# Patient Record
Sex: FEMALE | Race: WHITE | NOT HISPANIC OR LATINO | Employment: UNEMPLOYED | URBAN - METROPOLITAN AREA
[De-identification: names, ages, dates, MRNs, and addresses within clinical notes are randomized per-mention and may not be internally consistent; named-entity substitution may affect disease eponyms.]

---

## 2017-03-11 ENCOUNTER — HOSPITAL ENCOUNTER (INPATIENT)
Facility: HOSPITAL | Age: 42
LOS: 1 days | Discharge: LEFT BEFORE DISCHARGE | DRG: 305 | End: 2017-03-13
Attending: EMERGENCY MEDICINE | Admitting: GENERAL PRACTICE
Payer: COMMERCIAL

## 2017-03-11 ENCOUNTER — APPOINTMENT (EMERGENCY)
Dept: RADIOLOGY | Facility: HOSPITAL | Age: 42
DRG: 305 | End: 2017-03-11
Payer: COMMERCIAL

## 2017-03-11 DIAGNOSIS — I25.2 HISTORY OF MI (MYOCARDIAL INFARCTION): ICD-10-CM

## 2017-03-11 DIAGNOSIS — I50.9 CONGESTIVE HEART FAILURE, UNSPECIFIED CONGESTIVE HEART FAILURE CHRONICITY, UNSPECIFIED CONGESTIVE HEART FAILURE TYPE: ICD-10-CM

## 2017-03-11 DIAGNOSIS — R07.9 CHEST PAIN: Primary | ICD-10-CM

## 2017-03-11 DIAGNOSIS — R04.2 HEMOPTYSIS: ICD-10-CM

## 2017-03-11 PROBLEM — I10 ESSENTIAL HYPERTENSION: Status: ACTIVE | Noted: 2017-03-11

## 2017-03-11 PROBLEM — Z72.0 TOBACCO ABUSE: Status: ACTIVE | Noted: 2017-03-11

## 2017-03-11 PROBLEM — R04.0 EPISTAXIS: Status: ACTIVE | Noted: 2017-03-11

## 2017-03-11 LAB
ANION GAP SERPL CALCULATED.3IONS-SCNC: 11 MMOL/L (ref 4–13)
ANION GAP SERPL CALCULATED.3IONS-SCNC: 11 MMOL/L (ref 4–13)
BASOPHILS # BLD AUTO: 0.07 THOUSANDS/ΜL (ref 0–0.1)
BASOPHILS NFR BLD AUTO: 1 % (ref 0–1)
BUN SERPL-MCNC: 10 MG/DL (ref 5–25)
BUN SERPL-MCNC: 12 MG/DL (ref 5–25)
CALCIUM SERPL-MCNC: 7.5 MG/DL (ref 8.3–10.1)
CALCIUM SERPL-MCNC: 8.8 MG/DL (ref 8.3–10.1)
CHLORIDE SERPL-SCNC: 107 MMOL/L (ref 100–108)
CHLORIDE SERPL-SCNC: 109 MMOL/L (ref 100–108)
CHOLEST SERPL-MCNC: 156 MG/DL (ref 50–200)
CO2 SERPL-SCNC: 19 MMOL/L (ref 21–32)
CO2 SERPL-SCNC: 22 MMOL/L (ref 21–32)
CREAT SERPL-MCNC: 0.89 MG/DL (ref 0.6–1.3)
CREAT SERPL-MCNC: 1.07 MG/DL (ref 0.6–1.3)
EOSINOPHIL # BLD AUTO: 0.33 THOUSAND/ΜL (ref 0–0.61)
EOSINOPHIL NFR BLD AUTO: 2 % (ref 0–6)
ERYTHROCYTE [DISTWIDTH] IN BLOOD BY AUTOMATED COUNT: 16 % (ref 11.6–15.1)
ERYTHROCYTE [DISTWIDTH] IN BLOOD BY AUTOMATED COUNT: 16 % (ref 11.6–15.1)
GFR SERPL CREATININE-BSD FRML MDRD: 57.1 ML/MIN/1.73SQ M
GFR SERPL CREATININE-BSD FRML MDRD: >60 ML/MIN/1.73SQ M
GLUCOSE SERPL-MCNC: 82 MG/DL (ref 65–140)
GLUCOSE SERPL-MCNC: 93 MG/DL (ref 65–140)
HCG SERPL QL: NEGATIVE
HCG UR QL: NORMAL
HCT VFR BLD AUTO: 37.9 % (ref 34.8–46.1)
HCT VFR BLD AUTO: 41.1 % (ref 34.8–46.1)
HDLC SERPL-MCNC: 34 MG/DL (ref 40–60)
HGB BLD-MCNC: 13.4 G/DL (ref 11.5–15.4)
HGB BLD-MCNC: 14.4 G/DL (ref 11.5–15.4)
LDLC SERPL CALC-MCNC: 87 MG/DL (ref 0–100)
LYMPHOCYTES # BLD AUTO: 3.09 THOUSANDS/ΜL (ref 0.6–4.47)
LYMPHOCYTES NFR BLD AUTO: 23 % (ref 14–44)
MAGNESIUM SERPL-MCNC: 1.4 MG/DL (ref 1.6–2.6)
MCH RBC QN AUTO: 28.6 PG (ref 26.8–34.3)
MCH RBC QN AUTO: 28.9 PG (ref 26.8–34.3)
MCHC RBC AUTO-ENTMCNC: 35 G/DL (ref 31.4–37.4)
MCHC RBC AUTO-ENTMCNC: 35.4 G/DL (ref 31.4–37.4)
MCV RBC AUTO: 82 FL (ref 82–98)
MCV RBC AUTO: 82 FL (ref 82–98)
MONOCYTES # BLD AUTO: 1.06 THOUSAND/ΜL (ref 0.17–1.22)
MONOCYTES NFR BLD AUTO: 8 % (ref 4–12)
NEUTROPHILS # BLD AUTO: 8.96 THOUSANDS/ΜL (ref 1.85–7.62)
NEUTS SEG NFR BLD AUTO: 66 % (ref 43–75)
NRBC BLD AUTO-RTO: 0 /100 WBCS
NT-PROBNP SERPL-MCNC: 333 PG/ML
PLATELET # BLD AUTO: 318 THOUSANDS/UL (ref 149–390)
PLATELET # BLD AUTO: 342 THOUSANDS/UL (ref 149–390)
PMV BLD AUTO: 10.4 FL (ref 8.9–12.7)
PMV BLD AUTO: 10.5 FL (ref 8.9–12.7)
POTASSIUM SERPL-SCNC: 3.7 MMOL/L (ref 3.5–5.3)
POTASSIUM SERPL-SCNC: 4.1 MMOL/L (ref 3.5–5.3)
RBC # BLD AUTO: 4.63 MILLION/UL (ref 3.81–5.12)
RBC # BLD AUTO: 5.03 MILLION/UL (ref 3.81–5.12)
SODIUM SERPL-SCNC: 139 MMOL/L (ref 136–145)
SODIUM SERPL-SCNC: 140 MMOL/L (ref 136–145)
TRIGL SERPL-MCNC: 176 MG/DL
TROPONIN I SERPL-MCNC: 0.02 NG/ML
TROPONIN I SERPL-MCNC: <0.02 NG/ML
WBC # BLD AUTO: 12.67 THOUSAND/UL (ref 4.31–10.16)
WBC # BLD AUTO: 13.59 THOUSAND/UL (ref 4.31–10.16)

## 2017-03-11 PROCEDURE — 84703 CHORIONIC GONADOTROPIN ASSAY: CPT | Performed by: INTERNAL MEDICINE

## 2017-03-11 PROCEDURE — 96374 THER/PROPH/DIAG INJ IV PUSH: CPT

## 2017-03-11 PROCEDURE — 81025 URINE PREGNANCY TEST: CPT | Performed by: EMERGENCY MEDICINE

## 2017-03-11 PROCEDURE — 80048 BASIC METABOLIC PNL TOTAL CA: CPT | Performed by: PHYSICIAN ASSISTANT

## 2017-03-11 PROCEDURE — 85027 COMPLETE CBC AUTOMATED: CPT | Performed by: PHYSICIAN ASSISTANT

## 2017-03-11 PROCEDURE — 93005 ELECTROCARDIOGRAM TRACING: CPT | Performed by: EMERGENCY MEDICINE

## 2017-03-11 PROCEDURE — 83880 ASSAY OF NATRIURETIC PEPTIDE: CPT | Performed by: PHYSICIAN ASSISTANT

## 2017-03-11 PROCEDURE — 83735 ASSAY OF MAGNESIUM: CPT | Performed by: PHYSICIAN ASSISTANT

## 2017-03-11 PROCEDURE — 99285 EMERGENCY DEPT VISIT HI MDM: CPT

## 2017-03-11 PROCEDURE — 80048 BASIC METABOLIC PNL TOTAL CA: CPT | Performed by: EMERGENCY MEDICINE

## 2017-03-11 PROCEDURE — 36415 COLL VENOUS BLD VENIPUNCTURE: CPT | Performed by: EMERGENCY MEDICINE

## 2017-03-11 PROCEDURE — 84484 ASSAY OF TROPONIN QUANT: CPT | Performed by: PHYSICIAN ASSISTANT

## 2017-03-11 PROCEDURE — 84484 ASSAY OF TROPONIN QUANT: CPT | Performed by: EMERGENCY MEDICINE

## 2017-03-11 PROCEDURE — 80061 LIPID PANEL: CPT | Performed by: PHYSICIAN ASSISTANT

## 2017-03-11 PROCEDURE — 71020 HB CHEST X-RAY 2VW FRONTAL&LATL: CPT

## 2017-03-11 PROCEDURE — 85025 COMPLETE CBC W/AUTO DIFF WBC: CPT | Performed by: EMERGENCY MEDICINE

## 2017-03-11 RX ORDER — NIACIN 100 MG
100 TABLET ORAL
COMMUNITY
End: 2018-10-11

## 2017-03-11 RX ORDER — NICOTINE 21 MG/24HR
1 PATCH, TRANSDERMAL 24 HOURS TRANSDERMAL DAILY
Status: DISCONTINUED | OUTPATIENT
Start: 2017-03-11 | End: 2017-03-13 | Stop reason: HOSPADM

## 2017-03-11 RX ORDER — ENALAPRIL MALEATE 10 MG/1
10 TABLET ORAL DAILY
COMMUNITY
End: 2017-03-13 | Stop reason: HOSPADM

## 2017-03-11 RX ORDER — CLOPIDOGREL BISULFATE 75 MG/1
75 TABLET ORAL DAILY
Status: DISCONTINUED | OUTPATIENT
Start: 2017-03-11 | End: 2017-03-13 | Stop reason: HOSPADM

## 2017-03-11 RX ORDER — ASPIRIN 325 MG
325 TABLET ORAL DAILY
COMMUNITY
End: 2021-04-01 | Stop reason: HOSPADM

## 2017-03-11 RX ORDER — CALCIUM CARBONATE 200(500)MG
1000 TABLET,CHEWABLE ORAL DAILY PRN
Status: DISCONTINUED | OUTPATIENT
Start: 2017-03-11 | End: 2017-03-13 | Stop reason: HOSPADM

## 2017-03-11 RX ORDER — LORAZEPAM 1 MG/1
1 TABLET ORAL EVERY 6 HOURS PRN
Status: DISCONTINUED | OUTPATIENT
Start: 2017-03-11 | End: 2017-03-13 | Stop reason: HOSPADM

## 2017-03-11 RX ORDER — LORAZEPAM 2 MG/ML
0.5 INJECTION INTRAMUSCULAR 4 TIMES DAILY PRN
Status: DISCONTINUED | OUTPATIENT
Start: 2017-03-11 | End: 2017-03-11

## 2017-03-11 RX ORDER — LANOLIN ALCOHOL/MO/W.PET/CERES
6 CREAM (GRAM) TOPICAL ONCE
Status: COMPLETED | OUTPATIENT
Start: 2017-03-11 | End: 2017-03-11

## 2017-03-11 RX ORDER — NITROGLYCERIN 0.4 MG/1
0.4 TABLET SUBLINGUAL ONCE
Status: COMPLETED | OUTPATIENT
Start: 2017-03-11 | End: 2017-03-11

## 2017-03-11 RX ORDER — ASPIRIN 325 MG
325 TABLET ORAL DAILY
Status: DISCONTINUED | OUTPATIENT
Start: 2017-03-11 | End: 2017-03-13 | Stop reason: HOSPADM

## 2017-03-11 RX ORDER — CARVEDILOL 3.12 MG/1
6.25 TABLET ORAL DAILY
COMMUNITY
End: 2018-08-29

## 2017-03-11 RX ORDER — MAGNESIUM SULFATE HEPTAHYDRATE 40 MG/ML
2 INJECTION, SOLUTION INTRAVENOUS ONCE
Status: COMPLETED | OUTPATIENT
Start: 2017-03-11 | End: 2017-03-11

## 2017-03-11 RX ORDER — ATORVASTATIN CALCIUM 40 MG/1
40 TABLET, FILM COATED ORAL DAILY
COMMUNITY
End: 2020-07-08 | Stop reason: SDUPTHER

## 2017-03-11 RX ORDER — NIACIN 100 MG
100 TABLET ORAL
Status: DISCONTINUED | OUTPATIENT
Start: 2017-03-11 | End: 2017-03-13 | Stop reason: HOSPADM

## 2017-03-11 RX ORDER — POTASSIUM CHLORIDE 600 MG/1
8 TABLET, FILM COATED, EXTENDED RELEASE ORAL DAILY
COMMUNITY
End: 2018-08-29

## 2017-03-11 RX ORDER — NITROGLYCERIN 0.4 MG/1
0.4 TABLET SUBLINGUAL
Status: DISCONTINUED | OUTPATIENT
Start: 2017-03-11 | End: 2017-03-13 | Stop reason: HOSPADM

## 2017-03-11 RX ORDER — ONDANSETRON 2 MG/ML
4 INJECTION INTRAMUSCULAR; INTRAVENOUS EVERY 6 HOURS PRN
Status: DISCONTINUED | OUTPATIENT
Start: 2017-03-11 | End: 2017-03-13 | Stop reason: HOSPADM

## 2017-03-11 RX ORDER — SPIRONOLACTONE 25 MG/1
25 TABLET ORAL DAILY
COMMUNITY
End: 2018-08-29

## 2017-03-11 RX ORDER — NITROGLYCERIN 0.4 MG/1
0.4 TABLET SUBLINGUAL
COMMUNITY
End: 2020-07-08

## 2017-03-11 RX ORDER — CLOPIDOGREL BISULFATE 75 MG/1
75 TABLET ORAL DAILY
COMMUNITY
End: 2018-08-29

## 2017-03-11 RX ORDER — LORAZEPAM 2 MG/ML
1 INJECTION INTRAMUSCULAR ONCE
Status: COMPLETED | OUTPATIENT
Start: 2017-03-11 | End: 2017-03-11

## 2017-03-11 RX ORDER — FUROSEMIDE 10 MG/ML
20 INJECTION INTRAMUSCULAR; INTRAVENOUS ONCE
Status: COMPLETED | OUTPATIENT
Start: 2017-03-11 | End: 2017-03-11

## 2017-03-11 RX ORDER — CARVEDILOL 12.5 MG/1
12.5 TABLET ORAL DAILY
Status: DISCONTINUED | OUTPATIENT
Start: 2017-03-12 | End: 2017-03-13 | Stop reason: HOSPADM

## 2017-03-11 RX ORDER — CARVEDILOL 3.12 MG/1
3.12 TABLET ORAL DAILY
Status: DISCONTINUED | OUTPATIENT
Start: 2017-03-11 | End: 2017-03-11

## 2017-03-11 RX ORDER — ACETAMINOPHEN 325 MG/1
650 TABLET ORAL EVERY 4 HOURS PRN
Status: DISCONTINUED | OUTPATIENT
Start: 2017-03-11 | End: 2017-03-13 | Stop reason: HOSPADM

## 2017-03-11 RX ORDER — ENALAPRIL MALEATE 10 MG/1
10 TABLET ORAL DAILY
Status: DISCONTINUED | OUTPATIENT
Start: 2017-03-11 | End: 2017-03-12

## 2017-03-11 RX ADMIN — METOPROLOL TARTRATE 5 MG: 5 INJECTION, SOLUTION INTRAVENOUS at 07:56

## 2017-03-11 RX ADMIN — FUROSEMIDE 20 MG: 10 INJECTION, SOLUTION INTRAMUSCULAR; INTRAVENOUS at 04:56

## 2017-03-11 RX ADMIN — LORAZEPAM 0.5 MG: 2 INJECTION INTRAMUSCULAR; INTRAVENOUS at 07:55

## 2017-03-11 RX ADMIN — LORAZEPAM 1 MG: 1 TABLET ORAL at 12:01

## 2017-03-11 RX ADMIN — CLOPIDOGREL 75 MG: 75 TABLET, FILM COATED ORAL at 08:19

## 2017-03-11 RX ADMIN — ENALAPRIL MALEATE 10 MG: 10 TABLET ORAL at 11:20

## 2017-03-11 RX ADMIN — NITROGLYCERIN 0.4 MG: 0.4 TABLET SUBLINGUAL at 02:04

## 2017-03-11 RX ADMIN — MELATONIN TAB 3 MG 6 MG: 3 TAB at 21:25

## 2017-03-11 RX ADMIN — ASPIRIN 325 MG: 325 TABLET ORAL at 08:18

## 2017-03-11 RX ADMIN — MAGNESIUM SULFATE HEPTAHYDRATE 2 G: 40 INJECTION, SOLUTION INTRAVENOUS at 12:01

## 2017-03-11 RX ADMIN — LORAZEPAM 1 MG: 2 INJECTION INTRAMUSCULAR; INTRAVENOUS at 02:04

## 2017-03-11 RX ADMIN — Medication 100 MG: at 08:17

## 2017-03-11 RX ADMIN — NICOTINE 1 PATCH: 14 PATCH TRANSDERMAL at 08:14

## 2017-03-11 RX ADMIN — CARVEDILOL 3.12 MG: 3.12 TABLET, FILM COATED ORAL at 09:16

## 2017-03-11 RX ADMIN — LORAZEPAM 1 MG: 1 TABLET ORAL at 18:32

## 2017-03-12 LAB
ANION GAP SERPL CALCULATED.3IONS-SCNC: 9 MMOL/L (ref 4–13)
APTT PPP: 25 SECONDS (ref 24–36)
BASOPHILS # BLD AUTO: 0.09 THOUSANDS/ΜL (ref 0–0.1)
BASOPHILS NFR BLD AUTO: 1 % (ref 0–1)
BUN SERPL-MCNC: 14 MG/DL (ref 5–25)
CALCIUM SERPL-MCNC: 8.4 MG/DL (ref 8.3–10.1)
CHLORIDE SERPL-SCNC: 107 MMOL/L (ref 100–108)
CO2 SERPL-SCNC: 24 MMOL/L (ref 21–32)
CREAT SERPL-MCNC: 1.06 MG/DL (ref 0.6–1.3)
EOSINOPHIL # BLD AUTO: 0.41 THOUSAND/ΜL (ref 0–0.61)
EOSINOPHIL NFR BLD AUTO: 3 % (ref 0–6)
ERYTHROCYTE [DISTWIDTH] IN BLOOD BY AUTOMATED COUNT: 16.1 % (ref 11.6–15.1)
GFR SERPL CREATININE-BSD FRML MDRD: 57.7 ML/MIN/1.73SQ M
GLUCOSE SERPL-MCNC: 91 MG/DL (ref 65–140)
HCT VFR BLD AUTO: 42.3 % (ref 34.8–46.1)
HGB BLD-MCNC: 14.4 G/DL (ref 11.5–15.4)
INR PPP: 0.99 (ref 0.86–1.16)
LYMPHOCYTES # BLD AUTO: 2.98 THOUSANDS/ΜL (ref 0.6–4.47)
LYMPHOCYTES NFR BLD AUTO: 24 % (ref 14–44)
MAGNESIUM SERPL-MCNC: 1.9 MG/DL (ref 1.6–2.6)
MCH RBC QN AUTO: 28.3 PG (ref 26.8–34.3)
MCHC RBC AUTO-ENTMCNC: 34 G/DL (ref 31.4–37.4)
MCV RBC AUTO: 83 FL (ref 82–98)
MONOCYTES # BLD AUTO: 1.01 THOUSAND/ΜL (ref 0.17–1.22)
MONOCYTES NFR BLD AUTO: 8 % (ref 4–12)
NEUTROPHILS # BLD AUTO: 7.69 THOUSANDS/ΜL (ref 1.85–7.62)
NEUTS SEG NFR BLD AUTO: 63 % (ref 43–75)
NRBC BLD AUTO-RTO: 0 /100 WBCS
PLATELET # BLD AUTO: 359 THOUSANDS/UL (ref 149–390)
PMV BLD AUTO: 10.9 FL (ref 8.9–12.7)
POTASSIUM SERPL-SCNC: 4.3 MMOL/L (ref 3.5–5.3)
PROTHROMBIN TIME: 13 SECONDS (ref 12–14.3)
RBC # BLD AUTO: 5.09 MILLION/UL (ref 3.81–5.12)
SODIUM SERPL-SCNC: 140 MMOL/L (ref 136–145)
WBC # BLD AUTO: 12.24 THOUSAND/UL (ref 4.31–10.16)

## 2017-03-12 PROCEDURE — 85730 THROMBOPLASTIN TIME PARTIAL: CPT | Performed by: FAMILY MEDICINE

## 2017-03-12 PROCEDURE — 80048 BASIC METABOLIC PNL TOTAL CA: CPT | Performed by: GENERAL PRACTICE

## 2017-03-12 PROCEDURE — 85025 COMPLETE CBC W/AUTO DIFF WBC: CPT | Performed by: GENERAL PRACTICE

## 2017-03-12 PROCEDURE — 83735 ASSAY OF MAGNESIUM: CPT | Performed by: GENERAL PRACTICE

## 2017-03-12 PROCEDURE — 85610 PROTHROMBIN TIME: CPT | Performed by: FAMILY MEDICINE

## 2017-03-12 RX ORDER — ENALAPRIL MALEATE 10 MG/1
20 TABLET ORAL DAILY
Status: DISCONTINUED | OUTPATIENT
Start: 2017-03-13 | End: 2017-03-13

## 2017-03-12 RX ADMIN — NICOTINE 1 PATCH: 14 PATCH TRANSDERMAL at 08:40

## 2017-03-12 RX ADMIN — ENOXAPARIN SODIUM 40 MG: 40 INJECTION SUBCUTANEOUS at 11:14

## 2017-03-12 RX ADMIN — ONDANSETRON 4 MG: 2 INJECTION INTRAMUSCULAR; INTRAVENOUS at 15:41

## 2017-03-12 RX ADMIN — LORAZEPAM 1 MG: 1 TABLET ORAL at 17:40

## 2017-03-12 RX ADMIN — CLOPIDOGREL 75 MG: 75 TABLET, FILM COATED ORAL at 08:40

## 2017-03-12 RX ADMIN — ACETAMINOPHEN 650 MG: 325 TABLET ORAL at 15:41

## 2017-03-12 RX ADMIN — ASPIRIN 325 MG: 325 TABLET ORAL at 08:40

## 2017-03-12 RX ADMIN — LORAZEPAM 1 MG: 1 TABLET ORAL at 08:40

## 2017-03-12 RX ADMIN — CARVEDILOL 12.5 MG: 12.5 TABLET, FILM COATED ORAL at 08:40

## 2017-03-12 RX ADMIN — NITROGLYCERIN 1 INCH: 20 OINTMENT TOPICAL at 17:34

## 2017-03-12 RX ADMIN — ENALAPRIL MALEATE 10 MG: 10 TABLET ORAL at 08:40

## 2017-03-12 RX ADMIN — Medication 100 MG: at 09:58

## 2017-03-12 RX ADMIN — NITROGLYCERIN 1 INCH: 20 OINTMENT TOPICAL at 11:15

## 2017-03-13 ENCOUNTER — APPOINTMENT (OUTPATIENT)
Dept: NUCLEAR MEDICINE | Facility: HOSPITAL | Age: 42
DRG: 305 | End: 2017-03-13
Payer: COMMERCIAL

## 2017-03-13 ENCOUNTER — APPOINTMENT (OUTPATIENT)
Dept: NON INVASIVE DIAGNOSTICS | Facility: HOSPITAL | Age: 42
DRG: 305 | End: 2017-03-13
Payer: COMMERCIAL

## 2017-03-13 VITALS
SYSTOLIC BLOOD PRESSURE: 159 MMHG | TEMPERATURE: 98.6 F | HEART RATE: 72 BPM | BODY MASS INDEX: 34.53 KG/M2 | OXYGEN SATURATION: 99 % | WEIGHT: 220 LBS | DIASTOLIC BLOOD PRESSURE: 101 MMHG | HEIGHT: 67 IN | RESPIRATION RATE: 18 BRPM

## 2017-03-13 LAB
ATRIAL RATE: 75 BPM
BACTERIA UR QL AUTO: ABNORMAL /HPF
BILIRUB UR QL STRIP: NEGATIVE
CLARITY UR: ABNORMAL
COLOR UR: YELLOW
GLUCOSE UR STRIP-MCNC: NEGATIVE MG/DL
HGB UR QL STRIP.AUTO: ABNORMAL
KETONES UR STRIP-MCNC: NEGATIVE MG/DL
LEUKOCYTE ESTERASE UR QL STRIP: NEGATIVE
MAX DIASTOLIC BP: 90 MMHG
MAX HEART RATE: 100 BPM
MAX PREDICTED HEART RATE: 181 BPM
MAX. SYSTOLIC BP: 162 MMHG
NITRITE UR QL STRIP: POSITIVE
NON-SQ EPI CELLS URNS QL MICRO: ABNORMAL /HPF
P AXIS: 22 DEGREES
PH UR STRIP.AUTO: 5.5 [PH] (ref 4.5–8)
PR INTERVAL: 150 MS
PROT UR STRIP-MCNC: ABNORMAL MG/DL
PROTOCOL NAME: NORMAL
QRS AXIS: 14 DEGREES
QRSD INTERVAL: 88 MS
QT INTERVAL: 410 MS
QTC INTERVAL: 457 MS
RBC #/AREA URNS AUTO: ABNORMAL /HPF
REASON FOR TERMINATION: NORMAL
SP GR UR STRIP.AUTO: 1.02 (ref 1–1.03)
T WAVE AXIS: 18 DEGREES
TARGET HR FORMULA: NORMAL
TEST INDICATION: NORMAL
TIME IN EXERCISE PHASE: 180 S
UROBILINOGEN UR QL STRIP.AUTO: 0.2 E.U./DL
VENTRICULAR RATE: 75 BPM
WBC #/AREA URNS AUTO: ABNORMAL /HPF

## 2017-03-13 PROCEDURE — 87077 CULTURE AEROBIC IDENTIFY: CPT | Performed by: GENERAL PRACTICE

## 2017-03-13 PROCEDURE — 81001 URINALYSIS AUTO W/SCOPE: CPT | Performed by: GENERAL PRACTICE

## 2017-03-13 PROCEDURE — 78452 HT MUSCLE IMAGE SPECT MULT: CPT

## 2017-03-13 PROCEDURE — 87040 BLOOD CULTURE FOR BACTERIA: CPT | Performed by: GENERAL PRACTICE

## 2017-03-13 PROCEDURE — 93017 CV STRESS TEST TRACING ONLY: CPT

## 2017-03-13 PROCEDURE — A9502 TC99M TETROFOSMIN: HCPCS

## 2017-03-13 PROCEDURE — 87086 URINE CULTURE/COLONY COUNT: CPT | Performed by: GENERAL PRACTICE

## 2017-03-13 PROCEDURE — 87186 SC STD MICRODIL/AGAR DIL: CPT | Performed by: GENERAL PRACTICE

## 2017-03-13 RX ORDER — ENALAPRIL MALEATE 10 MG/1
20 TABLET ORAL 2 TIMES DAILY
Status: DISCONTINUED | OUTPATIENT
Start: 2017-03-13 | End: 2017-03-13 | Stop reason: HOSPADM

## 2017-03-13 RX ORDER — ENALAPRIL MALEATE 20 MG/1
20 TABLET ORAL 2 TIMES DAILY
Qty: 60 TABLET | Refills: 0 | Status: SHIPPED | OUTPATIENT
Start: 2017-03-13 | End: 2018-08-29

## 2017-03-13 RX ADMIN — CARVEDILOL 12.5 MG: 12.5 TABLET, FILM COATED ORAL at 08:00

## 2017-03-13 RX ADMIN — ASPIRIN 325 MG: 325 TABLET ORAL at 08:00

## 2017-03-13 RX ADMIN — REGADENOSON 0.4 MG: 0.08 INJECTION, SOLUTION INTRAVENOUS at 09:39

## 2017-03-13 RX ADMIN — LORAZEPAM 1 MG: 1 TABLET ORAL at 07:59

## 2017-03-13 RX ADMIN — ENALAPRIL MALEATE 20 MG: 10 TABLET ORAL at 08:02

## 2017-03-13 RX ADMIN — NICOTINE 1 PATCH: 14 PATCH TRANSDERMAL at 08:00

## 2017-03-13 RX ADMIN — Medication 100 MG: at 08:00

## 2017-03-13 RX ADMIN — CLOPIDOGREL 75 MG: 75 TABLET, FILM COATED ORAL at 08:00

## 2017-03-13 RX ADMIN — ENOXAPARIN SODIUM 40 MG: 40 INJECTION SUBCUTANEOUS at 08:00

## 2017-03-16 LAB — BACTERIA UR CULT: NORMAL

## 2017-03-18 LAB
BACTERIA BLD CULT: NORMAL
BACTERIA BLD CULT: NORMAL

## 2017-06-29 ENCOUNTER — GENERIC CONVERSION - ENCOUNTER (OUTPATIENT)
Dept: OTHER | Facility: OTHER | Age: 42
End: 2017-06-29

## 2017-08-09 ENCOUNTER — GENERIC CONVERSION - ENCOUNTER (OUTPATIENT)
Dept: OTHER | Facility: OTHER | Age: 42
End: 2017-08-09

## 2018-02-15 ENCOUNTER — APPOINTMENT (OUTPATIENT)
Dept: LAB | Facility: CLINIC | Age: 43
End: 2018-02-15
Payer: COMMERCIAL

## 2018-02-15 ENCOUNTER — TRANSCRIBE ORDERS (OUTPATIENT)
Dept: LAB | Facility: CLINIC | Age: 43
End: 2018-02-15

## 2018-02-15 DIAGNOSIS — E78.2 MIXED HYPERLIPIDEMIA: ICD-10-CM

## 2018-02-15 DIAGNOSIS — E78.2 MIXED HYPERLIPIDEMIA: Primary | ICD-10-CM

## 2018-02-15 LAB
CHOLEST SERPL-MCNC: 249 MG/DL (ref 50–200)
HDLC SERPL-MCNC: 32 MG/DL (ref 40–60)
LDLC SERPL DIRECT ASSAY-MCNC: 160 MG/DL (ref 0–100)
TRIGL SERPL-MCNC: 401 MG/DL

## 2018-02-15 PROCEDURE — 36415 COLL VENOUS BLD VENIPUNCTURE: CPT

## 2018-02-15 PROCEDURE — 83721 ASSAY OF BLOOD LIPOPROTEIN: CPT

## 2018-02-15 PROCEDURE — 80061 LIPID PANEL: CPT

## 2018-08-29 ENCOUNTER — APPOINTMENT (EMERGENCY)
Dept: CT IMAGING | Facility: HOSPITAL | Age: 43
End: 2018-08-29
Payer: COMMERCIAL

## 2018-08-29 ENCOUNTER — HOSPITAL ENCOUNTER (EMERGENCY)
Facility: HOSPITAL | Age: 43
Discharge: HOME | End: 2018-08-29
Attending: EMERGENCY MEDICINE
Payer: COMMERCIAL

## 2018-08-29 VITALS
DIASTOLIC BLOOD PRESSURE: 107 MMHG | RESPIRATION RATE: 24 BRPM | BODY MASS INDEX: 38.57 KG/M2 | OXYGEN SATURATION: 98 % | HEIGHT: 66 IN | WEIGHT: 240 LBS | SYSTOLIC BLOOD PRESSURE: 168 MMHG | HEART RATE: 82 BPM | TEMPERATURE: 98.3 F

## 2018-08-29 DIAGNOSIS — J03.90 TONSILLITIS: Primary | ICD-10-CM

## 2018-08-29 LAB
ANION GAP SERPL CALCULATED.3IONS-SCNC: 10 MMOL/L (ref 4–13)
BASOPHILS # BLD AUTO: 0.1 THOUSANDS/ΜL (ref 0–0.1)
BASOPHILS NFR BLD AUTO: 0 % (ref 0–2)
BUN SERPL-MCNC: 18 MG/DL (ref 7–25)
CALCIUM SERPL-MCNC: 9.2 MG/DL (ref 8.6–10.5)
CHLORIDE SERPL-SCNC: 102 MMOL/L (ref 98–107)
CO2 SERPL-SCNC: 25 MMOL/L (ref 21–31)
CREAT SERPL-MCNC: 1.36 MG/DL (ref 0.6–1.2)
EOSINOPHIL # BLD AUTO: 0.3 THOUSAND/ΜL (ref 0–0.61)
EOSINOPHIL NFR BLD AUTO: 2 % (ref 0–5)
ERYTHROCYTE [DISTWIDTH] IN BLOOD BY AUTOMATED COUNT: 17.7 % (ref 11.5–14.5)
GFR SERPL CREATININE-BSD FRML MDRD: 48 ML/MIN/1.73SQ M
GLUCOSE SERPL-MCNC: 79 MG/DL (ref 65–99)
HCT VFR BLD AUTO: 41.4 % (ref 34.8–46.1)
HGB BLD-MCNC: 13.8 G/DL (ref 12–16)
LYMPHOCYTES # BLD AUTO: 2.7 THOUSANDS/ΜL (ref 0.6–4.47)
LYMPHOCYTES NFR BLD AUTO: 18 % (ref 21–51)
MCH RBC QN AUTO: 28.3 PG (ref 26–34)
MCHC RBC AUTO-ENTMCNC: 33.3 G/DL (ref 31–37)
MCV RBC AUTO: 85 FL (ref 81–99)
MONOCYTES # BLD AUTO: 1 THOUSAND/ΜL (ref 0.17–1.22)
MONOCYTES NFR BLD AUTO: 7 % (ref 2–12)
NEUTROPHILS # BLD AUTO: 10.9 THOUSANDS/ΜL (ref 1.4–6.5)
NEUTS SEG NFR BLD AUTO: 73 % (ref 42–75)
NRBC BLD AUTO-RTO: 0 /100 WBCS
PLATELET # BLD AUTO: 386 THOUSANDS/UL (ref 149–390)
PMV BLD AUTO: 8.3 FL (ref 8.6–11.7)
POTASSIUM SERPL-SCNC: 4.1 MMOL/L (ref 3.5–5.5)
RBC # BLD AUTO: 4.86 MILLION/UL (ref 3.9–5.2)
SODIUM SERPL-SCNC: 137 MMOL/L (ref 134–143)
WBC # BLD AUTO: 14.9 THOUSAND/UL (ref 4.8–10.8)

## 2018-08-29 PROCEDURE — 96374 THER/PROPH/DIAG INJ IV PUSH: CPT

## 2018-08-29 PROCEDURE — 80048 BASIC METABOLIC PNL TOTAL CA: CPT | Performed by: EMERGENCY MEDICINE

## 2018-08-29 PROCEDURE — 99284 EMERGENCY DEPT VISIT MOD MDM: CPT

## 2018-08-29 PROCEDURE — 96375 TX/PRO/DX INJ NEW DRUG ADDON: CPT

## 2018-08-29 PROCEDURE — 85025 COMPLETE CBC W/AUTO DIFF WBC: CPT | Performed by: EMERGENCY MEDICINE

## 2018-08-29 PROCEDURE — 36415 COLL VENOUS BLD VENIPUNCTURE: CPT | Performed by: EMERGENCY MEDICINE

## 2018-08-29 PROCEDURE — 96361 HYDRATE IV INFUSION ADD-ON: CPT

## 2018-08-29 PROCEDURE — 70491 CT SOFT TISSUE NECK W/DYE: CPT

## 2018-08-29 RX ORDER — PENICILLIN V POTASSIUM 500 MG/1
500 TABLET ORAL 4 TIMES DAILY
Qty: 40 TABLET | Refills: 0 | Status: SHIPPED | OUTPATIENT
Start: 2018-08-29 | End: 2018-09-05

## 2018-08-29 RX ORDER — METOPROLOL TARTRATE 50 MG/1
25 TABLET, FILM COATED ORAL EVERY 12 HOURS SCHEDULED
COMMUNITY
End: 2019-10-24

## 2018-08-29 RX ORDER — PENICILLIN V POTASSIUM 500 MG/1
500 TABLET ORAL ONCE
Status: COMPLETED | OUTPATIENT
Start: 2018-08-29 | End: 2018-08-29

## 2018-08-29 RX ORDER — KETOROLAC TROMETHAMINE 30 MG/ML
30 INJECTION, SOLUTION INTRAMUSCULAR; INTRAVENOUS ONCE
Status: COMPLETED | OUTPATIENT
Start: 2018-08-29 | End: 2018-08-29

## 2018-08-29 RX ORDER — LISINOPRIL 10 MG/1
10 TABLET ORAL DAILY
COMMUNITY
End: 2019-10-24

## 2018-08-29 RX ORDER — FUROSEMIDE 20 MG/1
20 TABLET ORAL DAILY
COMMUNITY
End: 2020-07-08

## 2018-08-29 RX ORDER — OXYCODONE HYDROCHLORIDE AND ACETAMINOPHEN 5; 325 MG/1; MG/1
1 TABLET ORAL ONCE
Status: COMPLETED | OUTPATIENT
Start: 2018-08-29 | End: 2018-08-29

## 2018-08-29 RX ORDER — DEXAMETHASONE SODIUM PHOSPHATE 4 MG/ML
8 INJECTION, SOLUTION INTRA-ARTICULAR; INTRALESIONAL; INTRAMUSCULAR; INTRAVENOUS; SOFT TISSUE ONCE
Status: COMPLETED | OUTPATIENT
Start: 2018-08-29 | End: 2018-08-29

## 2018-08-29 RX ADMIN — SODIUM CHLORIDE 1000 ML: 0.9 INJECTION, SOLUTION INTRAVENOUS at 20:02

## 2018-08-29 RX ADMIN — PENICILLIN V POTASSIUM 500 MG: 500 TABLET, FILM COATED ORAL at 22:28

## 2018-08-29 RX ADMIN — KETOROLAC TROMETHAMINE 30 MG: 30 INJECTION, SOLUTION INTRAMUSCULAR; INTRAVENOUS at 20:08

## 2018-08-29 RX ADMIN — DEXAMETHASONE SODIUM PHOSPHATE 8 MG: 4 INJECTION, SOLUTION INTRA-ARTICULAR; INTRALESIONAL; INTRAMUSCULAR; INTRAVENOUS; SOFT TISSUE at 20:03

## 2018-08-29 RX ADMIN — OXYCODONE HYDROCHLORIDE AND ACETAMINOPHEN 1 TABLET: 5; 325 TABLET ORAL at 22:28

## 2018-08-29 RX ADMIN — IOHEXOL 80 ML: 350 INJECTION, SOLUTION INTRAVENOUS at 21:03

## 2018-08-30 NOTE — DISCHARGE INSTRUCTIONS
Tonsillitis, Ambulatory Care   GENERAL INFORMATION:   Tonsillitis  is an inflammation of the tonsils  Tonsils are 2 large lumps of tissue in the back of your throat  They help fight infection  Tonsillitis may be caused by a bacterial or a viral infection  Common symptoms include the following:   · Severe sore throat    · Red, swollen tonsils    · Painful swallowing    · Fever and chills    · Bad breath    · White spots on the tonsils  Seek immediate care  if you have trouble breathing because your tonsils are swollen  Treatment for tonsillitis  may include medicine to decrease throat pain  Antibiotic medicine may be given if your tonsillitis was caused by bacteria  You may also need surgery to remove your tonsils for chronic or recurrent tonsillitis  Prevent the spread of germs  by washing your hands often  Do not share food or drinks with anyone  Ask when you can return to work  Manage your symptoms:   · Drink plenty of liquids  to help prevent dehydration  Ask your healthcare provider how much you should drink  · Gargle with warm salt water  to help decrease throat pain  Mix 1 teaspoon of salt in 1 cup of warm water  Ask how often you should do this  Follow up with your healthcare provider as directed:  Write down your questions so you remember to ask them during your visits  CARE AGREEMENT:   You have the right to help plan your care  Learn about your health condition and how it may be treated  Discuss treatment options with your caregivers to decide what care you want to receive  You always have the right to refuse treatment  The above information is an  only  It is not intended as medical advice for individual conditions or treatments  Talk to your doctor, nurse or pharmacist before following any medical regimen to see if it is safe and effective for you    © 2014 [employer] is for End User's use only and may not be sold, redistributed or otherwise used for commercial purposes  All illustrations and images included in CareNotes® are the copyrighted property of A D A M , Inc  or [employer]ant[employer]

## 2018-08-30 NOTE — ED PROVIDER NOTES
History  Chief Complaint   Patient presents with    Facial Swelling     patient states she woke up with a sore throat yesterday when she swallowed  Now the left side of her face is swollen  Patient unable to hold secretions  Sore Throat   Location:  Left  Quality:  Aching  Severity:  Moderate  Onset quality:  Gradual  Duration:  3 days  Timing:  Constant  Progression:  Worsening  Chronicity:  New  Relieved by:  Nothing  Exacerbated by: opening mouth  Ineffective treatments:  None tried  Associated symptoms: trouble swallowing    Associated symptoms: no abdominal pain, no chest pain, no chills, no epistaxis, no fever, no headaches, no rash and no shortness of breath    Associated symptoms comment:  ++trismus      Prior to Admission Medications   Prescriptions Last Dose Informant Patient Reported? Taking?   aspirin 325 mg tablet   Yes No   Sig: Take 325 mg by mouth daily   atorvastatin (LIPITOR) 40 mg tablet   Yes No   Sig: Take 40 mg by mouth daily   furosemide (LASIX) 20 mg tablet   Yes Yes   Sig: Take 20 mg by mouth daily   lisinopril (ZESTRIL) 10 mg tablet   Yes Yes   Sig: Take 10 mg by mouth daily   metoprolol tartrate (LOPRESSOR) 50 mg tablet   Yes Yes   Sig: Take 25 mg by mouth every 12 (twelve) hours   niacin 100 mg tablet   Yes No   Sig: Take 100 mg by mouth daily with breakfast   nitroglycerin (NITROSTAT) 0 4 mg SL tablet   Yes No   Sig: Place 0 4 mg under the tongue every 5 (five) minutes as needed for chest pain      Facility-Administered Medications: None       Past Medical History:   Diagnosis Date    [facility] ([facility] )     Cardiac disease     Cardiomyopathy ([facility] )     CHF (congestive heart failure) ([facility] )     Hypertension     Lymphoma, non-Hodgkin's ([facility] )     Tobacco abuse        Past Surgical History:   Procedure Laterality Date    CAROTID STENT       SECTION         History reviewed  No pertinent family history  I have reviewed and agree with the history as documented      Social History   Substance Use Topics    Smoking status: Current Every Day Smoker     Packs/day: 0 50    Smokeless tobacco: Never Used    Alcohol use No        Review of Systems   Constitutional: Negative for chills, fatigue, fever and unexpected weight change  HENT: Positive for sore throat and trouble swallowing  Negative for congestion and nosebleeds  Eyes: Negative for visual disturbance  Respiratory: Negative for chest tightness and shortness of breath  Cardiovascular: Negative for chest pain, palpitations and leg swelling  Gastrointestinal: Negative for abdominal pain, blood in stool, diarrhea, nausea and vomiting  Endocrine: Negative for cold intolerance and heat intolerance  Genitourinary: Negative for difficulty urinating  Musculoskeletal: Negative for arthralgias, back pain, gait problem, joint swelling and myalgias  Skin: Negative for rash  Neurological: Negative for dizziness, speech difficulty, weakness and headaches  Psychiatric/Behavioral: Negative for behavioral problems, confusion, self-injury and suicidal ideas  All other systems reviewed and are negative  Physical Exam  Physical Exam   Constitutional: She is oriented to person, place, and time  She appears well-developed and well-nourished  HENT:   Head: Normocephalic and atraumatic  Nose: Nose normal    Nl throat  No erythema or abscess    Mild L  Sided STS face    +trismus   Eyes: EOM are normal  Pupils are equal, round, and reactive to light  Neck: Normal range of motion  Neck supple  Cardiovascular: Normal rate, regular rhythm and normal heart sounds  Exam reveals no gallop and no friction rub  No murmur heard  Pulmonary/Chest: Effort normal and breath sounds normal  No respiratory distress  She has no wheezes  She has no rales  Abdominal: Soft  She exhibits no distension  There is no tenderness  There is no rebound and no guarding  Musculoskeletal: Normal range of motion  She exhibits no edema  Neurological: She is alert and oriented to person, place, and time  Skin: Skin is warm and dry  Psychiatric: She has a normal mood and affect  Her behavior is normal  Judgment and thought content normal    Nursing note and vitals reviewed        Vital Signs  ED Triage Vitals [08/29/18 1915]   Temperature Pulse Respirations Blood Pressure SpO2   (!) 97 4 °F (36 3 °C) 94 18 156/99 97 %      Temp Source Heart Rate Source Patient Position - Orthostatic VS BP Location FiO2 (%)   Temporal Monitor Sitting Left arm --      Pain Score       Worst Possible Pain           Vitals:    08/29/18 1915 08/29/18 2123 08/29/18 2230   BP: 156/99 (!) 172/94 (!) 168/107   Pulse: 94 85 82   Patient Position - Orthostatic VS: Sitting  Sitting       Visual Acuity      ED Medications  Medications   sodium chloride 0 9 % bolus 1,000 mL (0 mL Intravenous Stopped 8/29/18 2234)   dexamethasone (DECADRON) injection 8 mg (8 mg Intravenous Given 8/29/18 2003)   ketorolac (TORADOL) injection 30 mg (30 mg Intravenous Given 8/29/18 2008)   iohexol (OMNIPAQUE) 350 MG/ML injection (SINGLE-DOSE) 100 mL (80 mL Intravenous Given 8/29/18 2103)   penicillin V potassium (VEETID) tablet 500 mg (500 mg Oral Given 8/29/18 2228)   oxyCODONE-acetaminophen (PERCOCET) 5-325 mg per tablet 1 tablet (1 tablet Oral Given 8/29/18 2228)       Diagnostic Studies  Results Reviewed     Procedure Component Value Units Date/Time    Basic metabolic panel [[id number]]  (Abnormal) Collected:  08/29/18 2000    Lab Status:  Final result Specimen:  Blood from Arm, Left Updated:  08/29/18 2050     Sodium 137 mmol/L      Potassium 4 1 mmol/L      Chloride 102 mmol/L      CO2 25 mmol/L      ANION GAP 10 mmol/L      BUN 18 mg/dL      Creatinine 1 36 (H) mg/dL      Glucose 79 mg/dL      Calcium 9 2 mg/dL      eGFR 48 ml/min/1 73sq m     Narrative:         National Kidney Disease Education Program recommendations are as follows:  GFR calculation is accurate only with a steady state creatinine  Chronic Kidney disease less than 60 ml/min/1 73 sq  meters  Kidney failure less than 15 ml/min/1 73 sq  meters  CBC and differential [[id number]]  (Abnormal) Collected:  08/29/18 2000    Lab Status:  Final result Specimen:  Blood from Arm, Left Updated:  08/29/18 2020     WBC 14 90 (H) Thousand/uL      RBC 4 86 Million/uL      Hemoglobin 13 8 g/dL      Hematocrit 41 4 %      MCV 85 fL      MCH 28 3 pg      MCHC 33 3 g/dL      RDW 17 7 (H) %      MPV 8 3 (L) fL      Platelets 791 Thousands/uL      nRBC 0 /100 WBCs      Neutrophils Relative 73 %      Lymphocytes Relative 18 (L) %      Monocytes Relative 7 %      Eosinophils Relative 2 %      Basophils Relative 0 %      Neutrophils Absolute 10 90 (H) Thousands/µL      Lymphocytes Absolute 2 70 Thousands/µL      Monocytes Absolute 1 00 Thousand/µL      Eosinophils Absolute 0 30 Thousand/µL      Basophils Absolute 0 10 Thousands/µL                  CT soft tissue neck   Final Result by Nkechi Yeung (08/29 2213)   Mild fullness of the tonsils suggestive of mild tonsillitis  No evidence   of epiglottitis demonstrated  No significant adenopathy is demonstrated  No abscess demonstrated  Signed by Dalton Sandhu MD                 Procedures  Procedures       Phone Contacts  ED Phone Contact    ED Course  ED Course as of Aug 30 0153   Wed Aug 29, 2018   2209 Patient says that she is feeling better    2221 Consult this  Will treat with penicillin CT soft tissue neck                               MDM  CritCare Time    Disposition  Final diagnoses: Tonsillitis     Time reflects when diagnosis was documented in both MDM as applicable and the Disposition within this note     Time User Action Codes Description Comment    8/29/2018 10:21 PM John Fried Add [P26 02] Tonsillitis       ED Disposition     ED Disposition Condition Comment    Discharge  Simuel Koyanagi discharge to home/self care      Condition at discharge: Good        Follow-up Information Follow up With Specialties Details Why [address] Gonzalez, DO [facility]   [facility] [address] [facility]  [phone]            Discharge Medication List as of 8/29/2018 10:22 PM      START taking these medications    Details   penicillin V potassium (VEETID) 500 mg tablet Take 1 tablet (500 mg total) by mouth 4 (four) times a day for 7 days, Starting Wed 8/29/2018, Until Wed 9/5/2018, Print         CONTINUE these medications which have NOT CHANGED    Details   furosemide (LASIX) 20 mg tablet Take 20 mg by mouth daily, Historical Med      lisinopril (ZESTRIL) 10 mg tablet Take 10 mg by mouth daily, Historical Med      metoprolol tartrate (LOPRESSOR) 50 mg tablet Take 25 mg by mouth every 12 (twelve) hours, Historical Med      aspirin 325 mg tablet Take 325 mg by mouth daily, Until Discontinued, Historical Med      atorvastatin (LIPITOR) 40 mg tablet Take 40 mg by mouth daily, Until Discontinued, Historical Med      niacin 100 mg tablet Take 100 mg by mouth daily with breakfast, Until Discontinued, Historical Med      nitroglycerin (NITROSTAT) 0 4 mg SL tablet Place 0 4 mg under the tongue every 5 (five) minutes as needed for chest pain, Until Discontinued, Historical Med           No discharge procedures on file      ED Provider  Electronically Signed by           Irwin Person MD  08/29/18 0364       Irwin Person MD  08/30/18 3293

## 2018-10-11 ENCOUNTER — APPOINTMENT (EMERGENCY)
Dept: CT IMAGING | Facility: HOSPITAL | Age: 43
End: 2018-10-11
Payer: COMMERCIAL

## 2018-10-11 ENCOUNTER — APPOINTMENT (EMERGENCY)
Dept: RADIOLOGY | Facility: HOSPITAL | Age: 43
End: 2018-10-11
Payer: COMMERCIAL

## 2018-10-11 ENCOUNTER — HOSPITAL ENCOUNTER (EMERGENCY)
Facility: HOSPITAL | Age: 43
Discharge: HOME | End: 2018-10-11
Attending: EMERGENCY MEDICINE
Payer: COMMERCIAL

## 2018-10-11 VITALS
WEIGHT: 240 LBS | RESPIRATION RATE: 14 BRPM | TEMPERATURE: 97.4 F | SYSTOLIC BLOOD PRESSURE: 144 MMHG | BODY MASS INDEX: 37.67 KG/M2 | DIASTOLIC BLOOD PRESSURE: 67 MMHG | OXYGEN SATURATION: 96 % | HEART RATE: 70 BPM | HEIGHT: 67 IN

## 2018-10-11 DIAGNOSIS — D72.829 LEUKOCYTOSIS: ICD-10-CM

## 2018-10-11 DIAGNOSIS — F41.9 ANXIETY: ICD-10-CM

## 2018-10-11 DIAGNOSIS — E87.6 HYPOKALEMIA: ICD-10-CM

## 2018-10-11 DIAGNOSIS — N18.9 CHRONIC KIDNEY DISEASE: ICD-10-CM

## 2018-10-11 DIAGNOSIS — R11.2 NAUSEA AND VOMITING: ICD-10-CM

## 2018-10-11 DIAGNOSIS — R42 DIZZINESS: Primary | ICD-10-CM

## 2018-10-11 LAB
ALBUMIN SERPL BCP-MCNC: 3.6 G/DL (ref 3.5–5.7)
ALP SERPL-CCNC: 54 U/L (ref 40–150)
ALT SERPL W P-5'-P-CCNC: 37 U/L (ref 7–52)
ANION GAP SERPL CALCULATED.3IONS-SCNC: 10 MMOL/L (ref 4–13)
APTT PPP: 27 SECONDS (ref 24–36)
AST SERPL W P-5'-P-CCNC: 20 U/L (ref 13–39)
BASOPHILS # BLD AUTO: 0.1 THOUSANDS/ΜL (ref 0–0.1)
BASOPHILS NFR BLD AUTO: 1 % (ref 0–2)
BILIRUB SERPL-MCNC: 0.6 MG/DL (ref 0.2–1)
BNP SERPL-MCNC: 79 PG/ML (ref 1–100)
BUN SERPL-MCNC: 13 MG/DL (ref 7–25)
CALCIUM SERPL-MCNC: 9.3 MG/DL (ref 8.6–10.5)
CHLORIDE SERPL-SCNC: 107 MMOL/L (ref 98–107)
CO2 SERPL-SCNC: 21 MMOL/L (ref 21–31)
CREAT SERPL-MCNC: 1.21 MG/DL (ref 0.6–1.2)
EOSINOPHIL # BLD AUTO: 0.3 THOUSAND/ΜL (ref 0–0.61)
EOSINOPHIL NFR BLD AUTO: 2 % (ref 0–5)
ERYTHROCYTE [DISTWIDTH] IN BLOOD BY AUTOMATED COUNT: 17.1 % (ref 11.5–14.5)
GFR SERPL CREATININE-BSD FRML MDRD: 56 ML/MIN/1.73SQ M
GLUCOSE SERPL-MCNC: 121 MG/DL (ref 65–99)
HCT VFR BLD AUTO: 40.1 % (ref 34.8–46.1)
HGB BLD-MCNC: 14.2 G/DL (ref 12–16)
INR PPP: 1.17 (ref 0.9–1.5)
LYMPHOCYTES # BLD AUTO: 3.8 THOUSANDS/ΜL (ref 0.6–4.47)
LYMPHOCYTES NFR BLD AUTO: 23 % (ref 21–51)
MCH RBC QN AUTO: 29.8 PG (ref 26–34)
MCHC RBC AUTO-ENTMCNC: 35.5 G/DL (ref 31–37)
MCV RBC AUTO: 84 FL (ref 81–99)
MONOCYTES # BLD AUTO: 0.9 THOUSAND/ΜL (ref 0.17–1.22)
MONOCYTES NFR BLD AUTO: 6 % (ref 2–12)
NEUTROPHILS # BLD AUTO: 11.3 THOUSANDS/ΜL (ref 1.4–6.5)
NEUTS SEG NFR BLD AUTO: 69 % (ref 42–75)
NRBC BLD AUTO-RTO: 0 /100 WBCS
PLATELET # BLD AUTO: 375 THOUSANDS/UL (ref 149–390)
PMV BLD AUTO: 9.1 FL (ref 8.6–11.7)
POTASSIUM SERPL-SCNC: 3.3 MMOL/L (ref 3.5–5.5)
PROT SERPL-MCNC: 6.7 G/DL (ref 6.4–8.9)
PROTHROMBIN TIME: 13.6 SECONDS (ref 10.1–12.9)
RBC # BLD AUTO: 4.77 MILLION/UL (ref 3.9–5.2)
SODIUM SERPL-SCNC: 138 MMOL/L (ref 134–143)
TROPONIN I SERPL-MCNC: <0.03 NG/ML
WBC # BLD AUTO: 16.4 THOUSAND/UL (ref 4.8–10.8)

## 2018-10-11 PROCEDURE — 83880 ASSAY OF NATRIURETIC PEPTIDE: CPT | Performed by: EMERGENCY MEDICINE

## 2018-10-11 PROCEDURE — 96374 THER/PROPH/DIAG INJ IV PUSH: CPT

## 2018-10-11 PROCEDURE — 85025 COMPLETE CBC W/AUTO DIFF WBC: CPT | Performed by: EMERGENCY MEDICINE

## 2018-10-11 PROCEDURE — 70450 CT HEAD/BRAIN W/O DYE: CPT

## 2018-10-11 PROCEDURE — 80053 COMPREHEN METABOLIC PANEL: CPT | Performed by: EMERGENCY MEDICINE

## 2018-10-11 PROCEDURE — 84484 ASSAY OF TROPONIN QUANT: CPT | Performed by: EMERGENCY MEDICINE

## 2018-10-11 PROCEDURE — 96375 TX/PRO/DX INJ NEW DRUG ADDON: CPT

## 2018-10-11 PROCEDURE — 93005 ELECTROCARDIOGRAM TRACING: CPT

## 2018-10-11 PROCEDURE — 96361 HYDRATE IV INFUSION ADD-ON: CPT

## 2018-10-11 PROCEDURE — 85610 PROTHROMBIN TIME: CPT | Performed by: EMERGENCY MEDICINE

## 2018-10-11 PROCEDURE — 85730 THROMBOPLASTIN TIME PARTIAL: CPT | Performed by: EMERGENCY MEDICINE

## 2018-10-11 PROCEDURE — 36415 COLL VENOUS BLD VENIPUNCTURE: CPT | Performed by: EMERGENCY MEDICINE

## 2018-10-11 PROCEDURE — 71045 X-RAY EXAM CHEST 1 VIEW: CPT

## 2018-10-11 PROCEDURE — 99284 EMERGENCY DEPT VISIT MOD MDM: CPT

## 2018-10-11 RX ORDER — NITROGLYCERIN 0.4 MG/1
0.4 TABLET SUBLINGUAL ONCE
Status: COMPLETED | OUTPATIENT
Start: 2018-10-11 | End: 2018-10-11

## 2018-10-11 RX ORDER — LORAZEPAM 2 MG/ML
1 INJECTION INTRAMUSCULAR ONCE
Status: COMPLETED | OUTPATIENT
Start: 2018-10-11 | End: 2018-10-11

## 2018-10-11 RX ORDER — ASPIRIN 81 MG/1
324 TABLET, CHEWABLE ORAL ONCE
Status: COMPLETED | OUTPATIENT
Start: 2018-10-11 | End: 2018-10-11

## 2018-10-11 RX ORDER — POTASSIUM CHLORIDE 20 MEQ/1
40 TABLET, EXTENDED RELEASE ORAL ONCE
Status: COMPLETED | OUTPATIENT
Start: 2018-10-11 | End: 2018-10-12

## 2018-10-11 RX ORDER — ONDANSETRON 2 MG/ML
4 INJECTION INTRAMUSCULAR; INTRAVENOUS ONCE
Status: COMPLETED | OUTPATIENT
Start: 2018-10-11 | End: 2018-10-11

## 2018-10-11 RX ADMIN — ONDANSETRON HYDROCHLORIDE 4 MG: 2 INJECTION INTRAMUSCULAR; INTRAVENOUS at 22:14

## 2018-10-11 RX ADMIN — NITROGLYCERIN 1 INCH: 20 OINTMENT TOPICAL at 22:14

## 2018-10-11 RX ADMIN — ASPIRIN 81 MG 324 MG: 81 TABLET ORAL at 22:14

## 2018-10-11 RX ADMIN — NITROGLYCERIN 0.4 MG: 0.4 TABLET SUBLINGUAL at 22:14

## 2018-10-11 RX ADMIN — SODIUM CHLORIDE 1000 ML: 0.9 INJECTION, SOLUTION INTRAVENOUS at 22:25

## 2018-10-11 RX ADMIN — LORAZEPAM 1 MG: 2 INJECTION INTRAMUSCULAR; INTRAVENOUS at 22:25

## 2018-10-12 LAB
ATRIAL RATE: 80 BPM
P AXIS: 25 DEGREES
PR INTERVAL: 164 MS
QRS AXIS: -10 DEGREES
QRSD INTERVAL: 96 MS
QT INTERVAL: 412 MS
QTC INTERVAL: 475 MS
T WAVE AXIS: 88 DEGREES
VENTRICULAR RATE: 80 BPM

## 2018-10-12 PROCEDURE — 93010 ELECTROCARDIOGRAM REPORT: CPT | Performed by: INTERNAL MEDICINE

## 2018-10-12 RX ADMIN — POTASSIUM CHLORIDE 40 MEQ: 1500 TABLET, EXTENDED RELEASE ORAL at 00:00

## 2018-10-12 NOTE — DISCHARGE INSTRUCTIONS
Dizziness   WHAT YOU NEED TO KNOW:   Dizziness is a feeling of being off balance or unsteady  Common causes of dizziness are an inner ear fluid imbalance or a lack of oxygen in your blood  Dizziness may be acute (lasts 3 days or less) or chronic (lasts longer than 3 days)  You may have dizzy spells that last from seconds to a few hours  DISCHARGE INSTRUCTIONS:   Return to the emergency department if:   · You have a headache and a stiff neck  · You have shaking chills and a fever  · You vomit over and over with no relief  · Your vomit or bowel movements are red or black  · You have pain in your chest, back, or abdomen  · You have numbness, especially in your face, arms, or legs  · You have trouble moving your arms or legs  · You are confused  Contact your healthcare provider if:   · You have a fever  · Your symptoms do not get better with treatment  · You have questions or concerns about your condition or care  Manage your symptoms:   · Do not drive  or operate heavy machinery when you are dizzy  · Get up slowly  from sitting or lying down  · Drink plenty of liquids  Liquids help prevent dehydration  Ask how much liquid to drink each day and which liquids are best for you  Follow up with your healthcare provider as directed:  Write down your questions so you remember to ask them during your visits  © 2017 [employer]  Information is for End User's use only and may not be sold, redistributed or otherwise used for commercial purposes  All illustrations and images included in CareNotes® are the copyrighted property of A [employer] M , Inc  or [employer] Brantley  The above information is an  only  It is not intended as medical advice for individual conditions or treatments  Talk to your doctor, nurse or pharmacist before following any medical regimen to see if it is safe and effective for you    Anxiety   WHAT YOU NEED TO KNOW:   Anxiety is a condition that causes you to feel extremely worried or nervous  The feelings are so strong that they can cause problems with your daily activities or sleep  Anxiety may be triggered by something you fear, or it may happen without a cause  Family or work stress, smoking, caffeine, and alcohol can increase your risk for anxiety  Certain medicines or health conditions can also increase your risk  Anxiety can become a long-term condition if it is not managed or treated  DISCHARGE INSTRUCTIONS:   Call 911 if:   · You have chest pain, tightness, or heaviness that may spread to your shoulders, arms, jaw, neck, or back  · You feel like hurting yourself or someone else  Contact your healthcare provider if:   · Your symptoms get worse or do not get better with treatment  · Your anxiety keeps you from doing your regular daily activities  · You have new symptoms since your last visit  · You have questions or concerns about your condition or care  Medicines:   · Medicines  may be given to help you feel more calm and relaxed, and decrease your symptoms  · Take your medicine as directed  Contact your healthcare provider if you think your medicine is not helping or if you have side effects  Tell him of her if you are allergic to any medicine  Keep a list of the medicines, vitamins, and herbs you take  Include the amounts, and when and why you take them  Bring the list or the pill bottles to follow-up visits  Carry your medicine list with you in case of an emergency  Follow up with your healthcare provider within 2 weeks or as directed:  Write down your questions so you remember to ask them during your visits  Manage anxiety:   · Talk to someone about your anxiety  Your healthcare provider may suggest counseling  Cognitive behavioral therapy can help you understand and change how you react to events that trigger your symptoms   You might feel more comfortable talking with a friend or family member about your anxiety  Choose someone you know will be supportive and encouraging  · Find ways to relax  Activities such as exercise, meditation, or listening to music can help you relax  Spend time with friends, or do things you enjoy  · Practice deep breathing  Deep breathing can help you relax when you feel anxious  Focus on taking slow, deep breaths several times a day, or during an anxiety attack  Breathe in through your nose and out through your mouth  · Create a regular sleep routine  Regular sleep can help you feel calmer during the day  Go to sleep and wake up at the same times every day  Do not watch television or use the computer right before bed  Your room should be comfortable, dark, and quiet  · Eat a variety of healthy foods  Healthy foods include fruits, vegetables, low-fat dairy products, lean meats, fish, whole-grain breads, and cooked beans  Healthy foods can help you feel less anxious and have more energy  · Exercise regularly  Exercise can increase your energy level  Exercise may also lift your mood and help you sleep better  Your healthcare provider can help you create an exercise plan  · Do not smoke  Nicotine and other chemicals in cigarettes and cigars can increase anxiety  Ask your healthcare provider for information if you currently smoke and need help to quit  E-cigarettes or smokeless tobacco still contain nicotine  Talk to your healthcare provider before you use these products  · Do not have caffeine  Caffeine can make your symptoms worse  Do not have foods or drinks that are meant to increase your energy level  · Limit or do not drink alcohol  Ask your healthcare provider if alcohol is safe for you  You may not be able to drink alcohol if you take certain anxiety or depression medicines  Limit alcohol to 1 drink per day if you are a woman  Limit alcohol to 2 drinks per day if you are a man   A drink of alcohol is 12 ounces of beer, 5 ounces of wine, or 1½ ounces of liquor  · Do not use drugs  Drugs can make your anxiety worse  It can also make anxiety hard to manage  Talk to your healthcare provider if you use drugs and want help to quit  © 2017 [employer]  Information is for End User's use only and may not be sold, redistributed or otherwise used for commercial purposes  All illustrations and images included in CareNotes® are the copyrighted property of [employer]  or [employer] Brantley  The above information is an  only  It is not intended as medical advice for individual conditions or treatments  Talk to your doctor, nurse or pharmacist before following any medical regimen to see if it is safe and effective for you  Dizziness   WHAT YOU NEED TO KNOW:   Dizziness is a feeling of being off balance or unsteady  Common causes of dizziness are an inner ear fluid imbalance or a lack of oxygen in your blood  Dizziness may be acute (lasts 3 days or less) or chronic (lasts longer than 3 days)  You may have dizzy spells that last from seconds to a few hours  DISCHARGE INSTRUCTIONS:   Return to the emergency department if:   · You have a headache and a stiff neck  · You have shaking chills and a fever  · You vomit over and over with no relief  · Your vomit or bowel movements are red or black  · You have pain in your chest, back, or abdomen  · You have numbness, especially in your face, arms, or legs  · You have trouble moving your arms or legs  · You are confused  Contact your healthcare provider if:   · You have a fever  · Your symptoms do not get better with treatment  · You have questions or concerns about your condition or care  Manage your symptoms:   · Do not drive  or operate heavy machinery when you are dizzy  · Get up slowly  from sitting or lying down  · Drink plenty of liquids  Liquids help prevent dehydration   Ask how much liquid to drink each day and which liquids are best for you   Follow up with your healthcare provider as directed:  Write down your questions so you remember to ask them during your visits  © 2017 [address] [employer] Information is for End User's use only and may not be sold, redistributed or otherwise used for commercial purposes  All illustrations and images included in CareNotes® are the copyrighted property of [employer] M , [employer]  or [employer] Brantley  The above information is an  only  It is not intended as medical advice for individual conditions or treatments  Talk to your doctor, nurse or pharmacist before following any medical regimen to see if it is safe and effective for you

## 2018-10-12 NOTE — ED PROVIDER NOTES
History  Chief Complaint   Patient presents with    Dizziness    Vomiting    Nausea     Patient with history of cardiomyopathy and prior cardiac MRI as well as hypertension and non-Hodgkin's lymphoma presents the emergency department today with acute onset of what she describes as dizziness and lightheadedness shortly after standing up as well as nausea and vomiting once the patient also admits some discomfort in the left chest under the left breast however she states that the symptoms do not feel like her prior MI the patient reports to being very anxious and getting worked up however she reports her symptoms of dizziness started prior today getting anxious and workedup  History provided by:  Patient  Dizziness   Quality:  Lightheadedness and head spinning  Severity:  Moderate  Onset quality:  Sudden  Duration:  1 hour  Timing:  Constant  Progression:  Improving  Chronicity:  New  Context: standing up    Associated symptoms: no chest pain, no diarrhea, no headaches, no nausea, no palpitations, no shortness of breath, no vomiting and no weakness        Prior to Admission Medications   Prescriptions Last Dose Informant Patient Reported? Taking?    Multiple Vitamins-Calcium (ONE-A-DAY WOMENS PO)   Yes Yes   Sig: Take by mouth daily   aspirin 325 mg tablet   Yes Yes   Sig: Take 325 mg by mouth daily   atorvastatin (LIPITOR) 40 mg tablet   Yes Yes   Sig: Take 40 mg by mouth daily   furosemide (LASIX) 20 mg tablet   Yes Yes   Sig: Take 20 mg by mouth daily   lisinopril (ZESTRIL) 10 mg tablet   Yes Yes   Sig: Take 10 mg by mouth daily   metoprolol tartrate (LOPRESSOR) 50 mg tablet   Yes Yes   Sig: Take 25 mg by mouth every 12 (twelve) hours   nitroglycerin (NITROSTAT) 0 4 mg SL tablet   Yes Yes   Sig: Place 0 4 mg under the tongue every 5 (five) minutes as needed for chest pain      Facility-Administered Medications: None       Past Medical History:   Diagnosis Date    Acute MI ([facility] )     Cardiac disease  Cardiomyopathy ([facility] [address] )     CHF (congestive heart failure) (HCC)     Hypertension     Lymphoma, non-Hodgkin's (HCC)     Tobacco abuse        Past Surgical History:   Procedure Laterality Date    CAROTID STENT       SECTION         History reviewed  No pertinent family history  I have reviewed and agree with the history as documented  Social History   Substance Use Topics    Smoking status: Current Every Day Smoker     Packs/day: 1 00    Smokeless tobacco: Never Used    Alcohol use No        Review of Systems   Constitutional: Negative for activity change, appetite change, chills, fatigue and fever  HENT: Negative for congestion, ear pain, rhinorrhea and sore throat  Eyes: Negative for discharge, redness and visual disturbance  Respiratory: Positive for chest tightness  Negative for cough, shortness of breath and wheezing  Cardiovascular: Negative for chest pain and palpitations  Gastrointestinal: Negative for abdominal pain, constipation, diarrhea, nausea and vomiting  Endocrine: Negative for polydipsia and polyuria  Genitourinary: Negative for difficulty urinating, dysuria, frequency, hematuria and urgency  Musculoskeletal: Negative for arthralgias and myalgias  Skin: Negative for color change, pallor and rash  Neurological: Positive for dizziness and light-headedness  Negative for weakness, numbness and headaches  Hematological: Negative for adenopathy  Does not bruise/bleed easily  All other systems reviewed and are negative  Physical Exam  Physical Exam   Constitutional: She is oriented to person, place, and time  She appears well-developed and well-nourished  HENT:   Head: Normocephalic and atraumatic  Right Ear: External ear normal    Left Ear: External ear normal    Nose: Nose normal    Mouth/Throat: Oropharynx is clear and moist    Eyes: Pupils are equal, round, and reactive to light  Conjunctivae and EOM are normal    Neck: Normal range of motion  Neck supple  Cardiovascular: Normal rate, regular rhythm, normal heart sounds and intact distal pulses  Pulmonary/Chest: Effort normal and breath sounds normal  No respiratory distress  She has no wheezes  She has no rales  She exhibits no tenderness  Abdominal: Soft  Bowel sounds are normal  She exhibits no distension  There is no tenderness  There is no guarding  Musculoskeletal: Normal range of motion  Neurological: She is alert and oriented to person, place, and time  No cranial nerve deficit or sensory deficit  Skin: Skin is warm and dry  Psychiatric: She has a normal mood and affect  Nursing note and vitals reviewed        Vital Signs  ED Triage Vitals [10/11/18 2157]   Temperature Pulse Respirations Blood Pressure SpO2   (!) 97 4 °F (36 3 °C) 80 16 (!) 191/102 94 %      Temp Source Heart Rate Source Patient Position - Orthostatic VS BP Location FiO2 (%)   Temporal -- Lying -- --      Pain Score       No Pain           Vitals:    10/11/18 2300 10/11/18 2315 10/11/18 2330 10/11/18 2345   BP: 139/80 146/85 (!) 181/86 144/67   Pulse: 69 71 77 70   Patient Position - Orthostatic VS:           Visual Acuity      ED Medications  Medications   potassium chloride (K-DUR,KLOR-CON) CR tablet 40 mEq (not administered)   ondansetron (ZOFRAN) injection 4 mg (4 mg Intravenous Given 10/11/18 2214)   sodium chloride 0 9 % bolus 1,000 mL (1,000 mL Intravenous New Bag 10/11/18 2225)   aspirin chewable tablet 324 mg (324 mg Oral Given 10/11/18 2214)   nitroglycerin (NITROSTAT) SL tablet 0 4 mg (0 4 mg Sublingual Given 10/11/18 2214)   nitroglycerin (NITRO-BID) 2 % TD ointment 1 inch (1 inch Topical Given 10/11/18 2214)   LORazepam (ATIVAN) 2 mg/mL injection 1 mg (1 mg Intravenous Given 10/11/18 2225)       Diagnostic Studies  Results Reviewed     Procedure Component Value Units Date/Time    B-Type Natriuretic Peptide [facility] ONLY) [[id number]]  (Normal) Collected:  10/11/18 2226    Lab Status:  Final result Specimen:  Blood from Arm, Right Updated:  10/11/18 2301     BNP 79 pg/mL     Troponin I [[id number]]  (Normal) Collected:  10/11/18 2226    Lab Status:  Final result Specimen:  Blood from Arm, Right Updated:  10/11/18 2259     Troponin I <0 03 ng/mL     Comprehensive metabolic panel [[id number]]  (Abnormal) Collected:  10/11/18 2226    Lab Status:  Final result Specimen:  Blood from Arm, Right Updated:  10/11/18 2258     Sodium 138 mmol/L      Potassium 3 3 (L) mmol/L      Chloride 107 mmol/L      CO2 21 mmol/L      ANION GAP 10 mmol/L      BUN 13 mg/dL      Creatinine 1 21 (H) mg/dL      Glucose 121 (H) mg/dL      Calcium 9 3 mg/dL      AST 20 U/L      ALT 37 U/L      Alkaline Phosphatase 54 U/L      Total Protein 6 7 g/dL      Albumin 3 6 g/dL      Total Bilirubin 0 60 mg/dL      eGFR 56 ml/min/1 73sq m     Narrative:         National Kidney Disease Education Program recommendations are as follows:  GFR calculation is accurate only with a steady state creatinine  Chronic Kidney disease less than 60 ml/min/1 73 sq  meters  Kidney failure less than 15 ml/min/1 73 sq  meters      APTT [[id number]]  (Normal) Collected:  10/11/18 2226    Lab Status:  Final result Specimen:  Blood from Arm, Right Updated:  10/11/18 2258     PTT 27 seconds     Protime-INR [[id number]]  (Abnormal) Collected:  10/11/18 2226    Lab Status:  Final result Specimen:  Blood from Arm, Right Updated:  10/11/18 2258     Protime 13 6 (H) seconds      INR 1 17    Troponin I [[id number]]     Lab Status:  No result Specimen:  Blood     CBC and differential [[id number]]  (Abnormal) Collected:  10/11/18 2226    Lab Status:  Final result Specimen:  Blood from Arm, Right Updated:  10/11/18 2236     WBC 16 40 (H) Thousand/uL      RBC 4 77 Million/uL      Hemoglobin 14 2 g/dL      Hematocrit 40 1 %      MCV 84 fL      MCH 29 8 pg      MCHC 35 5 g/dL      RDW 17 1 (H) %      MPV 9 1 fL      Platelets 286 Thousands/uL      nRBC 0 /100 WBCs      Neutrophils Relative 69 %      Lymphocytes Relative 23 %      Monocytes Relative 6 %      Eosinophils Relative 2 %      Basophils Relative 1 %      Neutrophils Absolute 11 30 (H) Thousands/µL      Lymphocytes Absolute 3 80 Thousands/µL      Monocytes Absolute 0 90 Thousand/µL      Eosinophils Absolute 0 30 Thousand/µL      Basophils Absolute 0 10 Thousands/µL     UA w Reflex to Microscopic w Reflex to Culture [[id number]]     Lab Status:  No result Specimen:  Urine                  CT head without contrast   Final Result by Stacy Jurado (10/11 2315)   Negative for acute intracranial findings  Signed by William Burgess      XR chest 1 view portable   Final Result by Vianey Silva (10/11 2306)   No acute pulmonary abnormality             Signed by Vianey Silva MD                 Procedures  ECG 12 Lead Documentation  Date/Time: 10/11/2018 9:57 PM  Performed by: Saurabh Boyle by: Kumar Atkinson     ECG reviewed by me, the ED Provider: yes    Patient location:  ED  Rate:     ECG rate:  80    ECG rate assessment: normal    Rhythm:     Rhythm: sinus rhythm    QRS:     QRS axis:  Left  Conduction:     Conduction: abnormal      Abnormal conduction: incomplete LBBB      Abnormal conduction comment:  Lvh  ST segments:     ST segments:  Non-specific  T waves:     T waves: non-specific               Phone Contacts  ED Phone Contact    ED Course                               MDM  Number of Diagnoses or Management Options  Anxiety: new and requires workup  Chronic kidney disease: new and requires workup  Dizziness: new and requires workup  Hypokalemia: new and requires workup  Leukocytosis: established and worsening  Nausea and vomiting: new and requires workup  Diagnosis management comments: Patient's primary complaint was dizziness with nausea and vomiting these resolved after rest fluids anti hypertensives and Ativan given in the emergency department she felt well and wished to return home her workup in the ED was unremarkable the patient denied any chest pain to me but did admit some discomfort under her left breast but it was not similar to prior MI symptoms patient's cardiac workup is unremarkable she does not wish to remain in the ED and wait for 2nd troponin or be admitted for observation as her initial testing is unremarkable CT head is within normal limits patient has a normal nonfocal neurologic examination the ED I offered admission for observation and serial troponins and telemetry and monitoring patient declined this and stated that if her initial tests were normal she wished to return home as she is feeling better the patient self reports anxiety and feels that this may have been a panic attack however at this point advised rest fluids and prompt follow-up with primary care physician for re-evaluation and obtain test results patient was given potassium replacement given mild hypokalemia renal function is at its baseline or better and leukocytosis has been noted on prior blood work as well but is slightly above where was the past however I find no signs of acute or active infection or symptoms of this patient will follow up as advised with her primary care physician return precautions and anticipatory guidance discussed           Amount and/or Complexity of Data Reviewed  Clinical lab tests: ordered and reviewed  Tests in the radiology section of CPT®: ordered and reviewed  Tests in the medicine section of CPT®: ordered and reviewed  Decide to obtain previous medical records or to obtain history from someone other than the patient: yes  Review and summarize past medical records: yes  Independent visualization of images, tracings, or specimens: yes    Risk of Complications, Morbidity, and/or Mortality  Presenting problems: moderate  Management options: moderate    Patient Progress  Patient progress: stable    CritCare Time    Disposition  Final diagnoses:   Dizziness   Anxiety   Nausea and vomiting Leukocytosis   Hypokalemia   Chronic kidney disease     Time reflects when diagnosis was documented in both MDM as applicable and the Disposition within this note     Time User Action Codes Description Comment    10/11/2018 11:18 PM Vivian Houghton Add [R42] Dizziness     10/11/2018 11:18 PM Vivian Houghton Add [F41 9] Anxiety     10/11/2018 11:19 PM Farrah Lynn Add [R11 2] Nausea and vomiting     10/11/2018 11:26 PM Vivian Houghton Add [Z10 543] Leukocytosis     10/11/2018 11:26 PM Usama Poole Add [E87 6] Hypokalemia     10/11/2018 11:26 PM Vivian Houghton Add [N18 9] Chronic kidney disease       ED Disposition     ED Disposition Condition Comment    Discharge  [place] discharge to home/self care  Condition at discharge: Stable        Follow-up Information     Follow up With Specialties Details Why Contact Info    Kam Monsalve DO Family Medicine Schedule an appointment as soon as possible for a visit in 2 days  [address] [facility] [address]  [phone]            Patient's Medications   Discharge Prescriptions    No medications on file     No discharge procedures on file      ED Provider  Electronically Signed by           Frandy Bland DO  10/11/18 8639

## 2019-10-24 ENCOUNTER — HOSPITAL ENCOUNTER (EMERGENCY)
Facility: HOSPITAL | Age: 44
Discharge: HOME | End: 2019-10-24
Attending: FAMILY MEDICINE | Admitting: FAMILY MEDICINE
Payer: COMMERCIAL

## 2019-10-24 VITALS
WEIGHT: 240 LBS | TEMPERATURE: 98.2 F | OXYGEN SATURATION: 98 % | DIASTOLIC BLOOD PRESSURE: 92 MMHG | SYSTOLIC BLOOD PRESSURE: 184 MMHG | HEIGHT: 66 IN | BODY MASS INDEX: 38.57 KG/M2 | HEART RATE: 109 BPM | RESPIRATION RATE: 22 BRPM

## 2019-10-24 DIAGNOSIS — G47.00 INSOMNIA: ICD-10-CM

## 2019-10-24 DIAGNOSIS — Z63.79 STRESSFUL LIFE EVENTS AFFECTING FAMILY AND HOUSEHOLD: ICD-10-CM

## 2019-10-24 DIAGNOSIS — IMO0002 SELF-INFLICTED INJURY: ICD-10-CM

## 2019-10-24 DIAGNOSIS — F41.8 SITUATIONAL ANXIETY: Primary | ICD-10-CM

## 2019-10-24 DIAGNOSIS — T07.XXXA MULTIPLE ABRASIONS: ICD-10-CM

## 2019-10-24 LAB
AMPHETAMINES SERPL QL SCN: NEGATIVE
BARBITURATES UR QL: NEGATIVE
BENZODIAZ UR QL: NEGATIVE
COCAINE UR QL: NEGATIVE
ETHANOL EXG-MCNC: NORMAL MG/DL
METHADONE UR QL: NEGATIVE
OPIATES UR QL SCN: NEGATIVE
PCP UR QL: NEGATIVE
THC UR QL: NEGATIVE

## 2019-10-24 PROCEDURE — 82075 ASSAY OF BREATH ETHANOL: CPT | Performed by: PHYSICIAN ASSISTANT

## 2019-10-24 PROCEDURE — 99285 EMERGENCY DEPT VISIT HI MDM: CPT

## 2019-10-24 PROCEDURE — 80307 DRUG TEST PRSMV CHEM ANLYZR: CPT | Performed by: PHYSICIAN ASSISTANT

## 2019-10-24 RX ORDER — LISINOPRIL 20 MG/1
20 TABLET ORAL DAILY
COMMUNITY
End: 2020-07-08 | Stop reason: SDUPTHER

## 2019-10-24 RX ORDER — HYDROXYZINE HYDROCHLORIDE 25 MG/1
50 TABLET, FILM COATED ORAL DAILY PRN
Qty: 3 TABLET | Refills: 0 | Status: SHIPPED | OUTPATIENT
Start: 2019-10-24 | End: 2020-07-08

## 2019-10-24 RX ORDER — LORAZEPAM 0.5 MG/1
0.5 TABLET ORAL ONCE
Status: COMPLETED | OUTPATIENT
Start: 2019-10-24 | End: 2019-10-24

## 2019-10-24 RX ADMIN — LORAZEPAM 0.5 MG: 0.5 TABLET ORAL at 14:35

## 2019-10-24 NOTE — ED PROVIDER NOTES
History  Chief Complaint   Patient presents with    Anxiety    Depression     denies suicidal ideation when questioned     70-year-old female with history of hypertension presents to emergency room with complaint of depression and anxiety  She has significant life stressors currently regarding her  and a court trial   Patient states she has not been able talk to anyone regarding this due to the legality of the situation and feels that her anxiety and stress is so severe  She states she has not slept in the past week  Patient states after yesterday's court trial the anxiety was so severe that early this morning she started making superficial cuts on her left arm  She states she felt a release and felt some decrease in the anxiety  Patient denied suicidal ideation or attempts  She also denies homicidal ideation  No hallucinations auditory or visual   Patient denies alcohol abuse  However does admit to CBD oil for chronic arthritis  Patient has not sought treatment on an outpatient basis and up until this point has not been able to talk to anyone prior to coming to emergency room  Patient states after her  saw her cutting he advised patient to come to the emergency room for which she was agreeable to obtain evaluation  Additionally patient lost her oldest child 1 year ago to drug overdose  Patient has 2 remaining children at home 25year-old and 15year-old whom are also having anxiety regarding the said area when she is trying to be there for them which is adding increased stress to patient  Patient denies taking any antidepressants anxiety medication on regular basis        History provided by:  Patient   used: No    Anxiety   Presenting symptoms: depression and self-mutilation    Presenting symptoms: no homicidal ideas, no suicidal thoughts and no suicide attempt    Patient accompanied by:  Family member (Spouse)  Degree of incapacity (severity):  Severe  Onset quality:  Gradual  Timing:  Intermittent  Progression:  Worsening  Chronicity:  New  Context: stressful life event    Context: not alcohol use and not drug abuse    Treatment compliance:  Untreated  Relieved by:  Nothing  Worsened by:  Family interactions and lack of sleep  Ineffective treatments:  None tried  Associated symptoms: anxiety and insomnia    Associated symptoms: no abdominal pain, no chest pain, no fatigue, no headaches and no hypersomnia    Risk factors: no hx of suicide attempts    Depression   Presenting symptoms: depression and self-mutilation    Presenting symptoms: no homicidal ideas, no suicidal thoughts and no suicide attempt    Associated symptoms: anxiety and insomnia    Associated symptoms: no abdominal pain, no chest pain, no fatigue, no headaches and no hypersomnia        No Known Allergies        Prior to Admission Medications   Prescriptions Last Dose Informant Patient Reported? Taking?    Multiple Vitamins-Calcium (ONE-A-DAY WOMENS PO) Not Taking at Unknown time  Yes No   Sig: Take by mouth daily   aspirin 325 mg tablet Unknown at Unknown time  Yes No   Sig: Take 325 mg by mouth daily   atorvastatin (LIPITOR) 40 mg tablet Not Taking at Unknown time  Yes No   Sig: Take 40 mg by mouth daily   furosemide (LASIX) 20 mg tablet Not Taking at Unknown time  Yes No   Sig: Take 20 mg by mouth daily   lisinopril (ZESTRIL) 20 mg tablet 10/22/2019  Yes Yes   Sig: Take 20 mg by mouth daily   nitroglycerin (NITROSTAT) 0 4 mg SL tablet Unknown at Unknown time  Yes No   Sig: Place 0 4 mg under the tongue every 5 (five) minutes as needed for chest pain      Facility-Administered Medications: None       Past Medical History:   Diagnosis Date    Acute MI ([facility] )     Cardiac disease     Cardiomyopathy ([facility] )     CHF (congestive heart failure) ([facility] )     Hypertension     Lymphoma, non-Hodgkin's ([facility] )     Tobacco abuse        Past Surgical History:   Procedure Laterality Date    CAROTID STENT       SECTION         History reviewed  No pertinent family history  I have reviewed and agree with the history as documented  Social History     Tobacco Use    Smoking status: Current Every Day Smoker     Packs/day: 1 00     Types: Cigarettes    Smokeless tobacco: Never Used   Substance Use Topics    Alcohol use: Yes     Comment: rarely    Drug use: No        Review of Systems   Constitutional: Positive for activity change  Negative for chills, diaphoresis, fatigue and fever  HENT: Negative for congestion, ear pain, rhinorrhea, sneezing and sore throat  Respiratory: Negative for cough, shortness of breath, wheezing and stridor  Cardiovascular: Negative for chest pain, palpitations and leg swelling  Gastrointestinal: Negative for abdominal pain, constipation, diarrhea, nausea and vomiting  Genitourinary: Negative for difficulty urinating, dysuria, frequency, hematuria and urgency  Musculoskeletal: Negative for gait problem, myalgias and neck pain  Skin: Negative for rash  Neurological: Negative for dizziness, syncope, weakness, light-headedness and headaches  Psychiatric/Behavioral: Positive for depression and self-injury  Negative for homicidal ideas and suicidal ideas  The patient is nervous/anxious and has insomnia  Anxiety as noted in HPI   All other systems reviewed and are negative  Physical Exam  Physical Exam   Constitutional: She is oriented to person, place, and time  She appears well-developed and well-nourished  HENT:   Head: Normocephalic and atraumatic  Eyes: Pupils are equal, round, and reactive to light  EOM are normal    Neck: Normal range of motion  Neck supple  No tracheal deviation present  Cardiovascular: Normal rate, regular rhythm, normal heart sounds and intact distal pulses  No murmur heard  Pulmonary/Chest: Effort normal and breath sounds normal  No respiratory distress  She has no wheezes  She has no rales  Abdominal: Soft   Bowel sounds are normal  She exhibits no distension  There is no tenderness  Obese   Musculoskeletal: Normal range of motion  She exhibits no edema or tenderness  Neurological: She is alert and oriented to person, place, and time  Skin: Skin is warm and dry  Abrasion and laceration noted  No rash noted  No erythema  Psychiatric: Her speech is normal and behavior is normal  Judgment and thought content normal  Her mood appears anxious  Cognition and memory are normal  She exhibits a depressed mood  She expresses no homicidal and no suicidal ideation  She expresses no suicidal plans and no homicidal plans  Tearful during exam   Nursing note and vitals reviewed  Vital Signs  ED Triage Vitals [10/24/19 1243]   Temperature Pulse Respirations Blood Pressure SpO2   98 2 °F (36 8 °C) (!) 109 22 (!) 184/92 98 %      Temp Source Heart Rate Source Patient Position - Orthostatic VS BP Location FiO2 (%)   Temporal Monitor Sitting Right arm --      Pain Score       No Pain           Vitals:    10/24/19 1243   BP: (!) 184/92   Pulse: (!) 109   Patient Position - Orthostatic VS: Sitting         Visual Acuity      ED Medications  Medications   LORazepam (ATIVAN) tablet 0 5 mg (0 5 mg Oral Given 10/24/19 1435)       Diagnostic Studies  Results Reviewed     Procedure Component Value Units Date/Time    Rapid drug screen, urine [[id number]]  (Normal) Collected:  10/24/19 1334    Lab Status:  Final result Specimen:  Urine, Clean Catch Updated:  10/24/19 1412     Amph/Meth UR Negative     Barbiturate Ur Negative     Benzodiazepine Urine Negative     Cocaine Urine Negative     Methadone Urine Negative     Opiate Urine Negative     PCP Ur Negative     THC Urine Negative    Narrative:       FOR MEDICAL PURPOSES ONLY  IF CONFIRMATION NEEDED PLEASE CONTACT THE LAB WITHIN 5 DAYS      Drug Screen Cutoff Levels:  AMPHETAMINE/METHAMPHETAMINES  1000 ng/mL  BARBITURATES     200 ng/mL  BENZODIAZEPINES     200 ng/mL  COCAINE      300 ng/mL  METHADONE      300 ng/mL  OPIATES      300 ng/mL  PHENCYCLIDINE     25 ng/mL  THC       50 ng/mL      POCT alcohol breath test [[id number]]  (Normal) Resulted:  10/24/19 1337    Lab Status:  Final result Updated:  10/24/19 1337     EXTBreath Alcohol 00 00 negative    POCT pregnancy, urine [[id number]]     Lab Status:  No result                  No orders to display              Procedures  Procedures       ED Course  ED Course as of Oct 24 1518   Thu Oct 24, 2019   1445 Patient seen by crisis and counseled, she is denying suicidal and homicidal ideations  Patient was given counseling resources for discharge and contracted for safety  patient was counseled by crisis extensively and contracted for safety  Patient was given outpatient resources well as Atarax prescription # 3 doses  Patient superficial lacerations/abrasions to bilateral forearms do not require suturing or intervention no active bleeding    During examination evaluation patient had sound mind judgment and appropriate speech                MDM  Number of Diagnoses or Management Options  Insomnia: new and requires workup  Multiple abrasions: new and requires workup  Self-inflicted injury: new and requires workup  Situational anxiety: new and requires workup  Stressful life events affecting family and household: new and requires workup     Amount and/or Complexity of Data Reviewed  Clinical lab tests: ordered and reviewed    Risk of Complications, Morbidity, and/or Mortality  Presenting problems: moderate  Diagnostic procedures: moderate  Management options: moderate    Patient Progress  Patient progress: stable      Disposition  Final diagnoses:   Situational anxiety   Stressful life events affecting family and household   Insomnia   Multiple abrasions - Superficial left and right arms   Self-inflicted injury     Time reflects when diagnosis was documented in both MDM as applicable and the Disposition within this note     Time User Action Codes Description Comment    10/24/2019  2:41 PM Gwendalyn Rival Add [F41 8] Situational anxiety     10/24/2019  2:41 PM Rodrigohollyjohanna MUJICA Add [Z63 79] Stressful life events affecting family and household     10/24/2019  2:41 PM Gwendalyn Rival Add [G47 00] Insomnia     10/24/2019  2:42 PM Gwendalyn Rival Add [T07  XXXA] Multiple lacerations     10/24/2019  2:42 PM Viktor MUJICA Remove [T07  XXXA] Multiple lacerations     10/24/2019  2:42 PM Susanajohanna Colten MUJICA Add [T07  XXXA] Multiple abrasions     10/24/2019  2:43 PM Viktor MUJICA Modify [T07  XXXA] Multiple abrasions Superficial left and right arms    10/24/2019  2:44 PM Gwendalyn Rival Add [V55 81] Self-inflicted injury       ED Disposition     ED Disposition Condition Date/Time Comment    Discharge Stable u Oct 24, 2019  2:41 PM aMx [place] discharge to home/self care  Follow-up Information     Follow up With Specialties Details Why Contact Info    Volodymyr Carreon DO Family Medicine Schedule an appointment as soon as possible for a visit   [address] [place] [id number]  [phone]      Counseling resources provided  Schedule an appointment as soon as possible for a visit  Return to emergency room for worsening symptoms             Discharge Medication List as of 10/24/2019  2:45 PM      CONTINUE these medications which have NOT CHANGED    Details   lisinopril (ZESTRIL) 20 mg tablet Take 20 mg by mouth daily, Historical Med      aspirin 325 mg tablet Take 325 mg by mouth daily, Until Discontinued, Historical Med      atorvastatin (LIPITOR) 40 mg tablet Take 40 mg by mouth daily, Until Discontinued, Historical Med      furosemide (LASIX) 20 mg tablet Take 20 mg by mouth daily, Historical Med      Multiple Vitamins-Calcium (ONE-A-DAY WOMENS PO) Take by mouth daily, Historical Med      nitroglycerin (NITROSTAT) 0 4 mg SL tablet Place 0 4 mg under the tongue every 5 (five) minutes as needed for chest pain, Until Discontinued, Historical Med           No discharge procedures on file      ED Provider  Electronically Signed by           Sumeet Mondragon PA-C  10/24/19 0266

## 2019-10-24 NOTE — ED NOTES
Pt presents due to increased anxiety and a single episode of superficial cutting  Pt is A & O X 4 and was calm and cooperative throughout the assessment  Pt's  was present for first part of assessment but then was asked to leave by the pt  Pt answered all questions w/ appropriate elaboration  Pt denies any SI/HI or thoughts of harm, but does admit to a single superficial cutting incident for stress relief only  Pt denies any AH, VH, or delusions  Pt states her  is a  and has been involved w/ a high profile case against himself  Pt states she has stood behind her  and supported him throughout the allegations and court, which was yesterday, but has had no one to talk to per his attorneys telling her she was not allowed to discuss the case with anyone which has caused her enormous stress  Pt further states she feels betrayed, hurt, humiliated by this whole ordeal and yet has not been able to talk to her family or friends for support which is what precipitated her cutting episode last night  Pt has hx of alcoholism, and has been clean and sober for nearly 20 yrs so she chose to cut superficially as a stress reliever because she does not ever want to go back to drinking  Pt was briefly counseled on the dangers of cutting and was able to identify this as something she does not want to do again, as she fears her children will be adversely affected if they were to find out  Pt identifies many protective factors and talks of having lost her first baby to SIDS which is what triggered her drinking for a year (20 yrs ago) and also having lost her son last year to suicide  Pt feels very strongly against suicide as she knows the pain it has caused her and states she would never put her children through that   Pt does not feel a need for I/P psych admission but after having finally been able to talk to someone, e g  the attending and myself, she now sees that talking is the stress reliever that she has been in need of  No criteria for a 302 is being demonstrated at this time  Pt is bianca verbally for safety and is aware should she have any SI/HI or thoughts of harm of any kind she should return to the nearest ED  Pt is in agreement w/ O/P psych and counseling services and has been given resources to f/u in a self-directed manner  Attending is in agreement w/ D/C home to f/u in a self-directed manner

## 2019-11-04 ENCOUNTER — OFFICE VISIT (OUTPATIENT)
Dept: FAMILY MEDICINE CLINIC | Facility: CLINIC | Age: 44
End: 2019-11-04
Payer: COMMERCIAL

## 2019-11-04 VITALS
TEMPERATURE: 98.8 F | HEART RATE: 80 BPM | OXYGEN SATURATION: 99 % | HEIGHT: 66 IN | SYSTOLIC BLOOD PRESSURE: 124 MMHG | DIASTOLIC BLOOD PRESSURE: 70 MMHG | BODY MASS INDEX: 40.5 KG/M2 | WEIGHT: 252 LBS

## 2019-11-04 DIAGNOSIS — E78.00 HYPERCHOLESTEROLEMIA: ICD-10-CM

## 2019-11-04 DIAGNOSIS — Z12.39 SCREENING FOR BREAST CANCER: ICD-10-CM

## 2019-11-04 DIAGNOSIS — F32.A DEPRESSION, UNSPECIFIED DEPRESSION TYPE: ICD-10-CM

## 2019-11-04 DIAGNOSIS — N89.8 VAGINAL MASS: ICD-10-CM

## 2019-11-04 DIAGNOSIS — C85.90 NON-HODGKIN'S LYMPHOMA, UNSPECIFIED BODY REGION, UNSPECIFIED NON-HODGKIN LYMPHOMA TYPE (HCC): ICD-10-CM

## 2019-11-04 DIAGNOSIS — E66.01 CLASS 3 SEVERE OBESITY DUE TO EXCESS CALORIES WITH SERIOUS COMORBIDITY AND BODY MASS INDEX (BMI) OF 40.0 TO 44.9 IN ADULT (HCC): ICD-10-CM

## 2019-11-04 DIAGNOSIS — E04.2 MULTIPLE THYROID NODULES: ICD-10-CM

## 2019-11-04 DIAGNOSIS — F41.9 ANXIETY: ICD-10-CM

## 2019-11-04 DIAGNOSIS — I10 ESSENTIAL HYPERTENSION: Primary | ICD-10-CM

## 2019-11-04 PROCEDURE — 99204 OFFICE O/P NEW MOD 45 MIN: CPT | Performed by: FAMILY MEDICINE

## 2019-11-04 RX ORDER — LORAZEPAM 0.5 MG/1
0.5 TABLET ORAL 2 TIMES DAILY
Qty: 60 TABLET | Refills: 2 | Status: SHIPPED | OUTPATIENT
Start: 2019-11-04 | End: 2020-07-08 | Stop reason: SDUPTHER

## 2019-11-04 NOTE — PROGRESS NOTES
Assessment/Plan:    No problem-specific Assessment & Plan notes found for this encounter  Diagnoses and all orders for this visit:    Essential hypertension  -     CBC and differential; Future    Hypercholesterolemia  -     Comprehensive metabolic panel; Future  -     Lipid panel; Future    Multiple thyroid nodules  -     TSH, 3rd generation with Free T4 reflex; Future  -     US thyroid; Future    Screening for breast cancer  -     Mammo screening bilateral w 3d & cad; Future    Class 3 severe obesity due to excess calories with serious comorbidity and body mass index (BMI) of 40 0 to 44 9 in adult [facility])    Depression, unspecified depression type  -     sertraline (ZOLOFT) 50 mg tablet; Take 1 tablet (50 mg total) by mouth daily    Non-Hodgkin's lymphoma, unspecified body region, unspecified non-Hodgkin lymphoma type ([facility] )  -     CT chest w contrast; Future  -     CT soft tissue neck w contrast; Future    Anxiety  -     LORazepam (ATIVAN) 0 5 mg tablet; Take 1 tablet (0 5 mg total) by mouth 2 (two) times a day    Vaginal mass  -     Ambulatory referral to Obstetrics / Gynecology; Future          PHQ-9 Depression Screening    PHQ-9:    Frequency of the following problems over the past two weeks:       Little interest or pleasure in doing things:  3 - nearly every day  Feeling down, depressed, or hopeless:  3 - nearly every day  Trouble falling or staying asleep, or sleeping too much:  3 - nearly every day  Feeling tired or having little energy:  3 - nearly every day  Poor appetite or overeating:  3 - nearly every day  Feeling bad about yourself - or that you are a failure or have let yourself or your family down:  3 - nearly every day  Trouble concentrating on things, such as reading the newspaper or watching television:  3 - nearly every day  Moving or speaking so slowly that other people could have noticed   Or the opposite - being so fidgety or restless that you have been moving around a lot more than usual: 1 - several days  Thoughts that you would be better off dead, or of hurting yourself in some way:  0 - not at all  PHQ-2 Score:  6  PHQ-9 Score:  22        Depression Screening Follow-up Plan: Patient's depression screening was positive with a PHQ-2 score of 6  Their PHQ-9 score was 22  Clinically patient does not have depression  No treatment is required  Subjective:      Patient ID: Shawna Oates is a 45 y o  female  New pt here to establish, with non hodgkins lymphoma treated 12 years ago, CHF, 2 MI's with 2 stents placed, HTN, hypercholesterolemia, , pt smokes 1ppd, rare EtoH, no illegal drugs, unemployed for 2-3 years, pt is stressed out over many situations, pt also complains of multiple joint pain with lumps and bumps in her hands    Depression   This is a chronic problem  The current episode started more than 1 year ago  The problem occurs constantly  Pertinent negatives include no abdominal pain, arthralgias, chest pain, chills, fatigue, fever, myalgias, nausea or vomiting  She has tried nothing for the symptoms  The treatment provided no relief  The following portions of the patient's history were reviewed and updated as appropriate: allergies, current medications, past family history, past medical history, past social history, past surgical history and problem list     Review of Systems   Constitutional: Negative for chills, fatigue and fever  HENT: Negative  Eyes: Negative  Respiratory: Negative for shortness of breath and wheezing  Cardiovascular: Negative for chest pain and palpitations  Gastrointestinal: Negative for abdominal pain, diarrhea, nausea and vomiting  Endocrine: Negative  Genitourinary: Negative for dysuria  Musculoskeletal: Negative for arthralgias and myalgias  Skin: Negative  Allergic/Immunologic: Negative  Neurological: Negative for seizures and syncope  Hematological: Negative for adenopathy     Psychiatric/Behavioral: Positive for depression  BMI Counseling: Body mass index is 40 67 kg/m²  The BMI is above normal  Nutrition recommendations include reducing portion sizes and 3-5 servings of fruits/vegetables daily  Exercise recommendations include moderate aerobic physical activity for 150 minutes/week and exercising 3-5 times per week  Objective:    /70 (BP Location: Left arm, Patient Position: Sitting, Cuff Size: Adult)   Pulse 80   Temp 98 8 °F (37 1 °C) (Tympanic)   Ht 5' 6" (1 676 m)   Wt 114 kg (252 lb)   LMP 10/17/2019   SpO2 99%   BMI 40 67 kg/m²      Physical Exam   Constitutional: She is oriented to person, place, and time  She appears well-developed and well-nourished  HENT:   Head: Normocephalic and atraumatic  Right Ear: External ear normal    Left Ear: External ear normal    Nose: Nose normal    Mouth/Throat: Oropharynx is clear and moist    Eyes: Pupils are equal, round, and reactive to light  Conjunctivae and EOM are normal    Neck: Normal range of motion  Neck supple  Cardiovascular: Normal rate, regular rhythm and normal heart sounds  No murmur heard  Pulmonary/Chest: Effort normal and breath sounds normal  No respiratory distress  She has no wheezes  She has no rales  Abdominal: Soft  Bowel sounds are normal  She exhibits no distension and no mass  There is no tenderness  There is no rebound and no guarding  Musculoskeletal: Normal range of motion  She exhibits no edema  Lymphadenopathy:     She has no cervical adenopathy  Neurological: She is alert and oriented to person, place, and time  She has normal reflexes  She exhibits normal muscle tone  Skin: Skin is warm and dry  Psychiatric: She has a normal mood and affect  Her behavior is normal  Judgment and thought content normal    Nursing note and vitals reviewed

## 2020-06-01 ENCOUNTER — OFFICE VISIT (OUTPATIENT)
Dept: URGENT CARE | Facility: CLINIC | Age: 45
End: 2020-06-01
Payer: COMMERCIAL

## 2020-06-01 VITALS
HEART RATE: 88 BPM | RESPIRATION RATE: 18 BRPM | OXYGEN SATURATION: 98 % | TEMPERATURE: 97.9 F | SYSTOLIC BLOOD PRESSURE: 160 MMHG | DIASTOLIC BLOOD PRESSURE: 90 MMHG

## 2020-06-01 DIAGNOSIS — J01.00 ACUTE MAXILLARY SINUSITIS, RECURRENCE NOT SPECIFIED: Primary | ICD-10-CM

## 2020-06-01 PROCEDURE — 99283 EMERGENCY DEPT VISIT LOW MDM: CPT | Performed by: PHYSICIAN ASSISTANT

## 2020-06-01 PROCEDURE — 99203 OFFICE O/P NEW LOW 30 MIN: CPT | Performed by: PHYSICIAN ASSISTANT

## 2020-06-01 PROCEDURE — G0382 LEV 3 HOSP TYPE B ED VISIT: HCPCS | Performed by: PHYSICIAN ASSISTANT

## 2020-06-01 RX ORDER — METHYLPREDNISOLONE 4 MG/1
TABLET ORAL
Qty: 21 TABLET | Refills: 0 | Status: SHIPPED | OUTPATIENT
Start: 2020-06-01 | End: 2020-07-08

## 2020-06-01 RX ORDER — AMOXICILLIN AND CLAVULANATE POTASSIUM 875; 125 MG/1; MG/1
1 TABLET, FILM COATED ORAL EVERY 12 HOURS SCHEDULED
Qty: 20 TABLET | Refills: 0 | Status: SHIPPED | OUTPATIENT
Start: 2020-06-01 | End: 2020-06-11

## 2020-07-08 ENCOUNTER — APPOINTMENT (OUTPATIENT)
Dept: LAB | Facility: CLINIC | Age: 45
End: 2020-07-08
Payer: COMMERCIAL

## 2020-07-08 ENCOUNTER — OFFICE VISIT (OUTPATIENT)
Dept: INTERNAL MEDICINE CLINIC | Facility: CLINIC | Age: 45
End: 2020-07-08
Payer: COMMERCIAL

## 2020-07-08 VITALS
HEIGHT: 66 IN | RESPIRATION RATE: 18 BRPM | TEMPERATURE: 99 F | WEIGHT: 272 LBS | SYSTOLIC BLOOD PRESSURE: 130 MMHG | OXYGEN SATURATION: 98 % | DIASTOLIC BLOOD PRESSURE: 80 MMHG | BODY MASS INDEX: 43.71 KG/M2 | HEART RATE: 88 BPM

## 2020-07-08 DIAGNOSIS — N94.9 LUMP IN VAGINA: ICD-10-CM

## 2020-07-08 DIAGNOSIS — C83.38 DIFFUSE LARGE B-CELL LYMPHOMA OF LYMPH NODES OF MULTIPLE REGIONS (HCC): ICD-10-CM

## 2020-07-08 DIAGNOSIS — E66.01 CLASS 3 SEVERE OBESITY DUE TO EXCESS CALORIES WITH SERIOUS COMORBIDITY AND BODY MASS INDEX (BMI) OF 40.0 TO 44.9 IN ADULT (HCC): ICD-10-CM

## 2020-07-08 DIAGNOSIS — Z13.1 SCREENING FOR DIABETES MELLITUS (DM): ICD-10-CM

## 2020-07-08 DIAGNOSIS — Z13.220 SCREENING FOR HYPERLIPIDEMIA: ICD-10-CM

## 2020-07-08 DIAGNOSIS — Z11.4 SCREENING FOR HIV (HUMAN IMMUNODEFICIENCY VIRUS): ICD-10-CM

## 2020-07-08 DIAGNOSIS — N63.20 LUMP OF LEFT BREAST: ICD-10-CM

## 2020-07-08 DIAGNOSIS — Z20.2 EXPOSURE TO STD: ICD-10-CM

## 2020-07-08 DIAGNOSIS — N18.30 CKD (CHRONIC KIDNEY DISEASE) STAGE 3, GFR 30-59 ML/MIN (HCC): Primary | ICD-10-CM

## 2020-07-08 DIAGNOSIS — N63.0 BREAST LUMP: Primary | ICD-10-CM

## 2020-07-08 DIAGNOSIS — E78.00 HYPERCHOLESTEROLEMIA: ICD-10-CM

## 2020-07-08 DIAGNOSIS — I10 ESSENTIAL HYPERTENSION: ICD-10-CM

## 2020-07-08 DIAGNOSIS — F32.A DEPRESSION, UNSPECIFIED DEPRESSION TYPE: ICD-10-CM

## 2020-07-08 DIAGNOSIS — E04.2 MULTIPLE THYROID NODULES: ICD-10-CM

## 2020-07-08 DIAGNOSIS — F41.9 ANXIETY: ICD-10-CM

## 2020-07-08 PROBLEM — C85.98 NON-HODGKIN LYMPHOMA OF LYMPH NODES OF MULTIPLE REGIONS (HCC): Status: ACTIVE | Noted: 2020-07-08

## 2020-07-08 PROBLEM — Z13.31 POSITIVE DEPRESSION SCREENING: Status: ACTIVE | Noted: 2020-07-08

## 2020-07-08 PROBLEM — I25.10 CAD (CORONARY ARTERY DISEASE): Status: ACTIVE | Noted: 2018-10-14

## 2020-07-08 LAB
ALBUMIN SERPL BCP-MCNC: 3.1 G/DL (ref 3.5–5)
ALP SERPL-CCNC: 70 U/L (ref 46–116)
ALT SERPL W P-5'-P-CCNC: 39 U/L (ref 12–78)
ANION GAP SERPL CALCULATED.3IONS-SCNC: 5 MMOL/L (ref 4–13)
AST SERPL W P-5'-P-CCNC: 18 U/L (ref 5–45)
BASOPHILS # BLD AUTO: 0.1 THOUSANDS/ΜL (ref 0–0.1)
BASOPHILS NFR BLD AUTO: 1 % (ref 0–1)
BILIRUB SERPL-MCNC: 0.22 MG/DL (ref 0.2–1)
BUN SERPL-MCNC: 18 MG/DL (ref 5–25)
CALCIUM SERPL-MCNC: 9.3 MG/DL (ref 8.3–10.1)
CHLORIDE SERPL-SCNC: 110 MMOL/L (ref 100–108)
CHOLEST SERPL-MCNC: 278 MG/DL (ref 50–200)
CO2 SERPL-SCNC: 23 MMOL/L (ref 21–32)
CREAT SERPL-MCNC: 1.5 MG/DL (ref 0.6–1.3)
EOSINOPHIL # BLD AUTO: 0.16 THOUSAND/ΜL (ref 0–0.61)
EOSINOPHIL NFR BLD AUTO: 1 % (ref 0–6)
ERYTHROCYTE [DISTWIDTH] IN BLOOD BY AUTOMATED COUNT: 19.8 % (ref 11.6–15.1)
EST. AVERAGE GLUCOSE BLD GHB EST-MCNC: 105 MG/DL
GFR SERPL CREATININE-BSD FRML MDRD: 42 ML/MIN/1.73SQ M
GLUCOSE P FAST SERPL-MCNC: 97 MG/DL (ref 65–99)
HBA1C MFR BLD: 5.3 %
HBV CORE AB SER QL: NORMAL
HBV CORE IGM SER QL: NORMAL
HBV SURFACE AG SER QL: NORMAL
HCT VFR BLD AUTO: 41.4 % (ref 34.8–46.1)
HCV AB SER QL: NORMAL
HDLC SERPL-MCNC: 31 MG/DL
HGB BLD-MCNC: 13.8 G/DL (ref 11.5–15.4)
IMM GRANULOCYTES # BLD AUTO: 0.14 THOUSAND/UL (ref 0–0.2)
IMM GRANULOCYTES NFR BLD AUTO: 1 % (ref 0–2)
LYMPHOCYTES # BLD AUTO: 2.68 THOUSANDS/ΜL (ref 0.6–4.47)
LYMPHOCYTES NFR BLD AUTO: 17 % (ref 14–44)
MCH RBC QN AUTO: 28 PG (ref 26.8–34.3)
MCHC RBC AUTO-ENTMCNC: 33.3 G/DL (ref 31.4–37.4)
MCV RBC AUTO: 84 FL (ref 82–98)
MONOCYTES # BLD AUTO: 0.87 THOUSAND/ΜL (ref 0.17–1.22)
MONOCYTES NFR BLD AUTO: 6 % (ref 4–12)
NEUTROPHILS # BLD AUTO: 11.41 THOUSANDS/ΜL (ref 1.85–7.62)
NEUTS SEG NFR BLD AUTO: 74 % (ref 43–75)
NONHDLC SERPL-MCNC: 247 MG/DL
NRBC BLD AUTO-RTO: 0 /100 WBCS
PLATELET # BLD AUTO: 365 THOUSANDS/UL (ref 149–390)
PMV BLD AUTO: 10.6 FL (ref 8.9–12.7)
POTASSIUM SERPL-SCNC: 4.4 MMOL/L (ref 3.5–5.3)
PROT SERPL-MCNC: 7.2 G/DL (ref 6.4–8.2)
RBC # BLD AUTO: 4.93 MILLION/UL (ref 3.81–5.12)
SODIUM SERPL-SCNC: 138 MMOL/L (ref 136–145)
TRIGL SERPL-MCNC: 465 MG/DL
TSH SERPL DL<=0.05 MIU/L-ACNC: 3.08 UIU/ML (ref 0.36–3.74)
WBC # BLD AUTO: 15.36 THOUSAND/UL (ref 4.31–10.16)

## 2020-07-08 PROCEDURE — 84443 ASSAY THYROID STIM HORMONE: CPT

## 2020-07-08 PROCEDURE — 86696 HERPES SIMPLEX TYPE 2 TEST: CPT

## 2020-07-08 PROCEDURE — 3075F SYST BP GE 130 - 139MM HG: CPT | Performed by: NURSE PRACTITIONER

## 2020-07-08 PROCEDURE — 86695 HERPES SIMPLEX TYPE 1 TEST: CPT

## 2020-07-08 PROCEDURE — 3008F BODY MASS INDEX DOCD: CPT | Performed by: NURSE PRACTITIONER

## 2020-07-08 PROCEDURE — 80061 LIPID PANEL: CPT

## 2020-07-08 PROCEDURE — 87389 HIV-1 AG W/HIV-1&-2 AB AG IA: CPT

## 2020-07-08 PROCEDURE — 83036 HEMOGLOBIN GLYCOSYLATED A1C: CPT

## 2020-07-08 PROCEDURE — 99214 OFFICE O/P EST MOD 30 MIN: CPT | Performed by: NURSE PRACTITIONER

## 2020-07-08 PROCEDURE — 86704 HEP B CORE ANTIBODY TOTAL: CPT

## 2020-07-08 PROCEDURE — 87340 HEPATITIS B SURFACE AG IA: CPT

## 2020-07-08 PROCEDURE — 3079F DIAST BP 80-89 MM HG: CPT | Performed by: NURSE PRACTITIONER

## 2020-07-08 PROCEDURE — 80053 COMPREHEN METABOLIC PANEL: CPT

## 2020-07-08 PROCEDURE — 86705 HEP B CORE ANTIBODY IGM: CPT

## 2020-07-08 PROCEDURE — 36415 COLL VENOUS BLD VENIPUNCTURE: CPT

## 2020-07-08 PROCEDURE — 85025 COMPLETE CBC W/AUTO DIFF WBC: CPT

## 2020-07-08 PROCEDURE — 86803 HEPATITIS C AB TEST: CPT

## 2020-07-08 RX ORDER — ATORVASTATIN CALCIUM 40 MG/1
40 TABLET, FILM COATED ORAL DAILY
Qty: 30 TABLET | Refills: 0 | Status: SHIPPED | OUTPATIENT
Start: 2020-07-08 | End: 2020-08-30 | Stop reason: SDUPTHER

## 2020-07-08 RX ORDER — LISINOPRIL 20 MG/1
20 TABLET ORAL DAILY
Qty: 30 TABLET | Refills: 0 | Status: SHIPPED | OUTPATIENT
Start: 2020-07-08 | End: 2020-08-18 | Stop reason: SDUPTHER

## 2020-07-08 RX ORDER — BUPROPION HYDROCHLORIDE 150 MG/1
150 TABLET ORAL EVERY MORNING
Qty: 30 TABLET | Refills: 5 | Status: SHIPPED | OUTPATIENT
Start: 2020-07-08 | End: 2020-08-06 | Stop reason: SDUPTHER

## 2020-07-08 RX ORDER — LORAZEPAM 0.5 MG/1
0.5 TABLET ORAL 2 TIMES DAILY
Qty: 60 TABLET | Refills: 0 | Status: SHIPPED | OUTPATIENT
Start: 2020-07-08 | End: 2020-08-06 | Stop reason: CLARIF

## 2020-07-08 NOTE — PROGRESS NOTES
Assessment/Plan:    No problem-specific Assessment & Plan notes found for this encounter  Diagnoses and all orders for this visit:    Breast lump  Comments:  Mammogram ordered    Lump of left breast  Comments:  Mammogram ordered  Orders:  -     Mammo screening bilateral w cad; Future    Anxiety  Comments:  Rx for Ativan provided  Orders:  -     LORazepam (ATIVAN) 0 5 mg tablet; Take 1 tablet (0 5 mg total) by mouth 2 (two) times a day  -     buPROPion (WELLBUTRIN XL) 150 mg 24 hr tablet; Take 1 tablet (150 mg total) by mouth every morning    Depression, unspecified depression type  Comments:  Rx for Wellbutrin provided    Lump in vagina  Comments:  referred to GYN  Orders:  -     Ambulatory referral to Gynecology; Future  -     HSV 1 and 2-Spec Ab, IgG w/Reflex; Future    Essential hypertension  Comments:  bp 130/80 today no medication , labwork ordered  Orders:  -     lisinopril (ZESTRIL) 20 mg tablet; Take 1 tablet (20 mg total) by mouth daily  -     Basic metabolic panel; Future    Screening for hyperlipidemia  Comments:  labwork ordered  Orders:  -     atorvastatin (LIPITOR) 40 mg tablet; Take 1 tablet (40 mg total) by mouth daily    Diffuse large B-cell lymphoma of lymph nodes of multiple regions (HCC)  -     CBC and differential; Future  -     Basic metabolic panel; Future    Screening for HIV (human immunodeficiency virus)  Comments:  labwork ordered  Orders:  -     HIV 1/2 Antigen/Antibody (4th Generation) w Reflex [facility]; Future    Exposure to STD  Comments:  labwork ordered  Orders:  -     Chronic Hepatitis Panel; Future    Screening for diabetes mellitus (DM)  Comments:  labwork ordered  Orders:  -     Hemoglobin A1C; Future    Class 3 severe obesity due to excess calories with serious comorbidity and body mass index (BMI) of 40 0 to 44 9 in [facility])  Comments:  pt counciled on diet and exercise    Other orders  -     Cancel: sertraline (ZOLOFT) 50 mg tablet;  Take 1 tablet (50 mg total) by mouth daily          Subjective:      Patient ID: Bogdan Pate is a 39 y o  female  39 yr old female arrives with a host of complaints today  Pt teary concerned about 2 lumps palpable in her left breast, in conjunction with palpable lump that she has noted newly in her vagina when inserting a tampon  Pt states she has been under great stress for the past year and has gained so much weight, has been out of her medication which I did reorder  Pt has not had a gyn exam and I did provide her with a referral as well as STD lab testing  I also ordered her Wellbutrin and Ativan for her ongoing anxiety which she reports is so severe she has trouble leaving the house, she has a counselor but due to CV19 she has not been able to see them      The following portions of the patient's history were reviewed and updated as appropriate: She  has a past medical history of Acute MI ([facility] [address] ), Anxiety, Cardiac disease, Cardiomyopathy ([facility] [address] ), CHF (congestive heart failure) ([facility] [address] ), Depression, Hypertension, Lymphoma, non-Hodgkin's ([facility] [address] ), and Tobacco abuse  She   Patient Active Problem List    Diagnosis Date Noted    Lump of left breast 2020    Non-Hodgkin lymphoma of lymph nodes of multiple regions ([facility] [address] ) 2020    Positive depression screening 2020    Class 3 severe obesity due to excess calories with serious comorbidity and body mass index (BMI) of 40 0 to 44 9 in adult [facility]) 2019    Depression 2019    Anxiety 10/14/2018    CAD (coronary artery disease) 10/14/2018    Chest pain 2017    Essential hypertension 2017    Epistaxis 2017    Tobacco abuse 2017    History of MI (myocardial infarction) 2017    CHF (congestive heart failure) ([facility] [address] ) 2017     She  has a past surgical history that includes Carotid stent and  section  Her family history includes No Known Problems in her father and mother  She  reports that she has been smoking cigarettes   She has been smoking about 1 00 pack per day  She has never used smokeless tobacco  She reports that she drinks alcohol  She reports that she does not use drugs  Current Outpatient Medications   Medication Sig Dispense Refill    aspirin 325 mg tablet Take 325 mg by mouth daily      atorvastatin (LIPITOR) 40 mg tablet Take 1 tablet (40 mg total) by mouth daily 30 tablet 0    lisinopril (ZESTRIL) 20 mg tablet Take 1 tablet (20 mg total) by mouth daily 30 tablet 0    LORazepam (ATIVAN) 0 5 mg tablet Take 1 tablet (0 5 mg total) by mouth 2 (two) times a day 60 tablet 0    Multiple Vitamins-Calcium (ONE-A-DAY WOMENS PO) Take by mouth daily      buPROPion (WELLBUTRIN XL) 150 mg 24 hr tablet Take 1 tablet (150 mg total) by mouth every morning 30 tablet 5     No current facility-administered medications for this visit        Current Outpatient Medications on File Prior to Visit   Medication Sig    aspirin 325 mg tablet Take 325 mg by mouth daily    Multiple Vitamins-Calcium (ONE-A-DAY WOMENS PO) Take by mouth daily    [DISCONTINUED] atorvastatin (LIPITOR) 40 mg tablet Take 40 mg by mouth daily    [DISCONTINUED] hydrOXYzine HCL (ATARAX) 25 mg tablet Take 2 tablets (50 mg total) by mouth daily as needed for anxiety for up to 3 doses    [DISCONTINUED] lisinopril (ZESTRIL) 20 mg tablet Take 20 mg by mouth daily    [DISCONTINUED] LORazepam (ATIVAN) 0 5 mg tablet Take 1 tablet (0 5 mg total) by mouth 2 (two) times a day    [DISCONTINUED] sertraline (ZOLOFT) 50 mg tablet Take 1 tablet (50 mg total) by mouth daily    [DISCONTINUED] furosemide (LASIX) 20 mg tablet Take 20 mg by mouth daily    [DISCONTINUED] methylPREDNISolone 4 MG tablet therapy pack Use as directed on package (Patient not taking: Reported on 7/8/2020)    [DISCONTINUED] nitroglycerin (NITROSTAT) 0 4 mg SL tablet Place 0 4 mg under the tongue every 5 (five) minutes as needed for chest pain     No current facility-administered medications on file prior to visit       She has No Known Allergies       Review of Systems   Constitutional: Negative  HENT: Negative  Eyes: Negative  Respiratory: Negative  Cardiovascular: Negative  Gastrointestinal: Negative  Endocrine: Negative  Genitourinary: Positive for pelvic pain  Musculoskeletal: Negative  Skin: Negative  Lumps left breast   Allergic/Immunologic: Negative  Neurological: Negative  Hematological: Negative  Psychiatric/Behavioral: The patient is nervous/anxious  Depressed and anxious          BMI Counseling: Body mass index is 43 9 kg/m²  Discussed the patient's BMI with her  The BMI is above normal  Nutrition recommendations include reducing portion sizes, decreasing overall calorie intake, 3-5 servings of fruits/vegetables daily, reducing fast food intake, consuming healthier snacks, decreasing soda and/or juice intake, moderation in carbohydrate intake, increasing intake of lean protein, reducing intake of saturated fat and trans fat and reducing intake of cholesterol  Exercise recommendations include exercising 3-5 times per week  PHQ-9 Depression Screening    PHQ-9:    Frequency of the following problems over the past two weeks:             Depression Screening Follow-up Plan: Patient's depression screening was positive with a PHQ-2 score of   Their PHQ-9 score was   Patient advised to follow-up with PCP for further management  Rx for Wellbutrin and Ativan provided will cont to follow      Objective:      /80   Pulse 88   Temp 99 °F (37 2 °C) (Tympanic)   Resp 18   Ht 5' 6" (1 676 m)   Wt 123 kg (272 lb)   SpO2 98%   BMI 43 90 kg/m²          Physical Exam   Constitutional: She is oriented to person, place, and time  She appears well-developed and well-nourished  HENT:   Head: Normocephalic  Eyes: Pupils are equal, round, and reactive to light  Neck: Normal range of motion  Cardiovascular: Normal rate and regular rhythm         Pulmonary/Chest: Effort normal and breath sounds normal    2 palpable lumps superficial below left nipple on left breast   Abdominal: Soft  Bowel sounds are normal    Musculoskeletal: Normal range of motion  Neurological: She is alert and oriented to person, place, and time  Skin: Skin is warm and dry  Psychiatric: Her behavior is normal  Judgment and thought content normal  Her mood appears anxious  Her speech is rapid and/or pressured  Cognition and memory are normal  She exhibits a depressed mood  Nursing note and vitals reviewed

## 2020-07-09 LAB
HIV 1+2 AB+HIV1 P24 AG SERPL QL IA: NORMAL
HSV1 IGG SER IA-ACNC: 22.5 INDEX (ref 0–0.9)
HSV2 IGG SER IA-ACNC: <0.91 INDEX (ref 0–0.9)

## 2020-07-13 ENCOUNTER — TRANSCRIBE ORDERS (OUTPATIENT)
Dept: ADMINISTRATIVE | Facility: HOSPITAL | Age: 45
End: 2020-07-13

## 2020-07-13 DIAGNOSIS — N63.20 LEFT BREAST LUMP: Primary | ICD-10-CM

## 2020-07-14 ENCOUNTER — HOSPITAL ENCOUNTER (OUTPATIENT)
Dept: MAMMOGRAPHY | Facility: CLINIC | Age: 45
Discharge: HOME | End: 2020-07-14
Payer: COMMERCIAL

## 2020-07-14 ENCOUNTER — HOSPITAL ENCOUNTER (OUTPATIENT)
Dept: ULTRASOUND IMAGING | Facility: CLINIC | Age: 45
Discharge: HOME | End: 2020-07-14
Payer: COMMERCIAL

## 2020-07-14 VITALS — BODY MASS INDEX: 43.71 KG/M2 | WEIGHT: 272 LBS | HEIGHT: 66 IN

## 2020-07-14 DIAGNOSIS — N63.20 LEFT BREAST LUMP: ICD-10-CM

## 2020-07-14 PROCEDURE — G0279 TOMOSYNTHESIS, MAMMO: HCPCS

## 2020-07-14 PROCEDURE — 77066 DX MAMMO INCL CAD BI: CPT

## 2020-07-14 PROCEDURE — 76642 ULTRASOUND BREAST LIMITED: CPT

## 2020-07-21 ENCOUNTER — OFFICE VISIT (OUTPATIENT)
Dept: OBGYN CLINIC | Facility: MEDICAL CENTER | Age: 45
End: 2020-07-21
Payer: COMMERCIAL

## 2020-07-21 VITALS
SYSTOLIC BLOOD PRESSURE: 110 MMHG | BODY MASS INDEX: 43.07 KG/M2 | WEIGHT: 268 LBS | HEIGHT: 66 IN | DIASTOLIC BLOOD PRESSURE: 74 MMHG | TEMPERATURE: 97.2 F

## 2020-07-21 DIAGNOSIS — N94.9 VAGINAL LUMP: Primary | ICD-10-CM

## 2020-07-21 PROCEDURE — 3008F BODY MASS INDEX DOCD: CPT | Performed by: NURSE PRACTITIONER

## 2020-07-21 PROCEDURE — 99202 OFFICE O/P NEW SF 15 MIN: CPT | Performed by: NURSE PRACTITIONER

## 2020-07-21 PROCEDURE — 3078F DIAST BP <80 MM HG: CPT | Performed by: NURSE PRACTITIONER

## 2020-07-21 PROCEDURE — 3074F SYST BP LT 130 MM HG: CPT | Performed by: NURSE PRACTITIONER

## 2020-07-21 NOTE — PROGRESS NOTES
Assessment/Plan No evidence of mass or vaginal lump Pt  Also examined by Dr Pankaj Bowling who was unable to palpate any abnormality    recommened pt rto during menses for evaluation if that is when she feels mass  Diagnoses and all orders for this visit:    Vaginal lump        Subjective   Patient ID: Poonam Montes De Oca is a 39 y o  female  Vitals:    07/21/20 0934   BP: 110/74   Temp: (!) 97 2 °F (36 2 °C)     HPI  Presents with c/o right sided vaginal lump  States feels it when she puts in a tampon  No other c/o  Presents with her   The following portions of the patient's history were reviewed and updated as appropriate: past family history, past social history and problem list     Review of Systems   Genitourinary: Positive for vaginal pain  Psychiatric/Behavioral: Negative  Objective   Physical Exam  Normal vulva, no lesions                           Normal vagina, cx felt but no masses or abnormalities felt  Had pt stand for exam to see if could feel mass but was unable to  Pt unable to feel lump today  States its worse with menses

## 2020-08-06 ENCOUNTER — OFFICE VISIT (OUTPATIENT)
Dept: FAMILY MEDICINE CLINIC | Facility: CLINIC | Age: 45
End: 2020-08-06
Payer: COMMERCIAL

## 2020-08-06 VITALS
TEMPERATURE: 98 F | DIASTOLIC BLOOD PRESSURE: 76 MMHG | SYSTOLIC BLOOD PRESSURE: 118 MMHG | WEIGHT: 267 LBS | BODY MASS INDEX: 42.91 KG/M2 | HEART RATE: 94 BPM | OXYGEN SATURATION: 98 % | HEIGHT: 66 IN

## 2020-08-06 DIAGNOSIS — Z13.31 POSITIVE DEPRESSION SCREENING: ICD-10-CM

## 2020-08-06 DIAGNOSIS — E78.49 OTHER HYPERLIPIDEMIA: ICD-10-CM

## 2020-08-06 DIAGNOSIS — N94.9 LUMP IN VAGINA: ICD-10-CM

## 2020-08-06 DIAGNOSIS — Z00.00 ANNUAL PHYSICAL EXAM: Primary | ICD-10-CM

## 2020-08-06 DIAGNOSIS — Z23 ENCOUNTER FOR IMMUNIZATION: ICD-10-CM

## 2020-08-06 DIAGNOSIS — M25.471 RIGHT ANKLE SWELLING: ICD-10-CM

## 2020-08-06 DIAGNOSIS — F41.9 ANXIETY: ICD-10-CM

## 2020-08-06 DIAGNOSIS — M15.2 BOUCHARD NODES (DJD HAND): ICD-10-CM

## 2020-08-06 DIAGNOSIS — F32.9 REACTIVE DEPRESSION: ICD-10-CM

## 2020-08-06 DIAGNOSIS — M79.671 PAIN IN RIGHT FOOT: ICD-10-CM

## 2020-08-06 DIAGNOSIS — B00.9 HERPES: ICD-10-CM

## 2020-08-06 DIAGNOSIS — I99.9 VASCULAR ABNORMALITY: ICD-10-CM

## 2020-08-06 PROBLEM — E66.01 CLASS 3 SEVERE OBESITY DUE TO EXCESS CALORIES WITH SERIOUS COMORBIDITY AND BODY MASS INDEX (BMI) OF 40.0 TO 44.9 IN ADULT (HCC): Status: RESOLVED | Noted: 2019-11-04 | Resolved: 2020-08-06

## 2020-08-06 PROCEDURE — 90715 TDAP VACCINE 7 YRS/> IM: CPT | Performed by: NURSE PRACTITIONER

## 2020-08-06 PROCEDURE — 90471 IMMUNIZATION ADMIN: CPT | Performed by: NURSE PRACTITIONER

## 2020-08-06 PROCEDURE — 99396 PREV VISIT EST AGE 40-64: CPT | Performed by: NURSE PRACTITIONER

## 2020-08-06 RX ORDER — ACYCLOVIR 800 MG/1
800 TABLET ORAL 2 TIMES DAILY
Qty: 14 TABLET | Refills: 3 | Status: SHIPPED | OUTPATIENT
Start: 2020-08-06 | End: 2020-09-14

## 2020-08-06 RX ORDER — BUPROPION HYDROCHLORIDE 150 MG/1
150 TABLET ORAL EVERY MORNING
Qty: 30 TABLET | Refills: 5 | Status: CANCELLED | OUTPATIENT
Start: 2020-08-06

## 2020-08-06 RX ORDER — LORAZEPAM 0.5 MG/1
0.5 TABLET ORAL 2 TIMES DAILY
Qty: 60 TABLET | Refills: 0 | OUTPATIENT
Start: 2020-08-06

## 2020-08-06 RX ORDER — BUPROPION HYDROCHLORIDE 300 MG/1
300 TABLET ORAL EVERY MORNING
Qty: 30 TABLET | Refills: 1 | Status: SHIPPED | OUTPATIENT
Start: 2020-08-06 | End: 2020-09-03 | Stop reason: SDUPTHER

## 2020-08-06 RX ORDER — ALPRAZOLAM 0.5 MG/1
0.5 TABLET ORAL 3 TIMES DAILY PRN
Qty: 90 TABLET | Refills: 0 | Status: SHIPPED | OUTPATIENT
Start: 2020-08-06 | End: 2020-09-03 | Stop reason: SDUPTHER

## 2020-08-06 RX ORDER — ARIPIPRAZOLE 10 MG/1
10 TABLET ORAL DAILY
Qty: 30 TABLET | Refills: 5 | Status: SHIPPED | OUTPATIENT
Start: 2020-08-06 | End: 2020-09-03 | Stop reason: SDUPTHER

## 2020-08-06 RX ORDER — ACETAMINOPHEN 500 MG
500 TABLET ORAL EVERY 4 HOURS PRN
COMMUNITY
End: 2021-04-01 | Stop reason: HOSPADM

## 2020-08-06 RX ORDER — IBUPROFEN 200 MG
TABLET ORAL EVERY 4 HOURS PRN
COMMUNITY
End: 2020-09-14 | Stop reason: SINTOL
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
## 2020-08-11 ENCOUNTER — TELEPHONE (OUTPATIENT)
Dept: PSYCHIATRY | Facility: CLINIC | Age: 45
End: 2020-08-11

## 2020-08-11 NOTE — TELEPHONE ENCOUNTER
Behavorial Health Outpatient Intake Questions    Referred by:pcp    Please advised interviewee that they need to answer all questions truthfully to allow for best care and any misrepresentations of information may affect their ability to be seen at this clinic   => Was this discussed? Yes     Behavorial Health Outpatient Intake History -     Presenting Problem (in patient's words): depression    Are there any developmental disabilities? ? If yes, can they speak to you on the phone? If they are too limited to speak to you on phone, refer out No    Are you taking any psychiatric medications? Yes    => If yes, who prescribes? pcp If yes, are they injectable medications? no    Does the patient have a language barrier or hearing impairment? Yes    Have you been treated at [address] [place] [address] by a therapist or a doctor in the past? If yes, who? No    Has the patient been hospitalized for mental health? No   If yes, how long ago was last hospitalization and where was it? Do you actively use alcohol or marijuana or illegal substances? If yes, what and how much - refer out to Drug and alcohol treatment if use is excessive or daily use of illegal substances No concerns of substance abuse are reported  Do you have a community treatment team or ? No    Legal History-     Does the patient have any history of arrests, prison/halfway time, or DUIs? No  If Yes-  1) What types of charges? 2) When were they last incarcerated? 3) Are they currently on parole or probation? Minor Child-    Who has custody of the child? Is there a custody agreement? If there is a custody agreement remind parent that they must bring a copy to the first appt or they will not be seen       Intake Team, please check with provider before scheduling if flags come up such as:  - complex case  - legal history (other than DUI)  - communication barrier concerns are present  - if, in your judgment, this needs further review    ACCEPTED as a patient Yes  => Appointment Date: sept 15, 2020 with SIL Rivera  Referred Elsewhere? No    Name of [address] ID#  Insurance Phone #  If ins is primary or secondary  If patient is a minor, parents information such as Name, D  O B of guarantor  verbal cues/1 person assist/nonverbal cues (demo/gestures)

## 2020-08-12 ENCOUNTER — HOSPITAL ENCOUNTER (OUTPATIENT)
Dept: ULTRASOUND IMAGING | Facility: HOSPITAL | Age: 45
Discharge: HOME | End: 2020-08-12
Payer: COMMERCIAL

## 2020-08-12 ENCOUNTER — APPOINTMENT (OUTPATIENT)
Dept: LAB | Facility: HOSPITAL | Age: 45
End: 2020-08-12
Payer: COMMERCIAL

## 2020-08-12 DIAGNOSIS — N94.9 LUMP IN VAGINA: ICD-10-CM

## 2020-08-12 PROCEDURE — 76830 TRANSVAGINAL US NON-OB: CPT

## 2020-08-12 PROCEDURE — 76856 US EXAM PELVIC COMPLETE: CPT

## 2020-08-13 ENCOUNTER — APPOINTMENT (EMERGENCY)
Dept: RADIOLOGY | Facility: HOSPITAL | Age: 45
End: 2020-08-13
Payer: COMMERCIAL

## 2020-08-13 ENCOUNTER — HOSPITAL ENCOUNTER (EMERGENCY)
Facility: HOSPITAL | Age: 45
Discharge: LEFT BEFORE DISCHARGE | End: 2020-08-13
Attending: EMERGENCY MEDICINE | Admitting: EMERGENCY MEDICINE
Payer: COMMERCIAL

## 2020-08-13 VITALS
HEART RATE: 72 BPM | RESPIRATION RATE: 20 BRPM | BODY MASS INDEX: 43.71 KG/M2 | TEMPERATURE: 98.2 F | SYSTOLIC BLOOD PRESSURE: 139 MMHG | DIASTOLIC BLOOD PRESSURE: 78 MMHG | OXYGEN SATURATION: 98 % | WEIGHT: 272 LBS | HEIGHT: 66 IN

## 2020-08-13 DIAGNOSIS — L03.90 CELLULITIS: Primary | ICD-10-CM

## 2020-08-13 DIAGNOSIS — R50.9 FEVER: ICD-10-CM

## 2020-08-13 DIAGNOSIS — N17.9 AKI (ACUTE KIDNEY INJURY) (HCC): ICD-10-CM

## 2020-08-13 LAB
ANION GAP SERPL CALCULATED.3IONS-SCNC: 10 MMOL/L (ref 4–13)
BASOPHILS # BLD AUTO: 0.1 THOUSANDS/ΜL (ref 0–0.1)
BASOPHILS NFR BLD AUTO: 1 % (ref 0–2)
BUN SERPL-MCNC: 31 MG/DL (ref 7–25)
CALCIUM SERPL-MCNC: 9.6 MG/DL (ref 8.6–10.5)
CHLORIDE SERPL-SCNC: 102 MMOL/L (ref 98–107)
CO2 SERPL-SCNC: 22 MMOL/L (ref 21–31)
CREAT SERPL-MCNC: 2.47 MG/DL (ref 0.6–1.2)
EOSINOPHIL # BLD AUTO: 0.1 THOUSAND/ΜL (ref 0–0.61)
EOSINOPHIL NFR BLD AUTO: 1 % (ref 0–5)
ERYTHROCYTE [DISTWIDTH] IN BLOOD BY AUTOMATED COUNT: 17.9 % (ref 11.5–14.5)
GFR SERPL CREATININE-BSD FRML MDRD: 23 ML/MIN/1.73SQ M
GLUCOSE SERPL-MCNC: 120 MG/DL (ref 65–99)
HCT VFR BLD AUTO: 37.1 % (ref 42–47)
HGB BLD-MCNC: 12.3 G/DL (ref 12–16)
LACTATE SERPL-SCNC: 1.4 MMOL/L (ref 0.5–2)
LYMPHOCYTES # BLD AUTO: 2.1 THOUSANDS/ΜL (ref 0.6–4.47)
LYMPHOCYTES NFR BLD AUTO: 13 % (ref 21–51)
MAGNESIUM SERPL-MCNC: 2.1 MG/DL (ref 1.9–2.7)
MCH RBC QN AUTO: 28 PG (ref 26–34)
MCHC RBC AUTO-ENTMCNC: 33.3 G/DL (ref 31–37)
MCV RBC AUTO: 84 FL (ref 81–99)
MONOCYTES # BLD AUTO: 1.4 THOUSAND/ΜL (ref 0.17–1.22)
MONOCYTES NFR BLD AUTO: 9 % (ref 2–12)
NEUTROPHILS # BLD AUTO: 12.7 THOUSANDS/ΜL (ref 1.4–6.5)
NEUTS SEG NFR BLD AUTO: 77 % (ref 42–75)
PLATELET # BLD AUTO: 353 THOUSANDS/UL (ref 149–390)
PMV BLD AUTO: 8.3 FL (ref 8.6–11.7)
POTASSIUM SERPL-SCNC: 4.1 MMOL/L (ref 3.5–5.5)
RBC # BLD AUTO: 4.42 MILLION/UL (ref 3.9–5.2)
SODIUM SERPL-SCNC: 134 MMOL/L (ref 134–143)
WBC # BLD AUTO: 16.5 THOUSAND/UL (ref 4.8–10.8)

## 2020-08-13 PROCEDURE — 87040 BLOOD CULTURE FOR BACTERIA: CPT | Performed by: EMERGENCY MEDICINE

## 2020-08-13 PROCEDURE — 83605 ASSAY OF LACTIC ACID: CPT | Performed by: EMERGENCY MEDICINE

## 2020-08-13 PROCEDURE — 80048 BASIC METABOLIC PNL TOTAL CA: CPT | Performed by: EMERGENCY MEDICINE

## 2020-08-13 PROCEDURE — 85025 COMPLETE CBC W/AUTO DIFF WBC: CPT | Performed by: EMERGENCY MEDICINE

## 2020-08-13 PROCEDURE — 96375 TX/PRO/DX INJ NEW DRUG ADDON: CPT

## 2020-08-13 PROCEDURE — 36415 COLL VENOUS BLD VENIPUNCTURE: CPT | Performed by: EMERGENCY MEDICINE

## 2020-08-13 PROCEDURE — 96361 HYDRATE IV INFUSION ADD-ON: CPT

## 2020-08-13 PROCEDURE — 96365 THER/PROPH/DIAG IV INF INIT: CPT

## 2020-08-13 PROCEDURE — 99284 EMERGENCY DEPT VISIT MOD MDM: CPT | Performed by: EMERGENCY MEDICINE

## 2020-08-13 PROCEDURE — 99284 EMERGENCY DEPT VISIT MOD MDM: CPT

## 2020-08-13 PROCEDURE — 96366 THER/PROPH/DIAG IV INF ADDON: CPT

## 2020-08-13 PROCEDURE — 96367 TX/PROPH/DG ADDL SEQ IV INF: CPT

## 2020-08-13 PROCEDURE — 83735 ASSAY OF MAGNESIUM: CPT | Performed by: EMERGENCY MEDICINE

## 2020-08-13 PROCEDURE — 73630 X-RAY EXAM OF FOOT: CPT

## 2020-08-13 PROCEDURE — 73610 X-RAY EXAM OF ANKLE: CPT

## 2020-08-13 RX ORDER — CLINDAMYCIN HYDROCHLORIDE 300 MG/1
300 CAPSULE ORAL 4 TIMES DAILY
Qty: 28 CAPSULE | Refills: 0 | Status: SHIPPED | OUTPATIENT
Start: 2020-08-13 | End: 2020-08-18 | Stop reason: ALTCHOICE

## 2020-08-13 RX ORDER — LORAZEPAM 2 MG/ML
0.5 INJECTION INTRAMUSCULAR ONCE
Status: COMPLETED | OUTPATIENT
Start: 2020-08-13 | End: 2020-08-13

## 2020-08-13 RX ORDER — HYDROMORPHONE HCL/PF 1 MG/ML
0.5 SYRINGE (ML) INJECTION ONCE
Status: COMPLETED | OUTPATIENT
Start: 2020-08-13 | End: 2020-08-13

## 2020-08-13 RX ORDER — OXYCODONE HYDROCHLORIDE AND ACETAMINOPHEN 5; 325 MG/1; MG/1
1 TABLET ORAL EVERY 4 HOURS PRN
Qty: 10 TABLET | Refills: 0 | Status: SHIPPED | OUTPATIENT
Start: 2020-08-13 | End: 2020-08-16

## 2020-08-13 RX ORDER — OXYCODONE HYDROCHLORIDE AND ACETAMINOPHEN 5; 325 MG/1; MG/1
1 TABLET ORAL ONCE
Status: COMPLETED | OUTPATIENT
Start: 2020-08-13 | End: 2020-08-13

## 2020-08-13 RX ORDER — CEFTRIAXONE 1 G/50ML
1000 INJECTION, SOLUTION INTRAVENOUS ONCE
Status: COMPLETED | OUTPATIENT
Start: 2020-08-13 | End: 2020-08-13

## 2020-08-13 RX ADMIN — HYDROMORPHONE HYDROCHLORIDE 0.5 MG: 1 INJECTION, SOLUTION INTRAMUSCULAR; INTRAVENOUS; SUBCUTANEOUS at 09:16

## 2020-08-13 RX ADMIN — CEFTRIAXONE 1000 MG: 1 INJECTION, SOLUTION INTRAVENOUS at 09:54

## 2020-08-13 RX ADMIN — SODIUM CHLORIDE 1000 ML: 0.9 INJECTION, SOLUTION INTRAVENOUS at 08:38

## 2020-08-13 RX ADMIN — VANCOMYCIN HYDROCHLORIDE 2000 MG: 1 INJECTION, POWDER, LYOPHILIZED, FOR SOLUTION INTRAVENOUS at 10:33

## 2020-08-13 RX ADMIN — LORAZEPAM 0.5 MG: 2 INJECTION INTRAMUSCULAR; INTRAVENOUS at 09:49

## 2020-08-13 RX ADMIN — OXYCODONE HYDROCHLORIDE AND ACETAMINOPHEN 1 TABLET: 5; 325 TABLET ORAL at 07:26

## 2020-08-13 NOTE — ED PROVIDER NOTES
History  Chief Complaint   Patient presents with    Foot Pain     left foot, no injury     Patient is a 42-year-old female past medical history of CHF, hypertension, depression, prior MI, presenting with left lower extremity pain, and subjective fevers for the last 2 days  Patient states symptoms began yesterday as dull pain on the left lateral ankle  She denies injury  His pain is gradually worsened to include the dorsum of her foot and distal calf  She denies swelling  States it feels warm compared to the other side  Leg Pain   Location:  Foot and ankle  Injury: no    Ankle location:  L ankle  Foot location:  L foot  Pain details:     Quality:  Sharp    Severity:  Severe    Onset quality:  Gradual    Duration:  1 day    Timing:  Constant    Progression:  Worsening  Chronicity:  New  Dislocation: no    Prior injury to area:  No  Relieved by:  Nothing  Worsened by:  Nothing  Ineffective treatments:  Acetaminophen  Associated symptoms: fever    Associated symptoms: no back pain and no neck pain        Prior to Admission Medications   Prescriptions Last Dose Informant Patient Reported? Taking?    ALPRAZolam (XANAX) 0 5 mg tablet   No No   Sig: Take 1 tablet (0 5 mg total) by mouth 3 (three) times a day as needed for anxiety   ARIPiprazole (ABILIFY) 10 mg tablet   No No   Sig: Take 1 tablet (10 mg total) by mouth daily   Multiple Vitamins-Calcium (ONE-A-DAY WOMENS PO)   Yes No   Sig: Take by mouth daily   acetaminophen (TYLENOL) 500 mg tablet  Self Yes No   Sig: Take 500 mg by mouth every 4 (four) hours as needed for mild pain   acyclovir (ZOVIRAX) 800 mg tablet   No No   Sig: Take 1 tablet (800 mg total) by mouth 2 (two) times a day for 7 days   aspirin 325 mg tablet   Yes No   Sig: Take 325 mg by mouth daily   atorvastatin (LIPITOR) 40 mg tablet   No No   Sig: Take 1 tablet (40 mg total) by mouth daily   buPROPion (WELLBUTRIN XL) 300 mg 24 hr tablet   No No   Sig: Take 1 tablet (300 mg total) by mouth every morning   ibuprofen (MOTRIN) 200 mg tablet  Self Yes No   Sig: Take by mouth every 4 (four) hours as needed for mild pain   lisinopril (ZESTRIL) 20 mg tablet   No No   Sig: Take 1 tablet (20 mg total) by mouth daily      Facility-Administered Medications: None       Past Medical History:   Diagnosis Date    Acute MI ([facility] )     Anxiety     Cardiac disease     Cardiomyopathy ([place] [address] )     CHF (congestive heart failure) ([place] [address] )     Depression     History of chemotherapy     non hodgkins lymphoma     Hypertension     Lymphoma, non-Hodgkin's ([place] [address] )     Tobacco abuse        Past Surgical History:   Procedure Laterality Date    CAROTID STENT       SECTION         Family History   Problem Relation Age of Onset    No Known Problems Mother     No Known Problems Father     No Known Problems Daughter     Brain cancer Maternal Grandfather     No Known Problems Paternal Aunt     No Known Problems Paternal Aunt      I have reviewed and agree with the history as documented  E-Cigarette/Vaping    E-Cigarette Use Never User      E-Cigarette/Vaping Substances     Social History     Tobacco Use    Smoking status: Current Every Day Smoker     Packs/day: 1 00     Types: Cigarettes    Smokeless tobacco: Never Used   Substance Use Topics    Alcohol use: Yes     Comment: rarely    Drug use: No       Review of Systems   Constitutional: Positive for fever  Negative for chills  HENT: Negative for congestion, nosebleeds, rhinorrhea and sore throat  Eyes: Negative for pain and visual disturbance  Respiratory: Negative for cough and wheezing  Cardiovascular: Negative for chest pain and leg swelling  Gastrointestinal: Negative for abdominal distention, abdominal pain, diarrhea, nausea and vomiting  Genitourinary: Negative for dysuria and frequency  Musculoskeletal: Negative for back pain, joint swelling and neck pain  Skin: Negative for rash and wound     Neurological: Negative for weakness and numbness  Psychiatric/Behavioral: Negative for decreased concentration and suicidal ideas  Physical Exam  Physical Exam  Constitutional:       Appearance: She is well-developed  She is obese  HENT:      Head: Normocephalic and atraumatic  Eyes:      Conjunctiva/sclera: Conjunctivae normal       Pupils: Pupils are equal, round, and reactive to light  Neck:      Musculoskeletal: Normal range of motion and neck supple  Trachea: No tracheal deviation  Cardiovascular:      Rate and Rhythm: Normal rate and regular rhythm  Heart sounds: Normal heart sounds  No murmur  Pulmonary:      Effort: Pulmonary effort is normal  No respiratory distress  Breath sounds: Normal breath sounds  No wheezing or rales  Abdominal:      General: Bowel sounds are normal  There is no distension  Palpations: Abdomen is soft  Tenderness: There is no abdominal tenderness  Musculoskeletal:         General: No deformity  Skin:     General: Skin is warm and dry  Capillary Refill: Capillary refill takes less than 2 seconds  Comments: Tenderness to palpation over foot and ankle  No rash  No crepitus  Pain out of proportion   Neurological:      Mental Status: She is alert and oriented to person, place, and time  Sensory: No sensory deficit     Psychiatric:         Judgment: Judgment normal          Vital Signs  ED Triage Vitals [08/13/20 0700]   Temperature Pulse Respirations Blood Pressure SpO2   100 3 °F (37 9 °C) 97 20 103/56 100 %      Temp Source Heart Rate Source Patient Position - Orthostatic VS BP Location FiO2 (%)   Temporal -- -- -- --      Pain Score       Worst Possible Pain           Vitals:    08/13/20 0700   BP: 103/56   Pulse: 97         Visual Acuity      ED Medications  Medications - No data to display    Diagnostic Studies  Results Reviewed     None                 No orders to display              Procedures  Procedures         ED Course  ED Course as of Aug 13 0947   [facility] Aug 13, 2020   9249 Patient unsure of willingness to be hospitalized due to her anxiety, will initiate broad spectrum antibiotics  US AUDIT      Most Recent Value   Initial Alcohol Screen: US AUDIT-C    1  How often do you have a drink containing alcohol?  0 Filed at: 08/13/2020 0702   2  How many drinks containing alcohol do you have on a typical day you are drinking? 0 Filed at: 08/13/2020 0702   3b  FEMALE Any Age, or MALE 65+: How often do you have 4 or more drinks on one occassion? 0 Filed at: 08/13/2020 1145   Audit-C Score  0 Filed at: 08/13/2020 4600                  NELIA/DAST-10      Most Recent Value   How many times in the past year have you    Used an illegal drug or used a prescription medication for non-medical reasons? Never Filed at: 08/13/2020 0702                                MDM  Number of Diagnoses or Management Options  ANTHONY (acute kidney injury) [facility]): new and requires workup  Cellulitis: new and requires workup  Fever: new and requires workup  Diagnosis management comments: Patient is a 46-year-old female presenting with left lower extremity pain  No injury  She is exquisitely tender over the lateral/dorsum of her left foot  No crepitus  Some warmth, no erythema  No fluctuance  She does have a temperature in the emergency department  Patient also has a other stigmata of systemic inflammatory disease (RA, Lupus) which also explain her extreme pain  Also considered shingles, patient on valtrex for chronic HSV, which would make this less likely, also in the setting of fever  Concern for early developing cellulitis, even possibly necrotizing fasciitis without rash, this patient is exquisitely tender however she has no other stigmata of nec fasciitis  X-rays negative  Labs indicate acute kidney injury, worsening leukocytosis  Explained to patient my concern that she could have an acute, life and limb threatening infection    This could also be part of a systemic inflammatory disease  Explained to her that I would like to start her on IV antibiotics and admit her to the hospital   She is adamant she does not want to stay  I explained to her that I am concerned that she could lose her leg, this infection could spread to her blood, and cause her to die, or lose her leg, and lose any ability to leave a meaningful and purposeful life  I also explained to her her kidneys are decreased function, and this could become per minute and she could have dialysis, have acute respiratory failure, again have sudden death due to a fatal arrhythmia  Patient understood these but states that she does not want to stay in the hospital because she would prefer to be at home  Informed her this is not adequate however I will treat her with antibiotics and analgesia as I feel this is still best for the patient  I also it in for her  to help convince her who was very adamant that he wanted her to stay as well  After some convincing, I gave her a dose of Ativan to help with her anxiety, however she still refused  She is awake, alert, oriented x3  She explained back to me the risks of leaving against medical advice  Patient received broad spectrum abx in ED  Will f/u with PCP   Given rx for clindamycin, as well as percocet for pain       Amount and/or Complexity of Data Reviewed  Review and summarize past medical records: yes  Independent visualization of images, tracings, or specimens: yes    Risk of Complications, Morbidity, and/or Mortality  Presenting problems: moderate  Diagnostic procedures: minimal  Management options: moderate          Disposition  Final diagnoses:   Cellulitis   Fever   ANTHONY (acute kidney injury) ([facility] )     Time reflects when diagnosis was documented in both MDM as applicable and the Disposition within this note     Time User Action Codes Description Comment    8/13/2020 10:40 AM Ashley Doe [L03 90] Cellulitis     8/13/2020 10:40 AM Lilia Sherman Bekah Blancas Add [R50 9] Fever     8/13/2020 10:40 AM Donell Anderson Add [N17 9] ANTHONY (acute kidney injury) [facility])       ED Disposition     ED Disposition Condition Date/Time Comment    CHARBEL Andrew Aug 13, 2020 11:29 AM Date: 8/13/2020  Patient: Ligia Medrano  Admitted: 8/13/2020  6:56 AM  Attending Provider: Izabel Adler DO    Ligia Medrano or her authorized caregiver has made the decision for the patient to leave the emergency department against the advice of  her attending physician  She or her authorized caregiver has been informed and understands the inherent risks, including death, permanent injury, disfigurement, loss of limb, loss of sexual function, loss of ability to have a meaningful life, need fo r dialysis, permament pain, and other unforeseen problems  She or her authorized caregiver has decided to accept the responsibility for this decision  Ligia Medrano and all necessary parties have been advised that she may return for further evaluation  or treatment  Her condition at time of discharge was stable  Ligia Medrano had current vital signs as follows:  /78 (BP Location: Left arm)   Pulse 78   Temp 99 3 °F (37 4 °C)   Resp 20   Ht 5' 6" (1 676 m)   Wt 123 kg (272 lb)   LMP 08/0 3/2020         Follow-up Information     Follow up With Specialties Details Why Contact Info    Ruth Bah, [address], Nurse Practitioner In 1 day For wound re-check [place]jess[place] 1976  [place] [address] [facility]  [phone]            Patient's Medications   Discharge Prescriptions    No medications on file     No discharge procedures on file      PDMP Review       Value Time User    PDMP Reviewed  Yes 8/6/2020  9:09 AM JALIL Ortiz      Summary  Total Prescriptions   5   Total Private Pay   0   Total Prescribers   2   Total Pharmacies   1    Opioids* (excluding buprenorphine)  Current Qty   0 0   Current MME/day   0 0   30 Day Avg MME/day   0 0    Buprenorphine*  Current Qty   0 0   Current mg/day   0 0   30 Day Avg mg/day   0 0        ED Provider  Electronically Signed by           Renato Maher DO  08/13/20 0294

## 2020-08-13 NOTE — DISCHARGE INSTRUCTIONS
You were seen in the emergency department for pain in your left foot, as well as fever  Your labs show increased white blood cells and acute kidney injury  I discussed the risk of death, permenant organ failure, need for dialysis, loss of limb, loss of life, sudden cardiac arrest, or other unpredictable consequences  After multiple discussions, you preferred to leave again medical advice  Take your antibiotics until completion, but this may be inadequate therapy  Follow up with your PCP tomorrow for a repeat evaluation  Return if you change you mind about admission  Return or call 911 if your symptoms worsen or if new symptoms develop, or if you have any additional concerns

## 2020-08-18 ENCOUNTER — OFFICE VISIT (OUTPATIENT)
Dept: FAMILY MEDICINE CLINIC | Facility: CLINIC | Age: 45
End: 2020-08-18
Payer: COMMERCIAL

## 2020-08-18 ENCOUNTER — APPOINTMENT (OUTPATIENT)
Dept: LAB | Facility: HOSPITAL | Age: 45
End: 2020-08-18
Payer: COMMERCIAL

## 2020-08-18 ENCOUNTER — TELEPHONE (OUTPATIENT)
Dept: SURGICAL ONCOLOGY | Facility: CLINIC | Age: 45
End: 2020-08-18

## 2020-08-18 VITALS
WEIGHT: 269.2 LBS | TEMPERATURE: 99.1 F | DIASTOLIC BLOOD PRESSURE: 82 MMHG | HEART RATE: 97 BPM | BODY MASS INDEX: 43.26 KG/M2 | HEIGHT: 66 IN | SYSTOLIC BLOOD PRESSURE: 144 MMHG | OXYGEN SATURATION: 99 %

## 2020-08-18 DIAGNOSIS — M25.471 RIGHT ANKLE SWELLING: ICD-10-CM

## 2020-08-18 DIAGNOSIS — I99.9 VASCULAR ABNORMALITY: ICD-10-CM

## 2020-08-18 DIAGNOSIS — I25.2 HISTORY OF MI (MYOCARDIAL INFARCTION): ICD-10-CM

## 2020-08-18 DIAGNOSIS — M15.2 BOUCHARD NODES (DJD HAND): ICD-10-CM

## 2020-08-18 DIAGNOSIS — N18.30 CKD (CHRONIC KIDNEY DISEASE) STAGE 3, GFR 30-59 ML/MIN (HCC): ICD-10-CM

## 2020-08-18 DIAGNOSIS — I50.9 CONGESTIVE HEART FAILURE, UNSPECIFIED HF CHRONICITY, UNSPECIFIED HEART FAILURE TYPE (HCC): ICD-10-CM

## 2020-08-18 DIAGNOSIS — I10 ESSENTIAL HYPERTENSION: ICD-10-CM

## 2020-08-18 DIAGNOSIS — M25.50 ARTHRALGIA, UNSPECIFIED JOINT: ICD-10-CM

## 2020-08-18 DIAGNOSIS — N88.8 CERVICAL CYST: Primary | ICD-10-CM

## 2020-08-18 DIAGNOSIS — Z85.79 H/O LYMPHOMA: ICD-10-CM

## 2020-08-18 DIAGNOSIS — N17.9 ACUTE RENAL FAILURE SUPERIMPOSED ON STAGE 3 CHRONIC KIDNEY DISEASE, UNSPECIFIED ACUTE RENAL FAILURE TYPE: ICD-10-CM

## 2020-08-18 DIAGNOSIS — F32.A DEPRESSION, UNSPECIFIED DEPRESSION TYPE: ICD-10-CM

## 2020-08-18 DIAGNOSIS — C83.38 DIFFUSE LARGE B-CELL LYMPHOMA OF LYMPH NODES OF MULTIPLE REGIONS (HCC): ICD-10-CM

## 2020-08-18 DIAGNOSIS — N18.3 ACUTE RENAL FAILURE SUPERIMPOSED ON STAGE 3 CHRONIC KIDNEY DISEASE, UNSPECIFIED ACUTE RENAL FAILURE TYPE: ICD-10-CM

## 2020-08-18 LAB
BACTERIA BLD CULT: NORMAL
BASOPHILS # BLD AUTO: 0.1 THOUSANDS/ΜL (ref 0–0.1)
BASOPHILS NFR BLD AUTO: 1 % (ref 0–2)
CRP SERPL QL: 45.5 MG/L
EOSINOPHIL # BLD AUTO: 0.3 THOUSAND/ΜL (ref 0–0.61)
EOSINOPHIL NFR BLD AUTO: 2 % (ref 0–5)
ERYTHROCYTE [DISTWIDTH] IN BLOOD BY AUTOMATED COUNT: 17.7 % (ref 11.5–14.5)
ERYTHROCYTE [SEDIMENTATION RATE] IN BLOOD: 58 MM/HOUR (ref 0–19)
HCT VFR BLD AUTO: 37.2 % (ref 42–47)
HGB BLD-MCNC: 12.2 G/DL (ref 12–16)
LYMPHOCYTES # BLD AUTO: 2.6 THOUSANDS/ΜL (ref 0.6–4.47)
LYMPHOCYTES NFR BLD AUTO: 16 % (ref 21–51)
MCH RBC QN AUTO: 28.4 PG (ref 26–34)
MCHC RBC AUTO-ENTMCNC: 32.9 G/DL (ref 31–37)
MCV RBC AUTO: 86 FL (ref 81–99)
MONOCYTES # BLD AUTO: 0.9 THOUSAND/ΜL (ref 0.17–1.22)
MONOCYTES NFR BLD AUTO: 6 % (ref 2–12)
NEUTROPHILS # BLD AUTO: 12.3 THOUSANDS/ΜL (ref 1.4–6.5)
NEUTS SEG NFR BLD AUTO: 76 % (ref 42–75)
PLATELET # BLD AUTO: 432 THOUSANDS/UL (ref 149–390)
PMV BLD AUTO: 8.5 FL (ref 8.6–11.7)
RBC # BLD AUTO: 4.32 MILLION/UL (ref 3.9–5.2)
RHEUMATOID FACT SER QL LA: NEGATIVE
WBC # BLD AUTO: 16.2 THOUSAND/UL (ref 4.8–10.8)

## 2020-08-18 PROCEDURE — 99215 OFFICE O/P EST HI 40 MIN: CPT | Performed by: NURSE PRACTITIONER

## 2020-08-18 PROCEDURE — 3079F DIAST BP 80-89 MM HG: CPT | Performed by: NURSE PRACTITIONER

## 2020-08-18 PROCEDURE — 3725F SCREEN DEPRESSION PERFORMED: CPT | Performed by: NURSE PRACTITIONER

## 2020-08-18 PROCEDURE — 86235 NUCLEAR ANTIGEN ANTIBODY: CPT

## 2020-08-18 PROCEDURE — 86160 COMPLEMENT ANTIGEN: CPT

## 2020-08-18 PROCEDURE — 86162 COMPLEMENT TOTAL (CH50): CPT

## 2020-08-18 PROCEDURE — 3008F BODY MASS INDEX DOCD: CPT | Performed by: NURSE PRACTITIONER

## 2020-08-18 PROCEDURE — 85025 COMPLETE CBC W/AUTO DIFF WBC: CPT

## 2020-08-18 PROCEDURE — 36415 COLL VENOUS BLD VENIPUNCTURE: CPT

## 2020-08-18 PROCEDURE — 85652 RBC SED RATE AUTOMATED: CPT

## 2020-08-18 PROCEDURE — 86618 LYME DISEASE ANTIBODY: CPT

## 2020-08-18 PROCEDURE — 86225 DNA ANTIBODY NATIVE: CPT

## 2020-08-18 PROCEDURE — 3077F SYST BP >= 140 MM HG: CPT | Performed by: NURSE PRACTITIONER

## 2020-08-18 PROCEDURE — 86430 RHEUMATOID FACTOR TEST QUAL: CPT

## 2020-08-18 PROCEDURE — 86140 C-REACTIVE PROTEIN: CPT

## 2020-08-18 PROCEDURE — 86038 ANTINUCLEAR ANTIBODIES: CPT

## 2020-08-18 RX ORDER — LISINOPRIL 20 MG/1
20 TABLET ORAL DAILY
Qty: 30 TABLET | Refills: 3 | Status: CANCELLED | OUTPATIENT
Start: 2020-08-18

## 2020-08-18 RX ORDER — PREDNISONE 1 MG/1
5 TABLET ORAL DAILY
Qty: 30 TABLET | Refills: 0 | Status: SHIPPED | OUTPATIENT
Start: 2020-08-18 | End: 2020-09-10 | Stop reason: ALTCHOICE

## 2020-08-18 RX ORDER — LISINOPRIL 20 MG/1
20 TABLET ORAL DAILY
Qty: 90 TABLET | Refills: 1 | Status: SHIPPED | OUTPATIENT
Start: 2020-08-18 | End: 2020-09-10 | Stop reason: ALTCHOICE

## 2020-08-18 RX ORDER — LISINOPRIL 20 MG/1
20 TABLET ORAL DAILY
Qty: 90 TABLET | Refills: 1 | Status: SHIPPED | OUTPATIENT
Start: 2020-08-18 | End: 2020-08-18 | Stop reason: SDUPTHER

## 2020-08-18 NOTE — TELEPHONE ENCOUNTER
New Patient Encounter    New Patient Intake Form   Patient Details:  Ambar Cole  1975  [id number]    Background Information:  [address] starts by opening a telephone encounter and gathering the following information   Who is calling to schedule? If not self, relationship to patient? self   Referring Provider Janice Reyes   What is the diagnosis? Abnormal labs   Is this diagnosis confirmed? Yes   When was the diagnosis? 8/2020   Is there a confirmed diagnosis from a biopsy/tissue reviewed by pathology? Is patient aware of diagnosis? Yes   Is there a personal history and what kind? nonhodgkins lymphoma   15 yrs ago in [place]   Is there a family history and what kind? Reason for visit? New Diagnosis   Have you had any imaging or labs done? If so: when, where? yes     Are records in EPIC? yes   Was the patient told to bring a disk? no   Does the patient smoke or Vape? no   If yes, how many packs or cartridges per day? Scheduling Information:   Preferred [place]:  [facility]     Are there any dates/time the patient cannot be seen? Miscellaneous:    After completing the above information, please route to Financial Counselor and the appropriate Nurse Navigator for review

## 2020-08-18 NOTE — PROGRESS NOTES
Assessment/Plan:    No problem-specific Assessment & Plan notes found for this encounter  Diagnoses and all orders for this visit:    Cervical cyst  Comments:  MRI pelvis w/o contrast ordered  Orders:  -     MRI pelvis female wo contrast; Future    Arthralgia, unspecified joint  Comments:  Prednisone taper ordered  Orders:  -     predniSONE 5 mg tablet; Take 1 tablet (5 mg total) by mouth daily Take 4 tabs for 3 days Take 3 tabs for 3 days Take 2 tabs for 3 days Take 1 tab for 3 days    Acute renal failure superimposed on stage 3 chronic kidney disease, unspecified acute renal failure type [facility])  Comments:  referred to nephrology  Orders:  -     Ambulatory referral to Nephrology; Future    Essential hypertension  Comments:  bp 144/82, Lisinopril reordered  Orders:  -     lisinopril (ZESTRIL) 20 mg tablet; Take 1 tablet (20 mg total) by mouth daily    History of MI (myocardial infarction)    Depression, unspecified depression type  Comments:  pt has appt with psychiatry 9/15/20     Congestive heart failure, unspecified HF chronicity, unspecified heart failure [facility] ([facility] )    CKD (chronic kidney disease) stage 3, GFR 30-59 ml/min ([facility])  Comments:  referred to nephrology    Diffuse large B-cell lymphoma of lymph nodes of multiple regions [facility])          Subjective:      Patient ID: Napoleon Maher is a 39 y o  female  39 yr old female returns today for a host of problems  Pt did complete her labs , found to have elevated Sed rate and CRP in the presence of left knee pain, left ankle pain and swelling  Pt also noted to have CKD 3 with hx of MI  Pt has not had Primary care in quite some time and is aware that there are several problems to address    US pelvis notes ? Mucous cervical cyst  Will order MRI pelvis w/o contrast    Pt has resumed cutting herself on her head, reports no plan of suicide but is agreeable to see psychiatry on Sept 15, 2020 as scheduled in [place]      In addition, labs reviewed, worsening renal failure pt agreeable to referral to nephrology  Pt also agreed to try Prednisone for left knee and ankle pain which I suspect is related to hx of twisting left knee, guarding of the knee as pt is limping while left foot swelling may be a result of her ongoing renal failure, related to excessive use of NSAIDS and untreated hypertension      The following portions of the patient's history were reviewed and updated as appropriate: She  has a past medical history of Acute MI ([facility] ), Anxiety, Cardiac disease, Cardiomyopathy ([facility] ), CHF (congestive heart failure) ([facility] ), Depression, History of chemotherapy, Hypertension, Lymphoma, non-Hodgkin's ([facility] ), and Tobacco abuse  She   Patient Active Problem List    Diagnosis Date Noted    Cervical cyst 2020    Other hyperlipidemia 2020    BMI 40 0-44 9, adult ([place] [address] ) 2020    Lump of left breast 2020    Non-Hodgkin lymphoma of lymph nodes of multiple regions ([place] [address] ) 2020    Positive depression screening 2020    CKD (chronic kidney disease) stage 3, GFR 30-59 ml/min ([place] [address] ) 2020    Depression 2019    Anxiety 10/14/2018    CAD (coronary artery disease) 10/14/2018    Chest pain 2017    Essential hypertension 2017    Epistaxis 2017    Tobacco abuse 2017    History of MI (myocardial infarction) 2017    CHF (congestive heart failure) ([place] [address] ) 2017     She  has a past surgical history that includes Carotid stent and  section  Her family history includes Brain cancer in her maternal grandfather; No Known Problems in her daughter, father, mother, paternal aunt, and paternal aunt  She  reports that she has been smoking cigarettes  She has been smoking about 1 00 pack per day  She has never used smokeless tobacco  She reports current alcohol use  She reports that she does not use drugs    Current Outpatient Medications   Medication Sig Dispense Refill    acetaminophen (TYLENOL) 500 mg tablet Take 500 mg by mouth every 4 (four) hours as needed for mild pain      ALPRAZolam (XANAX) 0 5 mg tablet Take 1 tablet (0 5 mg total) by mouth 3 (three) times a day as needed for anxiety 90 tablet 0    ARIPiprazole (ABILIFY) 10 mg tablet Take 1 tablet (10 mg total) by mouth daily 30 tablet 5    aspirin 325 mg tablet Take 325 mg by mouth daily      atorvastatin (LIPITOR) 40 mg tablet Take 1 tablet (40 mg total) by mouth daily 30 tablet 0    buPROPion (WELLBUTRIN XL) 300 mg 24 hr tablet Take 1 tablet (300 mg total) by mouth every morning 30 tablet 1    lisinopril (ZESTRIL) 20 mg tablet Take 1 tablet (20 mg total) by mouth daily 90 tablet 1    acyclovir (ZOVIRAX) 800 mg tablet Take 1 tablet (800 mg total) by mouth 2 (two) times a day for 7 days 14 tablet 3    ibuprofen (MOTRIN) 200 mg tablet Take by mouth every 4 (four) hours as needed for mild pain      Multiple Vitamins-Calcium (ONE-A-DAY WOMENS PO) Take by mouth daily      predniSONE 5 mg tablet Take 1 tablet (5 mg total) by mouth daily Take 4 tabs for 3 days Take 3 tabs for 3 days Take 2 tabs for 3 days Take 1 tab for 3 days 30 tablet 0     No current facility-administered medications for this visit        Current Outpatient Medications on File Prior to Visit   Medication Sig    acetaminophen (TYLENOL) 500 mg tablet Take 500 mg by mouth every 4 (four) hours as needed for mild pain    ALPRAZolam (XANAX) 0 5 mg tablet Take 1 tablet (0 5 mg total) by mouth 3 (three) times a day as needed for anxiety    ARIPiprazole (ABILIFY) 10 mg tablet Take 1 tablet (10 mg total) by mouth daily    aspirin 325 mg tablet Take 325 mg by mouth daily    atorvastatin (LIPITOR) 40 mg tablet Take 1 tablet (40 mg total) by mouth daily    buPROPion (WELLBUTRIN XL) 300 mg 24 hr tablet Take 1 tablet (300 mg total) by mouth every morning    [DISCONTINUED] lisinopril (ZESTRIL) 20 mg tablet Take 1 tablet (20 mg total) by mouth daily    acyclovir (ZOVIRAX) 800 mg tablet Take 1 tablet (800 mg total) by mouth 2 (two) times a day for 7 days    ibuprofen (MOTRIN) 200 mg tablet Take by mouth every 4 (four) hours as needed for mild pain    Multiple Vitamins-Calcium (ONE-A-DAY WOMENS PO) Take by mouth daily    [DISCONTINUED] clindamycin (CLEOCIN) 300 MG capsule Take 1 capsule (300 mg total) by mouth 4 (four) times a day for 7 days (Patient not taking: Reported on 8/18/2020)     No current facility-administered medications on file prior to visit  She has No Known Allergies       Review of Systems   Constitutional: Negative  HENT: Negative  Eyes: Negative  Respiratory: Negative  Cardiovascular: Negative  Gastrointestinal: Negative  Endocrine: Negative  Genitourinary: Negative  Feels lump inside her vagina   Musculoskeletal: Positive for arthralgias, gait problem and joint swelling  Left knee pain and swelling, left ankle pain and swelling   Skin: Negative  Allergic/Immunologic: Negative  Hematological: Negative  Psychiatric/Behavioral: The patient is nervous/anxious  Objective:      /82   Pulse 97   Temp 99 1 °F (37 3 °C) (Tympanic)   Ht 5' 6" (1 676 m)   Wt 122 kg (269 lb 3 2 oz)   LMP 08/03/2020   SpO2 99%   BMI 43 45 kg/m²          Physical Exam  Vitals signs and nursing note reviewed  Constitutional:       Appearance: She is well-developed  HENT:      Head: Normocephalic  Eyes:      Pupils: Pupils are equal, round, and reactive to light  Neck:      Musculoskeletal: Normal range of motion  Cardiovascular:      Rate and Rhythm: Normal rate and regular rhythm  Pulmonary:      Effort: Pulmonary effort is normal       Breath sounds: Normal breath sounds  Abdominal:      General: Bowel sounds are normal       Palpations: Abdomen is soft  Musculoskeletal:      Left knee: She exhibits decreased range of motion and swelling  Tenderness found  Left ankle: She exhibits decreased range of motion and swelling  Tenderness  Feet:    Skin:     General: Skin is warm and dry  Neurological:      Mental Status: She is alert and oriented to person, place, and time  Psychiatric:         Mood and Affect: Mood is anxious and depressed  Behavior: Behavior normal          Thought Content:  Thought content normal          Cognition and Memory: Cognition normal          Judgment: Judgment normal

## 2020-08-19 ENCOUNTER — CONSULT (OUTPATIENT)
Dept: NEPHROLOGY | Facility: CLINIC | Age: 45
End: 2020-08-19
Payer: COMMERCIAL

## 2020-08-19 VITALS
HEART RATE: 80 BPM | OXYGEN SATURATION: 98 % | BODY MASS INDEX: 42.59 KG/M2 | WEIGHT: 265 LBS | SYSTOLIC BLOOD PRESSURE: 144 MMHG | TEMPERATURE: 98 F | DIASTOLIC BLOOD PRESSURE: 88 MMHG | HEIGHT: 66 IN

## 2020-08-19 DIAGNOSIS — N18.3 ACUTE RENAL FAILURE SUPERIMPOSED ON STAGE 3 CHRONIC KIDNEY DISEASE, UNSPECIFIED ACUTE RENAL FAILURE TYPE: Primary | ICD-10-CM

## 2020-08-19 DIAGNOSIS — I50.9 CONGESTIVE HEART FAILURE, UNSPECIFIED HF CHRONICITY, UNSPECIFIED HEART FAILURE TYPE (HCC): ICD-10-CM

## 2020-08-19 DIAGNOSIS — N18.30 CKD (CHRONIC KIDNEY DISEASE) STAGE 3, GFR 30-59 ML/MIN (HCC): ICD-10-CM

## 2020-08-19 DIAGNOSIS — N17.9 ACUTE RENAL FAILURE SUPERIMPOSED ON STAGE 3 CHRONIC KIDNEY DISEASE, UNSPECIFIED ACUTE RENAL FAILURE TYPE: Primary | ICD-10-CM

## 2020-08-19 DIAGNOSIS — E78.49 OTHER HYPERLIPIDEMIA: ICD-10-CM

## 2020-08-19 DIAGNOSIS — I10 ESSENTIAL HYPERTENSION: ICD-10-CM

## 2020-08-19 LAB
B BURGDOR IGG+IGM SER-ACNC: <0.91 ISR (ref 0–0.9)
ENA SS-A AB SER-ACNC: <0.2 AI (ref 0–0.9)
ENA SS-B AB SER-ACNC: <0.2 AI (ref 0–0.9)

## 2020-08-19 PROCEDURE — 3008F BODY MASS INDEX DOCD: CPT | Performed by: INTERNAL MEDICINE

## 2020-08-19 PROCEDURE — 3079F DIAST BP 80-89 MM HG: CPT | Performed by: INTERNAL MEDICINE

## 2020-08-19 PROCEDURE — 3077F SYST BP >= 140 MM HG: CPT | Performed by: INTERNAL MEDICINE

## 2020-08-19 PROCEDURE — 99245 OFF/OP CONSLTJ NEW/EST HI 55: CPT | Performed by: INTERNAL MEDICINE

## 2020-08-19 RX ORDER — AMLODIPINE BESYLATE 5 MG/1
5 TABLET ORAL DAILY
Qty: 30 TABLET | Refills: 0 | Status: SHIPPED | OUTPATIENT
Start: 2020-08-19 | End: 2021-04-01 | Stop reason: HOSPADM

## 2020-08-19 NOTE — PROGRESS NOTES
OFFICE FOLLOW UP - Nephrology   Darlin Litten 39 y o  female MRN: [id number]    Encounter: [id number]        ASSESSMENT ? Plan  Nurys Mcdaniels was seen today for follow-up  Diagnoses and all orders for this visit:    Acute renal failure superimposed on stage 3 chronic kidney disease, unspecified acute renal failure type ([facility] )  Baseline creatinine 1 2-1 36 with GFR 48-56, since last month is getting worse to 2 47 with GFR 23  She admits has been taking all kinds of NSAIDs over-the-counter including Motrin ibuprofen plenty of them for her left knee pain with left ankle swelling  Primary provider running workup for autoimmune arthritis also noted to have a referral to oncologist given history of non-Hodgkin's lymphoma with chemo radiation therapy 12 years ago  She also taking lisinopril for her blood pressure which possibly making creatinine worse  She denies any uremic symptoms or signs  She has a history of heart failure but she is not in decompensated state at this time (as per the nuclear stress test done in 2017 her ejection fraction was 45% but no echocardiogram was noted in epic)  She does not follow with any cardiologist   She reports her blood pressure also runs higher side  Plan:  No NSAIDs, hold lisinopril  Start Norvasc for blood pressure medication for now and check BP at home prior to quit control and report to office in a week  Labs and 2 weeks and return to clinic in 4 weeks  No ultrasound in the epic the past, will order ultrasound of the kidney  -     amLODIPine (NORVASC) 5 mg tablet; Take 1 tablet (5 mg total) by mouth daily  -     PTH, intact; Future  -     Renal function panel; Future  -     Uric acid; Future  -     Urinalysis with microscopic; Future  -     Vitamin D 25 hydroxy; Future  -     Protein / creatinine ratio, urine; Future  -     Protein, urine, random; Future  -     Iron; Future  -     Ferritin; Future  -     Iron Saturation %;  Future  -     CBC and differential; Future  -     US kidney and bladder; Future    Essential hypertension  As above hold normal lisinopril and start Norvasc  Once creatinine at steady state would consider restarting lisinopril and also will consider diuretics given the patient has some swelling as well  Congestive heart failure, unspecified HF chronicity, unspecified heart failure type ([facility] )  She has a history of heart failure but she is not in decompensated state at this time (as per the nuclear stress test done in 2017 her ejection fraction was 45% but no echocardiogram was noted in epic)  She does not follow with any cardiologist   CKD (chronic kidney disease) stage 3, GFR 30-59 ml/min ([facility] )  Most likely due to chronic hypertension and morbid obesity as well as chronic NSAID use   Will order UA and other basic CKD workup including PTH and vitamin-D, phosphorus level and proteinuria quantification  -     amLODIPine (NORVASC) 5 mg tablet; Take 1 tablet (5 mg total) by mouth daily  -     PTH, intact; Future  -     Renal function panel; Future  -     Uric acid; Future  -     Urinalysis with microscopic; Future  -     Vitamin D 25 hydroxy; Future  -     Protein / creatinine ratio, urine; Future  -     Protein, urine, random; Future  -     Iron; Future  -     Ferritin; Future  -     Iron Saturation %; Future  -     CBC and differential; Future    Other hyperlipidemia with morbid obesity  Noted severe hypertriglyceridemia recommended dietary counseling low carb diet low-salt diet and and adequate control of diabetes and weight reduction  Weight reduction has been heavily counseled  HPI: Sharon Zepeda is a 39 y o  female who is here referred by primary care provider is Kwasi Pollock who is a 53-year-old morbidly obese white female with history of longstanding insulin-dependent diabetes mellitus, history of cardiac disease    Congestive heart failure not sure systolic or diastolic but last nuclear stress test in 2017 did show 45% ejection fraction, history of non-Hodgkin's lymphoma status post chemo and radiation therapy 12 years ago, hypertension, tobacco abuse who has had baseline CKD stage 3 with GFR 48-55 which recently worsened to GFR 23 with elevated creatinine from 1 2 to 2 47  She has had left knee pain left ankle pain and swelling and spin workup done by primary provider found to have elevated CRP and ESR but rheumatoid factor negative currently MRI of the pelvis is pending for possible uterine cyst   She has been started on prednisone for left knee and ankle pain and swelling with history of twisting left knee  She denies any nausea vomiting no shortness of bleeding orthopnea paroxysmal nocturnal dyspnea, no dysuria or foamy urine  She has been taking all kinds of over-the-counter NSAIDs  But otherwise denies any IV contrast exposure or any other new medication  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 and completed Acyclovir p o  Dose  Nephrology consult was requested regarding worsening renal function in setting of NSAID use diabetes and morbid obesity  The medical record, including Care Everywhere and media tabs were reviewed  Reason for Consult:  Worsening renal function    ROS:   All the systems were reviewed and were negative except as documented on the HPI  Allergies: Patient has no known allergies      Medications:   Current Outpatient Medications:     acetaminophen (TYLENOL) 500 mg tablet, Take 500 mg by mouth every 4 (four) hours as needed for mild pain, Disp: , Rfl:     acyclovir (ZOVIRAX) 800 mg tablet, Take 1 tablet (800 mg total) by mouth 2 (two) times a day for 7 days, Disp: 14 tablet, Rfl: 3    ALPRAZolam (XANAX) 0 5 mg tablet, Take 1 tablet (0 5 mg total) by mouth 3 (three) times a day as needed for anxiety, Disp: 90 tablet, Rfl: 0    ARIPiprazole (ABILIFY) 10 mg tablet, Take 1 tablet (10 mg total) by mouth daily, Disp: 30 tablet, Rfl: 5    aspirin 325 mg tablet, Take 325 mg by mouth daily, Disp: , Rfl:     atorvastatin (LIPITOR) 40 mg tablet, Take 1 tablet (40 mg total) by mouth daily, Disp: 30 tablet, Rfl: 0    buPROPion (WELLBUTRIN XL) 300 mg 24 hr tablet, Take 1 tablet (300 mg total) by mouth every morning, Disp: 30 tablet, Rfl: 1    ibuprofen (MOTRIN) 200 mg tablet, Take by mouth every 4 (four) hours as needed for mild pain, Disp: , Rfl:     lisinopril (ZESTRIL) 20 mg tablet, Take 1 tablet (20 mg total) by mouth daily, Disp: 90 tablet, Rfl: 1    Multiple Vitamins-Calcium (ONE-A-DAY WOMENS PO), Take by mouth daily, Disp: , Rfl:     predniSONE 5 mg tablet, Take 1 tablet (5 mg total) by mouth daily Take 4 tabs for 3 days Take 3 tabs for 3 days Take 2 tabs for 3 days Take 1 tab for 3 days, Disp: 30 tablet, Rfl: 0    amLODIPine (NORVASC) 5 mg tablet, Take 1 tablet (5 mg total) by mouth daily, Disp: 30 tablet, Rfl: 0    Past Medical History:   Diagnosis Date    Acute MI ([facility] )     Anxiety     Cardiac disease     Cardiomyopathy ([facility] )     CHF (congestive heart failure) (HCC)     Depression     History of chemotherapy     non hodgkins lymphoma     Hypertension     Lymphoma, non-Hodgkin's (HCC)     Tobacco abuse      Past Surgical History:   Procedure Laterality Date    CAROTID STENT       SECTION       Family History   Problem Relation Age of Onset    No Known Problems Mother     No Known Problems Father     No Known Problems Daughter     Brain cancer Maternal Grandfather     No Known Problems Paternal Aunt     No Known Problems Paternal Aunt       reports that she has been smoking cigarettes  She has been smoking about 1 00 pack per day  She has never used smokeless tobacco  She reports current alcohol use  She reports that she does not use drugs  Physical Exam:   Vitals:    20 1252   BP: 144/88   Pulse: 80   Temp: 98 °F (36 7 °C)   TempSrc: Tympanic   SpO2: 98%   Weight: 120 kg (265 lb)   Height: 5' 6" (1 676 m)     Body mass index is 42 77 kg/m²        Physical Exam  General: conscious, cooperative, in not acute distress, morbidly obese, anxious looking  Eyes: conjunctivae pink, anicteric sclerae  ENT: lips and mucous membranes moist  Neck: supple, no JVD, no masses  Chest: clear breath sounds bilateral, no crackles, ronchus or wheezings  CVS: distinct S1 & S2, normal rate, regular rhythm  Abdomen: soft, non-tender, non-distended, normoactive bowel sounds  Extremities:  1+ edema of both legs left more than right/left knee swelling tenderness with decrease range-of-motion  Skin: no rash  Neuro: awake, alert, oriented        Lab Results:  Lab Results   Component Value Date    SODIUM 134 08/13/2020    K 4 1 08/13/2020     08/13/2020    CO2 22 08/13/2020    AGAP 10 08/13/2020    BUN 31 (H) 08/13/2020    CREATININE 2 47 (H) 08/13/2020    GLUC 120 (H) 08/13/2020    GLUF 97 07/08/2020    CALCIUM 9 6 08/13/2020    AST 18 07/08/2020    ALT 39 07/08/2020    ALKPHOS 70 07/08/2020    TP 7 2 07/08/2020    TBILI 0 22 07/08/2020    EGFR 23 08/13/2020     Lab Results   Component Value Date    WBC 16 20 (H) 08/18/2020    HGB 12 2 08/18/2020    HCT 37 2 (L) 08/18/2020    MCV 86 08/18/2020     (H) 08/18/2020     Lab Results   Component Value Date    CHOLESTEROL 278 (H) 07/08/2020    CHOLESTEROL 249 (H) 02/15/2018    CHOLESTEROL 156 03/11/2017     Lab Results   Component Value Date    HDL 31 (L) 07/08/2020    HDL 32 (L) 02/15/2018    HDL 34 (L) 03/11/2017     Lab Results   Component Value Date    LDLCALC  07/08/2020      Comment:      Calculated LDL invalid, triglycerides >400 mg/dl  This screening LDL is a calculated result  It does not have the accuracy of the Direct Measured LDL in the monitoring of patients with hyperlipidemia and/or statin therapy  Direct Measure LDL (PRO205) must be ordered separately in these patients      [address] [employer]  02/15/2018      Comment:      Calculated LDL invalid, triglycerides >400 mg/dl    LDLCALC 87 03/11/2017     Lab Results   Component Value Date    TRIG 465 (H) 07/08/2020    TRIG 401 (H) 02/15/2018    TRIG 176 (H) 03/11/2017     No results found for: [place], [place]  Lab Results   Component Value Date    PTF2HBCPPRTF 3 080 07/08/2020       Discussion:    Patient Instructions   NSAIDS, OTC NSAIDS, ibuprofen, Aleve, Motrin, etc diclofenac no no!!!!  NO IV contrast   Check BP  recod and call office in a week   Lab work and US kidney come back in 4 weeks   Chronic Kidney Disease Diet   AMBULATORY CARE:   A chronic kidney disease diet  limits protein, phosphorus, sodium, and potassium  Liquids may also need to be limited in later stages of chronic kidney disease  This diet can help slow down the rate of damage to your kidneys  Your diet may change over time as your health condition changes  You may also need to make other diet changes if you have other health problems, such as diabetes  Diet changes: There are 5 stages of chronic kidney disease  The diet changes you need to make are based on your stage of kidney disease  Work with your dietitian or healthcare provider to plan meals that are right for you  You may need any of the following:  · Limit protein  in all stages of kidney disease  Limit the portion sizes of protein you eat to limit the amount of work your kidneys have to do  Foods that are high in protein are meat, poultry (chicken and turkey), fish, eggs, and dairy (milk, cheese, yogurt)  Your healthcare provider will tell you how much protein to eat each day  · Limit sodium  if you have high blood pressure  Limit your sodium intake to less than 2,300 milligrams (mg) each day  Ask your dietitian or healthcare provider how much sodium you can have each day  The amount of sodium you should have depends on your stage of kidney disease  Table salt, canned foods, soups, salted snacks, and processed meats, like deli meats and sausage, are high in sodium  · Limit the amount of phosphorus  you eat   Your kidneys cannot get rid of extra phosphorus that builds up in your blood  This may cause your bones to lose calcium and weaken  Foods that are high in phosphorus are dairy products, beans, peas, nuts, and whole grains  Phosphorus is also found in cocoa, beer, and cola drinks  Your healthcare provider will tell you how much phosphorus you can have each day  · Limit potassium  if your potassium blood levels are too high  Your dietitian or healthcare provider will tell you if you need to limit potassium  Potassium is found in fruits and vegetables  · Limit liquids  as directed  Your healthcare provider may recommend that you limit liquids in stages 4 and 5 of kidney disease  If your body retains fluids, you will have swelling and fluid may build up in your lungs  This can cause other health problems, such as shortness of breath  Foods you may include: Your dietitian will tell you how many servings you can have from each of the food groups below  The approximate amount of these nutrients is listed next to each food group  Read the food label to find the exact amount  · Bread, cereal, and grains: These foods contain about 80 calories, 2 grams (g) of protein, 150 mg of sodium, 50 mg of potassium, and 30 mg of phosphorus  ¨ 1 slice (1 ounce) of bread ([place], [place], raisin, light rye, or sourdough white), small dinner roll, or 6-inch tortilla    ¨ ½ of a hamburger bun, hot dog bun, or English muffin or ¼ of a bagel    ¨ 1 cup of unsweetened cereal or ½ cup of cooked cereal, such as cream of wheat    ¨ ? cup of cooked pasta (noodles, macaroni, or spaghetti) or rice    ¨ 4 (2-inch) unsalted crackers or 3 squares of gretel crackers    ¨ 3 cups of air-popped, unsalted popcorn    ¨ ¾ ounce of unsalted pretzels    · Vegetables:  A serving of these foods contains about 30 calories, 2 g of protein, 50 mg of sodium, and 50 mg of phosphorus       ¨ Low potassium (less than 150 mg):      ¨ ½ cup cooked green beans, cabbage, cauliflower, beets, or corn    ¨ 1 cup raw cucumber, endive, alfalfa sprouts, cabbage, cauliflower, or watercress    ¨ 1 cup of all types of lettuce    ¨ ¼ cup cooked or ½ cup raw mushrooms or onions    ¨ 1 cup cooked eggplant    ¨ Medium potassium (150 to 250 mg):      ¨ 1 cup raw broccoli, celery, or zucchini    ¨ ½ cup cooked asparagus, broccoli, celery, green peas, summer squash, zucchini, or peppers    ¨ 1 cup cooked kale or turnips    · Fruits:  A serving of these foods contains about 60 calories, 0 g protein, 0 mg sodium, and 150 mg of phosphorus  Each serving is ½ cup, unless another amount is given  ¨ Low potassium (less than 150 mg): ¨ Apple juice, applesauce, or 1 small apple    ¨ Blueberries    ¨ Cranberries or cranberry juice cocktail    ¨ Fresh or canned pears (light syrup or packed in water)    ¨ Grapes or grape juice    ¨ Canned peaches (light syrup or packed in water)    ¨ Pineapple or strawberries    ¨ 1 tangerine     ¨ Watermelon    ¨ Medium potassium (150 to 250 mg):      ¨ Fresh peaches or pears    ¨ Cherries    ¨ Cantaloupe, amber, or papaya    ¨ Grapefruit or grapefruit juice    · Meat, poultry, and fish: These foods have about 75 calories, 7 g of protein, an average of 65 mg of sodium, 115 mg of potassium, and 70 mg of phosphorus  Do not use salt to prepare these foods  ¨ 1 ounce of cooked beef, pork, or poultry    ¨ 1 ounce of any fresh or frozen fish, lobster, shrimp, crab, clams, tuna, unsalted canned salmon, or unsalted sardines    · Other protein foods: These foods have about 90 calories, 7 g of protein, an average of 100 mg of sodium, 100 mg of potassium, and 120 mg of phosphorus  ¨ 1 large whole egg or ¼ cup of low-cholesterol egg substitute    ¨ 1 ounce of cheese    ¨ ¼ cup of cottage cheese or tofu    ¨ 1 ounce of unsalted nuts or 2 tablespoons of peanut butter    · Fats: These foods have very little protein and about 45 calories, 55 milligrams of sodium, 10 milligrams of potassium, and 5 milligrams of phosphorus   Include healthy fats, such as unsaturated fats, which are listed below  ¨ 1 teaspoon margarine or mayonnaise     ¨ 1 teaspoon oil (safflower, sunflower, corn, soybean, olive, peanut, canola)    ¨ 1 tablespoon oil-based salad dressing (such as [place]) or 2 tablespoons mayonnaise-based salad dressing (such as ranch)  Foods to limit or avoid:   · Starches: The following foods have higher amounts of sodium, potassium, or phosphorus  ¨ Biscuits, muffins, pancakes, and waffles     ¨ Cake and cornbread from boxed mixes    ¨ Oatmeal and whole-wheat cereals    ¨ Salted pretzel sticks or rings and sandwich cookies    · Meat and protein foods: The following are high in sodium and phosphorus  ¨ Deli-style meat, such as roast beef, ham, and turkey    ¨ Canned salmon and sardines    ¨ Processed cheese, such as American cheese and cheese spreads    ¨ Smoked or cured meat, such as corned beef, perez, ham, hot dogs, and sausage    · Legumes: These foods have about 90 calories, 6 g of protein, less than 10 mg of sodium, 250 mg of potassium, and 100 mg of phosphorus  ¨ ? cup of black beans, red kidney beans, black-eye peas, garbanzos, and lentils    ¨ ¼ cup of green or mature soybeans    · Dairy: The following foods have about 8 g of protein, an average of 120 mg of sodium, 350 mg of potassium, and 220 mg of phosphorus  ¨ 1 cup of milk (fat-free, low-fat, whole, buttermilk, or chocolate milk)    ¨ 1 cup of low-fat plain or sugar-free yogurt or ice cream    ¨ ½ cup of pudding or custard    ¨ Nondairy milk substitutes: These foods have 75 calories, 1 gram of protein, and an average of 40 mg of sodium, 60 mg of potassium, and 60 mg of phosphorus  A serving is ½ cup of almond, rice, or soy milk, or nondairy creamer  · Vegetables: The following vegetables are high in potassium  Each serving has more than 250 mg of potassium  A serving is ½ cup, unless another amount is given      ¨ Artichoke or ¼ of a medium avocado    ¨ [place] sprouts, beets, chard, lorena or mustard greens    ¨ Potatoes, sweet potatoes, pumpkin, and yams    ¨ ¾ cup of okra    ¨ Raw tomatoes and low-sodium tomato juice, or tomato sauce    ¨ Winter squash, cooked asparagus, and cooked spinach    · Fruit:  The following fruits are high in potassium  Each serving has more than 250 mg of potassium  ¨ 3 fresh apricots    ¨ 1 small nectarine (2 inches across)    ¨ 1 small orange and ½ cup of orange juice    ¨ ¼ cup of dates     ¨ ? of a small honeydew melon    ¨ 1 six-inch banana     ¨ ½ cup of prune juice or prunes and kiwifruit    · Fats:  Limit unhealthy fats, such as saturated fats, which are listed below  ¨ Butter, lard, cream cheese, whipped cream, and sour cream    ¨ Powdered coffee creamer    · Other: The following foods are high in sodium  ¨ Frozen dinners, soups, and fast foods, such as hamburgers and pizza (see the food label for serving sizes)    ¨ Table salt and seasoned salts, such as onion or garlic salt    ¨ Barbecue sauce, ketchup, mustard, soy sauce, steak sauce, and teriyaki sauce  Contact your healthcare provider if:   · You are gaining or losing weight very quickly  · You have shortness of breath  · You have nausea and are vomiting  · You feel very weak and tired  · You are having trouble following the chronic kidney disease diet  © 2017 [employer] Information is for End User's use only and may not be sold, redistributed or otherwise used for commercial purposes  All illustrations and images included in CareNotes® are the copyrighted property of [employer]  or [facility]  The above information is an  only  It is not intended as medical advice for individual conditions or treatments  Talk to your doctor, nurse or pharmacist before following any medical regimen to see if it is safe and effective for you  Place chronic kidney disease patient instructions here  Portions of the record may have been created with voice recognition software  Occasional wrong word or "sound a like" substitutions may have occurred due to the inherent limitations of voice recognition software  Read the chart carefully and recognize, using context, where substitutions have occurred  If you have any questions, please contact the dictating provider

## 2020-08-19 NOTE — PATIENT INSTRUCTIONS
NSAIDS, OTC NSAIDS, ibuprofen, Aleve, Motrin, etc diclofenac no no!!!!  NO IV contrast   Check BP  recod and call office in a week   Lab work and US kidney come back in 4 weeks   Chronic Kidney Disease Diet   AMBULATORY CARE:   A chronic kidney disease diet  limits protein, phosphorus, sodium, and potassium  Liquids may also need to be limited in later stages of chronic kidney disease  This diet can help slow down the rate of damage to your kidneys  Your diet may change over time as your health condition changes  You may also need to make other diet changes if you have other health problems, such as diabetes  Diet changes: There are 5 stages of chronic kidney disease  The diet changes you need to make are based on your stage of kidney disease  Work with your dietitian or healthcare provider to plan meals that are right for you  You may need any of the following:  · Limit protein  in all stages of kidney disease  Limit the portion sizes of protein you eat to limit the amount of work your kidneys have to do  Foods that are high in protein are meat, poultry (chicken and turkey), fish, eggs, and dairy (milk, cheese, yogurt)  Your healthcare provider will tell you how much protein to eat each day  · Limit sodium  if you have high blood pressure  Limit your sodium intake to less than 2,300 milligrams (mg) each day  Ask your dietitian or healthcare provider how much sodium you can have each day  The amount of sodium you should have depends on your stage of kidney disease  Table salt, canned foods, soups, salted snacks, and processed meats, like deli meats and sausage, are high in sodium  · Limit the amount of phosphorus  you eat  Your kidneys cannot get rid of extra phosphorus that builds up in your blood  This may cause your bones to lose calcium and weaken  Foods that are high in phosphorus are dairy products, beans, peas, nuts, and whole grains  Phosphorus is also found in cocoa, beer, and cola drinks   Your healthcare provider will tell you how much phosphorus you can have each day  · Limit potassium  if your potassium blood levels are too high  Your dietitian or healthcare provider will tell you if you need to limit potassium  Potassium is found in fruits and vegetables  · Limit liquids  as directed  Your healthcare provider may recommend that you limit liquids in stages 4 and 5 of kidney disease  If your body retains fluids, you will have swelling and fluid may build up in your lungs  This can cause other health problems, such as shortness of breath  Foods you may include: Your dietitian will tell you how many servings you can have from each of the food groups below  The approximate amount of these nutrients is listed next to each food group  Read the food label to find the exact amount  · Bread, cereal, and grains: These foods contain about 80 calories, 2 grams (g) of protein, 150 mg of sodium, 50 mg of potassium, and 30 mg of phosphorus  ¨ 1 slice (1 ounce) of bread ([place], [place], raisin, light rye, or sourdough white), small dinner roll, or 6-inch tortilla    ¨ ½ of a hamburger bun, hot dog bun, or English muffin or ¼ of a bagel    ¨ 1 cup of unsweetened cereal or ½ cup of cooked cereal, such as cream of wheat    ¨ ? cup of cooked pasta (noodles, macaroni, or spaghetti) or rice    ¨ 4 (2-inch) unsalted crackers or 3 squares of gretel crackers    ¨ 3 cups of air-popped, unsalted popcorn    ¨ ¾ ounce of unsalted pretzels    · Vegetables:  A serving of these foods contains about 30 calories, 2 g of protein, 50 mg of sodium, and 50 mg of phosphorus       ¨ Low potassium (less than 150 mg):      ¨ ½ cup cooked green beans, cabbage, cauliflower, beets, or corn    ¨ 1 cup raw cucumber, endive, alfalfa sprouts, cabbage, cauliflower, or watercress    ¨ 1 cup of all types of lettuce    ¨ ¼ cup cooked or ½ cup raw mushrooms or onions    ¨ 1 cup cooked eggplant    ¨ Medium potassium (150 to 250 mg):      ¨ 1 cup raw broccoli, celery, or zucchini    ¨ ½ cup cooked asparagus, broccoli, celery, green peas, summer squash, zucchini, or peppers    ¨ 1 cup cooked kale or turnips    · Fruits:  A serving of these foods contains about 60 calories, 0 g protein, 0 mg sodium, and 150 mg of phosphorus  Each serving is ½ cup, unless another amount is given  ¨ Low potassium (less than 150 mg): ¨ Apple juice, applesauce, or 1 small apple    ¨ Blueberries    ¨ Cranberries or cranberry juice cocktail    ¨ Fresh or canned pears (light syrup or packed in water)    ¨ Grapes or grape juice    ¨ Canned peaches (light syrup or packed in water)    ¨ Pineapple or strawberries    ¨ 1 tangerine     ¨ Watermelon    ¨ Medium potassium (150 to 250 mg):      ¨ Fresh peaches or pears    ¨ Cherries    ¨ Cantaloupe, amber, or papaya    ¨ Grapefruit or grapefruit juice    · Meat, poultry, and fish: These foods have about 75 calories, 7 g of protein, an average of 65 mg of sodium, 115 mg of potassium, and 70 mg of phosphorus  Do not use salt to prepare these foods  ¨ 1 ounce of cooked beef, pork, or poultry    ¨ 1 ounce of any fresh or frozen fish, lobster, shrimp, crab, clams, tuna, unsalted canned salmon, or unsalted sardines    · Other protein foods: These foods have about 90 calories, 7 g of protein, an average of 100 mg of sodium, 100 mg of potassium, and 120 mg of phosphorus  ¨ 1 large whole egg or ¼ cup of low-cholesterol egg substitute    ¨ 1 ounce of cheese    ¨ ¼ cup of cottage cheese or tofu    ¨ 1 ounce of unsalted nuts or 2 tablespoons of peanut butter    · Fats: These foods have very little protein and about 45 calories, 55 milligrams of sodium, 10 milligrams of potassium, and 5 milligrams of phosphorus  Include healthy fats, such as unsaturated fats, which are listed below      ¨ 1 teaspoon margarine or mayonnaise     ¨ 1 teaspoon oil (safflower, sunflower, corn, soybean, olive, peanut, canola)    ¨ 1 tablespoon oil-based salad dressing (such as [place]) or 2 tablespoons mayonnaise-based salad dressing (such as ranch)  Foods to limit or avoid:   · Starches: The following foods have higher amounts of sodium, potassium, or phosphorus  ¨ Biscuits, muffins, pancakes, and waffles     ¨ Cake and cornbread from boxed mixes    ¨ Oatmeal and whole-wheat cereals    ¨ Salted pretzel sticks or rings and sandwich cookies    · Meat and protein foods: The following are high in sodium and phosphorus  ¨ Deli-style meat, such as roast beef, ham, and turkey    ¨ Canned salmon and sardines    ¨ Processed cheese, such as American cheese and cheese spreads    ¨ Smoked or cured meat, such as corned beef, perez, ham, hot dogs, and sausage    · Legumes: These foods have about 90 calories, 6 g of protein, less than 10 mg of sodium, 250 mg of potassium, and 100 mg of phosphorus  ¨ ? cup of black beans, red kidney beans, black-eye peas, garbanzos, and lentils    ¨ ¼ cup of green or mature soybeans    · Dairy: The following foods have about 8 g of protein, an average of 120 mg of sodium, 350 mg of potassium, and 220 mg of phosphorus  ¨ 1 cup of milk (fat-free, low-fat, whole, buttermilk, or chocolate milk)    ¨ 1 cup of low-fat plain or sugar-free yogurt or ice cream    ¨ ½ cup of pudding or custard    ¨ Nondairy milk substitutes: These foods have 75 calories, 1 gram of protein, and an average of 40 mg of sodium, 60 mg of potassium, and 60 mg of phosphorus  A serving is ½ cup of almond, rice, or soy milk, or nondairy creamer  · Vegetables: The following vegetables are high in potassium  Each serving has more than 250 mg of potassium  A serving is ½ cup, unless another amount is given      ¨ Artichoke or ¼ of a medium avocado    ¨ [place] sprouts, beets, chard, lorena or mustard greens    ¨ Potatoes, sweet potatoes, pumpkin, and yams    ¨ ¾ cup of okra    ¨ Raw tomatoes and low-sodium tomato juice, or tomato sauce    ¨ Winter squash, cooked asparagus, and cooked spinach    · Fruit:  The following fruits are high in potassium  Each serving has more than 250 mg of potassium  ¨ 3 fresh apricots    ¨ 1 small nectarine (2 inches across)    ¨ 1 small orange and ½ cup of orange juice    ¨ ¼ cup of dates     ¨ ? of a small honeydew melon    ¨ 1 six-inch banana     ¨ ½ cup of prune juice or prunes and kiwifruit    · Fats:  Limit unhealthy fats, such as saturated fats, which are listed below  ¨ Butter, lard, cream cheese, whipped cream, and sour cream    ¨ Powdered coffee creamer    · Other: The following foods are high in sodium  ¨ Frozen dinners, soups, and fast foods, such as hamburgers and pizza (see the food label for serving sizes)    ¨ Table salt and seasoned salts, such as onion or garlic salt    ¨ Barbecue sauce, ketchup, mustard, soy sauce, steak sauce, and teriyaki sauce  Contact your healthcare provider if:   · You are gaining or losing weight very quickly  · You have shortness of breath  · You have nausea and are vomiting  · You feel very weak and tired  · You are having trouble following the chronic kidney disease diet  © 2017 [employer] Information is for End User's use only and may not be sold, redistributed or otherwise used for commercial purposes  All illustrations and images included in CareNotes® are the copyrighted property of [employer]  or Enr[employer] Brantley  The above information is an  only  It is not intended as medical advice for individual conditions or treatments  Talk to your doctor, nurse or pharmacist before following any medical regimen to see if it is safe and effective for you  Place chronic kidney disease patient instructions here

## 2020-08-20 ENCOUNTER — TELEPHONE (OUTPATIENT)
Dept: INTERNAL MEDICINE CLINIC | Facility: CLINIC | Age: 45
End: 2020-08-20

## 2020-08-20 LAB — BACTERIA BLD CULT: NORMAL

## 2020-08-20 NOTE — TELEPHONE ENCOUNTER
----- Message from [address] sent at 8/17/2020  8:34 AM EDT -----  Please call pt and tell her that her US shows mucus cyst around the cervix, needs to be further evaluated by MRI does she agree to complete?

## 2020-08-24 LAB — MISCELLANEOUS LAB TEST RESULT: NORMAL

## 2020-08-30 DIAGNOSIS — Z13.220 SCREENING FOR HYPERLIPIDEMIA: ICD-10-CM

## 2020-08-31 RX ORDER — ATORVASTATIN CALCIUM 40 MG/1
40 TABLET, FILM COATED ORAL DAILY
Qty: 30 TABLET | Refills: 5 | Status: SHIPPED | OUTPATIENT
Start: 2020-08-31 | End: 2021-04-01 | Stop reason: HOSPADM

## 2020-09-02 ENCOUNTER — HOSPITAL ENCOUNTER (OUTPATIENT)
Dept: MRI IMAGING | Facility: HOSPITAL | Age: 45
Discharge: HOME | End: 2020-09-02
Payer: COMMERCIAL

## 2020-09-02 DIAGNOSIS — N88.8 CERVICAL CYST: ICD-10-CM

## 2020-09-02 PROCEDURE — G1004 CDSM NDSC: HCPCS

## 2020-09-02 PROCEDURE — 72195 MRI PELVIS W/O DYE: CPT

## 2020-09-03 DIAGNOSIS — F41.9 ANXIETY: ICD-10-CM

## 2020-09-03 DIAGNOSIS — F32.9 REACTIVE DEPRESSION: ICD-10-CM

## 2020-09-04 ENCOUNTER — TELEPHONE (OUTPATIENT)
Dept: FAMILY MEDICINE CLINIC | Facility: CLINIC | Age: 45
End: 2020-09-04

## 2020-09-04 ENCOUNTER — TELEPHONE (OUTPATIENT)
Dept: OBGYN CLINIC | Facility: MEDICAL CENTER | Age: 45
End: 2020-09-04

## 2020-09-04 DIAGNOSIS — N88.8 CYST OF CERVIX: Primary | ICD-10-CM

## 2020-09-04 RX ORDER — BUPROPION HYDROCHLORIDE 300 MG/1
300 TABLET ORAL EVERY MORNING
Qty: 90 TABLET | Refills: 2 | Status: SHIPPED | OUTPATIENT
Start: 2020-09-04 | End: 2021-04-05 | Stop reason: HOSPADM

## 2020-09-04 RX ORDER — ALPRAZOLAM 0.5 MG/1
0.5 TABLET ORAL 3 TIMES DAILY PRN
Qty: 90 TABLET | Refills: 1 | Status: SHIPPED | OUTPATIENT
Start: 2020-09-04 | End: 2021-04-05 | Stop reason: ALTCHOICE

## 2020-09-04 RX ORDER — ARIPIPRAZOLE 10 MG/1
10 TABLET ORAL DAILY
Qty: 90 TABLET | Refills: 2 | Status: SHIPPED | OUTPATIENT
Start: 2020-09-04 | End: 2021-09-13 | Stop reason: SDUPTHER

## 2020-09-04 NOTE — TELEPHONE ENCOUNTER
Pt called stating she was told by her PCP that she needed a bx of the cervix  Called her back and lvm

## 2020-09-08 ENCOUNTER — OFFICE VISIT (OUTPATIENT)
Dept: OBGYN CLINIC | Facility: MEDICAL CENTER | Age: 45
End: 2020-09-08
Payer: COMMERCIAL

## 2020-09-08 VITALS
WEIGHT: 266.9 LBS | TEMPERATURE: 97.2 F | BODY MASS INDEX: 43.08 KG/M2 | DIASTOLIC BLOOD PRESSURE: 68 MMHG | SYSTOLIC BLOOD PRESSURE: 116 MMHG

## 2020-09-08 DIAGNOSIS — N94.9 CERVICAL MOTION TENDERNESS: ICD-10-CM

## 2020-09-08 DIAGNOSIS — N88.8 NABOTHIAN CYST: ICD-10-CM

## 2020-09-08 DIAGNOSIS — M62.89 PELVIC FLOOR DYSFUNCTION: ICD-10-CM

## 2020-09-08 DIAGNOSIS — Z01.419 ENCOUNTER FOR ROUTINE GYNECOLOGICAL EXAMINATION WITH PAPANICOLAOU SMEAR OF CERVIX: Primary | ICD-10-CM

## 2020-09-08 PROCEDURE — G0145 SCR C/V CYTO,THINLAYER,RESCR: HCPCS | Performed by: STUDENT IN AN ORGANIZED HEALTH CARE EDUCATION/TRAINING PROGRAM

## 2020-09-08 PROCEDURE — 87624 HPV HI-RISK TYP POOLED RSLT: CPT | Performed by: STUDENT IN AN ORGANIZED HEALTH CARE EDUCATION/TRAINING PROGRAM

## 2020-09-08 PROCEDURE — 99214 OFFICE O/P EST MOD 30 MIN: CPT | Performed by: STUDENT IN AN ORGANIZED HEALTH CARE EDUCATION/TRAINING PROGRAM

## 2020-09-08 PROCEDURE — 87591 N.GONORRHOEAE DNA AMP PROB: CPT | Performed by: STUDENT IN AN ORGANIZED HEALTH CARE EDUCATION/TRAINING PROGRAM

## 2020-09-08 PROCEDURE — 87491 CHLMYD TRACH DNA AMP PROBE: CPT | Performed by: STUDENT IN AN ORGANIZED HEALTH CARE EDUCATION/TRAINING PROGRAM

## 2020-09-08 RX ORDER — AMLODIPINE BESYLATE 5 MG/1
5 TABLET ORAL DAILY
COMMUNITY
End: 2020-09-22 | Stop reason: SDUPTHER

## 2020-09-08 RX ORDER — DOXYCYCLINE 100 MG/1
100 TABLET ORAL 2 TIMES DAILY
Qty: 14 TABLET | Refills: 0 | Status: SHIPPED | OUTPATIENT
Start: 2020-09-08 | End: 2020-09-15

## 2020-09-08 RX ORDER — CYCLOBENZAPRINE HCL 5 MG
5 TABLET ORAL 2 TIMES DAILY PRN
Qty: 28 TABLET | Refills: 0 | Status: SHIPPED | OUTPATIENT
Start: 2020-09-08 | End: 2021-04-01 | Stop reason: HOSPADM

## 2020-09-08 NOTE — ASSESSMENT & PLAN NOTE
- Abnormal yellow green discharge with cervical motion tenderness  - Doxycycline 100 mg BID x 7 days  - Tested for Gonorrhea/chlamydia off Pap test

## 2020-09-08 NOTE — ASSESSMENT & PLAN NOTE
- There is no vaginal mass or lump  - There is significant pelvic floor tension myalgia appreciated on physical exam, especially on the right side  The tense pelvic floor muscle is the exact area of point tenderness that the patient is describing   - Discussed management options including medications (e g  flexeril) and/or physical therapy; patient interested in medications  I encouraged her to discuss with her PCP whether she is a good candidate for Flexeril treatment given her history of CHF, which is a relative contraindication

## 2020-09-08 NOTE — ASSESSMENT & PLAN NOTE
- We reviewed her imaging results: Pelvic MRI - There is a complex cystic lesion within the dorsal aspect of the cervix measuring 1 8 x 1 7 x 1 3 cm (series 6001 image 20 and series 5001 image 22) with thin septations and internal T1 shortening suggesting proteinaceous contents  This likely represents a complex nabothian cyst   Evaluation is limited due to lack of intravenous contrast   Additional smaller nabothian cyst noted  -- We that nabothian cysts are benign/physiologic cysts of the cervix that contain cervical mucus  - Cervix visually normal on today's exam   No visible or palpable mass, although there is cervical motion tenderness and an abnormal yellow/green discharge  -- I recommended we start with Pap testing (since she hasn't had this in many years)  If Pap is abnormal we can proceed with colposcopy/biopsy as indicated by ASCCP guidelines

## 2020-09-08 NOTE — PROGRESS NOTES
[facility] [address] [place] [address]    Assessment/Plan:  Nabothian cyst  - We reviewed her imaging results: Pelvic MRI - There is a complex cystic lesion within the dorsal aspect of the cervix measuring 1 8 x 1 7 x 1 3 cm (series 6001 image 20 and series 5001 image 22) with thin septations and internal T1 shortening suggesting proteinaceous contents  This likely represents a complex nabothian cyst   Evaluation is limited due to lack of intravenous contrast   Additional smaller nabothian cyst noted  -- We that nabothian cysts are benign/physiologic cysts of the cervix that contain cervical mucus  - Cervix visually normal on today's exam   No visible or palpable mass, although there is cervical motion tenderness and an abnormal yellow/green discharge  -- I recommended we start with Pap testing (since she hasn't had this in many years)  If Pap is abnormal we can proceed with colposcopy/biopsy as indicated by ASCCP guidelines  Cervical motion tenderness  - Abnormal yellow green discharge with cervical motion tenderness  - Doxycycline 100 mg BID x 7 days  - Tested for Gonorrhea/chlamydia off Pap test    Pelvic floor dysfunction  - There is no vaginal mass or lump  - There is significant pelvic floor tension myalgia appreciated on physical exam, especially on the right side  The tense pelvic floor muscle is the exact area of point tenderness that the patient is describing   - Discussed management options including medications (e g  flexeril) and/or physical therapy; patient interested in medications  I encouraged her to discuss with her PCP whether she is a good candidate for Flexeril treatment given her history of CHF, which is a relative contraindication      Diagnoses and all orders for this visit:    Encounter for routine gynecological examination with Papanicolaou smear of cervix  -     Liquid-based pap, screening    Cervical motion tenderness  -     Chlamydia/GC amplified DNA by PCR  -     doxycycline (ADOXA) 100 MG tablet; Take 1 tablet (100 mg total) by mouth 2 (two) times a day for 7 days    Pelvic floor dysfunction  -     Ambulatory referral to Physical Therapy; Future  -     cyclobenzaprine (FLEXERIL) 5 mg tablet; Take 1 tablet (5 mg total) by mouth 2 (two) times a day as needed for muscle spasms for up to 14 days    Nabothian cyst          Subjective:   Bogdan Pate is a 39 y o  [id number] female  CC: Cyst on my cervix and painful lump on right vaginal sidewall    HPI: Yamilex Holloway presents for follow up of a cyst on her cervix  She was referred by her PCP because she felt a painful vaginal lump whenever she tried to insert tampons  On examination by NP in our office, there was no visible lesion in the vagina or cervix  She was sent for pelvic imaging: On pelvic ultrasound there was a prominent nabothian cyst   Follow up MRI was recommended  On Pelvic MRI there was "a complex cystic lesion within the dorsal aspect of the cervix measuring 1 8 x 1 7 x 1 3 cm (series 6001 image 20 and series 5001 image 22) with thin septations and internal T1 shortening suggesting proteinaceous contents  This likely represents a complex nabothian cyst   Evaluation is limited due to lack of intravenous contrast   Additional smaller nabothian cyst noted "  She believes her symptoms are vaginal and separate from the findings on her cervix  She is nervous because she was informed by her PCP that she needs a biopsy of her cervix  It has been many years since her last Pap test   She thought they did a pap test at her last appointment here (but it was not done)  Her vaginal pain is only associated with intercourse or inserting tampons  She feels a vaginal lump on her right posterior vaginal side wall about 1-2 cm in from the introitus  She has regular periods every month  This month she noticed she spotted longer than usual  She denies postcoital bleeding      She has been trying to conceive with her current partner  She has had two miscarriages in the last few years  She says her daughter  of SIDS many years ago, and her son passed in 2018  She has had a complicated medical history including carotid stent, an accident resulting in foot fracture, and nonhodgkin's lymphoma  She currently smokes tobacco every day  ROS: Review of Systems    PFSH: The following portions of the patient's history were reviewed and updated as appropriate: allergies, current medications, past family history, past medical history, obstetric history, gynecologic history, past social history, past surgical history and problem list        Objective:  /68   Temp (!) 97 2 °F (36 2 °C)   Wt 121 kg (266 lb 14 4 oz)   LMP 2020   BMI 43 08 kg/m²    Physical Exam  Constitutional:       Appearance: Normal appearance  HENT:      Head: Normocephalic and atraumatic  Cardiovascular:      Rate and Rhythm: Normal rate  Pulmonary:      Effort: Pulmonary effort is normal    Abdominal:      General: There is no distension  Tenderness: There is no abdominal tenderness  There is no guarding  Genitourinary:     Exam position: Lithotomy position  Pubic Area: No rash  Labia:         Right: No rash, tenderness or lesion  Left: No rash, tenderness or lesion  Urethra: No prolapse, urethral swelling or urethral lesion  Vagina: No foreign body  Vaginal discharge and tenderness (there tension and tenderness of the right pelvic floor musculature  There is no palpable mass ) present  No erythema, bleeding or lesions  Cervix: Cervical motion tenderness and discharge (there is a visible physiologic nebothian cyst   There is no mass or other lesion ) present  No friability, lesion or erythema  Uterus: Not enlarged, not tender and no uterine prolapse  Adnexa:         Right: No mass, tenderness or fullness  Left: No mass, tenderness or fullness  Lymphadenopathy:      Lower Body: No right inguinal adenopathy  No left inguinal adenopathy  Neurological:      General: No focal deficit present  Mental Status: She is alert and oriented to person, place, and time     Psychiatric:         Mood and Affect: Mood normal          Behavior: Behavior normal            Ally Dick MD  [address] [place] [address]  9/8/2020 1:22 PM

## 2020-09-09 ENCOUNTER — TELEPHONE (OUTPATIENT)
Dept: HEMATOLOGY ONCOLOGY | Facility: CLINIC | Age: 45
End: 2020-09-09

## 2020-09-10 ENCOUNTER — CONSULT (OUTPATIENT)
Dept: HEMATOLOGY ONCOLOGY | Facility: CLINIC | Age: 45
End: 2020-09-10
Payer: COMMERCIAL

## 2020-09-10 ENCOUNTER — APPOINTMENT (OUTPATIENT)
Dept: LAB | Facility: CLINIC | Age: 45
End: 2020-09-10
Payer: COMMERCIAL

## 2020-09-10 VITALS
DIASTOLIC BLOOD PRESSURE: 82 MMHG | OXYGEN SATURATION: 99 % | TEMPERATURE: 97.6 F | BODY MASS INDEX: 43.55 KG/M2 | SYSTOLIC BLOOD PRESSURE: 126 MMHG | RESPIRATION RATE: 18 BRPM | HEART RATE: 86 BPM | HEIGHT: 66 IN | WEIGHT: 271 LBS

## 2020-09-10 DIAGNOSIS — N18.30 CKD (CHRONIC KIDNEY DISEASE) STAGE 3, GFR 30-59 ML/MIN (HCC): ICD-10-CM

## 2020-09-10 DIAGNOSIS — D72.829 LEUKOCYTOSIS, UNSPECIFIED TYPE: Primary | ICD-10-CM

## 2020-09-10 DIAGNOSIS — N17.9 ACUTE RENAL FAILURE SUPERIMPOSED ON STAGE 3 CHRONIC KIDNEY DISEASE, UNSPECIFIED ACUTE RENAL FAILURE TYPE: ICD-10-CM

## 2020-09-10 DIAGNOSIS — N18.3 ACUTE RENAL FAILURE SUPERIMPOSED ON STAGE 3 CHRONIC KIDNEY DISEASE, UNSPECIFIED ACUTE RENAL FAILURE TYPE: ICD-10-CM

## 2020-09-10 DIAGNOSIS — Z85.79 H/O LYMPHOMA: ICD-10-CM

## 2020-09-10 LAB
25(OH)D3 SERPL-MCNC: 13.2 NG/ML (ref 30–100)
ALBUMIN SERPL BCP-MCNC: 3.1 G/DL (ref 3.5–5)
ANION GAP SERPL CALCULATED.3IONS-SCNC: 8 MMOL/L (ref 4–13)
BASOPHILS # BLD AUTO: 0.12 THOUSANDS/ΜL (ref 0–0.1)
BASOPHILS NFR BLD AUTO: 1 % (ref 0–1)
BUN SERPL-MCNC: 17 MG/DL (ref 5–25)
CALCIUM ALBUM COR SERPL-MCNC: 11.2 MG/DL (ref 8.3–10.1)
CALCIUM SERPL-MCNC: 10.5 MG/DL (ref 8.3–10.1)
CHLORIDE SERPL-SCNC: 110 MMOL/L (ref 100–108)
CO2 SERPL-SCNC: 23 MMOL/L (ref 21–32)
CREAT SERPL-MCNC: 1.75 MG/DL (ref 0.6–1.3)
EOSINOPHIL # BLD AUTO: 0.26 THOUSAND/ΜL (ref 0–0.61)
EOSINOPHIL NFR BLD AUTO: 2 % (ref 0–6)
ERYTHROCYTE [DISTWIDTH] IN BLOOD BY AUTOMATED COUNT: 18.4 % (ref 11.6–15.1)
FERRITIN SERPL-MCNC: 14 NG/ML (ref 8–388)
GFR SERPL CREATININE-BSD FRML MDRD: 35 ML/MIN/1.73SQ M
GLUCOSE SERPL-MCNC: 72 MG/DL (ref 65–140)
HCT VFR BLD AUTO: 40.3 % (ref 34.8–46.1)
HGB BLD-MCNC: 12.9 G/DL (ref 11.5–15.4)
IMM GRANULOCYTES # BLD AUTO: 0.17 THOUSAND/UL (ref 0–0.2)
IMM GRANULOCYTES NFR BLD AUTO: 1 % (ref 0–2)
IRON SATN MFR SERPL: 10 %
IRON SERPL-MCNC: 37 UG/DL (ref 50–170)
LYMPHOCYTES # BLD AUTO: 3.5 THOUSANDS/ΜL (ref 0.6–4.47)
LYMPHOCYTES NFR BLD AUTO: 20 % (ref 14–44)
MCH RBC QN AUTO: 27.9 PG (ref 26.8–34.3)
MCHC RBC AUTO-ENTMCNC: 32 G/DL (ref 31.4–37.4)
MCV RBC AUTO: 87 FL (ref 82–98)
MONOCYTES # BLD AUTO: 1.38 THOUSAND/ΜL (ref 0.17–1.22)
MONOCYTES NFR BLD AUTO: 8 % (ref 4–12)
NEUTROPHILS # BLD AUTO: 12.35 THOUSANDS/ΜL (ref 1.85–7.62)
NEUTS SEG NFR BLD AUTO: 68 % (ref 43–75)
NRBC BLD AUTO-RTO: 0 /100 WBCS
PHOSPHATE SERPL-MCNC: 4.3 MG/DL (ref 2.7–4.5)
PLATELET # BLD AUTO: 396 THOUSANDS/UL (ref 149–390)
PMV BLD AUTO: 10.9 FL (ref 8.9–12.7)
POTASSIUM SERPL-SCNC: 4.6 MMOL/L (ref 3.5–5.3)
PTH-INTACT SERPL-MCNC: 25.7 PG/ML (ref 18.4–80.1)
RBC # BLD AUTO: 4.63 MILLION/UL (ref 3.81–5.12)
SODIUM SERPL-SCNC: 141 MMOL/L (ref 136–145)
TIBC SERPL-MCNC: 373 UG/DL (ref 250–450)
URATE SERPL-MCNC: 11.1 MG/DL (ref 2–6.8)
WBC # BLD AUTO: 17.78 THOUSAND/UL (ref 4.31–10.16)

## 2020-09-10 PROCEDURE — 82306 VITAMIN D 25 HYDROXY: CPT

## 2020-09-10 PROCEDURE — 83550 IRON BINDING TEST: CPT

## 2020-09-10 PROCEDURE — 85025 COMPLETE CBC W/AUTO DIFF WBC: CPT

## 2020-09-10 PROCEDURE — 83970 ASSAY OF PARATHORMONE: CPT

## 2020-09-10 PROCEDURE — 82728 ASSAY OF FERRITIN: CPT

## 2020-09-10 PROCEDURE — 99243 OFF/OP CNSLTJ NEW/EST LOW 30: CPT | Performed by: INTERNAL MEDICINE

## 2020-09-10 PROCEDURE — 84550 ASSAY OF BLOOD/URIC ACID: CPT

## 2020-09-10 PROCEDURE — 36415 COLL VENOUS BLD VENIPUNCTURE: CPT

## 2020-09-10 PROCEDURE — 83540 ASSAY OF IRON: CPT

## 2020-09-10 PROCEDURE — 80069 RENAL FUNCTION PANEL: CPT

## 2020-09-10 NOTE — PROGRESS NOTES
Hematology/Oncology Outpatient consultation  Rosie Carlton 39 y o  female 1975 [id number]    Date:  9/10/2020    Assessment and Plan:  1  H/O lymphoma  The patient seems to have a history of non-Hodgkin's lymphoma diagnosed in 2008 status post chemotherapy x6 cycles  It is not entirely clear what count of chemotherapy if he received or what type of lymphoma she had  It would be appropriate to pursue imaging studies with contrast after her kidney function has recovered in the near future to rule out any hint of adenopathy since she seems to have significant constitutional symptoms  Her symptoms could be also related due to other etiologies like morbid obesity  2  Leukocytosis, unspecified type  She seems to have chronic leukocytosis which is most likely reactive in nature  We will pursue extensive workup including flow cytometry from the peripheral blood and abdominal ultrasound  Further imaging studies could be entertained at the next visit as stated above  - CBC and differential; Future  - Comprehensive metabolic panel; Future  - Iron Panel (Includes Ferritin, Iron Sat%, Iron, and TIBC); Future  - Ferritin; Future  - C-reactive protein; Future  - Sedimentation rate, automated; Future  - TSH, 3rd generation with Free T4 reflex; Future  - LD,Blood; Future  - BCR/ABL, PCR; Future  - IgG, IgA, IgM; Future  - Immunoglobulin free LT chains blood; Future  - Beta 2 microglobulin, serum; Future  - Kappa/Lambda Light Chains Free With Ratio,Urine; Future  - IMMUNOFIXATION (SARA) AND PROTEIN ELECTROPHORESIS, RANDOM URINE; Future  - Protein, Total and Protein Electrophoresis with Immunofixation; Future  - Leukemia/Lymphoma flow cytometry; Future  - US abdomen limited; Future      HPI:  This is a 63-year-old female with multiple comorbid conditions including history of non-Hodgkin's lymphoma diagnosed in 2008 status post chemotherapy which was done in [place]  It is not entirely clear what count of lymphoma she had  She stated that it was in her chest   History of hypertension, morbid obesity, depression, chronic kidney disease, CHF, heart attack, cardiomyopathy, anxiety, etc   The patient seems to have elevated white cell count at least since 2017 with mainly increased absolute neutrophil count  Interval history:  She complained about significant constitutional symptoms including diaphoresis and severe fatigue  She also complained about significant pain in her neck in arms and was taking ibuprofen regular basis until she had significant renal dysfunction with creatinine of 2 47 on 08/13  The patient was evaluated by the Nephrology team and was told not to pursue any NSAIDs  She recently had an MRI of the pelvis which showed 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   Follow-up ultrasound 3 months was advised  She is up-to-date on mammograms  ROS: Review of Systems   Constitutional: Positive for fatigue  Negative for activity change, appetite change, chills, diaphoresis, fever and unexpected weight change  HENT: Negative for congestion, dental problem, ear discharge, ear pain, facial swelling, hearing loss, mouth sores, nosebleeds, postnasal drip, sore throat, tinnitus and trouble swallowing  Eyes: Negative for discharge, redness, itching and visual disturbance  Respiratory: Positive for shortness of breath  Negative for cough, chest tightness and wheezing  Cardiovascular: Negative for chest pain, palpitations and leg swelling  Gastrointestinal: Positive for diarrhea and nausea  Negative for abdominal distention, abdominal pain, anal bleeding, blood in stool, constipation and vomiting  Genitourinary: Negative for difficulty urinating, dysuria, flank pain, frequency, hematuria and urgency  Musculoskeletal: Negative for arthralgias, back pain, gait problem, joint swelling, myalgias and neck pain  Skin: Negative for color change, pallor, rash and wound  Neurological: Positive for dizziness, numbness (Arms and neck) and headaches  Negative for syncope, speech difficulty, weakness and light-headedness  Hematological: Negative for adenopathy  Does not bruise/bleed easily  Psychiatric/Behavioral: Positive for sleep disturbance  Negative for agitation, behavioral problems and confusion         Past Medical History:   Diagnosis Date    Acute MI ([facility] )     Anxiety     Cardiac disease     Cardiomyopathy ([facility] )     CHF (congestive heart failure) (HCC)     Chronic kidney disease     Depression     History of chemotherapy     non hodgkins lymphoma     Hypertension     Lymphoma, non-Hodgkin's (HCC)     Tobacco abuse        Past Surgical History:   Procedure Laterality Date    CAROTID STENT       SECTION         Social History     Socioeconomic History    Marital status: /Civil Union     Spouse name: None    Number of children: None    Years of education: None    Highest education level: None   Occupational History    None   Social Needs    Financial resource strain: None    Food insecurity     Worry: None     Inability: None    Transportation needs     Medical: None     Non-medical: None   Tobacco Use    Smoking status: Current Every Day Smoker     Packs/day: 1 00     Types: Cigarettes    Smokeless tobacco: Current User   Substance and Sexual Activity    Alcohol use: Not Currently     Comment: rarely    Drug use: Never    Sexual activity: Yes     Partners: Male     Birth control/protection: Male Sterilization     Comment:    Lifestyle    Physical activity     Days per week: None     Minutes per session: None    Stress: None   Relationships    Social connections     Talks on phone: None     Gets together: None     Attends Taoist service: None     Active member of club or organization: None     Attends meetings of clubs or organizations: None     Relationship status: None    Intimate partner violence     Fear of current or ex partner: None     Emotionally abused: None     Physically abused: None     Forced sexual activity: None   Other Topics Concern    None   Social History Narrative    None       Family History   Problem Relation Age of Onset    No Known Problems Mother     No Known Problems Father     No Known Problems Daughter     Brain cancer Maternal Grandfather     No Known Problems Paternal Aunt     No Known Problems Paternal Aunt        No Known Allergies      Current Outpatient Medications:     acetaminophen (TYLENOL) 500 mg tablet, Take 500 mg by mouth every 4 (four) hours as needed for mild pain, Disp: , Rfl:     ALPRAZolam (XANAX) 0 5 mg tablet, Take 1 tablet (0 5 mg total) by mouth 3 (three) times a day as needed for anxiety, Disp: 90 tablet, Rfl: 1    amLODIPine (NORVASC) 5 mg tablet, Take 1 tablet (5 mg total) by mouth daily, Disp: 30 tablet, Rfl: 0    amLODIPine (NORVASC) 5 mg tablet, Take 5 mg by mouth daily, Disp: , Rfl:     ARIPiprazole (ABILIFY) 10 mg tablet, Take 1 tablet (10 mg total) by mouth daily, Disp: 90 tablet, Rfl: 2    aspirin 325 mg tablet, Take 325 mg by mouth daily, Disp: , Rfl:     atorvastatin (LIPITOR) 40 mg tablet, Take 1 tablet (40 mg total) by mouth daily, Disp: 30 tablet, Rfl: 5    buPROPion (WELLBUTRIN XL) 300 mg 24 hr tablet, Take 1 tablet (300 mg total) by mouth every morning, Disp: 90 tablet, Rfl: 2    cyclobenzaprine (FLEXERIL) 5 mg tablet, Take 1 tablet (5 mg total) by mouth 2 (two) times a day as needed for muscle spasms for up to 14 days, Disp: 28 tablet, Rfl: 0    doxycycline (ADOXA) 100 MG tablet, Take 1 tablet (100 mg total) by mouth 2 (two) times a day for 7 days, Disp: 14 tablet, Rfl: 0    ibuprofen (MOTRIN) 200 mg tablet, Take by mouth every 4 (four) hours as needed for mild pain, Disp: , Rfl:     Multiple Vitamins-Calcium (ONE-A-DAY WOMENS PO), Take by mouth daily, Disp: , Rfl:     acyclovir (ZOVIRAX) 800 mg tablet, Take 1 tablet (800 mg total) by mouth 2 (two) times a day for 7 days, Disp: 14 tablet, Rfl: 3      Physical Exam:  /82 (BP Location: Right arm, Patient Position: Sitting)   Pulse 86   Temp 97 6 °F (36 4 °C) (Tympanic)   Resp 18   Ht 5' 6" (1 676 m)   Wt 123 kg (271 lb)   LMP 08/27/2020   SpO2 99%   BMI 43 74 kg/m²     Physical Exam  Constitutional:       General: She is not in acute distress  Appearance: She is well-developed  She is obese  She is not diaphoretic  HENT:      Head: Normocephalic and atraumatic  Eyes:      General: No scleral icterus  Right eye: No discharge  Left eye: No discharge  Conjunctiva/sclera: Conjunctivae normal       Pupils: Pupils are equal, round, and reactive to light  Neck:      Musculoskeletal: Normal range of motion and neck supple  Thyroid: No thyromegaly  Vascular: No JVD  Trachea: No tracheal deviation  Cardiovascular:      Rate and Rhythm: Normal rate and regular rhythm  Heart sounds: Murmur (Systolic) present  No friction rub  Pulmonary:      Effort: Pulmonary effort is normal  No respiratory distress  Breath sounds: Normal breath sounds  No stridor  No wheezing or rales  Chest:      Chest wall: No tenderness  Abdominal:      General: Bowel sounds are normal  There is no distension  Palpations: Abdomen is soft  There is no hepatomegaly, splenomegaly or mass  Tenderness: There is no abdominal tenderness  There is no guarding or rebound  Comments: Morbidly obese abdomen   Musculoskeletal: Normal range of motion  General: No tenderness or deformity  Lymphadenopathy:      Cervical: No cervical adenopathy  Skin:     General: Skin is warm and dry  Coloration: Skin is not pale  Findings: No erythema or rash  Neurological:      Mental Status: She is alert and oriented to person, place, and time  Cranial Nerves: No cranial nerve deficit        Coordination: Coordination normal       Deep Tendon Reflexes: Reflexes are normal and symmetric  Psychiatric:         Behavior: Behavior normal          Thought Content: Thought content normal          Judgment: Judgment normal            Labs:  Lab Results   Component Value Date    WBC 16 20 (H) 08/18/2020    HGB 12 2 08/18/2020    HCT 37 2 (L) 08/18/2020    MCV 86 08/18/2020     (H) 08/18/2020     Lab Results   Component Value Date    K 4 1 08/13/2020     08/13/2020    CO2 22 08/13/2020    BUN 31 (H) 08/13/2020    CREATININE 2 47 (H) 08/13/2020    GLUF 97 07/08/2020    CALCIUM 9 6 08/13/2020    AST 18 07/08/2020    ALT 39 07/08/2020    ALKPHOS 70 07/08/2020    EGFR 23 08/13/2020       Patient voiced understanding and agreement in the above discussion  Aware to contact our office with questions/symptoms in the interim

## 2020-09-12 ENCOUNTER — HOSPITAL ENCOUNTER (OUTPATIENT)
Dept: ULTRASOUND IMAGING | Facility: HOSPITAL | Age: 45
Discharge: HOME | End: 2020-09-12
Attending: INTERNAL MEDICINE
Payer: COMMERCIAL

## 2020-09-12 DIAGNOSIS — N17.9 ACUTE RENAL FAILURE SUPERIMPOSED ON STAGE 3 CHRONIC KIDNEY DISEASE, UNSPECIFIED ACUTE RENAL FAILURE TYPE: ICD-10-CM

## 2020-09-12 DIAGNOSIS — N18.3 ACUTE RENAL FAILURE SUPERIMPOSED ON STAGE 3 CHRONIC KIDNEY DISEASE, UNSPECIFIED ACUTE RENAL FAILURE TYPE: ICD-10-CM

## 2020-09-12 DIAGNOSIS — D72.829 LEUKOCYTOSIS, UNSPECIFIED TYPE: ICD-10-CM

## 2020-09-12 LAB
C TRACH DNA SPEC QL NAA+PROBE: NEGATIVE
N GONORRHOEA DNA SPEC QL NAA+PROBE: NEGATIVE

## 2020-09-12 PROCEDURE — 76770 US EXAM ABDO BACK WALL COMP: CPT

## 2020-09-12 PROCEDURE — 76700 US EXAM ABDOM COMPLETE: CPT

## 2020-09-14 ENCOUNTER — TELEPHONE (OUTPATIENT)
Dept: OBGYN CLINIC | Facility: MEDICAL CENTER | Age: 45
End: 2020-09-14

## 2020-09-14 ENCOUNTER — OFFICE VISIT (OUTPATIENT)
Dept: NEPHROLOGY | Facility: CLINIC | Age: 45
End: 2020-09-14
Payer: COMMERCIAL

## 2020-09-14 VITALS
DIASTOLIC BLOOD PRESSURE: 88 MMHG | BODY MASS INDEX: 42.75 KG/M2 | OXYGEN SATURATION: 99 % | WEIGHT: 266 LBS | HEART RATE: 79 BPM | HEIGHT: 66 IN | SYSTOLIC BLOOD PRESSURE: 138 MMHG | TEMPERATURE: 97 F

## 2020-09-14 DIAGNOSIS — C83.38 DIFFUSE LARGE B-CELL LYMPHOMA OF LYMPH NODES OF MULTIPLE REGIONS (HCC): ICD-10-CM

## 2020-09-14 DIAGNOSIS — E83.52 HYPERCALCEMIA: ICD-10-CM

## 2020-09-14 DIAGNOSIS — N18.30 CKD (CHRONIC KIDNEY DISEASE) STAGE 3, GFR 30-59 ML/MIN (HCC): Primary | ICD-10-CM

## 2020-09-14 DIAGNOSIS — E78.49 OTHER HYPERLIPIDEMIA: ICD-10-CM

## 2020-09-14 DIAGNOSIS — I10 ESSENTIAL HYPERTENSION: ICD-10-CM

## 2020-09-14 DIAGNOSIS — I50.9 CONGESTIVE HEART FAILURE, UNSPECIFIED HF CHRONICITY, UNSPECIFIED HEART FAILURE TYPE (HCC): ICD-10-CM

## 2020-09-14 LAB
LAB AP GYN PRIMARY INTERPRETATION: NORMAL
Lab: NORMAL
PATH INTERP SPEC-IMP: NORMAL

## 2020-09-14 PROCEDURE — 99215 OFFICE O/P EST HI 40 MIN: CPT | Performed by: INTERNAL MEDICINE

## 2020-09-14 NOTE — PROGRESS NOTES
Nephrology   Office Follow-Up  Khris Jones 39 y o  female MRN: [id number]    Encounter: [id number]        [address] was seen today for follow-up  Diagnoses and all orders for this visit:    CKD (chronic kidney disease) stage 3, GFR 30-59 ml/min ([facility])   Creatinine level better 1 75 from 2 47 last visit  That was most likely from NSAIDs  and lisinopril which after holding creatinine  Coming down close to baseline  CKD most likely from chronic hypertension and morbid obesity, chronic NSAID uses  Urine test has not been done yet  Ultrasound of the kidneys  Report pending at this time  Blood pressure is well controlled on current dose of Norvasc  Will continue same  For now  Her uric acid level is elevated but given his creatinine is not at a steady state will wait till treating it  Persistent uric acid level is a risk factor for future CKD worsening  Vitamin-D is low but given calcium level is elevated will not supplemented at this time  Will wait for hypercalcemia to be resolved  Iron deficiency noted but not anemic with not replace at this time  Will wait for hematologist to address  -     Uric acid; Future  -     Renal function panel; Future  -     Protein / creatinine ratio, urine; Future  -     Urinalysis with microscopic; Future  -     Microalbumin / creatinine urine ratio; Future  -     Protein, urine, random; Future  Care should be taken to avoid all nephrotoxic exposures including NSAIDs, iodinated contrast material, aminoglycoside antibiotics, and phosphate-based purgatives  The use of herbal supplements and remedies is discouraged due to the unregulated nature of the manufacturing process and the variable interaction with the patient's pharmacologic regimen  The [employer] provides excellent online resources regarding commonly used herbs at:  [employer] at  org/atoz/content/herbalsupp         The patient is encouraged to mind the vaccination schedule recommended for patients with chronic kidney disease  The flu shot is advised at this time to protect against flu in the 2020- 2021 season which ends in May 2021  Hypercalcemia    Her blood work shows hypercalcemia   PTH normal   Access to discontinue any calcium supplementation including multivitamin  She is getting workup for Hodgkin's lymphoma which she has had in the past status post chemotherapy in 2008  ionized calcium level  pending   so will order PTH related protein to rule out cause for hypercalcemia  She is asymptomatic from hypercalcemia encouraged to continue hydration  Vitamin-D is low but given calcium level is elevated will not supplemented at this time  Will wait for hypercalcemia to be resolved  -     PTH-related peptide; Future  -     Uric acid; Future  -     Renal function panel; Future  -     Protein / creatinine ratio, urine; Future  -     Urinalysis with microscopic; Future  -     Microalbumin / creatinine urine ratio; Future  -     Protein, urine, random; Future    Essential hypertension  Blood pressure is well controlled on current dose of Norvasc  Will continue same  For now  Her uric acid level is elevated but given his creatinine is not at a steady state will wait till treating it  Persistent uric acid level is a risk factor for future CKD worsening  It may be that a target blood pressure of <130/80 mmHg in patients with CKD is better for renoprotection than a higher level, particularly in the presence of proteinuria  However, the [employer] released guidelines in late 2013 simplifying the message:  treat to 150/90 mm Hg in patients over age 61 and 140/90 for everybody else    -     Blood Pressure Monitoring (BP Monitor-Stethoscope) KIT; by Does not apply route 2 (two) times a day for 1 day    Congestive heart failure, unspecified HF chronicity, unspecified heart failure type (HCC)    Clinically not volume overloaded in compensated state  2D echo to get the baseline ejection fraction of the left ventricle and pulmonary hypertension in the setting of history of  Systolic dysfunction in 6938  Nuclear stress test     2 g sodium and dietary restrictions discussed at length  Diffuse large B-cell lymphoma of lymph nodes of multiple regions [facility])    Currently undergoing workup under Dr Isael Martin oncologist     Other hyperlipidemia    Continue statin              HPI: Napoleon Maher is a 39 y o  female with an active problem list significant for  history of insulin dependent diabetes mellitus, history of lymphoma status post chemotherapy in 2008 currently undergoing workup for possible recurrence under oncologist, hypertension stage III CKD who is here for Follow-up   she denies any new symptoms since last visit but reports feeling better after NSAIDs stopped  Denies any shortness of breathing, no nausea vomiting or anorexia, she is going for Rheumatology workup for malaise generalized symptoms but so far rheumatoid factor has been negative  since last visit no significant interval  Changes in her symptoms reports  The medical record, including Care Everywhere and media tabs were reviewed  ROS:   All the systems were reviewed and were negative except as documented on the HPI  Allergies: Patient has no known allergies      Medications:   Current Outpatient Medications:     acetaminophen (TYLENOL) 500 mg tablet, Take 500 mg by mouth every 4 (four) hours as needed for mild pain, Disp: , Rfl:     ALPRAZolam (XANAX) 0 5 mg tablet, Take 1 tablet (0 5 mg total) by mouth 3 (three) times a day as needed for anxiety, Disp: 90 tablet, Rfl: 1    amLODIPine (NORVASC) 5 mg tablet, Take 1 tablet (5 mg total) by mouth daily, Disp: 30 tablet, Rfl: 0    amLODIPine (NORVASC) 5 mg tablet, Take 5 mg by mouth daily, Disp: , Rfl:     ARIPiprazole (ABILIFY) 10 mg tablet, Take 1 tablet (10 mg total) by mouth daily, Disp: 90 tablet, Rfl: 2   aspirin 325 mg tablet, Take 325 mg by mouth daily, Disp: , Rfl:     atorvastatin (LIPITOR) 40 mg tablet, Take 1 tablet (40 mg total) by mouth daily, Disp: 30 tablet, Rfl: 5    buPROPion (WELLBUTRIN XL) 300 mg 24 hr tablet, Take 1 tablet (300 mg total) by mouth every morning, Disp: 90 tablet, Rfl: 2    cyclobenzaprine (FLEXERIL) 5 mg tablet, Take 1 tablet (5 mg total) by mouth 2 (two) times a day as needed for muscle spasms for up to 14 days, Disp: 28 tablet, Rfl: 0    doxycycline (ADOXA) 100 MG tablet, Take 1 tablet (100 mg total) by mouth 2 (two) times a day for 7 days, Disp: 14 tablet, Rfl: 0    ibuprofen (MOTRIN) 200 mg tablet, Take by mouth every 4 (four) hours as needed for mild pain, Disp: , Rfl:     Multiple Vitamins-Calcium (ONE-A-DAY WOMENS PO), Take by mouth daily, Disp: , Rfl:     Blood Pressure Monitoring (BP Monitor-Stethoscope) KIT, by Does not apply route 2 (two) times a day for 1 day, Disp: 1 each, Rfl: 0    Past Medical History:   Diagnosis Date    [facility] [address] [facility]     Anxiety     Cardiac disease     Cardiomyopathy ([facility] [address] [facility]     CHF (congestive heart failure) (HCC)     Chronic kidney disease     Depression     History of chemotherapy     non hodgkins lymphoma     Hypertension     Lymphoma, non-Hodgkin's (HCC)     Tobacco abuse      Past Surgical History:   Procedure Laterality Date    CAROTID STENT       SECTION       Family History   Problem Relation Age of Onset    No Known Problems Mother     No Known Problems Father     No Known Problems Daughter     Brain cancer Maternal Grandfather     No Known Problems Paternal Aunt     No Known Problems Paternal Aunt       reports that she has been smoking cigarettes  She has been smoking about 1 00 pack per day  She uses smokeless tobacco  She reports previous alcohol use  She reports that she does not use drugs        Physical Exam:   Vitals:    20 1257   BP: 138/88   Pulse: 79   Temp: (!) 97 °F (36 1 °C)   TempSrc: Temporal   SpO2: 99%   Weight: 121 kg (266 lb)   Height: 5' 6" (1 676 m)     Body mass index is 42 93 kg/m²  General: conscious, cooperative, in no acute distress , mildly anxious, morbidly obese  Eyes: conjunctivae pink, anicteric sclerae  ENT: lips and mucous membranes moist  Neck: no masses, flat neck veins  Chest:  Few rhonchi breath sounds bilateral, no crackles, ronchus or wheezings  CVS: distinct S1 & S2, normal rate, regular rhythm  Abdomen: soft, non-tender, non-distended, normoactive bowel sounds  Extremities: 1+edema of both legs right more than left  Skin: no rash  Neuro: awake, alert, oriented      Diagnostic Data:  Lab: I have personally reviewed pertinent lab results  ,   CBC:  Results from last 7 days   Lab Units 09/10/20  1349   WBC Thousand/uL 17 78*   HEMOGLOBIN g/dL 12 9   HEMATOCRIT % 40 3   PLATELETS Thousands/uL 396*      CMP: No results found for: SODIUM, K, CL, CO2, ANIONGAP, BUN, CREATININE, GLUCOSE, CALCIUM, AST, ALT, ALKPHOS, PROT, BILITOT, EGFR,   PT/INR: No results found for: PT, INR,   Magnesium: No components found for: MAG,  Phosphorous: No results found for: PHOS    Discussion:    Patient Instructions   So continue avoiding over-the-counter pain medication as we discussed except Tylenol  Lower vitamin-D level is low but I am not going to replace it at this time given high calcium level  Continue keeping your blood pressure record at home with the proper instrument  Continue abstaining from any calcium supplement  Your iron is low but are not going to replace it at this time urine any a is absent  Uric acid is elevated but I am not going to treat that at this time but will keep repeating in the next visit if it is elevated will consider addressing that that time      I will follow your ultrasound of the kidney which is report is pending  Your urine test is pending please to so with the next blood lab   Please do the NOS Calcium and PTH related protein which has been ordered tomorrow with the lab work  Consider echocardiogram of your heart with your primary care physician  Give abstaining from NSAIDs and any IV contrast agents  Return to clinic in 6 weeks         Portions of the record may have been created with voice recognition software  Occasional wrong word or "sound a like" substitutions may have occurred due to the inherent limitations of voice recognition software  Read the chart carefully and recognize, using context, where substitutions have occurred  If you have any questions, please contact the dictating provider

## 2020-09-14 NOTE — PATIENT INSTRUCTIONS
So continue avoiding over-the-counter pain medication as we discussed except Tylenol  Lower vitamin-D level is low but I am not going to replace it at this time given high calcium level  Continue keeping your blood pressure record at home with the proper instrument  Continue abstaining from any calcium supplement  Your iron is low but are not going to replace it at this time urine any a is absent  Uric acid is elevated but I am not going to treat that at this time but will keep repeating in the next visit if it is elevated will consider addressing that that time  I will follow your ultrasound of the kidney which is report is pending  Your urine test is pending please to so with the next blood lab   Please do the NOS Calcium and PTH related protein which has been ordered tomorrow with the lab work  Consider echocardiogram of your heart with your primary care physician  Give abstaining from NSAIDs and any IV contrast agents      Return to clinic in 6 weeks

## 2020-09-15 ENCOUNTER — APPOINTMENT (OUTPATIENT)
Dept: LAB | Facility: CLINIC | Age: 45
End: 2020-09-15
Payer: COMMERCIAL

## 2020-09-15 DIAGNOSIS — E83.52 HYPERCALCEMIA: ICD-10-CM

## 2020-09-15 DIAGNOSIS — N18.3 ACUTE RENAL FAILURE SUPERIMPOSED ON STAGE 3 CHRONIC KIDNEY DISEASE, UNSPECIFIED ACUTE RENAL FAILURE TYPE: ICD-10-CM

## 2020-09-15 DIAGNOSIS — N17.9 ACUTE RENAL FAILURE SUPERIMPOSED ON STAGE 3 CHRONIC KIDNEY DISEASE, UNSPECIFIED ACUTE RENAL FAILURE TYPE: ICD-10-CM

## 2020-09-15 DIAGNOSIS — D72.829 LEUKOCYTOSIS, UNSPECIFIED TYPE: ICD-10-CM

## 2020-09-15 LAB
ALBUMIN SERPL BCP-MCNC: 3.3 G/DL (ref 3.5–5)
ALP SERPL-CCNC: 86 U/L (ref 46–116)
ALT SERPL W P-5'-P-CCNC: 28 U/L (ref 12–78)
ANION GAP SERPL CALCULATED.3IONS-SCNC: 8 MMOL/L (ref 4–13)
AST SERPL W P-5'-P-CCNC: 14 U/L (ref 5–45)
BACTERIA UR QL AUTO: ABNORMAL /HPF
BASOPHILS # BLD AUTO: 0.12 THOUSANDS/ΜL (ref 0–0.1)
BASOPHILS NFR BLD AUTO: 1 % (ref 0–1)
BILIRUB SERPL-MCNC: 0.35 MG/DL (ref 0.2–1)
BILIRUB UR QL STRIP: NEGATIVE
BUN SERPL-MCNC: 16 MG/DL (ref 5–25)
CA-I BLD-SCNC: 1.47 MMOL/L (ref 1.12–1.32)
CALCIUM ALBUM COR SERPL-MCNC: 11.6 MG/DL (ref 8.3–10.1)
CALCIUM SERPL-MCNC: 11 MG/DL (ref 8.3–10.1)
CHLORIDE SERPL-SCNC: 107 MMOL/L (ref 100–108)
CLARITY UR: ABNORMAL
CO2 SERPL-SCNC: 25 MMOL/L (ref 21–32)
COLOR UR: YELLOW
CREAT SERPL-MCNC: 1.51 MG/DL (ref 0.6–1.3)
CREAT UR-MCNC: 123 MG/DL
CRP SERPL QL: 12 MG/L
EOSINOPHIL # BLD AUTO: 0.28 THOUSAND/ΜL (ref 0–0.61)
EOSINOPHIL NFR BLD AUTO: 2 % (ref 0–6)
ERYTHROCYTE [DISTWIDTH] IN BLOOD BY AUTOMATED COUNT: 18.3 % (ref 11.6–15.1)
ERYTHROCYTE [SEDIMENTATION RATE] IN BLOOD: 28 MM/HOUR (ref 0–19)
FERRITIN SERPL-MCNC: 13 NG/ML (ref 8–388)
GFR SERPL CREATININE-BSD FRML MDRD: 42 ML/MIN/1.73SQ M
GLUCOSE P FAST SERPL-MCNC: 86 MG/DL (ref 65–99)
GLUCOSE UR STRIP-MCNC: NEGATIVE MG/DL
HCT VFR BLD AUTO: 40.9 % (ref 34.8–46.1)
HGB BLD-MCNC: 13.1 G/DL (ref 11.5–15.4)
HGB UR QL STRIP.AUTO: ABNORMAL
HYALINE CASTS #/AREA URNS LPF: ABNORMAL /LPF
IGA SERPL-MCNC: 228 MG/DL (ref 70–400)
IGG SERPL-MCNC: 1060 MG/DL (ref 700–1600)
IGM SERPL-MCNC: 41 MG/DL (ref 40–230)
IMM GRANULOCYTES # BLD AUTO: 0.17 THOUSAND/UL (ref 0–0.2)
IMM GRANULOCYTES NFR BLD AUTO: 1 % (ref 0–2)
IRON SATN MFR SERPL: 13 %
IRON SERPL-MCNC: 44 UG/DL (ref 50–170)
KETONES UR STRIP-MCNC: NEGATIVE MG/DL
LDH SERPL-CCNC: 150 U/L (ref 81–234)
LEUKOCYTE ESTERASE UR QL STRIP: ABNORMAL
LYMPHOCYTES # BLD AUTO: 3.44 THOUSANDS/ΜL (ref 0.6–4.47)
LYMPHOCYTES NFR BLD AUTO: 21 % (ref 14–44)
MCH RBC QN AUTO: 27.8 PG (ref 26.8–34.3)
MCHC RBC AUTO-ENTMCNC: 32 G/DL (ref 31.4–37.4)
MCV RBC AUTO: 87 FL (ref 82–98)
MONOCYTES # BLD AUTO: 0.95 THOUSAND/ΜL (ref 0.17–1.22)
MONOCYTES NFR BLD AUTO: 6 % (ref 4–12)
NEUTROPHILS # BLD AUTO: 11.77 THOUSANDS/ΜL (ref 1.85–7.62)
NEUTS SEG NFR BLD AUTO: 69 % (ref 43–75)
NITRITE UR QL STRIP: NEGATIVE
NON-SQ EPI CELLS URNS QL MICRO: ABNORMAL /HPF
NRBC BLD AUTO-RTO: 0 /100 WBCS
PH UR STRIP.AUTO: 6 [PH]
PLATELET # BLD AUTO: 377 THOUSANDS/UL (ref 149–390)
PMV BLD AUTO: 11 FL (ref 8.9–12.7)
POTASSIUM SERPL-SCNC: 3.9 MMOL/L (ref 3.5–5.3)
PROT SERPL-MCNC: 7.7 G/DL (ref 6.4–8.2)
PROT UR STRIP-MCNC: ABNORMAL MG/DL
PROT UR-MCNC: 220 MG/DL
PROT UR-MCNC: 220 MG/DL
PROT/CREAT UR: 1.79 MG/G{CREAT} (ref 0–0.1)
RBC # BLD AUTO: 4.71 MILLION/UL (ref 3.81–5.12)
RBC #/AREA URNS AUTO: ABNORMAL /HPF
SODIUM SERPL-SCNC: 140 MMOL/L (ref 136–145)
SP GR UR STRIP.AUTO: 1.02 (ref 1–1.03)
TIBC SERPL-MCNC: 352 UG/DL (ref 250–450)
TSH SERPL DL<=0.05 MIU/L-ACNC: 2.66 UIU/ML (ref 0.36–3.74)
UROBILINOGEN UR QL STRIP.AUTO: 0.2 E.U./DL
WBC # BLD AUTO: 16.73 THOUSAND/UL (ref 4.31–10.16)
WBC #/AREA URNS AUTO: ABNORMAL /HPF

## 2020-09-15 PROCEDURE — 83615 LACTATE (LD) (LDH) ENZYME: CPT

## 2020-09-15 PROCEDURE — 84443 ASSAY THYROID STIM HORMONE: CPT

## 2020-09-15 PROCEDURE — 86140 C-REACTIVE PROTEIN: CPT

## 2020-09-15 PROCEDURE — 84166 PROTEIN E-PHORESIS/URINE/CSF: CPT

## 2020-09-15 PROCEDURE — 83540 ASSAY OF IRON: CPT

## 2020-09-15 PROCEDURE — 82330 ASSAY OF CALCIUM: CPT

## 2020-09-15 PROCEDURE — 88184 FLOWCYTOMETRY/ TC 1 MARKER: CPT

## 2020-09-15 PROCEDURE — 80053 COMPREHEN METABOLIC PANEL: CPT

## 2020-09-15 PROCEDURE — 85652 RBC SED RATE AUTOMATED: CPT

## 2020-09-15 PROCEDURE — 82784 ASSAY IGA/IGD/IGG/IGM EACH: CPT

## 2020-09-15 PROCEDURE — 84166 PROTEIN E-PHORESIS/URINE/CSF: CPT | Performed by: PATHOLOGY

## 2020-09-15 PROCEDURE — 82397 CHEMILUMINESCENT ASSAY: CPT

## 2020-09-15 PROCEDURE — 82570 ASSAY OF URINE CREATININE: CPT

## 2020-09-15 PROCEDURE — 83883 ASSAY NEPHELOMETRY NOT SPEC: CPT

## 2020-09-15 PROCEDURE — 84156 ASSAY OF PROTEIN URINE: CPT

## 2020-09-15 PROCEDURE — 82232 ASSAY OF BETA-2 PROTEIN: CPT

## 2020-09-15 PROCEDURE — 36415 COLL VENOUS BLD VENIPUNCTURE: CPT

## 2020-09-15 PROCEDURE — 82728 ASSAY OF FERRITIN: CPT

## 2020-09-15 PROCEDURE — 85025 COMPLETE CBC W/AUTO DIFF WBC: CPT

## 2020-09-15 PROCEDURE — 84165 PROTEIN E-PHORESIS SERUM: CPT

## 2020-09-15 PROCEDURE — 83550 IRON BINDING TEST: CPT

## 2020-09-15 PROCEDURE — 88185 FLOWCYTOMETRY/TC ADD-ON: CPT

## 2020-09-15 PROCEDURE — 81001 URINALYSIS AUTO W/SCOPE: CPT

## 2020-09-15 PROCEDURE — 84165 PROTEIN E-PHORESIS SERUM: CPT | Performed by: PATHOLOGY

## 2020-09-16 LAB
ALBUMIN SERPL ELPH-MCNC: 3.66 G/DL (ref 3.5–5)
ALBUMIN SERPL ELPH-MCNC: 51.6 % (ref 52–65)
ALBUMIN UR ELPH-MCNC: 75.2 %
ALPHA1 GLOB MFR UR ELPH: 4.5 %
ALPHA1 GLOB SERPL ELPH-MCNC: 0.45 G/DL (ref 0.1–0.4)
ALPHA1 GLOB SERPL ELPH-MCNC: 6.4 % (ref 2.5–5)
ALPHA2 GLOB MFR UR ELPH: 4.4 %
ALPHA2 GLOB SERPL ELPH-MCNC: 1 G/DL (ref 0.4–1.2)
ALPHA2 GLOB SERPL ELPH-MCNC: 14.1 % (ref 7–13)
B-GLOBULIN MFR UR ELPH: 8 %
BETA GLOB ABNORMAL SERPL ELPH-MCNC: 0.52 G/DL (ref 0.4–0.8)
BETA1 GLOB SERPL ELPH-MCNC: 7.3 % (ref 5–13)
BETA2 GLOB SERPL ELPH-MCNC: 6.4 % (ref 2–8)
BETA2+GAMMA GLOB SERPL ELPH-MCNC: 0.45 G/DL (ref 0.2–0.5)
GAMMA GLOB ABNORMAL SERPL ELPH-MCNC: 1.01 G/DL (ref 0.5–1.6)
GAMMA GLOB MFR UR ELPH: 7.9 %
GAMMA GLOB SERPL ELPH-MCNC: 14.2 % (ref 12–22)
IGG/ALB SER: 1.07 {RATIO} (ref 1.1–1.8)
KAPPA LC FREE SER-MCNC: 39.6 MG/L (ref 3.3–19.4)
KAPPA LC FREE/LAMBDA FREE SER: 1.21 {RATIO} (ref 0.26–1.65)
LAMBDA LC FREE SERPL-MCNC: 32.8 MG/L (ref 5.7–26.3)
PROT PATTERN SERPL ELPH-IMP: ABNORMAL
PROT PATTERN UR ELPH-IMP: ABNORMAL
PROT SERPL-MCNC: 7.1 G/DL (ref 6.4–8.2)
PROT UR-MCNC: 239 MG/DL
SCAN RESULT: NORMAL

## 2020-09-17 LAB
B2 MICROGLOB SERPL-MCNC: 3.9 MG/L (ref 0.6–2.4)
HPV HR 12 DNA CVX QL NAA+PROBE: NEGATIVE
HPV16 DNA CVX QL NAA+PROBE: NEGATIVE
HPV18 DNA CVX QL NAA+PROBE: NEGATIVE
KAPPA LC UR-MCNC: 194.4 MG/L (ref 0.63–113.79)
KAPPA LC/LAMBDA UR: 4.31 {RATIO} (ref 1.03–31.76)
LAMBDA LC UR-MCNC: 45.11 MG/L (ref 0.47–11.77)

## 2020-09-21 LAB — PTH RELATED PROT SERPL-SCNC: <2 PMOL/L

## 2020-09-22 DIAGNOSIS — I10 HYPERTENSION: Primary | ICD-10-CM

## 2020-09-22 RX ORDER — AMLODIPINE BESYLATE 5 MG/1
5 TABLET ORAL DAILY
Qty: 90 TABLET | Refills: 3 | Status: SHIPPED | OUTPATIENT
Start: 2020-09-22 | End: 2021-04-01 | Stop reason: HOSPADM

## 2020-10-05 ENCOUNTER — OFFICE VISIT (OUTPATIENT)
Dept: PSYCHIATRY | Facility: CLINIC | Age: 45
End: 2020-10-05
Payer: COMMERCIAL

## 2020-10-05 DIAGNOSIS — F32.9 REACTIVE DEPRESSION: ICD-10-CM

## 2020-10-05 DIAGNOSIS — F41.9 ANXIETY: Primary | ICD-10-CM

## 2020-10-05 PROCEDURE — 90792 PSYCH DIAG EVAL W/MED SRVCS: CPT | Performed by: HOSPITALIST

## 2020-10-08 ENCOUNTER — TELEPHONE (OUTPATIENT)
Dept: HEMATOLOGY ONCOLOGY | Facility: CLINIC | Age: 45
End: 2020-10-08

## 2020-10-09 ENCOUNTER — TRANSCRIBE ORDERS (OUTPATIENT)
Dept: LAB | Facility: HOSPITAL | Age: 45
End: 2020-10-09

## 2020-10-09 ENCOUNTER — TELEPHONE (OUTPATIENT)
Dept: HEMATOLOGY ONCOLOGY | Facility: CLINIC | Age: 45
End: 2020-10-09

## 2020-10-12 ENCOUNTER — LAB (OUTPATIENT)
Dept: LAB | Facility: HOSPITAL | Age: 45
End: 2020-10-12
Attending: INTERNAL MEDICINE
Payer: COMMERCIAL

## 2020-10-12 ENCOUNTER — TELEPHONE (OUTPATIENT)
Dept: PSYCHOLOGY | Facility: CLINIC | Age: 45
End: 2020-10-12

## 2020-10-12 DIAGNOSIS — D72.829 LEUKOCYTOSIS, UNSPECIFIED TYPE: ICD-10-CM

## 2020-10-12 PROCEDURE — 81206 BCR/ABL1 GENE MAJOR BP: CPT

## 2020-10-12 PROCEDURE — 36415 COLL VENOUS BLD VENIPUNCTURE: CPT

## 2020-10-12 PROCEDURE — 81207 BCR/ABL1 GENE MINOR BP: CPT

## 2020-10-13 ENCOUNTER — APPOINTMENT (OUTPATIENT)
Dept: LAB | Facility: CLINIC | Age: 45
End: 2020-10-13
Payer: COMMERCIAL

## 2020-10-13 ENCOUNTER — OFFICE VISIT (OUTPATIENT)
Dept: HEMATOLOGY ONCOLOGY | Facility: CLINIC | Age: 45
End: 2020-10-13
Payer: COMMERCIAL

## 2020-10-13 VITALS
TEMPERATURE: 98.6 F | RESPIRATION RATE: 18 BRPM | OXYGEN SATURATION: 96 % | HEIGHT: 66 IN | DIASTOLIC BLOOD PRESSURE: 76 MMHG | HEART RATE: 105 BPM | WEIGHT: 266.5 LBS | BODY MASS INDEX: 42.83 KG/M2 | SYSTOLIC BLOOD PRESSURE: 138 MMHG

## 2020-10-13 DIAGNOSIS — D50.8 OTHER IRON DEFICIENCY ANEMIA: ICD-10-CM

## 2020-10-13 DIAGNOSIS — D72.828 OTHER ELEVATED WHITE BLOOD CELL (WBC) COUNT: Primary | ICD-10-CM

## 2020-10-13 DIAGNOSIS — E83.52 HYPERCALCEMIA: ICD-10-CM

## 2020-10-13 DIAGNOSIS — K21.9 GASTROESOPHAGEAL REFLUX DISEASE WITHOUT ESOPHAGITIS: ICD-10-CM

## 2020-10-13 DIAGNOSIS — Z85.79 H/O LYMPHOMA: ICD-10-CM

## 2020-10-13 DIAGNOSIS — D72.829 LEUKOCYTOSIS, UNSPECIFIED TYPE: ICD-10-CM

## 2020-10-13 LAB
ALBUMIN SERPL BCP-MCNC: 3.2 G/DL (ref 3.5–5)
ALP SERPL-CCNC: 84 U/L (ref 46–116)
ALT SERPL W P-5'-P-CCNC: 29 U/L (ref 12–78)
ANION GAP SERPL CALCULATED.3IONS-SCNC: 5 MMOL/L (ref 4–13)
AST SERPL W P-5'-P-CCNC: 11 U/L (ref 5–45)
BASOPHILS # BLD AUTO: 0.16 THOUSANDS/ΜL (ref 0–0.1)
BASOPHILS NFR BLD AUTO: 1 % (ref 0–1)
BILIRUB SERPL-MCNC: 0.2 MG/DL (ref 0.2–1)
BUN SERPL-MCNC: 14 MG/DL (ref 5–25)
CALCIUM ALBUM COR SERPL-MCNC: 10 MG/DL (ref 8.3–10.1)
CALCIUM SERPL-MCNC: 9.4 MG/DL (ref 8.3–10.1)
CHLORIDE SERPL-SCNC: 110 MMOL/L (ref 100–108)
CO2 SERPL-SCNC: 25 MMOL/L (ref 21–32)
CREAT SERPL-MCNC: 1.8 MG/DL (ref 0.6–1.3)
EOSINOPHIL # BLD AUTO: 0.25 THOUSAND/ΜL (ref 0–0.61)
EOSINOPHIL NFR BLD AUTO: 2 % (ref 0–6)
ERYTHROCYTE [DISTWIDTH] IN BLOOD BY AUTOMATED COUNT: 19 % (ref 11.6–15.1)
GFR SERPL CREATININE-BSD FRML MDRD: 34 ML/MIN/1.73SQ M
GLUCOSE SERPL-MCNC: 81 MG/DL (ref 65–140)
HCT VFR BLD AUTO: 41.8 % (ref 34.8–46.1)
HGB BLD-MCNC: 13.4 G/DL (ref 11.5–15.4)
IMM GRANULOCYTES # BLD AUTO: 0.22 THOUSAND/UL (ref 0–0.2)
IMM GRANULOCYTES NFR BLD AUTO: 1 % (ref 0–2)
LYMPHOCYTES # BLD AUTO: 3.44 THOUSANDS/ΜL (ref 0.6–4.47)
LYMPHOCYTES NFR BLD AUTO: 21 % (ref 14–44)
MCH RBC QN AUTO: 27.5 PG (ref 26.8–34.3)
MCHC RBC AUTO-ENTMCNC: 32.1 G/DL (ref 31.4–37.4)
MCV RBC AUTO: 86 FL (ref 82–98)
MONOCYTES # BLD AUTO: 1.1 THOUSAND/ΜL (ref 0.17–1.22)
MONOCYTES NFR BLD AUTO: 7 % (ref 4–12)
NEUTROPHILS # BLD AUTO: 11.55 THOUSANDS/ΜL (ref 1.85–7.62)
NEUTS SEG NFR BLD AUTO: 68 % (ref 43–75)
NRBC BLD AUTO-RTO: 0 /100 WBCS
PLATELET # BLD AUTO: 515 THOUSANDS/UL (ref 149–390)
PMV BLD AUTO: 10.6 FL (ref 8.9–12.7)
POTASSIUM SERPL-SCNC: 4.6 MMOL/L (ref 3.5–5.3)
PROT SERPL-MCNC: 7.8 G/DL (ref 6.4–8.2)
RBC # BLD AUTO: 4.87 MILLION/UL (ref 3.81–5.12)
SODIUM SERPL-SCNC: 140 MMOL/L (ref 136–145)
WBC # BLD AUTO: 16.72 THOUSAND/UL (ref 4.31–10.16)

## 2020-10-13 PROCEDURE — 99215 OFFICE O/P EST HI 40 MIN: CPT | Performed by: NURSE PRACTITIONER

## 2020-10-13 PROCEDURE — 80053 COMPREHEN METABOLIC PANEL: CPT

## 2020-10-13 PROCEDURE — 85025 COMPLETE CBC W/AUTO DIFF WBC: CPT

## 2020-10-13 PROCEDURE — 36415 COLL VENOUS BLD VENIPUNCTURE: CPT

## 2020-10-13 RX ORDER — SODIUM CHLORIDE 9 MG/ML
20 INJECTION, SOLUTION INTRAVENOUS ONCE
Status: CANCELLED | OUTPATIENT
Start: 2020-10-20

## 2020-10-13 RX ORDER — PANTOPRAZOLE SODIUM 40 MG/1
40 TABLET, DELAYED RELEASE ORAL DAILY
Qty: 30 TABLET | Refills: 5 | Status: SHIPPED | OUTPATIENT
Start: 2020-10-13 | End: 2021-04-05 | Stop reason: SDDI

## 2020-10-13 RX ORDER — SODIUM CHLORIDE 9 MG/ML
20 INJECTION, SOLUTION INTRAVENOUS ONCE
Status: CANCELLED | OUTPATIENT
Start: 2020-10-19

## 2020-10-14 ENCOUNTER — TELEPHONE (OUTPATIENT)
Dept: HEMATOLOGY ONCOLOGY | Facility: CLINIC | Age: 45
End: 2020-10-14

## 2020-10-14 DIAGNOSIS — Z85.79 H/O LYMPHOMA: Primary | ICD-10-CM

## 2020-10-14 DIAGNOSIS — D72.828 OTHER ELEVATED WHITE BLOOD CELL (WBC) COUNT: ICD-10-CM

## 2020-10-14 DIAGNOSIS — E83.52 HYPERCALCEMIA: ICD-10-CM

## 2020-10-15 ENCOUNTER — TELEPHONE (OUTPATIENT)
Dept: HEMATOLOGY ONCOLOGY | Facility: CLINIC | Age: 45
End: 2020-10-15

## 2020-10-19 ENCOUNTER — HOSPITAL ENCOUNTER (OUTPATIENT)
Dept: INFUSION CENTER | Facility: HOSPITAL | Age: 45
Discharge: HOME | End: 2020-10-19
Attending: INTERNAL MEDICINE
Payer: COMMERCIAL

## 2020-10-19 VITALS
SYSTOLIC BLOOD PRESSURE: 130 MMHG | TEMPERATURE: 98.5 F | HEART RATE: 90 BPM | DIASTOLIC BLOOD PRESSURE: 85 MMHG | RESPIRATION RATE: 18 BRPM

## 2020-10-19 DIAGNOSIS — D50.8 IRON DEFICIENCY ANEMIA SECONDARY TO INADEQUATE DIETARY IRON INTAKE: ICD-10-CM

## 2020-10-19 DIAGNOSIS — D50.8 OTHER IRON DEFICIENCY ANEMIAS: Primary | ICD-10-CM

## 2020-10-19 PROCEDURE — 96365 THER/PROPH/DIAG IV INF INIT: CPT

## 2020-10-19 RX ORDER — SODIUM CHLORIDE 9 MG/ML
20 INJECTION, SOLUTION INTRAVENOUS ONCE
Status: CANCELLED | OUTPATIENT
Start: 2020-10-26

## 2020-10-19 RX ORDER — SODIUM CHLORIDE 9 MG/ML
20 INJECTION, SOLUTION INTRAVENOUS ONCE
Status: COMPLETED | OUTPATIENT
Start: 2020-10-19 | End: 2020-10-19

## 2020-10-19 RX ADMIN — SODIUM CHLORIDE 20 ML/HR: 0.9 INJECTION, SOLUTION INTRAVENOUS at 11:26

## 2020-10-19 RX ADMIN — SODIUM CHLORIDE 200 MG: 9 INJECTION, SOLUTION INTRAVENOUS at 11:26

## 2020-10-20 ENCOUNTER — CONSULT (OUTPATIENT)
Dept: MULTI SPECIALTY CLINIC | Facility: CLINIC | Age: 45
End: 2020-10-20

## 2020-10-20 VITALS
WEIGHT: 266 LBS | TEMPERATURE: 96.7 F | OXYGEN SATURATION: 99 % | HEIGHT: 66 IN | DIASTOLIC BLOOD PRESSURE: 72 MMHG | BODY MASS INDEX: 42.75 KG/M2 | HEART RATE: 93 BPM | SYSTOLIC BLOOD PRESSURE: 132 MMHG

## 2020-10-20 DIAGNOSIS — M19.071 ARTHRITIS OF RIGHT SUBTALAR JOINT: Primary | ICD-10-CM

## 2020-10-20 DIAGNOSIS — S86.309A PERONEAL TENDON INJURY, INITIAL ENCOUNTER: ICD-10-CM

## 2020-10-20 DIAGNOSIS — M25.80 SESAMOIDITIS: ICD-10-CM

## 2020-10-20 PROCEDURE — 20605 DRAIN/INJ JOINT/BURSA W/O US: CPT | Performed by: PODIATRIST

## 2020-10-20 PROCEDURE — 99213 OFFICE O/P EST LOW 20 MIN: CPT | Performed by: PODIATRIST

## 2020-10-20 RX ORDER — PREDNISONE 1 MG/1
5 TABLET ORAL 2 TIMES DAILY WITH MEALS
Qty: 28 TABLET | Refills: 0 | Status: SHIPPED | OUTPATIENT
Start: 2020-10-20 | End: 2020-11-03

## 2020-10-21 ENCOUNTER — TELEPHONE (OUTPATIENT)
Dept: HEMATOLOGY ONCOLOGY | Facility: CLINIC | Age: 45
End: 2020-10-21

## 2020-10-21 ENCOUNTER — TRANSCRIBE ORDERS (OUTPATIENT)
Dept: INTERNAL MEDICINE CLINIC | Facility: CLINIC | Age: 45
End: 2020-10-21

## 2020-10-21 DIAGNOSIS — C83.38 DIFFUSE LARGE B-CELL LYMPHOMA OF LYMPH NODES OF MULTIPLE REGIONS (HCC): Primary | ICD-10-CM

## 2020-10-21 DIAGNOSIS — M79.671 PAIN IN RIGHT FOOT: ICD-10-CM

## 2020-10-21 DIAGNOSIS — M25.471 EFFUSION, RIGHT ANKLE: Primary | ICD-10-CM

## 2020-10-26 ENCOUNTER — HOSPITAL ENCOUNTER (OUTPATIENT)
Dept: INFUSION CENTER | Facility: HOSPITAL | Age: 45
Discharge: HOME | End: 2020-10-26
Attending: INTERNAL MEDICINE
Payer: COMMERCIAL

## 2020-10-26 VITALS
TEMPERATURE: 98.4 F | HEART RATE: 79 BPM | DIASTOLIC BLOOD PRESSURE: 86 MMHG | RESPIRATION RATE: 18 BRPM | SYSTOLIC BLOOD PRESSURE: 155 MMHG

## 2020-10-26 DIAGNOSIS — D50.8 OTHER IRON DEFICIENCY ANEMIAS: ICD-10-CM

## 2020-10-26 DIAGNOSIS — D50.8 IRON DEFICIENCY ANEMIA SECONDARY TO INADEQUATE DIETARY IRON INTAKE: Primary | ICD-10-CM

## 2020-10-26 PROCEDURE — 96365 THER/PROPH/DIAG IV INF INIT: CPT

## 2020-10-26 RX ORDER — SODIUM CHLORIDE 9 MG/ML
20 INJECTION, SOLUTION INTRAVENOUS ONCE
Status: COMPLETED | OUTPATIENT
Start: 2020-10-26 | End: 2020-10-26

## 2020-10-26 RX ORDER — SODIUM CHLORIDE 9 MG/ML
20 INJECTION, SOLUTION INTRAVENOUS ONCE
Status: CANCELLED | OUTPATIENT
Start: 2020-11-02

## 2020-10-26 RX ADMIN — SODIUM CHLORIDE 200 MG: 9 INJECTION, SOLUTION INTRAVENOUS at 09:54

## 2020-10-26 RX ADMIN — SODIUM CHLORIDE 20 ML/HR: 0.9 INJECTION, SOLUTION INTRAVENOUS at 09:48

## 2020-10-28 ENCOUNTER — APPOINTMENT (OUTPATIENT)
Dept: LAB | Facility: HOSPITAL | Age: 45
End: 2020-10-28
Attending: INTERNAL MEDICINE
Payer: COMMERCIAL

## 2020-10-28 ENCOUNTER — HOSPITAL ENCOUNTER (OUTPATIENT)
Dept: RADIOLOGY | Facility: HOSPITAL | Age: 45
Discharge: HOME | End: 2020-10-28
Payer: COMMERCIAL

## 2020-10-28 DIAGNOSIS — E83.52 HYPERCALCEMIA: ICD-10-CM

## 2020-10-28 DIAGNOSIS — M19.071 ARTHRITIS OF RIGHT SUBTALAR JOINT: ICD-10-CM

## 2020-10-28 DIAGNOSIS — Z85.79 H/O LYMPHOMA: ICD-10-CM

## 2020-10-28 DIAGNOSIS — I10 ESSENTIAL HYPERTENSION: ICD-10-CM

## 2020-10-28 DIAGNOSIS — N18.30 CKD (CHRONIC KIDNEY DISEASE) STAGE 3, GFR 30-59 ML/MIN (HCC): ICD-10-CM

## 2020-10-28 DIAGNOSIS — D72.828 OTHER ELEVATED WHITE BLOOD CELL (WBC) COUNT: ICD-10-CM

## 2020-10-28 DIAGNOSIS — C83.38 DIFFUSE LARGE B-CELL LYMPHOMA OF LYMPH NODES OF MULTIPLE REGIONS (HCC): ICD-10-CM

## 2020-10-28 LAB
ALBUMIN SERPL BCP-MCNC: 3.9 G/DL (ref 3.5–5.7)
ANION GAP SERPL CALCULATED.3IONS-SCNC: 10 MMOL/L (ref 4–13)
BACTERIA UR QL AUTO: ABNORMAL /HPF
BILIRUB UR QL STRIP: NEGATIVE
BUN SERPL-MCNC: 20 MG/DL (ref 7–25)
CALCIUM SERPL-MCNC: 9.2 MG/DL (ref 8.6–10.5)
CHLORIDE SERPL-SCNC: 107 MMOL/L (ref 98–107)
CLARITY UR: CLEAR
CO2 SERPL-SCNC: 22 MMOL/L (ref 21–31)
COLOR UR: YELLOW
CREAT SERPL-MCNC: 1.54 MG/DL (ref 0.6–1.2)
CREAT UR-MCNC: 184 MG/DL
CREAT UR-MCNC: 184 MG/DL
GFR SERPL CREATININE-BSD FRML MDRD: 41 ML/MIN/1.73SQ M
GLUCOSE P FAST SERPL-MCNC: 85 MG/DL (ref 65–99)
GLUCOSE UR STRIP-MCNC: NEGATIVE MG/DL
HGB UR QL STRIP.AUTO: ABNORMAL
KETONES UR STRIP-MCNC: NEGATIVE MG/DL
LEUKOCYTE ESTERASE UR QL STRIP: NEGATIVE
MICROALBUMIN UR-MCNC: 4980 MG/L (ref 0–20)
MICROALBUMIN/CREAT 24H UR: 2707 MG/G CREATININE (ref 0–30)
MUCOUS THREADS UR QL AUTO: ABNORMAL
NITRITE UR QL STRIP: NEGATIVE
NON-SQ EPI CELLS URNS QL MICRO: ABNORMAL /HPF
PH UR STRIP.AUTO: 5.5 [PH]
PHOSPHATE SERPL-MCNC: 3.5 MG/DL (ref 3–5.5)
POTASSIUM SERPL-SCNC: 4.3 MMOL/L (ref 3.5–5.5)
PROT UR STRIP-MCNC: ABNORMAL MG/DL
PROT UR-MCNC: 552 MG/DL
PROT UR-MCNC: 552 MG/DL
PROT/CREAT UR: 3 MG/G{CREAT} (ref 0–0.1)
RBC #/AREA URNS AUTO: ABNORMAL /HPF
SODIUM SERPL-SCNC: 139 MMOL/L (ref 134–143)
SP GR UR STRIP.AUTO: >=1.03 (ref 1–1.03)
UROBILINOGEN UR QL STRIP.AUTO: 0.2 E.U./DL
WBC #/AREA URNS AUTO: ABNORMAL /HPF

## 2020-10-28 PROCEDURE — 73610 X-RAY EXAM OF ANKLE: CPT

## 2020-10-28 PROCEDURE — 81001 URINALYSIS AUTO W/SCOPE: CPT

## 2020-10-28 PROCEDURE — 82043 UR ALBUMIN QUANTITATIVE: CPT

## 2020-10-28 PROCEDURE — 73630 X-RAY EXAM OF FOOT: CPT

## 2020-10-28 PROCEDURE — 82570 ASSAY OF URINE CREATININE: CPT

## 2020-10-28 PROCEDURE — 36415 COLL VENOUS BLD VENIPUNCTURE: CPT

## 2020-10-28 PROCEDURE — 84156 ASSAY OF PROTEIN URINE: CPT

## 2020-10-28 PROCEDURE — 71046 X-RAY EXAM CHEST 2 VIEWS: CPT

## 2020-10-28 PROCEDURE — 80069 RENAL FUNCTION PANEL: CPT

## 2020-10-29 ENCOUNTER — OFFICE VISIT (OUTPATIENT)
Dept: NEPHROLOGY | Facility: CLINIC | Age: 45
End: 2020-10-29
Payer: COMMERCIAL

## 2020-10-29 VITALS
BODY MASS INDEX: 41.95 KG/M2 | SYSTOLIC BLOOD PRESSURE: 136 MMHG | DIASTOLIC BLOOD PRESSURE: 82 MMHG | OXYGEN SATURATION: 98 % | TEMPERATURE: 96.9 F | WEIGHT: 261 LBS | HEART RATE: 89 BPM | HEIGHT: 66 IN

## 2020-10-29 DIAGNOSIS — R80.9 PROTEINURIA, UNSPECIFIED TYPE: ICD-10-CM

## 2020-10-29 DIAGNOSIS — C83.38 DIFFUSE LARGE B-CELL LYMPHOMA OF LYMPH NODES OF MULTIPLE REGIONS (HCC): ICD-10-CM

## 2020-10-29 DIAGNOSIS — N18.32 STAGE 3B CHRONIC KIDNEY DISEASE (HCC): Primary | ICD-10-CM

## 2020-10-29 DIAGNOSIS — I50.9 CONGESTIVE HEART FAILURE, UNSPECIFIED HF CHRONICITY, UNSPECIFIED HEART FAILURE TYPE (HCC): ICD-10-CM

## 2020-10-29 DIAGNOSIS — E83.52 HYPERCALCEMIA: ICD-10-CM

## 2020-10-29 DIAGNOSIS — I10 ESSENTIAL HYPERTENSION: ICD-10-CM

## 2020-10-29 DIAGNOSIS — D50.8 OTHER IRON DEFICIENCY ANEMIAS: ICD-10-CM

## 2020-10-29 PROCEDURE — 3008F BODY MASS INDEX DOCD: CPT | Performed by: INTERNAL MEDICINE

## 2020-10-29 PROCEDURE — 99215 OFFICE O/P EST HI 40 MIN: CPT | Performed by: INTERNAL MEDICINE

## 2020-10-29 PROCEDURE — 3008F BODY MASS INDEX DOCD: CPT | Performed by: PODIATRIST

## 2020-10-29 RX ORDER — LISINOPRIL 5 MG/1
5 TABLET ORAL DAILY
Qty: 30 TABLET | Refills: 5 | Status: SHIPPED | OUTPATIENT
Start: 2020-10-29 | End: 2021-04-04 | Stop reason: HOSPADM

## 2020-11-03 ENCOUNTER — OFFICE VISIT (OUTPATIENT)
Dept: MULTI SPECIALTY CLINIC | Facility: CLINIC | Age: 45
End: 2020-11-03

## 2020-11-03 VITALS
SYSTOLIC BLOOD PRESSURE: 138 MMHG | WEIGHT: 275 LBS | DIASTOLIC BLOOD PRESSURE: 82 MMHG | HEART RATE: 105 BPM | TEMPERATURE: 96.8 F | BODY MASS INDEX: 44.39 KG/M2 | OXYGEN SATURATION: 99 %

## 2020-11-03 DIAGNOSIS — M19.071 ARTHRITIS OF RIGHT ANKLE: ICD-10-CM

## 2020-11-03 DIAGNOSIS — M19.071 ARTHRITIS OF RIGHT SUBTALAR JOINT: Primary | ICD-10-CM

## 2020-11-03 PROCEDURE — 20605 DRAIN/INJ JOINT/BURSA W/O US: CPT | Performed by: PODIATRIST

## 2020-11-03 PROCEDURE — 99213 OFFICE O/P EST LOW 20 MIN: CPT | Performed by: PODIATRIST

## 2020-11-05 RX ORDER — SODIUM CHLORIDE 9 MG/ML
20 INJECTION, SOLUTION INTRAVENOUS ONCE
Status: CANCELLED | OUTPATIENT
Start: 2020-11-09

## 2020-11-10 ENCOUNTER — TELEPHONE (OUTPATIENT)
Dept: HEMATOLOGY ONCOLOGY | Facility: CLINIC | Age: 45
End: 2020-11-10

## 2020-11-11 ENCOUNTER — TELEPHONE (OUTPATIENT)
Dept: HEMATOLOGY ONCOLOGY | Facility: CLINIC | Age: 45
End: 2020-11-11

## 2020-11-12 ENCOUNTER — TELEPHONE (OUTPATIENT)
Dept: HEMATOLOGY ONCOLOGY | Facility: CLINIC | Age: 45
End: 2020-11-12

## 2020-11-16 ENCOUNTER — TELEPHONE (OUTPATIENT)
Dept: INTERNAL MEDICINE CLINIC | Facility: CLINIC | Age: 45
End: 2020-11-16

## 2021-03-25 ENCOUNTER — APPOINTMENT (INPATIENT)
Dept: NON INVASIVE DIAGNOSTICS | Facility: HOSPITAL | Age: 46
DRG: 174 | End: 2021-03-25
Payer: COMMERCIAL

## 2021-03-25 ENCOUNTER — HOSPITAL ENCOUNTER (EMERGENCY)
Facility: HOSPITAL | Age: 46
End: 2021-03-25
Attending: EMERGENCY MEDICINE | Admitting: EMERGENCY MEDICINE
Payer: COMMERCIAL

## 2021-03-25 ENCOUNTER — APPOINTMENT (EMERGENCY)
Dept: RADIOLOGY | Facility: HOSPITAL | Age: 46
End: 2021-03-25
Payer: COMMERCIAL

## 2021-03-25 ENCOUNTER — HOSPITAL ENCOUNTER (INPATIENT)
Dept: NON INVASIVE DIAGNOSTICS | Facility: HOSPITAL | Age: 46
LOS: 7 days | Discharge: HOME | DRG: 174 | End: 2021-04-01
Attending: INTERNAL MEDICINE | Admitting: INTERNAL MEDICINE
Payer: COMMERCIAL

## 2021-03-25 VITALS
DIASTOLIC BLOOD PRESSURE: 89 MMHG | BODY MASS INDEX: 45.19 KG/M2 | TEMPERATURE: 96 F | OXYGEN SATURATION: 99 % | HEART RATE: 65 BPM | WEIGHT: 280 LBS | SYSTOLIC BLOOD PRESSURE: 137 MMHG | RESPIRATION RATE: 15 BRPM

## 2021-03-25 DIAGNOSIS — J44.9 COPD (CHRONIC OBSTRUCTIVE PULMONARY DISEASE) (HCC): ICD-10-CM

## 2021-03-25 DIAGNOSIS — E78.2 MIXED HYPERLIPIDEMIA: ICD-10-CM

## 2021-03-25 DIAGNOSIS — I25.10 CAD (CORONARY ARTERY DISEASE): ICD-10-CM

## 2021-03-25 DIAGNOSIS — Z72.0 TOBACCO ABUSE: ICD-10-CM

## 2021-03-25 DIAGNOSIS — I30.9 ACUTE PERICARDITIS: ICD-10-CM

## 2021-03-25 DIAGNOSIS — R07.9 CHEST PAIN: ICD-10-CM

## 2021-03-25 DIAGNOSIS — I47.2 VENTRICULAR TACHYCARDIA, NON-SUSTAINED (HCC): ICD-10-CM

## 2021-03-25 DIAGNOSIS — N18.32 STAGE 3B CHRONIC KIDNEY DISEASE (HCC): ICD-10-CM

## 2021-03-25 DIAGNOSIS — I21.19 ACUTE MI, INFERIOR WALL (HCC): Primary | ICD-10-CM

## 2021-03-25 DIAGNOSIS — I21.3 STEMI (ST ELEVATION MYOCARDIAL INFARCTION) (HCC): Primary | ICD-10-CM

## 2021-03-25 DIAGNOSIS — K55.059 AMI (ACUTE MESENTERIC ISCHEMIA): ICD-10-CM

## 2021-03-25 DIAGNOSIS — I21.11 ACUTE ST ELEVATION MYOCARDIAL INFARCTION (STEMI) INVOLVING RIGHT CORONARY ARTERY (HCC): ICD-10-CM

## 2021-03-25 PROBLEM — D72.829 LEUKOCYTOSIS: Status: ACTIVE | Noted: 2021-03-25

## 2021-03-25 PROBLEM — I47.29 VENTRICULAR TACHYCARDIA, NON-SUSTAINED: Status: ACTIVE | Noted: 2021-03-25

## 2021-03-25 LAB
ALBUMIN SERPL BCP-MCNC: 3.8 G/DL (ref 3.5–5.7)
ALP SERPL-CCNC: 84 U/L (ref 40–150)
ALT SERPL W P-5'-P-CCNC: 30 U/L (ref 7–52)
ANION GAP SERPL CALCULATED.3IONS-SCNC: 12 MMOL/L (ref 4–13)
ANION GAP SERPL CALCULATED.3IONS-SCNC: 5 MMOL/L (ref 4–13)
APTT PPP: 26 SECONDS (ref 23–37)
AST SERPL W P-5'-P-CCNC: 24 U/L (ref 13–39)
ATRIAL RATE: 63 BPM
ATRIAL RATE: 70 BPM
ATRIAL RATE: 71 BPM
ATRIAL RATE: 71 BPM
ATRIAL RATE: 74 BPM
ATRIAL RATE: 76 BPM
ATRIAL RATE: 81 BPM
BILIRUB SERPL-MCNC: 0.5 MG/DL (ref 0.2–1)
BNP SERPL-MCNC: 214 PG/ML (ref 1–100)
BUN SERPL-MCNC: 13 MG/DL (ref 7–25)
BUN SERPL-MCNC: 14 MG/DL (ref 5–25)
CALCIUM SERPL-MCNC: 9.2 MG/DL (ref 8.3–10.1)
CALCIUM SERPL-MCNC: 9.5 MG/DL (ref 8.6–10.5)
CHLORIDE SERPL-SCNC: 104 MMOL/L (ref 98–107)
CHLORIDE SERPL-SCNC: 110 MMOL/L (ref 100–108)
CHOLEST SERPL-MCNC: 202 MG/DL (ref 50–200)
CO2 SERPL-SCNC: 23 MMOL/L (ref 21–31)
CO2 SERPL-SCNC: 23 MMOL/L (ref 21–32)
CREAT SERPL-MCNC: 1.61 MG/DL (ref 0.6–1.2)
CREAT SERPL-MCNC: 1.68 MG/DL (ref 0.6–1.3)
EOSINOPHIL # BLD AUTO: 0.24 THOUSAND/UL (ref 0–0.61)
EOSINOPHIL NFR BLD MANUAL: 1 % (ref 0–6)
ERYTHROCYTE [DISTWIDTH] IN BLOOD BY AUTOMATED COUNT: 17.8 % (ref 11.5–14.5)
ERYTHROCYTE [DISTWIDTH] IN BLOOD BY AUTOMATED COUNT: 18.5 % (ref 11.6–15.1)
FLUAV RNA RESP QL NAA+PROBE: NEGATIVE
FLUBV RNA RESP QL NAA+PROBE: NEGATIVE
GFR SERPL CREATININE-BSD FRML MDRD: 37 ML/MIN/1.73SQ M
GFR SERPL CREATININE-BSD FRML MDRD: 39 ML/MIN/1.73SQ M
GLUCOSE SERPL-MCNC: 125 MG/DL (ref 65–140)
GLUCOSE SERPL-MCNC: 129 MG/DL (ref 65–99)
HCG SERPL QL: NEGATIVE
HCT VFR BLD AUTO: 44.1 % (ref 34.8–46.1)
HCT VFR BLD AUTO: 44.6 % (ref 42–47)
HDLC SERPL-MCNC: 34 MG/DL
HGB BLD-MCNC: 14.7 G/DL (ref 11.5–15.4)
HGB BLD-MCNC: 14.9 G/DL (ref 12–16)
INR PPP: 1.04 (ref 0.84–1.19)
KCT BLD-ACNC: 237 SEC (ref 89–137)
KCT BLD-ACNC: 312 SEC (ref 89–137)
LDLC SERPL CALC-MCNC: 122 MG/DL (ref 0–100)
LIPASE SERPL-CCNC: 45 U/L (ref 11–82)
LYMPHOCYTES # BLD AUTO: 36 % (ref 20–51)
LYMPHOCYTES # BLD AUTO: 8.64 THOUSAND/UL (ref 0.6–4.47)
MAGNESIUM SERPL-MCNC: 1.8 MG/DL (ref 1.9–2.7)
MAGNESIUM SERPL-MCNC: 2.1 MG/DL (ref 1.6–2.6)
MCH RBC QN AUTO: 27.9 PG (ref 26–34)
MCH RBC QN AUTO: 28 PG (ref 26.8–34.3)
MCHC RBC AUTO-ENTMCNC: 33.3 G/DL (ref 31.4–37.4)
MCHC RBC AUTO-ENTMCNC: 33.4 G/DL (ref 31–37)
MCV RBC AUTO: 84 FL (ref 81–99)
MCV RBC AUTO: 84 FL (ref 82–98)
MONOCYTES # BLD AUTO: 2.4 THOUSAND/UL (ref 0–1.22)
MONOCYTES NFR BLD AUTO: 10 % (ref 4–12)
NEUTS SEG # BLD: 12.72 THOUSAND/UL (ref 1.81–6.82)
NEUTS SEG NFR BLD AUTO: 53 % (ref 42–75)
P AXIS: 16 DEGREES
P AXIS: 19 DEGREES
P AXIS: 22 DEGREES
P AXIS: 22 DEGREES
P AXIS: 23 DEGREES
P AXIS: 28 DEGREES
P AXIS: 41 DEGREES
PLATELET # BLD AUTO: 436 THOUSANDS/UL (ref 149–390)
PLATELET # BLD AUTO: 484 THOUSANDS/UL (ref 149–390)
PLATELET BLD QL SMEAR: ABNORMAL
PMV BLD AUTO: 11.1 FL (ref 8.9–12.7)
PMV BLD AUTO: 8.9 FL (ref 8.6–11.7)
POTASSIUM SERPL-SCNC: 3.2 MMOL/L (ref 3.5–5.5)
POTASSIUM SERPL-SCNC: 4 MMOL/L (ref 3.5–5.3)
PR INTERVAL: 152 MS
PR INTERVAL: 154 MS
PR INTERVAL: 158 MS
PR INTERVAL: 168 MS
PROT SERPL-MCNC: 7.1 G/DL (ref 6.4–8.9)
PROTHROMBIN TIME: 13.5 SECONDS (ref 11.6–14.5)
QRS AXIS: -14 DEGREES
QRS AXIS: -16 DEGREES
QRS AXIS: -16 DEGREES
QRS AXIS: -17 DEGREES
QRS AXIS: 19 DEGREES
QRS AXIS: 49 DEGREES
QRS AXIS: 51 DEGREES
QRSD INTERVAL: 112 MS
QRSD INTERVAL: 128 MS
QRSD INTERVAL: 130 MS
QRSD INTERVAL: 88 MS
QRSD INTERVAL: 90 MS
QRSD INTERVAL: 92 MS
QRSD INTERVAL: 96 MS
QT INTERVAL: 418 MS
QT INTERVAL: 424 MS
QT INTERVAL: 442 MS
QT INTERVAL: 448 MS
QT INTERVAL: 452 MS
QT INTERVAL: 454 MS
QT INTERVAL: 470 MS
QTC INTERVAL: 462 MS
QTC INTERVAL: 477 MS
QTC INTERVAL: 485 MS
QTC INTERVAL: 486 MS
QTC INTERVAL: 490 MS
QTC INTERVAL: 493 MS
QTC INTERVAL: 507 MS
RBC # BLD AUTO: 5.25 MILLION/UL (ref 3.81–5.12)
RBC # BLD AUTO: 5.32 MILLION/UL (ref 3.9–5.2)
RBC MORPH BLD: NORMAL
RSV RNA RESP QL NAA+PROBE: NEGATIVE
SARS-COV-2 RNA RESP QL NAA+PROBE: NEGATIVE
SODIUM SERPL-SCNC: 138 MMOL/L (ref 136–145)
SODIUM SERPL-SCNC: 139 MMOL/L (ref 134–143)
SPECIMEN SOURCE: ABNORMAL
SPECIMEN SOURCE: ABNORMAL
T WAVE AXIS: 100 DEGREES
T WAVE AXIS: 103 DEGREES
T WAVE AXIS: 86 DEGREES
T WAVE AXIS: 90 DEGREES
T WAVE AXIS: 93 DEGREES
T WAVE AXIS: 97 DEGREES
T WAVE AXIS: 98 DEGREES
TOTAL CELLS COUNTED SPEC: 100
TRIGL SERPL-MCNC: 230 MG/DL
TROPONIN I SERPL-MCNC: 0.29 NG/ML
TROPONIN I SERPL-MCNC: 39.7 NG/ML
TROPONIN I SERPL-MCNC: >40 NG/ML
VENTRICULAR RATE: 63 BPM
VENTRICULAR RATE: 70 BPM
VENTRICULAR RATE: 71 BPM
VENTRICULAR RATE: 71 BPM
VENTRICULAR RATE: 74 BPM
VENTRICULAR RATE: 76 BPM
VENTRICULAR RATE: 81 BPM
WBC # BLD AUTO: 20.69 THOUSAND/UL (ref 4.31–10.16)
WBC # BLD AUTO: 24 THOUSAND/UL (ref 4.8–10.8)

## 2021-03-25 PROCEDURE — NC001 PR NO CHARGE: Performed by: INTERNAL MEDICINE

## 2021-03-25 PROCEDURE — 83735 ASSAY OF MAGNESIUM: CPT | Performed by: EMERGENCY MEDICINE

## 2021-03-25 PROCEDURE — 4A023N7 MEASUREMENT OF CARDIAC SAMPLING AND PRESSURE, LEFT HEART, PERCUTANEOUS APPROACH: ICD-10-PCS | Performed by: INTERNAL MEDICINE

## 2021-03-25 PROCEDURE — B2101ZZ FLUOROSCOPY OF SINGLE CORONARY ARTERY USING LOW OSMOLAR CONTRAST: ICD-10-PCS | Performed by: INTERNAL MEDICINE

## 2021-03-25 PROCEDURE — 92978 ENDOLUMINL IVUS OCT C 1ST: CPT | Performed by: INTERNAL MEDICINE

## 2021-03-25 PROCEDURE — 85610 PROTHROMBIN TIME: CPT | Performed by: EMERGENCY MEDICINE

## 2021-03-25 PROCEDURE — 92941 PRQ TRLML REVSC TOT OCCL AMI: CPT | Performed by: INTERNAL MEDICINE

## 2021-03-25 PROCEDURE — C1769 GUIDE WIRE: HCPCS | Performed by: INTERNAL MEDICINE

## 2021-03-25 PROCEDURE — 84484 ASSAY OF TROPONIN QUANT: CPT | Performed by: EMERGENCY MEDICINE

## 2021-03-25 PROCEDURE — C1725 CATH, TRANSLUMIN NON-LASER: HCPCS | Performed by: INTERNAL MEDICINE

## 2021-03-25 PROCEDURE — 99152 MOD SED SAME PHYS/QHP 5/>YRS: CPT | Performed by: INTERNAL MEDICINE

## 2021-03-25 PROCEDURE — C9600 PERC DRUG-EL COR STENT SING: HCPCS

## 2021-03-25 PROCEDURE — 93458 L HRT ARTERY/VENTRICLE ANGIO: CPT

## 2021-03-25 PROCEDURE — 93306 TTE W/DOPPLER COMPLETE: CPT | Performed by: INTERNAL MEDICINE

## 2021-03-25 PROCEDURE — 99285 EMERGENCY DEPT VISIT HI MDM: CPT

## 2021-03-25 PROCEDURE — 93010 ELECTROCARDIOGRAM REPORT: CPT | Performed by: INTERNAL MEDICINE

## 2021-03-25 PROCEDURE — C1894 INTRO/SHEATH, NON-LASER: HCPCS

## 2021-03-25 PROCEDURE — 93454 CORONARY ARTERY ANGIO S&I: CPT | Performed by: INTERNAL MEDICINE

## 2021-03-25 PROCEDURE — C1769 GUIDE WIRE: HCPCS

## 2021-03-25 PROCEDURE — 93005 ELECTROCARDIOGRAM TRACING: CPT

## 2021-03-25 PROCEDURE — 99153 MOD SED SAME PHYS/QHP EA: CPT | Performed by: INTERNAL MEDICINE

## 2021-03-25 PROCEDURE — 99152 MOD SED SAME PHYS/QHP 5/>YRS: CPT

## 2021-03-25 PROCEDURE — C1874 STENT, COATED/COV W/DEL SYS: HCPCS

## 2021-03-25 PROCEDURE — 96374 THER/PROPH/DIAG INJ IV PUSH: CPT

## 2021-03-25 PROCEDURE — 96375 TX/PRO/DX INJ NEW DRUG ADDON: CPT

## 2021-03-25 PROCEDURE — C1887 CATHETER, GUIDING: HCPCS | Performed by: INTERNAL MEDICINE

## 2021-03-25 PROCEDURE — 71045 X-RAY EXAM CHEST 1 VIEW: CPT

## 2021-03-25 PROCEDURE — C1894 INTRO/SHEATH, NON-LASER: HCPCS | Performed by: INTERNAL MEDICINE

## 2021-03-25 PROCEDURE — 85007 BL SMEAR W/DIFF WBC COUNT: CPT | Performed by: EMERGENCY MEDICINE

## 2021-03-25 PROCEDURE — C1725 CATH, TRANSLUMIN NON-LASER: HCPCS

## 2021-03-25 PROCEDURE — 84703 CHORIONIC GONADOTROPIN ASSAY: CPT | Performed by: EMERGENCY MEDICINE

## 2021-03-25 PROCEDURE — B240ZZ3 ULTRASONOGRAPHY OF SINGLE CORONARY ARTERY, INTRAVASCULAR: ICD-10-PCS | Performed by: INTERNAL MEDICINE

## 2021-03-25 PROCEDURE — 85730 THROMBOPLASTIN TIME PARTIAL: CPT | Performed by: EMERGENCY MEDICINE

## 2021-03-25 PROCEDURE — 84484 ASSAY OF TROPONIN QUANT: CPT | Performed by: INTERNAL MEDICINE

## 2021-03-25 PROCEDURE — C1887 CATHETER, GUIDING: HCPCS

## 2021-03-25 PROCEDURE — 85347 COAGULATION TIME ACTIVATED: CPT

## 2021-03-25 PROCEDURE — 83690 ASSAY OF LIPASE: CPT | Performed by: EMERGENCY MEDICINE

## 2021-03-25 PROCEDURE — 94760 N-INVAS EAR/PLS OXIMETRY 1: CPT

## 2021-03-25 PROCEDURE — 36415 COLL VENOUS BLD VENIPUNCTURE: CPT | Performed by: EMERGENCY MEDICINE

## 2021-03-25 PROCEDURE — 96361 HYDRATE IV INFUSION ADD-ON: CPT

## 2021-03-25 PROCEDURE — C9606 PERC D-E COR REVASC W AMI S: HCPCS | Performed by: INTERNAL MEDICINE

## 2021-03-25 PROCEDURE — 93306 TTE W/DOPPLER COMPLETE: CPT

## 2021-03-25 PROCEDURE — 80061 LIPID PANEL: CPT | Performed by: INTERNAL MEDICINE

## 2021-03-25 PROCEDURE — 85027 COMPLETE CBC AUTOMATED: CPT | Performed by: EMERGENCY MEDICINE

## 2021-03-25 PROCEDURE — 027034Z DILATION OF CORONARY ARTERY, ONE ARTERY WITH DRUG-ELUTING INTRALUMINAL DEVICE, PERCUTANEOUS APPROACH: ICD-10-PCS | Performed by: INTERNAL MEDICINE

## 2021-03-25 PROCEDURE — 83880 ASSAY OF NATRIURETIC PEPTIDE: CPT | Performed by: EMERGENCY MEDICINE

## 2021-03-25 PROCEDURE — 80048 BASIC METABOLIC PNL TOTAL CA: CPT | Performed by: INTERNAL MEDICINE

## 2021-03-25 PROCEDURE — 80053 COMPREHEN METABOLIC PANEL: CPT | Performed by: EMERGENCY MEDICINE

## 2021-03-25 PROCEDURE — 85027 COMPLETE CBC AUTOMATED: CPT | Performed by: INTERNAL MEDICINE

## 2021-03-25 PROCEDURE — 0241U HB NFCT DS VIR RESP RNA 4 TRGT: CPT | Performed by: EMERGENCY MEDICINE

## 2021-03-25 PROCEDURE — 83735 ASSAY OF MAGNESIUM: CPT | Performed by: INTERNAL MEDICINE

## 2021-03-25 PROCEDURE — 99291 CRITICAL CARE FIRST HOUR: CPT | Performed by: EMERGENCY MEDICINE

## 2021-03-25 PROCEDURE — 92928 PRQ TCAT PLMT NTRAC ST 1 LES: CPT | Performed by: INTERNAL MEDICINE

## 2021-03-25 PROCEDURE — C1753 CATH, INTRAVAS ULTRASOUND: HCPCS | Performed by: INTERNAL MEDICINE

## 2021-03-25 PROCEDURE — B2111ZZ FLUOROSCOPY OF MULTIPLE CORONARY ARTERIES USING LOW OSMOLAR CONTRAST: ICD-10-PCS | Performed by: INTERNAL MEDICINE

## 2021-03-25 PROCEDURE — 99153 MOD SED SAME PHYS/QHP EA: CPT

## 2021-03-25 RX ORDER — ACETAMINOPHEN 325 MG/1
650 TABLET ORAL EVERY 4 HOURS PRN
Status: DISCONTINUED | OUTPATIENT
Start: 2021-03-25 | End: 2021-03-25

## 2021-03-25 RX ORDER — FENTANYL CITRATE 50 UG/ML
50 INJECTION, SOLUTION INTRAMUSCULAR; INTRAVENOUS EVERY 2 HOUR PRN
Status: DISCONTINUED | OUTPATIENT
Start: 2021-03-25 | End: 2021-03-31

## 2021-03-25 RX ORDER — PANTOPRAZOLE SODIUM 40 MG/1
40 TABLET, DELAYED RELEASE ORAL
Status: DISCONTINUED | OUTPATIENT
Start: 2021-03-25 | End: 2021-04-01 | Stop reason: HOSPADM

## 2021-03-25 RX ORDER — FENTANYL CITRATE 50 UG/ML
25 INJECTION, SOLUTION INTRAMUSCULAR; INTRAVENOUS ONCE
Status: COMPLETED | OUTPATIENT
Start: 2021-03-25 | End: 2021-03-25

## 2021-03-25 RX ORDER — ATORVASTATIN CALCIUM 80 MG/1
80 TABLET, FILM COATED ORAL EVERY EVENING
Status: DISCONTINUED | OUTPATIENT
Start: 2021-03-25 | End: 2021-04-01 | Stop reason: HOSPADM

## 2021-03-25 RX ORDER — SODIUM CHLORIDE 9 MG/ML
3 INJECTION INTRAVENOUS
Status: DISCONTINUED | OUTPATIENT
Start: 2021-03-25 | End: 2021-03-25 | Stop reason: HOSPADM

## 2021-03-25 RX ORDER — SODIUM CHLORIDE 9 MG/ML
50 INJECTION, SOLUTION INTRAVENOUS CONTINUOUS
Status: DISPENSED | OUTPATIENT
Start: 2021-03-25 | End: 2021-03-25

## 2021-03-25 RX ORDER — LIDOCAINE HYDROCHLORIDE 10 MG/ML
INJECTION, SOLUTION EPIDURAL; INFILTRATION; INTRACAUDAL; PERINEURAL CODE/TRAUMA/SEDATION MEDICATION
Status: DISCONTINUED | OUTPATIENT
Start: 2021-03-25 | End: 2021-04-01 | Stop reason: HOSPADM

## 2021-03-25 RX ORDER — POTASSIUM CHLORIDE 20 MEQ/1
40 TABLET, EXTENDED RELEASE ORAL ONCE
Status: COMPLETED | OUTPATIENT
Start: 2021-03-25 | End: 2021-03-25

## 2021-03-25 RX ORDER — ALBUTEROL SULFATE 2.5 MG/3ML
2.5 SOLUTION RESPIRATORY (INHALATION) EVERY 4 HOURS PRN
Status: DISCONTINUED | OUTPATIENT
Start: 2021-03-25 | End: 2021-03-27

## 2021-03-25 RX ORDER — FENTANYL CITRATE 50 UG/ML
100 INJECTION, SOLUTION INTRAMUSCULAR; INTRAVENOUS ONCE
Status: COMPLETED | OUTPATIENT
Start: 2021-03-25 | End: 2021-03-25

## 2021-03-25 RX ORDER — BUPROPION HYDROCHLORIDE 150 MG/1
300 TABLET ORAL EVERY MORNING
Status: DISCONTINUED | OUTPATIENT
Start: 2021-03-25 | End: 2021-04-01 | Stop reason: HOSPADM

## 2021-03-25 RX ORDER — ACETAMINOPHEN 325 MG/1
650 TABLET ORAL EVERY 4 HOURS PRN
Status: DISCONTINUED | OUTPATIENT
Start: 2021-03-25 | End: 2021-04-01 | Stop reason: HOSPADM

## 2021-03-25 RX ORDER — HEPARIN SODIUM 5000 [USP'U]/ML
7500 INJECTION, SOLUTION INTRAVENOUS; SUBCUTANEOUS EVERY 8 HOURS SCHEDULED
Status: DISCONTINUED | OUTPATIENT
Start: 2021-03-25 | End: 2021-04-01 | Stop reason: HOSPADM

## 2021-03-25 RX ORDER — METOPROLOL TARTRATE 5 MG/5ML
5 INJECTION INTRAVENOUS ONCE
Status: COMPLETED | OUTPATIENT
Start: 2021-03-25 | End: 2021-03-25

## 2021-03-25 RX ORDER — MIDAZOLAM HYDROCHLORIDE 2 MG/2ML
INJECTION, SOLUTION INTRAMUSCULAR; INTRAVENOUS CODE/TRAUMA/SEDATION MEDICATION
Status: COMPLETED | OUTPATIENT
Start: 2021-03-25 | End: 2021-03-25

## 2021-03-25 RX ORDER — VERAPAMIL HYDROCHLORIDE 2.5 MG/ML
INJECTION, SOLUTION INTRAVENOUS CODE/TRAUMA/SEDATION MEDICATION
Status: DISCONTINUED | OUTPATIENT
Start: 2021-03-25 | End: 2021-04-01 | Stop reason: HOSPADM

## 2021-03-25 RX ORDER — ASPIRIN 81 MG/1
324 TABLET, CHEWABLE ORAL ONCE
Status: COMPLETED | OUTPATIENT
Start: 2021-03-25 | End: 2021-03-25

## 2021-03-25 RX ORDER — NITROGLYCERIN 20 MG/100ML
INJECTION INTRAVENOUS CODE/TRAUMA/SEDATION MEDICATION
Status: DISCONTINUED | OUTPATIENT
Start: 2021-03-25 | End: 2021-04-01 | Stop reason: HOSPADM

## 2021-03-25 RX ORDER — ONDANSETRON 2 MG/ML
4 INJECTION INTRAMUSCULAR; INTRAVENOUS ONCE
Status: COMPLETED | OUTPATIENT
Start: 2021-03-25 | End: 2021-03-25

## 2021-03-25 RX ORDER — ASPIRIN 81 MG/1
81 TABLET, CHEWABLE ORAL DAILY
Status: DISCONTINUED | OUTPATIENT
Start: 2021-03-26 | End: 2021-04-01 | Stop reason: HOSPADM

## 2021-03-25 RX ORDER — LIDOCAINE HYDROCHLORIDE 10 MG/ML
INJECTION, SOLUTION EPIDURAL; INFILTRATION; INTRACAUDAL; PERINEURAL CODE/TRAUMA/SEDATION MEDICATION
Status: COMPLETED | OUTPATIENT
Start: 2021-03-25 | End: 2021-03-25

## 2021-03-25 RX ORDER — ALPRAZOLAM 0.5 MG/1
0.5 TABLET ORAL
Status: DISCONTINUED | OUTPATIENT
Start: 2021-03-25 | End: 2021-03-30

## 2021-03-25 RX ORDER — HEPARIN SODIUM 1000 [USP'U]/ML
INJECTION, SOLUTION INTRAVENOUS; SUBCUTANEOUS CODE/TRAUMA/SEDATION MEDICATION
Status: COMPLETED | OUTPATIENT
Start: 2021-03-25 | End: 2021-03-25

## 2021-03-25 RX ORDER — FENTANYL CITRATE 50 UG/ML
INJECTION, SOLUTION INTRAMUSCULAR; INTRAVENOUS CODE/TRAUMA/SEDATION MEDICATION
Status: DISCONTINUED | OUTPATIENT
Start: 2021-03-25 | End: 2021-04-01 | Stop reason: HOSPADM

## 2021-03-25 RX ORDER — MAGNESIUM SULFATE HEPTAHYDRATE 40 MG/ML
2 INJECTION, SOLUTION INTRAVENOUS ONCE
Status: COMPLETED | OUTPATIENT
Start: 2021-03-25 | End: 2021-03-25

## 2021-03-25 RX ORDER — SODIUM CHLORIDE 9 MG/ML
100 INJECTION, SOLUTION INTRAVENOUS CONTINUOUS
Status: DISCONTINUED | OUTPATIENT
Start: 2021-03-25 | End: 2021-03-25

## 2021-03-25 RX ORDER — SODIUM CHLORIDE FOR INHALATION 0.9 %
3 VIAL, NEBULIZER (ML) INHALATION
Status: DISCONTINUED | OUTPATIENT
Start: 2021-03-25 | End: 2021-03-25

## 2021-03-25 RX ORDER — VERAPAMIL HCL 2.5 MG/ML
AMPUL (ML) INTRAVENOUS CODE/TRAUMA/SEDATION MEDICATION
Status: COMPLETED | OUTPATIENT
Start: 2021-03-25 | End: 2021-03-25

## 2021-03-25 RX ORDER — HEPARIN SODIUM 1000 [USP'U]/ML
4000 INJECTION, SOLUTION INTRAVENOUS; SUBCUTANEOUS ONCE
Status: COMPLETED | OUTPATIENT
Start: 2021-03-25 | End: 2021-03-25

## 2021-03-25 RX ORDER — HEPARIN SODIUM 1000 [USP'U]/ML
INJECTION, SOLUTION INTRAVENOUS; SUBCUTANEOUS CODE/TRAUMA/SEDATION MEDICATION
Status: DISCONTINUED | OUTPATIENT
Start: 2021-03-25 | End: 2021-04-01 | Stop reason: HOSPADM

## 2021-03-25 RX ORDER — LEVALBUTEROL 1.25 MG/.5ML
1.25 SOLUTION, CONCENTRATE RESPIRATORY (INHALATION)
Status: DISCONTINUED | OUTPATIENT
Start: 2021-03-25 | End: 2021-03-25

## 2021-03-25 RX ORDER — FENTANYL CITRATE 50 UG/ML
INJECTION, SOLUTION INTRAMUSCULAR; INTRAVENOUS CODE/TRAUMA/SEDATION MEDICATION
Status: COMPLETED | OUTPATIENT
Start: 2021-03-25 | End: 2021-03-25

## 2021-03-25 RX ORDER — NITROGLYCERIN 20 MG/100ML
INJECTION INTRAVENOUS CODE/TRAUMA/SEDATION MEDICATION
Status: COMPLETED | OUTPATIENT
Start: 2021-03-25 | End: 2021-03-25

## 2021-03-25 RX ORDER — MIDAZOLAM HYDROCHLORIDE 2 MG/2ML
INJECTION, SOLUTION INTRAMUSCULAR; INTRAVENOUS CODE/TRAUMA/SEDATION MEDICATION
Status: DISCONTINUED | OUTPATIENT
Start: 2021-03-25 | End: 2021-03-30

## 2021-03-25 RX ORDER — ONDANSETRON 2 MG/ML
4 INJECTION INTRAMUSCULAR; INTRAVENOUS EVERY 6 HOURS PRN
Status: DISCONTINUED | OUTPATIENT
Start: 2021-03-25 | End: 2021-04-01 | Stop reason: HOSPADM

## 2021-03-25 RX ADMIN — MIDAZOLAM 2 MG: 1 INJECTION INTRAMUSCULAR; INTRAVENOUS at 14:46

## 2021-03-25 RX ADMIN — MAGNESIUM SULFATE HEPTAHYDRATE 2 G: 40 INJECTION, SOLUTION INTRAVENOUS at 05:59

## 2021-03-25 RX ADMIN — FENTANYL CITRATE 25 MCG: 50 INJECTION INTRAMUSCULAR; INTRAVENOUS at 04:50

## 2021-03-25 RX ADMIN — HEPARIN SODIUM 5000 UNITS: 1000 INJECTION INTRAVENOUS; SUBCUTANEOUS at 04:23

## 2021-03-25 RX ADMIN — METOPROLOL TARTRATE 25 MG: 25 TABLET, FILM COATED ORAL at 21:17

## 2021-03-25 RX ADMIN — FENTANYL CITRATE 50 MCG: 50 INJECTION INTRAMUSCULAR; INTRAVENOUS at 23:16

## 2021-03-25 RX ADMIN — HEPARIN SODIUM 4000 UNITS: 1000 INJECTION INTRAVENOUS; SUBCUTANEOUS at 14:51

## 2021-03-25 RX ADMIN — FENTANYL CITRATE 50 MCG: 50 INJECTION INTRAMUSCULAR; INTRAVENOUS at 18:03

## 2021-03-25 RX ADMIN — METOPROLOL TARTRATE 25 MG: 25 TABLET, FILM COATED ORAL at 10:21

## 2021-03-25 RX ADMIN — SERTRALINE HYDROCHLORIDE 50 MG: 50 TABLET ORAL at 08:24

## 2021-03-25 RX ADMIN — TICAGRELOR 90 MG: 90 TABLET ORAL at 17:16

## 2021-03-25 RX ADMIN — FENTANYL CITRATE 100 MCG: 50 INJECTION INTRAMUSCULAR; INTRAVENOUS at 14:46

## 2021-03-25 RX ADMIN — FENTANYL CITRATE 50 MCG: 50 INJECTION INTRAMUSCULAR; INTRAVENOUS at 13:22

## 2021-03-25 RX ADMIN — VERAPAMIL HYDROCHLORIDE 1.25 MG: 2.5 INJECTION, SOLUTION INTRAVENOUS at 04:11

## 2021-03-25 RX ADMIN — FENTANYL CITRATE 50 MCG: 50 INJECTION INTRAMUSCULAR; INTRAVENOUS at 04:07

## 2021-03-25 RX ADMIN — SODIUM CHLORIDE 100 ML/HR: 0.9 INJECTION, SOLUTION INTRAVENOUS at 06:02

## 2021-03-25 RX ADMIN — POTASSIUM CHLORIDE 40 MEQ: 1500 TABLET, EXTENDED RELEASE ORAL at 05:59

## 2021-03-25 RX ADMIN — TICAGRELOR 180 MG: 90 TABLET ORAL at 02:45

## 2021-03-25 RX ADMIN — NICOTINE 7 MG/24 HR DAILY TRANSDERMAL PATCH 1 PATCH: at 08:24

## 2021-03-25 RX ADMIN — VERAPAMIL HYDROCHLORIDE 2.5 MG: 2.5 INJECTION, SOLUTION INTRAVENOUS at 14:51

## 2021-03-25 RX ADMIN — SODIUM CHLORIDE 100 ML/HR: 0.9 INJECTION, SOLUTION INTRAVENOUS at 11:54

## 2021-03-25 RX ADMIN — ASPIRIN 324 MG: 81 TABLET, CHEWABLE ORAL at 02:37

## 2021-03-25 RX ADMIN — TICAGRELOR 90 MG: 90 TABLET ORAL at 08:24

## 2021-03-25 RX ADMIN — ONDANSETRON 4 MG: 2 INJECTION INTRAMUSCULAR; INTRAVENOUS at 02:44

## 2021-03-25 RX ADMIN — PANTOPRAZOLE SODIUM 40 MG: 40 TABLET, DELAYED RELEASE ORAL at 06:01

## 2021-03-25 RX ADMIN — HEPARIN SODIUM 7000 UNITS: 1000 INJECTION INTRAVENOUS; SUBCUTANEOUS at 04:12

## 2021-03-25 RX ADMIN — Medication 200 MCG: at 04:11

## 2021-03-25 RX ADMIN — BUPROPION HYDROCHLORIDE 300 MG: 150 TABLET, FILM COATED, EXTENDED RELEASE ORAL at 08:24

## 2021-03-25 RX ADMIN — SODIUM CHLORIDE 1000 ML: 0.9 INJECTION, SOLUTION INTRAVENOUS at 02:39

## 2021-03-25 RX ADMIN — LIDOCAINE HYDROCHLORIDE 1 ML: 10 INJECTION, SOLUTION EPIDURAL; INFILTRATION; INTRACAUDAL; PERINEURAL at 04:10

## 2021-03-25 RX ADMIN — HEPARIN SODIUM 8000 UNITS: 1000 INJECTION INTRAVENOUS; SUBCUTANEOUS at 14:59

## 2021-03-25 RX ADMIN — HEPARIN SODIUM 7500 UNITS: 5000 INJECTION INTRAVENOUS; SUBCUTANEOUS at 21:22

## 2021-03-25 RX ADMIN — HEPARIN SODIUM 4000 UNITS: 1000 INJECTION, SOLUTION INTRAVENOUS; SUBCUTANEOUS at 02:48

## 2021-03-25 RX ADMIN — NICOTINE 7 MG/24 HR DAILY TRANSDERMAL PATCH 1 PATCH: at 21:16

## 2021-03-25 RX ADMIN — FENTANYL CITRATE 50 MCG: 50 INJECTION INTRAMUSCULAR; INTRAVENOUS at 15:30

## 2021-03-25 RX ADMIN — FENTANYL CITRATE 25 MCG: 50 INJECTION INTRAMUSCULAR; INTRAVENOUS at 11:51

## 2021-03-25 RX ADMIN — ONDANSETRON 4 MG: 2 INJECTION INTRAMUSCULAR; INTRAVENOUS at 16:06

## 2021-03-25 RX ADMIN — SODIUM CHLORIDE 50 ML/HR: 0.9 INJECTION, SOLUTION INTRAVENOUS at 16:18

## 2021-03-25 RX ADMIN — METOROPROLOL TARTRATE 5 MG: 5 INJECTION, SOLUTION INTRAVENOUS at 10:24

## 2021-03-25 RX ADMIN — LIDOCAINE HYDROCHLORIDE 1 ML: 10 INJECTION, SOLUTION EPIDURAL; INFILTRATION; INTRACAUDAL; PERINEURAL at 14:47

## 2021-03-25 RX ADMIN — ALPRAZOLAM 0.5 MG: 0.5 TABLET ORAL at 21:16

## 2021-03-25 RX ADMIN — Medication 200 MCG: at 14:51

## 2021-03-25 RX ADMIN — METOPROLOL TARTRATE 25 MG: 25 TABLET, FILM COATED ORAL at 13:21

## 2021-03-25 RX ADMIN — MIDAZOLAM 2 MG: 1 INJECTION INTRAMUSCULAR; INTRAVENOUS at 04:07

## 2021-03-25 RX ADMIN — IOHEXOL 75 ML: 350 INJECTION, SOLUTION INTRAVENOUS at 15:39

## 2021-03-25 RX ADMIN — HEPARIN SODIUM 7500 UNITS: 5000 INJECTION INTRAVENOUS; SUBCUTANEOUS at 13:24

## 2021-03-25 RX ADMIN — FENTANYL CITRATE 100 MCG: 50 INJECTION INTRAMUSCULAR; INTRAVENOUS at 02:37

## 2021-03-25 RX ADMIN — HEPARIN SODIUM 7500 UNITS: 5000 INJECTION INTRAVENOUS; SUBCUTANEOUS at 06:00

## 2021-03-25 RX ADMIN — FENTANYL CITRATE 100 MCG: 50 INJECTION INTRAMUSCULAR; INTRAVENOUS at 03:03

## 2021-03-25 RX ADMIN — ATORVASTATIN CALCIUM 80 MG: 80 TABLET, FILM COATED ORAL at 17:16

## 2021-03-25 NOTE — ASSESSMENT & PLAN NOTE
Leukocytosis present on admission, WBC > 20  This likely represents a stress reaction  Will continue to trend off of antibiotics  Continues to have no signs of infection

## 2021-03-25 NOTE — ED PROVIDER NOTES
History  Chief Complaint   Patient presents with    Chest Pain       42YEAR-OLD FEMALE    1 PACK-A-DAY SMOKER  PAST MEDICAL HISTORY CAD, OBESITY      PATIENT SHOULD HAS HAD SUBSTERNAL CHEST PAIN FOR 3 HOURS  DESCRIBED AS SHARP  PATIENT HAS ASSOCIATED SHORTNESS OF BREATH AND DIAPHORESIS      SHE DENIES ANY RADIATION OF PAIN TO THE JAW NECK OR THE BACK  INTERVENTIONS: NONE      PATIENT DENIES ANY COUGH, NO FEVERS OR CHILLS  NO URI SYMPTOMS      VTE  RISK FACTORS:  NONE  NO HISTORY OF PE OR DVT  NO LONG CAR RIDES OR PLANE RIDES  NO IMMOBILIZATION  NO RECENT SURGERY OR TRAUMA  NO HEMOPTYSIS  OTHER ASSOCIATED SYMPTOMS:  NO ABDOMINAL PAIN  SHE DOES REPORT SOME NAUSEA PRIOR TO ARRIVAL  SHE DENIES STOOL CHANGES  PATIENT ENDORSES NO OTHER COMPLAINTS      History provided by:  Patient  Chest Pain  Pain location:  Substernal area  Pain quality: sharp    Pain radiates to:  L arm  Pain severity:  Severe  Onset quality:  Gradual  Timing:  Constant  Relieved by:  Nothing  Worsened by:  Nothing tried  Associated symptoms: diaphoresis and shortness of breath    Associated symptoms: no abdominal pain, no altered mental status, no anxiety, no back pain, no claudication, no cough, no dizziness, no fatigue, no fever, no headache, no lower extremity edema, no nausea, no near-syncope, no numbness, no palpitations, not vomiting and no weakness    Risk factors: coronary artery disease        Prior to Admission Medications   Prescriptions Last Dose Informant Patient Reported? Taking?    ARIPiprazole (ABILIFY) 10 mg tablet  Self No No   Sig: Take 1 tablet (10 mg total) by mouth daily   Patient taking differently: Take 10 mg by mouth 2 (two) times a day       Facility-Administered Medications: None       Past Medical History:   Diagnosis Date    [facility] ([facility] )     Anxiety     Cardiac disease     Cardiomyopathy ([facility] )     CHF (congestive heart failure) (HCC)     Chronic kidney disease     Depression     History of chemotherapy     non hodgkins lymphoma 2008    Hypertension     Lymphoma, non-Hodgkin's (HCC)     Myocardial infarction ([facility] )     Tobacco abuse        Past Surgical History:   Procedure Laterality Date    CARDIAC CATHETERIZATION      CAROTID STENT       SECTION         Family History   Problem Relation Age of Onset    No Known Problems Mother     No Known Problems Father     No Known Problems Daughter     Brain cancer Maternal Grandfather     No Known Problems Paternal Aunt     No Known Problems Paternal Aunt      I have reviewed and agree with the history as documented  E-Cigarette/Vaping    E-Cigarette Use Current Every Day User      E-Cigarette/Vaping Substances    Nicotine Yes     THC No     CBD No     Flavoring No     Other No     Unknown No      Social History     Tobacco Use    Smoking status: Current Some Day Smoker     Packs/day: 1 00     Types: Cigarettes     Last attempt to quit: 3/29/2021     Years since quittin 0    Smokeless tobacco: Current User   Substance Use Topics    Alcohol use: Not Currently     Comment: rarely    Drug use: Never       Review of Systems   Constitutional: Positive for diaphoresis  Negative for chills, fatigue and fever  Respiratory: Positive for chest tightness and shortness of breath  Negative for cough, wheezing and stridor  Cardiovascular: Negative for chest pain, palpitations, claudication, leg swelling and near-syncope  Gastrointestinal: Negative for abdominal pain, blood in stool, nausea and vomiting  Genitourinary: Negative for difficulty urinating, dysuria, flank pain and frequency  Musculoskeletal: Negative for arthralgias, back pain, gait problem, joint swelling, myalgias, neck pain and neck stiffness  Skin: Negative for rash and wound  Neurological: Negative for dizziness, weakness, light-headedness, numbness and headaches  All other systems reviewed and are negative        Physical Exam  Physical Exam  Constitutional:       General: She is in acute distress  Appearance: She is well-developed  She is ill-appearing and diaphoretic  She is not toxic-appearing  HENT:      Head: Normocephalic and atraumatic  Nose: Nose normal       Mouth/Throat:      Pharynx: No oropharyngeal exudate  Eyes:      General: No scleral icterus  Right eye: No discharge  Left eye: No discharge  Conjunctiva/sclera: Conjunctivae normal       Pupils: Pupils are equal, round, and reactive to light  Neck:      Musculoskeletal: Normal range of motion and neck supple  No neck rigidity or muscular tenderness  Vascular: No JVD  Trachea: No tracheal deviation  Cardiovascular:      Rate and Rhythm: Normal rate and regular rhythm  Pulses:           Carotid pulses are 2+ on the right side and 2+ on the left side  Radial pulses are 2+ on the right side and 2+ on the left side  Heart sounds: Normal heart sounds  No murmur  No friction rub  No gallop  Pulmonary:      Effort: Pulmonary effort is normal  No respiratory distress  Breath sounds: Normal breath sounds  No stridor  No wheezing, rhonchi or rales  Chest:      Chest wall: No tenderness  Abdominal:      General: Bowel sounds are normal  There is no distension  Palpations: Abdomen is soft  There is no mass  Tenderness: There is no abdominal tenderness  There is no right CVA tenderness, left CVA tenderness, guarding or rebound  Hernia: No hernia is present  Musculoskeletal: Normal range of motion  General: No swelling, tenderness, deformity or signs of injury  Right lower leg: No edema  Left lower leg: No edema  Lymphadenopathy:      Cervical: No cervical adenopathy  Skin:     General: Skin is warm  Capillary Refill: Capillary refill takes less than 2 seconds  Coloration: Skin is not jaundiced or pale  Findings: No bruising, erythema, lesion or rash     Neurological: General: No focal deficit present  Mental Status: She is alert and oriented to person, place, and time  Mental status is at baseline  Cranial Nerves: No cranial nerve deficit  Sensory: No sensory deficit  Motor: No weakness or abnormal muscle tone  Coordination: Coordination normal    Psychiatric:         Mood and Affect: Mood is anxious  Behavior: Behavior is agitated  Thought Content: Thought content normal          Judgment: Judgment normal          Vital Signs  ED Triage Vitals   Temperature Pulse Respirations Blood Pressure SpO2   03/25/21 0234 03/25/21 0230 03/25/21 0230 03/25/21 0230 03/25/21 0230   (!) 96 °F (35 6 °C) 64 16 134/78 99 %      Temp src Heart Rate Source Patient Position - Orthostatic VS BP Location FiO2 (%)   -- 03/25/21 0230 -- 03/25/21 0230 --    Monitor  Left arm       Pain Score       03/25/21 0234       Worst Possible Pain           Vitals:    03/25/21 0230 03/25/21 0234 03/25/21 0300   BP: 134/78 134/78 137/89   Pulse: 64 60 65         Visual Acuity      ED Medications  Medications   fentanyl citrate (PF) 100 MCG/2ML 100 mcg (100 mcg Intravenous Given 3/25/21 0237)   aspirin chewable tablet 324 mg (324 mg Oral Given 3/25/21 0237)   ondansetron (ZOFRAN) injection 4 mg (4 mg Intravenous Given 3/25/21 0244)   ticagrelor (BRILINTA) tablet 180 mg (180 mg Oral Given 3/25/21 0245)   heparin (porcine) injection 4,000 Units (4,000 Units Intravenous Given 3/25/21 0248)   fentanyl citrate (PF) 100 MCG/2ML 100 mcg (100 mcg Intravenous Given 3/25/21 0303)       Diagnostic Studies  Results Reviewed     Procedure Component Value Units Date/Time    [facility], [facility]/B[facility] [[id number]]  (Normal) Collected: 03/25/21 0255    Lab Status: Final result Specimen: Nares from Nasopharyngeal Swab Updated: 03/25/21 0415     SARS-CoV-2 Negative     INFLUENZA A PCR Negative     INFLUENZA B PCR Negative     RSV PCR Negative    Narrative:        This test has been authorized by FDA under an EUA (Emergency Use Assay) for use by authorized laboratories  Clinical caution and judgement should be used with the interpretation of these results with consideration of the clinical impression and other laboratory testing  Testing reported as "Positive" or "Negative" has been proven to be accurate according to standard laboratory validation requirements  All testing is performed with control materials showing appropriate reactivity at standard intervals      Manual Differential (Non Wam) [[id number]]  (Abnormal) Collected: 03/25/21 0240    Lab Status: Final result Specimen: Blood from Arm, Left Updated: 03/25/21 0344     Segmented % 53 %      Lymphocytes % 36 %      Monocytes % 10 %      Eosinophils, % 1 %      Neutrophils Absolute 12 72 Thousand/uL      Lymphocytes Absolute 8 64 Thousand/uL      Monocytes Absolute 2 40 Thousand/uL      Eosinophils Absolute 0 24 Thousand/uL      Total Counted 100     RBC Morphology Normal     Platelet Estimate Increased    hCG, qualitative pregnancy [[id number]]  (Normal) Collected: 03/25/21 0240    Lab Status: Final result Specimen: Blood from Arm, Left Updated: 03/25/21 0318     Preg, Serum Negative    Magnesium [[id number]]  (Abnormal) Collected: 03/25/21 0240    Lab Status: Final result Specimen: Blood from Arm, Left Updated: 03/25/21 0318     Magnesium 1 8 mg/dL     Troponin I [[id number]]  (Abnormal) Collected: 03/25/21 0240    Lab Status: Final result Specimen: Blood from Arm, Left Updated: 03/25/21 0318     Troponin I 0 29 ng/mL     Comprehensive metabolic panel [[id number]]  (Abnormal) Collected: 03/25/21 0240    Lab Status: Final result Specimen: Blood from Arm, Left Updated: 03/25/21 0318     Sodium 139 mmol/L      Potassium 3 2 mmol/L      Chloride 104 mmol/L      CO2 23 mmol/L      ANION GAP 12 mmol/L      BUN 13 mg/dL      Creatinine 1 61 mg/dL      Glucose 129 mg/dL      Calcium 9 5 mg/dL      AST 24 U/L      ALT 30 U/L      Alkaline Phosphatase 84 U/L      Total Protein 7 1 g/dL      Albumin 3 8 g/dL      Total Bilirubin 0 50 mg/dL      eGFR 39 ml/min/1 73sq m     Narrative:      [place] guidelines for Chronic Kidney Disease (CKD):     Stage 1 with normal or high GFR (GFR > 90 mL/min/1 73 square meters)    Stage 2 Mild CKD (GFR = 60-89 mL/min/1 73 square meters)    Stage 3A Moderate CKD (GFR = 45-59 mL/min/1 73 square meters)    Stage 3B Moderate CKD (GFR = 30-44 mL/min/1 73 square meters)    Stage 4 Severe CKD (GFR = 15-29 mL/min/1 73 square meters)    Stage 5 End Stage CKD (GFR <15 mL/min/1 73 square meters)  Note: GFR calculation is accurate only with a steady state creatinine    Lipase [[id number]]  (Normal) Collected: 03/25/21 0240    Lab Status: Final result Specimen: Blood from Arm, Left Updated: 03/25/21 0318     Lipase 45 u/L     B-Type Natriuretic Peptide [facility] ONLY) [[id number]]  (Abnormal) Collected: 03/25/21 0240    Lab Status: Final result Specimen: Blood from Arm, Left Updated: 03/25/21 0318      pg/mL     Protime-INR [[id number]]  (Normal) Collected: 03/25/21 0246    Lab Status: Final result Specimen: Blood from Arm, Left Updated: 03/25/21 0307     Protime 13 5 seconds      INR 1 04    APTT [[id number]]  (Normal) Collected: 03/25/21 0246    Lab Status: Final result Specimen: Blood from Arm, Left Updated: 03/25/21 0307     PTT 26 seconds     CBC and differential [[id number]]  (Abnormal) Collected: 03/25/21 0240    Lab Status: Final result Specimen: Blood from Arm, Left Updated: 03/25/21 0258     WBC 24 00 Thousand/uL      RBC 5 32 Million/uL      Hemoglobin 14 9 g/dL      Hematocrit 44 6 %      MCV 84 fL      MCH 27 9 pg      MCHC 33 4 g/dL      RDW 17 8 %      MPV 8 9 fL      Platelets 979 Thousands/uL                  X-ray chest 1 view portable   Final Result by Reji Hernandez MD (03/25 7484)      No acute cardiopulmonary disease                    Workstation performed: [id number]                    Procedures  Procedures         ED Course  ED Course as of Apr 22 0009   Thu Mar 25, 2021   Heriberto Arredondo      [address] pt  He will call PACS  Heparin Bolus only  [address]      [address]ino in [facility]  Sending [facility]       [facility] is not flying  [place] the ambulance will be here momentarily      0310 Patient remains clinically stable  Ground crew is here for the patient  She is getting 2nd dose of fentanyl  Her vital signs remained stable      0311 CBC and differential(!)   0312 INR: 1 04   0312 PTT: 26   0316 Patient is in route to the cath lab now via ground transport  She remaines stable      0319 Magnesium(!): 1 8   0319 BNP(!): 214   0319 INR: 1 04   0319 PTT: 26   0319 Lipase: 45   0319 Troponin I(!): 0 29   0319 Comprehensive metabolic panel(!)   Joie Arredondo to notify pt is en route as well as troponin Elevation                 HEART Risk Score      Most Recent Value   Heart Score Risk Calculator   History  2 Filed at: 03/25/2021 0325   ECG  2 Filed at: 03/25/2021 0325   Age  0 Filed at: 03/25/2021 0325   Risk Factors  2 Filed at: 03/25/2021 0325   Troponin  2 Filed at: 03/25/2021 0325   HEART Score  8 Filed at: 03/25/2021 0325                      SBIRT 20yo+      Most Recent Value   SBIRT (23 yo +)   In order to provide better care to our patients, we are screening all of our patients for alcohol and drug use  Would it be okay to ask you these screening questions? Yes Filed at: 03/25/2021 0249   Initial Alcohol Screen: US AUDIT-C    1  How often do you have a drink containing alcohol?  0 Filed at: 03/25/2021 0249   2  How many drinks containing alcohol do you have on a typical day you are drinking? 0 Filed at: 03/25/2021 0249   3a  Male UNDER 65: How often do you have five or more drinks on one occasion? 0 Filed at: 03/25/2021 0249   3b  FEMALE Any Age, or MALE 65+:  How often do you have 4 or more drinks on one occassion? 0 Filed at: 03/25/2021 0249   Audit-C Score  0 Filed at: 03/25/2021 7176   NELIA: How many times in the past year have you    Used an illegal drug or used a prescription medication for non-medical reasons?   Never Filed at: 03/25/2021 0249                    MDM    Disposition  Final diagnoses:   STEMI (ST elevation myocardial infarction) ([address] )     Time reflects when diagnosis was documented in both MDM as applicable and the Disposition within this note     Time User Action Codes Description Comment    3/25/2021  2:46 AM oRdolfo Carl Add [I21 3] STEMI (ST elevation myocardial infarction) [facility])       ED Disposition     ED Disposition Condition Date/Time Comment    Transfer to Another Facility-In Network  Thu Mar 25, 2021  2:46 AM Mike Perez should be transferred out to [facility]         MD Documentation      Most Recent Value   Patient Condition  The patient has been stabilized such that within reasonable medical probability, no material deterioration of the patient condition or the condition of the unborn child(sasha) is likely to result from the transfer   Reason for Transfer  Level of Care needed not available at this facility   Benefits of Transfer  Specialized equipment and/or services available at the receiving facility (Include comment)________________________   Risks of Transfer  Potential for delay in receiving treatment, Potential deterioration of medical condition, Loss of IV, Increased discomfort during transfer, Possible worsening of condition or death during transfer   Accepting Physician  Dr Candis Iyer Name, JOSSELIN Garcia MD    Provider Certification  General risk, such as traffic hazards, adverse weather conditions, rough terrain or turbulence, possible failure of equipment (including vehicle or aircraft), or consequences of actions of persons outside the control of the transport personnel, Unanticipated needs of medical equipment and personnel during transport, Risk of worsening condition, The possibility of a transport vehicle being unavailable, Consent was not obtained as patient is committed to psychiatric facility and transfer is mandated      RN Documentation      Most [address] Name, [address]       Follow-up Information    None         Discharge Medication List as of 3/25/2021  3:37 AM      CONTINUE these medications which have NOT CHANGED    Details   ARIPiprazole (ABILIFY) 10 mg tablet Take 1 tablet (10 mg total) by mouth daily, Starting Fri 9/4/2020, Until Tue 10/20/2020, Normal      acetaminophen (TYLENOL) 500 mg tablet Take 500 mg by mouth every 4 (four) hours as needed for mild pain, Historical Med      ALPRAZolam (XANAX) 0 5 mg tablet Take 1 tablet (0 5 mg total) by mouth 3 (three) times a day as needed for anxiety, Starting Fri 9/4/2020, Normal      amLODIPine (NORVASC) 5 mg tablet Take 1 tablet (5 mg total) by mouth daily, Starting Tue 9/22/2020, Normal      aspirin 325 mg tablet Take 325 mg by mouth daily, Historical Med      atorvastatin (LIPITOR) 40 mg tablet Take 1 tablet (40 mg total) by mouth daily, Starting Mon 8/31/2020, Normal      buPROPion (WELLBUTRIN XL) 300 mg 24 hr tablet Take 1 tablet (300 mg total) by mouth every morning, Starting Fri 9/4/2020, Normal      cyclobenzaprine (FLEXERIL) 5 mg tablet Take 1 tablet (5 mg total) by mouth 2 (two) times a day as needed for muscle spasms for up to 14 days, Starting Tue 9/8/2020, Until Tue 10/13/2020, Normal      lisinopril (ZESTRIL) 5 mg tablet Take 1 tablet (5 mg total) by mouth daily, Starting Thu 10/29/2020, Until Sat 11/28/2020, Normal      pantoprazole (PROTONIX) 40 mg tablet Take 1 tablet (40 mg total) by mouth daily, Starting Tue 10/13/2020, Normal      sertraline (ZOLOFT) 50 mg tablet Take 1 tablet (50 mg total) by mouth daily, Starting Mon 10/5/2020, Normal           No discharge procedures on file      PDMP Review       Value Time User    PDMP Reviewed  Yes 4/1/2021 11:40 AM Ngoc Caro DO          ED Provider  Electronically Signed by           Maria Luisa Cruz MD  03/25/21 1455 [facility] Raven Wallace MD  04/22/21 0003

## 2021-03-25 NOTE — EMTALA/ACUTE CARE TRANSFER
[address] [facility]  [address] [address] [phone]  Dept: [phone]      EMTALA TRANSFER CONSENT    NAME Fredy CONNELL 1975                              MRN [id number]    I have been informed of my rights regarding examination, treatment, and transfer   by Dr Brooklynn Wasserman MD    Benefits: Specialized equipment and/or services available at the receiving facility (Include comment)________________________    Risks: Potential for delay in receiving treatment, Potential deterioration of medical condition, Loss of IV, Increased discomfort during transfer, Possible worsening of condition or death during transfer      Consent for Transfer:  I acknowledge that my medical condition has been evaluated and explained to me by the emergency department physician or other qualified medical person and/or my attending physician, who has recommended that I be transferred to the service of  Accepting Physician: Dr Carine Barton at [address] Name, [address] : [place]   The above potential benefits of such transfer, the potential risks associated with such transfer, and the probable risks of not being transferred have been explained to me, and I fully understand them  The doctor has explained that, in my case, the benefits of transfer outweigh the risks  I agree to be transferred  I authorize the performance of emergency medical procedures and treatments upon me in both transit and upon arrival at the receiving facility  Additionally, I authorize the release of any and all medical records to the receiving facility and request they be transported with me, if possible  I understand that the safest mode of transportation during a medical emergency is an ambulance and that the Hospital advocates the use of this mode of transport   Risks of traveling to the receiving facility by car, including absence of medical control, life sustaining equipment, such as oxygen, and medical personnel has been explained to me and I fully understand them  (MINAL CORRECT BOX BELOW)  [  ]  I consent to the stated transfer and to be transported by ambulance/helicopter  [  ]  I consent to the stated transfer, but refuse transportation by ambulance and accept full responsibility for my transportation by car  I understand the risks of non-ambulance transfers and I exonerate the Hospital and its staff from any deterioration in my condition that results from this refusal     X___________________________________________    DATE  21  TIME________  Signature of patient or legally responsible individual signing on patient behalf           RELATIONSHIP TO PATIENT_________________________          Provider Certification    NAME Lukas Malone                                        [facility] 1975                              MRN [id number]    A medical screening exam was performed on the above named patient  Based on the examination:    Condition Necessitating Transfer The encounter diagnosis was STEMI (ST elevation myocardial infarction) ([facility] [address] )      Patient Condition: The patient has been stabilized such that within reasonable medical probability, no material deterioration of the patient condition or the condition of the unborn child(sasha) is likely to result from the transfer    Reason for Transfer: Level of Care needed not available at this facility    Transfer Requirements: [facility]    · Space available and qualified personnel available for treatment as acknowledged by    · Agreed to accept transfer and to provide appropriate medical treatment as acknowledged by       Dr Rc Wilder  · Appropriate medical records of the examination and treatment of the patient are provided at the time of transfer   [address] _______  · Transfer will be performed by qualified personnel from    and appropriate transfer equipment as required, including the use of necessary and appropriate life support measures  Provider Certification: I have examined the patient and explained the following risks and benefits of being transferred/refusing transfer to the patient/family:  General risk, such as traffic hazards, adverse weather conditions, rough terrain or turbulence, possible failure of equipment (including vehicle or aircraft), or consequences of actions of persons outside the control of the transport personnel, Unanticipated needs of medical equipment and personnel during transport, Risk of worsening condition, The possibility of a transport vehicle being unavailable, Consent was not obtained as patient is committed to psychiatric facility and transfer is mandated      Based on these reasonable risks and benefits to the patient and/or the unborn child(sasha), and based upon the information available at the time of the patients examination, I certify that the medical benefits reasonably to be expected from the provision of appropriate medical treatments at another medical facility outweigh the increasing risks, if any, to the individuals medical condition, and in the case of labor to the unborn child, from effecting the transfer      X____________________________________________ DATE 03/25/21        TIME_______      ORIGINAL - SEND TO MEDICAL RECORDS   COPY - SEND WITH PATIENT DURING TRANSFER

## 2021-03-25 NOTE — ED PROCEDURE NOTE
PROCEDURE  ECG 12 Lead Documentation Only    Date/Time: 3/25/2021 2:32 AM  Performed by: Satya Luna MD  Authorized by: Satya Luna MD     Indications / Diagnosis:  CHEST PAIN  ECG reviewed by me, the ED Provider: yes    Patient location:  ED  Previous ECG:     Comparison to cardiac monitor: Yes    Interpretation:     Interpretation: abnormal    Rate:     ECG rate:  63    ECG rate assessment: normal    Rhythm:     Rhythm: sinus rhythm    Conduction:     Conduction: normal    ST segments:     ST segments:  Abnormal    Elevation:  II, III, aVF, V3, V4, V5 and V6    Depression:  I and aVL  T waves:     T waves: non-specific and inverted      Inverted:  AVR and aVL  Comments:      ACUTE STEMI, INFERIOR LATERAL         Satya Luna MD  03/25/21 7691

## 2021-03-25 NOTE — QUICK NOTE
Called by nursing staff the patient was having issues with chest pain as well as requiring nasal cannula oxygen in the post cardiac catheterization period  When I arrived at bedside patient was awake and alert setting 98 percent on 4 L nasal cannula oxygen with improvement in her blood pressure into the 414G systolic  EKG performed did show persistence of ST segment elevation in the inferolateral leads however patient noted that chest pain while still having some was significantly better than when she came into the hospital and overall she felt much better than when she presented to [facility]  Will up titrate medical therapy as tolerated throughout the day followup transthoracic echocardiogram, and continue to monitor patient clinically as well as follow-up laboratory studies and repeat ECG

## 2021-03-25 NOTE — ASSESSMENT & PLAN NOTE
Wt Readings from Last 3 Encounters:   03/31/21 121 kg (266 lb 14 4 oz)   03/26/21 122 kg (269 lb)   03/25/21 127 kg (280 lb)     Prior history of CHF  No signs of volume overload  Echo completed with normal EF at 60%

## 2021-03-25 NOTE — CASE MANAGEMENT
Informed by cardiology that script for Brilinta was sent to [address] [facility]  Called pharmacy and cost is $0  Informed cardiology

## 2021-03-25 NOTE — PLAN OF CARE
Problem: PAIN - ADULT  Goal: Verbalizes/displays adequate comfort level or baseline comfort level  Description: Interventions:  - Encourage patient to monitor pain and request assistance  - Assess pain using appropriate pain scale  - Administer analgesics based on type and severity of pain and evaluate response  - Implement non-pharmacological measures as appropriate and evaluate response  - Consider cultural and social influences on pain and pain management  - Notify physician/advanced practitioner if interventions unsuccessful or patient reports new pain  Outcome: Progressing     Problem: INFECTION - ADULT  Goal: Absence or prevention of progression during hospitalization  Description: INTERVENTIONS:  - Assess and monitor for signs and symptoms of infection  - Monitor lab/diagnostic results  - Monitor all insertion sites, i e  indwelling lines, tubes, and drains  - Monitor endotracheal if appropriate and nasal secretions for changes in amount and color  - [place] appropriate cooling/warming therapies per order  - Administer medications as ordered  - Instruct and encourage patient and family to use good hand hygiene technique  - Identify and instruct in appropriate isolation precautions for identified infection/condition  Outcome: Progressing  Goal: Absence of fever/infection during neutropenic period  Description: INTERVENTIONS:  - Monitor WBC    Outcome: Progressing     Problem: SAFETY ADULT  Goal: Patient will remain free of falls  Description: INTERVENTIONS:  - Assess patient frequently for physical needs  -  Identify cognitive and physical deficits and behaviors that affect risk of falls    -  [place] fall precautions as indicated by assessment   - Educate patient/family on patient safety including physical limitations  - Instruct patient to call for assistance with activity based on assessment  - Modify environment to reduce risk of injury  - Consider OT/PT consult to assist with strengthening/mobility  Outcome: Progressing  Goal: Maintain or return to baseline ADL function  Description: INTERVENTIONS:  -  Assess patient's ability to carry out ADLs; assess patient's baseline for ADL function and identify physical deficits which impact ability to perform ADLs (bathing, care of mouth/teeth, toileting, grooming, dressing, etc )  - Assess/evaluate cause of self-care deficits   - Assess range of motion  - Assess patient's mobility; develop plan if impaired  - Assess patient's need for assistive devices and provide as appropriate  - Encourage maximum independence but intervene and supervise when necessary  - Involve family in performance of ADLs  - Assess for home care needs following discharge   - Consider OT consult to assist with ADL evaluation and planning for discharge  - Provide patient education as appropriate  Outcome: Progressing  Goal: Maintain or return mobility status to optimal level  Description: INTERVENTIONS:  - Assess patient's baseline mobility status (ambulation, transfers, stairs, etc )    - Identify cognitive and physical deficits and behaviors that affect mobility  - Identify mobility aids required to assist with transfers and/or ambulation (gait belt, sit-to-stand, lift, walker, cane, etc )  - [place] fall precautions as indicated by assessment  - Record patient progress and toleration of activity level on Mobility SBAR; progress patient to next Phase/Stage  - Instruct patient to call for assistance with activity based on assessment  - Consider rehabilitation consult to assist with strengthening/weightbearing, etc   Outcome: Progressing     Problem: DISCHARGE PLANNING  Goal: Discharge to home or other facility with appropriate resources  Description: INTERVENTIONS:  - Identify barriers to discharge w/patient and caregiver  - Arrange for needed discharge resources and transportation as appropriate  - Identify discharge learning needs (meds, wound care, etc )  - Arrange for interpretive services to assist at discharge as needed  - Refer to Case Management Department for coordinating discharge planning if the patient needs post-hospital services based on physician/advanced practitioner order or complex needs related to functional status, cognitive ability, or social support system  Outcome: Progressing     Problem: Knowledge Deficit  Goal: Patient/family/caregiver demonstrates understanding of disease process, treatment plan, medications, and discharge instructions  Description: Complete learning assessment and assess knowledge base    Interventions:  - Provide teaching at level of understanding  - Provide teaching via preferred learning methods  Outcome: Progressing

## 2021-03-25 NOTE — ASSESSMENT & PLAN NOTE
Multiple episodes of non-sustained monomorphic ventricular tachycardia in the post-MI period  Last episode recorded on telemetry occurred at 9:07 PM on 3/25 and lasted for approximately 45 beats (21 seconds) before converting  No further occurrences noted  She is to continue Metoprolol 50 mg BID  Life Vest not indicated as arrhythmia has subsided

## 2021-03-25 NOTE — ASSESSMENT & PLAN NOTE
History of hyperlipidemia, on Atorvastatin 40 mg QD at home  Fasting lipids this morning with sub-optimal control- total cholesterol 202, Tg 230, HDL 34, and   Continue Atorvastatin to 80 mg QD

## 2021-03-25 NOTE — ASSESSMENT & PLAN NOTE
Patient with history of HTN, on Norvasc 5 mg and Lisinopril 5 mg at home  BP was initially soft post-cardiac cath but has since been stable  Continue metoprolol succinate  Will restart home lisinopril at discharge

## 2021-03-25 NOTE — CASE MANAGEMENT
The patient transferred form [facility]  She is not a readmission or a Medicare Bundle  The patient has readmission risk score of 12  Met with patient and her spouse to complete assessment and explain role of CM  They live in a 3 floor home with 12-14 inside steps with left rail  They stay on the first floor  Patient has no DME or HHC  She denies IP MH and D&A treatment but says she has had OP psychiatric care in the past  Patient has meds and will speak to her PCP about obtaining a new psychiatrist in her area  Patient says she has an advance directive and was asked to provide a copy  Her spouse, Leeroy Do, [phone] is her primary contact  PCP is Jack Soto  The patient has prescription coverage and uses [address] [place] [address], [address], [facility] pass  CM reviewed d/c planning process including the following: identifying help at home, patient preference for d/c planning needs, Discharge Lounge, [facility] Meds to Bed program, availability of treatment team to discuss questions or concerns patient and/or family may have regarding understanding medications and recognizing signs and symptoms once discharged  CM also encouraged patient to follow up with all recommended appointments after discharge  Patient advised of importance for patient and family to participate in managing patients medical well being

## 2021-03-25 NOTE — ASSESSMENT & PLAN NOTE
Patient presented morning of 3/25 with acute inferior wall MI  Initial cath notable for diffuse atherosclerosis and 100% stenosis of mid-RCA which was stented  Continued to have significant chest pain and ST elevations in the hours post-procedure  She was then taken back to cath lab and was found to have 90% stenosis of right PDA ostium which was consistent with thrombus  The right PDA ostium was also stented  Additionally, patient had 100% stenosis of the 2nd posterolateral segment consistent with thrombus, however, this vessel was not amenable to intervention  Plan:  - Patient is stable for dc home today  She is to continue Brilinta at 90 mg BID, Atorvastatin 80 mg QHS, Ranexa 500 mg Q12H, and Metoprolol Succinate 50 mg BID    - Patient will require close outpatient follow up   Appointment information can be found in discharge summary  - Lifestyle modifications reviewed including tobacco cessation, dietary changes, and exercise  - Patient would benefit from Cardiac Rehab exercise program

## 2021-03-25 NOTE — ASSESSMENT & PLAN NOTE
Lab Results   Component Value Date    EGFR 45 03/31/2021    EGFR 29 03/30/2021    EGFR 38 03/28/2021    CREATININE 1 42 (H) 03/31/2021    CREATININE 2 03 (H) 03/30/2021    CREATININE 1 62 (H) 03/28/2021     Per review of records, patient's baseline creatinine is approximately 1 5 and creatinine has returned to baseline post-cath procedures   Avoid nephrotoxic drugs

## 2021-03-25 NOTE — PROGRESS NOTES
Cardiology Progress Note - Titi Richard 42 y o  female MRN: [id number]    Unit/Bed#: [facility] 505-01 Encounter: [id number]      Assessment & Plan:  Principal Problem:    Acute MI, inferior wall (HCC)  Active Problems:    Ventricular tachycardia, non-sustained (HCC)    CHF (congestive heart failure) (HCC)    Essential hypertension    Tobacco abuse    Mixed hyperlipidemia    CKD (chronic kidney disease) stage 3, GFR 30-59 ml/min    BMI 40 0-44 9, adult (HCC)    Leukocytosis        * Acute MI, inferior wall [facility])  Assessment & Plan  Patient has prior history of CAD and has multiple stents that were placed several years ago when she lived in [place], [place] and has previously followed with Dr Ludmila Kearney as an outpatient  Brittany Aguillon initially presented to [address] [place] [address] this morning at approximately 2:30 AM with new-onset chest pain, shortness of breath, and diaphoresis  Initial EKG was obtained in the ED this morning which revealed inferior wall MI with deep Q waves developing in lead III, concerning for late presentation  Initial troponin was slightly elevated at 0 29  CXR with no acute disease  Patient was then transferred to [address] [place] [address] for cardiac cath  Upon arrival to [place], patient still complained of chest pain  Cath revealed 100% stenosis of RCA which was intervened on with drug-eluding stent  Post-procedure, patient has continued to have ST elevations in inferior leads  Continues to have chest pain, rated at 7/10 which is better than her initial presentation  Troponin has trended up to > 40  Update approximately 2 PM: Patient continues to have 7/10 chest pain, ST elevations still present in inferior leads  Has had several runs of non-sustained monomorphic VT  Discussed case with interventional cardiology and will take patient back down to cath to r/o thrombosis of stent  - Continue to trend troponin until peak    - continue to monitor on telemetry due to arrhythmia (see A&P below)  - Patient was started on Brilinta  Priced checked with pharmacy - patient will have a $0 copay  Continue Brilinta 90 mg BID  - Restart ASA 81 mg QD  - Atorvastatin 80 mg QD  - Metoprolol 25 mg BID  - Fentanyl 25 mg IV for chest pain  - smoking cessation  - vitals per unit protocol  - Check lipid panel, HgbA1c  - Replete Mg and K as needed    Ventricular tachycardia, non-sustained (HCC)  Assessment & Plan  Several episodes of symptomatic, non-sustained, monomorphic VT this morning several hours after cardiac cath with stent placement  Patient reported symptoms of nausea, dizziness, and shortness of breath  Longest run about 15 beats  Patient was given 5 mg of metoprolol IV and started on 25 mg Metoprolol BID  Suspect that arrhythmia is secondary to infarct and cardiac muscle irritability   - Continue to monitor on telemetry  - Metoprolol 25 mg PO BID  - Please notify cardiology for further episodes of VT  - If arrhythmia persists beyond 24 hours post-cath, consider EP evaluation    Patient had an additional episode of VT lasting 24 beats at approximately 12:50 PM  She was given an extra one-time dose of Metoprolol 25 mg PO     CHF (congestive heart failure) ([facility] [address] )  Assessment & Plan  Wt Readings from Last 3 Encounters:   03/25/21 122 kg (269 lb 6 4 oz)   03/25/21 127 kg (280 lb)   11/03/20 125 kg (275 lb)       Prior history of CHF  No signs of volume overload today  Continue to monitor volume status given recent MI   -  Transthoracic echo pending    Essential hypertension  Assessment & Plan  Patient with history of HTN, on Norvasc 5 mg and Lisinopril 5 mg at home  BP was initially soft post-cardiac cath this morning  Now improving to the 330'S systolic with most recent /79   - Continue to monitor of antihypertensives for now  - Consider resuming home antihypertensives if BP is persistently elevated    Tobacco abuse  Assessment & Plan  Patient currently 1/2 to 1 ppd smoker   Strongly encouraged smoking cessation given MI and prior cardiac history  Continue with nicotine patch during admission  Mixed hyperlipidemia  Assessment & Plan  History of hyperlipidemia, on Atorvastatin 40 mg QD at home  Fasting lipids this morning with sub-optimal control- total cholesterol 202, Tg 230, HDL 34, and   Will increase Atorvastatin to 80 mg QD  CKD (chronic kidney disease) stage 3, GFR 30-59 ml/min  Assessment & Plan  Lab Results   Component Value Date    EGFR 37 03/25/2021    EGFR 39 03/25/2021    EGFR 41 10/28/2020    CREATININE 1 68 (H) 03/25/2021    CREATININE 1 61 (H) 03/25/2021    CREATININE 1 54 (H) 10/28/2020     Per review of records, patient's baseline creatinine is approximately 1 5  Continue to trend creatinine post-radiocontrast administration during cath  Post-procedure hydration with NS at 100 cc/hr  Leukocytosis  Assessment & Plan  Leukocytosis present on admission, WBC > 20  This likely represents a stress reaction  Will continue to trend off of antibiotics  Subjective:   Ligia Medrano is a 42 y o  female, 1 ppd smoker, with history of CAD s/p stent placement in LAD, Non-Hodgkin Lymphoma with prior chest radiation, HTN, hyperlipidemia, CHF, and obesity  Initially presented early this morning at [address] with chest pain, shortness of breath, and diaphoresis  Was found to have an acute inferior wall STEMI and was transferred to [facility] for Cath  % stenosis, drug eluting stent placed  Since procedure, patient has been complaining of persistent chest pain, rated at 7/10  Continues to have ST elevations in inferior leads on monitor  While speaking with Prema Poser this morning, she had an episode of VT while this writer was present at bedside  During these episodes, patient is symptomatic with nausea, diaphoresis, shortness of breath, and dizziness  Each episode lasts several seconds and then passes  She is now very anxious about these episodes and is worried       Prior to today, patient has been doing well at home  No recent chest pain, dyspnea on exertion, orthopnea, or PND  No leg swelling  No palpitations or arrhythmias  No personal history of arrhythmias  No family history of sudden cardiac death  Mic Villafana reports that she has had prior stents placed many years ago when she lived in [place], [place]  These records are not available to review  She believes that the LAD was stented at that time  She has previously followed with Dr Mer Rand for Cardiology, however, records are not available in [facility] or [facility] as it was before he joined [facility]  She has not been seen by cardiology in over ten years  Review of Systems   Constitutional: Positive for diaphoresis  Negative for activity change, appetite change, chills, fatigue, fever and unexpected weight change  HENT: Negative for congestion, ear pain and sore throat  Eyes: Negative for pain and visual disturbance  Respiratory: Positive for chest tightness  Negative for cough, shortness of breath and wheezing  Cardiovascular: Positive for chest pain and palpitations  Negative for leg swelling  Gastrointestinal: Positive for nausea  Negative for abdominal pain, diarrhea and vomiting  Endocrine: Negative for cold intolerance, heat intolerance and polyuria  Genitourinary: Negative for difficulty urinating, dysuria and hematuria  Musculoskeletal: Negative for arthralgias and back pain  Skin: Negative for color change and rash  Neurological: Positive for dizziness  Negative for seizures, syncope and weakness  Psychiatric/Behavioral: Negative for dysphoric mood  The patient is nervous/anxious  All other systems reviewed and are negative  Objective:     Vitals: Blood pressure 113/73, pulse 82, temperature (!) 97 3 °F (36 3 °C), temperature source Oral, resp   rate 16, height 5' 6" (1 676 m), weight 122 kg (269 lb 6 4 oz), SpO2 98 %, not currently breastfeeding , Body mass index is 43 48 kg/m² ,   Orthostatic Blood Pressures Most Recent Value   Blood Pressure  113/73 filed at 03/25/2021 1321            Intake/Output Summary (Last 24 hours) at 3/25/2021 1551  Last data filed at 3/25/2021 1500  Gross per 24 hour   Intake 1673 33 ml   Output --   Net 1673 33 ml           Physical Exam:  Physical Exam  Vitals signs and nursing note reviewed  Constitutional:       General: She is awake  She is in acute distress  Appearance: She is morbidly obese  She is ill-appearing and diaphoretic  She is not toxic-appearing  Comments: Patient appears uncomfortable and anxious  Diaphoretic during episode of VT  HENT:      Head: Normocephalic and atraumatic  Right Ear: External ear normal       Left Ear: External ear normal       Nose: Nose normal       Mouth/Throat:      Mouth: Mucous membranes are moist       Pharynx: Oropharynx is clear  Eyes:      General: No scleral icterus  Extraocular Movements: Extraocular movements intact  Conjunctiva/sclera: Conjunctivae normal       Pupils: Pupils are equal, round, and reactive to light  Neck:      Musculoskeletal: Normal range of motion and neck supple  Vascular: Normal carotid pulses  No JVD  Trachea: Trachea and phonation normal    Cardiovascular:      Rate and Rhythm: Normal rate and regular rhythm  Pulses: Normal pulses  Heart sounds: Normal heart sounds, S1 normal and S2 normal  No murmur  No friction rub  Comments: ST elevations on telemetry monitor  Also episode of non-sustained VT noted  Pulmonary:      Effort: Pulmonary effort is normal  No respiratory distress  Breath sounds: Normal breath sounds  No wheezing, rhonchi or rales  Abdominal:      General: Bowel sounds are normal  There is no distension  Palpations: Abdomen is soft  Tenderness: There is no abdominal tenderness  Musculoskeletal:         General: No deformity  Right lower leg: No edema  Left lower leg: No edema  Skin:     General: Skin is warm  Capillary Refill: Capillary refill takes less than 2 seconds  Coloration: Skin is not pale  Findings: No erythema  Neurological:      General: No focal deficit present  Mental Status: She is alert and oriented to person, place, and time  Mental status is at baseline  Psychiatric:         Mood and Affect: Affect normal  Mood is anxious  Speech: Speech normal          Behavior: Behavior normal  Behavior is cooperative               Current Facility-Administered Medications:     acetaminophen (TYLENOL) tablet 650 mg, 650 mg, Oral, Q4H PRN, Grady Buitrago MD    [START ON 3/26/2021] aspirin chewable tablet 81 mg, 81 mg, Oral, Daily, Tomi Gifford DO    atorvastatin (LIPITOR) tablet 80 mg, 80 mg, Oral, QPM, Tomi Gifford DO    buPROPion (WELLBUTRIN XL) 24 hr tablet 300 mg, 300 mg, Oral, QAM, Tomi Gifford DO, 300 mg at 03/25/21 5694    eptifibatide (INTEGRILIN) bolus from premix, , , Code/Trauma/Sedation MedMicki MD, 11 3 mL at 03/25/21 1528    fentanyl citrate (PF) 100 MCG/2ML 50 mcg, 50 mcg, Intravenous, Q2H PRN, Toma Holden DO, 50 mcg at 03/25/21 1322    fentanyl citrate (PF) 100 MCG/2ML, , Intravenous, Code/Trauma/Sedation Med, Micki Ortiz MD, 50 mcg at 03/25/21 1530    heparin (porcine) injection, , , Code/Trauma/Sedation MedMicki MD, 8,000 Units at 03/25/21 1459    heparin (porcine) subcutaneous injection 7,500 Units, 7,500 Units, Subcutaneous, Q8H [facility], 7,500 Units at 03/25/21 1324 **AND** [CANCELED] Platelet count, , , Once, Tomi Gifford DO    iohexol (OMNIPAQUE) 350 MG/ML injection (SINGLE-DOSE), , Intravenous, Code/Trauma/Sedation MedMicki MD, 75 mL at 03/25/21 1539    lidocaine (PF) (XYLOCAINE-MPF) 1 % injection, , , Code/Trauma/Sedation MedMicki MD, 1 mL at 03/25/21 1447    metoprolol tartrate (LOPRESSOR) tablet 25 mg, 25 mg, Oral, Q12H [facility], Toma DO Adina, 25 mg at 03/25/21 1021    midazolam (VERSED) injection, , , Code/Trauma/Sedation Med, Dank Nichols MD, 2 mg at 03/25/21 1446    nicotine (NICODERM CQ) 7 mg/24hr TD 24 hr patch 1 patch, 1 patch, Transdermal, Daily, Jagjit Escobar DO, 1 patch at 03/25/21 0824    nitroGLYcerin (TRIDIL) 50 mg in 250 mL infusion (premix), , Intra-arterial, Code/Trauma/Sedation Med, Dank Nichols MD, 200 mcg at 03/25/21 1451    ondansetron (ZOFRAN) injection 4 mg, 4 mg, Intravenous, Q6H PRN, Jagjit Escobar DO    pantoprazole (PROTONIX) EC tablet 40 mg, 40 mg, Oral, Early Morning, Jagjit Escobar DO, 40 mg at 03/25/21 0601    sertraline (ZOLOFT) tablet 50 mg, 50 mg, Oral, Daily, Jagjit Escobar DO, 50 mg at 03/25/21 1923    sodium chloride 0 9 % infusion, 50 mL/hr, Intravenous, Continuous, Derik Amanda MD, Last Rate: 50 mL/hr at 03/25/21 1551, 50 mL/hr at 03/25/21 1551    ticagrelor (BRILINTA) tablet 90 mg, 90 mg, Oral, BID, Jagjit Escobar DO, 90 mg at 03/25/21 5239    verapamil (ISOPTIN) injection, , , Code/Trauma/Sedation Med, Dank Nichols MD, 2 5 mg at 03/25/21 1451    Labs & Results:    Lab Results   Component Value Date    TROPONINI >40 00 (H) 03/25/2021    TROPONINI 39 70 (H) 03/25/2021    TROPONINI 0 29 (H) 03/25/2021       Lab Results   Component Value Date    CALCIUM 9 2 03/25/2021    K 4 0 03/25/2021    CO2 23 03/25/2021     (H) 03/25/2021    BUN 14 03/25/2021    CREATININE 1 68 (H) 03/25/2021       Lab Results   Component Value Date    WBC 20 69 (H) 03/25/2021    HGB 14 7 03/25/2021    HCT 44 1 03/25/2021    MCV 84 03/25/2021     (H) 03/25/2021     Results from last 7 days   Lab Units 03/25/21  0246   INR  1 04       No results found for: CHOL  Lab Results   Component Value Date    HDL 34 (L) 03/25/2021    HDL 31 (L) 07/08/2020     Lab Results   Component Value Date    LDLCALC 122 (H) 03/25/2021    [address]  07/08/2020      Comment:      Calculated LDL invalid, triglycerides >400 mg/dl  This screening LDL is a calculated result     It does not have the accuracy of the Direct Measured LDL in the monitoring of patients with hyperlipidemia and/or statin therapy  Direct Measure LDL ([id number]) must be ordered separately in these patients  Lab Results   Component Value Date    TRIG 230 (H) 03/25/2021    TRIG 465 (H) 07/08/2020       Lab Results   Component Value Date    ALT 30 03/25/2021    AST 24 03/25/2021       I have personally reviewed pertinent films in PACS  X-ray Chest 1 View Portable  Result Date: 3/25/2021  Impression: No acute cardiopulmonary disease  Workstation performed: [id number]         EKG: NSR, rate 76, normal axis, ST elevations in II, III, aVF, V4-V6 with reciprocal changes in I, aVR, aVL  Deep Q waves present in lead III  TELE: in general, patient is primarily in normal sinus rhythm with rates in the 70's-80's  Patient has had 3 episodes of non-sustained monomorphic VT, longest run of 24 beats     ======    Thank you for allowing me to participate in the care of your patient, [place] Alta Fort WorthDO Sohail Chakraborty [facility] Neurology Residency, PGY-1

## 2021-03-25 NOTE — PROGRESS NOTES
Pastoral Care Progress Note    3/25/2021  Patient: Chilo Garner : 1975  Admission Date & Time: 3/25/2021 0500  MRN: [id number] CSN: [id number]                     Chaplaincy Interventions Utilized:   Empowerment: Clarified, confirmed, or reviewed information from treatment team , Encouraged focus on present and Encouraged self-care    Exploration: Explored hope and Explored emotional needs & resources    Collaboration: Encouraged adherence to treatment plan    Relationship Building: Cultivated a relationship of care and support, Listened empathically and Provided silent and supportive presence    Ritual: Provided prayer      Chaplaincy Outcomes Achieved:  Debriefed/defused experience        Spiritual Coping Strategies Utilized:   Spiritual comfort

## 2021-03-25 NOTE — ED PROCEDURE NOTE
PROCEDURE  CriticalCare Time  Performed by: Maria Luisa Cruz MD  Authorized by: Maria Luisa Cruz MD     Critical care provider statement:     Critical care time (minutes):  65    Critical care start time:  3/25/2021 2:25 AM    Critical care end time:  3/25/2021 3:55 AM    Critical care time was exclusive of:  Separately billable procedures and treating other patients    Critical care was necessary to treat or prevent imminent or life-threatening deterioration of the following conditions:  Cardiac failure and circulatory failure    Critical care was time spent personally by me on the following activities:  Examination of patient, review of old charts, re-evaluation of patient's condition, ordering and review of radiographic studies, ordering and review of laboratory studies, ordering and performing treatments and interventions, evaluation of patient's response to treatment, discussions with primary provider, discussions with consultants, development of treatment plan with patient or surrogate and obtaining history from patient or surrogate         Maria Luisa Cruz MD  03/25/21 610 Favio Wallace MD  03/25/21 5664

## 2021-03-25 NOTE — ED NOTES
Transport crew switching patient to their monitoring equipment     [facility]  03/25/21 7742

## 2021-03-25 NOTE — ED NOTES
Report given to Brandee Feldman in cath lab      [facility]  03/25/21 9621

## 2021-03-25 NOTE — H&P
H&P Exam - Cardiology   Ministerio Maher 42 y o  female MRN: [id number]  Unit/Bed#: BE CATH LAB ROOM Encounter: [id number]    Assessment/Plan     Assessment/plan:    1  Inferior ST segment elevation MI  2  Known history of CAD with PCI to RCA and LAD in 2014  3  Current tobacco use with pack per day smoking  4  Morbid obesity  5  Hypertension  6  Hyperlipidemia  7  Chronic kidney disease stage 3  8  History of non-Hodgkin's lymphoma  9  Iron deficiency anemia  10  Hypokalemia  11  Hypomagnesemia  12  Intermittent marijuana use    -EKG on admission showing sinus rhythm with ST segment elevations in leads II, III, AVF and V3-V6    -patient taken emergently  to cardiac catheterization lab with drug-eluting stent placement RCA  -will replete potassium with 40 mEq oral potassium supplementation and will replete magnesium and monitor her laboratory studies  -trend troponin  -monitor patient on telemetry  -will check transthoracic echocardiogram  -in the setting of soft blood pressure is 90/50 for mmHg will avoid initiation of antihypertensive regimen at this time however once blood pressure is improved will likely reinitiate beginning with beta-blocker therapy pending transthoracic echocardiogram  -continue Brilinta 90 mg twice daily, aspirin 81 mg daily  -will check fasting lipid panel, with morning laboratory studies as well as hemoglobin A1c  -increase patient's atorvastatin 80 mg daily  -patient counseled on smoking cessation  -continue to monitor patient clinically at this time  History of Present Illness   HPI:  Ministerio Maher is a 42 y o  female past medical history significant for hypertension, morbid obesity, hyperlipidemia, no coronary artery disease history of PCI that RCA and LAD 2014 as well as history non-Hodgkin's lymphoma order deficiency anemia who initially presented to [facility] with chest pain and shortness of breath which had worsened throughout the day to the point where she could no longer stand it    Upon arrival patient was found have significant ST segment elevation MIs and was transferred to [facility] for further care and evaluation as well as emergent cardiac catheterization   -when patient arrived at cardiac catheterization lab patient was still complaining of active chest discomfort and mild shortness of breath and stated that this had improved  She denied any nausea/vomiting/diarrhea or abdominal discomfort  She did note continued active daily smoking and intermittent marijuana use  Review of Systems   Constitutional: Negative for chills, diaphoresis and fever  HENT: Negative for trouble swallowing and voice change  Respiratory: Positive for shortness of breath  Negative for wheezing  Cardiovascular: Positive for chest pain  Negative for palpitations and leg swelling  Gastrointestinal: Negative for abdominal pain, constipation, diarrhea and nausea  Genitourinary: Negative for dysuria  Musculoskeletal: Negative for neck pain and neck stiffness  Neurological: Negative for dizziness and syncope  Psychiatric/Behavioral: Negative for agitation  All other systems reviewed and are negative        Historical Information   Past Medical History:   Diagnosis Date    Acute MI ([facility] [address] )     Anxiety     Cardiac disease     Cardiomyopathy ([facility] [address] )     CHF (congestive heart failure) (HCC)     Chronic kidney disease     Depression     History of chemotherapy     non hodgkins lymphoma     Hypertension     Lymphoma, non-Hodgkin's (HCC)     Tobacco abuse      Past Surgical History:   Procedure Laterality Date    CAROTID STENT       SECTION       Social History   Social History     Substance and Sexual Activity   Alcohol Use Not Currently    Comment: rarely     Social History     Substance and Sexual Activity   Drug Use Never     Social History     Tobacco Use   Smoking Status Current Every Day Smoker    Packs/day: 1 00    Types: Cigarettes   Smokeless Tobacco Current User     E-Cigarette/Vaping    E-Cigarette Use Current Every Day User       E-Cigarette/Vaping Substances    Nicotine Yes     THC No     CBD No     Flavoring No     Other No     Unknown No        Family History:   Family History   Problem Relation Age of Onset    No Known Problems Mother     No Known Problems Father     No Known Problems Daughter     Brain cancer Maternal Grandfather     No Known Problems Paternal Aunt     No Known Problems Paternal Aunt        Meds/Allergies   all medications and allergies reviewed  No Known Allergies    Objective   Vitals: not currently breastfeeding  heart rate 73 beats per minute, blood pressure 90/50 mmHg, O2 sat 90% 2 L nasal cannula    No intake or output data in the 24 hours ending 03/25/21 0455    Invasive Devices     Peripheral Intravenous Line            Peripheral IV 03/25/21 Left Antecubital less than 1 day                Physical Exam  Vitals signs reviewed  Constitutional:       Appearance: She is obese  She is ill-appearing  She is not diaphoretic  HENT:      Head: Normocephalic and atraumatic  Comments: Nasal cannula oxygen in place  Eyes:      General:         Right eye: No discharge  Neck:      Comments: Trachea midline, neck obese difficult to assess JVD  Cardiovascular:      Rate and Rhythm: Normal rate and regular rhythm  Heart sounds: No friction rub  Pulmonary:      Effort: No respiratory distress  Comments: Decreased breath sounds bilaterally however no wheezing appreciated  Abdominal:      Comments: Obese, nontender to palpation, active bowel sounds present   Musculoskeletal:      Right lower leg: No edema  Left lower leg: No edema  Skin:     General: Skin is warm and dry     Neurological:      Comments: Awake, alert, able to answer questions appropriately   Psychiatric:         Mood and Affect: Mood normal          Behavior: Behavior normal          Lab Results: I have personally reviewed pertinent lab results  Imaging: I have personally reviewed pertinent reports      VTE Prophylaxis: Heparin    Code Status:  Full code   Advance Directive and Living Will:      Power of :    POLST:

## 2021-03-25 NOTE — ASSESSMENT & PLAN NOTE
Patient currently 1/2 to 1 ppd smoker  Strongly encouraged smoking cessation given MI and prior cardiac history  Continue with nicotine patch during admission  Patient wishes to stop smoking completely and is requesting rx for nicotine patch for home

## 2021-03-25 NOTE — ED PROCEDURE NOTE
PROCEDURE  ECG 12 Lead Documentation Only    Date/Time: 3/25/2021 2:48 AM  Performed by: Kalyn Ponce MD  Authorized by: Kalyn Ponce MD     Indications / Diagnosis:  STEMI  ECG reviewed by me, the ED Provider: yes    Previous ECG:     Comparison to cardiac monitor: Yes    Interpretation:     Interpretation: abnormal    Rate:     ECG rate:  71    ECG rate assessment: normal    Rhythm:     Rhythm: sinus rhythm    Ectopy:     Ectopy: none    Conduction:     Conduction: normal    ST segments:     ST segments:  Abnormal    Elevation:  II, III, aVF, V3, V4, V5 and V6    Depression:  AVL  T waves:     T waves: non-specific and inverted      Inverted:  AVR and aVL         Kalyn Ponce MD  03/25/21 8108

## 2021-03-26 PROBLEM — I42.2 HYPERTROPHIC CARDIOMYOPATHY (HCC): Status: ACTIVE | Noted: 2021-03-26

## 2021-03-26 LAB
ANION GAP SERPL CALCULATED.3IONS-SCNC: 7 MMOL/L (ref 4–13)
BUN SERPL-MCNC: 12 MG/DL (ref 5–25)
CALCIUM SERPL-MCNC: 9 MG/DL (ref 8.3–10.1)
CHLORIDE SERPL-SCNC: 108 MMOL/L (ref 100–108)
CO2 SERPL-SCNC: 22 MMOL/L (ref 21–32)
CREAT SERPL-MCNC: 1.49 MG/DL (ref 0.6–1.3)
ERYTHROCYTE [DISTWIDTH] IN BLOOD BY AUTOMATED COUNT: 18.8 % (ref 11.6–15.1)
GFR SERPL CREATININE-BSD FRML MDRD: 42 ML/MIN/1.73SQ M
GLUCOSE SERPL-MCNC: 101 MG/DL (ref 65–140)
HCT VFR BLD AUTO: 40.4 % (ref 34.8–46.1)
HGB BLD-MCNC: 13.2 G/DL (ref 11.5–15.4)
MCH RBC QN AUTO: 27.8 PG (ref 26.8–34.3)
MCHC RBC AUTO-ENTMCNC: 32.7 G/DL (ref 31.4–37.4)
MCV RBC AUTO: 85 FL (ref 82–98)
PLATELET # BLD AUTO: 392 THOUSANDS/UL (ref 149–390)
PMV BLD AUTO: 10.6 FL (ref 8.9–12.7)
POTASSIUM SERPL-SCNC: 4.7 MMOL/L (ref 3.5–5.3)
RBC # BLD AUTO: 4.75 MILLION/UL (ref 3.81–5.12)
SODIUM SERPL-SCNC: 137 MMOL/L (ref 136–145)
WBC # BLD AUTO: 22.52 THOUSAND/UL (ref 4.31–10.16)

## 2021-03-26 PROCEDURE — 85027 COMPLETE CBC AUTOMATED: CPT | Performed by: INTERNAL MEDICINE

## 2021-03-26 PROCEDURE — 80048 BASIC METABOLIC PNL TOTAL CA: CPT | Performed by: INTERNAL MEDICINE

## 2021-03-26 PROCEDURE — 99232 SBSQ HOSP IP/OBS MODERATE 35: CPT | Performed by: INTERNAL MEDICINE

## 2021-03-26 RX ORDER — ALPRAZOLAM 0.5 MG/1
0.5 TABLET ORAL ONCE AS NEEDED
Status: DISCONTINUED | OUTPATIENT
Start: 2021-03-26 | End: 2021-03-27

## 2021-03-26 RX ADMIN — FENTANYL CITRATE 50 MCG: 50 INJECTION INTRAMUSCULAR; INTRAVENOUS at 15:17

## 2021-03-26 RX ADMIN — ASPIRIN 81 MG: 81 TABLET, CHEWABLE ORAL at 08:18

## 2021-03-26 RX ADMIN — FENTANYL CITRATE 50 MCG: 50 INJECTION INTRAMUSCULAR; INTRAVENOUS at 22:25

## 2021-03-26 RX ADMIN — FENTANYL CITRATE 50 MCG: 50 INJECTION INTRAMUSCULAR; INTRAVENOUS at 19:58

## 2021-03-26 RX ADMIN — ATORVASTATIN CALCIUM 80 MG: 80 TABLET, FILM COATED ORAL at 18:08

## 2021-03-26 RX ADMIN — METOPROLOL TARTRATE 25 MG: 25 TABLET, FILM COATED ORAL at 08:01

## 2021-03-26 RX ADMIN — METOPROLOL TARTRATE 25 MG: 25 TABLET, FILM COATED ORAL at 09:31

## 2021-03-26 RX ADMIN — PANTOPRAZOLE SODIUM 40 MG: 40 TABLET, DELAYED RELEASE ORAL at 05:03

## 2021-03-26 RX ADMIN — BUPROPION HYDROCHLORIDE 300 MG: 150 TABLET, FILM COATED, EXTENDED RELEASE ORAL at 09:34

## 2021-03-26 RX ADMIN — FENTANYL CITRATE 50 MCG: 50 INJECTION INTRAMUSCULAR; INTRAVENOUS at 17:55

## 2021-03-26 RX ADMIN — FENTANYL CITRATE 50 MCG: 50 INJECTION INTRAMUSCULAR; INTRAVENOUS at 04:56

## 2021-03-26 RX ADMIN — TICAGRELOR 90 MG: 90 TABLET ORAL at 08:01

## 2021-03-26 RX ADMIN — METOPROLOL TARTRATE 25 MG: 25 TABLET, FILM COATED ORAL at 18:08

## 2021-03-26 RX ADMIN — HEPARIN SODIUM 7500 UNITS: 5000 INJECTION INTRAVENOUS; SUBCUTANEOUS at 05:02

## 2021-03-26 RX ADMIN — HEPARIN SODIUM 7500 UNITS: 5000 INJECTION INTRAVENOUS; SUBCUTANEOUS at 21:03

## 2021-03-26 RX ADMIN — FENTANYL CITRATE 50 MCG: 50 INJECTION INTRAMUSCULAR; INTRAVENOUS at 01:56

## 2021-03-26 RX ADMIN — FENTANYL CITRATE 50 MCG: 50 INJECTION INTRAMUSCULAR; INTRAVENOUS at 09:32

## 2021-03-26 RX ADMIN — SERTRALINE HYDROCHLORIDE 50 MG: 50 TABLET ORAL at 08:01

## 2021-03-26 RX ADMIN — NICOTINE 7 MG/24 HR DAILY TRANSDERMAL PATCH 1 PATCH: at 08:03

## 2021-03-26 RX ADMIN — TICAGRELOR 90 MG: 90 TABLET ORAL at 18:09

## 2021-03-26 RX ADMIN — HEPARIN SODIUM 7500 UNITS: 5000 INJECTION INTRAVENOUS; SUBCUTANEOUS at 13:40

## 2021-03-26 NOTE — PLAN OF CARE
Problem: PAIN - ADULT  Goal: Verbalizes/displays adequate comfort level or baseline comfort level  Description: Interventions:  - Encourage patient to monitor pain and request assistance  - Assess pain using appropriate pain scale  - Administer analgesics based on type and severity of pain and evaluate response  - Implement non-pharmacological measures as appropriate and evaluate response  - Consider cultural and social influences on pain and pain management  - Notify physician/advanced practitioner if interventions unsuccessful or patient reports new pain  3/26/2021 0120 by Kamran Fulton RN  Outcome: Progressing  3/26/2021 0119 by Kamran Fulton RN  Outcome: Progressing     Problem: INFECTION - ADULT  Goal: Absence or prevention of progression during hospitalization  Description: INTERVENTIONS:  - Assess and monitor for signs and symptoms of infection  - Monitor lab/diagnostic results  - Monitor all insertion sites, i e  indwelling lines, tubes, and drains  - Monitor endotracheal if appropriate and nasal secretions for changes in amount and color  - [place] appropriate cooling/warming therapies per order  - Administer medications as ordered  - Instruct and encourage patient and family to use good hand hygiene technique  - Identify and instruct in appropriate isolation precautions for identified infection/condition  3/26/2021 0120 by Kamran Fulton RN  Outcome: Progressing  3/26/2021 0119 by Kamran Fulton RN  Outcome: Progressing  Goal: Absence of fever/infection during neutropenic period  Description: INTERVENTIONS:  - Monitor WBC    3/26/2021 0120 by Kamran Fulton RN  Outcome: Progressing  3/26/2021 0119 by Kamran Fulton RN  Outcome: Progressing     Problem: SAFETY ADULT  Goal: Patient will remain free of falls  Description: INTERVENTIONS:  - Assess patient frequently for physical needs  -  Identify cognitive and physical deficits and behaviors that affect risk of falls    -  [place] fall precautions as indicated by assessment   - Educate patient/family on patient safety including physical limitations  - Instruct patient to call for assistance with activity based on assessment  - Modify environment to reduce risk of injury  - Consider OT/PT consult to assist with strengthening/mobility  3/26/2021 0120 by Shelley Gomez RN  Outcome: Progressing  3/26/2021 0119 by Shelley Gomez RN  Outcome: Progressing  Goal: Maintain or return to baseline ADL function  Description: INTERVENTIONS:  -  Assess patient's ability to carry out ADLs; assess patient's baseline for ADL function and identify physical deficits which impact ability to perform ADLs (bathing, care of mouth/teeth, toileting, grooming, dressing, etc )  - Assess/evaluate cause of self-care deficits   - Assess range of motion  - Assess patient's mobility; develop plan if impaired  - Assess patient's need for assistive devices and provide as appropriate  - Encourage maximum independence but intervene and supervise when necessary  - Involve family in performance of ADLs  - Assess for home care needs following discharge   - Consider OT consult to assist with ADL evaluation and planning for discharge  - Provide patient education as appropriate  3/26/2021 0120 by Shelley Gomez RN  Outcome: Progressing  3/26/2021 0119 by Shelley Gomez RN  Outcome: Progressing  Goal: Maintain or return mobility status to optimal level  Description: INTERVENTIONS:  - Assess patient's baseline mobility status (ambulation, transfers, stairs, etc )    - Identify cognitive and physical deficits and behaviors that affect mobility  - Identify mobility aids required to assist with transfers and/or ambulation (gait belt, sit-to-stand, lift, walker, cane, etc )  - [place] fall precautions as indicated by assessment  - Record patient progress and toleration of activity level on Mobility SBAR; progress patient to next Phase/Stage  - Instruct patient to call for assistance with activity based on assessment  - Consider rehabilitation consult to assist with strengthening/weightbearing, etc   3/26/2021 0120 by Tani Adams RN  Outcome: Progressing  3/26/2021 0119 by Tani Adams RN  Outcome: Progressing     Problem: DISCHARGE PLANNING  Goal: Discharge to home or other facility with appropriate resources  Description: INTERVENTIONS:  - Identify barriers to discharge w/patient and caregiver  - Arrange for needed discharge resources and transportation as appropriate  - Identify discharge learning needs (meds, wound care, etc )  - Arrange for interpretive services to assist at discharge as needed  - Refer to Case Management Department for coordinating discharge planning if the patient needs post-hospital services based on physician/advanced practitioner order or complex needs related to functional status, cognitive ability, or social support system  3/26/2021 0120 by Tani Adams RN  Outcome: Progressing  3/26/2021 0119 by Tani Adams RN  Outcome: Progressing     Problem: Knowledge Deficit  Goal: Patient/family/caregiver demonstrates understanding of disease process, treatment plan, medications, and discharge instructions  Description: Complete learning assessment and assess knowledge base  Interventions:  - Provide teaching at level of understanding  - Provide teaching via preferred learning methods  3/26/2021 0120 by Tani Adams RN  Outcome: Progressing  3/26/2021 0119 by Tani Adams RN  Outcome: Progressing     Problem: Nutrition/Hydration-ADULT  Goal: Nutrient/Hydration intake appropriate for improving, restoring or maintaining nutritional needs  Description: Monitor and assess patient's nutrition/hydration status for malnutrition  Collaborate with interdisciplinary team and initiate plan and interventions as ordered  Monitor patient's weight and dietary intake as ordered or per policy  Utilize nutrition screening tool and intervene as necessary   Determine patient's food preferences and provide high-protein, high-caloric foods as appropriate       INTERVENTIONS:  - Monitor oral intake, urinary output, labs, and treatment plans  - Assess nutrition and hydration status and recommend course of action  - Evaluate amount of meals eaten  - Assist patient with eating if necessary   - Allow adequate time for meals  - Recommend/ encourage appropriate diets, oral nutritional supplements, and vitamin/mineral supplements  - Order, calculate, and assess calorie counts as needed  - Recommend, monitor, and adjust tube feedings and TPN/PPN based on assessed needs  - Assess need for intravenous fluids  - Provide specific nutrition/hydration education as appropriate  - Include patient/family/caregiver in decisions related to nutrition  3/26/2021 0120 by Ame Camarena RN  Outcome: Progressing  3/26/2021 0119 by Ame Camarena RN  Outcome: Progressing     Problem: CARDIOVASCULAR - ADULT  Goal: Maintains optimal cardiac output and hemodynamic stability  Description: INTERVENTIONS:  - Monitor I/O, vital signs and rhythm  - Monitor for S/S and trends of decreased cardiac output  - Administer and titrate ordered vasoactive medications to optimize hemodynamic stability  - Assess quality of pulses, skin color and temperature  - Assess for signs of decreased coronary artery perfusion  - Instruct patient to report change in severity of symptoms  Outcome: Progressing  Goal: Absence of cardiac dysrhythmias or at baseline rhythm  Description: INTERVENTIONS:  - Continuous cardiac monitoring, vital signs, obtain 12 lead EKG if ordered  - Administer antiarrhythmic and heart rate control medications as ordered  - Monitor electrolytes and administer replacement therapy as ordered  Outcome: Progressing     Problem: RESPIRATORY - ADULT  Goal: Achieves optimal ventilation and oxygenation  Description: INTERVENTIONS:  - Assess for changes in respiratory status  - Assess for changes in mentation and behavior  - Position to facilitate oxygenation and minimize respiratory effort  - Oxygen administered by appropriate delivery if ordered  - Initiate smoking cessation education as indicated  - Encourage broncho-pulmonary hygiene including cough, deep breathe, Incentive Spirometry  - Assess the need for suctioning and aspirate as needed  - Assess and instruct to report SOB or any respiratory difficulty  - Respiratory Therapy support as indicated  Outcome: Progressing

## 2021-03-26 NOTE — ASSESSMENT & PLAN NOTE
Echo completed 3/25/21 concerning for hypertrophic cardiomyopathy +/- obstruction  Per report, there was severe asymmetrical hypertrophy of the inferior septum measuring 25 mm without dynamic obstruction at rest; however, noted to have moderate systolic anterior motion of the mitral valve anterior leaflet  Patient reports longstanding history of uncontrolled HTN with pressures > 276 mm Hg systolic, has been controlled for the last 3-4 years  Possible family history of sudden cardiac death at young age on father's side? Patient is not in contact with her father but will reach out to other family members to clarify history  Cardiac MRI:  Severe concentric LV hypertrophy with mildly reduced LV systolic function  Normal RV size and systolic function  Mild MR  Moderately dilated LA  Small circumferential pericardial effusion  Dense transmural ischemic scarring with evidence of microvascular obstruction of the entire inferior and inferolateral walls  These findings may suggest an underlying hypertrophic cardiomyopathy with ischemic scarring of the inferior and inferolateral walls      Plan:  · Patient will need close outpatient follow up with HCM clinic  · No additional testing at this time

## 2021-03-26 NOTE — UTILIZATION REVIEW
Initial Clinical Review    Admission: Date/Time/Statement:   Admission Orders (From admission, onward)     Ordered        03/25/21 0509  Inpatient Admission  Once        TRANSFER FROM ED [facility] TO [place] [address]  LEVEL OF CARE           Orders Placed This Encounter   Procedures    Inpatient Admission     Standing Status:   Standing     Number of Occurrences:   1     Order Specific Question:   Level of Care     Answer:   Level 1 Stepdown [13]     Order Specific Question:   Estimated length of stay     Answer:   More than 2 Midnights     Order Specific Question:   Certification     Answer:   I certify that inpatient services are medically necessary for this patient for a duration of greater than two midnights  See H&P and MD Progress Notes for additional information about the patient's course of treatment  Assessment/Plan:   Ms Amy Blum is a 41 yo female who presents as a transfer from the ED [facility] to [facility] for treatment of MI and cardiac catheterization  She started with worsening chest pain and SOB at home and when she got to the ED she has significant ST elevation  On entry to the cath lab she was still c/o chest pain and SOB that had improved  No other reported symptoms  She is a daily smoker and intermittent marijuana user  PMH: hypertension, morbid obesity, hyperlipidemia, no coronary artery disease history of PCI that RCA and LAD 2014 as well as history non-Hodgkin's lymphoma order deficiency anemia  She is admitted to INPATIENT status to Level 1 SD from the cath lab after she had GREG placement in RCA  She is admitted with Inferior ST segment Elevation MI - Tele, trend troponins, replete electrolytes, CAM, hold antihypertensives at this time d/t soft BP, Brilinita, ASA, increase Atorvastatin to 80 mg daily, smoking cessation counseling,     Post cardiac cath pt did c/o chest pain and was requesting oxygen  She was on 4L with 98% sat  Her BP had improved    ECG showed persistent ST elevation in inferiorlateral leads, but pain was better than on presentation  Will titrate up medical therapy and follow ECGs  Had intermittent chest pain rated 7/10, she was anxious, had intermittent symptomatic nonsustained VT with nausea, lightheadedness  She has increased risk d/t CKD stage 3 but she has been hydrated with IV fluids  Titrate up BB therapy, if VT continues will consult EPS       3/26 she had return to the cath lab and had 90% stenosis of R PDA ostium c/w thrombus  The R ostium was stented  She had 100% stenosis of the 2nd posterolateral segment consistent with thrombus, however, this vessel was not amenable to intervention  Since then, chest pain has subsided but is still present  Also continues to have ST elevation on telemetry  Trending troponins until peak, Tlee, Brilinta, Atorvastatin, heparin drip was added, Fentanyl for pain, replete electrolytes  ED Triage Vitals   Temperature Pulse Respirations Blood Pressure SpO2   03/25/21 0630 03/25/21 0630 03/25/21 0630 03/25/21 0630 03/25/21 0645   (!) 97 3 °F (36 3 °C) 66 18 113/58 97 %      Temp Source Heart Rate Source Patient Position - Orthostatic VS BP Location FiO2 (%)   03/25/21 0630 03/25/21 0630 03/25/21 1618 03/25/21 1618 --   Oral Monitor Lying Left arm       Pain Score       03/25/21 0629       7          Wt Readings from Last 1 Encounters:   03/25/21 122 kg (269 lb 6 4 oz)     Additional Vital Signs:         Pertinent Labs/Diagnostic Test Results:     3/24 CXR - No acute cardiopulmonary disease      3/24 ECG - Normal sinus rhythm  ST elevation consider anterolateral injury or acute infarct  ST elevation consider inferior injury or acute infarct  ACUTE MI / STEMI  Abnormal ECG  When compared with ECG of 11-OCT-2018 21:55,  Acute MI is new    3/25 Cardiac Cath -    CORONARY CIRCULATION:  Left main: The vessel was normal sized  There was moderate plaque  LAD: The vessel was normal sized   There was moderate non-obstructive plaque  Circumflex: The vessel was medium sized  There was moderate plaque  RCA: The vessel was large sized and dominant, giving rise to the PDA and a large posterolateral branch  There was a total occlusion in mid vessel  This was the culprit for the patient's inferior MI  A stent in the distal RCA was shown to  be patent after reperfusion and IVUS of the distal vessel      1ST LESION INTERVENTIONS:  Following pre-dilation and IVUS interrogation, a Xience Aneta Rx 4 0 x 38mm drug-eluting stent was placed across the site of the 100% lesion in mid vessel and deployed at a maximum inflation pressure of 18 lon  After post-dilation to  4 5mm, there was 0% residual stenosis with full stent expansion and apposition  DALILA flow improved from grade 0 to grade 3      3/25 post Cath ECG - Normal sinus rhythm  Minimal voltage criteria for LVH, may be normal variant  Inferior infarct , possibly acute  Anterolateral infarct (cited on or before 25-MAR-2021)  ACUTE MI / STEMI  Consider right ventricular involvement in acute inferior infarct  Abnormal ECG  When compared with ECG of 25-MAR-2021 05:54, (unconfirmed)  QRS duration has decreased  Inferior infarct is now Present  Serial changes of evolving Anterolateral infarct Present    3/25 Echo - IMPRESSIONS:Features are consistent with hypertrophic cardiomyopathy and inferior wall myocardial infarction  Clinical correlation is advised      SUMMARY: LEFT VENTRICLE:   Systolic function was normal  Ejection fraction was estimated to be 60 %  There was severe hypokinesis of the basal-mid inferior wall(s)  Wall thickness was markedly increased  There was severe assymetrical hypertrophy of the inferior septum (25 mm)  There was no dynamic obstruction at rest  Provocative maneuvers were not performed     Features were consistent with a pseudonormal left ventricular filling pattern, with concomitant abnormal relaxation and increased filling pressure (grade 2 diastolic dysfunction)  LEFT ATRIUM:  The atrium was mildly dilated  MITRAL VALVE:  There was moderate systolic anterior motion of the anterior leaflet  There was mild to moderate regurgitation  AORTIC VALVE:  There was trace regurgitation  3/25 Cardiac Cath #2 - Right PDA: There was a discrete 90 % stenosis at the ostium of the vessel segment  The lesion was associated with a large filling defect consistent with thrombus  This lesion is a likely culprit for the patient's anginal symptoms  An  intervention was performed  2nd posterolateral segment: There was a 100 % stenosis  The lesion was associated with a moderate filling defect consistent with thrombus  It does not appear amenable to intervention  Distal embolization     HEMODYNAMICS:  Hemodynamic assessment demonstrated mildly to moderately elevated LVEDP      1ST LESION INTERVENTIONS:  IC integrilin given (180mcg/Kg)  A successful balloon angioplasty with stent procedure was performed on the 90 % lesion in the right posterior descending artery  Following intervention there was an excellent angiographic appearance with a 0 % residual stenosis    A Resolute [place] Rx 3 5 x 12mm drug-eluting stent was placed across the lesion and deployed at a maximum inflation pressure of 12 lon     3/25 ECG - Normal sinus rhythm  Inferior infarct (cited on or before 25-MAR-2021)  Possible Anterior infarct (cited on or before 11-OCT-2018)  T wave abnormality, consider lateral ischemia  ACUTE MI / STEMI  Consider right ventricular involvement in acute inferior infarct  Abnormal ECG  When compared with ECG of 25-MAR-2021 16:58, (unconfirmed)  No significant change was found    Results from last 7 days   Lab Units 03/25/21  0255   SARS-COV-2  Negative     Results from last 7 days   Lab Units 03/26/21  0444 03/25/21  0620 03/25/21  0240   WBC Thousand/uL 22 52* 20 69* 24 00*   HEMOGLOBIN g/dL 13 2 14 7 14 9   HEMATOCRIT % 40 4 44 1 44 6   PLATELETS Thousands/uL 392* 436* 484*   TOTAL NEUT ABS Thousand/uL  --   --  12 72*         Results from last 7 days   Lab Units 03/26/21  0444 03/25/21  0620 03/25/21  0240   SODIUM mmol/L 137 138 139   POTASSIUM mmol/L 4 7 4 0 3 2*   CHLORIDE mmol/L 108 110* 104   CO2 mmol/L 22 23 23   ANION GAP mmol/L 7 5 12   BUN mg/dL 12 14 13   CREATININE mg/dL 1 49* 1 68* 1 61*   EGFR ml/min/1 73sq m 42 37 39   CALCIUM mg/dL 9 0 9 2 9 5   MAGNESIUM mg/dL  --  2 1 1 8*     Results from last 7 days   Lab Units 03/25/21  0240   AST U/L 24   ALT U/L 30   ALK PHOS U/L 84   TOTAL PROTEIN g/dL 7 1   ALBUMIN g/dL 3 8   TOTAL BILIRUBIN mg/dL 0 50         Results from last 7 days   Lab Units 03/26/21  0444 03/25/21  0620 03/25/21  0240   GLUCOSE RANDOM mg/dL 101 125 129*     Results from last 7 days   Lab Units 03/25/21  1115 03/25/21  0620 03/25/21  0240   TROPONIN I ng/mL >40 00* 39 70* 0 29*         Results from last 7 days   Lab Units 03/25/21  0246   PROTIME seconds 13 5   INR  1 04   PTT seconds 26     Results from last 7 days   Lab Units 03/25/21  0240   BNP pg/mL 214*     Results from last 7 days   Lab Units 03/25/21  0240   LIPASE u/L 45     Results from last 7 days   Lab Units 03/25/21  0255   INFLUENZA A PCR  Negative   INFLUENZA B PCR  Negative   RSV PCR  Negative       Past Medical History:   Diagnosis Date    Acute MI ([facility] )     Anxiety     Cardiac disease     Cardiomyopathy ([place] [address] )     CHF (congestive heart failure) (HCC)     Chronic kidney disease     Depression     History of chemotherapy     non hodgkins lymphoma 2008    Hypertension     Lymphoma, non-Hodgkin's ([place] [address] )     Tobacco abuse      Present on Admission:   Acute MI, inferior wall (HCC)   Essential hypertension   Tobacco abuse   CHF (congestive heart failure) (HCC)   Mixed hyperlipidemia   CKD (chronic kidney disease) stage 3, GFR 30-59 ml/min   Leukocytosis    Admitting Diagnosis: AMI (acute mesenteric ischemia) ([place] [address] ) [[id number]]     Age/Sex: 42 y o  female     Admission Orders:  Scheduled Medications:  aspirin, 81 mg, Oral, Daily  atorvastatin, 80 mg, Oral, QPM  buPROPion, 300 mg, Oral, QAM  heparin (porcine), 7,500 Units, Subcutaneous, Q8H ABIEL  metoprolol tartrate, 25 mg, Oral, Q8H  nicotine, 1 patch, Transdermal, Daily  pantoprazole, 40 mg, Oral, Early Morning  sertraline, 50 mg, Oral, Daily  ticagrelor, 90 mg, Oral, BID    Continuous IV Infusions:   IV fluids 0 9% NSS @ 100 ml and 50 ml in 2 lines     PRN Meds:  acetaminophen, 650 mg, Oral, Q4H PRN  albuterol, 2 5 mg, Nebulization, Q4H PRN  ALPRAZolam, 0 5 mg, Oral, HS PRN  - x 1 3/25  eptifibatide, , , Code/Trauma/Sedation Med - x 1 3/25  fentanyl citrate (PF), 50 mcg, Intravenous, Q2H PRN - x 5 3/25, x 3 3/26  fentanyl citrate (PF), , Intravenous, Code/Trauma/Sedation Med  heparin (porcine), , , Code/Trauma/Sedation Med -x 2 3/25  iohexol, , Intravenous, Code/Trauma/Sedation Med  lidocaine (PF), , , Code/Trauma/Sedation Med  midazolam, , , Code/Trauma/Sedation Med  nitroGLYcerin, , Intra-arterial, Code/Trauma/Sedation Med  ondansetron, 4 mg, Intravenous, Q6H PRN -x 1 3/25  verapamil, , , Code/Trauma/Sedation Med - x 1 3/25    Level 1 SD  Tele   SCDs  routine post cath orders  Vitals and neurovascular checks q 4 hr   Cardiac diet   IP CONSULT TO CASE MANAGEMENT  IP CONSULT TO ELECTROPHYSIOLOGY    Network Utilization Review Department  ATTENTION: Please call with any questions or concerns to [phone] and carefully listen to the prompts so that you are directed to the right person  All voicemails are confidential   Jerry Delude all requests for admission clinical reviews, approved or denied determinations and any other requests to dedicated fax number below belonging to the campus where the patient is receiving treatment   List of dedicated fax numbers for the Facilities:  [address] DENIALS (Administrative/Medical Necessity) [phone]   [address] (Maternity/NICU/Pediatrics) [phone] 5000 [facility] [phone]   [address]  [phone]   Yamila Mart[address]nez Alonzo [address] ([facility]) [address] [place] [address] Rocio [address]ley 7516 [phone]   [place] [address] [phone]

## 2021-03-26 NOTE — PROGRESS NOTES
Cardiology Progress Note - Chilo Garner 42 y o  female MRN: [id number]    Unit/Bed#: [facility] 505-01 Encounter: [id number]      Assessment & Plan:  Principal Problem:    Acute MI, inferior wall (HCC)  Active Problems:    Ventricular tachycardia, non-sustained (HCC)    Hypertrophic cardiomyopathy (HCC)    Essential hypertension    CHF (congestive heart failure) (HCC)    Tobacco abuse    Mixed hyperlipidemia    CKD (chronic kidney disease) stage 3, GFR 30-59 ml/min    BMI 40 0-44 9, adult (HCC)    Leukocytosis        * Acute MI, inferior wall [facility])  Assessment & Plan  Patient presented morning of 3/25 with acute inferior wall MI  Initial cath notable for diffuse atherosclerosis and 100% stenosis of mid-RCA which was stented  Continued to have significant chest pain and ST elevations in the hours post-procedure  She was then taken back to cath lab and was found to have 90% stenosis of right PDA ostium which was consistent with thrombus  The right PDA ostium was also stented  Additionally, patient had 100% stenosis of the 2nd posterolateral segment consistent with thrombus, however, this vessel was not amenable to intervention  Since then, chest pain has subsided but is still present  Also continues to have ST elevation on telemetry  Plan:  - continue to trend troponin until peak  - continue to monitor on telemetry 2/2 arrhythmia  - Continue Brilinta 90 mg bid  Patient was advised that Brilinta may cause some intermittent dyspnea  - Heparin gtt discontinued  - Continue Atorvastatin 80 mg QHS  - Fentanyl as needed for breakthrough pain   - Smoking cessation strongly encouraged  - Lifestyle modifications  - Replete Mg and K as needed    Ventricular tachycardia, non-sustained (HCC)  Assessment & Plan  Multiple episodes of non-sustained monomorphic ventricular tachycardia in the post-MI period  Last episode recorded on telemetry occurred at 9:07 PM and lasted for approximately 45 beats (21 seconds) before converting   Patient is highly symptomatic during these episodes  Plan:  - Continue to monitor on telemetry  - Metoprolol increased to 25 mg q8h  Please notify cardiology if medication is not given due to hold parameters  - If further occurrences of VT, will consult EP    Hypertrophic cardiomyopathy [facility])  Assessment & Plan  Echo completed 3/25/21 concerning for hypertrophic cardiomyopathy +/- obstruction  Per report, there was severe asymmetrical hypertrophy of the inferior septum measuring 25 mm without dynamic obstruction at rest; however, noted to have moderate systolic anterior motion of the mitral valve anterior leaflet  Plan:  - will obtain cardiac MRI for further evaluation  - Xanax 0 5 mg ordered to be given 15 min prior to procedure for anxiety  - Will also question patient further about family cardiac history    CHF (congestive heart failure) (HCC)  Assessment & Plan  Wt Readings from Last 3 Encounters:   03/25/21 122 kg (269 lb 6 4 oz)   03/25/21 127 kg (280 lb)   11/03/20 125 kg (275 lb)       Prior history of CHF  No signs of volume overload  Echo completed yesterday with normal EF at 60%  - continue to monitor volume status and diurese if necessary      Essential hypertension  Assessment & Plan  Patient with history of HTN, on Norvasc 5 mg and Lisinopril 5 mg at home  BP was initially soft post-cardiac cath  This morning, BP has been 104-702 systolic   - Continue to monitor off antihypertensives for now  - Consider resuming home antihypertensives if BP is persistently elevated    Tobacco abuse  Assessment & Plan  Patient currently 1/2 to 1 ppd smoker  Strongly encouraged smoking cessation given MI and prior cardiac history  Continue with nicotine patch during admission  Mixed hyperlipidemia  Assessment & Plan  History of hyperlipidemia, on Atorvastatin 40 mg QD at home  Fasting lipids this morning with sub-optimal control- total cholesterol 202, Tg 230, HDL 34, and    Will increase Atorvastatin to 80 mg QD     CKD (chronic kidney disease) stage 3, GFR 30-59 ml/min  Assessment & Plan  Lab Results   Component Value Date    EGFR 42 03/26/2021    EGFR 37 03/25/2021    EGFR 39 03/25/2021    CREATININE 1 49 (H) 03/26/2021    CREATININE 1 68 (H) 03/25/2021    CREATININE 1 61 (H) 03/25/2021     Per review of records, patient's baseline creatinine is approximately 1 5  Received approximately 2 5 L of fluids after cath procedures yesterday  At this point, creatinine has improved and is at baseline  Will continue to trend  Leukocytosis  Assessment & Plan  Leukocytosis present on admission, WBC > 20  This likely represents a stress reaction  Will continue to trend off of antibiotics  Subjective:   Patient seen and examined at bedside  Reports that she continued to have significant chest pain until 2-3 AM today  Since then chest pain has subsided and is now rated at 2/10  Chest pain is retrosternal and aching  Reports having dyspnea when ambulating to bathroom  Several additional episodes of non-sustained VT yesterday evening with last episode at 9:07 PM  She again was highly symptomatic during these episodes  Very tired today  She has tried to sleep but has woken up gasping for air a few times  Very anxious about her condition as well  Review of Systems   Constitutional: Positive for activity change and fatigue  Negative for appetite change, chills, diaphoresis, fever and unexpected weight change  HENT: Negative for ear pain and sore throat  Eyes: Negative for pain and visual disturbance  Respiratory: Positive for shortness of breath  Negative for cough, chest tightness and wheezing  Cardiovascular: Positive for chest pain and palpitations  Negative for leg swelling  Gastrointestinal: Negative for abdominal pain, nausea and vomiting  Endocrine: Negative for polydipsia and polyuria  Genitourinary: Negative for difficulty urinating, dysuria and hematuria     Musculoskeletal: Negative for arthralgias and back pain  Skin: Negative for color change and rash  Neurological: Positive for dizziness and light-headedness  Negative for seizures and syncope  Psychiatric/Behavioral: Negative for dysphoric mood  The patient is nervous/anxious  All other systems reviewed and are negative  Objective:     Vitals: Blood pressure 112/72, pulse 73, temperature 98 5 °F (36 9 °C), resp  rate 20, height 5' 6" (1 676 m), weight 122 kg (269 lb 6 4 oz), SpO2 97 %, not currently breastfeeding , Body mass index is 43 48 kg/m² ,   Orthostatic Blood Pressures      Most Recent Value   Blood Pressure  112/72 filed at 03/26/2021 1104   Patient Position - Orthostatic VS  Sitting filed at 03/25/2021 2313            Intake/Output Summary (Last 24 hours) at 3/26/2021 1607  Last data filed at 3/26/2021 1345  Gross per 24 hour   Intake 2715 27 ml   Output 1400 ml   Net 1315 27 ml           Physical Exam:     Physical Exam  Vitals signs and nursing note reviewed  Constitutional:       General: She is awake  Appearance: She is morbidly obese  She is not ill-appearing, toxic-appearing or diaphoretic  Comments: Patient appears tired   HENT:      Head: Normocephalic and atraumatic  Right Ear: External ear normal  There is no impacted cerumen  Left Ear: External ear normal  There is no impacted cerumen  Nose: Nose normal       Mouth/Throat:      Mouth: Mucous membranes are moist       Pharynx: Oropharynx is clear  Eyes:      General: No scleral icterus  Conjunctiva/sclera: Conjunctivae normal    Neck:      Musculoskeletal: Normal range of motion and neck supple  Vascular: No carotid bruit or JVD  Cardiovascular:      Rate and Rhythm: Normal rate and regular rhythm  Pulses: Normal pulses  Heart sounds: Normal heart sounds  No murmur  No friction rub  No gallop  Pulmonary:      Effort: Pulmonary effort is normal  No respiratory distress        Breath sounds: Normal breath sounds  No wheezing, rhonchi or rales  Abdominal:      General: Bowel sounds are normal  There is no distension  Palpations: Abdomen is soft  Tenderness: There is no abdominal tenderness  Musculoskeletal:         General: No deformity  Right lower leg: No edema  Left lower leg: No edema  Skin:     General: Skin is warm and dry  Capillary Refill: Capillary refill takes less than 2 seconds  Neurological:      General: No focal deficit present  Mental Status: She is alert and oriented to person, place, and time  Mental status is at baseline  Psychiatric:         Mood and Affect: Mood is anxious  Mood is not depressed  Behavior: Behavior normal  Behavior is cooperative               Current Facility-Administered Medications:     acetaminophen (TYLENOL) tablet 650 mg, 650 mg, Oral, Q4H PRN, Marty Leiva MD    albuterol inhalation solution 2 5 mg, 2 5 mg, Nebulization, Q4H PRN, Yesica Wood MD    ALPRAZolam Felicia Fede) tablet 0 5 mg, 0 5 mg, Oral, HS PRN, Lauri Gomez MD, 0 5 mg at 03/25/21 2116    ALPRAZolam (XANAX) tablet 0 5 mg, 0 5 mg, Oral, Once PRN, Toma Holden DO    aspirin chewable tablet 81 mg, 81 mg, Oral, Daily, Emy Sofia DO, 81 mg at 03/26/21 0818    atorvastatin (LIPITOR) tablet 80 mg, 80 mg, Oral, QPM, Emy Sofia DO, 80 mg at 03/25/21 1716    buPROPion (WELLBUTRIN XL) 24 hr tablet 300 mg, 300 mg, Oral, QAM, Emy Sofia DO, 300 mg at 03/26/21 4577    eptifibatide (INTEGRILIN) bolus from premix, , , Code/Trauma/Sedation Med, Eddie Gunn MD, 11 3 mL at 03/25/21 1528    fentanyl citrate (PF) 100 MCG/2ML 50 mcg, 50 mcg, Intravenous, Q2H PRN, Toma Holden DO, 50 mcg at 03/26/21 1517    fentanyl citrate (PF) 100 MCG/2ML, , Intravenous, Code/Trauma/Sedation Med, Eddie Gunn MD, 50 mcg at 03/25/21 1530    heparin (porcine) injection, , , Code/Trauma/Sedation Med, Eddie Gunn MD, 8,000 Units at 03/25/21 1459    heparin (porcine) subcutaneous injection 7,500 Units, 7,500 Units, Subcutaneous, Q8H [address], 7,500 Units at 03/26/21 1340 **AND** [CANCELED] Platelet count, , , Once, Sherlyn Cushing, DO    iohexol (OMNIPAQUE) 350 MG/ML injection (SINGLE-DOSE), , Intravenous, Code/Trauma/Sedation Med, Weston Gray MD, 75 mL at 03/25/21 1539    lidocaine (PF) (XYLOCAINE-MPF) 1 % injection, , , Code/Trauma/Sedation Med, Weston Gray MD, 1 mL at 03/25/21 1447    metoprolol tartrate (LOPRESSOR) tablet 25 mg, 25 mg, Oral, Q8H, Chica Mcdonald MD, 25 mg at 03/26/21 8636    midazolam (VERSED) injection, , , Code/Trauma/Sedation Med, Weston Gray MD, 2 mg at 03/25/21 1446    nicotine (NICODERM CQ) 7 mg/24hr TD 24 hr patch 1 patch, 1 patch, Transdermal, Daily, Sherlyn Cushing, DO, 1 patch at 03/26/21 0803    nitroGLYcerin (TRIDIL) 50 mg in 250 mL infusion (premix), , Intra-arterial, Code/Trauma/Sedation Med, Weston Gray MD, 200 mcg at 03/25/21 1451    ondansetron (ZOFRAN) injection 4 mg, 4 mg, Intravenous, Q6H PRN, Sherlyn Cushing, DO, 4 mg at 03/25/21 1606    pantoprazole (PROTONIX) EC tablet 40 mg, 40 mg, Oral, Early Morning, Sherlyn Cushing, DO, 40 mg at 03/26/21 0503    sertraline (ZOLOFT) tablet 50 mg, 50 mg, Oral, Daily, Sherlyn Cushing, DO, 50 mg at 03/26/21 0801    ticagrelor (BRILINTA) tablet 90 mg, 90 mg, Oral, BID, Sherlyn Cushing, DO, 90 mg at 03/26/21 0801    verapamil (ISOPTIN) injection, , , Code/Trauma/Sedation Med, Weston Gray MD, 2 5 mg at 03/25/21 1451    Labs & Results:    Lab Results   Component Value Date    TROPONINI >40 00 (H) 03/25/2021    TROPONINI 39 70 (H) 03/25/2021    TROPONINI 0 29 (H) 03/25/2021       Lab Results   Component Value Date    CALCIUM 9 0 03/26/2021    K 4 7 03/26/2021    CO2 22 03/26/2021     03/26/2021    BUN 12 03/26/2021    CREATININE 1 49 (H) 03/26/2021       Lab Results   Component Value Date    WBC 22 52 (H) 03/26/2021    HGB 13 2 03/26/2021    HCT 40 4 03/26/2021    MCV 85 03/26/2021     (H) 03/26/2021     Results from last 7 days   Lab Units 03/25/21  0246   INR  1 04       No results found for: CHOL  Lab Results   Component Value Date    HDL 34 (L) 03/25/2021    HDL 31 (L) 07/08/2020     Lab Results   Component Value Date    LDLCALC 122 (H) 03/25/2021    [address]  07/08/2020      Comment:      Calculated LDL invalid, triglycerides >400 mg/dl  This screening LDL is a calculated result  It does not have the accuracy of the Direct Measured LDL in the monitoring of patients with hyperlipidemia and/or statin therapy  Direct Measure LDL (VXU500) must be ordered separately in these patients  Lab Results   Component Value Date    TRIG 230 (H) 03/25/2021    TRIG 465 (H) 07/08/2020       Lab Results   Component Value Date    ALT 30 03/25/2021    AST 24 03/25/2021         EKG 3/25 at 16:58 - NSR at 74, left axis, deep Q waves in II, III, and aVF  ST elevations in II, III, aVF, V5-V6 with reciprocal changes  TELE: Episode of non-sustained VT at 9:07 PM on 3/25 which lasted approximately 45 beats  Currently in NSR with occasional PVCs  Transthoracic Echo 3/25/21:  IMPRESSIONS:  Features are consistent with hypertrophic cardiomyopathy and inferior wall myocardial infarction  Clinical correlation is advised      SUMMARY  LEFT VENTRICLE:  Systolic function was normal  Ejection fraction was estimated to be 60 %  There was severe hypokinesis of the basal-mid inferior wall(s)  Wall thickness was markedly increased  There was severe assymetrical hypertrophy of the inferior septum (25 mm)  There was no dynamic obstruction at rest  Provocative maneuvers were not performed   Features were consistent with a pseudonormal left ventricular filling pattern, with concomitant abnormal relaxation and increased filling pressure (grade 2 diastolic dysfunction)      LEFT ATRIUM:  The atrium was mildly dilated      MITRAL VALVE:  There was moderate systolic anterior motion of the anterior leaflet  There was mild to moderate regurgitation      AORTIC VALVE:  There was trace regurgitation      ======    Thank you for allowing me to participate in the care of your patient, DO Sohail Salmon [facility] Neurology Residency, PGY-1

## 2021-03-26 NOTE — RESPIRATORY THERAPY NOTE
RT Protocol Note  Janeen Smart 42 y o  female MRN: [id number]  Unit/Bed#: [facility] 505-01 Encounter: [id number]    Assessment    Principal Problem:    Acute MI, inferior wall (HCC)  Active Problems:    Essential hypertension    Tobacco abuse    CHF (congestive heart failure) (HCC)    CKD (chronic kidney disease) stage 3, GFR 30-59 ml/min    Mixed hyperlipidemia    BMI 40 0-44 9, adult (HCC)    Ventricular tachycardia, non-sustained (HCC)    Leukocytosis      Home Pulmonary Medications:  NA       Past Medical History:   Diagnosis Date    Acute [facility] ([facility] )     Anxiety     Cardiac disease     Cardiomyopathy ([place] [address] )     [facility]) [facility])     Chronic kidney disease     Depression     History of chemotherapy     non hodgkins lymphoma 2008    Hypertension     Lymphoma, non-Hodgkin's ([place] [address] )     Tobacco abuse      Social History     Socioeconomic History    Marital status: /Civil Union     Spouse name: Not on file    Number of children: Not on file    Years of education: Not on file    Highest education level: Not on file   Occupational History    Not on file   Social Needs    Financial resource strain: Not on file    Food insecurity     Worry: Not on file     Inability: Not on file   "[employer] needs     Medical: Not on file     Non-medical: Not on file   Tobacco Use    Smoking status: Current Every Day Smoker     Packs/day: 1 00     Types: Cigarettes    Smokeless tobacco: Current User   Substance and Sexual Activity    Alcohol use: Not Currently     Comment: rarely    Drug use: Never    Sexual activity: Yes     Partners: Male     Birth control/protection: Male Sterilization     Comment:    Lifestyle    Physical activity     Days per week: Not on file     Minutes per session: Not on file    Stress: Not on file   Relationships    Social connections     Talks on phone: Not on file     Gets together: Not on file     Attends Bahai service: Not on file     Active member of club or organization: Not on file     Attends meetings of clubs or organizations: Not on file     Relationship status: Not on file    Intimate partner violence     Fear of current or ex partner: Not on file     Emotionally abused: Not on file     Physically abused: Not on file     Forced sexual activity: Not on file   Other Topics Concern    Not on file   Social History Narrative    Not on file       Subjective         Objective    Physical Exam:   Assessment Type: (P) Assess only  General Appearance: (P) Awake, Alert  Respiratory Pattern: (P) Normal  Chest Assessment: (P) Chest expansion symmetrical  Bilateral Breath Sounds: (P) Clear, Diminished    Vitals:  Blood pressure 93/71, pulse 63, temperature 98 7 °F (37 1 °C), resp  rate 16, height 5' 6" (1 676 m), weight 122 kg (269 lb 6 4 oz), SpO2 (P) 98 %, not currently breastfeeding  Imaging and other studies: I have personally reviewed pertinent reports  Plan    Respiratory Plan: (P) No distress/Pulmonary history        Resp Comments: (P) Pt evaluated per respiratory protocol  Pt is a 41 y/o female admitted for ami/cp  Pt does not have any documented pulmonary history  No home respiratory medication use  Breathe sounds are clear  Pt admits to waking up felling like she is gasping for air   sttes this happens often through the night  Told pt she would benefit from a sleep study to look for sleep apnea  Will continue to moniter pt per protocol

## 2021-03-27 PROCEDURE — 99232 SBSQ HOSP IP/OBS MODERATE 35: CPT | Performed by: INTERNAL MEDICINE

## 2021-03-27 RX ORDER — ALPRAZOLAM 0.5 MG/1
1 TABLET ORAL ONCE AS NEEDED
Status: ACTIVE | OUTPATIENT
Start: 2021-03-27 | End: 2021-03-27

## 2021-03-27 RX ORDER — METOPROLOL TARTRATE 50 MG/1
50 TABLET, FILM COATED ORAL EVERY 12 HOURS SCHEDULED
Status: DISCONTINUED | OUTPATIENT
Start: 2021-03-27 | End: 2021-03-30

## 2021-03-27 RX ADMIN — ALPRAZOLAM 0.5 MG: 0.5 TABLET ORAL at 05:08

## 2021-03-27 RX ADMIN — METOPROLOL TARTRATE 25 MG: 25 TABLET, FILM COATED ORAL at 00:22

## 2021-03-27 RX ADMIN — ATORVASTATIN CALCIUM 80 MG: 80 TABLET, FILM COATED ORAL at 17:27

## 2021-03-27 RX ADMIN — HEPARIN SODIUM 7500 UNITS: 5000 INJECTION INTRAVENOUS; SUBCUTANEOUS at 14:34

## 2021-03-27 RX ADMIN — HEPARIN SODIUM 7500 UNITS: 5000 INJECTION INTRAVENOUS; SUBCUTANEOUS at 05:08

## 2021-03-27 RX ADMIN — FENTANYL CITRATE 50 MCG: 50 INJECTION INTRAMUSCULAR; INTRAVENOUS at 00:35

## 2021-03-27 RX ADMIN — FENTANYL CITRATE 50 MCG: 50 INJECTION INTRAMUSCULAR; INTRAVENOUS at 07:44

## 2021-03-27 RX ADMIN — FENTANYL CITRATE 50 MCG: 50 INJECTION INTRAMUSCULAR; INTRAVENOUS at 11:55

## 2021-03-27 RX ADMIN — FENTANYL CITRATE 50 MCG: 50 INJECTION INTRAMUSCULAR; INTRAVENOUS at 14:37

## 2021-03-27 RX ADMIN — FENTANYL CITRATE 50 MCG: 50 INJECTION INTRAMUSCULAR; INTRAVENOUS at 17:28

## 2021-03-27 RX ADMIN — PANTOPRAZOLE SODIUM 40 MG: 40 TABLET, DELAYED RELEASE ORAL at 05:08

## 2021-03-27 RX ADMIN — TICAGRELOR 90 MG: 90 TABLET ORAL at 17:27

## 2021-03-27 RX ADMIN — FENTANYL CITRATE 50 MCG: 50 INJECTION INTRAMUSCULAR; INTRAVENOUS at 22:12

## 2021-03-27 RX ADMIN — BUPROPION HYDROCHLORIDE 300 MG: 150 TABLET, FILM COATED, EXTENDED RELEASE ORAL at 09:44

## 2021-03-27 RX ADMIN — FENTANYL CITRATE 50 MCG: 50 INJECTION INTRAMUSCULAR; INTRAVENOUS at 19:59

## 2021-03-27 RX ADMIN — HEPARIN SODIUM 7500 UNITS: 5000 INJECTION INTRAVENOUS; SUBCUTANEOUS at 22:12

## 2021-03-27 RX ADMIN — ALPRAZOLAM 0.5 MG: 0.5 TABLET ORAL at 22:31

## 2021-03-27 RX ADMIN — ASPIRIN 81 MG: 81 TABLET, CHEWABLE ORAL at 09:43

## 2021-03-27 RX ADMIN — SERTRALINE HYDROCHLORIDE 50 MG: 50 TABLET ORAL at 09:43

## 2021-03-27 RX ADMIN — TICAGRELOR 90 MG: 90 TABLET ORAL at 09:43

## 2021-03-27 RX ADMIN — FENTANYL CITRATE 50 MCG: 50 INJECTION INTRAMUSCULAR; INTRAVENOUS at 03:49

## 2021-03-27 RX ADMIN — NICOTINE 7 MG/24 HR DAILY TRANSDERMAL PATCH 1 PATCH: at 09:51

## 2021-03-27 NOTE — PLAN OF CARE
Problem: PAIN - ADULT  Goal: Verbalizes/displays adequate comfort level or baseline comfort level  Description: Interventions:  - Encourage patient to monitor pain and request assistance  - Assess pain using appropriate pain scale  - Administer analgesics based on type and severity of pain and evaluate response  - Implement non-pharmacological measures as appropriate and evaluate response  - Consider cultural and social influences on pain and pain management  - Notify physician/advanced practitioner if interventions unsuccessful or patient reports new pain  Outcome: Progressing     Problem: INFECTION - ADULT  Goal: Absence or prevention of progression during hospitalization  Description: INTERVENTIONS:  - Assess and monitor for signs and symptoms of infection  - Monitor lab/diagnostic results  - Monitor all insertion sites, i e  indwelling lines, tubes, and drains  - Monitor endotracheal if appropriate and nasal secretions for changes in amount and color  - [place] appropriate cooling/warming therapies per order  - Administer medications as ordered  - Instruct and encourage patient and family to use good hand hygiene technique  - Identify and instruct in appropriate isolation precautions for identified infection/condition  Outcome: Progressing  Goal: Absence of fever/infection during neutropenic period  Description: INTERVENTIONS:  - Monitor WBC    Outcome: Progressing     Problem: SAFETY ADULT  Goal: Patient will remain free of falls  Description: INTERVENTIONS:  - Assess patient frequently for physical needs  -  Identify cognitive and physical deficits and behaviors that affect risk of falls    -  [place] fall precautions as indicated by assessment   - Educate patient/family on patient safety including physical limitations  - Instruct patient to call for assistance with activity based on assessment  - Modify environment to reduce risk of injury  - Consider OT/PT consult to assist with strengthening/mobility  Outcome: Progressing  Goal: Maintain or return to baseline ADL function  Description: INTERVENTIONS:  -  Assess patient's ability to carry out ADLs; assess patient's baseline for ADL function and identify physical deficits which impact ability to perform ADLs (bathing, care of mouth/teeth, toileting, grooming, dressing, etc )  - Assess/evaluate cause of self-care deficits   - Assess range of motion  - Assess patient's mobility; develop plan if impaired  - Assess patient's need for assistive devices and provide as appropriate  - Encourage maximum independence but intervene and supervise when necessary  - Involve family in performance of ADLs  - Assess for home care needs following discharge   - Consider OT consult to assist with ADL evaluation and planning for discharge  - Provide patient education as appropriate  Outcome: Progressing  Goal: Maintain or return mobility status to optimal level  Description: INTERVENTIONS:  - Assess patient's baseline mobility status (ambulation, transfers, stairs, etc )    - Identify cognitive and physical deficits and behaviors that affect mobility  - Identify mobility aids required to assist with transfers and/or ambulation (gait belt, sit-to-stand, lift, walker, cane, etc )  - [place] fall precautions as indicated by assessment  - Record patient progress and toleration of activity level on Mobility SBAR; progress patient to next Phase/Stage  - Instruct patient to call for assistance with activity based on assessment  - Consider rehabilitation consult to assist with strengthening/weightbearing, etc   Outcome: Progressing     Problem: DISCHARGE PLANNING  Goal: Discharge to home or other facility with appropriate resources  Description: INTERVENTIONS:  - Identify barriers to discharge w/patient and caregiver  - Arrange for needed discharge resources and transportation as appropriate  - Identify discharge learning needs (meds, wound care, etc )  - Arrange for interpretive services to assist at discharge as needed  - Refer to Case Management Department for coordinating discharge planning if the patient needs post-hospital services based on physician/advanced practitioner order or complex needs related to functional status, cognitive ability, or social support system  Outcome: Progressing     Problem: Knowledge Deficit  Goal: Patient/family/caregiver demonstrates understanding of disease process, treatment plan, medications, and discharge instructions  Description: Complete learning assessment and assess knowledge base  Interventions:  - Provide teaching at level of understanding  - Provide teaching via preferred learning methods  Outcome: Progressing     Problem: Nutrition/Hydration-ADULT  Goal: Nutrient/Hydration intake appropriate for improving, restoring or maintaining nutritional needs  Description: Monitor and assess patient's nutrition/hydration status for malnutrition  Collaborate with interdisciplinary team and initiate plan and interventions as ordered  Monitor patient's weight and dietary intake as ordered or per policy  Utilize nutrition screening tool and intervene as necessary  Determine patient's food preferences and provide high-protein, high-caloric foods as appropriate       INTERVENTIONS:  - Monitor oral intake, urinary output, labs, and treatment plans  - Assess nutrition and hydration status and recommend course of action  - Evaluate amount of meals eaten  - Assist patient with eating if necessary   - Allow adequate time for meals  - Recommend/ encourage appropriate diets, oral nutritional supplements, and vitamin/mineral supplements  - Order, calculate, and assess calorie counts as needed  - Recommend, monitor, and adjust tube feedings and TPN/PPN based on assessed needs  - Assess need for intravenous fluids  - Provide specific nutrition/hydration education as appropriate  - Include patient/family/caregiver in decisions related to nutrition  Outcome: Progressing     Problem: CARDIOVASCULAR - ADULT  Goal: Maintains optimal cardiac output and hemodynamic stability  Description: INTERVENTIONS:  - Monitor I/O, vital signs and rhythm  - Monitor for S/S and trends of decreased cardiac output  - Administer and titrate ordered vasoactive medications to optimize hemodynamic stability  - Assess quality of pulses, skin color and temperature  - Assess for signs of decreased coronary artery perfusion  - Instruct patient to report change in severity of symptoms  Outcome: Progressing  Goal: Absence of cardiac dysrhythmias or at baseline rhythm  Description: INTERVENTIONS:  - Continuous cardiac monitoring, vital signs, obtain 12 lead EKG if ordered  - Administer antiarrhythmic and heart rate control medications as ordered  - Monitor electrolytes and administer replacement therapy as ordered  Outcome: Progressing     Problem: RESPIRATORY - ADULT  Goal: Achieves optimal ventilation and oxygenation  Description: INTERVENTIONS:  - Assess for changes in respiratory status  - Assess for changes in mentation and behavior  - Position to facilitate oxygenation and minimize respiratory effort  - Oxygen administered by appropriate delivery if ordered  - Initiate smoking cessation education as indicated  - Encourage broncho-pulmonary hygiene including cough, deep breathe, Incentive Spirometry  - Assess the need for suctioning and aspirate as needed  - Assess and instruct to report SOB or any respiratory difficulty  - Respiratory Therapy support as indicated  Outcome: Progressing

## 2021-03-27 NOTE — PROGRESS NOTES
General Cardiology Progress Note   Katy Hover 42 y o  female MRN: [id number]  Unit/Bed#: [facility] 505-01 Encounter: [id number]      Assessment:  Principal Problem:    Acute MI, inferior wall (HCC)  Active Problems:    Essential hypertension    Tobacco abuse    CHF (congestive heart failure) (HCC)    CKD (chronic kidney disease) stage 3, GFR 30-59 ml/min    Mixed hyperlipidemia    BMI 40 0-44 9, adult (HCC)    Ventricular tachycardia, non-sustained ([facility])    Leukocytosis    Hypertrophic cardiomyopathy ([facility])      Impression:     Inferior STEMI  o On dual antiplatelet therapy, still having mild chest discomfort likely due to occluded distal PLV   CAD with prior stenting   Morbid obesity   Nonsustained ventricular tachycardia-recurrent   CKD stage 3   Mixed hyperlipidemia   Hypertrophic cardiomyopathy-longstanding history of hypertension, many years uncontrolled, but the severity of the thickness at her young age and the recurrent nonsustained VT raise suspicion for possible hypertrophic cardiomyopathy  o Awaiting cardiac MRI to assess for any signs of fibrosis, scar to explain the origin of her repeated nonsustained VT  o She has had RCA stent and PDA stent, and has a an occluded distal PLV due to embolism, I suspect much of her recurrent nonsustained VT is more likely because of this scenario  Plan:     Needs aggressive lifestyle modification, medical and possibly surgical weight loss in the outpatient setting   Dual antiplatelet therapy will be continued   Up titrate metoprolol to 50 mg b i d   Awaiting cardiac MRI as above   Continue telemetry    Subjective:   Patient seen and examined  Very anxious, somewhat tearful in the room  Subtle chest discomfort 2/10 has been persistent  1 episode of nonsustained VT today, short, 10 beats  Blood pressure stable  Labs stable, creatinine 1 49    Review of Systems   All other systems reviewed and are negative        Objective   Vitals: Blood pressure 98/53, pulse 95, temperature 100 1 °F (37 8 °C), temperature source Oral, resp  rate 19, height 5' 6" (1 676 m), weight 121 kg (267 lb 11 2 oz), last menstrual period 03/07/2021, SpO2 99 %, not currently breastfeeding , Body mass index is 43 21 kg/m² , I/O last 3 completed shifts: In: 2493 5 [P O :2086; I V :407 5]  Out: 1650 [XNCJO:6698]  I/O this shift: In: 472 [P O :472]  Out: 500 [Urine:500]  Wt Readings from Last 3 Encounters:   03/27/21 121 kg (267 lb 11 2 oz)   03/26/21 122 kg (269 lb)   03/25/21 127 kg (280 lb)       Intake/Output Summary (Last 24 hours) at 3/27/2021 1508  Last data filed at 3/27/2021 1405  Gross per 24 hour   Intake 1238 ml   Output 1100 ml   Net 138 ml     I/O last 3 completed shifts: In: 2493 5 [P O :2086; I V :407 5]  Out: 1650 [Urine:1650]    Nonsustained VT on telemetry    Physical Exam  Vitals signs reviewed  Constitutional:       General: She is not in acute distress  Appearance: She is well-developed  She is obese  She is not diaphoretic  HENT:      Head: Normocephalic and atraumatic  Nose: Nose normal    Eyes:      General: No scleral icterus  Conjunctiva/sclera: Conjunctivae normal       Pupils: Pupils are equal, round, and reactive to light  Neck:      Musculoskeletal: Normal range of motion and neck supple  Thyroid: No thyromegaly  Vascular: No JVD  Cardiovascular:      Rate and Rhythm: Normal rate and regular rhythm  Heart sounds: Normal heart sounds  No murmur  No gallop  Pulmonary:      Effort: Pulmonary effort is normal  No respiratory distress  Breath sounds: Normal breath sounds  No wheezing or rales  Abdominal:      General: Bowel sounds are normal  There is no distension  Palpations: Abdomen is soft  Tenderness: There is no abdominal tenderness  Musculoskeletal: Normal range of motion  General: No tenderness or deformity  Right lower leg: Edema present  Left lower leg: Edema present     Skin: General: Skin is warm and dry  Capillary Refill: Capillary refill takes less than 2 seconds  Findings: No erythema or rash  Neurological:      General: No focal deficit present  Mental Status: She is alert and oriented to person, place, and time  Cranial Nerves: No cranial nerve deficit        Coordination: Coordination normal    Psychiatric:         Behavior: Behavior normal          Judgment: Judgment normal          Meds/Allergies   No Known Allergies    Current Facility-Administered Medications:     acetaminophen (TYLENOL) tablet 650 mg, 650 mg, Oral, Q4H PRN, Marty Leiva MD    ALPRAZolam Felicia Fede) tablet 0 5 mg, 0 5 mg, Oral, HS PRN, Lauri Gomez MD, 0 5 mg at 03/27/21 0508    ALPRAZolam (XANAX) tablet 1 mg, 1 mg, Oral, Once PRN, Yudi Ritter MD    aspirin chewable tablet 81 mg, 81 mg, Oral, Daily, Rg Valencia DO, 81 mg at 03/27/21 0943    atorvastatin (LIPITOR) tablet 80 mg, 80 mg, Oral, QPM, Rg Valencia DO, 80 mg at 03/26/21 1808    buPROPion (WELLBUTRIN XL) 24 hr tablet 300 mg, 300 mg, Oral, QAM, Rg Valencia DO, 300 mg at 03/27/21 0944    eptifibatide (INTEGRILIN) bolus from premix, , , Code/Trauma/Sedation Med, Eddie Gunn MD, 11 3 mL at 03/25/21 1528    fentanyl citrate (PF) 100 MCG/2ML 50 mcg, 50 mcg, Intravenous, Q2H PRN, Toma Holden DO, 50 mcg at 03/27/21 1437    fentanyl citrate (PF) 100 MCG/2ML, , Intravenous, Code/Trauma/Sedation Med, Eddie Gunn MD, 50 mcg at 03/25/21 1530    heparin (porcine) injection, , , Code/Trauma/Sedation MedEddie MD, 8,000 Units at 03/25/21 1459    heparin (porcine) subcutaneous injection 7,500 Units, 7,500 Units, Subcutaneous, Q8H [facility], 7,500 Units at 03/27/21 1434 **AND** [CANCELED] Platelet count, , , Once, Rg Valencia DO    iohexol (OMNIPAQUE) 350 MG/ML injection (SINGLE-DOSE), , Intravenous, Code/Trauma/Sedation Eddie Ruff MD, 75 mL at 03/25/21 1539    lidocaine (PF) (XYLOCAINE-MPF) 1 % injection, , , Code/Trauma/Sedation Med, Faby Manuel MD, 1 mL at 03/25/21 1447    metoprolol tartrate (LOPRESSOR) tablet 50 mg, 50 mg, Oral, Q12H [address], Jany Benedict MD    midazolam (VERSED) injection, , , Code/Trauma/Sedation Med, Faby Manuel MD, 2 mg at 03/25/21 1446    nicotine (NICODERM CQ) 7 mg/24hr TD 24 hr patch 1 patch, 1 patch, Transdermal, Daily, Rossana Acosta DO, 1 patch at 03/27/21 0951    nitroGLYcerin (TRIDIL) 50 mg in 250 mL infusion (premix), , Intra-arterial, Code/Trauma/Sedation Med, Faby Manuel MD, 200 mcg at 03/25/21 1451    ondansetron (ZOFRAN) injection 4 mg, 4 mg, Intravenous, Q6H PRN, Rossana Acosta DO, 4 mg at 03/25/21 1606    pantoprazole (PROTONIX) EC tablet 40 mg, 40 mg, Oral, Early Morning, Rossana Acosta DO, 40 mg at 03/27/21 0508    sertraline (ZOLOFT) tablet 50 mg, 50 mg, Oral, Daily, Rossana Acosta DO, 50 mg at 03/27/21 4467    ticagrelor (BRILINTA) tablet 90 mg, 90 mg, Oral, BID, Rossana Acosta DO, 90 mg at 03/27/21 8703    verapamil (ISOPTIN) injection, , , Code/Trauma/Sedation Med, Faby Manuel MD, 2 5 mg at 03/25/21 1451    Laboratory Results:  Results from last 7 days   Lab Units 03/25/21  1115 03/25/21  0620 03/25/21  0240   TROPONIN I ng/mL >40 00* 39 70* 0 29*       CBC with diff:   Results from last 7 days   Lab Units 03/26/21  0444 03/25/21  0620 03/25/21  0240   WBC Thousand/uL 22 52* 20 69* 24 00*   HEMOGLOBIN g/dL 13 2 14 7 14 9   HEMATOCRIT % 40 4 44 1 44 6   MCV fL 85 84 84   PLATELETS Thousands/uL 392* 436* 484*   MCH pg 27 8 28 0 27 9   MCHC g/dL 32 7 33 3 33 4   RDW % 18 8* 18 5* 17 8*   MPV fL 10 6 11 1 8 9       CMP:  Results from last 7 days   Lab Units 03/26/21  0444 03/25/21  0620 03/25/21  0240   POTASSIUM mmol/L 4 7 4 0 3 2*   CHLORIDE mmol/L 108 110* 104   CO2 mmol/L 22 23 23   BUN mg/dL 12 14 13   CREATININE mg/dL 1 49* 1 68* 1 61*   CALCIUM mg/dL 9 0 9 2 9 5   AST U/L  --   --  24   ALT U/L  --   --  30   ALK PHOS U/L  --   --  84 EGFR ml/min/1 73sq m 42 37 39       BMP:  Results from last 7 days   Lab Units 21  0444 21  0620 21  0240   POTASSIUM mmol/L 4 7 4 0 3 2*   CHLORIDE mmol/L 108 110* 104   CO2 mmol/L 22 23 23   BUN mg/dL 12 14 13   CREATININE mg/dL 1 49* 1 68* 1 61*   CALCIUM mg/dL 9 0 9 2 9 5       NT-proBNP: No results for input(s): NTBNP in the last 72 hours  Magnesium:   Results from last 7 days   Lab Units 21  0620 21  0240   MAGNESIUM mg/dL 2 1 1 8*       Coags:   Results from last 7 days   Lab Units 21  0246   PTT seconds 26   INR  1 04       TSH:        Hemoglobin A1C )      Lipid Profile:   No results found for: CHOL  Lab Results   Component Value Date    HDL 34 (L) 2021    HDL 31 (L) 2020    HDL 32 (L) 02/15/2018     Lab Results   Component Value Date    LDLCALC 122 (H) 2021 [employer]  2020      Comment:      Calculated LDL invalid, triglycerides >400 mg/dl  This screening LDL is a calculated result  It does not have the accuracy of the Direct Measured LDL in the monitoring of patients with hyperlipidemia and/or statin therapy  Direct Measure LDL (OYV327) must be ordered separately in these patients       [employer]  02/15/2018      Comment:      Calculated LDL invalid, triglycerides >400 mg/dl     Lab Results   Component Value Date    LDLDIRECT 160 (H) 02/15/2018     Lab Results   Component Value Date    TRIG 230 (H) 2021    TRIG 465 (H) 2020    TRIG 401 (H) 02/15/2018       Cardiac testing:   EKG personally reviewed by Dipika Jordan MD      Results for orders placed during the hospital encounter of 21   Echo Complete with Contrast if Indicated    Narrative [place] [address]  [phone]    Transthoracic Echocardiogram  2D, M-mode, Doppler, and Color Doppler    Study date:  25-Mar-2021    Patient: [facility]  MR number: [id number]  Account number: [de-identified]  : 1975  Age: 42 years  Gender: Female  Status: Inpatient  Location: Bedside  Height: 66 in  Weight: 279 4 lb  BP: 122/ 82 mmHg    Indications: Acute MI    Diagnoses: I21 4 - Non-ST elevation (NSTEMI) myocardial infarction    Sonographer:  Brenda Pedraza RDCS  Primary Physician:  Fernie Bean DO  Referring Physician:  Cipriano Daily DO  Group:  [facility]  Interpreting Physician:  Sunita Gonzalez MD    IMPRESSIONS:  Features are consistent with hypertrophic cardiomyopathy and inferior wall myocardial infarction  Clinical correlation is advised  SUMMARY    LEFT VENTRICLE:  Systolic function was normal  Ejection fraction was estimated to be 60 %  There was severe hypokinesis of the basal-mid inferior wall(s)  Wall thickness was markedly increased  There was severe assymetrical hypertrophy of the inferior septum (25 mm)  There was no dynamic obstruction at rest  Provocative maneuvers were not performed  Features were consistent with a pseudonormal left ventricular filling pattern, with concomitant abnormal relaxation and increased filling pressure (grade 2 diastolic dysfunction)  LEFT ATRIUM:  The atrium was mildly dilated  MITRAL VALVE:  There was moderate systolic anterior motion of the anterior leaflet  There was mild to moderate regurgitation  AORTIC VALVE:  There was trace regurgitation  HISTORY: PRIOR HISTORY: CHF, CAD, Tobacco Abuse, Obesity    PROCEDURE: The procedure was performed at the bedside  This was a routine study  The transthoracic approach was used  The study included complete 2D imaging, M-mode, complete spectral Doppler, and color Doppler  The heart rate was 66 bpm,  at the start of the study  Images were obtained from the parasternal, apical, subcostal, and suprasternal notch acoustic windows  Echocardiographic views were limited due to poor acoustic window availability and decreased penetration  This  was a technically difficult study      LEFT VENTRICLE: Size was normal  Systolic function was normal  Ejection fraction was estimated to be 60 %  There was severe hypokinesis of the basal-mid inferior wall(s)  Wall thickness was markedly increased  There was severe assymetrical  hypertrophy of the inferior septum (25 mm)  DOPPLER: There was no dynamic obstruction at rest  Provocative maneuvers were not performed  Features were consistent with a pseudonormal left ventricular filling pattern, with concomitant  abnormal relaxation and increased filling pressure (grade 2 diastolic dysfunction)  RIGHT VENTRICLE: The size was normal  Systolic function was normal  Wall thickness was normal     LEFT ATRIUM: The atrium was mildly dilated  RIGHT ATRIUM: Size was normal     MITRAL VALVE: Valve structure was normal  There was normal leaflet separation  There was moderate systolic anterior motion of the anterior leaflet  DOPPLER: The transmitral velocity was within the normal range  There was no evidence for  stenosis  There was mild to moderate regurgitation  AORTIC VALVE: The valve was trileaflet  Leaflets exhibited normal thickness and normal cuspal separation  DOPPLER: Transaortic velocity was within the normal range  There was no evidence for stenosis  There was trace regurgitation  TRICUSPID VALVE: The valve structure was normal  There was normal leaflet separation  DOPPLER: The transtricuspid velocity was within the normal range  There was no evidence for stenosis  There was no regurgitation  PULMONIC VALVE: Leaflets exhibited normal thickness, no calcification, and normal cuspal separation  DOPPLER: The transpulmonic velocity was within the normal range  There was no regurgitation  PERICARDIUM: There was no pericardial effusion  The pericardium was normal in appearance  AORTA: The root exhibited normal size      SYSTEM MEASUREMENT TABLES    2D  %FS: 27 94 %  Ao Diam: 3 95 cm  Ao asc: 3 6 cm  EDV(Teich): 66 84 ml  EF(Teich): 54 74 %  ESV(Teich): 30 25 ml  IVSd: 2 82 cm  LA Diam: 4 59 cm  LAAs A4C: 16 52 cm2  LAESV A-L A4C: 43 86 ml  LAESV MOD A4C: 42 09 ml  LALs A4C: 5 28 cm  LVEDV MOD A4C: 88 32 ml  LVEF MOD A4C: 67 58 %  LVESV MOD A4C: 28 63 ml  LVIDd: 3 92 cm  LVIDs: 2 83 cm  LVLd A4C: 6 93 cm  LVLs A4C: 5 55 cm  LVPWd: 1 43 cm  RAEDV A-L: 23 76 ml  RAEDV MOD: 22 46 ml  RALd: 4 9 cm  RVIDd: 3 61 cm  SV MOD A4C: 59 69 ml  SV(Teich): 36 59 ml    CW  AR Dec [place]: 1 39 m/s2  AR Dec Time: 2867 87 ms  AR PHT: 831 68 ms  AR Vmax: 3 99 m/s  AR maxP 65 mmHg  AV Env  Ti: 240 72 ms  AV VTI: 22 04 cm  AV Vmax: 1 31 m/s  AV Vmean: 0 92 m/s  AV maxP 85 mmHg  AV meanPG: 3 93 mmHg    MM  TAPSE: 1 87 cm    PW  LVOT Env  Ti: 253 09 ms  LVOT VTI: 15 47 cm  LVOT Vmax: 0 92 m/s  LVOT Vmean: 0 61 m/s  LVOT maxPG: 3 4 mmHg  LVOT meanP 75 mmHg  MV A Jorge: 0 71 m/s  MV Dec [place]: 5 89 m/s2  MV DecT: 156 23 ms  MV E Jorge: 0 91 m/s  MV E/A Ratio: 1 3    [facility] Accredited Echocardiography Laboratory    Prepared and electronically signed by    Polina Mcgee MD  Signed 25-Mar-2021 13:06:46       No results found for this or any previous visit  Results for orders placed during the hospital encounter of 21   Cardiac catheterization    Narrative [place] [address]  [address] [facility], [address] [place] [address]  [phone]    [facility]    Invasive Cardiovascular Lab Complete Report    Patient: Nancy Dupont  MR number: [id number]  Account number: [de-identified]  Study date: 2021  Gender: Female  : 1975  Height: 66 1 in  Weight: 268 4 lb  BSA: 2 27 mï¾²    Diagnostic Cardiologist:  Timothy Farias MD  Interventional Cardiologist:  Timothy Farias MD    SUMMARY    CORONARY CIRCULATION:  Right PDA: There was a discrete 90 % stenosis at the ostium of the vessel segment  The lesion was associated with a large filling defect consistent with thrombus  This lesion is a likely culprit for the patient's anginal symptoms  An  intervention was performed  2nd posterolateral segment: There was a 100 % stenosis  The lesion was associated with a moderate filling defect consistent with thrombus  It does not appear amenable to intervention  Distal embolization    HEMODYNAMICS:  Hemodynamic assessment demonstrated mildly to moderately elevated LVEDP  1ST LESION INTERVENTIONS:  IC integrilin given (180mcg/Kg)  A successful balloon angioplasty with stent procedure was performed on the 90 % lesion in the right posterior descending artery  Following intervention there was an excellent angiographic appearance with a 0 % residual stenosis  A Resolute [place] Rx 3 5 x 12mm drug-eluting stent was placed across the lesion and deployed at a maximum inflation pressure of 12 lon  PROCEDURES PERFORMED    --  Left heart catheterization without ventriculogram   --  Right coronary angiography  --  Inpatient  --  Mod Sedation Same Physician Initial 15min  --  Mod Sedation Same Physician Add 15min  --  Mod Sedation Same Physician Add 15min  --  Coronary Catheterization (w/ LHC)  --  Mod Sedation Same Physician Add 15min  --  Coronary Drug Eluting Stent w/PTCA  --  Intervention on right PDA: balloon angioplasty, stent  PROCEDURE: The risks and alternatives of the procedures and conscious sedation were explained to the patient and informed consent was obtained  The patient was brought to the cath lab and placed on the table  The planned puncture sites  were prepped and draped in the usual sterile fashion  --  Right radial artery access  After performing an Riley's test to verify adequate ulnar artery supply to the hand, the radial site was prepped  The puncture site was infiltrated with local anesthetic  The vessel was accessed using the  modified Seldinger technique, a wire was advanced into the vessel, and a sheath was advanced over the wire into the vessel      --  Left heart catheterization without ventriculogram  A catheter was advanced over a guidewire into the ascending aorta  After recording ascending aortic pressure, the catheter was advanced across the aortic valve and left ventricular  pressure was recorded  The catheter was pulled back across the aortic valve and into the ascending aorta and pullback pressures were obtained  --  Right coronary artery angiography  A catheter was advanced over a guidewire into the aorta and positioned in the right coronary artery ostium under fluoroscopic guidance  Angiography was performed  --  Inpatient  --  Mod Sedation Same Physician Initial 15min  --  Mod Sedation Same Physician Add 15min  --  Mod Sedation Same Physician Add 15min  --  Coronary Catheterization (w/ LHC)  --  Mod Sedation Same Physician Add 15min  LESION INTERVENTION: A successful balloon angioplasty with stent procedure was performed on the 90 % lesion in the right posterior descending artery  Following intervention there was an excellent angiographic appearance with a 0 % residual  stenosis  The residual lesion demonstrated a small filling defect consistent with thrombus  There was DALILA 3 flow before the procedure and DALILA 3 flow after the procedure  IC integrilin given (180mcg/Kg)    --  Vessel setup was performed  A 6Fr  Launcher Rbu 3 5 guiding catheter was used to cannulate the vessel  --  Vessel setup was performed  A Runthrough NS 180cm wire was used to cross the lesion  --  Balloon angioplasty was performed, using a Trek Rx 3 0 x 15mm balloon, with 1 inflations and a maximum inflation pressure of 8 lon  --  A Resolute [place] Rx 3 5 x 12mm drug-eluting stent was placed across the lesion and deployed at a maximum inflation pressure of 12 lon  INTERVENTIONS:  --  Coronary Drug Eluting Stent w/PTCA  PROCEDURE COMPLETION: The patient tolerated the procedure well and was discharged from the cath lab  TIMING: Test started at 14:47  Test concluded at 15:37  HEMOSTASIS: The sheath was removed   The site was compressed with a Hemoband  device  Hemostasis was obtained  MEDICATIONS GIVEN: Versed (2mg/2ml), 2 mg, IV, at 14:46  Fentanyl (1OOmcg/2 ml), 100 mcg, IV, at 14:46  1% Lidocaine, 2 ml, subcutaneously, at 14:47  Nitroglycerin (200mcg/ml), 200 mcg, at 14:52  Verapamil  (5mg/2ml), 2 5 mg, IV, at 14:52  Heparin 1000 units/ml, 4,000 units, IV, at 14:52  Heparin 1000 units/ml, 8,000 units, IV, at 14:58  Fentanyl (1OOmcg/2 ml), 50 mcg, IV, at 15:02  Integrilin Bolus(2mg/ml), 11 3 ml, at 15:29  Fentanyl  (1OOmcg/2 ml), 50 mcg, IV, at 15:30  CONTRAST GIVEN: 75 ml Omnipaque (350mg I /ml)  RADIATION EXPOSURE: Fluoroscopy time: 11 88 min  HEMODYNAMICS: Hemodynamic assessment demonstrated mildly to moderately elevated LVEDP  CORONARY VESSELS:   --  The coronary circulation is right dominant  --  RCA: The vessel was large sized (dominant) and mildly ectatic  --  Mid RCA: There was a 0 % stenosis at the site of a prior stent  --  Distal RCA: There was a 0 % stenosis at the site of a prior stent  --  Right PDA: There was a discrete 90 % stenosis at the ostium of the vessel segment  The lesion was associated with a large filling defect consistent with thrombus  This lesion is a likely culprit for the patient's anginal symptoms  An  intervention was performed  --  2nd posterolateral segment: There was a 100 % stenosis  The lesion was associated with a moderate filling defect consistent with thrombus  It does not appear amenable to intervention  Distal embolization    RECOMMENDATIONS  The patient should continue with the present medications  DISPOSITION:  The patient left the catheterization laboratory in stable condition  Prepared and signed by    Malik Sandoval MD  Signed 03/25/2021 15:42:42    Study diagram    Angiographic findings  Native coronary lesions:  ï¾·Mid RCA: Lesion 1: 0 % stenosis, site of prior stent  ï¾·Distal RCA: Lesion 1: 0 % stenosis, site of prior stent    ï¾·RPDA: Lesion 1: discrete, 90 % stenosis  ï¾·RPL2: Lesion 1: 100 % stenosis  Intervention results  Native coronary lesions:  ï¾·Successful balloon angioplasty and stent of the 90 % stenosis in right PDA  Appearance excellent with 0 % residual stenosis  Stent: Resolute [place] Rx 3 5 x 12mm drug-eluting  Hemodynamic tables    Pressures:  Baseline  Pressures:  - HR: 73  Pressures:  - Rhythm:  Pressures:  -- Aortic Pressure (S/D/M): 102/74/86  Pressures:  -- Left Ventricle (s/edp): 108/50/--    Outputs:  Baseline  Outputs:  -- CALCULATIONS: [age] years[age]  Outputs:  -- CALCULATIONS: Body Surface Area: 2 27  Outputs:  -- CALCULATIONS: Height in cm: 168 00  Outputs:  -- CALCULATIONS: Sex: Female  Outputs:  -- CALCULATIONS: Weight in k 00       No results found for this or any previous visit  No results found for this or any previous visit  Results for orders placed during the hospital encounter of 17   NM myocardial perfusion spect (rx stress and/or rest)    [place] [id number] [phone]    Rest/Stress Gated SPECT Myocardial Perfusion Imaging After Regadenoson    Patient: Kristen Hollingsworth  MR number: [id number]  Account number: [de-identified]  : 1975  Age: 46 years  Gender: Female  Status: Inpatient  Location: Stress lab  Height: 67 in  Weight: 220 lb  BP: 142/ 98 mmHg    Allergies: NO KNOWN ALLERGIES    Diagnosis: R07 9 - Chest pain, unspecified    RN:  Sabas Curtis RN  Interpreting Physician:  Kinga Urrutia MD  Referring Physician:  Kinga Urrutia MD  Technician:  Sushil Dorman  Group:  [facility]  Report Prepared By[de-identified]  Sushil Dorman    INDICATIONS: Evaluation of known coronary artery disease  HISTORY: The patient is a 46year old  female  Chest pain status: chest pain  Coronary artery disease risk factors: dyslipidemia, hypertension, and smoking   Cardiovascular history: coronary artery disease and congestive heart  failure  Prior cardiovascular procedures: percutaneous transluminal coronary angioplasty  Co-morbidity: obesity  PHYSICAL EXAM: Baseline physical exam screening: diminished at bases per Yash Burgos RN  REST ECG: Normal sinus rhythm  Nonspecific T wave abnormalities were present  PROCEDURE: The study was performed in the stress lab  A regadenoson infusion pharmacologic stress test was performed  Gated SPECT myocardial perfusion imaging was performed after stress and at rest  Systolic blood pressure was 142 mmHg, at  the start of the study  Diastolic blood pressure was 98 mmHg, at the start of the study  The heart rate was 75 bpm, at the start of the study  Regadenoson protocol:  HR bpm SBP mmHg DBP mmHg Symptoms  Baseline 75 142 98 none  1 min 99 158 98 mild dyspnea, flushing, nausea  4 min 92 148 92 none  No medications or fluids given  The patient also performed low level exercise with hand  with foot wiggles  STRESS SUMMARY: Duration of pharmacologic stress was 3 min and 0 sec  The patient exercised to protocol stage 1  Maximal heart rate during stress was 100 bpm  The rate-pressure product for the peak heart rate and blood pressure was 06509  There was no chest pain during stress  The stress test was terminated due to protocol completion  Pre oxygen saturation: 99 %  Peak oxygen saturation: 99 %  The stress ECG was negative for ischemia and normal     ISOTOPE ADMINISTRATION:  Resting isotope administration Stress isotope administration  Agent Tetrofosmin Tetrofosmin  Dose 10 6 mCi 31 3 mCi  Date 03/13/2017 03/13/2017    The radiopharmaceutical was injected at the peak effect of pharmacologic stress  MYOCARDIAL PERFUSION IMAGING:  The image quality was fair  PERFUSION DEFECTS:  -  There was a small, mildly severe, fixed myocardial perfusion defect of the mid to apical anterior wall likely due to attenuation from breast tissue      GATED SPECT:  The calculated left ventricular ejection fraction was 48 %  Left ventricular ejection fraction was within normal limits by visual estimate  SUMMARY:  -  Stress results: There was no chest pain during stress  -  ECG conclusions: The stress ECG was negative for ischemia and normal   -  Perfusion imaging: There was a small, mildly severe, fixed myocardial perfusion defect of the mid to apical anterior wall likely due to attenuation from breast tissue   -  Gated SPECT: The calculated left ventricular ejection fraction was 48 %  Left ventricular ejection fraction was within normal limits by visual estimate  IMPRESSIONS: There was image artifact, without diagnostic evidence for perfusion abnormality  Prepared and signed by    Miguel See MD  Signed 03/13/2017 13:41:05             Gen Suarez MD    Portions of the record may have been created with voice recognition software  Occasional wrong word or "sound a like" substitutions may have occurred due to the inherent limitations of voice recognition software  Read the chart carefully and recognize, using context, where substitutions have occurred

## 2021-03-28 LAB
ANION GAP SERPL CALCULATED.3IONS-SCNC: 6 MMOL/L (ref 4–13)
BUN SERPL-MCNC: 13 MG/DL (ref 5–25)
CALCIUM SERPL-MCNC: 8.8 MG/DL (ref 8.3–10.1)
CHLORIDE SERPL-SCNC: 111 MMOL/L (ref 100–108)
CO2 SERPL-SCNC: 24 MMOL/L (ref 21–32)
CREAT SERPL-MCNC: 1.62 MG/DL (ref 0.6–1.3)
GFR SERPL CREATININE-BSD FRML MDRD: 38 ML/MIN/1.73SQ M
GLUCOSE SERPL-MCNC: 97 MG/DL (ref 65–140)
POTASSIUM SERPL-SCNC: 3.8 MMOL/L (ref 3.5–5.3)
SODIUM SERPL-SCNC: 141 MMOL/L (ref 136–145)

## 2021-03-28 PROCEDURE — 99232 SBSQ HOSP IP/OBS MODERATE 35: CPT | Performed by: INTERNAL MEDICINE

## 2021-03-28 PROCEDURE — 86140 C-REACTIVE PROTEIN: CPT | Performed by: STUDENT IN AN ORGANIZED HEALTH CARE EDUCATION/TRAINING PROGRAM

## 2021-03-28 PROCEDURE — 80048 BASIC METABOLIC PNL TOTAL CA: CPT | Performed by: INTERNAL MEDICINE

## 2021-03-28 RX ORDER — ALPRAZOLAM 0.5 MG/1
0.5 TABLET ORAL ONCE AS NEEDED
Status: COMPLETED | OUTPATIENT
Start: 2021-03-28 | End: 2021-03-29

## 2021-03-28 RX ADMIN — HEPARIN SODIUM 7500 UNITS: 5000 INJECTION INTRAVENOUS; SUBCUTANEOUS at 13:46

## 2021-03-28 RX ADMIN — FENTANYL CITRATE 50 MCG: 50 INJECTION INTRAMUSCULAR; INTRAVENOUS at 19:07

## 2021-03-28 RX ADMIN — HEPARIN SODIUM 7500 UNITS: 5000 INJECTION INTRAVENOUS; SUBCUTANEOUS at 05:12

## 2021-03-28 RX ADMIN — TICAGRELOR 90 MG: 90 TABLET ORAL at 17:17

## 2021-03-28 RX ADMIN — FENTANYL CITRATE 50 MCG: 50 INJECTION INTRAMUSCULAR; INTRAVENOUS at 08:00

## 2021-03-28 RX ADMIN — FENTANYL CITRATE 50 MCG: 50 INJECTION INTRAMUSCULAR; INTRAVENOUS at 16:20

## 2021-03-28 RX ADMIN — PANTOPRAZOLE SODIUM 40 MG: 40 TABLET, DELAYED RELEASE ORAL at 05:12

## 2021-03-28 RX ADMIN — HEPARIN SODIUM 7500 UNITS: 5000 INJECTION INTRAVENOUS; SUBCUTANEOUS at 21:48

## 2021-03-28 RX ADMIN — ALPRAZOLAM 0.5 MG: 0.5 TABLET ORAL at 21:48

## 2021-03-28 RX ADMIN — FENTANYL CITRATE 50 MCG: 50 INJECTION INTRAMUSCULAR; INTRAVENOUS at 21:48

## 2021-03-28 RX ADMIN — TICAGRELOR 90 MG: 90 TABLET ORAL at 08:00

## 2021-03-28 RX ADMIN — SERTRALINE HYDROCHLORIDE 50 MG: 50 TABLET ORAL at 08:00

## 2021-03-28 RX ADMIN — METOPROLOL TARTRATE 50 MG: 50 TABLET, FILM COATED ORAL at 21:48

## 2021-03-28 RX ADMIN — FENTANYL CITRATE 50 MCG: 50 INJECTION INTRAMUSCULAR; INTRAVENOUS at 04:59

## 2021-03-28 RX ADMIN — ATORVASTATIN CALCIUM 80 MG: 80 TABLET, FILM COATED ORAL at 17:17

## 2021-03-28 RX ADMIN — NICOTINE 7 MG/24 HR DAILY TRANSDERMAL PATCH 1 PATCH: at 08:02

## 2021-03-28 RX ADMIN — FENTANYL CITRATE 50 MCG: 50 INJECTION INTRAMUSCULAR; INTRAVENOUS at 00:23

## 2021-03-28 RX ADMIN — FENTANYL CITRATE 50 MCG: 50 INJECTION INTRAMUSCULAR; INTRAVENOUS at 12:58

## 2021-03-28 RX ADMIN — METOPROLOL TARTRATE 50 MG: 50 TABLET, FILM COATED ORAL at 08:00

## 2021-03-28 RX ADMIN — ASPIRIN 81 MG: 81 TABLET, CHEWABLE ORAL at 08:00

## 2021-03-28 RX ADMIN — FENTANYL CITRATE 50 MCG: 50 INJECTION INTRAMUSCULAR; INTRAVENOUS at 10:29

## 2021-03-28 RX ADMIN — BUPROPION HYDROCHLORIDE 300 MG: 150 TABLET, FILM COATED, EXTENDED RELEASE ORAL at 08:03

## 2021-03-28 NOTE — PROGRESS NOTES
General Cardiology Progress Note   Brown Demarco 42 y o  female MRN: [id number]  Unit/Bed#: [facility] 505-01 Encounter: [id number]      Assessment:  Principal Problem:    Acute MI, inferior wall (HCC)  Active Problems:    Essential hypertension    Tobacco abuse    CHF (congestive heart failure) (HCC)    CKD (chronic kidney disease) stage 3, GFR 30-59 ml/min    Mixed hyperlipidemia    BMI 40 0-44 9, adult (HCC)    Ventricular tachycardia, non-sustained (HCC)    Leukocytosis    Hypertrophic cardiomyopathy (HCC)      Impression:     Inferior STEMI  o On dual antiplatelet therapy  o RCA GREG initially  o Persistent symptoms resulted in proximal PDA stenting 1 day later  o She has an occluded distal PLV likely causing her residual symptoms   CAD with prior stenting   Morbid obesity   Nonsustained ventricular tachycardia  o Significant nonsustained VT post infarct, and its persistence and in the presence of severe hypertrophy raise concern for possible congenital hypertrophic cardiomyopathy, although it is concentric   CKD stage 3  o Stable at baseline post catheterization x2   Mixed hyperlipidemia  o On high-intensity statin   Hypertrophic cardiomyopathy-longstanding history of hypertension, many years uncontrolled, but the severity of the thickness at her young age and the recurrent nonsustained VT raise suspicion for possible hypertrophic cardiomyopathy  o Awaiting cardiac MRI to assess for any signs of fibrosis, scar to explain the origin of her repeated nonsustained VT  o She has had RCA stent and PDA stent, and has a an occluded distal PLV due to embolism, I suspect much of her recurrent nonsustained VT is more likely because of this scenario      Plan:     Needs aggressive lifestyle modification, medical and possibly surgical weight loss in the outpatient setting   Severely premature CAD needs to be taken more seriously from her, I discussed this with her in detail   Awaiting cardiac MRI, to try to clarify whether or not at her young age she has a congenital hypertrophic cardiomyopathy considering the severity of her LV thickness, especially with the increased amount of post infarct nonsustained VT, but in the last 48 hours she has had no evidence of nonsustained VT, and this is at now outside of the window of her ACS  If cannot accomplish cardiac MRI inpatient, and no further VT by tomorrow, I would feel comfortable with discharge home, and outpatient cardiac MRI for further clarification    Subjective:   Patient seen and examined  Still complains of 1/10 chest discomfort/heaviness  Persistent inferior ST elevation on telemetry  Labs stable, CKD stage 3 with creatinine at baseline  Blood pressure stable  No further nonsustained VT on telemetry  Palpitations at noon had no abnormal correlation on telemetry  Still awaiting cardiac MRI    Review of Systems   All other systems reviewed and are negative  Objective   Vitals: Blood pressure 101/68, pulse 77, temperature 99 2 °F (37 3 °C), temperature source Oral, resp  rate 20, height 5' 6" (1 676 m), weight 120 kg (264 lb 5 3 oz), last menstrual period 03/07/2021, SpO2 93 %, not currently breastfeeding , Body mass index is 42 66 kg/m² , I/O last 3 completed shifts: In: 8519 [P O :1718]  Out: 3582 [Urine:2375]  I/O this shift: In: 714 [P O :714]  Out: -   Wt Readings from Last 3 Encounters:   03/28/21 120 kg (264 lb 5 3 oz)   03/26/21 122 kg (269 lb)   03/25/21 127 kg (280 lb)       Intake/Output Summary (Last 24 hours) at 3/28/2021 1530  Last data filed at 3/28/2021 1244  Gross per 24 hour   Intake 1194 ml   Output 975 ml   Net 219 ml     I/O last 3 completed shifts: In: 6543 [P O :1718]  Out: 2375 [Urine:2375]    No evidence of nonsustained VT on telemetry times 48 hours    Physical Exam  Vitals signs reviewed  Constitutional:       General: She is not in acute distress  Appearance: She is obese  She is not diaphoretic     HENT:      Head: Normocephalic  Eyes:      Conjunctiva/sclera: Conjunctivae normal    Neck:      Musculoskeletal: Normal range of motion and neck supple  Vascular: No JVD  Cardiovascular:      Rate and Rhythm: Normal rate and regular rhythm  Heart sounds: Normal heart sounds  No murmur  No gallop  Pulmonary:      Effort: Pulmonary effort is normal  No respiratory distress  Breath sounds: Normal breath sounds  No wheezing or rales  Abdominal:      General: Bowel sounds are normal  There is no distension  Palpations: Abdomen is soft  Tenderness: There is no abdominal tenderness  Musculoskeletal: Normal range of motion  Right lower leg: Edema present  Left lower leg: Edema present  Skin:     General: Skin is warm and dry  Neurological:      Mental Status: She is alert and oriented to person, place, and time           Meds/Allergies   No Known Allergies    Current Facility-Administered Medications:     acetaminophen (TYLENOL) tablet 650 mg, 650 mg, Oral, Q4H PRN, Viviana Cline MD    ALPRAZolam Card Niece) tablet 0 5 mg, 0 5 mg, Oral, HS PRN, Patience Evans MD, 0 5 mg at 03/27/21 2231    aspirin chewable tablet 81 mg, 81 mg, Oral, Daily, Angel Chapin, DO, 81 mg at 03/28/21 0800    atorvastatin (LIPITOR) tablet 80 mg, 80 mg, Oral, QPM, John Saldana DO, 80 mg at 03/27/21 1727    buPROPion (WELLBUTRIN XL) 24 hr tablet 300 mg, 300 mg, Oral, QAM, Angel Gamal, DO, 300 mg at 03/28/21 0803    eptifibatide (INTEGRILIN) bolus from premix, , , Code/Trauma/Sedation MedKrunal MD, 11 3 mL at 03/25/21 1528    fentanyl citrate (PF) 100 MCG/2ML 50 mcg, 50 mcg, Intravenous, Q2H PRN, Toma Holden DO, 50 mcg at 03/28/21 1258    fentanyl citrate (PF) 100 MCG/2ML, , Intravenous, Code/Trauma/Sedation Krunal Ruff MD, 50 mcg at 03/25/21 1530    heparin (porcine) injection, , , Code/Trauma/Sedation Krunal Ruff MD, 8,000 Units at 03/25/21 1459    heparin (porcine) subcutaneous injection 7,500 Units, 7,500 Units, Subcutaneous, Q8H [address], 7,500 Units at 03/28/21 1346 **AND** [CANCELED] Platelet count, , , Once, Eve Amato DO    iohexol (OMNIPAQUE) 350 MG/ML injection (SINGLE-DOSE), , Intravenous, Code/Trauma/Sedation Med, Ildefonso Yin MD, 75 mL at 03/25/21 1539    lidocaine (PF) (XYLOCAINE-MPF) 1 % injection, , , Code/Trauma/Sedation Med, Ildefonso Yin MD, 1 mL at 03/25/21 1447    metoprolol tartrate (LOPRESSOR) tablet 50 mg, 50 mg, Oral, Q12H [address], Ryder Flores MD, 50 mg at 03/28/21 0800    midazolam (VERSED) injection, , , Code/Trauma/Sedation Med, Ildefonso Yin MD, 2 mg at 03/25/21 1446    nicotine (NICODERM CQ) 7 mg/24hr TD 24 hr patch 1 patch, 1 patch, Transdermal, Daily, Eve Amato DO, 1 patch at 03/28/21 0802    nitroGLYcerin (TRIDIL) 50 mg in 250 mL infusion (premix), , Intra-arterial, Code/Trauma/Sedation Med, Ildefonso Yin MD, 200 mcg at 03/25/21 1451    ondansetron (ZOFRAN) injection 4 mg, 4 mg, Intravenous, Q6H PRN, Eve Amato DO, 4 mg at 03/25/21 1606    pantoprazole (PROTONIX) EC tablet 40 mg, 40 mg, Oral, Early Morning, Eve Amato DO, 40 mg at 03/28/21 1255    sertraline (ZOLOFT) tablet 50 mg, 50 mg, Oral, Daily, Eve Amato DO, 50 mg at 03/28/21 0800    ticagrelor (BRILINTA) tablet 90 mg, 90 mg, Oral, BID, Eve Amato DO, 90 mg at 03/28/21 0800    verapamil (ISOPTIN) injection, , , Code/Trauma/Sedation Med, Ildefonso Yin MD, 2 5 mg at 03/25/21 1451    Laboratory Results:  Results from last 7 days   Lab Units 03/25/21  1115 03/25/21  0620 03/25/21  0240   TROPONIN I ng/mL >40 00* 39 70* 0 29*       CBC with diff:   Results from last 7 days   Lab Units 03/26/21  0444 03/25/21  0620 03/25/21  0240   WBC Thousand/uL 22 52* 20 69* 24 00*   HEMOGLOBIN g/dL 13 2 14 7 14 9   HEMATOCRIT % 40 4 44 1 44 6   MCV fL 85 84 84   PLATELETS Thousands/uL 392* 436* 484*   MCH pg 27 8 28 0 27 9   MCHC g/dL 32 7 33 3 33 4   RDW % 18 8* 18 5* 17 8*   MPV fL 10 6 11 1 8 9       CMP:  Results from last 7 days   Lab Units 03/28/21  0508 03/26/21  0444 03/25/21  0620 03/25/21  0240   POTASSIUM mmol/L 3 8 4 7 4 0 3 2*   CHLORIDE mmol/L 111* 108 110* 104   CO2 mmol/L 24 22 23 23   BUN mg/dL 13 12 14 13   CREATININE mg/dL 1 62* 1 49* 1 68* 1 61*   CALCIUM mg/dL 8 8 9 0 9 2 9 5   AST U/L  --   --   --  24   ALT U/L  --   --   --  30   ALK PHOS U/L  --   --   --  84   EGFR ml/min/1 73sq m 38 42 37 39       BMP:  Results from last 7 days   Lab Units 03/28/21  0508 03/26/21  0444 03/25/21  0620 03/25/21  0240   POTASSIUM mmol/L 3 8 4 7 4 0 3 2*   CHLORIDE mmol/L 111* 108 110* 104   CO2 mmol/L 24 22 23 23   BUN mg/dL 13 12 14 13   CREATININE mg/dL 1 62* 1 49* 1 68* 1 61*   CALCIUM mg/dL 8 8 9 0 9 2 9 5       NT-proBNP: No results for input(s): NTBNP in the last 72 hours  Magnesium:   Results from last 7 days   Lab Units 03/25/21  0620 03/25/21  0240   MAGNESIUM mg/dL 2 1 1 8*       Coags:   Results from last 7 days   Lab Units 03/25/21  0246   PTT seconds 26   INR  1 04       TSH:        Hemoglobin A1C )      Lipid Profile:   No results found for: CHOL  Lab Results   Component Value Date    HDL 34 (L) 03/25/2021    HDL 31 (L) 07/08/2020    HDL 32 (L) 02/15/2018     Lab Results   Component Value Date    LDLCALC 122 (H) 03/25/2021 [employer]  07/08/2020      Comment:      Calculated LDL invalid, triglycerides >400 mg/dl  This screening LDL is a calculated result  It does not have the accuracy of the Direct Measured LDL in the monitoring of patients with hyperlipidemia and/or statin therapy  Direct Measure LDL (SUZ696) must be ordered separately in these patients      [employer]  02/15/2018      Comment:      Calculated LDL invalid, triglycerides >400 mg/dl     Lab Results   Component Value Date    LDLDIRECT 160 (H) 02/15/2018     Lab Results   Component Value Date    TRIG 230 (H) 03/25/2021    TRIG 465 (H) 07/08/2020    TRIG 401 (H) 02/15/2018       Cardiac testing:   EKG personally reviewed by Pop Kothari MD      Results for orders placed during the hospital encounter of 21   Echo Complete with Contrast if Indicated    N[address]ative [facility] [address]  [address] [place] [address]  [phone]    Transthoracic Echocardiogram  2D, M-mode, Doppler, and Color Doppler    Study date:  25-Mar-2021    Patient: [facility]  MR number: [id number]  Account number: [de-identified]  : 1975  Age: 42 years  Gender: Female  Status: Inpatient  Location: Bedside  Height: 66 in  Weight: 279 4 lb  BP: 122/ 82 mmHg    Indications: Acute MI    Diagnoses: I21 4 - Non-ST elevation (NSTEMI) myocardial infarction    Sonographer:  Issac Merlin, RDCS  Primary Physician:  Yamile Thomason DO  Referring Physician:  Dino Roy DO  Group:  [facility]  Interpreting Physician:  Jayro Ramsey MD    IMPRESSIONS:  Features are consistent with hypertrophic cardiomyopathy and inferior wall myocardial infarction  Clinical correlation is advised  SUMMARY    LEFT VENTRICLE:  Systolic function was normal  Ejection fraction was estimated to be 60 %  There was severe hypokinesis of the basal-mid inferior wall(s)  Wall thickness was markedly increased  There was severe assymetrical hypertrophy of the inferior septum (25 mm)  There was no dynamic obstruction at rest  Provocative maneuvers were not performed  Features were consistent with a pseudonormal left ventricular filling pattern, with concomitant abnormal relaxation and increased filling pressure (grade 2 diastolic dysfunction)  LEFT ATRIUM:  The atrium was mildly dilated  MITRAL VALVE:  There was moderate systolic anterior motion of the anterior leaflet  There was mild to moderate regurgitation  AORTIC VALVE:  There was trace regurgitation  HISTORY: PRIOR HISTORY: CHF, CAD, Tobacco Abuse, Obesity    PROCEDURE: The procedure was performed at the bedside   This was a routine study  The transthoracic approach was used  The study included complete 2D imaging, M-mode, complete spectral Doppler, and color Doppler  The heart rate was 66 bpm,  at the start of the study  Images were obtained from the parasternal, apical, subcostal, and suprasternal notch acoustic windows  Echocardiographic views were limited due to poor acoustic window availability and decreased penetration  This  was a technically difficult study  LEFT VENTRICLE: Size was normal  Systolic function was normal  Ejection fraction was estimated to be 60 %  There was severe hypokinesis of the basal-mid inferior wall(s)  Wall thickness was markedly increased  There was severe assymetrical  hypertrophy of the inferior septum (25 mm)  DOPPLER: There was no dynamic obstruction at rest  Provocative maneuvers were not performed  Features were consistent with a pseudonormal left ventricular filling pattern, with concomitant  abnormal relaxation and increased filling pressure (grade 2 diastolic dysfunction)  RIGHT VENTRICLE: The size was normal  Systolic function was normal  Wall thickness was normal     LEFT ATRIUM: The atrium was mildly dilated  RIGHT ATRIUM: Size was normal     MITRAL VALVE: Valve structure was normal  There was normal leaflet separation  There was moderate systolic anterior motion of the anterior leaflet  DOPPLER: The transmitral velocity was within the normal range  There was no evidence for  stenosis  There was mild to moderate regurgitation  AORTIC VALVE: The valve was trileaflet  Leaflets exhibited normal thickness and normal cuspal separation  DOPPLER: Transaortic velocity was within the normal range  There was no evidence for stenosis  There was trace regurgitation  TRICUSPID VALVE: The valve structure was normal  There was normal leaflet separation  DOPPLER: The transtricuspid velocity was within the normal range  There was no evidence for stenosis   There was no regurgitation  PULMONIC VALVE: Leaflets exhibited normal thickness, no calcification, and normal cuspal separation  DOPPLER: The transpulmonic velocity was within the normal range  There was no regurgitation  PERICARDIUM: There was no pericardial effusion  The pericardium was normal in appearance  AORTA: The root exhibited normal size  SYSTEM MEASUREMENT TABLES    2D  %FS: 27 94 %  Ao Diam: 3 95 cm  Ao asc: 3 6 cm  EDV(Teich): 66 84 ml  EF(Teich): 54 74 %  ESV(Teich): 30 25 ml  IVSd: 2 82 cm  LA Diam: 4 59 cm  LAAs A4C: 16 52 cm2  LAESV A-L A4C: 43 86 ml  LAESV MOD A4C: 42 09 ml  LALs A4C: 5 28 cm  LVEDV MOD A4C: 88 32 ml  LVEF MOD A4C: 67 58 %  LVESV MOD A4C: 28 63 ml  LVIDd: 3 92 cm  LVIDs: 2 83 cm  LVLd A4C: 6 93 cm  LVLs A4C: 5 55 cm  LVPWd: 1 43 cm  RAEDV A-L: 23 76 ml  RAEDV MOD: 22 46 ml  RALd: 4 9 cm  RVIDd: 3 61 cm  SV MOD A4C: 59 69 ml  SV(Teich): 36 59 ml    CW  AR Dec [place]: 1 39 m/s2  AR Dec Time: 2867 87 ms  AR PHT: 831 68 ms  AR Vmax: 3 99 m/s  AR maxP 65 mmHg  AV Env  Ti: 240 72 ms  AV VTI: 22 04 cm  AV Vmax: 1 31 m/s  AV Vmean: 0 92 m/s  AV maxP 85 mmHg  AV meanPG: 3 93 mmHg    MM  TAPSE: 1 87 cm    PW  LVOT Env  Ti: 253 09 ms  LVOT VTI: 15 47 cm  LVOT Vmax: 0 92 m/s  LVOT Vmean: 0 61 m/s  LVOT maxPG: 3 4 mmHg  LVOT meanP 75 mmHg  MV A Jorge: 0 71 m/s  MV Dec Camuy: 5 89 m/s2  MV DecT: 156 23 ms  MV E Jorge: 0 91 m/s  MV E/A Ratio: 1 3    [facility] Accredited Echocardiography Laboratory    Prepared and electronically signed by    Robert Packer MD  Signed 25-Mar-2021 13:06:46       No results found for this or any previous visit    Results for orders placed during the hospital encounter of 21   Cardiac catheterization    Narrative [facility] [address]  [address]  [phone]    [facility]    Invasive Cardiovascular Lab Complete Report    Patient: Susi Deutsch  MR number: [id number]  Account number: [id number]  Study date: 2021  Gender: Female  : 1975  Height: 66 1 in  Weight: 268 4 lb  BSA: 2 27 mï¾²    Diagnostic Cardiologist:  Jory Oates MD  Interventional Cardiologist:  Jory Oates MD    SUMMARY    CORONARY CIRCULATION:  Right PDA: There was a discrete 90 % stenosis at the ostium of the vessel segment  The lesion was associated with a large filling defect consistent with thrombus  This lesion is a likely culprit for the patient's anginal symptoms  An  intervention was performed  2nd posterolateral segment: There was a 100 % stenosis  The lesion was associated with a moderate filling defect consistent with thrombus  It does not appear amenable to intervention  Distal embolization    HEMODYNAMICS:  Hemodynamic assessment demonstrated mildly to moderately elevated LVEDP  1ST LESION INTERVENTIONS:  IC integrilin given (180mcg/Kg)  A successful balloon angioplasty with stent procedure was performed on the 90 % lesion in the right posterior descending artery  Following intervention there was an excellent angiographic appearance with a 0 % residual stenosis  A Resolute Skip Rx 3 5 x 12mm drug-eluting stent was placed across the lesion and deployed at a maximum inflation pressure of 12 lon  PROCEDURES PERFORMED    --  Left heart catheterization without ventriculogram   --  Right coronary angiography  --  Inpatient  --  Mod Sedation Same Physician Initial 15min  --  Mod Sedation Same Physician Add 15min  --  Mod Sedation Same Physician Add 15min  --  Coronary Catheterization (w/ LHC)  --  Mod Sedation Same Physician Add 15min  --  Coronary Drug Eluting Stent w/PTCA  --  Intervention on right PDA: balloon angioplasty, stent  PROCEDURE: The risks and alternatives of the procedures and conscious sedation were explained to the patient and informed consent was obtained  The patient was brought to the cath lab and placed on the table   The planned puncture sites  were prepped and draped in the usual sterile fashion  --  Right radial artery access  After performing an Riley's test to verify adequate ulnar artery supply to the hand, the radial site was prepped  The puncture site was infiltrated with local anesthetic  The vessel was accessed using the  modified Seldinger technique, a wire was advanced into the vessel, and a sheath was advanced over the wire into the vessel  --  Left heart catheterization without ventriculogram  A catheter was advanced over a guidewire into the ascending aorta  After recording ascending aortic pressure, the catheter was advanced across the aortic valve and left ventricular  pressure was recorded  The catheter was pulled back across the aortic valve and into the ascending aorta and pullback pressures were obtained  --  Right coronary artery angiography  A catheter was advanced over a guidewire into the aorta and positioned in the right coronary artery ostium under fluoroscopic guidance  Angiography was performed  --  Inpatient  --  Mod Sedation Same Physician Initial 15min  --  Mod Sedation Same Physician Add 15min  --  Mod Sedation Same Physician Add 15min  --  Coronary Catheterization (w/ LHC)  --  Mod Sedation Same Physician Add 15min  LESION INTERVENTION: A successful balloon angioplasty with stent procedure was performed on the 90 % lesion in the right posterior descending artery  Following intervention there was an excellent angiographic appearance with a 0 % residual  stenosis  The residual lesion demonstrated a small filling defect consistent with thrombus  There was DALILA 3 flow before the procedure and DALILA 3 flow after the procedure  IC integrilin given (180mcg/Kg)    --  Vessel setup was performed  A 6Fr  Launcher Rbu 3 5 guiding catheter was used to cannulate the vessel  --  Vessel setup was performed  A Runthrough NS 180cm wire was used to cross the lesion      --  Balloon angioplasty was performed, using a Trek Rx 3 0 x 15mm balloon, with 1 inflations and a maximum inflation pressure of 8 lon  --  A Resolute [place] Rx 3 5 x 12mm drug-eluting stent was placed across the lesion and deployed at a maximum inflation pressure of 12 lon  INTERVENTIONS:  --  Coronary Drug Eluting Stent w/PTCA  PROCEDURE COMPLETION: The patient tolerated the procedure well and was discharged from the cath lab  TIMING: Test started at 14:47  Test concluded at 15:37  HEMOSTASIS: The sheath was removed  The site was compressed with a Hemoband  device  Hemostasis was obtained  MEDICATIONS GIVEN: Versed (2mg/2ml), 2 mg, IV, at 14:46  Fentanyl (1OOmcg/2 ml), 100 mcg, IV, at 14:46  1% Lidocaine, 2 ml, subcutaneously, at 14:47  Nitroglycerin (200mcg/ml), 200 mcg, at 14:52  Verapamil  (5mg/2ml), 2 5 mg, IV, at 14:52  Heparin 1000 units/ml, 4,000 units, IV, at 14:52  Heparin 1000 units/ml, 8,000 units, IV, at 14:58  Fentanyl (1OOmcg/2 ml), 50 mcg, IV, at 15:02  Integrilin Bolus(2mg/ml), 11 3 ml, at 15:29  Fentanyl  (1OOmcg/2 ml), 50 mcg, IV, at 15:30  CONTRAST GIVEN: 75 ml Omnipaque (350mg I /ml)  RADIATION EXPOSURE: Fluoroscopy time: 11 88 min  HEMODYNAMICS: Hemodynamic assessment demonstrated mildly to moderately elevated LVEDP  CORONARY VESSELS:   --  The coronary circulation is right dominant  --  RCA: The vessel was large sized (dominant) and mildly ectatic  --  Mid RCA: There was a 0 % stenosis at the site of a prior stent  --  Distal RCA: There was a 0 % stenosis at the site of a prior stent  --  Right PDA: There was a discrete 90 % stenosis at the ostium of the vessel segment  The lesion was associated with a large filling defect consistent with thrombus  This lesion is a likely culprit for the patient's anginal symptoms  An  intervention was performed  --  2nd posterolateral segment: There was a 100 % stenosis  The lesion was associated with a moderate filling defect consistent with thrombus  It does not appear amenable to intervention   Distal embolization    RECOMMENDATIONS  The patient should continue with the present medications  DISPOSITION:  The patient left the catheterization laboratory in stable condition  Prepared and signed by    Michelle López MD  Signed 2021 15:42:42    Study diagram    Angiographic findings  Native coronary lesions:  ï¾·Mid RCA: Lesion 1: 0 % stenosis, site of prior stent  ï¾·Distal RCA: Lesion 1: 0 % stenosis, site of prior stent  ï¾·RPDA: Lesion 1: discrete, 90 % stenosis  ï¾·RPL2: Lesion 1: 100 % stenosis  Intervention results  Native coronary lesions:  ï¾·Successful balloon angioplasty and stent of the 90 % stenosis in right PDA  Appearance excellent with 0 % residual stenosis  Stent: Resolute Skip Rx 3 5 x 12mm drug-eluting  Hemodynamic tables    Pressures:  Baseline  Pressures:  - HR: 73  Pressures:  - Rhythm:  Pressures:  -- Aortic Pressure (S/D/M): 102/74/86  Pressures:  -- Left Ventricle (s/edp): 108/50/--    Outputs:  Baseline  Outputs:  -- CALCULATIONS: [age] years[age]  Outputs:  -- CALCULATIONS: Body Surface Area: 2 27  Outputs:  -- CALCULATIONS: Height in cm: 168 00  Outputs:  -- CALCULATIONS: Sex: Female  Outputs:  -- CALCULATIONS: Weight in k 00       No results found for this or any previous visit  No results found for this or any previous visit    Results for orders placed during the hospital encounter of 17   NM myocardial perfusion spect (rx stress and/or rest)    [address] [address]  [phone]    Rest/Stress Gated SPECT Myocardial Perfusion Imaging After Regadenoson    Patient: Chalino Glez  MR number: [id number]  Account number: [de-identified]  : 1975  Age: 46 years  Gender: Female  Status: Inpatient  Location: Stress lab  Height: 67 in  Weight: 220 lb  BP: 142/ 98 mmHg    Allergies: NO KNOWN ALLERGIES    Diagnosis: R07 9 - Chest pain, unspecified    RN:  Milagros Albert RN  Interpreting Physician:  Lobo Gotti MD  Referring Physician:  Lobo Gotti MD  Technician:  Douglas Rod  Group:  [facility]  Report Prepared By[de-identified]  Douglas Rod    INDICATIONS: Evaluation of known coronary artery disease  HISTORY: The patient is a 46year old  female  Chest pain status: chest pain  Coronary artery disease risk factors: dyslipidemia, hypertension, and smoking  Cardiovascular history: coronary artery disease and congestive heart  failure  Prior cardiovascular procedures: percutaneous transluminal coronary angioplasty  Co-morbidity: obesity  PHYSICAL EXAM: Baseline physical exam screening: diminished at bases per Elías Burciaga RN  REST ECG: Normal sinus rhythm  Nonspecific T wave abnormalities were present  PROCEDURE: The study was performed in the stress lab  A regadenoson infusion pharmacologic stress test was performed  Gated SPECT myocardial perfusion imaging was performed after stress and at rest  Systolic blood pressure was 142 mmHg, at  the start of the study  Diastolic blood pressure was 98 mmHg, at the start of the study  The heart rate was 75 bpm, at the start of the study  Regadenoson protocol:  HR bpm SBP mmHg DBP mmHg Symptoms  Baseline 75 142 98 none  1 min 99 158 98 mild dyspnea, flushing, nausea  4 min 92 148 92 none  No medications or fluids given  The patient also performed low level exercise with hand  with foot wiggles  STRESS SUMMARY: Duration of pharmacologic stress was 3 min and 0 sec  The patient exercised to protocol stage 1  Maximal heart rate during stress was 100 bpm  The rate-pressure product for the peak heart rate and blood pressure was 85144  There was no chest pain during stress  The stress test was terminated due to protocol completion  Pre oxygen saturation: 99 %  Peak oxygen saturation: 99 %   The stress ECG was negative for ischemia and normal     ISOTOPE ADMINISTRATION:  Resting isotope administration Stress isotope administration  Agent Tetrofosmin Tetrofosmin  Dose 10 6 mCi 31 3 mCi  Date 03/13/2017 03/13/2017    The radiopharmaceutical was injected at the peak effect of pharmacologic stress  MYOCARDIAL PERFUSION IMAGING:  The image quality was fair  PERFUSION DEFECTS:  -  There was a small, mildly severe, fixed myocardial perfusion defect of the mid to apical anterior wall likely due to attenuation from breast tissue  GATED SPECT:  The calculated left ventricular ejection fraction was 48 %  Left ventricular ejection fraction was within normal limits by visual estimate  SUMMARY:  -  Stress results: There was no chest pain during stress  -  ECG conclusions: The stress ECG was negative for ischemia and normal   -  Perfusion imaging: There was a small, mildly severe, fixed myocardial perfusion defect of the mid to apical anterior wall likely due to attenuation from breast tissue   -  Gated SPECT: The calculated left ventricular ejection fraction was 48 %  Left ventricular ejection fraction was within normal limits by visual estimate  IMPRESSIONS: There was image artifact, without diagnostic evidence for perfusion abnormality  Prepared and signed by    Harry Ambrosio MD  Signed 03/13/2017 13:41:05             Vickie Weiss MD    Portions of the record may have been created with voice recognition software  Occasional wrong word or "sound a like" substitutions may have occurred due to the inherent limitations of voice recognition software  Read the chart carefully and recognize, using context, where substitutions have occurred

## 2021-03-28 NOTE — PLAN OF CARE
Problem: PAIN - ADULT  Goal: Verbalizes/displays adequate comfort level or baseline comfort level  Description: Interventions:  - Encourage patient to monitor pain and request assistance  - Assess pain using appropriate pain scale  - Administer analgesics based on type and severity of pain and evaluate response  - Implement non-pharmacological measures as appropriate and evaluate response  - Consider cultural and social influences on pain and pain management  - Notify physician/advanced practitioner if interventions unsuccessful or patient reports new pain  Outcome: Progressing     Problem: INFECTION - ADULT  Goal: Absence or prevention of progression during hospitalization  Description: INTERVENTIONS:  - Assess and monitor for signs and symptoms of infection  - Monitor lab/diagnostic results  - Monitor all insertion sites, i e  indwelling lines, tubes, and drains  - Monitor endotracheal if appropriate and nasal secretions for changes in amount and color  - [place] appropriate cooling/warming therapies per order  - Administer medications as ordered  - Instruct and encourage patient and family to use good hand hygiene technique  - Identify and instruct in appropriate isolation precautions for identified infection/condition  Outcome: Progressing  Goal: Absence of fever/infection during neutropenic period  Description: INTERVENTIONS:  - Monitor WBC    Outcome: Progressing     Problem: SAFETY ADULT  Goal: Patient will remain free of falls  Description: INTERVENTIONS:  - Assess patient frequently for physical needs  -  Identify cognitive and physical deficits and behaviors that affect risk of falls    -  [place] fall precautions as indicated by assessment   - Educate patient/family on patient safety including physical limitations  - Instruct patient to call for assistance with activity based on assessment  - Modify environment to reduce risk of injury  - Consider OT/PT consult to assist with strengthening/mobility  Outcome: Progressing  Goal: Maintain or return to baseline ADL function  Description: INTERVENTIONS:  -  Assess patient's ability to carry out ADLs; assess patient's baseline for ADL function and identify physical deficits which impact ability to perform ADLs (bathing, care of mouth/teeth, toileting, grooming, dressing, etc )  - Assess/evaluate cause of self-care deficits   - Assess range of motion  - Assess patient's mobility; develop plan if impaired  - Assess patient's need for assistive devices and provide as appropriate  - Encourage maximum independence but intervene and supervise when necessary  - Involve family in performance of ADLs  - Assess for home care needs following discharge   - Consider OT consult to assist with ADL evaluation and planning for discharge  - Provide patient education as appropriate  Outcome: Progressing  Goal: Maintain or return mobility status to optimal level  Description: INTERVENTIONS:  - Assess patient's baseline mobility status (ambulation, transfers, stairs, etc )    - Identify cognitive and physical deficits and behaviors that affect mobility  - Identify mobility aids required to assist with transfers and/or ambulation (gait belt, sit-to-stand, lift, walker, cane, etc )  - [place] fall precautions as indicated by assessment  - Record patient progress and toleration of activity level on Mobility SBAR; progress patient to next Phase/Stage  - Instruct patient to call for assistance with activity based on assessment  - Consider rehabilitation consult to assist with strengthening/weightbearing, etc   Outcome: Progressing     Problem: DISCHARGE PLANNING  Goal: Discharge to home or other facility with appropriate resources  Description: INTERVENTIONS:  - Identify barriers to discharge w/patient and caregiver  - Arrange for needed discharge resources and transportation as appropriate  - Identify discharge learning needs (meds, wound care, etc )  - Arrange for interpretive services to assist at discharge as needed  - Refer to Case Management Department for coordinating discharge planning if the patient needs post-hospital services based on physician/advanced practitioner order or complex needs related to functional status, cognitive ability, or social support system  Outcome: Progressing     Problem: Knowledge Deficit  Goal: Patient/family/caregiver demonstrates understanding of disease process, treatment plan, medications, and discharge instructions  Description: Complete learning assessment and assess knowledge base  Interventions:  - Provide teaching at level of understanding  - Provide teaching via preferred learning methods  Outcome: Progressing     Problem: Nutrition/Hydration-ADULT  Goal: Nutrient/Hydration intake appropriate for improving, restoring or maintaining nutritional needs  Description: Monitor and assess patient's nutrition/hydration status for malnutrition  Collaborate with interdisciplinary team and initiate plan and interventions as ordered  Monitor patient's weight and dietary intake as ordered or per policy  Utilize nutrition screening tool and intervene as necessary  Determine patient's food preferences and provide high-protein, high-caloric foods as appropriate       INTERVENTIONS:  - Monitor oral intake, urinary output, labs, and treatment plans  - Assess nutrition and hydration status and recommend course of action  - Evaluate amount of meals eaten  - Assist patient with eating if necessary   - Allow adequate time for meals  - Recommend/ encourage appropriate diets, oral nutritional supplements, and vitamin/mineral supplements  - Order, calculate, and assess calorie counts as needed  - Recommend, monitor, and adjust tube feedings and TPN/PPN based on assessed needs  - Assess need for intravenous fluids  - Provide specific nutrition/hydration education as appropriate  - Include patient/family/caregiver in decisions related to nutrition  Outcome: Progressing     Problem: CARDIOVASCULAR - ADULT  Goal: Maintains optimal cardiac output and hemodynamic stability  Description: INTERVENTIONS:  - Monitor I/O, vital signs and rhythm  - Monitor for S/S and trends of decreased cardiac output  - Administer and titrate ordered vasoactive medications to optimize hemodynamic stability  - Assess quality of pulses, skin color and temperature  - Assess for signs of decreased coronary artery perfusion  - Instruct patient to report change in severity of symptoms  Outcome: Progressing  Goal: Absence of cardiac dysrhythmias or at baseline rhythm  Description: INTERVENTIONS:  - Continuous cardiac monitoring, vital signs, obtain 12 lead EKG if ordered  - Administer antiarrhythmic and heart rate control medications as ordered  - Monitor electrolytes and administer replacement therapy as ordered  Outcome: Progressing     Problem: RESPIRATORY - ADULT  Goal: Achieves optimal ventilation and oxygenation  Description: INTERVENTIONS:  - Assess for changes in respiratory status  - Assess for changes in mentation and behavior  - Position to facilitate oxygenation and minimize respiratory effort  - Oxygen administered by appropriate delivery if ordered  - Initiate smoking cessation education as indicated  - Encourage broncho-pulmonary hygiene including cough, deep breathe, Incentive Spirometry  - Assess the need for suctioning and aspirate as needed  - Assess and instruct to report SOB or any respiratory difficulty  - Respiratory Therapy support as indicated  Outcome: Progressing     Problem: Potential for Falls  Goal: Patient will remain free of falls  Description: INTERVENTIONS:  - Assess patient frequently for physical needs  -  Identify cognitive and physical deficits and behaviors that affect risk of falls    -  [place] fall precautions as indicated by assessment   - Educate patient/family on patient safety including physical limitations  - Instruct patient to call for assistance with activity based on assessment  - Modify environment to reduce risk of injury  - Consider OT/PT consult to assist with strengthening/mobility  Outcome: Progressing

## 2021-03-29 ENCOUNTER — APPOINTMENT (INPATIENT)
Dept: RADIOLOGY | Facility: HOSPITAL | Age: 46
DRG: 174 | End: 2021-03-29
Payer: COMMERCIAL

## 2021-03-29 LAB — CRP SERPL QL: 153 MG/L

## 2021-03-29 PROCEDURE — A9585 GADOBUTROL INJECTION: HCPCS | Performed by: PSYCHIATRY & NEUROLOGY

## 2021-03-29 PROCEDURE — 99233 SBSQ HOSP IP/OBS HIGH 50: CPT | Performed by: INTERNAL MEDICINE

## 2021-03-29 PROCEDURE — 75561 CARDIAC MRI FOR MORPH W/DYE: CPT

## 2021-03-29 PROCEDURE — G1004 CDSM NDSC: HCPCS

## 2021-03-29 RX ORDER — KETOROLAC TROMETHAMINE 30 MG/ML
15 INJECTION, SOLUTION INTRAMUSCULAR; INTRAVENOUS EVERY 6 HOURS SCHEDULED
Status: DISCONTINUED | OUTPATIENT
Start: 2021-03-29 | End: 2021-03-29

## 2021-03-29 RX ORDER — ISOSORBIDE MONONITRATE 30 MG/1
30 TABLET, EXTENDED RELEASE ORAL DAILY
Status: DISCONTINUED | OUTPATIENT
Start: 2021-03-29 | End: 2021-03-29

## 2021-03-29 RX ORDER — ISOSORBIDE MONONITRATE 30 MG/1
30 TABLET, EXTENDED RELEASE ORAL DAILY
Status: DISCONTINUED | OUTPATIENT
Start: 2021-03-30 | End: 2021-03-29

## 2021-03-29 RX ORDER — RANOLAZINE 500 MG/1
500 TABLET, EXTENDED RELEASE ORAL EVERY 12 HOURS SCHEDULED
Status: DISCONTINUED | OUTPATIENT
Start: 2021-03-29 | End: 2021-04-01 | Stop reason: HOSPADM

## 2021-03-29 RX ORDER — COLCHICINE 0.6 MG/1
0.6 TABLET ORAL 2 TIMES DAILY
Status: DISCONTINUED | OUTPATIENT
Start: 2021-03-29 | End: 2021-04-01 | Stop reason: HOSPADM

## 2021-03-29 RX ADMIN — NICOTINE 7 MG/24 HR DAILY TRANSDERMAL PATCH 1 PATCH: at 09:14

## 2021-03-29 RX ADMIN — HEPARIN SODIUM 7500 UNITS: 5000 INJECTION INTRAVENOUS; SUBCUTANEOUS at 22:23

## 2021-03-29 RX ADMIN — ASPIRIN 81 MG: 81 TABLET, CHEWABLE ORAL at 09:09

## 2021-03-29 RX ADMIN — ALPRAZOLAM 0.5 MG: 0.5 TABLET ORAL at 00:28

## 2021-03-29 RX ADMIN — RANOLAZINE 500 MG: 500 TABLET, FILM COATED, EXTENDED RELEASE ORAL at 11:44

## 2021-03-29 RX ADMIN — RANOLAZINE 500 MG: 500 TABLET, FILM COATED, EXTENDED RELEASE ORAL at 22:23

## 2021-03-29 RX ADMIN — ACETAMINOPHEN 650 MG: 325 TABLET, FILM COATED ORAL at 15:55

## 2021-03-29 RX ADMIN — SERTRALINE HYDROCHLORIDE 50 MG: 50 TABLET ORAL at 09:09

## 2021-03-29 RX ADMIN — HEPARIN SODIUM 7500 UNITS: 5000 INJECTION INTRAVENOUS; SUBCUTANEOUS at 05:57

## 2021-03-29 RX ADMIN — FENTANYL CITRATE 50 MCG: 50 INJECTION INTRAMUSCULAR; INTRAVENOUS at 13:49

## 2021-03-29 RX ADMIN — FENTANYL CITRATE 50 MCG: 50 INJECTION INTRAMUSCULAR; INTRAVENOUS at 22:24

## 2021-03-29 RX ADMIN — PREDNISONE 30 MG: 20 TABLET ORAL at 15:56

## 2021-03-29 RX ADMIN — GADOBUTROL 24 ML: 604.72 INJECTION INTRAVENOUS at 02:02

## 2021-03-29 RX ADMIN — TICAGRELOR 90 MG: 90 TABLET ORAL at 17:28

## 2021-03-29 RX ADMIN — FENTANYL CITRATE 50 MCG: 50 INJECTION INTRAMUSCULAR; INTRAVENOUS at 03:39

## 2021-03-29 RX ADMIN — KETOROLAC TROMETHAMINE 15 MG: 30 INJECTION, SOLUTION INTRAMUSCULAR at 11:44

## 2021-03-29 RX ADMIN — METOPROLOL TARTRATE 50 MG: 50 TABLET, FILM COATED ORAL at 09:06

## 2021-03-29 RX ADMIN — PANTOPRAZOLE SODIUM 40 MG: 40 TABLET, DELAYED RELEASE ORAL at 05:57

## 2021-03-29 RX ADMIN — FENTANYL CITRATE 50 MCG: 50 INJECTION INTRAMUSCULAR; INTRAVENOUS at 20:05

## 2021-03-29 RX ADMIN — BUPROPION HYDROCHLORIDE 300 MG: 150 TABLET, FILM COATED, EXTENDED RELEASE ORAL at 09:09

## 2021-03-29 RX ADMIN — COLCHICINE 0.6 MG: 0.6 TABLET ORAL at 15:55

## 2021-03-29 RX ADMIN — FENTANYL CITRATE 50 MCG: 50 INJECTION INTRAMUSCULAR; INTRAVENOUS at 09:14

## 2021-03-29 RX ADMIN — FENTANYL CITRATE 50 MCG: 50 INJECTION INTRAMUSCULAR; INTRAVENOUS at 17:28

## 2021-03-29 RX ADMIN — FENTANYL CITRATE 50 MCG: 50 INJECTION INTRAMUSCULAR; INTRAVENOUS at 01:02

## 2021-03-29 RX ADMIN — TICAGRELOR 90 MG: 90 TABLET ORAL at 09:09

## 2021-03-29 RX ADMIN — FENTANYL CITRATE 50 MCG: 50 INJECTION INTRAMUSCULAR; INTRAVENOUS at 05:52

## 2021-03-29 RX ADMIN — ATORVASTATIN CALCIUM 80 MG: 80 TABLET, FILM COATED ORAL at 17:28

## 2021-03-29 NOTE — PROGRESS NOTES
Cardiology Progress Note - Rosie Carlton 42 y o  female MRN: [id number]    Unit/Bed#: [facility] 505-01 Encounter: [id number]      Assessment & Plan:  Principal Problem:    Acute MI, inferior wall (HCC)  Active Problems:    Ventricular tachycardia, non-sustained (HCC)    Hypertrophic cardiomyopathy (HCC)    Essential hypertension    CHF (congestive heart failure) (HCC)    Tobacco abuse    Mixed hyperlipidemia    CKD (chronic kidney disease) stage 3, GFR 30-59 ml/min    BMI 40 0-44 9, adult (HCC)    Leukocytosis        * Acute MI, inferior wall [facility])  Assessment & Plan  Patient presented morning of 3/25 with acute inferior wall MI  Initial cath notable for diffuse atherosclerosis and 100% stenosis of mid-RCA which was stented  Continued to have significant chest pain and ST elevations in the hours post-procedure  She was then taken back to cath lab and was found to have 90% stenosis of right PDA ostium which was consistent with thrombus  The right PDA ostium was also stented  Additionally, patient had 100% stenosis of the 2nd posterolateral segment consistent with thrombus, however, this vessel was not amenable to intervention  Since then, chest pain has subsided but is still present  Also continues to have ST elevation on telemetry  Plan:  - continue to monitor on telemetry 2/2 arrhythmia  - Continue Brilinta 90 mg bid  Patient was advised that Brilinta may cause some intermittent dyspnea  - Continue Atorvastatin 80 mg QHS  - Fentanyl as needed for breakthrough pain - weaning off today  - Ranexa 500 mg q12h  - Will trial Toradol 15 mg IV for possible pericardial inflammation s/p MI    - Smoking cessation strongly encouraged  - Lifestyle modifications  - Replete Mg and K as needed    Ventricular tachycardia, non-sustained (HCC)  Assessment & Plan  Multiple episodes of non-sustained monomorphic ventricular tachycardia in the post-MI period   Last episode recorded on telemetry occurred at 9:07 PM on 3/25 and lasted for approximately 45 beats (21 seconds) before converting  Patient is highly symptomatic during these episodes  Plan:  - Continue to monitor on telemetry  - Metoprolol 50 mg Q12H  - Life vest for discharge    Hypertrophic cardiomyopathy [facility])  Assessment & Plan  Echo completed 3/25/21 concerning for hypertrophic cardiomyopathy +/- obstruction  Per report, there was severe asymmetrical hypertrophy of the inferior septum measuring 25 mm without dynamic obstruction at rest; however, noted to have moderate systolic anterior motion of the mitral valve anterior leaflet  Patient reports longstanding history of uncontrolled HTN with pressures > 308 mm Hg systolic, has been controlled for the last 3-4 years  Plan:  - Cardiac MRI completed but not yet read  - Will discuss results with patient when they are available    CHF (congestive heart failure) ([facility])  Assessment & Plan  Wt Readings from Last 3 Encounters:   03/29/21 120 kg (264 lb 1 8 oz)   03/26/21 122 kg (269 lb)   03/25/21 127 kg (280 lb)       Prior history of CHF  No signs of volume overload  Echo completed with normal EF at 60%  - continue to monitor volume status and diurese if necessary      Essential hypertension  Assessment & Plan  Patient with history of HTN, on Norvasc 5 mg and Lisinopril 5 mg at home  BP was initially soft post-cardiac cath  This morning, BP has been 008-442 systolic   - Continue to monitor off antihypertensives for now  - Consider resuming home antihypertensives if BP is persistently elevated    Tobacco abuse  Assessment & Plan  Patient currently 1/2 to 1 ppd smoker  Strongly encouraged smoking cessation given MI and prior cardiac history  Continue with nicotine patch during admission  Patient wishes to stop smoking completely and is requesting rx for nicotine patch for home  Mixed hyperlipidemia  Assessment & Plan  History of hyperlipidemia, on Atorvastatin 40 mg QD at home   Fasting lipids this morning with sub-optimal control- total cholesterol 202, Tg 230, HDL 34, and   Continue Atorvastatin to 80 mg QD  CKD (chronic kidney disease) stage 3, GFR 30-59 ml/min  Assessment & Plan  Lab Results   Component Value Date    EGFR 38 03/28/2021    EGFR 42 03/26/2021    EGFR 37 03/25/2021    CREATININE 1 62 (H) 03/28/2021    CREATININE 1 49 (H) 03/26/2021    CREATININE 1 68 (H) 03/25/2021     Per review of records, patient's baseline creatinine is approximately 1 5 and creatinine has returned to baseline post-cath procedures  BMI 40 0-44 9, adult [facility])  Assessment & Plan  Lifestyle modification discussed    Leukocytosis  Assessment & Plan  Leukocytosis present on admission, WBC > 20  This likely represents a stress reaction  Will continue to trend off of antibiotics  Continues to have no signs of infection  Discharge Planning:  Patient continues to have chest pain  Will add Ranexa and Toradol  If pain improved and off of Fentanyl, possible for d/c home tomorrow  Pending Life Vest       Subjective:   Patient seen and examined  No significant events overnight  She continues to have constant chest pain, rated 2/10 at best, 8/10 at worst  Significant relief with Fentanyl  Also having dyspnea on exertion  She has not been able to walk the hallways yet  No shortness of breath at rest  No leg swelling, orthopnea, or PND  No palpitations  Review of Systems   Constitutional: Negative for activity change, chills and fever  HENT: Negative for congestion, postnasal drip, rhinorrhea and sore throat  Eyes: Negative for discharge and visual disturbance  Respiratory: Positive for shortness of breath (with exertion)  Negative for cough, chest tightness and wheezing  Cardiovascular: Positive for chest pain  Negative for palpitations and leg swelling  Gastrointestinal: Negative for abdominal pain, diarrhea, nausea and vomiting  Endocrine: Negative for polydipsia and polyuria     Genitourinary: Negative for difficulty urinating, dysuria and urgency  Musculoskeletal: Negative for arthralgias, myalgias, neck pain and neck stiffness  Skin: Negative for rash and wound  Neurological: Negative for dizziness, weakness, light-headedness and headaches  Psychiatric/Behavioral: Negative for dysphoric mood  The patient is not nervous/anxious  Objective:     Vitals: Blood pressure 93/63, pulse 70, temperature 98 9 °F (37 2 °C), temperature source Oral, resp  rate 18, height 5' 6" (1 676 m), weight 120 kg (264 lb 1 8 oz), last menstrual period 03/07/2021, SpO2 97 %, not currently breastfeeding , Body mass index is 42 63 kg/m² ,   Orthostatic Blood Pressures      Most Recent Value   Blood Pressure  93/63 filed at 03/29/2021 1100   Patient Position - Orthostatic VS  Sitting filed at 03/29/2021 0240            Intake/Output Summary (Last 24 hours) at 3/29/2021 1219  Last data filed at 3/29/2021 0601  Gross per 24 hour   Intake 1460 ml   Output 675 ml   Net 785 ml           Physical Exam:     Physical Exam  Vitals signs and nursing note reviewed  Constitutional:       General: She is awake  Appearance: Normal appearance  She is morbidly obese  She is not ill-appearing, toxic-appearing or diaphoretic  HENT:      Head: Normocephalic and atraumatic  Right Ear: External ear normal       Left Ear: External ear normal       Nose: Nose normal       Mouth/Throat:      Mouth: Mucous membranes are moist       Pharynx: Oropharynx is clear  Eyes:      General: No scleral icterus  Extraocular Movements: Extraocular movements intact  Conjunctiva/sclera: Conjunctivae normal       Pupils: Pupils are equal, round, and reactive to light  Neck:      Musculoskeletal: Normal range of motion and neck supple  Vascular: No JVD  Cardiovascular:      Rate and Rhythm: Normal rate and regular rhythm  Pulses: Normal pulses  Heart sounds: Normal heart sounds  No murmur  No friction rub  No gallop         Comments: Continues to have significant ST elevations on telemetry  Pulmonary:      Effort: Pulmonary effort is normal  No respiratory distress  Breath sounds: Normal breath sounds  Abdominal:      General: Bowel sounds are normal  There is no distension  Palpations: Abdomen is soft  Tenderness: There is no abdominal tenderness  Musculoskeletal:         General: No deformity  Right lower leg: No edema  Left lower leg: No edema  Skin:     General: Skin is warm and dry  Capillary Refill: Capillary refill takes less than 2 seconds  Neurological:      General: No focal deficit present  Mental Status: She is alert and oriented to person, place, and time  Mental status is at baseline  Psychiatric:         Mood and Affect: Mood normal          Behavior: Behavior normal  Behavior is cooperative               Current Facility-Administered Medications:     acetaminophen (TYLENOL) tablet 650 mg, 650 mg, Oral, Q4H PRN, Marty Leiva MD    ALPRAZolam Felicia Fede) tablet 0 5 mg, 0 5 mg, Oral, HS PRN, Lauri Gomez MD, 0 5 mg at 03/28/21 2148    aspirin chewable tablet 81 mg, 81 mg, Oral, Daily, Rg Valencia DO, 81 mg at 03/29/21 0909    atorvastatin (LIPITOR) tablet 80 mg, 80 mg, Oral, QPM, John Saldana DO, 80 mg at 03/28/21 1717    buPROPion (WELLBUTRIN XL) 24 hr tablet 300 mg, 300 mg, Oral, QAM, Rg Valencia DO, 300 mg at 03/29/21 0909    eptifibatide (INTEGRILIN) bolus from premix, , , Code/Trauma/Sedation Med, Eddie Gunn MD, 11 3 mL at 03/25/21 1528    fentanyl citrate (PF) 100 MCG/2ML 50 mcg, 50 mcg, Intravenous, Q2H PRN, Toma Holden DO, 50 mcg at 03/29/21 4047    fentanyl citrate (PF) 100 MCG/2ML, , Intravenous, Code/Trauma/Sedation Med, Eddie Gunn MD, 50 mcg at 03/25/21 1530    heparin (porcine) injection, , , Code/Trauma/Sedation Med, Eddie Gunn MD, 8,000 Units at 03/25/21 1459    heparin (porcine) subcutaneous injection 7,500 Units, 7,500 Units, Subcutaneous, Q8H [facility], 7,500 Units at 03/29/21 0557 **AND** [CANCELED] Platelet count, , , Once, Sheri Slipper, DO    iohexol (OMNIPAQUE) 350 MG/ML injection (SINGLE-DOSE), , Intravenous, Code/Trauma/Sedation Med, Malick Dent MD, 75 mL at 03/25/21 1539    ketorolac (TORADOL) injection 15 mg, 15 mg, Intravenous, Q6H [facility], [facility] DO, 15 mg at 03/29/21 1144    lidocaine (PF) (XYLOCAINE-MPF) 1 % injection, , , Code/Trauma/Sedation Med, Malick Dent MD, 1 mL at 03/25/21 1447    metoprolol tartrate (LOPRESSOR) tablet 50 mg, 50 mg, Oral, Q12H [facility], Richard Dela Cruz MD, 50 mg at 03/29/21 8941    midazolam (VERSED) injection, , , Code/Trauma/Sedation Med, Malick Dent MD, 2 mg at 03/25/21 1446    nicotine (NICODERM CQ) 7 mg/24hr TD 24 hr patch 1 patch, 1 patch, Transdermal, Daily, Sheri Slipper, DO, 1 patch at 03/29/21 0914    nitroGLYcerin (TRIDIL) 50 mg in 250 mL infusion (premix), , Intra-arterial, Code/Trauma/Sedation Med, Malick Dent MD, 200 mcg at 03/25/21 1451    ondansetron (ZOFRAN) injection 4 mg, 4 mg, Intravenous, Q6H PRN, Sheri Slipper, DO, 4 mg at 03/25/21 1606    pantoprazole (PROTONIX) EC tablet 40 mg, 40 mg, Oral, Early Morning, Sheri Slipper, DO, 40 mg at 03/29/21 0557    ranolazine (RANEXA) 12 hr tablet 500 mg, 500 mg, Oral, Q12H [facility], [facility], DO, 500 mg at 03/29/21 1144    sertraline (ZOLOFT) tablet 50 mg, 50 mg, Oral, Daily, Sheri Slipper, DO, 50 mg at 03/29/21 0909    ticagrelor (BRILINTA) tablet 90 mg, 90 mg, Oral, BID, Sheri Slipper, DO, 90 mg at 03/29/21 8285    verapamil (ISOPTIN) injection, , , Code/Trauma/Sedation Med, Malick Dent MD, 2 5 mg at 03/25/21 1451    Labs & Results:    Lab Results   Component Value Date    TROPONINI >40 00 (H) 03/25/2021    TROPONINI 39 70 (H) 03/25/2021    TROPONINI 0 29 (H) 03/25/2021       Lab Results   Component Value Date    CALCIUM 8 8 03/28/2021    K 3 8 03/28/2021    CO2 24 03/28/2021     (H) 03/28/2021    BUN 13 03/28/2021 CREATININE 1 62 (H) 03/28/2021       Lab Results   Component Value Date    WBC 22 52 (H) 03/26/2021    HGB 13 2 03/26/2021    HCT 40 4 03/26/2021    MCV 85 03/26/2021     (H) 03/26/2021     Results from last 7 days   Lab Units 03/25/21  0246   INR  1 04       No results found for: CHOL  Lab Results   Component Value Date    HDL 34 (L) 03/25/2021    HDL 31 (L) 07/08/2020     Lab Results   Component Value Date    LDLCALC 122 (H) 03/25/2021    [address]  07/08/2020      Comment:      Calculated LDL invalid, triglycerides >400 mg/dl  This screening LDL is a calculated result  It does not have the accuracy of the Direct Measured LDL in the monitoring of patients with hyperlipidemia and/or statin therapy  Direct Measure LDL (BDF407) must be ordered separately in these patients  Lab Results   Component Value Date    TRIG 230 (H) 03/25/2021    TRIG 465 (H) 07/08/2020       Lab Results   Component Value Date    ALT 30 03/25/2021    AST 24 03/25/2021       TELE: NSR, rates 60s-80s  No further occurrences of VT since approximately 9 PM on 3/25    ======    Thank you for allowing me to participate in the care of your patient, DO Sohail Lo [facility] Neurology Residency, PGY-1

## 2021-03-29 NOTE — PLAN OF CARE
Problem: PAIN - ADULT  Goal: Verbalizes/displays adequate comfort level or baseline comfort level  Description: Interventions:  - Encourage patient to monitor pain and request assistance  - Assess pain using appropriate pain scale  - Administer analgesics based on type and severity of pain and evaluate response  - Implement non-pharmacological measures as appropriate and evaluate response  - Consider cultural and social influences on pain and pain management  - Notify physician/advanced practitioner if interventions unsuccessful or patient reports new pain  Outcome: Progressing     Problem: INFECTION - ADULT  Goal: Absence or prevention of progression during hospitalization  Description: INTERVENTIONS:  - Assess and monitor for signs and symptoms of infection  - Monitor lab/diagnostic results  - Monitor all insertion sites, i e  indwelling lines, tubes, and drains  - Monitor endotracheal if appropriate and nasal secretions for changes in amount and color  - [place] appropriate cooling/warming therapies per order  - Administer medications as ordered  - Instruct and encourage patient and family to use good hand hygiene technique  - Identify and instruct in appropriate isolation precautions for identified infection/condition  Outcome: Progressing  Goal: Absence of fever/infection during neutropenic period  Description: INTERVENTIONS:  - Monitor WBC    Outcome: Progressing     Problem: SAFETY ADULT  Goal: Patient will remain free of falls  Description: INTERVENTIONS:  - Assess patient frequently for physical needs  -  Identify cognitive and physical deficits and behaviors that affect risk of falls    -  [place] fall precautions as indicated by assessment   - Educate patient/family on patient safety including physical limitations  - Instruct patient to call for assistance with activity based on assessment  - Modify environment to reduce risk of injury  - Consider OT/PT consult to assist with strengthening/mobility  Outcome: Progressing  Goal: Maintain or return to baseline ADL function  Description: INTERVENTIONS:  -  Assess patient's ability to carry out ADLs; assess patient's baseline for ADL function and identify physical deficits which impact ability to perform ADLs (bathing, care of mouth/teeth, toileting, grooming, dressing, etc )  - Assess/evaluate cause of self-care deficits   - Assess range of motion  - Assess patient's mobility; develop plan if impaired  - Assess patient's need for assistive devices and provide as appropriate  - Encourage maximum independence but intervene and supervise when necessary  - Involve family in performance of ADLs  - Assess for home care needs following discharge   - Consider OT consult to assist with ADL evaluation and planning for discharge  - Provide patient education as appropriate  Outcome: Progressing  Goal: Maintain or return mobility status to optimal level  Description: INTERVENTIONS:  - Assess patient's baseline mobility status (ambulation, transfers, stairs, etc )    - Identify cognitive and physical deficits and behaviors that affect mobility  - Identify mobility aids required to assist with transfers and/or ambulation (gait belt, sit-to-stand, lift, walker, cane, etc )  - [place] fall precautions as indicated by assessment  - Record patient progress and toleration of activity level on Mobility SBAR; progress patient to next Phase/Stage  - Instruct patient to call for assistance with activity based on assessment  - Consider rehabilitation consult to assist with strengthening/weightbearing, etc   Outcome: Progressing     Problem: DISCHARGE PLANNING  Goal: Discharge to home or other facility with appropriate resources  Description: INTERVENTIONS:  - Identify barriers to discharge w/patient and caregiver  - Arrange for needed discharge resources and transportation as appropriate  - Identify discharge learning needs (meds, wound care, etc )  - Arrange for interpretive services to assist at discharge as needed  - Refer to Case Management Department for coordinating discharge planning if the patient needs post-hospital services based on physician/advanced practitioner order or complex needs related to functional status, cognitive ability, or social support system  Outcome: Progressing     Problem: Knowledge Deficit  Goal: Patient/family/caregiver demonstrates understanding of disease process, treatment plan, medications, and discharge instructions  Description: Complete learning assessment and assess knowledge base  Interventions:  - Provide teaching at level of understanding  - Provide teaching via preferred learning methods  Outcome: Progressing     Problem: Nutrition/Hydration-ADULT  Goal: Nutrient/Hydration intake appropriate for improving, restoring or maintaining nutritional needs  Description: Monitor and assess patient's nutrition/hydration status for malnutrition  Collaborate with interdisciplinary team and initiate plan and interventions as ordered  Monitor patient's weight and dietary intake as ordered or per policy  Utilize nutrition screening tool and intervene as necessary  Determine patient's food preferences and provide high-protein, high-caloric foods as appropriate       INTERVENTIONS:  - Monitor oral intake, urinary output, labs, and treatment plans  - Assess nutrition and hydration status and recommend course of action  - Evaluate amount of meals eaten  - Assist patient with eating if necessary   - Allow adequate time for meals  - Recommend/ encourage appropriate diets, oral nutritional supplements, and vitamin/mineral supplements  - Order, calculate, and assess calorie counts as needed  - Recommend, monitor, and adjust tube feedings and TPN/PPN based on assessed needs  - Assess need for intravenous fluids  - Provide specific nutrition/hydration education as appropriate  - Include patient/family/caregiver in decisions related to nutrition  Outcome: Progressing     Problem: CARDIOVASCULAR - ADULT  Goal: Maintains optimal cardiac output and hemodynamic stability  Description: INTERVENTIONS:  - Monitor I/O, vital signs and rhythm  - Monitor for S/S and trends of decreased cardiac output  - Administer and titrate ordered vasoactive medications to optimize hemodynamic stability  - Assess quality of pulses, skin color and temperature  - Assess for signs of decreased coronary artery perfusion  - Instruct patient to report change in severity of symptoms  Outcome: Progressing  Goal: Absence of cardiac dysrhythmias or at baseline rhythm  Description: INTERVENTIONS:  - Continuous cardiac monitoring, vital signs, obtain 12 lead EKG if ordered  - Administer antiarrhythmic and heart rate control medications as ordered  - Monitor electrolytes and administer replacement therapy as ordered  Outcome: Progressing     Problem: RESPIRATORY - ADULT  Goal: Achieves optimal ventilation and oxygenation  Description: INTERVENTIONS:  - Assess for changes in respiratory status  - Assess for changes in mentation and behavior  - Position to facilitate oxygenation and minimize respiratory effort  - Oxygen administered by appropriate delivery if ordered  - Initiate smoking cessation education as indicated  - Encourage broncho-pulmonary hygiene including cough, deep breathe, Incentive Spirometry  - Assess the need for suctioning and aspirate as needed  - Assess and instruct to report SOB or any respiratory difficulty  - Respiratory Therapy support as indicated  Outcome: Progressing     Problem: Potential for Falls  Goal: Patient will remain free of falls  Description: INTERVENTIONS:  - Assess patient frequently for physical needs  -  Identify cognitive and physical deficits and behaviors that affect risk of falls    -  [place] fall precautions as indicated by assessment   - Educate patient/family on patient safety including physical limitations  - Instruct patient to call for assistance with activity based on assessment  - Modify environment to reduce risk of injury  - Consider OT/PT consult to assist with strengthening/mobility  Outcome: Progressing

## 2021-03-29 NOTE — PROGRESS NOTES
LATE ENTRY FROM 3/29/2020    Progress Note - Electrophysiology  Bogdan Pate 42 y o  female MRN: [id number]  Unit/Bed#: [facility] 505-01 Encounter: [id number]      Assessment:  1  Inferior STEMI  - status post GREG to mid RCA and ostial PDA this admission  - EF of 60% per echo this admission  - cardiac MRI showing anterior septal wall thickness of 22 mm, transverse scar in inferior and inferior lateral walls suspicious for HCM  2  Post MI pericarditis    3  Essential hypertension  4  Hyperlipidemia   5  CKD stage 3  6  Ongoing tobacco use  7  History of lymphoma  - status post chemo and radiation in 2008    Plan:  1  Electrophysiology had been asked see the patient given nonsustained ventricular tachycardia had been seen just after her MI and revascularization along with and abnormal echo  This was followed up with cardiac MRI over the weekend that showed severe eccentric left ventricular hypertrophy, dense transmural ischemic scarring of inferior and inferior lateral walls and some findings suggestive of hypertrophic cardiomyopathy  Patient can be worked up further as an outpatient for hypertrophic cardiomyopathy but after discussion with an oral Cardiology, it is felt that patient does not need ICD given these cardiac MRI results  Her ectopy has also subsided  Therefore, she does not have indication for device at this time EP will sign off but please reach out with any further questions or concerns  Subjective/Objective   Subjective:  Patient is a 19-year-old female with inferior STEMI status post drug-eluting stent to both RCA and PDA with prior stenting, obesity with BMI of 42, essential hypertension, hyperlipidemia, chronic tobacco use, and CKD stage III  Patient is resting in bed with family at bedside  She still does complain of significant chest discomfort and is anxious about test results  Review of telemetry shows sinus rhythm with no further ectopy    Of note, she does have still significant ST elevations      Objective:  Vitals: BP 94/63   Pulse 70   Temp 98 9 °F (37 2 °C)   Resp 18   Ht 5' 6" (1 676 m)   Wt 120 kg (264 lb 1 8 oz)   LMP 03/07/2021 (Exact Date)   SpO2 98%   BMI 42 63 kg/m²     Vitals:    03/28/21 0500 03/29/21 0548   Weight: 120 kg (264 lb 5 3 oz) 120 kg (264 lb 1 8 oz)     Orthostatic Blood Pressures      Most Recent Value   Blood Pressure  94/63 filed at 03/29/2021 1601   Patient Position - Orthostatic VS  Sitting filed at 03/29/2021 0240            Intake/Output Summary (Last 24 hours) at 3/29/2021 1804  Last data filed at 3/29/2021 0601  Gross per 24 hour   Intake 1220 ml   Output 675 ml   Net 545 ml       Invasive Devices     Peripheral Intravenous Line            Peripheral IV 03/29/21 Distal;Right;Upper;Ventral (anterior) Arm less than 1 day                          Scheduled Meds:  Current Facility-Administered Medications   Medication Dose Route Frequency Provider Last Rate    acetaminophen  650 mg Oral Q4H PRN Leatha Mukherjee MD      ALPRAZolam  0 5 mg Oral HS PRN Jeaneth Cody MD      aspirin  81 mg Oral Daily John Saldana DO      atorvastatin  80 mg Oral QPM Claude Sumner DO      buPROPion  300 mg Oral QAM Claude Sumner DO      colchicine  0 6 mg Oral BID Jeaneth Cody MD      eptifibatide    Code/Trauma/Sedation Speedy Bello MD      fentanyl citrate (PF)  50 mcg Intravenous Q2H PRN Toma Holden DO      fentanyl citrate (PF)   Intravenous Code/Trauma/Sedation Speedy Bello MD      heparin (porcine)    Code/Trauma/Sedation Speedy Bello MD      heparin (porcine)  7,500 Units Subcutaneous [facility] Claude Sumner DO      iohexol   Intravenous Code/Trauma/Sedation Speedy Bello MD      lidocaine (PF)    Code/Trauma/Sedation Speedy Bello MD      metoprolol tartrate  50 mg Oral Q12H [id number] Lisa CONWAY MD      midazolam    Code/Trauma/Sedation Speedy Bello MD      nicotine  1 patch Transdermal Daily Olga Bah Les, DO      nitroGLYcerin   Intra-arterial Code/Trauma/Sedation Med Sally Mata MD      ondansetron  4 mg Intravenous Q6H PRN Theo Preethi, DO      pantoprazole  40 mg Oral Early Morning Theo Preethi, DO      ranolazine  500 mg Oral Q12H [address] Toma Holden, DO      sertraline  50 mg Oral Daily Theo Preethi, DO      ticagrelor  90 mg Oral BID Theo Preethi, DO      verapamil    Code/Trauma/Sedation Med Sally Mata MD       Continuous Infusions:   PRN Meds:   acetaminophen    ALPRAZolam    eptifibatide    fentanyl citrate (PF)    fentanyl citrate (PF)    heparin (porcine)    iohexol    lidocaine (PF)    midazolam    nitroGLYcerin    ondansetron    verapamil    Review of Systems:  ROS as noted above, otherwise 12 point review of systems was performed and is negative  Physical Exam:   GEN: NAD, alert and oriented, well appearing  SKIN: dry without significant lesions or rashes  HEENT: NCAT, PERRL, EOMs intact  NECK: No JVD or carotid bruits appreciated  CARDIOVASCULAR: RRR, normal S1, S2 without murmurs, rubs, or gallops appreciated  LUNGS: Clear to auscultation bilaterally without wheezes, rhonchi, or rales  ABDOMEN: Soft, nontender, nondistended  EXTREMITIES/VASCULAR: perfused without clubbing, cyanosis, or edema b/l  PSYCH: Normal mood and affect  NEURO: CN ll-Xll grossly intact              Lab Results: I have personally reviewed pertinent lab results      Results from last 7 days   Lab Units 03/26/21  0444 03/25/21  0620 03/25/21  0240   WBC Thousand/uL 22 52* 20 69* 24 00*   HEMOGLOBIN g/dL 13 2 14 7 14 9   HEMATOCRIT % 40 4 44 1 44 6   PLATELETS Thousands/uL 392* 436* 484*     Results from last 7 days   Lab Units 03/28/21  0508 03/26/21  0444 03/25/21  0620   POTASSIUM mmol/L 3 8 4 7 4 0   CHLORIDE mmol/L 111* 108 110*   CO2 mmol/L 24 22 23   BUN mg/dL 13 12 14   CREATININE mg/dL 1 62* 1 49* 1 68*   CALCIUM mg/dL 8 8 9 0 9 2     Results from last 7 days   Lab Units 21  0246   INR  1 04   PTT seconds 26     Results from last 7 days   Lab Units 21  0620 21  0240   MAGNESIUM mg/dL 2 1 1 8*       Imaging: I have personally reviewed pertinent reports  Results for orders placed during the hospital encounter of 21   Echo Complete with Contrast if Indicated    Narrative [address]  [facility], [address] [facility]  [phone]    Transthoracic Echocardiogram  2D, M-mode, Doppler, and Color Doppler    Study date:  25-Mar-2021    Patient: Kim Mullen  MR number: [id number]  Account number: [de-identified]  : 1975  Age: 42 years  Gender: Female  Status: Inpatient  Location: Bedside  Height: 66 in  Weight: 279 4 lb  BP: 122/ 82 mmHg    Indications: Acute MI    Diagnoses: I21 4 - Non-ST elevation (NSTEMI) myocardial infarction    Sonographer:  Sarah Vences RDCS  Primary Physician:  Ramon Pepe DO  Referring Physician:  Shadi Serna DO  Group:  [facility]  Interpreting Physician:  Piero Park MD    IMPRESSIONS:  Features are consistent with hypertrophic cardiomyopathy and inferior wall myocardial infarction  Clinical correlation is advised  SUMMARY    LEFT VENTRICLE:  Systolic function was normal  Ejection fraction was estimated to be 60 %  There was severe hypokinesis of the basal-mid inferior wall(s)  Wall thickness was markedly increased  There was severe assymetrical hypertrophy of the inferior septum (25 mm)  There was no dynamic obstruction at rest  Provocative maneuvers were not performed  Features were consistent with a pseudonormal left ventricular filling pattern, with concomitant abnormal relaxation and increased filling pressure (grade 2 diastolic dysfunction)  LEFT ATRIUM:  The atrium was mildly dilated  MITRAL VALVE:  There was moderate systolic anterior motion of the anterior leaflet  There was mild to moderate regurgitation      AORTIC VALVE:  There was trace regurgitation  HISTORY: PRIOR HISTORY: CHF, CAD, Tobacco Abuse, Obesity    PROCEDURE: The procedure was performed at the bedside  This was a routine study  The transthoracic approach was used  The study included complete 2D imaging, M-mode, complete spectral Doppler, and color Doppler  The heart rate was 66 bpm,  at the start of the study  Images were obtained from the parasternal, apical, subcostal, and suprasternal notch acoustic windows  Echocardiographic views were limited due to poor acoustic window availability and decreased penetration  This  was a technically difficult study  LEFT VENTRICLE: Size was normal  Systolic function was normal  Ejection fraction was estimated to be 60 %  There was severe hypokinesis of the basal-mid inferior wall(s)  Wall thickness was markedly increased  There was severe assymetrical  hypertrophy of the inferior septum (25 mm)  DOPPLER: There was no dynamic obstruction at rest  Provocative maneuvers were not performed  Features were consistent with a pseudonormal left ventricular filling pattern, with concomitant  abnormal relaxation and increased filling pressure (grade 2 diastolic dysfunction)  RIGHT VENTRICLE: The size was normal  Systolic function was normal  Wall thickness was normal     LEFT ATRIUM: The atrium was mildly dilated  RIGHT ATRIUM: Size was normal     MITRAL VALVE: Valve structure was normal  There was normal leaflet separation  There was moderate systolic anterior motion of the anterior leaflet  DOPPLER: The transmitral velocity was within the normal range  There was no evidence for  stenosis  There was mild to moderate regurgitation  AORTIC VALVE: The valve was trileaflet  Leaflets exhibited normal thickness and normal cuspal separation  DOPPLER: Transaortic velocity was within the normal range  There was no evidence for stenosis  There was trace regurgitation      TRICUSPID VALVE: The valve structure was normal  There was normal leaflet separation  DOPPLER: The transtricuspid velocity was within the normal range  There was no evidence for stenosis  There was no regurgitation  PULMONIC VALVE: Leaflets exhibited normal thickness, no calcification, and normal cuspal separation  DOPPLER: The transpulmonic velocity was within the normal range  There was no regurgitation  PERICARDIUM: There was no pericardial effusion  The pericardium was normal in appearance  AORTA: The root exhibited normal size  SYSTEM MEASUREMENT TABLES    2D  %FS: 27 94 %  Ao Diam: 3 95 cm  Ao asc: 3 6 cm  EDV(Teich): 66 84 ml  EF(Teich): 54 74 %  ESV(Teich): 30 25 ml  IVSd: 2 82 cm  LA Diam: 4 59 cm  LAAs A4C: 16 52 cm2  LAESV A-L A4C: 43 86 ml  LAESV MOD A4C: 42 09 ml  LALs A4C: 5 28 cm  LVEDV MOD A4C: 88 32 ml  LVEF MOD A4C: 67 58 %  LVESV MOD A4C: 28 63 ml  LVIDd: 3 92 cm  LVIDs: 2 83 cm  LVLd A4C: 6 93 cm  LVLs A4C: 5 55 cm  LVPWd: 1 43 cm  RAEDV A-L: 23 76 ml  RAEDV MOD: 22 46 ml  RALd: 4 9 cm  RVIDd: 3 61 cm  SV MOD A4C: 59 69 ml  SV(Teich): 36 59 ml    CW  AR Dec Ashley: 1 39 m/s2  AR Dec Time: 2867 87 ms  AR PHT: 831 68 ms  AR Vmax: 3 99 m/s  AR maxP 65 mmHg  AV Env  Ti: 240 72 ms  AV VTI: 22 04 cm  AV Vmax: 1 31 m/s  AV Vmean: 0 92 m/s  AV maxP 85 mmHg  AV meanPG: 3 93 mmHg    MM  TAPSE: 1 87 cm    PW  LVOT Env  Ti: 253 09 ms  LVOT VTI: 15 47 cm  LVOT Vmax: 0 92 m/s  LVOT Vmean: 0 61 m/s  LVOT maxPG: 3 4 mmHg  LVOT meanP 75 mmHg  MV A Jorge: 0 71 m/s  MV Dec Ashley: 5 89 m/s2  MV DecT: 156 23 ms  MV E Jorge: 0 91 m/s  MV E/A Ratio: 1 3    [facility] Accredited Echocardiography Laboratory    Prepared and electronically signed by    Robert Packer MD  Signed 25-Mar-2021 13:06:46         VTE Pharmacologic Prophylaxis: Sequential compression device (Venodyne)   VTE Mechanical Prophylaxis: sequential compression device

## 2021-03-30 ENCOUNTER — APPOINTMENT (INPATIENT)
Dept: RADIOLOGY | Facility: HOSPITAL | Age: 46
DRG: 174 | End: 2021-03-30
Payer: COMMERCIAL

## 2021-03-30 LAB
ANION GAP SERPL CALCULATED.3IONS-SCNC: 8 MMOL/L (ref 4–13)
BASE EX.OXY STD BLDV CALC-SCNC: 92 % (ref 60–80)
BASE EXCESS BLDV CALC-SCNC: -5.1 MMOL/L
BASOPHILS # BLD AUTO: 0.03 THOUSANDS/ΜL (ref 0–0.1)
BASOPHILS NFR BLD AUTO: 0 % (ref 0–1)
BUN SERPL-MCNC: 24 MG/DL (ref 5–25)
CALCIUM SERPL-MCNC: 9.6 MG/DL (ref 8.3–10.1)
CHLORIDE SERPL-SCNC: 110 MMOL/L (ref 100–108)
CO2 SERPL-SCNC: 21 MMOL/L (ref 21–32)
CREAT SERPL-MCNC: 2.03 MG/DL (ref 0.6–1.3)
EOSINOPHIL # BLD AUTO: 0.02 THOUSAND/ΜL (ref 0–0.61)
EOSINOPHIL NFR BLD AUTO: 0 % (ref 0–6)
ERYTHROCYTE [DISTWIDTH] IN BLOOD BY AUTOMATED COUNT: 17.5 % (ref 11.6–15.1)
GFR SERPL CREATININE-BSD FRML MDRD: 29 ML/MIN/1.73SQ M
GLUCOSE SERPL-MCNC: 114 MG/DL (ref 65–140)
HCO3 BLDV-SCNC: 20.9 MMOL/L (ref 24–30)
HCT VFR BLD AUTO: 37.7 % (ref 34.8–46.1)
HGB BLD-MCNC: 12.5 G/DL (ref 11.5–15.4)
IMM GRANULOCYTES # BLD AUTO: 0.38 THOUSAND/UL (ref 0–0.2)
IMM GRANULOCYTES NFR BLD AUTO: 2 % (ref 0–2)
LYMPHOCYTES # BLD AUTO: 1.02 THOUSANDS/ΜL (ref 0.6–4.47)
LYMPHOCYTES NFR BLD AUTO: 4 % (ref 14–44)
MCH RBC QN AUTO: 28.3 PG (ref 26.8–34.3)
MCHC RBC AUTO-ENTMCNC: 33.2 G/DL (ref 31.4–37.4)
MCV RBC AUTO: 86 FL (ref 82–98)
MONOCYTES # BLD AUTO: 1.69 THOUSAND/ΜL (ref 0.17–1.22)
MONOCYTES NFR BLD AUTO: 7 % (ref 4–12)
NEUTROPHILS # BLD AUTO: 22.2 THOUSANDS/ΜL (ref 1.85–7.62)
NEUTS SEG NFR BLD AUTO: 87 % (ref 43–75)
NRBC BLD AUTO-RTO: 0 /100 WBCS
O2 CT BLDV-SCNC: 17.1 ML/DL
PCO2 BLDV: 41.9 MM HG (ref 42–50)
PH BLDV: 7.32 [PH] (ref 7.3–7.4)
PLATELET # BLD AUTO: 415 THOUSANDS/UL (ref 149–390)
PMV BLD AUTO: 10.8 FL (ref 8.9–12.7)
PO2 BLDV: 66.2 MM HG (ref 35–45)
POTASSIUM SERPL-SCNC: 4.9 MMOL/L (ref 3.5–5.3)
RBC # BLD AUTO: 4.41 MILLION/UL (ref 3.81–5.12)
SODIUM SERPL-SCNC: 139 MMOL/L (ref 136–145)
WBC # BLD AUTO: 25.34 THOUSAND/UL (ref 4.31–10.16)

## 2021-03-30 PROCEDURE — 82805 BLOOD GASES W/O2 SATURATION: CPT | Performed by: STUDENT IN AN ORGANIZED HEALTH CARE EDUCATION/TRAINING PROGRAM

## 2021-03-30 PROCEDURE — 94640 AIRWAY INHALATION TREATMENT: CPT

## 2021-03-30 PROCEDURE — 94760 N-INVAS EAR/PLS OXIMETRY 1: CPT

## 2021-03-30 PROCEDURE — 99233 SBSQ HOSP IP/OBS HIGH 50: CPT | Performed by: INTERNAL MEDICINE

## 2021-03-30 PROCEDURE — 80048 BASIC METABOLIC PNL TOTAL CA: CPT | Performed by: STUDENT IN AN ORGANIZED HEALTH CARE EDUCATION/TRAINING PROGRAM

## 2021-03-30 PROCEDURE — 85025 COMPLETE CBC W/AUTO DIFF WBC: CPT | Performed by: STUDENT IN AN ORGANIZED HEALTH CARE EDUCATION/TRAINING PROGRAM

## 2021-03-30 PROCEDURE — 71045 X-RAY EXAM CHEST 1 VIEW: CPT

## 2021-03-30 RX ORDER — SODIUM CHLORIDE FOR INHALATION 0.9 %
3 VIAL, NEBULIZER (ML) INHALATION EVERY 6 HOURS PRN
Status: DISCONTINUED | OUTPATIENT
Start: 2021-03-30 | End: 2021-03-31

## 2021-03-30 RX ORDER — ALPRAZOLAM 0.5 MG/1
0.5 TABLET ORAL 3 TIMES DAILY PRN
Status: DISCONTINUED | OUTPATIENT
Start: 2021-03-30 | End: 2021-04-01 | Stop reason: HOSPADM

## 2021-03-30 RX ORDER — PREDNISONE 20 MG/1
60 TABLET ORAL ONCE
Status: COMPLETED | OUTPATIENT
Start: 2021-03-30 | End: 2021-03-30

## 2021-03-30 RX ORDER — LISINOPRIL 5 MG/1
5 TABLET ORAL
Status: DISCONTINUED | OUTPATIENT
Start: 2021-03-30 | End: 2021-03-30

## 2021-03-30 RX ORDER — ALBUTEROL SULFATE 2.5 MG/3ML
2.5 SOLUTION RESPIRATORY (INHALATION) EVERY 4 HOURS PRN
Status: DISCONTINUED | OUTPATIENT
Start: 2021-03-30 | End: 2021-03-30

## 2021-03-30 RX ORDER — METOPROLOL SUCCINATE 50 MG/1
50 TABLET, EXTENDED RELEASE ORAL 2 TIMES DAILY
Status: DISCONTINUED | OUTPATIENT
Start: 2021-03-30 | End: 2021-04-01 | Stop reason: HOSPADM

## 2021-03-30 RX ORDER — LEVALBUTEROL 1.25 MG/.5ML
1.25 SOLUTION, CONCENTRATE RESPIRATORY (INHALATION) EVERY 8 HOURS PRN
Status: DISCONTINUED | OUTPATIENT
Start: 2021-03-30 | End: 2021-03-31

## 2021-03-30 RX ADMIN — FENTANYL CITRATE 50 MCG: 50 INJECTION INTRAMUSCULAR; INTRAVENOUS at 05:38

## 2021-03-30 RX ADMIN — ALPRAZOLAM 0.5 MG: 0.5 TABLET ORAL at 22:18

## 2021-03-30 RX ADMIN — ALPRAZOLAM 0.5 MG: 0.5 TABLET ORAL at 19:07

## 2021-03-30 RX ADMIN — COLCHICINE 0.6 MG: 0.6 TABLET ORAL at 08:50

## 2021-03-30 RX ADMIN — ISODIUM CHLORIDE 3 ML: 0.03 SOLUTION RESPIRATORY (INHALATION) at 13:30

## 2021-03-30 RX ADMIN — RANOLAZINE 500 MG: 500 TABLET, FILM COATED, EXTENDED RELEASE ORAL at 08:51

## 2021-03-30 RX ADMIN — FENTANYL CITRATE 50 MCG: 50 INJECTION INTRAMUSCULAR; INTRAVENOUS at 22:18

## 2021-03-30 RX ADMIN — ASPIRIN 81 MG: 81 TABLET, CHEWABLE ORAL at 08:50

## 2021-03-30 RX ADMIN — METOPROLOL TARTRATE 50 MG: 50 TABLET, FILM COATED ORAL at 08:51

## 2021-03-30 RX ADMIN — HEPARIN SODIUM 7500 UNITS: 5000 INJECTION INTRAVENOUS; SUBCUTANEOUS at 05:39

## 2021-03-30 RX ADMIN — FENTANYL CITRATE 50 MCG: 50 INJECTION INTRAMUSCULAR; INTRAVENOUS at 09:03

## 2021-03-30 RX ADMIN — TICAGRELOR 90 MG: 90 TABLET ORAL at 08:52

## 2021-03-30 RX ADMIN — FENTANYL CITRATE 50 MCG: 50 INJECTION INTRAMUSCULAR; INTRAVENOUS at 01:10

## 2021-03-30 RX ADMIN — PANTOPRAZOLE SODIUM 40 MG: 40 TABLET, DELAYED RELEASE ORAL at 05:39

## 2021-03-30 RX ADMIN — COLCHICINE 0.6 MG: 0.6 TABLET ORAL at 17:24

## 2021-03-30 RX ADMIN — FENTANYL CITRATE 50 MCG: 50 INJECTION INTRAMUSCULAR; INTRAVENOUS at 19:07

## 2021-03-30 RX ADMIN — FENTANYL CITRATE 50 MCG: 50 INJECTION INTRAMUSCULAR; INTRAVENOUS at 16:22

## 2021-03-30 RX ADMIN — TICAGRELOR 90 MG: 90 TABLET ORAL at 17:24

## 2021-03-30 RX ADMIN — METOPROLOL SUCCINATE 50 MG: 50 TABLET, EXTENDED RELEASE ORAL at 20:19

## 2021-03-30 RX ADMIN — FENTANYL CITRATE 50 MCG: 50 INJECTION INTRAMUSCULAR; INTRAVENOUS at 13:02

## 2021-03-30 RX ADMIN — ATORVASTATIN CALCIUM 80 MG: 80 TABLET, FILM COATED ORAL at 17:24

## 2021-03-30 RX ADMIN — BUPROPION HYDROCHLORIDE 300 MG: 150 TABLET, FILM COATED, EXTENDED RELEASE ORAL at 08:50

## 2021-03-30 RX ADMIN — HEPARIN SODIUM 7500 UNITS: 5000 INJECTION INTRAVENOUS; SUBCUTANEOUS at 13:08

## 2021-03-30 RX ADMIN — PREDNISONE 60 MG: 20 TABLET ORAL at 10:04

## 2021-03-30 RX ADMIN — LEVALBUTEROL HYDROCHLORIDE 1.25 MG: 1.25 SOLUTION, CONCENTRATE RESPIRATORY (INHALATION) at 13:30

## 2021-03-30 RX ADMIN — RANOLAZINE 500 MG: 500 TABLET, FILM COATED, EXTENDED RELEASE ORAL at 20:19

## 2021-03-30 RX ADMIN — NICOTINE 7 MG/24 HR DAILY TRANSDERMAL PATCH 1 PATCH: at 08:52

## 2021-03-30 RX ADMIN — HEPARIN SODIUM 7500 UNITS: 5000 INJECTION INTRAVENOUS; SUBCUTANEOUS at 22:18

## 2021-03-30 RX ADMIN — SERTRALINE HYDROCHLORIDE 50 MG: 50 TABLET ORAL at 08:52

## 2021-03-30 NOTE — PROGRESS NOTES
Cardiology Progress Note - Nehemias Humphreys 42 y o  female MRN: [id number]    Unit/Bed#: [facility] 505-01 Encounter: [id number]      Assessment & Plan:  Principal Problem:    Acute MI, inferior wall (HCC)  Active Problems:    Essential hypertension    Tobacco abuse    CHF (congestive heart failure) (HCC)    CKD (chronic kidney disease) stage 3, GFR 30-59 ml/min    Mixed hyperlipidemia    BMI 40 0-44 9, adult (HCC)    Ventricular tachycardia, non-sustained (HCC)    Leukocytosis    Hypertrophic cardiomyopathy (HCC)    # inferior STEMI status post GREG to mid RCA and ostial PDA  - EKG with persistent inferior ST elevations  - TTE with EF of 60% and basal mid inferior hypokinesis, asymmetric hypertrophy of septum with thickness of inferior septum at 25 mm  - MRI 3/29:  Anteroseptal wall 23 mm thick, small circumferential pericardial effusion, transverse scar in inferior and inferolateral walls, finding suspicious for HCM  - continue with aspirin and Brilinta  - continue the atorvastatin 80 mg once a day  - continue metoprolol 50 mg b i d  Given that there are no additional on SVTs will switch to succinate  - continue Ranexa 500 mg twice a day    # post MI pericarditis  Pleuritic severe chest pain, small circumferential effusion in the MRI  -   - patient started 0 6 mg b i d  Of colchicine  - given her advanced CKD patient started on steroids yesterday as patient cannot tolerate NSAIDs  After 30 mg dose yesterday patient had partial relief of her symptoms with use fentanyl requirements however patient continues to have persistent pain  We will dose 60 mg of prednisone today    # hypertension  - continue Toprol-XL for now  We will add lisinopril before discharge    # active smoker  - strongly encouraged smoking cessation    # hyperlipidemia on atorvastatin    # CKD stage IIIB  - creatinine stable post catheterization  - avoid nephrotoxic drugs    # history of lymphoma status post chemo radiation in 2008   As per the oncologist's note patient was seeing him last year for complains of night sweats, leukocytosis and lymphadenopathy  Undergoing active outpatient workup  Subjective:   Patient seen and examined  No significant events overnight  Complains of 8 x 10 pleuritic chest pain this morning requiring fentanyl  The patient had a panic episode this morning thinking that her heart attack and chest pain was her own fault  She tried to hide her pain so that she can go home however after understanding that it is important that we treat her pericarditis to the point that she feels no pain she revealed that she is having moderate to severe persistent chest pain  Objective:     Vitals: Blood pressure 126/85, pulse 79, temperature 97 8 °F (36 6 °C), resp   rate 18, height 5' 6" (1 676 m), weight 120 kg (264 lb 15 9 oz), last menstrual period 03/07/2021, SpO2 94 %, not currently breastfeeding , Body mass index is 42 77 kg/m² ,   Orthostatic Blood Pressures      Most Recent Value   Blood Pressure  126/85 filed at 03/30/2021 1471   Patient Position - Orthostatic VS  Sitting filed at 03/29/2021 0240            Intake/Output Summary (Last 24 hours) at 3/30/2021 0847  Last data filed at 3/30/2021 0831  Gross per 24 hour   Intake 240 ml   Output --   Net 240 ml           Physical Exam:    GEN: David Francois appears well, alert and oriented x 3, pleasant and cooperative, morbidly obese  HEENT: anicteric, mucous membranes moist  NECK: no jvd, carotid bruits   HEART: regular rhythm, distant heart sounds  LUNGS: clear to auscultation bilaterally; no wheezes, rales, or rhonchi   ABDOMEN: normal bowel sounds, soft, no tenderness, no distention  EXTREMITIES: peripheral pulses normal; no clubbing, cyanosis, or edema  NEURO: no focal findings   SKIN: normal without suspicious lesions on exposed skin      Current Facility-Administered Medications:     acetaminophen (TYLENOL) tablet 650 mg, 650 mg, Oral, Q4H PRN, Charline George MD, 650 mg at 03/29/21 1555    ALPRAZolam (XANAX) tablet 0 5 mg, 0 5 mg, Oral, HS PRN, Emily Rosas MD, 0 5 mg at 03/28/21 2148    aspirin chewable tablet 81 mg, 81 mg, Oral, Daily, Elissa Camejo DO, 81 mg at 03/29/21 0909    atorvastatin (LIPITOR) tablet 80 mg, 80 mg, Oral, QPM, Elissa Camejo DO, 80 mg at 03/29/21 1728    buPROPion (WELLBUTRIN XL) 24 hr tablet 300 mg, 300 mg, Oral, QAM, Elissa Camejo DO, 300 mg at 03/29/21 0909    colchicine (COLCRYS) tablet 0 6 mg, 0 6 mg, Oral, BID, Emily Rosas MD, 0 6 mg at 03/29/21 1555    eptifibatide (INTEGRILIN) bolus from premix, , , Code/Trauma/Sedation Med, Krystin Harmon MD, 11 3 mL at 03/25/21 1528    fentanyl citrate (PF) 100 MCG/2ML 50 mcg, 50 mcg, Intravenous, Q2H PRN, Toma Holden DO, 50 mcg at 03/30/21 4849    fentanyl citrate (PF) 100 MCG/2ML, , Intravenous, Code/Trauma/Sedation Med, Krystin Harmon MD, 50 mcg at 03/25/21 1530    heparin (porcine) injection, , , Code/Trauma/Sedation Med, Krystin Harmon MD, 8,000 Units at 03/25/21 1459    heparin (porcine) subcutaneous injection 7,500 Units, 7,500 Units, Subcutaneous, Q8H [address], 7,500 Units at 03/30/21 0539 **AND** [CANCELED] Platelet count, , , Once, Elissa Camejo DO    iohexol (OMNIPAQUE) 350 MG/ML injection (SINGLE-DOSE), , Intravenous, Code/Trauma/Sedation Med, Krystin Harmon MD, 75 mL at 03/25/21 1539    lidocaine (PF) (XYLOCAINE-MPF) 1 % injection, , , Code/Trauma/Sedation Med, Krystin Harmon MD, 1 mL at 03/25/21 1447    metoprolol tartrate (LOPRESSOR) tablet 50 mg, 50 mg, Oral, Q12H [address], Ether MD Wang, 50 mg at 03/29/21 9889    midazolam (VERSED) injection, , , Code/Trauma/Sedation MedKrystin MD, 2 mg at 03/25/21 1446    nicotine (NICODERM CQ) 7 mg/24hr TD 24 hr patch 1 patch, 1 patch, Transdermal, Daily, Elissa Camejo DO, 1 patch at 03/29/21 0914    nitroGLYcerin (TRIDIL) 50 mg in 250 mL infusion (premix), , Intra-arterial, Code/Trauma/Sedation Med, Krystin Harmon MD, 200 mcg at 03/25/21 1451    ondansetron (ZOFRAN) injection 4 mg, 4 mg, Intravenous, Q6H PRN, Jagjit Shuqualak, DO, 4 mg at 03/25/21 1606    pantoprazole (PROTONIX) EC tablet 40 mg, 40 mg, Oral, Early Morning, Jagjit Shuqualak, DO, 40 mg at 03/30/21 0539    ranolazine (RANEXA) 12 hr tablet 500 mg, 500 mg, Oral, Q12H [facility], [facility] Moceri, DO, 500 mg at 03/29/21 2223    sertraline (ZOLOFT) tablet 50 mg, 50 mg, Oral, Daily, Jagjit Shuqualak, DO, 50 mg at 03/29/21 0909    ticagrelor (BRILINTA) tablet 90 mg, 90 mg, Oral, BID, Jagjit Shuqualak, DO, 90 mg at 03/29/21 1728    verapamil (ISOPTIN) injection, , , Code/Trauma/Sedation Med, Dank Nichols MD, 2 5 mg at 03/25/21 1451    Labs & Results:    Lab Results   Component Value Date    TROPONINI >40 00 (H) 03/25/2021    TROPONINI 39 70 (H) 03/25/2021    TROPONINI 0 29 (H) 03/25/2021       Lab Results   Component Value Date    CALCIUM 8 8 03/28/2021    K 3 8 03/28/2021    CO2 24 03/28/2021     (H) 03/28/2021    BUN 13 03/28/2021    CREATININE 1 62 (H) 03/28/2021       Lab Results   Component Value Date    WBC 22 52 (H) 03/26/2021    HGB 13 2 03/26/2021    HCT 40 4 03/26/2021    MCV 85 03/26/2021     (H) 03/26/2021     Results from last 7 days   Lab Units 03/25/21  0246   INR  1 04       No results found for: CHOL  Lab Results   Component Value Date    HDL 34 (L) 03/25/2021    HDL 31 (L) 07/08/2020     Lab Results   Component Value Date    LDLCALC 122 (H) 03/25/2021    [address]  07/08/2020      Comment:      Calculated LDL invalid, triglycerides >400 mg/dl  This screening LDL is a calculated result  It does not have the accuracy of the Direct Measured LDL in the monitoring of patients with hyperlipidemia and/or statin therapy  Direct Measure LDL (JUX103) must be ordered separately in these patients       Lab Results   Component Value Date    TRIG 230 (H) 03/25/2021    TRIG 465 (H) 07/08/2020       Lab Results   Component Value Date    ALT 30 03/25/2021    AST 24 03/25/2021         EKG personally reviewed by )Yulia Lane MD  No acute changes   TELE: No significant arrhythmias seen on telemetry review

## 2021-03-30 NOTE — UTILIZATION REVIEW
Continued Stay Review    Date: 3/30/21 Tuesday                          Current Class:  Inpatient     HPI:  42year old female with PMHx Morbid Obesity, HTN, HLD, CAD with prior PCI RCA + LAD  in 2014,  non-Hodgkin's lymphoma and iron deficiency anemia  Transferred from [facility] [address] [place] [address] ED to [facility] [place] 2nd Acute STEMI, post emergent Cardiac Cath with  PCI + GREG to RCA    3/26/21:  Returned to cath lab and had 90% stenosis of R PDA ostium c/w thrombus  The R ostium was stented  Also 100% stenosis of the 2nd posterolateral segment consistent with thrombus, however, this vessel was not amenable to intervention  Since then, chest pain has subsided but is still present  Also continues to have ST elevation on telemetry    Trending troponins until peak, Tlee, Brilinta, Atorvastatin, heparin drip was added, Fentanyl for pain, replete electrolytes        3/28/21  CARDIOLOGY:  Still complains of 1/10 chest discomfort/heaviness  Persistent inferior ST elevation on telemetry  Labs stable, CKD stage 3 with creatinine at baseline  Blood pressure stable  No further nonsustained VT on telemetry  Palpitations at noon had no abnormal correlation on telemetry  Still awaiting cardiac MRI    3/30/21 CARDIOLOGY:  inferior STEMI status post GREG to mid RCA and ostial PDA  - EKG with persistent inferior ST elevations  - TTE with EF of 60% and basal mid inferior hypokinesis, asymmetric hypertrophy of septum with thickness of inferior septum at 25 mm  - MRI 3/29:  Anteroseptal wall 23 mm thick, small circumferential pericardial effusion, transverse scar in inferior and inferolateral walls, finding suspicious for HCM  - continue with aspirin and Brilinta  - continue the atorvastatin 80 mg once a day  - continue metoprolol 50 mg b i d  Given that there are no additional on SVTs will switch to succinate  - continue Ranexa 500 mg twice a day     # post MI pericarditis  Pleuritic severe chest pain, small circumferential effusion in the MRI  - CRP 153  - patient started 0 6 mg b i d  Of colchicine  - given her advanced CKD patient started on steroids yesterday as patient cannot tolerate NSAIDs  After 30 mg dose yesterday patient had partial relief of her symptoms with use fentanyl requirements however patient continues to have persistent pain    We will dose 60 mg of prednisone today    Pertinent Labs/Diagnostic Results:   Results from last 7 days   Lab Units 03/25/21  0255   SARS-COV-2  Negative     Results from last 7 days   Lab Units 03/30/21  1332 03/26/21  0444 03/25/21  0620 03/25/21  0240   WBC Thousand/uL 25 34* 22 52* 20 69* 24 00*   HEMOGLOBIN g/dL 12 5 13 2 14 7 14 9   HEMATOCRIT % 37 7 40 4 44 1 44 6   PLATELETS Thousands/uL 415* 392* 436* 484*   NEUTROS ABS Thousands/µL 22 20*  --   --   --    TOTAL NEUT ABS Thousand/uL  --   --   --  12 72*     Results from last 7 days   Lab Units 03/30/21  1332 03/28/21  0508 03/26/21  0444 03/25/21  0620 03/25/21  0240   SODIUM mmol/L 139 141 137 138 139   POTASSIUM mmol/L 4 9 3 8 4 7 4 0 3 2*   CHLORIDE mmol/L 110* 111* 108 110* 104   CO2 mmol/L 21 24 22 23 23   ANION GAP mmol/L 8 6 7 5 12   BUN mg/dL 24 13 12 14 13   CREATININE mg/dL 2 03* 1 62* 1 49* 1 68* 1 61*   EGFR ml/min/1 73sq m 29 38 42 37 39   CALCIUM mg/dL 9 6 8 8 9 0 9 2 9 5   MAGNESIUM mg/dL  --   --   --  2 1 1 8*     Results from last 7 days   Lab Units 03/25/21  0240   AST U/L 24   ALT U/L 30   ALK PHOS U/L 84   TOTAL PROTEIN g/dL 7 1   ALBUMIN g/dL 3 8   TOTAL BILIRUBIN mg/dL 0 50     Results from last 7 days   Lab Units 03/30/21  1332 03/28/21  0508 03/26/21  0444 03/25/21  0620 03/25/21  0240   GLUCOSE RANDOM mg/dL 114 97 101 125 129*     Results from last 7 days   Lab Units 03/30/21  1332   PH CHINTAN  7 315   PCO2 CHINTAN mm Hg 41 9*   PO2 CHINTAN mm Hg 66 2*   HCO3 CHINTAN mmol/L 20 9*   BASE EXC CHINTAN mmol/L -5 1   O2 CONTENT CHINTAN ml/dL 17 1   O2 HGB, VENOUS % 92 0*     Results from last 7 days   Lab Units 03/25/21  1115 03/25/21  0620 03/25/21  0240 TROPONIN I ng/mL >40 00* 39 70* 0 29*       Results from last 7 days   Lab Units 03/25/21  0246   PROTIME seconds 13 5   INR  1 04   PTT seconds 26     Results from last 7 days   Lab Units 03/28/21  0508   CRP mg/L 153 0*     Vital Signs:   Blood pressure 126/85, pulse 79, temperature 97 8 °F (36 6 °C), resp  rate 18,   SpO2 94 %  height 5' 6" (1 676 m), weight 120 kg (264 lb 15 9 oz), Body mass index is 42 77 kg/m²  last menstrual period 03/07/2021     Orthostatic Blood Pressures      Most Recent Value   Blood Pressure  126/85 filed at 03/30/2021 2263   Patient Position - Orthostatic VS  Sitting filed at 03/29/2021 0240       Intake/Output Summary (Last 24 hours) at 3/30/2021 0847:  Gross per 24 hour   Intake 240 ml   Output --   Net 240      Diet Cardiovascular; Cardiac    Scheduled Medications:  aspirin, 81 mg, Oral, Daily  atorvastatin, 80 mg, Oral, QPM  buPROPion, 300 mg, Oral, QAM  colchicine, 0 6 mg, Oral, BID  heparin (porcine), 7,500 Units, Subcutaneous, Q8H ABIEL  metoprolol succinate, 50 mg, Oral, BID  nicotine, 1 patch, Transdermal, Daily  pantoprazole, 40 mg, Oral, Early Morning  ranolazine, 500 mg, Oral, Q12H ABIEL  sertraline, 50 mg, Oral, Daily  ticagrelor, 90 mg, Oral, BID     PRN Meds:  acetaminophen, 650 mg, Oral, Q4H PRN  ALPRAZolam, 0 5 mg, Oral, HS PRN  eptifibatide, , , Code/Trauma/Sedation Med  IV fentanyl citrate (PF), 50 mcg, Intravenous, Q2H PRN GIVEN X 7/24 HRS  heparin (porcine), , , Code/Trauma/Sedation Med  iohexol, , Intravenous, Code/Trauma/Sedation Med  levalbuterol, 1 25 mg, Nebulization, Q8H PRN GIVEN X 1/ 24 HRS  lidocaine (PF), , , Code/Trauma/Sedation Med  midazolam, , , Code/Trauma/Sedation Med  nitroGLYcerin, , Intra-arterial, Code/Trauma/Sedation Med  ondansetron, 4 mg, Intravenous, Q6H PRN  sodium chloride, 3 mL, Nebulization, Q6H PRN  verapamil, , , Code/Trauma/Sedation Med    Discharge Plan:    To be determined   Inpatient Case Management following for all discharge needs    Network Utilization Review Department  ATTENTION: Please call with any questions or concerns to [phone] and carefully listen to the prompts so that you are directed to the right person  All voicemails are confidential   Cecilia Day all requests for admission clinical reviews, approved or denied determinations and any other requests to dedicated fax number below belonging to the campus where the patient is receiving treatment   List of dedicated fax numbers for the Facilities:  [address] DENIALS (Administrative/Medical Necessity) [phone]   [address] (Maternity/NICU/Pediatrics) [phone]   [address] [place] [address] Dr Allan Martinez [phone] (  Charla Crum "Abby" 103) [address] [address] Carlito Hurley 1481 [address] [place]) [address] [place] [address] [phone]

## 2021-03-30 NOTE — PROGRESS NOTES
1303: 1635 [facility] with Cardiology, regarding patient developing worsening crushing chest pain, diaphoresis, SOB, pallor  VSS  Patient continues to have persistent ST elevation on telemetry monitoring -- provider aware  Provider at bedside to evaluate patient  Respiratory protocol ordered  Nebulizer given  Labs ordered and drawn  CXR ordered

## 2021-03-30 NOTE — RESPIRATORY THERAPY NOTE
RT Protocol Note  Nestor Gauthier 42 y o  female MRN: [id number]  Unit/Bed#: [facility] 505-01 Encounter: [id number]    Assessment    Principal Problem:    Acute MI, inferior wall (HCC)  Active Problems:    Essential hypertension    Tobacco abuse    CHF (congestive heart failure) (HCC)    CKD (chronic kidney disease) stage 3, GFR 30-59 ml/min    Mixed hyperlipidemia    BMI 40 0-44 9, adult (HCC)    Ventricular tachycardia, non-sustained (HCC)    Leukocytosis    Hypertrophic cardiomyopathy (HCC)      Home Pulmonary Medications:  na       Past Medical History:   Diagnosis Date    Acute MI ([facility] )     Anxiety     Cardiac disease     Cardiomyopathy ([place] [address] )     [facility]) [facility])     Chronic kidney disease     Depression     History of chemotherapy     non hodgkins lymphoma 2008    Hypertension     Lymphoma, non-Hodgkin's ([place] [address] )     Tobacco abuse      Social History     Socioeconomic History    Marital status: /Civil Union     Spouse name: Not on file    Number of children: Not on file    Years of education: Not on file    Highest education level: Not on file   Occupational History    Not on file   Social Needs    Financial resource strain: Not on file    Food insecurity     Worry: Not on file     Inability: Not on file   [employer] needs     Medical: Not on file     Non-medical: Not on file   Tobacco Use    Smoking status: Current Every Day Smoker     Packs/day: 1 00     Types: Cigarettes    Smokeless tobacco: Current User   Substance and Sexual Activity    Alcohol use: Not Currently     Comment: rarely    Drug use: Never    Sexual activity: Yes     Partners: Male     Birth control/protection: Male Sterilization     Comment:    Lifestyle    Physical activity     Days per week: Not on file     Minutes per session: Not on file    Stress: Not on file   Relationships    Social connections     Talks on phone: Not on file     Gets together: Not on file     Attends [employer] service: Not on file     Active member of club or organization: Not on file     Attends meetings of clubs or organizations: Not on file     Relationship status: Not on file    Intimate partner violence     Fear of current or ex partner: Not on file     Emotionally abused: Not on file     Physically abused: Not on file     Forced sexual activity: Not on file   Other Topics Concern    Not on file   Social History Narrative    Not on file       Subjective         Objective    Physical Exam:   Assessment Type: (P) Assess only  General Appearance: (P) Alert, Awake  Respiratory Pattern: (P) Tachypneic  Chest Assessment: (P) Chest expansion symmetrical  Bilateral Breath Sounds: (P) Clear, Diminished  Cough: (P) None  O2 Device: (P) nc    Vitals:  Blood pressure 99/66, pulse 64, temperature 98 4 °F (36 9 °C), resp  rate 19, height 5' 6" (1 676 m), weight 120 kg (264 lb 15 9 oz), last menstrual period 03/07/2021, SpO2 96 %, not currently breastfeeding  Imaging and other studies: I have personally reviewed pertinent reports        O2 Device: (P) nc     Plan    Respiratory Plan: (P) Mild Distress pathway        Resp Comments: (P) per family, pt smokes 1ppd x 25 yrs, no resp meds at home, no current cxr, Dr requesting prn tx's, pt c/o chest pain, will continue to monitor

## 2021-03-31 PROBLEM — I30.9 ACUTE PERICARDITIS: Status: ACTIVE | Noted: 2021-03-31

## 2021-03-31 PROBLEM — F32.9 MAJOR DEPRESSIVE DISORDER WITH CURRENT ACTIVE EPISODE: Status: ACTIVE | Noted: 2019-11-04

## 2021-03-31 LAB
ANION GAP SERPL CALCULATED.3IONS-SCNC: 7 MMOL/L (ref 4–13)
BUN SERPL-MCNC: 21 MG/DL (ref 5–25)
CALCIUM SERPL-MCNC: 7 MG/DL (ref 8.3–10.1)
CHLORIDE SERPL-SCNC: 122 MMOL/L (ref 100–108)
CO2 SERPL-SCNC: 19 MMOL/L (ref 21–32)
CREAT SERPL-MCNC: 1.42 MG/DL (ref 0.6–1.3)
GFR SERPL CREATININE-BSD FRML MDRD: 45 ML/MIN/1.73SQ M
GLUCOSE SERPL-MCNC: 104 MG/DL (ref 65–140)
MAGNESIUM SERPL-MCNC: 1.8 MG/DL (ref 1.6–2.6)
POTASSIUM SERPL-SCNC: 3.2 MMOL/L (ref 3.5–5.3)
SODIUM SERPL-SCNC: 148 MMOL/L (ref 136–145)

## 2021-03-31 PROCEDURE — 80048 BASIC METABOLIC PNL TOTAL CA: CPT | Performed by: INTERNAL MEDICINE

## 2021-03-31 PROCEDURE — 83735 ASSAY OF MAGNESIUM: CPT | Performed by: INTERNAL MEDICINE

## 2021-03-31 PROCEDURE — 99233 SBSQ HOSP IP/OBS HIGH 50: CPT | Performed by: INTERNAL MEDICINE

## 2021-03-31 RX ORDER — FENTANYL CITRATE 50 UG/ML
50 INJECTION, SOLUTION INTRAMUSCULAR; INTRAVENOUS EVERY 6 HOURS PRN
Status: DISCONTINUED | OUTPATIENT
Start: 2021-03-31 | End: 2021-04-01

## 2021-03-31 RX ORDER — PREDNISONE 20 MG/1
60 TABLET ORAL DAILY
Status: DISCONTINUED | OUTPATIENT
Start: 2021-03-31 | End: 2021-04-01 | Stop reason: HOSPADM

## 2021-03-31 RX ORDER — ALBUTEROL SULFATE 90 UG/1
2 AEROSOL, METERED RESPIRATORY (INHALATION) EVERY 4 HOURS PRN
Status: DISCONTINUED | OUTPATIENT
Start: 2021-03-31 | End: 2021-04-01 | Stop reason: HOSPADM

## 2021-03-31 RX ORDER — IBUPROFEN 600 MG/1
600 TABLET ORAL EVERY 6 HOURS PRN
Status: DISCONTINUED | OUTPATIENT
Start: 2021-03-31 | End: 2021-04-01 | Stop reason: HOSPADM

## 2021-03-31 RX ORDER — POTASSIUM CHLORIDE 20 MEQ/1
20 TABLET, EXTENDED RELEASE ORAL ONCE
Status: COMPLETED | OUTPATIENT
Start: 2021-03-31 | End: 2021-03-31

## 2021-03-31 RX ADMIN — ATORVASTATIN CALCIUM 80 MG: 80 TABLET, FILM COATED ORAL at 17:14

## 2021-03-31 RX ADMIN — ALPRAZOLAM 0.5 MG: 0.5 TABLET ORAL at 13:48

## 2021-03-31 RX ADMIN — ALBUTEROL SULFATE 2 PUFF: 90 AEROSOL, METERED RESPIRATORY (INHALATION) at 20:09

## 2021-03-31 RX ADMIN — ACETAMINOPHEN 650 MG: 325 TABLET, FILM COATED ORAL at 02:12

## 2021-03-31 RX ADMIN — TICAGRELOR 90 MG: 90 TABLET ORAL at 08:09

## 2021-03-31 RX ADMIN — TICAGRELOR 90 MG: 90 TABLET ORAL at 17:14

## 2021-03-31 RX ADMIN — PANTOPRAZOLE SODIUM 40 MG: 40 TABLET, DELAYED RELEASE ORAL at 05:35

## 2021-03-31 RX ADMIN — ALBUTEROL SULFATE 2 PUFF: 90 AEROSOL, METERED RESPIRATORY (INHALATION) at 10:26

## 2021-03-31 RX ADMIN — METOPROLOL SUCCINATE 50 MG: 50 TABLET, EXTENDED RELEASE ORAL at 20:07

## 2021-03-31 RX ADMIN — BUPROPION HYDROCHLORIDE 300 MG: 150 TABLET, FILM COATED, EXTENDED RELEASE ORAL at 08:09

## 2021-03-31 RX ADMIN — FENTANYL CITRATE 50 MCG: 50 INJECTION INTRAMUSCULAR; INTRAVENOUS at 10:27

## 2021-03-31 RX ADMIN — FENTANYL CITRATE 50 MCG: 50 INJECTION INTRAMUSCULAR; INTRAVENOUS at 03:50

## 2021-03-31 RX ADMIN — RANOLAZINE 500 MG: 500 TABLET, FILM COATED, EXTENDED RELEASE ORAL at 20:07

## 2021-03-31 RX ADMIN — COLCHICINE 0.6 MG: 0.6 TABLET ORAL at 17:14

## 2021-03-31 RX ADMIN — POTASSIUM CHLORIDE 20 MEQ: 1500 TABLET, EXTENDED RELEASE ORAL at 10:27

## 2021-03-31 RX ADMIN — ALPRAZOLAM 0.5 MG: 0.5 TABLET ORAL at 22:01

## 2021-03-31 RX ADMIN — SERTRALINE HYDROCHLORIDE 50 MG: 50 TABLET ORAL at 08:09

## 2021-03-31 RX ADMIN — ASPIRIN 81 MG: 81 TABLET, CHEWABLE ORAL at 08:09

## 2021-03-31 RX ADMIN — FENTANYL CITRATE 50 MCG: 50 INJECTION INTRAMUSCULAR; INTRAVENOUS at 12:43

## 2021-03-31 RX ADMIN — FENTANYL CITRATE 50 MCG: 50 INJECTION INTRAMUSCULAR; INTRAVENOUS at 01:37

## 2021-03-31 RX ADMIN — RANOLAZINE 500 MG: 500 TABLET, FILM COATED, EXTENDED RELEASE ORAL at 08:10

## 2021-03-31 RX ADMIN — HEPARIN SODIUM 7500 UNITS: 5000 INJECTION INTRAVENOUS; SUBCUTANEOUS at 13:45

## 2021-03-31 RX ADMIN — FENTANYL CITRATE 50 MCG: 50 INJECTION INTRAMUSCULAR; INTRAVENOUS at 20:08

## 2021-03-31 RX ADMIN — HEPARIN SODIUM 7500 UNITS: 5000 INJECTION INTRAVENOUS; SUBCUTANEOUS at 05:35

## 2021-03-31 RX ADMIN — METOPROLOL SUCCINATE 50 MG: 50 TABLET, EXTENDED RELEASE ORAL at 08:09

## 2021-03-31 RX ADMIN — COLCHICINE 0.6 MG: 0.6 TABLET ORAL at 08:09

## 2021-03-31 RX ADMIN — FENTANYL CITRATE 50 MCG: 50 INJECTION INTRAMUSCULAR; INTRAVENOUS at 08:10

## 2021-03-31 RX ADMIN — PREDNISONE 60 MG: 20 TABLET ORAL at 10:27

## 2021-03-31 RX ADMIN — ALPRAZOLAM 0.5 MG: 0.5 TABLET ORAL at 08:10

## 2021-03-31 RX ADMIN — HEPARIN SODIUM 7500 UNITS: 5000 INJECTION INTRAVENOUS; SUBCUTANEOUS at 21:57

## 2021-03-31 RX ADMIN — NICOTINE 7 MG/24 HR DAILY TRANSDERMAL PATCH 1 PATCH: at 08:10

## 2021-03-31 NOTE — ASSESSMENT & PLAN NOTE
Patient developed acute pericarditis after MI  She was having significant chest pain, worse with breathing  Initial trial of Toradol with no relief, also not ideal with her CKD 3b to be on high dose NSAIDs  Patient has noted gradual improvement over the last 3 days with steroids  She is stable for dc home  Will send Rx for Prednisone 60 mg qd  Continue Colchicine 0 6 mg BID  Can also use ibuprofen sparingly and tylenol

## 2021-03-31 NOTE — ASSESSMENT & PLAN NOTE
Significant depression and anxiety  Home medications include Xanax 0 5 mg TID, Wellbutrin 300 mg qd, and Zoloft 50 mg qd  Per conversation with patient, depression is significantly impacting her life  Prior to this hospitalization, she has not left her house in over 7 months  Self-isolating, not interacting with family or friends, having mood swings  Strongly recommend that patient reach out to her PCP upon discharge to further discuss her mood  Would likely benefit from talk therapy as well  Patient is open to talk therapy but not open to group therapy

## 2021-03-31 NOTE — PROGRESS NOTES
Cardiology Progress Note - Armand Dixon 42 y o  female MRN: [id number]    Unit/Bed#: [facility] 505-01 Encounter: [id number]      Assessment & Plan:  Principal Problem:    Acute MI, inferior wall (HCC)  Active Problems:    Ventricular tachycardia, non-sustained (HCC)    Acute pericarditis    Hypertrophic cardiomyopathy (HCC)    Essential hypertension    CHF (congestive heart failure) ([facility])    Tobacco abuse    Mixed hyperlipidemia    CKD (chronic kidney disease) stage 3, GFR 30-59 ml/min    BMI 40 0-44 9, adult (HCC)    Leukocytosis    Major depressive disorder with current active episode        * Acute MI, inferior wall [facility])  Assessment & Plan  Patient presented morning of 3/25 with acute inferior wall MI  Initial cath notable for diffuse atherosclerosis and 100% stenosis of mid-RCA which was stented  Continued to have significant chest pain and ST elevations in the hours post-procedure  She was then taken back to cath lab and was found to have 90% stenosis of right PDA ostium which was consistent with thrombus  The right PDA ostium was also stented  Additionally, patient had 100% stenosis of the 2nd posterolateral segment consistent with thrombus, however, this vessel was not amenable to intervention  Plan:  - continue to monitor on telemetry  - Continue Brilinta 90 mg bid  Patient was advised that Brilinta may cause some intermittent dyspnea  - Continue Atorvastatin 80 mg QHS  - Ranexa 500 mg q12h for anginal chest pain  - Smoking cessation strongly encouraged  - Lifestyle modifications  - Replete Mg and K as needed    Acute pericarditis  Assessment & Plan  Patient developed acute pericarditis after MI  She was having significant chest pain, worse with breathing  Initial trial of Toradol with no relief, also not ideal with her CKD 3b to be on high dose NSAIDs  Having some clinical improvement with Prednisone, requiring less PRN Fentanyl       Plan:  - Continue Prednisone at 60 mg qd  - Continue Colchicine 0 6 mg BID    Ventricular tachycardia, non-sustained (HCC)  Assessment & Plan  Multiple episodes of non-sustained monomorphic ventricular tachycardia in the post-MI period  Last episode recorded on telemetry occurred at 9:07 PM on 3/25 and lasted for approximately 45 beats (21 seconds) before converting  Patient is highly symptomatic during these episodes  Plan:  - Continue to monitor on telemetry  - Metoprolol 50 mg BID    Hypertrophic cardiomyopathy [facility])  Assessment & Plan  Echo completed 3/25/21 concerning for hypertrophic cardiomyopathy +/- obstruction  Per report, there was severe asymmetrical hypertrophy of the inferior septum measuring 25 mm without dynamic obstruction at rest; however, noted to have moderate systolic anterior motion of the mitral valve anterior leaflet  Patient reports longstanding history of uncontrolled HTN with pressures > 529 mm Hg systolic, has been controlled for the last 3-4 years  Possible family history of sudden cardiac death at young age on father's side? Patient is not in contact with her father but will reach out to other family members to clarify history  Cardiac MRI:  Severe concentric LV hypertrophy with mildly reduced LV systolic function  Normal RV size and systolic function  Mild MR  Moderately dilated LA  Small circumferential pericardial effusion  Dense transmural ischemic scarring with evidence of microvascular obstruction of the entire inferior and inferolateral walls  These findings may suggest an underlying hypertrophic cardiomyopathy with ischemic scarring of the inferior and inferolateral walls  Plan:  · Patient will need close outpatient follow up with HCM clinic  · No additional testing at this time    CHF (congestive heart failure) ([address] )  Assessment & Plan  Wt Readings from Last 3 Encounters:   03/31/21 121 kg (266 lb 14 4 oz)   03/26/21 122 kg (269 lb)   03/25/21 127 kg (280 lb)       Prior history of CHF  No signs of volume overload   Echo completed with normal EF at 60%  - continue to monitor volume status and diurese if necessary      Essential hypertension  Assessment & Plan  Patient with history of HTN, on Norvasc 5 mg and Lisinopril 5 mg at home  BP was initially soft post-cardiac cath but has since been stable  · Continue metoprolol succinate  · Consider adding home lisinopril before discharge home    Tobacco abuse  Assessment & Plan  Patient currently 1/2 to 1 ppd smoker  Strongly encouraged smoking cessation given MI and prior cardiac history  Continue with nicotine patch during admission  Patient wishes to stop smoking completely and is requesting rx for nicotine patch for home  Mixed hyperlipidemia  Assessment & Plan  History of hyperlipidemia, on Atorvastatin 40 mg QD at home  Fasting lipids this morning with sub-optimal control- total cholesterol 202, Tg 230, HDL 34, and   Continue Atorvastatin to 80 mg QD  CKD (chronic kidney disease) stage 3, GFR 30-59 ml/min  Assessment & Plan  Lab Results   Component Value Date    EGFR 45 03/31/2021    EGFR 29 03/30/2021    EGFR 38 03/28/2021    CREATININE 1 42 (H) 03/31/2021    CREATININE 2 03 (H) 03/30/2021    CREATININE 1 62 (H) 03/28/2021     Per review of records, patient's baseline creatinine is approximately 1 5 and creatinine has returned to baseline post-cath procedures  Avoid nephrotoxic drugs    BMI 40 0-44 9, adult [facility])  Assessment & Plan  Lifestyle modification discussed    Leukocytosis  Assessment & Plan  Leukocytosis present on admission, WBC > 20  This likely represents a stress reaction  Will continue to trend off of antibiotics  Continues to have no signs of infection  Major depressive disorder with current active episode  Assessment & Plan  Significant depression and anxiety  Home medications include Xanax 0 5 mg TID, Wellbutrin 300 mg qd, and Zoloft 50 mg qd  Per conversation with patient, depression is significantly impacting her life   Prior to this hospitalization, she has not left her house in over 7 months  Self-isolating, not interacting with family or friends, having mood swings  Strongly recommend that patient reach out to her PCP upon discharge to further discuss her mood  Would likely benefit from talk therapy as well  Patient is open to talk therapy but not open to group therapy  Subjective:   Patient seen and examined  No significant events overnight  Patient reports that she still continues to have some chest pain  It is improving with the Prednisone  Has required less Fentanyl over the last 24 hours  No acute worsening of chest pain  No dyspnea or diaphoresis  No palpitations  No further events of non-sustained VT  No orthopnea or PND  No leg edema  No fevers or chills  Otherwise feeling well  Had an extensive discussion with patient today regarding her mood  Has had significant life stressors this last year - son  unexpectedly, COVID-19 isolation, etc  This has led to MDD  Currently taking medication at home  PCP wanted for her to go to group therapy but she is not comfortable with this option  She is also stressed today because her mother is supposed to come to the hospital to visit  Objective:     Vitals: Blood pressure 131/70, pulse 57, temperature 98 1 °F (36 7 °C), temperature source Oral, resp  rate 18, height 5' 6" (1 676 m), weight 121 kg (266 lb 14 4 oz), last menstrual period 2021, SpO2 (!) 87 %, not currently breastfeeding , Body mass index is 43 08 kg/m² ,   Orthostatic Blood Pressures      Most Recent Value   Blood Pressure  131/70 filed at 2021 0710   Patient Position - Orthostatic VS  Lying filed at 2021 2355            Intake/Output Summary (Last 24 hours) at 3/31/2021 0955  Last data filed at 3/31/2021 0900  Gross per 24 hour   Intake 480 ml   Output 0 ml   Net 480 ml         Physical Exam:     Physical Exam  Vitals signs and nursing note reviewed  Constitutional:       General: She is awake   She is not in acute distress  Appearance: She is well-developed  She is morbidly obese  She is not ill-appearing, toxic-appearing or diaphoretic  HENT:      Head: Normocephalic and atraumatic  Right Ear: External ear normal       Left Ear: External ear normal       Mouth/Throat:      Mouth: Mucous membranes are moist       Pharynx: Oropharynx is clear  Eyes:      General: No scleral icterus  Conjunctiva/sclera: Conjunctivae normal    Neck:      Musculoskeletal: Neck supple  Vascular: No JVD  Cardiovascular:      Rate and Rhythm: Normal rate and regular rhythm  Pulses: Normal pulses  Heart sounds: S1 normal and S2 normal  No murmur  No friction rub  No gallop  Pulmonary:      Effort: Pulmonary effort is normal  No respiratory distress  Breath sounds: Normal breath sounds  No wheezing, rhonchi or rales  Abdominal:      General: Abdomen is flat  Palpations: Abdomen is soft  Tenderness: There is no abdominal tenderness  Musculoskeletal:      Right lower leg: No edema  Left lower leg: No edema  Skin:     General: Skin is warm and dry  Capillary Refill: Capillary refill takes less than 2 seconds  Neurological:      General: No focal deficit present  Mental Status: She is alert and oriented to person, place, and time  Psychiatric:         Attention and Perception: Attention normal          Mood and Affect: Mood is anxious and depressed  Speech: Speech normal          Behavior: Behavior normal  Behavior is cooperative               Current Facility-Administered Medications:     acetaminophen (TYLENOL) tablet 650 mg, 650 mg, Oral, Q4H PRN, Kam Cota MD, 650 mg at 03/31/21 0212    albuterol (PROVENTIL HFA,VENTOLIN HFA) inhaler 2 puff, 2 puff, Inhalation, Q4H PRN, Javier Gallardo MD    ALPRAZolam Reena Sanz) tablet 0 5 mg, 0 5 mg, Oral, TID PRN, Kalen Shaw MD, 0 5 mg at 03/31/21 0810    aspirin chewable tablet 81 mg, 81 mg, Oral, Daily, Rossana Acosta DO, 81 mg at 03/31/21 0809    atorvastatin (LIPITOR) tablet 80 mg, 80 mg, Oral, QPM, Elissa Camejo DO, 80 mg at 03/30/21 1724    buPROPion (WELLBUTRIN XL) 24 hr tablet 300 mg, 300 mg, Oral, QAM, Elissa Camejo DO, 300 mg at 03/31/21 0809    colchicine (COLCRYS) tablet 0 6 mg, 0 6 mg, Oral, BID, Emily Rosas MD, 0 6 mg at 03/31/21 0809    eptifibatide (INTEGRILIN) bolus from premix, , , Code/Trauma/Sedation Med, Krystin Harmon MD, 11 3 mL at 03/25/21 1528    fentanyl citrate (PF) 100 MCG/2ML 50 mcg, 50 mcg, Intravenous, Q2H PRN, Toma Holden DO, 50 mcg at 03/31/21 0810    fentanyl citrate (PF) 100 MCG/2ML, , Intravenous, Code/Trauma/Sedation Med, Krystin Harmon MD, 50 mcg at 03/25/21 1530    heparin (porcine) injection, , , Code/Trauma/Sedation Med, Krystin Harmon MD, 8,000 Units at 03/25/21 1459    heparin (porcine) subcutaneous injection 7,500 Units, 7,500 Units, Subcutaneous, Q8H [facility], 7,500 Units at 03/31/21 0535 **AND** [CANCELED] Platelet count, , , Once, Elissa Camejo DO    iohexol (OMNIPAQUE) 350 MG/ML injection (SINGLE-DOSE), , Intravenous, Code/Trauma/Sedation Med, Krystin Harmon MD, 75 mL at 03/25/21 1539    lidocaine (PF) (XYLOCAINE-MPF) 1 % injection, , , Code/Trauma/Sedation Med, Krystin Harmon MD, 1 mL at 03/25/21 1447    metoprolol succinate (TOPROL-XL) 24 hr tablet 50 mg, 50 mg, Oral, BID, Emily Rosas MD, 50 mg at 03/31/21 0809    nicotine (NICODERM CQ) 7 mg/24hr TD 24 hr patch 1 patch, 1 patch, Transdermal, Daily, Elissa Camejo DO, 1 patch at 03/31/21 0810    nitroGLYcerin (TRIDIL) 50 mg in 250 mL infusion (premix), , Intra-arterial, Code/Trauma/Sedation Med, Krystin Harmon MD, 200 mcg at 03/25/21 1451    ondansetron (ZOFRAN) injection 4 mg, 4 mg, Intravenous, Q6H PRN, Elissa Camejo DO, 4 mg at 03/25/21 1606    pantoprazole (PROTONIX) EC tablet 40 mg, 40 mg, Oral, Early Morning, Elissa Camejo DO, 40 mg at 03/31/21 0535    potassium chloride (K-DUR,KLOR-CON) CR tablet 20 mEq, 20 mEq, Oral, Once, Toma Moceri, DO    predniSONE tablet 60 mg, 60 mg, Oral, Daily, Toma Moceri, DO    ranolazine (RANEXA) 12 hr tablet 500 mg, 500 mg, Oral, Q12H [address], Toma Moceri, DO, 500 mg at 03/31/21 0810    sertraline (ZOLOFT) tablet 50 mg, 50 mg, Oral, Daily, Gina Rings, DO, 50 mg at 03/31/21 0809    ticagrelor (BRILINTA) tablet 90 mg, 90 mg, Oral, BID, Gina Rings, DO, 90 mg at 03/31/21 0809    verapamil (ISOPTIN) injection, , , Code/Trauma/Sedation Med, Ronaldo Houston MD, 2 5 mg at 03/25/21 1451    Labs & Results:    Lab Results   Component Value Date    TROPONINI >40 00 (H) 03/25/2021    TROPONINI 39 70 (H) 03/25/2021    TROPONINI 0 29 (H) 03/25/2021       Lab Results   Component Value Date    CALCIUM 7 0 (L) 03/31/2021    K 3 2 (L) 03/31/2021    CO2 19 (L) 03/31/2021     (H) 03/31/2021    BUN 21 03/31/2021    CREATININE 1 42 (H) 03/31/2021       Lab Results   Component Value Date    WBC 25 34 (H) 03/30/2021    HGB 12 5 03/30/2021    HCT 37 7 03/30/2021    MCV 86 03/30/2021     (H) 03/30/2021     Results from last 7 days   Lab Units 03/25/21  0246   INR  1 04       No results found for: CHOL  Lab Results   Component Value Date    HDL 34 (L) 03/25/2021    HDL 31 (L) 07/08/2020     Lab Results   Component Value Date    LDLCALC 122 (H) 03/25/2021    [facility]  07/08/2020      Comment:      Calculated LDL invalid, triglycerides >400 mg/dl  This screening LDL is a calculated result  It does not have the accuracy of the Direct Measured LDL in the monitoring of patients with hyperlipidemia and/or statin therapy  Direct Measure LDL (EUA796) must be ordered separately in these patients       Lab Results   Component Value Date    TRIG 230 (H) 03/25/2021    TRIG 465 (H) 07/08/2020       Lab Results   Component Value Date    ALT 30 03/25/2021    AST 24 03/25/2021         EKG: No new EKG to review  TELE: sinus rhythm, rate in 80's, significant ST elevations have persisted, no further episodes of VT    ======    Thank you for allowing me to participate in the care of your patient, DO Sohail Salter [facility] Neurology Residency, PGY-1

## 2021-03-31 NOTE — RESPIRATORY THERAPY NOTE
resp care      03/31/21 0827   Respiratory Assessment   Assessment Type Assess only   General Appearance Alert; Awake   Respiratory Pattern Normal   Chest Assessment Chest expansion symmetrical   Bilateral Breath Sounds Clear;Diminished   Cough None   Resp Comments no distress noted, udn prn d/c'd, changed to an albuterol mdi q4prn for sob/wheezing

## 2021-04-01 ENCOUNTER — TRANSITIONAL CARE MANAGEMENT (OUTPATIENT)
Dept: FAMILY MEDICINE CLINIC | Facility: CLINIC | Age: 46
End: 2021-04-01

## 2021-04-01 VITALS
WEIGHT: 263.9 LBS | OXYGEN SATURATION: 97 % | RESPIRATION RATE: 20 BRPM | HEIGHT: 66 IN | BODY MASS INDEX: 42.41 KG/M2 | DIASTOLIC BLOOD PRESSURE: 91 MMHG | HEART RATE: 65 BPM | SYSTOLIC BLOOD PRESSURE: 129 MMHG | TEMPERATURE: 98.1 F

## 2021-04-01 PROBLEM — J44.9 COPD (CHRONIC OBSTRUCTIVE PULMONARY DISEASE) (HCC): Status: ACTIVE | Noted: 2021-04-01

## 2021-04-01 PROCEDURE — 99238 HOSP IP/OBS DSCHRG MGMT 30/<: CPT | Performed by: INTERNAL MEDICINE

## 2021-04-01 PROCEDURE — NC001 PR NO CHARGE: Performed by: INTERNAL MEDICINE

## 2021-04-01 RX ORDER — ATORVASTATIN CALCIUM 80 MG/1
80 TABLET, FILM COATED ORAL EVERY EVENING
Qty: 90 TABLET | Refills: 0 | Status: SHIPPED | OUTPATIENT
Start: 2021-04-01 | End: 2021-04-19 | Stop reason: SDUPTHER

## 2021-04-01 RX ORDER — METOPROLOL SUCCINATE 50 MG/1
50 TABLET, EXTENDED RELEASE ORAL 2 TIMES DAILY
Qty: 180 TABLET | Refills: 0 | Status: SHIPPED | OUTPATIENT
Start: 2021-04-01 | End: 2021-04-19 | Stop reason: SDUPTHER

## 2021-04-01 RX ORDER — ACETAMINOPHEN 325 MG/1
650 TABLET ORAL EVERY 6 HOURS PRN
Qty: 1 BOTTLE | Refills: 0 | Status: SHIPPED | OUTPATIENT
Start: 2021-04-01 | End: 2021-05-01

## 2021-04-01 RX ORDER — ASPIRIN 81 MG/1
81 TABLET, CHEWABLE ORAL DAILY
Qty: 90 TABLET | Refills: 0 | Status: SHIPPED | OUTPATIENT
Start: 2021-04-02

## 2021-04-01 RX ORDER — COLCHICINE 0.6 MG/1
0.6 TABLET ORAL 2 TIMES DAILY
Qty: 180 TABLET | Refills: 0 | Status: SHIPPED | OUTPATIENT
Start: 2021-04-01 | End: 2021-04-19 | Stop reason: SDUPTHER

## 2021-04-01 RX ORDER — RANOLAZINE 500 MG/1
500 TABLET, EXTENDED RELEASE ORAL EVERY 12 HOURS SCHEDULED
Qty: 60 TABLET | Refills: 0 | Status: SHIPPED | OUTPATIENT
Start: 2021-04-01 | End: 2021-04-19 | Stop reason: SDUPTHER

## 2021-04-01 RX ORDER — PREDNISONE 10 MG/1
TABLET ORAL
Qty: 147 TABLET | Refills: 0 | Status: SHIPPED | OUTPATIENT
Start: 2021-04-01 | End: 2021-05-13

## 2021-04-01 RX ORDER — ALBUTEROL SULFATE 90 UG/1
2 AEROSOL, METERED RESPIRATORY (INHALATION) EVERY 4 HOURS PRN
Qty: 1 INHALER | Refills: 0 | Status: SHIPPED | OUTPATIENT
Start: 2021-04-01 | End: 2022-05-23 | Stop reason: SDUPTHER

## 2021-04-01 RX ADMIN — IBUPROFEN 600 MG: 600 TABLET, FILM COATED ORAL at 09:19

## 2021-04-01 RX ADMIN — TICAGRELOR 90 MG: 90 TABLET ORAL at 09:16

## 2021-04-01 RX ADMIN — PANTOPRAZOLE SODIUM 40 MG: 40 TABLET, DELAYED RELEASE ORAL at 06:16

## 2021-04-01 RX ADMIN — ACETAMINOPHEN 650 MG: 325 TABLET, FILM COATED ORAL at 00:27

## 2021-04-01 RX ADMIN — ALPRAZOLAM 0.5 MG: 0.5 TABLET ORAL at 09:20

## 2021-04-01 RX ADMIN — ASPIRIN 81 MG: 81 TABLET, CHEWABLE ORAL at 09:16

## 2021-04-01 RX ADMIN — COLCHICINE 0.6 MG: 0.6 TABLET ORAL at 09:16

## 2021-04-01 RX ADMIN — METOPROLOL SUCCINATE 50 MG: 50 TABLET, EXTENDED RELEASE ORAL at 09:16

## 2021-04-01 RX ADMIN — SERTRALINE HYDROCHLORIDE 50 MG: 50 TABLET ORAL at 09:16

## 2021-04-01 RX ADMIN — ALBUTEROL SULFATE 2 PUFF: 90 AEROSOL, METERED RESPIRATORY (INHALATION) at 10:37

## 2021-04-01 RX ADMIN — HEPARIN SODIUM 7500 UNITS: 5000 INJECTION INTRAVENOUS; SUBCUTANEOUS at 06:13

## 2021-04-01 RX ADMIN — BUPROPION HYDROCHLORIDE 300 MG: 150 TABLET, FILM COATED, EXTENDED RELEASE ORAL at 09:20

## 2021-04-01 RX ADMIN — RANOLAZINE 500 MG: 500 TABLET, FILM COATED, EXTENDED RELEASE ORAL at 09:17

## 2021-04-01 RX ADMIN — FENTANYL CITRATE 50 MCG: 50 INJECTION INTRAMUSCULAR; INTRAVENOUS at 06:14

## 2021-04-01 RX ADMIN — NICOTINE 7 MG/24 HR DAILY TRANSDERMAL PATCH 1 PATCH: at 09:23

## 2021-04-01 RX ADMIN — PREDNISONE 60 MG: 20 TABLET ORAL at 09:17

## 2021-04-01 NOTE — DISCHARGE INSTRUCTIONS
Acute Pericarditis   WHAT YOU NEED TO KNOW:   Acute pericarditis is inflammation of the pericardium  The pericardium is the thin sac that surrounds your heart  A small amount of clear fluid between the heart and the sac allows the heart to beat easily  With acute pericarditis, the amount of fluid increases and may contain pus  This can lead to problems with the way that your heart beats  DISCHARGE INSTRUCTIONS:   Seek care immediately if:   · You have shortness of breath that is worse when you lie down  · Your chest pain gets worse or does not get better  Call your doctor if:   · You have a fever  · You have questions or concerns about your condition or care  Medicines: You may need any of the following:  · NSAIDs  help decrease swelling and pain or fever  This medicine is available with or without a doctor's order  NSAIDs can cause stomach bleeding or kidney problems in certain people  If you take blood thinner medicine, always ask your healthcare provider if NSAIDs are safe for you  Always read the medicine label and follow directions  · Antibiotics  help prevent or treat a bacterial infection  · Steroid medicine  helps lower inflammation  · Take your medicine as directed  Contact your healthcare provider if you think your medicine is not helping or if you have side effects  Tell him of her if you are allergic to any medicine  Keep a list of the medicines, vitamins, and herbs you take  Include the amounts, and when and why you take them  Bring the list or the pill bottles to follow-up visits  Carry your medicine list with you in case of an emergency  Self-care:   · Eat a variety of healthy foods  This may help you have more energy and heal faster  Healthy foods include fruits, vegetables, whole-grain breads, low-fat dairy products, beans, lean meat, and fish  Ask if you need to be on a special diet  · Drink liquids as directed    Adults should drink between 9 and 13 eight-ounce cups of liquid every day  Ask what amount is best for you  For most people, good liquids to drink are water, juice, and milk  · Get plenty of exercise  Talk to your healthcare provider about the best exercise plan for you  Exercise can decrease your blood pressure and improve your health  · Do not smoke  Nicotine and other chemicals in cigarettes and cigars can cause lung damage  Ask your healthcare provider for information if you currently smoke and need help to quit  E-cigarettes or smokeless tobacco still contain nicotine  Talk to your healthcare provider before you use these products  · Manage stress  Stress may slow healing and cause illness  Learn new ways to relax, such as deep breathing  Prevent infections: The following can help prevent the spread of viruses and bacteria that can cause acute pericarditis or make it worse:  · Wash your hands often  Wash your hands several times each day  Wash after you use the bathroom, change a child's diaper, and before you prepare or eat food  Use soap and water every time  Rub your soapy hands together, lacing your fingers  Wash the front and back of your hands, and in between your fingers  Use the fingers of one hand to scrub under the fingernails of the other hand  Wash for at least 20 seconds  Rinse with warm, running water for several seconds  Then dry your hands with a clean towel or paper towel  Use hand  that contains alcohol if soap and water are not available  Do not touch your eyes, nose, or mouth without washing your hands first          · Cover a sneeze or cough  Use a tissue that covers your mouth and nose  Throw the tissue away in a trash can right away  Use the bend of your arm if a tissue is not available  Wash your hands well with soap and water or use a hand   · Clean surfaces often  Clean doorknobs, countertops, cell phones, and other surfaces that are touched often   Use a disinfecting wipe, a single-use sponge, or a cloth you can wash and reuse  Use disinfecting  if you do not have wipes  You can create a disinfecting  by mixing 1 part bleach with 10 parts water  · Ask about vaccines you may need  Vaccines help protect against viruses and bacteria that cause certain diseases  Your healthcare provider can tell you which vaccines you may need and when to get them  ? Get the influenza (flu) vaccine as soon as recommended each year  The flu vaccine is usually available starting in September or October  Flu viruses change, so it is important to get a flu vaccine every year  ? Get the pneumonia vaccine if recommended  This vaccine is usually recommended every 5 years  Your provider will tell you when to get this vaccine, if needed  Follow up with your doctor as directed:  Write down your questions so you remember to ask them during your visits  © Copyright [address] Information is for End User's use only and may not be sold, redistributed or otherwise used for commercial purposes  All illustrations and images included in CareNotes® are the copyrighted property of [employer]  or [address] [place], [place] [address]maria alejandra   The above information is an  only  It is not intended as medical advice for individual conditions or treatments  Talk to your doctor, nurse or pharmacist before following any medical regimen to see if it is safe and effective for you  1  Please see the post cardiac catheterization dishcarge instructions  No heavy lifting, greater than 10 lbs  or strenuous  activity for 48 hrs  2 Remove band aid tomorrow  Shower and wash area- wrist gently with soap and water- beginning tomorrow  Rinse and pat dry  Apply new water seal band aid  Repeat this process for 5 days  No powders, creams lotions or antibiotic ointments  for 5 days  No tub baths, hot tubs or swimming for 5 days       3  Please call our office ([phone]) if you have any fever, redness, swelling, discharge from your wrist access site  4 No driving for 2 days    5  Stent Booklet    6  Please call your cardiologist with any questions or concerns  Coronary Intravascular Stent Placement   WHAT YOU SHOULD KNOW:   Coronary intravascular stent placement is a procedure to place a stent in an artery of your heart that has plaque buildup  Plaque is a mixture of fat and cholesterol  A stent is a small mesh tube made of metal that helps keep your artery open  Your caregiver may place a bare metal stent or a drug-eluting stent (GREG) in your artery  A GREG is coated with medicine that is slowly released and helps prevent more plaque buildup in the area where the stent is placed  The stent remains in your artery for life  You may need more than one stent  AFTER YOU LEAVE:   Medicines: You will be given any of the following:  · Antiplatelets  prevent blood clots from forming  You will need to take aspirin and another type of platelet medicine  Take this medicine daily as directed  Do not stop taking aspirin or other type of antiplatelet medicine  · Nitrates , such as nitroglycerin, relax the arteries of your heart so it gets more oxygen  This medicine helps to relieve chest pain  · Cholesterol medicine  helps decrease the amount of cholesterol in your blood  · Blood pressure medicine  lowers your blood pressure  · Take your medicine as directed  Call your healthcare provider if you think your medicine is not helping or if you have side effects  Tell him if you are allergic to any medicine  Keep a list of the medicines, vitamins, and herbs you take  Include the amounts, and when and why you take them  Bring the list or the pill bottles to follow-up visits  Carry your medicine list with you in case of an emergency  Follow up with your cardiologist as directed:  Write down your questions so you remember to ask them during your visits    Activity:   · Avoid unnecessary stair climbing for 48 hours, if a catheter was put in your groin  · Do not place pressure on your arm, hand, or wrist, if the catheter was placed in your wrist  Avoid pushing, pulling, or heavy lifting with that arm  · If you need to cough, support the area where the catheter was inserted with your hand  · Ask your cardiologist how long you need to limit movement and avoid certain activities  · You may feel like resting more after your procedure  Slowly start to do more each day  Rest when you feel it is needed  Wound care:  Ask your cardiologist about how to care for your incision wound  Ask when you can get into a tub, shower, or pool  Do not smoke: If you smoke, it is never too late to quit  Smoking increases your risk for heart disease and stroke  Ask your cardiologist for information if you need help quitting  Cardiac rehab:  Your cardiologist may recommend that you attend cardiac rehabilitation (rehab)  This is a program run by specialists who will help you safely strengthen your heart and reduce the risk of more heart disease  The plan includes exercise, relaxation, stress management, and heart-healthy nutrition  Caregivers will also check to make sure any medicines you are taking are working  Contact your cardiologist if:   · You have a fever or chills  · You have questions or concerns about your condition or care  Seek care immediately or call 911 if:   · Your leg or arm, used for the procedure, becomes numb or turns white or blue  · The area where the catheter was placed is swollen, red, or has pus or foul-smelling fluid coming from it  · Your arm or leg feels warm, tender, and painful  It may look swollen and red  · You start to bleed from your catheter site again      · You have any of the following signs of a heart attack:     ¨ Squeezing, pressure, fullness, or pain in your chest that lasts longer than a few minutes or returns     ¨ Discomfort or pain in your back, neck, jaw, stomach, or arm    ¨ Shortness of breath or breathing problems    ¨ A sudden cold sweat, lightheadedness, dizziness, or nausea, especially with chest pain or trouble breathing    · You have any of the following signs of a stroke:     ¨ Part of your face droops or is numb    ¨ Weakness in an arm or leg    ¨ Confusion or difficulty speaking    ¨ Dizziness, a severe headache, or vision loss  © 2014 [address] is for End User's use only and may not be sold, redistributed or otherwise used for commercial purposes  All illustrations and images included in CareNotes® are the copyrighted property of [employer]  or Enrrique Brantley  The above information is an  only  It is not intended as medical advice for individual conditions or treatments  Talk to your doctor, nurse or pharmacist before following any medical regimen to see if it is safe and effective for you

## 2021-04-01 NOTE — CASE MANAGEMENT
The patient is cleared for discharge home today  Sp[facility] is here to transport [facility]

## 2021-04-01 NOTE — ASSESSMENT & PLAN NOTE
Likely COPD given long-standing history of tobacco use  Has had symptomatic improvement in dyspnea with albuterol inhaler  Rx sent for albuterol and Anoro  Recommend follow up with PCP for further evaluation

## 2021-04-01 NOTE — DISCHARGE INSTR - AVS FIRST PAGE
New Medications:  · Prenisone - 6 tablets/day x 1 week, 5 tablets/day x1 week, 4 tablets/day x1 week, 3 tablets/day x 1 week, 2 tablets/day x 1 week, then 1 tablet/day x1 week  IF FOR ANY REASON YOUR CHEST PAIN WORSENS, PLEASE INCREASE BACK TO 60 MG (6 TABLETS) AND CALL THE OFFICE  · Colchicine 0 6 mg twice daily for 3 months  · Ranexa 500 mg twice daily  · Metoprolol 50 mg twice daily  · Atorvastatin 80 mg daily at bedtime  · 81 mg aspirin daily  · Brilinta 90 mg twice daily  · Pantoprazole 40 mg daily  · Nicotine patch  · Albuterol inhaler every 4 hours as needed for shortness of breath  · Anoro Ellipta inhaler - 1 puff daily (preventative)      FOLLOW UP:  You are scheduled for a follow up appointment at 2:00 PM on Monday, April 19th, at the [address] [place] [address] office with Margy Adorno  Referral has been sent in for you to start cardiac rehab  Please go to the lab and have your blood drawn on Monday to recheck your kidney function

## 2021-04-01 NOTE — PROGRESS NOTES
Cardiology Progress Note - Stephany Forrester 42 y o  female MRN: [id number]    Unit/Bed#: [facility] 505-01 Encounter: [id number]      Assessment & Plan:  Principal Problem:    Acute MI, inferior wall (formerly [facility])  Active Problems:    Ventricular tachycardia, non-sustained (HCC)    Acute pericarditis    Hypertrophic cardiomyopathy (HCC)    Essential hypertension    CHF (congestive heart failure) (formerly [facility])    Tobacco abuse    Mixed hyperlipidemia    CKD (chronic kidney disease) stage 3, GFR 30-59 ml/min    BMI 40 0-44 9, adult (HCC)    COPD (chronic obstructive pulmonary disease) (formerly [facility])    Leukocytosis    Major depressive disorder with current active episode        * Acute MI, inferior wall [facility])  Assessment & Plan  Patient presented morning of 3/25 with acute inferior wall MI  Initial cath notable for diffuse atherosclerosis and 100% stenosis of mid-RCA which was stented  Continued to have significant chest pain and ST elevations in the hours post-procedure  She was then taken back to cath lab and was found to have 90% stenosis of right PDA ostium which was consistent with thrombus  The right PDA ostium was also stented  Additionally, patient had 100% stenosis of the 2nd posterolateral segment consistent with thrombus, however, this vessel was not amenable to intervention  Plan:  - Patient is stable for dc home today  She is to continue Brilinta at 90 mg BID, Atorvastatin 80 mg QHS, Ranexa 500 mg Q12H, and Metoprolol Succinate 50 mg BID    - Patient will require close outpatient follow up  Appointment information can be found in discharge summary  - Lifestyle modifications reviewed including tobacco cessation, dietary changes, and exercise  - Patient would benefit from Cardiac Rehab exercise program    Acute pericarditis  Assessment & Plan  Patient developed acute pericarditis after MI  She was having significant chest pain, worse with breathing   Initial trial of Toradol with no relief, also not ideal with her CKD 3b to be on high dose NSAIDs  Patient has noted gradual improvement over the last 3 days with steroids  She is stable for dc home  Will send Rx for Prednisone 60 mg qd  Continue Colchicine 0 6 mg BID  Can also use ibuprofen sparingly and tylenol  Ventricular tachycardia, non-sustained (HCC)  Assessment & Plan  Multiple episodes of non-sustained monomorphic ventricular tachycardia in the post-MI period  Last episode recorded on telemetry occurred at 9:07 PM on 3/25 and lasted for approximately 45 beats (21 seconds) before converting  No further occurrences noted  She is to continue Metoprolol 50 mg BID  Life Vest not indicated as arrhythmia has subsided  Hypertrophic cardiomyopathy [facility])  Assessment & Plan  Echo completed 3/25/21 concerning for hypertrophic cardiomyopathy +/- obstruction  Per report, there was severe asymmetrical hypertrophy of the inferior septum measuring 25 mm without dynamic obstruction at rest; however, noted to have moderate systolic anterior motion of the mitral valve anterior leaflet  Patient reports longstanding history of uncontrolled HTN with pressures > 008 mm Hg systolic, has been controlled for the last 3-4 years  Possible family history of sudden cardiac death at young age on father's side? Patient is not in contact with her father but will reach out to other family members to clarify history  Cardiac MRI:  Severe concentric LV hypertrophy with mildly reduced LV systolic function  Normal RV size and systolic function  Mild MR  Moderately dilated LA  Small circumferential pericardial effusion  Dense transmural ischemic scarring with evidence of microvascular obstruction of the entire inferior and inferolateral walls  These findings may suggest an underlying hypertrophic cardiomyopathy with ischemic scarring of the inferior and inferolateral walls      Plan:  · Patient will need close outpatient follow up with HCM clinic  · No additional testing at this time    CHF (congestive heart failure) [facility])  Assessment & Plan  Wt Readings from Last 3 Encounters:   03/31/21 121 kg (266 lb 14 4 oz)   03/26/21 122 kg (269 lb)   03/25/21 127 kg (280 lb)     Prior history of CHF  No signs of volume overload  Echo completed with normal EF at 60%  Essential hypertension  Assessment & Plan  Patient with history of HTN, on Norvasc 5 mg and Lisinopril 5 mg at home  BP was initially soft post-cardiac cath but has since been stable  Continue metoprolol succinate  Will restart home lisinopril at discharge  Tobacco abuse  Assessment & Plan  Patient currently 1/2 to 1 ppd smoker  Strongly encouraged smoking cessation given MI and prior cardiac history  Continue with nicotine patch during admission  Patient wishes to stop smoking completely and is requesting rx for nicotine patch for home  Mixed hyperlipidemia  Assessment & Plan  History of hyperlipidemia, on Atorvastatin 40 mg QD at home  Fasting lipids this morning with sub-optimal control- total cholesterol 202, Tg 230, HDL 34, and   Continue Atorvastatin to 80 mg QD  CKD (chronic kidney disease) stage 3, GFR 30-59 ml/min  Assessment & Plan  Lab Results   Component Value Date    EGFR 45 03/31/2021    EGFR 29 03/30/2021    EGFR 38 03/28/2021    CREATININE 1 42 (H) 03/31/2021    CREATININE 2 03 (H) 03/30/2021    CREATININE 1 62 (H) 03/28/2021     Per review of records, patient's baseline creatinine is approximately 1 5 and creatinine has returned to baseline post-cath procedures  Avoid nephrotoxic drugs    BMI 40 0-44 9, adult [facility])  Assessment & Plan  Lifestyle modification discussed    COPD (chronic obstructive pulmonary disease) ([facility] [address] )  Assessment & Plan  Likely COPD given long-standing history of tobacco use  Has had symptomatic improvement in dyspnea with albuterol inhaler  Rx sent for albuterol and Anoro  Recommend follow up with PCP for further evaluation  Leukocytosis  Assessment & Plan  Leukocytosis present on admission, WBC > 20   This likely represents a stress reaction  Will continue to trend off of antibiotics  Continues to have no signs of infection  Major depressive disorder with current active episode  Assessment & Plan  Significant depression and anxiety  Home medications include Xanax 0 5 mg TID, Wellbutrin 300 mg qd, and Zoloft 50 mg qd  Per conversation with patient, depression is significantly impacting her life  Prior to this hospitalization, she has not left her house in over 7 months  Self-isolating, not interacting with family or friends, having mood swings  Strongly recommend that patient reach out to her PCP upon discharge to further discuss her mood  Would likely benefit from talk therapy as well  Patient is open to talk therapy but not open to group therapy  Subjective:   Patient seen and examined  No significant events overnight  Patient reports that she is gradually feeling better  Chest pain is less intense but still present  Feels like she may be able to go home today  Patient has taken only 3 doses of Fentanyl since it was changed to q6h yesterday morning, last dose at 6:14 AM today  Was on supplemental O2 overnight because she felt short of breath  SpO2 was > 95% at all times  She thinks that her shortness of breath is more related to anxiety/panic attacks and not cardiac or pulmonary in nature  She was stressed when this happened  Review of Systems   Constitutional: Negative for activity change, chills, diaphoresis, fatigue and fever  HENT: Negative for ear pain and sore throat  Eyes: Negative for pain and visual disturbance  Respiratory: Positive for shortness of breath  Negative for apnea, cough and chest tightness  Cardiovascular: Positive for chest pain (mild, intermittent)  Negative for palpitations and leg swelling  Gastrointestinal: Negative for abdominal pain, nausea and vomiting  Endocrine: Negative for polydipsia, polyphagia and polyuria     Genitourinary: Negative for difficulty urinating, dysuria and hematuria  Musculoskeletal: Negative for arthralgias and back pain  Skin: Negative for color change and rash  Neurological: Negative for seizures and syncope  Hematological: Does not bruise/bleed easily  Psychiatric/Behavioral: Positive for dysphoric mood  The patient is nervous/anxious  All other systems reviewed and are negative  Objective:     Vitals: Blood pressure 129/91, pulse 65, temperature 98 1 °F (36 7 °C), temperature source Oral, resp  rate 20, height 5' 6" (1 676 m), weight 120 kg (263 lb 14 4 oz), last menstrual period 03/07/2021, SpO2 97 %, not currently breastfeeding , Body mass index is 42 59 kg/m² ,   Orthostatic Blood Pressures      Most Recent Value   Blood Pressure  129/91 filed at 04/01/2021 1029   Patient Position - Orthostatic VS  Lying filed at 03/30/2021 2355            Intake/Output Summary (Last 24 hours) at 4/1/2021 1201  Last data filed at 4/1/2021 0900  Gross per 24 hour   Intake 240 ml   Output 400 ml   Net -160 ml       Physical Exam  Vitals signs and nursing note reviewed  Constitutional:       General: She is awake  She is not in acute distress  Appearance: She is well-developed  She is morbidly obese  She is not ill-appearing or toxic-appearing  HENT:      Head: Normocephalic and atraumatic  Right Ear: External ear normal       Left Ear: External ear normal       Nose: No congestion  Mouth/Throat:      Mouth: Mucous membranes are moist       Pharynx: Oropharynx is clear  Eyes:      General: No scleral icterus  Conjunctiva/sclera: Conjunctivae normal    Neck:      Musculoskeletal: Neck supple  Vascular: No JVD  Cardiovascular:      Rate and Rhythm: Normal rate and regular rhythm  Heart sounds: No murmur  Pulmonary:      Effort: Pulmonary effort is normal  No respiratory distress  Breath sounds: Normal breath sounds  No wheezing, rhonchi or rales     Abdominal:      General: Bowel sounds are normal  There is no distension  Palpations: Abdomen is soft  Tenderness: There is no abdominal tenderness  Musculoskeletal:      Right lower leg: No edema  Left lower leg: No edema  Skin:     General: Skin is warm and dry  Capillary Refill: Capillary refill takes less than 2 seconds  Neurological:      General: No focal deficit present  Mental Status: She is alert and oriented to person, place, and time  Mental status is at baseline  Psychiatric:         Mood and Affect: Mood is anxious and depressed  Speech: Speech normal          Behavior: Behavior is cooperative               Current Facility-Administered Medications:     acetaminophen (TYLENOL) tablet 650 mg, 650 mg, Oral, Q4H PRN, Chandler Alberts MD, 650 mg at 04/01/21 0027    albuterol (PROVENTIL HFA,VENTOLIN HFA) inhaler 2 puff, 2 puff, Inhalation, Q4H PRN, Suzy Calloway MD, 2 puff at 04/01/21 1037    ALPRAZolam (XANAX) tablet 0 5 mg, 0 5 mg, Oral, TID PRN, Baby Goltz, MD, 0 5 mg at 04/01/21 0920    aspirin chewable tablet 81 mg, 81 mg, Oral, Daily, Dino Manuela, DO, 81 mg at 04/01/21 0916    atorvastatin (LIPITOR) tablet 80 mg, 80 mg, Oral, QPM, John Saldana DO, 80 mg at 03/31/21 1714    buPROPion (WELLBUTRIN XL) 24 hr tablet 300 mg, 300 mg, Oral, QAM, John Saldana DO, 300 mg at 04/01/21 0920    colchicine (COLCRYS) tablet 0 6 mg, 0 6 mg, Oral, BID, Leodan Aguilar MD, 0 6 mg at 04/01/21 0916    eptifibatide (INTEGRILIN) bolus from premix, , , Code/Trauma/Sedation Med, Karen Long MD, 11 3 mL at 03/25/21 1528    fentanyl citrate (PF) 100 MCG/2ML, , Intravenous, Code/Trauma/Sedation MedKaren MD, 50 mcg at 03/25/21 1530    heparin (porcine) injection, , , Code/Trauma/Sedation MedKaren MD, 8,000 Units at 03/25/21 1459    heparin (porcine) subcutaneous injection 7,500 Units, 7,500 Units, Subcutaneous, Q8H [facility], 7,500 Units at 04/01/21 0613 **AND** [CANCELED] Platelet count, , , Once, Solomon Wright DO Les    ibuprofen (MOTRIN) tablet 600 mg, 600 mg, Oral, Q6H PRN, Toma Holden DO, 600 mg at 04/01/21 0919    iohexol (OMNIPAQUE) 350 MG/ML injection (SINGLE-DOSE), , Intravenous, Code/Trauma/Sedation Med, Humberto Johnson MD, 75 mL at 03/25/21 1539    lidocaine (PF) (XYLOCAINE-MPF) 1 % injection, , , Code/Trauma/Sedation Med, Humberto Johnson MD, 1 mL at 03/25/21 1447    metoprolol succinate (TOPROL-XL) 24 hr tablet 50 mg, 50 mg, Oral, BID, Daphne Berg MD, 50 mg at 04/01/21 0916    nicotine (NICODERM CQ) 7 mg/24hr TD 24 hr patch 1 patch, 1 patch, Transdermal, Daily, Alban Warner DO, 1 patch at 04/01/21 0923    nitroGLYcerin (TRIDIL) 50 mg in 250 mL infusion (premix), , Intra-arterial, Code/Trauma/Sedation Med, Humberto Johnson MD, 200 mcg at 03/25/21 1451    ondansetron (ZOFRAN) injection 4 mg, 4 mg, Intravenous, Q6H PRN, Alban Warner DO, 4 mg at 03/25/21 1606    pantoprazole (PROTONIX) EC tablet 40 mg, 40 mg, Oral, Early Morning, Alban Warner DO, 40 mg at 04/01/21 0616    predniSONE tablet 60 mg, 60 mg, Oral, Daily, Toma Holden DO, 60 mg at 04/01/21 0917    ranolazine (RANEXA) 12 hr tablet 500 mg, 500 mg, Oral, Q12H [address], Toma Holden DO, 500 mg at 04/01/21 0917    sertraline (ZOLOFT) tablet 50 mg, 50 mg, Oral, Daily, Alban Warner DO, 50 mg at 04/01/21 7229    ticagrelor (BRILINTA) tablet 90 mg, 90 mg, Oral, BID, Alban Warner DO, 90 mg at 04/01/21 1046    verapamil (ISOPTIN) injection, , , Code/Trauma/Sedation Med, Humberto Johnson MD, 2 5 mg at 03/25/21 1451    Labs & Results:    Lab Results   Component Value Date    TROPONINI >40 00 (H) 03/25/2021    TROPONINI 39 70 (H) 03/25/2021    TROPONINI 0 29 (H) 03/25/2021       Lab Results   Component Value Date    CALCIUM 7 0 (L) 03/31/2021    K 3 2 (L) 03/31/2021    CO2 19 (L) 03/31/2021     (H) 03/31/2021    BUN 21 03/31/2021    CREATININE 1 42 (H) 03/31/2021       Lab Results   Component Value Date    WBC 25 34 (H) 03/30/2021    HGB 12 5 03/30/2021    HCT 37 7 03/30/2021    MCV 86 03/30/2021     (H) 03/30/2021           No results found for: CHOL  Lab Results   Component Value Date    HDL 34 (L) 03/25/2021    HDL 31 (L) 07/08/2020     Lab Results   Component Value Date    LDLCALC 122 (H) 03/25/2021    [facility]  07/08/2020      Comment:      Calculated LDL invalid, triglycerides >400 mg/dl  This screening LDL is a calculated result  It does not have the accuracy of the Direct Measured LDL in the monitoring of patients with hyperlipidemia and/or statin therapy  Direct Measure LDL (KPF095) must be ordered separately in these patients  Lab Results   Component Value Date    TRIG 230 (H) 03/25/2021    TRIG 465 (H) 07/08/2020       Lab Results   Component Value Date    ALT 30 03/25/2021    AST 24 03/25/2021         EKG: No new EKG available to review  TELE: Over last 24 hours patient has maintained NSR without arrhythmias  ST elevation still present     ======    Thank you for allowing me to participate in the care of your patient, Ambar Leisure      Mason Callow, DO  [address] Neurology Residency, PGY-1

## 2021-04-01 NOTE — DISCHARGE SUMMARY
Discharge Summary - Myra Graves 42 y o  female MRN: [id number]    Unit/Bed#: [facility] 505-01 Encounter: [id number]       Admission Date:   Admission Date: 3/25/2021  Discharge Date: 4/1/2021    Admitting Diagnosis: AMI (acute mesenteric ischemia) ([facility] [address] ) [[id number]]    HPI:   Patient is a 42 y o  female with history of CAD s/p stent in LAD, non-Hodgkin lymphoma with prior chest radiation, HTN, hyperlipidemia, CHF, and obesity, who initially presented at [facility] for several hours of severe chest pain  EKG concerning for acute inferior wall STEMI and patient transferred to [facility] for cardiac cath and intervention  Please see progress note for 4/1/21 for interval history and physical exam     Procedures Performed:   Orders Placed This Encounter   Procedures    Cardiac catheterization    Cardiac catheterization       Summary of Hospital Course:   Patient was taken directly to cath lab  Was found to have 100% blockage of RCA and drug eluding stent was placed  Later that morning, had several episodes of non-sustained VT and worsening chest pain  Was taken back to cath lab and had proximal PDA stented  Also had occlusion of 2nd PLV that was not amenable to stent placement  She continued to have chest pain for several days  Echo was obtained which revealed severe LVH  Due to concern for hypertrophic cardiomyopathy a cardiac MRI was obtained  Findings of cardiac MRI are suggestive of underlying cardiomyopathy with ischemic scarring of the inferior and inferolateral walls  Small pericardial effusion was also noted on cardiac MRI  Patient continued to have chest pain which was worse with deep inspiration  Clinically exam was consistent with pericarditis  She was started on Prednisone and has had incremental relief over the last 3 days  She is now stable for dc home from a cardiac perspective  She will need close outpatient follow up with cardiology as well as aggressive lifestyle modifications  Significant Findings, Care, Treatment and Services Provided:   · Cardiac cath #1 - % stenosis, GREG placed  · Cardiac cath #2 - Proximal PDA stenosis, GREG placed  Second PLV occlusion not amenable to PCI  · Echo - LVEF 60%, severe hypokinesis of basal-mid inferior walls  Wall thickness markedly increased  Severe asymmetric hypertrophy of inferior septum (25 mm)  No dynamic obstruction at rest  Grade 2 diastolic dysfunction  LA mildly dilated  Moderate systolic anterior motion of mitral valve anterior leaflet  Mild to moderate MR  Trace aortic regurgitation  · Cardiac MRI - Severe concentric LV hypertrophy with mildly reduced LV systolic function  Normal RV size and systolic function  Mild MR  Moderately dilated LA  Small circumferential pericardial effusion  Dense transmural ischemic scarring with evidence of microvascular obstruction of the entire inferior and inferolateral walls  These findings may suggest an underlying hypertrophic cardiomyopathy with ischemic scarring of the inferior and inferolateral walls  Complications: None    Discharge Diagnosis:  Acute inferior wall MI  HTN  hyperlipidemia  Acute pericarditis  Non-sustained VT  Hypertrophic cardiomyopathy  CHF  CKD stage 3B  Tobacco abuse  COPD  Major depressive disorder        Resolved Problems  Date Reviewed: 4/1/2021    None          Condition at Discharge: stable       Discharge instructions/Information to patient and family:   See after visit summary for information provided to patient and family  Provisions for Follow-Up Care:  See after visit summary for information related to follow-up care and any pertinent home health orders  PCP: [place], DO    Disposition: Home    Planned Readmission: No    Discharge Statement   I spent 40 minutes discharging the patient  This time was spent on the day of discharge  I had direct contact with the patient on the day of discharge   Additional documentation is required if more than 30 minutes were spent on discharge  Discharge Medications:  See after visit summary for reconciled discharge medications provided to patient and family          ======    Thank you for allowing me to participate in the care of your patient, DO [address] Neurology Residency, PGY-1

## 2021-04-01 NOTE — PLAN OF CARE
Problem: PAIN - ADULT  Goal: Verbalizes/displays adequate comfort level or baseline comfort level  Description: Interventions:  - Encourage patient to monitor pain and request assistance  - Assess pain using appropriate pain scale  - Administer analgesics based on type and severity of pain and evaluate response  - Implement non-pharmacological measures as appropriate and evaluate response  - Consider cultural and social influences on pain and pain management  - Notify physician/advanced practitioner if interventions unsuccessful or patient reports new pain  Outcome: Adequate for Discharge     Problem: INFECTION - ADULT  Goal: Absence or prevention of progression during hospitalization  Description: INTERVENTIONS:  - Assess and monitor for signs and symptoms of infection  - Monitor lab/diagnostic results  - Monitor all insertion sites, i e  indwelling lines, tubes, and drains  - Monitor endotracheal if appropriate and nasal secretions for changes in amount and color  - [place] appropriate cooling/warming therapies per order  - Administer medications as ordered  - Instruct and encourage patient and family to use good hand hygiene technique  - Identify and instruct in appropriate isolation precautions for identified infection/condition  Outcome: Adequate for Discharge  Goal: Absence of fever/infection during neutropenic period  Description: INTERVENTIONS:  - Monitor WBC    Outcome: Adequate for Discharge     Problem: SAFETY ADULT  Goal: Patient will remain free of falls  Description: INTERVENTIONS:  - Assess patient frequently for physical needs  -  Identify cognitive and physical deficits and behaviors that affect risk of falls    -  [place] fall precautions as indicated by assessment   - Educate patient/family on patient safety including physical limitations  - Instruct patient to call for assistance with activity based on assessment  - Modify environment to reduce risk of injury  - Consider OT/PT consult to assist with strengthening/mobility  Outcome: Adequate for Discharge  Goal: Maintain or return to baseline ADL function  Description: INTERVENTIONS:  -  Assess patient's ability to carry out ADLs; assess patient's baseline for ADL function and identify physical deficits which impact ability to perform ADLs (bathing, care of mouth/teeth, toileting, grooming, dressing, etc )  - Assess/evaluate cause of self-care deficits   - Assess range of motion  - Assess patient's mobility; develop plan if impaired  - Assess patient's need for assistive devices and provide as appropriate  - Encourage maximum independence but intervene and supervise when necessary  - Involve family in performance of ADLs  - Assess for home care needs following discharge   - Consider OT consult to assist with ADL evaluation and planning for discharge  - Provide patient education as appropriate  Outcome: Adequate for Discharge  Goal: Maintain or return mobility status to optimal level  Description: INTERVENTIONS:  - Assess patient's baseline mobility status (ambulation, transfers, stairs, etc )    - Identify cognitive and physical deficits and behaviors that affect mobility  - Identify mobility aids required to assist with transfers and/or ambulation (gait belt, sit-to-stand, lift, walker, cane, etc )  - [place] fall precautions as indicated by assessment  - Record patient progress and toleration of activity level on Mobility SBAR; progress patient to next Phase/Stage  - Instruct patient to call for assistance with activity based on assessment  - Consider rehabilitation consult to assist with strengthening/weightbearing, etc   Outcome: Adequate for Discharge     Problem: DISCHARGE PLANNING  Goal: Discharge to home or other facility with appropriate resources  Description: INTERVENTIONS:  - Identify barriers to discharge w/patient and caregiver  - Arrange for needed discharge resources and transportation as appropriate  - Identify discharge learning needs (meds, wound care, etc )  - Arrange for interpretive services to assist at discharge as needed  - Refer to Case Management Department for coordinating discharge planning if the patient needs post-hospital services based on physician/advanced practitioner order or complex needs related to functional status, cognitive ability, or social support system  Outcome: Adequate for Discharge     Problem: Knowledge Deficit  Goal: Patient/family/caregiver demonstrates understanding of disease process, treatment plan, medications, and discharge instructions  Description: Complete learning assessment and assess knowledge base  Interventions:  - Provide teaching at level of understanding  - Provide teaching via preferred learning methods  Outcome: Adequate for Discharge     Problem: Nutrition/Hydration-ADULT  Goal: Nutrient/Hydration intake appropriate for improving, restoring or maintaining nutritional needs  Description: Monitor and assess patient's nutrition/hydration status for malnutrition  Collaborate with interdisciplinary team and initiate plan and interventions as ordered  Monitor patient's weight and dietary intake as ordered or per policy  Utilize nutrition screening tool and intervene as necessary  Determine patient's food preferences and provide high-protein, high-caloric foods as appropriate       INTERVENTIONS:  - Monitor oral intake, urinary output, labs, and treatment plans  - Assess nutrition and hydration status and recommend course of action  - Evaluate amount of meals eaten  - Assist patient with eating if necessary   - Allow adequate time for meals  - Recommend/ encourage appropriate diets, oral nutritional supplements, and vitamin/mineral supplements  - Order, calculate, and assess calorie counts as needed  - Recommend, monitor, and adjust tube feedings and TPN/PPN based on assessed needs  - Assess need for intravenous fluids  - Provide specific nutrition/hydration education as appropriate  - Include patient/family/caregiver in decisions related to nutrition  Outcome: Adequate for Discharge     Problem: Nutrition/Hydration-ADULT  Goal: Nutrient/Hydration intake appropriate for improving, restoring or maintaining nutritional needs  Description: Monitor and assess patient's nutrition/hydration status for malnutrition  Collaborate with interdisciplinary team and initiate plan and interventions as ordered  Monitor patient's weight and dietary intake as ordered or per policy  Utilize nutrition screening tool and intervene as necessary  Determine patient's food preferences and provide high-protein, high-caloric foods as appropriate       INTERVENTIONS:  - Monitor oral intake, urinary output, labs, and treatment plans  - Assess nutrition and hydration status and recommend course of action  - Evaluate amount of meals eaten  - Assist patient with eating if necessary   - Allow adequate time for meals  - Recommend/ encourage appropriate diets, oral nutritional supplements, and vitamin/mineral supplements  - Order, calculate, and assess calorie counts as needed  - Recommend, monitor, and adjust tube feedings and TPN/PPN based on assessed needs  - Assess need for intravenous fluids  - Provide specific nutrition/hydration education as appropriate  - Include patient/family/caregiver in decisions related to nutrition  Outcome: Adequate for Discharge     Problem: CARDIOVASCULAR - ADULT  Goal: Maintains optimal cardiac output and hemodynamic stability  Description: INTERVENTIONS:  - Monitor I/O, vital signs and rhythm  - Monitor for S/S and trends of decreased cardiac output  - Administer and titrate ordered vasoactive medications to optimize hemodynamic stability  - Assess quality of pulses, skin color and temperature  - Assess for signs of decreased coronary artery perfusion  - Instruct patient to report change in severity of symptoms  Outcome: Adequate for Discharge  Goal: Absence of cardiac dysrhythmias or at baseline rhythm  Description: INTERVENTIONS:  - Continuous cardiac monitoring, vital signs, obtain 12 lead EKG if ordered  - Administer antiarrhythmic and heart rate control medications as ordered  - Monitor electrolytes and administer replacement therapy as ordered  Outcome: Adequate for Discharge

## 2021-04-02 ENCOUNTER — APPOINTMENT (EMERGENCY)
Dept: RADIOLOGY | Facility: HOSPITAL | Age: 46
End: 2021-04-02
Payer: COMMERCIAL

## 2021-04-02 ENCOUNTER — HOSPITAL ENCOUNTER (INPATIENT)
Dept: NON INVASIVE DIAGNOSTICS | Facility: HOSPITAL | Age: 46
LOS: 2 days | Discharge: HOME | DRG: 756 | End: 2021-04-04
Attending: INTERNAL MEDICINE | Admitting: INTERNAL MEDICINE
Payer: COMMERCIAL

## 2021-04-02 ENCOUNTER — HOSPITAL ENCOUNTER (EMERGENCY)
Facility: HOSPITAL | Age: 46
Discharge: OTHER INSTITUTION | End: 2021-04-02
Attending: EMERGENCY MEDICINE | Admitting: EMERGENCY MEDICINE
Payer: COMMERCIAL

## 2021-04-02 VITALS
OXYGEN SATURATION: 95 % | BODY MASS INDEX: 43.06 KG/M2 | DIASTOLIC BLOOD PRESSURE: 92 MMHG | HEART RATE: 76 BPM | RESPIRATION RATE: 26 BRPM | SYSTOLIC BLOOD PRESSURE: 144 MMHG | WEIGHT: 266.76 LBS

## 2021-04-02 DIAGNOSIS — R07.9 CHEST PAIN: ICD-10-CM

## 2021-04-02 DIAGNOSIS — N17.9 AKI (ACUTE KIDNEY INJURY) (HCC): Primary | ICD-10-CM

## 2021-04-02 DIAGNOSIS — I21.3 STEMI (ST ELEVATION MYOCARDIAL INFARCTION) (HCC): Primary | ICD-10-CM

## 2021-04-02 DIAGNOSIS — I21.9 MI (MYOCARDIAL INFARCTION) (HCC): ICD-10-CM

## 2021-04-02 LAB
ALBUMIN SERPL BCP-MCNC: 3.6 G/DL (ref 3.5–5.7)
ALP SERPL-CCNC: 65 U/L (ref 40–150)
ALT SERPL W P-5'-P-CCNC: 38 U/L (ref 7–52)
ANION GAP SERPL CALCULATED.3IONS-SCNC: 12 MMOL/L (ref 4–13)
APTT PPP: 26 SECONDS (ref 23–37)
AST SERPL W P-5'-P-CCNC: 23 U/L (ref 13–39)
ATRIAL RATE: 65 BPM
BACTERIA UR QL AUTO: ABNORMAL /HPF
BILIRUB SERPL-MCNC: 0.5 MG/DL (ref 0.2–1)
BILIRUB UR QL STRIP: NEGATIVE
BUN SERPL-MCNC: 35 MG/DL (ref 7–25)
CALCIUM SERPL-MCNC: 9.2 MG/DL (ref 8.6–10.5)
CHLORIDE SERPL-SCNC: 108 MMOL/L (ref 98–107)
CHLORIDE UR-SCNC: 80 MMOL/L
CLARITY UR: ABNORMAL
CO2 SERPL-SCNC: 24 MMOL/L (ref 21–31)
COLOR UR: YELLOW
CREAT SERPL-MCNC: 1.99 MG/DL (ref 0.6–1.2)
CREAT UR-MCNC: 79.6 MG/DL
CRP SERPL QL: 14.6 MG/L
ERYTHROCYTE [DISTWIDTH] IN BLOOD BY AUTOMATED COUNT: 17.9 % (ref 11.5–14.5)
GFR SERPL CREATININE-BSD FRML MDRD: 30 ML/MIN/1.73SQ M
GLUCOSE SERPL-MCNC: 99 MG/DL (ref 65–99)
GLUCOSE UR STRIP-MCNC: NEGATIVE MG/DL
HCT VFR BLD AUTO: 37.6 % (ref 42–47)
HGB BLD-MCNC: 12.2 G/DL (ref 12–16)
HGB UR QL STRIP.AUTO: ABNORMAL
HYALINE CASTS #/AREA URNS LPF: ABNORMAL /LPF
INR PPP: 1.11 (ref 0.84–1.19)
KETONES UR STRIP-MCNC: NEGATIVE MG/DL
LEUKOCYTE ESTERASE UR QL STRIP: NEGATIVE
LYMPHOCYTES # BLD AUTO: 1.87 THOUSAND/UL (ref 0.6–4.47)
LYMPHOCYTES # BLD AUTO: 8 % (ref 20–51)
MCH RBC QN AUTO: 27.8 PG (ref 26–34)
MCHC RBC AUTO-ENTMCNC: 32.5 G/DL (ref 31–37)
MCV RBC AUTO: 86 FL (ref 81–99)
MONOCYTES # BLD AUTO: 1.64 THOUSAND/UL (ref 0–1.22)
MONOCYTES NFR BLD AUTO: 7 % (ref 4–12)
NEUTS SEG # BLD: 19.89 THOUSAND/UL (ref 1.81–6.82)
NEUTS SEG NFR BLD AUTO: 85 % (ref 42–75)
NITRITE UR QL STRIP: NEGATIVE
NON-SQ EPI CELLS URNS QL MICRO: ABNORMAL /HPF
P AXIS: 37 DEGREES
PH UR STRIP.AUTO: 5.5 [PH]
PLATELET # BLD AUTO: 424 THOUSANDS/UL (ref 149–390)
PLATELET # BLD AUTO: 433 THOUSANDS/UL (ref 149–390)
PLATELET BLD QL SMEAR: ABNORMAL
PMV BLD AUTO: 10.6 FL (ref 8.9–12.7)
PMV BLD AUTO: 8.6 FL (ref 8.6–11.7)
POTASSIUM SERPL-SCNC: 4.4 MMOL/L (ref 3.5–5.5)
PR INTERVAL: 160 MS
PROT SERPL-MCNC: 7.1 G/DL (ref 6.4–8.9)
PROT UR STRIP-MCNC: ABNORMAL MG/DL
PROTHROMBIN TIME: 14.2 SECONDS (ref 11.6–14.5)
QRS AXIS: -12 DEGREES
QRSD INTERVAL: 110 MS
QT INTERVAL: 446 MS
QTC INTERVAL: 463 MS
RBC # BLD AUTO: 4.4 MILLION/UL (ref 3.9–5.2)
RBC #/AREA URNS AUTO: ABNORMAL /HPF
RBC MORPH BLD: NORMAL
SODIUM 24H UR-SCNC: 87 MOL/L
SODIUM SERPL-SCNC: 144 MMOL/L (ref 134–143)
SP GR UR STRIP.AUTO: 1.02 (ref 1–1.03)
T WAVE AXIS: 39 DEGREES
TOTAL CELLS COUNTED SPEC: 100
TROPONIN I SERPL-MCNC: 16.87 NG/ML
TROPONIN I SERPL-MCNC: 19 NG/ML
UROBILINOGEN UR QL STRIP.AUTO: 0.2 E.U./DL
UUN 24H UR-MCNC: 604 MG/DL
VENTRICULAR RATE: 65 BPM
WBC # BLD AUTO: 23.4 THOUSAND/UL (ref 4.8–10.8)
WBC #/AREA URNS AUTO: ABNORMAL /HPF

## 2021-04-02 PROCEDURE — 85730 THROMBOPLASTIN TIME PARTIAL: CPT | Performed by: PHYSICIAN ASSISTANT

## 2021-04-02 PROCEDURE — 36415 COLL VENOUS BLD VENIPUNCTURE: CPT | Performed by: PHYSICIAN ASSISTANT

## 2021-04-02 PROCEDURE — 93005 ELECTROCARDIOGRAM TRACING: CPT

## 2021-04-02 PROCEDURE — 85027 COMPLETE CBC AUTOMATED: CPT | Performed by: PHYSICIAN ASSISTANT

## 2021-04-02 PROCEDURE — B2111ZZ FLUOROSCOPY OF MULTIPLE CORONARY ARTERIES USING LOW OSMOLAR CONTRAST: ICD-10-PCS | Performed by: INTERNAL MEDICINE

## 2021-04-02 PROCEDURE — 96374 THER/PROPH/DIAG INJ IV PUSH: CPT

## 2021-04-02 PROCEDURE — 85610 PROTHROMBIN TIME: CPT | Performed by: PHYSICIAN ASSISTANT

## 2021-04-02 PROCEDURE — C1769 GUIDE WIRE: HCPCS | Performed by: INTERNAL MEDICINE

## 2021-04-02 PROCEDURE — 85049 AUTOMATED PLATELET COUNT: CPT | Performed by: INTERNAL MEDICINE

## 2021-04-02 PROCEDURE — 96375 TX/PRO/DX INJ NEW DRUG ADDON: CPT

## 2021-04-02 PROCEDURE — C1887 CATHETER, GUIDING: HCPCS | Performed by: INTERNAL MEDICINE

## 2021-04-02 PROCEDURE — 99255 IP/OBS CONSLTJ NEW/EST HI 80: CPT | Performed by: INTERNAL MEDICINE

## 2021-04-02 PROCEDURE — 93454 CORONARY ARTERY ANGIO S&I: CPT | Performed by: INTERNAL MEDICINE

## 2021-04-02 PROCEDURE — 71045 X-RAY EXAM CHEST 1 VIEW: CPT

## 2021-04-02 PROCEDURE — 82570 ASSAY OF URINE CREATININE: CPT | Performed by: INTERNAL MEDICINE

## 2021-04-02 PROCEDURE — C1894 INTRO/SHEATH, NON-LASER: HCPCS | Performed by: INTERNAL MEDICINE

## 2021-04-02 PROCEDURE — 84484 ASSAY OF TROPONIN QUANT: CPT | Performed by: PHYSICIAN ASSISTANT

## 2021-04-02 PROCEDURE — 93010 ELECTROCARDIOGRAM REPORT: CPT | Performed by: INTERNAL MEDICINE

## 2021-04-02 PROCEDURE — 85007 BL SMEAR W/DIFF WBC COUNT: CPT | Performed by: PHYSICIAN ASSISTANT

## 2021-04-02 PROCEDURE — 80053 COMPREHEN METABOLIC PANEL: CPT | Performed by: PHYSICIAN ASSISTANT

## 2021-04-02 PROCEDURE — 99291 CRITICAL CARE FIRST HOUR: CPT | Performed by: PHYSICIAN ASSISTANT

## 2021-04-02 PROCEDURE — 84484 ASSAY OF TROPONIN QUANT: CPT | Performed by: INTERNAL MEDICINE

## 2021-04-02 PROCEDURE — 99291 CRITICAL CARE FIRST HOUR: CPT

## 2021-04-02 PROCEDURE — NC001 PR NO CHARGE: Performed by: INTERNAL MEDICINE

## 2021-04-02 PROCEDURE — 81001 URINALYSIS AUTO W/SCOPE: CPT | Performed by: INTERNAL MEDICINE

## 2021-04-02 PROCEDURE — 84540 ASSAY OF URINE/UREA-N: CPT | Performed by: INTERNAL MEDICINE

## 2021-04-02 PROCEDURE — G0379 DIRECT REFER HOSPITAL OBSERV: HCPCS

## 2021-04-02 PROCEDURE — 99152 MOD SED SAME PHYS/QHP 5/>YRS: CPT | Performed by: INTERNAL MEDICINE

## 2021-04-02 PROCEDURE — 82436 ASSAY OF URINE CHLORIDE: CPT | Performed by: INTERNAL MEDICINE

## 2021-04-02 PROCEDURE — 84300 ASSAY OF URINE SODIUM: CPT | Performed by: INTERNAL MEDICINE

## 2021-04-02 PROCEDURE — 86140 C-REACTIVE PROTEIN: CPT | Performed by: STUDENT IN AN ORGANIZED HEALTH CARE EDUCATION/TRAINING PROGRAM

## 2021-04-02 RX ORDER — LORAZEPAM 2 MG/ML
1 INJECTION INTRAMUSCULAR ONCE
Status: COMPLETED | OUTPATIENT
Start: 2021-04-02 | End: 2021-04-02

## 2021-04-02 RX ORDER — MORPHINE SULFATE 4 MG/ML
4 INJECTION, SOLUTION INTRAMUSCULAR; INTRAVENOUS ONCE
Status: COMPLETED | OUTPATIENT
Start: 2021-04-02 | End: 2021-04-02

## 2021-04-02 RX ORDER — ARIPIPRAZOLE 10 MG/1
10 TABLET ORAL DAILY
Status: DISCONTINUED | OUTPATIENT
Start: 2021-04-03 | End: 2021-04-04 | Stop reason: HOSPADM

## 2021-04-02 RX ORDER — PANTOPRAZOLE SODIUM 40 MG/1
40 TABLET, DELAYED RELEASE ORAL DAILY
Status: DISCONTINUED | OUTPATIENT
Start: 2021-04-03 | End: 2021-04-04 | Stop reason: HOSPADM

## 2021-04-02 RX ORDER — METOPROLOL SUCCINATE 50 MG/1
50 TABLET, EXTENDED RELEASE ORAL 2 TIMES DAILY
Status: DISCONTINUED | OUTPATIENT
Start: 2021-04-02 | End: 2021-04-04 | Stop reason: HOSPADM

## 2021-04-02 RX ORDER — ALPRAZOLAM 0.5 MG/1
0.5 TABLET ORAL 3 TIMES DAILY PRN
Status: DISCONTINUED | OUTPATIENT
Start: 2021-04-02 | End: 2021-04-04 | Stop reason: HOSPADM

## 2021-04-02 RX ORDER — ASPIRIN 81 MG/1
324 TABLET, CHEWABLE ORAL ONCE
Status: COMPLETED | OUTPATIENT
Start: 2021-04-02 | End: 2021-04-02

## 2021-04-02 RX ORDER — ONDANSETRON 2 MG/ML
4 INJECTION INTRAMUSCULAR; INTRAVENOUS ONCE
Status: COMPLETED | OUTPATIENT
Start: 2021-04-02 | End: 2021-04-02

## 2021-04-02 RX ORDER — PREDNISONE 20 MG/1
60 TABLET ORAL DAILY
Status: DISCONTINUED | OUTPATIENT
Start: 2021-04-03 | End: 2021-04-04 | Stop reason: HOSPADM

## 2021-04-02 RX ORDER — ALBUTEROL SULFATE 90 UG/1
2 AEROSOL, METERED RESPIRATORY (INHALATION) EVERY 4 HOURS PRN
Status: DISCONTINUED | OUTPATIENT
Start: 2021-04-02 | End: 2021-04-04 | Stop reason: HOSPADM

## 2021-04-02 RX ORDER — LIDOCAINE HYDROCHLORIDE 10 MG/ML
INJECTION, SOLUTION EPIDURAL; INFILTRATION; INTRACAUDAL; PERINEURAL CODE/TRAUMA/SEDATION MEDICATION
Status: COMPLETED | OUTPATIENT
Start: 2021-04-02 | End: 2021-04-02

## 2021-04-02 RX ORDER — ATORVASTATIN CALCIUM 80 MG/1
80 TABLET, FILM COATED ORAL EVERY EVENING
Status: DISCONTINUED | OUTPATIENT
Start: 2021-04-02 | End: 2021-04-04 | Stop reason: HOSPADM

## 2021-04-02 RX ORDER — ONDANSETRON 2 MG/ML
4 INJECTION INTRAMUSCULAR; INTRAVENOUS EVERY 6 HOURS PRN
Status: DISCONTINUED | OUTPATIENT
Start: 2021-04-02 | End: 2021-04-04 | Stop reason: HOSPADM

## 2021-04-02 RX ORDER — HEPARIN SODIUM 5000 [USP'U]/ML
7500 INJECTION, SOLUTION INTRAVENOUS; SUBCUTANEOUS EVERY 8 HOURS SCHEDULED
Status: DISCONTINUED | OUTPATIENT
Start: 2021-04-02 | End: 2021-04-04 | Stop reason: HOSPADM

## 2021-04-02 RX ORDER — SODIUM CHLORIDE 9 MG/ML
INJECTION, SOLUTION INTRAVENOUS
Status: COMPLETED | OUTPATIENT
Start: 2021-04-02 | End: 2021-04-02

## 2021-04-02 RX ORDER — HEPARIN SODIUM 1000 [USP'U]/ML
4000 INJECTION, SOLUTION INTRAVENOUS; SUBCUTANEOUS ONCE
Status: COMPLETED | OUTPATIENT
Start: 2021-04-02 | End: 2021-04-02

## 2021-04-02 RX ORDER — HEPARIN SODIUM 1000 [USP'U]/ML
4000 INJECTION, SOLUTION INTRAVENOUS; SUBCUTANEOUS
Status: DISCONTINUED | OUTPATIENT
Start: 2021-04-02 | End: 2021-04-02 | Stop reason: HOSPADM

## 2021-04-02 RX ORDER — LISINOPRIL 5 MG/1
5 TABLET ORAL DAILY
Status: DISCONTINUED | OUTPATIENT
Start: 2021-04-03 | End: 2021-04-02

## 2021-04-02 RX ORDER — HEPARIN SODIUM 10000 [USP'U]/100ML
3-20 INJECTION, SOLUTION INTRAVENOUS
Status: DISCONTINUED | OUTPATIENT
Start: 2021-04-02 | End: 2021-04-02 | Stop reason: HOSPADM

## 2021-04-02 RX ORDER — ACETAMINOPHEN 325 MG/1
650 TABLET ORAL EVERY 4 HOURS PRN
Status: DISCONTINUED | OUTPATIENT
Start: 2021-04-02 | End: 2021-04-04 | Stop reason: HOSPADM

## 2021-04-02 RX ORDER — RANOLAZINE 500 MG/1
500 TABLET, EXTENDED RELEASE ORAL EVERY 12 HOURS SCHEDULED
Status: DISCONTINUED | OUTPATIENT
Start: 2021-04-02 | End: 2021-04-04 | Stop reason: HOSPADM

## 2021-04-02 RX ORDER — BUPROPION HYDROCHLORIDE 150 MG/1
300 TABLET ORAL EVERY MORNING
Status: DISCONTINUED | OUTPATIENT
Start: 2021-04-03 | End: 2021-04-04 | Stop reason: HOSPADM

## 2021-04-02 RX ORDER — HEPARIN SODIUM 1000 [USP'U]/ML
INJECTION, SOLUTION INTRAVENOUS; SUBCUTANEOUS CODE/TRAUMA/SEDATION MEDICATION
Status: COMPLETED | OUTPATIENT
Start: 2021-04-02 | End: 2021-04-02

## 2021-04-02 RX ORDER — COLCHICINE 0.6 MG/1
0.6 TABLET ORAL 2 TIMES DAILY
Status: DISCONTINUED | OUTPATIENT
Start: 2021-04-02 | End: 2021-04-04 | Stop reason: HOSPADM

## 2021-04-02 RX ORDER — ASPIRIN 81 MG/1
81 TABLET, CHEWABLE ORAL DAILY
Status: DISCONTINUED | OUTPATIENT
Start: 2021-04-03 | End: 2021-04-04 | Stop reason: HOSPADM

## 2021-04-02 RX ORDER — HEPARIN SODIUM 1000 [USP'U]/ML
2000 INJECTION, SOLUTION INTRAVENOUS; SUBCUTANEOUS
Status: DISCONTINUED | OUTPATIENT
Start: 2021-04-02 | End: 2021-04-02 | Stop reason: HOSPADM

## 2021-04-02 RX ORDER — SODIUM CHLORIDE 9 MG/ML
100 INJECTION, SOLUTION INTRAVENOUS CONTINUOUS
Status: DISCONTINUED | OUTPATIENT
Start: 2021-04-02 | End: 2021-04-02

## 2021-04-02 RX ADMIN — ATORVASTATIN CALCIUM 80 MG: 80 TABLET, FILM COATED ORAL at 18:07

## 2021-04-02 RX ADMIN — HEPARIN SODIUM 4000 UNITS: 1000 INJECTION, SOLUTION INTRAVENOUS; SUBCUTANEOUS at 12:05

## 2021-04-02 RX ADMIN — ASPIRIN 324 MG: 81 TABLET, CHEWABLE ORAL at 11:46

## 2021-04-02 RX ADMIN — SALMETEROL XINAFOATE 1 PUFF: 50 POWDER, METERED ORAL; RESPIRATORY (INHALATION) at 20:27

## 2021-04-02 RX ADMIN — RANOLAZINE 500 MG: 500 TABLET, FILM COATED, EXTENDED RELEASE ORAL at 20:26

## 2021-04-02 RX ADMIN — MORPHINE SULFATE 4 MG: 4 INJECTION INTRAVENOUS at 11:46

## 2021-04-02 RX ADMIN — SODIUM CHLORIDE 75 ML/HR: 9 INJECTION, SOLUTION INTRAVENOUS at 12:45

## 2021-04-02 RX ADMIN — HEPARIN SODIUM 4000 UNITS: 1000 INJECTION INTRAVENOUS; SUBCUTANEOUS at 12:51

## 2021-04-02 RX ADMIN — LORAZEPAM 1 MG: 2 INJECTION INTRAMUSCULAR; INTRAVENOUS at 12:05

## 2021-04-02 RX ADMIN — ONDANSETRON 4 MG: 2 INJECTION INTRAMUSCULAR; INTRAVENOUS at 12:05

## 2021-04-02 RX ADMIN — LIDOCAINE HYDROCHLORIDE 1 ML: 10 INJECTION, SOLUTION EPIDURAL; INFILTRATION; INTRACAUDAL; PERINEURAL at 12:49

## 2021-04-02 RX ADMIN — TICAGRELOR 90 MG: 90 TABLET ORAL at 20:27

## 2021-04-02 RX ADMIN — IOHEXOL 65 ML: 350 INJECTION, SOLUTION INTRAVENOUS at 12:56

## 2021-04-02 RX ADMIN — COLCHICINE 0.6 MG: 0.6 TABLET ORAL at 18:07

## 2021-04-02 RX ADMIN — TICAGRELOR 90 MG: 90 TABLET ORAL at 12:15

## 2021-04-02 RX ADMIN — ALPRAZOLAM 0.5 MG: 0.5 TABLET ORAL at 22:23

## 2021-04-02 RX ADMIN — HEPARIN SODIUM 7500 UNITS: 5000 INJECTION INTRAVENOUS; SUBCUTANEOUS at 21:35

## 2021-04-02 RX ADMIN — ACETAMINOPHEN 650 MG: 325 TABLET, FILM COATED ORAL at 22:23

## 2021-04-02 NOTE — CONSULTS
Consult Note- Acute Pain Service   Tahira Ventura 42 y o  female MRN: [id number]  Unit/Bed#: [facility] 524-01 Encounter: [id number]               Assessment/Plan     Assessment:   Patient Active Problem List   Diagnosis    Chest pain    Essential hypertension    Epistaxis    Tobacco abuse    History of MI (myocardial infarction)    CHF (congestive heart failure) ([place] [address] )    Major depressive disorder with current active episode    Anxiety    CAD (coronary artery disease)    Lump of left breast    Non-Hodgkin lymphoma of lymph nodes of multiple regions ([place] [address] )    Positive depression screening    CKD (chronic kidney disease) stage 3, GFR 30-59 ml/min    Mixed hyperlipidemia    BMI 40 0-44 9, adult ([place] [address] )    Cervical cyst    Nabothian cyst    Pelvic floor dysfunction    Cervical motion tenderness    Encounter for routine gynecological examination with Papanicolaou smear of cervix    Other iron deficiency anemias    Iron deficiency anemia secondary to inadequate dietary iron intake    Hypercalcemia    Proteinuria    Acute MI, inferior wall (HCC)    Ventricular tachycardia, non-sustained (HCC)    Leukocytosis    Hypertrophic cardiomyopathy ([place] [address] )    Acute pericarditis    COPD (chronic obstructive pulmonary disease) ([place] [address] )      Tahira Ventura is a 42 y o  female  With a history of chest pain NSTEMI with stent placement presents with further evaluation of chest pain and ongoing management of pericarditis  Plan:   · Continue  colchicine and steroids per cardiology service for management of pericarditis  · Agree to avoid NSAIDS due to elevated creatine  · Can trial Tylenol 975mg every 8 hours for pain  · Would avoid additional medications/opioids due to level of sedation  At present time, patient reporting improvement in chest wall discomfort and remains drowsy, would not add additional sedating medications at this time   Would defer cardiac chest pain management to cardiology team      APS will sign off at this time  Thank you for the consult  All opioids and other analgesics to be written at discretion of primary team  Please call  / [facility] or [facility] (/ between [phone] and on weekends) with questions or concerns    History of Present Illness    Admit Date:  4/2/2021  Hospital Day:  0 days  Primary Service:  Cardiology  Attending Provider:  Cassie Maza DO  Reason for Consult / Principal Problem: acute chest pain  HPI: Stephany Forrester is a 42 y o  female who presented to [address] [place] [address] ED with complaints of crushing chest pain that started last evening  On 03/25/2021 was admitted for AMI was taken directly to the cath lab and was found to have 100% blockage of RCA, drug-eluting stent was placed  Also had episodes of nonsustained VT and worsening chest pain during that  Admission and had a 2nd stent in the proximal PDA; second PLV occlusion not amenable to PCI  During that admission she continued to have chest wall pain  That was consistent with pericarditis and she was started on prednisone and later discharged on 04/01/2021  Patient was [facility] for further evaluation  Cardiac catheterization showing patent drug-eluting stent placement  Current pain location(s): left chest and mid sternal   Pain Scale:   4/10  Quality: dull  Current Analgesic regimen:  Ativan and Mprphine 4mg IV  At present time patient remains drowsy and falls asleep during conversation  Patient received sedation prior to [employer]  Denied midsternal  Chest wall pain, minimal left chest wall discomfort at this time  Shortness of breath improved  Patient states overall she feels tired      Pain History: History of right ankle pain, has taken NSAIDS for pain in the past     I have reviewed the patient's controlled substance dispensing history in the Prescription Drug Monitoring Program in compliance with the [facility] regulations before prescribing any controlled substances  Inpatient consult to Acute Pain Service  Consult performed by: JALIL Olmos  Consult ordered by: Rubens Elizabeth MD          Review of Systems   Respiratory: Negative for shortness of breath  Cardiovascular: Positive for chest pain  Musculoskeletal: Negative for back pain and neck pain  Neurological: Negative for dizziness, numbness and headaches  All other systems reviewed and are negative        Historical Information   Past Medical History:   Diagnosis Date    Acute [facility] [address] )     Anxiety     Cardiac disease     [facility]     CHF (congestive heart failure) (HCC)     Chronic kidney disease     Depression     History of chemotherapy     non hodgkins lymphoma     Hypertension     Lymphoma, non-Hodgkin's (HCC)     Tobacco abuse      Past Surgical History:   Procedure Laterality Date    CAROTID STENT       SECTION       Social History   Social History     Substance and Sexual Activity   Alcohol Use Not Currently    Comment: rarely     Social History     Substance and Sexual Activity   Drug Use Never     Social History     Tobacco Use   Smoking Status Current Every Day Smoker    Packs/day: 1 00    Types: Cigarettes   Smokeless Tobacco Current User     Family History: non-contributory    Meds/Allergies   all current active meds have been reviewed, current meds:   Current Facility-Administered Medications   Medication Dose Route Frequency    acetaminophen (TYLENOL) tablet 650 mg  650 mg Oral Q4H PRN    albuterol (PROVENTIL HFA,VENTOLIN HFA) inhaler 2 puff  2 puff Inhalation Q4H PRN    [START ON 4/3/2021] ARIPiprazole (ABILIFY) tablet 10 mg  10 mg Oral Daily    [START ON 4/3/2021] aspirin chewable tablet 81 mg  81 mg Oral Daily    atorvastatin (LIPITOR) tablet 80 mg  80 mg Oral QPM    [START ON 4/3/2021] buPROPion (WELLBUTRIN XL) 24 hr tablet 300 mg  300 mg Oral QAM    colchicine (COLCRYS) tablet 0 6 mg  0 6 mg Oral BID    heparin (porcine) subcutaneous injection 7,500 Units  7,500 Units Subcutaneous Q8H [address]    [START ON 4/3/2021] lisinopril (ZESTRIL) tablet 5 mg  5 mg Oral Daily    metoprolol succinate (TOPROL-XL) 24 hr tablet 50 mg  50 mg Oral BID    [START ON 4/3/2021] nicotine (NICODERM CQ) 7 mg/24hr TD 24 hr patch 1 patch  1 patch Transdermal Daily    ondansetron (ZOFRAN) injection 4 mg  4 mg Intravenous Q6H PRN    [START ON 4/3/2021] pantoprazole (PROTONIX) EC tablet 40 mg  40 mg Oral Daily    [START ON 4/3/2021] predniSONE tablet 60 mg  60 mg Oral Daily    ranolazine (RANEXA) 12 hr tablet 500 mg  500 mg Oral Q12H ABIEL    salmeterol (SEREVENT) 50 mcg/dose inhaler 1 puff  1 puff Inhalation Q12H [address]    [START ON 4/3/2021] sertraline (ZOLOFT) tablet 50 mg  50 mg Oral Daily    sodium chloride 0 9 % infusion  100 mL/hr Intravenous Continuous    ticagrelor (BRILINTA) tablet 90 mg  90 mg Oral Q12H [address]    tiotropium (SPIRIVA) capsule for inhaler 18 mcg  18 mcg Inhalation Daily    and PTA meds:   Prior to Admission Medications   Prescriptions Last Dose Informant Patient Reported? Taking?    ALPRAZolam (XANAX) 0 5 mg tablet  Self No No   Sig: Take 1 tablet (0 5 mg total) by mouth 3 (three) times a day as needed for anxiety   ARIPiprazole (ABILIFY) 10 mg tablet  Self No No   Sig: Take 1 tablet (10 mg total) by mouth daily   acetaminophen (TYLENOL) 325 mg tablet   No No   Sig: Take 2 tablets (650 mg total) by mouth every 6 (six) hours as needed for mild pain   albuterol (PROVENTIL HFA,VENTOLIN HFA) 90 mcg/act inhaler   No No   Sig: Inhale 2 puffs every 4 (four) hours as needed for wheezing or shortness of breath   aspirin 81 mg chewable tablet   No No   Sig: Chew 1 tablet (81 mg total) daily   atorvastatin (LIPITOR) 80 mg tablet   No No   Sig: Take 1 tablet (80 mg total) by mouth every evening   buPROPion (WELLBUTRIN XL) 300 mg 24 hr tablet  Self No No   Sig: Take 1 tablet (300 mg total) by mouth every morning   colchicine (COLCRYS) 0 6 mg tablet No No   Sig: Take 1 tablet (0 6 mg total) by mouth 2 (two) times a day   lisinopril (ZESTRIL) 5 mg tablet   No No   Sig: Take 1 tablet (5 mg total) by mouth daily   metoprolol succinate (TOPROL-XL) 50 mg 24 hr tablet   No No   Sig: Take 1 tablet (50 mg total) by mouth 2 (two) times a day   nicotine (NICODERM CQ) 7 mg/24hr TD 24 hr patch   No No   Sig: Place 1 patch on the skin daily   pantoprazole (PROTONIX) 40 mg tablet  Self No No   Sig: Take 1 tablet (40 mg total) by mouth daily   predniSONE 10 mg tablet   No No   Sig: Take 6 tablets (60 mg total) by mouth daily for 7 days, THEN 5 tablets (50 mg total) daily for 7 days, THEN 4 tablets (40 mg total) daily for 7 days, THEN 3 tablets (30 mg total) daily for 7 days, THEN 2 tablets (20 mg total) daily for 7 days, THEN 1 tablet (10 mg total) daily for 7 days  ranolazine (RANEXA) 500 mg 12 hr tablet   No No   Sig: Take 1 tablet (500 mg total) by mouth every 12 (twelve) hours   sertraline (ZOLOFT) 50 mg tablet  Self No No   Sig: Take 1 tablet (50 mg total) by mouth daily   ticagrelor (BRILINTA) 90 MG   No No   Sig: Take 1 tablet (90 mg total) by mouth every 12 (twelve) hours   umeclidinium-vilanterol (ANORO ELLIPTA) 62 5-25 MCG/INH inhaler   No No   Sig: Inhale 1 puff daily      Facility-Administered Medications: None       No Known Allergies    Objective   HR:  [63-86] 63  Resp:  [20-26] 24  BP: ()/(49-92) 95/59  No intake or output data in the 24 hours ending 04/02/21 1549    Physical Exam  Vitals signs reviewed  Constitutional:       General: She is sleeping  Appearance: She is not ill-appearing, toxic-appearing or diaphoretic  HENT:      Head: Normocephalic and atraumatic  Pulmonary:      Effort: Pulmonary effort is normal  No tachypnea, bradypnea or respiratory distress  Comments: On oxygen via NC  Neurological:      Mental Status: She is easily aroused        Comments: Drowsy, falls asleep during conversation    Psychiatric:         Mood and Affect: Mood normal  Mood is not anxious  Behavior: Behavior normal  Behavior is not agitated  Behavior is cooperative  Lab Results:   I have personally reviewed pertinent labs  , CBC:   Lab Results   Component Value Date    WBC 23 40 (H) 04/02/2021    HGB 12 2 04/02/2021    HCT 37 6 (L) 04/02/2021    MCV 86 04/02/2021     (H) 04/02/2021    MCH 27 8 04/02/2021    MCHC 32 5 04/02/2021    RDW 17 9 (H) 04/02/2021    MPV 8 6 04/02/2021   , CMP:   Lab Results   Component Value Date    SODIUM 144 (H) 04/02/2021    K 4 4 04/02/2021     (H) 04/02/2021    CO2 24 04/02/2021    BUN 35 (H) 04/02/2021    CREATININE 1 99 (H) 04/02/2021    CALCIUM 9 2 04/02/2021    AST 23 04/02/2021    ALT 38 04/02/2021    ALKPHOS 65 04/02/2021    EGFR 30 04/02/2021   , BMP:  Lab Results   Component Value Date    SODIUM 144 (H) 04/02/2021    K 4 4 04/02/2021     (H) 04/02/2021    CO2 24 04/02/2021    BUN 35 (H) 04/02/2021    CREATININE 1 99 (H) 04/02/2021    GLUC 99 04/02/2021    CALCIUM 9 2 04/02/2021    AGAP 12 04/02/2021    EGFR 30 04/02/2021   , PT/PTT:  Lab Results   Component Value Date    PTT 26 04/02/2021       Imaging Studies: I have personally reviewed pertinent reports  Counseling / Coordination of Care  Total floor / unit time spent today Level 3 = 55 minutes  Greater than 50% of total time was spent with the patient and / or family counseling and / or coordination of care  A description of the counseling / coordination of care: Reviewed plan of care and medications with patient, family, and primary care service  Please note that the APS provides consultative services regarding pain management only  With the exception of ketamine and epidural infusions and except when indicated, final decisions regarding starting or changing doses of analgesic medications are at the discretion of the consulting service    Off hours consultation and/or medication management is generally not available      JALIL Morejon  Acute Pain Service

## 2021-04-02 NOTE — DISCHARGE INSTRUCTIONS
1  Please see the post cardiac catheterization dishcarge instructions  No heavy lifting, greater than 10 lbs  or strenuous  activity for 48 hrs  2 Remove band aid tomorrow  Shower and wash area- wrist gently with soap and water- beginning tomorrow  Rinse and pat dry  Apply new water seal band aid  Repeat this process for 5 days  No powders, creams lotions or antibiotic ointments  for 5 days  No tub baths, hot tubs or swimming for 5 days  3  Please call our office ([phone]) if you have any fever, redness, swelling, discharge from your wrist access site  4 No driving for 1 day    Left Heart Catheterization   WHAT YOU NEED TO KNOW:   A left heart catheterization is a procedure to look at your heart and its arteries  You may need this procedure if you have chest pain, heart disease, or your heart is not working as it should  WHILE YOU ARE HERE:   Before your procedure:   · Informed consent  is a legal document that explains the tests, treatments, or procedures that you may need  Informed consent means you understand what will be done and can make decisions about what you want  You give your permission when you sign the consent form  You can have someone sign this form for you if you are not able to sign it  You have the right to understand your medical care in words you know  Before you sign the consent form, understand the risks and benefits of what will be done  Make sure all your questions are answered  · An IV  is a small tube placed in your vein that is used to give you medicine or liquids  · Medicines:      ? Antihistamines  help prevent a reaction to the dye used during your heart catheterization  ? A sedative  is given to help you stay calm and relaxed  ? Steroids  decrease inflammation and help prevent a reaction to the contrast dye  · Local anesthesia  is a shot of medicine put into your arm or leg  It is used to numb the area and dull the pain   You may still feel pressure or pushing during the procedure  During your procedure:   · Your healthcare provider will insert a catheter into an artery in your arm, wrist, or leg  An x-ray will be used to carefully guide the catheter to your heart  He will inject a dye so he can see the blood vessels, muscle, or valves of your heart more clearly  You may get a warm feeling or slight nausea right after the dye is injected  This is normal, and will pass quickly  Your healthcare provider may remove a small sample of heart tissue and send it to a lab for testing  He may also open a narrow or blocked heart valve or artery  A stent (small tube) may be left inside your artery to hold it open  · The catheter may be left in place to monitor pressure in your heart  When the catheter is removed, a healthcare provider will apply pressure to the site for at least 30 minutes to help decrease the risk of bleeding  A collagen plug or other closure devices may be used to close the site  If the site is in your wrist, your healthcare provider will place a compression device around your wrist  Healthcare providers will cover the site with a pressure bandage to decrease further bleeding  After your procedure: You will be taken to a room to rest until you are fully awake  If insertion was in your wrist, the pressure device will be around your wrist  Healthcare providers will slowly decrease pressure in the device  If insertion was in your groin, a pressure bandage will be in place  Keep your arm or leg straight  Do not  get out of bed until your healthcare provider says it is okay  Healthcare providers will frequently monitor your vital signs and pulses  They will also frequently check your wound for bleeding  Healthcare providers may ask you to drink more liquids to help flush the dye out of your body  If the catheter was in your groin and you need to cough, apply pressure over the area with your hands as directed    RISKS:   During the procedure, the catheter may tear an artery and cause bleeding  An air bubble may enter your lung, or your lung may collapse  You may have a heart attack  After the procedure, you may have bleeding or an infection  You may have damage to a heart valve, or a fistula (abnormal opening) may form between an artery and vein  You may have irregular heartbeats, which may cause dizziness or fainting  You may get a blood clot in your leg or arm  Without this procedure, your condition may get worse  These problems may become life-threatening  CARE AGREEMENT:   You have the right to help plan your care  Learn about your health condition and how it may be treated  Discuss treatment options with your healthcare providers to decide what care you want to receive  You always have the right to refuse treatment  © Copyright [employer] 2018 Information is for End User's use only and may not be sold, redistributed or otherwise used for commercial purposes  All illustrations and images included in CareNotes® are the copyrighted property of A D A M , Inc  or [facility][note 3] Shahla Taylor   The above information is an  only  It is not intended as medical advice for individual conditions or treatments  Talk to your doctor, nurse or pharmacist before following any medical regimen to see if it is safe and effective for you

## 2021-04-02 NOTE — ED PROVIDER NOTES
History  Chief Complaint   Patient presents with    Chest Pain     21-year-old female presents emergency department complaining of crushing chest pain  She appears diaphoretic  She reports recently of a heart attack within the last 2 weeks  She was recently had the stent placed  Initial EKGs concerning for inferior wall STEMI  This was immediately relayed to the transfer center and to Interventional Cardiology who advised they would accept the patient and take her directly to the cath lab  Upon evaluation the patient was immediately started on heparin given aspirin morphine  Ativan given for anxiety  Patient is already taking Brilinta  Dr Jorge Luis Campbell of interventional cardiology recommends giving the patient an additional 90 mg of Brilinta prior to transfer  Lesley given  On the way out of the emergency department while being cared for by Evelia Hare the patient mentioned that she may be having blood in her stools  This information was relayed to interventional cardiology  Prior to Admission Medications   Prescriptions Last Dose Informant Patient Reported? Taking?    ALPRAZolam (XANAX) 0 5 mg tablet  Self No No   Sig: Take 1 tablet (0 5 mg total) by mouth 3 (three) times a day as needed for anxiety   ARIPiprazole (ABILIFY) 10 mg tablet  Self No No   Sig: Take 1 tablet (10 mg total) by mouth daily   acetaminophen (TYLENOL) 325 mg tablet   No No   Sig: Take 2 tablets (650 mg total) by mouth every 6 (six) hours as needed for mild pain   albuterol (PROVENTIL HFA,VENTOLIN HFA) 90 mcg/act inhaler   No No   Sig: Inhale 2 puffs every 4 (four) hours as needed for wheezing or shortness of breath   aspirin 81 mg chewable tablet   No No   Sig: Chew 1 tablet (81 mg total) daily   atorvastatin (LIPITOR) 80 mg tablet   No No   Sig: Take 1 tablet (80 mg total) by mouth every evening   buPROPion (WELLBUTRIN XL) 300 mg 24 hr tablet  Self No No   Sig: Take 1 tablet (300 mg total) by mouth every morning   colchicine (COLCRYS) 0 6 mg tablet   No No   Sig: Take 1 tablet (0 6 mg total) by mouth 2 (two) times a day   lisinopril (ZESTRIL) 5 mg tablet   No No   Sig: Take 1 tablet (5 mg total) by mouth daily   metoprolol succinate (TOPROL-XL) 50 mg 24 hr tablet   No No   Sig: Take 1 tablet (50 mg total) by mouth 2 (two) times a day   nicotine (NICODERM CQ) 7 mg/24hr TD 24 hr patch   No No   Sig: Place 1 patch on the skin daily   pantoprazole (PROTONIX) 40 mg tablet  Self No No   Sig: Take 1 tablet (40 mg total) by mouth daily   predniSONE 10 mg tablet   No No   Sig: Take 6 tablets (60 mg total) by mouth daily for 7 days, THEN 5 tablets (50 mg total) daily for 7 days, THEN 4 tablets (40 mg total) daily for 7 days, THEN 3 tablets (30 mg total) daily for 7 days, THEN 2 tablets (20 mg total) daily for 7 days, THEN 1 tablet (10 mg total) daily for 7 days     ranolazine (RANEXA) 500 mg 12 hr tablet   No No   Sig: Take 1 tablet (500 mg total) by mouth every 12 (twelve) hours   sertraline (ZOLOFT) 50 mg tablet  Self No No   Sig: Take 1 tablet (50 mg total) by mouth daily   ticagrelor (BRILINTA) 90 MG   No No   Sig: Take 1 tablet (90 mg total) by mouth every 12 (twelve) hours   umeclidinium-vilanterol (ANORO ELLIPTA) 62 5-25 MCG/INH inhaler   No No   Sig: Inhale 1 puff daily      Facility-Administered Medications: None       Past Medical History:   Diagnosis Date    Acute MI ([facility] [address] )     Anxiety     Cardiac disease     [facility]     CHF (congestive heart failure) (HCC)     Chronic kidney disease     Depression     History of chemotherapy     non hodgkins lymphoma     Hypertension     Lymphoma, non-Hodgkin's (HCC)     Tobacco abuse        Past Surgical History:   Procedure Laterality Date    CAROTID STENT       SECTION         Family History   Problem Relation Age of Onset    No Known Problems Mother     No Known Problems Father     No Known Problems Daughter     Brain cancer Maternal Grandfather     No Known Problems Paternal Aunt     No Known Problems Paternal Aunt      I have reviewed and agree with the history as documented  E-Cigarette/Vaping    E-Cigarette Use Current Every Day User      E-Cigarette/Vaping Substances    Nicotine Yes     THC No     CBD No     Flavoring No     Other No     Unknown No      Social History     Tobacco Use    Smoking status: Current Every Day Smoker     Packs/day: 1 00     Types: Cigarettes    Smokeless tobacco: Current User   Substance Use Topics    Alcohol use: Not Currently     Comment: rarely    Drug use: Never       Review of Systems   Unable to perform ROS: Acuity of condition   Constitutional: Positive for diaphoresis  Respiratory: Positive for chest tightness and shortness of breath  Cardiovascular: Positive for chest pain  Physical Exam  Physical Exam  Vitals signs and nursing note reviewed  Constitutional:       General: She is in acute distress  Appearance: She is well-developed and well-groomed  She is obese  She is ill-appearing and diaphoretic  HENT:      Head: Normocephalic and atraumatic  Right Ear: External ear normal       Left Ear: External ear normal       Nose: Nose normal    Eyes:      Conjunctiva/sclera: Conjunctivae normal       Pupils: Pupils are equal, round, and reactive to light  Neck:      Musculoskeletal: Normal range of motion  Cardiovascular:      Rate and Rhythm: Normal rate and regular rhythm  Heart sounds: Normal heart sounds  No murmur  No friction rub  No gallop  Pulmonary:      Effort: Pulmonary effort is normal  No respiratory distress  Breath sounds: Normal breath sounds  No stridor  No wheezing or rales  Abdominal:      General: Bowel sounds are normal  There is no distension  Palpations: Abdomen is soft  Tenderness: There is no abdominal tenderness  There is no guarding  Musculoskeletal: Normal range of motion  General: No tenderness     Skin:     General: Skin is warm       Capillary Refill: Capillary refill takes less than 2 seconds  Neurological:      Mental Status: She is alert and oriented to person, place, and time  Psychiatric:         Behavior: Behavior is cooperative           Vital Signs  ED Triage Vitals   Temp Pulse Respirations Blood Pressure SpO2   -- 04/02/21 1138 04/02/21 1138 04/02/21 1138 04/02/21 1138    76 (!) 26 144/92 95 %      Temp src Heart Rate Source Patient Position - Orthostatic VS BP Location FiO2 (%)   -- -- -- -- --             Pain Score       04/02/21 1140       Worst Possible Pain           Vitals:    04/02/21 1138   BP: 144/92   Pulse: 76         Visual Acuity      ED Medications  Medications   morphine (PF) 4 mg/mL injection 4 mg (4 mg Intravenous Given 4/2/21 1146)   aspirin chewable tablet 324 mg (324 mg Oral Given 4/2/21 1146)   heparin (porcine) injection 4,000 Units (4,000 Units Intravenous Given 4/2/21 1205)   ondansetron (ZOFRAN) injection 4 mg (4 mg Intravenous Given 4/2/21 1205)   LORazepam (ATIVAN) injection 1 mg (1 mg Intravenous Given 4/2/21 1205)   ticagrelor (BRILINTA) tablet 90 mg (90 mg Oral Given 4/2/21 1215)       Diagnostic Studies  Results Reviewed     Procedure Component Value Units Date/Time    Manual Differential (Non Wam) [[id number]]  (Abnormal) Collected: 04/02/21 1154    Lab Status: Final result Specimen: Blood from Arm, Right Updated: 04/02/21 1232     Segmented % 85 %      Lymphocytes % 8 %      Monocytes % 7 %      Neutrophils Absolute 19 89 Thousand/uL      Lymphocytes Absolute 1 87 Thousand/uL      Monocytes Absolute 1 64 Thousand/uL      Total Counted 100     RBC Morphology Normal     Platelet Estimate Increased    Troponin I [[id number]]  (Abnormal) Collected: 04/02/21 1154    Lab Status: Final result Specimen: Blood from Arm, Right Updated: 04/02/21 1231     Troponin I 16 87 ng/mL     APTT six (6) hours after Heparin bolus/drip initiation or dosing change [[id number]]  (Normal) Collected: 04/02/21 1154 Lab Status: Final result Specimen: Blood from Arm, Right Updated: 04/02/21 1224     PTT 26 seconds     Protime-INR [[id number]]  (Normal) Collected: 04/02/21 1154    Lab Status: Final result Specimen: Blood from Arm, Right Updated: 04/02/21 1224     Protime 14 2 seconds      INR 1 11    Comprehensive metabolic panel [[id number]]  (Abnormal) Collected: 04/02/21 1154    Lab Status: Final result Specimen: Blood from Arm, Right Updated: 04/02/21 1222     Sodium 144 mmol/L      Potassium 4 4 mmol/L      Chloride 108 mmol/L      CO2 24 mmol/L      ANION GAP 12 mmol/L      BUN 35 mg/dL      Creatinine 1 99 mg/dL      Glucose 99 mg/dL      Calcium 9 2 mg/dL      AST 23 U/L      ALT 38 U/L      Alkaline Phosphatase 65 U/L      Total Protein 7 1 g/dL      Albumin 3 6 g/dL      Total Bilirubin 0 50 mg/dL      eGFR 30 ml/min/1 73sq m     Narrative:      [place] guidelines for Chronic Kidney Disease (CKD):     Stage 1 with normal or high GFR (GFR > 90 mL/min/1 73 square meters)    Stage 2 Mild CKD (GFR = 60-89 mL/min/1 73 square meters)    Stage 3A Moderate CKD (GFR = 45-59 mL/min/1 73 square meters)    Stage 3B Moderate CKD (GFR = 30-44 mL/min/1 73 square meters)    Stage 4 Severe CKD (GFR = 15-29 mL/min/1 73 square meters)    Stage 5 End Stage CKD (GFR <15 mL/min/1 73 square meters)  Note: GFR calculation is accurate only with a steady state creatinine    CBC and differential [[id number]]  (Abnormal) Collected: 04/02/21 1154    Lab Status: Final result Specimen: Blood from Arm, Right Updated: 04/02/21 1211     WBC 23 40 Thousand/uL      RBC 4 40 Million/uL      Hemoglobin 12 2 g/dL      Hematocrit 37 6 %      MCV 86 fL      MCH 27 8 pg      MCHC 32 5 g/dL      RDW 17 9 %      MPV 8 6 fL      Platelets 481 Thousands/uL                  X-ray chest 1 view portable    (Results Pending)              Procedures  CriticalCare Time  Performed by: Aundrea Bonilla PA-C  Authorized by: Aundrea Bonilla EDY     Critical care provider statement:     Critical care time (minutes):  45    Critical care time was exclusive of:  Separately billable procedures and treating other patients and teaching time    Critical care was necessary to treat or prevent imminent or life-threatening deterioration of the following conditions:  Cardiac failure and circulatory failure    Critical care was time spent personally by me on the following activities:  Blood draw for specimens, obtaining history from patient or surrogate, development of treatment plan with patient or surrogate, discussions with consultants, evaluation of patient's response to treatment, examination of patient, review of old charts, re-evaluation of patient's condition, ordering and review of radiographic studies, ordering and review of laboratory studies, ordering and performing treatments and interventions and interpretation of cardiac output measurements             ED Course  ED Course as of Apr 02 1306   Fri Apr 02, 2021   1148 Case discussed with interventional cardiology  Accepted by interventional cardiology transfer Nick pending  [facility]  1148 EMTALA completed      1209 Patient reports he may be having blood in her stool  Currently being loaded by [facility]  Information relayed  1211 WBC(!): 23 40                                           MDM  Number of Diagnoses or Management Options  Chest pain:   STEMI (ST elevation myocardial infarction) [facility]):   Diagnosis management comments: EKG concerning for STEMI  Discussed with interventional cardiology  Rapid transfer arranged to [facility] for cath lab    Patient is agreeable plan       Amount and/or Complexity of Data Reviewed  Clinical lab tests: ordered and reviewed  Tests in the radiology section of CPT®: ordered and reviewed    Risk of Complications, Morbidity, and/or Mortality  Presenting problems: high  Diagnostic procedures: high  Management options: high    Patient Progress  Patient progress: stable      Disposition  Final diagnoses:   STEMI (ST elevation myocardial infarction) ([facility] )   Chest pain     Time reflects when diagnosis was documented in both MDM as applicable and the Disposition within this note     Time User Action Codes Description Comment    4/2/2021 11:45 AM Jono Corado Add [I21 3] STEMI (ST elevation myocardial infarction) ([facility] )     4/2/2021 11:45 AM Jono Corado Add [R07 9] Chest pain       ED Disposition     ED Disposition Condition Date/Time Comment    Transfer to Another Facility-In Network  Fri Apr 2, 2021 11:45 AM Armand Dixon should be transferred out to B          MD Documentation      Most Recent Value   Patient Condition  The patient has been stabilized such that within reasonable medical probability, no material deterioration of the patient condition or the condition of the unborn child(sasha) is likely to result from the transfer, An emergency transfer is being made prior to stabilization due to the need for definitive care and the benefit of transfer outweighs the risk   Reason for Transfer  Level of Care needed not available at this facility [Interventional cardiology]   Benefits of Transfer  Specialized equipment and/or services available at the receiving facility (Include comment)________________________ [facility]ier cardiology]   Risks of Transfer  Potential for delay in receiving treatment, Loss of IV, Increased discomfort during transfer, Potential deterioration of medical condition, Possible worsening of condition or death during transfer   Accepting Physician  Dr Stephany Fisher Name, Mike Hogan PA-C   Provider Certification  General risk, such as traffic hazards, adverse weather conditions, rough terrain or turbulence, possible failure of equipment (including vehicle or aircraft), or consequences of actions of persons outside the control of the transport personnel, Unanticipated needs of medical equipment and personnel during transport, Risk of worsening condition, The possibility of a transport vehicle being unavailable      RN Documentation      Most [address] Name, [address]   Transport Mode  Helicopter   Level of Care  Advanced life support      Follow-up Information    None         Discharge Medication List as of 4/2/2021 12:15 PM      CONTINUE these medications which have NOT CHANGED    Details   acetaminophen (TYLENOL) 325 mg tablet Take 2 tablets (650 mg total) by mouth every 6 (six) hours as needed for mild pain, Starting Thu 4/1/2021, Until Sat 5/1/2021, Normal      albuterol (PROVENTIL HFA,VENTOLIN HFA) 90 mcg/act inhaler Inhale 2 puffs every 4 (four) hours as needed for wheezing or shortness of breath, Starting Thu 4/1/2021, Normal      ALPRAZolam (XANAX) 0 5 mg tablet Take 1 tablet (0 5 mg total) by mouth 3 (three) times a day as needed for anxiety, Starting Fri 9/4/2020, Normal      ARIPiprazole (ABILIFY) 10 mg tablet Take 1 tablet (10 mg total) by mouth daily, Starting Fri 9/4/2020, Until Tue 10/20/2020, Normal      aspirin 81 mg chewable tablet Chew 1 tablet (81 mg total) daily, Starting Fri 4/2/2021, Until Thu 7/1/2021, Normal      atorvastatin (LIPITOR) 80 mg tablet Take 1 tablet (80 mg total) by mouth every evening, Starting Thu 4/1/2021, Until Wed 6/30/2021, Normal      buPROPion (WELLBUTRIN XL) 300 mg 24 hr tablet Take 1 tablet (300 mg total) by mouth every morning, Starting Fri 9/4/2020, Normal      colchicine (COLCRYS) 0 6 mg tablet Take 1 tablet (0 6 mg total) by mouth 2 (two) times a day, Starting Thu 4/1/2021, Until Wed 6/30/2021, Normal      lisinopril (ZESTRIL) 5 mg tablet Take 1 tablet (5 mg total) by mouth daily, Starting Thu 10/29/2020, Until Sat 11/28/2020, Normal      metoprolol succinate (TOPROL-XL) 50 mg 24 hr tablet Take 1 tablet (50 mg total) by mouth 2 (two) times a day, Starting Thu 4/1/2021, Until Wed 6/30/2021, Normal      nicotine (NICODERM CQ) 7 mg/24hr TD 24 hr patch Place 1 patch on the skin daily, Starting Fri 4/2/2021, Normal      pantoprazole (PROTONIX) 40 mg tablet Take 1 tablet (40 mg total) by mouth daily, Starting Tue 10/13/2020, Normal      predniSONE 10 mg tablet Multiple Dosages:Starting Thu 4/1/2021, Last dose on Wed 4/7/2021, THEN Starting Thu 4/8/2021, Last dose on Wed 4/14/2021, THEN Starting Thu 4/15/2021, Last dose on Wed 4/21/2021, THEN Starting Thu 4/22/2021, Last dose on Wed 4/28/2021, THEN Startin g Thu 4/29/2021, Last dose on Wed 5/5/2021, THEN Starting Thu 5/6/2021, Last dose on Wed 5/12/2021Take 6 tablets (60 mg total) by mouth daily for 7 days, THEN 5 tablets (50 mg total) daily for 7 days, THEN 4 tablets (40 mg total) daily for 7 days, THEN  3 tablets (30 mg total) daily for 7 days, THEN 2 tablets (20 mg total) daily for 7 days, THEN 1 tablet (10 mg total) daily for 7 days  , Normal      ranolazine (RANEXA) 500 mg 12 hr tablet Take 1 tablet (500 mg total) by mouth every 12 (twelve) hours, Starting Thu 4/1/2021, Until Sat 5/1/2021, Normal      sertraline (ZOLOFT) 50 mg tablet Take 1 tablet (50 mg total) by mouth daily, Starting Mon 10/5/2020, Normal      ticagrelor (BRILINTA) 90 MG Take 1 tablet (90 mg total) by mouth every 12 (twelve) hours, Starting Thu 4/1/2021, Until Sat 5/1/2021, Normal      umeclidinium-vilanterol (ANORO ELLIPTA) 62 5-25 MCG/INH inhaler Inhale 1 puff daily, Starting Thu 4/1/2021, Until Sat 5/1/2021, Normal           No discharge procedures on file      PDMP Review       Value Time User    PDMP Reviewed  Yes 4/1/2021 11:40 AM Juancarlos Valdovinos DO          ED Provider  Electronically Signed by           Greg Dior PA-C  04/02/21 [id number]

## 2021-04-02 NOTE — CONSULTS
Consultation - Nephrology   Stephany Forrester 42 y o  female MRN: [id number]  Unit/Bed#: [facility] 524-01 Encounter: [id number]    ASSESSMENT AND PLAN:  Patient is 68-year-old female with significant medical issues of hypertension for many years, CKD, diffuse large B-cell lymphoma, recent pericarditis on steroids, CAD, status post PTCA, presented with shortness of breath, chest discomfort  We are consulted for [facility]  Anthony on CKD stage 3, baseline 1 4 to 1 7, followed with Dr Rosen Party  -creatinine 2 0 on 3/30/21 fluctuating and improved to 1 4 yesterday and now again worsened up to 1 9  -ANTHONY suspect in the setting of cardiorenal, hypotension episodes, use of lisinopril, status post IV contrast with cardiac catheterization on 3/25/21 and again on 4/2/21  -closely monitor for JARAD  -recommend to hold lisinopril given low normal BP readings  -currently receiving IV normal saline, discontinue after 4 hour post contrast  -check bladder scan for PVR  -check UA with microscopy  -check urine electrolytes  -avoid nephrotoxins or NSAIDs  -CKD suspect in the setting of long-term hypertension, prior chronic NSAID use, morbid obesity    Diastolic CHF  -currently on 2 L via nasal cannula  Recent echo shows EF 67%, grade 2 diastolic dysfunction, no pericardial effusion  -chest x-ray concerning for pulmonary congestion   -discontinue IV fluid 4 hours after contrast exposure  May need to consider Lasix  -daily intake and output, daily standing weight  Upper normal serum sodium, continue to closely monitor, encourage free water intake  Recent pericarditis, currently remains on prednisone, colchicine  Discussed above plan in detail with Cardiology team     HISTORY OF PRESENT ILLNESS:  Requesting Physician: Cassie Maza DO  Reason for Consult:  ANTHONY/CKD    Stephany Forrester is a 42y o  year old female who was admitted to [facility] after presenting with shortness of breath, chest discomfort   A renal consultation is requested today for assistance in the management of ANTHONY/CKD  Old medical records including BMP results were reviewed from prior [facility] records  Patient recently had cardiac catheterization on 3/25/21  She also was recently diagnosed with pericarditis and remains on colchicine, steroids  She started having worsening shortness of breath, chest discomfort and presented to the outside [facility] and was eventually brought to the [facility] [place] [facility] and had emergent cardiac catheterization today  She was seen post cardiac catheterization  She denies any worsening shortness of breath although seems to be overall sleepy at the time of my encounter  She has history of chronic NSAID use in the past although has not been using any NSAIDs recently  Denies any urinary complaint  Denies any lightheadedness or dizziness  Denies nausea, vomiting      PAST MEDICAL HISTORY:  Past Medical History:   Diagnosis Date    [facility]     Anxiety     Cardiac disease     Cardiomyopathy ([facility] )     CHF (congestive heart failure) (HCC)     Chronic kidney disease     Depression     History of chemotherapy     non hodgkins lymphoma     Hypertension     Lymphoma, non-Hodgkin's (HCC)     Tobacco abuse        PAST SURGICAL HISTORY:  Past Surgical History:   Procedure Laterality Date    CAROTID STENT       SECTION         ALLERGIES:  No Known Allergies    SOCIAL HISTORY:  Social History     Substance and Sexual Activity   Alcohol Use Not Currently    Comment: rarely     Social History     Substance and Sexual Activity   Drug Use Never     Social History     Tobacco Use   Smoking Status Current Every Day Smoker    Packs/day: 1 00    Types: Cigarettes   Smokeless Tobacco Current User       FAMILY HISTORY:  Family History   Problem Relation Age of Onset    No Known Problems Mother     No Known Problems Father     No Known Problems Daughter     Brain cancer Maternal Grandfather     No Known Problems Paternal Aunt     No Known Problems Paternal Aunt        MEDICATIONS:    Current Facility-Administered Medications:     acetaminophen (TYLENOL) tablet 650 mg, 650 mg, Oral, Q4H PRN, Sheri Slipper, DO    albuterol (PROVENTIL HFA,VENTOLIN HFA) inhaler 2 puff, 2 puff, Inhalation, Q4H PRN, Sheri Slipper, DO    [START ON 4/3/2021] ARIPiprazole (ABILIFY) tablet 10 mg, 10 mg, Oral, Daily, Sheri Slipper, DO    [START ON 4/3/2021] aspirin chewable tablet 81 mg, 81 mg, Oral, Daily, Sheri Slipper, DO    atorvastatin (LIPITOR) tablet 80 mg, 80 mg, Oral, QPM, Sheri Slipper, DO    [START ON 4/3/2021] buPROPion (WELLBUTRIN XL) 24 hr tablet 300 mg, 300 mg, Oral, QAM, Sheri Slipper, DO    colchicine (COLCRYS) tablet 0 6 mg, 0 6 mg, Oral, BID, Sheri Slipper, DO    heparin (porcine) subcutaneous injection 7,500 Units, 7,500 Units, Subcutaneous, Q8H [address], Stopped at 04/02/21 1412 **AND** Platelet count, , , Once, Sheri Slipper, DO    [START ON 4/3/2021] lisinopril (ZESTRIL) tablet 5 mg, 5 mg, Oral, Daily, Sheri Slipper, DO    metoprolol succinate (TOPROL-XL) 24 hr tablet 50 mg, 50 mg, Oral, BID, Sheri Slipper, DO    [START ON 4/3/2021] nicotine (NICODERM CQ) 7 mg/24hr TD 24 hr patch 1 patch, 1 patch, Transdermal, Daily, Sheri Slipper, DO    ondansetron (ZOFRAN) injection 4 mg, 4 mg, Intravenous, Q6H PRN, Sheri Slipper, DO    [START ON 4/3/2021] pantoprazole (PROTONIX) EC tablet 40 mg, 40 mg, Oral, Daily, Sheri Slipper, DO    [START ON 4/3/2021] predniSONE tablet 60 mg, 60 mg, Oral, Daily, Sheri Slipper, DO    ranolazine (RANEXA) 12 hr tablet 500 mg, 500 mg, Oral, Q12H [address], Sheri Slipper, DO    salmeterol (SEREVENT) 50 mcg/dose inhaler 1 puff, 1 puff, Inhalation, Q12H [address], Sheri Slipper, DO, Stopped at 04/02/21 1413    [START ON 4/3/2021] sertraline (ZOLOFT) tablet 50 mg, 50 mg, Oral, Daily, Sheri Slipper, DO    sodium chloride 0 9 % infusion, 100 mL/hr, Intravenous, Continuous, [facility] DO Les    ticagrelor (BRILINTA) tablet 90 mg, 90 mg, Oral, Q12H [address], Mike Cantor DO    tiotropium (SPIRIVA) capsule for inhaler 18 mcg, 18 mcg, Inhalation, Daily, Mike Cantor DO, Stopped at 04/02/21 1413    REVIEW OF SYSTEMS:  Complete 10 point review of systems were obtained and discussed in length with the patient  Complete review of systems were negative / unremarkable except mentioned in the HPI section       PHYSICAL EXAM:  Current Weight:    First Weight:    Vitals:    04/02/21 1434   BP: 95/59   Pulse: 63   Resp: (!) 24   SpO2:      No intake or output data in the 24 hours ending 04/02/21 1615  Wt Readings from Last 3 Encounters:   04/02/21 121 kg (266 lb 12 1 oz)   04/01/21 120 kg (263 lb 14 4 oz)   03/26/21 122 kg (269 lb)     Temp Readings from Last 3 Encounters:   04/01/21 98 1 °F (36 7 °C) (Oral)   03/25/21 (!) 96 °F (35 6 °C)   11/03/20 (!) 96 8 °F (36 °C) (Temporal)     BP Readings from Last 3 Encounters:   04/02/21 95/59   04/02/21 144/92   04/01/21 129/91     Pulse Readings from Last 3 Encounters:   04/02/21 63   04/02/21 76   04/01/21 65        Physical Examination:  General:  Lying in bed, no acute distress  Eyes:  No conjunctival pallor present  ENT:  External examination of ears and nose unremarkable  Neck:  No obvious lymphadenopathy appreciated  Respiratory:  Bilateral air entry present, decreased breath sound at base  CVS:  S1, S2 present  GI:  Soft, nondistended, obese  CNS:  Sleepy, opens eyes, oriented x3  Extremities:  No significant edema in legs  Psych:  Conscious, coherent, oriented  Skin:  No new rash in legs    Invasive Devices:      Lab Results:   Results from last 7 days   Lab Units 04/02/21  1154 03/31/21  0401 03/30/21  1332 03/28/21  0508   WBC Thousand/uL 23 40*  --  25 34*  --    HEMOGLOBIN g/dL 12 2  --  12 5  --    HEMATOCRIT % 37 6*  --  37 7  --    PLATELETS Thousands/uL 424*  --  415*  --    POTASSIUM mmol/L 4 4 3 2* 4 9 3 8   CHLORIDE mmol/L 108* 122* 110* 111*   CO2 mmol/L 24 19* 21 24   BUN mg/dL 35* 21 24 13   CREATININE mg/dL 1 99* 1 42* 2 03* 1 62*   CALCIUM mg/dL 9 2 7 0* 9 6 8 8   MAGNESIUM mg/dL  --  1 8  --   --        Other Studies:   No orders to display   Chest x-ray images personally reviewed which shows pulmonary congestion  Portions of the record may have been created with voice recognition software  Occasional wrong word or "sound a like" substitutions may have occurred due to the inherent limitations of voice recognition software  Read the chart carefully and recognize, using context, where substitutions have occurred

## 2021-04-02 NOTE — EMTALA/ACUTE CARE TRANSFER
[facility]  [address] [place] [address]  [phone]  Dept: [phone]      EMTALA TRANSFER CONSENT    NAME Rosie Carlton                                         1975                              MRN [id number]    I have been informed of my rights regarding examination, treatment, and transfer   by Dr Gil Linton , *    Benefits: Specialized equipment and/or services available at the receiving facility (Include comment)________________________(Interventional cardiology)    Risks: Potential for delay in receiving treatment, Loss of IV, Increased discomfort during transfer, Potential deterioration of medical condition, Possible worsening of condition or death during transfer      Consent for Transfer:  I acknowledge that my medical condition has been evaluated and explained to me by the emergency department physician or other qualified medical person and/or my attending physician, who has recommended that I be transferred to the service of  Accepting Physician: Dr Mik Latham at [address] Name, [address] : [facility]  The above potential benefits of such transfer, the potential risks associated with such transfer, and the probable risks of not being transferred have been explained to me, and I fully understand them  The doctor has explained that, in my case, the benefits of transfer outweigh the risks  I agree to be transferred  I authorize the performance of emergency medical procedures and treatments upon me in both transit and upon arrival at the receiving facility  Additionally, I authorize the release of any and all medical records to the receiving facility and request they be transported with me, if possible  I understand that the safest mode of transportation during a medical emergency is an ambulance and that the Hospital advocates the use of this mode of transport   Risks of traveling to the receiving facility by car, including absence of medical control, life sustaining equipment, such as oxygen, and medical personnel has been explained to me and I fully understand them  (MINAL CORRECT BOX BELOW)  [  ]  I consent to the stated transfer and to be transported by ambulance/helicopter  [  ]  I consent to the stated transfer, but refuse transportation by ambulance and accept full responsibility for my transportation by car  I understand the risks of non-ambulance transfers and I exonerate the Hospital and its staff from any deterioration in my condition that results from this refusal     X___________________________________________    DATE  21  TIME________  Signature of patient or legally responsible individual signing on patient behalf           RELATIONSHIP TO PATIENT_________________________          Provider Certification    NAME Myra Graves                                         1975                              MRN [id number]    A medical screening exam was performed on the above named patient  Based on the examination:    Condition Necessitating Transfer The primary encounter diagnosis was STEMI (ST elevation myocardial infarction) ([facility] [facility] )  A diagnosis of Chest pain was also pertinent to this visit      Patient Condition: The patient has been stabilized such that within reasonable medical probability, no material deterioration of the patient condition or the condition of the unborn child(sasha) is likely to result from the transfer, An emergency transfer is being made prior to stabilization due to the need for definitive care and the benefit of transfer outweighs the risk    Reason for Transfer: Level of Care needed not available at this facility(Interventional cardiology)    Transfer Requirements: [address] [place] [address]   · Space available and qualified personnel available for treatment as acknowledged by    · Agreed to accept transfer and to provide appropriate medical treatment as acknowledged by       Dr Pete Bolton  · Appropriate medical records of the examination and treatment of the patient are provided at the time of transfer   [facility], [address] _______  · Transfer will be performed by qualified personnel from    and appropriate transfer equipment as required, including the use of necessary and appropriate life support measures  Provider Certification: I have examined the patient and explained the following risks and benefits of being transferred/refusing transfer to the patient/family:  General risk, such as traffic hazards, adverse weather conditions, rough terrain or turbulence, possible failure of equipment (including vehicle or aircraft), or consequences of actions of persons outside the control of the transport personnel, Unanticipated needs of medical equipment and personnel during transport, Risk of worsening condition, The possibility of a transport vehicle being unavailable      Based on these reasonable risks and benefits to the patient and/or the unborn child(sasha), and based upon the information available at the time of the patients examination, I certify that the medical benefits reasonably to be expected from the provision of appropriate medical treatments at another medical facility outweigh the increasing risks, if any, to the individuals medical condition, and in the case of labor to the unborn child, from effecting the transfer      X____________________________________________ DATE 04/02/21        TIME_______      ORIGINAL - SEND TO MEDICAL RECORDS   COPY - SEND WITH PATIENT DURING TRANSFER

## 2021-04-02 NOTE — H&P
H&P Exam - Cardiology   Rosie Carlton 42 y o  female MRN: [id number]  Unit/Bed#:  Encounter: [id number]    Assessment/Plan     Assessment:    1  Chest pain  2  Pericarditis  3  Recent inferior ST segment elevation MI s/p drug-eluting stent placement x2 to RCA  4  Hypertension  5  Hyperlipidemia  6  Morbid obesity  7  Tobacco use  8  Intermittent marijuana use  9  History Hodgkin's lymphoma    -cardiac catheterization showing patent drug-eluting stent placement  -continue guideline directed medical therapy  -monitor on telemetry  -trend troponins  -follow-up laboratory studies    History of Present Illness      HPI limited on arrival to chart review as patient sedated from [address] [place] [address]   HPI:  Rosie Carlton is a 42 y o  female recently discharged after successful drug-eluting stent placement for inferior ST segment elevation MI to right coronary artery known history of CAD with PCI and RCA and LAD in 2014, tobacco use, and post MI pericarditis, morbid obesity, hypertension hyperlipidemia, history of non-Hodgkin's lymphoma and intermittent marijuana use who presented to [facility] with recurrence of crushing chest pain  -patient received sedation and for [employer] helicopter ride and therefore review of systems in HPI are limited on presentation today cath lab except patient was able to state still having chest pain however unable to articulate/answer questions on duration and intensity      Review of Systems   Unable to perform ROS: Other (Patient somnolent after sedation for helicopter transportation )       Historical Information   Past Medical History:   Diagnosis Date    Acute MI ([address] )     Anxiety     Cardiac disease     Cardiomyopathy ([address] )     CHF [facility]congestive [facility] failure) [facility]     Chronic kidney disease     Depression     History of chemotherapy     non hodgkins lymphoma 2008    Hypertension     Lymphoma, non-Hodgkin's ([facility] )     Tobacco abuse      Past Surgical History:   Procedure Laterality Date    CAROTID STENT       SECTION       Social History   Social History     Substance and Sexual Activity   Alcohol Use Not Currently    Comment: rarely     Social History     Substance and Sexual Activity   Drug Use Never     Social History     Tobacco Use   Smoking Status Current Every Day Smoker    Packs/day: 1 00    Types: Cigarettes   Smokeless Tobacco Current User     E-Cigarette/Vaping    E-Cigarette Use Current Every Day User       E-Cigarette/Vaping Substances    Nicotine Yes     THC No     CBD No     Flavoring No     Other No     Unknown No        Family History:   Family History   Problem Relation Age of Onset    No Known Problems Mother     No Known Problems Father     No Known Problems Daughter     Brain cancer Maternal Grandfather     No Known Problems Paternal Aunt     No Known Problems Paternal Aunt        Meds/Allergies   all medications and allergies reviewed  No Known Allergies    Objective   Vitals: Blood pressure 152/86, pulse 86, resp  rate 22, last menstrual period 2021, SpO2 92 %, not currently breastfeeding  Orthostatic Blood Pressures      Most Recent Value   Blood Pressure  152/86 filed at 2021 1244          No intake or output data in the 24 hours ending 21 1259    Invasive Devices     Peripheral Intravenous Line            Peripheral IV 21 Right Antecubital less than 1 day                Physical Exam  Vitals signs reviewed  Constitutional:       Appearance: She is obese  She is not diaphoretic  Comments: Somnolent   HENT:      Head: Normocephalic and atraumatic  Comments: Nasal cannula oxygen in place  Eyes:      General:         Right eye: No discharge  Left eye: No discharge  Neck:      Comments: Neck obese, unable to assess for JVD  Cardiovascular:      Rate and Rhythm: Normal rate and regular rhythm  Heart sounds: No friction rub  Pulmonary:      Effort: Pulmonary effort is normal  No respiratory distress  Breath sounds: No wheezing  Abdominal:      General: Bowel sounds are normal       Palpations: Abdomen is soft  Musculoskeletal:      Right lower leg: No edema  Left lower leg: No edema  Skin:     General: Skin is warm and dry  Neurological:      Comments: Somnolent, follows commands  Lab Results: I have personally reviewed pertinent lab results  Imaging: I have personally reviewed pertinent reports      EKG:  Sinus rhythm with ST segment elevations in inferolateral leads  VTE Prophylaxis: Heparin    Code Status:  Full code   Advance Directive and Living Will:      Power of :    POLST:

## 2021-04-03 LAB
ALBUMIN SERPL BCP-MCNC: 2.7 G/DL (ref 3.5–5)
ALP SERPL-CCNC: 83 U/L (ref 46–116)
ALT SERPL W P-5'-P-CCNC: 49 U/L (ref 12–78)
ANION GAP SERPL CALCULATED.3IONS-SCNC: 8 MMOL/L (ref 4–13)
AST SERPL W P-5'-P-CCNC: 23 U/L (ref 5–45)
BASOPHILS # BLD AUTO: 0.07 THOUSANDS/ΜL (ref 0–0.1)
BASOPHILS NFR BLD AUTO: 0 % (ref 0–1)
BILIRUB SERPL-MCNC: 0.38 MG/DL (ref 0.2–1)
BUN SERPL-MCNC: 34 MG/DL (ref 5–25)
CALCIUM ALBUM COR SERPL-MCNC: 10.4 MG/DL (ref 8.3–10.1)
CALCIUM SERPL-MCNC: 9.4 MG/DL (ref 8.3–10.1)
CHLORIDE SERPL-SCNC: 114 MMOL/L (ref 100–108)
CO2 SERPL-SCNC: 20 MMOL/L (ref 21–32)
CREAT SERPL-MCNC: 2 MG/DL (ref 0.6–1.3)
EOSINOPHIL # BLD AUTO: 0.02 THOUSAND/ΜL (ref 0–0.61)
EOSINOPHIL NFR BLD AUTO: 0 % (ref 0–6)
ERYTHROCYTE [DISTWIDTH] IN BLOOD BY AUTOMATED COUNT: 17.7 % (ref 11.6–15.1)
GFR SERPL CREATININE-BSD FRML MDRD: 30 ML/MIN/1.73SQ M
GLUCOSE SERPL-MCNC: 80 MG/DL (ref 65–140)
HCT VFR BLD AUTO: 38.7 % (ref 34.8–46.1)
HGB BLD-MCNC: 12.1 G/DL (ref 11.5–15.4)
IMM GRANULOCYTES # BLD AUTO: 0.49 THOUSAND/UL (ref 0–0.2)
IMM GRANULOCYTES NFR BLD AUTO: 2 % (ref 0–2)
LYMPHOCYTES # BLD AUTO: 2.31 THOUSANDS/ΜL (ref 0.6–4.47)
LYMPHOCYTES NFR BLD AUTO: 11 % (ref 14–44)
MAGNESIUM SERPL-MCNC: 2.6 MG/DL (ref 1.6–2.6)
MCH RBC QN AUTO: 27.5 PG (ref 26.8–34.3)
MCHC RBC AUTO-ENTMCNC: 31.3 G/DL (ref 31.4–37.4)
MCV RBC AUTO: 88 FL (ref 82–98)
MONOCYTES # BLD AUTO: 1.55 THOUSAND/ΜL (ref 0.17–1.22)
MONOCYTES NFR BLD AUTO: 7 % (ref 4–12)
NEUTROPHILS # BLD AUTO: 17.18 THOUSANDS/ΜL (ref 1.85–7.62)
NEUTS SEG NFR BLD AUTO: 80 % (ref 43–75)
NRBC BLD AUTO-RTO: 0 /100 WBCS
PLATELET # BLD AUTO: 439 THOUSANDS/UL (ref 149–390)
PMV BLD AUTO: 10.9 FL (ref 8.9–12.7)
POTASSIUM SERPL-SCNC: 4.5 MMOL/L (ref 3.5–5.3)
PROT SERPL-MCNC: 7.1 G/DL (ref 6.4–8.2)
RBC # BLD AUTO: 4.4 MILLION/UL (ref 3.81–5.12)
SODIUM SERPL-SCNC: 142 MMOL/L (ref 136–145)
WBC # BLD AUTO: 21.62 THOUSAND/UL (ref 4.31–10.16)

## 2021-04-03 PROCEDURE — NC001 PR NO CHARGE: Performed by: INTERNAL MEDICINE

## 2021-04-03 PROCEDURE — 83735 ASSAY OF MAGNESIUM: CPT | Performed by: INTERNAL MEDICINE

## 2021-04-03 PROCEDURE — 80053 COMPREHEN METABOLIC PANEL: CPT | Performed by: INTERNAL MEDICINE

## 2021-04-03 PROCEDURE — 99232 SBSQ HOSP IP/OBS MODERATE 35: CPT | Performed by: INTERNAL MEDICINE

## 2021-04-03 PROCEDURE — 99233 SBSQ HOSP IP/OBS HIGH 50: CPT | Performed by: INTERNAL MEDICINE

## 2021-04-03 PROCEDURE — 85025 COMPLETE CBC W/AUTO DIFF WBC: CPT | Performed by: INTERNAL MEDICINE

## 2021-04-03 RX ADMIN — ASPIRIN 81 MG: 81 TABLET, CHEWABLE ORAL at 09:08

## 2021-04-03 RX ADMIN — ALPRAZOLAM 0.5 MG: 0.5 TABLET ORAL at 21:11

## 2021-04-03 RX ADMIN — ATORVASTATIN CALCIUM 80 MG: 80 TABLET, FILM COATED ORAL at 17:13

## 2021-04-03 RX ADMIN — METOPROLOL SUCCINATE 50 MG: 50 TABLET, EXTENDED RELEASE ORAL at 09:08

## 2021-04-03 RX ADMIN — SALMETEROL XINAFOATE 1 PUFF: 50 POWDER, METERED ORAL; RESPIRATORY (INHALATION) at 20:08

## 2021-04-03 RX ADMIN — HEPARIN SODIUM 7500 UNITS: 5000 INJECTION INTRAVENOUS; SUBCUTANEOUS at 13:58

## 2021-04-03 RX ADMIN — COLCHICINE 0.6 MG: 0.6 TABLET ORAL at 17:13

## 2021-04-03 RX ADMIN — ACETAMINOPHEN 650 MG: 325 TABLET, FILM COATED ORAL at 05:20

## 2021-04-03 RX ADMIN — NICOTINE 7 MG/24 HR DAILY TRANSDERMAL PATCH 1 PATCH: at 09:09

## 2021-04-03 RX ADMIN — HEPARIN SODIUM 7500 UNITS: 5000 INJECTION INTRAVENOUS; SUBCUTANEOUS at 05:14

## 2021-04-03 RX ADMIN — PANTOPRAZOLE SODIUM 40 MG: 40 TABLET, DELAYED RELEASE ORAL at 09:08

## 2021-04-03 RX ADMIN — BUPROPION HYDROCHLORIDE 300 MG: 150 TABLET, FILM COATED, EXTENDED RELEASE ORAL at 09:08

## 2021-04-03 RX ADMIN — SERTRALINE HYDROCHLORIDE 50 MG: 50 TABLET ORAL at 09:09

## 2021-04-03 RX ADMIN — TICAGRELOR 90 MG: 90 TABLET ORAL at 09:08

## 2021-04-03 RX ADMIN — HEPARIN SODIUM 7500 UNITS: 5000 INJECTION INTRAVENOUS; SUBCUTANEOUS at 21:11

## 2021-04-03 RX ADMIN — TIOTROPIUM BROMIDE 18 MCG: 18 CAPSULE ORAL; RESPIRATORY (INHALATION) at 09:12

## 2021-04-03 RX ADMIN — RANOLAZINE 500 MG: 500 TABLET, FILM COATED, EXTENDED RELEASE ORAL at 20:08

## 2021-04-03 RX ADMIN — ARIPIPRAZOLE 10 MG: 10 TABLET ORAL at 09:08

## 2021-04-03 RX ADMIN — ALPRAZOLAM 0.5 MG: 0.5 TABLET ORAL at 05:20

## 2021-04-03 RX ADMIN — METOPROLOL SUCCINATE 50 MG: 50 TABLET, EXTENDED RELEASE ORAL at 20:08

## 2021-04-03 RX ADMIN — TICAGRELOR 90 MG: 90 TABLET ORAL at 20:08

## 2021-04-03 RX ADMIN — SALMETEROL XINAFOATE 1 PUFF: 50 POWDER, METERED ORAL; RESPIRATORY (INHALATION) at 09:12

## 2021-04-03 RX ADMIN — RANOLAZINE 500 MG: 500 TABLET, FILM COATED, EXTENDED RELEASE ORAL at 09:09

## 2021-04-03 RX ADMIN — ALPRAZOLAM 0.5 MG: 0.5 TABLET ORAL at 13:57

## 2021-04-03 RX ADMIN — PREDNISONE 60 MG: 20 TABLET ORAL at 09:08

## 2021-04-03 RX ADMIN — COLCHICINE 0.6 MG: 0.6 TABLET ORAL at 09:09

## 2021-04-03 NOTE — PROGRESS NOTES
NEPHROLOGY PROGRESS NOTE   Josselyn Spangler 42 y o  female MRN: [id number]  Unit/Bed#: [facility] 524-01 Encounter: [id number]  Reason for Consult: ANTHONY    ASSESSMENT AND PLAN:  Patient is 43-year-old female with significant medical issues of hypertension for many years, CKD, diffuse large B-cell lymphoma, recent pericarditis on steroids, CAD, status post PTCA, presented with shortness of breath, chest discomfort  We are consulted for [facility]      Anthony on CKD stage 3, baseline 1 4 to 1 7, followed with Dr Matilde Mullen  -creatinine 2 0 on 3/30/21 fluctuating and improved to 1 4 yesterday and now again worsened up to 2 0, hopefully plateauing  -ANTHONY suspect in the setting of cardiorenal, hypotension episodes, use of lisinopril, status post IV contrast with cardiac catheterization on 3/25/21 and again on 4/2/21  -closely monitor for JARAD  -continue to hold lisinopril given low normal BP readings  -status post IV fluid  Now remains off  -bladder scan 386 mL, continue to monitor bladder scan Q shift, urine retention protocol  -UA shows 2+ proteinuria, 2 to 4 RBCs, 10 to 20 WBCs, 3 to 5 hyaline cast   -avoid nephrotoxins or NSAIDs  -CKD suspect in the setting of long-term hypertension, prior chronic NSAID use, morbid obesity     Diastolic CHF  -currently on 1 L via nasal cannula needed overnight  Currently saturating 99% on 1 L O2, will try to wean off of oxygen  Recent echo shows EF 20%, grade 2 diastolic dysfunction, no pericardial effusion  -chest x-ray concerning for pulmonary congestion  -now remains off IV fluid  Okay for p r n  Lasix if has worsening respiratory status   -daily intake and output, daily standing weight  Metabolic and respiratory acidosis  -start sodium bicarb supplement one tablet p o  B i d        Recent pericarditis, currently remains on prednisone, colchicine      Discussed above plan in detail with primary team    SUBJECTIVE:  Patient seen and examined at bedside    Denies chest pain , denies worsening shortness of breath , nausea, vomiting or abdominal pain        OBJECTIVE:  Current Weight: Weight - Scale: 119 kg (261 lb 11 oz)  Vitals:    04/03/21 0728   BP: 127/81   Pulse:    Resp: 18   Temp: 97 5 °F (36 4 °C)   SpO2: 99%       Intake/Output Summary (Last 24 hours) at 4/3/2021 1052  Last data filed at 4/3/2021 0819  Gross per 24 hour   Intake 630 ml   Output 500 ml   Net 130 ml     Wt Readings from Last 3 Encounters:   04/03/21 119 kg (261 lb 11 oz)   04/02/21 121 kg (266 lb 12 1 oz)   04/01/21 120 kg (263 lb 14 4 oz)     Temp Readings from Last 3 Encounters:   04/03/21 97 5 °F (36 4 °C)   04/01/21 98 1 °F (36 7 °C) (Oral)   03/25/21 (!) 96 °F (35 6 °C)     BP Readings from Last 3 Encounters:   04/03/21 127/81   04/02/21 144/92   04/01/21 129/91     Pulse Readings from Last 3 Encounters:   04/02/21 72   04/02/21 76   04/01/21 65        Physical Examination:  General:  Lying in bed, no acute distress   Eyes:  No conjunctival pallor present  ENT:  External examination of ears and nose unremarkable  Neck:  No obvious lymphadenopathy appreciated  Respiratory:  Bilateral air entry present  CVS:  S1, S2 present  GI: , nondistended, soft  CNS:  Active alert oriented x3  Skin:  No new rash in legs  Musculoskeletal:  No obvious gross deformity noted    Medications:    Current Facility-Administered Medications:     acetaminophen (TYLENOL) tablet 650 mg, 650 mg, Oral, Q4H PRN, Tomi Gifford DO, 650 mg at 04/03/21 0520    albuterol (PROVENTIL HFA,VENTOLIN HFA) inhaler 2 puff, 2 puff, Inhalation, Q4H PRN, Tomi Gifford DO    ALPRAZolam [facility]) tablet 0 5 mg, 0 5 mg, Oral, TID PRN, Kavitha Conte MD, 0 5 mg at 04/03/21 0520    ARIPiprazole (ABILIFY) tablet 10 mg, 10 mg, Oral, Daily, Tomi Gifford DO, 10 mg at 04/03/21 0908    aspirin chewable tablet 81 mg, 81 mg, Oral, Daily, Tomi Gifford DO, 81 mg at 04/03/21 0908    atorvastatin (LIPITOR) tablet 80 mg, 80 mg, Oral, QPM, John Saldana DO, 80 mg at 04/02/21 1807    buPROPion (WELLBUTRIN XL) 24 hr tablet 300 mg, 300 mg, Oral, QAM, Savannah Shrestha DO, 300 mg at 04/03/21 0908    colchicine (COLCRYS) tablet 0 6 mg, 0 6 mg, Oral, BID, Savannah Shrestha DO, 0 6 mg at 04/03/21 0909    heparin (porcine) subcutaneous injection 7,500 Units, 7,500 Units, Subcutaneous, Q8H [address], 7,500 Units at 04/03/21 0514 **AND** [COMPLETED] Platelet count, , , Once, Savannah Shrestha DO    metoprolol succinate (TOPROL-XL) 24 hr tablet 50 mg, 50 mg, Oral, BID, Savannah Shrestha DO, 50 mg at 04/03/21 0908    nicotine (NICODERM CQ) 7 mg/24hr TD 24 hr patch 1 patch, 1 patch, Transdermal, Daily, Savannah Shrestha DO, 1 patch at 04/03/21 0909    ondansetron (ZOFRAN) injection 4 mg, 4 mg, Intravenous, Q6H PRN, Savannah Shrestha DO    pantoprazole (PROTONIX) EC tablet 40 mg, 40 mg, Oral, Daily, Savannah Shrestha DO, 40 mg at 04/03/21 0908    predniSONE tablet 60 mg, 60 mg, Oral, Daily, Savannah Shrestha DO, 60 mg at 04/03/21 0908    ranolazine (RANEXA) 12 hr tablet 500 mg, 500 mg, Oral, Q12H [address], Savannah Shrestha DO, 500 mg at 04/03/21 0909    salmeterol (SEREVENT) 50 mcg/dose inhaler 1 puff, 1 puff, Inhalation, Q12H [address], Savannah Shrestha DO, 1 puff at 04/03/21 0912    sertraline (ZOLOFT) tablet 50 mg, 50 mg, Oral, Daily, Savannah Shrestha DO, 50 mg at 04/03/21 0909    ticagrelor (BRILINTA) tablet 90 mg, 90 mg, Oral, Q12H [address], Savannah Shrestha DO, 90 mg at 04/03/21 0908    tiotropium (SPIRIVA) capsule for inhaler 18 mcg, 18 mcg, Inhalation, Daily, Savannah Shrestha DO, 18 mcg at 04/03/21 0912    Laboratory Results:  Results from last 7 days   Lab Units 04/03/21  0512 04/02/21  1638 04/02/21  1154 03/31/21  0401 03/30/21  1332 03/28/21  0508   WBC Thousand/uL 21 62*  --  23 40*  --  25 34*  --    HEMOGLOBIN g/dL 12 1  --  12 2  --  12 5  --    HEMATOCRIT % 38 7  --  37 6*  --  37 7  --    PLATELETS Thousands/uL 439* 433* 424*  --  415*  --    SODIUM mmol/L 142  --  144* 148* 139 141   POTASSIUM mmol/L 4 5  --  4 4 3 2* 4 9 3 8   CHLORIDE mmol/L 114*  --  108* 122* 110* 111*   CO2 mmol/L 20*  --  24 19* 21 24   BUN mg/dL 34*  --  35* 21 24 13   CREATININE mg/dL 2 00*  --  1 99* 1 42* 2 03* 1 62*   CALCIUM mg/dL 9 4  --  9 2 7 0* 9 6 8 8   MAGNESIUM mg/dL 2 6  --   --  1 8  --   --        No orders to display       Portions of the record may have been created with voice recognition software  Occasional wrong word or "sound a like" substitutions may have occurred due to the inherent limitations of voice recognition software  Read the chart carefully and recognize, using context, where substitutions have occurred

## 2021-04-03 NOTE — PROGRESS NOTES
Cardiology Progress Note - Khris Jones 42 y o  female MRN: [id number]    Unit/Bed#: [facility] 524-01 Encounter: [id number]      Assessment & Plan:  Principal Problem:    Chest pain    # Readmission for chest pain with repeat cardiac catheterization negative for any progression of CAD 3/2  # inferior STEMI status post GREG to mid RCA and ostial PDA 3/25  -   Down trending Troponin elevation likely from old  Myocardial infarction   although patient has various etiologies for her pain such as transmural infarct from a late presenting MI in last admission, post MI pericarditis both of which have been adequately treated last admission  Her pain is not anginal and her pleuritic pain and CRP responded to steroids and colchicine  Repeat Catheterization did not show any new significant blockages  After discussion with the patient likely culprit for her presentation is thought to be her severe anxiety  Ativan helps her symptoms  We talked about relaxation exercises and she is to follow-up with her primary care for treatment of anxiety disorder  If she comes in with similar presentation imminently after discharge in future I would consider treatment with benzos and be hesitant to catheter again given that she did not have progression of CAD but she did end up getting ANTHONY from contrast exposure    - EKG with persistent inferior ST elevations  - TTE with EF of 60% and basal mid inferior hypokinesis, asymmetric hypertrophy of septum with thickness of inferior septum at 25 mm  - MRI 3/29:  Anteroseptal wall 23 mm thick, small circumferential pericardial effusion, transverse scar in inferior and inferolateral walls, finding suspicious for HCM  - continue with aspirin and Brilinta  - continue the atorvastatin 80 mg once a day  - continue metoprolol 50 mg b i d    - continue Ranexa 500 mg twice a day     # post MI pericarditis  Pleuritic severe chest pain, small circumferential effusion in the MRI  - ->14  - patient started 0 6 mg b i d  Of colchicine  -  patient started on 60 mg of oral prednisone last admission  Given the down trend and CRP  Pericarditis is responding to therapy  Slow taper on discharge with 60 mg once a day on   week 1 followed by 50 mg once a day on week 2  Further taper can be determined on  Follow-up appointment     #  Eddy and CKD   potential etiologies include lisinopril, contrast induced nephropathy, heart failure  -  Creatinine 2  up from baseline of 1 4-1 6  -  Nephrology following  -  Avoid nephrotoxic drugs  -  Monitor urine output    #  COPD  -  Patient had an episode of COPD exacerbation responsive to nebulization last admission  -  She was discharged on maintenance and p r n  inhaler  -  She was educated on proper use of inhalers  -  Outpatient PFTs  -  To be addressed at primary care outpatient    # hypertension  - continue Toprol-XL for now  We will add lisinopril before discharge     # active smoker  - strongly encouraged smoking cessation     # hyperlipidemia on atorvastatin     # history of lymphoma status post chemo radiation in 2008  As per the oncologist's note patient was seeing him last year for complains of night sweats, leukocytosis and lymphadenopathy  Undergoing active outpatient workup  Subjective:   Patient seen and examined  No significant events overnight  Denies chest pain, pnd, orthopnea, abdominal pain, nausea vomiting, fever, chills, headache, dizziness or palpitations  Objective:     Vitals: Blood pressure 127/81, pulse 72, temperature 97 5 °F (36 4 °C), resp   rate 18, weight 119 kg (261 lb 11 oz), last menstrual period 03/07/2021, SpO2 99 %, not currently breastfeeding , Body mass index is 42 24 kg/m² ,   Orthostatic Blood Pressures      Most Recent Value   Blood Pressure  127/81 filed at 04/03/2021 5994   Patient Position - Orthostatic VS  Lying filed at 04/02/2021 2333            Intake/Output Summary (Last 24 hours) at 4/3/2021 1205  Last data filed at 4/3/2021 1056  Gross per 24 hour   Intake 630 ml   Output 500 ml   Net 130 ml           Physical Exam:    GEN: Myra Graves appears well, alert and oriented x 3, pleasant and cooperative,   Morbidly obese  HEENT: anicteric, mucous membranes moist  NECK: no jvd, carotid bruits   HEART: regular rhythm, normal S1 and S2, no murmurs, clicks, gallops or rubs   LUNGS: clear to auscultation bilaterally; no wheezes, rales, or rhonchi   ABDOMEN: normal bowel sounds, soft, no tenderness, no distention  EXTREMITIES: peripheral pulses normal; no clubbing, cyanosis, or edema  NEURO: no focal findings   SKIN: normal without suspicious lesions on exposed skin      Current Facility-Administered Medications:     acetaminophen (TYLENOL) tablet 650 mg, 650 mg, Oral, Q4H PRN, Heaven Cushing, DO, 650 mg at 04/03/21 0520    albuterol (PROVENTIL HFA,VENTOLIN HFA) inhaler 2 puff, 2 puff, Inhalation, Q4H PRN, Heaven Cushing, DO    ALPRAZolam [facility]) tablet 0 5 mg, 0 5 mg, Oral, TID PRN, Tobin Bejarano MD, 0 5 mg at 04/03/21 0520    ARIPiprazole (ABILIFY) tablet 10 mg, 10 mg, Oral, Daily, Heaven Cushing, DO, 10 mg at 04/03/21 0908    aspirin chewable tablet 81 mg, 81 mg, Oral, Daily, Heaven Cushing, DO, 81 mg at 04/03/21 0908    atorvastatin (LIPITOR) tablet 80 mg, 80 mg, Oral, QPM, John Saldana, DO, 80 mg at 04/02/21 1807    buPROPion (WELLBUTRIN XL) 24 hr tablet 300 mg, 300 mg, Oral, QAM, Heaven Cushing, DO, 300 mg at 04/03/21 0908    colchicine (COLCRYS) tablet 0 6 mg, 0 6 mg, Oral, BID, Heaven Cushing, DO, 0 6 mg at 04/03/21 0909    heparin (porcine) subcutaneous injection 7,500 Units, 7,500 Units, Subcutaneous, Q8H [facility], 7,500 Units at 04/03/21 0514 **AND** [COMPLETED] Platelet count, , , Once, Sherlyn Cushing, DO    metoprolol succinate (TOPROL-XL) 24 hr tablet 50 mg, 50 mg, Oral, BID, John Saldana DO, 50 mg at 04/03/21 0908    nicotine (NICODERM CQ) 7 mg/24hr TD 24 hr patch 1 patch, 1 patch, Transdermal, Daily, Heaven Cushing, DO, 1 patch at 04/03/21 0909    ondansetron (ZOFRAN) injection 4 mg, 4 mg, Intravenous, Q6H PRN, Duc Will DO    pantoprazole (PROTONIX) EC tablet 40 mg, 40 mg, Oral, Daily, Duc Will DO, 40 mg at 04/03/21 0908    predniSONE tablet 60 mg, 60 mg, Oral, Daily, Duc Will DO, 60 mg at 04/03/21 0908    ranolazine (RANEXA) 12 hr tablet 500 mg, 500 mg, Oral, Q12H [facility], Duc Will DO, 500 mg at 04/03/21 0909    salmeterol (SEREVENT) 50 mcg/dose inhaler 1 puff, 1 puff, Inhalation, Q12H [facility], Duc Will DO, 1 puff at 04/03/21 0912    sertraline (ZOLOFT) tablet 50 mg, 50 mg, Oral, Daily, Duc Will DO, 50 mg at 04/03/21 0909    ticagrelor (BRILINTA) tablet 90 mg, 90 mg, Oral, Q12H [facility], Duc Will DO, 90 mg at 04/03/21 0908    tiotropium (SPIRIVA) capsule for inhaler 18 mcg, 18 mcg, Inhalation, Daily, Duc Will DO, 18 mcg at 04/03/21 0912    Labs & Results:    Lab Results   Component Value Date    TROPONINI 19 00 (H) 04/02/2021    TROPONINI 16 87 (H) 04/02/2021    TROPONINI >40 00 (H) 03/25/2021       Lab Results   Component Value Date    CALCIUM 9 4 04/03/2021    K 4 5 04/03/2021    CO2 20 (L) 04/03/2021     (H) 04/03/2021    BUN 34 (H) 04/03/2021    CREATININE 2 00 (H) 04/03/2021       Lab Results   Component Value Date    WBC 21 62 (H) 04/03/2021    HGB 12 1 04/03/2021    HCT 38 7 04/03/2021    MCV 88 04/03/2021     (H) 04/03/2021     Results from last 7 days   Lab Units 04/02/21  1154   INR  1 11       No results found for: CHOL  Lab Results   Component Value Date    HDL 34 (L) 03/25/2021    HDL 31 (L) 07/08/2020     Lab Results   Component Value Date    LDLCALC 122 (H) 03/25/2021    [facility]  07/08/2020      Comment:      Calculated LDL invalid, triglycerides >400 mg/dl  This screening LDL is a calculated result  It does not have the accuracy of the Direct Measured LDL in the monitoring of patients with hyperlipidemia and/or statin therapy     Direct Measure LDL (SVN167) must be ordered separately in these patients  Lab Results   Component Value Date    TRIG 230 (H) 03/25/2021    TRIG 465 (H) 07/08/2020       Lab Results   Component Value Date    ALT 49 04/03/2021    AST 23 04/03/2021         EKG personally reviewed by )Nato Brooks MD  No acute changes   TELE: No significant arrhythmias seen on telemetry review

## 2021-04-03 NOTE — UTILIZATION REVIEW
Notification of Inpatient Admission/Inpatient Authorization Request   This is a Notification of Inpatient Admission for [facility]  Be advised that this patient was admitted to our facility under Inpatient Status  Contact Renetta Reynolds at [phone] for additional admission information  [facility] DEPT  DEDICATED -[phone]  Patient Name:   Chiol Garner   YOB: 1975       [address]   [address]:   [facility]  Tax ID: [id number]  NPI: [id number] Attending Provider/NPI:  Phone:  Address: Neeraj Mandujano Alfred [[id number]]  [phone]  Same as Facility   Place of Service Code: 24 Place of Service Name:  [address] [place] [address]   Start Date: 4/2/21 1317 Discharge Date & Time: No discharge date for patient encounter  Type of Admission: Inpatient Status Discharge Disposition (if discharged): Discharge/Transfer to not defined Healthcare Facility   Patient Diagnoses: MI (myocardial infarction) [facility]) [I21 9]     Orders: Admission Orders (From admission, onward)     Ordered        04/02/21 1317  Inpatient Admission  Once                    Assigned Utilization Review Contact: Renetta Reynolds  Utilization   Network Utilization Review Department  Phone: [phone]; Fax [phone]  Email: [email or web address]   ATTENTION PAYERS: Please call the assigned Utilization  directly with any questions or concerns ALL voicemails in the department are confidential  Send all requests for admission clinical reviews, approved or denied determinations and any other requests to dedicated fax number belonging to the campus where the patient is receiving treatment

## 2021-04-03 NOTE — DISCHARGE SUMMARY
Discharge Summary - Jose Manuel Kemp 42 y o  female MRN: [id number]    [facility] Encounter: [id number]    Admission Date:   Admission Orders (From admission, onward)     Ordered        04/02/21 1317  Inpatient Admission  Once                     Admitting Diagnosis:  Chest pain  HPI:   Jose Manuel Kemp is a 42 y o  female recently discharged(4/1) after successful drug-eluting stent placement for inferior ST segment elevation MI to right coronary artery (3/25/2021) known history of CAD with PCI and RCA and LAD in 2014,  smoking, and post MI pericarditis, morbid obesity, hypertension hyperlipidemia, history of non-Hodgkin's lymphoma and intermittent marijuana use who presented to [facility] with recurrence of crushing chest pain  she started to have sudden onset chest discomfort  Inability to take deep breath due to sharp chest pain, Diaphoresis and anxiety  In the emergency room she was noted tele ST elevations however she had persistent ST elevation on discharge from her prior admission  Due to concerning presentation for myocardial infarction and inability to to definitively rule out other etiologies she was brought to [facility] for cardiac catheterization which did not show any new blockages  She did receive sedation and was admitted to Cardiology floor for further evaluation  Procedures Performed:   Orders Placed This Encounter   Procedures    Cardiac catheterization       Summary of Hospital Course:     Since  ACS was ruled out as potential etiology of her we current chest pain syndrome be considered acute pericarditis, COPD exacerbation in the setting of pericarditis or anxiety as potential etiologies of her pain syndrome  She was discharged on prednisone and colchicine for her pericarditis which were of appropriate dosing  She was compliant to his medications at home  Her repeat CRP has down trended significantly and today she is not complaining of worse than moderate pain    Based on this history and objective data worsening pericarditis was ruled out as potential cause of her pain  She was discharged on new maintenance and p r n  albuterol inhaler due to her episode of COPD exacerbation during last admission  She did attempt to take 2 inhalers while having chest pain and shortness of breath which she said did not help  She was not noted to have any significant arrhythmias on presentation or on tele during hospitalization  With all above-mentioned data and after discussion with the patient her recurring chest pain, shortness of breath and diaphoresis were chalked up to panic attack in the setting of anxiety  Benzodiazepines did help, her down and help with her symptoms  We discussed with her some relaxation exercises and she is to follow-up with her primary care for further management  This hospital course was complicated by ANTHONY on CKD likely from contrast induced nephropathy in the setting of lisinopril use  Her kidney function was monitored until adequate result lesion of ANTHONY with the help of Nephrology consultation  If she is to present with similar constellation of chest pain, shortness of breath and diaphoresis a cautious approach before repeat cardiac catheterization should be considered and she will have persistent ST elevations at least for near future  She was discharged on aspirin, Brilinta, metoprolol, Ranexa, prednisone, colchicine and inhalers  Lisinopril was held on discharge  Patient does have p r n  benzos  At discharge  Advised her to reach out to her primary care to see if Wellbutrin can be discontinued as it is known to cause anxiety as a side effect      Significant Findings, Care, Treatment and Services Provided: Cardiac cath    Complications: none    Discharge Diagnosis: Panic attack with residual pericarditis    Resolved Problems  Date Reviewed: 4/1/2021    None          Condition at Discharge: stable         Discharge instructions/Information to patient and family: See after visit summary for information provided to patient and family  Provisions for Follow-Up Care:  See after visit summary for information related to follow-up care and any pertinent home health orders  PCP: Felicita Biggs DO    Disposition: Home    Planned Readmission: No    Discharge Medications:  See after visit summary for reconciled discharge medications provided to patient and family

## 2021-04-04 VITALS
SYSTOLIC BLOOD PRESSURE: 131 MMHG | WEIGHT: 258.82 LBS | BODY MASS INDEX: 41.77 KG/M2 | RESPIRATION RATE: 18 BRPM | HEART RATE: 76 BPM | TEMPERATURE: 97.4 F | OXYGEN SATURATION: 96 % | DIASTOLIC BLOOD PRESSURE: 89 MMHG

## 2021-04-04 LAB
ANION GAP SERPL CALCULATED.3IONS-SCNC: 5 MMOL/L (ref 4–13)
ATRIAL RATE: 66 BPM
BUN SERPL-MCNC: 30 MG/DL (ref 5–25)
CALCIUM SERPL-MCNC: 9.2 MG/DL (ref 8.3–10.1)
CHLORIDE SERPL-SCNC: 114 MMOL/L (ref 100–108)
CO2 SERPL-SCNC: 22 MMOL/L (ref 21–32)
CREAT SERPL-MCNC: 1.97 MG/DL (ref 0.6–1.3)
GFR SERPL CREATININE-BSD FRML MDRD: 30 ML/MIN/1.73SQ M
GLUCOSE SERPL-MCNC: 78 MG/DL (ref 65–140)
P AXIS: 40 DEGREES
POTASSIUM SERPL-SCNC: 4.3 MMOL/L (ref 3.5–5.3)
PR INTERVAL: 170 MS
QRS AXIS: -17 DEGREES
QRSD INTERVAL: 118 MS
QT INTERVAL: 442 MS
QTC INTERVAL: 463 MS
SODIUM SERPL-SCNC: 141 MMOL/L (ref 136–145)
T WAVE AXIS: 24 DEGREES
VENTRICULAR RATE: 66 BPM

## 2021-04-04 PROCEDURE — 99232 SBSQ HOSP IP/OBS MODERATE 35: CPT | Performed by: INTERNAL MEDICINE

## 2021-04-04 PROCEDURE — 80048 BASIC METABOLIC PNL TOTAL CA: CPT | Performed by: INTERNAL MEDICINE

## 2021-04-04 PROCEDURE — 99233 SBSQ HOSP IP/OBS HIGH 50: CPT | Performed by: INTERNAL MEDICINE

## 2021-04-04 RX ADMIN — METOPROLOL SUCCINATE 50 MG: 50 TABLET, EXTENDED RELEASE ORAL at 08:45

## 2021-04-04 RX ADMIN — NICOTINE 7 MG/24 HR DAILY TRANSDERMAL PATCH 1 PATCH: at 08:45

## 2021-04-04 RX ADMIN — SALMETEROL XINAFOATE 1 PUFF: 50 POWDER, METERED ORAL; RESPIRATORY (INHALATION) at 08:46

## 2021-04-04 RX ADMIN — TIOTROPIUM BROMIDE 18 MCG: 18 CAPSULE ORAL; RESPIRATORY (INHALATION) at 08:46

## 2021-04-04 RX ADMIN — BUPROPION HYDROCHLORIDE 300 MG: 150 TABLET, FILM COATED, EXTENDED RELEASE ORAL at 08:46

## 2021-04-04 RX ADMIN — TICAGRELOR 90 MG: 90 TABLET ORAL at 08:45

## 2021-04-04 RX ADMIN — HEPARIN SODIUM 7500 UNITS: 5000 INJECTION INTRAVENOUS; SUBCUTANEOUS at 05:02

## 2021-04-04 RX ADMIN — ARIPIPRAZOLE 10 MG: 10 TABLET ORAL at 08:46

## 2021-04-04 RX ADMIN — ALPRAZOLAM 0.5 MG: 0.5 TABLET ORAL at 05:06

## 2021-04-04 RX ADMIN — RANOLAZINE 500 MG: 500 TABLET, FILM COATED, EXTENDED RELEASE ORAL at 08:44

## 2021-04-04 RX ADMIN — SERTRALINE HYDROCHLORIDE 50 MG: 50 TABLET ORAL at 08:45

## 2021-04-04 RX ADMIN — PREDNISONE 60 MG: 20 TABLET ORAL at 08:44

## 2021-04-04 RX ADMIN — PANTOPRAZOLE SODIUM 40 MG: 40 TABLET, DELAYED RELEASE ORAL at 08:45

## 2021-04-04 RX ADMIN — ASPIRIN 81 MG: 81 TABLET, CHEWABLE ORAL at 08:44

## 2021-04-04 RX ADMIN — COLCHICINE 0.6 MG: 0.6 TABLET ORAL at 08:45

## 2021-04-04 NOTE — PROGRESS NOTES
NEPHROLOGY PROGRESS NOTE   Jose Manuel Kemp 42 y o  female MRN: [id number]  Unit/Bed#: [facility] 524-01 Encounter: [id number]  Reason for Consult: DEDY    ASSESSMENT AND PLAN:  Patient is 73-year-old female with significant medical issues of hypertension for many years, CKD, diffuse large B-cell lymphoma, recent pericarditis on steroids, CAD, status post PTCA, presented with shortness of breath, chest discomfort   We are consulted for [facility] [place] management      Eddy on CKD stage 3, baseline 1 4 to 1 7, followed with Dr Jenae Uribe  -creatinine 2 0 on 3/30/21 fluctuating, initially improved to 1 4 and now remains plateaued at 1 9 to 2 0  Creatinine 1 9 today  -EDDY suspect in the setting of cardiorenal, hypotension episodes, use of lisinopril, status post IV contrast with cardiac catheterization on 3/25/21 and again on 4/2/21  -continue to hold lisinopril on discharge  -now remains off IV fluid   -most recent bladder scan nonsignificant  -UA shows 2+ proteinuria, 2 to 4 RBCs, 10 to 20 WBCs, 3 to 5 hyaline cast   -avoid nephrotoxins or NSAIDs  -CKD suspect in the setting of long-term hypertension, prior chronic NSAID use, morbid obesity     Diastolic CHF  -currently on 1 L via nasal cannula needed overnight  Currently saturating 99% on 1 L O2, will try to wean off of oxygen   Recent echo shows EF 81%, grade 2 diastolic dysfunction, no pericardial effusion  -chest x-ray concerning for pulmonary congestion   -clinically seems to be overall stable  May use p r n  Lasix   -daily intake and output, daily standing weight      Metabolic and respiratory acidosis  -bicarb level improving 22 today     Recent pericarditis, currently remains on prednisone, colchicine      Discussed above plan in detail with primary team   Overall stable from renal standpoint for discharge  If she gets discharged, she needs repeat BMP in mid next week and close outpatient Nephrology follow-up  SUBJECTIVE:  Patient seen and examined at bedside   No chest pain, shortness of breath, nausea, vomiting, abdominal pain  OBJECTIVE:  Current Weight: Weight - Scale: 117 kg (258 lb 13 1 oz)  Vitals:    04/04/21 0725   BP: 131/89   Pulse: 76   Resp: 18   Temp: (!) 97 4 °F (36 3 °C)   SpO2: 96%       Intake/Output Summary (Last 24 hours) at 4/4/2021 1059  Last data filed at 4/4/2021 0843  Gross per 24 hour   Intake 240 ml   Output --   Net 240 ml     Wt Readings from Last 3 Encounters:   04/04/21 117 kg (258 lb 13 1 oz)   04/02/21 121 kg (266 lb 12 1 oz)   04/01/21 120 kg (263 lb 14 4 oz)     Temp Readings from Last 3 Encounters:   04/04/21 (!) 97 4 °F (36 3 °C)   04/01/21 98 1 °F (36 7 °C) (Oral)   03/25/21 (!) 96 °F (35 6 °C)     BP Readings from Last 3 Encounters:   04/04/21 131/89   04/02/21 144/92   04/01/21 129/91     Pulse Readings from Last 3 Encounters:   04/04/21 76   04/02/21 76   04/01/21 65        Physical Examination:  General:  Lying in bed, no acute distress   Eyes:  Mild conjunctival pallor present  ENT:  External examination of ears and nose unremarkable  Neck:  No obvious lymphadenopathy appreciated  Respiratory:  Bilateral air entry present  CVS:  S1, S2 present  GI:  Soft, nondistended  CNS:  Active alert oriented x3  Skin:  No new rash in legs  Musculoskeletal:  No obvious new gross deformity noted    Medications:    Current Facility-Administered Medications:     acetaminophen (TYLENOL) tablet 650 mg, 650 mg, Oral, Q4H PRN, [place], DO, 650 mg at 04/03/21 0520    albuterol (PROVENTIL HFA,VENTOLIN HFA) inhaler 2 puff, 2 puff, Inhalation, Q4H PRN, [place], DO    ALPRAZolam [place]) tablet 0 5 mg, 0 5 mg, Oral, TID PRN, Yee Ruelas MD, 0 5 mg at 04/04/21 0506    ARIPiprazole (ABILIFY) tablet 10 mg, 10 mg, Oral, Daily, [place], DO, 10 mg at 04/04/21 0846    aspirin chewable tablet 81 mg, 81 mg, Oral, Daily, La Huff DO, 81 mg at 04/04/21 0844    atorvastatin (LIPITOR) tablet 80 mg, 80 mg, Oral, QPM, John Saldana DO, 80 mg at 04/03/21 1713    buPROPion (WELLBUTRIN XL) 24 hr tablet 300 mg, 300 mg, Oral, QAM, John Saldana DO, 300 mg at 04/04/21 0846    colchicine (COLCRYS) tablet 0 6 mg, 0 6 mg, Oral, BID, Jagjit Escobar DO, 0 6 mg at 04/04/21 0845    heparin (porcine) subcutaneous injection 7,500 Units, 7,500 Units, Subcutaneous, Q8H [address], 7,500 Units at 04/04/21 0502 **AND** [COMPLETED] Platelet count, , , Once, Jagjit Escobar DO    metoprolol succinate (TOPROL-XL) 24 hr tablet 50 mg, 50 mg, Oral, BID, John Saldana DO, 50 mg at 04/04/21 0845    nicotine (NICODERM CQ) 7 mg/24hr TD 24 hr patch 1 patch, 1 patch, Transdermal, Daily, Jagjit Escobar DO, 1 patch at 04/04/21 0845    ondansetron (ZOFRAN) injection 4 mg, 4 mg, Intravenous, Q6H PRN, Jagjit Escobar DO    pantoprazole (PROTONIX) EC tablet 40 mg, 40 mg, Oral, Daily, John Saldana DO, 40 mg at 04/04/21 0845    predniSONE tablet 60 mg, 60 mg, Oral, Daily, Jagjit Escobar DO, 60 mg at 04/04/21 0844    ranolazine (RANEXA) 12 hr tablet 500 mg, 500 mg, Oral, Q12H [address], John Saldana DO, 500 mg at 04/04/21 0844    salmeterol (SEREVENT) 50 mcg/dose inhaler 1 puff, 1 puff, Inhalation, Q12H [address], Jagjit Escobar DO, 1 puff at 04/04/21 0846    sertraline (ZOLOFT) tablet 50 mg, 50 mg, Oral, Daily, Jagjit Escobar DO, 50 mg at 04/04/21 0845    ticagrelor (BRILINTA) tablet 90 mg, 90 mg, Oral, Q12H [address], Jagjit Escobar DO, 90 mg at 04/04/21 0845    tiotropium (SPIRIVA) capsule for inhaler 18 mcg, 18 mcg, Inhalation, Daily, Jagjit Escobar, , 18 mcg at 04/04/21 0846    Laboratory Results:  Results from last 7 days   Lab Units 04/04/21  0454 04/03/21  0512 04/02/21  1638 04/02/21  1154 03/31/21  0401 03/30/21  1332   WBC Thousand/uL  --  21 62*  --  23 40*  --  25 34*   HEMOGLOBIN g/dL  --  12 1  --  12 2  --  12 5   HEMATOCRIT %  --  38 7  --  37 6*  --  37 7   PLATELETS Thousands/uL  --  439* 433* 424*  --  415*   SODIUM mmol/L 141 142  --  144* 148* 139   POTASSIUM mmol/L 4 3 4 5  --  4 4 3 2* 4 9   CHLORIDE mmol/L 114* 114*  --  108* 122* 110*   CO2 mmol/L 22 20*  --  24 19* 21   BUN mg/dL 30* 34*  --  35* 21 24   CREATININE mg/dL 1 97* 2 00*  --  1 99* 1 42* 2 03*   CALCIUM mg/dL 9 2 9 4  --  9 2 7 0* 9 6   MAGNESIUM mg/dL  --  2 6  --   --  1 8  --        No orders to display       Portions of the record may have been created with voice recognition software  Occasional wrong word or "sound a like" substitutions may have occurred due to the inherent limitations of voice recognition software  Read the chart carefully and recognize, using context, where substitutions have occurred

## 2021-04-04 NOTE — PROGRESS NOTES
Cardiology Progress Note - Ministerio Maher 42 y o  female MRN: [id number]    Unit/Bed#: [facility] 524-01 Encounter: [id number]        Subjective:    No significant events overnight  SHe feels better  No CP this AM      Review of Systems   Constitution: Positive for malaise/fatigue  Cardiovascular: Negative for chest pain  Respiratory: Negative for shortness of breath  Objective:   Vitals: Blood pressure 131/89, pulse 76, temperature (!) 97 4 °F (36 3 °C), resp  rate 18, weight 117 kg (258 lb 13 1 oz), last menstrual period 03/07/2021, SpO2 96 %, not currently breastfeeding , Body mass index is 41 77 kg/m² ,   Orthostatic Blood Pressures      Most Recent Value   Blood Pressure  131/89 filed at 04/04/2021 0725   Patient Position - Orthostatic VS  Lying filed at 04/02/2021 3969         Systolic (84VFZ), GWU:591 , Min:112 , JOSÉ LUIS:617     Diastolic (98YPS), VGX:02, Min:57, Max:89      Intake/Output Summary (Last 24 hours) at 4/4/2021 0915  Last data filed at 4/4/2021 0843  Gross per 24 hour   Intake 240 ml   Output --   Net 240 ml     Weight (last 2 days)     Date/Time   Weight    04/04/21 0531   117 (258 82)    04/03/21 0531   119 (261 69)                Telemetry Review: No significant arrhythmias seen on telemetry review  NSR      Physical Exam  Vitals signs and nursing note reviewed  Constitutional:       Appearance: Normal appearance  HENT:      Head: Normocephalic  Nose: Nose normal       Mouth/Throat:      Mouth: Mucous membranes are moist    Eyes:      General: No scleral icterus  Conjunctiva/sclera: Conjunctivae normal    Neck:      Musculoskeletal: Normal range of motion and neck supple  Cardiovascular:      Rate and Rhythm: Normal rate and regular rhythm  Heart sounds: No murmur  No gallop  Pulmonary:      Effort: Pulmonary effort is normal  No respiratory distress  Breath sounds: Normal breath sounds  No wheezing or rales  Abdominal:      General: Abdomen is flat   Bowel sounds are normal  There is no distension  Palpations: Abdomen is soft  Tenderness: There is no abdominal tenderness  There is no guarding  Musculoskeletal:      Right lower leg: No edema  Left lower leg: No edema  Skin:     General: Skin is warm and dry  Neurological:      General: No focal deficit present  Mental Status: She is alert and oriented to person, place, and time     Psychiatric:         Mood and Affect: Mood normal          Behavior: Behavior normal            Laboratory Results:  Results from last 7 days   Lab Units 04/02/21  1638 04/02/21  1154   TROPONIN I ng/mL 19 00* 16 87*       CBC with diff:   Results from last 7 days   Lab Units 04/03/21  0512 04/02/21  1638 04/02/21  1154 03/30/21  1332   WBC Thousand/uL 21 62*  --  23 40* 25 34*   HEMOGLOBIN g/dL 12 1  --  12 2 12 5   HEMATOCRIT % 38 7  --  37 6* 37 7   MCV fL 88  --  86 86   PLATELETS Thousands/uL 439* 433* 424* 415*   MCH pg 27 5  --  27 8 28 3   MCHC g/dL 31 3*  --  32 5 33 2   RDW % 17 7*  --  17 9* 17 5*   MPV fL 10 9 10 6 8 6 10 8   NRBC AUTO /100 WBCs 0  --   --  0         CMP:  Results from last 7 days   Lab Units 04/04/21 0454 04/03/21 0512 04/02/21 1154 03/31/21 0401 03/30/21  1332   POTASSIUM mmol/L 4 3 4 5 4 4 3 2* 4 9   CHLORIDE mmol/L 114* 114* 108* 122* 110*   CO2 mmol/L 22 20* 24 19* 21   BUN mg/dL 30* 34* 35* 21 24   CREATININE mg/dL 1 97* 2 00* 1 99* 1 42* 2 03*   CALCIUM mg/dL 9 2 9 4 9 2 7 0* 9 6   AST U/L  --  23 23  --   --    ALT U/L  --  49 38  --   --    ALK PHOS U/L  --  83 65  --   --    EGFR ml/min/1 73sq m 30 30 30 45 29         BMP:  Results from last 7 days   Lab Units 04/04/21 0454 04/03/21  0512 04/02/21  1154 03/31/21  0401 03/30/21  1332   POTASSIUM mmol/L 4 3 4 5 4 4 3 2* 4 9   CHLORIDE mmol/L 114* 114* 108* 122* 110*   CO2 mmol/L 22 20* 24 19* 21   BUN mg/dL 30* 34* 35* 21 24   CREATININE mg/dL 1 97* 2 00* 1 99* 1 42* 2 03*   CALCIUM mg/dL 9 2 9 4 9 2 7 0* 9 6       BNP: No results for input(s): BNP in the last 72 hours  Magnesium:   Results from last 7 days   Lab Units 21  0512 21  0401   MAGNESIUM mg/dL 2 6 1 8       Coags:   Results from last 7 days   Lab Units 21  1154   PTT seconds 26   INR  1 11       TSH: No results found for: TSH    Hemoglobin A1C       Lipid Profile:       Cardiac testing:   Results for orders placed during the hospital encounter of 21   Echo Complete with Contrast if Indicated    Narrative [address]  [address] [place] [address]  [phone]    Transthoracic Echocardiogram  2D, M-mode, Doppler, and Color Doppler    Study date:  25-Mar-2021    Patient: Ishmael Ac  MR number: [id number]  Account number: [de-identified]  : 1975  Age: 42 years  Gender: Female  Status: Inpatient  Location: Bedside  Height: 66 in  Weight: 279 4 lb  BP: 122/ 82 mmHg    Indications: Acute MI    Diagnoses: I21 4 - Non-ST elevation (NSTEMI) myocardial infarction    Sonographer:  Brenda Pedraza RDCS  Primary Physician:  Fernie Bean DO  Referring Physician:  Cipriano Daily DO  Group:  [facility]  Interpreting Physician:  Sunita Gonzalez MD    IMPRESSIONS:  Features are consistent with hypertrophic cardiomyopathy and inferior wall myocardial infarction  Clinical correlation is advised  SUMMARY    LEFT VENTRICLE:  Systolic function was normal  Ejection fraction was estimated to be 60 %  There was severe hypokinesis of the basal-mid inferior wall(s)  Wall thickness was markedly increased  There was severe assymetrical hypertrophy of the inferior septum (25 mm)  There was no dynamic obstruction at rest  Provocative maneuvers were not performed  Features were consistent with a pseudonormal left ventricular filling pattern, with concomitant abnormal relaxation and increased filling pressure (grade 2 diastolic dysfunction)  LEFT ATRIUM:  The atrium was mildly dilated      MITRAL VALVE:  There was moderate systolic anterior motion of the anterior leaflet  There was mild to moderate regurgitation  AORTIC VALVE:  There was trace regurgitation  HISTORY: PRIOR HISTORY: CHF, CAD, Tobacco Abuse, Obesity    PROCEDURE: The procedure was performed at the bedside  This was a routine study  The transthoracic approach was used  The study included complete 2D imaging, M-mode, complete spectral Doppler, and color Doppler  The heart rate was 66 bpm,  at the start of the study  Images were obtained from the parasternal, apical, subcostal, and suprasternal notch acoustic windows  Echocardiographic views were limited due to poor acoustic window availability and decreased penetration  This  was a technically difficult study  LEFT VENTRICLE: Size was normal  Systolic function was normal  Ejection fraction was estimated to be 60 %  There was severe hypokinesis of the basal-mid inferior wall(s)  Wall thickness was markedly increased  There was severe assymetrical  hypertrophy of the inferior septum (25 mm)  DOPPLER: There was no dynamic obstruction at rest  Provocative maneuvers were not performed  Features were consistent with a pseudonormal left ventricular filling pattern, with concomitant  abnormal relaxation and increased filling pressure (grade 2 diastolic dysfunction)  RIGHT VENTRICLE: The size was normal  Systolic function was normal  Wall thickness was normal     LEFT ATRIUM: The atrium was mildly dilated  RIGHT ATRIUM: Size was normal     MITRAL VALVE: Valve structure was normal  There was normal leaflet separation  There was moderate systolic anterior motion of the anterior leaflet  DOPPLER: The transmitral velocity was within the normal range  There was no evidence for  stenosis  There was mild to moderate regurgitation  AORTIC VALVE: The valve was trileaflet  Leaflets exhibited normal thickness and normal cuspal separation  DOPPLER: Transaortic velocity was within the normal range  There was no evidence for stenosis  There was trace regurgitation  TRICUSPID VALVE: The valve structure was normal  There was normal leaflet separation  DOPPLER: The transtricuspid velocity was within the normal range  There was no evidence for stenosis  There was no regurgitation  PULMONIC VALVE: Leaflets exhibited normal thickness, no calcification, and normal cuspal separation  DOPPLER: The transpulmonic velocity was within the normal range  There was no regurgitation  PERICARDIUM: There was no pericardial effusion  The pericardium was normal in appearance  AORTA: The root exhibited normal size  SYSTEM MEASUREMENT TABLES    2D  %FS: 27 94 %  Ao Diam: 3 95 cm  Ao asc: 3 6 cm  EDV(Teich): 66 84 ml  EF(Teich): 54 74 %  ESV(Teich): 30 25 ml  IVSd: 2 82 cm  LA Diam: 4 59 cm  LAAs A4C: 16 52 cm2  LAESV A-L A4C: 43 86 ml  LAESV MOD A4C: 42 09 ml  LALs A4C: 5 28 cm  LVEDV MOD A4C: 88 32 ml  LVEF MOD A4C: 67 58 %  LVESV MOD A4C: 28 63 ml  LVIDd: 3 92 cm  LVIDs: 2 83 cm  LVLd A4C: 6 93 cm  LVLs A4C: 5 55 cm  LVPWd: 1 43 cm  RAEDV A-L: 23 76 ml  RAEDV MOD: 22 46 ml  RALd: 4 9 cm  RVIDd: 3 61 cm  SV MOD A4C: 59 69 ml  SV(Teich): 36 59 ml    CW  AR Dec Blackford: 1 39 m/s2  AR Dec Time: 2867 87 ms  AR PHT: 831 68 ms  AR Vmax: 3 99 m/s  AR maxP 65 mmHg  AV Env  Ti: 240 72 ms  AV VTI: 22 04 cm  AV Vmax: 1 31 m/s  AV Vmean: 0 92 m/s  AV maxP 85 mmHg  AV meanPG: 3 93 mmHg    MM  TAPSE: 1 87 cm    PW  LVOT Env  Ti: 253 09 ms  LVOT VTI: 15 47 cm  LVOT Vmax: 0 92 m/s  LVOT Vmean: 0 61 m/s  LVOT maxPG: 3 4 mmHg  LVOT meanP 75 mmHg  MV A Jorge: 0 71 m/s  MV Dec Blackford: 5 89 m/s2  MV DecT: 156 23 ms  MV E Jorge: 0 91 m/s  MV E/A Ratio: 1 3    [facility]    Prepared and electronically signed by    Elliot Kevin MD  Signed 25-Mar-2021 13:06:46       No results found for this or any previous visit  No results found for this or any previous visit    No results found for this or any previous visit      Meds/Allergies   current meds:   Current Facility-Administered Medications   Medication Dose Route Frequency    acetaminophen (TYLENOL) tablet 650 mg  650 mg Oral Q4H PRN    albuterol (PROVENTIL HFA,VENTOLIN HFA) inhaler 2 puff  2 puff Inhalation Q4H PRN    ALPRAZolam (XANAX) tablet 0 5 mg  0 5 mg Oral TID PRN    ARIPiprazole (ABILIFY) tablet 10 mg  10 mg Oral Daily    aspirin chewable tablet 81 mg  81 mg Oral Daily    atorvastatin (LIPITOR) tablet 80 mg  80 mg Oral QPM    buPROPion (WELLBUTRIN XL) 24 hr tablet 300 mg  300 mg Oral QAM    colchicine (COLCRYS) tablet 0 6 mg  0 6 mg Oral BID    heparin (porcine) subcutaneous injection 7,500 Units  7,500 Units Subcutaneous Q8H [address]    metoprolol succinate (TOPROL-XL) 24 hr tablet 50 mg  50 mg Oral BID    nicotine (NICODERM CQ) 7 mg/24hr TD 24 hr patch 1 patch  1 patch Transdermal Daily    ondansetron (ZOFRAN) injection 4 mg  4 mg Intravenous Q6H PRN    pantoprazole (PROTONIX) EC tablet 40 mg  40 mg Oral Daily    predniSONE tablet 60 mg  60 mg Oral Daily    ranolazine (RANEXA) 12 hr tablet 500 mg  500 mg Oral Q12H ABIEL    salmeterol (SEREVENT) 50 mcg/dose inhaler 1 puff  1 puff Inhalation Q12H [address]    sertraline (ZOLOFT) tablet 50 mg  50 mg Oral Daily    ticagrelor (BRILINTA) tablet 90 mg  90 mg Oral Q12H [address]    tiotropium (SPIRIVA) capsule for inhaler 18 mcg  18 mcg Inhalation Daily     Medications Prior to Admission   Medication    acetaminophen (TYLENOL) 325 mg tablet    albuterol (PROVENTIL HFA,VENTOLIN HFA) 90 mcg/act inhaler    ALPRAZolam (XANAX) 0 5 mg tablet    ARIPiprazole (ABILIFY) 10 mg tablet    aspirin 81 mg chewable tablet    atorvastatin (LIPITOR) 80 mg tablet    buPROPion (WELLBUTRIN XL) 300 mg 24 hr tablet    colchicine (COLCRYS) 0 6 mg tablet    lisinopril (ZESTRIL) 5 mg tablet    metoprolol succinate (TOPROL-XL) 50 mg 24 hr tablet    nicotine (NICODERM CQ) 7 mg/24hr TD 24 hr patch    pantoprazole (PROTONIX) 40 mg tablet    predniSONE 10 mg tablet    ranolazine (RANEXA) 500 mg 12 hr tablet    sertraline (ZOLOFT) 50 mg tablet    ticagrelor (BRILINTA) 90 MG    umeclidinium-vilanterol (ANORO ELLIPTA) 62 5-25 MCG/INH inhaler          Assessment:  Principal Problem:    Chest pain    PLAN:  SHE IS STABLE FOR DISCHARGE HOME TODAY      # Readmission for chest pain with repeat cardiac catheterization negative for any progression of CAD 3/2  # inferior STEMI status post GREG to mid RCA and ostial PDA 3/25  -   Down trending Troponin elevation likely from old  Myocardial infarction   although patient has various etiologies for her pain such as transmural infarct from a late presenting MI in last admission, post MI pericarditis both of which have been adequately treated last admission  Her pain is not anginal and her pleuritic pain and CRP responded to steroids and colchicine  Repeat Catheterization did not show any new significant blockages  After discussion with the patient likely culprit for her presentation is thought to be her severe anxiety  Ativan helps her symptoms  We talked about relaxation exercises and she is to follow-up with her primary care for treatment of anxiety disorder  If she comes in with similar presentation imminently after discharge in future I would consider treatment with benzos and be hesitant to catheter again given that she did not have progression of CAD but she did end up getting ANTHONY from contrast exposure    - EKG with persistent inferior ST elevations  - TTE with EF of 60% and basal mid inferior hypokinesis, asymmetric hypertrophy of septum with thickness of inferior septum at 25 mm  - MRI 3/29:  Anteroseptal wall 23 mm thick, small circumferential pericardial effusion, transverse scar in inferior and inferolateral walls, finding suspicious for HCM  - continue with aspirin and Brilinta  - continue the atorvastatin 80 mg once a day  - continue metoprolol 50 mg b i d    - continue Ranexa 500 mg twice a day     # post MI pericarditis  Pleuritic severe chest pain, small circumferential effusion in the MRI  - ->14  - patient started 0 6 mg b i d  Of colchicine  -  patient started on 60 mg of oral prednisone last admission  Given the down trend and CRP  Pericarditis is responding to therapy  Slow taper on discharge with 60 mg once a day on   week 1 followed by 50 mg once a day on week 2  Further taper can be determined on  Follow-up appointment     #  Eddy and CKD   potential etiologies include lisinopril, contrast induced nephropathy, heart failure  -  Creatinine 2  up from baseline of 1 4-1 6  -  Nephrology following  -  Avoid nephrotoxic drugs  -  Monitor urine output     #  COPD  -  Patient had an episode of COPD exacerbation responsive to nebulization last admission  -  She was discharged on maintenance and p r n  inhaler  -  She was educated on proper use of inhalers  -  Outpatient PFTs  -  To be addressed at primary care outpatient     # hypertension  - continue Toprol-XL for now   We will add lisinopril before discharge     # active smoker  - strongly encouraged smoking cessation     # hyperlipidemia on atorvastatin     # history of lymphoma status post chemo radiation in 2008  As per the oncologist's note patient was seeing him last year for complains of night sweats, leukocytosis and lymphadenopathy   Undergoing active outpatient workup          Counseling / Coordination of Care  Total floor / unit time spent today 25 minutes  Greater than 50% of total time was spent with the patient and / or family counseling and / or coordination of care  A description of the counseling / coordination of care

## 2021-04-04 NOTE — UTILIZATION REVIEW
Initial Clinical Review    Admission: Date/Time/Statement:   Admission Orders (From admission, onward)     Ordered        04/02/21 1317  Inpatient Admission  Once                   Orders Placed This Encounter   Procedures    Inpatient Admission     Standing Status:   Standing     Number of Occurrences:   1     Order Specific Question:   Level of Care     Answer:   Level 2 Stepdown / HOT [14]     Order Specific Question:   Estimated length of stay     Answer:   More than 2 Midnights     Order Specific Question:   Certification     Answer:   I certify that inpatient services are medically necessary for this patient for a duration of greater than two midnights  See H&P and MD Progress Notes for additional information about the patient's course of treatment  ED Arrival Information     Patient not seen in ED -- [facility] ED                     Assessment/Plan: 42 y o  female recently discharged after successful drug-eluting stent placement for inferior ST segment elevation MI to RCA, known h/o CAD with PCI and RCA and LAD in 2014, tobacco use, and post MI pericarditis, morbid obesity, HTN, HLD, h/o non-Hodgkin's lymphoma and intermittent marijuana use presents to [facility] via air from [facility] where she initially presented with recurrence of crushing chest pain  Decision to tx to [facility] for continued cardiac tx  In B pt sedated from transport but states she is still having some chest pain however unable to articulate/answer questions on duration and intensity  Admit inpatient to M/S/Tele unit -- urgent cardiac cath showing patent drug-eluting stent placement  continue medical therapy  Telemetry, trend troponins, f/u labs  APS and nephrology consults  APS consult 4/2 -- A: chest pain and ongoing management of pericarditis    Plan:   · Continue colchicine and steroids per cardiology service for management of pericarditis  · Agree to avoid NSAIDS d/t elevated creatine     · Can trial Tylenol 975mg every 8 hours for pain  · Would avoid additional medications/opioids d/t level of sedation  Nephrology consult 4/2 -- Mik Villatoro on CKD stage 3, creatinine 2 0 on 3/30 fluctuating and improved to 1 4 yesterday and now again worsened up to 1 9  ANTHONY suspect in the setting of cardiorenal, hypotension episodes, use of lisinopril, status post IV contrast with cardiac catheterization on 3/25 and again on 4/2  -closely monitor for JARAD  -recommend to hold lisinopril given low-normal BP readings  -currently receiving IV normal saline, discontinue after 4 hour post contrast  -check bladder scan for PVR  -check UA with microscopy  -check urine electrolytes  -avoid nephrotoxins or NSAIDs  -CKD suspect in the setting of long-term hypertension, prior chronic NSAID use, morbid obesity  -d/c IV fluid 4 hours after contrast exposure  May need to consider Lasix  -daily intake and output, daily standing weight    Upper normal serum sodium, continue to closely monitor, encourage free water intake        4/3 -- pt currently denies cp this AM --  Inferior STEMI s/p GREG to mid RCA and ostial PDA 3/25  Down trending Troponin elevation likely from old MI, post MI Pericarditis, ANTHONY on CKD  Continue po meds  Started on 60 mg of oral prednisone last admission  Given the down trend and CRP  Pericarditis is responding to therapy  Slow taper on d/c with 60 mg once a day on week 1 followed by 50 mg once a day on week 2    Further taper to be determined on f/u appt per cardiology      ED Triage Vitals   Temperature Pulse Respirations Blood Pressure SpO2   04/02/21 1434 04/02/21 1244 04/02/21 1244 04/02/21 1244 04/02/21 1244   98 1 °F (36 7 °C) 86 22 152/86 92 %      Temp Source Heart Rate Source Patient Position - Orthostatic VS BP Location FiO2 (%)   04/02/21 1434 -- 04/02/21 1300 04/02/21 1434 --   Oral  Lying Right arm       Pain Score       04/02/21 1600       5          Wt Readings from Last 1 Encounters:   04/04/21 117 kg (258 lb 13 1 oz) Additional Vital Signs:   /Time  Temp  Pulse  Resp  BP  MAP (mmHg)  SpO2  Calculated FIO2 (%) - Nasal Cannula  Nasal Cannula O2 Flow Rate (L/min)  O2 Device   04/03/21 22:19:26  98 °F (36 7 °C)  71  --  112/61  78  96 %  --  --  --   04/03/21 22:18:48  98 °F (36 7 °C)  74  18  112/61  78  93 %  --  --  --   04/03/21 19:16:03  98 2 °F (36 8 °C)  71  --  134/83  100  97 %  --  --  --   04/03/21 15:30:59  97 3 °F (36 3 °C)Abnormal   --  18  112/57  75  --  --  --  --   04/03/21 0800  --  --  --  --  --  --  --  --  None (Room air)   04/03/21 07:28:24  97 5 °F (36 4 °C)  --  18  127/81  96  99 %  --  --  --   04/03/21 02:34:47  --  --  --  113/68  83  --  --  --  --   04/02/21 23:33:29  98 8 °F (37 1 °C)  72  16  104/62  76  96 %  --  --  None (Room air)   04/02/21 20:17:02  --  --  --  105/59  74  --  --  --  --   04/02/21 1930  --  --  --  --  --  --  --  --  None (Room air)   04/02/21 1900  97 5 °F (36 4 °C)  69  --  111/71  --  97 %  --  --  Nasal cannula   04/02/21 1800  --  --  --  115/68  84  --  --  --  --   04/02/21 1700  --  --  --  123/70  88  --  --  --  --   04/02/21 1600  --  --  --  94/52  66  --  --  --  --   04/02/21 1434  98 1 °F (36 7 °C)  63  24Abnormal   95/59  --  96 %  28  2 L/min  Nasal cannula   04/02/21 1409  --  64  22  91/49Abnormal   --  97 %  28  2 L/min  Nasal cannula   04/02/21 1300  --  70  20  132/74  --  --  --  --  --   04/02/21 12:44:02  --  86  22  152/86  --  92 %  --  --  --       Pertinent Labs/Diagnostic Test Results:   EKG 4/2 -- Normal sinus rhythm  Inferior infarct (cited on or before 25-MAR-2021)  ACUTE MI / STEMI   Abnormal ECG  When compared with ECG of 25-MAR-2021 16:58    CXR 4/2 -- Persistent pulmonary vascular congestion     Stable right basilar patchy density may be related to atelectasis or pneumonia   Possible small right pleural effusion          Results from last 7 days   Lab Units 04/03/21  0512 04/02/21  1638 04/02/21  1154 03/30/21  1332   WBC Thousand/uL 21 62*  --  23 40* 25 34*   HEMOGLOBIN g/dL 12 1  --  12 2 12 5   HEMATOCRIT % 38 7  --  37 6* 37 7   PLATELETS Thousands/uL 439* 433* 424* 415*   NEUTROS ABS Thousands/µL 17 18*  --   --  22 20*   TOTAL NEUT ABS Thousand/uL  --   --  19 89*  --       Results from last 7 days   Lab Units 04/04/21  0454 04/03/21  0512 04/02/21  1154 03/31/21  0401 03/30/21  1332   SODIUM mmol/L 141 142 144* 148* 139   POTASSIUM mmol/L 4 3 4 5 4 4 3 2* 4 9   CHLORIDE mmol/L 114* 114* 108* 122* 110*   CO2 mmol/L 22 20* 24 19* 21   ANION GAP mmol/L 5 8 12 7 8   BUN mg/dL 30* 34* 35* 21 24   CREATININE mg/dL 1 97* 2 00* 1 99* 1 42* 2 03*   EGFR ml/min/1 73sq m 30 30 30 45 29   CALCIUM mg/dL 9 2 9 4 9 2 7 0* 9 6   MAGNESIUM mg/dL  --  2 6  --  1 8  --      Results from last 7 days   Lab Units 04/03/21  0512 04/02/21  1154   AST U/L 23 23   ALT U/L 49 38   ALK PHOS U/L 83 65   TOTAL PROTEIN g/dL 7 1 7 1   ALBUMIN g/dL 2 7* 3 6   TOTAL BILIRUBIN mg/dL 0 38 0 50     Results from last 7 days   Lab Units 04/04/21  0454 04/03/21  0512 04/02/21  1154 03/31/21  0401 03/30/21  1332   GLUCOSE RANDOM mg/dL 78 80 99 104 114     Results from last 7 days   Lab Units 03/30/21  1332   PH CHINTAN  7 315   PCO2 CHINTAN mm Hg 41 9*   PO2 CHINTAN mm Hg 66 2*   HCO3 CHINTAN mmol/L 20 9*   BASE EXC CHINTAN mmol/L -5 1   O2 CONTENT CHINTAN ml/dL 17 1   O2 HGB, VENOUS % 92 0*     Results from last 7 days   Lab Units 04/02/21  1638 04/02/21  1154   TROPONIN I ng/mL 19 00* 16 87*     Results from last 7 days   Lab Units 04/02/21  1154   PROTIME seconds 14 2   INR  1 11   PTT seconds 26     Results from last 7 days   Lab Units 04/02/21  1154   CRP mg/L 14 6*     Results from last 7 days   Lab Units 04/02/21  2033   CLARITY UA  Cloudy   COLOR UA  Yellow   SPEC GRAV UA  1 023   PH UA  5 5   GLUCOSE UA mg/dl Negative   KETONES UA mg/dl Negative   BLOOD UA  Small*   PROTEIN UA mg/dl 100 (2+)*   NITRITE UA  Negative   BILIRUBIN UA  Negative   UROBILINOGEN UA E U /dl 0 2   LEUKOCYTES UA Negative   WBC UA /hpf 10-20*   RBC UA /hpf 2-4   BACTERIA UA /hpf Occasional   EPITHELIAL CELLS WET PREP /hpf Moderate*   SODIUM UR  87   CREATININE UR mg/dL 79 6     Results from last 7 days   Lab Units 04/02/21  1154   TOTAL COUNTED  100       Past Medical History:   Diagnosis Date    Acute [facility] ([facility] )     Anxiety     Cardiac disease     Cardiomyopathy ([place] [address] )     CHF (congestive heart failure) (HCC)     Chronic kidney disease     Depression     History of chemotherapy     non hodgkins lymphoma 2008    Hypertension     Lymphoma, non-Hodgkin's (HCC)     Tobacco abuse      Present on Admission:   Chest pain      Admitting Diagnosis: MI (myocardial infarction) ([place] [address] ) [I21 9]  Age/Sex: 42 y o  female  Admission Orders:  Scheduled Medications:  ARIPiprazole, 10 mg, Oral, Daily  aspirin, 81 mg, Oral, Daily  atorvastatin, 80 mg, Oral, QPM  buPROPion, 300 mg, Oral, QAM  colchicine, 0 6 mg, Oral, BID  heparin (porcine), 7,500 Units, Subcutaneous, Q8H ABIEL  metoprolol succinate, 50 mg, Oral, BID  nicotine, 1 patch, Transdermal, Daily  pantoprazole, 40 mg, Oral, Daily  predniSONE, 60 mg, Oral, Daily  ranolazine, 500 mg, Oral, Q12H ABIEL  salmeterol, 1 puff, Inhalation, Q12H ABIEL  sertraline, 50 mg, Oral, Daily  ticagrelor, 90 mg, Oral, Q12H ABIEL  tiotropium, 18 mcg, Inhalation, Daily      PRN Meds:  acetaminophen, 650 mg, Oral, Q4H PRN 4/2 x1, 4/3 x1  albuterol, 2 puff, Inhalation, Q4H PRN  ALPRAZolam, 0 5 mg, Oral, TID PRN 4/2 x1, 4/3 x3  ondansetron, 4 mg, Intravenous, Q6H PRN        Network Utilization Review Department  ATTENTION: Please call with any questions or concerns to [phone] and carefully listen to the prompts so that you are directed to the right person  All voicemails are confidential   [facility] all requests for admission clinical reviews, approved or denied determinations and any other requests to dedicated fax number below belonging to the campus where the patient is receiving treatment   List of dedicated fax numbers for the Facilities:  [address] [place] [address] DENIALS (Administrative/Medical Necessity) [phone]   [address]  (Maternity/NICU/Pediatrics) 261 [facility],[address] [address] Allan Martinez 9545 (  Charla Crum "Abby" 103) [address] [place] [address] Rocio Hurley [address] [place] [address] [phone]

## 2021-04-05 ENCOUNTER — OFFICE VISIT (OUTPATIENT)
Dept: FAMILY MEDICINE CLINIC | Facility: CLINIC | Age: 46
End: 2021-04-05
Payer: COMMERCIAL

## 2021-04-05 VITALS
OXYGEN SATURATION: 98 % | SYSTOLIC BLOOD PRESSURE: 138 MMHG | HEART RATE: 75 BPM | HEIGHT: 66 IN | TEMPERATURE: 97.7 F | WEIGHT: 256 LBS | DIASTOLIC BLOOD PRESSURE: 72 MMHG | BODY MASS INDEX: 41.14 KG/M2

## 2021-04-05 DIAGNOSIS — I47.2 VENTRICULAR TACHYCARDIA, NON-SUSTAINED (HCC): ICD-10-CM

## 2021-04-05 DIAGNOSIS — F33.1 MODERATE EPISODE OF RECURRENT MAJOR DEPRESSIVE DISORDER (HCC): ICD-10-CM

## 2021-04-05 DIAGNOSIS — J44.9 CHRONIC OBSTRUCTIVE PULMONARY DISEASE, UNSPECIFIED COPD TYPE (HCC): ICD-10-CM

## 2021-04-05 DIAGNOSIS — I30.9 ACUTE PERICARDITIS, UNSPECIFIED TYPE: ICD-10-CM

## 2021-04-05 DIAGNOSIS — F41.9 ANXIETY: ICD-10-CM

## 2021-04-05 DIAGNOSIS — I21.19 ACUTE MI, INFERIOR WALL (HCC): Primary | ICD-10-CM

## 2021-04-05 DIAGNOSIS — I10 ESSENTIAL HYPERTENSION: ICD-10-CM

## 2021-04-05 DIAGNOSIS — N18.32 STAGE 3B CHRONIC KIDNEY DISEASE (HCC): ICD-10-CM

## 2021-04-05 PROCEDURE — 1111F DSCHRG MED/CURRENT MED MERGE: CPT | Performed by: FAMILY MEDICINE

## 2021-04-05 PROCEDURE — 99215 OFFICE O/P EST HI 40 MIN: CPT | Performed by: FAMILY MEDICINE

## 2021-04-05 RX ORDER — LORAZEPAM 0.5 MG/1
0.5 TABLET ORAL
Qty: 30 TABLET | Refills: 1 | Status: SHIPPED | OUTPATIENT
Start: 2021-04-05 | End: 2021-04-26 | Stop reason: ALTCHOICE

## 2021-04-05 RX ORDER — SERTRALINE HYDROCHLORIDE 100 MG/1
100 TABLET, FILM COATED ORAL DAILY
Qty: 30 TABLET | Refills: 5 | Status: SHIPPED | OUTPATIENT
Start: 2021-04-05 | End: 2021-08-04 | Stop reason: SDUPTHER

## 2021-04-05 NOTE — PATIENT INSTRUCTIONS
Wellbutrin discontinued as this will worsen her anxiety and it has not helped with smoking cessation  Zoloft was increased to 100mg daily  Xanax prn was discontinued as patient did not find this helpful, Ativan at bedtime to help with anxiety and insomnia  We spent 40 minutes discussing her struggles with her anxiety and depression, jake, prayer and her son's suicide  Patient denies suicidal ideation  F/U cardiology 4/19  Make appt with Nephrology today - we discussed ordering another BMP to watch her Creatinine/GFR which is currently stable  Patient would like to see to see what nephrology would like to do, I asked her to contact me if she cannot get into see them >4 weeks from today and I will order a BMP  F/U with me in 4 weeks for re-check on medication changes, sooner if needed

## 2021-04-05 NOTE — PROGRESS NOTES
TCM Call (since 3/5/2021)     Date and time call was made  4/1/2021  2:04 PM    Hospital care reviewed  Records reviewed    Patient was hospitialized at  [facility]        Date of Admission  03/25/21    Date of discharge  04/01/21    Diagnosis  Acute MI, inferior wall    Disposition  Home    Were the patients medications reviewed and updated  Yes    Current Symptoms  None      TCM Call (since 3/5/2021)     Post hospital issues  None    Should patient be enrolled in anticoag monitoring? No    Scheduled for follow up?   Yes    Patients specialists  Cardiologist    Cardiologist name  Pamela Gomez    Did you obtain your prescribed medications  Yes    Do you need help managing your prescriptions or medications  No    Is transportation to your appointment needed  No    I have advised the patient to call PCP with any new or worsening symptoms  Leonel Schlatter MA

## 2021-04-05 NOTE — PROGRESS NOTES
Assessment/Plan:    No problem-specific Assessment & Plan notes found for this encounter  Diagnoses and all orders for this visit:    Acute MI, inferior wall (HCC)    Anxiety  -     sertraline (ZOLOFT) 100 mg tablet; Take 1 tablet (100 mg total) by mouth daily  -     LORazepam (ATIVAN) 0 5 mg tablet; Take 1 tablet (0 5 mg total) by mouth daily at bedtime    Chronic obstructive pulmonary disease, unspecified COPD type ([facility] )    Acute pericarditis, unspecified type  -     sertraline (ZOLOFT) 100 mg tablet; Take 1 tablet (100 mg total) by mouth daily    Moderate episode of recurrent major depressive disorder (HCC)    Stage 3b chronic kidney disease    Essential hypertension    Ventricular tachycardia, non-sustained (HCC)          Subjective:   TCM Call (since 3/5/2021)     Date and time call was made  4/1/2021  2:04 PM    Hospital care reviewed  Records reviewed    Patient was hospitialized at  [facility]        Date of Admission  03/25/21    Date of discharge  04/01/21    Diagnosis  Acute MI, inferior wall    Disposition  Home    Were the patients medications reviewed and updated  Yes    Current Symptoms  None      TCM Call (since 3/5/2021)     Post hospital issues  None    Should patient be enrolled in anticoag monitoring? No    Scheduled for follow up? Yes    Patients specialists  Cardiologist    Cardiologist name  Eugenio Bates    Did you obtain your prescribed medications  Yes    Do you need help managing your prescriptions or medications  No    Is transportation to your appointment needed  No    I have advised the patient to call PCP with any new or worsening symptoms  Mariangel Marsh MA         Patient ID: Sharon Zepeda is a 42 y o  female  Patient presents for TCM visit, accompannied by her , after hospitalization at [facility] for chest pain after having an acute inferior wall MI, she had undergone cardiac cath with placement of a drug eluting stent    Her second hospitalization did not reveal a second coronary event  Patient was diagnosed with pericarditis and placed on colcrys and prednisone  All hospital records were reviewed  It was noted that patient was experiencing significant anxiety and this was likely contributory to her chest pain symptoms; it was recommended by cardiology that the patient discontinue the wellbutrin as this can increase anxiety  Patient reports taking it for smoking cessation which she has not found it helpful  Today patient reports (and is visibly) anxious having a new provider and she feels increased anxiety being out of the house stating she does not leave the house unless she absolutely has to do so  Patient denies current chest pain, she feels occasional twinges of pain that are sharp in nature but improved since admission and discharge  Patient was a smoker but has not smoked since discharge and is currently using the nicoderm transdermal patch which she finds helpful  We did have a lengthy (40 minute) discussion regarding her anxiety and depression which is a result of her health but more so from her son's suicide  She had been referred to psychiatry in the past who wanted her to attend group therapy but she refuses due to her social anxiety  Since discharge patient denies chest pressure, she will get exertional sob from COPD but also notes occasional resting sob from the pericarditis  She denies syncope, lightheadedness  She reports not being able to sleep and feels even more jumpy and anxious than usual which is likely a result of the high dose prednisone which I informed her will improved as the dose decreases  Patient reports weight loss surgery was discussed while she was in the hospital, given her co-morbid conditions and BMI she would be a candidate, however, this would not be something we would address for a year given her recent MI   She is scheduled to see Cardiology in F/U on 4/19 and Nephrology she will be making an appt - she missed her last appt due to her hospitalization  Her medications were reviewed and updated, she has stopped taking her meds because she ran out or didn't feel they were helpful  The following portions of the patient's history were reviewed and updated as appropriate: current medications, past family history, past medical history, past social history, past surgical history and problem list     Review of Systems   Constitutional: Negative  HENT: Negative  Eyes: Negative  Respiratory: Positive for shortness of breath  Negative for cough, chest tightness and wheezing  Cardiovascular: Positive for chest pain  Negative for palpitations and leg swelling  Twinges of sharp chest pain   Gastrointestinal: Negative  Endocrine: Negative  Genitourinary: Negative  Musculoskeletal: Negative  Skin: Negative  Neurological: Negative  Psychiatric/Behavioral: Positive for dysphoric mood and sleep disturbance  The patient is nervous/anxious  Objective:      /72 (BP Location: Left arm, Patient Position: Sitting, Cuff Size: Standard)   Pulse 75   Temp 97 7 °F (36 5 °C) (Tympanic)   Ht 5' 6" (1 676 m)   Wt 116 kg (256 lb)   LMP 03/07/2021 (Exact Date)   SpO2 98%   BMI 41 32 kg/m²          Physical Exam  Constitutional:       General: She is not in acute distress  Appearance: Normal appearance  She is obese  She is not ill-appearing, toxic-appearing or diaphoretic  HENT:      Head: Normocephalic and atraumatic  Right Ear: Tympanic membrane and ear canal normal       Left Ear: Tympanic membrane normal       Mouth/Throat:      Mouth: Mucous membranes are moist    Eyes:      Extraocular Movements: Extraocular movements intact  Conjunctiva/sclera: Conjunctivae normal       Pupils: Pupils are equal, round, and reactive to light  Neck:      Musculoskeletal: Neck supple  No muscular tenderness  Cardiovascular:      Rate and Rhythm: Normal rate and regular rhythm        Pulses: Normal pulses  Heart sounds: Normal heart sounds  No murmur  Pulmonary:      Effort: Pulmonary effort is normal  No respiratory distress  Breath sounds: Normal breath sounds  No stridor  No wheezing or rhonchi  Comments: Mild chest TTP  Chest:      Chest wall: Tenderness present  Abdominal:      General: Abdomen is flat  Tenderness: There is no abdominal tenderness  Lymphadenopathy:      Cervical: No cervical adenopathy  Skin:     General: Skin is warm and dry  Neurological:      Mental Status: She is alert and oriented to person, place, and time     Psychiatric:      Comments: Visibly anxious, tearful at times during the visit

## 2021-04-06 ENCOUNTER — TELEPHONE (OUTPATIENT)
Dept: FAMILY MEDICINE CLINIC | Facility: CLINIC | Age: 46
End: 2021-04-06

## 2021-04-06 ENCOUNTER — TRANSITIONAL CARE MANAGEMENT (OUTPATIENT)
Dept: FAMILY MEDICINE CLINIC | Facility: CLINIC | Age: 46
End: 2021-04-06

## 2021-04-08 NOTE — UTILIZATION REVIEW
Notification of Discharge  This is a Notification of Discharge from our facility [address]  Please be advised that this patient has been discharge from our facility  Below you will find the admission and discharge date and time including the patients disposition  PRESENTATION DATE: 3/25/2021  5:00 AM    IP ADMISSION DATE: 3/25/21 0500   DISCHARGE DATE: 4/1/2021  1:45 PM  DISPOSITION: Home/Self Care Home/Self Care   Admission Orders listed below:  Admission Orders (From admission, onward)     Ordered        03/25/21 0509  Inpatient Admission  Once                   Please contact the UR Department if additional information is required to close this patient's authorization/case  Ann Seat  Network Utilization Review Department  Main: [phone] x carefully listen to the prompts  All voicemails are confidential   [email or web address]  Send all requests for admission clinical reviews, approved or denied determinations and any other requests to dedicated fax number below belonging to the campus where the patient is receiving treatment   List of dedicated fax numbers:  [address] DENIALS (Administrative/Medical Necessity) [phone]   [address] (Maternity/NICU/Pediatrics) [phone]   Quin Alcantara [phone]   Keiko Horner [phone]   Yojana Mireles [phone]   [facility] [address] [place] [address] [phone]   [facility]  [phone]   [address] [facility] [address]  [address] [facility] 1000 [address] [facility] [phone]

## 2021-04-09 LAB
ATRIAL RATE: 78 BPM
P AXIS: 54 DEGREES
PR INTERVAL: 174 MS
QRS AXIS: -22 DEGREES
QRSD INTERVAL: 112 MS
QT INTERVAL: 388 MS
QTC INTERVAL: 442 MS
T WAVE AXIS: 73 DEGREES
VENTRICULAR RATE: 78 BPM

## 2021-04-09 PROCEDURE — 93010 ELECTROCARDIOGRAM REPORT: CPT | Performed by: INTERNAL MEDICINE

## 2021-04-12 ENCOUNTER — TELEPHONE (OUTPATIENT)
Dept: NEPHROLOGY | Facility: CLINIC | Age: 46
End: 2021-04-12

## 2021-04-12 NOTE — TELEPHONE ENCOUNTER
----- Message from France Daley sent at 4/7/2021 11:17 AM EDT -----    ----- Message -----  From: Justen Forte MD  Sent: 4/4/2021   4:57 PM EDT  To: Nephrology [employer]    She got discharged from [address] [place] [address] today  She will need follow-up with Dr Laya Snow or Gloria Vee (prior followed with Dr Dexter Paz) in their office  She should have repeat BMP in 3-4 days with results to Dr Laya Snow

## 2021-04-12 NOTE — TELEPHONE ENCOUNTER
----- Message from Dominguez Rosa sent at 4/7/2021 11:17 AM EDT -----    ----- Message -----  From: Giselle Fermin MD  Sent: 4/4/2021   4:57 PM EDT  To: Nephrology [employer]    She got discharged from [facility] today  She will need follow-up with Dr Maddison Escobedo or Chastity Dudley (prior followed with Dr Phillip Arellano) in their office  She should have repeat BMP in 3-4 days with results to Dr Maddison Escobedo

## 2021-04-13 ENCOUNTER — OFFICE VISIT (OUTPATIENT)
Dept: FAMILY MEDICINE CLINIC | Facility: CLINIC | Age: 46
End: 2021-04-13
Payer: COMMERCIAL

## 2021-04-13 VITALS
OXYGEN SATURATION: 98 % | HEART RATE: 90 BPM | WEIGHT: 256 LBS | TEMPERATURE: 97.3 F | HEIGHT: 66 IN | SYSTOLIC BLOOD PRESSURE: 160 MMHG | DIASTOLIC BLOOD PRESSURE: 80 MMHG | BODY MASS INDEX: 41.14 KG/M2

## 2021-04-13 DIAGNOSIS — R52 GENERALIZED BODY ACHES: Primary | ICD-10-CM

## 2021-04-13 DIAGNOSIS — K62.5 RECTAL BLEEDING: ICD-10-CM

## 2021-04-13 DIAGNOSIS — F41.9 ANXIETY: ICD-10-CM

## 2021-04-13 PROCEDURE — 1111F DSCHRG MED/CURRENT MED MERGE: CPT | Performed by: FAMILY MEDICINE

## 2021-04-13 PROCEDURE — 3077F SYST BP >= 140 MM HG: CPT | Performed by: FAMILY MEDICINE

## 2021-04-13 PROCEDURE — 3725F SCREEN DEPRESSION PERFORMED: CPT | Performed by: FAMILY MEDICINE

## 2021-04-13 PROCEDURE — 3079F DIAST BP 80-89 MM HG: CPT | Performed by: FAMILY MEDICINE

## 2021-04-13 PROCEDURE — 99214 OFFICE O/P EST MOD 30 MIN: CPT | Performed by: FAMILY MEDICINE

## 2021-04-13 NOTE — PROGRESS NOTES
Assessment/Plan:    No problem-specific Assessment & Plan notes found for this encounter  Diagnoses and all orders for this visit:    Generalized body aches    Anxiety    Rectal bleeding  -     Ambulatory referral to Gastroenterology; Future        Patient Instructions   Can increase ativan to twice daily as needed while on prednisone  Currently only using 7mg nicoderm patch - was 1ppd smoker  Increase to 2 patches for 2 weeks then 1 patch daily  Refer to GI - rectal bleeding/hematochezia      Symptoms of generalized aches and increased anxiety/agitation most likely from high dose prednisone in addition to trying to quit smoking  F/U as scheduled in 3 weeks      Subjective:      Patient ID: Khris Jones is a 42 y o  female  2 days ago awoke feeling sore in the elbows and knees, yesterday awoke with worse pain in long bones of arms, femurs, R  Hip and bilateral ribs  described as a deep aching pain  Today pain is the worst  Taking acetaminophen with no improvement  Pain deep muscular and bones  No falls, did not strike herself against anything  When lies down feels throbbing  "It feels like a bad bruise everywhere"  No chest pain or sob  No difficulties with asthma  Feels jittery being on the prednisone on top of her existing anxiety  She also notes rectal bleeding with each bowel movement, sometimes experiences clots  It was noted in the hospital record documentation patient had mentioned she was experiencing rectal bleeding  She continues to have depression and depressive mood but she is please the ativan helps her sleep and when she wakes up she does feel better  Generalized Body Aches  The problem occurs constantly  The problem has been gradually worsening since onset  The pain is moderate  Associated symptoms include joint pain   Pertinent negatives include no congestion, ear discharge, ear pain, eye discharge, eye redness, headaches, mouth sores, photophobia, rhinorrhea, sore throat, fatigue, fever, chest pain, wheezing, abdominal pain, constipation, diarrhea, nausea, vomiting, joint swelling, muscle aches, neck stiffness or rash  Past treatments include acetaminophen  The following portions of the patient's history were reviewed and updated as appropriate: She  has a past medical history of Acute MI ([facility] ), Anxiety, Cardiac disease, Cardiomyopathy ([facility] ), CHF (congestive heart failure) ([place] [address] ), Chronic kidney disease, Depression, History of chemotherapy, Hypertension, Lymphoma, non-Hodgkin's ([facility] ), Myocardial infarction ([facility] ), and Tobacco abuse       Review of Systems   Constitutional: Negative for fatigue and fever  HENT: Negative for congestion, ear discharge, ear pain, mouth sores, rhinorrhea and sore throat  Eyes: Negative for photophobia, discharge and redness  Respiratory: Negative for wheezing  Cardiovascular: Negative for chest pain  Gastrointestinal: Positive for blood in stool  Negative for abdominal distention, abdominal pain, anal bleeding, constipation, diarrhea, nausea, rectal pain and vomiting  Genitourinary: Negative  Musculoskeletal: Positive for arthralgias, back pain and joint pain  Negative for joint swelling  Skin: Negative for rash  Neurological: Negative for dizziness, tremors, weakness, numbness and headaches  Psychiatric/Behavioral: Positive for dysphoric mood  The patient is nervous/anxious  Objective:      /80 (BP Location: Left arm, Patient Position: Sitting, Cuff Size: Standard)   Pulse 90   Temp (!) 97 3 °F (36 3 °C) (Tympanic)   Ht 5' 6" (1 676 m)   Wt 116 kg (256 lb)   SpO2 98%   BMI 41 32 kg/m²          Physical Exam  Constitutional:       General: She is not in acute distress  Appearance: Normal appearance  She is not ill-appearing, toxic-appearing or diaphoretic  HENT:      Head: Normocephalic and atraumatic  Cardiovascular:      Rate and Rhythm: Normal rate and regular rhythm  Pulses: Normal pulses  Heart sounds: Normal heart sounds  Pulmonary:      Effort: Pulmonary effort is normal       Breath sounds: Normal breath sounds  Abdominal:      General: Abdomen is flat  Musculoskeletal:         General: Tenderness present  Right lower leg: No edema  Left lower leg: No edema  Comments: TTP bilateral ribs, femurs, tibia, right SI joint, no rashes or erythema, no swelling  Skin:     General: Skin is warm and dry  Neurological:      General: No focal deficit present  Mental Status: She is alert and oriented to person, place, and time     Psychiatric:         Behavior: Behavior normal       Comments: Flat affect, near tears, repeatedly apologetic

## 2021-04-13 NOTE — PATIENT INSTRUCTIONS
Can increase ativan to twice daily as needed while on prednisone  Currently only using 7mg nicoderm patch - was 1ppd smoker  Increase to 2 patches for 2 weeks then 1 patch daily  Refer to GI - rectal bleeding/hematochezia      Symptoms of generalized aches and increased anxiety/agitation most likely from high dose prednisone in addition to trying to quit smoking    F/U as scheduled in 3 weeks

## 2021-04-19 ENCOUNTER — OFFICE VISIT (OUTPATIENT)
Dept: CARDIOLOGY CLINIC | Facility: CLINIC | Age: 46
End: 2021-04-19
Payer: COMMERCIAL

## 2021-04-19 VITALS
DIASTOLIC BLOOD PRESSURE: 80 MMHG | HEART RATE: 83 BPM | BODY MASS INDEX: 41.95 KG/M2 | SYSTOLIC BLOOD PRESSURE: 128 MMHG | OXYGEN SATURATION: 98 % | WEIGHT: 261 LBS | HEIGHT: 66 IN

## 2021-04-19 DIAGNOSIS — I30.9 ACUTE PERICARDITIS: ICD-10-CM

## 2021-04-19 DIAGNOSIS — Z72.0 TOBACCO ABUSE: ICD-10-CM

## 2021-04-19 DIAGNOSIS — E66.01 CLASS 3 SEVERE OBESITY DUE TO EXCESS CALORIES WITH BODY MASS INDEX (BMI) OF 40.0 TO 44.9 IN ADULT, UNSPECIFIED WHETHER SERIOUS COMORBIDITY PRESENT (HCC): ICD-10-CM

## 2021-04-19 DIAGNOSIS — N17.9 AKI (ACUTE KIDNEY INJURY) (HCC): ICD-10-CM

## 2021-04-19 DIAGNOSIS — Z95.5 S/P DRUG ELUTING CORONARY STENT PLACEMENT: ICD-10-CM

## 2021-04-19 DIAGNOSIS — R00.2 PALPITATIONS: Primary | ICD-10-CM

## 2021-04-19 DIAGNOSIS — E78.2 MIXED HYPERLIPIDEMIA: ICD-10-CM

## 2021-04-19 DIAGNOSIS — I42.2 HYPERTROPHIC CARDIOMYOPATHY (HCC): ICD-10-CM

## 2021-04-19 DIAGNOSIS — G47.33 OSA (OBSTRUCTIVE SLEEP APNEA): ICD-10-CM

## 2021-04-19 DIAGNOSIS — I10 HYPERTENSION, UNSPECIFIED TYPE: ICD-10-CM

## 2021-04-19 DIAGNOSIS — I31.9 PERICARDITIS, UNSPECIFIED CHRONICITY, UNSPECIFIED TYPE: ICD-10-CM

## 2021-04-19 PROCEDURE — 99215 OFFICE O/P EST HI 40 MIN: CPT | Performed by: NURSE PRACTITIONER

## 2021-04-19 RX ORDER — RANOLAZINE 500 MG/1
500 TABLET, EXTENDED RELEASE ORAL EVERY 12 HOURS SCHEDULED
Qty: 60 TABLET | Refills: 0 | Status: SHIPPED | OUTPATIENT
Start: 2021-04-19 | End: 2021-05-10 | Stop reason: SDUPTHER

## 2021-04-19 RX ORDER — ATORVASTATIN CALCIUM 80 MG/1
80 TABLET, FILM COATED ORAL EVERY EVENING
Qty: 90 TABLET | Refills: 0 | Status: SHIPPED | OUTPATIENT
Start: 2021-04-19 | End: 2022-07-13 | Stop reason: SDUPTHER

## 2021-04-19 RX ORDER — COLCHICINE 0.6 MG/1
0.6 TABLET ORAL 2 TIMES DAILY
Qty: 180 TABLET | Refills: 0 | Status: SHIPPED | OUTPATIENT
Start: 2021-04-19 | End: 2021-05-10 | Stop reason: SDUPTHER

## 2021-04-19 RX ORDER — METOPROLOL SUCCINATE 50 MG/1
50 TABLET, EXTENDED RELEASE ORAL 2 TIMES DAILY
Qty: 180 TABLET | Refills: 0 | Status: SHIPPED | OUTPATIENT
Start: 2021-04-19 | End: 2021-05-03 | Stop reason: SDUPTHER

## 2021-04-19 NOTE — PATIENT INSTRUCTIONS
2gm sodium low fat low cholesterol diet,eating fresh is best, 1500 to 1800 cc fluid restriction  Check daily weights  If you gain 3 pounds in one day, 5 pounds in one week, or experience worsening shortness of breath or increasing lower leg swelling  Please call the heart failure office at [phone]    Please bring a  list of your current medications and daily weights to the office visit    HCM hand out information  BMP in near future  48 Hour Holter monitor   Sleep specialty  Weight loss management

## 2021-04-19 NOTE — PROGRESS NOTES
Cardiology Follow Up    Josselyn Spangler  1975  [id number]  [facility] [place]  [address] [place]  [address]  [phone]  [phone]    1  Acute MI, inferior wall [facility])  Ambulatory referral to Cardiology       Interval History:  Ms Josselyn Spangler presented to [facility]  [facility] ED with STEMI  [place] presented with a 3 hour history of midsternal chest pain  Troponin 0 29  EKG showed IW STEMI  Ms Dangelo Officer was transferred to [facility] with an acute IWSTEMI  She was admitted to [facility] on from 3/25 - 4/01/21 with IW STEMI   3/25/21  Urgent cardiac catheterization showed left main normal size moderate plaque, lad vessel normal size moderate non obstructive plaque, circumflex medium-sized moderate plaque, RCA large size dominant giving rise to PDA and large posterolateral branch cleared there was a total  Occlusion in mid vessel  Culture of the patient's inferior MI  A stent in the distal RCA was shown to be pain after reperfusion with salma of the distal vessel  Following pre dilatation I was interrogation Xience 0 drug-eluting stent was placed across the 100% lesion in the mid vessel  TTE showed   Left ventricular systolic function normal, LVEF 60%  There was severe hypokinesis of the basal mid inferior wall  Wall thickness mildly increased  Severe asymmetric hypertrophy of the inferior septum 25 mm  There was no dynamic obstruction at rest   Provocative maneuvers were not performed  Grade 2 diastolic dysfunction  Left atrium mildly dilated, mild-to-moderate mitral valve regurgitation  Trace aortic valve regurgitation  Later on in the am several episodes on NSVT and worsening CP   3/25/21 LHC   Discrete 90% stenosis at the ostium of the vessel segment of the right PDA  Large filling defect consistent with thrombus  Second posterolateral segment 100% stenosis    Lesion associated with moderate filling defect consistent with thrombus  Successful balloon angioplasty with stent procedure performed on the 90% lesion in the right posterior descending artery  A residue on X Rx drug-eluting stent placed across the lesion deployed at maximum inflation  3/29/21 Cardiac MRI showed   Severe concentric left ventricle hypertrophy with mildly reduced left ventricular systolic function LVEF 93%, normal right ventricle size systolic function  Mild mitral valve regurgitation, moderate dilatation of the left atrium, small circumferential pericardial effusion  Dense transmural ischemic scarring with evidence of microvascular obstruction of the entire inferior inferior lateral wall  Findings suggest underlying hypertrophic cardiomyopathy with ischemic scarring of the inferior inferior lateral wall  Ms Jessica Templeton with chest pain on inspiration  Clinically consistent with pericarditis  Started on prednisone with relief over 3 days  She was discharged home with lifestyle  She was discharged on  Aspirin 81 mg daily, Brilinta 90 mg q 12 hours, Lipitor 80 mg daily, colchicine 0 6 mg b i d , metoprolol succinate 50 mg b i d , lisinopril 5 mg daily, prednisone tapering dose  On 4/02/21 Ms Manuel Perez presented to [facility] with chest pain  EKG was concerning for STEMI  She was  Given an event for anxiety  Cardiology recommended additional Brilinta 90 mg and transferred to [facility]  Ms Manuel Perez was admitted to [facility] on 4/02/21 to 4/04/21 with Chest pain  With review of prior EKG she she was noted to have ST elevations prior to discharge from her last hospitalization  On 4/02/21 she underwent a cardiac catheterization which showed Distal LAD 60% stenosis, mid circumflex 50% stenosis, distal RCA 10% stenosis at the site of prior stent, right PDA 0% stenosis the site of prior stent     Hospitalization course was complicated by ANTHONY on CKD due to contrast induced nephropathy in the setting of lisinopril use  Kidney function was monitored  Nephrology was consulted  She was instructed to follow-up with her primary care physician for anxiety  She was instructed follow up with Cardiology  Discharge lab studies showed sodium 141, potassium 4 3, BUN 30, creatinine 1 97, GFR 30  Ms Rosie Carlton presents to our office for a recent hospitalization follow up visit  She admits to some soreness in her left throat with swallowing  She admits to occasional random daily multiple times a day heart racing with associated feelings of lightheadedness, dizziness, short of breath, lasting seconds  She admits to continue smoking 4 cigarettes a day  Charmaynemayne Duster admits to occasional stabbing pains in her left chest area  She admits to snoring at night with daytime fatigue             HPI:  CAD  Stent to LAD  Hypertension  Hyperlipidemia 3/25/21 , , HDL 34,   CKD III baseline creat 1 4 - 1 7  Obesity    Non-Hodgkin's lymphoma prior chest radiation  Tobacco abuse   7/20/20 HgbA1 C 5 3   Patient Active Problem List   Diagnosis    Chest pain    Essential hypertension    Epistaxis    Tobacco abuse    History of MI (myocardial infarction)    CHF (congestive heart failure) (HCC)    Major depressive disorder with current active episode    Anxiety    CAD (coronary artery disease)    Lump of left breast    Non-Hodgkin lymphoma of lymph nodes of multiple regions (HCC)    Positive depression screening    CKD (chronic kidney disease) stage 3, GFR 30-59 ml/min    Mixed hyperlipidemia    BMI 40 0-44 9, adult (HCC)    Cervical cyst    Nabothian cyst    Pelvic floor dysfunction    Cervical motion tenderness    Encounter for routine gynecological examination with Papanicolaou smear of cervix    Other iron deficiency anemias    Iron deficiency anemia secondary to inadequate dietary iron intake    Hypercalcemia    Proteinuria    Acute MI, inferior wall (HCC)    Ventricular tachycardia, non-sustained (HCC)    Leukocytosis    Hypertrophic cardiomyopathy (HCC)    Acute pericarditis    COPD (chronic obstructive pulmonary disease) (HCC)     Past Medical History:   Diagnosis Date    Acute MI ([facility] )     Anxiety     Cardiac disease     Cardiomyopathy ([facility] )     CHF (congestive heart failure) (HCC)     Chronic kidney disease     Depression     History of chemotherapy     non hodgkins lymphoma     Hypertension     Lymphoma, non-Hodgkin's (HCC)     Myocardial infarction ([facility] )     Tobacco abuse      Social History     Socioeconomic History    Marital status: /Civil Union     Spouse name: Not on file    Number of children: Not on file    Years of education: Not on file    Highest education level: Not on file   Occupational History    Not on file   Social Needs    Financial resource strain: Not on file    Food insecurity     Worry: Not on file     Inability: Not on file    Transportation needs     Medical: Not on file     Non-medical: Not on file   Tobacco Use    Smoking status: Current Some Day Smoker     Packs/day: 1 00     Types: Cigarettes     Last attempt to quit: 3/29/2021     Years since quittin 0    Smokeless tobacco: Current User   Substance and Sexual Activity    Alcohol use: Not Currently     Comment: rarely    Drug use: Never    Sexual activity: Yes     Partners: Male     Birth control/protection: Male Sterilization     Comment:    Lifestyle    Physical activity     Days per week: Not on file     Minutes per session: Not on file    Stress: Not on file   Relationships    Social connections     Talks on phone: Not on file     Gets together: Not on file     Attends Yazidi service: Not on file     Active member of club or organization: Not on file     Attends meetings of clubs or organizations: Not on file     Relationship status: Not on file    Intimate partner violence     Fear of current or ex partner: Not on file     Emotionally abused: Not on file     Physically abused: Not on file     Forced sexual activity: Not on file   Other Topics Concern    Not on file   Social History Narrative    Not on file      Family History   Problem Relation Age of Onset    No Known Problems Mother     No Known Problems Father     No Known Problems Daughter     Brain cancer Maternal Grandfather     No Known Problems Paternal Aunt     No Known Problems Paternal Aunt      Past Surgical History:   Procedure Laterality Date    CARDIAC CATHETERIZATION      CAROTID STENT       SECTION         Current Outpatient Medications:     acetaminophen (TYLENOL) 325 mg tablet, Take 2 tablets (650 mg total) by mouth every 6 (six) hours as needed for mild pain, Disp: 1 Bottle, Rfl: 0    albuterol (PROVENTIL HFA,VENTOLIN HFA) 90 mcg/act inhaler, Inhale 2 puffs every 4 (four) hours as needed for wheezing or shortness of breath, Disp: 1 Inhaler, Rfl: 0    ARIPiprazole (ABILIFY) 10 mg tablet, Take 1 tablet (10 mg total) by mouth daily, Disp: 90 tablet, Rfl: 2    aspirin 81 mg chewable tablet, Chew 1 tablet (81 mg total) daily, Disp: 90 tablet, Rfl: 0    atorvastatin (LIPITOR) 80 mg tablet, Take 1 tablet (80 mg total) by mouth every evening, Disp: 90 tablet, Rfl: 0    colchicine (COLCRYS) 0 6 mg tablet, Take 1 tablet (0 6 mg total) by mouth 2 (two) times a day, Disp: 180 tablet, Rfl: 0    LORazepam (ATIVAN) 0 5 mg tablet, Take 1 tablet (0 5 mg total) by mouth daily at bedtime, Disp: 30 tablet, Rfl: 1    metoprolol succinate (TOPROL-XL) 50 mg 24 hr tablet, Take 1 tablet (50 mg total) by mouth 2 (two) times a day, Disp: 180 tablet, Rfl: 0    nicotine (NICODERM CQ) 7 mg/24hr TD 24 hr patch, Place 1 patch on the skin daily, Disp: 28 patch, Rfl: 0    predniSONE 10 mg tablet, Take 6 tablets (60 mg total) by mouth daily for 7 days, THEN 5 tablets (50 mg total) daily for 7 days, THEN 4 tablets (40 mg total) daily for 7 days, THEN 3 tablets (30 mg total) daily for 7 days, THEN 2 tablets (20 mg total) daily for 7 days, THEN 1 tablet (10 mg total) daily for 7 days  , Disp: 147 tablet, Rfl: 0    ranolazine (RANEXA) 500 mg 12 hr tablet, Take 1 tablet (500 mg total) by mouth every 12 (twelve) hours, Disp: 60 tablet, Rfl: 0    sertraline (ZOLOFT) 100 mg tablet, Take 1 tablet (100 mg total) by mouth daily, Disp: 30 tablet, Rfl: 5    ticagrelor (BRILINTA) 90 MG, Take 1 tablet (90 mg total) by mouth every 12 (twelve) hours, Disp: 60 tablet, Rfl: 0    umeclidinium-vilanterol (ANORO ELLIPTA) 62 5-25 MCG/INH inhaler, Inhale 1 puff daily, Disp: 1 Inhaler, Rfl: 0  No Known Allergies    Labs:  Admission on 04/02/2021, Discharged on 04/04/2021   Component Date Value    Platelets 97/98/9376 433*    MPV 04/02/2021 10 6     CRP 04/02/2021 14 6*    Ventricular Rate 04/02/2021 65     Atrial Rate 04/02/2021 65     MA Interval 04/02/2021 160     QRSD Interval 04/02/2021 110     QT Interval 04/02/2021 446     QTC Interval 04/02/2021 463     P Axis 04/02/2021 37     QRS Axis 04/02/2021 -12     T Wave Axis 04/02/2021 39     Clarity, UA 04/02/2021 Cloudy     Color, UA 04/02/2021 Yellow     Specific Gravity, UA 04/02/2021 1 023     pH, UA 04/02/2021 5 5     Glucose, UA 04/02/2021 Negative     Ketones, UA 04/02/2021 Negative     Blood, UA 04/02/2021 Small*    Protein, UA 04/02/2021 100 (2+)*    Nitrite, UA 04/02/2021 Negative     Bilirubin, UA 04/02/2021 Negative     Urobilinogen, UA 04/02/2021 0 2     Leukocytes, UA 04/02/2021 Negative     WBC, UA 04/02/2021 10-20*    RBC, UA 04/02/2021 2-4     Hyaline Casts, UA 04/02/2021 3-5*    Bacteria, UA 04/02/2021 Occasional     Epithelial Cells 04/02/2021 Moderate*    Sodium, Ur 04/02/2021 87     Chloride, Ur 04/02/2021 80     Creatinine, Ur 04/02/2021 79 6     Urea Nitrogen, Ur 04/02/2021 604     Troponin I 04/02/2021 19 00*    WBC 04/03/2021 21 62*    RBC 04/03/2021 4 40     Hemoglobin 04/03/2021 12 1     Hematocrit 04/03/2021 38 7     MCV 04/03/2021 88     MCH 04/03/2021 27 5     MCHC 04/03/2021 31 3*    RDW 04/03/2021 17 7*    MPV 04/03/2021 10 9     Platelets 49/66/1395 439*    nRBC 04/03/2021 0     Neutrophils Relative 04/03/2021 80*    Immat GRANS % 04/03/2021 2     Lymphocytes Relative 04/03/2021 11*    Monocytes Relative 04/03/2021 7     Eosinophils Relative 04/03/2021 0     Basophils Relative 04/03/2021 0     Neutrophils Absolute 04/03/2021 17 18*    Immature Grans Absolute 04/03/2021 0 49*    Lymphocytes Absolute 04/03/2021 2 31     Monocytes Absolute 04/03/2021 1 55*    Eosinophils Absolute 04/03/2021 0 02     Basophils Absolute 04/03/2021 0 07     Magnesium 04/03/2021 2 6     Sodium 04/03/2021 142     Potassium 04/03/2021 4 5     Chloride 04/03/2021 114*    CO2 04/03/2021 20*    ANION GAP 04/03/2021 8     BUN 04/03/2021 34*    Creatinine 04/03/2021 2 00*    Glucose 04/03/2021 80     Calcium 04/03/2021 9 4     Corrected Calcium 04/03/2021 10 4*    AST 04/03/2021 23     ALT 04/03/2021 49     Alkaline Phosphatase 04/03/2021 83     Total Protein 04/03/2021 7 1     Albumin 04/03/2021 2 7*    Total Bilirubin 04/03/2021 0 38     eGFR 04/03/2021 30     Sodium 04/04/2021 141     Potassium 04/04/2021 4 3     Chloride 04/04/2021 114*    CO2 04/04/2021 22     ANION GAP 04/04/2021 5     BUN 04/04/2021 30*    Creatinine 04/04/2021 1 97*    Glucose 04/04/2021 78     Calcium 04/04/2021 9 2     eGFR 04/04/2021 30     Ventricular Rate 04/02/2021 66     Atrial Rate 04/02/2021 66     ID Interval 04/02/2021 170     QRSD Interval 04/02/2021 118     QT Interval 04/02/2021 442     QTC Interval 04/02/2021 463     P Axis 04/02/2021 40     QRS Axis 04/02/2021 -17     T Wave Axis 04/02/2021 24     Ventricular Rate 04/02/2021 78     Atrial Rate 04/02/2021 78     ID Interval 04/02/2021 174     QRSD Interval 04/02/2021 112     QT Interval 04/02/2021 388     QTC Interval 04/02/2021 442  P Axis 04/02/2021 54     QRS Axis 04/02/2021 -25     T Wave [place] 04/02/2021 73    Admission on 04/02/2021, Discharged on 04/02/2021   Component Date Value    WBC 04/02/2021 23 40*    RBC 04/02/2021 4 40     Hemoglobin 04/02/2021 12 2     Hematocrit 04/02/2021 37 6*    MCV 04/02/2021 86     MCH 04/02/2021 27 8     MCHC 04/02/2021 32 5     RDW 04/02/2021 17 9*    MPV 04/02/2021 8 6     Platelets 16/09/0108 424*    Sodium 04/02/2021 144*    Potassium 04/02/2021 4 4     Chloride 04/02/2021 108*    CO2 04/02/2021 24     ANION GAP 04/02/2021 12     BUN 04/02/2021 35*    Creatinine 04/02/2021 1 99*    Glucose 04/02/2021 99     Calcium 04/02/2021 9 2     AST 04/02/2021 23     ALT 04/02/2021 38     Alkaline Phosphatase 04/02/2021 65     Total Protein 04/02/2021 7 1     Albumin 04/02/2021 3 6     Total Bilirubin 04/02/2021 0 50     eGFR 04/02/2021 30     Troponin I 04/02/2021 16 87*    PTT 04/02/2021 26     Protime 04/02/2021 14 2     INR 04/02/2021 1 11     Segmented % 04/02/2021 85*    Lymphocytes % 04/02/2021 8*    Monocytes % 04/02/2021 7     Neutrophils Absolute 04/02/2021 19 89*    Lymphocytes Absolute 04/02/2021 1 87     Monocytes Absolute 04/02/2021 1 64*    Total Counted 04/02/2021 100     RBC Morphology 04/02/2021 Normal     Platelet Estimate 43/82/9695 Increased*   Admission on 03/25/2021, Discharged on 04/01/2021   Component Date Value    Activated Clotting Time,* 03/25/2021 237*    Specimen Type 03/25/2021 ARTERIAL     Sodium 03/25/2021 138     Potassium 03/25/2021 4 0     Chloride 03/25/2021 110*    CO2 03/25/2021 23     ANION GAP 03/25/2021 5     BUN 03/25/2021 14     Creatinine 03/25/2021 1 68*    Glucose 03/25/2021 125     Calcium 03/25/2021 9 2     eGFR 03/25/2021 37     Troponin I 03/25/2021 39 70*    Cholesterol 03/25/2021 202*    Triglycerides 03/25/2021 230*    HDL, Direct 03/25/2021 34*    LDL Calculated 03/25/2021 122*    WBC 03/25/2021 20 69*    RBC 03/25/2021 5 25*    Hemoglobin 03/25/2021 14 7     Hematocrit 03/25/2021 44 1     MCV 03/25/2021 84     MCH 03/25/2021 28 0     MCHC 03/25/2021 33 3     RDW 03/25/2021 18 5*    Platelets 80/72/8589 436*    MPV 03/25/2021 11 1     Magnesium 03/25/2021 2 1     Troponin I 03/25/2021 >40 00*    Ventricular Rate 03/25/2021 81     Atrial Rate 03/25/2021 81     ND Interval 03/25/2021 152     QRSD Interval 03/25/2021 92     QT Interval 03/25/2021 418     QTC Interval 03/25/2021 485     P Axis 03/25/2021 22     QRS Axis 03/25/2021 -14     T Wave Axis 03/25/2021 86     Ventricular Rate 03/25/2021 76     Atrial Rate 03/25/2021 76     ND Interval 03/25/2021 154     QRSD Interval 03/25/2021 112     QT Interval 03/25/2021 424     QTC Interval 03/25/2021 477     P Axis 03/25/2021 28     QRS Axis 03/25/2021 19     T Wave Axis 03/25/2021 90     Activated Clotting Time,* 03/25/2021 312*    Specimen Type 03/25/2021 VENOUS     Ventricular Rate 03/25/2021 74     Atrial Rate 03/25/2021 74     ND Interval 03/25/2021 158     QRSD Interval 03/25/2021 90     QT Interval 03/25/2021 442     QTC Interval 03/25/2021 490     P Axis 03/25/2021 16     QRS Axis 03/25/2021 -16     T Wave Axis 03/25/2021 98     Ventricular Rate 03/25/2021 71     Atrial Rate 03/25/2021 71     ND Interval 03/25/2021 158     QRSD Interval 03/25/2021 88     QT Interval 03/25/2021 454     QTC Interval 03/25/2021 493     P Axis 03/25/2021 19     QRS Axis 03/25/2021 -16     T Wave Axis 03/25/2021 97     Ventricular Rate 03/25/2021 70     Atrial Rate 03/25/2021 70     ND Interval 03/25/2021 158     QRSD Interval 03/25/2021 96     QT Interval 03/25/2021 470     QTC Interval 03/25/2021 507     P Axis 03/25/2021 23     QRS Axis 03/25/2021 -17     T Wave Axis 03/25/2021 93     WBC 03/26/2021 22 52*    RBC 03/26/2021 4 75     Hemoglobin 03/26/2021 13 2     Hematocrit 03/26/2021 40 4     MCV 03/26/2021 85  MCH 03/26/2021 27 8     MCHC 03/26/2021 32 7     RDW 03/26/2021 18 8*    Platelets 58/35/9036 392*    MPV 03/26/2021 10 6     Sodium 03/26/2021 137     Potassium 03/26/2021 4 7     Chloride 03/26/2021 108     CO2 03/26/2021 22     ANION GAP 03/26/2021 7     BUN 03/26/2021 12     Creatinine 03/26/2021 1 49*    Glucose 03/26/2021 101     Calcium 03/26/2021 9 0     eGFR 03/26/2021 42     Sodium 03/28/2021 141     Potassium 03/28/2021 3 8     Chloride 03/28/2021 111*    CO2 03/28/2021 24     ANION GAP 03/28/2021 6     BUN 03/28/2021 13     Creatinine 03/28/2021 1 62*    Glucose 03/28/2021 97     Calcium 03/28/2021 8 8     eGFR 03/28/2021 38     CRP 03/28/2021 153 0*    WBC 03/30/2021 25 34*    RBC 03/30/2021 4 41     Hemoglobin 03/30/2021 12 5     Hematocrit 03/30/2021 37 7     MCV 03/30/2021 86     MCH 03/30/2021 28 3     MCHC 03/30/2021 33 2     RDW 03/30/2021 17 5*    MPV 03/30/2021 10 8     Platelets 02/56/0991 415*    nRBC 03/30/2021 0     Neutrophils Relative 03/30/2021 87*    Immat GRANS % 03/30/2021 2     Lymphocytes Relative 03/30/2021 4*    Monocytes Relative 03/30/2021 7     Eosinophils Relative 03/30/2021 0     Basophils Relative 03/30/2021 0     Neutrophils Absolute 03/30/2021 22 20*    Immature Grans Absolute 03/30/2021 0 38*    Lymphocytes Absolute 03/30/2021 1 02     Monocytes Absolute 03/30/2021 1 69*    Eosinophils Absolute 03/30/2021 0 02     Basophils Absolute 03/30/2021 0 03     Sodium 03/30/2021 139     Potassium 03/30/2021 4 9     Chloride 03/30/2021 110*    CO2 03/30/2021 21     ANION GAP 03/30/2021 8     BUN 03/30/2021 24     Creatinine 03/30/2021 2 03*    Glucose 03/30/2021 114     Calcium 03/30/2021 9 6     eGFR 03/30/2021 29     pH, Kervin 03/30/2021 7 315     pCO2, Kervin 03/30/2021 41 9*    pO2, Kervin 03/30/2021 66 2*    HCO3, Kervin 03/30/2021 20 9*    Base Excess, Kervin 03/30/2021 -5 1     O2 Content, Kervin 03/30/2021 17 1     O2 HGB, VENOUS 03/30/2021 92 0*    Sodium 03/31/2021 148*    Potassium 03/31/2021 3 2*    Chloride 03/31/2021 122*    CO2 03/31/2021 19*    ANION GAP 03/31/2021 7     BUN 03/31/2021 21     Creatinine 03/31/2021 1 42*    Glucose 03/31/2021 104     Calcium 03/31/2021 7 0*    eGFR 03/31/2021 45     Magnesium 03/31/2021 1 8    Admission on 03/25/2021, Discharged on 03/25/2021   Component Date Value    WBC 03/25/2021 24 00*    RBC 03/25/2021 5 32*    Hemoglobin 03/25/2021 14 9     Hematocrit 03/25/2021 44 6     MCV 03/25/2021 84     MCH 03/25/2021 27 9     MCHC 03/25/2021 33 4     RDW 03/25/2021 17 8*    MPV 03/25/2021 8 9     Platelets 66/14/8338 484*    Troponin I 03/25/2021 0 29*    BNP 03/25/2021 214*    Sodium 03/25/2021 139     Potassium 03/25/2021 3 2*    Chloride 03/25/2021 104     CO2 03/25/2021 23     ANION GAP 03/25/2021 12     BUN 03/25/2021 13     Creatinine 03/25/2021 1 61*    Glucose 03/25/2021 129*    Calcium 03/25/2021 9 5     AST 03/25/2021 24     ALT 03/25/2021 30     Alkaline Phosphatase 03/25/2021 84     Total Protein 03/25/2021 7 1     Albumin 03/25/2021 3 8     Total Bilirubin 03/25/2021 0 50     eGFR 03/25/2021 39     Magnesium 03/25/2021 1 8*    Lipase 03/25/2021 45     Preg, Serum 03/25/2021 Negative     Protime 03/25/2021 13 5     INR 03/25/2021 1 04     PTT 03/25/2021 26     Segmented % 03/25/2021 53     Lymphocytes % 03/25/2021 36     Monocytes % 03/25/2021 10     Eosinophils, % 03/25/2021 1     Neutrophils Absolute 03/25/2021 12 72*    Lymphocytes Absolute 03/25/2021 8 64*    Monocytes Absolute 03/25/2021 2 40*    Eosinophils Absolute 03/25/2021 0 24     Total Counted 03/25/2021 100     RBC Morphology 03/25/2021 Normal     Platelet Estimate 13/21/9773 Increased*    SARS-CoV-2 03/25/2021 Negative     INFLUENZA A PCR 03/25/2021 Negative     INFLUENZA B PCR 03/25/2021 Negative     RSV PCR 03/25/2021 Negative     Ventricular Rate 03/25/2021 71  Atrial Rate 03/25/2021 71     MI Interval 03/25/2021 158     QRSD Interval 03/25/2021 130     QT Interval 03/25/2021 448     QTC Interval 03/25/2021 486     P Axis 03/25/2021 41     QRS Axis 03/25/2021 49     T Wave Axis 03/25/2021 103     Ventricular Rate 03/25/2021 63     Atrial Rate 03/25/2021 63     MI Interval 03/25/2021 168     QRSD Interval 03/25/2021 128     QT Interval 03/25/2021 452     QTC Interval 03/25/2021 462     P Axis 03/25/2021 22     QRS Axis 03/25/2021 51     T Wave Axis 03/25/2021 100      Imaging: X-ray Chest 1 View Portable    Result Date: 4/2/2021  Narrative: CHEST INDICATION:   Chest pain  COMPARISON:  Chest x-ray 3/30/2021 EXAM PERFORMED/VIEWS:  XR CHEST PORTABLE FINDINGS: Heart size is not well evaluated on this single portable AP view  Persistent prominence of the pulmonary vasculature  Stable right basilar patchy density may be related to atelectasis or pneumonia  Possible small right pleural effusion  No pneumothorax  Osseous structures appear within normal limits for patient age  Impression: Persistent pulmonary vascular congestion  Stable right basilar patchy density may be related to atelectasis or pneumonia  Possible small right pleural effusion  Workstation performed: [id number]     Xr Chest Portable    Result Date: 3/31/2021  Narrative: CHEST INDICATION:   chest pain/wheezing  COMPARISON:  March 25, 2021 EXAM PERFORMED/VIEWS:  XR CHEST PORTABLE FINDINGS: Heart enlarged  Pulmonary vessels prominent and indistinct  Mild right lower lobe subsegmental atelectasis  Lungs otherwise clear  Small right pleural effusion  No evidence of pneumothorax  Osseous structures appear within normal limits for patient age  Impression: Mild pulmonary vascular congestion  Small right pleural effusion  Mild subsegmental atelectasis in the right lower lobe   Workstation performed: [id number]     X-ray Chest 1 View Portable    Result Date: 3/25/2021  Narrative: CHEST INDICATION:   chest pain  COMPARISON:  October 28, 2020 EXAM PERFORMED/VIEWS:  XR CHEST PORTABLE FINDINGS: Cardiomediastinal silhouette appears unremarkable  The lungs are clear  No pneumothorax or pleural effusion  Osseous structures appear within normal limits for patient age  Impression: No acute cardiopulmonary disease  Workstation performed: [id number]     [facility]  [facility] Contrast    Result Date: 3/29/2021  Narrative: 42year old woman with myocardial infarction for evaluation for hypertrophic cardiomyopathy  Technique: 1  3 plane SSFP localizers  2  SSFP cine imaging in long and short axis plane  3  T2 weighted DIR FSE in short axis plane  4  24 ml gadobutrol power injected  5  2D inversion recovery FGRE for delayed myocardial enhancement  6  The patient tolerated the procedure well without complication  Measurements: Massimo-septal wall 23 mm Postero-lateral wall 16 mm Left ventricular end-diastolic dimension 49 mm Left ventricular end-systolic dimension 39 mm Left ventricular end-diastolic volume 166 ml Left ventricular end-systolic volume  90 ml Stroke volume 71 ml Cardiac Output 5 1 L/min Ejection fraction 44 % Left atrium 57 mm Aortic Root 36 mm Findings:  1  The left ventricle is normal in size with severe concentric left ventricular hypertrophy  Left ventricular systolic function is mildly reduced with a measured ejection fraction of 44%  There is mild global hypokinesis  2  The right ventricle is normal in size with normal right ventricular systolic function  3  The aortic, mitral, and tricuspid valves open without restriction  There is likely mild mitral regurgitation, however cine MRI is inaccurate in the qualitative assessment of valvular regurgitation  4  The left atrium is moderately dilated  The aortic root is normal in size  5  There is a small circumferential pericardial effusion and a right pleural effusion   6  Delayed post-gadolinium imaging demonstrates dense transmural hyperenhancement with evidence of microvascular obstruction of the entire inferior and inferolateral walls  Impression: Impression: 1  Severe concentric left ventricular hypertrophy with mildly reduced left ventricular systolic function  2  Normal right ventricular size and systolic function  3  Likely mild mitral regurgitation  4  Moderately dilated left atrium  5  Small circumferential pericardial effusion  6  Dense transmural ischemic scarring with evidence of microvascular obstruction of the entire inferior and inferolateral walls  7  These findings may suggest an underlying hypertrophic cardiomyopathy with ischemic scarring of the inferior and inferolateral walls  Workstation performed: [id number]       Review of Systems:  Review of Systems   Constitutional: Positive for fatigue  Respiratory:        Snoring     Cardiovascular: Positive for palpitations  Musculoskeletal: Positive for arthralgias and myalgias  All other systems reviewed and are negative  Physical Exam:  Physical Exam  Vitals signs reviewed  HENT:      Head: Normocephalic  Neck:      Musculoskeletal: Normal range of motion and neck supple  Cardiovascular:      Rate and Rhythm: Normal rate and regular rhythm  Pulmonary:      Effort: Pulmonary effort is normal       Breath sounds: Normal breath sounds  Abdominal:      General: Bowel sounds are normal       Palpations: Abdomen is soft  Musculoskeletal: Normal range of motion  Right lower leg: No edema  Skin:     General: Skin is warm and dry  Capillary Refill: Capillary refill takes less than 2 seconds  Comments: Right radial cath site appears healed   Neurological:      General: No focal deficit present  Mental Status: She is oriented to person, place, and time     Psychiatric:         Mood and Affect: Mood normal          Behavior: Behavior normal          Discussion/Summary:  1  3/25/21 sp Xience Aneta GREG placed across the mid RCA, sp CPI with Skip Rx GREG placed across the 90% leision in the right PDA  -  Continue on aspirin 81 mg daily, Brilinta 90 mg b i d   Justin Moraes is aware  not stop aspirin or Brilinta for any reason  Continue Lipitor 80 mg daily, metoprolol succinate 50 mg b i d , counseled on smoking cessation  Remains off ACE due to ANTHONY on CKD III  MI hand out provided   2  Palpitations hx NSVT in the hospital, 48 hour Holter monitor evaluate rate and rhythm further NSVT  3  ANTHONY on CKD III baseline creat 1 4 - 1 7 most likely due to contrast induced nephropathy in the setting of lisinopril use  - continues off lisinopril  Aware not to take any nephrotoxic medications such as nonsteroidal anti-inflammatory drugs  Instructed to undergo pre ordered BMP to evaluate BUN/Creat, follow up with Nephrology  4  Pericarditis, post MI- continue on Colchicine 0 6mg BID,  Ranexa 500 mg q 12 hours  Tapering dose of prednisone   5  Hypertension- RUE sitting 130/80, continue on  Metoprolol succinate 50 mg q 12 hours  6  Hyperlipidemia 3/25/21 , , HDL 34, -  Continue Lipitor 80 mg daily,  Heart healthy diet  7  HCM  With ischemic scarring of the inferior inferolateral walls LVEF 44%- continue on  Metoprolol succinate 50 mg b i d  No ACE or ARB due to ANTHONY, 2gm sodium diet, daily weights, weight loss management and Sleep study to R/O sleep apnea  follow up with Dr Gen Plummer in the near future, HCM hand out education provided  8  Morbid Obesity  BMI 42 13- ambulatory referral to weight loss management,   9  Most likely ELIAZAR with snoring at night with daytime fatigue, ambulatory referral to Sleep medicine  10   Tobacco abuse continues to smoke 4 cigarettes a day,  Actively working on smoking cessation

## 2021-04-22 ENCOUNTER — HOSPITAL ENCOUNTER (OUTPATIENT)
Dept: NON INVASIVE DIAGNOSTICS | Facility: HOSPITAL | Age: 46
Discharge: HOME | End: 2021-04-22
Payer: COMMERCIAL

## 2021-04-22 ENCOUNTER — APPOINTMENT (OUTPATIENT)
Dept: LAB | Facility: HOSPITAL | Age: 46
End: 2021-04-22
Payer: COMMERCIAL

## 2021-04-22 DIAGNOSIS — N17.9 AKI (ACUTE KIDNEY INJURY) (HCC): ICD-10-CM

## 2021-04-22 DIAGNOSIS — D50.8 OTHER IRON DEFICIENCY ANEMIA: ICD-10-CM

## 2021-04-22 DIAGNOSIS — R00.2 PALPITATIONS: ICD-10-CM

## 2021-04-22 DIAGNOSIS — Z85.79 H/O LYMPHOMA: ICD-10-CM

## 2021-04-22 DIAGNOSIS — R80.9 PROTEINURIA, UNSPECIFIED TYPE: ICD-10-CM

## 2021-04-22 DIAGNOSIS — N18.32 STAGE 3B CHRONIC KIDNEY DISEASE (HCC): ICD-10-CM

## 2021-04-22 DIAGNOSIS — N18.30 CKD (CHRONIC KIDNEY DISEASE) STAGE 3, GFR 30-59 ML/MIN (HCC): ICD-10-CM

## 2021-04-22 DIAGNOSIS — I10 ESSENTIAL HYPERTENSION: ICD-10-CM

## 2021-04-22 DIAGNOSIS — E83.52 HYPERCALCEMIA: ICD-10-CM

## 2021-04-22 DIAGNOSIS — D72.828 OTHER ELEVATED WHITE BLOOD CELL (WBC) COUNT: ICD-10-CM

## 2021-04-22 LAB
25(OH)D3 SERPL-MCNC: 9 NG/ML (ref 30–100)
ALBUMIN SERPL BCP-MCNC: 3.7 G/DL (ref 3.5–5.7)
ALP SERPL-CCNC: 63 U/L (ref 40–150)
ALT SERPL W P-5'-P-CCNC: 35 U/L (ref 7–52)
ANION GAP SERPL CALCULATED.3IONS-SCNC: 10 MMOL/L (ref 4–13)
AST SERPL W P-5'-P-CCNC: 16 U/L (ref 13–39)
BASOPHILS # BLD AUTO: 0.1 THOUSANDS/ΜL (ref 0–0.1)
BASOPHILS NFR BLD AUTO: 1 % (ref 0–2)
BILIRUB SERPL-MCNC: 0.3 MG/DL (ref 0.2–1)
BUN SERPL-MCNC: 18 MG/DL (ref 7–25)
CALCIUM SERPL-MCNC: 8.7 MG/DL (ref 8.6–10.5)
CHLORIDE SERPL-SCNC: 109 MMOL/L (ref 98–107)
CO2 SERPL-SCNC: 24 MMOL/L (ref 21–31)
CREAT SERPL-MCNC: 1.62 MG/DL (ref 0.6–1.2)
CRP SERPL QL: <5 MG/L
EOSINOPHIL # BLD AUTO: 0.2 THOUSAND/ΜL (ref 0–0.61)
EOSINOPHIL NFR BLD AUTO: 1 % (ref 0–5)
ERYTHROCYTE [DISTWIDTH] IN BLOOD BY AUTOMATED COUNT: 19.4 % (ref 11.5–14.5)
ERYTHROCYTE [SEDIMENTATION RATE] IN BLOOD: 8 MM/HOUR (ref 0–19)
FERRITIN SERPL-MCNC: 23 NG/ML (ref 8–388)
GFR SERPL CREATININE-BSD FRML MDRD: 38 ML/MIN/1.73SQ M
GLUCOSE P FAST SERPL-MCNC: 86 MG/DL (ref 65–99)
HCT VFR BLD AUTO: 42.1 % (ref 42–47)
HGB BLD-MCNC: 13.4 G/DL (ref 12–16)
IRON SATN MFR SERPL: 9 %
IRON SERPL-MCNC: 31 UG/DL (ref 50–170)
LDH SERPL-CCNC: 187 U/L (ref 84–246)
LYMPHOCYTES # BLD AUTO: 3.4 THOUSANDS/ΜL (ref 0.6–4.47)
LYMPHOCYTES NFR BLD AUTO: 21 % (ref 21–51)
MAGNESIUM SERPL-MCNC: 1.9 MG/DL (ref 1.9–2.7)
MCH RBC QN AUTO: 27.8 PG (ref 26–34)
MCHC RBC AUTO-ENTMCNC: 31.9 G/DL (ref 31–37)
MCV RBC AUTO: 87 FL (ref 81–99)
MONOCYTES # BLD AUTO: 1.1 THOUSAND/ΜL (ref 0.17–1.22)
MONOCYTES NFR BLD AUTO: 7 % (ref 2–12)
NEUTROPHILS # BLD AUTO: 11.8 THOUSANDS/ΜL (ref 1.4–6.5)
NEUTS SEG NFR BLD AUTO: 71 % (ref 42–75)
PHOSPHATE SERPL-MCNC: 4.1 MG/DL (ref 3–5.5)
PLATELET # BLD AUTO: 252 THOUSANDS/UL (ref 149–390)
PMV BLD AUTO: 8.9 FL (ref 8.6–11.7)
POTASSIUM SERPL-SCNC: 4.5 MMOL/L (ref 3.5–5.5)
PROT SERPL-MCNC: 6.1 G/DL (ref 6.4–8.9)
RBC # BLD AUTO: 4.83 MILLION/UL (ref 3.9–5.2)
SODIUM SERPL-SCNC: 143 MMOL/L (ref 134–143)
TIBC SERPL-MCNC: 351 UG/DL (ref 250–450)
URATE SERPL-MCNC: 9.8 MG/DL (ref 2.3–7.6)
VIT B12 SERPL-MCNC: 245 PG/ML (ref 100–900)
WBC # BLD AUTO: 16.6 THOUSAND/UL (ref 4.8–10.8)

## 2021-04-22 PROCEDURE — 93225 XTRNL ECG REC<48 HRS REC: CPT

## 2021-04-22 PROCEDURE — 85025 COMPLETE CBC W/AUTO DIFF WBC: CPT

## 2021-04-22 PROCEDURE — 82607 VITAMIN B-12: CPT

## 2021-04-22 PROCEDURE — 93226 XTRNL ECG REC<48 HR SCAN A/R: CPT

## 2021-04-22 PROCEDURE — 82306 VITAMIN D 25 HYDROXY: CPT

## 2021-04-22 PROCEDURE — 36415 COLL VENOUS BLD VENIPUNCTURE: CPT

## 2021-04-22 PROCEDURE — 85652 RBC SED RATE AUTOMATED: CPT

## 2021-04-22 PROCEDURE — 83735 ASSAY OF MAGNESIUM: CPT

## 2021-04-22 PROCEDURE — 83540 ASSAY OF IRON: CPT

## 2021-04-22 PROCEDURE — 84100 ASSAY OF PHOSPHORUS: CPT

## 2021-04-22 PROCEDURE — 84550 ASSAY OF BLOOD/URIC ACID: CPT

## 2021-04-22 PROCEDURE — 83550 IRON BINDING TEST: CPT

## 2021-04-22 PROCEDURE — 80053 COMPREHEN METABOLIC PANEL: CPT

## 2021-04-22 PROCEDURE — 86140 C-REACTIVE PROTEIN: CPT

## 2021-04-22 PROCEDURE — 83615 LACTATE (LD) (LDH) ENZYME: CPT

## 2021-04-22 PROCEDURE — 82728 ASSAY OF FERRITIN: CPT

## 2021-04-26 DIAGNOSIS — F41.9 ANXIETY: ICD-10-CM

## 2021-04-26 DIAGNOSIS — F41.1 GENERALIZED ANXIETY DISORDER: Primary | ICD-10-CM

## 2021-04-26 RX ORDER — LORAZEPAM 0.5 MG/1
0.5 TABLET ORAL 2 TIMES DAILY PRN
Qty: 60 TABLET | Refills: 0 | Status: SHIPPED | OUTPATIENT
Start: 2021-04-26 | End: 2021-08-04 | Stop reason: SDUPTHER

## 2021-04-26 RX ORDER — LORAZEPAM 0.5 MG/1
0.5 TABLET ORAL
Qty: 30 TABLET | Refills: 1 | Status: CANCELLED | OUTPATIENT
Start: 2021-04-26

## 2021-04-26 NOTE — TELEPHONE ENCOUNTER
Patient stated on last visit you said she can take 2 times per day       if so RX will need to be changed before refill is sent       Please advise

## 2021-04-27 NOTE — PROGRESS NOTES
Cardiac Rehabilitation Plan of Care   Initial Care Plan          Today's date: 2021  # of Exercise Sessions Completed:   Patient name: Janeen Smart      : 1975  Age: 42 y o  MRN: [id number]  Referring Physician: Annamaria Walsh MD  Cardiologist: Dr Pau Ward MD  Provider: Cassi padilla  Clinician: Leo Harris, MPT    Dx: STEMI  S/P GREG mid RCA; RPD ( Hx of catheterization X 4)  LVEF 60%  Hx non-Hodgkin's Lymphoma  Date of onset: 2021      SUMMARY OF PROGRESS:  Today is the patient's initial visit at Cardiac Rehab  Patient admits to 100% medication compliance  Patient reports the following physical limitations: MCGOWAN, fatigue, weakness, gait dysfunction and general functional decline    The patient completed an initial Submaximal TM ETT for a total of 6 minutesand reached a 2 2 MET level  Patient does not require supplemental O2 at this time  Patient's PHQ9 Score was 22  At this time, patient admits to having depression  Patient's GAD7 Score was 21  At this time, patient admits to  having anxiety  Patient's depression and anxiety are related to the death of 2 of her children  Patient currently is taking medication for these issues  Currently, the patient does not follow a formal exercise program at home  Patient had been exercising as she did while being in the [employer]  Patient was counseled on exercise guidelines including 1-2 days of moderate exercise on opposite days of Cardiac  Rehab, as tolerated  Patient was issued the [facility] Cardiopulmonary Guide this session      At rest, the patient's HR was 81, O2Sat was 98%, and BP was 142/74  During exercise, the patient's peak HR was 117, while peak BP was 154/80  During the test she was able to maintain her 02 Sat at 98%-99%  Patient rated the test a 6 on a 1-10 RPE Scale  She noted 5/10 SOB  During recovery, the patient's HR was 86, O2Sat was 99%, and BP was 130/66  Patient denied having any other symptoms during exercise   Additionally, the patient's telemetry revealed NSR  Patient had correct hemodynamic responses to activity  Patient has goals of weight loss, decrease MCGOWAN, decrease fatigue, improve strength, decrease reliance on medication, decrease stress level initiate a HEP and attain her maximal level of function      Patient is agreeable to coming to Cardiac  Rehab 3X times/week for 12 weeks or until she reaches 36 sessions  She is scheduled to begin CR 2021  Medication compliance: Yes   Comments: Pt reports to be compliant with medications  Fall Risk: Low   Comments: Ambulates with a steady gait with no assist device  Patient has issue w/RLE pitting edema following activity       EKG Interpretation: Telemetry reveals NSR    EXERCISE ASSESSMENT and PLAN    Current Exercise Program in Rehab:       Frequency: 3 days/week Supplement with home exercise 2+ days/wk as tolerated       Minutes: 30-35         METS: 2 5 to 3            HR: 20-30 > -110   RPE: 4-6        Modalities: Treadmill, UBE, NuStep and Recumbent bike      Exercise Progression 30 Day Goals :    Frequency: 3 days/week of cardiac rehab     Supplement with home exercise 2+ days/wk as tolerated    Minutes: 35-40                           >150 mins/wk of moderate intensity exercise   METS: 3 to 3 5   HR: 100-110   RPE: 4-6   Modalities: Treadmill, UBE, NuStep and Recumbent bike    Strength trainin-3 days / week  12-15 repetitions  1-2 sets per modality    Modalities: Leg Press and UE free weights    Home Exercise: none    Goals: 10% improvement in functional capacity - based on max METs achieved in fitness assessment, Reduced dyspnea with physical activity  0-1/10, improved DASI score by 10%, Increase in exercise capacity by 40% - based on peak METs tolerated in cardiac rehab exercise session, Exercise 5 days/wk, >150mins/wk of moderate intensity exercise, Resume ADLs with increased strength, Attend Rehab regularly and start a home exercise program    Progression Toward Goals:  Reviewed Pt goals and determined plan of care    Education: benefit of exercise for CAD risk factors, home exercise guidelines, AHA guidelines to achieve >150 mins/wk of moderate exercise and RPE scale   Plan:education on home exercise guidelines, home exercise 30+ mins 2 days opposite CR and Education class: Risk Factors for Heart Disease  Readiness to change: Preparation:  (Getting ready to change)       NUTRITION ASSESSMENT AND PLAN    Weight control: Normal weight is 160#   Starting weight: 266   Current weight:   261  Waist circumference:    Starting: NA   Current:  NA    Diabetes: N/A  A1c: NA   last measured: NA    Lipid management: Discussed diet and lipid management    Goals:LDL <100, HDL >40, TRG <150, CHOL <200, Improved Rate Your Plate score  >04, 7 3-9%  wt loss, eliminate processed meats, increase intake of fish, shellfish, cook without added fat or use vegetable oil/spray, increase intake of meatless meals, eat 3 or more servings of whole grains a day, Eat 4-5 cups of fruits and vegetables daily, use olive or canola oil in baking, choose low sodium processed foods and Increase intake of nuts and seeds    Progression Toward Goals: Reviewed Pt goals and determined plan of care    Education: heart healthy eating  low sodium diet  hydration  nutrition for  lipid management  Plan: Education class: Reading Food Labels, Education Class: Heart Healthy Eating, drink more water and keep added daily sugar <25g/day  Readiness to change: Preparation:  (Getting ready to change)       PSYCHOSOCIAL ASSESSMENT AND PLAN    Emotional:  Depression assessment:  PHQ-9 = 20-27 = Severe Depression            Anxiety measure:  TANIYA-7 = 15-21 = Severe anxiety  Self-reported stress level:  10  Social support: Good     Goals:  Reduce perceived stress to 1-3/10, improved [facility] QOL < 27, PHQ-9 - reduced severity by one level, Feelings in [place] Score < 3, Physical Fitness in [facility] Score < 3, Daily Activity in [facility] Score < 3, Quality of Life in [facility] Score < 3 , Change in Health in [facility] Score < 3 , improved sleep, improved positive thoughts of well being and increased energy    Progression Toward Goals: Reviewed Pt goals and determined plan of care    Education: signs/sxs of depression, benefits of a positive support system, stress management techniques and depression and CAD  Plan: Class: Stress and Your Health, Class: Relaxation, Practice relaxation techniques and Exercise  Readiness to change: Preparation:  (Getting ready to change)       OTHER CORE COMPONENTS     Tobacco:   Social History     Tobacco Use   Smoking Status Current Some Day Smoker    Packs/day: 1 00    Types: Cigarettes    Last attempt to quit: 3/29/2021    Years since quittin 0   Smokeless Tobacco Current User       Tobacco Use Intervention:   Pt continues to smoke 1 ppd, is working on a smoking cessation plan  Anginal Symptoms:  chest pressure, SOB, excessive diaphoresis and lightheadedness at time of the event  NTG use: No prescription  Patient states she was not issed a RX for Nitro due to hypotension at the time of her last visit       Blood pressure:    Restin/74   Exercise: 154/80   Recovery: 130/66    Goals: consistent BP < 130/80, reduced dietary sodium <2300mg, moderate intensity exercise >150 mins/wk, medication compliance and reduce number of medications  needed for BP control    Progression Toward Goals: Reviewed Pt goals and determined plan of care    Education:  understanding high blood pressure and it's relationship to CAD and low sodium diet and HTN  Plan: Class: Understanding Heart Disease, Class: Common Heart Medications, Avoid Processed foods, engage in regular exercise, eliminate salt shaker at the table, check labels for sodium content and monitor home BP  Readiness to change: Preparation:  (Getting ready to change)       CARDIAC REHAB ASSESSMENT    Today's date: 2021  Patient name: Yamilex [address] Cuff     : 1975       MRN: [id number]  PCP: Sarah Cushing, DO  Referring Physician: Princess Crespo MD  Cardiologist: Dr Marta Tiwari MD  Surgeon: Dr Haven Beach DO  Dx: STEMI    Date of onset: 2021  Cultural needs: None    Height:    Wt Readings from Last 1 Encounters:   21 118 kg (261 lb)      Weight:   Ht Readings from Last 1 Encounters:   21 5' 6" (1 676 m)     Medical History:   Past Medical History:   Diagnosis Date    [facility] )     Anxiety     Cardiac disease     Cardiomyopathy ([place] [address] )     CHF (congestive heart failure) ([place] [address] )     Chronic kidney disease     Depression     History of chemotherapy     non hodgkins lymphoma     Hypertension     Lymphoma, non-Hodgkin's ([place] [address] )     Myocardial infarction ([place] [address] )     Tobacco abuse          Physical Limitations: None    Fall Risk: Low   Comments: Ambulates with a steady gait with no assist device    Anginal Equivalent: Chest Pressure, Dyspnea and Excessive Diaphoresis at the time of her event  NTG use: No prescription    Risk Factors   Cholesterol: Yes  Smoking: Current user:  average ppd:  1ppd  HTN: Yes  DM: No  Obesity: Yes   Inactivity: Yes  Stress:  perceived  stress: 10/10   Stressors: Current health situation, finances and death of two children  Goals for Stress Management:Practice Relaxation Techniques, Read, Exercise, Maintain a stress diary, Spend time outside and Spend time with family    Family History:  Family History   Problem Relation Age of Onset    No Known Problems Mother     No Known Problems Father     No Known Problems Daughter     Brain cancer Maternal Grandfather     No Known Problems Paternal Aunt     No Known Problems Paternal Aunt        Allergies: Patient has no known allergies    ETOH:   Social History     Substance and Sexual Activity   Alcohol Use Not Currently    Comment: rarely         Current Medications:   Current Outpatient Medications   Medication Sig Dispense Refill    acetaminophen (TYLENOL) 325 mg tablet Take 2 tablets (650 mg total) by mouth every 6 (six) hours as needed for mild pain 1 Bottle 0    albuterol (PROVENTIL HFA,VENTOLIN HFA) 90 mcg/act inhaler Inhale 2 puffs every 4 (four) hours as needed for wheezing or shortness of breath 1 Inhaler 0    ARIPiprazole (ABILIFY) 10 mg tablet Take 1 tablet (10 mg total) by mouth daily (Patient taking differently: Take 10 mg by mouth 2 (two) times a day ) 90 tablet 2    aspirin 81 mg chewable tablet Chew 1 tablet (81 mg total) daily 90 tablet 0    atorvastatin (LIPITOR) 80 mg tablet Take 1 tablet (80 mg total) by mouth every evening 90 tablet 0    colchicine (COLCRYS) 0 6 mg tablet Take 1 tablet (0 6 mg total) by mouth 2 (two) times a day 180 tablet 0    LORazepam (ATIVAN) 0 5 mg tablet Take 1 tablet (0 5 mg total) by mouth 2 (two) times a day as needed for anxiety 60 tablet 0    metoprolol succinate (TOPROL-XL) 50 mg 24 hr tablet Take 1 tablet (50 mg total) by mouth 2 (two) times a day 180 tablet 0    nicotine (NICODERM CQ) 7 mg/24hr TD 24 hr patch Place 1 patch on the skin daily 28 patch 0    predniSONE 10 mg tablet Take 6 tablets (60 mg total) by mouth daily for 7 days, THEN 5 tablets (50 mg total) daily for 7 days, THEN 4 tablets (40 mg total) daily for 7 days, THEN 3 tablets (30 mg total) daily for 7 days, THEN 2 tablets (20 mg total) daily for 7 days, THEN 1 tablet (10 mg total) daily for 7 days  147 tablet 0    ranolazine (RANEXA) 500 mg 12 hr tablet Take 1 tablet (500 mg total) by mouth every 12 (twelve) hours 60 tablet 0    sertraline (ZOLOFT) 100 mg tablet Take 1 tablet (100 mg total) by mouth daily 30 tablet 5    ticagrelor (BRILINTA) 90 MG Take 1 tablet (90 mg total) by mouth every 12 (twelve) hours 60 tablet 0    umeclidinium-vilanterol (ANORO ELLIPTA) 62 5-25 MCG/INH inhaler Inhale 1 puff daily 1 Inhaler 0     No current facility-administered medications for this visit            Functional Status Prior to Diagnosis for Treatment   Occupation: Patient was in the for; to [place]: Activities w/her children  ADLs: No limitations  Lee: No limitations  Exercise: No formal HEP  Other: Patient may have had a history of exposures, [place]  Current Functional Status  Occupation: unemployed  Recreation: as above  ADLs:Capable of performing light ADLs only able to perform self-care resumed driving  Lee: Capable of performing light ADLs only  Exercise: No formal HEP  Patient agreeable to attend CR until program completion  Other:     Patient Specific Goals:  Patient has goals of weight loss, decrease MCGOWAN, decrease fatigue, improve strength, decrease reliance on medication, decrease stress level initiate a HEP and attain her maximal level of function        Short Term Program Goals: dietary modifications increased strength improved energy/stamina with ADLs exercise 120-150 mins/wk    Long Term Goals: increased maximal walking duration  increased intial training workload  Improved [facility] Activity Status score  Improved functional capacity  Improved Quality of Life - [facility] score reduced  Improved lipid profile    Ability to reach goals/rehabilitation potential:  Excellent    Projected return to function: 12 weeks  Objective tests: sub-max TM ETT      Nutritional   Reviewed details of Rate your Plate  Discussed key elements of heart healthy eating  Reviewed patient goals for dietary modifications and their clinical implications  Reviewed most recent lipid profile       Goals for dietary modification: choose lean cuts of meat  poultry without the skin  low fat ground meat and poultry  eliminate processed meats  reduce portions of meat to 3 oz  increase fish intake  more meatless meals  increase whole grains  increase fruits and vegetables  eliminate butter  low sodium      Emotional/Social  Patient has a history of depression  Patient has a history of anxiety   compliant with medical therapy for depression/anxiety    Marital status:     Domestic Violence Screening: No    Comments: Patient presents w/weakness and general debility  She is very motivated to progress  Pateint requires skilled CR interventions in order to attain her goals and maximal level of function
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
## 2021-04-28 ENCOUNTER — OFFICE VISIT (OUTPATIENT)
Dept: NEPHROLOGY | Facility: CLINIC | Age: 46
End: 2021-04-28
Payer: COMMERCIAL

## 2021-04-28 ENCOUNTER — TELEPHONE (OUTPATIENT)
Dept: NEPHROLOGY | Facility: CLINIC | Age: 46
End: 2021-04-28

## 2021-04-28 VITALS
HEART RATE: 77 BPM | HEIGHT: 66 IN | BODY MASS INDEX: 42.78 KG/M2 | WEIGHT: 266.2 LBS | OXYGEN SATURATION: 97 % | SYSTOLIC BLOOD PRESSURE: 130 MMHG | DIASTOLIC BLOOD PRESSURE: 68 MMHG

## 2021-04-28 DIAGNOSIS — M89.9 CHRONIC KIDNEY DISEASE-MINERAL AND BONE DISORDER: ICD-10-CM

## 2021-04-28 DIAGNOSIS — I42.2 HYPERTROPHIC CARDIOMYOPATHY (HCC): ICD-10-CM

## 2021-04-28 DIAGNOSIS — D50.8 OTHER IRON DEFICIENCY ANEMIAS: Primary | ICD-10-CM

## 2021-04-28 DIAGNOSIS — D50.8 OTHER IRON DEFICIENCY ANEMIAS: ICD-10-CM

## 2021-04-28 DIAGNOSIS — E55.9 VITAMIN D DEFICIENCY: ICD-10-CM

## 2021-04-28 DIAGNOSIS — E83.9 CHRONIC KIDNEY DISEASE-MINERAL AND BONE DISORDER: ICD-10-CM

## 2021-04-28 DIAGNOSIS — N18.9 CHRONIC KIDNEY DISEASE-MINERAL AND BONE DISORDER: ICD-10-CM

## 2021-04-28 DIAGNOSIS — R80.9 PROTEINURIA, UNSPECIFIED TYPE: ICD-10-CM

## 2021-04-28 DIAGNOSIS — I21.19 ACUTE MI, INFERIOR WALL (HCC): ICD-10-CM

## 2021-04-28 DIAGNOSIS — N11.9 CHRONIC TUBULO-INTERSTITIAL NEPHRITIS: ICD-10-CM

## 2021-04-28 DIAGNOSIS — N18.32 STAGE 3B CHRONIC KIDNEY DISEASE (HCC): Primary | ICD-10-CM

## 2021-04-28 DIAGNOSIS — R31.9 HEMATURIA, UNSPECIFIED TYPE: ICD-10-CM

## 2021-04-28 DIAGNOSIS — K92.1 BLOODY STOOL: ICD-10-CM

## 2021-04-28 DIAGNOSIS — D50.8 IRON DEFICIENCY ANEMIA SECONDARY TO INADEQUATE DIETARY IRON INTAKE: ICD-10-CM

## 2021-04-28 PROCEDURE — 99214 OFFICE O/P EST MOD 30 MIN: CPT | Performed by: NURSE PRACTITIONER

## 2021-04-28 PROCEDURE — 1111F DSCHRG MED/CURRENT MED MERGE: CPT | Performed by: NURSE PRACTITIONER

## 2021-04-28 RX ORDER — LISINOPRIL 5 MG/1
5 TABLET ORAL DAILY
Qty: 90 TABLET | Refills: 1 | Status: SHIPPED | OUTPATIENT
Start: 2021-04-28 | End: 2021-09-21

## 2021-04-28 RX ORDER — SODIUM CHLORIDE 9 MG/ML
20 INJECTION, SOLUTION INTRAVENOUS ONCE
Status: CANCELLED | OUTPATIENT
Start: 2021-05-03

## 2021-04-28 RX ORDER — ERGOCALCIFEROL 1.25 MG/1
50000 CAPSULE ORAL WEEKLY
Qty: 12 CAPSULE | Refills: 1 | Status: SHIPPED | OUTPATIENT
Start: 2021-04-28 | End: 2021-09-29 | Stop reason: SDUPTHER

## 2021-04-28 NOTE — TELEPHONE ENCOUNTER
Okay I have ordered jayla heme for [address]  Please assist with scheduling  So she was referred to GI by PCP- is she aware of this? Did they call or or does she need to be re referred?

## 2021-04-28 NOTE — TELEPHONE ENCOUNTER
----- Message from Jean Paul Barkley, [address] sent at 4/28/2021  9:30 AM EDT -----  Please call Elise and advise:  Tell her I was finishing my note and realized she is iron deficient  I see that this has occurred previously and that she was managed by hematology previously  They felt the low iron was from heavy menstrual bleeding  So I just have a few questions  Has she ever had a colonoscopy? Any family history of colon cancer? Also would she like iron infusion sometime in the next coming months? Typically we order faraheme which is 2 infusions  Looks like her insurance did not cover this before but we can try again  Let me know    Also, needs some vitamin D and will sent to pharmacy         thanks

## 2021-04-28 NOTE — TELEPHONE ENCOUNTER
Patient states that she had never had a colonoscopy and that she does not have any history of colon cancer in her family  She is ok with going for iron infusion it is covered at [employer]

## 2021-04-28 NOTE — TELEPHONE ENCOUNTER
Patient is set up for iron infusions at [facility] on 5/4/21 at 1 pm and 5/11/21 at 11 am   She also stated that she didn't know she was referred to GI and that they have not contacted her  Do you want to put the referral in

## 2021-04-29 ENCOUNTER — TELEPHONE (OUTPATIENT)
Dept: NEPHROLOGY | Facility: CLINIC | Age: 46
End: 2021-04-29

## 2021-04-29 ENCOUNTER — CLINICAL SUPPORT (OUTPATIENT)
Dept: CARDIAC REHAB | Facility: HOSPITAL | Age: 46
End: 2021-04-29
Payer: COMMERCIAL

## 2021-04-29 DIAGNOSIS — I21.19 ACUTE MI, INFERIOR WALL (HCC): ICD-10-CM

## 2021-04-29 PROCEDURE — 93227 XTRNL ECG REC<48 HR R&I: CPT | Performed by: INTERNAL MEDICINE

## 2021-04-29 PROCEDURE — 93797 PHYS/QHP OP CAR RHAB WO ECG: CPT

## 2021-04-29 NOTE — TELEPHONE ENCOUNTER
Prior auth form for feraheme was faxed along with recent progress note and last labs and lab work from 9/15/20  We will wait to hear back from insurance company

## 2021-04-30 NOTE — TELEPHONE ENCOUNTER
Call from Joseph Gibson at Pre-Encounter that the NPI number for the facility was wrong  I went and called the insurance company and changed the NPI  Joseph Gibson aware  Approval number- [id number], approved from- 5/4/21- 8/4/21

## 2021-05-03 ENCOUNTER — OFFICE VISIT (OUTPATIENT)
Dept: FAMILY MEDICINE CLINIC | Facility: CLINIC | Age: 46
End: 2021-05-03
Payer: COMMERCIAL

## 2021-05-03 VITALS
DIASTOLIC BLOOD PRESSURE: 78 MMHG | TEMPERATURE: 98.9 F | OXYGEN SATURATION: 99 % | WEIGHT: 263 LBS | BODY MASS INDEX: 42.27 KG/M2 | HEIGHT: 66 IN | HEART RATE: 100 BPM | SYSTOLIC BLOOD PRESSURE: 142 MMHG

## 2021-05-03 DIAGNOSIS — Z95.5 S/P DRUG ELUTING CORONARY STENT PLACEMENT: ICD-10-CM

## 2021-05-03 DIAGNOSIS — I10 HYPERTENSION, UNSPECIFIED TYPE: ICD-10-CM

## 2021-05-03 DIAGNOSIS — I42.2 HYPERTROPHIC CARDIOMYOPATHY (HCC): ICD-10-CM

## 2021-05-03 DIAGNOSIS — B96.89 ACUTE BACTERIAL SINUSITIS: ICD-10-CM

## 2021-05-03 DIAGNOSIS — J01.90 ACUTE BACTERIAL SINUSITIS: ICD-10-CM

## 2021-05-03 DIAGNOSIS — D50.8 OTHER IRON DEFICIENCY ANEMIAS: ICD-10-CM

## 2021-05-03 DIAGNOSIS — F41.9 ANXIETY: Primary | ICD-10-CM

## 2021-05-03 DIAGNOSIS — N18.32 STAGE 3B CHRONIC KIDNEY DISEASE (HCC): ICD-10-CM

## 2021-05-03 DIAGNOSIS — Z72.0 TOBACCO ABUSE: ICD-10-CM

## 2021-05-03 PROCEDURE — 3008F BODY MASS INDEX DOCD: CPT | Performed by: FAMILY MEDICINE

## 2021-05-03 PROCEDURE — 1111F DSCHRG MED/CURRENT MED MERGE: CPT | Performed by: FAMILY MEDICINE

## 2021-05-03 PROCEDURE — 99214 OFFICE O/P EST MOD 30 MIN: CPT | Performed by: FAMILY MEDICINE

## 2021-05-03 RX ORDER — AMOXICILLIN AND CLAVULANATE POTASSIUM 875; 125 MG/1; MG/1
1 TABLET, FILM COATED ORAL EVERY 12 HOURS SCHEDULED
Qty: 14 TABLET | Refills: 0 | Status: SHIPPED | OUTPATIENT
Start: 2021-05-03 | End: 2021-05-10

## 2021-05-03 RX ORDER — METOPROLOL SUCCINATE 50 MG/1
TABLET, EXTENDED RELEASE ORAL
Qty: 180 TABLET | Refills: 3 | Status: SHIPPED | OUTPATIENT
Start: 2021-05-03 | End: 2021-05-10

## 2021-05-03 RX ORDER — NICOTINE 21 MG/24HR
1 PATCH, TRANSDERMAL 24 HOURS TRANSDERMAL EVERY 24 HOURS
Qty: 28 PATCH | Refills: 0 | Status: SHIPPED | OUTPATIENT
Start: 2021-05-03 | End: 2021-08-31 | Stop reason: ALTCHOICE

## 2021-05-03 RX ORDER — FLUTICASONE PROPIONATE 50 MCG
2 SPRAY, SUSPENSION (ML) NASAL DAILY
Qty: 1 BOTTLE | Refills: 0 | Status: ON HOLD | OUTPATIENT
Start: 2021-05-03 | End: 2021-07-25 | Stop reason: ALTCHOICE

## 2021-05-03 NOTE — PROGRESS NOTES
Assessment/Plan:    No problem-specific Assessment & Plan notes found for this encounter  Diagnoses and all orders for this visit:    Anxiety  Comments:  Doing better on lorazepam twice daily, continue zoloft and abilify    Acute bacterial sinusitis  -     amoxicillin-clavulanate (AUGMENTIN) 875-125 mg per tablet; Take 1 tablet by mouth every 12 (twelve) hours for 7 days  -     fluticasone (FLONASE) 50 mcg/act nasal spray; 2 sprays into each nostril daily for 7 days    Stage 3b chronic kidney disease ([facility] )  Comments: Following with nephrology, note reviewed    BMI 40 0-44 9, adult ([facility] )    Other iron deficiency anemias  Comments:  Has IV infusion scheduled tomorrow at 1 pm    Tobacco abuse  Comments:  Increase patch to 14mg/24 hours from 7mg as this not adequate dosing  Discussed need to address habits associated wtih smoking such as times smoking is common  Orders:  -     nicotine (NICODERM CQ) 14 mg/24hr TD 24 hr patch; Place 1 patch on the skin every 24 hours          PHQ-9 Depression Screening    PHQ-9:   Frequency of the following problems over the past two weeks:      Little interest or pleasure in doing things: 3 - nearly every day  Feeling down, depressed, or hopeless: 3 - nearly every day  Trouble falling or staying asleep, or sleeping too much: 3 - nearly every day  Feeling tired or having little energy: 3 - nearly every day  Poor appetite or overeatin - not at all  Feeling bad about yourself - or that you are a failure or have let yourself or your family down: 3 - nearly every day  Trouble concentrating on things, such as reading the newspaper or watching television: 3 - nearly every day  Moving or speaking so slowly that other people could have noticed   Or the opposite - being so fidgety or restless that you have been moving around a lot more than usual: 3 - nearly every day  Thoughts that you would be better off dead, or of hurting yourself in some way: 0 - not at all  PHQ-2 Score: 6  PHQ-9 Score: 21        BMI Counseling: Body mass index is 42 45 kg/m²  The BMI is above normal  Nutrition recommendations include reducing portion sizes, decreasing overall calorie intake, 3-5 servings of fruits/vegetables daily, reducing fast food intake, consuming healthier snacks, decreasing soda and/or juice intake, moderation in carbohydrate intake and increasing intake of lean protein  Exercise recommendations include moderate aerobic physical activity for 150 minutes/week and exercising 3-5 times per week  Subjective:      Patient ID: Edson Stewart is a 42 y o  female  Patient presents for follow up, the anxiety remains improved  She complains today of sinus pressure and ear pain that kept her up last night  She saw Nephrology, that note was reviewed  She had a holter monitor which showed several ventricular ectopic beats  She has an an appt with Cardiology on 5/10  No syncope, occasional lightheaded if stands up too fast      She also has 2 days of right sided sinus pressure and pain, no fever, chills, sore throat  Feels symptoms progressively worsening  No chest tightness, mild shortness of breath no worse than baseline  Earache   There is pain in the right ear  This is a new problem  The current episode started in the past 7 days  The problem occurs constantly  The problem has been gradually worsening  The pain is at a severity of 6/10  The pain is moderate  Associated symptoms include headaches, neck pain and rhinorrhea  Pertinent negatives include no coughing, ear discharge, hearing loss or sore throat  She has tried acetaminophen for the symptoms  The treatment provided no relief  There is no history of a chronic ear infection         The following portions of the patient's history were reviewed and updated as appropriate: allergies, current medications, past family history, past medical history, past social history, past surgical history and problem list     Review of Systems   Constitutional: Negative for activity change, appetite change, chills, fatigue and fever  HENT: Positive for congestion, ear pain, rhinorrhea, sinus pressure and sneezing  Negative for ear discharge, hearing loss, sore throat, trouble swallowing and voice change  Eyes: Positive for pain  Negative for discharge  Respiratory: Positive for shortness of breath  Negative for cough, chest tightness and wheezing  Cardiovascular: Negative for chest pain, palpitations and leg swelling  Genitourinary: Negative for decreased urine volume, difficulty urinating, flank pain, frequency and hematuria  Musculoskeletal: Positive for neck pain  Neurological: Positive for light-headedness and headaches  Negative for dizziness and weakness  Psychiatric/Behavioral: Negative for dysphoric mood, sleep disturbance and suicidal ideas  The patient is nervous/anxious  Objective:    /78 (BP Location: Left arm, Patient Position: Sitting)   Pulse 100   Temp 98 9 °F (37 2 °C) (Tympanic)   Ht 5' 6" (1 676 m)   Wt 119 kg (263 lb)   LMP 04/29/2021   SpO2 99%   BMI 42 45 kg/m²      Physical Exam  Constitutional:       General: She is not in acute distress  Appearance: Normal appearance  She is obese  She is not ill-appearing or diaphoretic  HENT:      Head: Normocephalic and atraumatic  Right Ear: Tympanic membrane and ear canal normal  There is no impacted cerumen  Left Ear: Tympanic membrane and ear canal normal  There is no impacted cerumen  Nose: Congestion and rhinorrhea present  Mouth/Throat:      Mouth: Mucous membranes are moist       Pharynx: Posterior oropharyngeal erythema present  No oropharyngeal exudate  Eyes:      Conjunctiva/sclera: Conjunctivae normal    Neck:      Musculoskeletal: Neck supple  Muscular tenderness present  No neck rigidity  Cardiovascular:      Rate and Rhythm: Normal rate and regular rhythm  Pulses: Normal pulses  Heart sounds: Normal heart sounds   No murmur  Pulmonary:      Effort: Pulmonary effort is normal  No respiratory distress  Breath sounds: Normal breath sounds  No stridor  No wheezing, rhonchi or rales  Abdominal:      General: Abdomen is flat  Musculoskeletal:         General: Tenderness present  No signs of injury  Comments: TTP suboccipital musculature   Lymphadenopathy:      Cervical: No cervical adenopathy  Skin:     General: Skin is warm and dry  Neurological:      Mental Status: She is alert and oriented to person, place, and time     Psychiatric:         Mood and Affect: Mood normal          Behavior: Behavior normal          Patient Instructions   Had negative covid test March 21, no exposures   F/U 3 months for anxiety and review nephro/cardiology follow ups and anxiety  Make sure you see GI for rectal bleeding  Continue iron infusions

## 2021-05-03 NOTE — PATIENT INSTRUCTIONS
Had negative covid test March 21, no exposures   F/U 3 months for anxiety and review nephro/cardiology follow ups and anxiety  Make sure you see GI for rectal bleeding  Continue iron infusions

## 2021-05-04 ENCOUNTER — HOSPITAL ENCOUNTER (OUTPATIENT)
Dept: INFUSION CENTER | Facility: HOSPITAL | Age: 46
Discharge: HOME | End: 2021-05-04
Attending: INTERNAL MEDICINE
Payer: COMMERCIAL

## 2021-05-04 VITALS
TEMPERATURE: 97.6 F | SYSTOLIC BLOOD PRESSURE: 112 MMHG | HEART RATE: 84 BPM | DIASTOLIC BLOOD PRESSURE: 75 MMHG | RESPIRATION RATE: 18 BRPM

## 2021-05-04 DIAGNOSIS — D50.8 OTHER IRON DEFICIENCY ANEMIAS: ICD-10-CM

## 2021-05-04 DIAGNOSIS — D50.8 IRON DEFICIENCY ANEMIA SECONDARY TO INADEQUATE DIETARY IRON INTAKE: Primary | ICD-10-CM

## 2021-05-04 PROCEDURE — 96365 THER/PROPH/DIAG IV INF INIT: CPT

## 2021-05-04 RX ORDER — SODIUM CHLORIDE 9 MG/ML
20 INJECTION, SOLUTION INTRAVENOUS ONCE
Status: COMPLETED | OUTPATIENT
Start: 2021-05-04 | End: 2021-05-04

## 2021-05-04 RX ORDER — SODIUM CHLORIDE 9 MG/ML
20 INJECTION, SOLUTION INTRAVENOUS ONCE
Status: CANCELLED | OUTPATIENT
Start: 2021-05-11

## 2021-05-04 RX ADMIN — SODIUM CHLORIDE 20 ML/HR: 0.9 INJECTION, SOLUTION INTRAVENOUS at 13:05

## 2021-05-04 RX ADMIN — FERUMOXYTOL 510 MG: 510 INJECTION INTRAVENOUS at 13:25

## 2021-05-04 NOTE — PLAN OF CARE
Problem: SAFETY ADULT  Goal: Patient will remain free of falls  Description: INTERVENTIONS:  - Assess patient frequently for physical needs  -  Identify cognitive and physical deficits and behaviors that affect risk of falls  -  [place] fall precautions as indicated by assessment   - Educate patient/family on patient safety including physical limitations  - Instruct patient to call for assistance with activity based on assessment  - Modify environment to reduce risk of injury  - Consider OT/PT consult to assist with strengthening/mobility  Outcome: Progressing     Problem: Knowledge Deficit  Goal: Patient/family/caregiver demonstrates understanding of disease process, treatment plan, medications, and discharge instructions  Description: Complete learning assessment and assess knowledge base    Interventions:  - Provide teaching at level of understanding  - Provide teaching via preferred learning methods  Outcome: Progressing

## 2021-05-04 NOTE — PLAN OF CARE
Problem: SAFETY ADULT  Goal: Patient will remain free of falls  Description: INTERVENTIONS:  - Assess patient frequently for physical needs  -  Identify cognitive and physical deficits and behaviors that affect risk of falls  -  [place] fall precautions as indicated by assessment   - Educate patient/family on patient safety including physical limitations  - Instruct patient to call for assistance with activity based on assessment  - Modify environment to reduce risk of injury  - Consider OT/PT consult to assist with strengthening/mobility  5/4/2021 1436 by Charlee Wells RN  Outcome: Progressing  5/4/2021 1319 by Charlee Wells RN  Outcome: Progressing     Problem: Knowledge Deficit  Goal: Patient/family/caregiver demonstrates understanding of disease process, treatment plan, medications, and discharge instructions  Description: Complete learning assessment and assess knowledge base    Interventions:  - Provide teaching at level of understanding  - Provide teaching via preferred learning methods  5/4/2021 1436 by Charlee Wells RN  Outcome: Progressing  5/4/2021 1319 by Charlee Wells RN  Outcome: Progressing

## 2021-05-05 ENCOUNTER — TELEPHONE (OUTPATIENT)
Dept: CARDIAC REHAB | Facility: HOSPITAL | Age: 46
End: 2021-05-05

## 2021-05-10 ENCOUNTER — OFFICE VISIT (OUTPATIENT)
Dept: CARDIOLOGY CLINIC | Facility: CLINIC | Age: 46
End: 2021-05-10
Payer: COMMERCIAL

## 2021-05-10 ENCOUNTER — CLINICAL SUPPORT (OUTPATIENT)
Dept: CARDIAC REHAB | Facility: HOSPITAL | Age: 46
End: 2021-05-10
Payer: COMMERCIAL

## 2021-05-10 VITALS
SYSTOLIC BLOOD PRESSURE: 110 MMHG | HEIGHT: 66 IN | BODY MASS INDEX: 42.59 KG/M2 | DIASTOLIC BLOOD PRESSURE: 80 MMHG | HEART RATE: 80 BPM | WEIGHT: 265 LBS

## 2021-05-10 DIAGNOSIS — G47.33 OSA (OBSTRUCTIVE SLEEP APNEA): ICD-10-CM

## 2021-05-10 DIAGNOSIS — E78.2 MIXED HYPERLIPIDEMIA: ICD-10-CM

## 2021-05-10 DIAGNOSIS — E66.01 CLASS 3 SEVERE OBESITY DUE TO EXCESS CALORIES WITH BODY MASS INDEX (BMI) OF 40.0 TO 44.9 IN ADULT, UNSPECIFIED WHETHER SERIOUS COMORBIDITY PRESENT (HCC): ICD-10-CM

## 2021-05-10 DIAGNOSIS — Z95.5 S/P DRUG ELUTING CORONARY STENT PLACEMENT: ICD-10-CM

## 2021-05-10 DIAGNOSIS — I47.2 NSVT (NONSUSTAINED VENTRICULAR TACHYCARDIA) (HCC): Primary | ICD-10-CM

## 2021-05-10 DIAGNOSIS — I21.19 ACUTE MI, INFERIOR WALL (HCC): ICD-10-CM

## 2021-05-10 DIAGNOSIS — I10 HYPERTENSION, UNSPECIFIED TYPE: ICD-10-CM

## 2021-05-10 DIAGNOSIS — I42.9 CARDIOMYOPATHY (HCC): ICD-10-CM

## 2021-05-10 DIAGNOSIS — I24.1 POST-MI PERICARDITIS (HCC): ICD-10-CM

## 2021-05-10 DIAGNOSIS — I25.10 CORONARY ARTERY DISEASE INVOLVING NATIVE CORONARY ARTERY OF NATIVE HEART WITHOUT ANGINA PECTORIS: ICD-10-CM

## 2021-05-10 PROCEDURE — 93798 PHYS/QHP OP CAR RHAB W/ECG: CPT

## 2021-05-10 PROCEDURE — 99215 OFFICE O/P EST HI 40 MIN: CPT | Performed by: INTERNAL MEDICINE

## 2021-05-10 RX ORDER — NITROGLYCERIN 0.4 MG/1
0.4 TABLET SUBLINGUAL
Qty: 60 TABLET | Refills: 0 | Status: SHIPPED | OUTPATIENT
Start: 2021-05-10 | End: 2021-10-14 | Stop reason: SDUPTHER

## 2021-05-10 RX ORDER — RANOLAZINE 500 MG/1
500 TABLET, EXTENDED RELEASE ORAL EVERY 12 HOURS SCHEDULED
Qty: 180 TABLET | Refills: 1 | Status: ON HOLD | OUTPATIENT
Start: 2021-05-10 | End: 2021-07-25 | Stop reason: ALTCHOICE

## 2021-05-10 RX ORDER — COLCHICINE 0.6 MG/1
0.6 TABLET ORAL 2 TIMES DAILY
Qty: 120 TABLET | Refills: 0 | Status: SHIPPED | OUTPATIENT
Start: 2021-05-10 | End: 2021-06-21 | Stop reason: ALTCHOICE

## 2021-05-10 RX ORDER — METOPROLOL SUCCINATE 50 MG/1
75 TABLET, EXTENDED RELEASE ORAL EVERY 12 HOURS
Qty: 180 TABLET | Refills: 3 | Status: ON HOLD | OUTPATIENT
Start: 2021-05-10 | End: 2021-07-25 | Stop reason: ALTCHOICE

## 2021-05-10 NOTE — PROGRESS NOTES
[facility] [address] [facility], [address] [facility] [address]   Cassi padilla, [address]   [phone]                                              Cardiology Office Consult  Anna Castillo, 42 y o  female  YOB: 1975  MRN: [id number] Encounter: [id number]      PCP - Cresencio Lemons DO    Assessment   1  CAD s/p PCI to RCA, rPDA  · Inferior STEMI (3/23/21) --> initial PCI to [facility]  · Recurrent chest pain (3/23/21) --> GREG to RPDA  · Readmission, inferior STEMI (4/2/21) --> patent stents, no change on cath  2  Ischemic cardiomyopathy, LVEF 45-55%  3  Post MI pericarditis  4  NSVT  5  Dizziness  6  Hyperlipidemia  7  Hypertension   8  CKD   · Baseline Cr 1 5-1 8  9  COPD   10  Morbid obesity, Body mass index is 42 77 kg/m²  Plan  Cardiomyopathy, LVEF 45-55%  · Mixed etiology - ischemic + hypertensive/hypertrophic   · appears euvolemic on exam  · Continue lisinopril 5 mg, metoprolol succinate 75 mg b i d  CAD s/p PCI to mRCA, rPDA  · Has 50-60% LAD and LCx stenosis, and 100% stenosis of 2nd RPL, which was not amenable to intervention  ·  continues to report some anginal symptoms with moderate to severe exertion, which are mostly mild  · Continue antianginal therapy with metoprolol succinate 75 mg bid, ranolazine  · Nitroglycerin p r n  · Continue aspirin, ticagrelor, atorvastatin 80  · Continue cardiac rehab    Post-MI pericarditis, Chest pain  · Currently on colchicine 0 6 mg b i d    · Will complete 3 months of colchicine (end of June 2021), and then discontinue as long as symptom is improving  · With her renal function, high-dose statins, will need close monitoring    Hyperlipidemia    3/11/2017 04:53 2/15/2018 08:51 7/8/2020 13:26 3/25/2021 06:20   Cholesterol 156 249 (H) 278 (H) 202 (H)   Triglycerides 176 (H) 401 (H) 465 (H) 230 (H)   HDL 34 (L) 32 (L) 31 (L) 34 (L)   Non-HDL Cholesterol   247    LDL Calculated 87 See Comment See Comment 122 (H)   LDL CHOLESTEROL DIRECT  160 (H) · uncontrolled   · Continue atorvastatin 80 mg daily   · Follow-up lipid panel in 3-4 months    Dizziness, NSVT  ·  he had  Multiple and SVTs post MI in the hospital, and also had a run of 8 beat and SVT on Holter subsequently   · She continues to report dizziness with symptoms of palpitations  · Her EF is otherwise 45- 55%, and she is not currently a candidate for Lifevest/ICD otherwise  · Check 30 day event monitor to assess for further NSVT and evaluate cause of her dizziness  · ?orthostatic v related to NSVT  · Blood pressure on lower side today at 110/80   · Continue metoprolol succinate 75 mg b i d  Hypertension  ·  blood pressure now on lower and  · Continue metoprolol, lisinopril    Extensive review of chart and images from several recent hospitalizations and cardiac catheterizations performed today, due to the complex nature of her recent history  Over 70 minutes spent, including additional time for chart review  ECG today -  No results found for this visit on 05/10/21  Orders Placed This Encounter   Procedures    Cardiac event monitor       Return in about 4 weeks (around 6/7/2021), or if symptoms worsen or fail to improve  History of Present Illness    63-year-old female, comes in for cardiac follow-up and establishment of care the office  She has previously seen the nurse practitioner at the [facility] for post hospital follow-up, and is seeing me today for the 1st time  On March 25, 2021, she originally presented to [facility]  with complains of severe chest pressure, and was diagnosed with acute inferior STEMI  She underwent emergent cardiac catheterization which revealed 100% mid RCA stenosis  This was successfully stented  She subsequently had worsening chest pain and multiple runs of nonsustained V-tach, and as a result was taken back to the cath lab the same day for repeat  evaluation    At that time, she was found to have severe stenosis of proximal PDA as well as 2nd right posterolateral vessel  The right PDA was quite a large vessel and was successfully stented (3 5x12 [place])  She continued to have chest pain despite this,  And underwent cardiac MRI (3/29/21) to evaluate for hypertrophic cardiomyopathy  This revealed LVEF 44%,  With severe concentric LVH and dense transmural scarring of entire inferior/inferolateral wall  She was subsequently started on ranolazine and then colchicine for treatment of both angina and pericarditis and discharge  Within a few days, she came back to the ED at [facility] with recurrent complaints of crushing chest pain with diaphoresis, with an ECG concerning for inferior STEMI  Case was discussed with interventional cardiology Dr Benitez and she was flown to [facility] for emergent catheterization (4/2/21)  This revealed patent stents, and 50-60% stenosis of distal LAD and mid circumflex, which was felt to be overall unchanged compared to prior  She was continued to be treated for post MI pericarditis, and discharged with outpatient follow-up and cardiac rehab  Since this discharge, she has initiated cardiac rehab, and continues to have mild chest discomfort with some nausea, but this is unlike her prior severe symptoms  She was at cardiac rehab earlier today, and did 10 minutes on treadmill before eventually having mild chest discomfort today  She continues to report symptoms of dizziness, palpitations, and some of her dizzy spells seem to correlate with the palpitations as well  No episodes of near-syncope or syncope  She does admit that some of her dizziness seems to be happening when she stands up suddenly      Historical Information   Past Medical History:   Diagnosis Date    Acute [facility] ([facility] [address] [facility]     Anxiety     Cardiac disease     Cardiomyopathy ([facility] [facility]     CHF (congestive heart failure) (HCC)     Chronic kidney disease     Depression     History of chemotherapy     non hodgkins lymphoma 2008    Hypertension     Lymphoma, non-Hodgkin's ([facility] [address] [facility]     Myocardial infarction ([facility] [address] [facility]     Tobacco abuse      Past Surgical History:   Procedure Laterality Date    CARDIAC CATHETERIZATION      CAROTID STENT       SECTION       Family History   Problem Relation Age of Onset    No Known Problems Mother     No Known Problems Father     No Known Problems Daughter     Brain cancer Maternal Grandfather     No Known Problems Paternal Aunt     No Known Problems Paternal Aunt      Current Outpatient Medications on File Prior to Visit   Medication Sig Dispense Refill    albuterol (PROVENTIL HFA,VENTOLIN HFA) 90 mcg/act inhaler Inhale 2 puffs every 4 (four) hours as needed for wheezing or shortness of breath 1 Inhaler 0    amoxicillin-clavulanate (AUGMENTIN) 875-125 mg per tablet Take 1 tablet by mouth every 12 (twelve) hours for 7 days 14 tablet 0    ARIPiprazole (ABILIFY) 10 mg tablet Take 1 tablet (10 mg total) by mouth daily (Patient taking differently: Take 10 mg by mouth 2 (two) times a day ) 90 tablet 2    aspirin 81 mg chewable tablet Chew 1 tablet (81 mg total) daily 90 tablet 0    atorvastatin (LIPITOR) 80 mg tablet Take 1 tablet (80 mg total) by mouth every evening 90 tablet 0    ergocalciferol (VITAMIN D2) 50,000 units Take 1 capsule (50,000 Units total) by mouth once a week 12 capsule 1    fluticasone (FLONASE) 50 mcg/act nasal spray 2 sprays into each nostril daily for 7 days 1 Bottle 0    lisinopril (ZESTRIL) 5 mg tablet Take 1 tablet (5 mg total) by mouth daily 90 tablet 1    LORazepam (ATIVAN) 0 5 mg tablet Take 1 tablet (0 5 mg total) by mouth 2 (two) times a day as needed for anxiety 60 tablet 0    nicotine (NICODERM CQ) 14 mg/24hr TD 24 hr patch Place 1 patch on the skin every 24 hours 28 patch 0    sertraline (ZOLOFT) 100 mg tablet Take 1 tablet (100 mg total) by mouth daily 30 tablet 5    ticagrelor (BRILINTA) 90 MG Take 1 tablet (90 mg total) by mouth every 12 (twelve) hours 60 tablet 0    [DISCONTINUED] colchicine (COLCRYS) 0 6 mg tablet Take 1 tablet (0 6 mg total) by mouth 2 (two) times a day 180 tablet 0    [DISCONTINUED] metoprolol succinate (TOPROL-XL) 50 mg 24 hr tablet 75mg  Q12 hours 180 tablet 3    [DISCONTINUED] ranolazine (RANEXA) 500 mg 12 hr tablet Take 1 tablet (500 mg total) by mouth every 12 (twelve) hours 60 tablet 0    predniSONE 10 mg tablet Take 6 tablets (60 mg total) by mouth daily for 7 days, THEN 5 tablets (50 mg total) daily for 7 days, THEN 4 tablets (40 mg total) daily for 7 days, THEN 3 tablets (30 mg total) daily for 7 days, THEN 2 tablets (20 mg total) daily for 7 days, THEN 1 tablet (10 mg total) daily for 7 days  (Patient not taking: Reported on 5/10/2021) 147 tablet 0    umeclidinium-vilanterol (ANORO ELLIPTA) 62 5-25 MCG/INH inhaler Inhale 1 puff daily (Patient not taking: Reported on 5/10/2021) 1 Inhaler 0     No current facility-administered medications on file prior to visit        No Known Allergies  Social History     Socioeconomic History    Marital status: /Civil Union     Spouse name: None    Number of children: None    Years of education: None    Highest education level: None   Occupational History    None   Social Needs    Financial resource strain: None    Food insecurity     Worry: None     Inability: None    Transportation needs     Medical: None     Non-medical: None   Tobacco Use    Smoking status: Current Some Day Smoker     Packs/day: 1 00     Types: Cigarettes     Last attempt to quit: 3/29/2021     Years since quittin 1    Smokeless tobacco: Current User   Substance and Sexual Activity    Alcohol use: Not Currently     Comment: rarely    Drug use: Never    Sexual activity: Yes     Partners: Male     Birth control/protection: Male Sterilization     Comment:    Lifestyle    Physical activity     Days per week: None     Minutes per session: None    Stress: None   Relationships    Social connections     Talks on phone: None     Gets together: None     Attends Christianity service: None     Active member of club or organization: None     Attends meetings of clubs or organizations: None     Relationship status: None    Intimate partner violence     Fear of current or ex partner: None     Emotionally abused: None     Physically abused: None     Forced sexual activity: None   Other Topics Concern    None   Social History Narrative    None        Review of Systems   All other systems reviewed and are negative  Vitals:  Vitals:    05/10/21 1432   BP: 110/80   Pulse: 80   Weight: 120 kg (265 lb)   Height: 5' 6" (1 676 m)     BMI - Body mass index is 42 77 kg/m²  Wt Readings from Last 7 Encounters:   05/10/21 120 kg (265 lb)   05/03/21 119 kg (263 lb)   04/28/21 121 kg (266 lb 3 2 oz)   04/19/21 118 kg (261 lb)   04/13/21 116 kg (256 lb)   04/05/21 116 kg (256 lb)   04/04/21 117 kg (258 lb 13 1 oz)       Physical Exam  Vitals signs and nursing note reviewed  Constitutional:       General: She is not in acute distress  Appearance: Normal appearance  She is well-developed  She is obese  She is not ill-appearing  HENT:      Head: Normocephalic and atraumatic  Nose: No congestion  Eyes:      General: No scleral icterus  Conjunctiva/sclera: Conjunctivae normal    Neck:      Musculoskeletal: Neck supple  Vascular: No carotid bruit or JVD  Cardiovascular:      Rate and Rhythm: Normal rate and regular rhythm  Pulses: Normal pulses  Heart sounds: Normal heart sounds  No murmur  No friction rub  No gallop  Pulmonary:      Effort: Pulmonary effort is normal  No respiratory distress  Breath sounds: Normal breath sounds  No rales  Abdominal:      General: There is no distension  Palpations: Abdomen is soft  Tenderness: There is no abdominal tenderness  Musculoskeletal:         General: No swelling or tenderness  Right lower leg: No edema  Left lower leg: No edema  Skin:     General: Skin is warm  Neurological:      General: No focal deficit present  Mental Status: She is alert and oriented to person, place, and time  Mental status is at baseline  Psychiatric:         Attention and Perception: Attention normal          Mood and Affect: Mood is anxious  Behavior: Behavior normal  Behavior is not agitated or aggressive  Thought Content: Thought content normal          Cognition and Memory: Cognition and memory normal          Labs:  CBC:   Lab Results   Component Value Date    WBC 16 60 (H) 2021    RBC 4 83 2021    HGB 13 4 2021    HCT 42 1 2021    MCV 87 2021     2021    RDW 19 4 (H) 2021       CMP:   Lab Results   Component Value Date    K 4 5 2021     (H) 2021    CO2 24 2021    BUN 18 2021    CREATININE 1 62 (H) 2021    EGFR 38 2021    CALCIUM 8 7 2021    AST 16 2021    ALT 35 2021    ALKPHOS 63 2021       Magnesium:  Lab Results   Component Value Date    MG 1 9 2021       Lipid Profile:   Lab Results   Component Value Date    HDL 34 (L) 2021    TRIG 230 (H) 2021    LDLCALC 122 (H) 2021       Thyroid Studies:   Lab Results   Component Value Date    AFA9NAIJMPBW 2 660 09/15/2020       No components found for: [employer] [facility]    Lab Results   Component Value Date    INR 1 11 2021    INR 1 04 2021    INR 1 17 10/11/2018   5    Imaging: No results found      Cardiac testing:   Results for orders placed during the hospital encounter of 21   Echo Complete with Contrast if Indicated    N[facility]ative [facility] [address] [place] [address]  [phone]    Transthoracic Echocardiogram  2D, M-mode, Doppler, and Color Doppler    Study date:  25-Mar-2021    Patient: Ishmael Ac  MR number: [id number]  Account number: [de-identified]  : 1975  Age: 42 years  Gender: Female  Status: Inpatient  Location: Bedside  Height: 66 in  Weight: 279 4 lb  BP: 122/ 82 mmHg    Indications: Acute MI    Diagnoses: I21 4 - Non-ST elevation (NSTEMI) myocardial infarction    Sonographer:  Suzan Johnson RDCS  Primary Physician:  Beatriz Scales DO  Referring Physician:  Yves Pandey DO  Group:  [facility]  Interpreting Physician:  Ivan Collier MD    IMPRESSIONS:  Features are consistent with hypertrophic cardiomyopathy and inferior wall myocardial infarction  Clinical correlation is advised  SUMMARY    LEFT VENTRICLE:  Systolic function was normal  Ejection fraction was estimated to be 60 %  There was severe hypokinesis of the basal-mid inferior wall(s)  Wall thickness was markedly increased  There was severe assymetrical hypertrophy of the inferior septum (25 mm)  There was no dynamic obstruction at rest  Provocative maneuvers were not performed  Features were consistent with a pseudonormal left ventricular filling pattern, with concomitant abnormal relaxation and increased filling pressure (grade 2 diastolic dysfunction)  LEFT ATRIUM:  The atrium was mildly dilated  MITRAL VALVE:  There was moderate systolic anterior motion of the anterior leaflet  There was mild to moderate regurgitation  AORTIC VALVE:  There was trace regurgitation  HISTORY: PRIOR HISTORY: CHF, CAD, Tobacco Abuse, Obesity    PROCEDURE: The procedure was performed at the bedside  This was a routine study  The transthoracic approach was used  The study included complete 2D imaging, M-mode, complete spectral Doppler, and color Doppler  The heart rate was 66 bpm,  at the start of the study  Images were obtained from the parasternal, apical, subcostal, and suprasternal notch acoustic windows  Echocardiographic views were limited due to poor acoustic window availability and decreased penetration  This  was a technically difficult study      LEFT VENTRICLE: Size was normal  Systolic function was normal  Ejection fraction was estimated to be 60 %  There was severe hypokinesis of the basal-mid inferior wall(s)  Wall thickness was markedly increased  There was severe assymetrical  hypertrophy of the inferior septum (25 mm)  DOPPLER: There was no dynamic obstruction at rest  Provocative maneuvers were not performed  Features were consistent with a pseudonormal left ventricular filling pattern, with concomitant  abnormal relaxation and increased filling pressure (grade 2 diastolic dysfunction)  RIGHT VENTRICLE: The size was normal  Systolic function was normal  Wall thickness was normal     LEFT ATRIUM: The atrium was mildly dilated  RIGHT ATRIUM: Size was normal     MITRAL VALVE: Valve structure was normal  There was normal leaflet separation  There was moderate systolic anterior motion of the anterior leaflet  DOPPLER: The transmitral velocity was within the normal range  There was no evidence for  stenosis  There was mild to moderate regurgitation  AORTIC VALVE: The valve was trileaflet  Leaflets exhibited normal thickness and normal cuspal separation  DOPPLER: Transaortic velocity was within the normal range  There was no evidence for stenosis  There was trace regurgitation  TRICUSPID VALVE: The valve structure was normal  There was normal leaflet separation  DOPPLER: The transtricuspid velocity was within the normal range  There was no evidence for stenosis  There was no regurgitation  PULMONIC VALVE: Leaflets exhibited normal thickness, no calcification, and normal cuspal separation  DOPPLER: The transpulmonic velocity was within the normal range  There was no regurgitation  PERICARDIUM: There was no pericardial effusion  The pericardium was normal in appearance  AORTA: The root exhibited normal size      SYSTEM MEASUREMENT TABLES    2D  %FS: 27 94 %  Ao Diam: 3 95 cm  Ao asc: 3 6 cm  EDV(Teich): 66 84 ml  EF(Teich): 54 74 %  ESV(Teich): 30 25 ml  IVSd: 2 82 cm  LA Diam: 4 59 cm  LAAs A4C: 16 52 cm2  LAESV A-L A4C: 43 86 ml  LAESV MOD A4C: 42 09 ml  LALs A4C: 5 28 cm  LVEDV MOD A4C: 88 32 ml  LVEF MOD A4C: 67 58 %  LVESV MOD A4C: 28 63 ml  LVIDd: 3 92 cm  LVIDs: 2 83 cm  LVLd A4C: 6 93 cm  LVLs A4C: 5 55 cm  LVPWd: 1 43 cm  RAEDV A-L: 23 76 ml  RAEDV MOD: 22 46 ml  RALd: 4 9 cm  RVIDd: 3 61 cm  SV MOD A4C: 59 69 ml  SV(Teich): 36 59 ml    CW  AR Dec Lewis: 1 39 m/s2  AR Dec Time: 2867 87 ms  AR PHT: 831 68 ms  AR Vmax: 3 99 m/s  AR maxP 65 mmHg  AV Env  Ti: 240 72 ms  AV VTI: 22 04 cm  AV Vmax: 1 31 m/s  AV Vmean: 0 92 m/s  AV maxP 85 mmHg  AV meanPG: 3 93 mmHg    MM  TAPSE: 1 87 cm    PW  LVOT Env  Ti: 253 09 ms  LVOT VTI: 15 47 cm  LVOT Vmax: 0 92 m/s  LVOT Vmean: 0 61 m/s  LVOT maxPG: 3 4 mmHg  LVOT meanP 75 mmHg  MV A Jorge: 0 71 m/s  MV Dec Lewis: 5 89 m/s2  MV DecT: 156 23 ms  MV E Jorge: 0 91 m/s  MV E/A Ratio: 1 3    [facility] Accredited [facility]    Prepared and electronically signed by    Tia Lambert MD  Signed 25-Mar-2021 13:06:46       No results found for this or any previous visit  No results found for this or any previous visit  No results found for this or any previous visit

## 2021-05-11 ENCOUNTER — HOSPITAL ENCOUNTER (OUTPATIENT)
Dept: INFUSION CENTER | Facility: HOSPITAL | Age: 46
Discharge: HOME | End: 2021-05-11
Attending: INTERNAL MEDICINE

## 2021-05-11 DIAGNOSIS — D50.8 OTHER IRON DEFICIENCY ANEMIAS: ICD-10-CM

## 2021-05-11 DIAGNOSIS — D50.8 IRON DEFICIENCY ANEMIA SECONDARY TO INADEQUATE DIETARY IRON INTAKE: ICD-10-CM

## 2021-05-12 ENCOUNTER — TELEPHONE (OUTPATIENT)
Dept: CARDIAC REHAB | Facility: HOSPITAL | Age: 46
End: 2021-05-12

## 2021-05-19 ENCOUNTER — TELEPHONE (OUTPATIENT)
Dept: CARDIOLOGY CLINIC | Facility: CLINIC | Age: 46
End: 2021-05-19

## 2021-05-19 ENCOUNTER — TELEPHONE (OUTPATIENT)
Dept: CARDIAC REHAB | Facility: HOSPITAL | Age: 46
End: 2021-05-19

## 2021-05-19 NOTE — TELEPHONE ENCOUNTER
Spoke to patient about findings of 3 2 second pause on the event monitor, and she confirms having had brief dizziness at that time  No recent syncope  She has had occasional dizziness over the past month  She is on metoprolol succinate 75 mg bid (was increased from 50 bid to 75 bid after being noted to have NSVT on holter monitor on 5/3/21), which is likely contributing to this sinus pause  She also needs beta-blockade for cardiomyopathy and NSVT  Plan  · Hold remaining dose of metoprolol today and decrease metoprolol succinate to 25 mg bid from tomorrow  · She has an event monitor on, and so will continue to monitor for further symptoms of dizziness and palpitations/chest pain   If developing worsening symptoms, then she call EMS and go to the ED   · If she continues to have dizziness or has more pauses despite lowering metoprolol, then she may need a pacemaker for further  · For now, I have asked her to stop driving for now, until cleared to resume doing so

## 2021-05-19 NOTE — TELEPHONE ENCOUNTER
----- Message from Radha Brunson MD sent at 5/19/2021  9:50 AM EDT -----  Regarding: Please call patient regarding follow up  Event monitor showed 3 2 second pause on 5/18/21 at 5:38 AM  Possibly during sleep and related to beta-blockade  If no symptoms, and was sleeping, then decrease metoprolol from 75 mg bid to 50 mg bid, and continue to monitor on event monitor  If had symptoms or dizziness at that time, then may need further evaluation to consider pacemaker

## 2021-05-19 NOTE — TELEPHONE ENCOUNTER
Adela Felder called back    she was up when it happened  She was up taking her dogs out  She said she did feel some thing happen, she wasn't sure what it was  She become dizzy and woozy and sat down in her chair right away  I told her I will check with you and get back to her for further direction  If she should come in, still decrease meds? I told her to make sure she keeps her phone on her so I can call her back with your instruction

## 2021-05-19 NOTE — TELEPHONE ENCOUNTER
Called patient in regards to No Show  CR staff let patient know that we cannot continue to hold this time slot for her if she is not going to be able to make her appts

## 2021-05-20 ENCOUNTER — TELEPHONE (OUTPATIENT)
Dept: CARDIAC REHAB | Facility: HOSPITAL | Age: 46
End: 2021-05-20

## 2021-05-20 ENCOUNTER — OFFICE VISIT (OUTPATIENT)
Dept: CARDIOLOGY CLINIC | Facility: CLINIC | Age: 46
End: 2021-05-20
Payer: COMMERCIAL

## 2021-05-20 VITALS
SYSTOLIC BLOOD PRESSURE: 92 MMHG | WEIGHT: 266 LBS | BODY MASS INDEX: 42.75 KG/M2 | DIASTOLIC BLOOD PRESSURE: 70 MMHG | HEIGHT: 66 IN | HEART RATE: 84 BPM

## 2021-05-20 DIAGNOSIS — I10 ESSENTIAL HYPERTENSION: Primary | ICD-10-CM

## 2021-05-20 DIAGNOSIS — I30.8 OTHER ACUTE PERICARDITIS: ICD-10-CM

## 2021-05-20 DIAGNOSIS — I42.2 HYPERTROPHIC CARDIOMYOPATHY (HCC): ICD-10-CM

## 2021-05-20 DIAGNOSIS — I25.10 CORONARY ARTERY DISEASE INVOLVING NATIVE CORONARY ARTERY OF NATIVE HEART, UNSPECIFIED WHETHER ANGINA PRESENT: ICD-10-CM

## 2021-05-20 DIAGNOSIS — I21.19 ACUTE MI, INFERIOR WALL (HCC): ICD-10-CM

## 2021-05-20 DIAGNOSIS — I45.5 SINUS PAUSE: ICD-10-CM

## 2021-05-20 DIAGNOSIS — I47.2 VENTRICULAR TACHYCARDIA, NON-SUSTAINED (HCC): ICD-10-CM

## 2021-05-20 PROCEDURE — 99214 OFFICE O/P EST MOD 30 MIN: CPT | Performed by: NURSE PRACTITIONER

## 2021-05-20 NOTE — H&P (VIEW-ONLY)
I agree with plan outlined   Patient ID: Jose Manuel Kemp is a 42 y o  female  Plan:      Sinus pause  Recent event monitor showing sinus pauses  Pt symptomatic with dizziness, nausea, tunnel vision, and feelings of syncope    Acute pericarditis  Post-MI pericarditis  S/p prednisone  Continue Colchicine through June     Hypertrophic cardiomyopathy ([facility] [address] )  By cardiac MRI  No further testing at this time  Will follow closely as an outpatient     Ventricular tachycardia, non-sustained (HCC)  Post-MI NSVT 3/25 (45 beats)  8 beat run noted on post hospital Holter monitor   Frequent ventricular ectopy on Holter as well  Metoprolol to 75 mg bid, see additional comments     Essential hypertension  BP well controlled  Continue current regimen    CAD (coronary artery disease)  1  CAD s/p PCI to RCA, rPDA  · Inferior STEMI (3/23/21) --> initial PCI to [facility]  · Recurrent chest pain (3/23/21) --> GREG to RPDA  · Readmission, inferior STEMI (4/2/21) --> patent stents, no change on cath  Continue asa, Brilinta, statin    Acute MI, inferior wall (HCC)  CAD s/p PCI to mRCA, rPDA  Has 50-60% LAD and LCx stenosis, and 100% stenosis of 2nd RPL, which was not amenable to intervention          Follow up Plan/Summary Comments: The patient was seen along with Dr Whitley Riley in the office today  For now, we will discontinue her metoprolol and continue with the event monitor  Per discussion with EP, there is no indication for an ICD at this time  If she continues to have pauses or other evidence of heart block on her event recorder, then will be referred for pacemaker placement  HPI:   I had the pleasure of seeing Leilani Ramon in the office today for evaluation of sinus pauses on a recent event recorder transmission  There is a pleasant 81-year-old female who had an MI in March of this year  She underwent cardiac catheterization and stenting on several occasions  During her hospitalization    She was noted to have an episode of nonsustained ventricular tachycardia Post MI  She had a Holter monitor which showed an 8 beat run of  NSVT following her discharge  She had a recent follow-up visit with Dr Ben Ross where she reported episodes of dizziness and palpitations  Given these symptoms, an event monitor was placed  We received several transmissions yesterday and early this morning showing 3-4 second pauses  The patient was called and reported symptoms at those times  She presented today for further evaluation  She denies any recurrent chest discomfort  She denies any shortness of breath  She has been feeling fatigued  With the episodes of pauses she felt nausea, lightheaded, a little short of breath and presyncopal       Review of Systems   10  point ROS  was otherwise non pertinent or negative except as per HPI or as below  Gait: Normal      Most recent or relevant cardiac/vascular testing:    EKG 4/2/2021 SR, LAE, prior IWMI, PRWP    Holter 4/22/2021 SR, avg rate 88  Frequent PVCs, 8-beat run NSVT    Cath 4/2/2021 Distal LAD: There was a 60 % stenosis  Mid circumflex: There was a 50 % stenosis  Distal RCA: There was a 10 % stenosis at the site of a prior stent  Right PDA: There was a 0 % stenosis at the site of a prior stent  Echo 3/25/2021 60%, severe hypokinesis of the basal-mid inferior wall  Findings consistent with HCM  Mild-mod MR  Cardiac MRI 3/29/2021 severe LVH, EF 44%  Small circumferential pericardial effusion  Mild MR  Findings suggestive of underlying hypertrophic cardiomyopathy with ischemic scarring of the inferior and inferolateral walls  Objective:     BP 92/70   Pulse 84   Ht 5' 6" (1 676 m)   Wt 121 kg (266 lb)   LMP 04/29/2021   BMI 42 93 kg/m²     PHYSICAL EXAM:    General:  Normal appearance, no acute distress  Eyes:  Anicteric  Oral mucosa:  Wearing a mask  Neck:  No JVD  Carotid upstrokes are brisk without bruits  No masses  Chest:  Clear to auscultation   Cardiac:  No palpable PMI    Normal S1 and S2  No murmur gallop or rub  Abdomen:  Soft and nontender  No palpable organomegaly or aortic enlargement  Extremities:  +1 bilateral pedal and ankle edema, R>L   Musculoskeletal:  Symmetric  Vascular:  Pedal pulses are intact  Neuro:  Grossly symmetric  Psych:  Alert and oriented x3      No Known Allergies    Current Outpatient Medications:     albuterol (PROVENTIL HFA,VENTOLIN HFA) 90 mcg/act inhaler, Inhale 2 puffs every 4 (four) hours as needed for wheezing or shortness of breath, Disp: 1 Inhaler, Rfl: 0    ARIPiprazole (ABILIFY) 10 mg tablet, Take 1 tablet (10 mg total) by mouth daily (Patient taking differently: Take 10 mg by mouth 2 (two) times a day ), Disp: 90 tablet, Rfl: 2    aspirin 81 mg chewable tablet, Chew 1 tablet (81 mg total) daily, Disp: 90 tablet, Rfl: 0    atorvastatin (LIPITOR) 80 mg tablet, Take 1 tablet (80 mg total) by mouth every evening, Disp: 90 tablet, Rfl: 0    colchicine (COLCRYS) 0 6 mg tablet, Take 1 tablet (0 6 mg total) by mouth 2 (two) times a day, Disp: 120 tablet, Rfl: 0    ergocalciferol (VITAMIN D2) 50,000 units, Take 1 capsule (50,000 Units total) by mouth once a week, Disp: 12 capsule, Rfl: 1    fluticasone (FLONASE) 50 mcg/act nasal spray, 2 sprays into each nostril daily for 7 days, Disp: 1 Bottle, Rfl: 0    lisinopril (ZESTRIL) 5 mg tablet, Take 1 tablet (5 mg total) by mouth daily, Disp: 90 tablet, Rfl: 1    LORazepam (ATIVAN) 0 5 mg tablet, Take 1 tablet (0 5 mg total) by mouth 2 (two) times a day as needed for anxiety, Disp: 60 tablet, Rfl: 0    metoprolol succinate (TOPROL-XL) 50 mg 24 hr tablet, Take 1 5 tablets (75 mg total) by mouth every 12 (twelve) hours (Patient taking differently: Take 25 mg by mouth every 12 (twelve) hours ), Disp: 180 tablet, Rfl: 3    nicotine (NICODERM CQ) 14 mg/24hr TD 24 hr patch, Place 1 patch on the skin every 24 hours, Disp: 28 patch, Rfl: 0    nitroglycerin (NITROSTAT) 0 4 mg SL tablet, Place 1 tablet (0 4 mg total) under the tongue every 5 (five) minutes as needed for chest pain, Disp: 60 tablet, Rfl: 0    ranolazine (RANEXA) 500 mg 12 hr tablet, Take 1 tablet (500 mg total) by mouth every 12 (twelve) hours, Disp: 180 tablet, Rfl: 1    sertraline (ZOLOFT) 100 mg tablet, Take 1 tablet (100 mg total) by mouth daily, Disp: 30 tablet, Rfl: 5    ticagrelor (BRILINTA) 90 MG, Take 1 tablet (90 mg total) by mouth every 12 (twelve) hours, Disp: 60 tablet, Rfl: 0    umeclidinium-vilanterol (ANORO ELLIPTA) 62 5-25 MCG/INH inhaler, Inhale 1 puff daily (Patient not taking: Reported on 5/10/2021), Disp: 1 Inhaler, Rfl: 0  Past Medical History:   Diagnosis Date    [facility]     Anxiety     Cardiac disease     Cardiomyopathy ([facility] )     CHF (congestive heart failure) (HCC)     Chronic kidney disease     Depression     History of chemotherapy     non hodgkins lymphoma     Hypertension     Lymphoma, non-Hodgkin's (HCC)     [facility]     Tobacco abuse      Past Surgical History:   Procedure Laterality Date    CARDIAC CATHETERIZATION      CAROTID STENT       SECTION         CMP:   Lab Results   Component Value Date    K 4 5 2021     (H) 2021    CO2 24 2021    BUN 18 2021    CREATININE 1 62 (H) 2021    EGFR 38 2021     Lipid Profile:    Lab Results   Component Value Date    TRIG 230 (H) 2021    HDL 34 (L) 2021         Social History     Tobacco Use   Smoking Status Current Some Day Smoker    Packs/day: 1 00    Types: Cigarettes    Last attempt to quit: 3/29/2021    Years since quittin 1   Smokeless Tobacco Current User

## 2021-05-20 NOTE — PROGRESS NOTES
Patient ID: Adrian Alcantara is a 42 y o  female  Plan:      Sinus pause  Recent event monitor showing sinus pauses  Pt symptomatic with dizziness, nausea, tunnel vision, and feelings of syncope    Acute pericarditis  Post-MI pericarditis  S/p prednisone  Continue Colchicine through June     Hypertrophic cardiomyopathy ([facility] [address] )  By cardiac MRI  No further testing at this time  Will follow closely as an outpatient     Ventricular tachycardia, non-sustained (HCC)  Post-MI NSVT 3/25 (45 beats)  8 beat run noted on post hospital Holter monitor   Frequent ventricular ectopy on Holter as well  Metoprolol to 75 mg bid, see additional comments     Essential hypertension  BP well controlled  Continue current regimen    CAD (coronary artery disease)  1  CAD s/p PCI to RCA, rPDA  · Inferior STEMI (3/23/21) --> initial PCI to [facility]  · Recurrent chest pain (3/23/21) --> GREG to RPDA  · Readmission, inferior STEMI (4/2/21) --> patent stents, no change on cath  Continue asa, Brilinta, statin    Acute MI, inferior wall (HCC)  CAD s/p PCI to mRCA, rPDA  Has 50-60% LAD and LCx stenosis, and 100% stenosis of 2nd RPL, which was not amenable to intervention          Follow up Plan/Summary Comments: The patient was seen along with Dr Renita Justice in the office today  For now, we will discontinue her metoprolol and continue with the event monitor  Per discussion with EP, there is no indication for an ICD at this time  If she continues to have pauses or other evidence of heart block on her event recorder, then will be referred for pacemaker placement  HPI:   I had the pleasure of seeing Flavio Arriola in the office today for evaluation of sinus pauses on a recent event recorder transmission  There is a pleasant 51-year-old female who had an MI in March of this year  She underwent cardiac catheterization and stenting on several occasions  During her hospitalization    She was noted to have an episode of nonsustained ventricular tachycardia Post MI  She had a Holter monitor which showed an 8 beat run of  NSVT following her discharge  She had a recent follow-up visit with Dr Britany An where she reported episodes of dizziness and palpitations  Given these symptoms, an event monitor was placed  We received several transmissions yesterday and early this morning showing 3-4 second pauses  The patient was called and reported symptoms at those times  She presented today for further evaluation  She denies any recurrent chest discomfort  She denies any shortness of breath  She has been feeling fatigued  With the episodes of pauses she felt nausea, lightheaded, a little short of breath and presyncopal       Review of Systems   10  point ROS  was otherwise non pertinent or negative except as per HPI or as below  Gait: Normal      Most recent or relevant cardiac/vascular testing:    EKG 4/2/2021 SR, LAE, prior IWMI, PRWP    Holter 4/22/2021 SR, avg rate 88  Frequent PVCs, 8-beat run NSVT    Cath 4/2/2021 Distal LAD: There was a 60 % stenosis  Mid circumflex: There was a 50 % stenosis  Distal RCA: There was a 10 % stenosis at the site of a prior stent  Right PDA: There was a 0 % stenosis at the site of a prior stent  Echo 3/25/2021 60%, severe hypokinesis of the basal-mid inferior wall  Findings consistent with HCM  Mild-mod MR  Cardiac MRI 3/29/2021 severe LVH, EF 44%  Small circumferential pericardial effusion  Mild MR  Findings suggestive of underlying hypertrophic cardiomyopathy with ischemic scarring of the inferior and inferolateral walls  Objective:     BP 92/70   Pulse 84   Ht 5' 6" (1 676 m)   Wt 121 kg (266 lb)   LMP 04/29/2021   BMI 42 93 kg/m²     PHYSICAL EXAM:    General:  Normal appearance, no acute distress  Eyes:  Anicteric  Oral mucosa:  Wearing a mask  Neck:  No JVD  Carotid upstrokes are brisk without bruits  No masses  Chest:  Clear to auscultation   Cardiac:  No palpable PMI    Normal S1 and S2  No murmur gallop or rub  Abdomen:  Soft and nontender  No palpable organomegaly or aortic enlargement  Extremities:  +1 bilateral pedal and ankle edema, R>L   Musculoskeletal:  Symmetric  Vascular:  Pedal pulses are intact  Neuro:  Grossly symmetric  Psych:  Alert and oriented x3      No Known Allergies    Current Outpatient Medications:     albuterol (PROVENTIL HFA,VENTOLIN HFA) 90 mcg/act inhaler, Inhale 2 puffs every 4 (four) hours as needed for wheezing or shortness of breath, Disp: 1 Inhaler, Rfl: 0    ARIPiprazole (ABILIFY) 10 mg tablet, Take 1 tablet (10 mg total) by mouth daily (Patient taking differently: Take 10 mg by mouth 2 (two) times a day ), Disp: 90 tablet, Rfl: 2    aspirin 81 mg chewable tablet, Chew 1 tablet (81 mg total) daily, Disp: 90 tablet, Rfl: 0    atorvastatin (LIPITOR) 80 mg tablet, Take 1 tablet (80 mg total) by mouth every evening, Disp: 90 tablet, Rfl: 0    colchicine (COLCRYS) 0 6 mg tablet, Take 1 tablet (0 6 mg total) by mouth 2 (two) times a day, Disp: 120 tablet, Rfl: 0    ergocalciferol (VITAMIN D2) 50,000 units, Take 1 capsule (50,000 Units total) by mouth once a week, Disp: 12 capsule, Rfl: 1    fluticasone (FLONASE) 50 mcg/act nasal spray, 2 sprays into each nostril daily for 7 days, Disp: 1 Bottle, Rfl: 0    lisinopril (ZESTRIL) 5 mg tablet, Take 1 tablet (5 mg total) by mouth daily, Disp: 90 tablet, Rfl: 1    LORazepam (ATIVAN) 0 5 mg tablet, Take 1 tablet (0 5 mg total) by mouth 2 (two) times a day as needed for anxiety, Disp: 60 tablet, Rfl: 0    metoprolol succinate (TOPROL-XL) 50 mg 24 hr tablet, Take 1 5 tablets (75 mg total) by mouth every 12 (twelve) hours (Patient taking differently: Take 25 mg by mouth every 12 (twelve) hours ), Disp: 180 tablet, Rfl: 3    nicotine (NICODERM CQ) 14 mg/24hr TD 24 hr patch, Place 1 patch on the skin every 24 hours, Disp: 28 patch, Rfl: 0    nitroglycerin (NITROSTAT) 0 4 mg SL tablet, Place 1 tablet (0 4 mg total) under the tongue every 5 (five) minutes as needed for chest pain, Disp: 60 tablet, Rfl: 0    ranolazine (RANEXA) 500 mg 12 hr tablet, Take 1 tablet (500 mg total) by mouth every 12 (twelve) hours, Disp: 180 tablet, Rfl: 1    sertraline (ZOLOFT) 100 mg tablet, Take 1 tablet (100 mg total) by mouth daily, Disp: 30 tablet, Rfl: 5    ticagrelor (BRILINTA) 90 MG, Take 1 tablet (90 mg total) by mouth every 12 (twelve) hours, Disp: 60 tablet, Rfl: 0    umeclidinium-vilanterol (ANORO ELLIPTA) 62 5-25 MCG/INH inhaler, Inhale 1 puff daily (Patient not taking: Reported on 5/10/2021), Disp: 1 Inhaler, Rfl: 0  Past Medical History:   Diagnosis Date    [facility]     Anxiety     Cardiac disease     Cardiomyopathy ([facility] )     CHF (congestive heart failure) (HCC)     Chronic kidney disease     Depression     History of chemotherapy     non hodgkins lymphoma     Hypertension     Lymphoma, non-Hodgkin's (HCC)     Myocardial infarction ([facility] )     Tobacco abuse      Past Surgical History:   Procedure Laterality Date    CARDIAC CATHETERIZATION      CAROTID STENT       SECTION         CMP:   Lab Results   Component Value Date    K 4 5 2021     (H) 2021    CO2 24 2021    BUN 18 2021    CREATININE 1 62 (H) 2021    EGFR 38 2021     Lipid Profile:    Lab Results   Component Value Date    TRIG 230 (H) 2021    HDL 34 (L) 2021         Social History     Tobacco Use   Smoking Status Current Some Day Smoker    Packs/day: 1 00    Types: Cigarettes    Last attempt to quit: 3/29/2021    Years since quittin 1   Smokeless Tobacco Current User

## 2021-05-20 NOTE — TELEPHONE ENCOUNTER
Patient called CR department stating she is unable to participate at this time due to ongoing cardiac issues and symptoms  She is being monitored via Holter monitor  Patient agreed to a medical hold at time

## 2021-05-20 NOTE — ASSESSMENT & PLAN NOTE
Recent event monitor showing sinus pauses  Pt symptomatic with dizziness, nausea, tunnel vision, and feelings of syncope

## 2021-05-20 NOTE — ASSESSMENT & PLAN NOTE
1  CAD s/p PCI to RCA, rPDA  · Inferior STEMI (3/23/21) --> initial PCI to [facility]  · Recurrent chest pain (3/23/21) --> GREG to RPDA  · Readmission, inferior STEMI (4/2/21) --> patent stents, no change on cath  Continue asa, Brilinta, statin

## 2021-05-20 NOTE — ASSESSMENT & PLAN NOTE
CAD s/p PCI to mRCA, rPDA  Has 50-60% LAD and LCx stenosis, and 100% stenosis of 2nd RPL, which was not amenable to intervention

## 2021-05-20 NOTE — ASSESSMENT & PLAN NOTE
Post-MI NSVT 3/25 (45 beats)  8 beat run noted on post hospital Holter monitor   Frequent ventricular ectopy on Holter as well  Metoprolol to 75 mg bid, see additional comments

## 2021-05-21 ENCOUNTER — PREP FOR PROCEDURE (OUTPATIENT)
Dept: CARDIOLOGY CLINIC | Facility: CLINIC | Age: 46
End: 2021-05-21

## 2021-05-21 ENCOUNTER — APPOINTMENT (OUTPATIENT)
Dept: CARDIAC REHAB | Facility: HOSPITAL | Age: 46
End: 2021-05-21
Payer: COMMERCIAL

## 2021-05-21 DIAGNOSIS — I49.5 SICK SINUS SYNDROME (HCC): Primary | ICD-10-CM

## 2021-05-21 RX ORDER — CEFAZOLIN SODIUM 2 G/50ML
2000 SOLUTION INTRAVENOUS ONCE
Status: CANCELLED | OUTPATIENT
Start: 2021-05-25 | End: 2021-05-21

## 2021-05-24 ENCOUNTER — APPOINTMENT (OUTPATIENT)
Dept: CARDIAC REHAB | Facility: HOSPITAL | Age: 46
End: 2021-05-24
Payer: COMMERCIAL

## 2021-05-24 ENCOUNTER — ANESTHESIA EVENT (OUTPATIENT)
Dept: PERIOP | Facility: HOSPITAL | Age: 46
End: 2021-05-24
Payer: COMMERCIAL

## 2021-05-25 ENCOUNTER — APPOINTMENT (OUTPATIENT)
Dept: RADIOLOGY | Facility: HOSPITAL | Age: 46
End: 2021-05-25
Payer: COMMERCIAL

## 2021-05-25 ENCOUNTER — ANESTHESIA (OUTPATIENT)
Dept: PERIOP | Facility: HOSPITAL | Age: 46
End: 2021-05-25
Payer: COMMERCIAL

## 2021-05-25 ENCOUNTER — HOSPITAL ENCOUNTER (OUTPATIENT)
Facility: HOSPITAL | Age: 46
Setting detail: OUTPATIENT SURGERY
Discharge: HOME | End: 2021-05-25
Attending: INTERNAL MEDICINE | Admitting: INTERNAL MEDICINE
Payer: COMMERCIAL

## 2021-05-25 VITALS
DIASTOLIC BLOOD PRESSURE: 64 MMHG | RESPIRATION RATE: 20 BRPM | OXYGEN SATURATION: 96 % | HEIGHT: 66 IN | TEMPERATURE: 97.1 F | WEIGHT: 266 LBS | BODY MASS INDEX: 42.75 KG/M2 | HEART RATE: 72 BPM | SYSTOLIC BLOOD PRESSURE: 110 MMHG

## 2021-05-25 DIAGNOSIS — I25.10 CORONARY ARTERY DISEASE INVOLVING NATIVE CORONARY ARTERY OF NATIVE HEART, UNSPECIFIED WHETHER ANGINA PRESENT: Primary | ICD-10-CM

## 2021-05-25 LAB
ATRIAL RATE: 73 BPM
EXT PREGNANCY TEST URINE: NEGATIVE
EXT. CONTROL: NORMAL
P AXIS: 36 DEGREES
PR INTERVAL: 150 MS
QRS AXIS: -6 DEGREES
QRSD INTERVAL: 96 MS
QT INTERVAL: 410 MS
QTC INTERVAL: 451 MS
T WAVE AXIS: -3 DEGREES
VENTRICULAR RATE: 73 BPM

## 2021-05-25 PROCEDURE — 93010 ELECTROCARDIOGRAM REPORT: CPT | Performed by: INTERNAL MEDICINE

## 2021-05-25 PROCEDURE — C1785 PMKR, DUAL, RATE-RESP: HCPCS | Performed by: INTERNAL MEDICINE

## 2021-05-25 PROCEDURE — 71045 X-RAY EXAM CHEST 1 VIEW: CPT

## 2021-05-25 PROCEDURE — C1894 INTRO/SHEATH, NON-LASER: HCPCS | Performed by: INTERNAL MEDICINE

## 2021-05-25 PROCEDURE — C1898 LEAD, PMKR, OTHER THAN TRANS: HCPCS | Performed by: INTERNAL MEDICINE

## 2021-05-25 PROCEDURE — 76000 FLUOROSCOPY <1 HR PHYS/QHP: CPT

## 2021-05-25 PROCEDURE — 93005 ELECTROCARDIOGRAM TRACING: CPT

## 2021-05-25 PROCEDURE — 33208 INSRT HEART PM ATRIAL & VENT: CPT | Performed by: INTERNAL MEDICINE

## 2021-05-25 PROCEDURE — 81025 URINE PREGNANCY TEST: CPT | Performed by: INTERNAL MEDICINE

## 2021-05-25 DEVICE — LEAD 5076-58 MRI US RCMCRD
Type: IMPLANTABLE DEVICE | Site: CHEST  WALL | Status: FUNCTIONAL
Brand: CAPSUREFIX NOVUS MRI™ SURESCAN®

## 2021-05-25 DEVICE — IPG W1DR01 AZURE XT DR MRI USA
Type: IMPLANTABLE DEVICE | Site: CHEST  WALL | Status: FUNCTIONAL
Brand: AZURE™ XT DR MRI SURESCAN™

## 2021-05-25 RX ORDER — LIDOCAINE HYDROCHLORIDE 10 MG/ML
INJECTION, SOLUTION EPIDURAL; INFILTRATION; INTRACAUDAL; PERINEURAL AS NEEDED
Status: DISCONTINUED | OUTPATIENT
Start: 2021-05-25 | End: 2021-05-25 | Stop reason: HOSPADM

## 2021-05-25 RX ORDER — ONDANSETRON 2 MG/ML
INJECTION INTRAMUSCULAR; INTRAVENOUS AS NEEDED
Status: DISCONTINUED | OUTPATIENT
Start: 2021-05-25 | End: 2021-05-25

## 2021-05-25 RX ORDER — MIDAZOLAM HYDROCHLORIDE 2 MG/2ML
INJECTION, SOLUTION INTRAMUSCULAR; INTRAVENOUS AS NEEDED
Status: DISCONTINUED | OUTPATIENT
Start: 2021-05-25 | End: 2021-05-25

## 2021-05-25 RX ORDER — FENTANYL CITRATE/PF 50 MCG/ML
25 SYRINGE (ML) INJECTION
Status: DISCONTINUED | OUTPATIENT
Start: 2021-05-25 | End: 2021-05-25

## 2021-05-25 RX ORDER — SODIUM CHLORIDE, SODIUM LACTATE, POTASSIUM CHLORIDE, CALCIUM CHLORIDE 600; 310; 30; 20 MG/100ML; MG/100ML; MG/100ML; MG/100ML
50 INJECTION, SOLUTION INTRAVENOUS CONTINUOUS
Status: DISCONTINUED | OUTPATIENT
Start: 2021-05-25 | End: 2021-05-25 | Stop reason: HOSPADM

## 2021-05-25 RX ORDER — METOPROLOL TARTRATE 50 MG/1
50 TABLET, FILM COATED ORAL 2 TIMES DAILY
Refills: 0
Start: 2021-05-25 | End: 2021-07-26

## 2021-05-25 RX ORDER — CEFAZOLIN SODIUM 2 G/50ML
2000 SOLUTION INTRAVENOUS ONCE
Status: COMPLETED | OUTPATIENT
Start: 2021-05-25 | End: 2021-05-25

## 2021-05-25 RX ORDER — ACETAMINOPHEN 325 MG/1
650 TABLET ORAL EVERY 4 HOURS PRN
Status: DISCONTINUED | OUTPATIENT
Start: 2021-05-25 | End: 2021-05-25 | Stop reason: HOSPADM

## 2021-05-25 RX ORDER — FENTANYL CITRATE 50 UG/ML
INJECTION, SOLUTION INTRAMUSCULAR; INTRAVENOUS AS NEEDED
Status: DISCONTINUED | OUTPATIENT
Start: 2021-05-25 | End: 2021-05-25

## 2021-05-25 RX ORDER — PROPOFOL 10 MG/ML
INJECTION, EMULSION INTRAVENOUS CONTINUOUS PRN
Status: DISCONTINUED | OUTPATIENT
Start: 2021-05-25 | End: 2021-05-25

## 2021-05-25 RX ORDER — ONDANSETRON 2 MG/ML
4 INJECTION INTRAMUSCULAR; INTRAVENOUS ONCE AS NEEDED
Status: DISCONTINUED | OUTPATIENT
Start: 2021-05-25 | End: 2021-05-25

## 2021-05-25 RX ORDER — SODIUM CHLORIDE, SODIUM LACTATE, POTASSIUM CHLORIDE, CALCIUM CHLORIDE 600; 310; 30; 20 MG/100ML; MG/100ML; MG/100ML; MG/100ML
125 INJECTION, SOLUTION INTRAVENOUS CONTINUOUS
Status: DISCONTINUED | OUTPATIENT
Start: 2021-05-25 | End: 2021-05-25 | Stop reason: HOSPADM

## 2021-05-25 RX ORDER — SODIUM CHLORIDE 9 MG/ML
INJECTION, SOLUTION INTRAVENOUS AS NEEDED
Status: DISCONTINUED | OUTPATIENT
Start: 2021-05-25 | End: 2021-05-25 | Stop reason: HOSPADM

## 2021-05-25 RX ADMIN — PHENYLEPHRINE HYDROCHLORIDE 200 MCG: 10 INJECTION INTRAVENOUS at 08:21

## 2021-05-25 RX ADMIN — PHENYLEPHRINE HYDROCHLORIDE 200 MCG: 10 INJECTION INTRAVENOUS at 08:43

## 2021-05-25 RX ADMIN — CEFAZOLIN SODIUM 2000 MG: 2 SOLUTION INTRAVENOUS at 07:36

## 2021-05-25 RX ADMIN — FENTANYL CITRATE 25 MCG: 50 INJECTION INTRAMUSCULAR; INTRAVENOUS at 07:46

## 2021-05-25 RX ADMIN — FENTANYL CITRATE 25 MCG: 50 INJECTION INTRAMUSCULAR; INTRAVENOUS at 07:41

## 2021-05-25 RX ADMIN — FENTANYL CITRATE 25 MCG: 50 INJECTION INTRAMUSCULAR; INTRAVENOUS at 10:07

## 2021-05-25 RX ADMIN — SODIUM CHLORIDE, SODIUM LACTATE, POTASSIUM CHLORIDE, AND CALCIUM CHLORIDE 125 ML/HR: .6; .31; .03; .02 INJECTION, SOLUTION INTRAVENOUS at 06:56

## 2021-05-25 RX ADMIN — PHENYLEPHRINE HYDROCHLORIDE 200 MCG: 10 INJECTION INTRAVENOUS at 08:32

## 2021-05-25 RX ADMIN — FENTANYL CITRATE 25 MCG: 50 INJECTION INTRAMUSCULAR; INTRAVENOUS at 10:18

## 2021-05-25 RX ADMIN — PHENYLEPHRINE HYDROCHLORIDE 200 MCG: 10 INJECTION INTRAVENOUS at 08:23

## 2021-05-25 RX ADMIN — FENTANYL CITRATE 50 MCG: 50 INJECTION INTRAMUSCULAR; INTRAVENOUS at 07:36

## 2021-05-25 RX ADMIN — PHENYLEPHRINE HYDROCHLORIDE 200 MCG: 10 INJECTION INTRAVENOUS at 08:29

## 2021-05-25 RX ADMIN — PROPOFOL 50 MCG/KG/MIN: 10 INJECTION, EMULSION INTRAVENOUS at 07:37

## 2021-05-25 RX ADMIN — PHENYLEPHRINE HYDROCHLORIDE 200 MCG: 10 INJECTION INTRAVENOUS at 08:37

## 2021-05-25 RX ADMIN — FENTANYL CITRATE 25 MCG: 50 INJECTION INTRAMUSCULAR; INTRAVENOUS at 09:40

## 2021-05-25 RX ADMIN — PHENYLEPHRINE HYDROCHLORIDE 200 MCG: 10 INJECTION INTRAVENOUS at 08:19

## 2021-05-25 RX ADMIN — ACETAMINOPHEN 650 MG: 325 TABLET ORAL at 12:11

## 2021-05-25 RX ADMIN — ONDANSETRON 4 MG: 2 INJECTION INTRAMUSCULAR; INTRAVENOUS at 08:37

## 2021-05-25 RX ADMIN — FENTANYL CITRATE 25 MCG: 50 INJECTION INTRAMUSCULAR; INTRAVENOUS at 09:49

## 2021-05-25 RX ADMIN — MIDAZOLAM HYDROCHLORIDE 2 MG: 1 INJECTION, SOLUTION INTRAMUSCULAR; INTRAVENOUS at 07:32

## 2021-05-25 NOTE — INTERVAL H&P NOTE
H&P reviewed  Since that visit, sinus pauses with significant symptoms have continued  ON this basis will have pacemaker implantation to prevent falls  Consent obtained      Vitals:    05/25/21 0613   BP: 136/84   Pulse: 77   Resp: 18   Temp: 97 5 °F (36 4 °C)   SpO2: 98%
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
## 2021-05-25 NOTE — ANESTHESIA PREPROCEDURE EVALUATION
Procedure:  DUAL LEAD PACEMAKER IMPLANT (Left Chest)    Relevant Problems   CARDIO   (+) CAD (coronary artery disease)   (+) CHF (congestive heart failure) ([facility])   (+) Essential hypertension   (+) Mixed hyperlipidemia   (+) Sinus pause      /RENAL   (+) CKD (chronic kidney disease) stage 3, GFR 30-59 ml/min ([facility])      HEMATOLOGY   (+) Iron deficiency anemia secondary to inadequate dietary iron intake   (+) Non-Hodgkin lymphoma of lymph nodes of multiple regions ([facility])      NEURO/PSYCH   (+) Anxiety   (+) History of MI (myocardial infarction)      PULMONARY   (+) COPD (chronic obstructive pulmonary disease) ([facility])      Other   (+) BMI 40 0-44 9, adult ([facility])   (+) Hypertrophic cardiomyopathy ([facility])   (+) Tobacco abuse   (+) Ventricular tachycardia, non-sustained ([facility])   HOCM  Status post PCI of RCA and LAD in 2014  Inferior STEMI 3/23/21 status post PCI to [facility], then GREG to RPDA  Non-sustained VT post infarct, treated with beta blockade  Now with sinus pauses necessitating pacemaker    Probable ELIAZAR, sleep study to confirm scheduled for June 2021    Physical Exam    Airway    Mallampati score: III  TM Distance: >3 FB  Neck ROM: full     Dental   No notable dental hx     Cardiovascular      Pulmonary      Other Findings        4/2/21 [facility]:  CORONARY VESSELS:   --  The coronary circulation is right dominant  --  Left main: Angiography showed minor luminal irregularities  --  Distal LAD: There was a 60 % stenosis  --  Mid circumflex: There was a 50 % stenosis  --  Distal RCA: There was a 10 % stenosis at the site of a prior stent  --  Right PDA: There was a 0 % stenosis at the site of a prior stent  --  2nd posterolateral segment: The distal vessel was supplied by faint collaterals      IMPRESSIONS:  Patient without angiographic change on cath compared to 3/25/2021   Additionally, she had a return to the cath lab during that index hospitalization for inferior STEMI which demonstrated patent stents and was elected to stent rPDA branch  that was not flow limiting  She has an ECG with residual ST elevation and Q waves  Since she returned with cp today with [place] pass, was elected repeat cath once again  This demonstrated no change and patients chest pain syndrome is  therefore not related to further progression of epicardial CAD       3/25/21 TTE:  LEFT VENTRICLE:  Systolic function was normal  Ejection fraction was estimated to be 60 %  There was severe hypokinesis of the basal-mid inferior wall(s)  Wall thickness was markedly increased  There was severe assymetrical hypertrophy of the inferior septum (25 mm)  There was no dynamic obstruction at rest  Provocative maneuvers were not performed  Features were consistent with a pseudonormal left ventricular filling pattern, with concomitant abnormal relaxation and increased filling pressure (grade 2 diastolic dysfunction)      LEFT ATRIUM:  The atrium was mildly dilated      MITRAL VALVE:  There was moderate systolic anterior motion of the anterior leaflet  There was mild to moderate regurgitation      AORTIC VALVE:  There was trace regurgitation  Anesthesia Plan  ASA Score- 3     Anesthesia Type- IV sedation with anesthesia with ASA Monitors  Additional Monitors:   Airway Plan:     Comment: I discussed the risks and benefits of IV sedation anesthesia including the possibility of the need to convert to general anesthesia and the potential risk of awareness  Plan Factors-    Chart reviewed  EKG reviewed  Existing labs reviewed  Patient summary reviewed  Patient is a current smoker (Attempting to quit, down to 3 cigarettes per week)  Induction- intravenous  Postoperative Plan-     Informed Consent- Anesthetic plan and risks discussed with patient

## 2021-05-25 NOTE — DISCHARGE INSTRUCTIONS
Instructions for going home after your Pacemaker or Defibrillator Surgery        WHAT YOU NEED TO KNOW:   A pacemaker or defirillator is a battery-powered device that is implanted into your chest to help regulate your heart rate or prevent fainting or arrhythmia  DISCHARGE INSTRUCTIONS:   Medicines:   · Pain medicine: You may need medicine to take away or decrease pain  ¨ Learn how to take your medicine  Ask what medicine and how much you should take  Be sure you know how, when, and how often to take it  ¨ Do not wait until the pain is severe before you take your medicine  Tell caregivers if your pain does not decrease  ¨ Prevent falls by calling someone when you get out of bed or if you need help  Follow up with your cardiologist after your procedure: You may need a follow-up visit 7 to 10 days after you leave the hospital  Your cardiologist will check your wound and make sure that your device is working correctly  These should all ready be scheduled but if not or the time is inconvenient, call the office promptly  Follow the instructions to check your device:  Your cardiologist or healthcare provider will check your device and the battery regularly, usually every 3 to 6 months  A computer may be used to  check your device over the telephone between visits  Pacemaker or defibrillator batteries usually last 5 to 11 years  The entire unit will be replaced when the battery gets low  This is a simpler procedure than the original one to implant your pacemaker or defibrillator  Wound care:  Keep your incisions clean and dry for 7 to 10 days after your procedure  There may be glue on the wound  This needs 24 hours to cure so no washing of the site before then  Afterwards if the wound gets wet just had it dry  Do not scrub it  Contact the office if the area is around the device is unusually red or painful, tender, or has drainage    Also the office needs to be contacted if there is fever          Arm movement and lifting:  Be careful using the arm on the side of your device  Do not move your arm for the first 24 hours after your procedure  If you are sent home with a sling, remove this sling in the morning after the procedure  Do not  lift your arm above your shoulder or lift more than 10 pounds for 6 weeks after your procedure  This helps the leads stay in place and helps your wound heal  Ask your healthcare provider when you can drive after your procedure  Living with your pacemaker or defibrillator:     · Carry your pacemaker or defibrillator ID card: Make sure you receive a pacemaker or defibrillator ID card  Carry it with you at all times  It lists important information about your pacemaker  Show it to airport security if you travel  · Avoid electrical interference:  Avoid welding equipment, MRI machines, and other equipment with large magnets or electric fields  These things could interfere with how your pacemaker works  Use your cell phone on the ear opposite from your pacemaker  Do not carry your cell phone in your shirt pocket over your chest   Most new devices however are compatible with an MRI if none under the proper circumstances  Seek care immediately if:     · You feel your heart suddenly beating very slowly or quickly  · You become too weak or dizzy to stand, or you pass out  · You feel lightheaded, short of breath, and have chest pain  You cough up blood

## 2021-05-25 NOTE — ANESTHESIA POSTPROCEDURE EVALUATION
Post-Op Assessment Note    CV Status:  Stable  Pain Score: 0       Mental Status:  Awake   Hydration Status:  Stable   PONV Controlled:  None   Airway Patency:  Patent      Post Op Vitals Reviewed: Yes      Staff: CRNA   Comments: o2 via mask to pacu        No complications documented      BP   103/56   Temp      Pulse  76   Resp   20   SpO2   97

## 2021-05-25 NOTE — OP NOTE
OPERATIVE REPORT  PATIENT NAME: Lilia Wood    :  1975  MRN: [id number]  Pt Location: [facility] OR ROOM 03    SURGERY DATE: 2021    Surgeon(s) and Role:     * Brenda Keith MD - Primary    Preop Diagnosis:  Sick sinus syndrome ([address] ) [I49 5]    Post-Op Diagnosis Codes:     * Sick sinus syndrome ([address] ) [I49 5]    Procedure(s) (LRB):  DUAL LEAD PACEMAKER IMPLANT (Left)         Indication:  Symptomatic sick sinus syndrome with long sinus pauses  Site:Left subclavian  Device:  Brand:Medtronic    Atrial lead:  Brand:Medtronic  Fluoroscopic position:Right Atrial lateral wall  Active fixation    Ventricular lead:  Brand:Medtronic  Fluoroscopic position:RV apex  Active fixation    Device and Lead details:    IMPLANTED DEVICES  IPG [id number] KYLE XT DR MRI  Model # Serial #  Implant Date Placement Status Is Primary  [id number] Medtronic 2021 L Pect SQ Implant Yes  LEADS AND ADAPTERS  Chamber Model # Length Serial #  Implant Date Placement Status  Right Atrium 4748-49 72 [id number] Medtronic 2021 RA-Appendage Implant  Right Ventricle 0934-51 58 [id number] Medtronic 2021 RV-[place] Implant  STIMULATION THRESHOLD  Chamber Polarity Pulse Width (ms) Voltage (V) Current (mA) Impedance   (Ohms)  P/R Wave (mV) Slew Rate(V/Sec)  Right Atrium Bipolar 0 4 1 8 551 3 75  Right Ventricle Bipolar 0 4 1 3 874 9 5  IVCD (ms): Not Tested 0 Diaphragmatic Stimulation: Not Tested  First Loss of Capture: RV Retrograde Conduction (ms): Not Tested 0   DEVICE MEASURED DATA  Pacing Threshold  Chamber Pac P/R Wave (mV) Pacing Imp HVB Imp HVX Imp Test Shock V ms  Right Atrium 2 8 532 1 5 0 4  Right Ventricle 9 5 874 0 75 0 4  Note: Impedance given in Ohms    Pacing Mode:AAIR<=>DDDR Parameter Atrial RV LV  Mode Switch: On Amplitude (V): 3 5 3 5  Lower Rate:60 Pulse Width (ms): 0 4 0 4  Upper Tracking Rate:140 Sensitivity (mV): 0 3 0 9  Upper Activity Rate:140 Paced Polarity: Bipolar Bipolar N/A  Sense Polarity: Bipolar Bipolar  Paced A-V Interval:180 Capture Management: Yes Yes  Sensed A-V Interval:150 Ventricular Pacing: Ventricular Sense Response:Off  Rate Adaptive A-V Interval:Off V to V Interval:N/A      Anesthesia:  Local with sedation  Estimated blood loss:  Less than 10 cc  Complications:  None    Procedure: The patient was prepped and draped in the usual sterile manner after consent was obtained and the patient was appropriately identified  1/2 inch incision was made from the midclavicular line to the deltopectoral groove  Cutdown was made with sharp and blunt dissection to the prepectoral fascia  A pocket was formed bluntly and a 4 x 4 was placed into the pocket for hemostasis  The subclavian vein was entered on a single pass and a guidewire was placed through the needle  This was confirmed to the superior vena cava by fluoroscopy  A second stick and wire were then placed  Sheath was placed over the first wire and the wire was removed  The ventricular lead was passed through the sheath which was peeled away  Using fluoroscopic guidance, the lead was manipulated into the right ventricular apex and when appropriate position was found lead was secured to the tissue entry site with 0 silk around the stay collar  10 volts did not cause diaphragmatic stimulation  Sheath was placed over the second wire and the wire was removed  The atrial lead was passed through the sheath which was peeled away  Using fluoroscopic guidance, the lead was manipulated into the right atrium and when appropriate position was found lead was secured to the tissue entry site with 0 silk around the stay collar  10 volts did not cause diaphragmatic stimulation  The 4 x 4 was removed from the pocket and the pocket was copiously irrigated with sterile saline  There was good hemostasis  The device was brought to the table attached to the lead and secured to the underlying fascia with 0 silk through the header block    2-0 Vicryl was used to close the pocket deeply in an interrupted manner  2-0 Vicryl was used for running closure of the subcutaneous fashion fat  4-O Vicryl was then used for interrupted subcuticular closure of the skin  Dermabond was placed on the wound and the the patient was transferred to the recovery room in good condition              SIGNATURE: Jessica Resendez MD  DATE: May 25, 2021  TIME: 9:14 AM

## 2021-05-26 ENCOUNTER — APPOINTMENT (OUTPATIENT)
Dept: CARDIAC REHAB | Facility: HOSPITAL | Age: 46
End: 2021-05-26
Payer: COMMERCIAL

## 2021-05-26 DIAGNOSIS — G47.30 SLEEP APNEA, UNSPECIFIED TYPE: ICD-10-CM

## 2021-05-26 DIAGNOSIS — E66.01 MORBID OBESITY (HCC): Primary | ICD-10-CM

## 2021-05-28 ENCOUNTER — APPOINTMENT (EMERGENCY)
Dept: RADIOLOGY | Facility: HOSPITAL | Age: 46
End: 2021-05-28
Payer: COMMERCIAL

## 2021-05-28 ENCOUNTER — HOSPITAL ENCOUNTER (EMERGENCY)
Facility: HOSPITAL | Age: 46
Discharge: HOME | End: 2021-05-28
Attending: INTERNAL MEDICINE
Payer: COMMERCIAL

## 2021-05-28 ENCOUNTER — APPOINTMENT (OUTPATIENT)
Dept: CARDIAC REHAB | Facility: HOSPITAL | Age: 46
End: 2021-05-28
Payer: COMMERCIAL

## 2021-05-28 ENCOUNTER — APPOINTMENT (EMERGENCY)
Dept: CT IMAGING | Facility: HOSPITAL | Age: 46
End: 2021-05-28
Payer: COMMERCIAL

## 2021-05-28 VITALS
SYSTOLIC BLOOD PRESSURE: 128 MMHG | DIASTOLIC BLOOD PRESSURE: 59 MMHG | HEART RATE: 72 BPM | RESPIRATION RATE: 17 BRPM | TEMPERATURE: 98.7 F | BODY MASS INDEX: 42.93 KG/M2 | OXYGEN SATURATION: 96 % | WEIGHT: 266 LBS

## 2021-05-28 DIAGNOSIS — M25.512 LEFT SHOULDER PAIN: ICD-10-CM

## 2021-05-28 DIAGNOSIS — R07.89 LEFT-SIDED CHEST WALL PAIN: Primary | ICD-10-CM

## 2021-05-28 LAB
ALBUMIN SERPL BCP-MCNC: 3.7 G/DL (ref 3.5–5.7)
ALP SERPL-CCNC: 60 U/L (ref 40–150)
ALT SERPL W P-5'-P-CCNC: 7 U/L (ref 7–52)
ANION GAP SERPL CALCULATED.3IONS-SCNC: 9 MMOL/L (ref 4–13)
APTT PPP: 34 SECONDS (ref 23–37)
AST SERPL W P-5'-P-CCNC: 9 U/L (ref 13–39)
BASOPHILS # BLD AUTO: 0.1 THOUSANDS/ΜL (ref 0–0.1)
BASOPHILS NFR BLD AUTO: 1 % (ref 0–2)
BILIRUB SERPL-MCNC: 0.3 MG/DL (ref 0.2–1)
BUN SERPL-MCNC: 24 MG/DL (ref 7–25)
CALCIUM SERPL-MCNC: 9.2 MG/DL (ref 8.6–10.5)
CHLORIDE SERPL-SCNC: 107 MMOL/L (ref 98–107)
CO2 SERPL-SCNC: 24 MMOL/L (ref 21–31)
CREAT SERPL-MCNC: 1.52 MG/DL (ref 0.6–1.2)
CRP SERPL HS-MCNC: 61.58 MG/L
EOSINOPHIL # BLD AUTO: 0.2 THOUSAND/ΜL (ref 0–0.61)
EOSINOPHIL NFR BLD AUTO: 1 % (ref 0–5)
ERYTHROCYTE [DISTWIDTH] IN BLOOD BY AUTOMATED COUNT: 19.3 % (ref 11.5–14.5)
GFR SERPL CREATININE-BSD FRML MDRD: 41 ML/MIN/1.73SQ M
GLUCOSE SERPL-MCNC: 103 MG/DL (ref 65–99)
HCT VFR BLD AUTO: 37.1 % (ref 42–47)
HGB BLD-MCNC: 12.2 G/DL (ref 12–16)
INR PPP: 1.06 (ref 0.84–1.19)
LYMPHOCYTES # BLD AUTO: 2.2 THOUSANDS/ΜL (ref 0.6–4.47)
LYMPHOCYTES NFR BLD AUTO: 15 % (ref 21–51)
MCH RBC QN AUTO: 28.7 PG (ref 26–34)
MCHC RBC AUTO-ENTMCNC: 32.7 G/DL (ref 31–37)
MCV RBC AUTO: 88 FL (ref 81–99)
MONOCYTES # BLD AUTO: 1.1 THOUSAND/ΜL (ref 0.17–1.22)
MONOCYTES NFR BLD AUTO: 7 % (ref 2–12)
NEUTROPHILS # BLD AUTO: 11.8 THOUSANDS/ΜL (ref 1.4–6.5)
NEUTS SEG NFR BLD AUTO: 77 % (ref 42–75)
PLATELET # BLD AUTO: 280 THOUSANDS/UL (ref 149–390)
PMV BLD AUTO: 8.6 FL (ref 8.6–11.7)
POTASSIUM SERPL-SCNC: 4.2 MMOL/L (ref 3.5–5.5)
PROT SERPL-MCNC: 7 G/DL (ref 6.4–8.9)
PROTHROMBIN TIME: 13.7 SECONDS (ref 11.6–14.5)
RBC # BLD AUTO: 4.24 MILLION/UL (ref 3.9–5.2)
SODIUM SERPL-SCNC: 140 MMOL/L (ref 134–143)
WBC # BLD AUTO: 15.3 THOUSAND/UL (ref 4.8–10.8)

## 2021-05-28 PROCEDURE — 85025 COMPLETE CBC W/AUTO DIFF WBC: CPT | Performed by: INTERNAL MEDICINE

## 2021-05-28 PROCEDURE — 93005 ELECTROCARDIOGRAM TRACING: CPT

## 2021-05-28 PROCEDURE — 87040 BLOOD CULTURE FOR BACTERIA: CPT | Performed by: INTERNAL MEDICINE

## 2021-05-28 PROCEDURE — 80053 COMPREHEN METABOLIC PANEL: CPT | Performed by: INTERNAL MEDICINE

## 2021-05-28 PROCEDURE — 86141 C-REACTIVE PROTEIN HS: CPT | Performed by: INTERNAL MEDICINE

## 2021-05-28 PROCEDURE — 99284 EMERGENCY DEPT VISIT MOD MDM: CPT

## 2021-05-28 PROCEDURE — 71250 CT THORAX DX C-: CPT

## 2021-05-28 PROCEDURE — 71046 X-RAY EXAM CHEST 2 VIEWS: CPT

## 2021-05-28 PROCEDURE — 72040 X-RAY EXAM NECK SPINE 2-3 VW: CPT

## 2021-05-28 PROCEDURE — 96374 THER/PROPH/DIAG INJ IV PUSH: CPT

## 2021-05-28 PROCEDURE — 99285 EMERGENCY DEPT VISIT HI MDM: CPT | Performed by: INTERNAL MEDICINE

## 2021-05-28 PROCEDURE — 85730 THROMBOPLASTIN TIME PARTIAL: CPT | Performed by: INTERNAL MEDICINE

## 2021-05-28 PROCEDURE — 96376 TX/PRO/DX INJ SAME DRUG ADON: CPT

## 2021-05-28 PROCEDURE — 85610 PROTHROMBIN TIME: CPT | Performed by: INTERNAL MEDICINE

## 2021-05-28 PROCEDURE — G1004 CDSM NDSC: HCPCS

## 2021-05-28 PROCEDURE — 36415 COLL VENOUS BLD VENIPUNCTURE: CPT | Performed by: INTERNAL MEDICINE

## 2021-05-28 RX ORDER — FENTANYL CITRATE 50 UG/ML
50 INJECTION, SOLUTION INTRAMUSCULAR; INTRAVENOUS ONCE
Status: COMPLETED | OUTPATIENT
Start: 2021-05-28 | End: 2021-05-28

## 2021-05-28 RX ORDER — OXYCODONE HYDROCHLORIDE AND ACETAMINOPHEN 5; 325 MG/1; MG/1
1 TABLET ORAL EVERY 6 HOURS PRN
Qty: 10 TABLET | Refills: 0 | Status: ON HOLD | OUTPATIENT
Start: 2021-05-28 | End: 2021-07-25 | Stop reason: ALTCHOICE

## 2021-05-28 RX ORDER — FENTANYL CITRATE 50 UG/ML
100 INJECTION, SOLUTION INTRAMUSCULAR; INTRAVENOUS ONCE
Status: COMPLETED | OUTPATIENT
Start: 2021-05-28 | End: 2021-05-28

## 2021-05-28 RX ADMIN — FENTANYL CITRATE 100 MCG: 50 INJECTION INTRAMUSCULAR; INTRAVENOUS at 22:34

## 2021-05-28 RX ADMIN — FENTANYL CITRATE 50 MCG: 50 INJECTION INTRAMUSCULAR; INTRAVENOUS at 21:30

## 2021-05-29 NOTE — ED PROVIDER NOTES
History  Chief Complaint   Patient presents with    Arm Injury     pt presents with left shoulder pain since having her pacemaker placed this past tuesday  53-year-old female with recent implantation of a pacemaker related tachy-katy syndrome following treatment for V-tach  She has chronic kidney disease, hypertension, and prior history of coronary artery disease  She is a survivor of non-Hodgkin's lymphoma  She had a pacemaker put in Tuesday, just 3 days ago  Since that time she has difficulty moving her left shoulder and arm  Is just sore and swollen up in the shoulder area  It is more posterior in the cervical trapezius  There is tenderness over the pacer site, but is not appear cellulitic her erythematous  There is no significant drainage  There is no cervical adenopathy  She has no pain in the biceps her forearm  She has normal hand grasp that side  She denies any fevers or chills  Her pain is just severe at this time  Prior to Admission Medications   Prescriptions Last Dose Informant Patient Reported? Taking?    ARIPiprazole (ABILIFY) 10 mg tablet  Self No No   Sig: Take 1 tablet (10 mg total) by mouth daily   Patient taking differently: Take 10 mg by mouth 2 (two) times a day    LORazepam (ATIVAN) 0 5 mg tablet   No No   Sig: Take 1 tablet (0 5 mg total) by mouth 2 (two) times a day as needed for anxiety   albuterol (PROVENTIL HFA,VENTOLIN HFA) 90 mcg/act inhaler  Self No No   Sig: Inhale 2 puffs every 4 (four) hours as needed for wheezing or shortness of breath   aspirin 81 mg chewable tablet  Self No No   Sig: Chew 1 tablet (81 mg total) daily   atorvastatin (LIPITOR) 80 mg tablet   No No   Sig: Take 1 tablet (80 mg total) by mouth every evening   colchicine (COLCRYS) 0 6 mg tablet   No No   Sig: Take 1 tablet (0 6 mg total) by mouth 2 (two) times a day   ergocalciferol (VITAMIN D2) 50,000 units   No No   Sig: Take 1 capsule (50,000 Units total) by mouth once a week   fluticasone (FLONASE) 50 mcg/act nasal spray   No No   Si sprays into each nostril daily for 7 days   lisinopril (ZESTRIL) 5 mg tablet   No No   Sig: Take 1 tablet (5 mg total) by mouth daily   metoprolol succinate (TOPROL-XL) 50 mg 24 hr tablet   No No   Sig: Take 1 5 tablets (75 mg total) by mouth every 12 (twelve) hours   Patient taking differently: Take 25 mg by mouth every 12 (twelve) hours    metoprolol tartrate (LOPRESSOR) 50 mg tablet   No No   Sig: Take 1 tablet (50 mg total) by mouth 2 (two) times a day   nicotine (NICODERM CQ) 14 mg/24hr TD 24 hr patch   No No   Sig: Place 1 patch on the skin every 24 hours   nitroglycerin (NITROSTAT) 0 4 mg SL tablet   No No   Sig: Place 1 tablet (0 4 mg total) under the tongue every 5 (five) minutes as needed for chest pain   ranolazine (RANEXA) 500 mg 12 hr tablet   No No   Sig: Take 1 tablet (500 mg total) by mouth every 12 (twelve) hours   sertraline (ZOLOFT) 100 mg tablet  Self No No   Sig: Take 1 tablet (100 mg total) by mouth daily   ticagrelor (BRILINTA) 90 MG   No No   Sig: Take 1 tablet (90 mg total) by mouth every 12 (twelve) hours   umeclidinium-vilanterol (ANORO ELLIPTA) 62 5-25 MCG/INH inhaler  Self No No   Sig: Inhale 1 puff daily   Patient not taking: Reported on 5/10/2021      Facility-Administered Medications: None       Past Medical History:   Diagnosis Date    [facility]     Anxiety     Cardiac disease     [facility]     CHF (congestive heart failure) (HCC)     Chronic kidney disease     [facility]     scheduled for sleep apnea test soon after pacemaker implant    Depression     History of chemotherapy     non hodgkins lymphoma     Hypertension     Lymphoma, non-Hodgkin's (HCC)     Myocardial infarction ([facility] )     Tobacco abuse     Wears glasses        Past Surgical History:   Procedure Laterality Date    CARDIAC CATHETERIZATION      CARDIAC PACEMAKER PLACEMENT Left 2021 Procedure: DUAL LEAD PACEMAKER IMPLANT;  Surgeon: Ryan Koo MD;  Location: [address] [place] [address] OR;  Service: Cardiology    CARDIAC PACEMAKER PLACEMENT      CAROTID STENT       SECTION      DENTAL SURGERY         Family History   Problem Relation Age of Onset    No Known Problems Mother     No Known Problems Father     No Known Problems Daughter     Brain cancer Maternal Grandfather     No Known Problems Paternal Aunt     No Known Problems Paternal Aunt      I have reviewed and agree with the history as documented  E-Cigarette/Vaping    E-Cigarette Use Never User      E-Cigarette/Vaping Substances    Nicotine No     THC No     CBD No     Flavoring No     Other No     Unknown No      Social History     Tobacco Use    Smoking status: Smoker, Current Status Unknown     Packs/day:      Types: Cigarettes     Last attempt to quit: 3/29/2021     Years since quittin 1    Smokeless tobacco: Current User    Tobacco comment: on nicotine patch 14 mg currently no cigarettes   Substance Use Topics    Alcohol use: Yes     Frequency: Monthly or less     Comment: rarely    Drug use: Never       Review of Systems   Constitutional: Negative for fatigue  HENT: Negative for sinus pressure and sinus pain  Respiratory: Negative for cough, chest tightness and shortness of breath  Cardiovascular: Negative for chest pain and palpitations  Gastrointestinal: Negative for abdominal pain, diarrhea and vomiting  Genitourinary: Negative for difficulty urinating and dysuria  Musculoskeletal: Positive for back pain and neck pain  Left shoulder pain, and tenderness over pacer site   Skin: Negative for color change and rash  Neurological: Negative for tremors and headaches  Psychiatric/Behavioral: Negative for behavioral problems  Physical Exam  Physical Exam  Vitals signs and nursing note reviewed  Constitutional:       Appearance: She is well-developed  She is obese        Comments: Uncomfortable   HENT:      Nose: Nose normal       Mouth/Throat:      Pharynx: No oropharyngeal exudate  Eyes:      General: No scleral icterus  Conjunctiva/sclera: Conjunctivae normal       Pupils: Pupils are equal, round, and reactive to light  Neck:      Musculoskeletal: Normal range of motion and neck supple  Muscular tenderness present  Vascular: No JVD  Trachea: No tracheal deviation  Comments: Lateral trapezius muscle tenderness  Cardiovascular:      Rate and Rhythm: Normal rate and regular rhythm  Heart sounds: Normal heart sounds  No murmur  Pulmonary:      Effort: Pulmonary effort is normal  No respiratory distress  Breath sounds: Normal breath sounds  No wheezing or rales  Abdominal:      General: Bowel sounds are normal       Palpations: Abdomen is soft  Tenderness: There is no abdominal tenderness  There is no guarding  Musculoskeletal: Normal range of motion  General: Tenderness present  Comments: Tenderness over left trapezius and into anterior pectoralis  Tenderness over chest wall were pacemaker is  No primary shoulder tenderness at site of the joint  Skin:     General: Skin is warm and dry  Comments: Pacer site appears clean and dry, no exudate of drainage  Healing appears adequate  Neurological:      Mental Status: She is alert and oriented to person, place, and time  Cranial Nerves: No cranial nerve deficit  Sensory: No sensory deficit  Motor: No abnormal muscle tone        Comments: 5/5 motor, nl sens   Psychiatric:         Mood and Affect: Mood normal          Behavior: Behavior normal          Vital Signs  ED Triage Vitals   Temperature Pulse Respirations Blood Pressure SpO2   05/28/21 2055 05/28/21 2044 05/28/21 2044 05/28/21 2044 05/28/21 2044   98 7 °F (37 1 °C) 98 19 (!) 192/104 98 %      Temp Source Heart Rate Source Patient Position - Orthostatic VS BP Location FiO2 (%)   05/28/21 2055 05/28/21 2044 -- 05/28/21 2044 --   Oral Monitor  Right arm       Pain Score       05/28/21 2044       Worst Possible Pain           Vitals:    05/28/21 2044 05/28/21 2130 05/28/21 2215 05/28/21 2230   BP: (!) 192/104 103/56 134/62 125/70   Pulse: 98 76 74 75         Visual Acuity      ED Medications  Medications   fentanyl citrate (PF) 100 MCG/2ML 50 mcg (50 mcg Intravenous Given 5/28/21 2130)   fentanyl citrate (PF) 100 MCG/2ML 100 mcg (100 mcg Intravenous Given 5/28/21 2234)       Diagnostic Studies  Results Reviewed     Procedure Component Value Units Date/Time    High sensitivity CRP [[id number]] Collected: 05/28/21 2126    Lab Status: Final result Specimen: Blood from Arm, Right Updated: 05/28/21 2208     CRP, High Sensitivity 61 58 mg/L     Narrative:            HsCRP Level       Relative Risk           <1 0 mg/L          Low           1 0 to 3 0 mg/L    Average           >3 0 mg/L          High        Comprehensive metabolic panel [[id number]]  (Abnormal) Collected: 05/28/21 2126    Lab Status: Final result Specimen: Blood from Arm, Right Updated: 05/28/21 2207     Sodium 140 mmol/L      Potassium 4 2 mmol/L      Chloride 107 mmol/L      CO2 24 mmol/L      ANION GAP 9 mmol/L      BUN 24 mg/dL      Creatinine 1 52 mg/dL      Glucose 103 mg/dL      Calcium 9 2 mg/dL      AST 9 U/L      ALT 7 U/L      Alkaline Phosphatase 60 U/L      Total Protein 7 0 g/dL      Albumin 3 7 g/dL      Total Bilirubin 0 30 mg/dL      eGFR 41 ml/min/1 73sq m     Narrative:      [place] guidelines for Chronic Kidney Disease (CKD):     Stage 1 with normal or high GFR (GFR > 90 mL/min/1 73 square meters)    Stage 2 Mild CKD (GFR = 60-89 mL/min/1 73 square meters)    Stage 3A Moderate CKD (GFR = 45-59 mL/min/1 73 square meters)    Stage 3B Moderate CKD (GFR = 30-44 mL/min/1 73 square meters)    Stage 4 Severe CKD (GFR = 15-29 mL/min/1 73 square meters)    Stage 5 End Stage CKD (GFR <15 mL/min/1 73 square meters)  Note: GFR calculation is accurate only with a steady state creatinine    Protime-INR [[id number]]  (Normal) Collected: 05/28/21 2126    Lab Status: Final result Specimen: Blood from Arm, Right Updated: 05/28/21 2150     Protime 13 7 seconds      INR 1 06    APTT [[id number]]  (Normal) Collected: 05/28/21 2126    Lab Status: Final result Specimen: Blood from Arm, Right Updated: 05/28/21 2150     PTT 34 seconds     CBC and differential [[id number]]  (Abnormal) Collected: 05/28/21 2126    Lab Status: Final result Specimen: Blood from Arm, Right Updated: 05/28/21 2141     WBC 15 30 Thousand/uL      RBC 4 24 Million/uL      Hemoglobin 12 2 g/dL      Hematocrit 37 1 %      MCV 88 fL      MCH 28 7 pg      MCHC 32 7 g/dL      RDW 19 3 %      MPV 8 6 fL      Platelets 033 Thousands/uL      Neutrophils Relative 77 %      Lymphocytes Relative 15 %      Monocytes Relative 7 %      Eosinophils Relative 1 %      Basophils Relative 1 %      Neutrophils Absolute 11 80 Thousands/µL      Lymphocytes Absolute 2 20 Thousands/µL      Monocytes Absolute 1 10 Thousand/µL      Eosinophils Absolute 0 20 Thousand/µL      Basophils Absolute 0 10 Thousands/µL     Blood culture #1 [[id number]] Collected: 05/28/21 2126    Lab Status: In process Specimen: Blood from Arm, Right Updated: 05/28/21 2136    Blood culture #2 [[id number]] Collected: 05/28/21 2126    Lab Status: In process Specimen: Blood from Arm, Right Updated: 05/28/21 2134                 CT chest without contrast   ED Interpretation by Eliana Chery DO (05/28 2306)   Pacer within chest wall  No apparent free air or fluid collection within the soft tissues, noting the lack of contrast as well  Final Result by Bridget Isaac MD (05/28 2310)         1  No acute cardial pulmonary process  2   Left chest pacemaker device in expected position  No evidence of soft tissue inflammation, hematoma or abscess                 Workstation performed: [id number]         XR chest 2 views ED Interpretation by Etelvina Benavides DO (05/28 2304)   Pacemaker in place  No other significant intrathoracic disease  No obvious soft tissue air  XR cervical spine 2 or 3 views   ED Interpretation by Etelvina Benavides DO (05/28 2304)   Degenerative disease of the cervical spine, no free air tracking                 Procedures  ECG 12 Lead Documentation Only    Date/Time: 5/28/2021 9:31 PM  Performed by: Etelvina Benavides DO  Authorized by: Etelvina Benavides DO     Indications / Diagnosis:  Shoulder pain  ECG reviewed by me, the ED Provider: yes    Patient location:  ED  Previous ECG:     Previous ECG:  Compared to current    Similarity:  No change    Comparison to cardiac monitor: No    Interpretation:     Interpretation: abnormal    Rate:     ECG rate:  81    ECG rate assessment: normal    Rhythm:     Rhythm: sinus rhythm    Ectopy:     Ectopy: none    QRS:     QRS axis:  Left    QRS intervals:  Normal  Conduction:     Conduction: normal    ST segments:     ST segments:  Non-specific  Q waves:     Q waves:  I, II, III, V5 and V6  Other findings:     Other findings: poor R wave progression    Comments:      Inferior infarct age indeterminate, anterolateral infarct and age indeterminate             ED Course  ED Course as of May 28 2323   Fri May 28, 2021   2213 HIGH SENSITIVITY C-REACTIVE PROTEIN: 61 58   2225 Given the CRP being so elevated, will or CT scan done short no fluid collection  Due to renal function patient requesting no contrast   No fever, I do not suspect infection, still seems musculoskeletal       2322 CT negative  Likely all muscle spasm  She will be returned home and will monitor her temperature  SBIRT 22yo+      Most Recent Value   SBIRT (22 yo +)   In order to provide better care to our patients, we are screening all of our patients for alcohol and drug use  Would it be okay to ask you these screening questions?   Yes Filed at: 05/28/2021 2051 Initial Alcohol Screen: US AUDIT-C    1  How often do you have a drink containing alcohol?  0 Filed at: 05/28/2021 2051   2  How many drinks containing alcohol do you have on a typical day you are drinking? 0 Filed at: 05/28/2021 2051   3a  Male UNDER 65: How often do you have five or more drinks on one occasion? 0 Filed at: 05/28/2021 2051   3b  FEMALE Any Age, or MALE 65+: How often do you have 4 or more drinks on one occassion? 0 Filed at: 05/28/2021 2051   Audit-C Score  0 Filed at: 05/28/2021 2051   NELIA: How many times in the past year have you    Used an illegal drug or used a prescription medication for non-medical reasons? Never Filed at: 05/28/2021 2051                    MDM    Disposition  Final diagnoses:   Left-sided chest wall pain   Left shoulder pain     Time reflects when diagnosis was documented in both MDM as applicable and the Disposition within this note     Time User Action Codes Description Comment    5/28/2021 10:27 PM Fidel Garcia Add [O13 346] Left arm pain     5/28/2021 10:27 PM Fidel Garcia Add [R07 89] Left-sided chest wall pain     5/28/2021 11:08 PM Fidel Garcia Add [E05 149] Left shoulder pain     5/28/2021 11:08 PM Fidel Garcia Modify [R07 89] Left-sided chest wall pain     5/28/2021 11:08 PM Fidel Garcia Remove [U81 456] Left arm pain       ED Disposition     ED Disposition Condition Date/Time Comment    Discharge Stable Fri May 28, 2021 10:27 PM David Francios discharge to home/self care              Follow-up Information     Follow up With Specialties Details Why Contact Tushar Parr,  Family Medicine Schedule an appointment as soon as possible for a visit   [address] [place] [id number]  [phone]            Patient's Medications   Discharge Prescriptions    OXYCODONE-ACETAMINOPHEN (PERCOCET) 5-325 MG PER TABLET    Take 1 tablet by mouth every 6 (six) hours as needed for moderate pain for up to 10 dosesMax Daily Amount: 4 tablets       Start Date: 5/28/2021 End Date: --       Order Dose: 1 tablet       Quantity: 10 tablet    Refills: 0     No discharge procedures on file      PDMP Review       Value Time User    PDMP Reviewed  Yes 4/1/2021 11:40 AM Steph Franco DO          ED Provider  Electronically Signed by           Swetha Mera DO  05/28/21 1569

## 2021-05-29 NOTE — DISCHARGE INSTRUCTIONS
Use heat on the shoulder as needed  Percocet every 4 hours as needed for pain  Follow-up with your family doctor  Return for temperatures greater than 101, monitor temperature several times a day

## 2021-05-31 ENCOUNTER — APPOINTMENT (OUTPATIENT)
Dept: CARDIAC REHAB | Facility: HOSPITAL | Age: 46
End: 2021-05-31
Payer: COMMERCIAL

## 2021-06-01 ENCOUNTER — OFFICE VISIT (OUTPATIENT)
Dept: CARDIOLOGY CLINIC | Facility: CLINIC | Age: 46
End: 2021-06-01

## 2021-06-01 DIAGNOSIS — I49.5 SSS (SICK SINUS SYNDROME) (HCC): Primary | ICD-10-CM

## 2021-06-01 PROCEDURE — 99024 POSTOP FOLLOW-UP VISIT: CPT | Performed by: NURSE PRACTITIONER

## 2021-06-01 NOTE — PROGRESS NOTES
Presents for wound check s/p [employer] PPM 5/25/2021 for SSS/pauses    ER visit 5/28/2021 for L shoulder pain felt to be musculoskeletal   Improved today  Residual tenderness to palpation of L trapezius muscle  L subclavian pacer site well approximated  No signs of infection   No ecchymosis, drainage, bleeding noted

## 2021-06-02 LAB
ATRIAL RATE: 81 BPM
P AXIS: 20 DEGREES
PR INTERVAL: 152 MS
QRS AXIS: -21 DEGREES
QRSD INTERVAL: 96 MS
QT INTERVAL: 388 MS
QTC INTERVAL: 450 MS
T WAVE AXIS: 60 DEGREES
VENTRICULAR RATE: 81 BPM

## 2021-06-02 PROCEDURE — 93010 ELECTROCARDIOGRAM REPORT: CPT | Performed by: INTERNAL MEDICINE

## 2021-06-03 LAB
BACTERIA BLD CULT: NORMAL
BACTERIA BLD CULT: NORMAL

## 2021-06-04 ENCOUNTER — OFFICE VISIT (OUTPATIENT)
Dept: SLEEP CENTER | Facility: CLINIC | Age: 46
End: 2021-06-04
Payer: COMMERCIAL

## 2021-06-04 ENCOUNTER — OFFICE VISIT (OUTPATIENT)
Dept: FAMILY MEDICINE CLINIC | Facility: CLINIC | Age: 46
End: 2021-06-04
Payer: COMMERCIAL

## 2021-06-04 VITALS
HEIGHT: 66 IN | DIASTOLIC BLOOD PRESSURE: 78 MMHG | SYSTOLIC BLOOD PRESSURE: 128 MMHG | HEART RATE: 82 BPM | BODY MASS INDEX: 40.5 KG/M2 | WEIGHT: 252 LBS | RESPIRATION RATE: 18 BRPM

## 2021-06-04 VITALS
WEIGHT: 252 LBS | SYSTOLIC BLOOD PRESSURE: 118 MMHG | DIASTOLIC BLOOD PRESSURE: 88 MMHG | HEART RATE: 88 BPM | OXYGEN SATURATION: 98 % | BODY MASS INDEX: 40.5 KG/M2 | HEIGHT: 66 IN | TEMPERATURE: 98.6 F

## 2021-06-04 DIAGNOSIS — R06.89 GASPING FOR BREATH: ICD-10-CM

## 2021-06-04 DIAGNOSIS — Z95.5 S/P DRUG ELUTING CORONARY STENT PLACEMENT: ICD-10-CM

## 2021-06-04 DIAGNOSIS — R06.83 SNORING: Primary | ICD-10-CM

## 2021-06-04 DIAGNOSIS — G47.19 EXCESSIVE DAYTIME SLEEPINESS: ICD-10-CM

## 2021-06-04 DIAGNOSIS — R35.1 NOCTURIA: ICD-10-CM

## 2021-06-04 DIAGNOSIS — G47.30 SLEEP APNEA, UNSPECIFIED TYPE: ICD-10-CM

## 2021-06-04 DIAGNOSIS — R29.818 SUSPECTED SLEEP APNEA: ICD-10-CM

## 2021-06-04 DIAGNOSIS — E66.01 CLASS 3 SEVERE OBESITY DUE TO EXCESS CALORIES WITH BODY MASS INDEX (BMI) OF 40.0 TO 44.9 IN ADULT, UNSPECIFIED WHETHER SERIOUS COMORBIDITY PRESENT (HCC): ICD-10-CM

## 2021-06-04 DIAGNOSIS — E66.01 MORBID OBESITY (HCC): ICD-10-CM

## 2021-06-04 DIAGNOSIS — M25.512 ACUTE PAIN OF LEFT SHOULDER: Primary | ICD-10-CM

## 2021-06-04 DIAGNOSIS — N17.9 AKI (ACUTE KIDNEY INJURY) (HCC): ICD-10-CM

## 2021-06-04 PROCEDURE — 3079F DIAST BP 80-89 MM HG: CPT | Performed by: FAMILY MEDICINE

## 2021-06-04 PROCEDURE — 99214 OFFICE O/P EST MOD 30 MIN: CPT | Performed by: FAMILY MEDICINE

## 2021-06-04 PROCEDURE — 3074F SYST BP LT 130 MM HG: CPT | Performed by: FAMILY MEDICINE

## 2021-06-04 PROCEDURE — 3008F BODY MASS INDEX DOCD: CPT | Performed by: PSYCHIATRY & NEUROLOGY

## 2021-06-04 PROCEDURE — 99244 OFF/OP CNSLTJ NEW/EST MOD 40: CPT | Performed by: PSYCHIATRY & NEUROLOGY

## 2021-06-04 NOTE — PATIENT INSTRUCTIONS
Recommendations:  1) In lab diagnostic Polysomnography   2) Follow-up after initiation of treatment  3) Call with any questions or concerns

## 2021-06-04 NOTE — LETTER
June 4, 2021     Art Mahan, [address] [address]    Patient: Brown Demarco   YOB: 1975   Date of Visit: 6/4/2021       Dear Dr Rosanne Santos:    Thank you for referring Brown Demarco to me for evaluation  Below are my notes for this consultation  If you have questions, please do not hesitate to call me  I look forward to following your patient along with you  Sincerely,        Lianna Cheney MD        CC: JALIL Hastings MD Lila Speck, MD  6/4/2021  1:47 PM  Sign when Signing Visit  Sleep Medicine Consultation Note    HPI:  Ms Brown Demarco is a 42 y o  female seen at the request of [facility] for advice regarding suspected sleep disordered breathing  The patient has numerous cardiopulmomary (CAD, COPD, MI, CHF, and cardiomyopathy) comorbid conditions and had an MI in March 2021  She had a cardiac cath done at that time which resulted in angioplasty  She has also had a pace maker put in a 10 days ago and her surgeon told her that "I have a bad case of sleep apnea " She endorsed feeling tired all the time and wakes up gasping for air at night  She presented with her  today       Please see below for continuation of the HPI:      Sleep Disordered Breathing:  -Snoring: yes   -Severity: loudly   -Frequency: nightly   -Duration: years   -Over time: worsened   -Modifying factors: weight gain  -Observed Apneas: yes  -Mouth Breathing at night: yes  -Dry Mouth in morning: yes   -Nocturnal Gasping: yes  -Nasal Obstruction: yes  -Weight: gained 50 lbs    Sleep Pattern:  -Location: bedroom   -Bed/Recliner/Wedge: bed  -Bed Partner: yes  -HOB: inclined to about 30 degrees  -# of pillows under head: 2  -Position: back  -Bedtime: 945pm  -Lights out: same time  -Environmental:  Reading or watching TV if she cannot fall asleep  -Latency: 1020-1130pm  -Awakenings: 3-4   -Reason: gasping or bathroom    -Duration: mins  -Wake time: 6am   -Alarm: no  -Rise time: same time  -Days off: same   -Shift Work: n/a  -Patient's estimate of total sleep time: 5-6h      Daytime Symptoms:  -Upon Awakening: tired  -Daytime fatigue/sleepiness: tired and sleepy  -Naps: 1-2 times a day  -Involuntary Dozing: yes, often  -Cognitive Symptoms: poor short term memory  -Driving: Doesn't drive      Questionnaires:   Sitting and reading: High chance of dozing  Watching TV: High chance of dozing  Sitting, inactive in a public place (e g  a theatre or a meeting): Would never doze  As a passenger in a car for an hour without a break: High chance of dozing  Lying down to rest in the afternoon when circumstances permit: High chance of dozing  Sitting and talking to someone: Would never doze  Sitting quietly after a lunch without alcohol: High chance of dozing  In a car, while stopped for a few minutes in traffic: Would never doze  Total score: 15      Sleep Review of Symptoms:  -Parasomnias:  --Sleep Walking: no  --Dream Enactment: no  --Bruxism: yes  -Motor:  --RLS: yes  --PLMS: no  -Narcolepsy:  --Hallucinations: no  --Paralysis: no  --Cataplexy: no    Childhood Sleep History: denied    Prior Sleep Studies/Evaluations:  none    Family History:  Family history of sleep disorders: grandfather snored      Patient Active Problem List   Diagnosis    Chest pain    Essential hypertension    Epistaxis    Tobacco abuse    History of MI (myocardial infarction)    CH[facility]congestive heart failure) [facility])    Major depressive disorder with current active episode    Anxiety    CAD (coronary artery disease)    Lump of left breast    Non-Hodgkin lymphoma of lymph nodes of multiple regions ([facility])    Positive depression screening    CKD (chronic kidney disease) stage 3, GFR 30-59 ml/min ([facility])    Mixed hyperlipidemia    BMI 40 0-44 9, adult ([facility])    Cervical cyst    Nabothian cyst    Pelvic floor dysfunction    Cervical motion tenderness    Encounter for routine gynecological examination with Papanicolaou smear of cervix    Other iron deficiency anemias    Iron deficiency anemia secondary to inadequate dietary iron intake    Hypercalcemia    Proteinuria    Acute MI, inferior wall (HCC)    Ventricular tachycardia, non-sustained (HCC)    Leukocytosis    Hypertrophic cardiomyopathy (HCC)    Acute pericarditis    COPD (chronic obstructive pulmonary disease) (HCC)    Chronic kidney disease-mineral and bone disorder    Hematuria    Chronic tubulo-interstitial nephritis    Sinus pause     Past Medical History:   Diagnosis Date    [facility] )     Anxiety     Cardiac disease     Cardiomyopathy ([place] [address] )     CHF (congestive heart failure) ([place] [address] )     Chronic kidney disease     COPD (chronic obstructive pulmonary disease) ([place] [address] )     scheduled for sleep apnea test soon after pacemaker implant    Depression     History of chemotherapy     non hodgkins lymphoma 2008    Hypertension     Lymphoma, non-Hodgkin's (HCC)     Myocardial infarction ([facility] )     Tobacco abuse     Wears glasses      --> Seizure hx: denies  --> Head injury with LOC: denies  --> Supplemental Oxygen Use: denies    Labs     Results for Emilee Meier (MRN [id number]) as of 6/4/2021 13:23   Ref   Range 5/28/2021 21:26   Sodium Latest Ref Range: 134 - 143 mmol/L 140   Potassium Latest Ref Range: 3 5 - 5 5 mmol/L 4 2   Chloride Latest Ref Range: 98 - 107 mmol/L 107   CO2 Latest Ref Range: 21 - 31 mmol/L 24   Anion Gap Latest Ref Range: 4 - 13 mmol/L 9   BUN Latest Ref Range: 7 - 25 mg/dL 24   Creatinine Latest Ref Range: 0 60 - 1 20 mg/dL 1 52 (H)   Glucose, Random Latest Ref Range: 65 - 99 mg/dL 103 (H)   Calcium Latest Ref Range: 8 6 - 10 5 mg/dL 9 2   AST Latest Ref Range: 13 - 39 U/L 9 (L)   ALT Latest Ref Range: 7 - 52 U/L 7   Alkaline Phosphatase Latest Ref Range: 40 - 150 U/L 60   Total Protein Latest Ref Range: 6 4 - 8 9 g/dL 7 0   Albumin Latest Ref Range: 3 5 - 5 7 g/dL 3 7   TOTAL BILIRUBIN Latest Ref Range: 0 20 - 1 00 mg/dL 0 30   eGFR Latest Units: ml/min/1 73sq m 41   HIGH SENSITIVITY C-REACTIVE PROTEIN Latest Units: mg/L 61 58   WBC Latest Ref Range: 4 80 - 10 80 Thousand/uL 15 30 (H)   Red Blood Cell Count Latest Ref Range: 3 90 - 5 20 Million/uL 4 24   Hemoglobin Latest Ref Range: 12 0 - 16 0 g/dL 12 2   HCT Latest Ref Range: 42 0 - 47 0 % 37 1 (L)   MCV Latest Ref Range: 81 - 99 fL 88   MCH Latest Ref Range: 26 0 - 34 0 pg 28 7   MCHC Latest Ref Range: 31 0 - 37 0 g/dL 32 7   RDW Latest Ref Range: 11 5 - 14 5 % 19 3 (H)   Platelet Count Latest Ref Range: 149 - 390 Thousands/uL 280   MPV Latest Ref Range: 8 6 - 11 7 fL 8 6   Neutrophils % Latest Ref Range: 42 - 75 % 77 (H)   Lymphocytes Relative Latest Ref Range: 21 - 51 % 15 (L)   Monocytes Relative Latest Ref Range: 2 - 12 % 7   Eosinophils Latest Ref Range: 0 - 5 % 1   Basophils Relative Latest Ref Range: 0 - 2 % 1   Absolute Neutrophils Latest Ref Range: 1 40 - 6 50 Thousands/µL 11 80 (H)   Lymphocytes Absolute Latest Ref Range: 0 60 - 4 47 Thousands/µL 2 20   Absolute Monocytes Latest Ref Range: 0 17 - 1 22 Thousand/µL 1 10   Absolute Eosinophils Latest Ref Range: 0 00 - 0 61 Thousand/µL 0 20   Basophils Absolute Latest Ref Range: 0 00 - 0 10 Thousands/µL 0 10   Protime Latest Ref Range: 11 6 - 14 5 seconds 13 7   INR Latest Ref Range: 0 84 - 1 19  1 06   PTT Latest Ref Range: 23 - 37 seconds 34   BLOOD CULTURE Unknown Rpt     EKG: Normal sinus rhythm  Inferior infarct (cited on or before 25-MAR-2021)  Anterolateral infarct (cited on or before 25-MAR-2021)  Abnormal ECG  When compared with ECG of 25-MAY-2021 09:16,  No significant change was found     Confirmed by Charline Schaumann ([id number]) on 6/2/2021 10:15:12 AM    Holter monitor 4/2021: IMPRESSION:  1  The basic mechanism was sinus with rates ranging from  34 beats per minute to 154 beats per minute  2  The average heart rate was 88/minute  3   Supraventricular ectopic beats were uncommon  4  Ventricular ectopic beats were frequent totaling 2882    5  There was an 8 beat ventricular run with a rate of 120/minute  6  There were no significant pauses  No symptoms were noted during the monitoring phase  Cardiac cath 2021: SUMMARY     CORONARY CIRCULATION:  Distal LAD: There was a 60 % stenosis  Mid circumflex: There was a 50 % stenosis  Distal RCA: There was a 10 % stenosis at the site of a prior stent  Right PDA: There was a 0 % stenosis at the site of a prior stent      INDICATIONS:  --  Possible CAD: myocardial infarction with ST elevation (STEMI)      PROCEDURES PERFORMED     --  Right coronary angiography  --  Left coronary angiography  --  Acute Myocardial Infarct  --  Coronary Catheterization (w/o LHC)  --  Mod Sedation Same Physician Initial 15min    Past Surgical History:   Procedure Laterality Date    CARDIAC CATHETERIZATION      CARDIAC PACEMAKER PLACEMENT Left 2021    Procedure: DUAL LEAD PACEMAKER IMPLANT;  Surgeon: Lesly Villatoro MD;  Location: [address] [place], [place] [address]o [facility] OR;  Service: Cardiology    CARDIAC PACEMAKER PLACEMENT      CAROTID STENT       SECTION      DENTAL SURGERY         --> ENT procedures: denies    Current Outpatient Medications   Medication Sig Dispense Refill    albuterol (PROVENTIL HFA,VENTOLIN HFA) 90 mcg/act inhaler Inhale 2 puffs every 4 (four) hours as needed for wheezing or shortness of breath 1 Inhaler 0    ARIPiprazole (ABILIFY) 10 mg tablet Take 1 tablet (10 mg total) by mouth daily (Patient taking differently: Take 10 mg by mouth 2 (two) times a day ) 90 tablet 2    aspirin 81 mg chewable tablet Chew 1 tablet (81 mg total) daily 90 tablet 0    atorvastatin (LIPITOR) 80 mg tablet Take 1 tablet (80 mg total) by mouth every evening 90 tablet 0    colchicine (COLCRYS) 0 6 mg tablet Take 1 tablet (0 6 mg total) by mouth 2 (two) times a day 120 tablet 0    ergocalciferol (VITAMIN D2) 50,000 units Take 1 capsule (50,000 Units total) by mouth once a week 12 capsule 1    fluticasone (FLONASE) 50 mcg/act nasal spray 2 sprays into each nostril daily for 7 days 1 Bottle 0    lisinopril (ZESTRIL) 5 mg tablet Take 1 tablet (5 mg total) by mouth daily 90 tablet 1    LORazepam (ATIVAN) 0 5 mg tablet Take 1 tablet (0 5 mg total) by mouth 2 (two) times a day as needed for anxiety 60 tablet 0    metoprolol succinate (TOPROL-XL) 50 mg 24 hr tablet Take 1 5 tablets (75 mg total) by mouth every 12 (twelve) hours (Patient taking differently: Take 25 mg by mouth every 12 (twelve) hours ) 180 tablet 3    metoprolol tartrate (LOPRESSOR) 50 mg tablet Take 1 tablet (50 mg total) by mouth 2 (two) times a day  0    nicotine (NICODERM CQ) 14 mg/24hr TD 24 hr patch Place 1 patch on the skin every 24 hours 28 patch 0    nitroglycerin (NITROSTAT) 0 4 mg SL tablet Place 1 tablet (0 4 mg total) under the tongue every 5 (five) minutes as needed for chest pain 60 tablet 0    oxyCODONE-acetaminophen (PERCOCET) 5-325 mg per tablet Take 1 tablet by mouth every 6 (six) hours as needed for moderate pain for up to 10 dosesMax Daily Amount: 4 tablets 10 tablet 0    ranolazine (RANEXA) 500 mg 12 hr tablet Take 1 tablet (500 mg total) by mouth every 12 (twelve) hours 180 tablet 1    sertraline (ZOLOFT) 100 mg tablet Take 1 tablet (100 mg total) by mouth daily 30 tablet 5    ticagrelor (BRILINTA) 90 MG Take 1 tablet (90 mg total) by mouth every 12 (twelve) hours 60 tablet 0    umeclidinium-vilanterol (ANORO ELLIPTA) 62 5-25 MCG/INH inhaler Inhale 1 puff daily 1 Inhaler 0     No current facility-administered medications for this visit            Social History:  -Employment: not working  -Smoking: quit 3/25/2021  -Caffeine: soda 2-3 cans a day  -Alcohol: a few times a year  -THC: no  -OTC/Supplements/herbals: no  -Illicits:  denies  -Family: lives with     ROS:  Genitourinary need to urinate more than twice a night, excessive blood loss during menses and none Cardiology Frequent chest pain or angina,  and palpitations/fluttering feeling in the chest   Gastrointestinal frequent heartburn/acid reflux and abdominal pain or cramping that disturb sleep    Neurology frequent headaches, awaken with headache, numbness/tingling of an extremity, forgetfulness and difficulty with memory   Constitutional fatigue and excessive sweating at night   Integumentary none   Psychiatry anxiety, depression, aggressiveness or irritability and mood change   Musculoskeletal joint pain, muscle aches, back pain and leg cramps   Pulmonary shortness of breath with activity, chest tightness, snoring and difficulty breathing when lying flat    ENT throat clearing   Endocrine frequent urination   Hematological none       MSE:  -Alert and appropriate: alert, calm, cooperative  -Oriented to person, place and time:  name, age, location, day/date/mon/yr  -Behavior: good, sustained eye contact  -Speech: Unremarkable rate/rhythm/volume  -Mood: "alicja"  -Affect: constricted  -Thought Processes: linear, logical, goal directed  PE:  Body mass index is 40 67 kg/m²    Vitals:    06/04/21 1306   BP: 128/78   BP Location: Right arm   Patient Position: Sitting   Cuff Size: Standard   Pulse: 82   Resp: 18   Weight: 114 kg (252 lb)   Height: 5' 6" (1 676 m)       -General:  In NAD    -Eyes: Conjunctival injection: none     -EOM:  PERRLA, EOMI   -Eyelid hooding: no    -ENT: MP: 4/4   -Facial deformity: no retrognathia   -Hard palate: moderate arch   -Soft palate: crowding   -Gums and teeth: normal dentition   -Tongue:  Scalloping   -Nares:  Patent    -Neck/Lymphatics: Lymphadenopathy:  none appreciated   -Masses:  none appreciated   -Circumference: Neck Circumference: 18 75 "    -Cardiac: Auscultation:  RRR   - LE edema over shins: 1+ appreciated on the right    -Pulm: -Respirations: unlaboured         -Auscultation:  CTA bilaterally, posterior fields    -Neuro: No resting tremor     -Musculoskeletal: Gait and stance: normal turning and ambulation; unremarkable  Assessment:  Ms Myra Graves is a 42 y o  female who is seen to evaluate for possible obstructive sleep apnea  Given the patient's symptoms of observed apneas, snoring, nocturnal gasping, daytime tiredness and sleepiness, non-restorative sleep, and nocturia in the context of a narrow airway, obesity, large neck girth, and her comorbid cardiopulmonary conditions, a diagnosis of obstructive sleep apnea is very likely and should be addressed sooner rather than later  The pathophysiology of, the reasons to treat and treatment options for obstructive sleep apnea were all reviewed with the patient and her  today  Discussed the testing options and reviewed the benefits and downsides of both, patient opted for the in lab testing  She is amenable to treatment with PAP therapy  Discussed keeping nasal passages clear, abstaining from alcohol, and other sedating drugs at night- which will worsen symptoms of ELIAZAR  --History provided by: patient and   --Records reviewed: in chart      Recommendations:  1) In lab diagnostic Polysomnography   2) Follow-up after initiation of treatment  3) Call with any questions or concerns  All questions answered for the patient, who indicated understanding and agreed with the plan       Arabella Tesfaye MD  Psychiatry/ Sleep medicine

## 2021-06-04 NOTE — PROGRESS NOTES
Assessment/Plan:      Diagnoses and all orders for this visit:    Acute pain of left shoulder  Comments:  pain out of proportion to exam  Concern for possible infection  Patient does have muscle spasm but would not account for all her symptoms  Advised to go to ED          Return for Next scheduled follow up  The following portions of the patient's history were reviewed and updated as appropriate: allergies, current medications, past family history, past medical history, past social history, past surgical history, and problem list      Subjective:     Patient ID: Gin  is a 42 y o  female  HPI     37-year-old female with recent implantation of a pacemaker related tachy-katy syndrome following treatment for V-tach  She has chronic kidney disease, hypertension, and prior history of coronary artery disease  She is a survivor of non-Hodgkin's lymphoma  She had a pacemaker put in 5/25/2021  Since that time she has difficulty moving her left shoulder and arm  Is just sore and swollen up in the shoulder area  It is more posterior in the cervical trapezius  There is tenderness over the pacer site, but is not appear cellulitic her erythematous  There is no significant drainage  There is no cervical adenopathy  She has no pain in the biceps her forearm  She has normal hand grasp that side  She denies any fevers or chills  Her pain is just severe at this time  She was seen in the ED on 5/28/2021  XR cervical spine, chest and CT chest were negative for acute abnormality  WBC was elevated but has been since 3/2021 atleast  CRP was elevated which had prompted the CT  Was discharged home with percocet (oxycodone-acetaminophen 5 325)  Then seen on 6/1/2021 and reported improvement when seen by cardiology  Today patient is here with her   Patient reports pain of left shoulder  Present from Left shoulder from shoulder blade to superior shoulder down the arm to deltoid   Reports she cant lift her arm without using the other arm  Reports pain as sharp and then burning when she moves it  Pain is a 10/10  She felt it immediately since the pace maker was placed  She reports she told the surgeon at the time and was told its MSK as above  Reports worsening since then  Reports she can only pick it up from the elbow down to the fingers  She reports numbness in the first hour fingers in ventral aspect  Reports any jiggle of the arm, wiping herself, lifting it up, putting her clothes on hurts to the point of tears  Tylenol currently 500 mg multiple times a day "popping pills like nothing " Reports she cant take ibuprofen due to the kidneys  She had percocet from ED initially which helped with the pain initially  Denies previous injuries to the shoulder or the neck  Does reports some soreness in the shoulder when she turns a certain way in the neck  Denies weakness or numbness in the lower extremities  Denies fevers, chills, shortness of breath, chest pain  XR chest 2 views  Narrative: CHEST     INDICATION:   Chest wall pain  COMPARISON:  5/25/2021    EXAM PERFORMED/VIEWS:  XR CHEST PA & LATERAL  Images: 2    FINDINGS:  Left-sided chest wall intracardiac device is identified  Leads are intact  Heart shadow is top normal in size but unchanged from prior exam     The lungs are clear  No pneumothorax or pleural effusion  Osseous structures appear within normal limits for patient age  Impression: No acute cardiopulmonary disease  Workstation performed: [id number]  XR cervical spine 2 or 3 views  Narrative: CERVICAL SPINE    INDICATION:   Neck pain  COMPARISON:  None    VIEWS:  XR SPINE CERVICAL 2 OR 3 VW INJURY     FINDINGS:    No fracture or subluxation  Alignment is anatomic    The intervertebral disc spaces are preserved  The prevertebral soft tissues are within normal limits  The lung apices are clear  Impression: No acute osseous abnormality      Workstation performed: [id number]          CT CHEST IMPRESSION:     1  No acute cardial pulmonary process  2   Left chest pacemaker device in expected position  No evidence of soft tissue inflammation, hematoma or abscess      Current Outpatient Medications on File Prior to Visit   Medication Sig Dispense Refill    albuterol (PROVENTIL HFA,VENTOLIN HFA) 90 mcg/act inhaler Inhale 2 puffs every 4 (four) hours as needed for wheezing or shortness of breath 1 Inhaler 0    ARIPiprazole (ABILIFY) 10 mg tablet Take 1 tablet (10 mg total) by mouth daily (Patient taking differently: Take 10 mg by mouth 2 (two) times a day ) 90 tablet 2    aspirin 81 mg chewable tablet Chew 1 tablet (81 mg total) daily 90 tablet 0    atorvastatin (LIPITOR) 80 mg tablet Take 1 tablet (80 mg total) by mouth every evening 90 tablet 0    ergocalciferol (VITAMIN D2) 50,000 units Take 1 capsule (50,000 Units total) by mouth once a week 12 capsule 1    fluticasone (FLONASE) 50 mcg/act nasal spray 2 sprays into each nostril daily for 7 days 1 Bottle 0    lisinopril (ZESTRIL) 5 mg tablet Take 1 tablet (5 mg total) by mouth daily 90 tablet 1    LORazepam (ATIVAN) 0 5 mg tablet Take 1 tablet (0 5 mg total) by mouth 2 (two) times a day as needed for anxiety 60 tablet 0    metoprolol succinate (TOPROL-XL) 50 mg 24 hr tablet Take 1 5 tablets (75 mg total) by mouth every 12 (twelve) hours (Patient taking differently: Take 25 mg by mouth every 12 (twelve) hours ) 180 tablet 3    metoprolol tartrate (LOPRESSOR) 50 mg tablet Take 1 tablet (50 mg total) by mouth 2 (two) times a day  0    nicotine (NICODERM CQ) 14 mg/24hr TD 24 hr patch Place 1 patch on the skin every 24 hours 28 patch 0    nitroglycerin (NITROSTAT) 0 4 mg SL tablet Place 1 tablet (0 4 mg total) under the tongue every 5 (five) minutes as needed for chest pain 60 tablet 0    sertraline (ZOLOFT) 100 mg tablet Take 1 tablet (100 mg total) by mouth daily 30 tablet 5    ticagrelor (BRILINTA) 90 MG Take 1 tablet (90 mg total) by mouth every 12 (twelve) hours 60 tablet 0    umeclidinium-vilanterol (ANORO ELLIPTA) 62 5-25 MCG/INH inhaler Inhale 1 puff daily 1 Inhaler 0    colchicine (COLCRYS) 0 6 mg tablet Take 1 tablet (0 6 mg total) by mouth 2 (two) times a day (Patient not taking: Reported on 6/4/2021) 120 tablet 0    oxyCODONE-acetaminophen (PERCOCET) 5-325 mg per tablet Take 1 tablet by mouth every 6 (six) hours as needed for moderate pain for up to 10 dosesMax Daily Amount: 4 tablets (Patient not taking: Reported on 6/4/2021) 10 tablet 0    ranolazine (RANEXA) 500 mg 12 hr tablet Take 1 tablet (500 mg total) by mouth every 12 (twelve) hours (Patient not taking: Reported on 6/4/2021) 180 tablet 1     No current facility-administered medications on file prior to visit  Review of Systems      Objective:     Physical Exam  Vitals signs and nursing note reviewed  Constitutional:       General: She is in acute distress (tearful)  Appearance: Normal appearance  She is not ill-appearing or toxic-appearing  HENT:      Head: Normocephalic and atraumatic  Right Ear: External ear normal       Left Ear: External ear normal    Eyes:      General: No scleral icterus  Right eye: No discharge  Left eye: No discharge  Extraocular Movements: Extraocular movements intact  Conjunctiva/sclera: Conjunctivae normal    Neck:      Musculoskeletal: Normal range of motion  Pain with movement and muscular tenderness present  Cardiovascular:      Rate and Rhythm: Normal rate and regular rhythm  Heart sounds: Normal heart sounds  No murmur  No friction rub  No gallop  Pulmonary:      Effort: Pulmonary effort is normal  No respiratory distress  Breath sounds: Normal breath sounds  No wheezing or rales     Musculoskeletal:      Right shoulder: Normal       Left shoulder: She exhibits decreased range of motion, tenderness (even with slight palpation), pain, spasm and decreased strength (may be 2/2 to pain but unable to distinguish)  She exhibits no swelling (no noticeable swelling  clearly giving preference to right shoulder with slough to the left  Slight pink tinge to skin that is more pronounced than the right and increased warmth compared to the right side and surrounding left side apart from superior, lat) and no crepitus  Cervical back: She exhibits tenderness, pain and spasm (entire left trapezius with increased tone)  Lymphadenopathy:      Cervical: No cervical adenopathy  Neurological:      Mental Status: She is alert

## 2021-06-04 NOTE — PROGRESS NOTES
Sleep Medicine Consultation Note    HPI:  Ms Pankaj Arteaga is a 42 y o  female seen at the request of [facility] for advice regarding suspected sleep disordered breathing  The patient has numerous cardiopulmomary (CAD, COPD, MI, CHF, and cardiomyopathy) comorbid conditions and had an MI in March 2021  She had a cardiac cath done at that time which resulted in angioplasty  She has also had a pace maker put in a 10 days ago and her surgeon told her that "I have a bad case of sleep apnea " She endorsed feeling tired all the time and wakes up gasping for air at night  She presented with her  today  Please see below for continuation of the HPI:      Sleep Disordered Breathing:  -Snoring: yes   -Severity: loudly   -Frequency: nightly   -Duration: years   -Over time: worsened   -Modifying factors: weight gain  -Observed Apneas: yes  -Mouth Breathing at night: yes  -Dry Mouth in morning: yes   -Nocturnal Gasping: yes  -Nasal Obstruction: yes  -Weight: gained 50 lbs    Sleep Pattern:  -Location: bedroom   -Bed/Recliner/Wedge: bed  -Bed Partner: yes  -HOB: inclined to about 30 degrees  -# of pillows under head: 2  -Position: back  -Bedtime: 945pm  -Lights out: same time  -Environmental:  Reading or watching TV if she cannot fall asleep  -Latency: 1020-1130pm  -Awakenings: 3-4   -Reason: gasping or bathroom    -Duration: mins  -Wake time: 6am   -Alarm: no  -Rise time: same time  -Days off: same   -Shift Work: n/a  -Patient's estimate of total sleep time: 5-6h      Daytime Symptoms:  -Upon Awakening: tired  -Daytime fatigue/sleepiness: tired and sleepy  -Naps: 1-2 times a day  -Involuntary Dozing: yes, often  -Cognitive Symptoms: poor short term memory  -Driving: Doesn't drive      Questionnaires:   Sitting and reading: High chance of dozing  Watching TV: High chance of dozing  Sitting, inactive in a public place (e g  a theatre or a meeting):  Would never doze  As a passenger in a car for an hour without a break: High chance of dozing  Lying down to rest in the afternoon when circumstances permit: High chance of dozing  Sitting and talking to someone: Would never doze  Sitting quietly after a lunch without alcohol: High chance of dozing  In a car, while stopped for a few minutes in traffic: Would never doze  Total score: 15      Sleep Review of Symptoms:  -Parasomnias:  --Sleep Walking: no  --Dream Enactment: no  --Bruxism: yes  -Motor:  --RLS: yes  --PLMS: no  -Narcolepsy:  --Hallucinations: no  --Paralysis: no  --Cataplexy: no    Childhood Sleep History: denied    Prior Sleep Studies/Evaluations:  none    Family History:  Family history of sleep disorders: grandfather snored      Patient Active Problem List   Diagnosis    Chest pain    Essential hypertension    Epistaxis    Tobacco abuse    History of MI (myocardial infarction)    [facility]congestive [facility] failure) [facility])    Major depressive disorder with current active episode    Anxiety    CAD (coronary artery disease)    Lump of left breast    Non-Hodgkin lymphoma of lymph nodes of multiple regions ([facility])    Positive depression screening    CKD (chronic kidney disease) stage 3, GFR 30-59 ml/min ([facility])    Mixed hyperlipidemia    BMI 40 0-44 9, adult ([facility])    Cervical cyst    Nabothian cyst    Pelvic floor dysfunction    Cervical motion tenderness    Encounter for routine gynecological examination with Papanicolaou smear of cervix    Other iron deficiency anemias    Iron deficiency anemia secondary to inadequate dietary iron intake    Hypercalcemia    Proteinuria    Acute MI, inferior wall (HCC)    Ventricular tachycardia, non-sustained (HCC)    Leukocytosis    Hypertrophic cardiomyopathy (HCC)    Acute pericarditis    COPD (chronic obstructive pulmonary disease) ([facility])    Chronic kidney disease-mineral and bone disorder    Hematuria    Chronic tubulo-interstitial nephritis    Sinus pause     Past Medical History:   Diagnosis Date  Acute MI ([facility] )     Anxiety     Cardiac disease     Cardiomyopathy ([place] [address] )     CHF (congestive heart failure) (HCC)     Chronic kidney disease     COPD (chronic obstructive pulmonary disease) (HCC)     scheduled for sleep apnea test soon after pacemaker implant    Depression     History of chemotherapy     non hodgkins lymphoma 2008    Hypertension     Lymphoma, non-Hodgkin's (HCC)     Myocardial infarction ([facility] )     Tobacco abuse     Wears glasses      --> Seizure hx: denies  --> Head injury with LOC: denies  --> Supplemental Oxygen Use: denies    Labs     Results for Fred Santos (MRN [id number]) as of 6/4/2021 13:23   Ref   Range 5/28/2021 21:26   Sodium Latest Ref Range: 134 - 143 mmol/L 140   Potassium Latest Ref Range: 3 5 - 5 5 mmol/L 4 2   Chloride Latest Ref Range: 98 - 107 mmol/L 107   CO2 Latest Ref Range: 21 - 31 mmol/L 24   Anion Gap Latest Ref Range: 4 - 13 mmol/L 9   BUN Latest Ref Range: 7 - 25 mg/dL 24   Creatinine Latest Ref Range: 0 60 - 1 20 mg/dL 1 52 (H)   Glucose, Random Latest Ref Range: 65 - 99 mg/dL 103 (H)   Calcium Latest Ref Range: 8 6 - 10 5 mg/dL 9 2   AST Latest Ref Range: 13 - 39 U/L 9 (L)   ALT Latest Ref Range: 7 - 52 U/L 7   Alkaline Phosphatase Latest Ref Range: 40 - 150 U/L 60   Total Protein Latest Ref Range: 6 4 - 8 9 g/dL 7 0   Albumin Latest Ref Range: 3 5 - 5 7 g/dL 3 7   TOTAL BILIRUBIN Latest Ref Range: 0 20 - 1 00 mg/dL 0 30   eGFR Latest Units: ml/min/1 73sq m 41   HIGH SENSITIVITY C-REACTIVE PROTEIN Latest Units: mg/L 61 58   WBC Latest Ref Range: 4 80 - 10 80 Thousand/uL 15 30 (H)   Red Blood Cell Count Latest Ref Range: 3 90 - 5 20 Million/uL 4 24   Hemoglobin Latest Ref Range: 12 0 - 16 0 g/dL 12 2   HCT Latest Ref Range: 42 0 - 47 0 % 37 1 (L)   MCV Latest Ref Range: 81 - 99 fL 88   MCH Latest Ref Range: 26 0 - 34 0 pg 28 7   MCHC Latest Ref Range: 31 0 - 37 0 g/dL 32 7   RDW Latest Ref Range: 11 5 - 14 5 % 19 3 (H)   Platelet Count Latest Ref Range: 149 - 390 Thousands/uL 280   MPV Latest Ref Range: 8 6 - 11 7 fL 8 6   Neutrophils % Latest Ref Range: 42 - 75 % 77 (H)   Lymphocytes Relative Latest Ref Range: 21 - 51 % 15 (L)   Monocytes Relative Latest Ref Range: 2 - 12 % 7   Eosinophils Latest Ref Range: 0 - 5 % 1   Basophils Relative Latest Ref Range: 0 - 2 % 1   Absolute Neutrophils Latest Ref Range: 1 40 - 6 50 Thousands/µL 11 80 (H)   Lymphocytes Absolute Latest Ref Range: 0 60 - 4 47 Thousands/µL 2 20   Absolute Monocytes Latest Ref Range: 0 17 - 1 22 Thousand/µL 1 10   Absolute Eosinophils Latest Ref Range: 0 00 - 0 61 Thousand/µL 0 20   Basophils Absolute Latest Ref Range: 0 00 - 0 10 Thousands/µL 0 10   Protime Latest Ref Range: 11 6 - 14 5 seconds 13 7   INR Latest Ref Range: 0 84 - 1 19  1 06   PTT Latest Ref Range: 23 - 37 seconds 34   BLOOD CULTURE Unknown Rpt     EKG: Normal sinus rhythm  Inferior infarct (cited on or before 25-MAR-2021)  Anterolateral infarct (cited on or before 25-MAR-2021)  Abnormal ECG  When compared with ECG of 25-MAY-2021 09:16,  No significant change was found     Confirmed by Gina Friedman ([id number]) on 6/2/2021 10:15:12 AM    Holter monitor 4/2021: IMPRESSION:  1  The basic mechanism was sinus with rates ranging from  34 beats per minute to 154 beats per minute  2  The average heart rate was 88/minute  3  Supraventricular ectopic beats were uncommon  4  Ventricular ectopic beats were frequent totaling 2882    5  There was an 8 beat ventricular run with a rate of 120/minute  6  There were no significant pauses  No symptoms were noted during the monitoring phase  Cardiac cath 4/2021: SUMMARY     CORONARY CIRCULATION:  Distal LAD: There was a 60 % stenosis  Mid circumflex: There was a 50 % stenosis  Distal RCA: There was a 10 % stenosis at the site of a prior stent    Right PDA: There was a 0 % stenosis at the site of a prior stent      INDICATIONS:  --  Possible CAD: myocardial infarction with ST elevation (STEMI)      PROCEDURES PERFORMED     --  Right coronary angiography  --  Left coronary angiography  --  Acute Myocardial Infarct  --  Coronary Catheterization (w/o LHC)  --  Mod Sedation Same Physician Initial 15min    Past Surgical History:   Procedure Laterality Date    CARDIAC CATHETERIZATION      CARDIAC PACEMAKER PLACEMENT Left 2021    Procedure: DUAL LEAD PACEMAKER IMPLANT;  Surgeon: Jessica Draper MD;  Location: [address] MAIN OR;  Service: Cardiology    CARDIAC PACEMAKER PLACEMENT      CAROTID STENT       SECTION      DENTAL SURGERY         --> ENT procedures: denies    Current Outpatient Medications   Medication Sig Dispense Refill    albuterol (PROVENTIL HFA,VENTOLIN HFA) 90 mcg/act inhaler Inhale 2 puffs every 4 (four) hours as needed for wheezing or shortness of breath 1 Inhaler 0    ARIPiprazole (ABILIFY) 10 mg tablet Take 1 tablet (10 mg total) by mouth daily (Patient taking differently: Take 10 mg by mouth 2 (two) times a day ) 90 tablet 2    aspirin 81 mg chewable tablet Chew 1 tablet (81 mg total) daily 90 tablet 0    atorvastatin (LIPITOR) 80 mg tablet Take 1 tablet (80 mg total) by mouth every evening 90 tablet 0    colchicine (COLCRYS) 0 6 mg tablet Take 1 tablet (0 6 mg total) by mouth 2 (two) times a day 120 tablet 0    ergocalciferol (VITAMIN D2) 50,000 units Take 1 capsule (50,000 Units total) by mouth once a week 12 capsule 1    fluticasone (FLONASE) 50 mcg/act nasal spray 2 sprays into each nostril daily for 7 days 1 Bottle 0    lisinopril (ZESTRIL) 5 mg tablet Take 1 tablet (5 mg total) by mouth daily 90 tablet 1    LORazepam (ATIVAN) 0 5 mg tablet Take 1 tablet (0 5 mg total) by mouth 2 (two) times a day as needed for anxiety 60 tablet 0    metoprolol succinate (TOPROL-XL) 50 mg 24 hr tablet Take 1 5 tablets (75 mg total) by mouth every 12 (twelve) hours (Patient taking differently: Take 25 mg by mouth every 12 (twelve) hours ) 180 tablet 3    metoprolol tartrate (LOPRESSOR) 50 mg tablet Take 1 tablet (50 mg total) by mouth 2 (two) times a day  0    nicotine (NICODERM CQ) 14 mg/24hr TD 24 hr patch Place 1 patch on the skin every 24 hours 28 patch 0    nitroglycerin (NITROSTAT) 0 4 mg SL tablet Place 1 tablet (0 4 mg total) under the tongue every 5 (five) minutes as needed for chest pain 60 tablet 0    oxyCODONE-acetaminophen (PERCOCET) 5-325 mg per tablet Take 1 tablet by mouth every 6 (six) hours as needed for moderate pain for up to 10 dosesMax Daily Amount: 4 tablets 10 tablet 0    ranolazine (RANEXA) 500 mg 12 hr tablet Take 1 tablet (500 mg total) by mouth every 12 (twelve) hours 180 tablet 1    sertraline (ZOLOFT) 100 mg tablet Take 1 tablet (100 mg total) by mouth daily 30 tablet 5    ticagrelor (BRILINTA) 90 MG Take 1 tablet (90 mg total) by mouth every 12 (twelve) hours 60 tablet 0    umeclidinium-vilanterol (ANORO ELLIPTA) 62 5-25 MCG/INH inhaler Inhale 1 puff daily 1 Inhaler 0     No current facility-administered medications for this visit            Social History:  -Employment: not working  -Smoking: quit 3/25/2021  -Caffeine: soda 2-3 cans a day  -Alcohol: a few times a year  -THC: no  -OTC/Supplements/herbals: no  -Illicits:  denies  -Family: lives with     ROS:  Genitourinary need to urinate more than twice a night, excessive blood loss during menses and none   Cardiology Frequent chest pain or angina,  and palpitations/fluttering feeling in the chest   Gastrointestinal frequent heartburn/acid reflux and abdominal pain or cramping that disturb sleep    Neurology frequent headaches, awaken with headache, numbness/tingling of an extremity, forgetfulness and difficulty with memory   Constitutional fatigue and excessive sweating at night   Integumentary none   Psychiatry anxiety, depression, aggressiveness or irritability and mood change   Musculoskeletal joint pain, muscle aches, back pain and leg cramps   Pulmonary shortness of breath with activity, chest tightness, snoring and difficulty breathing when lying flat    ENT throat clearing   Endocrine frequent urination   Hematological none       MSE:  -Alert and appropriate: alert, calm, cooperative  -Oriented to person, place and time:  name, age, location, day/date/mon/yr  -Behavior: good, sustained eye contact  -Speech: Unremarkable rate/rhythm/volume  -Mood: "alicja"  -Affect: constricted  -Thought Processes: linear, logical, goal directed  PE:  Body mass index is 40 67 kg/m²  Vitals:    06/04/21 1306   BP: 128/78   BP Location: Right arm   Patient Position: Sitting   Cuff Size: Standard   Pulse: 82   Resp: 18   Weight: 114 kg (252 lb)   Height: 5' 6" (1 676 m)       -General:  In NAD    -Eyes: Conjunctival injection: none     -EOM:  PERRLA, EOMI   -Eyelid hooding: no    -ENT: MP: 4/4   -Facial deformity: no retrognathia   -Hard palate: moderate arch   -Soft palate: crowding   -Gums and teeth: normal dentition   -Tongue:  Scalloping   -Nares:  Patent    -Neck/Lymphatics: Lymphadenopathy:  none appreciated   -Masses:  none appreciated   -Circumference: Neck Circumference: 18 75 "    -Cardiac: Auscultation:  RRR   - LE edema over shins: 1+ appreciated on the right    -Pulm: -Respirations: unlaboured         -Auscultation:  CTA bilaterally, posterior fields    -Neuro: No resting tremor     -Musculoskeletal: Gait and stance: normal turning and ambulation; unremarkable  Assessment:  Ms Penny Holliday is a 42 y o  female who is seen to evaluate for possible obstructive sleep apnea  Given the patient's symptoms of observed apneas, snoring, nocturnal gasping, daytime tiredness and sleepiness, non-restorative sleep, and nocturia in the context of a narrow airway, obesity, large neck girth, and her comorbid cardiopulmonary conditions, a diagnosis of obstructive sleep apnea is very likely and should be addressed sooner rather than later    The pathophysiology of, the reasons to treat and treatment options for obstructive sleep apnea were all reviewed with the patient and her  today  Discussed the testing options and reviewed the benefits and downsides of both, patient opted for the in lab testing  She is amenable to treatment with PAP therapy  Discussed keeping nasal passages clear, abstaining from alcohol, and other sedating drugs at night- which will worsen symptoms of ELIAZAR  --History provided by: patient and   --Records reviewed: in chart      Recommendations:  1) In lab diagnostic Polysomnography   2) Follow-up after initiation of treatment  3) Call with any questions or concerns  All questions answered for the patient, who indicated understanding and agreed with the plan       Edd Villatoro MD  Psychiatry/ Sleep medicine

## 2021-06-05 ENCOUNTER — HOSPITAL ENCOUNTER (EMERGENCY)
Facility: HOSPITAL | Age: 46
Discharge: HOME | End: 2021-06-05
Attending: EMERGENCY MEDICINE | Admitting: EMERGENCY MEDICINE
Payer: COMMERCIAL

## 2021-06-05 ENCOUNTER — APPOINTMENT (EMERGENCY)
Dept: RADIOLOGY | Facility: HOSPITAL | Age: 46
End: 2021-06-05
Payer: COMMERCIAL

## 2021-06-05 ENCOUNTER — HOSPITAL ENCOUNTER (EMERGENCY)
Dept: NON INVASIVE DIAGNOSTICS | Facility: HOSPITAL | Age: 46
Discharge: HOME | End: 2021-06-05
Payer: COMMERCIAL

## 2021-06-05 VITALS
SYSTOLIC BLOOD PRESSURE: 152 MMHG | BODY MASS INDEX: 40.5 KG/M2 | HEART RATE: 88 BPM | TEMPERATURE: 98.2 F | RESPIRATION RATE: 20 BRPM | WEIGHT: 252 LBS | OXYGEN SATURATION: 98 % | HEIGHT: 66 IN | DIASTOLIC BLOOD PRESSURE: 76 MMHG

## 2021-06-05 DIAGNOSIS — M25.512 LEFT SHOULDER PAIN: Primary | ICD-10-CM

## 2021-06-05 LAB
ANION GAP SERPL CALCULATED.3IONS-SCNC: 10 MMOL/L (ref 4–13)
APTT PPP: 30 SECONDS (ref 23–37)
BUN SERPL-MCNC: 19 MG/DL (ref 7–25)
CALCIUM SERPL-MCNC: 10 MG/DL (ref 8.6–10.5)
CHLORIDE SERPL-SCNC: 106 MMOL/L (ref 98–107)
CO2 SERPL-SCNC: 25 MMOL/L (ref 21–31)
CREAT SERPL-MCNC: 1.4 MG/DL (ref 0.6–1.2)
EOSINOPHIL # BLD AUTO: 0.52 THOUSAND/UL (ref 0–0.61)
EOSINOPHIL NFR BLD MANUAL: 3 % (ref 0–6)
ERYTHROCYTE [DISTWIDTH] IN BLOOD BY AUTOMATED COUNT: 19.4 % (ref 11.5–14.5)
GFR SERPL CREATININE-BSD FRML MDRD: 46 ML/MIN/1.73SQ M
GLUCOSE SERPL-MCNC: 79 MG/DL (ref 65–99)
HCT VFR BLD AUTO: 39.3 % (ref 42–47)
HGB BLD-MCNC: 12.8 G/DL (ref 12–16)
INR PPP: 1.04 (ref 0.84–1.19)
LYMPHOCYTES # BLD AUTO: 17 % (ref 20–51)
LYMPHOCYTES # BLD AUTO: 2.94 THOUSAND/UL (ref 0.6–4.47)
MCH RBC QN AUTO: 28.4 PG (ref 26–34)
MCHC RBC AUTO-ENTMCNC: 32.5 G/DL (ref 31–37)
MCV RBC AUTO: 88 FL (ref 81–99)
MONOCYTES # BLD AUTO: 0.87 THOUSAND/UL (ref 0–1.22)
MONOCYTES NFR BLD AUTO: 5 % (ref 4–12)
NEUTS BAND NFR BLD MANUAL: 4 % (ref 0–8)
NEUTS SEG # BLD: 12.98 THOUSAND/UL (ref 1.81–6.82)
NEUTS SEG NFR BLD AUTO: 71 % (ref 42–75)
PLATELET # BLD AUTO: 344 THOUSANDS/UL (ref 149–390)
PLATELET BLD QL SMEAR: ADEQUATE
PMV BLD AUTO: 8 FL (ref 8.6–11.7)
POTASSIUM SERPL-SCNC: 4.5 MMOL/L (ref 3.5–5.5)
PROTHROMBIN TIME: 13.5 SECONDS (ref 11.6–14.5)
RBC # BLD AUTO: 4.49 MILLION/UL (ref 3.9–5.2)
RBC MORPH BLD: NORMAL
SODIUM SERPL-SCNC: 141 MMOL/L (ref 134–143)
TOTAL CELLS COUNTED SPEC: 100
WBC # BLD AUTO: 17.3 THOUSAND/UL (ref 4.8–10.8)

## 2021-06-05 PROCEDURE — 71045 X-RAY EXAM CHEST 1 VIEW: CPT

## 2021-06-05 PROCEDURE — 96372 THER/PROPH/DIAG INJ SC/IM: CPT

## 2021-06-05 PROCEDURE — 73030 X-RAY EXAM OF SHOULDER: CPT

## 2021-06-05 PROCEDURE — 85730 THROMBOPLASTIN TIME PARTIAL: CPT | Performed by: EMERGENCY MEDICINE

## 2021-06-05 PROCEDURE — 99284 EMERGENCY DEPT VISIT MOD MDM: CPT

## 2021-06-05 PROCEDURE — 85007 BL SMEAR W/DIFF WBC COUNT: CPT | Performed by: EMERGENCY MEDICINE

## 2021-06-05 PROCEDURE — 36415 COLL VENOUS BLD VENIPUNCTURE: CPT | Performed by: EMERGENCY MEDICINE

## 2021-06-05 PROCEDURE — 85610 PROTHROMBIN TIME: CPT | Performed by: EMERGENCY MEDICINE

## 2021-06-05 PROCEDURE — 80048 BASIC METABOLIC PNL TOTAL CA: CPT | Performed by: EMERGENCY MEDICINE

## 2021-06-05 PROCEDURE — 93971 EXTREMITY STUDY: CPT | Performed by: SURGERY

## 2021-06-05 PROCEDURE — 99285 EMERGENCY DEPT VISIT HI MDM: CPT | Performed by: EMERGENCY MEDICINE

## 2021-06-05 PROCEDURE — 85027 COMPLETE CBC AUTOMATED: CPT | Performed by: EMERGENCY MEDICINE

## 2021-06-05 PROCEDURE — 93971 EXTREMITY STUDY: CPT

## 2021-06-05 RX ORDER — HYDROMORPHONE HCL/PF 1 MG/ML
0.5 SYRINGE (ML) INJECTION ONCE
Status: DISCONTINUED | OUTPATIENT
Start: 2021-06-05 | End: 2021-06-05

## 2021-06-05 RX ORDER — HYDROMORPHONE HCL/PF 1 MG/ML
1 SYRINGE (ML) INJECTION ONCE
Status: COMPLETED | OUTPATIENT
Start: 2021-06-05 | End: 2021-06-05

## 2021-06-05 RX ORDER — GABAPENTIN 100 MG/1
100 CAPSULE ORAL 3 TIMES DAILY
Qty: 15 CAPSULE | Refills: 0 | Status: ON HOLD | OUTPATIENT
Start: 2021-06-05 | End: 2021-07-25 | Stop reason: ALTCHOICE

## 2021-06-05 RX ORDER — KETOROLAC TROMETHAMINE 30 MG/ML
15 INJECTION, SOLUTION INTRAMUSCULAR; INTRAVENOUS ONCE
Status: COMPLETED | OUTPATIENT
Start: 2021-06-05 | End: 2021-06-05

## 2021-06-05 RX ORDER — OXYCODONE HYDROCHLORIDE AND ACETAMINOPHEN 5; 325 MG/1; MG/1
1 TABLET ORAL ONCE
Status: COMPLETED | OUTPATIENT
Start: 2021-06-05 | End: 2021-06-05

## 2021-06-05 RX ADMIN — OXYCODONE HYDROCHLORIDE AND ACETAMINOPHEN 1 TABLET: 5; 325 TABLET ORAL at 16:43

## 2021-06-05 RX ADMIN — HYDROMORPHONE HYDROCHLORIDE 1 MG: 1 INJECTION, SOLUTION INTRAMUSCULAR; INTRAVENOUS; SUBCUTANEOUS at 14:39

## 2021-06-05 RX ADMIN — KETOROLAC TROMETHAMINE 15 MG: 30 INJECTION, SOLUTION INTRAMUSCULAR at 16:45

## 2021-06-05 NOTE — ED PROVIDER NOTES
History  Chief Complaint   Patient presents with    Shoulder Pain     started after having pacemaker placed Left shoulder     Patient is a 44-year-old female presenting with left-sided shoulder pain and a sided hand swelling  Patient had pacemaker placed approximately 2 weeks ago and was seen in the ER for left shoulder pain several days afterwards  She was unable to follow-up with her primary physician however states that she saw another provider and was sent to the emergency department for repeat evaluation  It is unknown with their specific concern was  On her 1st visit she had a reassuring CT, x-rays and labs  She describes pain worse with shoulder abduction was mild swelling  Shoulder Pain  Location:  Shoulder  Pain details:     Quality:  Dull    Severity:  Moderate    Onset quality:  Gradual    Timing:  Constant    Progression:  Worsening  Dislocation: no    Foreign body present:  No foreign bodies  Tetanus status:  Up to date  Prior injury to area: Yes  Associated symptoms: no back pain and no fever        Prior to Admission Medications   Prescriptions Last Dose Informant Patient Reported? Taking?    ARIPiprazole (ABILIFY) 10 mg tablet  Self No No   Sig: Take 1 tablet (10 mg total) by mouth daily   Patient taking differently: Take 10 mg by mouth 2 (two) times a day    LORazepam (ATIVAN) 0 5 mg tablet  Self No No   Sig: Take 1 tablet (0 5 mg total) by mouth 2 (two) times a day as needed for anxiety   albuterol (PROVENTIL HFA,VENTOLIN HFA) 90 mcg/act inhaler  Self No No   Sig: Inhale 2 puffs every 4 (four) hours as needed for wheezing or shortness of breath   aspirin 81 mg chewable tablet  Self No No   Sig: Chew 1 tablet (81 mg total) daily   atorvastatin (LIPITOR) 80 mg tablet  Self No No   Sig: Take 1 tablet (80 mg total) by mouth every evening   colchicine (COLCRYS) 0 6 mg tablet  Self No No   Sig: Take 1 tablet (0 6 mg total) by mouth 2 (two) times a day   Patient not taking: Reported on 6/4/2021 ergocalciferol (VITAMIN D2) 50,000 units  Self No No   Sig: Take 1 capsule (50,000 Units total) by mouth once a week   fluticasone (FLONASE) 50 mcg/act nasal spray  Self No No   Si sprays into each nostril daily for 7 days   lisinopril (ZESTRIL) 5 mg tablet  Self No No   Sig: Take 1 tablet (5 mg total) by mouth daily   metoprolol succinate (TOPROL-XL) 50 mg 24 hr tablet  Self No No   Sig: Take 1 5 tablets (75 mg total) by mouth every 12 (twelve) hours   Patient taking differently: Take 25 mg by mouth every 12 (twelve) hours    metoprolol tartrate (LOPRESSOR) 50 mg tablet  Self No No   Sig: Take 1 tablet (50 mg total) by mouth 2 (two) times a day   nicotine (NICODERM CQ) 14 mg/24hr TD 24 hr patch  Self No No   Sig: Place 1 patch on the skin every 24 hours   nitroglycerin (NITROSTAT) 0 4 mg SL tablet  Self No No   Sig: Place 1 tablet (0 4 mg total) under the tongue every 5 (five) minutes as needed for chest pain   oxyCODONE-acetaminophen (PERCOCET) 5-325 mg per tablet  Self No No   Sig: Take 1 tablet by mouth every 6 (six) hours as needed for moderate pain for up to 10 dosesMax Daily Amount: 4 tablets   Patient not taking: Reported on 2021   ranolazine (RANEXA) 500 mg 12 hr tablet  Self No No   Sig: Take 1 tablet (500 mg total) by mouth every 12 (twelve) hours   Patient not taking: Reported on 2021   sertraline (ZOLOFT) 100 mg tablet  Self No No   Sig: Take 1 tablet (100 mg total) by mouth daily   ticagrelor (BRILINTA) 90 MG  Self No No   Sig: Take 1 tablet (90 mg total) by mouth every 12 (twelve) hours   umeclidinium-vilanterol (ANORO ELLIPTA) 62 5-25 MCG/INH inhaler  Self No No   Sig: Inhale 1 puff daily      Facility-Administered Medications: None       Past Medical History:   Diagnosis Date    Acute [facility] ([facility] )     Anxiety     Cardiac disease     Cardiomyopathy ([place] [address] )     [facility]ive [facility]     Chronic kidney disease     COPD (chronic obstructive pulmonary disease) ([facility]) scheduled for sleep apnea test soon after pacemaker implant    Depression     History of chemotherapy     non hodgkins lymphoma 2008    Hypertension     Lymphoma, non-Hodgkin's (HCC)     Myocardial infarction ([address] )     Tobacco abuse     Wears glasses        Past Surgical History:   Procedure Laterality Date    CARDIAC CATHETERIZATION      CARDIAC PACEMAKER PLACEMENT Left 2021    Procedure: DUAL LEAD PACEMAKER IMPLANT;  Surgeon: Swapna Julien MD;  [facility]: [facility] MAIN OR;  Service: Cardiology    CARDIAC PACEMAKER PLACEMENT      CAROTID STENT       SECTION      DENTAL SURGERY         Family History   Problem Relation Age of Onset    No Known Problems Mother     No Known Problems Father     No Known Problems Daughter     Brain cancer Maternal Grandfather     No Known Problems Paternal Aunt     No Known Problems Paternal Aunt      I have reviewed and agree with the history as documented  E-Cigarette/Vaping    E-Cigarette Use Never User      E-Cigarette/Vaping Substances    Nicotine No     THC No     CBD No     Flavoring No     Other No     Unknown No      Social History     Tobacco Use    Smoking status: Smoker, Current Status Unknown     Packs/day: 1 00     Types: Cigarettes     Last attempt to quit: 3/29/2021     Years since quittin 1    Smokeless tobacco: Current User    Tobacco comment: on nicotine patch 14 mg currently no cigarettes   Substance Use Topics    Alcohol use: Yes     Frequency: Monthly or less     Comment: rarely    Drug use: Never       Review of Systems   Constitutional: Negative for chills and fever  HENT: Negative for congestion, nosebleeds, rhinorrhea and sore throat  Eyes: Negative for pain and visual disturbance  Respiratory: Negative for cough and wheezing  Cardiovascular: Negative for chest pain and leg swelling  Gastrointestinal: Negative for abdominal distention, abdominal pain, diarrhea, nausea and vomiting     Genitourinary: Negative for dysuria and frequency  Musculoskeletal: Positive for arthralgias  Negative for back pain and joint swelling  Skin: Negative for rash and wound  Neurological: Negative for weakness and numbness  Psychiatric/Behavioral: Negative for decreased concentration and suicidal ideas  Physical Exam  Physical Exam  Constitutional:       Appearance: She is well-developed  HENT:      Head: Normocephalic and atraumatic  Eyes:      Conjunctiva/sclera: Conjunctivae normal       Pupils: Pupils are equal, round, and reactive to light  Neck:      Musculoskeletal: Normal range of motion and neck supple  Trachea: No tracheal deviation  Cardiovascular:      Rate and Rhythm: Normal rate and regular rhythm  Heart sounds: Normal heart sounds  No murmur  Pulmonary:      Effort: Pulmonary effort is normal  No respiratory distress  Breath sounds: Normal breath sounds  No wheezing or rales  Abdominal:      General: Bowel sounds are normal  There is no distension  Palpations: Abdomen is soft  Tenderness: There is no abdominal tenderness  Musculoskeletal:         General: No deformity  Comments: Proximal humerus tenderness, painful abduction  No joint swelling/erytehma   Skin:     General: Skin is warm and dry  Capillary Refill: Capillary refill takes less than 2 seconds  Neurological:      Mental Status: She is alert and oriented to person, place, and time  Sensory: No sensory deficit     Psychiatric:         Judgment: Judgment normal          Vital Signs  ED Triage Vitals [06/05/21 1401]   Temperature Pulse Respirations Blood Pressure SpO2   98 2 °F (36 8 °C) 90 20 149/92 99 %      Temp Source Heart Rate Source Patient Position - Orthostatic VS BP Location FiO2 (%)   Temporal Monitor Lying Right arm --      Pain Score       Worst Possible Pain           Vitals:    06/05/21 1401 06/05/21 1654   BP: 149/92 152/76   Pulse: 90 88   Patient Position - Orthostatic VS: Lying          Visual Acuity      ED Medications  Medications   HYDROmorphone (DILAUDID) injection 1 mg (1 mg Intramuscular Given 6/5/21 1439)   oxyCODONE-acetaminophen (PERCOCET) 5-325 mg per tablet 1 tablet (1 tablet Oral Given 6/5/21 1643)   ketorolac (TORADOL) injection 15 mg (15 mg Intramuscular Given 6/5/21 1645)       Diagnostic Studies  Results Reviewed     Procedure Component Value Units Date/Time    Basic metabolic panel [[id number]]  (Abnormal) Collected: 06/05/21 1535    Lab Status: Final result Specimen: Blood from Arm, Right Updated: 06/05/21 1601     Sodium 141 mmol/L      Potassium 4 5 mmol/L      Chloride 106 mmol/L      CO2 25 mmol/L      ANION GAP 10 mmol/L      BUN 19 mg/dL      Creatinine 1 40 mg/dL      Glucose 79 mg/dL      Calcium 10 0 mg/dL      eGFR 46 ml/min/1 73sq m     Narrative:      [place] guidelines for Chronic Kidney Disease (CKD):     Stage 1 with normal or high GFR (GFR > 90 mL/min/1 73 square meters)    Stage 2 Mild CKD (GFR = 60-89 mL/min/1 73 square meters)    Stage 3A Moderate CKD (GFR = 45-59 mL/min/1 73 square meters)    Stage 3B Moderate CKD (GFR = 30-44 mL/min/1 73 square meters)    Stage 4 Severe CKD (GFR = 15-29 mL/min/1 73 square meters)    Stage 5 End Stage CKD (GFR <15 mL/min/1 73 square meters)  Note: GFR calculation is accurate only with a steady state creatinine    Protime-INR [[id number]]  (Normal) Collected: 06/05/21 1535    Lab Status: Final result Specimen: Blood from Arm, Right Updated: 06/05/21 1553     Protime 13 5 seconds      INR 1 04    APTT [[id number]]  (Normal) Collected: 06/05/21 1535    Lab Status: Final result Specimen: Blood from Arm, Right Updated: 06/05/21 1553     PTT 30 seconds     Manual Differential (Non Wam) [[id number]]  (Abnormal) Collected: 06/05/21 1535    Lab Status: Final result Specimen: Blood from Arm, Right Updated: 06/05/21 1551     Segmented % 71 %      Bands % 4 %      Lymphocytes % 17 % Monocytes % 5 %      Eosinophils, % 3 %      Neutrophils Absolute 12 98 Thousand/uL      Lymphocytes Absolute 2 94 Thousand/uL      Monocytes Absolute 0 87 Thousand/uL      Eosinophils Absolute 0 52 Thousand/uL      Total Counted 100     RBC Morphology Normal     Platelet Estimate Adequate    CBC and differential [[id number]]  (Abnormal) Collected: 06/05/21 1535    Lab Status: Final result Specimen: Blood from Arm, Right Updated: 06/05/21 1542     WBC 17 30 Thousand/uL      RBC 4 49 Million/uL      Hemoglobin 12 8 g/dL      Hematocrit 39 3 %      MCV 88 fL      MCH 28 4 pg      MCHC 32 5 g/dL      RDW 19 4 %      MPV 8 0 fL      Platelets 537 Thousands/uL                  XR shoulder 2+ views LEFT    (Results Pending)   XR chest 1 view portable    (Results Pending)   VAS upper limb venous duplex scan, unilateral/limited    (Results Pending)              Procedures  Procedures         ED Course  ED Course as of Jun 05 1831   Sat Jun 05, 2021   1543 WBC within her baseline of 16-23      1626 Xrays, labs, dvt study reassuring  Likely msk cause, ortho f/u given                                              MDM  Number of Diagnoses or Management Options  Left shoulder pain: new and requires workup  Diagnosis management comments: Patient has left-sided shoulder pain, worse with abduction, she has difficulty tolerating abduction passively  Strongly suspect this is musculoskeletal in origin given the presentation  There is no evidence of septic arthritis  Pain is located on lateral shoulder and no were near the thorax to suggest thoracic etiology or atypical thoracic etiology  Pain exacerbated by movement  It would be absurd to think pain is cardiac in etiology  She does have some mild edema on the left lower extremity which is likely dependent from her shoulder pain  Will get a vascular study to rule out DVT however looked very low pretest probability         Amount and/or Complexity of Data Reviewed  Review and summarize past medical records: yes  Independent visualization of images, tracings, or specimens: yes    Risk of Complications, Morbidity, and/or Mortality  Presenting problems: high  Diagnostic procedures: minimal  Management options: high        Disposition  Final diagnoses:   Left shoulder pain     Time reflects when diagnosis was documented in both MDM as applicable and the Disposition within this note     Time User Action Codes Description Comment    6/5/2021  4:19 PM Simin Calabrese Add [U23 533] Left shoulder pain       ED Disposition     ED Disposition Condition Date/Time Comment    Discharge Stable Sat Jun 5, 2021  4:18 PM Baudilio Guzman discharge to home/self care              Follow-up Information     Follow up With Specialties Details Why Contact Info    Darren Romero DO Orthopedic Surgery Schedule an appointment as soon as possible for a visit   [address] [address]  [address]            Discharge Medication List as of 6/5/2021  4:23 PM      START taking these medications    Details   gabapentin (NEURONTIN) 100 mg capsule Take 1 capsule (100 mg total) by mouth 3 (three) times a day, Starting Sat 6/5/2021, Normal         CONTINUE these medications which have NOT CHANGED    Details   albuterol (PROVENTIL HFA,VENTOLIN HFA) 90 mcg/act inhaler Inhale 2 puffs every 4 (four) hours as needed for wheezing or shortness of breath, Starting Thu 4/1/2021, Normal      ARIPiprazole (ABILIFY) 10 mg tablet Take 1 tablet (10 mg total) by mouth daily, Starting Fri 9/4/2020, Normal      aspirin 81 mg chewable tablet Chew 1 tablet (81 mg total) daily, Starting Fri 4/2/2021, Until Thu 7/1/2021, Normal      atorvastatin (LIPITOR) 80 mg tablet Take 1 tablet (80 mg total) by mouth every evening, Starting Mon 4/19/2021, Until Sun 7/18/2021, Normal      ergocalciferol (VITAMIN D2) 50,000 units Take 1 capsule (50,000 Units total) by mouth once a week, Starting Wed 4/28/2021, Normal      fluticasone (FLONASE) 50 mcg/act nasal spray 2 sprays into each nostril daily for 7 days, Starting Mon 5/3/2021, Normal      lisinopril (ZESTRIL) 5 mg tablet Take 1 tablet (5 mg total) by mouth daily, Starting Wed 4/28/2021, Normal      LORazepam (ATIVAN) 0 5 mg tablet Take 1 tablet (0 5 mg total) by mouth 2 (two) times a day as needed for anxiety, Starting Mon 4/26/2021, Normal      metoprolol succinate (TOPROL-XL) 50 mg 24 hr tablet Take 1 5 tablets (75 mg total) by mouth every 12 (twelve) hours, Starting Mon 5/10/2021, Normal      metoprolol tartrate (LOPRESSOR) 50 mg tablet Take 1 tablet (50 mg total) by mouth 2 (two) times a day, Starting Tue 5/25/2021, No Print      nicotine (NICODERM CQ) 14 mg/24hr TD 24 hr patch Place 1 patch on the skin every 24 hours, Starting Mon 5/3/2021, Normal      nitroglycerin (NITROSTAT) 0 4 mg SL tablet Place 1 tablet (0 4 mg total) under the tongue every 5 (five) minutes as needed for chest pain, Starting Mon 5/10/2021, Normal      sertraline (ZOLOFT) 100 mg tablet Take 1 tablet (100 mg total) by mouth daily, Starting Mon 4/5/2021, Normal      ticagrelor (BRILINTA) 90 MG Take 1 tablet (90 mg total) by mouth every 12 (twelve) hours, Starting Mon 4/19/2021, Normal      umeclidinium-vilanterol (ANORO ELLIPTA) 62 5-25 MCG/INH inhaler Inhale 1 puff daily, Starting Thu 4/1/2021, Normal      colchicine (COLCRYS) 0 6 mg tablet Take 1 tablet (0 6 mg total) by mouth 2 (two) times a day, Starting Mon 5/10/2021, Until Fri 7/9/2021, Normal      oxyCODONE-acetaminophen (PERCOCET) 5-325 mg per tablet Take 1 tablet by mouth every 6 (six) hours as needed for moderate pain for up to 10 dosesMax Daily Amount: 4 tablets, Starting Fri 5/28/2021, Normal      ranolazine (RANEXA) 500 mg 12 hr tablet Take 1 tablet (500 mg total) by mouth every 12 (twelve) hours, Starting Mon 5/10/2021, Normal               PDMP Review       Value Time User    PDMP Reviewed  Yes 4/1/2021 11:40 AM Daniel Patel DO          ED Provider  Electronically Signed by           Jessica Wilson DO  06/05/21 4881

## 2021-06-05 NOTE — DISCHARGE INSTRUCTIONS
Your studies are reassuring  Return if symptoms worsen or if new symptoms develop  Follow up with orthopedic surgery  Your symptoms may be due to a rotator cuff injury

## 2021-06-08 ENCOUNTER — HOSPITAL ENCOUNTER (OUTPATIENT)
Dept: SLEEP CENTER | Facility: CLINIC | Age: 46
Discharge: HOME | End: 2021-06-08
Payer: COMMERCIAL

## 2021-06-08 DIAGNOSIS — E66.01 MORBID OBESITY (HCC): ICD-10-CM

## 2021-06-08 DIAGNOSIS — G47.19 EXCESSIVE DAYTIME SLEEPINESS: ICD-10-CM

## 2021-06-08 DIAGNOSIS — R06.83 SNORING: ICD-10-CM

## 2021-06-08 DIAGNOSIS — N17.9 AKI (ACUTE KIDNEY INJURY) (HCC): ICD-10-CM

## 2021-06-08 DIAGNOSIS — E66.01 CLASS 3 SEVERE OBESITY DUE TO EXCESS CALORIES WITH BODY MASS INDEX (BMI) OF 40.0 TO 44.9 IN ADULT, UNSPECIFIED WHETHER SERIOUS COMORBIDITY PRESENT (HCC): ICD-10-CM

## 2021-06-08 DIAGNOSIS — Z95.5 S/P DRUG ELUTING CORONARY STENT PLACEMENT: ICD-10-CM

## 2021-06-08 DIAGNOSIS — G47.30 SLEEP APNEA, UNSPECIFIED TYPE: ICD-10-CM

## 2021-06-08 DIAGNOSIS — R35.1 NOCTURIA: ICD-10-CM

## 2021-06-08 DIAGNOSIS — R06.89 GASPING FOR BREATH: ICD-10-CM

## 2021-06-08 DIAGNOSIS — R29.818 SUSPECTED SLEEP APNEA: ICD-10-CM

## 2021-06-08 PROCEDURE — 95810 POLYSOM 6/> YRS 4/> PARAM: CPT | Performed by: PSYCHIATRY & NEUROLOGY

## 2021-06-08 PROCEDURE — 95810 POLYSOM 6/> YRS 4/> PARAM: CPT

## 2021-06-09 NOTE — PROGRESS NOTES
Sleep Study Documentation    Pre-Sleep Study       Sleep testing procedure explained to patient:YES    Patient napped prior to study:NO    Caffeine:Dayshift worker after 12PM   Caffeine use:YES- soda  12 ounces    Alcohol:Dayshift workers after 5PM: Alcohol use:NO    Typical day for patient:NO       Study Documentation    Sleep Study Indications: Snoring, nocturnal choking, BMI >30, excessive daytime sleepiness, witnessed apneas, witnessed gasping, unrefreshing sleep and impaired concentration  Sleep Study: Diagnostic   Snore: Moderate  Supplemental O2: no      Minimum SaO2 56%  Baseline SaO2 90%            EKG abnormalities: no     EEG abnormalities: no    Sleep Study Recorded < 2 hours: N/A    Sleep Study Recorded > 2 hours but incomplete study: N/A    Sleep Study Recorded 6 hours but no sleep obtained: NO    Patient classification: employed       Post-Sleep Study    Medication used at bedtime or during sleep study:YES other prescription medications    Patient reports time it took to fall asleep:20 to 30 minutes    Patient reports waking up during study:3 or more times  Patient reports returning to sleep without difficulty  Patient reports sleeping 4 to 6 hours without dreaming  Patient reports sleep during study:better than usual    Patient rated sleepiness: Somewhat sleepy or tired    PAP treatment:no

## 2021-06-10 DIAGNOSIS — I50.9 CONGESTIVE HEART FAILURE, UNSPECIFIED HF CHRONICITY, UNSPECIFIED HEART FAILURE TYPE (HCC): ICD-10-CM

## 2021-06-10 DIAGNOSIS — G47.33 OSA (OBSTRUCTIVE SLEEP APNEA): Primary | ICD-10-CM

## 2021-06-10 DIAGNOSIS — J44.9 CHRONIC OBSTRUCTIVE PULMONARY DISEASE, UNSPECIFIED COPD TYPE (HCC): ICD-10-CM

## 2021-06-10 DIAGNOSIS — G47.34 NOCTURNAL HYPOXEMIA: ICD-10-CM

## 2021-06-10 DIAGNOSIS — R06.3 CHEYNE-STOKES BREATHING: ICD-10-CM

## 2021-06-11 ENCOUNTER — TELEPHONE (OUTPATIENT)
Dept: SLEEP CENTER | Facility: CLINIC | Age: 46
End: 2021-06-11

## 2021-06-11 DIAGNOSIS — Z20.822 ENCOUNTER FOR PREPROCEDURE SCREENING LABORATORY TESTING FOR COVID-19: Primary | ICD-10-CM

## 2021-06-11 DIAGNOSIS — Z01.812 ENCOUNTER FOR PREPROCEDURE SCREENING LABORATORY TESTING FOR COVID-19: Primary | ICD-10-CM

## 2021-06-11 NOTE — TELEPHONE ENCOUNTER
Called patient and reviewed sleep study results  Severe ELIAZAR  AHI [id number] [phone]  CPAP study ordered  Scheduled CPAP study for 6/28/21 in [facility]  Instructions provided  Verbalized understanding  Patient does not have COVID vaccine  Will need COVID test prior to study  Discussed new COVID protocol:  Patient agreeable to have COVID test on 6/22/21, for PAP study on 6/28/21  We ask that you limit interpersonal interactions as much as possible and observe all social distancing and masking precautions from the time of your testing to the time of your study  COVID letter sent  COVID order placed

## 2021-06-11 NOTE — TELEPHONE ENCOUNTER
----- Message from Adry Stoll sent at 6/10/2021  3:07 PM EDT -----  Please find out vaccine status  We can probably get her in sooner once you result her  She doesn't need an [place]     ----- Message -----  From: Lakeshia Skaggs MD  Sent: 6/10/2021   2:50 PM EDT  To: Adry Dodie    Patient needs CPAP study ASAP

## 2021-06-14 ENCOUNTER — TELEPHONE (OUTPATIENT)
Dept: SLEEP CENTER | Facility: CLINIC | Age: 46
End: 2021-06-14

## 2021-06-14 NOTE — TELEPHONE ENCOUNTER
6/14 Patient informed that COVID testing is to be performed 5-10 days prior to PAP study  COVID tests performed more than 10 days prior to the sleep study will not be accepted  Testing site information provided as well as letter with testing dates  [place]

## 2021-06-16 ENCOUNTER — CLINICAL SUPPORT (OUTPATIENT)
Dept: CARDIOLOGY CLINIC | Facility: CLINIC | Age: 46
End: 2021-06-16
Payer: COMMERCIAL

## 2021-06-16 DIAGNOSIS — I45.5 SINUS PAUSE: ICD-10-CM

## 2021-06-16 DIAGNOSIS — I47.2 VENTRICULAR TACHYCARDIA, NON-SUSTAINED (HCC): ICD-10-CM

## 2021-06-16 DIAGNOSIS — I47.2 NSVT (NONSUSTAINED VENTRICULAR TACHYCARDIA) (HCC): ICD-10-CM

## 2021-06-16 DIAGNOSIS — I10 HYPERTENSION, UNSPECIFIED TYPE: ICD-10-CM

## 2021-06-16 DIAGNOSIS — Z95.5 S/P DRUG ELUTING CORONARY STENT PLACEMENT: ICD-10-CM

## 2021-06-16 PROCEDURE — 93228 REMOTE 30 DAY ECG REV/REPORT: CPT | Performed by: INTERNAL MEDICINE

## 2021-06-21 ENCOUNTER — OFFICE VISIT (OUTPATIENT)
Dept: CARDIOLOGY CLINIC | Facility: CLINIC | Age: 46
End: 2021-06-21
Payer: COMMERCIAL

## 2021-06-21 ENCOUNTER — IN-CLINIC DEVICE VISIT (OUTPATIENT)
Dept: CARDIOLOGY CLINIC | Facility: CLINIC | Age: 46
End: 2021-06-21
Payer: COMMERCIAL

## 2021-06-21 VITALS
SYSTOLIC BLOOD PRESSURE: 132 MMHG | HEART RATE: 76 BPM | DIASTOLIC BLOOD PRESSURE: 64 MMHG | BODY MASS INDEX: 40.5 KG/M2 | WEIGHT: 252 LBS | HEIGHT: 66 IN

## 2021-06-21 DIAGNOSIS — I49.5 SICK SINUS SYNDROME (HCC): Primary | ICD-10-CM

## 2021-06-21 DIAGNOSIS — Z45.010 ENCOUNTER FOR CHECKING AND TESTING OF CARDIAC PACEMAKER PULSE GENERATOR (BATTERY): ICD-10-CM

## 2021-06-21 DIAGNOSIS — I47.2 VENTRICULAR TACHYCARDIA, NON-SUSTAINED (HCC): ICD-10-CM

## 2021-06-21 DIAGNOSIS — I30.8 OTHER ACUTE PERICARDITIS: ICD-10-CM

## 2021-06-21 DIAGNOSIS — I10 ESSENTIAL HYPERTENSION: Primary | ICD-10-CM

## 2021-06-21 DIAGNOSIS — I42.2 HYPERTROPHIC CARDIOMYOPATHY (HCC): ICD-10-CM

## 2021-06-21 DIAGNOSIS — I45.5 SINUS PAUSE: ICD-10-CM

## 2021-06-21 DIAGNOSIS — I25.10 CORONARY ARTERY DISEASE INVOLVING NATIVE CORONARY ARTERY OF NATIVE HEART, UNSPECIFIED WHETHER ANGINA PRESENT: ICD-10-CM

## 2021-06-21 PROCEDURE — 3075F SYST BP GE 130 - 139MM HG: CPT | Performed by: NURSE PRACTITIONER

## 2021-06-21 PROCEDURE — 99024 POSTOP FOLLOW-UP VISIT: CPT | Performed by: NURSE PRACTITIONER

## 2021-06-21 PROCEDURE — 93283 PRGRMG EVAL IMPLANTABLE DFB: CPT | Performed by: INTERNAL MEDICINE

## 2021-06-21 PROCEDURE — 3078F DIAST BP <80 MM HG: CPT | Performed by: NURSE PRACTITIONER

## 2021-06-21 NOTE — ASSESSMENT & PLAN NOTE
Recent event monitor showing sinus pauses  Pt symptomatic with dizziness, nausea, tunnel vision, and feelings of syncope  S/p [employer] PPM 5/25/2021, normal device function on device check today  Continue surveillance in device clinic

## 2021-06-21 NOTE — ASSESSMENT & PLAN NOTE
Post-MI NSVT 3/25 (45 beats)  8 beat run noted on post hospital Holter monitor   Frequent ventricular ectopy on Holter as well  Metoprolol 50 mg bid

## 2021-06-21 NOTE — PROGRESS NOTES
Patient ID: Ambar Cole is a 42 y o  female  Plan:      Essential hypertension  Blood pressure well controlled   Continue metoprolol 50 mg b i d , lisinopril 5 mg daily    CAD (coronary artery disease)  1  CAD s/p PCI to RCA, rPDA  · Inferior STEMI (3/23/21) --> initial PCI to [facility]  · Recurrent chest pain (3/23/21) --> GREG to RPDA  · Readmission, inferior STEMI (4/2/21) --> patent stents, no change on cath  Continue asa, Brilinta, statin    Ventricular tachycardia, non-sustained (HCC)  Post-MI NSVT 3/25 (45 beats)  8 beat run noted on post hospital Holter monitor   Frequent ventricular ectopy on Holter as well  Metoprolol 50 mg bid    Hypertrophic cardiomyopathy ([address] )  By cardiac MRI  No further testing at this time  Will follow closely as an outpatient     Acute pericarditis  Post-MI pericarditis  S/p prednisone, colchicine        Sinus pause  Recent event monitor showing sinus pauses  Pt symptomatic with dizziness, nausea, tunnel vision, and feelings of syncope  S/p Medtronic PPM 5/25/2021, normal device function on device check today  Continue surveillance in device clinic       Follow up Plan/Summary Comments:  Justin Moraes is doing well from a cardiac standpoint  She is feeling much better since the placement of her pacemaker  She denies any recurrent chest pain or discomfort  She is gradually increasing her activity by walking on a treadmill at home  No medication changes were made today  We will see her in follow-up in 3 months, sooner if needed  HPI: I had the pleasure seeing Justin Moraes in the office today for a follow-up visit  Justin Moraes is a pleasant 79-year-old female who had an MI in March of this year  She underwent cardiac catheterization and stenting on several occasions during her hospitalization  She was noted to have an episode of nonsustained ventricular tachycardia post MI as well as and 8 beat run of NSVT on a Holter monitor following her discharge        At her follow up visit, she reported dizziness and palpitations, thus an event monitor was placed  She had several sinus pauses and subsequently underwent PPM on 5/25/2021  She presents today for device check and office evaluation  She reports that she has not experienced any further episodes of dizziness or palpitations  She states she is feeling very well  She denies any chest discomfort or shortness of breath  She has been gradually increasing her activity and has been walking on the treadmill at home  Regarding her medications, her colchicine was recently completed, sooner than expected due to insurance issues  In addition, she is not taking Ranexa due to insurance issues, but denies any chest pain  Review of Systems   10  point ROS  was otherwise non pertinent or negative except as per HPI or as below  Gait: Normal      Most recent or relevant cardiac/vascular testing:      EKG 4/2/2021 SR, LAE, prior IWMI, PRWP     Holter 4/22/2021 SR, avg rate 88  Frequent PVCs, 8-beat run NSVT     Cath 4/2/2021 Distal LAD: There was a 60 % stenosis  Mid circumflex: There was a 50 % stenosis  Distal RCA: There was a 10 % stenosis at the site of a prior stent  Right PDA: There was a 0 % stenosis at the site of a prior stent  Echo 3/25/2021 60%, severe hypokinesis of the basal-mid inferior wall  Findings consistent with HCM  Mild-mod MR  Cardiac MRI 3/29/2021 severe LVH, EF 44%  Small circumferential pericardial effusion  Mild MR  Findings suggestive of underlying hypertrophic cardiomyopathy with ischemic scarring of the inferior and inferolateral walls  Objective:     /64   Pulse 76   Ht 5' 6" (1 676 m)   Wt 114 kg (252 lb)   BMI 40 67 kg/m²     PHYSICAL EXAM:    General:  Normal appearance, no acute distress  Eyes:  Anicteric  Oral mucosa:  Wearing a mask  Neck:  No JVD  Carotid upstrokes are brisk without bruits  No masses  Chest:  Clear to auscultation, L subclavian device    Incision well approximated  Cardiac:  No palpable PMI  Normal S1 and S2  No murmur gallop or rub  Abdomen:  Soft and nontender  No palpable organomegaly or aortic enlargement  Extremities:  No peripheral edema  Musculoskeletal:  Symmetric  Vascular:  Pedal pulses are intact  Neuro:  Grossly symmetric  Psych:  Alert and oriented x3      No Known Allergies    Current Outpatient Medications:     albuterol (PROVENTIL HFA,VENTOLIN HFA) 90 mcg/act inhaler, Inhale 2 puffs every 4 (four) hours as needed for wheezing or shortness of breath, Disp: 1 Inhaler, Rfl: 0    ARIPiprazole (ABILIFY) 10 mg tablet, Take 1 tablet (10 mg total) by mouth daily (Patient taking differently: Take 10 mg by mouth 2 (two) times a day ), Disp: 90 tablet, Rfl: 2    aspirin 81 mg chewable tablet, Chew 1 tablet (81 mg total) daily, Disp: 90 tablet, Rfl: 0    atorvastatin (LIPITOR) 80 mg tablet, Take 1 tablet (80 mg total) by mouth every evening, Disp: 90 tablet, Rfl: 0    ergocalciferol (VITAMIN D2) 50,000 units, Take 1 capsule (50,000 Units total) by mouth once a week, Disp: 12 capsule, Rfl: 1    fluticasone (FLONASE) 50 mcg/act nasal spray, 2 sprays into each nostril daily for 7 days, Disp: 1 Bottle, Rfl: 0    gabapentin (NEURONTIN) 100 mg capsule, Take 1 capsule (100 mg total) by mouth 3 (three) times a day, Disp: 15 capsule, Rfl: 0    lisinopril (ZESTRIL) 5 mg tablet, Take 1 tablet (5 mg total) by mouth daily, Disp: 90 tablet, Rfl: 1    LORazepam (ATIVAN) 0 5 mg tablet, Take 1 tablet (0 5 mg total) by mouth 2 (two) times a day as needed for anxiety, Disp: 60 tablet, Rfl: 0    metoprolol tartrate (LOPRESSOR) 50 mg tablet, Take 1 tablet (50 mg total) by mouth 2 (two) times a day, Disp: , Rfl: 0    nicotine (NICODERM CQ) 14 mg/24hr TD 24 hr patch, Place 1 patch on the skin every 24 hours, Disp: 28 patch, Rfl: 0    nitroglycerin (NITROSTAT) 0 4 mg SL tablet, Place 1 tablet (0 4 mg total) under the tongue every 5 (five) minutes as needed for chest pain, Disp: 60 tablet, Rfl: 0    sertraline (ZOLOFT) 100 mg tablet, Take 1 tablet (100 mg total) by mouth daily, Disp: 30 tablet, Rfl: 5    ticagrelor (BRILINTA) 90 MG, Take 1 tablet (90 mg total) by mouth every 12 (twelve) hours, Disp: 60 tablet, Rfl: 0    umeclidinium-vilanterol (ANORO ELLIPTA) 62 5-25 MCG/INH inhaler, Inhale 1 puff daily, Disp: 1 Inhaler, Rfl: 0    metoprolol succinate (TOPROL-XL) 50 mg 24 hr tablet, Take 1 5 tablets (75 mg total) by mouth every 12 (twelve) hours (Patient taking differently: Take 25 mg by mouth every 12 (twelve) hours ), Disp: 180 tablet, Rfl: 3    oxyCODONE-acetaminophen (PERCOCET) 5-325 mg per tablet, Take 1 tablet by mouth every 6 (six) hours as needed for moderate pain for up to 10 dosesMax Daily Amount: 4 tablets (Patient not taking: Reported on 2021), Disp: 10 tablet, Rfl: 0    ranolazine (RANEXA) 500 mg 12 hr tablet, Take 1 tablet (500 mg total) by mouth every 12 (twelve) hours (Patient not taking: Reported on 2021), Disp: 180 tablet, Rfl: 1  Past Medical History:   Diagnosis Date    [facility] )     Anxiety     CAD (coronary artery disease)     GREG mid RCA and GREG right PDA 3/25/2021    Cardiomyopathy ([facility] [facility] )     CH[facility]congestive [facility] failure) ([facility])     Chronic kidney disease     COPD (chronic obstructive pulmonary disease) ([facility])     scheduled for sleep apnea test soon after pacemaker implant    Depression     History of chemotherapy     non hodgkins lymphoma     Hypertension     Lymphoma, non-Hodgkin's (HCC)     Myocardial infarction ([facility])     SSS (sick sinus syndrome) ([facility])     s/p medtronic dual chamber pacemaker 2021    Tobacco abuse     Wears glasses      Past Surgical History:   Procedure Laterality Date    CARDIAC PACEMAKER PLACEMENT Left 2021    Procedure: DUAL LEAD PACEMAKER IMPLANT;  Surgeon: Pam Hu MD;  Location: [address] [place] [address] OR;  Service: Cardiology    CAROTID STENT       SECTION      CORONARY ANGIOPLASTY WITH STENT PLACEMENT  2021    GREG mid RCA and GREG right PDA    DENTAL SURGERY         CMP:   Lab Results   Component Value Date    K 4 5 2021     2021    CO2 25 2021    BUN 19 2021    CREATININE 1 40 (H) 2021    EGFR 46 2021     Lipid Profile:    Lab Results   Component Value Date    TRIG 230 (H) 2021    HDL 34 (L) 2021         Social History     Tobacco Use   Smoking Status Smoker, Current Status Unknown    Packs/day: 1 00    Types: Cigarettes    Last attempt to quit: 3/29/2021    Years since quittin 2   Smokeless Tobacco Current User   Tobacco Comment    on nicotine patch 14 mg currently no cigarettes

## 2021-06-21 NOTE — ASSESSMENT & PLAN NOTE
1  CAD s/p PCI to RCA, rPDA  · Inferior STEMI (3/23/21) --> initial PCI to [facility]  · Recurrent chest pain (3/23/21) --> GREG to RPDA  · Readmission, inferior STEMI (4/2/21) --> patent stents, no change on cath  Continue asa, Brilinta, statin

## 2021-06-24 ENCOUNTER — TELEPHONE (OUTPATIENT)
Dept: SLEEP CENTER | Facility: CLINIC | Age: 46
End: 2021-06-24

## 2021-06-24 DIAGNOSIS — G47.33 OSA (OBSTRUCTIVE SLEEP APNEA): Primary | ICD-10-CM

## 2021-06-24 DIAGNOSIS — G47.34 NOCTURNAL HYPOXEMIA: ICD-10-CM

## 2021-06-24 NOTE — TELEPHONE ENCOUNTER
I am canceling CPAP study due to recall  Do you want to wait or order Auto PAP? I know you wanted her in asap

## 2021-06-28 NOTE — PROGRESS NOTES
Device check in person pacer  No events  Normal battery function        Current Outpatient Medications:     albuterol (PROVENTIL HFA,VENTOLIN HFA) 90 mcg/act inhaler, Inhale 2 puffs every 4 (four) hours as needed for wheezing or shortness of breath, Disp: 1 Inhaler, Rfl: 0    ARIPiprazole (ABILIFY) 10 mg tablet, Take 1 tablet (10 mg total) by mouth daily (Patient taking differently: Take 10 mg by mouth 2 (two) times a day ), Disp: 90 tablet, Rfl: 2    aspirin 81 mg chewable tablet, Chew 1 tablet (81 mg total) daily, Disp: 90 tablet, Rfl: 0    atorvastatin (LIPITOR) 80 mg tablet, Take 1 tablet (80 mg total) by mouth every evening, Disp: 90 tablet, Rfl: 0    ergocalciferol (VITAMIN D2) 50,000 units, Take 1 capsule (50,000 Units total) by mouth once a week, Disp: 12 capsule, Rfl: 1    fluticasone (FLONASE) 50 mcg/act nasal spray, 2 sprays into each nostril daily for 7 days, Disp: 1 Bottle, Rfl: 0    gabapentin (NEURONTIN) 100 mg capsule, Take 1 capsule (100 mg total) by mouth 3 (three) times a day, Disp: 15 capsule, Rfl: 0    lisinopril (ZESTRIL) 5 mg tablet, Take 1 tablet (5 mg total) by mouth daily, Disp: 90 tablet, Rfl: 1    LORazepam (ATIVAN) 0 5 mg tablet, Take 1 tablet (0 5 mg total) by mouth 2 (two) times a day as needed for anxiety, Disp: 60 tablet, Rfl: 0    metoprolol succinate (TOPROL-XL) 50 mg 24 hr tablet, Take 1 5 tablets (75 mg total) by mouth every 12 (twelve) hours (Patient taking differently: Take 25 mg by mouth every 12 (twelve) hours ), Disp: 180 tablet, Rfl: 3    metoprolol tartrate (LOPRESSOR) 50 mg tablet, Take 1 tablet (50 mg total) by mouth 2 (two) times a day, Disp: , Rfl: 0    nicotine (NICODERM CQ) 14 mg/24hr TD 24 hr patch, Place 1 patch on the skin every 24 hours, Disp: 28 patch, Rfl: 0    nitroglycerin (NITROSTAT) 0 4 mg SL tablet, Place 1 tablet (0 4 mg total) under the tongue every 5 (five) minutes as needed for chest pain, Disp: 60 tablet, Rfl: 0   oxyCODONE-acetaminophen (PERCOCET) 5-325 mg per tablet, Take 1 tablet by mouth every 6 (six) hours as needed for moderate pain for up to 10 dosesMax Daily Amount: 4 tablets (Patient not taking: Reported on 6/4/2021), Disp: 10 tablet, Rfl: 0    ranolazine (RANEXA) 500 mg 12 hr tablet, Take 1 tablet (500 mg total) by mouth every 12 (twelve) hours (Patient not taking: Reported on 6/4/2021), Disp: 180 tablet, Rfl: 1    sertraline (ZOLOFT) 100 mg tablet, Take 1 tablet (100 mg total) by mouth daily, Disp: 30 tablet, Rfl: 5    ticagrelor (BRILINTA) 90 MG, Take 1 tablet (90 mg total) by mouth every 12 (twelve) hours, Disp: 60 tablet, Rfl: 0    umeclidinium-vilanterol (ANORO ELLIPTA) 62 5-25 MCG/INH inhaler, Inhale 1 puff daily, Disp: 1 Inhaler, Rfl: 0

## 2021-06-29 NOTE — TELEPHONE ENCOUNTER
Spoke with patient, advised Dr May Camarena recommendations      Scheduled DME set up for 7/16/2021 in [facility] office    [employer] representative aware    Compliance follow up scheduled 10/1/2021 in [facility] office    BAUTISTA script given to Tarsha BARNETT, will need to be completed 2-3 weeks after set up

## 2021-07-06 ENCOUNTER — OFFICE VISIT (OUTPATIENT)
Dept: CARDIOLOGY CLINIC | Facility: CLINIC | Age: 46
End: 2021-07-06

## 2021-07-06 VITALS
DIASTOLIC BLOOD PRESSURE: 88 MMHG | OXYGEN SATURATION: 97 % | HEART RATE: 84 BPM | HEIGHT: 66 IN | BODY MASS INDEX: 43.28 KG/M2 | SYSTOLIC BLOOD PRESSURE: 146 MMHG | WEIGHT: 269.3 LBS

## 2021-07-06 DIAGNOSIS — I47.2 VENTRICULAR TACHYCARDIA, NON-SUSTAINED (HCC): ICD-10-CM

## 2021-07-06 DIAGNOSIS — I42.2 HYPERTROPHIC CARDIOMYOPATHY (HCC): ICD-10-CM

## 2021-07-06 DIAGNOSIS — I10 ESSENTIAL HYPERTENSION: ICD-10-CM

## 2021-07-06 DIAGNOSIS — I49.5 SICK SINUS SYNDROME (HCC): Primary | ICD-10-CM

## 2021-07-06 DIAGNOSIS — I25.10 CORONARY ARTERY DISEASE INVOLVING NATIVE CORONARY ARTERY OF NATIVE HEART, UNSPECIFIED WHETHER ANGINA PRESENT: ICD-10-CM

## 2021-07-06 DIAGNOSIS — Z95.5 S/P DRUG ELUTING CORONARY STENT PLACEMENT: ICD-10-CM

## 2021-07-06 PROCEDURE — 99024 POSTOP FOLLOW-UP VISIT: CPT | Performed by: INTERNAL MEDICINE

## 2021-07-06 PROCEDURE — 3008F BODY MASS INDEX DOCD: CPT | Performed by: NURSE PRACTITIONER

## 2021-07-06 NOTE — PROGRESS NOTES
HCM Consultation - Cardiology   Umesh Haney 42 y o  female MRN: [id number]  Unit/Bed#:  Encounter: [id number]    Patient Active Problem List    Diagnosis Date Noted    Cheyne-Guzman breathing     Nocturnal hypoxemia     ELIAZAR (obstructive sleep apnea)     Sinus pause 05/20/2021    Chronic kidney disease-mineral and bone disorder 04/28/2021    Hematuria 04/28/2021    Chronic tubulo-interstitial nephritis 04/28/2021    COPD (chronic obstructive pulmonary disease) ([place] [address] ) 04/01/2021    Acute pericarditis 03/31/2021    Hypertrophic cardiomyopathy ([place] [address] ) 03/26/2021    Acute MI, inferior wall ([place] [address] ) 03/25/2021    Ventricular tachycardia, non-sustained ([place] [address] ) 03/25/2021    Leukocytosis 03/25/2021    Hypercalcemia 10/29/2020    Proteinuria 10/29/2020    Other iron deficiency anemias 10/13/2020    Iron deficiency anemia secondary to inadequate dietary iron intake 10/13/2020    Nabothian cyst 09/08/2020    Pelvic floor dysfunction 09/08/2020    Cervical motion tenderness 09/08/2020    Encounter for routine gynecological examination with Papanicolaou smear of cervix 09/08/2020    Cervical cyst 08/18/2020    Mixed hyperlipidemia 08/06/2020    BMI 40 0-44 9, adult ([place] [address] ) 08/06/2020    Lump of left breast 07/08/2020    Non-Hodgkin lymphoma of lymph nodes of multiple regions ([place] [address] ) 07/08/2020    Positive depression screening 07/08/2020    CKD (chronic kidney disease) stage 3, GFR 30-59 ml/min ([place] [address] ) 07/08/2020    Major depressive disorder with current active episode 11/04/2019    Anxiety 10/14/2018    CAD (coronary artery disease) 10/14/2018    Chest pain 03/11/2017    Essential hypertension 03/11/2017    Epistaxis 03/11/2017    Tobacco abuse 03/11/2017    History of MI (myocardial infarction) 03/11/2017    CHF (congestive heart failure) ([place] [address] ) 03/11/2017     Plan: Mrs Dank Chaudhari has a complex cardiac history inlcuding extensive coroanry artery disease along with hypertrophic cardiomyopathy   Althouth her symptoms has improved after pacemaker placement, she continues having NYHA class 2 symptoms  TTE did not show LVOT obstruction although no provocative maneuvrs were implemented  She will have stress echocardiography to evalute for inducible LVOT obstruction, hemodynamic response to the stress as well as arrhythmias  This will also conclude risk stratification for sudden cardiac death  At this time we will also initiate genetic testing  Her blood pressure is slightly elevated at today's visit (146/88) but she states that usually it is within normal range with systolic blood pressure being in 888P and diastolic in 84B  We encouraged her to check blood pressure at home and keep logs to be reviewed for further adjustment in the medications  We also discussed the importance of weight loss for management of HCM, ELIAZAR, HTN, and HLD  She has lost some weight already (currently 269 lbs, which is down from 287 lb)  She walks on treadmill ~ 15 min at 2 6-3 m/h daily  We encouraged her to continue with routine exercises as well as heart healthy and salt-limited diet  We also encouraged her to get COVID-19 vaccine and strongly consider smoking cessation  At this time she takes metoprolol succinate 50 mg bid, lisinopril 5 mg qd, atorvastatin 80 mg qd, ticagrelor 90 mg bid and aspirin 81 mg qd and has stopped taking ranolazine as insurance would not cover it  We will not make changes to her medication with plan to reassess based on the results of stress echocardiography and blood pressure logs  She will be seen in [facility] clinic in  6 month  Physician Requesting Consult:  Loretta Rivera MD  Reason for Consult / Principal Problem: HOCM    HPI: Nelsy Cornejo is a 42year old woman with past medical history of coronary artery disease (PCI to RCA and LAD in 2014, PCI to mid RCA and RPDA in 3/2021), history of Non-Hodgkin lymphoma (status post chest radiation ), current smoker, essential hypertension, hyperlipidemia, CKD stage 3B, obstructive sleep apnea was referred for management of recently diagnosed hypertrophic cardiomyopathy  Mrs Anita See had inferior MI in 3/2021 and underwent coronary angiography which showed diffuse atherosclerosis with total occlusion of mid RCA which was felt to be culprit of MI  She underwent IVUS-guided PCI with GREG placement to mid RCA with 0% residual stenosis and DALILA flow improvement to grade 3  During the same admission she continued having anginal symptoms with episode of nonsustained ventricular tachycardia on telemetry and underwent repeat angiography which showed 90% stenosis at ostium of Right PDA, lesion was associated with large filling defect consistent with thrombus and underwent balloon angioplasty with GREG placement with 0% residual stenosis  She also had 100% stenosis of 2nd posterolateral segment, which also was associated with moderate filling defect consistent with thrmobus but not amenable to intervention  At that time Mrs Anita See was also diagnosed with robert-infarct pericarditis and was treated with prednisone with imporvement in symptoms  TTE from 3/2021 showed normal left ventriuclar systolic function with estimated EF of 60%, severe hypokinesis of the basal-mid inferior walls, severe assymetrical hyperthrophy of inferior septum (25 mm) with no dynamic LVOT obstruction, pseudonormal left ventricular filling pattern with concominant abnormal relaxation and incresed filling pressure (grade 2 diastolic dysfunction), midly dilated left atrium, moderate systolic anterior motion of the anterior leaflet, mild to moderate mitral valve regurgitation and trace aortic valve regurgitaion   Given TTE findigns suggestive of HCM she underwent Cardiac MRI in 3/2021, which showed severe concentric left ventricular hyperthrophy with mildly reduced left venrticular systolic function (EF 85%), likely mild mitral valve regurgitation, moderately dilated left atrium, small circumferential pericardial effusion, dense transmural ischmeic scarring with evidence of microvascular obstruction of the entire inferior and inferolateral walls, findings suggestive and underlying hyperthrophic cardiomypathy with ischemic scarring of the inferior and inferolateral larson  Mrs Earnestine Ignacio was readmitted with recurrent chest pain on 4/3/2021 and given persistent ST-elevation on EKG she was taken to cath lab with agiography showing patent stents and chest pain was attributed to pericarditis and anxiety and she was discharged on prednisone and colchicine and prn Lorezapam  She has finished prednisone and colchicine at this time  Mrs Earnestine Ignacio had 48 hour ambulatory cardiac monitoring for the complaints of palpitaions and dizziness spells, which showed her being mostly in normal sinus rhythm with average heart rate of 88 bpm, rare supraventricular ectopies, frequent ventricular ectopies with 8 beats of ventricular tachycardia at 120 bpm  This was followed by 6-day cardiac event monitoring, which showed 1 symptomatic and 11 device triggered episodes, sinus bradycardia with miltiple sinus pauses with the longest being 5 6 sec, 4% of PAC burden and 2% of PVC burden  In view of symptomatic sinus pauses, Mrs Earnestine Ignacio underwent Medtronic Dual Chamber pacemaker placement on 5/25/2021  Device check from 6/21/2021 showed no events, normal function and battery life  During the most recent hospitalization Mrs Earnestine Ignacio had acute on chronic kidney failure, with later improvement in kidney function  At that time her lisinopril was held and was reinitiated after improvement in kidney function  She was seen in Nephrology clinic and work up for preteinuria and hematruai was started along with iron infusions for anemia  Mrs Earnestine Ignacio underwent sleep study in 6/2021, which showed severe sleep apnea and she is awaiting her CPAP  Admits to daily naps, and easy fatigability  Admits to continuing with smoking, although decreased in amount and uses nicotine patches       Review of Systems: States that dizziness and presyncopal episodes have resolved after pacemaker placement  Admits to dyspnea (NYHA class 2 symptoms), an isolated episode of palpitations, lower extremity edema, which improves with leg elevation, occasional chest " twinges" for which did not require nitroglycerin  Family History:  Both, mother (77 year old) and father (74 year old) without cardiac issues  She has 2 half-siblings (both paternal): a half-brother (~62 year-old) and a half-sister (in 46s), without known cardiac issues  She has 4 full brothers (55, 43 36and 45year-old) without cardiac conditions  Maternal grandfather  at age of 76, had history of hypertension and , possibly hypertrophic cardiomyopathy,and he was not allowed to be involved in athletic activities  Mrs Abdirizak Manjarrez has 2 living children: 21year-old son and 12year-old daugther, without cardiac issues  Son is somewhat athletic  She also lost 2 children: son from suicide at age of 25, and 3years 11 month old daughter from [address]  [place]  of heart attack in 45s       Genetic testing: initiated at this visit    Historical Information   Past Medical History:   Diagnosis Date    Acute MI ([address] )     Anxiety     CAD (coronary artery disease)     GREG mid RCA and GREG right PDA 3/25/2021    Cardiomyopathy ([address] )     [facility]     Chronic kidney disease     COPD (chronic obstructive pulmonary disease) ([facility])     scheduled for sleep apnea test soon after pacemaker implant    Depression     History of chemotherapy     non hodgkins lymphoma     Hypertension     Lymphoma, non-Hodgkin's (HCC)     Myocardial infarction ([address] )     SSS (sick sinus syndrome) ([address] )     s/p medtronic dual chamber pacemaker 2021    Tobacco abuse     Wears glasses      Past Surgical History:   Procedure Laterality Date    CARDIAC PACEMAKER PLACEMENT Left 2021    Procedure: DUAL LEAD PACEMAKER IMPLANT;  Surgeon: Titi Moore Nick Bruno MD;  Location: [address] [place] [address] OR;  Service: Cardiology    [facility]OTID [facility]      CORONARY ANGIOPLASTY WITH STENT PLACEMENT  03/25/2021    GREG mid RCA and GREG right PDA    DENTAL SURGERY       Family History   Problem Relation Age of Onset    No Known Problems Mother     No Known Problems Father     No Known Problems Daughter     Brain cancer Maternal Grandfather     No Known Problems Paternal Aunt     No Known Problems Paternal Aunt      Current Outpatient Medications on File Prior to Visit   Medication Sig Dispense Refill    albuterol (PROVENTIL HFA,VENTOLIN HFA) 90 mcg/act inhaler Inhale 2 puffs every 4 (four) hours as needed for wheezing or shortness of breath 1 Inhaler 0    ARIPiprazole (ABILIFY) 10 mg tablet Take 1 tablet (10 mg total) by mouth daily (Patient taking differently: Take 10 mg by mouth 2 (two) times a day ) 90 tablet 2    aspirin 81 mg chewable tablet Chew 1 tablet (81 mg total) daily 90 tablet 0    atorvastatin (LIPITOR) 80 mg tablet Take 1 tablet (80 mg total) by mouth every evening 90 tablet 0    ergocalciferol (VITAMIN D2) 50,000 units Take 1 capsule (50,000 Units total) by mouth once a week 12 capsule 1    fluticasone (FLONASE) 50 mcg/act nasal spray 2 sprays into each nostril daily for 7 days 1 Bottle 0    gabapentin (NEURONTIN) 100 mg capsule Take 1 capsule (100 mg total) by mouth 3 (three) times a day 15 capsule 0    lisinopril (ZESTRIL) 5 mg tablet Take 1 tablet (5 mg total) by mouth daily 90 tablet 1    LORazepam (ATIVAN) 0 5 mg tablet Take 1 tablet (0 5 mg total) by mouth 2 (two) times a day as needed for anxiety 60 tablet 0    metoprolol succinate (TOPROL-XL) 50 mg 24 hr tablet Take 1 5 tablets (75 mg total) by mouth every 12 (twelve) hours (Patient taking differently: Take 25 mg by mouth every 12 (twelve) hours ) 180 tablet 3    metoprolol tartrate (LOPRESSOR) 50 mg tablet Take 1 tablet (50 mg total) by mouth 2 (two) times a day  0    nicotine (NICODERM CQ) 14 mg/24hr TD 24 hr patch Place 1 patch on the skin every 24 hours 28 patch 0    nitroglycerin (NITROSTAT) 0 4 mg SL tablet Place 1 tablet (0 4 mg total) under the tongue every 5 (five) minutes as needed for chest pain 60 tablet 0    oxyCODONE-acetaminophen (PERCOCET) 5-325 mg per tablet Take 1 tablet by mouth every 6 (six) hours as needed for moderate pain for up to 10 dosesMax Daily Amount: 4 tablets (Patient not taking: Reported on 2021) 10 tablet 0    ranolazine (RANEXA) 500 mg 12 hr tablet Take 1 tablet (500 mg total) by mouth every 12 (twelve) hours (Patient not taking: Reported on 2021) 180 tablet 1    sertraline (ZOLOFT) 100 mg tablet Take 1 tablet (100 mg total) by mouth daily 30 tablet 5    ticagrelor (BRILINTA) 90 MG Take 1 tablet (90 mg total) by mouth every 12 (twelve) hours 60 tablet 0    umeclidinium-vilanterol (ANORO ELLIPTA) 62 5-25 MCG/INH inhaler Inhale 1 puff daily 1 Inhaler 0     No current facility-administered medications on file prior to visit  No Known Allergies  Social History     Substance and Sexual Activity   Alcohol Use Yes    Comment: rarely     Social History     Substance and Sexual Activity   Drug Use Never     Social History     Tobacco Use   Smoking Status Smoker, Current Status Unknown    Packs/day: 1 00    Types: Cigarettes    Last attempt to quit: 3/29/2021    Years since quittin 2   Smokeless Tobacco Current User   Tobacco Comment    on nicotine patch 14 mg currently no cigarettes     Objective   Vitals:   Vitals:    21 0956   BP: 146/88   BP Location: Right arm   Patient Position: Sitting   Cuff Size: Large   Pulse: 84   SpO2: 97%   Weight: 122 kg (269 lb 4 8 oz)   Height: 5' 6" (1 676 m)   Body mass index is 43 47 kg/m²  Body surface area is 2 27 meters squared      Physical Exam:  GEN: Nelia Edge appears well, alert and oriented x 3, pleasant and cooperative, morbidly obese  HEENT: pupils equal, round, and reactive to light; extraocular muscles intact  NECK: supple, no carotid bruits   HEART: regular rhythm, normal S1 and S2, no murmurs, clicks, gallops or rubs   LUNGS: clear to auscultation bilaterally; no wheezes, rales, or rhonchi   ABDOMEN: normal bowel sounds, soft, no tenderness, no distention  EXTREMITIES: peripheral pulses normal; no clubbing, cyanosis, or edema  NEURO: no focal findings   SKIN: normal without suspicious lesions on exposed skin    Lab Results:   Labs:  Lab Results   Component Value Date    WBC 17 30 (H) 06/05/2021    RBC 4 49 06/05/2021    HGB 12 8 06/05/2021    HCT 39 3 (L) 06/05/2021    MCV 88 06/05/2021     06/05/2021    RDW 19 4 (H) 06/05/2021     Lab Results   Component Value Date    K 4 5 06/05/2021     06/05/2021    CO2 25 06/05/2021    BUN 19 06/05/2021    CREATININE 1 40 (H) 06/05/2021    EGFR 46 06/05/2021    CALCIUM 10 0 06/05/2021    AST 9 (L) 05/28/2021    ALT 7 05/28/2021    ALKPHOS 60 05/28/2021     Lab Results   Component Value Date    MG 1 9 04/22/2021     Lab Results   Component Value Date    HDL 34 (L) 03/25/2021    TRIG 230 (H) 03/25/2021    LDLCALC 122 (H) 03/25/2021     Lab Results   Component Value Date    ZXR1WUWNBBJI 2 660 09/15/2020     Imaging:   I have personally reviewed pertinent films in PACS  Diagnostic Sleep Study    Result Date: 6/10/2021  Narrative: Diagnostic Report Patient Name: Kashmir Astudillo Patient Details: Female, 46 years  Patient #: [id number] YOB: 1975 Referring Physician: Dr Junior Macias Height: 66 0 in Interpreting Physician: Dr Junior Macias Weight: 252 0 lbs Study Date: 6/8/2021 B  M I : 40 7 STUDY FORMAT: The patient was admitted to the sleep laboratory at the [address] [place] [address] and oriented to procedures and equipment    Data was obtained using the [employer] sleep monitoring system; Parameters monitored: EEG (frontal, central, occipital), EOG, EMG (chin and leg), ECG, body position, abdominal and thoracic respiratory effort, snoring, pulse oximetry, and airflow  The sleep study was scored following the rules established by the American Academy of Sleep Medicine (AASM)  Hypopnea: event lasting ? 10 seconds with a ?30% reduction in airflow amplitude and a ?4% decrease in SpO2  HISTORY: 39yo F with a pacemaker and recent MI with suspected ELIAZAR undergoing PSG  SLEEP: Patient slept for 425 0 minutes out of 488 1 minutes in bed representing a sleep efficiency of 87 1%  Sleep architecture showed a sleep latency of 3 0 minutes and a REM sleep latency of 91 5 minutes  There was 0 4% Stage N1 noted  There was 61 3% Stage N2 noted  There was 13 3% Stage N3 noted  There was 25 1% REM sleep noted  The patient had 524 arousals for an average of 74 0 arousals per hour of sleep  RESPIRATORY: There were a total of 503 respiratory events made up of 220 obstructive apneas, 268 mixed apneas, 0 central apneas, and 15 hypopneas resulting in an apnea/hypopnea index (AHI) of 71 0 events per hour of sleep  The AHI in the supine position was 74 9  The AHI during REM sleep was 63 1  OXIMETRY: The baseline oxygen saturation with the patient awake was 90 6%  The mean oxygen saturation throughout the study was 86 8%  The amount of sleep time below 90% was 219 2 minutes  The lowest oxygen saturation was 56 0%  BODY POSITION: The patient slept 54 1% of the evening in the supine position, 0 0% on left side, 45 9% on right side, and 0 0%  in the prone position  LEG MOVEMENT: There were 0 PLMs; PLM index of 0 0; 0 PLMs associated with arousal; PLM w/arousal index of 0 0  SUMMARY:       TOTAL INDEX Apneas and Hypopneas 503 71 0 Central Apneas 0 0 0 Arousals 524 74 0 PLMs 0 0 0     Impression:  On this night, there were no EEG abnormalities seen  Sleep architecture was notable for good sleep efficiency  EKG was unremarkable  No periodic limb movements seen  Severe obstructive sleep apnea was seen   Worse in the supine position  This was in the background of Cheyne beckman breathing all night  Oxygen levels were impaired for at least 50% of the total sleep time to a min of 56%  CPAP titration is recommended  Carina Grossman MD Psychiatry/ Sleep medicine Board Certified Sleep Physician     Cardiac testing:   Results for orders placed during the hospital encounter of 21    Echo Complete with Contrast if Indicated    Narrative  [address]  [place], [address] [facility]  [phone]    Transthoracic Echocardiogram  2D, M-mode, Doppler, and Color Doppler    Study date:  25-Mar-2021    Patient: [facility]  MR number: [id number]  Account number: [de-identified]  : 1975  Age: 42 years  Gender: Female  Status: Inpatient  Location: Bedside  Height: 66 in  Weight: 279 4 lb  BP: 122/ 82 mmHg    Indications: Acute MI    Diagnoses: I21 4 - Non-ST elevation (NSTEMI) myocardial infarction    Sonographer:  Jose J Peralta RDCS  Primary Physician:  Robi Rand DO  Referring Physician:  Goldie Carter DO  Group:  [facility]  Interpreting Physician:  Andres Trevizo MD    IMPRESSIONS:  Features are consistent with hypertrophic cardiomyopathy and inferior wall myocardial infarction  Clinical correlation is advised  SUMMARY    LEFT VENTRICLE:  Systolic function was normal  Ejection fraction was estimated to be 60 %  There was severe hypokinesis of the basal-mid inferior wall(s)  Wall thickness was markedly increased  There was severe assymetrical hypertrophy of the inferior septum (25 mm)  There was no dynamic obstruction at rest  Provocative maneuvers were not performed  Features were consistent with a pseudonormal left ventricular filling pattern, with concomitant abnormal relaxation and increased filling pressure (grade 2 diastolic dysfunction)  LEFT ATRIUM:  The atrium was mildly dilated      MITRAL VALVE:  There was moderate systolic anterior motion of the anterior leaflet  There was mild to moderate regurgitation  AORTIC VALVE:  There was trace regurgitation  HISTORY: PRIOR HISTORY: CHF, CAD, Tobacco Abuse, Obesity    PROCEDURE: The procedure was performed at the bedside  This was a routine study  The transthoracic approach was used  The study included complete 2D imaging, M-mode, complete spectral Doppler, and color Doppler  The heart rate was 66 bpm,  at the start of the study  Images were obtained from the parasternal, apical, subcostal, and suprasternal notch acoustic windows  Echocardiographic views were limited due to poor acoustic window availability and decreased penetration  This  was a technically difficult study  LEFT VENTRICLE: Size was normal  Systolic function was normal  Ejection fraction was estimated to be 60 %  There was severe hypokinesis of the basal-mid inferior wall(s)  Wall thickness was markedly increased  There was severe assymetrical  hypertrophy of the inferior septum (25 mm)  DOPPLER: There was no dynamic obstruction at rest  Provocative maneuvers were not performed  Features were consistent with a pseudonormal left ventricular filling pattern, with concomitant  abnormal relaxation and increased filling pressure (grade 2 diastolic dysfunction)  RIGHT VENTRICLE: The size was normal  Systolic function was normal  Wall thickness was normal     LEFT ATRIUM: The atrium was mildly dilated  RIGHT ATRIUM: Size was normal     MITRAL VALVE: Valve structure was normal  There was normal leaflet separation  There was moderate systolic anterior motion of the anterior leaflet  DOPPLER: The transmitral velocity was within the normal range  There was no evidence for  stenosis  There was mild to moderate regurgitation  AORTIC VALVE: The valve was trileaflet  Leaflets exhibited normal thickness and normal cuspal separation  DOPPLER: Transaortic velocity was within the normal range  There was no evidence for stenosis   There was trace regurgitation  TRICUSPID VALVE: The valve structure was normal  There was normal leaflet separation  DOPPLER: The transtricuspid velocity was within the normal range  There was no evidence for stenosis  There was no regurgitation  PULMONIC VALVE: Leaflets exhibited normal thickness, no calcification, and normal cuspal separation  DOPPLER: The transpulmonic velocity was within the normal range  There was no regurgitation  PERICARDIUM: There was no pericardial effusion  The pericardium was normal in appearance  AORTA: The root exhibited normal size  SYSTEM MEASUREMENT TABLES    2D  %FS: 27 94 %  Ao Diam: 3 95 cm  Ao asc: 3 6 cm  EDV(Teich): 66 84 ml  EF(Teich): 54 74 %  ESV(Teich): 30 25 ml  IVSd: 2 82 cm  LA Diam: 4 59 cm  LAAs A4C: 16 52 cm2  LAESV A-L A4C: 43 86 ml  LAESV MOD A4C: 42 09 ml  LALs A4C: 5 28 cm  LVEDV MOD A4C: 88 32 ml  LVEF MOD A4C: 67 58 %  LVESV MOD A4C: 28 63 ml  LVIDd: 3 92 cm  LVIDs: 2 83 cm  LVLd A4C: 6 93 cm  LVLs A4C: 5 55 cm  LVPWd: 1 43 cm  RAEDV A-L: 23 76 ml  RAEDV MOD: 22 46 ml  RALd: 4 9 cm  RVIDd: 3 61 cm  SV MOD A4C: 59 69 ml  SV(Teich): 36 59 ml    CW  AR Dec [place]: 1 39 m/s2  AR Dec Time: 2867 87 ms  AR PHT: 831 68 ms  AR Vmax: 3 99 m/s  AR maxP 65 mmHg  AV Env  Ti: 240 72 ms  AV VTI: 22 04 cm  AV Vmax: 1 31 m/s  AV Vmean: 0 92 m/s  AV maxP 85 mmHg  AV meanPG: 3 93 mmHg    MM  TAPSE: 1 87 cm    PW  LVOT Env  Ti: 253 09 ms  LVOT VTI: 15 47 cm  LVOT Vmax: 0 92 m/s  LVOT Vmean: 0 61 m/s  LVOT maxPG: 3 4 mmHg  LVOT meanP 75 mmHg  MV A Jorge: 0 71 m/s  MV Dec [place]: 5 89 m/s2  MV DecT: 156 23 ms  MV E Jorge: 0 91 m/s  MV E/A Ratio: 1 3    [employer] Accredited Echocardiography Laboratory    Prepared and electronically signed by    Phani Mojica MD  Signed 25-Mar-2021 13:06:46    EKG 2021: normals sinus rhythm with HR of 81 bpm, q-waves in inferior and lateral leads    Counseling / Coordination of Care  Total floor / unit time spent today 60 minutes  Greater than 50% of total time was spent with the patient and / or family counseling and / or coordination of care  Charline Andrade

## 2021-07-06 NOTE — PATIENT INSTRUCTIONS
Your stress echocardiography will be on July 26th at 5 am in [address] at [address] [facility]  You can come from Entrance A, go to 1st floor to Cardiology department  Please call [phone] if need help with directions

## 2021-07-07 ENCOUNTER — DOCUMENTATION (OUTPATIENT)
Dept: CARDIOLOGY CLINIC | Facility: CLINIC | Age: 46
End: 2021-07-07

## 2021-07-15 ENCOUNTER — TELEPHONE (OUTPATIENT)
Dept: SLEEP CENTER | Facility: CLINIC | Age: 46
End: 2021-07-15

## 2021-07-19 ENCOUNTER — TELEPHONE (OUTPATIENT)
Dept: SLEEP CENTER | Facility: CLINIC | Age: 46
End: 2021-07-19

## 2021-07-19 NOTE — TELEPHONE ENCOUNTER
7-16-21  DS 2 cpap set up @  [place]  Set in auto mode @ 6-20cm, heated humidifier, heated tubing, air touch F20 med FFM   Per script Dr Yash Mathews

## 2021-07-24 ENCOUNTER — APPOINTMENT (EMERGENCY)
Dept: RADIOLOGY | Facility: HOSPITAL | Age: 46
End: 2021-07-24
Payer: COMMERCIAL

## 2021-07-24 ENCOUNTER — HOSPITAL ENCOUNTER (OUTPATIENT)
Facility: HOSPITAL | Age: 46
Setting detail: OBSERVATION
Discharge: HOME | End: 2021-07-25
Attending: EMERGENCY MEDICINE | Admitting: HOSPITALIST
Payer: COMMERCIAL

## 2021-07-24 DIAGNOSIS — R07.9 CHEST PAIN: Primary | ICD-10-CM

## 2021-07-24 PROCEDURE — 84484 ASSAY OF TROPONIN QUANT: CPT | Performed by: EMERGENCY MEDICINE

## 2021-07-24 PROCEDURE — 85025 COMPLETE CBC W/AUTO DIFF WBC: CPT | Performed by: EMERGENCY MEDICINE

## 2021-07-24 PROCEDURE — 80053 COMPREHEN METABOLIC PANEL: CPT | Performed by: EMERGENCY MEDICINE

## 2021-07-24 PROCEDURE — 96374 THER/PROPH/DIAG INJ IV PUSH: CPT

## 2021-07-24 PROCEDURE — 83735 ASSAY OF MAGNESIUM: CPT | Performed by: EMERGENCY MEDICINE

## 2021-07-24 PROCEDURE — 85610 PROTHROMBIN TIME: CPT | Performed by: EMERGENCY MEDICINE

## 2021-07-24 PROCEDURE — 36415 COLL VENOUS BLD VENIPUNCTURE: CPT | Performed by: EMERGENCY MEDICINE

## 2021-07-24 PROCEDURE — 85730 THROMBOPLASTIN TIME PARTIAL: CPT | Performed by: EMERGENCY MEDICINE

## 2021-07-24 PROCEDURE — 99284 EMERGENCY DEPT VISIT MOD MDM: CPT | Performed by: EMERGENCY MEDICINE

## 2021-07-24 PROCEDURE — 71045 X-RAY EXAM CHEST 1 VIEW: CPT

## 2021-07-24 PROCEDURE — 99285 EMERGENCY DEPT VISIT HI MDM: CPT

## 2021-07-24 PROCEDURE — 93005 ELECTROCARDIOGRAM TRACING: CPT

## 2021-07-24 RX ORDER — ASPIRIN 325 MG
325 TABLET ORAL ONCE
Status: COMPLETED | OUTPATIENT
Start: 2021-07-25 | End: 2021-07-24

## 2021-07-24 RX ORDER — MORPHINE SULFATE 4 MG/ML
4 INJECTION, SOLUTION INTRAMUSCULAR; INTRAVENOUS ONCE
Status: COMPLETED | OUTPATIENT
Start: 2021-07-25 | End: 2021-07-24

## 2021-07-24 RX ADMIN — ASPIRIN 325 MG: 325 TABLET ORAL at 23:48

## 2021-07-24 RX ADMIN — MORPHINE SULFATE 4 MG: 4 INJECTION INTRAVENOUS at 23:48

## 2021-07-25 VITALS
OXYGEN SATURATION: 98 % | BODY MASS INDEX: 44.11 KG/M2 | HEART RATE: 64 BPM | HEIGHT: 66 IN | DIASTOLIC BLOOD PRESSURE: 56 MMHG | TEMPERATURE: 97.2 F | WEIGHT: 274.47 LBS | SYSTOLIC BLOOD PRESSURE: 109 MMHG | RESPIRATION RATE: 18 BRPM

## 2021-07-25 PROBLEM — R77.8 ELEVATED TROPONIN: Status: ACTIVE | Noted: 2021-07-25

## 2021-07-25 PROBLEM — R79.89 ELEVATED TROPONIN: Status: ACTIVE | Noted: 2021-07-25

## 2021-07-25 PROBLEM — R07.2 PRECORDIAL PAIN: Status: ACTIVE | Noted: 2017-03-11

## 2021-07-25 LAB
ALBUMIN SERPL BCP-MCNC: 4 G/DL (ref 3.5–5.7)
ALP SERPL-CCNC: 73 U/L (ref 40–150)
ALT SERPL W P-5'-P-CCNC: 21 U/L (ref 7–52)
ANION GAP SERPL CALCULATED.3IONS-SCNC: 10 MMOL/L (ref 4–13)
APTT PPP: 28 SECONDS (ref 23–37)
AST SERPL W P-5'-P-CCNC: 13 U/L (ref 13–39)
ATRIAL RATE: 60 BPM
ATRIAL RATE: 60 BPM
ATRIAL RATE: 77 BPM
BASOPHILS # BLD AUTO: 0.1 THOUSANDS/ΜL (ref 0–0.1)
BASOPHILS NFR BLD AUTO: 1 % (ref 0–2)
BILIRUB SERPL-MCNC: 0.4 MG/DL (ref 0.2–1)
BNP SERPL-MCNC: 625 PG/ML (ref 1–100)
BUN SERPL-MCNC: 21 MG/DL (ref 7–25)
CALCIUM SERPL-MCNC: 9.8 MG/DL (ref 8.6–10.5)
CHLORIDE SERPL-SCNC: 106 MMOL/L (ref 98–107)
CO2 SERPL-SCNC: 23 MMOL/L (ref 21–31)
CREAT SERPL-MCNC: 1.8 MG/DL (ref 0.6–1.2)
EOSINOPHIL # BLD AUTO: 0.2 THOUSAND/ΜL (ref 0–0.61)
EOSINOPHIL NFR BLD AUTO: 2 % (ref 0–5)
ERYTHROCYTE [DISTWIDTH] IN BLOOD BY AUTOMATED COUNT: 18.2 % (ref 11.5–14.5)
GFR SERPL CREATININE-BSD FRML MDRD: 34 ML/MIN/1.73SQ M
GLUCOSE SERPL-MCNC: 87 MG/DL (ref 65–99)
HCT VFR BLD AUTO: 42.5 % (ref 42–47)
HGB BLD-MCNC: 13.7 G/DL (ref 12–16)
INR PPP: 1.04 (ref 0.84–1.19)
LYMPHOCYTES # BLD AUTO: 3 THOUSANDS/ΜL (ref 0.6–4.47)
LYMPHOCYTES NFR BLD AUTO: 18 % (ref 21–51)
MAGNESIUM SERPL-MCNC: 1.9 MG/DL (ref 1.9–2.7)
MCH RBC QN AUTO: 27.1 PG (ref 26–34)
MCHC RBC AUTO-ENTMCNC: 32.3 G/DL (ref 31–37)
MCV RBC AUTO: 84 FL (ref 81–99)
MONOCYTES # BLD AUTO: 1.2 THOUSAND/ΜL (ref 0.17–1.22)
MONOCYTES NFR BLD AUTO: 7 % (ref 2–12)
NEUTROPHILS # BLD AUTO: 12.2 THOUSANDS/ΜL (ref 1.4–6.5)
NEUTS SEG NFR BLD AUTO: 73 % (ref 42–75)
P AXIS: 33 DEGREES
P AXIS: 48 DEGREES
P AXIS: 53 DEGREES
PLATELET # BLD AUTO: 315 THOUSANDS/UL (ref 149–390)
PMV BLD AUTO: 9 FL (ref 8.6–11.7)
POTASSIUM SERPL-SCNC: 4.1 MMOL/L (ref 3.5–5.5)
PR INTERVAL: 158 MS
PR INTERVAL: 200 MS
PR INTERVAL: 210 MS
PROT SERPL-MCNC: 7 G/DL (ref 6.4–8.9)
PROTHROMBIN TIME: 13.5 SECONDS (ref 11.6–14.5)
QRS AXIS: -3 DEGREES
QRS AXIS: 11 DEGREES
QRS AXIS: 17 DEGREES
QRSD INTERVAL: 102 MS
QRSD INTERVAL: 106 MS
QRSD INTERVAL: 108 MS
QT INTERVAL: 390 MS
QT INTERVAL: 464 MS
QT INTERVAL: 466 MS
QTC INTERVAL: 441 MS
QTC INTERVAL: 464 MS
QTC INTERVAL: 466 MS
RBC # BLD AUTO: 5.06 MILLION/UL (ref 3.9–5.2)
SODIUM SERPL-SCNC: 139 MMOL/L (ref 134–143)
T WAVE AXIS: -15 DEGREES
T WAVE AXIS: -8 DEGREES
T WAVE AXIS: 41 DEGREES
TROPONIN I SERPL-MCNC: 0.04 NG/ML
TROPONIN I SERPL-MCNC: 0.04 NG/ML
TROPONIN I SERPL-MCNC: 0.05 NG/ML
VENTRICULAR RATE: 60 BPM
VENTRICULAR RATE: 60 BPM
VENTRICULAR RATE: 77 BPM
WBC # BLD AUTO: 16.7 THOUSAND/UL (ref 4.8–10.8)

## 2021-07-25 PROCEDURE — 99244 OFF/OP CNSLTJ NEW/EST MOD 40: CPT | Performed by: INTERNAL MEDICINE

## 2021-07-25 PROCEDURE — 99220 PR INITIAL OBSERVATION CARE/DAY 70 MINUTES: CPT | Performed by: HOSPITALIST

## 2021-07-25 PROCEDURE — 36415 COLL VENOUS BLD VENIPUNCTURE: CPT | Performed by: EMERGENCY MEDICINE

## 2021-07-25 PROCEDURE — 93010 ELECTROCARDIOGRAM REPORT: CPT | Performed by: INTERNAL MEDICINE

## 2021-07-25 PROCEDURE — 93005 ELECTROCARDIOGRAM TRACING: CPT

## 2021-07-25 PROCEDURE — 94660 CPAP INITIATION&MGMT: CPT

## 2021-07-25 PROCEDURE — 84484 ASSAY OF TROPONIN QUANT: CPT | Performed by: PHYSICIAN ASSISTANT

## 2021-07-25 PROCEDURE — 83880 ASSAY OF NATRIURETIC PEPTIDE: CPT | Performed by: EMERGENCY MEDICINE

## 2021-07-25 PROCEDURE — 94760 N-INVAS EAR/PLS OXIMETRY 1: CPT

## 2021-07-25 PROCEDURE — NC001 PR NO CHARGE: Performed by: HOSPITALIST

## 2021-07-25 RX ORDER — ATORVASTATIN CALCIUM 80 MG/1
80 TABLET, FILM COATED ORAL EVERY EVENING
Status: DISCONTINUED | OUTPATIENT
Start: 2021-07-25 | End: 2021-07-25 | Stop reason: HOSPADM

## 2021-07-25 RX ORDER — METOPROLOL TARTRATE 50 MG/1
50 TABLET, FILM COATED ORAL 2 TIMES DAILY
Status: DISCONTINUED | OUTPATIENT
Start: 2021-07-25 | End: 2021-07-25 | Stop reason: HOSPADM

## 2021-07-25 RX ORDER — ASPIRIN 81 MG/1
81 TABLET, CHEWABLE ORAL DAILY
Status: DISCONTINUED | OUTPATIENT
Start: 2021-07-25 | End: 2021-07-25 | Stop reason: HOSPADM

## 2021-07-25 RX ORDER — NICOTINE 21 MG/24HR
1 PATCH, TRANSDERMAL 24 HOURS TRANSDERMAL DAILY
Status: DISCONTINUED | OUTPATIENT
Start: 2021-07-25 | End: 2021-07-25 | Stop reason: HOSPADM

## 2021-07-25 RX ORDER — ARIPIPRAZOLE 10 MG/1
10 TABLET ORAL 2 TIMES DAILY
Status: DISCONTINUED | OUTPATIENT
Start: 2021-07-25 | End: 2021-07-25 | Stop reason: HOSPADM

## 2021-07-25 RX ORDER — ALBUTEROL SULFATE 90 UG/1
2 AEROSOL, METERED RESPIRATORY (INHALATION) EVERY 4 HOURS PRN
Status: DISCONTINUED | OUTPATIENT
Start: 2021-07-25 | End: 2021-07-25 | Stop reason: HOSPADM

## 2021-07-25 RX ORDER — LISINOPRIL 5 MG/1
5 TABLET ORAL DAILY
Status: DISCONTINUED | OUTPATIENT
Start: 2021-07-25 | End: 2021-07-25 | Stop reason: HOSPADM

## 2021-07-25 RX ORDER — NITROGLYCERIN 0.4 MG/1
0.4 TABLET SUBLINGUAL
Status: DISCONTINUED | OUTPATIENT
Start: 2021-07-25 | End: 2021-07-25 | Stop reason: HOSPADM

## 2021-07-25 RX ORDER — SODIUM CHLORIDE, SODIUM GLUCONATE, SODIUM ACETATE, POTASSIUM CHLORIDE, MAGNESIUM CHLORIDE, SODIUM PHOSPHATE, DIBASIC, AND POTASSIUM PHOSPHATE .53; .5; .37; .037; .03; .012; .00082 G/100ML; G/100ML; G/100ML; G/100ML; G/100ML; G/100ML; G/100ML
100 INJECTION, SOLUTION INTRAVENOUS CONTINUOUS
Status: DISCONTINUED | OUTPATIENT
Start: 2021-07-25 | End: 2021-07-25 | Stop reason: HOSPADM

## 2021-07-25 RX ORDER — HEPARIN SODIUM 5000 [USP'U]/ML
5000 INJECTION, SOLUTION INTRAVENOUS; SUBCUTANEOUS EVERY 12 HOURS SCHEDULED
Status: DISCONTINUED | OUTPATIENT
Start: 2021-07-25 | End: 2021-07-25 | Stop reason: HOSPADM

## 2021-07-25 RX ORDER — LORAZEPAM 0.5 MG/1
0.5 TABLET ORAL 2 TIMES DAILY PRN
Status: DISCONTINUED | OUTPATIENT
Start: 2021-07-25 | End: 2021-07-25 | Stop reason: HOSPADM

## 2021-07-25 RX ORDER — HYDROCODONE BITARTRATE AND ACETAMINOPHEN 5; 325 MG/1; MG/1
1 TABLET ORAL EVERY 6 HOURS PRN
Status: DISCONTINUED | OUTPATIENT
Start: 2021-07-25 | End: 2021-07-25 | Stop reason: HOSPADM

## 2021-07-25 RX ORDER — MORPHINE SULFATE 10 MG/ML
6 INJECTION, SOLUTION INTRAMUSCULAR; INTRAVENOUS ONCE
Status: COMPLETED | OUTPATIENT
Start: 2021-07-25 | End: 2021-07-25

## 2021-07-25 RX ORDER — ACETAMINOPHEN 325 MG/1
650 TABLET ORAL EVERY 4 HOURS PRN
Status: DISCONTINUED | OUTPATIENT
Start: 2021-07-25 | End: 2021-07-25 | Stop reason: HOSPADM

## 2021-07-25 RX ORDER — ONDANSETRON 2 MG/ML
4 INJECTION INTRAMUSCULAR; INTRAVENOUS EVERY 6 HOURS PRN
Status: DISCONTINUED | OUTPATIENT
Start: 2021-07-25 | End: 2021-07-25 | Stop reason: HOSPADM

## 2021-07-25 RX ADMIN — HYDROCODONE BITARTRATE AND ACETAMINOPHEN 1 TABLET: 5; 325 TABLET ORAL at 09:23

## 2021-07-25 RX ADMIN — MORPHINE SULFATE 6 MG: 10 INJECTION INTRAVENOUS at 01:07

## 2021-07-25 RX ADMIN — ONDANSETRON 4 MG: 2 INJECTION INTRAMUSCULAR; INTRAVENOUS at 03:29

## 2021-07-25 RX ADMIN — SODIUM CHLORIDE, SODIUM GLUCONATE, SODIUM ACETATE, POTASSIUM CHLORIDE, MAGNESIUM CHLORIDE, SODIUM PHOSPHATE, DIBASIC, AND POTASSIUM PHOSPHATE 100 ML/HR: .53; .5; .37; .037; .03; .012; .00082 INJECTION, SOLUTION INTRAVENOUS at 05:21

## 2021-07-25 RX ADMIN — METOPROLOL TARTRATE 50 MG: 50 TABLET, FILM COATED ORAL at 08:53

## 2021-07-25 RX ADMIN — HEPARIN SODIUM 5000 UNITS: 5000 INJECTION INTRAVENOUS; SUBCUTANEOUS at 02:59

## 2021-07-25 RX ADMIN — LORAZEPAM 0.5 MG: 0.5 TABLET ORAL at 05:16

## 2021-07-25 RX ADMIN — MORPHINE SULFATE 2 MG: 2 INJECTION, SOLUTION INTRAMUSCULAR; INTRAVENOUS at 03:57

## 2021-07-25 RX ADMIN — HEPARIN SODIUM 5000 UNITS: 5000 INJECTION INTRAVENOUS; SUBCUTANEOUS at 09:24

## 2021-07-25 RX ADMIN — TICAGRELOR 90 MG: 90 TABLET ORAL at 09:24

## 2021-07-25 RX ADMIN — ASPIRIN 81 MG CHEWABLE TABLET 81 MG: 81 TABLET CHEWABLE at 08:53

## 2021-07-25 RX ADMIN — LISINOPRIL 5 MG: 5 TABLET ORAL at 08:53

## 2021-07-25 RX ADMIN — ARIPIPRAZOLE 10 MG: 10 TABLET ORAL at 09:24

## 2021-07-25 NOTE — H&P
[facility]  H&P- Kaycee Johnson 1975, 42 y o  female MRN: [id number]  Unit/Bed#: -01 Encounter: [id number]  Primary Care Provider: Cele Briones DO   Date and time admitted to hospital: 7/24/2021 11:38 PM    * Precordial pain  Assessment & Plan  · Placed in observation telemetry  · Trend cardiac enzymes  · Obtain serial EKGs  · Continue pre-hospital cardiac medications to include aspirin, Lipitor, lisinopril, Lopressor and Brilinta  · Consult Cardiology in a m  · Give nitroglycerin and pain control p r n     ELIAZAR (obstructive sleep apnea)  Assessment & Plan  Continue pre-hospital CPAP    BMI 40 0-44 9, adult [facility])  Assessment & Plan  Encourage healthy weight loss and supportive care    Mixed hyperlipidemia  Assessment & Plan  Continue pre-hospital Lipitor 80 mg p o  Daily    Major depressive disorder with current active episode  Assessment & Plan  Continue pre-hospital Abilify 10 mg p o  B i d  And Ativan 0 5 mg p o  B i d  P r n  Tobacco abuse  Assessment & Plan  Will give nicotine 14 mg transdermal daily    Essential hypertension  Assessment & Plan  Continue pre-hospital Lopressor 50 mg p o  B i d  And lisinopril 5 mg p o  Daily      VTE Prophylaxis: Heparin  Code Status:  Level 1  POLST: There is no POLST form on file for this patient (pre-hospital)  Discussion with family:   who was present at bedside at time of exam, diagnosis and treatment plan reviewed all questions answered to their satisfaction    Anticipated Length of Stay:  Patient will be admitted on an Observation basis with an anticipated length of stay of  < 2 midnights  Justification for Hospital Stay:  Chest pain rule out MI requiring further cardiac evaluation    Chief Complaint:   Chest pain x1 day    History of Present Illness:    Kaycee Johnson is a 42 y o  female who presents with chest pain x1 day    Patient presents ER for further evaluation treatment 1 day history of intermittent chest pain which has been ongoing for the past 24 hours  Patient describes it is located midsternally in left chest with radiation into her back and shoulder describes as sharp and squeezing in nature accompanied with nausea and lightheadedness  Patient has an extensive cardiac history to include recent MI with cardiac catheterization and stents in both March in April of this year as well as a pacemaker placed in May of this year  Patient has a pending stress test on 07/26/2021 and states that she is being evaluated for possible defibrillator pacemaker  Patient states that her regular cardiologist is Dr Moya Dy was last seen in his office on 06/01/2021 for routine follow-up  Review of Systems:  Review of Systems   Constitutional: Negative for chills and fever  HENT: Negative for congestion and sore throat  Respiratory: Negative for cough, shortness of breath and wheezing  Cardiovascular: Positive for chest pain  Negative for palpitations  Gastrointestinal: Positive for nausea  Negative for diarrhea and vomiting  Genitourinary: Negative for dysuria, frequency, hematuria and urgency  Musculoskeletal: Negative for arthralgias and myalgias  Skin: Negative for wound  Neurological: Positive for light-headedness  Negative for dizziness, syncope and headaches  All other systems reviewed and are negative        Past Medical and Surgical History:   Past Medical History:   Diagnosis Date    Acute MI [facility] [address] )     Anxiety     CAD (coronary artery disease)     GREG mid RCA and GREG right PDA 3/25/2021    Cardiomyopathy ([facility] [address] )     CHF (congestive heart failure) (HCC)     Chronic kidney disease     COPD (ch[facility] obstructive pulmonary disease) ([facility])     scheduled for sleep apnea test soon after pacemaker implant    Depression     History of chemotherapy     non hodgkins lymphoma 2008    Hypertension     Lymphoma, non-Hodgkin's (HCC)     Myocardial infarction ([facility] [address] )     SSS (sick sinus syndrome) ([address] )     s/p medtronic dual chamber pacemaker 2021    Tobacco abuse     Wears glasses        Past Surgical History:   Procedure Laterality Date    CARDIAC PACEMAKER PLACEMENT Left 2021    Procedure: DUAL LEAD PACEMAKER IMPLANT;  Surgeon: Corey Mari MD;  Location: [facility] MAIN OR;  Service: Cardiology    CAROTID STENT       SECTION      CORONARY ANGIOPLASTY WITH STENT PLACEMENT  2021    GREG mid RCA and GREG right PDA    DENTAL SURGERY         Meds/Allergies:  Prior to Admission medications    Medication Sig Start Date End Date Taking?  Authorizing Provider   albuterol (PROVENTIL HFA,VENTOLIN HFA) 90 mcg/act inhaler Inhale 2 puffs every 4 (four) hours as needed for wheezing or shortness of breath 21  Yes Haleigh Ybarra DO   ARIPiprazole (ABILIFY) 10 mg tablet Take 1 tablet (10 mg total) by mouth daily 20  Yes JALIL Yuan   aspirin 81 mg chewable tablet Chew 1 tablet (81 mg total) daily 21 Yes Haleigh Ybarra DO   atorvastatin (LIPITOR) 80 mg tablet Take 1 tablet (80 mg total) by mouth every evening 21 Yes JALIL Rivera   lisinopril (ZESTRIL) 5 mg tablet Take 1 tablet (5 mg total) by mouth daily 21  Yes JALIL Fernando   metoprolol tartrate (LOPRESSOR) 50 mg tablet Take 1 tablet (50 mg total) by mouth 2 (two) times a day 21  Yes Corey Mari MD   nicotine (NICODERM CQ) 14 mg/24hr TD 24 hr patch Place 1 patch on the skin every 24 hours 5/3/21  Yes Aida Ferguson, DO   nitroglycerin (NITROSTAT) 0 4 mg SL tablet Place 1 tablet (0 4 mg total) under the tongue every 5 (five) minutes as needed for chest pain 5/10/21  Yes Lenor Duverney, MD   sertraline (ZOLOFT) 100 mg tablet Take 1 tablet (100 mg total) by mouth daily 21  Yes Aida Ferguson DO   ticagrelor (BRILINTA) 90 MG Take 1 tablet (90 mg total) by mouth every 12 (twelve) hours 21  Yes JALIL Leal   metoprolol succinate (TOPROL-XL) 50 mg 24 hr tablet Take 1 5 tablets (75 mg total) by mouth every 12 (twelve) hours  Patient taking differently: Take 50 mg by mouth every 12 (twelve) hours  5/10/21 7/25/21 Yes Finis Goltz, MD   ergocalciferol (VITAMIN D2) 50,000 units Take 1 capsule (50,000 Units total) by mouth once a week 4/28/21   JALIL Anguiano   LORazepam (ATIVAN) 0 5 mg tablet Take 1 tablet (0 5 mg total) by mouth 2 (two) times a day as needed for anxiety  Patient not taking: Reported on 7/6/2021 4/26/21   Uriel Cartwright DO   umeclidinium-vilanterol [facility] ELLIP) 62 5-25 MCG/INH inhaler Inhale 1 puff daily  Patient not taking: Reported on 7/6/2021 4/1/21   Nini Lenz DO   fluticasone (FLONASE) 50 mcg/act nasal spray 2 sprays into each nostril daily for 7 days  Patient not taking: Reported on 7/6/2021 5/3/21 7/25/21  Uriel Cartwright DO   gabapentin (NEURONTIN) 100 mg capsule Take 1 capsule (100 mg total) by mouth 3 (three) times a day  Patient not taking: Reported on 7/6/2021 6/5/21 7/25/21  Desean Shrestha DO   oxyCODONE-acetaminophen (PERCOCET) 5-325 mg per tablet Take 1 tablet by mouth every 6 (six) hours as needed for moderate pain for up to 10 dosesMax Daily Amount: 4 tablets  Patient not taking: Reported on 6/4/2021 5/28/21 7/25/21  Jessica Kimball DO   ranolazine (RANEXA) 500 mg 12 hr tablet Take 1 tablet (500 mg total) by mouth every 12 (twelve) hours  Patient not taking: Reported on 6/4/2021 5/10/21 7/25/21  Finis Goltz, MD     I have reviewed home medications with patient personally      Allergies: No Known Allergies    Social History:  Marital Status: /Civil Union   Patient Pre-hospital Living Situation:  Resides at home with   Patient Pre-hospital Level of Mobility:  Full without assist  Patient Pre-hospital Diet Restrictions:  Cardiac  Substance Use History:   Social History     Substance and Sexual Activity   Alcohol Use Yes    Comment: rarely     Social History     Tobacco Use   Smoking Status Smoker, Current Status Unknown    Packs/day: 1 00    Types: Cigarettes    Last attempt to quit: 3/29/2021    Years since quittin 3   Smokeless Tobacco Current User   Tobacco Comment    on nicotine patch 14 mg currently no cigarettes     Social History     Substance and Sexual Activity   Drug Use Never       Family History:  I have reviewed the patients family history    Physical Exam:   Vitals:   Blood Pressure: 131/84 (21)  Pulse: 68 (21)  Temperature: 97 7 °F (36 5 °C) (21)  Temp Source: Temporal (21)  Respirations: 18 (21)  Height: 5' 6" (167 6 cm) (21)  Weight - Scale: 124 kg (274 lb 7 6 oz) (21)  SpO2: 97 % (21)    Physical Exam  Vitals and nursing note reviewed  Constitutional:       General: She is not in acute distress  Appearance: Normal appearance  She is obese  HENT:      Head: Normocephalic and atraumatic  Right Ear: Tympanic membrane normal       Left Ear: Tympanic membrane normal       Nose: Nose normal       Mouth/Throat:      Mouth: Mucous membranes are moist       Pharynx: Oropharynx is clear  No posterior oropharyngeal erythema  Eyes:      Extraocular Movements: Extraocular movements intact  Conjunctiva/sclera: Conjunctivae normal       Pupils: Pupils are equal, round, and reactive to light  Cardiovascular:      Rate and Rhythm: Normal rate and regular rhythm  Pulses: Normal pulses  Heart sounds: Normal heart sounds  No murmur heard  Pulmonary:      Effort: Pulmonary effort is normal  No respiratory distress  Breath sounds: Normal breath sounds  No rhonchi or rales  Abdominal:      General: Bowel sounds are normal       Palpations: Abdomen is soft  Tenderness: There is no abdominal tenderness  Musculoskeletal:      Cervical back: Normal range of motion and neck supple  No muscular tenderness  Right lower leg: No edema  Left lower leg: No edema  Skin:     General: Skin is warm and dry  Capillary Refill: Capillary refill takes less than 2 seconds  Neurological:      General: No focal deficit present  Mental Status: She is alert and oriented to person, place, and time  Psychiatric:         Mood and Affect: Mood is anxious  Additional Data:   Lab Results: I have personally reviewed pertinent reports  Results from last 7 days   Lab Units 07/24/21  2339   WBC Thousand/uL 16 70*   HEMOGLOBIN g/dL 13 7   HEMATOCRIT % 42 5   PLATELETS Thousands/uL 315   NEUTROS PCT % 73   LYMPHS PCT % 18*   MONOS PCT % 7   EOS PCT % 2     Results from last 7 days   Lab Units 07/24/21  2339   SODIUM mmol/L 139   POTASSIUM mmol/L 4 1   CHLORIDE mmol/L 106   CO2 mmol/L 23   BUN mg/dL 21   CREATININE mg/dL 1 80*   CALCIUM mg/dL 9 8   ALK PHOS U/L 73   ALT U/L 21   AST U/L 13     Results from last 7 days   Lab Units 07/24/21  2339   INR  1 04               Imaging: I have personally reviewed pertinent reports  XR chest 1 view portable    (Results Pending)       EKG, Pathology, and Other Studies Reviewed on Admission:   · EKG: N/A    Epic Records Reviewed: Yes     ** Please Note: This note has been constructed using a voice recognition system   **

## 2021-07-25 NOTE — ASSESSMENT & PLAN NOTE
· Resolved  · Most likely secondary to anxiety  · Troponin x3 0 05, 0 04, 0 04  · Most likely a non MI related elevated troponinemia in the setting of having chronic kidney disease  · Status post a Cardiology evaluation  · No arrhythmias on telemetry, no further symptoms, EKGs are nondiagnostic  · No further inpatient testing, treatment, and or workup is needed  · Patient is medically stable for discharge, she will follow up in the outpatient setting with her primary cardiologist-Dr Arlette Chin  · GA home on all other pre-admit meds at pre-admit dosages which include include aspirin, Lipitor, lisinopril, Lopressor and Brilinta

## 2021-07-25 NOTE — ASSESSMENT & PLAN NOTE
CAD s/p PCI to RCA, rPDA  · Inferior STEMI (3/23/21) --> initial PCI to [facility]  · Recurrent chest pain (3/23/21) --> GREG to RPDA  · Readmission, inferior STEMI (4/2/21) --> patent stents, no change on cath  Continue asa, Brilinta, statin

## 2021-07-25 NOTE — ASSESSMENT & PLAN NOTE
Very atypical and not consistent with her angina  There is even some musculoskeletal discomfort that reproduces some aspect of her presentation

## 2021-07-25 NOTE — ASSESSMENT & PLAN NOTE
· As above  · Non MI related elevated troponinemia  · No further inpatient testing, treatment, and or workup

## 2021-07-25 NOTE — CONSULTS
[address] 1975, 42 y o  female MRN: [id number]  Unit/Bed#: -01 Encounter: [id number]  Primary Care Provider: Nilson Benoit DO   Date and time admitted to hospital: 7/24/2021 11:38 PM    Inpatient consult to Cardiology  Consult performed by: Michael Oneill MD  Consult ordered by: Ja Alvarez PA-C          Elevated troponin  Assessment & Plan  No rise and fall  I do not believe current presentation is related to acute coronary insufficiency  ELIAZAR (obstructive sleep apnea)  Assessment & Plan  Reports that she has been doing much better with CPAP  Sinus pause  Assessment & Plan  pacemaker working properly  Hypertrophic cardiomyopathy [facility])  Shahla Jointer Dr Chema Blackwood in the near term  Prior note was reviewed  CAD (coronary artery disease)  Assessment & Plan  CAD s/p PCI to RCA, rPDA  · Inferior STEMI (3/23/21) --> initial PCI to [facility]  · Recurrent chest pain (3/23/21) --> GREG to RPDA  · Readmission, inferior STEMI (4/2/21) --> patent stents, no change on cath  Continue asa, Brilinta, statin    * Precordial pain  Assessment & Plan  Very atypical and not consistent with her angina  There is even some musculoskeletal discomfort that reproduces some aspect of her presentation  Other summary comments:   Patient certainly has significant cardiac issues but also as an overlay of anxiety  Mainly I reassured her today and will continue outpatient follow-up  Outpatient Cardiologist: Jacobo Peralta    HPI: Chase Mccollum is a 42y o  year old female who presented with several hours of chest pain  She 1st felt brief stabbing feeling but that a more constant feeling  Symptoms lasted for hours at a time  There was some slight palliation but not real true relief with nitroglycerin  She came to the emergency room  Initial troponin was minimally elevated but there was no subsequent rise or fall  I am asked to comment further    She also admits to being worried that her pacemaker was not working right and that there was a sense of occasional extra beats that worried her  To reiterate patient has a history of chest pain and acute coronary syndrome  See testing below  She also has been found to have hypertrophic cardiomyopathy as well as obstructive sleep apnea treated with CPAP  On 05/25/2021 she underwent dual chamber Medtronic pacemaker implantation because of significant long sinus pauses  EKG:   Atrial pacing with intact AV conduction  MOST  RECENT CARDIAC IMAGING:      Holter 4/22/2021 SR, avg rate 88  Frequent PVCs, 8-beat run NSVT     Cath 4/2/2021 Distal LAD: There was a 60 % stenosis  Mid circumflex: There was a 50 % stenosis  Distal RCA: There was a 10 % stenosis at the site of a prior stent  Right PDA: There was a 0 % stenosis at the site of a prior stent  Echo 3/25/2021 60%, severe hypokinesis of the basal-mid inferior wall  Findings consistent with HCM  Mild-mod MR  Cardiac MRI 3/29/2021 severe LVH, EF 44%  Small circumferential pericardial effusion  Mild MR  Findings suggestive of underlying hypertrophic cardiomyopathy with ischemic scarring of the inferior and inferolateral walls  Review of Systems: a 10 point review of systems was conducted and is negative except for as mentioned in the HPI or as below          Historical Information   Past Medical History:   Diagnosis Date    Acute MI ([facility] [address] )     Anxiety     CAD (coronary artery disease)     GREG mid RCA and GREG right PDA 3/25/2021    Cardiomyopathy ([address] )     CH[facility]est[facility]) ([facility])     Chronic kidney disease     COPD (chronic obstructive pulmonary disease) ([facility])     scheduled for sleep apnea test soon after pacemaker implant    Depression     History of chemotherapy     non hodgkins lymphoma 2008    Hypertension     Lymphoma, non-Hodgkin's (HCC)     Myocardial infarction ([address] )     SSS (sick sinus syndrome) ([facility] [address] )     s/p medtronic dual chamber pacemaker 2021    Tobacco abuse     Wears glasses      Past Surgical History:   Procedure Laterality Date    CARDIAC PACEMAKER PLACEMENT Left 2021    Procedure: DUAL LEAD PACEMAKER IMPLANT;  Surgeon: Poonam Steiner MD;  [facility] OR;  Service: Cardiology    CAROTID STENT       SECTION      CORONARY ANGIOPLASTY WITH STENT PLACEMENT  2021    GREG mid RCA and GREG right PDA    DENTAL SURGERY       Social History     Substance and Sexual Activity   Alcohol Use Yes    Comment: rarely     Social History     Substance and Sexual Activity   Drug Use Never     Social History     Tobacco Use   Smoking Status Smoker, Current Status Unknown    Packs/day: 1 00    Types: Cigarettes    Last attempt to quit: 3/29/2021    Years since quittin 3   Smokeless Tobacco Current User   Tobacco Comment    on nicotine patch 14 mg currently no cigarettes       Family History:   Negative for early sudden death  Meds/Allergies   all current active meds have been reviewed  Medications Prior to Admission   Medication    albuterol (PROVENTIL HFA,VENTOLIN HFA) 90 mcg/act inhaler    ARIPiprazole (ABILIFY) 10 mg tablet    aspirin 81 mg chewable tablet    atorvastatin (LIPITOR) 80 mg tablet    lisinopril (ZESTRIL) 5 mg tablet    metoprolol tartrate (LOPRESSOR) 50 mg tablet    nicotine (NICODERM CQ) 14 mg/24hr TD 24 hr patch    nitroglycerin (NITROSTAT) 0 4 mg SL tablet    sertraline (ZOLOFT) 100 mg tablet    ticagrelor (BRILINTA) 90 MG    ergocalciferol (VITAMIN D2) 50,000 units    LORazepam (ATIVAN) 0 5 mg tablet    umeclidinium-vilanterol (ANORO ELLIPTA) 62 5-25 MCG/INH inhaler       No Known Allergies    Objective   Vitals: Blood pressure 109/56, pulse 64, temperature (!) 97 2 °F (36 2 °C), temperature source Tympanic, resp  rate 18, height 5' 6" (1 676 m), weight 124 kg (274 lb 7 6 oz), SpO2 98 %, not currently breastfeeding , Body mass index is 44 3 kg/m²  , Orthostatic Blood Pressures      Most Recent Value   Blood Pressure  109/56 filed at 07/25/2021 3979   Patient Position - Orthostatic VS  Lying filed at 07/25/2021 6362          Systolic (98CJS), UKF:247 , Min:109 , IJA:931     Diastolic (30IRU), KFY:23, Min:56, Max:84              Physical Exam:    General:  Normal appearance in no distress  Eyes:  Anicteric  Oral mucosa:  Moist   Neck:  No JVD  Carotid upstrokes are brisk without bruits  No masses  Chest:  Clear to auscultation  Pacemaker left subclavian region  Cardiac:  No palpable PMI  Normal S1 and S2  No murmur gallop or rub  Abdomen:  Soft and nontender  No palpable organomegaly or aortic enlargement  Extremities:  Trace peripheral edema  Musculoskeletal:  Symmetric  Vascular:  Femoral pulses are brisk without bruits  Popliteal  pulses are intact bilaterally  Pedal pulses are intact  Neuro:  Grossly symmetric  Psych:  Alert and oriented x3          Lab Results:     Troponins:   Results from last 7 days   Lab Units 07/25/21  0527 07/25/21  0235 07/24/21  2339   TROPONIN I ng/mL 0 04* 0 04* 0 05*     BNP:   Results from last 6 Months   Lab Units 07/25/21  0011 03/25/21  0240   BNP pg/mL 625* 214*       CBC :   Results from last 7 days   Lab Units 07/24/21  2339   WBC Thousand/uL 16 70*   HEMOGLOBIN g/dL 13 7   HEMATOCRIT % 42 5   MCV fL 84   PLATELETS Thousands/uL 315     TSH:     CMP:   Results from last 7 days   Lab Units 07/24/21  2339   POTASSIUM mmol/L 4 1   CHLORIDE mmol/L 106   CO2 mmol/L 23   BUN mg/dL 21   CREATININE mg/dL 1 80*   AST U/L 13   ALT U/L 21   EGFR ml/min/1 73sq m 34     Lipid Profile:     Coags:   Results from last 7 days   Lab Units 07/24/21  2339   INR  1 04

## 2021-07-25 NOTE — ASSESSMENT & PLAN NOTE
· BMI 44 30  · Dietary, weight loss, and lifestyle modification counseling was provided prior to discharge

## 2021-07-25 NOTE — ASSESSMENT & PLAN NOTE
· Placed in observation telemetry  · Trend cardiac enzymes  · Obtain serial EKGs  · Continue pre-hospital cardiac medications to include aspirin, Lipitor, lisinopril, Lopressor and Brilinta  · Consult Cardiology in a m  · Give nitroglycerin and pain control p r n

## 2021-07-25 NOTE — ASSESSMENT & PLAN NOTE
[facility] Emergency Department Call Back     Person Contacted: Mother    Hello .     This is Jose Marie RN calling from [facility] Emergency Department.   wanted me to call and see how you’re doing after your recent visit.    Patient Condition: Improved     Were there any questions about your care in the Emergency Room?    No    Were you prescribed any new medications?    No    Do you have any questions on your discharge instructions?    No    Have you made a follow-up appointment or received a call for follow up?    No    We appreciate you taking the time this afternoon to speak with us. You may receive a survey in the mail regarding your care; we use this information to better your experience and our practice. Is there anything else I can do for you?    No    Recommendation:  none     Seeing Dr Horace Hastings in the near term  Prior note was reviewed

## 2021-07-25 NOTE — UTILIZATION REVIEW
Initial Clinical Review    Admission: Date/Time/Statement:   Admission Orders (From admission, onward)     Ordered        07/25/21 0048  Place in Observation  Once                   Orders Placed This Encounter   Procedures    Place in Observation     Standing Status:   Standing     Number of Occurrences:   1     Order Specific Question:   Level of Care     Answer:   Med Surg [16]     Order Specific Question:   Bed request comments     Answer:   tele     ED Arrival Information     Expected Arrival Acuity    - 7/24/2021 23:18 Emergent         Means of arrival Escorted by Service Admission type    [address] [facility] Medicine Emergency         Arrival complaint    chest pain        Chief Complaint   Patient presents with    Chest Pain       Initial Presentation: 41 yo f with a pmh of COPD, ELIAZAR on CPAP, and a extensive cardiac history, recent MI with cath and stents in both march and April as well as PPM placed in may of 2021  Presents to the Ed with chest pain x 1 day  She reports intermittent chest pain ongoing for the past 24 hours, she reports it is localized midsternally in the left chest with radiation into her back and shoulder, described as sharp in nature accompanied  with nausea and lightheadedness  She is admitted to observation status  For chest pain  Rule out MI requiring cardiac evaluation       Date: 7/25/21  Day 2: pending     ED Triage Vitals   Temperature Pulse Respirations Blood Pressure SpO2   07/24/21 2354 07/24/21 2354 07/24/21 2354 07/24/21 2354 07/24/21 2354   98 °F (36 7 °C) 70 18 140/78 100 %      Temp Source Heart Rate Source Patient Position - Orthostatic VS BP Location FiO2 (%)   07/24/21 2354 07/24/21 2354 07/24/21 2354 07/24/21 2354 --   Oral Monitor Lying Left arm       Pain Score       07/24/21 2348       8          Wt Readings from Last 1 Encounters:   07/25/21 124 kg (274 lb 7 6 oz)     Additional Vital Signs:   Date/Time  Temp  Pulse  Resp  BP  SpO2  O2 Device  O2 Interface Device  Patient Position - Orthostatic VS   07/25/21 0647  --  --  --  --  --  --   --  --   O2 Device: C pap at 07/25/21 0647   07/25/21 0644  97 2 °F (36 2 °C)Abnormal   64  18  109/56  98 %  --  --  Lying   07/25/21 0327  --  --  --  --  --  --  Full face mask  --   07/25/21 0251  --  --  --  --  97 %  None (Room air)  --  --   07/25/21 0221  97 7 °F (36 5 °C)  68  18  131/84  98 %  None (Room air)  --           Pertinent Labs/Diagnostic Test Results:       Results from last 7 days   Lab Units 07/24/21  2339   WBC Thousand/uL 16 70*   HEMOGLOBIN g/dL 13 7   HEMATOCRIT % 42 5   PLATELETS Thousands/uL 315   NEUTROS ABS Thousands/µL 12 20*         Results from last 7 days   Lab Units 07/24/21  2339   SODIUM mmol/L 139   POTASSIUM mmol/L 4 1   CHLORIDE mmol/L 106   CO2 mmol/L 23   ANION GAP mmol/L 10   BUN mg/dL 21   CREATININE mg/dL 1 80*   EGFR ml/min/1 73sq m 34   CALCIUM mg/dL 9 8   MAGNESIUM mg/dL 1 9     Results from last 7 days   Lab Units 07/24/21  2339   AST U/L 13   ALT U/L 21   ALK PHOS U/L 73   TOTAL PROTEIN g/dL 7 0   ALBUMIN g/dL 4 0   TOTAL BILIRUBIN mg/dL 0 40         Results from last 7 days   Lab Units 07/24/21  2339   GLUCOSE RANDOM mg/dL 87     Results from last 7 days   Lab Units 07/25/21  0527 07/25/21  0235 07/24/21  2339   TROPONIN I ng/mL 0 04* 0 04* 0 05*         Results from last 7 days   Lab Units 07/24/21  2339   PROTIME seconds 13 5   INR  1 04   PTT seconds 28       Results from last 7 days   Lab Units 07/25/21  0011   BNP pg/mL 625*       CXR - 7/24 -     EKG - 7/24 -         ED Treatment:   Medication Administration from 07/24/2021 2318 to 07/25/2021 0219       Date/Time Order Dose Route Action Comments     07/24/2021 2348 aspirin tablet 325 mg 325 mg Oral Given      07/24/2021 2348 morphine (PF) 4 mg/mL injection 4 mg 4 mg Intravenous Given      07/25/2021 0107 morphine (PF) 10 mg/mL injection 6 mg 6 mg Intravenous Given         Past Medical History:   Diagnosis Date    Acute MI ([facility] )     Anxiety     CAD (coronary artery disease)     GREG mid RCA and GREG right PDA 3/25/2021    Cardiomyopathy ([place] [address] )     CHF (congestive heart failure) ([facility])     Chronic kidney disease     COPD (chronic obstructive pulmonary disease) ([facility])     scheduled for sleep apnea test soon after pacemaker implant    Depression     History of chemotherapy     non hodgkins lymphoma 2008    Hypertension     Lymphoma, non-Hodgkin's ([facility])     Myocardial infarction ([facility] )     SSS (sick sinus syndrome) ([facility])     s/p medtronic dual chamber pacemaker 5/25/2021    Tobacco abuse     Wears glasses      Present on Admission:   Tobacco abuse   ELIAZAR (obstructive sleep apnea)   Mixed hyperlipidemia   Major depressive disorder with current active episode   Essential hypertension      Admitting Diagnosis: Chest pain [R07 9]  Age/Sex: 42 y o  female         Admission Orders:  Scheduled Medications:  ARIPiprazole, 10 mg, Oral, BID  aspirin, 81 mg, Oral, Daily  atorvastatin, 80 mg, Oral, QPM  heparin (porcine), 5,000 Units, Subcutaneous, Q12H ABIEL  lisinopril, 5 mg, Oral, Daily  metoprolol tartrate, 50 mg, Oral, BID  nicotine, 1 patch, Transdermal, Daily  ticagrelor, 90 mg, Oral, Q12H [address]      Continuous IV Infusions:  multi-electrolyte, 100 mL/hr, Intravenous, Continuous      PRN Meds:  acetaminophen, 650 mg, Oral, Q4H PRN  albuterol, 2 puff, Inhalation, Q4H PRN  HYDROcodone-acetaminophen, 1 tablet, Oral, Q6H PRN  LORazepam, 0 5 mg, Oral, BID PRN - 7/25 x 1   morphine injection, 2 mg, Intravenous, Q4H PRN - 7/25 x 1   nitroglycerin, 0 4 mg, Sublingual, Q5 Min PRN  ondansetron, 4 mg, Intravenous, Q6H PRN - 7/25 x 1       Nursing orders  - VS - telem - up & OOB as tolerated - SCD's to le's - diet cardiac- CPAP hs      Network Utilization Review Department  ATTENTION: Please call with any questions or concerns to [phone] and carefully listen to the prompts so that you are directed to the right person   All voicemails are confidential   [facility] all requests for admission clinical reviews, approved or denied determinations and any other requests to dedicated fax number below belonging to the campus where the patient is receiving treatment   List of dedicated fax numbers for the Facilities:  [address] DENIALS (Administrative/Medical Necessity) [phone]   [address] (Maternity/NICU/Pediatrics) [phone]   [address] [phone] [address] Carlito Freeman [address] [facility] [facility] [phone]

## 2021-07-25 NOTE — ED PROVIDER NOTES
History  Chief Complaint   Patient presents with    Chest Pain     53-year-old female comes in for evaluation of chest pain  Patient states she began to have chest pain this morning and describes it as a stabbing sensation that lasted few minutes and was intermittent  Throughout the days she started to having longer and more intense episodes of chest pain  She now states that the chest pain is constant it is on the lower left side of her chest   It radiates into her left arm and she has left arm pain that she describes as a squeezing sensation  Patient states it feels like her previous MI   Mrs Dank Chaudhari has a complex cardiac history inlcuding extensive coroanry artery disease along with hypertrophic cardiomyopathy  Patient also complains of shortness of breath no nausea vomiting or fever  Patient took nitro and aspirin and had minimal relief  Patient also states that she feels dizzy and lightheaded  She is concerned that her pacemaker is not working properly  History provided by:  Patient   used: No    Chest Pain  Pain location:  L chest  Pain quality: pressure and tightness    Pain radiates to:  L arm  Pain radiates to the back: no    Pain severity:  Severe  Onset quality:  Gradual  Timing:  Constant  Progression:  Worsening  Chronicity:  New  Context: at rest    Ineffective treatments:  Aspirin and nitroglycerin  Associated symptoms: dizziness    Associated symptoms: no abdominal pain, no anorexia, no back pain, no cough, no fatigue, no fever, no headache, no numbness, no shortness of breath and not vomiting    Risk factors: coronary artery disease, hypertension, obesity and smoking        Prior to Admission Medications   Prescriptions Last Dose Informant Patient Reported? Taking?    ARIPiprazole (ABILIFY) 10 mg tablet 7/24/2021 at Unknown time Self No Yes   Sig: Take 1 tablet (10 mg total) by mouth daily   LORazepam (ATIVAN) 0 5 mg tablet Unknown at Unknown time Self No No   Sig: Take 1 tablet (0 5 mg total) by mouth 2 (two) times a day as needed for anxiety   Patient not taking: Reported on 7/6/2021   albuterol (PROVENTIL HFA,VENTOLIN HFA) 90 mcg/act inhaler Past Month at Unknown time Self No Yes   Sig: Inhale 2 puffs every 4 (four) hours as needed for wheezing or shortness of breath   aspirin 81 mg chewable tablet 7/24/2021 at Unknown time Self No Yes   Sig: Chew 1 tablet (81 mg total) daily   atorvastatin (LIPITOR) 80 mg tablet 7/24/2021 at Unknown time Self No Yes   Sig: Take 1 tablet (80 mg total) by mouth every evening   ergocalciferol (VITAMIN D2) 50,000 units 7/19/2021 Self No No   Sig: Take 1 capsule (50,000 Units total) by mouth once a week   lisinopril (ZESTRIL) 5 mg tablet 7/24/2021 at Unknown time Self No Yes   Sig: Take 1 tablet (5 mg total) by mouth daily   nicotine (NICODERM CQ) 14 mg/24hr TD 24 hr patch 7/24/2021 at Unknown time Self No Yes   Sig: Place 1 patch on the skin every 24 hours   nitroglycerin (NITROSTAT) 0 4 mg SL tablet 7/24/2021 at Unknown time Self No Yes   Sig: Place 1 tablet (0 4 mg total) under the tongue every 5 (five) minutes as needed for chest pain   sertraline (ZOLOFT) 100 mg tablet 7/24/2021 at Unknown time Self No Yes   Sig: Take 1 tablet (100 mg total) by mouth daily   ticagrelor (BRILINTA) 90 MG 7/24/2021 at Unknown time Self No Yes   Sig: Take 1 tablet (90 mg total) by mouth every 12 (twelve) hours   umeclidinium-vilanterol (ANORO ELLIPTA) 62 5-25 MCG/INH inhaler Not Taking at Unknown time Self No No   Sig: Inhale 1 puff daily   Patient not taking: Reported on 7/6/2021      Facility-Administered Medications: None       Past Medical History:   Diagnosis Date    [facility] )     Anxiety     CAD (coronary artery disease)     GREG mid RCA and GREG right PDA 3/25/2021    Cardiomyopathy ([facility] )     CHF (congestive heart failure) (H[facility])     Chronic kidney disease     COPD [facility] )     scheduled for sleep apnea test soon after pacemaker implant    Depression     History of chemotherapy     non hodgkins lymphoma     Hypertension     Lymphoma, non-Hodgkin's (H[facility])     [facility] )     [facility]sick [facility]) ([facility])     s/p medtronic dual chamber pacemaker 2021    Tobacco abuse     Wears glasses        Past Surgical History:   Procedure Laterality Date    CARDIAC PACEMAKER PLACEMENT Left 2021    Procedure: DUAL LEAD PACEMAKER IMPLANT;  Surgeon: Avila Whitfield MD;  Location: [facility] MAIN OR;  Service: Cardiology    CAROTID [facility]      CORONARY ANGIOPLASTY WITH STENT PLACEMENT  2021    GREG mid RCA and GREG right PDA    DENTAL SURGERY         Family History   Problem Relation Age of Onset    No Known Problems Mother     No Known Problems Father     No Known Problems Daughter     Brain cancer Maternal Grandfather     No Known Problems Paternal Aunt     No Known Problems Paternal Aunt      I have reviewed and agree with the history as documented  E-Cigarette/Vaping    E-Cigarette Use Never User      E-Cigarette/Vaping Substances    Nicotine No     THC No     CBD No     Flavoring No     Other No     Unknown No      Social History     Tobacco Use    Smoking status: Smoker, Current Status Unknown     Packs/day: 1 00     Types: Cigarettes     Last attempt to quit: 3/29/2021     Years since quittin 3    Smokeless tobacco: Current User    Tobacco comment: on nicotine patch 14 mg currently no cigarettes   Vaping Use    Vaping Use: Never used   Substance Use Topics    Alcohol use: Yes     Comment: rarely    Drug use: Never       Review of Systems   Constitutional: Negative for fatigue and fever  HENT: Negative for congestion and ear pain  Eyes: Negative for discharge and redness  Respiratory: Negative for apnea, cough, shortness of breath and wheezing  Cardiovascular: Positive for chest pain     Gastrointestinal: Negative for abdominal pain, anorexia, diarrhea and vomiting  Endocrine: Negative for cold intolerance and polydipsia  Genitourinary: Negative for difficulty urinating and hematuria  Musculoskeletal: Negative for arthralgias and back pain  Skin: Negative for color change and rash  Allergic/Immunologic: Negative for environmental allergies and immunocompromised state  Neurological: Positive for dizziness  Negative for numbness and headaches  Hematological: Negative for adenopathy  Does not bruise/bleed easily  Psychiatric/Behavioral: Negative for agitation and behavioral problems  Physical Exam  Physical Exam  Vitals and nursing note reviewed  Constitutional:       General: She is in acute distress  Appearance: Normal appearance  She is well-developed  She is obese  She is not toxic-appearing  HENT:      Head: Normocephalic and atraumatic  Right Ear: Tympanic membrane and external ear normal       Left Ear: Tympanic membrane and external ear normal       Nose: Nose normal  No nasal deformity or rhinorrhea  Mouth/Throat:      Dentition: Normal dentition  Pharynx: Uvula midline  Eyes:      General: Lids are normal          Right eye: No discharge  Left eye: No discharge  Conjunctiva/sclera: Conjunctivae normal       Pupils: Pupils are equal, round, and reactive to light  Neck:      Vascular: No carotid bruit or JVD  Trachea: Trachea normal    Cardiovascular:      Rate and Rhythm: Normal rate and regular rhythm  No extrasystoles are present  Chest Wall: PMI is not displaced  Pulses: Normal pulses  Heart sounds: Heart sounds not distant  No murmur heard  Pulmonary:      Effort: Pulmonary effort is normal  Tachypnea present  No accessory muscle usage or respiratory distress  Breath sounds: Decreased breath sounds present  No wheezing, rhonchi or rales  Abdominal:      General: Bowel sounds are normal       Palpations: Abdomen is soft  Abdomen is not rigid  There is no mass  Tenderness: There is no abdominal tenderness  There is no guarding or rebound  Musculoskeletal:      Right shoulder: No swelling, deformity or bony tenderness  Normal range of motion  Cervical back: Normal range of motion and neck supple  No deformity, tenderness or bony tenderness  Lymphadenopathy:      Cervical: No cervical adenopathy  Skin:     General: Skin is warm and dry  Findings: No rash  Neurological:      Mental Status: She is alert and oriented to person, place, and time  GCS: GCS eye subscore is 4  GCS verbal subscore is 5  GCS motor subscore is 6  Cranial Nerves: No cranial nerve deficit  Sensory: No sensory deficit  Deep Tendon Reflexes: Reflexes are normal and symmetric  Psychiatric:         Mood and Affect: Mood is anxious           Speech: Speech normal          Behavior: Behavior normal          Vital Signs  ED Triage Vitals   Temperature Pulse Respirations Blood Pressure SpO2   07/24/21 2354 07/24/21 2354 07/24/21 2354 07/24/21 2354 07/24/21 2354   98 °F (36 7 °C) 70 18 140/78 100 %      Temp Source Heart Rate Source Patient Position - Orthostatic VS BP Location FiO2 (%)   07/24/21 2354 07/24/21 2354 07/24/21 2354 07/24/21 2354 --   Oral Monitor Lying Left arm       Pain Score       07/24/21 2348       8           Vitals:    07/24/21 2354 07/25/21 0221 07/25/21 0644   BP: 140/78 131/84 109/56   Pulse: 70 68 64   Patient Position - Orthostatic VS: Lying  Lying         Visual Acuity      ED Medications  Medications   aspirin tablet 325 mg (325 mg Oral Given 7/24/21 2348)   morphine (PF) 4 mg/mL injection 4 mg (4 mg Intravenous Given 7/24/21 2348)   morphine (PF) 10 mg/mL injection 6 mg (6 mg Intravenous Given 7/25/21 0107)       Diagnostic Studies  Results Reviewed     Procedure Component Value Units Date/Time    Troponin I [[id number]]  (Abnormal) Collected: 07/25/21 0527    Lab Status: Final result Specimen: Blood from Arm, Left Updated: 07/25/21 0643     Troponin I 0 04 ng/mL     Troponin I [[id number]]  (Abnormal) Collected: 07/25/21 0235    Lab Status: Final result Specimen: Blood from Hand, Left Updated: 07/25/21 0346     Troponin I 0 04 ng/mL     B-Type Natriuretic Peptide [facility] ONLY) [[id number]]  (Abnormal) Collected: 07/25/21 0011    Lab Status: Final result Specimen: Blood from Arm, Right Updated: 07/25/21 0052      pg/mL     Comprehensive metabolic panel [[id number]]  (Abnormal) Collected: 07/24/21 2339    Lab Status: Final result Specimen: Blood from Arm, Right Updated: 07/25/21 0022     Sodium 139 mmol/L      Potassium 4 1 mmol/L      Chloride 106 mmol/L      CO2 23 mmol/L      ANION GAP 10 mmol/L      BUN 21 mg/dL      Creatinine 1 80 mg/dL      Glucose 87 mg/dL      Calcium 9 8 mg/dL      AST 13 U/L      ALT 21 U/L      Alkaline Phosphatase 73 U/L      Total Protein 7 0 g/dL      Albumin 4 0 g/dL      Total Bilirubin 0 40 mg/dL      eGFR 34 ml/min/1 73sq m     Narrative:      [place] guidelines for Chronic Kidney Disease (CKD):     Stage 1 with normal or high GFR (GFR > 90 mL/min/1 73 square meters)    Stage 2 Mild CKD (GFR = 60-89 mL/min/1 73 square meters)    Stage 3A Moderate CKD (GFR = 45-59 mL/min/1 73 square meters)    Stage 3B Moderate CKD (GFR = 30-44 mL/min/1 73 square meters)    Stage 4 Severe CKD (GFR = 15-29 mL/min/1 73 square meters)    Stage 5 End Stage CKD (GFR <15 mL/min/1 73 square meters)  Note: GFR calculation is accurate only with a steady state creatinine    Troponin I [[id number]]  (Abnormal) Collected: 07/24/21 2339    Lab Status: Final result Specimen: Blood from Arm, Right Updated: 07/25/21 0021     Troponin I 0 05 ng/mL     Magnesium [[id number]]  (Normal) Collected: 07/24/21 2339    Lab Status: Final result Specimen: Blood from Arm, Right Updated: 07/25/21 0015     Magnesium 1 9 mg/dL     Protime-INR [[id number]]  (Normal) Collected: 07/24/21 1277    Lab Status: Final result Specimen: Blood from Arm, Right Updated: 07/25/21 0011     Protime 13 5 seconds      INR 1 04    APTT [[id number]]  (Normal) Collected: 07/24/21 2339    Lab Status: Final result Specimen: Blood from Arm, Right Updated: 07/25/21 0011     PTT 28 seconds     CBC and differential [[id number]]  (Abnormal) Collected: 07/24/21 2339    Lab Status: Final result Specimen: Blood from Arm, Right Updated: 07/25/21 0003     WBC 16 70 Thousand/uL      RBC 5 06 Million/uL      Hemoglobin 13 7 g/dL      Hematocrit 42 5 %      MCV 84 fL      MCH 27 1 pg      MCHC 32 3 g/dL      RDW 18 2 %      MPV 9 0 fL      Platelets 639 Thousands/uL      Neutrophils Relative 73 %      Lymphocytes Relative 18 %      Monocytes Relative 7 %      Eosinophils Relative 2 %      Basophils Relative 1 %      Neutrophils Absolute 12 20 Thousands/µL      Lymphocytes Absolute 3 00 Thousands/µL      Monocytes Absolute 1 20 Thousand/µL      Eosinophils Absolute 0 20 Thousand/µL      Basophils Absolute 0 10 Thousands/µL                  XR chest 1 view portable   Final Result by Dalton Santos MD (07/25 1532)      No acute cardiopulmonary disease                    Workstation performed: [id number]                    Procedures  Procedures         ED Course             HEART Risk Score      Most Recent Value   Heart Score Risk Calculator   History  2 Filed at: 07/25/2021 0031   ECG  1 Filed at: 07/25/2021 0031   Age  0 Filed at: 07/25/2021 0031   Risk Factors  2 Filed at: 07/25/2021 0031   Troponin  0 Filed at: 07/25/2021 0031   HEART Score  5 Filed at: 07/25/2021 0031                                    MDM  Number of Diagnoses or Management Options  Chest pain: new and requires workup     Amount and/or Complexity of Data Reviewed  Clinical lab tests: ordered and reviewed  Tests in the radiology section of CPT®: ordered and reviewed  Tests in the medicine section of CPT®: ordered and reviewed  Discuss the patient with other providers: yes  Independent visualization of images, tracings, or specimens: yes    Patient Progress  Patient progress: stable      Disposition  Final diagnoses:   Chest pain     Time reflects when diagnosis was documented in both MDM as applicable and the Disposition within this note     Time User Action Codes Description Comment    7/25/2021 12:47 AM Tigre Severino Add [R07 9] Chest pain       ED Disposition     ED Disposition Condition Date/Time Comment    Admit Stable Sun Jul 25, 2021 12:47 AM Case was discussed with alberta lopez and the patient's admission status was agreed to be Admission Status: observation status to the service of Dr Brittaney Bocanegra   Follow-up Information     Follow up With Specialties Details Why Raad Gonzalez, DO Family Medicine Follow up in 1 week(s) Please call and schedule a post hospital discharge follow-up visit to ideally occur within 1 week [address] [facility]  [address] [facility] [address]  [phone]      Guille Brody MD Cardiology Follow up  [facility]   [address] [facility]            Discharge Medication List as of 7/25/2021 11:26 AM      CONTINUE these medications which have NOT CHANGED    Details   albuterol (PROVENTIL HFA,VENTOLIN HFA) 90 mcg/act inhaler Inhale 2 puffs every 4 (four) hours as needed for wheezing or shortness of breath, Starting Thu 4/1/2021, Normal      ARIPiprazole (ABILIFY) 10 mg tablet Take 1 tablet (10 mg total) by mouth daily, Starting Fri 9/4/2020, Normal      aspirin 81 mg chewable tablet Chew 1 tablet (81 mg total) daily, Starting Fri 4/2/2021, Until Sun 7/25/2021, Normal      atorvastatin (LIPITOR) 80 mg tablet Take 1 tablet (80 mg total) by mouth every evening, Starting Mon 4/19/2021, Until Sun 7/25/2021, Normal      lisinopril (ZESTRIL) 5 mg tablet Take 1 tablet (5 mg total) by mouth daily, Starting Wed 4/28/2021, Normal      nicotine (NICODERM CQ) 14 mg/24hr TD 24 hr patch Place 1 patch on the skin every 24 hours, Starting Mon 5/3/2021, Normal      nitroglycerin (NITROSTAT) 0 4 mg SL tablet Place 1 tablet (0 4 mg total) under the tongue every 5 (five) minutes as needed for chest pain, Starting Mon 5/10/2021, Normal      sertraline (ZOLOFT) 100 mg tablet Take 1 tablet (100 mg total) by mouth daily, Starting Mon 4/5/2021, Normal      ticagrelor (BRILINTA) 90 MG Take 1 tablet (90 mg total) by mouth every 12 (twelve) hours, Starting Mon 4/19/2021, Normal      metoprolol tartrate (LOPRESSOR) 50 mg tablet Take 1 tablet (50 mg total) by mouth 2 (two) times a day, Starting Tue 5/25/2021, No Print      ergocalciferol (VITAMIN D2) 50,000 units Take 1 capsule (50,000 Units total) by mouth once a week, Starting Wed 4/28/2021, Normal      LORazepam (ATIVAN) 0 5 mg tablet Take 1 tablet (0 5 mg total) by mouth 2 (two) times a day as needed for anxiety, Starting Mon 4/26/2021, Normal      umeclidinium-vilanterol (ANORO ELLIPTA) 62 5-25 MCG/INH inhaler Inhale 1 puff daily, Starting Thu 4/1/2021, Normal           Outpatient Discharge Orders   Discharge Diet     Activity as tolerated     No strenuous exercise     Call provider for:  persistent nausea or vomiting     Call provider for:  severe uncontrolled pain     Call provider for:  difficulty breathing, headache or visual disturbances     Call provider for:  persistent dizziness or light-headedness     Call provider for:  extreme fatigue     Call provider for: active or persistent bleeding     No dressing needed       PDMP Review       Value Time User    PDMP Reviewed  Yes 4/1/2021 11:40 AM Sharron Downs,           ED Provider  Electronically Signed by           Estephania Hidalgo DO  07/26/21 2542

## 2021-07-25 NOTE — DISCHARGE SUMMARY
[address] [facility]  Discharge- Maria Fernanda Brooks 1975, 42 y o  female MRN: [id number]  Unit/Bed#: -01 Encounter: [id number]  Primary Care Provider: Leif Izquierdo DO   Date and time admitted to hospital: 7/24/2021 11:38 PM    * Precordial pain  Assessment & Plan  · Resolved  · Most likely secondary to anxiety  · Troponin x3 0 05, 0 04, 0 04  · Most likely a non MI related elevated troponinemia in the setting of having chronic kidney disease  · Status post a Cardiology evaluation  · No arrhythmias on telemetry, no further symptoms, EKGs are nondiagnostic  · No further inpatient testing, treatment, and or workup is needed  · Patient is medically stable for discharge, she will follow up in the outpatient setting with her primary cardiologist-Dr Emile Smith  · DC home on all other pre-admit meds at pre-admit dosages which include include aspirin, Lipitor, lisinopril, Lopressor and Brilinta    Elevated troponin  Assessment & Plan  · As above  · Non MI related elevated troponinemia  · No further inpatient testing, treatment, and or workup    CAD (coronary artery disease)  Assessment & Plan  · DC home on the medications as outlined above  · Outpatient follow-up with Cardiology    Hypertrophic cardiomyopathy ([place] [address] )  Assessment & Plan  · Outpatient follow-up with Cardiology    Essential hypertension  Assessment & Plan  · Blood pressure is well controlled  · DC home on pre-admit meds at pre-admit dosages    Mixed hyperlipidemia  Assessment & Plan  · DC home on Lipitor    Major depressive disorder with current active episode  Assessment & Plan  · DC home on Abilify and Ativan as the patient was taking prior to arrival    BMI 40 0-44 9, adult ([facility] )  Assessment & Plan  · BMI 44 30  · Dietary, weight loss, and lifestyle modification counseling was provided prior to discharge    Sinus pause  Assessment & Plan  · Outpatient follow-up with Cardiology and EP Cardiology    Tobacco abuse  Assessment & Plan  · Status post treatment with a nicotine patch while in-house  · Cessation counseling provided    ELIAZAR (obstructive sleep apnea)  Assessment & Plan  · Continue CPAP while at home    CKD stage IIIA:-creatinine is at baseline    Discharging Physician / Practitioner: Nikos Gupta MD  PCP: Rose Duong DO  Admission Date:   Admission Orders (From admission, onward)     Ordered        07/25/21 0048  Place in Observation  Once                   Discharge Date: 07/25/21    Medical Problems     Resolved Problems  Date Reviewed: 7/25/2021    None                Consultations During Hospital Stay:  · Cardiology    Procedures Performed:   · None    Significant Findings / Test Results:   · X-ray chest-no acute pathology    Incidental Findings:   · None    Test Results Pending at Discharge (will require follow up): · None     Outpatient Tests Requested:  · None    Complications:     None    Reason for Admission:  Chest pain rule out ACS    Hospital Course:     Ruth Ann Eduardo is a 42 y o  female patient who originally presented to the hospital on 7/24/2021 due to chest pain  Please refer to the initial history and physical examination completed by Lorenza ÁLVAREZ for the initial presenting features and complaints  In brief, the patient is a pleasant 44-year-old female, who was admitted to [facility] telemetry observation, after she came into the emergency room with a chief complaint of chest pain  In the emergency room, she was found to have a normal EKG, however the 1st troponin was minimally elevated at 0 05  The ER spoke with Cardiology, who recommended that the patient be observed  After being admitted to the observation level unit, the patient had 2 more sets of troponins which yielded troponin level of 0 04 x 2  Patient remained otherwise hemodynamically stable, and had no arrhythmias on telemetry    Cardiology completed a formal consultation on the morning of 07/25/2021, and felt that the patient was otherwise medically stable for discharge  It was deemed that the patient had a non MI related elevated troponinemia in the setting of having CKD  Patient had no recurrent chest pain, no arrhythmias on telemetry, and normal EKGs  It was felt that she might have an anxiety related component and or musculoskeletal component  She was cleared for discharge by Cardiology, and they will be following up with her in the outpatient setting in the near future  She was discharged home on all of her other pre-admission medications at the preadmission dosages  Please refer to the assessment/plan portion of this discharge summary as outlined above for the remainder of the details  Please see above list of diagnoses and related plan for additional information  Condition at Discharge: good     Discharge Day Visit / Exam:     Subjective:  Patient seen and examined, resting in bed, feels well and would like to go home  She states that she has been waiting for me since the cardiologist told her she can go home  Vitals: Blood Pressure: 109/56 (07/25/21 0644)  Pulse: 64 (07/25/21 0644)  Temperature: (!) 97 2 °F (36 2 °C) (07/25/21 0644)  Temp Source: Tympanic (07/25/21 0644)  Respirations: 18 (07/25/21 0644)  Height: 5' 6" (167 6 cm) (07/25/21 0221)  Weight - Scale: 124 kg (274 lb 7 6 oz) (07/25/21 0221)  SpO2: 98 % (07/25/21 0644)  Exam:   Physical Exam  Constitutional:       General: She is not in acute distress  Appearance: Normal appearance  She is normal weight  She is not ill-appearing  HENT:      Head: Normocephalic and atraumatic  Nose: Nose normal       Mouth/Throat:      Mouth: Mucous membranes are moist    Eyes:      Extraocular Movements: Extraocular movements intact  Pupils: Pupils are equal, round, and reactive to light  Cardiovascular:      Rate and Rhythm: Normal rate and regular rhythm  Pulses: Normal pulses  Heart sounds: Normal heart sounds  No murmur heard     No friction rub  No gallop  Pulmonary:      Effort: Pulmonary effort is normal  No respiratory distress  Breath sounds: Normal breath sounds  No wheezing, rhonchi or rales  Abdominal:      General: There is no distension  Palpations: Abdomen is soft  There is no mass  Tenderness: There is no abdominal tenderness  Hernia: No hernia is present  Musculoskeletal:         General: No swelling or tenderness  Normal range of motion  Cervical back: Normal range of motion and neck supple  No rigidity  Right lower leg: No edema  Left lower leg: No edema  Skin:     General: Skin is warm  Capillary Refill: Capillary refill takes less than 2 seconds  Findings: No erythema or rash  Neurological:      General: No focal deficit present  Mental Status: She is alert and oriented to person, place, and time  Mental status is at baseline  Cranial Nerves: No cranial nerve deficit  Motor: No weakness  Psychiatric:         Mood and Affect: Mood normal          Behavior: Behavior normal          Discussion with Family:  The patient's  was bedside at the time of my discharge evaluation, all questions answered to his satisfaction    Discharge instructions/Information to patient and family:   See after visit summary for information provided to patient and family  Provisions for Follow-Up Care:  See after visit summary for information related to follow-up care and any pertinent home health orders  Disposition:     Home      Planned Readmission:    None     Discharge Statement:  I spent 40 minutes discharging the patient  This time was spent on the day of discharge  I had direct contact with the patient on the day of discharge  Greater than 50% of the total time was spent examining patient, answering all patient questions, arranging and discussing plan of care with patient as well as directly providing post-discharge instructions    Additional time then spent on discharge activities  Discharge Medications:  See after visit summary for reconciled discharge medications provided to patient and family        ** Please Note: This note has been constructed using a voice recognition system **

## 2021-07-25 NOTE — RESPIRATORY THERAPY NOTE
RT Protocol Note  Sun Allen 42 y o  female MRN: [id number]  Unit/Bed#: -01 Encounter: [id number]    Assessment    Active Problems:    * No active hospital problems   *      Home Pulmonary Medications:    Home Devices/Therapy: (P) BiPAP/CPAP, Other (Comment) (Anoro/ Albuterol MDI/ CPAP)    Past Medical History:   Diagnosis Date    Acute MI ([place] [address] )     Anxiety     CAD (coronary artery disease)     GREG mid RCA and GREG right PDA 3/25/2021    Cardiomyopathy ([place] [address] )     [facility]est[facility]     Chronic kidney disease     COPD (chronic obstructive pulmonary disease) ([facility])     scheduled for sleep apnea test soon after pacemaker implant    Depression     History of chemotherapy     non hodgkins lymphoma     Hypertension     Lymphoma, non-Hodgkin's (HCC)     Myocardial infarction ([place] [address] )     SSS (sick sinus syndrome) ([place] [address] )     s/p medtronic dual chamber pacemaker 2021    Tobacco abuse     Wears glasses      Social History     Socioeconomic History    Marital status: /Civil Union     Spouse name: None    Number of children: None    Years of education: None    Highest education level: None   Occupational History    None   Tobacco Use    Smoking status: Smoker, Current Status Unknown     Packs/day: 1 00     Types: Cigarettes     Last attempt to quit: 3/29/2021     Years since quittin 3    Smokeless tobacco: Current User    Tobacco comment: on nicotine patch 14 mg currently no cigarettes   Vaping Use    Vaping Use: Never used   Substance and Sexual Activity    Alcohol use: Yes     Comment: rarely    Drug use: Never    Sexual activity: Yes     Partners: Male     Birth control/protection: Male Sterilization     Comment:    Other Topics Concern    None   Social History Narrative    None     Social Determinants of Health     Financial Resource Strain:     Difficulty of Paying Living Expenses:    Food Insecurity:     Worried About Running Out of Food in the Last Year:     Ran Out of Food in the Last Year:    Transportation Needs:     Lack of Transportation (Medical):  Lack of Transportation (Non-Medical):    Physical Activity:     Days of Exercise per Week:     Minutes of Exercise per Session:    Stress:     Feeling of Stress :    Social Connections:     Frequency of Communication with Friends and Family:     Frequency of Social Gatherings with Friends and Family:     Attends Caoda[employer] Services:     Active Member of Clubs or Organizations:     Attends Club or Organization Meetings:     Marital Status:    Intimate Partner Violence:     Fear of Current or Ex-Partner:     Emotionally Abused:     Physically Abused:     Sexually Abused:        Subjective: Pulmonary HX COPD, ELIAZAR  Pt  Wears home CPAP and will borrow one of our units  No home 02 use  Pt  Will placed on 12 cm H20 CPAP tonight  All respiratory meds previously ordered by EDY  Objective    Physical Exam:   Assessment Type: (P) Assess only  General Appearance: (P) Alert, Awake  Respiratory Pattern: (P) Normal  Chest Assessment: (P) Chest expansion symmetrical, Trachea midline  Bilateral Breath Sounds: (P) Clear, Diminished    Vitals:  Blood pressure 131/84, pulse 68, temperature 97 7 °F (36 5 °C), temperature source Temporal, resp  rate 18, height 5' 6" (1 676 m), weight 124 kg (274 lb 7 6 oz), SpO2 97 %, not currently breastfeeding  Imaging and other studies: I have personally reviewed pertinent reports              Plan    Respiratory Plan: (P) Home Bronchodilator Patient pathway

## 2021-07-25 NOTE — DISCHARGE INSTRUCTIONS
Chest Pain   WHAT YOU NEED TO KNOW:   Chest pain can be caused by a range of conditions, from not serious to life-threatening  Chest pain can be a symptom of a digestive problem, such as acid reflux or a stomach ulcer  An anxiety attack or a strong emotion, such as anger, can also cause chest pain  Infection, inflammation, or a fracture in the bones or cartilage in your chest can cause pain or discomfort  Sometimes chest pain or pressure is caused by poor blood flow to your heart (angina)  Chest pain may also be caused by life-threatening conditions such as a heart attack or blood clot in your lungs  DISCHARGE INSTRUCTIONS:   Call your local emergency number (911 in the [address] [facility]) or have someone call if:   · You have any of the following signs of a heart attack:      ? Squeezing, pressure, or pain in your chest    ? You may  also have any of the following:     § Discomfort or pain in your back, neck, jaw, stomach, or arm    § Shortness of breath    § Nausea or vomiting    § Lightheadedness or a sudden cold sweat      Seek care immediately if:   · You have chest discomfort that gets worse, even with medicine  · You cough or vomit blood  · Your bowel movements are black or bloody  · You cannot stop vomiting, or it hurts to swallow  Call your doctor if:   · You have questions or concerns about your condition or care  Medicines:   · Medicines  may be given to treat the cause of your chest pain  Examples include pain medicine, anxiety medicine, or medicines to increase blood flow to your heart  · Do not take certain medicines without asking your healthcare provider first   These include NSAIDs, herbal or vitamin supplements, or hormones (estrogen or progestin)  · Take your medicine as directed  Contact your healthcare provider if you think your medicine is not helping or if you have side effects  Tell him or her if you are allergic to any medicine   Keep a list of the medicines, vitamins, and herbs you take  Include the amounts, and when and why you take them  Bring the list or the pill bottles to follow-up visits  Carry your medicine list with you in case of an emergency  Healthy living tips: The following are general healthy guidelines  If the cause of your chest pain is known, your healthcare provider will give you specific guidelines to follow  · Do not smoke  Nicotine and other chemicals in cigarettes and cigars can cause lung and heart damage  Ask your healthcare provider for information if you currently smoke and need help to quit  E-cigarettes or smokeless tobacco still contain nicotine  Talk to your healthcare provider before you use these products  · Choose a variety of healthy foods as often as possible  Include fresh, frozen, or canned fruits and vegetables  Also include low-fat dairy products, fish, chicken (without skin), and lean meats  Your healthcare provider or a dietitian can help you create meal plans  You may need to avoid certain foods or drinks if your pain is caused by a digestion problem  · Lower your sodium (salt) intake  Limit foods that are high in sodium, such as canned foods, salty snacks, and cold cuts  If you add salt when you cook food, do not add more at the table  Choose low-sodium canned foods as much as possible  · Drink plenty of water every day  Water helps your body to control temperature and blood pressure  Ask your healthcare provider how much water you should drink every day  · Ask about activity  Your healthcare provider will tell you which activities to limit or avoid  Ask when you can drive, return to work, and have sex  Ask about the best exercise plan for you  · Maintain a healthy weight  Ask your healthcare provider what a healthy weight is for you  Ask him or her to help you create a safe weight loss plan if you are overweight  · Ask about vaccines you may need    Get the influenza (flu) vaccine every year as soon as recommended, usually in September or October  You may also need a pneumococcal vaccine to prevent pneumonia  The vaccine is usually given every 5 years, starting at age 72  Your healthcare provider can tell you if should get other vaccines, and when to get them  Follow up with your healthcare provider within 72 hours, or as directed: You may need to return for more tests to find the cause of your chest pain  You may be referred to a specialist, such as a cardiologist or gastroenterologist  Write down your questions so you remember to ask them during your visits  © Copyright [employer] 2021 Information is for End User's use only and may not be sold, redistributed or otherwise used for commercial purposes  All illustrations and images included in CareNotes® are the copyrighted property of A D A M , Inc  or Alicia Taylor   The above information is an  only  It is not intended as medical advice for individual conditions or treatments  Talk to your doctor, nurse or pharmacist before following any medical regimen to see if it is safe and effective for you  Cigarette Smoking and Your Health   WHAT YOU NEED TO KNOW:   What are the risks to my health if I smoke tobacco?  Nicotine and other chemicals found in tobacco and e-cigarettes can damage every cell in your body  Even if you are a light smoker, you have an increased risk for cancer, heart disease, and lung disease  If you are pregnant or have diabetes, smoking increases your risk for complications  Nicotine can affect an adolescent's developing brain  This can lead to trouble thinking, learning, or paying attention  What are the benefits to my health if I stop smoking? · You decrease respiratory symptoms such as coughing, wheezing, and shortness of breath  · You reduce your risk for cancers of the lung, mouth, throat, kidney, bladder, pancreas, stomach, and cervix   If you already have cancer, you increase the benefits of chemotherapy  You also reduce your risk for cancer returning or a second cancer from developing  · You reduce your risk for heart disease, blood clots, heart attack, and stroke  · You reduce your risk for lung infections, and diseases such as pneumonia, asthma, chronic bronchitis, and emphysema  · Your circulation improves  More oxygen can be delivered to your body  If you have diabetes, you lower your risk for complications, such as kidney, artery, and eye diseases  You also lower your risk for nerve damage  Nerve damage can lead to amputations, poor vision, and blindness  · You improve your body's ability to heal and to fight infections  · An adolescent can help his or her brain and body develop in a healthy way  Talk to your adolescent about all the health risks of nicotine  If you can, start talking about nicotine when your child is younger than 12 years  This may make it easier for him or her not to start using nicotine as a teenager or adult  Explain to him or her that it is best never to start  It can be hard to try to quit later  What are the health benefits to others if I stop smoking? Tobacco is harmful to nonsmokers who breathe in your secondhand smoke  The following are ways the health of others around you may improve when you stop smoking:  · You lower the risks for lung cancer and heart disease in nonsmoking adults  · If you are pregnant, you lower the risk for miscarriage, early delivery, low birth weight, and stillbirth  You also lower your baby's risk for SIDS, obesity, developmental delay, and neurobehavioral problems, such as ADHD  · If you have children, you lower their risk for ear infections, colds, pneumonia, bronchitis, and asthma  Where can I find support and more information? · American Lung Association  [address] [facility]  [address]  Phone: [facility] [address]  Phone: 0- 203 - [phone]  Web Address: Javier andrews    · Smokefree  gov  Phone: [address]  Web Address: www smokefree  gov  CARE AGREEMENT:   You have the right to help plan your care  Learn about your health condition and how it may be treated  Discuss treatment options with your healthcare providers to decide what care you want to receive  You always have the right to refuse treatment  The above information is an  only  It is not intended as medical advice for individual conditions or treatments  Talk to your doctor, nurse or pharmacist before following any medical regimen to see if it is safe and effective for you  © Copyright [employer] 2021 Information is for End User's use only and may not be sold, redistributed or otherwise used for commercial purposes   All illustrations and images included in CareNotes® are the copyrighted property of A D A M , Inc  or [address] [place] [address]

## 2021-07-26 ENCOUNTER — TRANSITIONAL CARE MANAGEMENT (OUTPATIENT)
Dept: FAMILY MEDICINE CLINIC | Facility: CLINIC | Age: 46
End: 2021-07-26

## 2021-07-26 ENCOUNTER — HOSPITAL ENCOUNTER (OUTPATIENT)
Dept: NON INVASIVE DIAGNOSTICS | Facility: HOSPITAL | Age: 46
Discharge: HOME | End: 2021-07-26
Payer: COMMERCIAL

## 2021-07-26 DIAGNOSIS — I42.2 HYPERTROPHIC CARDIOMYOPATHY (HCC): ICD-10-CM

## 2021-07-26 DIAGNOSIS — I25.10 CORONARY ARTERY DISEASE INVOLVING NATIVE CORONARY ARTERY OF NATIVE HEART, UNSPECIFIED WHETHER ANGINA PRESENT: ICD-10-CM

## 2021-07-26 PROCEDURE — 93350 STRESS TTE ONLY: CPT

## 2021-07-26 PROCEDURE — 93351 STRESS TTE COMPLETE: CPT | Performed by: INTERNAL MEDICINE

## 2021-07-26 RX ORDER — METOPROLOL TARTRATE 50 MG/1
75 TABLET, FILM COATED ORAL 2 TIMES DAILY
Qty: 270 TABLET | Refills: 1 | Status: SHIPPED | OUTPATIENT
Start: 2021-07-26 | End: 2021-09-21

## 2021-07-27 LAB
CHEST PAIN STATEMENT: NORMAL
MAX DIASTOLIC BP: 90 MMHG
MAX HEART RATE: 142 BPM
MAX PREDICTED HEART RATE: 176 BPM
MAX. SYSTOLIC BP: 160 MMHG
PROTOCOL NAME: NORMAL
TARGET HR FORMULA: NORMAL
TEST INDICATION: NORMAL
TIME IN EXERCISE PHASE: NORMAL

## 2021-08-04 ENCOUNTER — OFFICE VISIT (OUTPATIENT)
Dept: FAMILY MEDICINE CLINIC | Facility: CLINIC | Age: 46
End: 2021-08-04
Payer: COMMERCIAL

## 2021-08-04 VITALS
WEIGHT: 267 LBS | OXYGEN SATURATION: 99 % | SYSTOLIC BLOOD PRESSURE: 142 MMHG | TEMPERATURE: 97.6 F | BODY MASS INDEX: 42.91 KG/M2 | HEIGHT: 66 IN | DIASTOLIC BLOOD PRESSURE: 68 MMHG | HEART RATE: 68 BPM

## 2021-08-04 DIAGNOSIS — D50.8 IRON DEFICIENCY ANEMIA SECONDARY TO INADEQUATE DIETARY IRON INTAKE: ICD-10-CM

## 2021-08-04 DIAGNOSIS — R77.8 ELEVATED TROPONIN: ICD-10-CM

## 2021-08-04 DIAGNOSIS — E78.2 MIXED HYPERLIPIDEMIA: ICD-10-CM

## 2021-08-04 DIAGNOSIS — I30.9 ACUTE PERICARDITIS, UNSPECIFIED TYPE: ICD-10-CM

## 2021-08-04 DIAGNOSIS — I25.2 HISTORY OF MI (MYOCARDIAL INFARCTION): ICD-10-CM

## 2021-08-04 DIAGNOSIS — I42.2 HYPERTROPHIC CARDIOMYOPATHY (HCC): ICD-10-CM

## 2021-08-04 DIAGNOSIS — R07.2 PRECORDIAL PAIN: ICD-10-CM

## 2021-08-04 DIAGNOSIS — G89.29 CHRONIC PAIN OF RIGHT ANKLE: ICD-10-CM

## 2021-08-04 DIAGNOSIS — Z12.39 ENCOUNTER FOR SCREENING FOR MALIGNANT NEOPLASM OF BREAST, UNSPECIFIED SCREENING MODALITY: Primary | ICD-10-CM

## 2021-08-04 DIAGNOSIS — F33.1 MODERATE EPISODE OF RECURRENT MAJOR DEPRESSIVE DISORDER (HCC): ICD-10-CM

## 2021-08-04 DIAGNOSIS — Z72.0 TOBACCO ABUSE: ICD-10-CM

## 2021-08-04 DIAGNOSIS — F41.1 GENERALIZED ANXIETY DISORDER: ICD-10-CM

## 2021-08-04 DIAGNOSIS — I50.9 CONGESTIVE HEART FAILURE, UNSPECIFIED HF CHRONICITY, UNSPECIFIED HEART FAILURE TYPE (HCC): ICD-10-CM

## 2021-08-04 DIAGNOSIS — N18.32 STAGE 3B CHRONIC KIDNEY DISEASE (HCC): ICD-10-CM

## 2021-08-04 DIAGNOSIS — F41.9 ANXIETY: ICD-10-CM

## 2021-08-04 DIAGNOSIS — E55.9 VITAMIN D DEFICIENCY: ICD-10-CM

## 2021-08-04 DIAGNOSIS — R79.89 ELEVATED BRAIN NATRIURETIC PEPTIDE (BNP) LEVEL: ICD-10-CM

## 2021-08-04 DIAGNOSIS — Z95.5 S/P DRUG ELUTING CORONARY STENT PLACEMENT: ICD-10-CM

## 2021-08-04 DIAGNOSIS — M25.571 CHRONIC PAIN OF RIGHT ANKLE: ICD-10-CM

## 2021-08-04 PROCEDURE — 99214 OFFICE O/P EST MOD 30 MIN: CPT | Performed by: FAMILY MEDICINE

## 2021-08-04 RX ORDER — SERTRALINE HYDROCHLORIDE 100 MG/1
100 TABLET, FILM COATED ORAL DAILY
Qty: 30 TABLET | Refills: 5 | Status: SHIPPED | OUTPATIENT
Start: 2021-08-04 | End: 2021-09-13 | Stop reason: SDUPTHER

## 2021-08-04 RX ORDER — LORAZEPAM 0.5 MG/1
0.5 TABLET ORAL 2 TIMES DAILY PRN
Qty: 60 TABLET | Refills: 0 | Status: SHIPPED | OUTPATIENT
Start: 2021-08-04 | End: 2021-09-13 | Stop reason: SDUPTHER

## 2021-08-09 ENCOUNTER — OFFICE VISIT (OUTPATIENT)
Dept: MULTI SPECIALTY CLINIC | Facility: CLINIC | Age: 46
End: 2021-08-09

## 2021-08-09 VITALS
DIASTOLIC BLOOD PRESSURE: 78 MMHG | BODY MASS INDEX: 43.58 KG/M2 | SYSTOLIC BLOOD PRESSURE: 117 MMHG | HEART RATE: 66 BPM | WEIGHT: 270 LBS | TEMPERATURE: 97.3 F

## 2021-08-09 DIAGNOSIS — M19.071 ARTHRITIS OF RIGHT SUBTALAR JOINT: Primary | ICD-10-CM

## 2021-08-09 DIAGNOSIS — M25.571 CHRONIC PAIN OF RIGHT ANKLE: ICD-10-CM

## 2021-08-09 DIAGNOSIS — M19.071 ARTHRITIS OF RIGHT ANKLE: ICD-10-CM

## 2021-08-09 DIAGNOSIS — M25.80 SESAMOIDITIS: ICD-10-CM

## 2021-08-09 DIAGNOSIS — M79.671 RIGHT FOOT PAIN: ICD-10-CM

## 2021-08-09 DIAGNOSIS — M19.071 ARTHRITIS OF RIGHT FOOT: ICD-10-CM

## 2021-08-09 DIAGNOSIS — G89.29 CHRONIC PAIN OF RIGHT ANKLE: ICD-10-CM

## 2021-08-09 PROCEDURE — 99243 OFF/OP CNSLTJ NEW/EST LOW 30: CPT | Performed by: PODIATRIST

## 2021-08-09 PROCEDURE — 3078F DIAST BP <80 MM HG: CPT | Performed by: PODIATRIST

## 2021-08-09 PROCEDURE — 3074F SYST BP LT 130 MM HG: CPT | Performed by: PODIATRIST

## 2021-08-09 RX ORDER — TRIAMCINOLONE ACETONIDE 40 MG/ML
1.2 INJECTION, SUSPENSION INTRA-ARTICULAR; INTRAMUSCULAR ONCE
Status: DISCONTINUED | OUTPATIENT
Start: 2021-08-09 | End: 2021-08-09
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
## 2021-08-12 ENCOUNTER — DOCUMENTATION (OUTPATIENT)
Dept: CARDIOLOGY CLINIC | Facility: CLINIC | Age: 46
End: 2021-08-12

## 2021-08-24 ENCOUNTER — APPOINTMENT (OUTPATIENT)
Dept: LAB | Facility: CLINIC | Age: 46
End: 2021-08-24
Payer: COMMERCIAL

## 2021-08-24 DIAGNOSIS — I50.9 CONGESTIVE HEART FAILURE, UNSPECIFIED HF CHRONICITY, UNSPECIFIED HEART FAILURE TYPE (HCC): ICD-10-CM

## 2021-08-24 DIAGNOSIS — N18.32 STAGE 3B CHRONIC KIDNEY DISEASE (HCC): ICD-10-CM

## 2021-08-24 DIAGNOSIS — E78.2 MIXED HYPERLIPIDEMIA: ICD-10-CM

## 2021-08-24 DIAGNOSIS — D50.8 IRON DEFICIENCY ANEMIA SECONDARY TO INADEQUATE DIETARY IRON INTAKE: ICD-10-CM

## 2021-08-24 DIAGNOSIS — E55.9 VITAMIN D DEFICIENCY: ICD-10-CM

## 2021-08-24 DIAGNOSIS — R79.89 ELEVATED BRAIN NATRIURETIC PEPTIDE (BNP) LEVEL: ICD-10-CM

## 2021-08-24 DIAGNOSIS — R80.9 PROTEINURIA, UNSPECIFIED TYPE: ICD-10-CM

## 2021-08-24 LAB
25(OH)D3 SERPL-MCNC: 14.6 NG/ML (ref 30–100)
ALBUMIN SERPL BCP-MCNC: 3.3 G/DL (ref 3.5–5)
ALP SERPL-CCNC: 112 U/L (ref 46–116)
ALT SERPL W P-5'-P-CCNC: 28 U/L (ref 12–78)
ANION GAP SERPL CALCULATED.3IONS-SCNC: 4 MMOL/L (ref 4–13)
AST SERPL W P-5'-P-CCNC: 16 U/L (ref 5–45)
BASOPHILS # BLD AUTO: 0.07 THOUSANDS/ΜL (ref 0–0.1)
BASOPHILS NFR BLD AUTO: 1 % (ref 0–1)
BILIRUB SERPL-MCNC: 0.24 MG/DL (ref 0.2–1)
BUN SERPL-MCNC: 23 MG/DL (ref 5–25)
C3 SERPL-MCNC: 165 MG/DL (ref 90–180)
C4 SERPL-MCNC: 30 MG/DL (ref 10–40)
CALCIUM ALBUM COR SERPL-MCNC: 9.6 MG/DL (ref 8.3–10.1)
CALCIUM SERPL-MCNC: 9 MG/DL (ref 8.3–10.1)
CHLORIDE SERPL-SCNC: 111 MMOL/L (ref 100–108)
CHOLEST SERPL-MCNC: 139 MG/DL (ref 50–200)
CO2 SERPL-SCNC: 21 MMOL/L (ref 21–32)
CREAT SERPL-MCNC: 1.95 MG/DL (ref 0.6–1.3)
EOSINOPHIL # BLD AUTO: 0.21 THOUSAND/ΜL (ref 0–0.61)
EOSINOPHIL NFR BLD AUTO: 1 % (ref 0–6)
ERYTHROCYTE [DISTWIDTH] IN BLOOD BY AUTOMATED COUNT: 19.4 % (ref 11.6–15.1)
GFR SERPL CREATININE-BSD FRML MDRD: 31 ML/MIN/1.73SQ M
GLUCOSE P FAST SERPL-MCNC: 77 MG/DL (ref 65–99)
HCT VFR BLD AUTO: 45.4 % (ref 34.8–46.1)
HDLC SERPL-MCNC: 32 MG/DL
HGB BLD-MCNC: 14.6 G/DL (ref 11.5–15.4)
IMM GRANULOCYTES # BLD AUTO: 0.11 THOUSAND/UL (ref 0–0.2)
IMM GRANULOCYTES NFR BLD AUTO: 1 % (ref 0–2)
LDLC SERPL CALC-MCNC: 39 MG/DL (ref 0–100)
LYMPHOCYTES # BLD AUTO: 2.6 THOUSANDS/ΜL (ref 0.6–4.47)
LYMPHOCYTES NFR BLD AUTO: 17 % (ref 14–44)
MCH RBC QN AUTO: 27.3 PG (ref 26.8–34.3)
MCHC RBC AUTO-ENTMCNC: 32.2 G/DL (ref 31.4–37.4)
MCV RBC AUTO: 85 FL (ref 82–98)
MONOCYTES # BLD AUTO: 0.97 THOUSAND/ΜL (ref 0.17–1.22)
MONOCYTES NFR BLD AUTO: 6 % (ref 4–12)
NEUTROPHILS # BLD AUTO: 11.54 THOUSANDS/ΜL (ref 1.85–7.62)
NEUTS SEG NFR BLD AUTO: 74 % (ref 43–75)
NONHDLC SERPL-MCNC: 107 MG/DL
NRBC BLD AUTO-RTO: 0 /100 WBCS
NT-PROBNP SERPL-MCNC: 1913 PG/ML
PLATELET # BLD AUTO: 368 THOUSANDS/UL (ref 149–390)
PMV BLD AUTO: 10.9 FL (ref 8.9–12.7)
POTASSIUM SERPL-SCNC: 4.4 MMOL/L (ref 3.5–5.3)
PROT SERPL-MCNC: 7.7 G/DL (ref 6.4–8.2)
RBC # BLD AUTO: 5.35 MILLION/UL (ref 3.81–5.12)
SODIUM SERPL-SCNC: 136 MMOL/L (ref 136–145)
TRIGL SERPL-MCNC: 338 MG/DL
TSH SERPL DL<=0.05 MIU/L-ACNC: 1.43 UIU/ML (ref 0.36–3.74)
WBC # BLD AUTO: 15.5 THOUSAND/UL (ref 4.31–10.16)

## 2021-08-24 PROCEDURE — 82306 VITAMIN D 25 HYDROXY: CPT

## 2021-08-24 PROCEDURE — 85025 COMPLETE CBC W/AUTO DIFF WBC: CPT

## 2021-08-24 PROCEDURE — 86160 COMPLEMENT ANTIGEN: CPT

## 2021-08-24 PROCEDURE — 36415 COLL VENOUS BLD VENIPUNCTURE: CPT

## 2021-08-24 PROCEDURE — 84443 ASSAY THYROID STIM HORMONE: CPT

## 2021-08-24 PROCEDURE — 83880 ASSAY OF NATRIURETIC PEPTIDE: CPT

## 2021-08-24 PROCEDURE — 80061 LIPID PANEL: CPT

## 2021-08-24 PROCEDURE — 80053 COMPREHEN METABOLIC PANEL: CPT

## 2021-08-31 ENCOUNTER — OFFICE VISIT (OUTPATIENT)
Dept: NEPHROLOGY | Facility: CLINIC | Age: 46
End: 2021-08-31
Payer: COMMERCIAL

## 2021-08-31 ENCOUNTER — TELEPHONE (OUTPATIENT)
Dept: NEPHROLOGY | Facility: CLINIC | Age: 46
End: 2021-08-31

## 2021-08-31 ENCOUNTER — APPOINTMENT (OUTPATIENT)
Dept: LAB | Facility: HOSPITAL | Age: 46
End: 2021-08-31
Payer: COMMERCIAL

## 2021-08-31 ENCOUNTER — HOSPITAL ENCOUNTER (OUTPATIENT)
Dept: RADIOLOGY | Facility: HOSPITAL | Age: 46
Discharge: HOME | End: 2021-08-31
Payer: COMMERCIAL

## 2021-08-31 ENCOUNTER — OFFICE VISIT (OUTPATIENT)
Dept: FAMILY MEDICINE CLINIC | Facility: CLINIC | Age: 46
End: 2021-08-31
Payer: COMMERCIAL

## 2021-08-31 ENCOUNTER — TELEPHONE (OUTPATIENT)
Dept: CARDIOLOGY CLINIC | Facility: CLINIC | Age: 46
End: 2021-08-31

## 2021-08-31 VITALS
TEMPERATURE: 97.7 F | HEART RATE: 82 BPM | WEIGHT: 264 LBS | HEIGHT: 66 IN | SYSTOLIC BLOOD PRESSURE: 128 MMHG | OXYGEN SATURATION: 99 % | DIASTOLIC BLOOD PRESSURE: 84 MMHG | BODY MASS INDEX: 42.43 KG/M2

## 2021-08-31 VITALS
WEIGHT: 260 LBS | HEIGHT: 66 IN | DIASTOLIC BLOOD PRESSURE: 84 MMHG | OXYGEN SATURATION: 98 % | SYSTOLIC BLOOD PRESSURE: 158 MMHG | BODY MASS INDEX: 41.78 KG/M2 | HEART RATE: 86 BPM

## 2021-08-31 DIAGNOSIS — I42.2 HYPERTROPHIC CARDIOMYOPATHY (HCC): ICD-10-CM

## 2021-08-31 DIAGNOSIS — N18.32 STAGE 3B CHRONIC KIDNEY DISEASE (HCC): Primary | ICD-10-CM

## 2021-08-31 DIAGNOSIS — E83.9 CHRONIC KIDNEY DISEASE-MINERAL AND BONE DISORDER: ICD-10-CM

## 2021-08-31 DIAGNOSIS — N18.30 BENIGN HYPERTENSION WITH CKD (CHRONIC KIDNEY DISEASE) STAGE III (HCC): ICD-10-CM

## 2021-08-31 DIAGNOSIS — I10 ESSENTIAL HYPERTENSION: ICD-10-CM

## 2021-08-31 DIAGNOSIS — M89.9 CHRONIC KIDNEY DISEASE-MINERAL AND BONE DISORDER: ICD-10-CM

## 2021-08-31 DIAGNOSIS — I12.9 BENIGN HYPERTENSION WITH CKD (CHRONIC KIDNEY DISEASE) STAGE III (HCC): ICD-10-CM

## 2021-08-31 DIAGNOSIS — I50.9 CONGESTIVE HEART FAILURE, UNSPECIFIED HF CHRONICITY, UNSPECIFIED HEART FAILURE TYPE (HCC): Primary | ICD-10-CM

## 2021-08-31 DIAGNOSIS — R80.9 NEPHROTIC RANGE PROTEINURIA: ICD-10-CM

## 2021-08-31 DIAGNOSIS — S39.012A LOW BACK STRAIN, INITIAL ENCOUNTER: ICD-10-CM

## 2021-08-31 DIAGNOSIS — I50.9 CONGESTIVE HEART FAILURE, UNSPECIFIED HF CHRONICITY, UNSPECIFIED HEART FAILURE TYPE (HCC): ICD-10-CM

## 2021-08-31 DIAGNOSIS — R79.89 ELEVATED BRAIN NATRIURETIC PEPTIDE (BNP) LEVEL: ICD-10-CM

## 2021-08-31 DIAGNOSIS — R60.9 EDEMA, UNSPECIFIED TYPE: ICD-10-CM

## 2021-08-31 DIAGNOSIS — N18.32 STAGE 3B CHRONIC KIDNEY DISEASE (HCC): ICD-10-CM

## 2021-08-31 DIAGNOSIS — R31.9 HEMATURIA, UNSPECIFIED TYPE: ICD-10-CM

## 2021-08-31 DIAGNOSIS — N18.9 CHRONIC KIDNEY DISEASE-MINERAL AND BONE DISORDER: ICD-10-CM

## 2021-08-31 DIAGNOSIS — E55.9 VITAMIN D DEFICIENCY: ICD-10-CM

## 2021-08-31 DIAGNOSIS — N11.9 CHRONIC TUBULO-INTERSTITIAL NEPHRITIS: ICD-10-CM

## 2021-08-31 DIAGNOSIS — D50.9 IRON DEFICIENCY ANEMIA, UNSPECIFIED IRON DEFICIENCY ANEMIA TYPE: ICD-10-CM

## 2021-08-31 LAB — NT-PROBNP SERPL-MCNC: 1264 PG/ML

## 2021-08-31 PROCEDURE — 83880 ASSAY OF NATRIURETIC PEPTIDE: CPT

## 2021-08-31 PROCEDURE — 99214 OFFICE O/P EST MOD 30 MIN: CPT | Performed by: FAMILY MEDICINE

## 2021-08-31 PROCEDURE — 36415 COLL VENOUS BLD VENIPUNCTURE: CPT

## 2021-08-31 PROCEDURE — 99214 OFFICE O/P EST MOD 30 MIN: CPT | Performed by: NURSE PRACTITIONER

## 2021-08-31 RX ORDER — FUROSEMIDE 20 MG/1
TABLET ORAL
Qty: 30 TABLET | Refills: 5 | Status: SHIPPED | OUTPATIENT
Start: 2021-08-31 | End: 2021-09-13 | Stop reason: SDUPTHER

## 2021-08-31 RX ORDER — METHOCARBAMOL 500 MG/1
500 TABLET, FILM COATED ORAL 2 TIMES DAILY
Qty: 30 TABLET | Refills: 0 | Status: SHIPPED | OUTPATIENT
Start: 2021-08-31 | End: 2021-10-12 | Stop reason: ALTCHOICE

## 2021-08-31 NOTE — TELEPHONE ENCOUNTER
----- Message from Aristeo Prince, [address] Alegre sent at 8/31/2021  3:04 PM EDT -----  Please call cardiology and let them know I feel the patient may need to have kidney biopsy- what are their thoughts on holding DAPT    Please call hematology (Dr Johnie Bello) and see if they still want to follow with this patient  She is unsure   I believe they should (non-Hodgkin's lymphoma diagnosed 2018 status post chemotherapy and radiation and persistent leukocytosis)
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
## 2021-08-31 NOTE — TELEPHONE ENCOUNTER
Spoke with Cardiology and they will call me back to let us know what the provider thinks  I called Hem/ Onc and they said they do want her to follow up  She was supposed to come back for a 4 week follow up in Oct but never returned to office  They said they would contact her to make an appointment

## 2021-08-31 NOTE — PROGRESS NOTES
Assessment/Plan:    No problem-specific Assessment & Plan notes found for this encounter  Diagnoses and all orders for this visit:    Congestive heart failure, unspecified HF chronicity, unspecified heart failure type ([facility] )  Comments:  Start Lasix  Re-check BNP today  Orders:  -     furosemide (LASIX) 20 mg tablet; Take 2 tablets daily for 5 days then 1 tablet daily  -     Cancel: NT-BNP PRO; Future  -     XR chest pa & lateral; Future  -     NT-BNP PRO; Future    Hypertrophic cardiomyopathy (HCC)  -     furosemide (LASIX) 20 mg tablet; Take 2 tablets daily for 5 days then 1 tablet daily  -     XR chest pa & lateral; Future  -     NT-BNP PRO; Future    Low back strain, initial encounter  -     methocarbamol (ROBAXIN) 500 mg tablet; Take 1 tablet (500 mg total) by mouth 2 (two) times a day  -     Ambulatory referral to Physical Therapy; Future  -     XR spine lumbar minimum 4 views non injury; Future    Essential hypertension  Comments:  well controlled    Stage 3b chronic kidney disease ([facility] )  Comments:  NO NSAID's, tylenol only for LBP  Appt with nephrolog today at 3pm          PHQ-9 Depression Screening    PHQ-9:   Frequency of the following problems over the past two weeks:      Little interest or pleasure in doing things: 2 - more than half the days  Feeling down, depressed, or hopeless: 2 - more than half the days  Trouble falling or staying asleep, or sleeping too much: 2 - more than half the days  Feeling tired or having little energy: 2 - more than half the days  Poor appetite or overeatin - more than half the days  Feeling bad about yourself - or that you are a failure or have let yourself or your family down: 2 - more than half the days  Trouble concentrating on things, such as reading the newspaper or watching television: 2 - more than half the days  Moving or speaking so slowly that other people could have noticed   Or the opposite - being so fidgety or restless that you have been moving around a lot more than usual: 0 - not at all  Thoughts that you would be better off dead, or of hurting yourself in some way: 0 - not at all  PHQ-2 Score: 4  PHQ-9 Score: 14            Subjective:      Patient ID: Gillian Raines is a 42 y o  female  Patient presents for 3 week follow-up, she is reporting low back pain when she bent forward to  something from the floor she felt a pulling her in low back  She feels the discomfort across the low back  This occurred yesterday morning  She took tylenol this morning which did not help much  No N/T radiating down the legs  Her labs were reviewed, her most recent BNP was 1,913  Prior to this her BNP was 600 in the hospital  She did have a stress echo which was negative for ischemia, she does have a hx of hypertrophic cardiomyopathy but not on diuresis  She did not follow up with Cardiology as requested  Patient denies chest pain, she is short of breath, she does have palpitations with a hx of anxiety which she states continues to be problematic  She denies lightheadedness, dizziness, or syncope  She does expeirence fatigue  Her creatinine has been steadily rising with the most recent creatinine being 1 95 and a GFR of 31  She reports she was contacted by Nephrology today and will see them at 3pm   The continuing rise in her BNP couples with her symptoms are strongly suggestive of CHF but she is not being diuresed  The concern of course is causing worsening renal function as a result  Will coordinate with Nephro  I suggested hospitalization  The following portions of the patient's history were reviewed and updated as appropriate: allergies, current medications, past family history, past medical history, past social history, past surgical history and problem list     Review of Systems   Respiratory: Positive for shortness of breath  Negative for cough and chest tightness  Cardiovascular: Negative for leg swelling     Gastrointestinal: Negative for abdominal pain, diarrhea, nausea and vomiting  Endocrine: Negative  Genitourinary: Negative for difficulty urinating, dysuria, frequency, hematuria and urgency  Musculoskeletal: Positive for back pain  Neurological: Positive for weakness  Negative for dizziness, tremors, light-headedness and headaches  Psychiatric/Behavioral: Positive for dysphoric mood  The patient is nervous/anxious  Objective:    /84 (BP Location: Left arm, Patient Position: Sitting, Cuff Size: Standard)   Pulse 82   Temp 97 7 °F (36 5 °C) (Tympanic)   Ht 5' 6" (1 676 m)   Wt 120 kg (264 lb)   SpO2 99%   BMI 42 61 kg/m²      Physical Exam  Constitutional:       General: She is not in acute distress  Appearance: Normal appearance  She is not ill-appearing, toxic-appearing or diaphoretic  Cardiovascular:      Rate and Rhythm: Normal rate and regular rhythm  Pulses: Normal pulses  Heart sounds: Normal heart sounds  No murmur heard  No gallop  Pulmonary:      Effort: Pulmonary effort is normal  No respiratory distress  Breath sounds: Normal breath sounds  No wheezing, rhonchi or rales  Abdominal:      General: Abdomen is flat  There is no distension  Palpations: Abdomen is soft  Tenderness: There is no abdominal tenderness  Comments: No fluid wave   Musculoskeletal:         General: Tenderness present  Right lower leg: No edema  Left lower leg: No edema  Comments: Bilateral PVM TTP, LES 5/5 DTR 2+   Skin:     General: Skin is warm and dry  Neurological:      Mental Status: She is alert and oriented to person, place, and time     Psychiatric:      Comments: Visibly very anxious

## 2021-08-31 NOTE — TELEPHONE ENCOUNTER
Received a call from [place] from Nephrology  [facility] would like to know your thoughts on a kidney biopsy and dual anti platelet therapy     [phone]

## 2021-08-31 NOTE — PATIENT INSTRUCTIONS
Swelling:   Take lasix per primary care provider  Weigh yourself every day with an empty bladder and same amount of nudity write down weights  1 liter of fluid is equal to 2 2 pounds    Labs:  Get blood work and urine done in 1 week including 24 hour urine protein     I will reach out to your cardiologist regarding possible kidney biopsy    I will also reach out to the hematologist to see if you need follow-up

## 2021-09-07 ENCOUNTER — APPOINTMENT (OUTPATIENT)
Dept: LAB | Facility: CLINIC | Age: 46
End: 2021-09-07
Payer: COMMERCIAL

## 2021-09-07 DIAGNOSIS — R80.9 NEPHROTIC RANGE PROTEINURIA: ICD-10-CM

## 2021-09-07 DIAGNOSIS — N18.32 STAGE 3B CHRONIC KIDNEY DISEASE (HCC): ICD-10-CM

## 2021-09-07 DIAGNOSIS — D50.9 IRON DEFICIENCY ANEMIA, UNSPECIFIED IRON DEFICIENCY ANEMIA TYPE: ICD-10-CM

## 2021-09-07 DIAGNOSIS — R60.9 EDEMA, UNSPECIFIED TYPE: ICD-10-CM

## 2021-09-07 LAB
ANION GAP SERPL CALCULATED.3IONS-SCNC: 7 MMOL/L (ref 4–13)
BUN SERPL-MCNC: 28 MG/DL (ref 5–25)
CALCIUM SERPL-MCNC: 9.5 MG/DL (ref 8.3–10.1)
CHLORIDE SERPL-SCNC: 109 MMOL/L (ref 100–108)
CO2 SERPL-SCNC: 19 MMOL/L (ref 21–32)
CREAT SERPL-MCNC: 2.02 MG/DL (ref 0.6–1.3)
FERRITIN SERPL-MCNC: 20 NG/ML (ref 8–388)
GFR SERPL CREATININE-BSD FRML MDRD: 29 ML/MIN/1.73SQ M
GLUCOSE P FAST SERPL-MCNC: 94 MG/DL (ref 65–99)
HBV CORE AB SER QL: NORMAL
HBV CORE IGM SER QL: NORMAL
HBV SURFACE AG SER QL: NORMAL
HCV AB SER QL: NORMAL
IRON SATN MFR SERPL: 13 % (ref 15–50)
IRON SERPL-MCNC: 50 UG/DL (ref 50–170)
NT-PROBNP SERPL-MCNC: 944 PG/ML
POTASSIUM SERPL-SCNC: 4.4 MMOL/L (ref 3.5–5.3)
SODIUM SERPL-SCNC: 135 MMOL/L (ref 136–145)
TIBC SERPL-MCNC: 380 UG/DL (ref 250–450)

## 2021-09-07 PROCEDURE — 86803 HEPATITIS C AB TEST: CPT

## 2021-09-07 PROCEDURE — 83550 IRON BINDING TEST: CPT

## 2021-09-07 PROCEDURE — 86225 DNA ANTIBODY NATIVE: CPT

## 2021-09-07 PROCEDURE — 80048 BASIC METABOLIC PNL TOTAL CA: CPT

## 2021-09-07 PROCEDURE — 83880 ASSAY OF NATRIURETIC PEPTIDE: CPT

## 2021-09-07 PROCEDURE — 83540 ASSAY OF IRON: CPT

## 2021-09-07 PROCEDURE — 87340 HEPATITIS B SURFACE AG IA: CPT

## 2021-09-07 PROCEDURE — 86255 FLUORESCENT ANTIBODY SCREEN: CPT

## 2021-09-07 PROCEDURE — 86704 HEP B CORE ANTIBODY TOTAL: CPT

## 2021-09-07 PROCEDURE — 83520 IMMUNOASSAY QUANT NOS NONAB: CPT

## 2021-09-07 PROCEDURE — 86705 HEP B CORE ANTIBODY IGM: CPT

## 2021-09-07 PROCEDURE — 36415 COLL VENOUS BLD VENIPUNCTURE: CPT

## 2021-09-07 PROCEDURE — 84156 ASSAY OF PROTEIN URINE: CPT

## 2021-09-07 PROCEDURE — 87536 HIV-1 QUANT&REVRSE TRNSCRPJ: CPT

## 2021-09-07 PROCEDURE — 86147 CARDIOLIPIN ANTIBODY EA IG: CPT

## 2021-09-07 PROCEDURE — 86162 COMPLEMENT TOTAL (CH50): CPT

## 2021-09-07 PROCEDURE — 82728 ASSAY OF FERRITIN: CPT

## 2021-09-08 LAB — DSDNA AB SER-ACNC: <1 IU/ML (ref 0–9)

## 2021-09-09 ENCOUNTER — DOCUMENTATION (OUTPATIENT)
Dept: SLEEP CENTER | Facility: CLINIC | Age: 46
End: 2021-09-09

## 2021-09-09 LAB
C-ANCA TITR SER IF: NORMAL TITER
GBM AB SER IA-ACNC: 3 UNITS (ref 0–20)
HIV1 RNA # SERPL NAA+PROBE: <20 COPIES/ML
HIV1 RNA SERPL NAA+PROBE-LOG#: NORMAL LOG10COPY/ML
MYELOPEROXIDASE AB SER IA-ACNC: <9 U/ML (ref 0–9)
P-ANCA ATYPICAL TITR SER IF: NORMAL TITER
P-ANCA TITR SER IF: NORMAL TITER
PROT 24H UR-MCNC: 857.5 MG/24 HRS (ref 40–150)
PROTEINASE3 AB SER IA-ACNC: <3.5 U/ML (ref 0–3.5)
SPECIMEN VOL UR: 2450 ML

## 2021-09-09 NOTE — PROGRESS NOTES
Overnight oximetry report: on room air and CPAP done 9/1/21  Time spent below 90%: 0  Time spent below 89%: 0  Oxygen b[place]: 90  KIKO 2 02    No changes to pressure  No oxygen needed

## 2021-09-10 ENCOUNTER — HOSPITAL ENCOUNTER (OUTPATIENT)
Dept: RADIOLOGY | Facility: HOSPITAL | Age: 46
Discharge: HOME | End: 2021-09-10
Payer: COMMERCIAL

## 2021-09-10 ENCOUNTER — APPOINTMENT (OUTPATIENT)
Dept: LAB | Facility: HOSPITAL | Age: 46
End: 2021-09-10
Payer: COMMERCIAL

## 2021-09-10 LAB
CARDIOLIPIN IGA SER IA-ACNC: 3.4
CARDIOLIPIN IGG SER IA-ACNC: 0.7
CARDIOLIPIN IGM SER IA-ACNC: <0.8
CH50 SERPL-ACNC: >60 U/ML

## 2021-09-10 PROCEDURE — 72110 X-RAY EXAM L-2 SPINE 4/>VWS: CPT

## 2021-09-10 PROCEDURE — 36415 COLL VENOUS BLD VENIPUNCTURE: CPT

## 2021-09-10 PROCEDURE — 82595 ASSAY OF CRYOGLOBULIN: CPT

## 2021-09-10 PROCEDURE — 71046 X-RAY EXAM CHEST 2 VIEWS: CPT

## 2021-09-13 ENCOUNTER — OFFICE VISIT (OUTPATIENT)
Dept: FAMILY MEDICINE CLINIC | Facility: CLINIC | Age: 46
End: 2021-09-13
Payer: COMMERCIAL

## 2021-09-13 VITALS
BODY MASS INDEX: 42.59 KG/M2 | HEIGHT: 66 IN | HEART RATE: 79 BPM | TEMPERATURE: 97.5 F | OXYGEN SATURATION: 99 % | DIASTOLIC BLOOD PRESSURE: 72 MMHG | WEIGHT: 265 LBS | SYSTOLIC BLOOD PRESSURE: 110 MMHG

## 2021-09-13 DIAGNOSIS — I12.9 BENIGN HYPERTENSION WITH CKD (CHRONIC KIDNEY DISEASE) STAGE III (HCC): Primary | ICD-10-CM

## 2021-09-13 DIAGNOSIS — R79.89 ELEVATED BRAIN NATRIURETIC PEPTIDE (BNP) LEVEL: ICD-10-CM

## 2021-09-13 DIAGNOSIS — I50.9 CONGESTIVE HEART FAILURE, UNSPECIFIED HF CHRONICITY, UNSPECIFIED HEART FAILURE TYPE (HCC): ICD-10-CM

## 2021-09-13 DIAGNOSIS — I42.2 HYPERTROPHIC CARDIOMYOPATHY (HCC): ICD-10-CM

## 2021-09-13 DIAGNOSIS — K62.5 RECTAL BLEEDING: ICD-10-CM

## 2021-09-13 DIAGNOSIS — F33.9 RECURRENT MAJOR DEPRESSIVE DISORDER, REMISSION STATUS UNSPECIFIED (HCC): ICD-10-CM

## 2021-09-13 DIAGNOSIS — N18.30 BENIGN HYPERTENSION WITH CKD (CHRONIC KIDNEY DISEASE) STAGE III (HCC): Primary | ICD-10-CM

## 2021-09-13 DIAGNOSIS — F41.1 GENERALIZED ANXIETY DISORDER: ICD-10-CM

## 2021-09-13 DIAGNOSIS — M54.50 ACUTE BILATERAL LOW BACK PAIN WITHOUT SCIATICA: ICD-10-CM

## 2021-09-13 PROCEDURE — 99214 OFFICE O/P EST MOD 30 MIN: CPT | Performed by: FAMILY MEDICINE

## 2021-09-13 RX ORDER — LORAZEPAM 0.5 MG/1
0.5 TABLET ORAL 2 TIMES DAILY PRN
Qty: 60 TABLET | Refills: 0 | Status: SHIPPED | OUTPATIENT
Start: 2021-09-13 | End: 2021-10-14 | Stop reason: SDUPTHER

## 2021-09-13 RX ORDER — ARIPIPRAZOLE 10 MG/1
10 TABLET ORAL DAILY
Qty: 90 TABLET | Refills: 2 | Status: SHIPPED | OUTPATIENT
Start: 2021-09-13 | End: 2022-03-17

## 2021-09-13 RX ORDER — FUROSEMIDE 20 MG/1
TABLET ORAL
Qty: 30 TABLET | Refills: 5 | Status: SHIPPED | OUTPATIENT
Start: 2021-09-13 | End: 2021-09-21

## 2021-09-13 RX ORDER — SERTRALINE HYDROCHLORIDE 100 MG/1
100 TABLET, FILM COATED ORAL DAILY
Qty: 30 TABLET | Refills: 5 | Status: SHIPPED | OUTPATIENT
Start: 2021-09-13 | End: 2022-03-17 | Stop reason: ALTCHOICE

## 2021-09-13 NOTE — PROGRESS NOTES
Assessment/Plan:    No problem-specific Assessment & Plan notes found for this encounter  Diagnoses and all orders for this visit:    Benign hypertension with CKD (chronic kidney disease) stage III [facility])  Comments:  Nephrology is following closely, renal biopsy pending    Hypertrophic cardiomyopathy ([facility] )  Comments:  F/U Cardiology 9/23    Elevated brain natriuretic peptide (BNP) level  Comments:  Improving on lasix, will continue at 20mg daily - watch renal function    Acute bilateral low back pain without sciatica  Comments:  Improving with conservative treatment    Generalized anxiety disorder  Comments:  Continue zoloft, refill ativan    Rectal bleeding  Comments: Will see GI Thursday    Recurrent major depressive disorder, remission status unspecified ([facility] )  Comments:  Refill ability, continue zoloft          PHQ-9 Depression Screening    PHQ-9:   Frequency of the following problems over the past two weeks:      Little interest or pleasure in doing things: 3 - nearly every day  Feeling down, depressed, or hopeless: 3 - nearly every day  Trouble falling or staying asleep, or sleeping too much: 3 - nearly every day  Feeling tired or having little energy: 3 - nearly every day  Poor appetite or overeating: 3 - nearly every day  Feeling bad about yourself - or that you are a failure or have let yourself or your family down: 0 - not at all  Trouble concentrating on things, such as reading the newspaper or watching television: 0 - not at all  Moving or speaking so slowly that other people could have noticed  Or the opposite - being so fidgety or restless that you have been moving around a lot more than usual: 0 - not at all  Thoughts that you would be better off dead, or of hurting yourself in some way: 0 - not at all  PHQ-2 Score: 6  PHQ-9 Score: 15            Subjective:      Patient ID: Mal Ying is a 42 y o  female      Kermit Schaefer presents for follow up, she notes when her bladder is full her back hurts worse but improves after urination  Her back pain over all has improved, she did not go to PT  She does not have nearly as much abdominal firmness or pain since starting the lasix, her BNP did improve but her creatinine did worsen some, following with nephrology  She notes Wednesday and Thursday she had an urge to have a bowel movement but only passed rectal bleeding bright red but the blood clots that she passed are a maroon color  She has an appt this week with GI  Her  did obtain laxative for her which also helped and she was able to move her bowels  She has been constipated  Chest x-ray was reviewed which was normal, lumbar spine x-ray not yet read, she has not attended PT because she does not like being around people  She is also having difficulty sleeping  The following portions of the patient's history were reviewed and updated as appropriate: allergies, current medications, past family history, past medical history, past social history, past surgical history and problem list     Review of Systems   Constitutional: Negative  HENT: Negative  Respiratory: Negative for chest tightness, shortness of breath and wheezing  Cardiovascular: Negative for chest pain, palpitations and leg swelling  Gastrointestinal: Positive for abdominal pain, blood in stool and constipation  Negative for nausea and vomiting  Genitourinary: Positive for difficulty urinating  Musculoskeletal: Positive for back pain  Neurological: Negative for dizziness and light-headedness  Psychiatric/Behavioral: Positive for dysphoric mood  The patient is nervous/anxious  Objective:    /72   Pulse 79   Temp 97 5 °F (36 4 °C)   Ht 5' 6" (1 676 m)   Wt 120 kg (265 lb)   LMP 08/25/2021 (Approximate)   SpO2 99%   BMI 42 77 kg/m²      Physical Exam  Vitals and nursing note reviewed  Constitutional:       General: She is not in acute distress  Appearance: Normal appearance   She is not ill-appearing or diaphoretic  HENT:      Head: Normocephalic and atraumatic  Eyes:      Conjunctiva/sclera: Conjunctivae normal    Neck:      Vascular: No carotid bruit  Cardiovascular:      Rate and Rhythm: Normal rate and regular rhythm  Pulses: Normal pulses  Heart sounds: Normal heart sounds  No murmur heard  Pulmonary:      Effort: Pulmonary effort is normal  No respiratory distress  Breath sounds: Normal breath sounds  No wheezing, rhonchi or rales  Abdominal:      General: Abdomen is flat  Bowel sounds are normal  There is no distension  Palpations: Abdomen is soft  There is no mass  Tenderness: There is no abdominal tenderness  There is no right CVA tenderness, left CVA tenderness, guarding or rebound  Musculoskeletal:         General: No tenderness  Cervical back: Normal range of motion and neck supple  Right lower leg: No edema  Left lower leg: No edema  Lymphadenopathy:      Cervical: No cervical adenopathy  Neurological:      Mental Status: She is alert and oriented to person, place, and time  Psychiatric:         Mood and Affect: Mood normal          Behavior: Behavior normal          Thought Content:  Thought content normal          Judgment: Judgment normal

## 2021-09-16 ENCOUNTER — LAB (OUTPATIENT)
Dept: LAB | Facility: CLINIC | Age: 46
End: 2021-09-16
Payer: COMMERCIAL

## 2021-09-16 ENCOUNTER — CONSULT (OUTPATIENT)
Dept: GASTROENTEROLOGY | Facility: CLINIC | Age: 46
End: 2021-09-16
Payer: COMMERCIAL

## 2021-09-16 ENCOUNTER — TELEPHONE (OUTPATIENT)
Dept: GASTROENTEROLOGY | Facility: CLINIC | Age: 46
End: 2021-09-16

## 2021-09-16 VITALS
HEIGHT: 66 IN | BODY MASS INDEX: 42.17 KG/M2 | WEIGHT: 262.4 LBS | HEART RATE: 68 BPM | SYSTOLIC BLOOD PRESSURE: 118 MMHG | DIASTOLIC BLOOD PRESSURE: 62 MMHG | RESPIRATION RATE: 16 BRPM | TEMPERATURE: 96.3 F

## 2021-09-16 DIAGNOSIS — R10.84 GENERALIZED ABDOMINAL PAIN: ICD-10-CM

## 2021-09-16 DIAGNOSIS — R19.7 DIARRHEA, UNSPECIFIED TYPE: ICD-10-CM

## 2021-09-16 DIAGNOSIS — R19.8 ALTERNATING CONSTIPATION AND DIARRHEA: ICD-10-CM

## 2021-09-16 DIAGNOSIS — K21.9 GASTROESOPHAGEAL REFLUX DISEASE, UNSPECIFIED WHETHER ESOPHAGITIS PRESENT: ICD-10-CM

## 2021-09-16 DIAGNOSIS — R11.0 NAUSEA: ICD-10-CM

## 2021-09-16 DIAGNOSIS — D50.8 OTHER IRON DEFICIENCY ANEMIAS: ICD-10-CM

## 2021-09-16 DIAGNOSIS — R14.0 BLOATING: ICD-10-CM

## 2021-09-16 DIAGNOSIS — K92.1 BLOODY STOOL: Primary | ICD-10-CM

## 2021-09-16 LAB — IGA SERPL-MCNC: 251 MG/DL (ref 70–400)

## 2021-09-16 PROCEDURE — 83516 IMMUNOASSAY NONANTIBODY: CPT

## 2021-09-16 PROCEDURE — 82784 ASSAY IGA/IGD/IGG/IGM EACH: CPT

## 2021-09-16 PROCEDURE — 36415 COLL VENOUS BLD VENIPUNCTURE: CPT

## 2021-09-16 PROCEDURE — 99243 OFF/OP CNSLTJ NEW/EST LOW 30: CPT | Performed by: NURSE PRACTITIONER

## 2021-09-16 RX ORDER — POLYETHYLENE GLYCOL 3350 17 G/17G
POWDER, FOR SOLUTION ORAL
Qty: 238 G | Refills: 0 | Status: SHIPPED | OUTPATIENT
Start: 2021-09-16 | End: 2021-10-12 | Stop reason: ALTCHOICE

## 2021-09-16 RX ORDER — FAMOTIDINE 40 MG/1
40 TABLET, FILM COATED ORAL DAILY
Qty: 30 TABLET | Refills: 5 | Status: SHIPPED | OUTPATIENT
Start: 2021-09-16

## 2021-09-16 NOTE — PATIENT INSTRUCTIONS
Pt is scheduled with dr Franca Johnson at [facility] on 9/28/21 justa went over golytely in room with pt and she has folder with instructions  Patient is aware she will need a  to and from   She will get a call the day before with an exact time for arrival   Pt on brilinta I sent clearance to dr Jonell Duverney via epic

## 2021-09-16 NOTE — TELEPHONE ENCOUNTER
Our mutual patient is scheduled for procedure: EGD/COLON    On: 9/28/21     With: Dr Rachael Amaya is taking the following blood thinner: Augusto Villalta    Can this be stopped 4 days prior to the procedure?       Physician Approving clearance: ________________________

## 2021-09-17 LAB — TTG IGA SER-ACNC: <2 U/ML (ref 0–3)

## 2021-09-18 LAB — CRYOGLOB SER QL 1D COLD INC: NORMAL

## 2021-09-20 ENCOUNTER — LAB (OUTPATIENT)
Dept: LAB | Facility: CLINIC | Age: 46
End: 2021-09-20
Payer: COMMERCIAL

## 2021-09-20 DIAGNOSIS — R10.84 GENERALIZED ABDOMINAL PAIN: ICD-10-CM

## 2021-09-20 DIAGNOSIS — R11.0 NAUSEA: ICD-10-CM

## 2021-09-20 DIAGNOSIS — K21.9 GASTROESOPHAGEAL REFLUX DISEASE, UNSPECIFIED WHETHER ESOPHAGITIS PRESENT: ICD-10-CM

## 2021-09-20 DIAGNOSIS — R19.8 ALTERNATING CONSTIPATION AND DIARRHEA: ICD-10-CM

## 2021-09-20 PROCEDURE — 87338 HPYLORI STOOL AG IA: CPT

## 2021-09-21 ENCOUNTER — OFFICE VISIT (OUTPATIENT)
Dept: NEPHROLOGY | Facility: CLINIC | Age: 46
End: 2021-09-21
Payer: COMMERCIAL

## 2021-09-21 ENCOUNTER — OFFICE VISIT (OUTPATIENT)
Dept: CARDIOLOGY CLINIC | Facility: CLINIC | Age: 46
End: 2021-09-21
Payer: COMMERCIAL

## 2021-09-21 ENCOUNTER — REMOTE DEVICE CLINIC VISIT (OUTPATIENT)
Dept: CARDIOLOGY CLINIC | Facility: CLINIC | Age: 46
End: 2021-09-21
Payer: COMMERCIAL

## 2021-09-21 ENCOUNTER — APPOINTMENT (OUTPATIENT)
Dept: LAB | Facility: CLINIC | Age: 46
End: 2021-09-21
Payer: COMMERCIAL

## 2021-09-21 VITALS
BODY MASS INDEX: 42.11 KG/M2 | DIASTOLIC BLOOD PRESSURE: 60 MMHG | HEIGHT: 66 IN | SYSTOLIC BLOOD PRESSURE: 90 MMHG | WEIGHT: 262 LBS | HEART RATE: 60 BPM

## 2021-09-21 VITALS
HEIGHT: 66 IN | BODY MASS INDEX: 41.95 KG/M2 | DIASTOLIC BLOOD PRESSURE: 62 MMHG | WEIGHT: 261 LBS | OXYGEN SATURATION: 99 % | SYSTOLIC BLOOD PRESSURE: 88 MMHG | HEART RATE: 62 BPM

## 2021-09-21 DIAGNOSIS — R80.1 PERSISTENT PROTEINURIA: ICD-10-CM

## 2021-09-21 DIAGNOSIS — D50.9 IRON DEFICIENCY ANEMIA, UNSPECIFIED IRON DEFICIENCY ANEMIA TYPE: ICD-10-CM

## 2021-09-21 DIAGNOSIS — N11.9 CHRONIC TUBULO-INTERSTITIAL NEPHRITIS: ICD-10-CM

## 2021-09-21 DIAGNOSIS — I50.9 CONGESTIVE HEART FAILURE, UNSPECIFIED HF CHRONICITY, UNSPECIFIED HEART FAILURE TYPE (HCC): ICD-10-CM

## 2021-09-21 DIAGNOSIS — I95.9 HYPOTENSION, UNSPECIFIED HYPOTENSION TYPE: ICD-10-CM

## 2021-09-21 DIAGNOSIS — E87.70 HYPERVOLEMIA, UNSPECIFIED HYPERVOLEMIA TYPE: ICD-10-CM

## 2021-09-21 DIAGNOSIS — I25.10 CORONARY ARTERY DISEASE INVOLVING NATIVE CORONARY ARTERY OF NATIVE HEART, UNSPECIFIED WHETHER ANGINA PRESENT: Primary | ICD-10-CM

## 2021-09-21 DIAGNOSIS — N18.4 CKD (CHRONIC KIDNEY DISEASE) STAGE 4, GFR 15-29 ML/MIN (HCC): Primary | ICD-10-CM

## 2021-09-21 DIAGNOSIS — I42.2 HYPERTROPHIC CARDIOMYOPATHY (HCC): ICD-10-CM

## 2021-09-21 DIAGNOSIS — Z45.010 ENCOUNTER FOR CHECKING AND TESTING OF CARDIAC PACEMAKER PULSE GENERATOR (BATTERY): ICD-10-CM

## 2021-09-21 DIAGNOSIS — K62.5 RECTAL BLEEDING: ICD-10-CM

## 2021-09-21 DIAGNOSIS — I49.5 SICK SINUS SYNDROME (HCC): Primary | ICD-10-CM

## 2021-09-21 DIAGNOSIS — I25.10 CORONARY ARTERY DISEASE INVOLVING NATIVE CORONARY ARTERY OF NATIVE HEART, UNSPECIFIED WHETHER ANGINA PRESENT: ICD-10-CM

## 2021-09-21 LAB
ANION GAP SERPL CALCULATED.3IONS-SCNC: 6 MMOL/L (ref 4–13)
BASOPHILS # BLD AUTO: 0.1 THOUSANDS/ΜL (ref 0–0.1)
BASOPHILS NFR BLD AUTO: 1 % (ref 0–1)
BUN SERPL-MCNC: 45 MG/DL (ref 5–25)
CALCIUM SERPL-MCNC: 9.7 MG/DL (ref 8.3–10.1)
CHLORIDE SERPL-SCNC: 111 MMOL/L (ref 100–108)
CO2 SERPL-SCNC: 20 MMOL/L (ref 21–32)
CREAT SERPL-MCNC: 3.16 MG/DL (ref 0.6–1.3)
EOSINOPHIL # BLD AUTO: 0.2 THOUSAND/ΜL (ref 0–0.61)
EOSINOPHIL NFR BLD AUTO: 1 % (ref 0–6)
ERYTHROCYTE [DISTWIDTH] IN BLOOD BY AUTOMATED COUNT: 19.6 % (ref 11.6–15.1)
GFR SERPL CREATININE-BSD FRML MDRD: 17 ML/MIN/1.73SQ M
GLUCOSE SERPL-MCNC: 90 MG/DL (ref 65–140)
H PYLORI AG STL QL IA: NEGATIVE
HCT VFR BLD AUTO: 44.9 % (ref 34.8–46.1)
HGB BLD-MCNC: 14.3 G/DL (ref 11.5–15.4)
IMM GRANULOCYTES # BLD AUTO: 0.12 THOUSAND/UL (ref 0–0.2)
IMM GRANULOCYTES NFR BLD AUTO: 1 % (ref 0–2)
LYMPHOCYTES # BLD AUTO: 2.78 THOUSANDS/ΜL (ref 0.6–4.47)
LYMPHOCYTES NFR BLD AUTO: 19 % (ref 14–44)
MCH RBC QN AUTO: 27.3 PG (ref 26.8–34.3)
MCHC RBC AUTO-ENTMCNC: 31.8 G/DL (ref 31.4–37.4)
MCV RBC AUTO: 86 FL (ref 82–98)
MONOCYTES # BLD AUTO: 1.08 THOUSAND/ΜL (ref 0.17–1.22)
MONOCYTES NFR BLD AUTO: 7 % (ref 4–12)
NEUTROPHILS # BLD AUTO: 10.73 THOUSANDS/ΜL (ref 1.85–7.62)
NEUTS SEG NFR BLD AUTO: 71 % (ref 43–75)
NRBC BLD AUTO-RTO: 0 /100 WBCS
PLATELET # BLD AUTO: 370 THOUSANDS/UL (ref 149–390)
PMV BLD AUTO: 10.7 FL (ref 8.9–12.7)
POTASSIUM SERPL-SCNC: 4.8 MMOL/L (ref 3.5–5.3)
RBC # BLD AUTO: 5.24 MILLION/UL (ref 3.81–5.12)
SODIUM SERPL-SCNC: 137 MMOL/L (ref 136–145)
WBC # BLD AUTO: 15.01 THOUSAND/UL (ref 4.31–10.16)

## 2021-09-21 PROCEDURE — 85025 COMPLETE CBC W/AUTO DIFF WBC: CPT | Performed by: NURSE PRACTITIONER

## 2021-09-21 PROCEDURE — 99214 OFFICE O/P EST MOD 30 MIN: CPT | Performed by: NURSE PRACTITIONER

## 2021-09-21 PROCEDURE — 93296 REM INTERROG EVL PM/IDS: CPT | Performed by: INTERNAL MEDICINE

## 2021-09-21 PROCEDURE — 80048 BASIC METABOLIC PNL TOTAL CA: CPT | Performed by: NURSE PRACTITIONER

## 2021-09-21 PROCEDURE — 93294 REM INTERROG EVL PM/LDLS PM: CPT | Performed by: INTERNAL MEDICINE

## 2021-09-21 PROCEDURE — 36415 COLL VENOUS BLD VENIPUNCTURE: CPT | Performed by: NURSE PRACTITIONER

## 2021-09-21 RX ORDER — METOPROLOL TARTRATE 50 MG/1
50 TABLET, FILM COATED ORAL 2 TIMES DAILY
Qty: 270 TABLET | Refills: 1 | Status: SHIPPED | OUTPATIENT
Start: 2021-09-21 | End: 2022-03-15 | Stop reason: SDUPTHER

## 2021-09-21 RX ORDER — FUROSEMIDE 40 MG/1
TABLET ORAL
Qty: 30 TABLET | Refills: 6 | Status: SHIPPED | OUTPATIENT
Start: 2021-09-21 | End: 2021-10-12 | Stop reason: ALTCHOICE

## 2021-09-21 RX ORDER — LISINOPRIL 2.5 MG/1
2.5 TABLET ORAL DAILY
Qty: 90 TABLET | Refills: 1 | Status: SHIPPED | OUTPATIENT
Start: 2021-09-21 | End: 2021-09-23

## 2021-09-21 NOTE — ASSESSMENT & PLAN NOTE
Pt reports dark stools and bleeding  She is reportedly scheduled for a colonoscopy and EGD in the near future    Script given for CBC

## 2021-09-21 NOTE — PATIENT INSTRUCTIONS
Decrease metoprolol 50 mg BID  Decrease lisinopril to 2 5 mg daily- sent to pharmacy   If your blood pressure still low at home with systolic (Top number) less than 90 consistently, stop lisinopril     BMP Monday    Call us with blood pressure readings on Thursday [address]  66   Progress Note Bernadene Fraction Day 1933, 82 y o  female MRN: [id number]  Unit/Bed#: -01 Encounter: [id number]  Primary Care Provider: Elizabet Escudero MD   Date and time admitted to hospital: 8/25/2021  4:58 PM    Partial small bowel obstruction [facility])  Assessment & Plan  Patient removed NG tube overnight  Denies vomiting or abdominal pain  General surgery recommend clear liquid diet  Will trial on clear liquid diet  * Sepsis ([address] )  Assessment & Plan    Results from last 7 days   Lab Units 08/25/21 2044 08/25/21  1739   BLOOD CULTURE  Escherichia coli ESBL*  --    GRAM STAIN RESULT  Gram negative rods*  --    URINE CULTURE   --  >100,000 cfu/ml Escherichia coli ESBL*     Patient meets sepsis criteria on admission  Sepsis likely secondary to UTI  Blood culture and urine culture growing ESBL E coli  ID consult, appreciate recommendation  She received IV meropenem for 3 days and subsequently transition to Ertapenem on 8/30 by ID guidance  Total duration anticipated for a total duration of 7 days or as per clinical situation  Carbapenem duration: #7    Monitor WBC/hemodynamics    UTI (urinary tract infection)  Assessment & Plan  Urine culture growing ESBL E coli  She received 3 days of IV meropenem was subsequently switched to ertapenem on 8/30  Total carbapenem duration: #7 days  Infectious disease following, appreciate recommendation  Sacral decubitus ulcer  Assessment & Plan  Sacral decubitus ulcer reported by nursing staff  Appears to be stage 2 on exam  Wound care management as per nursing protocol     - inpatient wound care nurse following  - continue nursing intervention to redistribute pressure and minimize tissue damage   - daily wound assessment  Metabolic alkalosis  Assessment & Plan  HCO of 34  Most likely due to NG tube drainage with consequential loss of gastric HCl  SpO2 of 98% on 2L NC O2    VBG revealed alkalemia of 7 4    Hypernatremia  Assessment & Plan  Improved  Appreciate nephrology input  CKD (chronic kidney disease)  Assessment & Plan  Results from last 7 days   Lab Units 09/02/21  0600 09/01/21  0519 08/31/21  0607 08/30/21  0707 08/29/21  0548 08/28/21 2140 08/28/21  0458 08/27/21  0613   SODIUM mmol/L 139 138 146* 147* 152*  --  150* 146*   POTASSIUM mmol/L 2 8* 3 2* 3 4* 3 8 3 2* 3 3* 3 2* 3 1*   CHLORIDE mmol/L 98 97 102 105 106  --  103 99   CO2 mmol/L 34* 33 36* 34* 37*  --  37* 36*   ANION GAP mmol/L 7 8 8 8 9  --  10 11   BUN mg/dL 13 13 16 19 28*  --  41* 41*   CREATININE mg/dL 0 72 0 78 0 83 0 88 0 96  --  1 29* 1 23*   CALCIUM mg/dL 8 6 8 6 9 0 9 5 9 6  --  9 4 8 9   EGFR ml/min/1 73sq m 76 69 64 60 54  --  38 40   GLUCOSE RANDOM mg/dL 112 107 129 145* 124  --  97 147*       Creatinine is greater than her baseline of 1 4-1 5  Currently creatinine at baseline  Monitor BMP daily    Hypokalemia  Assessment & Plan  Replete accordingly    Most likely due to metabolic alkalosis in setting of NG tube drainage - expected to improve as NG tube has been that removed    Results from last 7 days   Lab Units 09/01/21  0519 08/31/21  0607 08/30/21  0707 08/29/21  0548 08/28/21  2140 08/28/21  0458 08/27/21  0613 08/25/21  1734   SODIUM mmol/L 138 146* 147* 152*  --  150* 146* 138   POTASSIUM mmol/L 3 2* 3 4* 3 8 3 2* 3 3* 3 2* 3 1* 3 9   CHLORIDE mmol/L 97 102 105 106  --  103 99 93*   CO2 mmol/L 33 36* 34* 37*  --  37* 36* 28   ANION GAP mmol/L 8 8 8 9  --  10 11 17*   BUN mg/dL 13 16 19 28*  --  41* 41* 71*   CREATININE mg/dL 0 78 0 83 0 88 0 96  --  1 29* 1 23* 1 75*   CALCIUM mg/dL 8 6 9 0 9 5 9 6  --  9 4 8 9 9 7   ALBUMIN g/dL  --   --   --   --   --   --   --  3 9   TOTAL BILIRUBIN mg/dL  --   --   --   --   --   --   --  0 71   ALK PHOS U/L  --   --   --   --   --   --   --  92 4   ALT U/L  --   --   --   --   --   --   --  27   AST U/L  --   --   --   --   --   --   --  26   EGFR ml/min/1 73sq m 69 64 60 54  --  38 40 26   GLUCOSE RANDOM mg/dL 107 129 145* 124  --  97 147* 222*         Hypertension  Assessment & Plan  · Amlodipine and metoprolol with holding parameters  · Will hold Lasix currently in the setting of sepsis  Blood pressure 142/75, pulse 89, temperature 98 1 °F (36 7 °C), temperature source Tympanic, resp  rate 19, height 5' (1 524 m), weight 92 6 kg (204 lb 2 3 oz), SpO2 97 %  Hyperlipidemia  Assessment & Plan  Continue Lipitor for now    Chronic diastolic congestive heart failure (HCC)  Assessment & Plan  Wt Readings from Last 3 Encounters:   08/25/21 92 6 kg (204 lb 2 3 oz)   08/22/21 95 3 kg (210 lb 1 6 oz)   02/19/20 87 5 kg (193 lb)     Currently lasix on hold in light of sepsis and reduced p o  Intake  I's and O's, daily weight  Cardiology on board  Input appreciated    Diabetes mellitus [facility])  Assessment & Plan  Lab Results   Component Value Date    HGBA1C 6 7 (H) 08/19/2021       Recent Labs     08/30/21  0004 08/30/21  0651 08/30/21  1135 08/30/21  1453   POCGLU 136 149* 145* 148*       Blood Sugar Average: Last 72 hrs:  (P) 138 6     Currently on D5W  Accu-Cheks every 6 hourly and sliding scale insulin  Hypoglycemic protocol  Constipation  Assessment & Plan  Chronic  Hold stool softener      Ambulatory dysfunction  Assessment & Plan  PT and OT consult, recommended acute post rehab  Frequent falls  Assessment & Plan  PT and OT evaluations, recommend acute post rehab  Family in agreement    Age-related cognitive decline  Assessment & Plan  Supportive care  VTE Pharmacologic Prophylaxis:   VTE Score: 5 Moderate Risk (Score 3-4) - Pharmacological DVT Prophylaxis Contraindicated  Sequential Compression Devices Ordered  Mechanical VTE Prophylaxis in Place: Yes    Patient Centered Rounds: I have performed bedside rounds with nursing staff today      Discussions with Specialists or Other Care Team Provider: ID    Education and Discussions with Family / Patient: Patient    Current Length of Stay: 6 day(s)    Current Patient Status: Inpatient     Discharge Plan / Estimated Discharge Date: Anticipate discharge in 48 hrs to TBD    Code Status: Level 3 - DNAR and DNI      Subjective:   Patient seen and examined at bedside  Denies abdominal pain, or bowel movement  Was able to tolerate clear liquid diet yesterday  Will resume surgical soft tomorrow morning  Objective:     Vitals:   Temp (24hrs), Av 6 °F (36 4 °C), Min:96 °F (35 6 °C), Max:98 8 °F (37 1 °C)    Temp:  [96 °F (35 6 °C)-98 8 °F (37 1 °C)] 97 9 °F (36 6 °C)  HR:  [72-79] 79  Resp:  [16-19] 16  BP: (118-125)/(50-68) 118/60  SpO2:  [94 %-99 %] 97 %  Body mass index is 39 87 kg/m²  Input and Output Summary (last 24 hours): Intake/Output Summary (Last 24 hours) at 2021 1821  Last data filed at 2021 1446  Gross per 24 hour   Intake --   Output 1450 ml   Net -1450 ml       Physical Exam:     Physical Exam  Vitals reviewed  Constitutional:       General: She is not in acute distress  Appearance: She is not toxic-appearing  HENT:      Head: Normocephalic  Cardiovascular:      Rate and Rhythm: Normal rate and regular rhythm  Pulses: Normal pulses  Pulmonary:      Effort: No respiratory distress  Breath sounds: Normal breath sounds  No wheezing  Abdominal:      General: Bowel sounds are normal       Palpations: Abdomen is soft  Tenderness: There is no abdominal tenderness  Musculoskeletal:      Right lower leg: No edema  Left lower leg: No edema  Skin:     Coloration: Skin is not jaundiced or pale  Neurological:      General: No focal deficit present  Mental Status: She is alert and oriented to person, place, and time           Additional Data:     Labs:  Results from last 7 days   Lab Units 21  0519   WBC Thousand/uL 15 33*   HEMOGLOBIN g/dL 11 5   HEMATOCRIT % 37 2   PLATELETS Thousands/uL 372   NEUTROS PCT % 77*   LYMPHS PCT % 11*   MONOS PCT % 7   EOS PCT % 4     Results from last 7 days   Lab Units 09/02/21  0600   SODIUM mmol/L 139   POTASSIUM mmol/L 2 8*   CHLORIDE mmol/L 98   CO2 mmol/L 34*   BUN mg/dL 13   CREATININE mg/dL 0 72   ANION GAP mmol/L 7   CALCIUM mg/dL 8 6   GLUCOSE RANDOM mg/dL 112         Results from last 7 days   Lab Units 09/02/21  1711 09/02/21  1126 09/02/21  0650 09/01/21  2327 09/01/21  1555 09/01/21  1122 09/01/21  0541 09/01/21  0025 08/31/21  2103 08/31/21  1554 08/31/21  1115 08/31/21  0503   POC GLUCOSE mg/dl 134 169* 140 122 123 121 103 130 132 141* 139 118               Imaging: Reviewed radiology reports from this admission including: xray(s)    Recent Cultures (last 7 days):           Lines/Drains:  Invasive Devices     Peripheral Intravenous Line            Peripheral IV 09/01/21 Dorsal (posterior); Left Forearm 1 day          Drain            Urethral Catheter Non-latex 16 Fr  8 days                Telemetry:        Last 24 Hours Medication List:   Current Facility-Administered Medications   Medication Dose Route Frequency Provider Last Rate    acetaminophen  650 mg Oral Q6H PRN Uday Mcgowan MD      aluminum-magnesium hydroxide-simethicone  30 mL Oral Q6H PRN Uday Mcgowan MD      atorvastatin  10 mg Oral Daily With Juan Cabrales MD      dextrose 5 % and sodium chloride 0 45 % with KCl 20 mEq/L  75 mL/hr Intravenous Continuous Vida Palma MD 75 mL/hr (09/02/21 1012)    ertapenem  1,000 mg Intravenous Q24H Nereida Garcia MD 1,000 mg (09/01/21 2148)    heparin (porcine)  5,000 Units Subcutaneous Q8H [facility] Vida Palma MD      insulin lispro  1-5 Units Subcutaneous Q6H Donell Aguilera MD      levothyroxine  50 mcg Oral Early Morning Uday Mcgowan MD      magnesium hydroxide  30 mL Oral Daily PRN Uday Mcgowan MD      metoprolol tartrate  25 mg Oral BID Uday Mcgowan MD      nystatin   Topical BID Uday Mcgowan MD      ondansetron  4 mg Intravenous Q4H PRN MD Valdo Cuba oxyCODONE  2 5 mg Oral Q4H PRN Erika Bravo MD      pantoprazole  40 mg Intravenous Q12H 528 KASIE Yañez          Today, Patient Was Seen By: Sonja Olivares MD    ** Please Note: This note has been constructed using a voice recognition system   **
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
## 2021-09-21 NOTE — ASSESSMENT & PLAN NOTE
1  CAD s/p PCI to RCA, rPDA  · Inferior STEMI (3/23/21) --> initial PCI to [facility]  · Recurrent chest pain (3/23/21) --> GREG to RPDA  · Readmission, inferior STEMI (4/2/21) --> patent stents, no change on cath  Continue asa, Brilinta, statin, BB

## 2021-09-21 NOTE — PATIENT INSTRUCTIONS
Lasix 60 mg (take 3 tablets) for the next 3 days or take one 40 mg and one 20 mg tablet to equal 60 mg  Then reduce to 40 mg daily  Labs CBC--today or tomorrow  BMP next Monday or Tuesday

## 2021-09-21 NOTE — PROGRESS NOTES
Patient ID: Emily Gomez is a 42 y o  female  Plan:      Rectal bleeding  Pt reports dark stools and bleeding  She is reportedly scheduled for a colonoscopy and EGD in the near future  Script given for CBC    CHF (congestive heart failure) ([facility])  Wt Readings from Last 3 Encounters:   09/21/21 118 kg (261 lb)   09/21/21 119 kg (262 lb)   09/16/21 119 kg (262 lb 6 4 oz)     Mildly volume overloaded based on recently elevated BNP and symptoms  Given lasix by PCP and she felt better on the higher dose  Will give 60 mg daily x 3 days and then reduce to 40 mg daily  BMP early next week        CAD (coronary artery disease)  1  CAD s/p PCI to RCA, rPDA  · Inferior STEMI (3/23/21) --> initial PCI to [facility]  · Recurrent chest pain (3/23/21) --> GREG to RPDA  · Readmission, inferior STEMI (4/2/21) --> patent stents, no change on cath  Continue asa, Brilinta, statin, BB    Hypertrophic cardiomyopathy ([address] )  By cardiac MRI  No further testing at this time  Will follow closely as an outpatient        Follow up Plan/Summary Comments:  Briseida Cornejo was recently prescribed Lasix by her PCP for an elevated BNP and exertional dyspnea  Her symptoms seemed to improve temporarily when taking 40 mg daily, however she began experiencing more shortness of breath when the dose was reduced to 20 mg     I suspect that given her worsening renal function, she will need an increased dose in order for the Lasix to be effective  I have increased her Lasix to 60 mg daily for the next 3 days and then she will reduce to 40 mg daily  She will have a BMP checked to monitor renal function and electrolytes early next week  This plan was discussed with JALIL Pérez of Nephrology via [facility] Text  Briseida Cornejo is planning for an EGD and colonoscopy for evaluation of some rectal bleeding  I did ask her to have a CBC done as it has been a few weeks since this was checked      Following her appointment today, she was reportedly hypotensive and a little dizzy at her nephrology appointment  There was discussion with the Nephrology nurse practitioner and some of her medications will be adjusted  I asked her to continue to follow a low-sodium diet and monitor her weight daily  Follow-up in our office in 3 months, sooner if needed  HPI:    I had the pleasure of seeing Frank Dawson in the office today for a follow-up visit  She is accompanied by her son  Frank Dawson is a pleasant 30-year-old female who unfortunately had an MI 03/2021  She underwent cardiac catheterization and stenting on several occasions during her hospitalization  She was noted to have episodes of nonsustained V-tach  After discharge, she had episodes of dizziness and palpitations, thus an event monitor was placed  She was found to have sinus pauses and underwent permanent pacemaker 05/25/2021  From a cardiac standpoint, she notes that over the past several weeks she has been feeling more tired and has been experiencing exertional dyspnea  Her PCP ordered Lasix for these symptoms and she did note an improvement initially, but with the lower dose of Lasix her symptoms returned  Her weight 09/01/2021 was 268 lb  With Lasix 40 mg daily she dropped to 262 lb on 09/10  Her weight yesterday was 261 5 on Lasix 20 mg daily  She notes some mild ankle edema  She denies any dizziness or lightheadedness  She reports some rectal bleeding and was planning for a colonoscopy an EGD in the near future  She has been followed by our renal service and is planning for a renal biopsy in the near term due to worsening renal function  Review of Systems   10  point ROS  was otherwise non pertinent or negative except as per HPI or as below  Gait: Normal      Most recent or relevant cardiac/vascular testing:      EKG 4/2/2021 SR, LAE, prior IWMI, PRWP     Holter 4/22/2021 SR, avg rate 88  Frequent PVCs, 8-beat run NSVT     Cath 4/2/2021 Distal LAD: There was a 60 % stenosis    Mid circumflex: There was a 50 % stenosis  Distal RCA: There was a 10 % stenosis at the site of a prior stent  Right PDA: There was a 0 % stenosis at the site of a prior stent  Echo 3/25/2021 60%, severe hypokinesis of the basal-mid inferior wall  Findings consistent with HCM  Mild-mod MR  Cardiac MRI 3/29/2021 severe LVH, EF 44%  Small circumferential pericardial effusion  Mild MR  Findings suggestive of underlying hypertrophic cardiomyopathy with ischemic scarring of the inferior and inferolateral walls  Objective:     BP 90/60   Pulse 60   Ht 5' 6" (1 676 m)   Wt 119 kg (262 lb)   LMP 08/25/2021 (Approximate)   BMI 42 29 kg/m²     PHYSICAL EXAM:    General:  Normal appearance, no acute distress  Eyes:  Anicteric  Oral mucosa:  Wearing a mask  Neck:  No JVD  Carotid upstrokes are brisk without bruits  No masses  Chest:  Clear to auscultation   Cardiac:  No palpable PMI  Normal S1 and S2  No murmur gallop or rub  Abdomen:  Soft and nontender  No palpable organomegaly or aortic enlargement  Extremities:  Trace ankle edema  Musculoskeletal:  Symmetric  Vascular:  Pedal pulses are intact  Neuro:  Grossly symmetric  Psych:  Alert and oriented x3      No Known Allergies    Current Outpatient Medications:     albuterol (PROVENTIL HFA,VENTOLIN HFA) 90 mcg/act inhaler, Inhale 2 puffs every 4 (four) hours as needed for wheezing or shortness of breath, Disp: 1 Inhaler, Rfl: 0    ARIPiprazole (ABILIFY) 10 mg tablet, Take 1 tablet (10 mg total) by mouth daily, Disp: 90 tablet, Rfl: 2    aspirin 81 mg chewable tablet, Chew 1 tablet (81 mg total) daily, Disp: 90 tablet, Rfl: 0    atorvastatin (LIPITOR) 80 mg tablet, Take 1 tablet (80 mg total) by mouth every evening, Disp: 90 tablet, Rfl: 0    bisacodyl (DULCOLAX) 5 mg EC tablet, Take for prep as directed, Disp: 1 tablet, Rfl: 0    Diclofenac Sodium (VOLTAREN) 1 %, Apply 2 g topically 4 (four) times a day, Disp: 150 g, Rfl: 2    ergocalciferol (VITAMIN D2) 50,000 units, Take 1 capsule (50,000 Units total) by mouth once a week, Disp: 12 capsule, Rfl: 1    famotidine (PEPCID) 40 MG tablet, Take 1 tablet (40 mg total) by mouth daily, Disp: 30 tablet, Rfl: 5    furosemide (LASIX) 40 mg tablet, One tablet daily or as directed, Disp: 30 tablet, Rfl: 6    lisinopril (ZESTRIL) 5 mg tablet, Take 1 tablet (5 mg total) by mouth daily, Disp: 90 tablet, Rfl: 1    LORazepam (ATIVAN) 0 5 mg tablet, Take 1 tablet (0 5 mg total) by mouth 2 (two) times a day as needed for anxiety, Disp: 60 tablet, Rfl: 0    metoprolol tartrate (LOPRESSOR) 50 mg tablet, Take 1 5 tablets (75 mg total) by mouth 2 (two) times a day, Disp: 270 tablet, Rfl: 1    nitroglycerin (NITROSTAT) 0 4 mg SL tablet, Place 1 tablet (0 4 mg total) under the tongue every 5 (five) minutes as needed for chest pain, Disp: 60 tablet, Rfl: 0    polyethylene glycol (GLYCOLAX) 17 GM/SCOOP powder, Take as instructed for prep, Disp: 238 g, Rfl: 0    sertraline (ZOLOFT) 100 mg tablet, Take 1 tablet (100 mg total) by mouth daily, Disp: 30 tablet, Rfl: 5    ticagrelor (BRILINTA) 90 MG, Take 1 tablet (90 mg total) by mouth every 12 (twelve) hours, Disp: 60 tablet, Rfl: 5    methocarbamol (ROBAXIN) 500 mg tablet, Take 1 tablet (500 mg total) by mouth 2 (two) times a day, Disp: 30 tablet, Rfl: 0  Past Medical History:   Diagnosis Date    [facility]     Anxiety     CAD (coronary artery disease)     GREG mid RCA and GREG right PDA 3/25/2021    Cardiomyopathy [facility]     CHF [facility]congest[facility] failure) ([facility])     Chronic kidney disease     COPD (chronic obstructive pulmonary disease) ([facility])     scheduled for sleep apnea test soon after pacemaker implant    Depression     History of chemotherapy     non hodgkins lymphoma 2008    Hypertension     Lymphoma, non-Hodgkin's (HCC)     Myocardial infarction (HCC)     SSS (sick sinus syndrome) ([facility])     s/p medtronic dual chamber pacemaker 5/25/2021    Tobacco abuse     Wears glasses      Past Surgical History:   Procedure Laterality Date    CARDIAC PACEMAKER PLACEMENT Left 2021    Procedure: DUAL LEAD PACEMAKER IMPLANT;  Surgeon: Avila Whitfield MD;  Location: [facility] MAIN OR;  Service: Cardiology    CAROTID STENT       SECTION      CORONARY ANGIOPLASTY WITH STENT PLACEMENT  2021    GREG mid RCA and GREG right PDA    DENTAL SURGERY         CMP:   Lab Results   Component Value Date    K 4 4 2021     (H) 2021    CO2 19 (L) 2021    BUN 28 (H) 2021    CREATININE 2 02 (H) 2021    EGFR 29 2021     Lipid Profile:    Lab Results   Component Value Date    TRIG 338 (H) 2021    HDL 32 (L) 2021         Social History     Tobacco Use   Smoking Status Current Every Day Smoker    Packs/day: 1 00    Types: Cigarettes    Last attempt to quit: 3/29/2021    Years since quittin 4   Smokeless Tobacco Current User

## 2021-09-21 NOTE — ASSESSMENT & PLAN NOTE
Wt Readings from Last 3 Encounters:   09/21/21 118 kg (261 lb)   09/21/21 119 kg (262 lb)   09/16/21 119 kg (262 lb 6 4 oz)     Mildly volume overloaded based on recently elevated BNP and symptoms  Given lasix by PCP and she felt better on the higher dose  Will give 60 mg daily x 3 days and then reduce to 40 mg daily  BMP early next week

## 2021-09-22 ENCOUNTER — APPOINTMENT (OUTPATIENT)
Dept: LAB | Facility: HOSPITAL | Age: 46
End: 2021-09-22
Payer: COMMERCIAL

## 2021-09-22 DIAGNOSIS — N17.9 AKI (ACUTE KIDNEY INJURY) (HCC): ICD-10-CM

## 2021-09-22 LAB
ANION GAP SERPL CALCULATED.3IONS-SCNC: 9 MMOL/L (ref 4–13)
BUN SERPL-MCNC: 44 MG/DL (ref 7–25)
CALCIUM SERPL-MCNC: 9.6 MG/DL (ref 8.6–10.5)
CHLORIDE SERPL-SCNC: 107 MMOL/L (ref 98–107)
CO2 SERPL-SCNC: 19 MMOL/L (ref 21–31)
CREAT SERPL-MCNC: 2.9 MG/DL (ref 0.6–1.2)
GFR SERPL CREATININE-BSD FRML MDRD: 19 ML/MIN/1.73SQ M
GLUCOSE P FAST SERPL-MCNC: 97 MG/DL (ref 65–99)
POTASSIUM SERPL-SCNC: 4.8 MMOL/L (ref 3.5–5.5)
SODIUM SERPL-SCNC: 135 MMOL/L (ref 134–143)

## 2021-09-22 PROCEDURE — 36415 COLL VENOUS BLD VENIPUNCTURE: CPT

## 2021-09-22 PROCEDURE — 80048 BASIC METABOLIC PNL TOTAL CA: CPT

## 2021-09-22 NOTE — RESULT ENCOUNTER NOTE
Good afternoon Elise,    Your H pylori was test was negative, good news! I will see you back after your procedures  Feel free to reach out to me with any questions or concerns    Thank you,  [id number] [facility]

## 2021-09-27 ENCOUNTER — TELEPHONE (OUTPATIENT)
Dept: SURGERY | Facility: HOSPITAL | Age: 46
End: 2021-09-27

## 2021-09-28 ENCOUNTER — ANESTHESIA (OUTPATIENT)
Dept: GASTROENTEROLOGY | Facility: HOSPITAL | Age: 46
End: 2021-09-28

## 2021-09-28 ENCOUNTER — ANESTHESIA EVENT (OUTPATIENT)
Dept: GASTROENTEROLOGY | Facility: HOSPITAL | Age: 46
End: 2021-09-28

## 2021-09-28 ENCOUNTER — HOSPITAL ENCOUNTER (OUTPATIENT)
Dept: GASTROENTEROLOGY | Facility: HOSPITAL | Age: 46
Setting detail: OUTPATIENT SURGERY
Discharge: HOME | End: 2021-09-28
Payer: COMMERCIAL

## 2021-09-28 VITALS
DIASTOLIC BLOOD PRESSURE: 50 MMHG | TEMPERATURE: 98 F | OXYGEN SATURATION: 98 % | SYSTOLIC BLOOD PRESSURE: 100 MMHG | WEIGHT: 261 LBS | HEART RATE: 88 BPM | BODY MASS INDEX: 41.95 KG/M2 | HEIGHT: 66 IN | RESPIRATION RATE: 20 BRPM

## 2021-09-28 DIAGNOSIS — R10.84 GENERALIZED ABDOMINAL PAIN: ICD-10-CM

## 2021-09-28 DIAGNOSIS — K20.90 ESOPHAGITIS: Primary | ICD-10-CM

## 2021-09-28 DIAGNOSIS — D50.8 OTHER IRON DEFICIENCY ANEMIAS: ICD-10-CM

## 2021-09-28 DIAGNOSIS — K21.9 GASTROESOPHAGEAL REFLUX DISEASE, UNSPECIFIED WHETHER ESOPHAGITIS PRESENT: ICD-10-CM

## 2021-09-28 DIAGNOSIS — R11.0 NAUSEA: ICD-10-CM

## 2021-09-28 DIAGNOSIS — R19.8 ALTERNATING CONSTIPATION AND DIARRHEA: ICD-10-CM

## 2021-09-28 DIAGNOSIS — K92.1 BLOODY STOOL: ICD-10-CM

## 2021-09-28 LAB
EXT PREGNANCY TEST URINE: NEGATIVE
EXT. CONTROL: NORMAL

## 2021-09-28 PROCEDURE — 88305 TISSUE EXAM BY PATHOLOGIST: CPT | Performed by: PATHOLOGY

## 2021-09-28 PROCEDURE — 81025 URINE PREGNANCY TEST: CPT | Performed by: STUDENT IN AN ORGANIZED HEALTH CARE EDUCATION/TRAINING PROGRAM

## 2021-09-28 PROCEDURE — 45380 COLONOSCOPY AND BIOPSY: CPT | Performed by: INTERNAL MEDICINE

## 2021-09-28 PROCEDURE — 43239 EGD BIOPSY SINGLE/MULTIPLE: CPT | Performed by: INTERNAL MEDICINE

## 2021-09-28 RX ORDER — PROPOFOL 10 MG/ML
INJECTION, EMULSION INTRAVENOUS CONTINUOUS PRN
Status: DISCONTINUED | OUTPATIENT
Start: 2021-09-28 | End: 2021-09-28

## 2021-09-28 RX ORDER — PROPOFOL 10 MG/ML
INJECTION, EMULSION INTRAVENOUS AS NEEDED
Status: DISCONTINUED | OUTPATIENT
Start: 2021-09-28 | End: 2021-09-28

## 2021-09-28 RX ORDER — MIDAZOLAM HYDROCHLORIDE 2 MG/2ML
INJECTION, SOLUTION INTRAMUSCULAR; INTRAVENOUS AS NEEDED
Status: DISCONTINUED | OUTPATIENT
Start: 2021-09-28 | End: 2021-09-28

## 2021-09-28 RX ORDER — METOPROLOL TARTRATE 5 MG/5ML
INJECTION INTRAVENOUS AS NEEDED
Status: DISCONTINUED | OUTPATIENT
Start: 2021-09-28 | End: 2021-09-28

## 2021-09-28 RX ORDER — LIDOCAINE HYDROCHLORIDE 20 MG/ML
INJECTION, SOLUTION EPIDURAL; INFILTRATION; INTRACAUDAL; PERINEURAL AS NEEDED
Status: DISCONTINUED | OUTPATIENT
Start: 2021-09-28 | End: 2021-09-28

## 2021-09-28 RX ORDER — GLYCOPYRROLATE 0.2 MG/ML
INJECTION INTRAMUSCULAR; INTRAVENOUS AS NEEDED
Status: DISCONTINUED | OUTPATIENT
Start: 2021-09-28 | End: 2021-09-28

## 2021-09-28 RX ORDER — SODIUM CHLORIDE, SODIUM LACTATE, POTASSIUM CHLORIDE, CALCIUM CHLORIDE 600; 310; 30; 20 MG/100ML; MG/100ML; MG/100ML; MG/100ML
125 INJECTION, SOLUTION INTRAVENOUS CONTINUOUS
Status: DISCONTINUED | OUTPATIENT
Start: 2021-09-28 | End: 2021-10-02 | Stop reason: HOSPADM

## 2021-09-28 RX ORDER — FENTANYL CITRATE 50 UG/ML
INJECTION, SOLUTION INTRAMUSCULAR; INTRAVENOUS AS NEEDED
Status: DISCONTINUED | OUTPATIENT
Start: 2021-09-28 | End: 2021-09-28

## 2021-09-28 RX ORDER — OMEPRAZOLE 20 MG/1
20 CAPSULE, DELAYED RELEASE ORAL 2 TIMES DAILY
Qty: 60 CAPSULE | Refills: 0 | Status: SHIPPED | OUTPATIENT
Start: 2021-09-28

## 2021-09-28 RX ADMIN — FENTANYL CITRATE 25 MCG: 50 INJECTION INTRAMUSCULAR; INTRAVENOUS at 09:55

## 2021-09-28 RX ADMIN — FENTANYL CITRATE 25 MCG: 50 INJECTION INTRAMUSCULAR; INTRAVENOUS at 09:59

## 2021-09-28 RX ADMIN — PROPOFOL 150 MG: 10 INJECTION, EMULSION INTRAVENOUS at 09:36

## 2021-09-28 RX ADMIN — SODIUM CHLORIDE, SODIUM LACTATE, POTASSIUM CHLORIDE, AND CALCIUM CHLORIDE 125 ML/HR: .6; .31; .03; .02 INJECTION, SOLUTION INTRAVENOUS at 08:49

## 2021-09-28 RX ADMIN — GLYCOPYRROLATE 0.2 MG: 0.2 INJECTION, SOLUTION INTRAMUSCULAR; INTRAVENOUS at 09:28

## 2021-09-28 RX ADMIN — METOPROLOL TARTRATE 5 MG: 1 INJECTION, SOLUTION INTRAVENOUS at 09:46

## 2021-09-28 RX ADMIN — LIDOCAINE HYDROCHLORIDE 5 ML: 20 INJECTION, SOLUTION EPIDURAL; INFILTRATION; INTRACAUDAL; PERINEURAL at 09:36

## 2021-09-28 RX ADMIN — MIDAZOLAM HYDROCHLORIDE 2 MG: 1 INJECTION, SOLUTION INTRAMUSCULAR; INTRAVENOUS at 09:34

## 2021-09-28 RX ADMIN — PROPOFOL 75 MCG/KG/MIN: 10 INJECTION, EMULSION INTRAVENOUS at 09:50

## 2021-09-28 RX ADMIN — PROPOFOL 50 MG: 10 INJECTION, EMULSION INTRAVENOUS at 09:46

## 2021-09-28 RX ADMIN — FENTANYL CITRATE 50 MCG: 50 INJECTION INTRAMUSCULAR; INTRAVENOUS at 09:36

## 2021-09-28 RX ADMIN — PROPOFOL 30 MG: 10 INJECTION, EMULSION INTRAVENOUS at 09:53

## 2021-09-28 RX ADMIN — METOPROLOL TARTRATE 5 MG: 1 INJECTION, SOLUTION INTRAVENOUS at 09:45

## 2021-09-28 NOTE — ANESTHESIA POSTPROCEDURE EVALUATION
Post-Op Assessment Note    CV Status:  Stable  Pain Score: 0    Pain management: adequate     Mental Status:  Awake   Hydration Status:  Stable   PONV Controlled:  None   Airway Patency:  Patent      Post Op Vitals Reviewed: Yes      Staff: CRNA         No complications documented      BP   104/67   Temp      Pulse  84   Resp   20   SpO2   99

## 2021-09-28 NOTE — ANESTHESIA PREPROCEDURE EVALUATION
Procedure:  COLONOSCOPY  EGD    Relevant Problems   CARDIO   (+) Acute MI, inferior wall (HCC)   (+) Benign hypertension with CKD (chronic kidney disease) stage III (HCC)   (+) CAD (coronary artery disease)   (+) CHF (congestive heart failure) (HCC)   (+) Essential hypertension   (+) Mixed hyperlipidemia   (+) Precordial pain   (+) Sinus pause      GI/HEPATIC   (+) Rectal bleeding      /RENAL   (+) CKD (chronic kidney disease) stage 3, GFR 30-59 ml/min (HCC)   (+) CKD (chronic kidney disease) stage 4, GFR 15-29 ml/min (HCC)   (+) Chronic kidney disease-mineral and bone disorder   (+) Chronic tubulo-interstitial nephritis      HEMATOLOGY   (+) Iron deficiency anemia   (+) Iron deficiency anemia secondary to inadequate dietary iron intake   (+) Non-Hodgkin lymphoma of lymph nodes of multiple regions (HCC)      MUSCULOSKELETAL   (+) Pelvic floor dysfunction      NEURO/PSYCH   (+) Anxiety   (+) History of MI (myocardial infarction)   (+) Major depressive disorder with current active episode      PULMONARY   (+) COPD (chronic obstructive pulmonary disease) (HCC)   (+) ELIAZAR (obstructive sleep apnea)        Physical Exam    Airway    Mallampati score: III  TM Distance: >3 FB  Neck ROM: full     Dental       Cardiovascular      Pulmonary      Other Findings        Anesthesia Plan  ASA Score- 3     Anesthesia Type- IV sedation with anesthesia with ASA Monitors  Additional Monitors:   Airway Plan:           Plan Factors-Exercise tolerance (METS): >4 METS  Chart reviewed  EKG reviewed  Existing labs reviewed  Patient summary reviewed  Patient is a current smoker  Patient instructed to abstain from smoking on day of procedure  Patient did not smoke on day of surgery  Induction- intravenous  Postoperative Plan-     Informed Consent- Anesthetic plan and risks discussed with patient  I personally reviewed this patient with the CRNA  Discussed and agreed on the Anesthesia Plan with the CRNA  Veena Kline

## 2021-09-29 ENCOUNTER — TELEPHONE (OUTPATIENT)
Dept: NEPHROLOGY | Facility: CLINIC | Age: 46
End: 2021-09-29

## 2021-09-29 DIAGNOSIS — E55.9 VITAMIN D DEFICIENCY: ICD-10-CM

## 2021-09-29 DIAGNOSIS — Z72.0 TOBACCO ABUSE: ICD-10-CM

## 2021-09-29 DIAGNOSIS — Z95.5 S/P DRUG ELUTING CORONARY STENT PLACEMENT: ICD-10-CM

## 2021-09-29 NOTE — TELEPHONE ENCOUNTER
Patient called stating that she had her colonoscopy and EGD done yesterday  She stated about you want her to get a kidney biopsy

## 2021-09-30 ENCOUNTER — APPOINTMENT (OUTPATIENT)
Dept: LAB | Facility: HOSPITAL | Age: 46
End: 2021-09-30
Payer: COMMERCIAL

## 2021-09-30 DIAGNOSIS — N17.9 AKI (ACUTE KIDNEY INJURY) (HCC): ICD-10-CM

## 2021-09-30 DIAGNOSIS — K92.1 BLOODY STOOL: ICD-10-CM

## 2021-09-30 LAB
ANION GAP SERPL CALCULATED.3IONS-SCNC: 9 MMOL/L (ref 4–13)
BASOPHILS # BLD AUTO: 0.1 THOUSANDS/ΜL (ref 0–0.1)
BASOPHILS NFR BLD AUTO: 1 % (ref 0–2)
BUN SERPL-MCNC: 22 MG/DL (ref 7–25)
CALCIUM SERPL-MCNC: 8.8 MG/DL (ref 8.6–10.5)
CHLORIDE SERPL-SCNC: 108 MMOL/L (ref 98–107)
CO2 SERPL-SCNC: 22 MMOL/L (ref 21–31)
CREAT SERPL-MCNC: 2.18 MG/DL (ref 0.6–1.2)
EOSINOPHIL # BLD AUTO: 0.3 THOUSAND/ΜL (ref 0–0.61)
EOSINOPHIL NFR BLD AUTO: 3 % (ref 0–5)
ERYTHROCYTE [DISTWIDTH] IN BLOOD BY AUTOMATED COUNT: 18.7 % (ref 11.5–14.5)
GFR SERPL CREATININE-BSD FRML MDRD: 27 ML/MIN/1.73SQ M
GLUCOSE P FAST SERPL-MCNC: 89 MG/DL (ref 65–99)
HCT VFR BLD AUTO: 39.2 % (ref 42–47)
HGB BLD-MCNC: 12.6 G/DL (ref 12–16)
LYMPHOCYTES # BLD AUTO: 3.1 THOUSANDS/ΜL (ref 0.6–4.47)
LYMPHOCYTES NFR BLD AUTO: 23 % (ref 21–51)
MCH RBC QN AUTO: 27.5 PG (ref 26–34)
MCHC RBC AUTO-ENTMCNC: 32.3 G/DL (ref 31–37)
MCV RBC AUTO: 85 FL (ref 81–99)
MONOCYTES # BLD AUTO: 1 THOUSAND/ΜL (ref 0.17–1.22)
MONOCYTES NFR BLD AUTO: 7 % (ref 2–12)
NEUTROPHILS # BLD AUTO: 9 THOUSANDS/ΜL (ref 1.4–6.5)
NEUTS SEG NFR BLD AUTO: 67 % (ref 42–75)
PLATELET # BLD AUTO: 294 THOUSANDS/UL (ref 149–390)
PMV BLD AUTO: 8.9 FL (ref 8.6–11.7)
POTASSIUM SERPL-SCNC: 4.6 MMOL/L (ref 3.5–5.5)
RBC # BLD AUTO: 4.6 MILLION/UL (ref 3.9–5.2)
SODIUM SERPL-SCNC: 139 MMOL/L (ref 134–143)
WBC # BLD AUTO: 13.5 THOUSAND/UL (ref 4.8–10.8)

## 2021-09-30 PROCEDURE — 80048 BASIC METABOLIC PNL TOTAL CA: CPT

## 2021-09-30 PROCEDURE — 36415 COLL VENOUS BLD VENIPUNCTURE: CPT

## 2021-09-30 PROCEDURE — 85025 COMPLETE CBC W/AUTO DIFF WBC: CPT

## 2021-09-30 RX ORDER — ERGOCALCIFEROL 1.25 MG/1
50000 CAPSULE ORAL WEEKLY
Qty: 12 CAPSULE | Refills: 0 | Status: SHIPPED | OUTPATIENT
Start: 2021-09-30 | End: 2022-05-23 | Stop reason: SDDI

## 2021-10-05 ENCOUNTER — PREP FOR PROCEDURE (OUTPATIENT)
Dept: INTERVENTIONAL RADIOLOGY/VASCULAR | Facility: CLINIC | Age: 46
End: 2021-10-05

## 2021-10-05 ENCOUNTER — TELEPHONE (OUTPATIENT)
Dept: NEPHROLOGY | Facility: CLINIC | Age: 46
End: 2021-10-05

## 2021-10-05 DIAGNOSIS — N18.32 STAGE 3B CHRONIC KIDNEY DISEASE (HCC): Primary | ICD-10-CM

## 2021-10-06 ENCOUNTER — HOSPITAL ENCOUNTER (EMERGENCY)
Facility: HOSPITAL | Age: 46
Discharge: HOME | End: 2021-10-06
Attending: EMERGENCY MEDICINE | Admitting: EMERGENCY MEDICINE
Payer: COMMERCIAL

## 2021-10-06 ENCOUNTER — APPOINTMENT (EMERGENCY)
Dept: CT IMAGING | Facility: HOSPITAL | Age: 46
End: 2021-10-06
Payer: COMMERCIAL

## 2021-10-06 ENCOUNTER — APPOINTMENT (EMERGENCY)
Dept: RADIOLOGY | Facility: HOSPITAL | Age: 46
End: 2021-10-06
Payer: COMMERCIAL

## 2021-10-06 ENCOUNTER — TELEPHONE (OUTPATIENT)
Dept: FAMILY MEDICINE CLINIC | Facility: CLINIC | Age: 46
End: 2021-10-06

## 2021-10-06 VITALS
RESPIRATION RATE: 18 BRPM | OXYGEN SATURATION: 100 % | TEMPERATURE: 98.5 F | HEART RATE: 77 BPM | SYSTOLIC BLOOD PRESSURE: 171 MMHG | DIASTOLIC BLOOD PRESSURE: 9 MMHG

## 2021-10-06 DIAGNOSIS — R07.9 CHEST PAIN, UNSPECIFIED: Primary | ICD-10-CM

## 2021-10-06 LAB
ALBUMIN SERPL BCP-MCNC: 3.8 G/DL (ref 3.5–5.7)
ALP SERPL-CCNC: 86 U/L (ref 40–150)
ALT SERPL W P-5'-P-CCNC: 15 U/L (ref 7–52)
ANION GAP SERPL CALCULATED.3IONS-SCNC: 7 MMOL/L (ref 4–13)
APTT PPP: 28 SECONDS (ref 23–37)
AST SERPL W P-5'-P-CCNC: 10 U/L (ref 13–39)
BILIRUB SERPL-MCNC: 0.3 MG/DL (ref 0.2–1)
BUN SERPL-MCNC: 16 MG/DL (ref 7–25)
CALCIUM SERPL-MCNC: 9.1 MG/DL (ref 8.6–10.5)
CHLORIDE SERPL-SCNC: 107 MMOL/L (ref 98–107)
CO2 SERPL-SCNC: 22 MMOL/L (ref 21–31)
CREAT SERPL-MCNC: 1.73 MG/DL (ref 0.6–1.2)
D DIMER PPP FEU-MCNC: 0.43 UG/ML FEU
ERYTHROCYTE [DISTWIDTH] IN BLOOD BY AUTOMATED COUNT: 18.5 % (ref 11.5–14.5)
GFR SERPL CREATININE-BSD FRML MDRD: 35 ML/MIN/1.73SQ M
GLUCOSE SERPL-MCNC: 79 MG/DL (ref 65–99)
HCT VFR BLD AUTO: 40.4 % (ref 42–47)
HGB BLD-MCNC: 13.3 G/DL (ref 12–16)
INR PPP: 0.99 (ref 0.84–1.19)
LYMPHOCYTES # BLD AUTO: 23 % (ref 20–51)
LYMPHOCYTES # BLD AUTO: 4.32 THOUSAND/UL (ref 0.6–4.47)
MCH RBC QN AUTO: 27.6 PG (ref 26–34)
MCHC RBC AUTO-ENTMCNC: 32.9 G/DL (ref 31–37)
MCV RBC AUTO: 84 FL (ref 81–99)
MONOCYTES # BLD AUTO: 1.13 THOUSAND/UL (ref 0–1.22)
MONOCYTES NFR BLD AUTO: 6 % (ref 4–12)
NEUTS BAND NFR BLD MANUAL: 1 % (ref 0–8)
NEUTS SEG # BLD: 13.35 THOUSAND/UL (ref 1.81–6.82)
NEUTS SEG NFR BLD AUTO: 70 % (ref 42–75)
PLATELET # BLD AUTO: 346 THOUSANDS/UL (ref 149–390)
PLATELET BLD QL SMEAR: ADEQUATE
PMV BLD AUTO: 8.1 FL (ref 8.6–11.7)
POTASSIUM SERPL-SCNC: 4.5 MMOL/L (ref 3.5–5.5)
PROT SERPL-MCNC: 7.1 G/DL (ref 6.4–8.9)
PROTHROMBIN TIME: 13.2 SECONDS (ref 11.6–14.5)
RBC # BLD AUTO: 4.81 MILLION/UL (ref 3.9–5.2)
RBC MORPH BLD: NORMAL
SODIUM SERPL-SCNC: 136 MMOL/L (ref 134–143)
TOTAL CELLS COUNTED SPEC: 100
TROPONIN I SERPL-MCNC: <0.03 NG/ML
WBC # BLD AUTO: 18.8 THOUSAND/UL (ref 4.8–10.8)

## 2021-10-06 PROCEDURE — 80053 COMPREHEN METABOLIC PANEL: CPT | Performed by: EMERGENCY MEDICINE

## 2021-10-06 PROCEDURE — 85610 PROTHROMBIN TIME: CPT | Performed by: EMERGENCY MEDICINE

## 2021-10-06 PROCEDURE — 93005 ELECTROCARDIOGRAM TRACING: CPT

## 2021-10-06 PROCEDURE — 71045 X-RAY EXAM CHEST 1 VIEW: CPT

## 2021-10-06 PROCEDURE — 99285 EMERGENCY DEPT VISIT HI MDM: CPT

## 2021-10-06 PROCEDURE — 96374 THER/PROPH/DIAG INJ IV PUSH: CPT

## 2021-10-06 PROCEDURE — 85027 COMPLETE CBC AUTOMATED: CPT | Performed by: EMERGENCY MEDICINE

## 2021-10-06 PROCEDURE — 85730 THROMBOPLASTIN TIME PARTIAL: CPT | Performed by: EMERGENCY MEDICINE

## 2021-10-06 PROCEDURE — 99284 EMERGENCY DEPT VISIT MOD MDM: CPT | Performed by: EMERGENCY MEDICINE

## 2021-10-06 PROCEDURE — 85007 BL SMEAR W/DIFF WBC COUNT: CPT | Performed by: EMERGENCY MEDICINE

## 2021-10-06 PROCEDURE — 96361 HYDRATE IV INFUSION ADD-ON: CPT

## 2021-10-06 PROCEDURE — 85379 FIBRIN DEGRADATION QUANT: CPT | Performed by: EMERGENCY MEDICINE

## 2021-10-06 PROCEDURE — 71250 CT THORAX DX C-: CPT

## 2021-10-06 PROCEDURE — 84484 ASSAY OF TROPONIN QUANT: CPT | Performed by: EMERGENCY MEDICINE

## 2021-10-06 PROCEDURE — G1004 CDSM NDSC: HCPCS

## 2021-10-06 PROCEDURE — 36415 COLL VENOUS BLD VENIPUNCTURE: CPT

## 2021-10-06 RX ORDER — HYDROMORPHONE HCL/PF 1 MG/ML
0.5 SYRINGE (ML) INJECTION ONCE
Status: COMPLETED | OUTPATIENT
Start: 2021-10-06 | End: 2021-10-06

## 2021-10-06 RX ORDER — ACETAMINOPHEN 325 MG/1
975 TABLET ORAL ONCE
Status: COMPLETED | OUTPATIENT
Start: 2021-10-06 | End: 2021-10-06

## 2021-10-06 RX ADMIN — HYDROMORPHONE HYDROCHLORIDE 0.5 MG: 1 INJECTION, SOLUTION INTRAMUSCULAR; INTRAVENOUS; SUBCUTANEOUS at 18:53

## 2021-10-06 RX ADMIN — ACETAMINOPHEN 975 MG: 325 TABLET ORAL at 20:50

## 2021-10-06 RX ADMIN — SODIUM CHLORIDE 500 ML: 0.9 INJECTION, SOLUTION INTRAVENOUS at 18:52

## 2021-10-07 LAB
ATRIAL RATE: 77 BPM
P AXIS: 18 DEGREES
PR INTERVAL: 156 MS
QRS AXIS: 11 DEGREES
QRSD INTERVAL: 94 MS
QT INTERVAL: 412 MS
QTC INTERVAL: 466 MS
T WAVE AXIS: 23 DEGREES
VENTRICULAR RATE: 77 BPM

## 2021-10-07 PROCEDURE — 93010 ELECTROCARDIOGRAM REPORT: CPT | Performed by: INTERNAL MEDICINE

## 2021-10-09 ENCOUNTER — NURSE TRIAGE (OUTPATIENT)
Dept: OTHER | Facility: OTHER | Age: 46
End: 2021-10-09

## 2021-10-09 ENCOUNTER — HOSPITAL ENCOUNTER (EMERGENCY)
Facility: HOSPITAL | Age: 46
Discharge: HOME | End: 2021-10-09
Attending: EMERGENCY MEDICINE
Payer: COMMERCIAL

## 2021-10-09 ENCOUNTER — APPOINTMENT (EMERGENCY)
Dept: RADIOLOGY | Facility: HOSPITAL | Age: 46
End: 2021-10-09
Payer: COMMERCIAL

## 2021-10-09 VITALS
DIASTOLIC BLOOD PRESSURE: 59 MMHG | OXYGEN SATURATION: 98 % | WEIGHT: 260 LBS | HEART RATE: 65 BPM | TEMPERATURE: 98.8 F | RESPIRATION RATE: 19 BRPM | SYSTOLIC BLOOD PRESSURE: 126 MMHG | HEIGHT: 66 IN | BODY MASS INDEX: 41.78 KG/M2

## 2021-10-09 DIAGNOSIS — J06.9 VIRAL URI WITH COUGH: ICD-10-CM

## 2021-10-09 DIAGNOSIS — I50.9 CHF (CONGESTIVE HEART FAILURE) (HCC): ICD-10-CM

## 2021-10-09 DIAGNOSIS — R07.9 CHEST PAIN, UNSPECIFIED TYPE: Primary | ICD-10-CM

## 2021-10-09 LAB
ANION GAP SERPL CALCULATED.3IONS-SCNC: 9 MMOL/L (ref 4–13)
BASOPHILS # BLD AUTO: 0.1 THOUSANDS/ΜL (ref 0–0.1)
BASOPHILS NFR BLD AUTO: 1 % (ref 0–2)
BNP SERPL-MCNC: 643 PG/ML (ref 1–100)
BUN SERPL-MCNC: 16 MG/DL (ref 7–25)
CALCIUM SERPL-MCNC: 8.6 MG/DL (ref 8.6–10.5)
CHLORIDE SERPL-SCNC: 106 MMOL/L (ref 98–107)
CO2 SERPL-SCNC: 24 MMOL/L (ref 21–31)
CREAT SERPL-MCNC: 1.58 MG/DL (ref 0.6–1.2)
EOSINOPHIL # BLD AUTO: 0.4 THOUSAND/ΜL (ref 0–0.61)
EOSINOPHIL NFR BLD AUTO: 2 % (ref 0–5)
ERYTHROCYTE [DISTWIDTH] IN BLOOD BY AUTOMATED COUNT: 19 % (ref 11.5–14.5)
FLUAV RNA RESP QL NAA+PROBE: NEGATIVE
FLUBV RNA RESP QL NAA+PROBE: NEGATIVE
GFR SERPL CREATININE-BSD FRML MDRD: 40 ML/MIN/1.73SQ M
GLUCOSE SERPL-MCNC: 81 MG/DL (ref 65–99)
HCT VFR BLD AUTO: 38.6 % (ref 42–47)
HGB BLD-MCNC: 12.7 G/DL (ref 12–16)
LYMPHOCYTES # BLD AUTO: 2.6 THOUSANDS/ΜL (ref 0.6–4.47)
LYMPHOCYTES NFR BLD AUTO: 17 % (ref 21–51)
MCH RBC QN AUTO: 27.3 PG (ref 26–34)
MCHC RBC AUTO-ENTMCNC: 32.9 G/DL (ref 31–37)
MCV RBC AUTO: 83 FL (ref 81–99)
MONOCYTES # BLD AUTO: 1 THOUSAND/ΜL (ref 0.17–1.22)
MONOCYTES NFR BLD AUTO: 7 % (ref 2–12)
NEUTROPHILS # BLD AUTO: 11 THOUSANDS/ΜL (ref 1.4–6.5)
NEUTS SEG NFR BLD AUTO: 73 % (ref 42–75)
PLATELET # BLD AUTO: 353 THOUSANDS/UL (ref 149–390)
PMV BLD AUTO: 8.4 FL (ref 8.6–11.7)
POTASSIUM SERPL-SCNC: 4.5 MMOL/L (ref 3.5–5.5)
RBC # BLD AUTO: 4.66 MILLION/UL (ref 3.9–5.2)
RSV RNA RESP QL NAA+PROBE: NEGATIVE
SARS-COV-2 RNA RESP QL NAA+PROBE: NEGATIVE
SODIUM SERPL-SCNC: 139 MMOL/L (ref 134–143)
TROPONIN I SERPL-MCNC: <0.03 NG/ML
WBC # BLD AUTO: 15.2 THOUSAND/UL (ref 4.8–10.8)

## 2021-10-09 PROCEDURE — 84484 ASSAY OF TROPONIN QUANT: CPT | Performed by: EMERGENCY MEDICINE

## 2021-10-09 PROCEDURE — 36415 COLL VENOUS BLD VENIPUNCTURE: CPT | Performed by: EMERGENCY MEDICINE

## 2021-10-09 PROCEDURE — 71045 X-RAY EXAM CHEST 1 VIEW: CPT

## 2021-10-09 PROCEDURE — 80048 BASIC METABOLIC PNL TOTAL CA: CPT | Performed by: EMERGENCY MEDICINE

## 2021-10-09 PROCEDURE — 93005 ELECTROCARDIOGRAM TRACING: CPT

## 2021-10-09 PROCEDURE — 99284 EMERGENCY DEPT VISIT MOD MDM: CPT | Performed by: EMERGENCY MEDICINE

## 2021-10-09 PROCEDURE — 99285 EMERGENCY DEPT VISIT HI MDM: CPT

## 2021-10-09 PROCEDURE — 83880 ASSAY OF NATRIURETIC PEPTIDE: CPT | Performed by: EMERGENCY MEDICINE

## 2021-10-09 PROCEDURE — 85025 COMPLETE CBC W/AUTO DIFF WBC: CPT | Performed by: EMERGENCY MEDICINE

## 2021-10-09 PROCEDURE — 0241U HB NFCT DS VIR RESP RNA 4 TRGT: CPT | Performed by: EMERGENCY MEDICINE

## 2021-10-09 RX ORDER — SODIUM CHLORIDE 9 MG/ML
3 INJECTION INTRAVENOUS
Status: DISCONTINUED | OUTPATIENT
Start: 2021-10-09 | End: 2021-10-09 | Stop reason: HOSPADM

## 2021-10-09 RX ORDER — ASPIRIN 81 MG/1
324 TABLET, CHEWABLE ORAL ONCE
Status: COMPLETED | OUTPATIENT
Start: 2021-10-09 | End: 2021-10-09

## 2021-10-09 RX ORDER — NITROGLYCERIN 0.4 MG/1
0.4 TABLET SUBLINGUAL ONCE
Status: COMPLETED | OUTPATIENT
Start: 2021-10-09 | End: 2021-10-09

## 2021-10-09 RX ORDER — FUROSEMIDE 40 MG/1
40 TABLET ORAL ONCE
Status: COMPLETED | OUTPATIENT
Start: 2021-10-09 | End: 2021-10-09

## 2021-10-09 RX ADMIN — NITROGLYCERIN 0.4 MG: 0.4 TABLET SUBLINGUAL at 17:19

## 2021-10-09 RX ADMIN — ASPIRIN 81 MG CHEWABLE TABLET 324 MG: 81 TABLET CHEWABLE at 17:18

## 2021-10-09 RX ADMIN — FUROSEMIDE 40 MG: 40 TABLET ORAL at 18:50

## 2021-10-11 ENCOUNTER — TELEPHONE (OUTPATIENT)
Dept: INTERVENTIONAL RADIOLOGY/VASCULAR | Facility: HOSPITAL | Age: 46
End: 2021-10-11

## 2021-10-12 ENCOUNTER — OFFICE VISIT (OUTPATIENT)
Dept: FAMILY MEDICINE CLINIC | Facility: CLINIC | Age: 46
End: 2021-10-12
Payer: COMMERCIAL

## 2021-10-12 VITALS
BODY MASS INDEX: 43.26 KG/M2 | TEMPERATURE: 97.1 F | HEART RATE: 60 BPM | OXYGEN SATURATION: 99 % | WEIGHT: 269.2 LBS | DIASTOLIC BLOOD PRESSURE: 64 MMHG | HEIGHT: 66 IN | SYSTOLIC BLOOD PRESSURE: 126 MMHG

## 2021-10-12 DIAGNOSIS — B96.89 ACUTE BACTERIAL BRONCHITIS: ICD-10-CM

## 2021-10-12 DIAGNOSIS — I20.0 UNSTABLE ANGINA (HCC): Primary | ICD-10-CM

## 2021-10-12 DIAGNOSIS — J44.9 CHRONIC OBSTRUCTIVE PULMONARY DISEASE, UNSPECIFIED COPD TYPE (HCC): ICD-10-CM

## 2021-10-12 DIAGNOSIS — J20.8 ACUTE BACTERIAL BRONCHITIS: ICD-10-CM

## 2021-10-12 DIAGNOSIS — M94.0 COSTOCHONDRITIS: ICD-10-CM

## 2021-10-12 DIAGNOSIS — R79.89 ELEVATED BRAIN NATRIURETIC PEPTIDE (BNP) LEVEL: ICD-10-CM

## 2021-10-12 PROCEDURE — 99214 OFFICE O/P EST MOD 30 MIN: CPT | Performed by: FAMILY MEDICINE

## 2021-10-12 RX ORDER — CEFUROXIME AXETIL 500 MG/1
250 TABLET ORAL EVERY 12 HOURS SCHEDULED
Qty: 20 TABLET | Refills: 0 | Status: SHIPPED | OUTPATIENT
Start: 2021-10-12 | End: 2021-11-01

## 2021-10-12 RX ORDER — IPRATROPIUM BROMIDE AND ALBUTEROL SULFATE 2.5; .5 MG/3ML; MG/3ML
3 SOLUTION RESPIRATORY (INHALATION) ONCE
Status: DISCONTINUED | OUTPATIENT
Start: 2021-10-12 | End: 2021-10-18 | Stop reason: ALTCHOICE

## 2021-10-12 NOTE — PROGRESS NOTES
Assessment/Plan:    No problem-specific Assessment & Plan notes found for this encounter  Diagnoses and all orders for this visit:    Unstable angina ([facility] )  Comments:  Nitro as needed, move up appt with Cardiology    Chronic obstructive pulmonary disease, unspecified COPD type ([facility] )  Comments:  Using inhalers, uses CPAP  Check PFT  Refer Pulmonary  Orders:  -     Mini neb; Future  -     ipratropium-albuterol (DUO-NEB) 0 5-2 5 mg/3 mL inhalation solution 3 mL  -     Complete PFT with post bronchodilator; Future  -     Ambulatory referral to Pulmonology; Future    Elevated brain natriuretic peptide (BNP) level  Comments: Will defer to Cardiology and Nephrology regarding tenuous fluid balance     Acute bacterial bronchitis  Comments:  Start abx, office neb tx with duo neb  Orders:  -     Mini neb; Future  -     ipratropium-albuterol (DUO-NEB) 0 5-2 5 mg/3 mL inhalation solution 3 mL  -     cefuroxime (CEFTIN) 500 mg tablet; Take 0 5 tablets (250 mg total) by mouth every 12 (twelve) hours for 20 days    Costochondritis  Comments:  Tylenol as needed, NO NSAID's          PHQ-9 Depression Screening    PHQ-9:   Frequency of the following problems over the past two weeks:      Little interest or pleasure in doing things: 2 - more than half the days  Feeling down, depressed, or hopeless: 2 - more than half the days  Trouble falling or staying asleep, or sleeping too much: 2 - more than half the days  Feeling tired or having little energy: 2 - more than half the days  Poor appetite or overeatin - more than half the days  Feeling bad about yourself - or that you are a failure or have let yourself or your family down: 2 - more than half the days  Trouble concentrating on things, such as reading the newspaper or watching television: 2 - more than half the days  Moving or speaking so slowly that other people could have noticed   Or the opposite - being so fidgety or restless that you have been moving around a lot more than usual: 2 - more than half the days  Thoughts that you would be better off dead, or of hurting yourself in some way: 0 - not at all  PHQ-2 Score: 4  PHQ-9 Score: 16            Subjective:      Patient ID: Edson Stewart is a 42 y o  female  Was in the ER twice 10/6 and 10/9 for chest pain, she had a negative cardiac workup  She had a CT of the chest which was normal and CXR at the second visit that was also normal  She was given lasix 40mg po x 1, she did note improvement with that  Diuretics were stopped by Nephrology due to declining renal function but BNP is rising  She reports the nitro she was given in the ER did help improve her chest pain  She still has CP but it is not as bad as it was, she did take 2 nitro yesterday and the pain again resolved  She is not due to see Cardiology until December  She continues with orthopnea  She reports sore throat, cough, she had a fever over the weekend  Tmax 1027  She was negative for covid, RSV, and flu  The following portions of the patient's history were reviewed and updated as appropriate: allergies, current medications, past family history, past medical history, past social history, past surgical history and problem list     Review of Systems   Constitutional: Negative for chills, fatigue and fever  HENT: Positive for congestion, sinus pressure and sinus pain  R  Sided sinus pressure   Eyes: Negative  Respiratory: Positive for cough, chest tightness, shortness of breath and wheezing  Cardiovascular: Negative for chest pain, palpitations and leg swelling  Neurological: Negative for dizziness and light-headedness  Psychiatric/Behavioral: Positive for dysphoric mood  The patient is nervous/anxious            Objective:    /64 (BP Location: Left arm, Patient Position: Sitting)   Pulse 60   Temp (!) 97 1 °F (36 2 °C) (Tympanic)   Ht 5' 6" (1 676 m)   Wt 122 kg (269 lb 3 2 oz)   LMP 10/07/2021   SpO2 99%   BMI 43 45 kg/m² Physical Exam  Vitals and nursing note reviewed  Constitutional:       General: She is not in acute distress  Appearance: Normal appearance  She is ill-appearing  HENT:      Head: Normocephalic and atraumatic  Right Ear: Tympanic membrane normal       Left Ear: Tympanic membrane normal       Nose: Congestion and rhinorrhea present  Mouth/Throat:      Mouth: Mucous membranes are moist    Eyes:      Conjunctiva/sclera: Conjunctivae normal    Cardiovascular:      Rate and Rhythm: Normal rate and regular rhythm  Pulses: Normal pulses  Heart sounds: Normal heart sounds  No gallop  Pulmonary:      Effort: Pulmonary effort is normal       Breath sounds: Wheezing present  Comments: Coarse bs with cough  Musculoskeletal:         General: Tenderness present  Cervical back: Neck supple  Comments: ACW TTP C/W costochondritis   Lymphadenopathy:      Cervical: No cervical adenopathy  Neurological:      Mental Status: She is alert  Psychiatric:         Mood and Affect: Mood normal          Thought Content:  Thought content normal

## 2021-10-13 LAB
ATRIAL RATE: 74 BPM
P AXIS: 57 DEGREES
PR INTERVAL: 158 MS
QRS AXIS: 45 DEGREES
QRSD INTERVAL: 94 MS
QT INTERVAL: 406 MS
QTC INTERVAL: 450 MS
T WAVE AXIS: 43 DEGREES
VENTRICULAR RATE: 74 BPM

## 2021-10-13 PROCEDURE — 93010 ELECTROCARDIOGRAM REPORT: CPT | Performed by: INTERNAL MEDICINE

## 2021-10-14 DIAGNOSIS — F41.1 GENERALIZED ANXIETY DISORDER: ICD-10-CM

## 2021-10-14 DIAGNOSIS — I25.10 CORONARY ARTERY DISEASE INVOLVING NATIVE CORONARY ARTERY OF NATIVE HEART WITHOUT ANGINA PECTORIS: ICD-10-CM

## 2021-10-14 DIAGNOSIS — Z95.5 S/P DRUG ELUTING CORONARY STENT PLACEMENT: ICD-10-CM

## 2021-10-14 RX ORDER — NITROGLYCERIN 0.4 MG/1
0.4 TABLET SUBLINGUAL
Qty: 25 TABLET | Refills: 5 | Status: SHIPPED | OUTPATIENT
Start: 2021-10-14 | End: 2022-05-12 | Stop reason: SDUPTHER

## 2021-10-15 RX ORDER — LORAZEPAM 0.5 MG/1
0.5 TABLET ORAL 2 TIMES DAILY PRN
Qty: 60 TABLET | Refills: 0 | Status: SHIPPED | OUTPATIENT
Start: 2021-10-15 | End: 2021-11-26 | Stop reason: SDUPTHER

## 2021-10-18 ENCOUNTER — HOSPITAL ENCOUNTER (OUTPATIENT)
Dept: CT IMAGING | Facility: HOSPITAL | Age: 46
Discharge: HOME | End: 2021-10-18
Attending: STUDENT IN AN ORGANIZED HEALTH CARE EDUCATION/TRAINING PROGRAM
Payer: COMMERCIAL

## 2021-10-18 VITALS
BODY MASS INDEX: 42.11 KG/M2 | TEMPERATURE: 97 F | WEIGHT: 262 LBS | SYSTOLIC BLOOD PRESSURE: 124 MMHG | HEART RATE: 64 BPM | HEIGHT: 66 IN | OXYGEN SATURATION: 97 % | RESPIRATION RATE: 20 BRPM | DIASTOLIC BLOOD PRESSURE: 80 MMHG

## 2021-10-18 DIAGNOSIS — N18.32 STAGE 3B CHRONIC KIDNEY DISEASE (HCC): ICD-10-CM

## 2021-10-18 LAB
EXT PREGNANCY TEST URINE: NEGATIVE
EXT. CONTROL: NORMAL

## 2021-10-18 PROCEDURE — 88313 SPECIAL STAINS GROUP 2: CPT | Performed by: RADIOLOGY

## 2021-10-18 PROCEDURE — 77012 CT SCAN FOR NEEDLE BIOPSY: CPT | Performed by: RADIOLOGY

## 2021-10-18 PROCEDURE — 88350 IMFLUOR EA ADDL 1ANTB STN PX: CPT | Performed by: RADIOLOGY

## 2021-10-18 PROCEDURE — 88346 IMFLUOR 1ST 1ANTB STAIN PX: CPT | Performed by: RADIOLOGY

## 2021-10-18 PROCEDURE — 50200 RENAL BIOPSY PERQ: CPT

## 2021-10-18 PROCEDURE — 74176 CT ABD & PELVIS W/O CONTRAST: CPT

## 2021-10-18 PROCEDURE — 77012 CT SCAN FOR NEEDLE BIOPSY: CPT

## 2021-10-18 PROCEDURE — 99152 MOD SED SAME PHYS/QHP 5/>YRS: CPT

## 2021-10-18 PROCEDURE — 81025 URINE PREGNANCY TEST: CPT | Performed by: RADIOLOGY

## 2021-10-18 PROCEDURE — G1004 CDSM NDSC: HCPCS

## 2021-10-18 PROCEDURE — 50200 RENAL BIOPSY PERQ: CPT | Performed by: RADIOLOGY

## 2021-10-18 PROCEDURE — 99152 MOD SED SAME PHYS/QHP 5/>YRS: CPT | Performed by: RADIOLOGY

## 2021-10-18 PROCEDURE — 88305 TISSUE EXAM BY PATHOLOGIST: CPT | Performed by: RADIOLOGY

## 2021-10-18 PROCEDURE — 88348 ELECTRON MICROSCOPY DX: CPT | Performed by: RADIOLOGY

## 2021-10-18 RX ORDER — MIDAZOLAM HYDROCHLORIDE 2 MG/2ML
INJECTION, SOLUTION INTRAMUSCULAR; INTRAVENOUS CODE/TRAUMA/SEDATION MEDICATION
Status: COMPLETED | OUTPATIENT
Start: 2021-10-18 | End: 2021-10-18

## 2021-10-18 RX ORDER — SODIUM CHLORIDE 9 MG/ML
50 INJECTION, SOLUTION INTRAVENOUS CONTINUOUS
Status: DISCONTINUED | OUTPATIENT
Start: 2021-10-18 | End: 2021-10-22 | Stop reason: HOSPADM

## 2021-10-18 RX ORDER — LIDOCAINE WITH 8.4% SOD BICARB 0.9%(10ML)
SYRINGE (ML) INJECTION CODE/TRAUMA/SEDATION MEDICATION
Status: COMPLETED | OUTPATIENT
Start: 2021-10-18 | End: 2021-10-18

## 2021-10-18 RX ORDER — FENTANYL CITRATE 50 UG/ML
INJECTION, SOLUTION INTRAMUSCULAR; INTRAVENOUS CODE/TRAUMA/SEDATION MEDICATION
Status: COMPLETED | OUTPATIENT
Start: 2021-10-18 | End: 2021-10-18

## 2021-10-18 RX ORDER — ACETAMINOPHEN 325 MG/1
325 TABLET ORAL EVERY 6 HOURS PRN
Status: DISCONTINUED | OUTPATIENT
Start: 2021-10-18 | End: 2021-10-22 | Stop reason: HOSPADM

## 2021-10-18 RX ADMIN — FENTANYL CITRATE 50 MCG: 50 INJECTION INTRAMUSCULAR; INTRAVENOUS at 09:29

## 2021-10-18 RX ADMIN — Medication 10 ML: at 09:17

## 2021-10-18 RX ADMIN — SODIUM CHLORIDE 50 ML/HR: 0.9 INJECTION, SOLUTION INTRAVENOUS at 07:38

## 2021-10-18 RX ADMIN — MIDAZOLAM HYDROCHLORIDE 1 MG: 1 INJECTION, SOLUTION INTRAMUSCULAR; INTRAVENOUS at 09:13

## 2021-10-21 ENCOUNTER — OFFICE VISIT (OUTPATIENT)
Dept: CARDIOLOGY CLINIC | Facility: CLINIC | Age: 46
End: 2021-10-21
Payer: COMMERCIAL

## 2021-10-21 VITALS
SYSTOLIC BLOOD PRESSURE: 118 MMHG | DIASTOLIC BLOOD PRESSURE: 70 MMHG | BODY MASS INDEX: 42.27 KG/M2 | HEIGHT: 66 IN | HEART RATE: 72 BPM | WEIGHT: 263 LBS

## 2021-10-21 DIAGNOSIS — I45.5 SINUS PAUSE: ICD-10-CM

## 2021-10-21 DIAGNOSIS — I50.32 CHRONIC DIASTOLIC CONGESTIVE HEART FAILURE (HCC): ICD-10-CM

## 2021-10-21 DIAGNOSIS — R53.82 CHRONIC FATIGUE: ICD-10-CM

## 2021-10-21 DIAGNOSIS — I10 ESSENTIAL HYPERTENSION: Primary | ICD-10-CM

## 2021-10-21 DIAGNOSIS — R07.2 PRECORDIAL PAIN: ICD-10-CM

## 2021-10-21 DIAGNOSIS — I25.10 CORONARY ARTERY DISEASE INVOLVING NATIVE CORONARY ARTERY OF NATIVE HEART, UNSPECIFIED WHETHER ANGINA PRESENT: ICD-10-CM

## 2021-10-21 PROCEDURE — 99214 OFFICE O/P EST MOD 30 MIN: CPT | Performed by: NURSE PRACTITIONER

## 2021-11-01 ENCOUNTER — HOSPITAL ENCOUNTER (EMERGENCY)
Facility: HOSPITAL | Age: 46
Discharge: HOME | End: 2021-11-02
Attending: EMERGENCY MEDICINE
Payer: COMMERCIAL

## 2021-11-01 ENCOUNTER — TELEPHONE (OUTPATIENT)
Dept: PULMONOLOGY | Facility: CLINIC | Age: 46
End: 2021-11-01

## 2021-11-01 ENCOUNTER — APPOINTMENT (EMERGENCY)
Dept: RADIOLOGY | Facility: HOSPITAL | Age: 46
End: 2021-11-01
Payer: COMMERCIAL

## 2021-11-01 ENCOUNTER — OFFICE VISIT (OUTPATIENT)
Dept: NEPHROLOGY | Facility: CLINIC | Age: 46
End: 2021-11-01
Payer: COMMERCIAL

## 2021-11-01 VITALS
HEIGHT: 66 IN | WEIGHT: 268 LBS | DIASTOLIC BLOOD PRESSURE: 84 MMHG | SYSTOLIC BLOOD PRESSURE: 126 MMHG | HEART RATE: 77 BPM | BODY MASS INDEX: 43.07 KG/M2

## 2021-11-01 DIAGNOSIS — I42.2 HYPERTROPHIC CARDIOMYOPATHY (HCC): ICD-10-CM

## 2021-11-01 DIAGNOSIS — M79.671 FOOT PAIN, RIGHT: ICD-10-CM

## 2021-11-01 DIAGNOSIS — Z72.0 TOBACCO ABUSE: ICD-10-CM

## 2021-11-01 DIAGNOSIS — N11.9 CHRONIC TUBULO-INTERSTITIAL NEPHRITIS: ICD-10-CM

## 2021-11-01 DIAGNOSIS — M25.579 ANKLE PAIN: Primary | ICD-10-CM

## 2021-11-01 DIAGNOSIS — Q87.81 HEREDITARY NEPHROPATHY (ALPORT'S): ICD-10-CM

## 2021-11-01 DIAGNOSIS — R31.9 HEMATURIA, UNSPECIFIED TYPE: Primary | ICD-10-CM

## 2021-11-01 DIAGNOSIS — N18.32 STAGE 3B CHRONIC KIDNEY DISEASE (HCC): ICD-10-CM

## 2021-11-01 PROCEDURE — 99215 OFFICE O/P EST HI 40 MIN: CPT | Performed by: INTERNAL MEDICINE

## 2021-11-01 PROCEDURE — 73610 X-RAY EXAM OF ANKLE: CPT

## 2021-11-01 PROCEDURE — 99283 EMERGENCY DEPT VISIT LOW MDM: CPT

## 2021-11-01 PROCEDURE — 99285 EMERGENCY DEPT VISIT HI MDM: CPT | Performed by: EMERGENCY MEDICINE

## 2021-11-01 PROCEDURE — 73630 X-RAY EXAM OF FOOT: CPT

## 2021-11-01 RX ORDER — FUROSEMIDE 20 MG/1
20 TABLET ORAL 2 TIMES DAILY
COMMUNITY
End: 2022-02-22

## 2021-11-01 RX ORDER — COLCHICINE 0.6 MG/1
0.6 TABLET ORAL 2 TIMES DAILY
Qty: 14 TABLET | Refills: 0 | Status: SHIPPED | OUTPATIENT
Start: 2021-11-01 | End: 2021-12-09 | Stop reason: ALTCHOICE

## 2021-11-01 RX ORDER — HYDROCODONE BITARTRATE AND ACETAMINOPHEN 5; 325 MG/1; MG/1
1 TABLET ORAL EVERY 6 HOURS PRN
Qty: 10 TABLET | Refills: 0 | Status: SHIPPED | OUTPATIENT
Start: 2021-11-01 | End: 2021-12-09 | Stop reason: ALTCHOICE

## 2021-11-01 RX ORDER — OXYCODONE HYDROCHLORIDE 10 MG/1
10 TABLET ORAL ONCE
Status: COMPLETED | OUTPATIENT
Start: 2021-11-01 | End: 2021-11-01

## 2021-11-01 RX ORDER — COLCHICINE 0.6 MG/1
1.2 TABLET ORAL ONCE
Status: COMPLETED | OUTPATIENT
Start: 2021-11-02 | End: 2021-11-01

## 2021-11-01 RX ADMIN — COLCHICINE 1.2 MG: 0.6 TABLET, FILM COATED ORAL at 23:56

## 2021-11-01 RX ADMIN — OXYCODONE HYDROCHLORIDE 10 MG: 10 TABLET ORAL at 22:59

## 2021-11-02 VITALS
OXYGEN SATURATION: 98 % | SYSTOLIC BLOOD PRESSURE: 184 MMHG | WEIGHT: 268 LBS | HEIGHT: 66 IN | DIASTOLIC BLOOD PRESSURE: 88 MMHG | TEMPERATURE: 98.9 F | BODY MASS INDEX: 43.07 KG/M2 | RESPIRATION RATE: 18 BRPM | HEART RATE: 80 BPM

## 2021-11-26 DIAGNOSIS — F41.1 GENERALIZED ANXIETY DISORDER: ICD-10-CM

## 2021-11-26 RX ORDER — LORAZEPAM 0.5 MG/1
0.5 TABLET ORAL 2 TIMES DAILY PRN
Qty: 60 TABLET | Refills: 0 | Status: SHIPPED | OUTPATIENT
Start: 2021-11-26 | End: 2022-03-17 | Stop reason: SDUPTHER

## 2021-12-09 ENCOUNTER — OFFICE VISIT (OUTPATIENT)
Dept: FAMILY MEDICINE CLINIC | Facility: CLINIC | Age: 46
End: 2021-12-09
Payer: COMMERCIAL

## 2021-12-09 VITALS
HEART RATE: 97 BPM | SYSTOLIC BLOOD PRESSURE: 132 MMHG | TEMPERATURE: 98.7 F | DIASTOLIC BLOOD PRESSURE: 82 MMHG | BODY MASS INDEX: 45.02 KG/M2 | HEIGHT: 66 IN | OXYGEN SATURATION: 99 % | WEIGHT: 280.13 LBS

## 2021-12-09 DIAGNOSIS — M25.561 ACUTE PAIN OF RIGHT KNEE: ICD-10-CM

## 2021-12-09 DIAGNOSIS — S93.491A SPRAIN OF ANTERIOR TALOFIBULAR LIGAMENT OF RIGHT ANKLE, INITIAL ENCOUNTER: ICD-10-CM

## 2021-12-09 DIAGNOSIS — N18.4 CKD (CHRONIC KIDNEY DISEASE) STAGE 4, GFR 15-29 ML/MIN (HCC): ICD-10-CM

## 2021-12-09 DIAGNOSIS — F99 INSOMNIA DUE TO OTHER MENTAL DISORDER: ICD-10-CM

## 2021-12-09 DIAGNOSIS — F40.10 SOCIAL ANXIETY DISORDER: ICD-10-CM

## 2021-12-09 DIAGNOSIS — F51.05 INSOMNIA DUE TO OTHER MENTAL DISORDER: ICD-10-CM

## 2021-12-09 DIAGNOSIS — F33.1 MODERATE EPISODE OF RECURRENT MAJOR DEPRESSIVE DISORDER (HCC): Primary | ICD-10-CM

## 2021-12-09 DIAGNOSIS — F41.9 ANXIETY: ICD-10-CM

## 2021-12-09 DIAGNOSIS — M79.671 RIGHT FOOT PAIN: ICD-10-CM

## 2021-12-09 PROCEDURE — 99214 OFFICE O/P EST MOD 30 MIN: CPT | Performed by: FAMILY MEDICINE

## 2021-12-09 RX ORDER — TRAZODONE HYDROCHLORIDE 50 MG/1
50 TABLET ORAL
Qty: 30 TABLET | Refills: 5 | Status: SHIPPED | OUTPATIENT
Start: 2021-12-09 | End: 2022-03-17 | Stop reason: ALTCHOICE

## 2021-12-21 ENCOUNTER — REMOTE DEVICE CLINIC VISIT (OUTPATIENT)
Dept: CARDIOLOGY CLINIC | Facility: CLINIC | Age: 46
End: 2021-12-21
Payer: COMMERCIAL

## 2021-12-21 DIAGNOSIS — Z45.010 ENCOUNTER FOR CHECKING AND TESTING OF CARDIAC PACEMAKER PULSE GENERATOR (BATTERY): ICD-10-CM

## 2021-12-21 DIAGNOSIS — I49.5 SICK SINUS SYNDROME (HCC): Primary | ICD-10-CM

## 2021-12-21 PROCEDURE — 93296 REM INTERROG EVL PM/IDS: CPT | Performed by: INTERNAL MEDICINE

## 2021-12-21 PROCEDURE — 93294 REM INTERROG EVL PM/LDLS PM: CPT | Performed by: INTERNAL MEDICINE

## 2021-12-27 ENCOUNTER — APPOINTMENT (EMERGENCY)
Dept: NON INVASIVE DIAGNOSTICS | Facility: HOSPITAL | Age: 46
End: 2021-12-27
Payer: COMMERCIAL

## 2021-12-27 ENCOUNTER — APPOINTMENT (EMERGENCY)
Dept: RADIOLOGY | Facility: HOSPITAL | Age: 46
End: 2021-12-27
Payer: COMMERCIAL

## 2021-12-27 ENCOUNTER — HOSPITAL ENCOUNTER (EMERGENCY)
Facility: HOSPITAL | Age: 46
Discharge: HOME | End: 2021-12-27
Attending: EMERGENCY MEDICINE | Admitting: EMERGENCY MEDICINE
Payer: COMMERCIAL

## 2021-12-27 VITALS
RESPIRATION RATE: 22 BRPM | WEIGHT: 273.37 LBS | TEMPERATURE: 98.2 F | HEART RATE: 93 BPM | DIASTOLIC BLOOD PRESSURE: 64 MMHG | SYSTOLIC BLOOD PRESSURE: 103 MMHG | OXYGEN SATURATION: 96 % | HEIGHT: 66 IN | BODY MASS INDEX: 43.93 KG/M2

## 2021-12-27 DIAGNOSIS — L03.90 CELLULITIS: Primary | ICD-10-CM

## 2021-12-27 DIAGNOSIS — M79.661 PAIN OF RIGHT LOWER LEG: ICD-10-CM

## 2021-12-27 LAB
ALBUMIN SERPL BCP-MCNC: 3.5 G/DL (ref 3.5–5)
ALP SERPL-CCNC: 75 U/L (ref 34–104)
ALT SERPL W P-5'-P-CCNC: 14 U/L (ref 7–52)
ANION GAP SERPL CALCULATED.3IONS-SCNC: 9 MMOL/L (ref 4–13)
APTT PPP: 31 SECONDS (ref 23–37)
AST SERPL W P-5'-P-CCNC: 10 U/L (ref 13–39)
BASOPHILS # BLD AUTO: 0.11 THOUSANDS/ΜL (ref 0–0.1)
BASOPHILS NFR BLD AUTO: 1 % (ref 0–1)
BILIRUB SERPL-MCNC: 0.28 MG/DL (ref 0.2–1)
BNP SERPL-MCNC: 225 PG/ML (ref 1–100)
BUN SERPL-MCNC: 18 MG/DL (ref 5–25)
CALCIUM SERPL-MCNC: 8.9 MG/DL (ref 8.4–10.2)
CHLORIDE SERPL-SCNC: 107 MMOL/L (ref 96–108)
CK SERPL-CCNC: 76 U/L (ref 26–192)
CO2 SERPL-SCNC: 24 MMOL/L (ref 21–32)
CREAT SERPL-MCNC: 1.56 MG/DL (ref 0.6–1.3)
CRP SERPL HS-MCNC: 62.6 MG/L
EOSINOPHIL # BLD AUTO: 0.4 THOUSAND/ΜL (ref 0–0.61)
EOSINOPHIL NFR BLD AUTO: 2 % (ref 0–6)
ERYTHROCYTE [DISTWIDTH] IN BLOOD BY AUTOMATED COUNT: 18.6 % (ref 11.6–15.1)
ERYTHROCYTE [SEDIMENTATION RATE] IN BLOOD: 39 MM/HOUR (ref 0–19)
GFR SERPL CREATININE-BSD FRML MDRD: 40 ML/MIN/1.73SQ M
GLUCOSE SERPL-MCNC: 96 MG/DL (ref 65–140)
HCG SERPL QL: NEGATIVE
HCT VFR BLD AUTO: 39.5 % (ref 34.8–46.1)
HGB BLD-MCNC: 12.5 G/DL (ref 11.5–15.4)
IMM GRANULOCYTES # BLD AUTO: 0.24 THOUSAND/UL (ref 0–0.2)
IMM GRANULOCYTES NFR BLD AUTO: 1 % (ref 0–2)
INR PPP: 1.09 (ref 0.84–1.19)
LYMPHOCYTES # BLD AUTO: 3.09 THOUSANDS/ΜL (ref 0.6–4.47)
LYMPHOCYTES NFR BLD AUTO: 19 % (ref 14–44)
MAGNESIUM SERPL-MCNC: 1.9 MG/DL (ref 1.9–2.7)
MCH RBC QN AUTO: 27.7 PG (ref 26.8–34.3)
MCHC RBC AUTO-ENTMCNC: 31.6 G/DL (ref 31.4–37.4)
MCV RBC AUTO: 87 FL (ref 82–98)
MONOCYTES # BLD AUTO: 1.17 THOUSAND/ΜL (ref 0.17–1.22)
MONOCYTES NFR BLD AUTO: 7 % (ref 4–12)
NEUTROPHILS # BLD AUTO: 11.62 THOUSANDS/ΜL (ref 1.85–7.62)
NEUTS SEG NFR BLD AUTO: 70 % (ref 43–75)
NRBC BLD AUTO-RTO: 0 /100 WBCS
PLATELET # BLD AUTO: 316 THOUSANDS/UL (ref 149–390)
PMV BLD AUTO: 10.1 FL (ref 8.9–12.7)
POTASSIUM SERPL-SCNC: 3.8 MMOL/L (ref 3.5–5.3)
PROT SERPL-MCNC: 6.9 G/DL (ref 6.4–8.4)
PROTHROMBIN TIME: 14 SECONDS (ref 11.6–14.5)
RBC # BLD AUTO: 4.52 MILLION/UL (ref 3.81–5.12)
SODIUM SERPL-SCNC: 140 MMOL/L (ref 135–147)
WBC # BLD AUTO: 16.63 THOUSAND/UL (ref 4.31–10.16)

## 2021-12-27 PROCEDURE — 73590 X-RAY EXAM OF LOWER LEG: CPT

## 2021-12-27 PROCEDURE — 80053 COMPREHEN METABOLIC PANEL: CPT | Performed by: EMERGENCY MEDICINE

## 2021-12-27 PROCEDURE — 82550 ASSAY OF CK (CPK): CPT | Performed by: EMERGENCY MEDICINE

## 2021-12-27 PROCEDURE — 86141 C-REACTIVE PROTEIN HS: CPT | Performed by: EMERGENCY MEDICINE

## 2021-12-27 PROCEDURE — 85730 THROMBOPLASTIN TIME PARTIAL: CPT | Performed by: EMERGENCY MEDICINE

## 2021-12-27 PROCEDURE — 83735 ASSAY OF MAGNESIUM: CPT | Performed by: EMERGENCY MEDICINE

## 2021-12-27 PROCEDURE — 99285 EMERGENCY DEPT VISIT HI MDM: CPT | Performed by: EMERGENCY MEDICINE

## 2021-12-27 PROCEDURE — 99284 EMERGENCY DEPT VISIT MOD MDM: CPT

## 2021-12-27 PROCEDURE — 83880 ASSAY OF NATRIURETIC PEPTIDE: CPT | Performed by: EMERGENCY MEDICINE

## 2021-12-27 PROCEDURE — 96374 THER/PROPH/DIAG INJ IV PUSH: CPT

## 2021-12-27 PROCEDURE — 73610 X-RAY EXAM OF ANKLE: CPT

## 2021-12-27 PROCEDURE — 84703 CHORIONIC GONADOTROPIN ASSAY: CPT | Performed by: EMERGENCY MEDICINE

## 2021-12-27 PROCEDURE — 85652 RBC SED RATE AUTOMATED: CPT | Performed by: EMERGENCY MEDICINE

## 2021-12-27 PROCEDURE — 36415 COLL VENOUS BLD VENIPUNCTURE: CPT | Performed by: EMERGENCY MEDICINE

## 2021-12-27 PROCEDURE — 85025 COMPLETE CBC W/AUTO DIFF WBC: CPT | Performed by: EMERGENCY MEDICINE

## 2021-12-27 PROCEDURE — 85610 PROTHROMBIN TIME: CPT | Performed by: EMERGENCY MEDICINE

## 2021-12-27 PROCEDURE — 93971 EXTREMITY STUDY: CPT

## 2021-12-27 PROCEDURE — 93971 EXTREMITY STUDY: CPT | Performed by: SURGERY

## 2021-12-27 RX ORDER — MORPHINE SULFATE 4 MG/ML
4 INJECTION, SOLUTION INTRAMUSCULAR; INTRAVENOUS ONCE
Status: COMPLETED | OUTPATIENT
Start: 2021-12-27 | End: 2021-12-27

## 2021-12-27 RX ORDER — CEPHALEXIN 500 MG/1
500 CAPSULE ORAL ONCE
Status: COMPLETED | OUTPATIENT
Start: 2021-12-27 | End: 2021-12-27

## 2021-12-27 RX ORDER — OXYCODONE HYDROCHLORIDE AND ACETAMINOPHEN 5; 325 MG/1; MG/1
1 TABLET ORAL ONCE
Status: COMPLETED | OUTPATIENT
Start: 2021-12-27 | End: 2021-12-27

## 2021-12-27 RX ORDER — CEPHALEXIN 500 MG/1
500 CAPSULE ORAL EVERY 6 HOURS SCHEDULED
Qty: 28 CAPSULE | Refills: 0 | Status: SHIPPED | OUTPATIENT
Start: 2021-12-27 | End: 2022-01-03

## 2021-12-27 RX ADMIN — CEPHALEXIN 500 MG: 500 CAPSULE ORAL at 10:10

## 2021-12-27 RX ADMIN — OXYCODONE HYDROCHLORIDE AND ACETAMINOPHEN 1 TABLET: 5; 325 TABLET ORAL at 10:10

## 2021-12-27 RX ADMIN — MORPHINE SULFATE 4 MG: 4 INJECTION INTRAVENOUS at 07:31

## 2021-12-27 NOTE — ED CARE HANDOFF
Emergency Department Sign Out Note        Sign out and transfer of care from Dr Jana Arteaga  See Separate Emergency Department note  The patient, Stephany Forrester, was evaluated by the previous provider for left lateral lower leg/ankle swelling since saturday    Workup Completed:  CBC, CMP, BNP, ESR, CRP, XR of left ankle/tib-fib    ED Course / Workup Pending (followup):  DVT study                                   ED Course as of 12/27/21 1224   Mon Dec 27, 2021   0656 Several days of lateral lower leg/ankle pain, swelling  No joint swelling/redness  Over the lateral lower leg  DVT study ordered  Labs pending  Compartment soft, limb neurovascularly intact  If elevated WBC/CRP/ESR, kelflex w/ disharge  2471 DVT study negative per Ultrasound tech     Procedures  MDM        Disposition  Final diagnoses:   Cellulitis   Pain of right lower leg     Time reflects when diagnosis was documented in both MDM as applicable and the Disposition within this note     Time User Action Codes Description Comment    12/27/2021 10:13 AM [facility] Add [L03 90] Cellulitis     12/27/2021 10:13 AM [facility] Add [L60 285] Pain of right lower leg       ED Disposition     ED Disposition Condition Date/Time Comment    Discharge Stable Mon Dec 27, 2021 10:13 AM Stephany Forrester discharge to home/self care  Follow-up Information     Follow up With Specialties Details Why Contact Info Additional [id number] [facility], DO Family Medicine Schedule an appointment as soon as possible for a visit in 3 days For follow up of symptoms [address] [address]  [phone]       [facility] Emergency Department Emergency Medicine Go to  If symptoms worsen [address] [place] [address]  [facility] Emergency Department, [address] [facility] Rocio padilla [address] [facility] Schedule an appointment as soon as possible for a visit  For follow up of R ankle pain [address] [facility] [address] [address]  [facility] [address] [facility] [address] [facility], [address] [facility], [address] [place] [address], [place] [address]        Discharge Medication List as of 12/27/2021 10:14 AM      START taking these medications    Details   cephalexin (KEFLEX) 500 mg capsule Take 1 capsule (500 mg total) by mouth every 6 (six) hours for 7 days, Starting Mon 12/27/2021, Until Mon 1/3/2022, Normal         CONTINUE these medications which have NOT CHANGED    Details   albuterol (PROVENTIL HFA,VENTOLIN HFA) 90 mcg/act inhaler Inhale 2 puffs every 4 (four) hours as needed for wheezing or shortness of breath, Starting Thu 4/1/2021, Normal      ARIPiprazole (ABILIFY) 10 mg tablet Take 1 tablet (10 mg total) by mouth daily, Starting Mon 9/13/2021, Normal      aspirin 81 mg chewable tablet Chew 1 tablet (81 mg total) daily, Starting Fri 4/2/2021, Normal      atorvastatin (LIPITOR) 80 mg tablet Take 1 tablet (80 mg total) by mouth every evening, Starting Mon 4/19/2021, Normal      Diclofenac Sodium (VOLTAREN) 1 % Apply 2 g topically 4 (four) times a day, Starting Mon 8/9/2021, Normal      ergocalciferol (VITAMIN D2) 50,000 units Take 1 capsule (50,000 Units total) by mouth once a week, Starting Thu 9/30/2021, Normal      famotidine (PEPCID) 40 MG tablet Take 1 tablet (40 mg total) by mouth daily, Starting Thu 9/16/2021, Normal      furosemide (Lasix) 20 mg tablet Take 20 mg by mouth 2 (two) times a day, Historical Med      LORazepam (ATIVAN) 0 5 mg tablet Take 1 tablet (0 5 mg total) by mouth 2 (two) times a day as needed for anxiety, Starting Fri 11/26/2021, Normal      metoprolol tartrate (LOPRESSOR) 50 mg tablet Take 1 tablet (50 mg total) by mouth 2 (two) times a day, Starting Tue 9/21/2021, Until Sun 3/20/2022, Normal      nitroglycerin (NITROSTAT) 0 4 mg SL tablet Place 1 tablet (0 4 mg total) under the tongue every 5 (five) minutes as needed for chest pain, Starting Thu 10/14/2021, Normal      Nutritional Supplements (COLD AND FLU PO) Take 1 Dose by mouth daily as needed, Historical Med      omeprazole (PriLOSEC) 20 mg delayed release capsule Take 1 capsule (20 mg total) by mouth 2 (two) times a day, Starting Tue 9/28/2021, Normal      !! sertraline (ZOLOFT) 100 mg tablet Take 1 tablet (100 mg total) by mouth daily, Starting Mon 9/13/2021, Normal      !! sertraline (Zoloft) 50 mg tablet Take 1 tablet (50 mg total) by mouth daily, Starting Thu 12/9/2021, Normal      ticagrelor (BRILINTA) 90 MG Take 1 tablet (90 mg total) by mouth every 12 (twelve) hours, Starting Wed 9/29/2021, Until Mon 3/28/2022, Normal      traZODone (DESYREL) 50 mg tablet Take 1 tablet (50 mg total) by mouth daily at bedtime, Starting Thu 12/9/2021, Normal       !! - Potential duplicate medications found  Please discuss with provider  No discharge procedures on file         ED Provider  Electronically Signed by     Nayana Isaac DO  12/27/21 6333

## 2021-12-27 NOTE — ED PROVIDER NOTES
History  Chief Complaint   Patient presents with    Ankle Swelling     States of right ankle swelling into leg since Saturday  Reyna trauma/injury  41 yo F    Here for right lateral lower leg and ankle pain and swelling since Saturday  No fever/chills  No n/v    No cp or sob or pleurisy      History provided by:  Patient  Ankle Swelling  Location:  Leg and ankle  Injury: no    Leg location:  R lower leg  Ankle location:  R ankle  Pain details:     Quality:  Aching and sharp    Severity:  Severe    Onset quality:  Gradual  Chronicity:  New  Relieved by:  Nothing  Worsened by:  Nothing  Ineffective treatments:  None tried  Associated symptoms: decreased ROM (due to pain ) and swelling    Associated symptoms: no back pain, no fatigue, no fever, no muscle weakness, no neck pain, no numbness and no stiffness        Prior to Admission Medications   Prescriptions Last Dose Informant Patient Reported? Taking?    ARIPiprazole (ABILIFY) 10 mg tablet  Self No No   Sig: Take 1 tablet (10 mg total) by mouth daily   Diclofenac Sodium (VOLTAREN) 1 %  Self No No   Sig: Apply 2 g topically 4 (four) times a day   LORazepam (ATIVAN) 0 5 mg tablet   No No   Sig: Take 1 tablet (0 5 mg total) by mouth 2 (two) times a day as needed for anxiety   Nutritional Supplements (COLD AND FLU PO)   Yes No   Sig: Take 1 Dose by mouth daily as needed   albuterol (PROVENTIL HFA,VENTOLIN HFA) 90 mcg/act inhaler  Self No No   Sig: Inhale 2 puffs every 4 (four) hours as needed for wheezing or shortness of breath   aspirin 81 mg chewable tablet  Self No No   Sig: Chew 1 tablet (81 mg total) daily   atorvastatin (LIPITOR) 80 mg tablet  Self No No   Sig: Take 1 tablet (80 mg total) by mouth every evening   ergocalciferol (VITAMIN D2) 50,000 units   No No   Sig: Take 1 capsule (50,000 Units total) by mouth once a week   famotidine (PEPCID) 40 MG tablet  Self No No   Sig: Take 1 tablet (40 mg total) by mouth daily   furosemide (Lasix) 20 mg tablet Yes No   Sig: Take 20 mg by mouth 2 (two) times a day   Patient not taking: Reported on 2021    metoprolol tartrate (LOPRESSOR) 50 mg tablet   No No   Sig: Take 1 tablet (50 mg total) by mouth 2 (two) times a day   nitroglycerin (NITROSTAT) 0 4 mg SL tablet   No No   Sig: Place 1 tablet (0 4 mg total) under the tongue every 5 (five) minutes as needed for chest pain   omeprazole (PriLOSEC) 20 mg delayed release capsule   No No   Sig: Take 1 capsule (20 mg total) by mouth 2 (two) times a day   sertraline (ZOLOFT) 100 mg tablet  Self No No   Sig: Take 1 tablet (100 mg total) by mouth daily   sertraline (Zoloft) 50 mg tablet   No No   Sig: Take 1 tablet (50 mg total) by mouth daily   ticagrelor (BRILINTA) 90 MG   No No   Sig: Take 1 tablet (90 mg total) by mouth every 12 (twelve) hours   traZODone (DESYREL) 50 mg tablet   No No   Sig: Take 1 tablet (50 mg total) by mouth daily at bedtime      Facility-Administered Medications: None       Past Medical History:   Diagnosis Date    [facility] ([facility] )     Anxiety     CAD (coronary artery disease)     GREG mid RCA and GREG right PDA 3/25/2021    [facility]     [facility]estive [facility]     Chronic kidney disease     COPD (chronic obstructive pulmonary disease) ([facility])     scheduled for sleep apnea test soon after pacemaker implant    Depression     History of chemotherapy     non hodgkins lymphoma     Hypertension     Lymphoma, non-Hodgkin's ([facility])     Myocardial infarction ([facility] )     SSS (sick sinus syndrome) ([facility])     s/p medtronic dual chamber pacemaker 2021    Tobacco abuse     Wears glasses        Past Surgical History:   Procedure Laterality Date    CARDIAC PACEMAKER PLACEMENT Left 2021    Procedure: DUAL LEAD PACEMAKER IMPLANT;  Surgeon: Sarmad Zhang MD;  [facility];  Service: Cardiology    CAROTID STENT       SECTION      CORONARY ANGIOPLASTY WITH STENT PLACEMENT  2021    GREG mid RCA and GREG right PDA    DENTAL SURGERY      IR BIOPSY KIDNEY COLUMBIA KIT NO LATERALITY  10/18/2021       Family History   Problem Relation Age of Onset    No Known Problems Mother     No Known Problems Father     No Known Problems Daughter     Brain cancer Maternal Grandfather     No Known Problems Paternal Aunt     No Known Problems Paternal Aunt      I have reviewed and agree with the history as documented  E-Cigarette/Vaping    E-Cigarette Use Never User      E-Cigarette/Vaping Substances    Nicotine No     THC No     CBD No     Flavoring No     Other No     Unknown No      Social History     Tobacco Use    Smoking status: Current Every Day Smoker     Packs/day: 1 00     Types: Cigarettes     Last attempt to quit: 3/29/2021     Years since quittin 7    Smokeless tobacco: Current User    Tobacco comment: 1 pk/week per patient    Vaping Use    Vaping Use: Never used   Substance Use Topics    Alcohol use: Yes     Comment: 1-2 times years    Drug use: Never       Review of Systems   Constitutional: Negative for chills, diaphoresis, fatigue and fever  Respiratory: Negative for cough, shortness of breath, wheezing and stridor  Cardiovascular: Negative for chest pain, palpitations and leg swelling  Gastrointestinal: Negative for abdominal pain, blood in stool, nausea and vomiting  Genitourinary: Negative for difficulty urinating, dysuria, flank pain and frequency  Musculoskeletal: Negative for arthralgias, back pain, gait problem, joint swelling, myalgias, neck pain, neck stiffness and stiffness  Right lateral leg swelling and pain    Skin: Negative for rash and wound  Neurological: Negative for dizziness, light-headedness and headaches  All other systems reviewed and are negative  Physical Exam  Physical Exam  Constitutional:       General: She is in acute distress (mild distress due to pain )  Appearance: She is well-developed  She is not diaphoretic     HENT: Head: Normocephalic and atraumatic  Right Ear: External ear normal       Left Ear: External ear normal       Nose: Nose normal       Mouth/Throat:      Pharynx: No oropharyngeal exudate  Eyes:      General: No scleral icterus  Right eye: No discharge  Left eye: No discharge  Conjunctiva/sclera: Conjunctivae normal       Pupils: Pupils are equal, round, and reactive to light  Neck:      Vascular: No JVD  Trachea: No tracheal deviation  Cardiovascular:      Rate and Rhythm: Normal rate and regular rhythm  Pulses: Normal pulses  Pulmonary:      Effort: Pulmonary effort is normal  No respiratory distress  Breath sounds: Normal breath sounds  No stridor  No wheezing or rales  Chest:      Chest wall: No tenderness  Abdominal:      General: Bowel sounds are normal  There is no distension  Palpations: Abdomen is soft  There is no mass  Tenderness: There is no abdominal tenderness  There is no guarding or rebound  Hernia: No hernia is present  Musculoskeletal:         General: Tenderness (right lateral lower leg ttp) present  No deformity or signs of injury  Normal range of motion  Cervical back: Normal range of motion and neck supple  No rigidity or tenderness  Lymphadenopathy:      Cervical: No cervical adenopathy  Skin:     General: Skin is warm  Capillary Refill: Capillary refill takes less than 2 seconds  Coloration: Skin is not jaundiced or pale  Findings: No bruising, erythema or rash  Neurological:      General: No focal deficit present  Mental Status: She is alert and oriented to person, place, and time  Mental status is at baseline  Cranial Nerves: No cranial nerve deficit  Sensory: No sensory deficit  Motor: No weakness or abnormal muscle tone  Coordination: Coordination normal    Psychiatric:         Behavior: Behavior normal          Thought Content:  Thought content normal          Judgment: Judgment normal       Comments: Agitated due to pain          Vital Signs  ED Triage Vitals [12/27/21 0632]   Temperature Pulse Respirations Blood Pressure SpO2   98 2 °F (36 8 °C) 93 22 103/64 96 %      Temp Source Heart Rate Source Patient Position - Orthostatic VS BP Location FiO2 (%)   Tympanic -- Sitting Right arm --      Pain Score       10 - Worst Possible Pain           Vitals:    12/27/21 0632   BP: 103/64   Pulse: 93   Patient Position - Orthostatic VS: Sitting         Visual Acuity      ED Medications  Medications   morphine (PF) 4 mg/mL injection 4 mg (4 mg Intravenous Given 12/27/21 0731)   cephalexin (KEFLEX) capsule 500 mg (500 mg Oral Given 12/27/21 1010)   oxyCODONE-acetaminophen (PERCOCET) 5-325 mg per tablet 1 tablet (1 tablet Oral Given 12/27/21 1010)       Diagnostic Studies  Results Reviewed     Procedure Component Value Units Date/Time    High sensitivity CRP [[id number]] Collected: 12/27/21 0734    Lab Status: Final result Specimen: Blood from Arm, Left Updated: 12/27/21 1618     CRP, High Sensitivity 62 60 mg/L     Narrative:            HsCRP Level       Relative Risk           <1 0 mg/L          Low           1 0 to 3 0 mg/L    Average           >3 0 mg/L          High        hCG, qualitative pregnancy [[id number]]  (Normal) Collected: 12/27/21 0734    Lab Status: Final result Specimen: Blood from Arm, Left Updated: 12/27/21 0822     Preg, Serum Negative    B-Type Natriuretic Peptide(BNP) CA, GH, EA Campuses Only [[id number]]  (Abnormal) Collected: 12/27/21 0734    Lab Status: Final result Specimen: Blood from Arm, Left Updated: 12/27/21 0820      pg/mL     CK Total with Reflex CKMB [[id number]]  (Normal) Collected: 12/27/21 0734    Lab Status: Final result Specimen: Blood from Arm, Left Updated: 12/27/21 0816     Total CK 76 U/L     Comprehensive metabolic panel [[id number]]  (Abnormal) Collected: 12/27/21 0734    Lab Status: Final result Specimen: Blood from Arm, Left Updated: 12/27/21 0630     Sodium 140 mmol/L      Potassium 3 8 mmol/L      Chloride 107 mmol/L      CO2 24 mmol/L      ANION GAP 9 mmol/L      BUN 18 mg/dL      Creatinine 1 56 mg/dL      Glucose 96 mg/dL      Calcium 8 9 mg/dL      AST 10 U/L      ALT 14 U/L      Alkaline Phosphatase 75 U/L      Total Protein 6 9 g/dL      Albumin 3 5 g/dL      Total Bilirubin 0 28 mg/dL      eGFR 40 ml/min/1 73sq m     Narrative:      [place] guidelines for Chronic Kidney Disease (CKD):     Stage 1 with normal or high GFR (GFR > 90 mL/min/1 73 square meters)    Stage 2 Mild CKD (GFR = 60-89 mL/min/1 73 square meters)    Stage 3A Moderate CKD (GFR = 45-59 mL/min/1 73 square meters)    Stage 3B Moderate CKD (GFR = 30-44 mL/min/1 73 square meters)    Stage 4 Severe CKD (GFR = 15-29 mL/min/1 73 square meters)    Stage 5 End Stage CKD (GFR <15 mL/min/1 73 square meters)  Note: GFR calculation is accurate only with a steady state creatinine    Magnesium [[id number]]  (Normal) Collected: 12/27/21 0734    Lab Status: Final result Specimen: Blood from Arm, Left Updated: 12/27/21 0816     Magnesium 1 9 mg/dL     Sedimentation rate, automated [[id number]]  (Abnormal) Collected: 12/27/21 0734    Lab Status: Final result Specimen: Blood from Arm, Left Updated: 12/27/21 0808     Sed Rate 39 mm/hour     Protime-INR [[id number]]  (Normal) Collected: 12/27/21 0734    Lab Status: Final result Specimen: Blood from Arm, Left Updated: 12/27/21 0808     Protime 14 0 seconds      INR 1 09    APTT [[id number]]  (Normal) Collected: 12/27/21 0734    Lab Status: Final result Specimen: Blood from Arm, Left Updated: 12/27/21 0808     PTT 31 seconds     CBC and differential [[id number]]  (Abnormal) Collected: 12/27/21 0734    Lab Status: Final result Specimen: Blood from Arm, Left Updated: 12/27/21 0748     WBC 16 63 Thousand/uL      RBC 4 52 Million/uL      Hemoglobin 12 5 g/dL      Hematocrit 39 5 %      MCV 87 fL      MCH 27 7 pg      MCHC 31 6 g/dL      RDW 18 6 %      MPV 10 1 fL      Platelets 503 Thousands/uL      nRBC 0 /100 WBCs      Neutrophils Relative 70 %      Immat GRANS % 1 %      Lymphocytes Relative 19 %      Monocytes Relative 7 %      Eosinophils Relative 2 %      Basophils Relative 1 %      Neutrophils Absolute 11 62 Thousands/µL      Immature Grans Absolute 0 24 Thousand/uL      Lymphocytes Absolute 3 09 Thousands/µL      Monocytes Absolute 1 17 Thousand/µL      Eosinophils Absolute 0 40 Thousand/µL      Basophils Absolute 0 11 Thousands/µL                  VAS lower limb venous duplex study, unilateral/limited   Final Result by Soo Ruiz MD (12/27 1401)      XR ankle 3+ views RIGHT   ED Interpretation by Jose De Jesus Fleming DO (12/27 2030)   No acute osseous abnormality       Final Result by Bridgette Miller MD (12/27 1001)   No acute osseous abnormality  Workstation performed: [id number]         XR tibia fibula 2 views RIGHT   ED Interpretation by Jose De Jesus Fleming DO (12/27 5661)   No acute osseous abnormality       Final Result by Tracey Hernandes MD (12/27 1407)      No acute osseous abnormality  Workstation performed: [id number]                    Procedures  Procedures         ED Course  ED Course as of 12/27/21 2156   Mon Dec 27, 2021   8152 Pt seen and evaluated  Here for right lateral lower leg and ankle pain and swelling since Saturday 1838 Sign out:     D/w Dr Pratima Bautista  Will f/u on Labs, xrays and pt's clinical status and assist w/ disposition                                SBIRT 22yo+      Most Recent Value   SBIRT (23 yo +)    In order to provide better care to our patients, we are screening all of our patients for alcohol and drug use  Would it be okay to ask you these screening questions? Yes Filed at: 12/27/2021 6290   Initial Alcohol Screen: US AUDIT-C     1  How often do you have a drink containing alcohol? 1 Filed at: 12/27/2021 0637   2   How many drinks containing alcohol do you have on a typical day you are drinking? 1 Filed at: 12/27/2021 0637   3b  FEMALE Any Age, or MALE 65+: How often do you have 4 or more drinks on one occassion? 0 Filed at: 12/27/2021 6924   Audit-C Score 2 Filed at: 12/27/2021 1007   NELIA: How many times in the past year have you    Used an illegal drug or used a prescription medication for non-medical reasons? Never Filed at: 12/27/2021 9313                    MDM    Disposition  Final diagnoses:   Cellulitis   Pain of right lower leg     Time reflects when diagnosis was documented in both MDM as applicable and the Disposition within this note     Time User Action Codes Description Comment    12/27/2021 10:13 AM Christopher Charleston Add [L03 90] Cellulitis     12/27/2021 10:13 AM Christopher Shaka Add [Z74 632] Pain of right lower leg       ED Disposition     ED Disposition Condition Date/Time Comment    Discharge Stable Mon Dec 27, 2021 10:13 AM Luisa Sepulveda discharge to home/self care  Follow-up Information     Follow up With Specialties Details Why Contact Info Additional [address] [facility], DO Family Medicine Schedule an appointment as soon as possible for a visit in 3 days For follow up of symptoms [address] [address]  [phone]       [facility] Emergency Medicine Go to  If symptoms worsen [address]  [place] [id number]  [facility] Emergency Department, [address] [facility] Rocio Swanson [address] [facility] [place] Orthopedic Surgery Schedule an appointment as soon as possible for a visit  For follow up of R ankle pain [address] [phone]  [address] [facility] [place], [address] [place], [place], Gricelda Ramírez          Discharge Medication List as of 12/27/2021 10:14 AM      START taking these medications    Details cephalexin (KEFLEX) 500 mg capsule Take 1 capsule (500 mg total) by mouth every 6 (six) hours for 7 days, Starting Mon 12/27/2021, Until Mon 1/3/2022, Normal         CONTINUE these medications which have NOT CHANGED    Details   albuterol (PROVENTIL HFA,VENTOLIN HFA) 90 mcg/act inhaler Inhale 2 puffs every 4 (four) hours as needed for wheezing or shortness of breath, Starting Thu 4/1/2021, Normal      ARIPiprazole (ABILIFY) 10 mg tablet Take 1 tablet (10 mg total) by mouth daily, Starting Mon 9/13/2021, Normal      aspirin 81 mg chewable tablet Chew 1 tablet (81 mg total) daily, Starting Fri 4/2/2021, Normal      atorvastatin (LIPITOR) 80 mg tablet Take 1 tablet (80 mg total) by mouth every evening, Starting Mon 4/19/2021, Normal      Diclofenac Sodium (VOLTAREN) 1 % Apply 2 g topically 4 (four) times a day, Starting Mon 8/9/2021, Normal      ergocalciferol (VITAMIN D2) 50,000 units Take 1 capsule (50,000 Units total) by mouth once a week, Starting Thu 9/30/2021, Normal      famotidine (PEPCID) 40 MG tablet Take 1 tablet (40 mg total) by mouth daily, Starting Thu 9/16/2021, Normal      furosemide (Lasix) 20 mg tablet Take 20 mg by mouth 2 (two) times a day, Historical Med      LORazepam (ATIVAN) 0 5 mg tablet Take 1 tablet (0 5 mg total) by mouth 2 (two) times a day as needed for anxiety, Starting Fri 11/26/2021, Normal      metoprolol tartrate (LOPRESSOR) 50 mg tablet Take 1 tablet (50 mg total) by mouth 2 (two) times a day, Starting Tue 9/21/2021, Until Sun 3/20/2022, Normal      nitroglycerin (NITROSTAT) 0 4 mg SL tablet Place 1 tablet (0 4 mg total) under the tongue every 5 (five) minutes as needed for chest pain, Starting Thu 10/14/2021, Normal      Nutritional Supplements (COLD AND FLU PO) Take 1 Dose by mouth daily as needed, Historical Med      omeprazole (PriLOSEC) 20 mg delayed release capsule Take 1 capsule (20 mg total) by mouth 2 (two) times a day, Starting Tue 9/28/2021, Normal      !! sertraline (ZOLOFT) 100 mg tablet Take 1 tablet (100 mg total) by mouth daily, Starting Mon 9/13/2021, Normal      !! sertraline (Zoloft) 50 mg tablet Take 1 tablet (50 mg total) by mouth daily, Starting Thu 12/9/2021, Normal      ticagrelor (BRILINTA) 90 MG Take 1 tablet (90 mg total) by mouth every 12 (twelve) hours, Starting Wed 9/29/2021, Until Mon 3/28/2022, Normal      traZODone (DESYREL) 50 mg tablet Take 1 tablet (50 mg total) by mouth daily at bedtime, Starting Thu 12/9/2021, Normal       !! - Potential duplicate medications found  Please discuss with provider  No discharge procedures on file      PDMP Review       Value Time User    PDMP Reviewed  Yes 11/26/2021 11:34 AM Reanna Nuñez DO          ED Provider  Electronically Signed by           Yamilet Santillan MD  12/27/21   Theo Andino MD  12/27/21 2933

## 2022-01-04 ENCOUNTER — TELEPHONE (OUTPATIENT)
Dept: OBGYN CLINIC | Facility: CLINIC | Age: 47
End: 2022-01-04

## 2022-01-12 ENCOUNTER — APPOINTMENT (OUTPATIENT)
Dept: RADIOLOGY | Facility: CLINIC | Age: 47
End: 2022-01-12
Payer: COMMERCIAL

## 2022-01-12 ENCOUNTER — OFFICE VISIT (OUTPATIENT)
Dept: OBGYN CLINIC | Facility: CLINIC | Age: 47
End: 2022-01-12
Payer: COMMERCIAL

## 2022-01-12 VITALS
SYSTOLIC BLOOD PRESSURE: 121 MMHG | HEIGHT: 66 IN | HEART RATE: 103 BPM | DIASTOLIC BLOOD PRESSURE: 80 MMHG | WEIGHT: 273 LBS | BODY MASS INDEX: 43.87 KG/M2

## 2022-01-12 DIAGNOSIS — M25.561 RIGHT KNEE PAIN, UNSPECIFIED CHRONICITY: Primary | ICD-10-CM

## 2022-01-12 DIAGNOSIS — M25.561 RIGHT KNEE PAIN, UNSPECIFIED CHRONICITY: ICD-10-CM

## 2022-01-12 PROCEDURE — 99203 OFFICE O/P NEW LOW 30 MIN: CPT | Performed by: ORTHOPAEDIC SURGERY

## 2022-01-12 PROCEDURE — 20610 DRAIN/INJ JOINT/BURSA W/O US: CPT | Performed by: ORTHOPAEDIC SURGERY

## 2022-01-12 PROCEDURE — 73562 X-RAY EXAM OF KNEE 3: CPT

## 2022-01-12 RX ORDER — BUPIVACAINE HYDROCHLORIDE 5 MG/ML
6 INJECTION, SOLUTION EPIDURAL; INTRACAUDAL
Status: COMPLETED | OUTPATIENT
Start: 2022-01-12 | End: 2022-01-12

## 2022-01-12 RX ORDER — TRIAMCINOLONE ACETONIDE 40 MG/ML
80 INJECTION, SUSPENSION INTRA-ARTICULAR; INTRAMUSCULAR
Status: COMPLETED | OUTPATIENT
Start: 2022-01-12 | End: 2022-01-12

## 2022-01-12 RX ADMIN — BUPIVACAINE HYDROCHLORIDE 6 ML: 5 INJECTION, SOLUTION EPIDURAL; INTRACAUDAL at 11:14

## 2022-01-12 RX ADMIN — TRIAMCINOLONE ACETONIDE 80 MG: 40 INJECTION, SUSPENSION INTRA-ARTICULAR; INTRAMUSCULAR at 11:14

## 2022-01-20 NOTE — PROGRESS NOTES
[facility] remote check pacer  1 NSVT 1 second  Normal battery function        Current Outpatient Medications:     albuterol (PROVENTIL HFA,VENTOLIN HFA) 90 mcg/act inhaler, Inhale 2 puffs every 4 (four) hours as needed for wheezing or shortness of breath, Disp: 1 Inhaler, Rfl: 0    ARIPiprazole (ABILIFY) 10 mg tablet, Take 1 tablet (10 mg total) by mouth daily, Disp: 90 tablet, Rfl: 2    aspirin 81 mg chewable tablet, Chew 1 tablet (81 mg total) daily, Disp: 90 tablet, Rfl: 0    atorvastatin (LIPITOR) 80 mg tablet, Take 1 tablet (80 mg total) by mouth every evening, Disp: 90 tablet, Rfl: 0    Diclofenac Sodium (VOLTAREN) 1 %, Apply 2 g topically 4 (four) times a day, Disp: 150 g, Rfl: 2    ergocalciferol (VITAMIN D2) 50,000 units, Take 1 capsule (50,000 Units total) by mouth once a week, Disp: 12 capsule, Rfl: 0    famotidine (PEPCID) 40 MG tablet, Take 1 tablet (40 mg total) by mouth daily, Disp: 30 tablet, Rfl: 5    furosemide (Lasix) 20 mg tablet, Take 20 mg by mouth 2 (two) times a day (Patient not taking: Reported on 12/9/2021 ), Disp: , Rfl:     LORazepam (ATIVAN) 0 5 mg tablet, Take 1 tablet (0 5 mg total) by mouth 2 (two) times a day as needed for anxiety, Disp: 60 tablet, Rfl: 0    metoprolol tartrate (LOPRESSOR) 50 mg tablet, Take 1 tablet (50 mg total) by mouth 2 (two) times a day, Disp: 270 tablet, Rfl: 1    nitroglycerin (NITROSTAT) 0 4 mg SL tablet, Place 1 tablet (0 4 mg total) under the tongue every 5 (five) minutes as needed for chest pain, Disp: 25 tablet, Rfl: 5    Nutritional Supplements (COLD AND FLU PO), Take 1 Dose by mouth daily as needed, Disp: , Rfl:     omeprazole (PriLOSEC) 20 mg delayed release capsule, Take 1 capsule (20 mg total) by mouth 2 (two) times a day, Disp: 60 capsule, Rfl: 0    sertraline (ZOLOFT) 100 mg tablet, Take 1 tablet (100 mg total) by mouth daily, Disp: 30 tablet, Rfl: 5    sertraline (Zoloft) 50 mg tablet, Take 1 tablet (50 mg total) by mouth daily, Disp: 30 tablet, Rfl: 5    ticagrelor (BRILINTA) 90 MG, Take 1 tablet (90 mg total) by mouth every 12 (twelve) hours, Disp: 180 tablet, Rfl: 1    traZODone (DESYREL) 50 mg tablet, Take 1 tablet (50 mg total) by mouth daily at bedtime, Disp: 30 tablet, Rfl: 5

## 2022-01-27 ENCOUNTER — OFFICE VISIT (OUTPATIENT)
Dept: FAMILY MEDICINE CLINIC | Facility: CLINIC | Age: 47
End: 2022-01-27
Payer: COMMERCIAL

## 2022-01-27 VITALS
DIASTOLIC BLOOD PRESSURE: 80 MMHG | HEIGHT: 66 IN | BODY MASS INDEX: 44.06 KG/M2 | HEART RATE: 98 BPM | OXYGEN SATURATION: 100 % | SYSTOLIC BLOOD PRESSURE: 146 MMHG | TEMPERATURE: 98.5 F

## 2022-01-27 DIAGNOSIS — Q87.81 ALPORT SYNDROME: ICD-10-CM

## 2022-01-27 DIAGNOSIS — N18.30 BENIGN HYPERTENSION WITH CKD (CHRONIC KIDNEY DISEASE) STAGE III (HCC): ICD-10-CM

## 2022-01-27 DIAGNOSIS — I25.10 CORONARY ARTERY DISEASE INVOLVING NATIVE CORONARY ARTERY OF NATIVE HEART, UNSPECIFIED WHETHER ANGINA PRESENT: ICD-10-CM

## 2022-01-27 DIAGNOSIS — G47.33 OSA (OBSTRUCTIVE SLEEP APNEA): ICD-10-CM

## 2022-01-27 DIAGNOSIS — F33.1 MODERATE EPISODE OF RECURRENT MAJOR DEPRESSIVE DISORDER (HCC): Primary | ICD-10-CM

## 2022-01-27 DIAGNOSIS — M17.11 PRIMARY OSTEOARTHRITIS OF RIGHT KNEE: ICD-10-CM

## 2022-01-27 DIAGNOSIS — F40.10 SOCIAL ANXIETY DISORDER: ICD-10-CM

## 2022-01-27 DIAGNOSIS — I12.9 BENIGN HYPERTENSION WITH CKD (CHRONIC KIDNEY DISEASE) STAGE III (HCC): ICD-10-CM

## 2022-01-27 DIAGNOSIS — Z23 ENCOUNTER FOR IMMUNIZATION: ICD-10-CM

## 2022-01-27 DIAGNOSIS — I10 ESSENTIAL HYPERTENSION: ICD-10-CM

## 2022-01-27 DIAGNOSIS — E78.2 MIXED HYPERLIPIDEMIA: ICD-10-CM

## 2022-01-27 DIAGNOSIS — M54.16 LUMBAR RADICULOPATHY, ACUTE: ICD-10-CM

## 2022-01-27 PROCEDURE — 99215 OFFICE O/P EST HI 40 MIN: CPT | Performed by: FAMILY MEDICINE

## 2022-01-27 RX ORDER — PREDNISONE 10 MG/1
TABLET ORAL
Qty: 21 TABLET | Refills: 0 | OUTPATIENT
Start: 2022-01-27 | End: 2022-05-18

## 2022-01-27 RX ORDER — METHOCARBAMOL 500 MG/1
500 TABLET, FILM COATED ORAL 3 TIMES DAILY
Qty: 30 TABLET | Refills: 1 | Status: SHIPPED | OUTPATIENT
Start: 2022-01-27 | End: 2022-05-23 | Stop reason: ALTCHOICE

## 2022-01-27 RX ORDER — TRAMADOL HYDROCHLORIDE 50 MG/1
50 TABLET ORAL EVERY 8 HOURS PRN
Qty: 20 TABLET | Refills: 0 | Status: SHIPPED | OUTPATIENT
Start: 2022-01-27 | End: 2022-03-17 | Stop reason: ALTCHOICE

## 2022-01-27 NOTE — PROGRESS NOTES
Assessment/Plan:    No problem-specific Assessment & Plan notes found for this encounter  Diagnoses and all orders for this visit:    Moderate episode of recurrent major depressive disorder ([address] )  Comments:  Pending psych appt, mild improvment on increased zoloft    Social anxiety disorder    Lumbar radiculopathy, acute  Comments:  NO NSAID's  Orders:  -     XR spine lumbar minimum 4 views non injury; Future  -     Ambulatory Referral to Physical Therapy; Future  -     methocarbamol (ROBAXIN) 500 mg tablet; Take 1 tablet (500 mg total) by mouth 3 (three) times a day  -     predniSONE 10 mg tablet; Take 6 tablets today, 5 tomorrow, 4 the next day, 3 the next day, 2 the following and 1 the last day with food  -     traMADol (Ultram) 50 mg tablet; Take 1 tablet (50 mg total) by mouth every 8 (eight) hours as needed for moderate pain or severe pain    ELIAZAR (obstructive sleep apnea)  Comments:  Appt sleep medicine in April, Continue CPAP    Benign hypertension with CKD (chronic kidney disease) stage III (HCC)  Comments:  Check BMP just prior to Nephrology appt  Orders:  -     CBC and differential; Future  -     Comprehensive metabolic panel; Future    Coronary artery disease involving native coronary artery of native heart, unspecified whether angina present  Comments:  No anginal symptoms, pending Cardiology appt    Essential hypertension  -     CBC and differential; Future  -     Comprehensive metabolic panel; Future    Alport syndrome  -     Comprehensive metabolic panel; Future    Primary osteoarthritis of right knee    Mixed hyperlipidemia  -     Lipid panel;  Future    Encounter for immunization          PHQ-2/9 Depression Screening    Little interest or pleasure in doing things: 2 - more than half the days  Feeling down, depressed, or hopeless: 2 - more than half the days  Trouble falling or staying asleep, or sleeping too much: 2 - more than half the days  Feeling tired or having little energy: 2 - more than half the days  Poor appetite or overeatin - more than half the days  Feeling bad about yourself - or that you are a failure or have let yourself or your family down: 2 - more than half the days  Trouble concentrating on things, such as reading the newspaper or watching television: 2 - more than half the days  Moving or speaking so slowly that other people could have noticed  Or the opposite - being so fidgety or restless that you have been moving around a lot more than usual: 2 - more than half the days  Thoughts that you would be better off dead, or of hurting yourself in some way: 2 - more than half the days  PHQ-9 Score: 18   PHQ-9 Interpretation: Moderately severe depression             Subjective:      Patient ID: Katy Vergara is a 42 y o  female  Patient presents for follow up  She saw orthopedics and had a CSI of the right knee due to OA  She will see Dr Michael Marin in follow up 3/16  She has a hx of Alport syndrome and CKD, a BMP  Was ordered last visit but was not done, she has a follow up with nephrology on 2/3  I increased her zoloft at the last visit which she noted a mild improvement, she has an appt with psychiatry 3/17  She has a significant cardiac hx and will follow up with cardiology 3/22, she denies Chest pain or anginal symptoms  She has a hx of ELIAZAR and has a follow up with sleep medicine on   She reports 4 days of hot sharp right side low back pain radiating down the right leg which is constant, no trauma or known inciting event  Pain is worse with walking, improves with sitting still  Laying down also makes it better, she is using a heating pad which helps  The following portions of the patient's history were reviewed and updated as appropriate: allergies, current medications, past family history, past medical history, past social history, past surgical history and problem list     Review of Systems   Constitutional: Negative for chills, fatigue and fever  HENT: Negative  Eyes: Negative  Negative for visual disturbance  Respiratory: Negative for cough, chest tightness, shortness of breath and wheezing  Cardiovascular: Negative for chest pain and palpitations  Gastrointestinal: Negative for abdominal pain, blood in stool, constipation, diarrhea, nausea and vomiting  Endocrine: Negative  Genitourinary: Negative for difficulty urinating and dysuria  Musculoskeletal: Positive for arthralgias and back pain  Negative for myalgias  Skin: Negative  Allergic/Immunologic: Negative  Neurological: Negative for dizziness, seizures, syncope, light-headedness and headaches  Hematological: Negative for adenopathy  Psychiatric/Behavioral: Positive for dysphoric mood  The patient is nervous/anxious  Objective:    /80 (BP Location: Left arm, Patient Position: Sitting)   Pulse 98   Temp 98 5 °F (36 9 °C)   Ht 5' 6" (1 676 m)   LMP 01/20/2022 (Approximate)   SpO2 100%   BMI 44 06 kg/m²      Physical Exam  Vitals and nursing note reviewed  Constitutional:       General: She is not in acute distress  Appearance: Normal appearance  She is not ill-appearing or diaphoretic  HENT:      Head: Normocephalic and atraumatic  Eyes:      Conjunctiva/sclera: Conjunctivae normal    Cardiovascular:      Rate and Rhythm: Normal rate and regular rhythm  Heart sounds: Normal heart sounds  No murmur heard  Pulmonary:      Effort: Pulmonary effort is normal  No respiratory distress  Breath sounds: Normal breath sounds  No wheezing, rhonchi or rales  Abdominal:      General: There is no distension  Palpations: Abdomen is soft  There is no mass  Tenderness: There is no abdominal tenderness  There is no guarding or rebound  Musculoskeletal:         General: Tenderness present  Cervical back: Normal range of motion and neck supple  Right lower leg: No edema  Left lower leg: No edema  Comments: Quite TTP R   Distal PVM with muscle spasm  R  LES 4/5 due to pain  NSI   Lymphadenopathy:      Cervical: No cervical adenopathy  Neurological:      Mental Status: She is alert and oriented to person, place, and time  Psychiatric:         Mood and Affect: Mood normal          Behavior: Behavior normal          Thought Content:  Thought content normal          Judgment: Judgment normal

## 2022-01-27 NOTE — PATIENT INSTRUCTIONS
F/U 8 weeks assess response of back pain to PT and review labs and specialist visits  Total time spent this visit 30 minutes with >50% spent counseling

## 2022-02-03 ENCOUNTER — TELEPHONE (OUTPATIENT)
Dept: NEPHROLOGY | Facility: CLINIC | Age: 47
End: 2022-02-03

## 2022-02-03 NOTE — LETTER
February 3, 2022     Luisa Sepulveda      Dear Ms  Jordan Claudy: We are sorry that you missed your appointment with Kameron Jarrell on 2/3/2022  Your health and follow-up medical care are important to us  Please call our office as soon as possible so that we may reschedule your appointment  If you have already rescheduled your appointment, please disregard this letter           Sincerely,        [facility] - [address]

## 2022-02-21 ENCOUNTER — HOSPITAL ENCOUNTER (OUTPATIENT)
Facility: HOSPITAL | Age: 47
Setting detail: OBSERVATION
Discharge: HOME | End: 2022-02-22
Attending: EMERGENCY MEDICINE | Admitting: INTERNAL MEDICINE
Payer: COMMERCIAL

## 2022-02-21 ENCOUNTER — APPOINTMENT (EMERGENCY)
Dept: RADIOLOGY | Facility: HOSPITAL | Age: 47
End: 2022-02-21
Payer: COMMERCIAL

## 2022-02-21 DIAGNOSIS — R07.9 CHEST PAIN, UNSPECIFIED TYPE: Primary | ICD-10-CM

## 2022-02-21 DIAGNOSIS — R60.9 EDEMA, UNSPECIFIED TYPE: ICD-10-CM

## 2022-02-21 LAB
2HR DELTA HS TROPONIN: 4 NG/L
4HR DELTA HS TROPONIN: -3 NG/L
ALBUMIN SERPL BCP-MCNC: 3.8 G/DL (ref 3.5–5)
ALP SERPL-CCNC: 75 U/L (ref 34–104)
ALT SERPL W P-5'-P-CCNC: 32 U/L (ref 7–52)
ANION GAP SERPL CALCULATED.3IONS-SCNC: 11 MMOL/L (ref 4–13)
AST SERPL W P-5'-P-CCNC: 18 U/L (ref 13–39)
ATRIAL RATE: 75 BPM
ATRIAL RATE: 80 BPM
ATRIAL RATE: 89 BPM
ATRIAL RATE: 94 BPM
BASOPHILS # BLD MANUAL: 0 THOUSAND/UL (ref 0–0.1)
BASOPHILS NFR MAR MANUAL: 0 % (ref 0–1)
BILIRUB SERPL-MCNC: 0.44 MG/DL (ref 0.2–1)
BNP SERPL-MCNC: 972 PG/ML (ref 1–100)
BUN SERPL-MCNC: 24 MG/DL (ref 5–25)
CALCIUM SERPL-MCNC: 9.7 MG/DL (ref 8.4–10.2)
CARDIAC TROPONIN I PNL SERPL HS: 46 NG/L
CARDIAC TROPONIN I PNL SERPL HS: 49 NG/L
CARDIAC TROPONIN I PNL SERPL HS: 53 NG/L
CHLORIDE SERPL-SCNC: 106 MMOL/L (ref 96–108)
CO2 SERPL-SCNC: 24 MMOL/L (ref 21–32)
CREAT SERPL-MCNC: 1.78 MG/DL (ref 0.6–1.3)
EOSINOPHIL # BLD MANUAL: 0.19 THOUSAND/UL (ref 0–0.4)
EOSINOPHIL NFR BLD MANUAL: 1 % (ref 0–6)
ERYTHROCYTE [DISTWIDTH] IN BLOOD BY AUTOMATED COUNT: 19.9 % (ref 11.6–15.1)
FLUAV RNA RESP QL NAA+PROBE: NEGATIVE
FLUBV RNA RESP QL NAA+PROBE: NEGATIVE
GFR SERPL CREATININE-BSD FRML MDRD: 34 ML/MIN/1.73SQ M
GLUCOSE SERPL-MCNC: 76 MG/DL (ref 65–140)
HCT VFR BLD AUTO: 42.2 % (ref 34.8–46.1)
HGB BLD-MCNC: 13.7 G/DL (ref 11.5–15.4)
INR PPP: 1.04 (ref 0.84–1.19)
LYMPHOCYTES # BLD AUTO: 11 % (ref 14–44)
LYMPHOCYTES # BLD AUTO: 2.1 THOUSAND/UL (ref 0.6–4.47)
MCH RBC QN AUTO: 28.2 PG (ref 26.8–34.3)
MCHC RBC AUTO-ENTMCNC: 32.5 G/DL (ref 31.4–37.4)
MCV RBC AUTO: 87 FL (ref 82–98)
METAMYELOCYTES NFR BLD MANUAL: 1 % (ref 0–1)
MONOCYTES # BLD AUTO: 1.72 THOUSAND/UL (ref 0–1.22)
MONOCYTES NFR BLD: 9 % (ref 4–12)
MYELOCYTES NFR BLD MANUAL: 2 % (ref 0–1)
NEUTROPHILS # BLD MANUAL: 13.96 THOUSAND/UL (ref 1.85–7.62)
NEUTS SEG NFR BLD AUTO: 73 % (ref 43–75)
P AXIS: 30 DEGREES
P AXIS: 38 DEGREES
P AXIS: 40 DEGREES
P AXIS: 43 DEGREES
PLATELET # BLD AUTO: 360 THOUSANDS/UL (ref 149–390)
PLATELET BLD QL SMEAR: ADEQUATE
PMV BLD AUTO: 10 FL (ref 8.9–12.7)
POTASSIUM SERPL-SCNC: 3.7 MMOL/L (ref 3.5–5.3)
PR INTERVAL: 152 MS
PR INTERVAL: 154 MS
PR INTERVAL: 156 MS
PR INTERVAL: 160 MS
PROT SERPL-MCNC: 7.1 G/DL (ref 6.4–8.4)
PROTHROMBIN TIME: 13.5 SECONDS (ref 11.6–14.5)
QRS AXIS: 12 DEGREES
QRS AXIS: 48 DEGREES
QRS AXIS: 49 DEGREES
QRS AXIS: 50 DEGREES
QRSD INTERVAL: 100 MS
QRSD INTERVAL: 108 MS
QRSD INTERVAL: 94 MS
QRSD INTERVAL: 98 MS
QT INTERVAL: 370 MS
QT INTERVAL: 378 MS
QT INTERVAL: 408 MS
QT INTERVAL: 410 MS
QTC INTERVAL: 457 MS
QTC INTERVAL: 459 MS
QTC INTERVAL: 462 MS
QTC INTERVAL: 470 MS
RBC # BLD AUTO: 4.85 MILLION/UL (ref 3.81–5.12)
RBC MORPH BLD: NORMAL
RSV RNA RESP QL NAA+PROBE: NEGATIVE
SARS-COV-2 RNA RESP QL NAA+PROBE: NEGATIVE
SODIUM SERPL-SCNC: 141 MMOL/L (ref 135–147)
T WAVE AXIS: 29 DEGREES
T WAVE AXIS: 36 DEGREES
T WAVE AXIS: 37 DEGREES
T WAVE AXIS: 43 DEGREES
VARIANT LYMPHS # BLD AUTO: 3 %
VENTRICULAR RATE: 75 BPM
VENTRICULAR RATE: 80 BPM
VENTRICULAR RATE: 89 BPM
VENTRICULAR RATE: 94 BPM
WBC # BLD AUTO: 19.13 THOUSAND/UL (ref 4.31–10.16)

## 2022-02-21 PROCEDURE — 36415 COLL VENOUS BLD VENIPUNCTURE: CPT | Performed by: EMERGENCY MEDICINE

## 2022-02-21 PROCEDURE — 99220 PR INITIAL OBSERVATION CARE/DAY 70 MINUTES: CPT | Performed by: INTERNAL MEDICINE

## 2022-02-21 PROCEDURE — 93005 ELECTROCARDIOGRAM TRACING: CPT

## 2022-02-21 PROCEDURE — 96374 THER/PROPH/DIAG INJ IV PUSH: CPT

## 2022-02-21 PROCEDURE — 0241U HB NFCT DS VIR RESP RNA 4 TRGT: CPT | Performed by: EMERGENCY MEDICINE

## 2022-02-21 PROCEDURE — 85007 BL SMEAR W/DIFF WBC COUNT: CPT | Performed by: EMERGENCY MEDICINE

## 2022-02-21 PROCEDURE — 85027 COMPLETE CBC AUTOMATED: CPT | Performed by: EMERGENCY MEDICINE

## 2022-02-21 PROCEDURE — 84484 ASSAY OF TROPONIN QUANT: CPT | Performed by: EMERGENCY MEDICINE

## 2022-02-21 PROCEDURE — 99285 EMERGENCY DEPT VISIT HI MDM: CPT | Performed by: EMERGENCY MEDICINE

## 2022-02-21 PROCEDURE — 80053 COMPREHEN METABOLIC PANEL: CPT | Performed by: EMERGENCY MEDICINE

## 2022-02-21 PROCEDURE — 93010 ELECTROCARDIOGRAM REPORT: CPT | Performed by: INTERNAL MEDICINE

## 2022-02-21 PROCEDURE — 83880 ASSAY OF NATRIURETIC PEPTIDE: CPT | Performed by: EMERGENCY MEDICINE

## 2022-02-21 PROCEDURE — 85610 PROTHROMBIN TIME: CPT | Performed by: EMERGENCY MEDICINE

## 2022-02-21 PROCEDURE — 71045 X-RAY EXAM CHEST 1 VIEW: CPT

## 2022-02-21 PROCEDURE — 84484 ASSAY OF TROPONIN QUANT: CPT | Performed by: INTERNAL MEDICINE

## 2022-02-21 PROCEDURE — 99285 EMERGENCY DEPT VISIT HI MDM: CPT

## 2022-02-21 RX ORDER — FAMOTIDINE 20 MG/1
40 TABLET, FILM COATED ORAL DAILY
Status: DISCONTINUED | OUTPATIENT
Start: 2022-02-22 | End: 2022-02-22 | Stop reason: HOSPADM

## 2022-02-21 RX ORDER — TRAZODONE HYDROCHLORIDE 50 MG/1
50 TABLET ORAL
Status: DISCONTINUED | OUTPATIENT
Start: 2022-02-21 | End: 2022-02-22 | Stop reason: HOSPADM

## 2022-02-21 RX ORDER — FAMOTIDINE 20 MG/1
20 TABLET, FILM COATED ORAL DAILY
Status: DISCONTINUED | OUTPATIENT
Start: 2022-02-21 | End: 2022-02-21

## 2022-02-21 RX ORDER — MORPHINE SULFATE 4 MG/ML
4 INJECTION, SOLUTION INTRAMUSCULAR; INTRAVENOUS ONCE
Status: COMPLETED | OUTPATIENT
Start: 2022-02-21 | End: 2022-02-21

## 2022-02-21 RX ORDER — ALBUTEROL SULFATE 90 UG/1
2 AEROSOL, METERED RESPIRATORY (INHALATION) EVERY 4 HOURS PRN
Status: DISCONTINUED | OUTPATIENT
Start: 2022-02-21 | End: 2022-02-22 | Stop reason: HOSPADM

## 2022-02-21 RX ORDER — HEPARIN SODIUM 5000 [USP'U]/ML
5000 INJECTION, SOLUTION INTRAVENOUS; SUBCUTANEOUS EVERY 8 HOURS SCHEDULED
Status: DISCONTINUED | OUTPATIENT
Start: 2022-02-21 | End: 2022-02-22 | Stop reason: HOSPADM

## 2022-02-21 RX ORDER — NITROGLYCERIN 0.4 MG/1
0.4 TABLET SUBLINGUAL
Status: DISCONTINUED | OUTPATIENT
Start: 2022-02-21 | End: 2022-02-21

## 2022-02-21 RX ORDER — ASPIRIN 81 MG/1
81 TABLET, CHEWABLE ORAL DAILY
Status: DISCONTINUED | OUTPATIENT
Start: 2022-02-21 | End: 2022-02-22 | Stop reason: HOSPADM

## 2022-02-21 RX ORDER — NITROGLYCERIN 0.4 MG/1
0.4 TABLET SUBLINGUAL
Status: DISCONTINUED | OUTPATIENT
Start: 2022-02-21 | End: 2022-02-22 | Stop reason: HOSPADM

## 2022-02-21 RX ORDER — ARIPIPRAZOLE 5 MG/1
10 TABLET ORAL DAILY
Status: DISCONTINUED | OUTPATIENT
Start: 2022-02-21 | End: 2022-02-22 | Stop reason: HOSPADM

## 2022-02-21 RX ORDER — LORAZEPAM 0.5 MG/1
0.5 TABLET ORAL 2 TIMES DAILY PRN
Status: DISCONTINUED | OUTPATIENT
Start: 2022-02-21 | End: 2022-02-22 | Stop reason: HOSPADM

## 2022-02-21 RX ORDER — NICOTINE 21 MG/24HR
1 PATCH, TRANSDERMAL 24 HOURS TRANSDERMAL DAILY
Status: DISCONTINUED | OUTPATIENT
Start: 2022-02-21 | End: 2022-02-22 | Stop reason: HOSPADM

## 2022-02-21 RX ORDER — PANTOPRAZOLE SODIUM 40 MG/1
40 TABLET, DELAYED RELEASE ORAL
Status: DISCONTINUED | OUTPATIENT
Start: 2022-02-22 | End: 2022-02-22 | Stop reason: HOSPADM

## 2022-02-21 RX ORDER — ATORVASTATIN CALCIUM 40 MG/1
80 TABLET, FILM COATED ORAL EVERY EVENING
Status: DISCONTINUED | OUTPATIENT
Start: 2022-02-21 | End: 2022-02-22 | Stop reason: HOSPADM

## 2022-02-21 RX ORDER — METOPROLOL TARTRATE 50 MG/1
50 TABLET, FILM COATED ORAL 2 TIMES DAILY
Status: DISCONTINUED | OUTPATIENT
Start: 2022-02-21 | End: 2022-02-22

## 2022-02-21 RX ADMIN — TICAGRELOR 90 MG: 90 TABLET ORAL at 20:16

## 2022-02-21 RX ADMIN — LORAZEPAM 0.5 MG: 0.5 TABLET ORAL at 17:24

## 2022-02-21 RX ADMIN — ATORVASTATIN CALCIUM 80 MG: 40 TABLET, FILM COATED ORAL at 17:24

## 2022-02-21 RX ADMIN — TRAZODONE HYDROCHLORIDE 50 MG: 50 TABLET ORAL at 21:16

## 2022-02-21 RX ADMIN — NITROGLYCERIN 0.5 INCH: 20 OINTMENT TOPICAL at 15:08

## 2022-02-21 RX ADMIN — NITROGLYCERIN 0.4 MG: 0.4 TABLET SUBLINGUAL at 14:15

## 2022-02-21 RX ADMIN — SERTRALINE HYDROCHLORIDE 100 MG: 50 TABLET ORAL at 17:36

## 2022-02-21 RX ADMIN — MORPHINE SULFATE 4 MG: 4 INJECTION INTRAVENOUS at 14:09

## 2022-02-21 RX ADMIN — NICOTINE 1 PATCH: 21 PATCH, EXTENDED RELEASE TRANSDERMAL at 17:23

## 2022-02-21 RX ADMIN — NITROGLYCERIN 0.4 MG: 0.4 TABLET SUBLINGUAL at 16:40

## 2022-02-21 RX ADMIN — SERTRALINE HYDROCHLORIDE 50 MG: 50 TABLET ORAL at 17:36

## 2022-02-21 RX ADMIN — METOPROLOL TARTRATE 50 MG: 50 TABLET ORAL at 17:24

## 2022-02-21 RX ADMIN — NITROGLYCERIN 0.4 MG: 0.4 TABLET SUBLINGUAL at 14:08

## 2022-02-21 RX ADMIN — MORPHINE SULFATE 4 MG: 4 INJECTION INTRAVENOUS at 17:44

## 2022-02-21 RX ADMIN — ARIPIPRAZOLE 10 MG: 5 TABLET ORAL at 17:35

## 2022-02-21 RX ADMIN — HEPARIN SODIUM 5000 UNITS: 5000 INJECTION INTRAVENOUS; SUBCUTANEOUS at 17:25

## 2022-02-21 NOTE — ASSESSMENT & PLAN NOTE
Patient has history of inferior STEMI status post PCI to RCA    · Cardiology consult as above  · Continue Brilinta and Aspirin  · Beta-blocker and statin therapy  · Outpatient follow-up with Cardiology

## 2022-02-21 NOTE — Clinical Note
Case was discussed with agustina and the patient's admission status was agreed to be Admission Status: observation status to the service of Dr Santy Mcqueen

## 2022-02-21 NOTE — ASSESSMENT & PLAN NOTE
States she smokes approximately 1 pack of cigarettes weekly    · Encouraged tobacco abuse cessation  · Nicotine patch

## 2022-02-21 NOTE — ASSESSMENT & PLAN NOTE
Lab Results   Component Value Date    EGFR 34 02/21/2022    EGFR 40 12/27/2021    EGFR 40 10/09/2021    CREATININE 1 78 (H) 02/21/2022    CREATININE 1 56 (H) 12/27/2021    CREATININE 1 58 (H) 10/09/2021   Unclear baseline  Likely secondary to Alport syndrome  Creatinine on presentation does not appear to be far off from patient's apparent baseline creatinine    · Trend BMP  · Avoid nephrotoxic agents

## 2022-02-21 NOTE — H&P
[address] 47  H&P- Gin  1975, 42 y o  female MRN: [id number]  Unit/Bed#: ED 21 Encounter: [id number]  Primary Care Provider: Reanna Nuñez DO   Date and time admitted to hospital: 2/21/2022  1:45 PM    * Chest pain  Assessment & Plan  Patient presents with complaints of substernal chest pain radiating to the back  DALILA score 4  Significant history of inferior STEMI status post PCI in March of 2021  Endorses compliance with home DAPT  States her pain does feel similar to prior ischemic event however is not experiencing diaphoresis or shortness of breath  States her symptoms may be related to anxiety as she often times does not leave the house  High sensitivity troponin I within normal limits  EKG with no signs of active ischemia  · Admit to observation    · Continue home Brilinta, Aspirin, Beta-blocker, Statin  · Serial HS troponin I and EKGs  · Cardiology consult ; appreciate recommendations  · Cardiac diet  · Monitor on telemetry  · Nitroglycerin for chest pain    Major depressive disorder with current active episode  Assessment & Plan  Stable  Present on admission  States that her 25year-old son passed away and she has since been depressed  · Continue home Abilify, Sertraline, Trazodone  · Monitor mental status    Essential hypertension  Assessment & Plan  Stable  Present on admission  · Continue home beta-blocker as above  · Monitor blood pressures      CKD (chronic kidney disease) stage 4, GFR 15-29 ml/min [facility])  Assessment & Plan  Lab Results   Component Value Date    EGFR 34 02/21/2022    EGFR 40 12/27/2021    EGFR 40 10/09/2021    CREATININE 1 78 (H) 02/21/2022    CREATININE 1 56 (H) 12/27/2021    CREATININE 1 58 (H) 10/09/2021   Unclear baseline  Likely secondary to Alport syndrome  Creatinine on presentation does not appear to be far off from patient's apparent baseline creatinine    · Trend BMP  · Avoid nephrotoxic agents    Tobacco abuse  Assessment & Plan  States she smokes approximately 1 pack of cigarettes weekly  · Encouraged tobacco abuse cessation  · Nicotine patch    History of MI (myocardial infarction)  Assessment & Plan  Patient has history of inferior STEMI status post PCI to RCA  · Cardiology consult as above  · Continue Brilinta and Aspirin  · Beta-blocker and statin therapy  · Outpatient follow-up with Cardiology    Leukocytosis  Assessment & Plan  Patient with a history of non-Hodgkin's lymphoma which likely contributes to her chronic leukocytosis  No signs of active infection  Denies cough, urinary frequency/burning  No signs of skin and soft tissue infection  · Trend fever curve and WBC count  · Outpatient follow-up with Oncology      VTE Prophylaxis:  Heparin  Code Status: Level 1 Full Code    Anticipated Length of Stay:  Patient will be admitted on an Observation basis with an anticipated length of stay of  2 midnights  Justification for Hospital Stay:  Chest pain    Total Time for Visit, including Counseling / Coordination of Care: 60 minutes  Greater than 50% of this total time spent on direct patient counseling and coordination of care  Chief Complaint:  Chest pain      History of Present Illness:    Napoleon Maher is a 42 y o  female with a past medical history significant for coronary artery disease status post PCI to RCA compliant with dual antiplatelet therapy, morbid obesity, Alport syndrome, depression who presents with complaints of chest pain  the patient states that the pain is similar to her prior myocardial infarction  She denies any associated symptomatology other than chest pain which radiates to her back  It is not reproducible  It was partially relieved by nitroglycerin  In the ED, all vital signs were found to be stable  HS troponin I within normal limits  EKG with no signs of active ischemia  Review of Systems:    Review of Systems   Constitutional: Negative for chills and fever     HENT: Negative for ear pain and sore throat  Eyes: Negative for pain and visual disturbance  Respiratory: Positive for chest tightness  Negative for cough and shortness of breath  Cardiovascular: Negative for chest pain and palpitations  Gastrointestinal: Negative for abdominal pain and vomiting  Genitourinary: Negative for dysuria and hematuria  Musculoskeletal: Negative for arthralgias and back pain  Skin: Negative for color change and rash  Neurological: Negative for seizures and syncope  All other systems reviewed and are negative  Past Medical and Surgical History:     Past Medical History:   Diagnosis Date    Acute MI ([facility] [address] )     Anxiety     CAD (coronary artery disease)     GREG mid RCA and GREG right PDA 3/25/2021    [facility]     [facility]est[facility]) [facility]     Chronic kidney disease     COPD (chronic obstructive pulmonary disease) ([facility])     scheduled for sleep apnea test soon after pacemaker implant    Depression     History of chemotherapy     non hodgkins lymphoma     Hypertension     Lymphoma, non-Hodgkin's (HCC)     Myocardial infarction ([facility] [address] )     SSS (sick sinus syndrome) ([facility] )     s/p medtronic dual chamber pacemaker 2021    Tobacco abuse     Wears glasses        Past Surgical History:   Procedure Laterality Date    CARDIAC PACEMAKER PLACEMENT Left 2021    Procedure: DUAL LEAD PACEMAKER IMPLANT;  Surgeon: Sarmad Zhang MD;  Location: [facility] MAIN OR;  Service: Cardiology    CAROTID STENT       SECTION      CORONARY ANGIOPLASTY WITH STENT PLACEMENT  2021    RGEG mid RCA and GREG right PDA    DENTAL SURGERY      IR BIOPSY KIDNEY [place] KIT NO LATERALITY  10/18/2021       Meds/Allergies:    Prior to Admission medications    Medication Sig Start Date End Date Taking?  Authorizing Provider   albuterol (PROVENTIL HFA,VENTOLIN HFA) 90 mcg/act inhaler Inhale 2 puffs every 4 (four) hours as needed for wheezing or shortness of breath 21 Caridad Benedict DO   ARIPiprazole (ABILIFY) 10 mg tablet Take 1 tablet (10 mg total) by mouth daily 9/13/21   Villa Grimm DO   aspirin 81 mg chewable tablet Chew 1 tablet (81 mg total) daily 4/2/21   Caridad Benedict DO   atorvastatin (LIPITOR) 80 mg tablet Take 1 tablet (80 mg total) by mouth every evening 4/19/21   JALIL Alcala   Diclofenac Sodium (VOLTAREN) 1 % Apply 2 g topically 4 (four) times a day 8/9/21   Humera Hernandez DPM   ergocalciferol (VITAMIN D2) 50,000 units Take 1 capsule (50,000 Units total) by mouth once a week 9/30/21   JALIL Norman   famotidine (PEPCID) 40 MG tablet Take 1 tablet (40 mg total) by mouth daily 9/16/21   JALIL Trinidad   furosemide (Lasix) 20 mg tablet Take 20 mg by mouth 2 (two) times a day  Patient not taking: Reported on 12/9/2021     Historical Provider, MD   LORazepam (ATIVAN) 0 5 mg tablet Take 1 tablet (0 5 mg total) by mouth 2 (two) times a day as needed for anxiety 11/26/21   Villa Grimm DO   methocarbamol (ROBAXIN) 500 mg tablet Take 1 tablet (500 mg total) by mouth 3 (three) times a day 1/27/22   Villa Grimm DO   metoprolol tartrate (LOPRESSOR) 50 mg tablet Take 1 tablet (50 mg total) by mouth 2 (two) times a day 9/21/21 3/20/22  JALIL Norman   nitroglycerin (NITROSTAT) 0 4 mg SL tablet Place 1 tablet (0 4 mg total) under the tongue every 5 (five) minutes as needed for chest pain 10/14/21   Radha Brunson MD   Nutritional Supplements (COLD AND FLU PO) Take 1 Dose by mouth daily as needed    Historical Provider, MD   omeprazole (PriLOSEC) 20 mg delayed release capsule Take 1 capsule (20 mg total) by mouth 2 (two) times a day 9/28/21   Roxana Kaur DO   predniSONE 10 mg tablet Take 6 tablets today, 5 tomorrow, 4 the next day, 3 the next day, 2 the following and 1 the last day with food 1/27/22   Villa Grimm DO   sertraline (ZOLOFT) 100 mg tablet Take 1 tablet (100 mg total) by mouth daily 9/13/21   Villa Grimm DO sertraline (Zoloft) 50 mg tablet Take 1 tablet (50 mg total) by mouth daily 21   Abdi West DO   ticagrelor (BRILINTA) 90 MG Take 1 tablet (90 mg total) by mouth every 12 (twelve) hours 9/29/21 3/28/22  Abdi West DO   traMADol (Ultram) 50 mg tablet Take 1 tablet (50 mg total) by mouth every 8 (eight) hours as needed for moderate pain or severe pain 22   Abdi West,    traZODone (DESYREL) 50 mg tablet Take 1 tablet (50 mg total) by mouth daily at bedtime 21   Abdi West DO       Allergies: No Known Allergies    Social History:     Marital Status: /Civil Union   Substance Use History:   Social History     Substance and Sexual Activity   Alcohol Use Yes    Comment: 1-2 times years     Social History     Tobacco Use   Smoking Status Current Every Day Smoker    Packs/day: 1 00    Types: Cigarettes    Last attempt to quit: 3/29/2021    Years since quittin 9   Smokeless Tobacco Current User   Tobacco Comment    1 pk/week per patient      Social History     Substance and Sexual Activity   Drug Use Never       Family History:    Pertinent family history reviewed    Physical Exam:     Vitals:   Blood Pressure: 135/73 (22 1500)  Pulse: 73 (22 1500)  Temperature: 97 5 °F (36 4 °C) (22 1351)  Respirations: 16 (22 1500)  Height: 5' 6" (167 6 cm) (22 1351)  Weight - Scale: 122 kg (270 lb) (22 1351)  SpO2: 97 % (22 1500)    Physical Exam  Vitals and nursing note reviewed  Constitutional:       General: She is not in acute distress  Appearance: She is well-developed  She is obese  HENT:      Head: Normocephalic and atraumatic  Eyes:      Conjunctiva/sclera: Conjunctivae normal    Cardiovascular:      Rate and Rhythm: Normal rate and regular rhythm  Heart sounds: No murmur heard  Pulmonary:      Effort: Pulmonary effort is normal  No respiratory distress  Breath sounds: Normal breath sounds     Abdominal:      Palpations: Abdomen is soft  Tenderness: There is no abdominal tenderness  Musculoskeletal:      Cervical back: Neck supple  Skin:     General: Skin is warm and dry  Neurological:      Mental Status: She is alert  Additional Data:     Lab Results: I have reviewed pertinent results     Results from last 7 days   Lab Units 02/21/22  1358   WBC Thousand/uL 19 13*   HEMOGLOBIN g/dL 13 7   HEMATOCRIT % 42 2   PLATELETS Thousands/uL 360   LYMPHO PCT % 11*   MONO PCT % 9   EOS PCT % 1     Results from last 7 days   Lab Units 02/21/22  1358   SODIUM mmol/L 141   POTASSIUM mmol/L 3 7   CHLORIDE mmol/L 106   CO2 mmol/L 24   BUN mg/dL 24   CREATININE mg/dL 1 78*   ANION GAP mmol/L 11   CALCIUM mg/dL 9 7   ALBUMIN g/dL 3 8   TOTAL BILIRUBIN mg/dL 0 44   ALK PHOS U/L 75   ALT U/L 32   AST U/L 18   GLUCOSE RANDOM mg/dL 76     Results from last 7 days   Lab Units 02/21/22  1359   INR  1 04                   Imaging: I have reviewed pertinent imaging     XR chest 1 view portable   ED Interpretation by Ronald Martin MD (02/21 1442)   Mild overload          EKG, Pathology, and Other Studies Reviewed on Admission:   · EKG: Reviewed     Allscripts / Epic Records Reviewed    ** Please Note: This note has been constructed using a voice recognition system   **

## 2022-02-21 NOTE — ED PROVIDER NOTES
History  Chief Complaint   Patient presents with    Chest Pain     Patient reports shortness of breath and chest pain that started this morning around 0300  Patient reports five previous heart attacks      This is a 81-year-old female complains of chest pain  She has a prior history of multiple MIs requiring stenting  She states this feels identical to previous MIs  She describes it is a crushing substernal pain radiating down the left arm and up to the left side of her jaw  Nothing seems to make it better  Nothing seems to make it worse  She rates it is severe  Associated some shortness of breath  No palpitations and no syncope  Mild nausea  Prior to Admission Medications   Prescriptions Last Dose Informant Patient Reported? Taking?    ARIPiprazole (ABILIFY) 10 mg tablet  Self No No   Sig: Take 1 tablet (10 mg total) by mouth daily   Diclofenac Sodium (VOLTAREN) 1 %  Self No No   Sig: Apply 2 g topically 4 (four) times a day   LORazepam (ATIVAN) 0 5 mg tablet   No No   Sig: Take 1 tablet (0 5 mg total) by mouth 2 (two) times a day as needed for anxiety   Nutritional Supplements (COLD AND FLU PO)   Yes No   Sig: Take 1 Dose by mouth daily as needed   albuterol (PROVENTIL HFA,VENTOLIN HFA) 90 mcg/act inhaler  Self No No   Sig: Inhale 2 puffs every 4 (four) hours as needed for wheezing or shortness of breath   aspirin 81 mg chewable tablet  Self No No   Sig: Chew 1 tablet (81 mg total) daily   atorvastatin (LIPITOR) 80 mg tablet  Self No No   Sig: Take 1 tablet (80 mg total) by mouth every evening   ergocalciferol (VITAMIN D2) 50,000 units   No No   Sig: Take 1 capsule (50,000 Units total) by mouth once a week   famotidine (PEPCID) 40 MG tablet  Self No No   Sig: Take 1 tablet (40 mg total) by mouth daily   furosemide (Lasix) 20 mg tablet   Yes No   Sig: Take 20 mg by mouth 2 (two) times a day   Patient not taking: Reported on 12/9/2021    methocarbamol (ROBAXIN) 500 mg tablet   No No   Sig: Take 1 tablet (500 mg total) by mouth 3 (three) times a day   metoprolol tartrate (LOPRESSOR) 50 mg tablet   No No   Sig: Take 1 tablet (50 mg total) by mouth 2 (two) times a day   nitroglycerin (NITROSTAT) 0 4 mg SL tablet   No No   Sig: Place 1 tablet (0 4 mg total) under the tongue every 5 (five) minutes as needed for chest pain   omeprazole (PriLOSEC) 20 mg delayed release capsule   No No   Sig: Take 1 capsule (20 mg total) by mouth 2 (two) times a day   predniSONE 10 mg tablet   No No   Sig: Take 6 tablets today, 5 tomorrow, 4 the next day, 3 the next day, 2 the following and 1 the last day with food   sertraline (ZOLOFT) 100 mg tablet  Self No No   Sig: Take 1 tablet (100 mg total) by mouth daily   sertraline (Zoloft) 50 mg tablet   No No   Sig: Take 1 tablet (50 mg total) by mouth daily   ticagrelor (BRILINTA) 90 MG   No No   Sig: Take 1 tablet (90 mg total) by mouth every 12 (twelve) hours   traMADol (Ultram) 50 mg tablet   No No   Sig: Take 1 tablet (50 mg total) by mouth every 8 (eight) hours as needed for moderate pain or severe pain   traZODone (DESYREL) 50 mg tablet   No No   Sig: Take 1 tablet (50 mg total) by mouth daily at bedtime      Facility-Administered Medications: None       Past Medical History:   Diagnosis Date    [facility]     Anxiety     CAD (coronary artery disease)     GREG mid RCA and GREG right PDA 3/25/2021    [facility]     CH[facility]congestive [facility] failure) [facility]     Chronic kidney disease     COPD (chronic obstructive pulmonary disease) ([facility])     scheduled for sleep apnea test soon after pacemaker implant    Depression     History of chemotherapy     non hodgkins lymphoma 2008    Hypertension     Lymphoma, non-Hodgkin's ([facility]     Myocardial infarction ([facility] [address] [facility]     SSS (sick sinus syndrome) ([facility])     s/p medtronic dual chamber pacemaker 5/25/2021    Tobacco abuse     Wears glasses        Past Surgical History:   Procedure Laterality Date    CARDIAC PACEMAKER PLACEMENT Left 2021    Procedure: DUAL LEAD PACEMAKER IMPLANT;  Surgeon: Jessica Draper MD;  Location: [address] [place] [address] MAIN OR;  Service: Cardiology    CAROTID STENT       SECTION      CORONARY ANGIOPLASTY WITH STENT PLACEMENT  2021    GREG mid RCA and GREG right PDA    DENTAL SURGERY      IR BIOPSY KIDNEY COLUMBIA KIT NO LATERALITY  10/18/2021       Family History   Problem Relation Age of Onset    No Known Problems Mother     No Known Problems Father     No Known Problems Daughter     Brain cancer Maternal Grandfather     No Known Problems Paternal Aunt     No Known Problems Paternal Aunt      I have reviewed and agree with the history as documented  E-Cigarette/Vaping    E-Cigarette Use Never User      E-Cigarette/Vaping Substances    Nicotine No     THC No     CBD No     Flavoring No     Other No     Unknown No      Social History     Tobacco Use    Smoking status: Current Every Day Smoker     Packs/day: 1 00     Types: Cigarettes     Last attempt to quit: 3/29/2021     Years since quittin 9    Smokeless tobacco: Current User    Tobacco comment: 1 pk/week per patient    Vaping Use    Vaping Use: Never used   Substance Use Topics    Alcohol use: Yes     Comment: 1-2 times years    Drug use: Never       Review of Systems   Constitutional: Negative for activity change, chills, fatigue and fever  HENT: Negative for congestion  Eyes: Negative for visual disturbance  Respiratory: Positive for shortness of breath  Negative for cough  Cardiovascular: Positive for chest pain  Gastrointestinal: Positive for nausea  Negative for abdominal pain, diarrhea and vomiting  Genitourinary: Negative for dysuria  Skin: Negative for rash  Neurological: Negative for dizziness, weakness and numbness  Physical Exam  Physical Exam  Constitutional:       General: She is not in acute distress  Appearance: She is well-developed  She is ill-appearing  She is not diaphoretic     HENT: Head: Normocephalic and atraumatic  Eyes:      Conjunctiva/sclera: Conjunctivae normal       Pupils: Pupils are equal, round, and reactive to light  Cardiovascular:      Rate and Rhythm: Normal rate and regular rhythm  Heart sounds: Normal heart sounds  Pulmonary:      Effort: Pulmonary effort is normal  No respiratory distress  Breath sounds: Normal breath sounds  Abdominal:      General: Bowel sounds are normal       Palpations: Abdomen is soft  Musculoskeletal:         General: Normal range of motion  Cervical back: Normal range of motion and neck supple  Skin:     General: Skin is warm and dry  Capillary Refill: Capillary refill takes less than 2 seconds  Neurological:      Mental Status: She is alert and oriented to person, place, and time     Psychiatric:         Behavior: Behavior normal          Vital Signs  ED Triage Vitals   Temperature Pulse Respirations Blood Pressure SpO2   02/21/22 1351 02/21/22 1351 02/21/22 1351 02/21/22 1351 02/21/22 1351   97 5 °F (36 4 °C) 99 22 (!) 172/82 98 %      Temp src Heart Rate Source Patient Position - Orthostatic VS BP Location FiO2 (%)   -- 02/21/22 1500 -- -- --    Monitor         Pain Score       02/21/22 1351       10 - Worst Possible Pain           Vitals:    02/21/22 1351 02/21/22 1500 02/21/22 1600 02/21/22 1700   BP: (!) 172/82 135/73 148/74 133/76   Pulse: 99 73 78 74         Visual Acuity      ED Medications  Medications   albuterol (PROVENTIL HFA,VENTOLIN HFA) inhaler 2 puff (has no administration in time range)   ARIPiprazole (ABILIFY) tablet 10 mg (has no administration in time range)   aspirin chewable tablet 81 mg (81 mg Oral Not Given 2/21/22 1711)   atorvastatin (LIPITOR) tablet 80 mg (has no administration in time range)   LORazepam (ATIVAN) tablet 0 5 mg (has no administration in time range)   metoprolol tartrate (LOPRESSOR) tablet 50 mg (has no administration in time range)   pantoprazole (PROTONIX) EC tablet 40 mg (has no administration in time range)   sertraline (ZOLOFT) tablet 100 mg (has no administration in time range)   sertraline (ZOLOFT) tablet 50 mg (has no administration in time range)   ticagrelor (BRILINTA) tablet 90 mg (has no administration in time range)   traZODone (DESYREL) tablet 50 mg (has no administration in time range)   nicotine (NICODERM CQ) 21 mg/24 hr TD 24 hr patch 1 patch (has no administration in time range)   heparin (porcine) subcutaneous injection 5,000 Units (has no administration in time range)   nitroglycerin (NITROSTAT) SL tablet 0 4 mg (0 4 mg Sublingual Given 2/21/22 1640)   famotidine (PEPCID) tablet 40 mg (has no administration in time range)   morphine (PF) 4 mg/mL injection 4 mg (4 mg Intravenous Given 2/21/22 1409)   nitroglycerin (NITRO-BID) 2 % TD ointment 0 5 inch (0 5 inches Topical Given 2/21/22 1508)       Diagnostic Studies  Results Reviewed     Procedure Component Value Units Date/Time    HS Troponin I 2hr [[id number]]  (Abnormal) Collected: 02/21/22 1555    Lab Status: Final result Specimen: Blood from Arm, Right Updated: 02/21/22 1625     hs TnI 2hr 53 ng/L      Delta 2hr hsTnI 4 ng/L     COVID/FLU/RSV [[id number]]  (Normal) Collected: 02/21/22 1519    Lab Status: Final result Specimen: Nares from Nose Updated: 02/21/22 1614     SARS-CoV-2 Negative     INFLUENZA A PCR Negative     INFLUENZA B PCR Negative     RSV PCR Negative    Narrative:      FOR PEDIATRIC PATIENTS - copy/paste COVID Guidelines URL to browser: [email or web address]    SARS-CoV-2 assay is a Nucleic Acid Amplification assay intended for the  qualitative detection of nucleic acid from SARS-CoV-2 in nasopharyngeal  swabs  Results are for the presumptive identification of SARS-CoV-2 RNA  Positive results are indicative of infection with SARS-CoV-2, the virus  causing COVID-19, but do not rule out bacterial infection or co-infection  with other viruses  Laboratories within the [place] and its  territories are required to report all positive results to the appropriate  public health authorities  Negative results do not preclude SARS-CoV-2  infection and should not be used as the sole basis for treatment or other  patient management decisions  Negative results must be combined with  clinical observations, patient history, and epidemiological information  This test has not been FDA cleared or approved  This test has been authorized by FDA under an Emergency Use Authorization  (EUA)  This test is only authorized for the duration of time the  declaration that circumstances exist justifying the authorization of the  emergency use of an in vitro diagnostic tests for detection of SARS-CoV-2  virus and/or diagnosis of COVID-19 infection under section 564(b)(1) of  the Act, 21 U  S C  923FGR-6(Z)(4), unless the authorization is terminated  or revoked sooner  The test has been validated but independent review by FDA  and CLIA is pending  Test performed using [employer] GeneXpert: This RT-PCR assay targets N2,  a region unique to SARS-CoV-2  A conserved region in the E-gene was chosen  for pan-Sarbecovirus detection which includes SARS-CoV-2  HS Troponin I 4hr [[id number]]     Lab Status: No result Specimen: Blood     Platelet count [[id number]]     Lab Status: No result Specimen: Blood     CBC and differential [[id number]]  (Abnormal) Collected: 02/21/22 1358    Lab Status: Final result Specimen: Blood from Arm, Right Updated: 02/21/22 1452     WBC 19 13 Thousand/uL      RBC 4 85 Million/uL      Hemoglobin 13 7 g/dL      Hematocrit 42 2 %      MCV 87 fL      MCH 28 2 pg      MCHC 32 5 g/dL      RDW 19 9 %      MPV 10 0 fL      Platelets 735 Thousands/uL     Narrative: This is an appended report  These results have been appended to a previously verified report      Manual Differential(PHLEBS Do Not Order) [[id number]]  (Abnormal) Collected: 02/21/22 1358    Lab Status: Final result Specimen: Blood from Arm, Right Updated: 02/21/22 1452     Segmented % 73 %      Lymphocytes % 11 %      Monocytes % 9 %      Eosinophils, % 1 %      Basophils % 0 %      Metamyelocytes% 1 %      Myelocytes % 2 %      Atypical Lymphocytes % 3 %      Absolute Neutrophils 13 96 Thousand/uL      Lymphocytes Absolute 2 10 Thousand/uL      Monocytes Absolute 1 72 Thousand/uL      Eosinophils Absolute 0 19 Thousand/uL      Basophils Absolute 0 00 Thousand/uL      Total Counted --     RBC Morphology Normal     Platelet Estimate Adequate    HS Troponin 0hr (reflex protocol) [[id number]]  (Normal) Collected: 02/21/22 1358    Lab Status: Final result Specimen: Blood from Arm, Right Updated: 02/21/22 1437     hs TnI 0hr 49 ng/L     B-Type Natriuretic Peptide(BNP) CA, ,  Campuses Only [[id number]]  (Abnormal) Collected: 02/21/22 1358    Lab Status: Final result Specimen: Blood from Arm, Right Updated: 02/21/22 1436      pg/mL     Comprehensive metabolic panel [[id number]]  (Abnormal) Collected: 02/21/22 1358    Lab Status: Final result Specimen: Blood from Arm, Right Updated: 02/21/22 1429     Sodium 141 mmol/L      Potassium 3 7 mmol/L      Chloride 106 mmol/L      CO2 24 mmol/L      ANION GAP 11 mmol/L      BUN 24 mg/dL      Creatinine 1 78 mg/dL      Glucose 76 mg/dL      Calcium 9 7 mg/dL      AST 18 U/L      ALT 32 U/L      Alkaline Phosphatase 75 U/L      Total Protein 7 1 g/dL      Albumin 3 8 g/dL      Total Bilirubin 0 44 mg/dL      eGFR 34 ml/min/1 73sq m     Narrative:      [place] guidelines for Chronic Kidney Disease (CKD):     Stage 1 with normal or high GFR (GFR > 90 mL/min/1 73 square meters)    Stage 2 Mild CKD (GFR = 60-89 mL/min/1 73 square meters)    Stage 3A Moderate CKD (GFR = 45-59 mL/min/1 73 square meters)    Stage 3B Moderate CKD (GFR = 30-44 mL/min/1 73 square meters)    Stage 4 Severe CKD (GFR = 15-29 mL/min/1 73 square meters)    Stage 5 End Stage CKD (GFR <15 mL/min/1 73 square meters)  Note: GFR calculation is accurate only with a steady state creatinine    Protime-INR [[id number]]  (Normal) Collected: 02/21/22 1359    Lab Status: Final result Specimen: Blood from Arm, Right Updated: 02/21/22 1423     Protime 13 5 seconds      INR 1 04                 XR chest 1 view portable   ED Interpretation by Jef Lindsay MD (02/21 1442)   Mild overload      Final Result by Sherrie Mari MD (02/21 1644)      Mildly enlarged cardiac silhouette  Findings suspicious for mild pulmonary vascular congestion  Workstation performed: [id number]                    Procedures  Procedures         ED Course                               SBIRT 20yo+      Most Recent Value   SBIRT (25 yo +)    In order to provide better care to our patients, we are screening all of our patients for alcohol and drug use  Would it be okay to ask you these screening questions? Yes Filed at: 02/21/2022 1417   Initial Alcohol Screen: US AUDIT-C     1  How often do you have a drink containing alcohol? 0 Filed at: 02/21/2022 1417   2  How many drinks containing alcohol do you have on a typical day you are drinking? 0 Filed at: 02/21/2022 1417   3a  Male UNDER 65: How often do you have five or more drinks on one occasion? 0 Filed at: 02/21/2022 1417   3b  FEMALE Any Age, or MALE 65+: How often do you have 4 or more drinks on one occassion? 0 Filed at: 02/21/2022 1417   Audit-C Score 0 Filed at: 02/21/2022 1417   NELIA: How many times in the past year have you    Used an illegal drug or used a prescription medication for non-medical reasons?  Never Filed at: 02/21/2022 1417        DALILA Risk Score      Most Recent Value   Age >= 72 0 Filed at: 02/21/2022 1532   Known CAD (stenosis >= 50%) 1 Filed at: 02/21/2022 1532   Recent (<=24 hrs) Service Angina 1 Filed at: 02/21/2022 1532   ST Deviation >= 0 5 mm 0 Filed at: 02/21/2022 1532   3+ CAD Risk Factors (FHx, HTN, HLP, DM, Smoker) 1 Filed at: 02/21/2022 1532   Aspirin Use Past 7 Days 1 Filed at: 02/21/2022 1532   Elevated Cardiac Markers 0 Filed at: 02/21/2022 1532   DALILA Risk Score (Calculated) 4 Filed at: 02/21/2022 1532                  [facility]  Number of Diagnoses or Management Options  Chest pain, unspecified type: new and requires workup  Diagnosis management comments: This is a 29-year-old female with chest pain  Her story is highly concerning  She has previous history of MIs  Her initial troponin is 1 unit below the high risk threshold  Her EKG is slightly changed from baseline  Case discussed with Dr Juan Alberto Millan of Cardiology  Patient to be admitted for observation  Says is significantly improved at the time of admission  States understanding agreement the plan  Nitro paste in place  Amount and/or Complexity of Data Reviewed  Clinical lab tests: ordered and reviewed  Tests in the radiology section of CPT®: ordered and reviewed  Discuss the patient with other providers: yes  Independent visualization of images, tracings, or specimens: yes        Disposition  Final diagnoses:   Chest pain, unspecified type     Time reflects when diagnosis was documented in both MDM as applicable and the Disposition within this note     Time User Action Codes Description Comment    2/21/2022  3:06 PM Turner Smith Add [R07 9] Chest pain, unspecified type       ED Disposition     ED Disposition Condition Date/Time Comment    Admit Stable Mon Feb 21, 2022 1506 Case was discussed with agustina and the patient's admission status was agreed to be Admission Status: observation status to the service of Dr Gretchen Serna   Follow-up Information    None         Patient's Medications   Discharge Prescriptions    No medications on file       No discharge procedures on file      PDMP Review       Value Time User    PDMP Reviewed  Yes 1/27/2022 11:50 AM Jc Farooq DO          ED Provider  Electronically Signed by           Ming Bejarano MD  02/21/22 5139

## 2022-02-21 NOTE — ASSESSMENT & PLAN NOTE
Stable  Present on admission  States that her 25year-old son passed away and she has since been depressed    · Continue home Abilify, Sertraline, Trazodone  · Monitor mental status

## 2022-02-21 NOTE — ASSESSMENT & PLAN NOTE
Patient presents with complaints of substernal chest pain radiating to the back  DALILA score 4  Significant history of inferior STEMI status post PCI in March of 2021  Endorses compliance with home DAPT  States her pain does feel similar to prior ischemic event however is not experiencing diaphoresis or shortness of breath  States her symptoms may be related to anxiety as she often times does not leave the house  High sensitivity troponin I within normal limits  EKG with no signs of active ischemia    · Admit to observation    · Continue home Brilinta, Aspirin, Beta-blocker, Statin  · Serial HS troponin I and EKGs  · Cardiology consult ; appreciate recommendations  · Cardiac diet  · Monitor on telemetry  · Nitroglycerin for chest pain

## 2022-02-21 NOTE — ASSESSMENT & PLAN NOTE
Patient with a history of non-Hodgkin's lymphoma which likely contributes to her chronic leukocytosis  No signs of active infection  Denies cough, urinary frequency/burning  No signs of skin and soft tissue infection    · Trend fever curve and WBC count  · Outpatient follow-up with Oncology

## 2022-02-22 VITALS
HEIGHT: 66 IN | SYSTOLIC BLOOD PRESSURE: 141 MMHG | WEIGHT: 270 LBS | OXYGEN SATURATION: 97 % | HEART RATE: 97 BPM | DIASTOLIC BLOOD PRESSURE: 73 MMHG | RESPIRATION RATE: 16 BRPM | BODY MASS INDEX: 43.39 KG/M2 | TEMPERATURE: 97.5 F

## 2022-02-22 PROBLEM — I50.32 CHRONIC DIASTOLIC CONGESTIVE HEART FAILURE (HCC): Status: ACTIVE | Noted: 2017-03-11

## 2022-02-22 LAB
ANION GAP SERPL CALCULATED.3IONS-SCNC: 8 MMOL/L (ref 4–13)
BASOPHILS # BLD AUTO: 0.08 THOUSANDS/ΜL (ref 0–0.1)
BASOPHILS NFR BLD AUTO: 1 % (ref 0–1)
BUN SERPL-MCNC: 27 MG/DL (ref 5–25)
CALCIUM SERPL-MCNC: 9.3 MG/DL (ref 8.4–10.2)
CHLORIDE SERPL-SCNC: 109 MMOL/L (ref 96–108)
CO2 SERPL-SCNC: 21 MMOL/L (ref 21–32)
CREAT SERPL-MCNC: 1.59 MG/DL (ref 0.6–1.3)
EOSINOPHIL # BLD AUTO: 0.16 THOUSAND/ΜL (ref 0–0.61)
EOSINOPHIL NFR BLD AUTO: 1 % (ref 0–6)
ERYTHROCYTE [DISTWIDTH] IN BLOOD BY AUTOMATED COUNT: 20 % (ref 11.6–15.1)
GFR SERPL CREATININE-BSD FRML MDRD: 39 ML/MIN/1.73SQ M
GLUCOSE P FAST SERPL-MCNC: 134 MG/DL (ref 65–99)
GLUCOSE SERPL-MCNC: 134 MG/DL (ref 65–140)
HCT VFR BLD AUTO: 38.6 % (ref 34.8–46.1)
HGB BLD-MCNC: 12.4 G/DL (ref 11.5–15.4)
IMM GRANULOCYTES # BLD AUTO: 0.22 THOUSAND/UL (ref 0–0.2)
IMM GRANULOCYTES NFR BLD AUTO: 1 % (ref 0–2)
LYMPHOCYTES # BLD AUTO: 2.14 THOUSANDS/ΜL (ref 0.6–4.47)
LYMPHOCYTES NFR BLD AUTO: 12 % (ref 14–44)
MAGNESIUM SERPL-MCNC: 2 MG/DL (ref 1.9–2.7)
MCH RBC QN AUTO: 28 PG (ref 26.8–34.3)
MCHC RBC AUTO-ENTMCNC: 32.1 G/DL (ref 31.4–37.4)
MCV RBC AUTO: 87 FL (ref 82–98)
MONOCYTES # BLD AUTO: 1.07 THOUSAND/ΜL (ref 0.17–1.22)
MONOCYTES NFR BLD AUTO: 6 % (ref 4–12)
NEUTROPHILS # BLD AUTO: 13.78 THOUSANDS/ΜL (ref 1.85–7.62)
NEUTS SEG NFR BLD AUTO: 79 % (ref 43–75)
NRBC BLD AUTO-RTO: 0 /100 WBCS
PHOSPHATE SERPL-MCNC: 3.9 MG/DL (ref 2.7–4.5)
PLATELET # BLD AUTO: 309 THOUSANDS/UL (ref 149–390)
PMV BLD AUTO: 10.1 FL (ref 8.9–12.7)
POTASSIUM SERPL-SCNC: 4.4 MMOL/L (ref 3.5–5.3)
RBC # BLD AUTO: 4.43 MILLION/UL (ref 3.81–5.12)
SODIUM SERPL-SCNC: 138 MMOL/L (ref 135–147)
WBC # BLD AUTO: 17.45 THOUSAND/UL (ref 4.31–10.16)

## 2022-02-22 PROCEDURE — 99244 OFF/OP CNSLTJ NEW/EST MOD 40: CPT | Performed by: INTERNAL MEDICINE

## 2022-02-22 PROCEDURE — 84100 ASSAY OF PHOSPHORUS: CPT | Performed by: INTERNAL MEDICINE

## 2022-02-22 PROCEDURE — 99217 PR OBSERVATION CARE DISCHARGE MANAGEMENT: CPT | Performed by: HOSPITALIST

## 2022-02-22 PROCEDURE — 85025 COMPLETE CBC W/AUTO DIFF WBC: CPT | Performed by: INTERNAL MEDICINE

## 2022-02-22 PROCEDURE — 80048 BASIC METABOLIC PNL TOTAL CA: CPT | Performed by: INTERNAL MEDICINE

## 2022-02-22 PROCEDURE — 83735 ASSAY OF MAGNESIUM: CPT | Performed by: INTERNAL MEDICINE

## 2022-02-22 PROCEDURE — 36415 COLL VENOUS BLD VENIPUNCTURE: CPT | Performed by: INTERNAL MEDICINE

## 2022-02-22 RX ORDER — FUROSEMIDE 10 MG/ML
40 INJECTION INTRAMUSCULAR; INTRAVENOUS ONCE
Status: COMPLETED | OUTPATIENT
Start: 2022-02-22 | End: 2022-02-22

## 2022-02-22 RX ORDER — FUROSEMIDE 40 MG/1
40 TABLET ORAL DAILY PRN
Qty: 30 TABLET | Refills: 0 | Status: SHIPPED | OUTPATIENT
Start: 2022-02-22 | End: 2022-03-15 | Stop reason: SDUPTHER

## 2022-02-22 RX ORDER — METOPROLOL TARTRATE 50 MG/1
100 TABLET, FILM COATED ORAL 2 TIMES DAILY
Status: DISCONTINUED | OUTPATIENT
Start: 2022-02-22 | End: 2022-02-22 | Stop reason: HOSPADM

## 2022-02-22 RX ORDER — FUROSEMIDE 40 MG/1
40 TABLET ORAL DAILY
Status: DISCONTINUED | OUTPATIENT
Start: 2022-02-23 | End: 2022-02-22 | Stop reason: HOSPADM

## 2022-02-22 RX ORDER — FUROSEMIDE 40 MG/1
40 TABLET ORAL DAILY PRN
Status: DISCONTINUED | OUTPATIENT
Start: 2022-02-22 | End: 2022-02-22 | Stop reason: HOSPADM

## 2022-02-22 RX ADMIN — PANTOPRAZOLE SODIUM 40 MG: 40 TABLET, DELAYED RELEASE ORAL at 05:25

## 2022-02-22 RX ADMIN — TICAGRELOR 90 MG: 90 TABLET ORAL at 09:18

## 2022-02-22 RX ADMIN — ASPIRIN 81 MG CHEWABLE TABLET 81 MG: 81 TABLET CHEWABLE at 09:17

## 2022-02-22 RX ADMIN — SERTRALINE HYDROCHLORIDE 100 MG: 50 TABLET ORAL at 09:18

## 2022-02-22 RX ADMIN — FAMOTIDINE 40 MG: 20 TABLET, FILM COATED ORAL at 09:17

## 2022-02-22 RX ADMIN — SERTRALINE HYDROCHLORIDE 50 MG: 50 TABLET ORAL at 09:18

## 2022-02-22 RX ADMIN — FUROSEMIDE 40 MG: 10 INJECTION, SOLUTION INTRAMUSCULAR; INTRAVENOUS at 09:22

## 2022-02-22 RX ADMIN — ARIPIPRAZOLE 10 MG: 5 TABLET ORAL at 09:17

## 2022-02-22 RX ADMIN — NICOTINE 1 PATCH: 21 PATCH, EXTENDED RELEASE TRANSDERMAL at 09:19

## 2022-02-22 RX ADMIN — HEPARIN SODIUM 5000 UNITS: 5000 INJECTION INTRAVENOUS; SUBCUTANEOUS at 05:26

## 2022-02-22 RX ADMIN — METOPROLOL TARTRATE 100 MG: 50 TABLET ORAL at 09:18

## 2022-02-22 NOTE — DISCHARGE SUMMARY
[address]  Discharge- Luisa Sepulveda 1975, 42 y o  female MRN: [id number]  Unit/Bed#: ED 21 Encounter: [id number]  Primary Care Provider: Adebayo Gallegos DO   Date and time admitted to hospital: 2/21/2022  1:45 PM    * Chest pain  Assessment & Plan  · Resolved, atypical - possibly related to stress and or anxiety  · Troponin trend 49, 53, 46  ·  EKG with no signs of active ischemia    · Status post a Cardiology evaluation  · No further inpatient testing, treatment, and or workup is warranted  · Okay for discharge home  · Cardiology will be following up in the near future in the outpatient setting  · No changes to any of her pre-admission medications, and or to the preadmission dosages with the exception of adding Lasix as outlined below   · DC home on Brilinta, Aspirin, Beta-blocker, Statin  · Outpatient follow-up with PCP also    CAD (coronary artery disease)  Assessment & Plan  · As outlined above  · DC home on the medications as outlined above  · Of note, Brilinta may be discontinued in the near future in the outpatient setting since she will have completed 1 years worth of therapy    Essential hypertension  Assessment & Plan  · Blood pressure stable, DC home on pre-admit meds at pre-admit dosages      Mixed hyperlipidemia  Assessment & Plan  · DC home on Lipitor    Major depressive disorder with current active episode  Assessment & Plan  · DC home on pre-admit meds at pre-admit dosages    Tobacco abuse  Assessment & Plan  · Cessation counseling provided  · Status post treatment with a nicotine patch while in-house    Chronic diastolic congestive heart failure (HCC)  Assessment & Plan  · Wt Readings from Last 3 Encounters:   02/21/22 122 kg (270 lb)   01/12/22 124 kg (273 lb)   12/27/21 124 kg (273 lb 5 9 oz)   · Patient has heart failure with preserved ejection fraction  · Status post treatment with 1 dose of IV Lasix here in the hospital  · Patient to be discharged home on 40 mg of Lasix to be taken on any given day 1 her weight is above 275 lb          CKD (chronic kidney disease) stage 4, GFR 15-29 ml/min [facility])  Assessment & Plan  Lab Results   Component Value Date    EGFR 39 02/22/2022    EGFR 34 02/21/2022    EGFR 40 12/27/2021    CREATININE 1 59 (H) 02/22/2022    CREATININE 1 78 (H) 02/21/2022    CREATININE 1 56 (H) 12/27/2021   · Unclear baseline  Likely secondary to Alport syndrome  Creatinine on presentation does not appear to be far off from patient's apparent baseline creatinine  · Creatinine stable over the past 24 hours, will need continued outpatient periodic BMP monitoring via her primary care physician  · Trend BMP  · Avoid nephrotoxic agents    BMI 40 0-44 9, adult ([facility] [address] )  Assessment & Plan  · Exact BMI 43 58  · Dietary, weight loss, and lifestyle modification counseling was provided    History of MI (myocardial infarction)  Assessment & Plan  · Management as outlined above    Leukocytosis  Assessment & Plan  Patient with a history of non-Hodgkin's lymphoma which likely contributes to her chronic leukocytosis  No signs of active infection  Denies cough, urinary frequency/burning  No signs of skin and soft tissue infection  · Outpatient follow-up with Oncology        Discharging Physician / Practitioner: Jeffery Gomez MD  PCP: Divya Stewart DO  Admission Date:   Admission Orders (From admission, onward)     Ordered        02/21/22 1506  Place in Observation  Once                      Discharge Date: 02/22/22    Medical Problems             Resolved Problems  Date Reviewed: 2/22/2022    None                Consultations During Hospital Stay:  · Cardiology    Procedures Performed:   · None    Significant Findings / Test Results:   · Chest x-ray-Mildly enlarged cardiac silhouette   Findings suspicious for mild pulmonary vascular congestion  Incidental Findings:   · None     Test Results Pending at Discharge (will require follow up):    · None     Outpatient Tests Requested:  · None    Complications:     None    Reason for Admission:  Chest pain rule out ACS    Hospital Course:     Chilo Garner is a 42 y o  female patient who originally presented to the hospital on 2/21/2022 due to chest pain  Please refer to the initial history and physical examination completed by Dr Gurpreet Lynn for the initial presenting features and complaints  In brief, the patient is a 51-year-old female, who was admitted to the hospital after she came into the emergency room with a chief complaint of chest pain  EKGs were nonischemic  The patient had a troponin trend as outlined above  She was seen in conjunction with Cardiology  Cardiology treated her with a 1 time dose of IV Lasix  She ruled out for the possibility of an acute coronary event  Patient unfortunately has a known history of cardiac disease as outlined above  She was deemed stable for discharge by Cardiology on the morning of 02/22/2022  She was given a prescription for Lasix 40 mg to be taken on any given day 1 her weight is greater than 275 lb  No other changes were made to any of her pre-admission medications, and or to the preadmission dosages  Please refer to the assessment/plan portion of this discharge summary as outlined above for the remainder of the details in regard to her stay here in the hospital     Please see above list of diagnoses and related plan for additional information  Condition at Discharge: good     Discharge Day Visit / Exam:     Subjective:  Patient seen and examined, no new complaints, no distress  Vitals: Blood Pressure: 141/73 (02/22/22 0916)  Pulse: 97 (02/22/22 0916)  Temperature: 97 5 °F (36 4 °C) (02/21/22 1351)  Respirations: 16 (02/22/22 0916)  Height: 5' 6" (167 6 cm) (02/21/22 1351)  Weight - Scale: 122 kg (270 lb) (02/21/22 1351)  SpO2: 97 % (02/22/22 0916)  Exam:   Physical Exam  Constitutional:       General: She is not in acute distress  Appearance: Normal appearance   She is normal weight  She is not ill-appearing  HENT:      Head: Normocephalic and atraumatic  Nose: Nose normal       Mouth/Throat:      Mouth: Mucous membranes are moist    Eyes:      Extraocular Movements: Extraocular movements intact  Pupils: Pupils are equal, round, and reactive to light  Cardiovascular:      Rate and Rhythm: Normal rate and regular rhythm  Pulses: Normal pulses  Heart sounds: Normal heart sounds  No murmur heard  No friction rub  No gallop  Pulmonary:      Effort: Pulmonary effort is normal  No respiratory distress  Breath sounds: Normal breath sounds  No wheezing, rhonchi or rales  Abdominal:      General: There is no distension  Palpations: Abdomen is soft  There is no mass  Tenderness: There is no abdominal tenderness  Hernia: No hernia is present  Musculoskeletal:         General: No swelling or tenderness  Normal range of motion  Cervical back: Normal range of motion and neck supple  No rigidity  Right lower leg: No edema  Left lower leg: No edema  Skin:     General: Skin is warm  Capillary Refill: Capillary refill takes less than 2 seconds  Findings: No erythema or rash  Neurological:      General: No focal deficit present  Mental Status: She is alert and oriented to person, place, and time  Mental status is at baseline  Cranial Nerves: No cranial nerve deficit  Motor: No weakness  Psychiatric:         Mood and Affect: Mood normal          Behavior: Behavior normal          Discussion with Family:  Patient's  Thao Sheikh was bedside at the time of my discharge evaluation, all questions were answered to his satisfaction    Discharge instructions/Information to patient and family:   See after visit summary for information provided to patient and family  Provisions for Follow-Up Care:  See after visit summary for information related to follow-up care and any pertinent home health orders  Disposition:     Home      Planned Readmission:    None     Discharge Statement:  I spent 40 minutes discharging the patient  This time was spent on the day of discharge  I had direct contact with the patient on the day of discharge  Greater than 50% of the total time was spent examining patient, answering all patient questions, arranging and discussing plan of care with patient as well as directly providing post-discharge instructions  Additional time then spent on discharge activities  Discharge Medications:  See after visit summary for reconciled discharge medications provided to patient and family        ** Please Note: This note has been constructed using a voice recognition system **

## 2022-02-22 NOTE — ASSESSMENT & PLAN NOTE
· Wt Readings from Last 3 Encounters:   02/21/22 122 kg (270 lb)   01/12/22 124 kg (273 lb)   12/27/21 124 kg (273 lb 5 9 oz)   · Patient has heart failure with preserved ejection fraction  · Status post treatment with 1 dose of IV Lasix here in the hospital  · Patient to be discharged home on 40 mg of Lasix to be taken on any given day 1 her weight is above 275 lb

## 2022-02-22 NOTE — CONSULTS
[id number] [employer] 1975, 42 y o  female MRN: [id number]  Unit/Bed#: ED 21 Encounter: [id number]  Primary Care Provider: Joselo Ponce DO   Date and time admitted to hospital: 2/21/2022  1:45 PM    Inpatient consult to Cardiology  Consult performed by: Sarmad Zhang MD  Consult ordered by: Farnaz Majano DO          Mixed hyperlipidemia  Assessment & Plan  Tolerating statin therapy  CHF (congestive heart failure) (HCC)  Assessment & Plan  Mild and related to hypertrophic cardiomyopathy  Will give a single dose of IV Lasix  As well, given elevated creatinine at baseline, I am changing her home diuretic dose to 40 mg of furosemide daily for weight over 275 lb  CAD (coronary artery disease)  Assessment & Plan  See comments under history of MI  History of MI (myocardial infarction)  Assessment & Plan  Almost 1 year post stenting  I reminded her that Shane Abts can be discontinued at the end of March  We will make arrangements to see her to enforce that as well  * Chest pain  Assessment & Plan  Noncardiac  Symptoms of tightness lasted for hours associated with a negative troponin  Other summary comments:   Mainly I reassured Darryl Liu today  I am going to tweak her meds by increasing metoprolol to 100 mg twice daily  Also a single dose of furosemide to help with the minor sense of dyspnea and change in home furosemide dose  Follow-up will be arranged in 3 weeks or so in our office  Outpatient Cardiologist: Arlin Dean    HPI: Dana Bennett is a 42y o  year old female who presented with sense of tightness in the chest   She was awakened with this feeling proximally 30 hours ago  She continued quite take a deep breath  Symptoms persisted and she ultimately went to the emergency room  Total duration of symptoms were was 18 hours or so however troponin was negative    Symptoms did not feel like when she had her acute coronary syndrome last year however  She feels as if she is slightly swollen but not markedly so  BNP is mildly elevated  To reiterate patient has a history of chest pain and acute coronary syndrome  See testing below  She also has been found to have hypertrophic cardiomyopathy as well as obstructive sleep apnea treated with CPAP  On 03/25/2021 she underwent drug-eluting stent to the mid RCA and to the posterior descending artery  Repeat heart catheterization on 04/02/2021 revealed patent stents  On 05/25/2021 she underwent dual chamber Medtronic pacemaker implantation because of significant long sinus pauses  EKG:   Nonischemic  MOST  RECENT CARDIAC IMAGING:   Echo 3/25/2021 60%, severe hypokinesis of the basal-mid inferior wall  Findings consistent with HCM  Mild-mod MR  Cardiac MRI 3/29/2021 severe LVH, EF 44%  Small circumferential pericardial effusion  Mild MR  Findings suggestive of underlying hypertrophic cardiomyopathy with ischemic scarring of the inferior and inferolateral walls  Review of Systems: a 10 point review of systems was conducted and is negative except for as mentioned in the HPI or as below          Historical Information   Past Medical History:   Diagnosis Date    Acute MI ([address] )     Anxiety     CAD (coronary artery disease)     GREG mid RCA and GREG right PDA 3/25/2021    Cardiomyopathy ([address] )     [facility]     Chronic kidney disease     COPD (chronic obstructive pulmonary disease) ([facility])     scheduled for sleep apnea test soon after pacemaker implant    Depression     History of chemotherapy     non hodgkins lymphoma 2008    Hypertension     Lymphoma, non-Hodgkin's (HCC)     Myocardial infarction ([address] )     SSS (sick sinus syndrome) ([facility] [address] )     s/p medtronic dual chamber pacemaker 5/25/2021    Tobacco abuse     Wears glasses      Past Surgical History:   Procedure Laterality Date    CARDIAC PACEMAKER PLACEMENT Left 5/25/2021    Procedure: DUAL LEAD PACEMAKER IMPLANT;  Surgeon: Jesus Lima MD;  Location: [facility] MAIN OR;  Service: Cardiology    CAROTID STENT       SECTION      CORONARY ANGIOPLASTY WITH STENT PLACEMENT  2021    GREG mid RCA and GREG right PDA    DENTAL SURGERY      IR BIOPSY KIDNEY COLUMBIA KIT NO LATERALITY  10/18/2021     Social History     Substance and Sexual Activity   Alcohol Use Yes    Comment: 1-2 times years     Social History     Substance and Sexual Activity   Drug Use Never     Social History     Tobacco Use   Smoking Status Current Every Day Smoker    Packs/day: 1 00    Types: Cigarettes    Last attempt to quit: 3/29/2021    Years since quittin 9   Smokeless Tobacco Current User   Tobacco Comment    1 pk/week per patient        Family History:   No longer relevant  Meds/Allergies   all current active meds have been reviewed  (Not in a hospital admission)      No Known Allergies    Objective   Vitals: Blood pressure 141/74, pulse 75, temperature 97 5 °F (36 4 °C), resp  rate 16, height 5' 6" (1 676 m), weight 122 kg (270 lb), SpO2 91 %, not currently breastfeeding , Body mass index is 43 58 kg/m² ,   Orthostatic Blood Pressures      Most Recent Value   Blood Pressure 141/74 filed at 2022 8338          Systolic (09MGI), ZVB:072 , Min:120 , GTB:258     Diastolic (27ELI), MARILIN:59, Min:58, Max:91              Physical Exam:       General:  Normal appearance in no distress  Eyes:  Anicteric  Oral mucosa:  Moist   Neck:  No JVD  Carotid upstrokes are brisk without bruits  No masses  Chest:  Clear to auscultation  Pacemaker left subclavian region  Cardiac:  No palpable PMI  Normal S1 and S2  No murmur gallop or rub  Abdomen:  Soft and nontender  No palpable organomegaly or aortic enlargement  Extremities:  Trace peripheral edema  Musculoskeletal:  Symmetric  Vascular:  Femoral pulses are brisk without bruits  Popliteal  pulses are intact bilaterally  Pedal pulses are intact    Neuro: Grossly symmetric  Psych:  Alert and oriented x3          Lab Results:     Troponins:    Results from last 7 days   Lab Units 02/21/22  1744 02/21/22  1555 02/21/22  1358   HS TNI 0HR ng/L  --   --  49   HS TNI 2HR ng/L  --  53*  --    HSTNI D2 ng/L  --  4  --    HS TNI 4HR ng/L 46  --   --    HSTNI D4 ng/L -3  --   --      BNP:   Results from last 6 Months   Lab Units 02/21/22  1358 12/27/21  0734   BNP pg/mL 972* 225*       CBC :   Results from last 7 days   Lab Units 02/22/22  0525 02/21/22  1358   WBC Thousand/uL 17 45* 19 13*   HEMOGLOBIN g/dL 12 4 13 7   HEMATOCRIT % 38 6 42 2   MCV fL 87 87   PLATELETS Thousands/uL 309 360     TSH:     CMP:   Results from last 7 days   Lab Units 02/22/22  0525 02/21/22  1358   POTASSIUM mmol/L 4 4 3 7   CHLORIDE mmol/L 109* 106   CO2 mmol/L 21 24   BUN mg/dL 27* 24   CREATININE mg/dL 1 59* 1 78*   AST U/L  --  18   ALT U/L  --  32   EGFR ml/min/1 73sq m 39 34     Lipid Profile:     Coags:   Results from last 7 days   Lab Units 02/21/22  1359   INR  1 04

## 2022-02-22 NOTE — ASSESSMENT & PLAN NOTE
Lab Results   Component Value Date    EGFR 39 02/22/2022    EGFR 34 02/21/2022    EGFR 40 12/27/2021    CREATININE 1 59 (H) 02/22/2022    CREATININE 1 78 (H) 02/21/2022    CREATININE 1 56 (H) 12/27/2021   · Unclear baseline  Likely secondary to Alport syndrome  Creatinine on presentation does not appear to be far off from patient's apparent baseline creatinine    · Creatinine stable over the past 24 hours, will need continued outpatient periodic BMP monitoring via her primary care physician  · Trend BMP  · Avoid nephrotoxic agents

## 2022-02-22 NOTE — DISCHARGE INSTR - AVS FIRST PAGE
Dear Myra Graves,     It was our pleasure to care for you here at [facility], [address] [place] [address]  It is our hope that we were always able to exceed the expected standards for your care during your stay  You were hospitalized due to chest pain  You were cared for on the medical/surgical floor by Consuelo Álvarez MD with the [address] [place] [address] [facility] who covers for your primary care physician (PCP), Nia Perera DO, while you were hospitalized  You were additionally seen by Dr [facility]  If you have any questions or concerns related to this hospitalization, you may contact us at [phone]  For follow up as well as any medication refills, we recommend that you follow up with your primary care physician  A registered nurse will reach out to you by phone within a few days after your discharge to answer any additional questions that you may have after going home  However, at this time we provide for you here, the most important instructions / recommendations at discharge:     Notable Medication Adjustments -   New prescription Lasix 40 mg take 1 tablet on any given day when you weight is greater than 275 lb  Okay to resume all other pre-admission medications, the preadmission dosages  Testing Required after Discharge -   To be further determined in the outpatient setting by your primary care provider and or cardiology  Important follow up information -   Please follow-up with the providers as outlined in this discharge package  Other Instructions -   Please maintain a healthy low-sodium diet  Please review this entire after visit summary as additional general instructions including medication list, appointments, activity, diet, any pertinent wound care, and other additional recommendations from your care team that may be provided for you        Sincerely,     Consuelo Álvarez MD

## 2022-02-22 NOTE — ASSESSMENT & PLAN NOTE
· As outlined above  · DC home on the medications as outlined above  · Of note, Brilinta may be discontinued in the near future in the outpatient setting since she will have completed 1 years worth of therapy

## 2022-02-22 NOTE — ASSESSMENT & PLAN NOTE
Almost 1 year post stenting  I reminded her that Lenton Geoffrey can be discontinued at the end of March  We will make arrangements to see her to enforce that as well

## 2022-02-22 NOTE — ASSESSMENT & PLAN NOTE
· Resolved, atypical - possibly related to stress and or anxiety  · Troponin trend 49, 53, 46  ·  EKG with no signs of active ischemia    · Status post a Cardiology evaluation  · No further inpatient testing, treatment, and or workup is warranted  · Okay for discharge home  · Cardiology will be following up in the near future in the outpatient setting  · No changes to any of her pre-admission medications, and or to the preadmission dosages with the exception of adding Lasix as outlined below   · DC home on Brilinta, Aspirin, Beta-blocker, Statin  · Outpatient follow-up with PCP also

## 2022-02-22 NOTE — ASSESSMENT & PLAN NOTE
Patient with a history of non-Hodgkin's lymphoma which likely contributes to her chronic leukocytosis  No signs of active infection  Denies cough, urinary frequency/burning  No signs of skin and soft tissue infection    · Outpatient follow-up with Oncology

## 2022-02-22 NOTE — UTILIZATION REVIEW
Initial Clinical Review    Admission: Date/Time/Statement:   Admission Orders (From admission, onward)     Ordered        02/21/22 1506  Place in Observation  Once                      Orders Placed This Encounter   Procedures    Place in Observation     Standing Status:   Standing     Number of Occurrences:   1     Order Specific Question:   Level of Care     Answer:   Med Surg [16]     ED Arrival Information     Expected Arrival Acuity    - 2/21/2022 13:40 Emergent         Means of arrival Escorted by Service Admission type    Wheelchair Spouse Hospitalist Emergency         Arrival complaint    chest pains        Chief Complaint   Patient presents with    Chest Pain     Patient reports shortness of breath and chest pain that started this morning around 0300  Patient reports five previous heart attacks        Initial Presentation: 42 y o  female with a past medical history significant for coronary artery disease status post PCI to RCA compliant with dual antiplatelet therapy, morbid obesity, Alport syndrome, depression who presents with complaints of chest pain  the patient states that the pain is similar to her prior myocardial infarction  She denies any associated symptomatology other than chest pain which radiates to her back  It is not reproducible  It was partially relieved by nitroglycerin  In the ED, all vital signs were found to be stable  HS troponin I within normal limits  EKG with no signs of active ischemia   ADMIT OBSERVATION STATUS      Date:    Day 2:     ED Triage Vitals   Temperature Pulse Respirations Blood Pressure SpO2   02/21/22 1351 02/21/22 1351 02/21/22 1351 02/21/22 1351 02/21/22 1351   97 5 °F (36 4 °C) 99 22 (!) 172/82 98 %      Temp src Heart Rate Source Patient Position - Orthostatic VS BP Location FiO2 (%)   -- 02/21/22 1500 -- -- --    Monitor         Pain Score       02/21/22 1351       10 - Worst Possible Pain          Wt Readings from Last 1 Encounters:   02/21/22 122 kg (270 lb)     Additional Vital Signs:   Date/Time Temp Pulse Resp BP MAP (mmHg) SpO2 O2 Device   02/22/22 0300 -- 75 -- 141/74 100 91 % --   02/22/22 0000 -- 68 16 121/58 83 96 % --   02/21/22 2200 -- 66 16 120/65 86 95 % --   02/21/22 2115 -- 71 16 129/77 94 95 % --   02/21/22 2015 -- 66 16 127/91 -- 94 % --   02/21/22 1730 -- 75 19 135/77 100 98 % --   02/21/22 1700 -- 74 17 133/76 99 97 % --   02/21/22 1600 -- 78 17 148/74 107 96 % --   02/21/22 1500 -- 73 16 135/73 97 97 % --       Pertinent Labs/Diagnostic Test Results:   XR chest 1 view portable   ED Interpretation by Cortez Paulino MD (02/21 1442)   Mild overload      Final Result by Kit Welch MD (02/21 1644)      Mildly enlarged cardiac silhouette  Findings suspicious for mild pulmonary vascular congestion                    Workstation performed: [id number]             Results from last 7 days   Lab Units 02/21/22  1519   SARS-COV-2  Negative     Results from last 7 days   Lab Units 02/22/22  0525 02/21/22  1358   WBC Thousand/uL 17 45* 19 13*   HEMOGLOBIN g/dL 12 4 13 7   HEMATOCRIT % 38 6 42 2   PLATELETS Thousands/uL 309 360   NEUTROS ABS Thousands/µL 13 78*  --          Results from last 7 days   Lab Units 02/22/22  0525 02/21/22  1358   SODIUM mmol/L 138 141   POTASSIUM mmol/L 4 4 3 7   CHLORIDE mmol/L 109* 106   CO2 mmol/L 21 24   ANION GAP mmol/L 8 11   BUN mg/dL 27* 24   CREATININE mg/dL 1 59* 1 78*   EGFR ml/min/1 73sq m 39 34   CALCIUM mg/dL 9 3 9 7   MAGNESIUM mg/dL 2 0  --    PHOSPHORUS mg/dL 3 9  --      Results from last 7 days   Lab Units 02/21/22  1358   AST U/L 18   ALT U/L 32   ALK PHOS U/L 75   TOTAL PROTEIN g/dL 7 1   ALBUMIN g/dL 3 8   TOTAL BILIRUBIN mg/dL 0 44         Results from last 7 days   Lab Units 02/22/22  0525 02/21/22  1358   GLUCOSE RANDOM mg/dL 134 76     Results from last 7 days   Lab Units 02/21/22  1744 02/21/22  1555 02/21/22  1358   HS TNI 0HR ng/L  --   --  49   HS TNI 2HR ng/L  --  53*  --    HSTNI D2 ng/L  -- 4  --    HS TNI 4HR ng/L 46  --   --    HSTNI D4 ng/L -3  --   --          Results from last 7 days   Lab Units 02/21/22  1359   PROTIME seconds 13 5   INR  1 04     Results from last 7 days   Lab Units 02/21/22  1358   BNP pg/mL 972*     Results from last 7 days   Lab Units 02/21/22  1519   INFLUENZA A PCR  Negative   INFLUENZA B PCR  Negative   RSV PCR  Negative     ED Treatment:   Medication Administration from 02/21/2022 1340 to 02/22/2022 0743       Date/Time Order Dose Route Action     02/21/2022 1415 nitroglycerin (NITROSTAT) SL tablet 0 4 mg 0 4 mg Sublingual Given     02/21/2022 1408 nitroglycerin (NITROSTAT) SL tablet 0 4 mg 0 4 mg Sublingual Given     02/21/2022 1409 morphine (PF) 4 mg/mL injection 4 mg 4 mg Intravenous Given     02/21/2022 1508 nitroglycerin (NITRO-BID) 2 % TD ointment 0 5 inch 0 5 inch Topical Given     02/21/2022 1735 ARIPiprazole (ABILIFY) tablet 10 mg 10 mg Oral Given     02/21/2022 1724 atorvastatin (LIPITOR) tablet 80 mg 80 mg Oral Given     02/21/2022 1724 LORazepam (ATIVAN) tablet 0 5 mg 0 5 mg Oral Given     02/21/2022 1724 metoprolol tartrate (LOPRESSOR) tablet 50 mg 50 mg Oral Given     02/22/2022 0525 pantoprazole (PROTONIX) EC tablet 40 mg 40 mg Oral Given     02/21/2022 1736 sertraline (ZOLOFT) tablet 100 mg 100 mg Oral Given     02/21/2022 1736 sertraline (ZOLOFT) tablet 50 mg 50 mg Oral Given     02/21/2022 2016 ticagrelor (BRILINTA) tablet 90 mg 90 mg Oral Given     02/21/2022 2116 traZODone (DESYREL) tablet 50 mg 50 mg Oral Given     02/21/2022 1723 nicotine (NICODERM CQ) 21 mg/24 hr TD 24 hr patch 1 patch 1 patch Transdermal Medication Applied     02/22/2022 0526 heparin (porcine) subcutaneous injection 5,000 Units 5,000 Units Subcutaneous Given     02/21/2022 1725 heparin (porcine) subcutaneous injection 5,000 Units 5,000 Units Subcutaneous Given     02/21/2022 1640 nitroglycerin (NITROSTAT) SL tablet 0 4 mg 0 4 mg Sublingual Given     02/21/2022 1744 morphine (PF) 4 mg/mL injection 4 mg 4 mg Intravenous Given        Past Medical History:   Diagnosis Date    Acute MI ([facility] )     Anxiety     CAD (coronary artery disease)     GREG mid RCA and GREG right PDA 3/25/2021    Cardiomyopathy ([place] [address] )     CH[facility]est[facility]) [facility]     Chronic kidney disease     COPD (chronic obstructive pulmonary disease) ([facility]     scheduled for sleep apnea test soon after pacemaker implant    Depression     History of chemotherapy     non hodgkins lymphoma 2008    Hypertension     Lymphoma, non-Hodgkin's ([facility])     Myocardial infarction ([facility] )     SSS (sick sinus syndrome) ([facility])     s/p medtronic dual chamber pacemaker 5/25/2021    Tobacco abuse     Wears glasses      Present on Admission:   Chest pain   Essential hypertension   Tobacco abuse   Major depressive disorder with current active episode   CKD (chronic kidney disease) stage 4, GFR 15-29 ml/min ([facility])   Leukocytosis      Admitting Diagnosis: Chest pain [R07 9]  Age/Sex: 42 y o  female  Admission Orders:  Scheduled Medications:  ARIPiprazole, 10 mg, Oral, Daily  aspirin, 81 mg, Oral, Daily  atorvastatin, 80 mg, Oral, QPM  famotidine, 40 mg, Oral, Daily  heparin (porcine), 5,000 Units, Subcutaneous, Q8H ABIEL  metoprolol tartrate, 50 mg, Oral, BID  nicotine, 1 patch, Transdermal, Daily  pantoprazole, 40 mg, Oral, Early Morning  sertraline, 100 mg, Oral, Daily  sertraline, 50 mg, Oral, Daily  ticagrelor, 90 mg, Oral, Q12H ABIEL  traZODone, 50 mg, Oral, HS      Continuous IV Infusions:     PRN Meds:  albuterol, 2 puff, Inhalation, Q4H PRN  LORazepam, 0 5 mg, Oral, BID PRN  morphine injection, 2 mg, Intravenous, Q4H PRN  nitroglycerin, 0 4 mg, Sublingual, Q5 Min PRN        IP CONSULT TO CARDIOLOGY  IP CONSULT TO CASE MANAGEMENT    Network Utilization Review Department  ATTENTION: Please call with any questions or concerns to [phone] and carefully listen to the prompts so that you are directed to the right person   All voicemails are confidential   Candia Siemens all requests for admission clinical reviews, approved or denied determinations and any other requests to dedicated fax number below belonging to the campus where the patient is receiving treatment   List of dedicated fax numbers for the Facilities:  [address] [place] [address] DENIALS (Administrative/Medical Necessity) [phone]   [address]  [facility] (Maternity/NICU/Pediatrics) [phone]   [address]  [address] [facility] [place] [address] [address] [place] [address] [address] [facility] [place] [address] [phone]

## 2022-02-22 NOTE — ASSESSMENT & PLAN NOTE
Mild and related to hypertrophic cardiomyopathy  Will give a single dose of IV Lasix  As well, given elevated creatinine at baseline, I am changing her home diuretic dose to 40 mg of furosemide daily for weight over 275 lb

## 2022-02-22 NOTE — ED NOTES
IV removed  Discharge instructions reviewed with patient at bedside, she verbalized understanding and denied having any questions  Patient was transported to entrance via wheelchair with all personal belongings       Arturo Homans, RN  02/22/22 1466

## 2022-03-15 ENCOUNTER — OFFICE VISIT (OUTPATIENT)
Dept: CARDIOLOGY CLINIC | Facility: CLINIC | Age: 47
End: 2022-03-15
Payer: COMMERCIAL

## 2022-03-15 VITALS
BODY MASS INDEX: 44.36 KG/M2 | HEIGHT: 66 IN | HEART RATE: 100 BPM | DIASTOLIC BLOOD PRESSURE: 68 MMHG | WEIGHT: 276 LBS | SYSTOLIC BLOOD PRESSURE: 108 MMHG

## 2022-03-15 DIAGNOSIS — R60.9 EDEMA, UNSPECIFIED TYPE: ICD-10-CM

## 2022-03-15 DIAGNOSIS — I25.10 CORONARY ARTERY DISEASE INVOLVING NATIVE CORONARY ARTERY OF NATIVE HEART, UNSPECIFIED WHETHER ANGINA PRESENT: ICD-10-CM

## 2022-03-15 DIAGNOSIS — I42.2 HYPERTROPHIC CARDIOMYOPATHY (HCC): Primary | ICD-10-CM

## 2022-03-15 DIAGNOSIS — I50.32 CHRONIC DIASTOLIC CONGESTIVE HEART FAILURE (HCC): ICD-10-CM

## 2022-03-15 PROCEDURE — 99214 OFFICE O/P EST MOD 30 MIN: CPT | Performed by: INTERNAL MEDICINE

## 2022-03-15 RX ORDER — METOPROLOL TARTRATE 100 MG/1
100 TABLET ORAL 2 TIMES DAILY
Qty: 180 TABLET | Refills: 5
Start: 2022-03-15 | End: 2022-06-28 | Stop reason: SDUPTHER

## 2022-03-15 RX ORDER — FUROSEMIDE 80 MG
80 TABLET ORAL DAILY PRN
Qty: 30 TABLET | Refills: 5
Start: 2022-03-15 | End: 2022-06-28 | Stop reason: SDUPTHER

## 2022-03-15 NOTE — PATIENT INSTRUCTIONS
Increase the Lasix to 80 mg once daily  In other words take 2 of the 40 mg tablets in the morning  If that is effective then let us know and I will send in a new prescription for 80 mg tablets  Increase the metoprolol to 100 mg twice daily  When you are running low call us and we will send in a new prescription for the 100 mg tablets

## 2022-03-15 NOTE — PROGRESS NOTES
Patient ID: Baudilio Guzman is a 42 y o  female  Plan:      Hypertrophic cardiomyopathy ([address] )  By cardiac MRI and echo  May benefit from slower rate and will increase the beta blocker dose  CAD (coronary artery disease)   1 year post stenting  Brilinta can be stopped when she runs out    Chronic diastolic congestive heart failure ([address] )      Still with lots of fluid retention  Will slow the rate a little bit but also try 80 of Lasix instead of 40  She does not feel as if she diuresis very well when she takes her current dose  Mixed hyperlipidemia  Tolerating high-intensity statin therapy  Follow up Plan/Other summary comments:  If all is well, 1 year EKG and follow-up visit  I gave the patient the following instruction however:  Increase the Lasix to 80 mg once daily  In other words take 2 of the 40 mg tablets in the morning  If that is effective then let us know and I will send in a new prescription for 80 mg tablets  Increase the metoprolol to 100 mg twice daily  When you are running low call us and we will send in a new prescription for the 100 mg tablets  HPI:  Patient is seen in follow-up today regarding the above issues  Since the last visit she was hospitalized for fluid retention  Since hospital discharge she has again and gained some weight  No chest pain  No palpitations  To reiterate patient has a history of chest pain and acute coronary syndrome  See testing below  She also has been found to have hypertrophic cardiomyopathy as well as obstructive sleep apnea treated with CPAP  On 03/25/2021 she underwent drug-eluting stent to the mid RCA and to the posterior descending artery  Repeat heart catheterization on 04/02/2021 revealed patent stents  On 05/25/2021 she underwent dual chamber Medtronic pacemaker implantation because of significant long sinus pauses          Most recent or relevant cardiac/vascular testing:     Echo 3/25/2021 60%, severe hypokinesis of the basal-mid inferior wall  Findings consistent with HCM  Mild-mod MR  Cardiac MRI 3/29/2021 severe LVH, EF 44%  Small circumferential pericardial effusion  Mild MR  Findings suggestive of underlying hypertrophic cardiomyopathy with ischemic scarring of the inferior and inferolateral walls  Past Surgical History:   Procedure Laterality Date    CARDIAC PACEMAKER PLACEMENT Left 2021    Procedure: DUAL LEAD PACEMAKER IMPLANT;  Surgeon: Fili Connor MD;  Location: [facility] MAIN OR;  Service: Cardiology    CAROTID STENT       SECTION      CORONARY ANGIOPLASTY WITH STENT PLACEMENT  2021    GREG mid RCA and GREG right PDA    DENTAL SURGERY      IR BIOPSY KIDNEY COLUMBIA KIT NO LATERALITY  10/18/2021     CMP:   Lab Results   Component Value Date    K 4 4 2022     (H) 2022    CO2 21 2022    BUN 27 (H) 2022    CREATININE 1 59 (H) 2022    EGFR 39 2022       Lipid Profile:   Lab Results   Component Value Date    TRIG 338 (H) 2021    HDL 32 (L) 2021         Review of Systems   10  point ROS  was otherwise non pertinent or negative except as per HPI or as below  Gait: Normal          Objective:     /68   Pulse 100   Ht 5' 6" (1 676 m)   Wt 125 kg (276 lb)   BMI 44 55 kg/m²     PHYSICAL EXAM:    General:  Normal appearance in no distress  Eyes:  Anicteric  Oral mucosa:  Moist   Neck:  No JVD  Carotid upstrokes are brisk without bruits  No masses  Chest:  Clear to auscultation  Pacemaker left subclavian region  Cardiac:  No palpable PMI  Normal S1 and S2  No murmur gallop or rub  Abdomen:  Soft and nontender  No palpable organomegaly or aortic enlargement  Extremities:  Plethoric with 1+ peripheral edema  Musculoskeletal:  Symmetric  Vascular:  Femoral pulses are brisk without bruits  Popliteal pulses are intact bilaterally  Pedal pulses are intact  Neuro:  Grossly symmetric    Psych:  Alert and oriented x3         Current Outpatient Medications:     albuterol (PROVENTIL HFA,VENTOLIN HFA) 90 mcg/act inhaler, Inhale 2 puffs every 4 (four) hours as needed for wheezing or shortness of breath, Disp: 1 Inhaler, Rfl: 0    ARIPiprazole (ABILIFY) 10 mg tablet, Take 1 tablet (10 mg total) by mouth daily, Disp: 90 tablet, Rfl: 2    aspirin 81 mg chewable tablet, Chew 1 tablet (81 mg total) daily, Disp: 90 tablet, Rfl: 0    atorvastatin (LIPITOR) 80 mg tablet, Take 1 tablet (80 mg total) by mouth every evening, Disp: 90 tablet, Rfl: 0    Diclofenac Sodium (VOLTAREN) 1 %, Apply 2 g topically 4 (four) times a day, Disp: 150 g, Rfl: 2    ergocalciferol (VITAMIN D2) 50,000 units, Take 1 capsule (50,000 Units total) by mouth once a week, Disp: 12 capsule, Rfl: 0    famotidine (PEPCID) 40 MG tablet, Take 1 tablet (40 mg total) by mouth daily, Disp: 30 tablet, Rfl: 5    furosemide (LASIX) 80 mg tablet, Take 1 tablet (80 mg total) by mouth daily as needed (weight over 275 lbs ), Disp: 30 tablet, Rfl: 5    LORazepam (ATIVAN) 0 5 mg tablet, Take 1 tablet (0 5 mg total) by mouth 2 (two) times a day as needed for anxiety, Disp: 60 tablet, Rfl: 0    methocarbamol (ROBAXIN) 500 mg tablet, Take 1 tablet (500 mg total) by mouth 3 (three) times a day, Disp: 30 tablet, Rfl: 1    metoprolol tartrate (LOPRESSOR) 100 mg tablet, Take 1 tablet (100 mg total) by mouth 2 (two) times a day, Disp: 180 tablet, Rfl: 5    nitroglycerin (NITROSTAT) 0 4 mg SL tablet, Place 1 tablet (0 4 mg total) under the tongue every 5 (five) minutes as needed for chest pain, Disp: 25 tablet, Rfl: 5    Nutritional Supplements (COLD AND FLU PO), Take 1 Dose by mouth daily as needed, Disp: , Rfl:     omeprazole (PriLOSEC) 20 mg delayed release capsule, Take 1 capsule (20 mg total) by mouth 2 (two) times a day, Disp: 60 capsule, Rfl: 0    sertraline (ZOLOFT) 100 mg tablet, Take 1 tablet (100 mg total) by mouth daily, Disp: 30 tablet, Rfl: 5    sertraline (Zoloft) 50 mg tablet, Take 1 tablet (50 mg total) by mouth daily, Disp: 30 tablet, Rfl: 5    traMADol (Ultram) 50 mg tablet, Take 1 tablet (50 mg total) by mouth every 8 (eight) hours as needed for moderate pain or severe pain, Disp: 20 tablet, Rfl: 0    traZODone (DESYREL) 50 mg tablet, Take 1 tablet (50 mg total) by mouth daily at bedtime, Disp: 30 tablet, Rfl: 5    predniSONE 10 mg tablet, Take 6 tablets today, 5 tomorrow, 4 the next day, 3 the next day, 2 the following and 1 the last day with food (Patient not taking: Reported on 3/15/2022 ), Disp: 21 tablet, Rfl: 0  No Known Allergies  Past Medical History:   Diagnosis Date    Acute MI ([place] [address] )     Anxiety     CAD (coronary artery disease)     GREG mid RCA and GREG right PDA 3/25/2021    Cardiomyopathy ([place] [address] )     CHF (congestive heart failure) ([facility])     Chronic kidney disease     COPD (chronic obstructive pulmonary disease) ([place] [address] )     scheduled for sleep apnea test soon after pacemaker implant    Depression     History of chemotherapy     non hodgkins lymphoma     Hyperlipemia     Hypertension     Lymphoma, non-Hodgkin's ([facility])     [facility]     SSS (sick sinus syndrome) ([facility])     s/p medtronic dual chamber pacemaker 2021    Tobacco abuse     Ventricular tachycardia, non-sustained ([facility])     Wears glasses            Social History     Tobacco Use   Smoking Status Current Every Day Smoker    Packs/day: 1 00    Types: Cigarettes    Last attempt to quit: 3/29/2021    Years since quittin 9   Smokeless Tobacco Current User   Tobacco Comment    1 pk/week per patient

## 2022-03-15 NOTE — ASSESSMENT & PLAN NOTE
Still with lots of fluid retention  Will slow the rate a little bit but also try 80 of Lasix instead of 40  She does not feel as if she diuresis very well when she takes her current dose

## 2022-03-17 ENCOUNTER — OFFICE VISIT (OUTPATIENT)
Dept: PSYCHIATRY | Facility: CLINIC | Age: 47
End: 2022-03-17
Payer: COMMERCIAL

## 2022-03-17 DIAGNOSIS — F41.1 GENERALIZED ANXIETY DISORDER: ICD-10-CM

## 2022-03-17 DIAGNOSIS — F33.1 MODERATE EPISODE OF RECURRENT MAJOR DEPRESSIVE DISORDER (HCC): Primary | ICD-10-CM

## 2022-03-17 DIAGNOSIS — F33.9 RECURRENT MAJOR DEPRESSIVE DISORDER, REMISSION STATUS UNSPECIFIED (HCC): ICD-10-CM

## 2022-03-17 PROCEDURE — 90792 PSYCH DIAG EVAL W/MED SRVCS: CPT

## 2022-03-17 RX ORDER — QUETIAPINE FUMARATE 25 MG/1
25 TABLET, FILM COATED ORAL
Qty: 30 TABLET | Refills: 0 | Status: SHIPPED | OUTPATIENT
Start: 2022-03-17 | End: 2022-04-14 | Stop reason: SDUPTHER

## 2022-03-17 RX ORDER — LORAZEPAM 0.5 MG/1
0.5 TABLET ORAL 2 TIMES DAILY PRN
Qty: 60 TABLET | Refills: 0 | Status: SHIPPED | OUTPATIENT
Start: 2022-03-17 | End: 2022-04-14 | Stop reason: SDUPTHER

## 2022-03-17 RX ORDER — SERTRALINE HYDROCHLORIDE 100 MG/1
200 TABLET, FILM COATED ORAL DAILY
Qty: 30 TABLET | Refills: 1 | Status: SHIPPED | OUTPATIENT
Start: 2022-03-17 | End: 2022-04-25

## 2022-03-17 NOTE — BH TREATMENT PLAN
TREATMENT PLAN (Medication Management Only)        [employer]    Name and Date of Birth:  Nestor Gauthier 42 y o  1975  Date of Treatment Plan: March 17, 2022  Diagnosis/Diagnoses:    1  Moderate episode of recurrent major depressive disorder ([facility] )    2  Anxiety    3  Recurrent major depressive disorder, remission status unspecified ([facility] )    4  Generalized anxiety disorder      Strengths/Personal Resources for Self-Care: supportive family  Area/Areas of need (in own words): anxiety, depression  1  Long Term Goal: alleviate acceptable anxiety level  Target Date:6 months - 9/17/2022  Person/Persons responsible for completion of goal: Latonya Jamison  2  Short Term Objective (s) - How will we reach this goal?:   A  Provider new recommended medication/dosage changes and/or continue medication(s): increase zoloft and begin seroquel  Marge Rice all scheduled appointments  C  Attend medication management appointments regularly  Target Date:6 months - 9/17/2022  Person/Persons Responsible for Completion of Goal: Dixie brothers  Progress Towards Goals: starting treatment  Treatment Modality: medication management every 5 weeks  Review due 180 days from date of this plan: 6 months - 9/17/2022  Expected length of service: maintenance  My Physician/PA/NP and I have developed this plan together and I agree to work on the goals and objectives  I understand the treatment goals that were developed for my treatment
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
## 2022-03-22 ENCOUNTER — REMOTE DEVICE CLINIC VISIT (OUTPATIENT)
Dept: CARDIOLOGY CLINIC | Facility: CLINIC | Age: 47
End: 2022-03-22
Payer: COMMERCIAL

## 2022-03-22 DIAGNOSIS — Z45.010 ENCOUNTER FOR CHECKING AND TESTING OF CARDIAC PACEMAKER PULSE GENERATOR (BATTERY): ICD-10-CM

## 2022-03-22 DIAGNOSIS — I49.5 SICK SINUS SYNDROME (HCC): Primary | ICD-10-CM

## 2022-03-22 PROCEDURE — 93294 REM INTERROG EVL PM/LDLS PM: CPT | Performed by: INTERNAL MEDICINE

## 2022-03-22 PROCEDURE — 93296 REM INTERROG EVL PM/IDS: CPT | Performed by: INTERNAL MEDICINE

## 2022-04-14 ENCOUNTER — OFFICE VISIT (OUTPATIENT)
Dept: PSYCHIATRY | Facility: CLINIC | Age: 47
End: 2022-04-14
Payer: COMMERCIAL

## 2022-04-14 DIAGNOSIS — F33.1 MODERATE EPISODE OF RECURRENT MAJOR DEPRESSIVE DISORDER (HCC): Primary | ICD-10-CM

## 2022-04-14 DIAGNOSIS — F41.1 GENERALIZED ANXIETY DISORDER: ICD-10-CM

## 2022-04-14 PROCEDURE — 99213 OFFICE O/P EST LOW 20 MIN: CPT

## 2022-04-14 RX ORDER — LORAZEPAM 0.5 MG/1
0.5 TABLET ORAL 2 TIMES DAILY PRN
Qty: 60 TABLET | Refills: 0 | Status: SHIPPED | OUTPATIENT
Start: 2022-04-14 | End: 2022-06-08 | Stop reason: SDUPTHER

## 2022-04-14 RX ORDER — QUETIAPINE FUMARATE 50 MG/1
50 TABLET, FILM COATED ORAL
Qty: 30 TABLET | Refills: 1 | Status: SHIPPED | OUTPATIENT
Start: 2022-04-14 | End: 2022-06-08

## 2022-04-14 NOTE — PSYCH
Regular Visit    Problem List Items Addressed This Visit        Other    Major depressive disorder with current active episode - Primary    Relevant Medications    QUEtiapine (SEROquel) 50 mg tablet    LORazepam (ATIVAN) 0 5 mg tablet      Other Visit Diagnoses     Generalized anxiety disorder        Relevant Medications    QUEtiapine (SEROquel) 50 mg tablet    LORazepam (ATIVAN) 0 5 mg tablet    Generalized anxiety disorder        Continue zoloft, refill ativan    Relevant Medications    QUEtiapine (SEROquel) 50 mg tablet    LORazepam (ATIVAN) 0 5 mg tablet             Encounter provider JALIL Livingston    Provider located at   [place] [id number] [phone]    Recent Visits  No visits were found meeting these conditions  Showing recent visits within past 7 days and meeting all other requirements  Today's Visits  Date Type Provider Dept   04/14/22 Office Visit JALIL Livingston  [facility]   Showing today's visits and meeting all other requirements  Future Appointments  No visits were found meeting these conditions    Showing future appointments within next 150 days and meeting all other requirements       HPI     Current Outpatient Medications   Medication Sig Dispense Refill    albuterol (PROVENTIL HFA,VENTOLIN HFA) 90 mcg/act inhaler Inhale 2 puffs every 4 (four) hours as needed for wheezing or shortness of breath 1 Inhaler 0    aspirin 81 mg chewable tablet Chew 1 tablet (81 mg total) daily 90 tablet 0    atorvastatin (LIPITOR) 80 mg tablet Take 1 tablet (80 mg total) by mouth every evening 90 tablet 0    Diclofenac Sodium (VOLTAREN) 1 % Apply 2 g topically 4 (four) times a day 150 g 2    ergocalciferol (VITAMIN D2) 50,000 units Take 1 capsule (50,000 Units total) by mouth once a week 12 capsule 0    famotidine (PEPCID) 40 MG tablet Take 1 tablet (40 mg total) by mouth daily 30 tablet 5    furosemide (LASIX) 80 mg tablet Take 1 tablet (80 mg total) by mouth daily as needed (weight over 275 lbs ) 30 tablet 5    LORazepam (ATIVAN) 0 5 mg tablet Take 1 tablet (0 5 mg total) by mouth 2 (two) times a day as needed for anxiety 60 tablet 0    methocarbamol (ROBAXIN) 500 mg tablet Take 1 tablet (500 mg total) by mouth 3 (three) times a day 30 tablet 1    metoprolol tartrate (LOPRESSOR) 100 mg tablet Take 1 tablet (100 mg total) by mouth 2 (two) times a day 180 tablet 5    nitroglycerin (NITROSTAT) 0 4 mg SL tablet Place 1 tablet (0 4 mg total) under the tongue every 5 (five) minutes as needed for chest pain 25 tablet 5    Nutritional Supplements (COLD AND FLU PO) Take 1 Dose by mouth daily as needed      omeprazole (PriLOSEC) 20 mg delayed release capsule Take 1 capsule (20 mg total) by mouth 2 (two) times a day 60 capsule 0    predniSONE 10 mg tablet Take 6 tablets today, 5 tomorrow, 4 the next day, 3 the next day, 2 the following and 1 the last day with food (Patient not taking: Reported on 3/15/2022 ) 21 tablet 0    QUEtiapine (SEROquel) 50 mg tablet Take 1 tablet (50 mg total) by mouth daily at bedtime 30 tablet 1    sertraline (Zoloft) 100 mg tablet Take 2 tablets (200 mg total) by mouth daily 30 tablet 1     No current facility-administered medications for this visit  Review of Systems        MEDICATION MANAGEMENT NOTE        [facility]    Name and Date of Birth:  De Hickman 41 y o  1975 MRN: [id number]    Date of Visit: April 14, 2022    No Known Allergies  SUBJECTIVE:    Rogerio Enciso is seen today for a follow up for Major Depressive Disorder and Generalized Anxiety Disorder  She continues to do fairly well since the last visit  Present with her   Patient's biggest symptom during the initial visit was lack of sleep and sleeping about 3 hours per night    Trazodone previously ineffective, Seroquel 25 mg was initiated during her 1st visit for insomnia, reports she had a great night sleep for about 3 nights after initiating the medication then afterwards struggled again with sleep to about 4 hours per night  We discussed healthy sleeping habits and patient stated she has already tried all these methods  Seroquel was increased to 50 mg at HS for insomnia and she denies all side effects of medication at this time  She does not report much relief after we increased Zoloft to 200 mg, still reports high levels of depression anxiety related to her cardiac history, worry about her future cardiac events, and her physical limitations  Denies any cardiac events and denies chest pain since our last visit  Supportive counseling provided and strongly recommended patient to consider the partial program as we discussed during last visit  She remains on the therapy wait list is looking forward to beginning  Denies thoughts of self-harm  She denies any side effects from medications  PLAN:  Continue  Zoloft to 200 mg daily for depression and anxiety symptoms  Continue lorazepam 0 5 mg b i d  for severe breakthrough anxiety initiated by PCP  Increase Seroquel to 50mg daily at hs for insomnia        Aware of 24 hour and weekend coverage for urgent situations accessed by calling [facility] main practice number  Referral for individual psychotherapy    Diagnoses and all orders for this visit:    Moderate episode of recurrent major depressive disorder ([facility] )  Comments:  Refer to psychiatry, increased the zoloft to 150mg daily  Orders:  -     QUEtiapine (SEROquel) 50 mg tablet; Take 1 tablet (50 mg total) by mouth daily at bedtime    Generalized anxiety disorder  -     LORazepam (ATIVAN) 0 5 mg tablet; Take 1 tablet (0 5 mg total) by mouth 2 (two) times a day as needed for anxiety    Generalized anxiety disorder  Comments:  Continue zoloft, refill ativan  Orders:  -     LORazepam (ATIVAN) 0 5 mg tablet;  Take 1 tablet (0 5 mg total) by mouth 2 (two) times a day as needed for anxiety        Current Outpatient Medications on File Prior to Visit   Medication Sig Dispense Refill    albuterol (PROVENTIL HFA,VENTOLIN HFA) 90 mcg/act inhaler Inhale 2 puffs every 4 (four) hours as needed for wheezing or shortness of breath 1 Inhaler 0    aspirin 81 mg chewable tablet Chew 1 tablet (81 mg total) daily 90 tablet 0    atorvastatin (LIPITOR) 80 mg tablet Take 1 tablet (80 mg total) by mouth every evening 90 tablet 0    Diclofenac Sodium (VOLTAREN) 1 % Apply 2 g topically 4 (four) times a day 150 g 2    ergocalciferol (VITAMIN D2) 50,000 units Take 1 capsule (50,000 Units total) by mouth once a week 12 capsule 0    famotidine (PEPCID) 40 MG tablet Take 1 tablet (40 mg total) by mouth daily 30 tablet 5    furosemide (LASIX) 80 mg tablet Take 1 tablet (80 mg total) by mouth daily as needed (weight over 275 lbs ) 30 tablet 5    methocarbamol (ROBAXIN) 500 mg tablet Take 1 tablet (500 mg total) by mouth 3 (three) times a day 30 tablet 1    metoprolol tartrate (LOPRESSOR) 100 mg tablet Take 1 tablet (100 mg total) by mouth 2 (two) times a day 180 tablet 5    nitroglycerin (NITROSTAT) 0 4 mg SL tablet Place 1 tablet (0 4 mg total) under the tongue every 5 (five) minutes as needed for chest pain 25 tablet 5    Nutritional Supplements (COLD AND FLU PO) Take 1 Dose by mouth daily as needed      omeprazole (PriLOSEC) 20 mg delayed release capsule Take 1 capsule (20 mg total) by mouth 2 (two) times a day 60 capsule 0    predniSONE 10 mg tablet Take 6 tablets today, 5 tomorrow, 4 the next day, 3 the next day, 2 the following and 1 the last day with food (Patient not taking: Reported on 3/15/2022 ) 21 tablet 0    sertraline (Zoloft) 100 mg tablet Take 2 tablets (200 mg total) by mouth daily 30 tablet 1    [DISCONTINUED] ARIPiprazole, sensor, 5 MG TABS Take 5 mg by mouth daily Take for 2 weeks before discontinuing 30 tablet 0    [DISCONTINUED] LORazepam (ATIVAN) 0 5 mg tablet Take 1 tablet (0 5 mg total) by mouth 2 (two) times a day as needed for anxiety 60 tablet 0    [DISCONTINUED] QUEtiapine (SEROquel) 25 mg tablet Take 1 tablet (25 mg total) by mouth daily at bedtime 30 tablet 0     No current facility-administered medications on file prior to visit  HPI ROS Appetite Changes and Sleep:     She reports difficulty falling asleep, frequent awakenings, fluctuating sleep pattern, adequate appetite, low energy   Denies homicidal ideation, denies suicidal ideation    Review Of Systems:      HPI ROS:               Medication Side Effects:  denies    Depression (10 worst): 9/10    Anxiety (10 worst): 9/10    Safety concerns (SI, HI, etc): Denies si and hi    Sleep: Slept well for 4 nights after initiating Seroquel that struggle to sleep again    Energy: low    Appetite: fair    Weight Change: denies        Mental Status Evaluation:    Appearance Marginal/poor hygiene   Behavior calm and cooperative   Mood anxious and depressed  Depression Scale - 9 of 10 (0 = No depression)  Anxiety Scale - 9 of 10 (0 = No anxiety)   Speech Normal rate and volume   Affect appropriate   Thought Processes Goal directed and coherent   Thought Content Does not verbalize delusional material   Associations Tightly connected   Perceptual Disturbances Denies hallucinations and does not appear to be responding to internal stimuli   Risk Potential Suicidal/Homicidal Ideation - No evidence of suicidal or homicidal ideation and patient does not verbalize suicidal or homicidal ideation  Risk of Violence - No evidence of risk for violence found on assessment  Risk of Self Mutilation - No evidence of risk for self mutilation found on assessment   Orientation oriented to person, place, time/date and situation   Memory recent and remote memory grossly intact   Consciousness alert and awake   Attention/Concentration attention span and concentration are age appropriate Insight intact   Judgement intact   Muscle Strength and Gait uses wheelchair   Motor Activity no abnormal movements   Language no difficulty naming common objects, no difficulty repeating a phrase, no difficulty writing a sentence   Fund of Knowledge adequate knowledge of current events  adequate fund of knowledge regarding past history  adequate fund of knowledge regarding vocabulary      Past Psychiatric History Update:     Inpatient Psychiatric Admission Since Last Encounter:   no  Changes to Outpatient Psychiatric Treatment Team:    no  Suicide Attempt Or Self Mutilation Since Last Encounter:   no  Incidence of Violent Behavior Since Last Encounter:   no    Traumatic History Update:     New Onset of Abuse Since Last Encounter:   no  Traumatic Events Since Last Encounter:   no    Past Medical History:    Past Medical History:   Diagnosis Date    [facility]     Anxiety     CAD (coronary artery disease)     GREG mid RCA and GREG right PDA 3/25/2021    Cardiomyopathy ([place] [address] )     CH[facility]estive heart failure) ([facility] )     Chronic kidney disease     COPD (chronic obstructive pulmonary disease) ([facility] )     scheduled for sleep apnea test soon after pacemaker implant    Depression     History of chemotherapy     non hodgkins lymphoma     Hyperlipemia     Hypertension     Lymphoma, non-Hodgkin's ([facility] )     My[facility] infarction [facility]     SSS (sick sinus syndrome) ([facility] )     s/p medtronic dual chamber pacemaker 2021    Tobacco abuse     Ventricular tachycardia, non-sustained ([place] [address] )     Wears glasses      Past Medical History Pertinent Negatives:   Diagnosis Date Noted    [facility] 2020    Colon polyp 2021     Past Surgical History:   Procedure Laterality Date    CARDIAC PACEMAKER PLACEMENT Left 2021    Procedure: DUAL LEAD PACEMAKER IMPLANT;  Surgeon: Irais Wade MD;  [facility] MA[facility] OR;  Service: Cardiology    CAROTID STENT       SECTION      CORONARY ANGIOPLASTY WITH STENT PLACEMENT  2021    GREG mid RCA and GREG right PDA    DENTAL SURGERY      IR BIOPSY KIDNEY RANDOM  10/18/2021     No Known Allergies  Substance Abuse History:    Social History     Substance and Sexual Activity   Alcohol Use Yes    Comment: 1-2 times years     Social History     Substance and Sexual Activity   Drug Use Never     Social History:    Social History     Socioeconomic History    Marital status: /Civil Union     Spouse name: Not on file    Number of children: Not on file    Years of education: Not on file    Highest education level: Not on file   Occupational History    Not on file   Tobacco Use    Smoking status: Current Every Day Smoker     Packs/day: 1 00     Types: Cigarettes     Last attempt to quit: 3/29/2021     Years since quittin 0    Smokeless tobacco: Current User    Tobacco comment: 1 pk/week per patient    Vaping Use    Vaping Use: Never used   Substance and Sexual Activity    Alcohol use: Yes     Comment: 1-2 times years    Drug use: Never    Sexual activity: Yes     Partners: Male     Birth control/protection: Male Sterilization     Comment:    Other Topics Concern    Not on file   Social History Narrative    Not on file     Social Determinants of Health     Financial Resource Strain: Not on file   Food Insecurity: Not on file   Transportation Needs: Not on file   Physical Activity: Not on file   Stress: Not on file   Social Connections: Not on file   Intimate Partner Violence: Not on file   Housing Stability: Not on file     Family Psychiatric History:     Family History   Problem Relation Age of Onset    No Known Problems Mother     No Known Problems Father     No Known Problems Daughter     Brain cancer Maternal Grandfather     No Known Problems Paternal Aunt     No Known Problems Paternal Aunt      History Review: The following portions of the patient's history were reviewed and updated as appropriate: allergies, current medications, past family history, past medical history, past social history, past surgical history and problem list     OBJECTIVE:     Vital signs in last 24 hours: There were no vitals filed for this visit  Laboratory Results:   Recent Labs (last 2 months):    Admission on 02/21/2022, Discharged on 02/22/2022   Component Date Value    WBC 02/21/2022 19 13*    RBC 02/21/2022 4 85     Hemoglobin 02/21/2022 13 7     Hematocrit 02/21/2022 42 2     MCV 02/21/2022 87     MCH 02/21/2022 28 2     MCHC 02/21/2022 32 5     RDW 02/21/2022 19 9*    MPV 02/21/2022 10 0     Platelets 84/95/0063 360     Sodium 02/21/2022 141     Potassium 02/21/2022 3 7     Chloride 02/21/2022 106     CO2 02/21/2022 24     ANION GAP 02/21/2022 11     BUN 02/21/2022 24     Creatinine 02/21/2022 1 78*    Glucose 02/21/2022 76     Calcium 02/21/2022 9 7     AST 02/21/2022 18     ALT 02/21/2022 32     Alkaline Phosphatase 02/21/2022 75     Total Protein 02/21/2022 7 1     Albumin 02/21/2022 3 8     Total Bilirubin 02/21/2022 0 44     eGFR 02/21/2022 34     hs TnI 0hr 02/21/2022 49     BNP 02/21/2022 972*    Protime 02/21/2022 13 5     INR 02/21/2022 1 04     SARS-CoV-2 02/21/2022 Negative     INFLUENZA A PCR 02/21/2022 Negative     INFLUENZA B PCR 02/21/2022 Negative     RSV PCR 02/21/2022 Negative     hs TnI 2hr 02/21/2022 53*    Delta 2hr hsTnI 02/21/2022 4     Segmented % 02/21/2022 73     Lymphocytes % 02/21/2022 11*    Monocytes % 02/21/2022 9     Eosinophils, % 02/21/2022 1     Basophils % 02/21/2022 0     Metamyelocytes% 02/21/2022 1     Myelocytes % 02/21/2022 2*    Atypical Lymphocytes % 02/21/2022 3*    Absolute Neutrophils 02/21/2022 13 96*    Lymphocytes Absolute 02/21/2022 2 10     Monocytes Absolute 02/21/2022 1 72*    Eosinophils Absolute 02/21/2022 0 19     Basophils Absolute 02/21/2022 0 00     RBC Morphology 02/21/2022 Normal     Platelet Estimate 46/39/0351 Adequate     hs TnI 4hr 02/21/2022 46     Delta 4hr hsTnI 02/21/2022 -3     Ventricular Rate 02/21/2022 75     Atrial Rate 02/21/2022 75     WY Interval 02/21/2022 160     QRSD Interval 02/21/2022 98     QT Interval 02/21/2022 410     QTC Interval 02/21/2022 457     P Axis 02/21/2022 43     QRS Axis 02/21/2022 50     T Wave Axis 02/21/2022 37     Ventricular Rate 02/21/2022 80     Atrial Rate 02/21/2022 80     WY Interval 02/21/2022 156     QRSD Interval 02/21/2022 100     QT Interval 02/21/2022 408     QTC Interval 02/21/2022 470     P Axis 02/21/2022 40     QRS Axis 02/21/2022 49     T Wave Axis 02/21/2022 36     Ventricular Rate 02/21/2022 89     Atrial Rate 02/21/2022 89     WY Interval 02/21/2022 152     QRSD Interval 02/21/2022 94     QT Interval 02/21/2022 378     QTC Interval 02/21/2022 459     P Axis 02/21/2022 38     QRS Axis 02/21/2022 48     T Wave Axis 02/21/2022 29     Ventricular Rate 02/21/2022 94     Atrial Rate 02/21/2022 94     WY Interval 02/21/2022 154     QRSD Interval 02/21/2022 108     QT Interval 02/21/2022 370     QTC Interval 02/21/2022 462     P Axis 02/21/2022 30     QRS Axis 02/21/2022 12     T Wave Axis 02/21/2022 43     WBC 02/22/2022 17 45*    RBC 02/22/2022 4 43     Hemoglobin 02/22/2022 12 4     Hematocrit 02/22/2022 38 6     MCV 02/22/2022 87     MCH 02/22/2022 28 0     MCHC 02/22/2022 32 1     RDW 02/22/2022 20 0*    MPV 02/22/2022 10 1     Platelets 17/95/6501 309     nRBC 02/22/2022 0     Neutrophils Relative 02/22/2022 79*    Immat GRANS % 02/22/2022 1     Lymphocytes Relative 02/22/2022 12*    Monocytes Relative 02/22/2022 6     Eosinophils Relative 02/22/2022 1     Basophils Relative 02/22/2022 1     Neutrophils Absolute 02/22/2022 13 78*    Immature Grans Absolute 02/22/2022 0 22*    Lymphocytes Absolute 02/22/2022 2 14     Monocytes Absolute 02/22/2022 1 07     Eosinophils Absolute 02/22/2022 0 16     Basophils Absolute 02/22/2022 0 08     Sodium 02/22/2022 138     Potassium 02/22/2022 4 4     Chloride 02/22/2022 109*    CO2 02/22/2022 21     ANION GAP 02/22/2022 8     BUN 02/22/2022 27*    Creatinine 02/22/2022 1 59*    Glucose 02/22/2022 134     Glucose, Fasting 02/22/2022 134*    Calcium 02/22/2022 9 3     eGFR 02/22/2022 39     Magnesium 02/22/2022 2 0     Phosphorus 02/22/2022 3 9      I have personally reviewed all pertinent laboratory/tests results  Suicide/Homicide Risk Assessment:    Risk of Harm to Self:  Protective Factors: no current suicidal ideation, access to mental health treatment, being , compliant with medications, compliant with mental health treatment  Based on today's assessment, Walter Heath presents the following risk of harm to self: minimal    Risk of Harm to Others:  Based on today's assessment, Walter Heath presents the following risk of harm to others: none    The following interventions are recommended: referral for psychotherapy    Medications Risks/Benefits:      Risks, Benefits And Possible Side Effects Of Medications:    Discussed risks and benefits of treatment with patient including risk of suicidality, serotonin syndrome, increased QTc interval and SIADH related to treatment with antidepressants;  Risk of induction of manic symptoms in certain patient populations, risk of parkinsonian symptoms, metabolic syndrome, tardive dyskinesia and neuroleptic malignant syndrome related to treatment with antipsychotic medications and risks of misuse, abuse or dependence, sedation and respiratory depression related to treatment with benzodiazepine medications     Controlled Medication Discussion:     Walter Heath has been filling controlled prescriptions on time as prescribed according to [address] [place] [address] Drive Monitoring Program  Discussed with Walter Heath the risks of sedation, respiratory depression, impairment of ability to drive and potential for abuse and addiction related to treatment with benzodiazepine medications  She understands risk of treatment with benzodiazepine medications, agrees to not drive if feels impaired and agrees to take medications as prescribed  Discussed with Angel Roger warning on concurrent use of benzodiazepines and opioid medications including sedation, respiratory depression, coma and death  She understands the risk of treatment with benzodiazepines in addition to opioids and wants to continue taking those medications  Discussed with Juice De La Fuente increased risk of impairment related to concurrent use of benzodiazepines and hypnotic medications including excessive sedation, psychomotor impairment and respiratory depression  She understands the risk of treatment with benzodiazepines in addition to hypnotic medications and wants to continue taking those medications    Treatment Plan:    Due for update/Updated:   no  Last treatment plan done 3/17/22 by Sharon Perales  Treatment Plan due on 9/17/22      JALIL Carlisle 04/14/22

## 2022-04-24 DIAGNOSIS — F33.1 MODERATE EPISODE OF RECURRENT MAJOR DEPRESSIVE DISORDER (HCC): ICD-10-CM

## 2022-04-25 RX ORDER — SERTRALINE HYDROCHLORIDE 100 MG/1
TABLET, FILM COATED ORAL
Qty: 30 TABLET | Refills: 1 | Status: SHIPPED | OUTPATIENT
Start: 2022-04-25 | End: 2022-05-25

## 2022-05-12 DIAGNOSIS — Z95.5 S/P DRUG ELUTING CORONARY STENT PLACEMENT: ICD-10-CM

## 2022-05-12 DIAGNOSIS — I25.10 CORONARY ARTERY DISEASE INVOLVING NATIVE CORONARY ARTERY OF NATIVE HEART WITHOUT ANGINA PECTORIS: ICD-10-CM

## 2022-05-12 RX ORDER — NITROGLYCERIN 0.4 MG/1
0.4 TABLET SUBLINGUAL
Qty: 25 TABLET | Refills: 5 | Status: SHIPPED | OUTPATIENT
Start: 2022-05-12 | End: 2022-06-08 | Stop reason: SDUPTHER

## 2022-05-12 NOTE — TELEPHONE ENCOUNTER
[facility]  [facility]     She said she has been using them but not  excessively

## 2022-05-17 ENCOUNTER — HOSPITAL ENCOUNTER (OUTPATIENT)
Facility: HOSPITAL | Age: 47
Setting detail: OBSERVATION
Discharge: HOME | End: 2022-05-18
Attending: EMERGENCY MEDICINE | Admitting: INTERNAL MEDICINE
Payer: COMMERCIAL

## 2022-05-17 ENCOUNTER — APPOINTMENT (EMERGENCY)
Dept: CT IMAGING | Facility: HOSPITAL | Age: 47
End: 2022-05-17
Payer: COMMERCIAL

## 2022-05-17 ENCOUNTER — APPOINTMENT (EMERGENCY)
Dept: RADIOLOGY | Facility: HOSPITAL | Age: 47
End: 2022-05-17
Payer: COMMERCIAL

## 2022-05-17 DIAGNOSIS — R07.9 CHEST PAIN, UNSPECIFIED TYPE: Primary | ICD-10-CM

## 2022-05-17 DIAGNOSIS — N17.9 ACUTE-ON-CHRONIC KIDNEY INJURY (HCC): ICD-10-CM

## 2022-05-17 DIAGNOSIS — Q87.81 ALPORT SYNDROME: ICD-10-CM

## 2022-05-17 DIAGNOSIS — N18.9 ACUTE-ON-CHRONIC KIDNEY INJURY (HCC): ICD-10-CM

## 2022-05-17 DIAGNOSIS — R51.9 HEADACHE: ICD-10-CM

## 2022-05-17 LAB
ALBUMIN SERPL BCP-MCNC: 3.6 G/DL (ref 3.5–5)
ALP SERPL-CCNC: 68 U/L (ref 34–104)
ALT SERPL W P-5'-P-CCNC: 18 U/L (ref 7–52)
ANION GAP SERPL CALCULATED.3IONS-SCNC: 9 MMOL/L (ref 4–13)
AST SERPL W P-5'-P-CCNC: 12 U/L (ref 13–39)
ATRIAL RATE: 98 BPM
BASOPHILS # BLD MANUAL: 0.16 THOUSAND/UL (ref 0–0.1)
BASOPHILS NFR MAR MANUAL: 1 % (ref 0–1)
BILIRUB SERPL-MCNC: 0.24 MG/DL (ref 0.2–1)
BNP SERPL-MCNC: 505 PG/ML (ref 0–100)
BUN SERPL-MCNC: 32 MG/DL (ref 5–25)
CALCIUM SERPL-MCNC: 9.7 MG/DL (ref 8.4–10.2)
CARDIAC TROPONIN I PNL SERPL HS: 81 NG/L
CHLORIDE SERPL-SCNC: 106 MMOL/L (ref 96–108)
CO2 SERPL-SCNC: 20 MMOL/L (ref 21–32)
CREAT SERPL-MCNC: 2.21 MG/DL (ref 0.6–1.3)
EOSINOPHIL # BLD MANUAL: 0.48 THOUSAND/UL (ref 0–0.4)
EOSINOPHIL NFR BLD MANUAL: 3 % (ref 0–6)
ERYTHROCYTE [DISTWIDTH] IN BLOOD BY AUTOMATED COUNT: 18.6 % (ref 11.6–15.1)
GFR SERPL CREATININE-BSD FRML MDRD: 26 ML/MIN/1.73SQ M
GLUCOSE SERPL-MCNC: 163 MG/DL (ref 65–140)
HCT VFR BLD AUTO: 39 % (ref 34.8–46.1)
HGB BLD-MCNC: 12.6 G/DL (ref 11.5–15.4)
LIPASE SERPL-CCNC: 55 U/L (ref 11–82)
LYMPHOCYTES # BLD AUTO: 21 % (ref 14–44)
LYMPHOCYTES # BLD AUTO: 3.38 THOUSAND/UL (ref 0.6–4.47)
MAGNESIUM SERPL-MCNC: 1.9 MG/DL (ref 1.9–2.7)
MCH RBC QN AUTO: 27.6 PG (ref 26.8–34.3)
MCHC RBC AUTO-ENTMCNC: 32.3 G/DL (ref 31.4–37.4)
MCV RBC AUTO: 86 FL (ref 82–98)
MONOCYTES # BLD AUTO: 0.64 THOUSAND/UL (ref 0–1.22)
MONOCYTES NFR BLD: 4 % (ref 4–12)
NEUTROPHILS # BLD MANUAL: 11.42 THOUSAND/UL (ref 1.85–7.62)
NEUTS BAND NFR BLD MANUAL: 1 % (ref 0–8)
NEUTS SEG NFR BLD AUTO: 70 % (ref 43–75)
P AXIS: 40 DEGREES
PLATELET # BLD AUTO: 325 THOUSANDS/UL (ref 149–390)
PLATELET BLD QL SMEAR: ADEQUATE
PMV BLD AUTO: 10.2 FL (ref 8.9–12.7)
POTASSIUM SERPL-SCNC: 3.9 MMOL/L (ref 3.5–5.3)
PR INTERVAL: 154 MS
PROT SERPL-MCNC: 6.9 G/DL (ref 6.4–8.4)
QRS AXIS: 46 DEGREES
QRSD INTERVAL: 102 MS
QT INTERVAL: 350 MS
QTC INTERVAL: 446 MS
RBC # BLD AUTO: 4.56 MILLION/UL (ref 3.81–5.12)
RBC MORPH BLD: NORMAL
SODIUM SERPL-SCNC: 135 MMOL/L (ref 135–147)
T WAVE AXIS: 1 DEGREES
VENTRICULAR RATE: 98 BPM
WBC # BLD AUTO: 16.09 THOUSAND/UL (ref 4.31–10.16)

## 2022-05-17 PROCEDURE — 84484 ASSAY OF TROPONIN QUANT: CPT | Performed by: EMERGENCY MEDICINE

## 2022-05-17 PROCEDURE — 71045 X-RAY EXAM CHEST 1 VIEW: CPT

## 2022-05-17 PROCEDURE — 36415 COLL VENOUS BLD VENIPUNCTURE: CPT | Performed by: EMERGENCY MEDICINE

## 2022-05-17 PROCEDURE — 83880 ASSAY OF NATRIURETIC PEPTIDE: CPT | Performed by: EMERGENCY MEDICINE

## 2022-05-17 PROCEDURE — 96361 HYDRATE IV INFUSION ADD-ON: CPT

## 2022-05-17 PROCEDURE — 96375 TX/PRO/DX INJ NEW DRUG ADDON: CPT

## 2022-05-17 PROCEDURE — 83735 ASSAY OF MAGNESIUM: CPT | Performed by: EMERGENCY MEDICINE

## 2022-05-17 PROCEDURE — 80053 COMPREHEN METABOLIC PANEL: CPT | Performed by: EMERGENCY MEDICINE

## 2022-05-17 PROCEDURE — 70450 CT HEAD/BRAIN W/O DYE: CPT

## 2022-05-17 PROCEDURE — 93005 ELECTROCARDIOGRAM TRACING: CPT

## 2022-05-17 PROCEDURE — 93010 ELECTROCARDIOGRAM REPORT: CPT | Performed by: INTERNAL MEDICINE

## 2022-05-17 PROCEDURE — 96374 THER/PROPH/DIAG INJ IV PUSH: CPT

## 2022-05-17 PROCEDURE — G1004 CDSM NDSC: HCPCS

## 2022-05-17 PROCEDURE — 85652 RBC SED RATE AUTOMATED: CPT | Performed by: EMERGENCY MEDICINE

## 2022-05-17 PROCEDURE — 85027 COMPLETE CBC AUTOMATED: CPT | Performed by: EMERGENCY MEDICINE

## 2022-05-17 PROCEDURE — 99285 EMERGENCY DEPT VISIT HI MDM: CPT | Performed by: EMERGENCY MEDICINE

## 2022-05-17 PROCEDURE — 85007 BL SMEAR W/DIFF WBC COUNT: CPT | Performed by: EMERGENCY MEDICINE

## 2022-05-17 PROCEDURE — 99285 EMERGENCY DEPT VISIT HI MDM: CPT

## 2022-05-17 PROCEDURE — 86140 C-REACTIVE PROTEIN: CPT | Performed by: EMERGENCY MEDICINE

## 2022-05-17 PROCEDURE — 83690 ASSAY OF LIPASE: CPT | Performed by: EMERGENCY MEDICINE

## 2022-05-17 RX ORDER — DIPHENHYDRAMINE HYDROCHLORIDE 50 MG/ML
50 INJECTION INTRAMUSCULAR; INTRAVENOUS ONCE
Status: COMPLETED | OUTPATIENT
Start: 2022-05-17 | End: 2022-05-17

## 2022-05-17 RX ORDER — METOCLOPRAMIDE HYDROCHLORIDE 5 MG/ML
10 INJECTION INTRAMUSCULAR; INTRAVENOUS ONCE
Status: COMPLETED | OUTPATIENT
Start: 2022-05-17 | End: 2022-05-17

## 2022-05-17 RX ADMIN — SODIUM CHLORIDE 250 ML: 0.9 INJECTION, SOLUTION INTRAVENOUS at 23:35

## 2022-05-17 RX ADMIN — DIPHENHYDRAMINE HYDROCHLORIDE 50 MG: 50 INJECTION, SOLUTION INTRAMUSCULAR; INTRAVENOUS at 23:35

## 2022-05-17 RX ADMIN — METOCLOPRAMIDE HYDROCHLORIDE 10 MG: 5 INJECTION INTRAMUSCULAR; INTRAVENOUS at 23:34

## 2022-05-17 NOTE — Clinical Note
Case was discussed with Vida Araujo and the patient's admission status was agreed to be Admission Status: observation status to the service of Dr Emily Hoffman

## 2022-05-18 ENCOUNTER — TELEPHONE (OUTPATIENT)
Dept: OTHER | Facility: HOSPITAL | Age: 47
End: 2022-05-18

## 2022-05-18 ENCOUNTER — TRANSITIONAL CARE MANAGEMENT (OUTPATIENT)
Dept: FAMILY MEDICINE CLINIC | Facility: CLINIC | Age: 47
End: 2022-05-18

## 2022-05-18 VITALS
WEIGHT: 272 LBS | SYSTOLIC BLOOD PRESSURE: 150 MMHG | HEART RATE: 102 BPM | TEMPERATURE: 98 F | HEIGHT: 66 IN | RESPIRATION RATE: 18 BRPM | BODY MASS INDEX: 43.71 KG/M2 | DIASTOLIC BLOOD PRESSURE: 78 MMHG | OXYGEN SATURATION: 95 %

## 2022-05-18 DIAGNOSIS — N25.81 SECONDARY HYPERPARATHYROIDISM (HCC): ICD-10-CM

## 2022-05-18 DIAGNOSIS — N18.4 CKD (CHRONIC KIDNEY DISEASE) STAGE 4, GFR 15-29 ML/MIN (HCC): Primary | ICD-10-CM

## 2022-05-18 PROBLEM — K62.5 RECTAL BLEEDING: Status: RESOLVED | Noted: 2021-09-21 | Resolved: 2022-05-18

## 2022-05-18 PROBLEM — I30.9 ACUTE PERICARDITIS: Status: RESOLVED | Noted: 2021-03-31 | Resolved: 2022-05-18

## 2022-05-18 PROBLEM — Z01.419 ENCOUNTER FOR ROUTINE GYNECOLOGICAL EXAMINATION WITH PAPANICOLAOU SMEAR OF CERVIX: Status: RESOLVED | Noted: 2020-09-08 | Resolved: 2022-05-18

## 2022-05-18 PROBLEM — I47.29 VENTRICULAR TACHYCARDIA, NON-SUSTAINED: Status: RESOLVED | Noted: 2021-03-25 | Resolved: 2022-05-18

## 2022-05-18 PROBLEM — I47.2 VENTRICULAR TACHYCARDIA, NON-SUSTAINED (HCC): Status: RESOLVED | Noted: 2021-03-25 | Resolved: 2022-05-18

## 2022-05-18 PROBLEM — N17.9 ACUTE RENAL FAILURE SUPERIMPOSED ON STAGE 4 CHRONIC KIDNEY DISEASE (HCC): Status: ACTIVE | Noted: 2022-05-18

## 2022-05-18 PROBLEM — D72.823 LEUKEMOID REACTION: Status: ACTIVE | Noted: 2022-05-18

## 2022-05-18 PROBLEM — R04.0 EPISTAXIS: Status: RESOLVED | Noted: 2017-03-11 | Resolved: 2022-05-18

## 2022-05-18 PROBLEM — I21.19 ACUTE MI, INFERIOR WALL (HCC): Status: RESOLVED | Noted: 2021-03-25 | Resolved: 2022-05-18

## 2022-05-18 LAB
2HR DELTA HS TROPONIN: 16 NG/L
4HR DELTA HS TROPONIN: 1 NG/L
ATRIAL RATE: 102 BPM
ATRIAL RATE: 91 BPM
CARDIAC TROPONIN I PNL SERPL HS: 82 NG/L
CARDIAC TROPONIN I PNL SERPL HS: 97 NG/L
CRP SERPL QL: 37.8 MG/L
ERYTHROCYTE [SEDIMENTATION RATE] IN BLOOD: 51 MM/HOUR (ref 0–19)
P AXIS: 40 DEGREES
P AXIS: 41 DEGREES
PR INTERVAL: 148 MS
PR INTERVAL: 156 MS
QRS AXIS: 52 DEGREES
QRS AXIS: 57 DEGREES
QRSD INTERVAL: 104 MS
QRSD INTERVAL: 106 MS
QT INTERVAL: 358 MS
QT INTERVAL: 372 MS
QTC INTERVAL: 457 MS
QTC INTERVAL: 466 MS
T WAVE AXIS: 13 DEGREES
T WAVE AXIS: 8 DEGREES
VENTRICULAR RATE: 102 BPM
VENTRICULAR RATE: 91 BPM

## 2022-05-18 PROCEDURE — 99220 PR INITIAL OBSERVATION CARE/DAY 70 MINUTES: CPT | Performed by: HOSPITALIST

## 2022-05-18 PROCEDURE — 96375 TX/PRO/DX INJ NEW DRUG ADDON: CPT

## 2022-05-18 PROCEDURE — 36415 COLL VENOUS BLD VENIPUNCTURE: CPT | Performed by: EMERGENCY MEDICINE

## 2022-05-18 PROCEDURE — NC001 PR NO CHARGE: Performed by: HOSPITALIST

## 2022-05-18 PROCEDURE — 84484 ASSAY OF TROPONIN QUANT: CPT | Performed by: EMERGENCY MEDICINE

## 2022-05-18 PROCEDURE — 99215 OFFICE O/P EST HI 40 MIN: CPT | Performed by: INTERNAL MEDICINE

## 2022-05-18 PROCEDURE — 96361 HYDRATE IV INFUSION ADD-ON: CPT

## 2022-05-18 PROCEDURE — 93010 ELECTROCARDIOGRAM REPORT: CPT | Performed by: INTERNAL MEDICINE

## 2022-05-18 PROCEDURE — 93005 ELECTROCARDIOGRAM TRACING: CPT

## 2022-05-18 RX ORDER — HEPARIN SODIUM 5000 [USP'U]/ML
7500 INJECTION, SOLUTION INTRAVENOUS; SUBCUTANEOUS EVERY 8 HOURS SCHEDULED
Status: DISCONTINUED | OUTPATIENT
Start: 2022-05-18 | End: 2022-05-18 | Stop reason: HOSPADM

## 2022-05-18 RX ORDER — LORAZEPAM 2 MG/ML
1 INJECTION INTRAMUSCULAR ONCE
Status: COMPLETED | OUTPATIENT
Start: 2022-05-18 | End: 2022-05-18

## 2022-05-18 RX ORDER — METOPROLOL TARTRATE 50 MG/1
100 TABLET, FILM COATED ORAL 2 TIMES DAILY
Status: DISCONTINUED | OUTPATIENT
Start: 2022-05-18 | End: 2022-05-18 | Stop reason: HOSPADM

## 2022-05-18 RX ORDER — METHOCARBAMOL 500 MG/1
500 TABLET, FILM COATED ORAL 3 TIMES DAILY
Status: DISCONTINUED | OUTPATIENT
Start: 2022-05-18 | End: 2022-05-18 | Stop reason: HOSPADM

## 2022-05-18 RX ORDER — ALBUTEROL SULFATE 90 UG/1
2 AEROSOL, METERED RESPIRATORY (INHALATION) EVERY 4 HOURS PRN
Status: DISCONTINUED | OUTPATIENT
Start: 2022-05-18 | End: 2022-05-18 | Stop reason: HOSPADM

## 2022-05-18 RX ORDER — QUETIAPINE FUMARATE 50 MG/1
50 TABLET, FILM COATED ORAL
Status: DISCONTINUED | OUTPATIENT
Start: 2022-05-18 | End: 2022-05-18 | Stop reason: HOSPADM

## 2022-05-18 RX ORDER — ASPIRIN 81 MG/1
324 TABLET, CHEWABLE ORAL ONCE
Status: COMPLETED | OUTPATIENT
Start: 2022-05-18 | End: 2022-05-18

## 2022-05-18 RX ORDER — HYDROMORPHONE HCL/PF 1 MG/ML
1 SYRINGE (ML) INJECTION ONCE
Status: COMPLETED | OUTPATIENT
Start: 2022-05-18 | End: 2022-05-18

## 2022-05-18 RX ORDER — LORAZEPAM 0.5 MG/1
0.5 TABLET ORAL 2 TIMES DAILY PRN
Status: DISCONTINUED | OUTPATIENT
Start: 2022-05-18 | End: 2022-05-18 | Stop reason: HOSPADM

## 2022-05-18 RX ORDER — ATORVASTATIN CALCIUM 40 MG/1
80 TABLET, FILM COATED ORAL EVERY EVENING
Status: DISCONTINUED | OUTPATIENT
Start: 2022-05-18 | End: 2022-05-18 | Stop reason: HOSPADM

## 2022-05-18 RX ORDER — NICOTINE 21 MG/24HR
1 PATCH, TRANSDERMAL 24 HOURS TRANSDERMAL DAILY
Status: DISCONTINUED | OUTPATIENT
Start: 2022-05-18 | End: 2022-05-18 | Stop reason: HOSPADM

## 2022-05-18 RX ORDER — NITROGLYCERIN 0.4 MG/1
0.4 TABLET SUBLINGUAL
Status: DISCONTINUED | OUTPATIENT
Start: 2022-05-18 | End: 2022-05-18 | Stop reason: HOSPADM

## 2022-05-18 RX ORDER — ONDANSETRON 2 MG/ML
4 INJECTION INTRAMUSCULAR; INTRAVENOUS EVERY 6 HOURS PRN
Status: DISCONTINUED | OUTPATIENT
Start: 2022-05-18 | End: 2022-05-18 | Stop reason: HOSPADM

## 2022-05-18 RX ORDER — ACETAMINOPHEN 325 MG/1
650 TABLET ORAL EVERY 4 HOURS PRN
Status: DISCONTINUED | OUTPATIENT
Start: 2022-05-18 | End: 2022-05-18 | Stop reason: HOSPADM

## 2022-05-18 RX ORDER — ASPIRIN 81 MG/1
81 TABLET, CHEWABLE ORAL DAILY
Status: DISCONTINUED | OUTPATIENT
Start: 2022-05-18 | End: 2022-05-18 | Stop reason: HOSPADM

## 2022-05-18 RX ORDER — PANTOPRAZOLE SODIUM 40 MG/1
40 TABLET, DELAYED RELEASE ORAL
Status: DISCONTINUED | OUTPATIENT
Start: 2022-05-18 | End: 2022-05-18 | Stop reason: HOSPADM

## 2022-05-18 RX ORDER — FAMOTIDINE 20 MG/1
20 TABLET, FILM COATED ORAL DAILY
Status: DISCONTINUED | OUTPATIENT
Start: 2022-05-18 | End: 2022-05-18 | Stop reason: HOSPADM

## 2022-05-18 RX ORDER — FUROSEMIDE 40 MG/1
80 TABLET ORAL DAILY
Status: DISCONTINUED | OUTPATIENT
Start: 2022-05-20 | End: 2022-05-18 | Stop reason: HOSPADM

## 2022-05-18 RX ADMIN — DICLOFENAC SODIUM 2 G: 10 GEL TOPICAL at 08:09

## 2022-05-18 RX ADMIN — PANTOPRAZOLE SODIUM 40 MG: 40 TABLET, DELAYED RELEASE ORAL at 06:15

## 2022-05-18 RX ADMIN — METOPROLOL TARTRATE 100 MG: 50 TABLET ORAL at 08:07

## 2022-05-18 RX ADMIN — METHOCARBAMOL 500 MG: 500 TABLET ORAL at 08:06

## 2022-05-18 RX ADMIN — FAMOTIDINE 20 MG: 20 TABLET ORAL at 08:06

## 2022-05-18 RX ADMIN — SERTRALINE HYDROCHLORIDE 200 MG: 50 TABLET ORAL at 08:06

## 2022-05-18 RX ADMIN — ASPIRIN 81 MG 324 MG: 81 TABLET ORAL at 01:58

## 2022-05-18 RX ADMIN — HEPARIN SODIUM 7500 UNITS: 5000 INJECTION INTRAVENOUS; SUBCUTANEOUS at 06:15

## 2022-05-18 RX ADMIN — ASPIRIN 81 MG 81 MG: 81 TABLET ORAL at 08:06

## 2022-05-18 RX ADMIN — LORAZEPAM 1 MG: 2 INJECTION INTRAMUSCULAR; INTRAVENOUS at 04:18

## 2022-05-18 RX ADMIN — HYDROMORPHONE HYDROCHLORIDE 1 MG: 1 INJECTION, SOLUTION INTRAMUSCULAR; INTRAVENOUS; SUBCUTANEOUS at 00:23

## 2022-05-18 NOTE — ASSESSMENT & PLAN NOTE
Significant abnormality  Has been evaluated by my colleague with expertise in this matter as well  At this point patient does not want any more advanced treatment as she is really quite fearful of leaving her home  Continue beta-blocker therapy and weight loss encouragement

## 2022-05-18 NOTE — ASSESSMENT & PLAN NOTE
Lab Results   Component Value Date    EGFR 26 05/17/2022    EGFR 39 02/22/2022    EGFR 34 02/21/2022    CREATININE 2 21 (H) 05/17/2022    CREATININE 1 59 (H) 02/22/2022    CREATININE 1 78 (H) 02/21/2022     · Patient has a history of [place] syndrome  · Currently follows with Dr Castillo aWrner from Nephrology  · Patient recently had her Lasix increased to 80 mg daily by Cardiology in March of 2022  · Consult Nephrology in a m

## 2022-05-18 NOTE — H&P
Dev [address]  H&P- Fredy Chacon 1975, 41 y o  female MRN: [id number]  Unit/Bed#: TR 03 Encounter: [id number]  Primary Care Provider: Kendra Armenta DO   Date and time admitted to hospital: 5/17/2022 10:45 PM    * Elevated troponin  Assessment & Plan  · Placed in observation telemetry  · Patient has extensive cardiac history  · Currently follows with Dr Hieu Morrison who ER discussed case with  · Patient has a history of chronically elevated troponins the baseline appears to be proximally 46 verses initial of 81 today  · Trend cardiac enzymes  · Obtain serial EKGs  · Continue pre-hospital Lipitor 80 mg p o  Daily, Lopressor 100 mg p o  B i d  and give aspirin 81 mg p o  Daily  · Consult Cardiology in a m  Acute renal failure superimposed on stage 4 chronic kidney disease [facility])  Assessment & Plan  Lab Results   Component Value Date    EGFR 26 05/17/2022    EGFR 39 02/22/2022    EGFR 34 02/21/2022    CREATININE 2 21 (H) 05/17/2022    CREATININE 1 59 (H) 02/22/2022    CREATININE 1 78 (H) 02/21/2022     · Patient has a history of [place] syndrome  · Currently follows with Dr Olga Hess from Nephrology  · Patient recently had her Lasix increased to 80 mg daily by Cardiology in March of 2022  · Consult Nephrology in a m  Social anxiety disorder  Assessment & Plan  Continue pre-hospital Zoloft 200 mg p o  Daily, Seroquel 50 mg p o  Daily and Ativan 0 5 mg p o  B i d  P r n     ELIAZAR (obstructive sleep apnea)  Assessment & Plan  · Patient states that she uses CPAP q h s  At home  · Is declining it while hospitalized  · Will place on O2 protocol    COPD (chronic obstructive pulmonary disease) (HCC)  Assessment & Plan  Continue pre-hospital Proventil 90 mcg 2 puffs q 4 hours p r n  Hypertrophic cardiomyopathy (HCC)  Assessment & Plan  Continue pre-hospital Lopressor 100 mg p o  B i d  And nitroglycerin 0 4 mg sublingual Q 5 minutes p r n      Mixed hyperlipidemia  Assessment & Plan  Continue pre-hospital Lipitor 80 mg p o  Daily    Chronic diastolic congestive heart failure (HCC)  Assessment & Plan  Wt Readings from Last 3 Encounters:   05/17/22 123 kg (272 lb)   03/15/22 125 kg (276 lb)   02/21/22 122 kg (270 lb)     · Obtain daily weights  · Pre-hospital Lasix currently on hold secondary to acute kidney injury  · Continue pre-hospital Lopressor 100 mg p o  B i d         Tobacco abuse  Assessment & Plan  Will give Nicoderm 21 mg transdermal daily consult for smoking cessation education    Essential hypertension  Assessment & Plan  Continue pre-hospital Lopressor 100 mg p o  B i d , Lasix currently on hold secondary to acute kidney injury      VTE Prophylaxis: Heparin  Code Status:  Level 1  POLST: There is no POLST form on file for this patient (pre-hospital)  Discussion with family:   who was present at bedside at time of exam, diagnosis and treatment plan reviewed all questions answered to their satisfaction    Anticipated Length of Stay:  Patient will be admitted on an Observation basis with an anticipated length of stay of  < 2 midnights  Justification for Hospital Stay:  Chest pain with elevated troponin requiring further cardiology evaluation, acute on chronic kidney injury requiring further nephrology evaluation    Chief Complaint:   Chest pain x2 days    History of Present Illness:    Miachel Babinski is a 41 y o  female who presents with chest pain x2 days  Patient with a significant cardiac history presents ER for further evaluation treatment for 2 day history of episodic chest pain  Patient states that it was located midsternally and radiated Ramirez and at its worst was a 10/10 and began while at rest   Patient states that she used her nitroglycerin x2 and her pain is now completely relieved and said that is also improved with rest and relaxation  Chest pain was accompanied with some nausea as well as dizziness and lightheadedness      Patient follows with Dr Yamilka Sarah from Cardiology who she saw in March for routine follow-up and during that visit he increased her metoprolol as well as her Lasix  Patient does have a history of chronic kidney disease stage 4 and follows with Dr Sparkle Segura from Nephrology  Review of Systems:  Review of Systems   Constitutional: Negative for chills and fever  HENT: Negative for congestion and sore throat  Respiratory: Negative for cough, shortness of breath and wheezing  Cardiovascular: Positive for chest pain and leg swelling  Negative for palpitations  Gastrointestinal: Positive for nausea  Negative for abdominal pain, diarrhea and vomiting  Genitourinary: Negative for dysuria, frequency, hematuria and urgency  Musculoskeletal: Negative for arthralgias and myalgias  Skin: Negative for wound  Neurological: Positive for dizziness, light-headedness and headaches  Negative for syncope  Psychiatric/Behavioral: The patient is nervous/anxious  All other systems reviewed and are negative        Past Medical and Surgical History:   Past Medical History:   Diagnosis Date    Acute [facility] )     Anxiety     CAD (coronary artery disease)     GREG mid RCA and GREG right PDA 3/25/2021    Cardiomyopathy ([facility] )     [facility]     Chronic kidney disease     COPD (chronic obstructive pulmonary disease) ([facility])     scheduled for sleep apnea test soon after pacemaker implant    Depression     History of chemotherapy     non hodgkins lymphoma 2008    Hyperlipemia     Hypertension     Lymphoma, non-Hodgkin's (HCC)     Myocardial infarction ([facility] )     SSS (sick sinus syndrome) ([facility] [address] )     s/p medtronic dual chamber pacemaker 5/25/2021    Tobacco abuse     Ventricular tachycardia, non-sustained ([facility] [address] )     Wears glasses        Past Surgical History:   Procedure Laterality Date    CARDIAC PACEMAKER PLACEMENT Left 5/25/2021    Procedure: DUAL LEAD PACEMAKER IMPLANT;  Surgeon: Dhiraj Jean MD;  Location: [address] [place] [address];  Service: Cardiology    CAROTID STENT       SECTION      CORONARY ANGIOPLASTY WITH STENT PLACEMENT  2021    GREG mid RCA and GREG right PDA    DENTAL SURGERY      IR BIOPSY KIDNEY RANDOM  10/18/2021       Meds/Allergies:  Prior to Admission medications    Medication Sig Start Date End Date Taking?  Authorizing Provider   albuterol (PROVENTIL HFA,VENTOLIN HFA) 90 mcg/act inhaler Inhale 2 puffs every 4 (four) hours as needed for wheezing or shortness of breath 21   Toma Holden,    aspirin 81 mg chewable tablet Chew 1 tablet (81 mg total) daily 21   Aisha Li DO   atorvastatin (LIPITOR) 80 mg tablet Take 1 tablet (80 mg total) by mouth every evening 21   JALIL Hilliard   Diclofenac Sodium (VOLTAREN) 1 % Apply 2 g topically 4 (four) times a day 21   Melvin Emerson DPM   ergocalciferol (VITAMIN D2) 50,000 units Take 1 capsule (50,000 Units total) by mouth once a week 21   JALIL Vilchis   famotidine (PEPCID) 40 MG tablet Take 1 tablet (40 mg total) by mouth daily 21   JALIL Ugarte   furosemide (LASIX) 80 mg tablet Take 1 tablet (80 mg total) by mouth daily as needed (weight over 275 lbs ) 3/15/22 4/14/22  Joaquina Arguelles MD   LORazepam (ATIVAN) 0 5 mg tablet Take 1 tablet (0 5 mg total) by mouth 2 (two) times a day as needed for anxiety 22   JALIL Heath   methocarbamol (ROBAXIN) 500 mg tablet Take 1 tablet (500 mg total) by mouth 3 (three) times a day 22   Areli Clinton DO   metoprolol tartrate (LOPRESSOR) 100 mg tablet Take 1 tablet (100 mg total) by mouth 2 (two) times a day 3/15/22 9/11/22  Joaquina Arguelles MD   nitroglycerin (NITROSTAT) 0 4 mg SL tablet Place 1 tablet (0 4 mg total) under the tongue every 5 (five) minutes as needed for chest pain 22   Joaquina Arguelles MD   Nutritional Supplements (COLD AND FLU PO) Take 1 Dose by mouth daily as needed    Historical Provider, MD   omeprazole (PriLOSEC) 20 mg delayed release capsule Take 1 capsule (20 mg total) by mouth 2 (two) times a day 21   Ryan Ortiz CeliajaycobDO   predniSONE 10 mg tablet Take 6 tablets today, 5 tomorrow, 4 the next day, 3 the next day, 2 the following and 1 the last day with food  Patient not taking: Reported on 3/15/2022  1/27/22   Ralph Lang DO   QUEtiapine (SEROquel) 50 mg tablet Take 1 tablet (50 mg total) by mouth daily at bedtime 22   JALIL Santacruz   sertraline (ZOLOFT) 100 mg tablet take 2 tablets by mouth once daily 22   JALIL Santacruz     I have reviewed home medications with patient personally  Allergies: No Known Allergies    Social History:  Marital Status: /Civil Union   Occupation:  Disabled  Patient Pre-hospital Living Situation:  Resides at home with   Patient Pre-hospital Level of Mobility:  Full with assist of crutches occasionally  Patient Pre-hospital Diet Restrictions:  Cardiac  Substance Use History:   Social History     Substance and Sexual Activity   Alcohol Use Yes    Comment: 1-2 times years     Social History     Tobacco Use   Smoking Status Current Every Day Smoker    Packs/day: 1 00    Types: Cigarettes    Last attempt to quit: 3/29/2021    Years since quittin 1   Smokeless Tobacco Current User   Tobacco Comment    1 pk/week per patient      Social History     Substance and Sexual Activity   Drug Use Never       Family History:  I have reviewed the patients family history    Physical Exam:   Vitals:   Blood Pressure: 101/58 (22)  Pulse: 94 (22)  Temperature: 98 1 °F (36 7 °C) (22)  Temp Source: Oral (22)  Respirations: 22 (22)  Height: 5' 6" (167 6 cm) (22)  Weight - Scale: 123 kg (272 lb) (22)  SpO2: 94 % (22)    Physical Exam  Vitals and nursing note reviewed  Constitutional:       General: She is not in acute distress  Appearance: Normal appearance  She is obese     HENT:      Head: Normocephalic and atraumatic  Right Ear: Tympanic membrane normal       Left Ear: Tympanic membrane normal       Nose: Nose normal       Mouth/Throat:      Mouth: Mucous membranes are moist       Pharynx: Oropharynx is clear  No posterior oropharyngeal erythema  Eyes:      Extraocular Movements: Extraocular movements intact  Conjunctiva/sclera: Conjunctivae normal       Pupils: Pupils are equal, round, and reactive to light  Cardiovascular:      Rate and Rhythm: Normal rate and regular rhythm  Pulses: Normal pulses  Heart sounds: Normal heart sounds  No murmur heard  Pulmonary:      Effort: Pulmonary effort is normal  No respiratory distress  Breath sounds: Normal breath sounds  No rhonchi or rales  Abdominal:      General: Bowel sounds are normal       Palpations: Abdomen is soft  Tenderness: There is no abdominal tenderness  Musculoskeletal:      Cervical back: Normal range of motion and neck supple  No muscular tenderness  Right lower leg: Edema present  Left lower leg: Edema present  Skin:     General: Skin is warm and dry  Capillary Refill: Capillary refill takes less than 2 seconds  Neurological:      General: No focal deficit present  Mental Status: She is alert and oriented to person, place, and time  Additional Data:   Lab Results: I have personally reviewed pertinent reports  Results from last 7 days   Lab Units 05/17/22  2300   WBC Thousand/uL 16 09*   HEMOGLOBIN g/dL 12 6   HEMATOCRIT % 39 0   PLATELETS Thousands/uL 325   LYMPHO PCT % 21   MONO PCT % 4   EOS PCT % 3     Results from last 7 days   Lab Units 05/17/22  2300   SODIUM mmol/L 135   POTASSIUM mmol/L 3 9   CHLORIDE mmol/L 106   CO2 mmol/L 20*   BUN mg/dL 32*   CREATININE mg/dL 2 21*   CALCIUM mg/dL 9 7   ALK PHOS U/L 68   ALT U/L 18   AST U/L 12*                   Imaging: I have personally reviewed pertinent reports      CT head without contrast   Final Result by Arnaldo Sumner MD (05/17 8121)      No acute intracranial abnormality  Workstation performed: [id number]         XR chest 1 view portable    (Results Pending)       EKG, Pathology, and Other Studies Reviewed on Admission:   · EKG:  Normal sinus rhythm rate of 91 without ischemia    Epic Records Reviewed: Yes     ** Please Note: This note has been constructed using a voice recognition system   **

## 2022-05-18 NOTE — ED PROCEDURE NOTE
PROCEDURE  ECG 12 Lead Documentation Only    Date/Time: 5/17/2022 11:02 PM  Performed by: Rossy Irizarry MD  Authorized by: Rossy Irizarry MD     Indications / Diagnosis:  Cp   ECG reviewed by me, the ED Provider: yes    Patient location:  ED  Previous ECG:     Previous ECG:  Compared to current    Comparison ECG info:  2 21 22 and 10  9 21    Similarity:  No change    Comparison to cardiac monitor: Yes    Interpretation:     Interpretation: normal    Rate:     ECG rate:  98    ECG rate assessment: normal    Rhythm:     Rhythm: sinus rhythm    Ectopy:     Ectopy: none    QRS:     QRS axis:  Normal    QRS intervals:  Normal  Conduction:     Conduction: normal    ST segments:     ST segments:  Non-specific  T waves:     T waves: non-specific           Rossy Irizarry MD  05/17/22 6878

## 2022-05-18 NOTE — ASSESSMENT & PLAN NOTE
Wt Readings from Last 3 Encounters:   05/17/22 123 kg (272 lb)   03/15/22 125 kg (276 lb)   02/21/22 122 kg (270 lb)     · Obtain daily weights  · Pre-hospital Lasix currently on hold secondary to acute kidney injury  · Continue pre-hospital Lopressor 100 mg p o  B i d

## 2022-05-18 NOTE — CONSULTS
[id number] [employer] 1975, 41 y o  female MRN: [id number]  Unit/Bed#: TR 03 Encounter: [id number]  Primary Care Provider: Kendra Armenta DO   Date and time admitted to hospital: 5/17/2022 10:45 PM    Inpatient consult to Cardiology  Consult performed by: Feli Fagan MD  Consult ordered by: Antoni Law PA-C          Chronic diastolic congestive heart failure [facility])  Assessment & Plan  Wt Readings from Last 3 Encounters:   05/17/22 123 kg (272 lb)   03/15/22 125 kg (276 lb)   02/21/22 122 kg (270 lb)       Related to her hypertrophic cardiomyopathy  Chest pain  Assessment & Plan  Despite the presence of CAD I do not believe current symptoms are related  Acute renal failure superimposed on stage 4 chronic kidney disease ([facility] [address] )  Assessment & Plan  Will back off on diuretic for the next 2 days  Hypertrophic cardiomyopathy ([facility] [address] )  Assessment & Plan  Significant abnormality  Has been evaluated by my colleague with expertise in this matter as well  At this point patient does not want any more advanced treatment as she is really quite fearful of leaving her home  Continue beta-blocker therapy and weight loss encouragement  * Elevated troponin  Assessment & Plan  No rise and fall  No indication for acute coronary syndrome  To reiterate patient has a history of chest pain and acute coronary syndrome  See testing below  She also has been found to have hypertrophic cardiomyopathy as well as obstructive sleep apnea treated with CPAP  On 03/25/2021 she underwent drug-eluting stent to the mid RCA and to the posterior descending artery  Repeat heart catheterization on 04/02/2021 revealed patent stents  On 05/25/2021 she underwent dual chamber Medtronic pacemaker implantation because of significant long sinus pauses  Other summary comments:   Patient's current symptoms seem more related to her generalized anxiety    Of course she has some serious underlying problems as well  However continued outpatient management is reasonable at this point and certainly the patient's strong preference  I will arrange office follow-up from our perspective as well  Outpatient Cardiologist:  Krystin Gallagher    HPI: Ajit Sanford is a 41y o  year old female who presented with a myriad of complaints  Amongst this was chest pain  This all seem to be proceeded by several days of headaches  Patient notes that she has a lot of anxiety about leaving her house  Chest pain does not seem to be exertional   It also seems different than the sensation she had prior to stenting 1 year ago  This current symptomatology seemed more like a sense of warmth in the chest as opposed to pressure  Here there have been no significant changes in troponin and she feels much better today  MOST  RECENT CARDIAC IMAGING:   Echo 3/25/2021 60%, severe hypokinesis of the basal-mid inferior wall  Findings consistent with HCM  Mild-mod MR  Cardiac MRI 3/29/2021 severe LVH, EF 44%  Small circumferential pericardial effusion  Mild MR  Findings suggestive of underlying hypertrophic cardiomyopathy with ischemic scarring of the inferior and inferolateral walls  Review of Systems: a 10 point review of systems was conducted and is negative except for as mentioned in the HPI or as below          Historical Information   Past Medical History:   Diagnosis Date    Acute MI ([address] )     Anxiety     CAD (coronary artery disease)     GREG mid RCA and GREG right PDA 3/25/2021    Cardiomyopathy ([address] )     [facility]     Chronic kidney disease     COPD (chronic obstructive pulmonary disease) ([facility]     scheduled for sleep apnea test soon after pacemaker implant    Depression     History of chemotherapy     non hodgkins lymphoma 2008    Hyperlipemia     Hypertension     Lymphoma, non-Hodgkin's (HCC)     Myocardial infarction ([address] )     SSS (sick sinus syndrome) ([address] ) s/p medtronic dual chamber pacemaker 2021    Tobacco abuse     Ventricular tachycardia, non-sustained ([address] )     Wears glasses      Past Surgical History:   Procedure Laterality Date    CARDIAC PACEMAKER PLACEMENT Left 2021    Procedure: DUAL LEAD PACEMAKER IMPLANT;  Surgeon: Joaquina Arguelles MD;  Location: [address] [place] [address] OR;  Service: Cardiology    CAROTID STENT       SECTION      CORONARY ANGIOPLASTY WITH STENT PLACEMENT  2021    GREG mid RCA and GREG right PDA    DENTAL SURGERY      IR BIOPSY KIDNEY RANDOM  10/18/2021     Social History     Substance and Sexual Activity   Alcohol Use Yes    Comment: 1-2 times years     Social History     Substance and Sexual Activity   Drug Use Never     Social History     Tobacco Use   Smoking Status Current Every Day Smoker    Packs/day: 1 00    Types: Cigarettes    Last attempt to quit: 3/29/2021    Years since quittin 1   Smokeless Tobacco Current User   Tobacco Comment    1 pk/week per patient        Family History:   Negative for early sudden death  Meds/Allergies   all current active meds have been reviewed  (Not in a hospital admission)      No Known Allergies    Objective   Vitals: Blood pressure 150/78, pulse 102, temperature 98 °F (36 7 °C), temperature source Temporal, resp  rate 18, height 5' 6" (1 676 m), weight 123 kg (272 lb), last menstrual period 2022, SpO2 95 %, not currently breastfeeding , Body mass index is 43 9 kg/m² ,   Orthostatic Blood Pressures    Flowsheet Row Most Recent Value   Blood Pressure 150/78 filed at 2022 0805   Patient Position - Orthostatic VS Lying filed at 2022 0562          Systolic (31IRJ), PLN:595 , Min:101 , FQP:106     Diastolic (17QMW), SWI:25, Min:52, Max:78              Physical Exam:     General:  Normal appearance in no distress  Eyes:  Anicteric  Oral mucosa:  Moist   Neck:  No JVD  Carotid upstrokes are brisk without bruits  No masses    Chest:  Clear to auscultation  Pacemaker left subclavian region  Cardiac:  No palpable PMI  Normal S1 and S2  No murmur gallop or rub  Abdomen:  Soft and nontender  No palpable organomegaly or aortic enlargement  Extremities:  Plethoric with trace peripheral edema  Musculoskeletal:  Symmetric  Vascular:  Femoral pulses are brisk without bruits  Popliteal pulses are intact bilaterally  Pedal pulses are intact  Neuro:  Grossly symmetric  Psych:  Alert and oriented x3            Lab Results:     Troponins:    Results from last 7 days   Lab Units 05/18/22  0310 05/18/22  0120 05/17/22  2300   HS TNI 0HR ng/L  --   --  81*   HS TNI 2HR ng/L  --  97*  --    HSTNI D2 ng/L  --  16  --    HS TNI 4HR ng/L 82*  --   --    HSTNI D4 ng/L 1  --   --      BNP:   Results from last 6 Months   Lab Units 05/17/22  2300 02/21/22  1358   BNP pg/mL 505* 972*       CBC :   Results from last 7 days   Lab Units 05/17/22  2300   WBC Thousand/uL 16 09*   HEMOGLOBIN g/dL 12 6   HEMATOCRIT % 39 0   MCV fL 86   PLATELETS Thousands/uL 325     TSH:     CMP:   Results from last 7 days   Lab Units 05/17/22  2300   POTASSIUM mmol/L 3 9   CHLORIDE mmol/L 106   CO2 mmol/L 20*   BUN mg/dL 32*   CREATININE mg/dL 2 21*   AST U/L 12*   ALT U/L 18   EGFR ml/min/1 73sq m 26     Lipid Profile:     Coags:

## 2022-05-18 NOTE — ASSESSMENT & PLAN NOTE
· Actual BMI 43 90  · Dietary, weight loss, and lifestyle modification counseling was provided to patient prior to discharge

## 2022-05-18 NOTE — ASSESSMENT & PLAN NOTE
· Patient states that she uses CPAP q h s   At home  · Is declining it while hospitalized  · Will place on O2 protocol

## 2022-05-18 NOTE — ASSESSMENT & PLAN NOTE
Wt Readings from Last 3 Encounters:   05/17/22 123 kg (272 lb)   03/15/22 125 kg (276 lb)   02/21/22 122 kg (270 lb)       Related to her hypertrophic cardiomyopathy

## 2022-05-18 NOTE — ED NOTES
Pt to restroom via [address] with        Jaky Jenkins, [facility]  05/18/22 0659

## 2022-05-18 NOTE — ED PROCEDURE NOTE
PROCEDURE  ECG 12 Lead Documentation Only    Date/Time: 5/18/2022 12:30 AM  Performed by: Malinda Castellanos MD  Authorized by: Malinda Castellanos MD     Indications / Diagnosis:  Cp  Patient location:  ED  Interpretation:     Interpretation: non-specific    Rate:     ECG rate:  91    ECG rate assessment: normal    Rhythm:     Rhythm: sinus rhythm    Ectopy:     Ectopy: none    QRS:     QRS axis:  Normal  Conduction:     Conduction: normal    ST segments:     ST segments:  Non-specific  T waves:     T waves: non-specific           Malinda Castellanos MD  05/18/22 0200

## 2022-05-18 NOTE — ASSESSMENT & PLAN NOTE
Continue pre-hospital Lopressor 100 mg p o  B i d  And nitroglycerin 0 4 mg sublingual Q 5 minutes p r n

## 2022-05-18 NOTE — DISCHARGE INSTR - AVS FIRST PAGE
Dear Rosemary Sanchez,     It was our pleasure to care for you here at [facility],  [address]  It is our hope that we were always able to exceed the expected standards for your care during your stay  You were hospitalized due to chest pain  You were cared for on the medical/surgical floor by Tashia Guevara MD with the [facility] who covers for your primary care physician (PCP), Humera Brooks DO, while you were hospitalized  If you have any questions or concerns related to this hospitalization, you may contact us at [phone]  For follow up as well as any medication refills, we recommend that you follow up with your primary care physician  A registered nurse will reach out to you by phone within a few days after your discharge to answer any additional questions that you may have after going home  However, at this time we provide for you here, the most important instructions / recommendations at discharge:     Notable Medication Adjustments -   No new medications at time of discharge  Hold Lasix until Friday 05/20/2022-then okay to resume  Testing Required after Discharge -   To be further determined in the outpatient setting by your primary care provider, and/or by Cardiology  Important follow up information -   Please follow up with providers as outlined in his discharge packet  Other Instructions -   Please maintain a healthy low-sodium diet  Please in shortly year outpatient primary care provider orders repeat blood testing within 7-10 days  Please review this entire after visit summary as additional general instructions including medication list, appointments, activity, diet, any pertinent wound care, and other additional recommendations from your care team that may be provided for you        Sincerely,     Tashia Guevara MD

## 2022-05-18 NOTE — ASSESSMENT & PLAN NOTE
Continue pre-hospital Zoloft 200 mg p o  Daily, Seroquel 50 mg p o  Daily and Ativan 0 5 mg p o  B i d  P r n

## 2022-05-18 NOTE — DISCHARGE SUMMARY
[address] 47  Discharge- Refugio Bloom 1975, 41 y o  female MRN: [id number]  Unit/Bed#: TR 03 Encounter: [id number]  Primary Care Provider: Darvin Rivera DO   Date and time admitted to hospital: 5/17/2022 10:45 PM    * Elevated troponin  Assessment & Plan  · Patient without any symptoms at this time  · Patient had been reporting chest pain prior to arrival-no further chest pain since in-house  · Troponin trended as follows:-81, 97, 82, with the differentials of 16 and 1 respectively  · Non myocardial injury related troponinemia in the setting of having an acute kidney injury atop CKD stage 4  · Status post a Cardiology evaluation - they feel symptoms of more secondary to generalized anxiety disorder  · No further inpatient testing, treatment, and/or workup is needed  · Okay for discharge home  · Cardiology will follow up in the outpatient setting in the near future for continued management    Essential hypertension  Assessment & Plan  · Blood pressure has been stable  · Okay for DC home on pre-admit meds at pre-admit doses  · Of note, Cardiology instructed the patient to hold her Lasix until Friday 05/20/2022 in view of the mild acute kidney injury on top of her CKD stage 4  · Cardiology will be repeating blood work early next week in the outpatient setting  · Patient verbalized understanding of these instructions    Mixed hyperlipidemia  Assessment & Plan  · Continue pre-hospital Lipitor 80 mg p o   Daily post discharge    Tobacco abuse  Assessment & Plan  · Cessation counseling was provided  · Status post treatment with nicotine patch while in-house    Chronic diastolic congestive heart failure (HCC)  Assessment & Plan  Wt Readings from Last 3 Encounters:   05/17/22 123 kg (272 lb)   03/15/22 125 kg (276 lb)   02/21/22 122 kg (270 lb)     · Without exacerbation  · DC home on pre-admit meds at pre-admit doses, once again Lasix to restart on 05/20/2022        Acute renal failure superimposed on stage 4 chronic kidney disease [facility])  Assessment & Plan  Lab Results   Component Value Date    EGFR 26 05/17/2022    EGFR 39 02/22/2022    EGFR 34 02/21/2022    CREATININE 2 21 (H) 05/17/2022    CREATININE 1 59 (H) 02/22/2022    CREATININE 1 78 (H) 02/21/2022     · Patient has a history of Alport syndrome  · The need for nephrology consult  · Most likely secondary to intravascular depletion from recent increase in her outpatient Lasix dosing  · Lasix to be restarted on Friday 05/20/2022  · Cardiology will be repeating blood work early next week    COPD (chronic obstructive pulmonary disease) ([facility] )  Assessment & Plan  · Without exacerbation  · Patient is on room air  · DC home on pre-admit meds at pre-admit doses    Hypertrophic cardiomyopathy ([facility] )  Assessment & Plan  · Continue pre-hospital Lopressor 100 mg p o  B i d  And nitroglycerin 0 4 mg sublingual Q 5 minutes p r n  post discharge  · Outpatient follow-up with Cardiology    BMI 40 0-44 9, adult ([facility] )  Assessment & Plan  · Actual BMI 43 90  · Dietary, weight loss, and lifestyle modification counseling was provided to patient prior to discharge    ELIAZAR (obstructive sleep apnea)  Assessment & Plan  · Continue CPAP at night post discharge    Social anxiety disorder  Assessment & Plan  · Continue Zoloft, Seroquel, and Ativan post discharge    Leukemoid reaction  Assessment & Plan  · Patient has a history of non-Hodgkin's lymphoma, and reports that her white blood cell count is always up    · No signs of infection while in-house for  · Will need continued outpatient CBC monitoring        Discharging Physician / Practitioner: Thom Vigil MD  PCP: Hanh Canela DO  Admission Date:   Admission Orders (From admission, onward)     Ordered        05/18/22 0141  Place in Observation  Once                      Discharge Date: 05/18/22    Medical Problems             Resolved Problems  Date Reviewed: 5/18/2022   None                 Consultations During [facility]:  · Cardiology    Procedures Performed:   · None    Significant Findings / Test Results:   · CT head-no acute intercranial abnormality  · X-ray chest-no acute cardiopulmonary disease    Incidental Findings:   · None     Test Results Pending at Discharge (will require follow up): · None     Outpatient Tests Requested:  · None    Complications:     None    Reason for Admission:  Elevated troponins    Hospital Course:     De Hickman is a 41 y o  female patient who originally presented to the hospital on 5/17/2022 due to chest pain and other nonspecific aches and pains  Please refer to the initial history and physical examination completed by Diane ÁLVAREZ for the initial presenting features and complaints from the H&P completed earlier today  In brief, the patient is a 28-year-old female, who presented to the emergency room with a myriad of symptoms inclusive of chest pain  In the emergency room she was found to have minimally elevated troponins  She was admitted to [facility] jobs  A cardiology consultation was obtained  She was also diagnosed with an acute kidney injury on top of CKD stage 4 secondary to intravascular depletion from a recent increase in her outpatient Lasix dosing  Patient was otherwise asymptomatic  After being seen by Cardiology, they felt that she was medically stable for discharge home  They will follow up with her in the near future knee outpatient setting  Patient was discharged with the instructions to hold Lasix for couple of days  Repeat blood work will be checked upon by Cardiology early next week  It was felt that her symptoms inclusive of her chest pain most likely secondary to agoraphobia and severe anxiety  Patient was deemed medically stable for discharge on the morning of 05/18/2022  Please refer to the assessment/plan portion of this discharge summary is out of the remainder of the details in regard to her stay      Please see above list of diagnoses and related plan for additional information  Condition at Discharge: good     Discharge Day Visit / Exam:     Subjective:  Patient seen and examined, is already dressed to go home, has no complaints and is eager on wanting to leave  Vitals: Blood Pressure: 150/78 (05/18/22 0805)  Pulse: 102 (05/18/22 0805)  Temperature: 98 °F (36 7 °C) (05/18/22 0805)  Temp Source: Temporal (05/18/22 0805)  Respirations: 18 (05/18/22 0805)  Height: 5' 6" (167 6 cm) (05/17/22 2246)  Weight - Scale: 123 kg (272 lb) (05/17/22 2246)  SpO2: 95 % (05/18/22 0805)  Exam:   Physical Exam  Constitutional:       General: She is not in acute distress  Appearance: Normal appearance  She is normal weight  She is not ill-appearing  HENT:      Head: Normocephalic and atraumatic  Nose: Nose normal       Mouth/Throat:      Mouth: Mucous membranes are moist    Eyes:      Extraocular Movements: Extraocular movements intact  Pupils: Pupils are equal, round, and reactive to light  Cardiovascular:      Rate and Rhythm: Normal rate and regular rhythm  Pulses: Normal pulses  Heart sounds: Normal heart sounds  No murmur heard  No friction rub  No gallop  Pulmonary:      Effort: Pulmonary effort is normal  No respiratory distress  Breath sounds: Normal breath sounds  No wheezing, rhonchi or rales  Abdominal:      General: There is no distension  Palpations: Abdomen is soft  There is no mass  Tenderness: There is no abdominal tenderness  Hernia: No hernia is present  Musculoskeletal:         General: No swelling or tenderness  Normal range of motion  Cervical back: Normal range of motion and neck supple  No rigidity  Right lower leg: No edema  Left lower leg: No edema  Skin:     General: Skin is warm  Capillary Refill: Capillary refill takes less than 2 seconds  Findings: No erythema or rash  Neurological:      General: No focal deficit present        Mental Status: She is alert and oriented to person, place, and time  Mental status is at baseline  Cranial Nerves: No cranial nerve deficit  Motor: No weakness  Psychiatric:         Mood and Affect: Mood normal          Behavior: Behavior normal          Discussion with Family:  Patient's  was bedside at time of discharge, questions answered to his satisfaction    Discharge instructions/Information to patient and family:   See after visit summary for information provided to patient and family  Provisions for Follow-Up Care:  See after visit summary for information related to follow-up care and any pertinent home health orders  Disposition:     Home      Planned Readmission:    None     Discharge Statement:  I spent 45 minutes discharging the patient  This time was spent on the day of discharge  I had direct contact with the patient on the day of discharge  Greater than 50% of the total time was spent examining patient, answering all patient questions, arranging and discussing plan of care with patient as well as directly providing post-discharge instructions  Additional time then spent on discharge activities  Discharge Medications:  See after visit summary for reconciled discharge medications provided to patient and family        ** Please Note: This note has been constructed using a voice recognition system **

## 2022-05-18 NOTE — ASSESSMENT & PLAN NOTE
· Continue pre-hospital Lopressor 100 mg p o  B i d   And nitroglycerin 0 4 mg sublingual Q 5 minutes p r n  post discharge  · Outpatient follow-up with Cardiology

## 2022-05-18 NOTE — ASSESSMENT & PLAN NOTE
· Blood pressure has been stable  · Okay for DC home on pre-admit meds at pre-admit doses  · Of note, Cardiology instructed the patient to hold her Lasix until Friday 05/20/2022 in view of the mild acute kidney injury on top of her CKD stage 4  · Cardiology will be repeating blood work early next week in the outpatient setting  · Patient verbalized understanding of these instructions

## 2022-05-18 NOTE — ED PROVIDER NOTES
History  Chief Complaint   Patient presents with    Hypertension    Chest Pain     Took nitro at 2030, CP resolved at the time, but will return to movement  Pt endorses dizziness, nausea         45-YEAR-OLD FEMALE  PMH:  Cad  HTN  mdd  1 PACK-A-DAY SMOKER    Chief complaint:   CP    Cp started around 10am  Pt took 1 SLN, resolved pain   Pain came back around 8:30, took second SLN, pain resolved  Pain is still resolved  Pain was across her chest and down both arm  She did have SOB during these two episodes  No diaphoresis    Pt has headache for 4 days  She has Dizziness  viomited 1 x today    Jin is 10/10  Interventions for headache: tylenol, 1000mg taken at 8:30, which did not help much        PATIENT DENIES ANY COUGH, NO FEVERS OR CHILLS  NO URI SYMPTOMS     VTE  RISK FACTORS:  NONE   NO HISTORY OF PE OR DVT   NO LONG CAR RIDES OR PLANE RIDES  NO IMMOBILIZATION  NO RECENT SURGERY OR TRAUMA  NO HEMOPTYSIS  She has had pretibial edema    OTHER ASSOCIATED SYMPTOMS:  NO ABDOMINAL PAIN  NO NAUSEA OR VOMITING  NO STOOL CHANGES    NO URINARY COMPLAINTS: NO DYSURIA, NO HEMATURIA, NO FREQUENCY        History provided by:  Patient  Chest Pain  Pain location:  Substernal area  Pain quality: pressure and sharp    Pain radiates to:  Does not radiate  Pain radiates to the back: no    Pain severity:  Severe  Onset quality:  Gradual  Timing:  Intermittent  Progression:  Waxing and waning  Associated symptoms: headache and shortness of breath    Associated symptoms: no abdominal pain, no altered mental status, no back pain, no cough, no diaphoresis, no dysphagia, no fatigue, no fever, no lower extremity edema, no nausea, no near-syncope, no numbness, no syncope, not vomiting and no weakness    Headache  Pain location:  L temporal  Quality:  Sharp  Radiates to:  Does not radiate  Severity currently:  10/10  Onset quality:  Gradual  Relieved by:  Nothing  Worsened by:  Nothing  Ineffective treatments:  None tried  Associated symptoms: no abdominal pain, no back pain, no blurred vision, no congestion, no cough, no diarrhea, no fatigue, no fever, no focal weakness, no myalgias, no nausea, no near-syncope, no neck pain, no neck stiffness, no numbness, no paresthesias, no photophobia, no sinus pressure, no sore throat, no syncope, no URI, no vomiting and no weakness        Prior to Admission Medications   Prescriptions Last Dose Informant Patient Reported? Taking?    Diclofenac Sodium (VOLTAREN) 1 %  Self No No   Sig: Apply 2 g topically 4 (four) times a day   LORazepam (ATIVAN) 0 5 mg tablet   No No   Sig: Take 1 tablet (0 5 mg total) by mouth 2 (two) times a day as needed for anxiety   Nutritional Supplements (COLD AND FLU PO)   Yes No   Sig: Take 1 Dose by mouth daily as needed   QUEtiapine (SEROquel) 50 mg tablet   No No   Sig: Take 1 tablet (50 mg total) by mouth daily at bedtime   albuterol (PROVENTIL HFA,VENTOLIN HFA) 90 mcg/act inhaler  Self No No   Sig: Inhale 2 puffs every 4 (four) hours as needed for wheezing or shortness of breath   aspirin 81 mg chewable tablet  Self No No   Sig: Chew 1 tablet (81 mg total) daily   atorvastatin (LIPITOR) 80 mg tablet  Self No No   Sig: Take 1 tablet (80 mg total) by mouth every evening   ergocalciferol (VITAMIN D2) 50,000 units   No No   Sig: Take 1 capsule (50,000 Units total) by mouth once a week   famotidine (PEPCID) 40 MG tablet  Self No No   Sig: Take 1 tablet (40 mg total) by mouth daily   furosemide (LASIX) 80 mg tablet   No No   Sig: Take 1 tablet (80 mg total) by mouth daily as needed (weight over 275 lbs )   methocarbamol (ROBAXIN) 500 mg tablet   No No   Sig: Take 1 tablet (500 mg total) by mouth 3 (three) times a day   metoprolol tartrate (LOPRESSOR) 100 mg tablet   No No   Sig: Take 1 tablet (100 mg total) by mouth 2 (two) times a day   nitroglycerin (NITROSTAT) 0 4 mg SL tablet   No No   Sig: Place 1 tablet (0 4 mg total) under the tongue every 5 (five) minutes as needed for chest pain   omeprazole (PriLOSEC) 20 mg delayed release capsule   No No   Sig: Take 1 capsule (20 mg total) by mouth 2 (two) times a day   predniSONE 10 mg tablet   No No   Sig: Take 6 tablets today, 5 tomorrow, 4 the next day, 3 the next day, 2 the following and 1 the last day with food   Patient not taking: Reported on 3/15/2022    sertraline (ZOLOFT) 100 mg tablet   No No   Sig: take 2 tablets by mouth once daily      Facility-Administered Medications: None       Past Medical History:   Diagnosis Date    [facility]     Anxiety     CAD (coronary artery disease)     GREG mid RCA and GREG right PDA 3/25/2021    Cardiomyopathy ([facility]     CHF (congestive heart failure) ([facility] )     Chronic kidney disease     [facility]     scheduled for sleep apnea test soon after pacemaker implant    Depression     History of chemotherapy     non hodgkins lymphoma     Hyperlipemia     Hypertension     Lymphoma, non-Hodgkin's (HCC)     Myocardial infarction ([facility]     SSS (sick sinus syndrome) ([facility] )     s/p medtronic dual chamber pacemaker 2021    Tobacco abuse     Ventricular tachycardia, non-sustained [facility]     Wears glasses        Past Surgical History:   Procedure Laterality Date    CARDIAC PACEMAKER PLACEMENT Left 2021    Procedure: DUAL LEAD PACEMAKER IMPLANT;  Surgeon: Nayan Major MD;  Location: [address] [place] [address];  Service: Cardiology    CAROTID STENT       SECTION      CORONARY ANGIOPLASTY WITH STENT PLACEMENT  2021    GREG mid RCA and GREG right PDA    DENTAL SURGERY      IR BIOPSY KIDNEY RANDOM  10/18/2021       Family History   Problem Relation Age of Onset    No Known Problems Mother     No Known Problems Father     No Known Problems Daughter     Brain cancer Maternal Grandfather     No Known Problems Paternal Aunt     No Known Problems Paternal Aunt      I have reviewed and agree with the history as documented  E-Cigarette/Vaping    E-Cigarette Use Never User      E-Cigarette/Vaping Substances    Nicotine No     THC No     CBD No     Flavoring No     Other No     Unknown No      Social History     Tobacco Use    Smoking status: Current Every Day Smoker     Packs/day: 1 00     Types: Cigarettes     Last attempt to quit: 3/29/2021     Years since quittin 1    Smokeless tobacco: Current User    Tobacco comment: 1 pk/week per patient    Vaping Use    Vaping Use: Never used   Substance Use Topics    Alcohol use: Yes     Comment: 1-2 times years    Drug use: Never       Review of Systems   Constitutional: Negative for chills, diaphoresis, fatigue and fever  HENT: Negative for congestion, rhinorrhea, sinus pressure, sinus pain, sneezing, sore throat, trouble swallowing and voice change  Eyes: Negative for blurred vision and photophobia  Respiratory: Positive for shortness of breath  Negative for cough, wheezing and stridor  Cardiovascular: Positive for chest pain  Negative for leg swelling, syncope and near-syncope  Gastrointestinal: Negative for abdominal pain, blood in stool, diarrhea, nausea and vomiting  Genitourinary: Negative for difficulty urinating, dysuria, flank pain and frequency  Musculoskeletal: Negative for arthralgias, back pain, gait problem, joint swelling, myalgias, neck pain and neck stiffness  Skin: Negative for rash and wound  Neurological: Positive for headaches  Negative for focal weakness, weakness, light-headedness, numbness and paresthesias  All other systems reviewed and are negative  Physical Exam  Physical Exam  Constitutional:       General: She is not in acute distress  Appearance: She is well-developed  She is not ill-appearing, toxic-appearing or diaphoretic  HENT:      Head: Normocephalic and atraumatic  Nose: Nose normal       Mouth/Throat:      Pharynx: No oropharyngeal exudate  Eyes:      General: No scleral icterus  Right eye: No discharge  Left eye: No discharge  Conjunctiva/sclera: Conjunctivae normal       Pupils: Pupils are equal, round, and reactive to light  Neck:      Vascular: No JVD  Trachea: No tracheal deviation  Cardiovascular:      Rate and Rhythm: Normal rate and regular rhythm  Pulses:           Carotid pulses are 2+ on the right side and 2+ on the left side  Radial pulses are 2+ on the right side and 2+ on the left side  Heart sounds: Normal heart sounds  No murmur heard  No friction rub  No gallop  Pulmonary:      Effort: Pulmonary effort is normal  No respiratory distress  Breath sounds: Normal breath sounds  No stridor  No wheezing, rhonchi or rales  Chest:      Chest wall: No tenderness  Abdominal:      General: Bowel sounds are normal  There is no distension  Palpations: Abdomen is soft  There is no mass  Tenderness: There is no abdominal tenderness  There is no guarding or rebound  Hernia: No hernia is present  Musculoskeletal:         General: No tenderness or deformity  Normal range of motion  Cervical back: Normal range of motion and neck supple  Right lower leg: No tenderness  No edema  Left lower leg: No tenderness  No edema  Lymphadenopathy:      Cervical: No cervical adenopathy  Skin:     General: Skin is warm  Capillary Refill: Capillary refill takes less than 2 seconds  Coloration: Skin is not cyanotic or pale  Findings: No ecchymosis, erythema or rash  Neurological:      General: No focal deficit present  Mental Status: She is alert and oriented to person, place, and time  Cranial Nerves: No cranial nerve deficit  Sensory: No sensory deficit  Motor: No abnormal muscle tone  Coordination: Coordination normal    Psychiatric:         Mood and Affect: Mood normal          Behavior: Behavior normal          Thought Content:  Thought content normal          Judgment: Judgment normal          Vital Signs  ED Triage Vitals   Temp Pulse Resp BP SpO2   -- -- -- -- --      Temp src Heart Rate Source Patient Position - Orthostatic VS BP Location FiO2 (%)   -- -- -- -- --      Pain Score       --           There were no vitals filed for this visit  Visual Acuity      ED Medications  Medications - No data to display    Diagnostic Studies  Results Reviewed     Procedure Component Value Units Date/Time    HS Troponin 0hr (reflex protocol) [[id number]]     Lab Status: No result Specimen: Blood     CBC and differential [[id number]]     Lab Status: No result Specimen: Blood     CMP [[id number]]     Lab Status: No result Specimen: Blood     Lipase [[id number]]     Lab Status: No result Specimen: Blood                  No orders to display              Procedures  Procedures         ED Course  ED Course as of 05/18/22 0227 Tue May 17, 2022   2257 Pt seen and evaluated  Non toxic  Here for CP and headache    2301 Ekg is stable  Non ischemic    2333 Patient clinically stable  She is complaining of headache still  Awaiting medication   2333 Magnesium: 1 9   2333 Lipase: 55   2333 hs TnI 0hr(!): 81   2333 CMP(!)   2333 BNP(!): 505   2333 CMP(!)   2333 CBC and differential(!)   2334 Chest x-ray appears stable  CT head appears unremarkable   Wed May 18, 2022   0004 Patient states pain is mostly over the left temple and left eye  CRP and ESR added   0005 CT BRAIN - WITHOUT CONTRAST     INDICATION:   headache, vomiting      COMPARISON:  10/11/2018      TECHNIQUE:  CT examination of the brain was performed  In addition to axial images, sagittal and coronal 2D reformatted images were created and submitted for interpretation      Radiation dose length product (DLP) for this visit:  892 31 mGy-cm     This examination, like all CT scans performed in the [facility], was performed utilizing techniques to minimize radiation dose exposure, including the use of iterative   reconstruction and automated exposure control        IMAGE QUALITY:  Diagnostic      FINDINGS:     PARENCHYMA:  No intracranial mass, mass effect or midline shift  No CT signs of acute infarction  No acute parenchymal hemorrhage      VENTRICLES AND EXTRA-AXIAL SPACES:  Normal for the patient's age      VISUALIZED ORBITS AND PARANASAL SINUSES:  No acute abnormality involving the orbits  Mild scattered sinus mucosal thickening is noted  No fluid levels are seen      CALVARIUM AND EXTRACRANIAL SOFT TISSUES:  Normal      IMPRESSION:     No acute intracranial abnormality          0047 Ekg stable  Non ischemic    0105 Pt stable  Headache much improved and CP resolved after Dilaudid  Pt aware of work up results   Sujit Stoner texted Dr Yamilka Sarah  Reviewed the case   Margaret Back Dr Yamilka Sarah will see in consultation    [address] Heriberto Peer obs                                             MDM    Disposition  Final diagnoses:   None     ED Disposition     None      Follow-up Information    None         Patient's Medications   Discharge Prescriptions    No medications on file       No discharge procedures on file      PDMP Review       Value Time User    PDMP Reviewed  Yes 4/14/2022  1:13 PM Lai Harvey, 10 [facility]          ED Provider  Electronically Signed by           Dionicio Cagle MD  05/18/22 9449

## 2022-05-18 NOTE — ASSESSMENT & PLAN NOTE
· Patient has a history of non-Hodgkin's lymphoma, and reports that her white blood cell count is always up    · No signs of infection while in-house for  · Will need continued outpatient CBC monitoring

## 2022-05-18 NOTE — ASSESSMENT & PLAN NOTE
· Patient without any symptoms at this time  · Patient had been reporting chest pain prior to arrival-no further chest pain since in-house  · Troponin trended as follows:-81, 97, 82, with the differentials of 16 and 1 respectively  · Non myocardial injury related troponinemia in the setting of having an acute kidney injury atop CKD stage 4  · Status post a Cardiology evaluation - they feel symptoms of more secondary to generalized anxiety disorder  · No further inpatient testing, treatment, and/or workup is needed  · Okay for discharge home  · Cardiology will follow up in the outpatient setting in the near future for continued management

## 2022-05-18 NOTE — ASSESSMENT & PLAN NOTE
Wt Readings from Last 3 Encounters:   05/17/22 123 kg (272 lb)   03/15/22 125 kg (276 lb)   02/21/22 122 kg (270 lb)     · Without exacerbation  · DC home on pre-admit meds at pre-admit doses, once again Lasix to restart on 05/20/2022

## 2022-05-18 NOTE — ASSESSMENT & PLAN NOTE
Continue pre-hospital Lopressor 100 mg p o  B i d , Lasix currently on hold secondary to acute kidney injury

## 2022-05-18 NOTE — ASSESSMENT & PLAN NOTE
· Placed in observation telemetry  · Patient has extensive cardiac history  · Currently follows with Dr Manuel Paz who ER discussed case with  · Patient has a history of chronically elevated troponins the baseline appears to be proximally 46 verses initial of 81 today  · Trend cardiac enzymes  · Obtain serial EKGs  · Continue pre-hospital Lipitor 80 mg p o  Daily, Lopressor 100 mg p o  B i d  and give aspirin 81 mg p o  Daily  · Consult Cardiology in a m

## 2022-05-18 NOTE — ASSESSMENT & PLAN NOTE
Lab Results   Component Value Date    EGFR 26 05/17/2022    EGFR 39 02/22/2022    EGFR 34 02/21/2022    CREATININE 2 21 (H) 05/17/2022    CREATININE 1 59 (H) 02/22/2022    CREATININE 1 78 (H) 02/21/2022     · Patient has a history of Alport syndrome  · The need for nephrology consult  · Most likely secondary to intravascular depletion from recent increase in her outpatient Lasix dosing  · Lasix to be restarted on Friday 05/20/2022  · Cardiology will be repeating blood work early next week

## 2022-05-20 ENCOUNTER — APPOINTMENT (OUTPATIENT)
Dept: LAB | Facility: CLINIC | Age: 47
End: 2022-05-20
Payer: COMMERCIAL

## 2022-05-20 DIAGNOSIS — I12.9 BENIGN HYPERTENSION WITH CKD (CHRONIC KIDNEY DISEASE) STAGE III (HCC): ICD-10-CM

## 2022-05-20 DIAGNOSIS — N18.4 CKD (CHRONIC KIDNEY DISEASE) STAGE 4, GFR 15-29 ML/MIN (HCC): ICD-10-CM

## 2022-05-20 DIAGNOSIS — N25.81 SECONDARY HYPERPARATHYROIDISM (HCC): ICD-10-CM

## 2022-05-20 DIAGNOSIS — N18.30 BENIGN HYPERTENSION WITH CKD (CHRONIC KIDNEY DISEASE) STAGE III (HCC): ICD-10-CM

## 2022-05-20 DIAGNOSIS — Q87.81 ALPORT SYNDROME: ICD-10-CM

## 2022-05-20 DIAGNOSIS — I10 ESSENTIAL HYPERTENSION: ICD-10-CM

## 2022-05-20 DIAGNOSIS — E78.2 MIXED HYPERLIPIDEMIA: ICD-10-CM

## 2022-05-20 LAB
ALBUMIN SERPL BCP-MCNC: 2.7 G/DL (ref 3.5–5)
ALP SERPL-CCNC: 77 U/L (ref 46–116)
ALT SERPL W P-5'-P-CCNC: 37 U/L (ref 12–78)
ANION GAP SERPL CALCULATED.3IONS-SCNC: 7 MMOL/L (ref 4–13)
AST SERPL W P-5'-P-CCNC: 28 U/L (ref 5–45)
BILIRUB SERPL-MCNC: 0.15 MG/DL (ref 0.2–1)
BUN SERPL-MCNC: 24 MG/DL (ref 5–25)
CALCIUM ALBUM COR SERPL-MCNC: 10.6 MG/DL (ref 8.3–10.1)
CALCIUM SERPL-MCNC: 9.6 MG/DL (ref 8.3–10.1)
CHLORIDE SERPL-SCNC: 112 MMOL/L (ref 100–108)
CHOLEST SERPL-MCNC: 236 MG/DL
CO2 SERPL-SCNC: 22 MMOL/L (ref 21–32)
CREAT SERPL-MCNC: 2.11 MG/DL (ref 0.6–1.3)
ERYTHROCYTE [DISTWIDTH] IN BLOOD BY AUTOMATED COUNT: 19.4 % (ref 11.6–15.1)
GFR SERPL CREATININE-BSD FRML MDRD: 27 ML/MIN/1.73SQ M
GLUCOSE P FAST SERPL-MCNC: 134 MG/DL (ref 65–99)
HCT VFR BLD AUTO: 36.7 % (ref 34.8–46.1)
HDLC SERPL-MCNC: 30 MG/DL
HGB BLD-MCNC: 11.8 G/DL (ref 11.5–15.4)
MCH RBC QN AUTO: 27.5 PG (ref 26.8–34.3)
MCHC RBC AUTO-ENTMCNC: 32.2 G/DL (ref 31.4–37.4)
MCV RBC AUTO: 86 FL (ref 82–98)
NONHDLC SERPL-MCNC: 206 MG/DL
PLATELET # BLD AUTO: 316 THOUSANDS/UL (ref 149–390)
PMV BLD AUTO: 10.7 FL (ref 8.9–12.7)
POTASSIUM SERPL-SCNC: 4.3 MMOL/L (ref 3.5–5.3)
PROT SERPL-MCNC: 6.9 G/DL (ref 6.4–8.2)
PTH-INTACT SERPL-MCNC: 115.6 PG/ML (ref 18.4–80.1)
RBC # BLD AUTO: 4.29 MILLION/UL (ref 3.81–5.12)
SODIUM SERPL-SCNC: 141 MMOL/L (ref 136–145)
TRIGL SERPL-MCNC: 414 MG/DL
WBC # BLD AUTO: 12.46 THOUSAND/UL (ref 4.31–10.16)

## 2022-05-20 PROCEDURE — 83970 ASSAY OF PARATHORMONE: CPT

## 2022-05-20 PROCEDURE — 80061 LIPID PANEL: CPT

## 2022-05-20 PROCEDURE — 80053 COMPREHEN METABOLIC PANEL: CPT

## 2022-05-20 PROCEDURE — 36415 COLL VENOUS BLD VENIPUNCTURE: CPT

## 2022-05-20 PROCEDURE — 85025 COMPLETE CBC W/AUTO DIFF WBC: CPT

## 2022-05-21 ENCOUNTER — APPOINTMENT (OUTPATIENT)
Dept: LAB | Facility: HOSPITAL | Age: 47
End: 2022-05-21
Payer: COMMERCIAL

## 2022-05-21 LAB
BACTERIA UR QL AUTO: ABNORMAL /HPF
BILIRUB UR QL STRIP: NEGATIVE
CLARITY UR: CLEAR
COARSE GRAN CASTS URNS QL MICRO: ABNORMAL /LPF
COLOR UR: ABNORMAL
GLUCOSE UR STRIP-MCNC: NEGATIVE MG/DL
HGB UR QL STRIP.AUTO: ABNORMAL
KETONES UR STRIP-MCNC: NEGATIVE MG/DL
LEUKOCYTE ESTERASE UR QL STRIP: NEGATIVE
NITRITE UR QL STRIP: NEGATIVE
NON-SQ EPI CELLS URNS QL MICRO: ABNORMAL /HPF
PH UR STRIP.AUTO: 6 [PH]
PROT UR STRIP-MCNC: ABNORMAL MG/DL
RBC #/AREA URNS AUTO: ABNORMAL /HPF
SP GR UR STRIP.AUTO: 1.02 (ref 1–1.03)
UROBILINOGEN UR QL STRIP.AUTO: 0.2 E.U./DL
WBC #/AREA URNS AUTO: ABNORMAL /HPF

## 2022-05-21 PROCEDURE — 82043 UR ALBUMIN QUANTITATIVE: CPT

## 2022-05-21 PROCEDURE — 84156 ASSAY OF PROTEIN URINE: CPT

## 2022-05-21 PROCEDURE — 81001 URINALYSIS AUTO W/SCOPE: CPT

## 2022-05-21 PROCEDURE — 82570 ASSAY OF URINE CREATININE: CPT

## 2022-05-22 LAB
CREAT UR-MCNC: 106 MG/DL
CREAT UR-MCNC: 106 MG/DL
MICROALBUMIN UR-MCNC: 1540 MG/L (ref 0–20)
MICROALBUMIN/CREAT 24H UR: 1453 MG/G CREATININE (ref 0–30)
PROT UR-MCNC: 230 MG/DL
PROT UR-MCNC: 230 MG/DL
PROT/CREAT UR: 2.17 MG/G{CREAT} (ref 0–0.1)

## 2022-05-22 NOTE — DISCHARGE INSTR - AVS FIRST PAGE
Dear Simuel Koyanagi,     It was our pleasure to care for you here at [facility], [facility]  It is our hope that we were always able to exceed the expected standards for your care during your stay  You were hospitalized due to chest pain  You were cared for on the whole/surgical floor by Karen Max MD with the [facility] who covers for your primary care physician (PCP), Itz Rough, DO, while you were hospitalized  You were additionally seen by Dr Princess Marin of the [facility] associates  If you have any questions or concerns related to this hospitalization, you may contact us at [phone]  For follow up as well as any medication refills, we recommend that you follow up with your primary care physician  A registered nurse will reach out to you by phone within a few days after your discharge to answer any additional questions that you may have after going home  However, at this time we provide for you here, the most important instructions / recommendations at discharge:     · Notable Medication Adjustments -   · No new medications at time of discharge  · Okay to resume all pre-admission medications at the preadmission dosages  · Testing Required after Discharge -   · To be further determined in the outpatient setting by your primary care provider, and or cardiology  · Important follow up information -   · Please follow-up with your providers as previously scheduled  · Other Instructions -   · Please maintain a healthy low-sodium/cardiac diet  · Please review this entire after visit summary as additional general instructions including medication list, appointments, activity, diet, any pertinent wound care, and other additional recommendations from your care team that may be provided for you        Sincerely,     Karen Max MD
no

## 2022-05-23 ENCOUNTER — OFFICE VISIT (OUTPATIENT)
Dept: FAMILY MEDICINE CLINIC | Facility: CLINIC | Age: 47
End: 2022-05-23
Payer: COMMERCIAL

## 2022-05-23 ENCOUNTER — OFFICE VISIT (OUTPATIENT)
Dept: NEPHROLOGY | Facility: CLINIC | Age: 47
End: 2022-05-23
Payer: COMMERCIAL

## 2022-05-23 VITALS
HEART RATE: 78 BPM | DIASTOLIC BLOOD PRESSURE: 76 MMHG | HEIGHT: 66 IN | BODY MASS INDEX: 43.9 KG/M2 | TEMPERATURE: 97.3 F | OXYGEN SATURATION: 100 % | SYSTOLIC BLOOD PRESSURE: 120 MMHG

## 2022-05-23 VITALS
DIASTOLIC BLOOD PRESSURE: 74 MMHG | HEIGHT: 66 IN | SYSTOLIC BLOOD PRESSURE: 130 MMHG | HEART RATE: 78 BPM | BODY MASS INDEX: 43.9 KG/M2 | OXYGEN SATURATION: 97 %

## 2022-05-23 DIAGNOSIS — N17.9 ACUTE RENAL FAILURE SUPERIMPOSED ON STAGE 4 CHRONIC KIDNEY DISEASE, UNSPECIFIED ACUTE RENAL FAILURE TYPE (HCC): Primary | ICD-10-CM

## 2022-05-23 DIAGNOSIS — R80.1 PERSISTENT PROTEINURIA: ICD-10-CM

## 2022-05-23 DIAGNOSIS — N17.9 ACUTE RENAL FAILURE SUPERIMPOSED ON STAGE 4 CHRONIC KIDNEY DISEASE, UNSPECIFIED ACUTE RENAL FAILURE TYPE (HCC): ICD-10-CM

## 2022-05-23 DIAGNOSIS — N18.4 CKD (CHRONIC KIDNEY DISEASE) STAGE 4, GFR 15-29 ML/MIN (HCC): ICD-10-CM

## 2022-05-23 DIAGNOSIS — F33.1 MODERATE EPISODE OF RECURRENT MAJOR DEPRESSIVE DISORDER (HCC): ICD-10-CM

## 2022-05-23 DIAGNOSIS — E78.2 MIXED HYPERLIPIDEMIA: ICD-10-CM

## 2022-05-23 DIAGNOSIS — I50.32 CHRONIC DIASTOLIC CONGESTIVE HEART FAILURE (HCC): ICD-10-CM

## 2022-05-23 DIAGNOSIS — E66.01 CLASS 3 SEVERE OBESITY DUE TO EXCESS CALORIES WITH SERIOUS COMORBIDITY AND BODY MASS INDEX (BMI) OF 40.0 TO 44.9 IN ADULT (HCC): ICD-10-CM

## 2022-05-23 DIAGNOSIS — I25.10 CORONARY ARTERY DISEASE INVOLVING NATIVE CORONARY ARTERY OF NATIVE HEART, UNSPECIFIED WHETHER ANGINA PRESENT: ICD-10-CM

## 2022-05-23 DIAGNOSIS — R73.9 HYPERGLYCEMIA: ICD-10-CM

## 2022-05-23 DIAGNOSIS — G47.33 OSA (OBSTRUCTIVE SLEEP APNEA): ICD-10-CM

## 2022-05-23 DIAGNOSIS — Z76.89 ENCOUNTER FOR SUPPORT AND COORDINATION OF TRANSITION OF CARE: Primary | ICD-10-CM

## 2022-05-23 DIAGNOSIS — N18.4 ACUTE RENAL FAILURE SUPERIMPOSED ON STAGE 4 CHRONIC KIDNEY DISEASE, UNSPECIFIED ACUTE RENAL FAILURE TYPE (HCC): ICD-10-CM

## 2022-05-23 DIAGNOSIS — I10 ESSENTIAL HYPERTENSION: ICD-10-CM

## 2022-05-23 DIAGNOSIS — J44.9 COPD (CHRONIC OBSTRUCTIVE PULMONARY DISEASE) (HCC): ICD-10-CM

## 2022-05-23 DIAGNOSIS — N18.4 ACUTE RENAL FAILURE SUPERIMPOSED ON STAGE 4 CHRONIC KIDNEY DISEASE, UNSPECIFIED ACUTE RENAL FAILURE TYPE (HCC): Primary | ICD-10-CM

## 2022-05-23 DIAGNOSIS — F40.10 SOCIAL ANXIETY DISORDER: ICD-10-CM

## 2022-05-23 DIAGNOSIS — I42.2 HYPERTROPHIC CARDIOMYOPATHY (HCC): ICD-10-CM

## 2022-05-23 DIAGNOSIS — I50.33 ACUTE ON CHRONIC DIASTOLIC CONGESTIVE HEART FAILURE (HCC): ICD-10-CM

## 2022-05-23 DIAGNOSIS — E55.9 VITAMIN D DEFICIENCY: ICD-10-CM

## 2022-05-23 DIAGNOSIS — R60.0 LOWER EXTREMITY EDEMA: ICD-10-CM

## 2022-05-23 DIAGNOSIS — Q87.81 ALPORT SYNDROME: ICD-10-CM

## 2022-05-23 LAB — SL AMB POCT HEMOGLOBIN AIC: 5.8 (ref ?–6.5)

## 2022-05-23 PROCEDURE — 99496 TRANSJ CARE MGMT HIGH F2F 7D: CPT | Performed by: FAMILY MEDICINE

## 2022-05-23 PROCEDURE — 83036 HEMOGLOBIN GLYCOSYLATED A1C: CPT | Performed by: FAMILY MEDICINE

## 2022-05-23 PROCEDURE — 99214 OFFICE O/P EST MOD 30 MIN: CPT | Performed by: NURSE PRACTITIONER

## 2022-05-23 RX ORDER — ALBUTEROL SULFATE 90 UG/1
2 AEROSOL, METERED RESPIRATORY (INHALATION) EVERY 4 HOURS PRN
Qty: 18 G | Refills: 5 | Status: SHIPPED | OUTPATIENT
Start: 2022-05-23

## 2022-05-23 RX ORDER — EZETIMIBE 10 MG/1
10 TABLET ORAL DAILY
Qty: 30 TABLET | Refills: 5 | Status: SHIPPED | OUTPATIENT
Start: 2022-05-23

## 2022-05-23 NOTE — PROGRESS NOTES
Carelink remote check pacer  3 NSVT longest 2 seconds  1 SVT 2 seconds  Normal abttery function        Current Outpatient Medications:     albuterol (PROVENTIL HFA,VENTOLIN HFA) 90 mcg/act inhaler, Inhale 2 puffs every 4 (four) hours as needed for wheezing or shortness of breath, Disp: 1 Inhaler, Rfl: 0    aspirin 81 mg chewable tablet, Chew 1 tablet (81 mg total) daily, Disp: 90 tablet, Rfl: 0    atorvastatin (LIPITOR) 80 mg tablet, Take 1 tablet (80 mg total) by mouth every evening, Disp: 90 tablet, Rfl: 0    Diclofenac Sodium (VOLTAREN) 1 %, Apply 2 g topically 4 (four) times a day, Disp: 150 g, Rfl: 2    ergocalciferol (VITAMIN D2) 50,000 units, Take 1 capsule (50,000 Units total) by mouth once a week, Disp: 12 capsule, Rfl: 0    famotidine (PEPCID) 40 MG tablet, Take 1 tablet (40 mg total) by mouth daily, Disp: 30 tablet, Rfl: 5    furosemide (LASIX) 80 mg tablet, Take 1 tablet (80 mg total) by mouth daily as needed (weight over 275 lbs ), Disp: 30 tablet, Rfl: 5    LORazepam (ATIVAN) 0 5 mg tablet, Take 1 tablet (0 5 mg total) by mouth 2 (two) times a day as needed for anxiety, Disp: 60 tablet, Rfl: 0    methocarbamol (ROBAXIN) 500 mg tablet, Take 1 tablet (500 mg total) by mouth 3 (three) times a day, Disp: 30 tablet, Rfl: 1    metoprolol tartrate (LOPRESSOR) 100 mg tablet, Take 1 tablet (100 mg total) by mouth 2 (two) times a day, Disp: 180 tablet, Rfl: 5    nitroglycerin (NITROSTAT) 0 4 mg SL tablet, Place 1 tablet (0 4 mg total) under the tongue every 5 (five) minutes as needed for chest pain, Disp: 25 tablet, Rfl: 5    Nutritional Supplements (COLD AND FLU PO), Take 1 Dose by mouth daily as needed, Disp: , Rfl:     omeprazole (PriLOSEC) 20 mg delayed release capsule, Take 1 capsule (20 mg total) by mouth 2 (two) times a day, Disp: 60 capsule, Rfl: 0    QUEtiapine (SEROquel) 50 mg tablet, Take 1 tablet (50 mg total) by mouth daily at bedtime, Disp: 30 tablet, Rfl: 1    sertraline (ZOLOFT) 100 mg tablet, take 2 tablets by mouth once daily, Disp: 30 tablet, Rfl: 1

## 2022-05-23 NOTE — PROGRESS NOTES
Nephrology   Office Follow-Up  Misael Dawn 41 y o  female MRN: [id number]    Encounter: [id number]        Assessment & Plan    Misael Dawn was seen in the [place] office today  All diagnoses and orders for visit:     1  Acute renal failure superimposed on stage 4 chronic kidney disease, unspecified acute renal failure type ([facility] [address] )  · Suspect cardiorenal syndrome due to significant volume overloaded state, decompensated heart failure  See #2  · BMP Tuesday to ensure kidney function and electrolyte stability  2  Acute on chronic diastolic congestive heart failure (HCC)  · Diuretics reinitiated over the weekend  ER weight 273 lbs -> 293 pounds on home scale  Continue lasix daily, low sodium diet, daily weights  She will call in weights  Will try to get her in sooner with cardiology  Consider escalating to lasix BID or switching to torsemide if refractory  3  CKD (chronic kidney disease) stage 4, GFR 15-29 ml/min ([facility])  · Baseline creatinine appears to be around 1 5-1 8 mg/dL  When acute issues stable will discuss modalities with patient   -     Basic metabolic panel; Future  4  Alport syndrome  · Biopsy proven  She is supposed to be undergoing genetic counseling with [facility]  5  Lower extremity edema  · Likely related to HF but having a lot of pain left greater than right    -     VAS lower limb venous duplex study, complete bilateral; Future; Expected date: 05/23/2022  6  Persistent proteinuria  · Unfortunately currently not a candidate for ace/arb given ANTHONY  Was on lisinopril previously     HPI: Misael Dawn is a medically complex 41 y o  female who is here for ER follow-up      Pertinent medical problems include CKD 4, biopsy-proven (10/28/2021) hereditary nephritis/Alport's syndrome, CAD s/p PCI with stent, NHL, hypertension, hyperlipidemia, obesity, hypertrophic cardiomyopathy, CdHF, sinus pause s/p dual chamber PPM      Scott Ordaz was hospitalized earlier this month with chest pain noted to have elevated troponin and acute kidney injury  She was evaluated by her cardiologist who advised brief lasix holday  Weight 272 pounds  Creatinine 2 2 mg/dL  Presents to office today for follow-up  Has many physical complaints including lower extremity swelling, lower extremity pain, abdominal bloating, orthopnea, insomnia  Home weight 297 pounds  Restarted lasix over weekend as advised 80 mg daily  Notes increased urine output after lasix  Creatinine 2 1 mg/dL  Patient examines volume overloaded and at high risk for re-hospitalization  She will significantly decrease sodium in diet, take lasix 80 mg daily per cardiology, weigh daily  She will report weights at end of week and diuretic dose will be adjusted if necessary  Lab work next Tuesday given upcoming holiday  Duplex bilateral LE's for severe LE pain/swelling  ROS:   Review of Systems   Constitutional: Positive for activity change, fever and unexpected weight change  Negative for chills  HENT: Negative for ear pain and sore throat  Eyes: Negative for pain and visual disturbance  Respiratory: Positive for shortness of breath  Negative for cough  Orthopnea   Cardiovascular: Positive for leg swelling  Negative for chest pain and palpitations  Gastrointestinal: Positive for abdominal distention  Negative for abdominal pain and vomiting  Genitourinary: Negative for dysuria and hematuria  Musculoskeletal: Positive for gait problem  Negative for arthralgias and back pain  Skin: Negative for color change and rash  Neurological: Negative for seizures and syncope  All other systems reviewed and are negative  Allergies: Patient has no known allergies      Medications:   Current Outpatient Medications:     albuterol (PROVENTIL HFA,VENTOLIN HFA) 90 mcg/act inhaler, Inhale 2 puffs every 4 (four) hours as needed for wheezing or shortness of breath, Disp: 18 g, Rfl: 5    aspirin 81 mg chewable tablet, Chew 1 tablet (81 mg total) daily, Disp: 90 tablet, Rfl: 0    atorvastatin (LIPITOR) 80 mg tablet, Take 1 tablet (80 mg total) by mouth every evening, Disp: 90 tablet, Rfl: 0    ezetimibe (ZETIA) 10 mg tablet, Take 1 tablet (10 mg total) by mouth in the morning , Disp: 30 tablet, Rfl: 5    famotidine (PEPCID) 40 MG tablet, Take 1 tablet (40 mg total) by mouth daily, Disp: 30 tablet, Rfl: 5    furosemide (LASIX) 80 mg tablet, Take 1 tablet (80 mg total) by mouth daily as needed (weight over 275 lbs ), Disp: 30 tablet, Rfl: 5    LORazepam (ATIVAN) 0 5 mg tablet, Take 1 tablet (0 5 mg total) by mouth 2 (two) times a day as needed for anxiety, Disp: 60 tablet, Rfl: 0    metoprolol tartrate (LOPRESSOR) 100 mg tablet, Take 1 tablet (100 mg total) by mouth 2 (two) times a day, Disp: 180 tablet, Rfl: 5    nitroglycerin (NITROSTAT) 0 4 mg SL tablet, Place 1 tablet (0 4 mg total) under the tongue every 5 (five) minutes as needed for chest pain, Disp: 25 tablet, Rfl: 5    omeprazole (PriLOSEC) 20 mg delayed release capsule, Take 1 capsule (20 mg total) by mouth 2 (two) times a day, Disp: 60 capsule, Rfl: 0    QUEtiapine (SEROquel) 50 mg tablet, Take 1 tablet (50 mg total) by mouth daily at bedtime, Disp: 30 tablet, Rfl: 1    sertraline (ZOLOFT) 100 mg tablet, take 2 tablets by mouth once daily, Disp: 30 tablet, Rfl: 1    Diclofenac Sodium (VOLTAREN) 1 %, Apply 2 g topically 4 (four) times a day (Patient not taking: Reported on 5/23/2022), Disp: 150 g, Rfl: 2    Past Medical History:   Diagnosis Date    Acute MI ([facility] )     Anxiety     CAD (coronary artery disease)     GREG mid RCA and GRGE right PDA 3/25/2021    Cardiomyopathy ([place] [address] )     CHF (congestive heart failure) (HCC)     Chronic kidney disease     COPD (chronic obstructive pulmonary disease) (HCC)     scheduled for sleep apnea test soon after pacemaker implant    Depression     History of chemotherapy     non hodgkins lymphoma 2008    Hyperlipemia     Hypertension     Lymphoma, non-Hodgkin's ([facility] )     Myocardial infarction (HCC)     SSS (sick sinus syndrome) (HCC)     s/p medtronic dual chamber pacemaker 2021    Tobacco abuse     Ventricular tachycardia, non-sustained (HCC)     Wears glasses      Past Surgical History:   Procedure Laterality Date    CARDIAC PACEMAKER PLACEMENT Left 2021    Procedure: DUAL LEAD PACEMAKER IMPLANT;  Surgeon: Kelby Anne MD;  Location: [facility] MAIN OR;  Service: Cardiology    CAROTID STENT       SECTION      CORONARY ANGIOPLASTY WITH STENT PLACEMENT  2021    GREG mid RCA and GREG right PDA    DENTAL SURGERY      IR BIOPSY KIDNEY RANDOM  10/18/2021     Family History   Problem Relation Age of Onset    No Known Problems Mother     No Known Problems Father     No Known Problems Daughter     Brain cancer Maternal Grandfather     No Known Problems Paternal Aunt     No Known Problems Paternal Aunt       reports that she has been smoking cigarettes  She has been smoking about 1 00 pack per day  She uses smokeless tobacco  She reports current alcohol use  She reports that she does not use drugs  Physical Exam:   Vitals:    22 1046   BP: 130/74   Pulse: 78   SpO2: 97%   Height: 5' 6" (1 676 m)     Body mass index is 43 9 kg/m²  General: conscious, cooperative, in no acute distress, appears older than stated age  Eyes: conjunctivae pale, anicteric sclerae  ENT: lips and mucous membranes moist  Neck: supple, no JVD, no masses  Chest:  diminished  breath sounds bilaterally, no crackles, ronchus or wheezings  CVS: S1 & S2, normal rate, regular rhythm murmur, generalized anasarca  Abdomen: soft, non-tender, softly-distended, normoactive bowel sounds, rounded  Extremities: severe edema of both legs  Skin: no rash   Neuro: awake, alert, oriented       Diagnostic Data:  Lab: I have personally reviewed pertinent lab results  ,   CBC:  Results from last 7 days   Lab Units 22  1158   WBC Thousand/uL 12 46*   HEMOGLOBIN g/dL 11 8   HEMATOCRIT % 36 7   PLATELETS Thousands/uL 316      CMP: No results found for: SODIUM, K, CL, CO2, ANIONGAP, BUN, CREATININE, GLUCOSE, CALCIUM, AST, ALT, ALKPHOS, PROT, BILITOT, EGFR,   PT/INR: No results found for: PT, INR,   Magnesium: No components found for: MAG,  Phosphorous: No results found for: PHOS    Patient Instructions   Take lasix 80 daily  Weigh yourself daily  Low sodium diet  Avoid processed foods  Call in weight log on Friday morning 5/27  Repeat lab work next Tuesday 5/31 because of the holiday        Portions of the record may have been created with voice recognition software  Occasional wrong word or "sound a like" substitutions may have occurred due to the inherent limitations of voice recognition software  Read the chart carefully and recognize, using context, where substitutions have occurred  If you have any questions, please contact the dictating provider

## 2022-05-23 NOTE — PROGRESS NOTES
Assessment/Plan:     No problem-specific Assessment & Plan notes found for this encounter  Diagnoses and all orders for this visit:    Encounter for support and coordination of transition of care    COPD (chronic obstructive pulmonary disease) ([place] [address] )  -     albuterol (PROVENTIL HFA,VENTOLIN HFA) 90 mcg/act inhaler; Inhale 2 puffs every 4 (four) hours as needed for wheezing or shortness of breath    Coronary artery disease involving native coronary artery of native heart, unspecified whether angina present  Comments: Folowing with Dr Morris Ireland, F/U 6/23    Chronic diastolic congestive heart failure ([place] [address] )  Comments:  Diuresis and management per Cardiology with Nephrology  Orders:  -     CBC and differential; Future  -     Comprehensive metabolic panel; Future    Hypertrophic cardiomyopathy ([place] [address] )  Comments:  Encouraged Elise to follow cardiology direction  Orders:  -     CBC and differential; Future  -     Comprehensive metabolic panel; Future    Essential hypertension  Comments: Well controlled  Orders:  -     CBC and differential; Future  -     Comprehensive metabolic panel; Future    Acute renal failure superimposed on stage 4 chronic kidney disease, unspecified acute renal failure type ([place] [address] )  Comments:  Secondary to diuresis, micro/cr ration 1,540, will see Nephro today  Orders:  -     TSH, 3rd generation with Free T4 reflex; Future  -     Comprehensive metabolic panel; Future    Social anxiety disorder  Comments:  Strongly encouraged overcoming this by discussing with psychology - impedes care  Strongly encouraged weight mgmt appt and PT for progressive deconditioning    Mixed hyperlipidemia  Comments:  Added zetia to lipitor, will hold off on adding medication for sig  Trig until seen by Nephro  Patient needs this under control given sig  CAD hx  Orders:  -     ezetimibe (ZETIA) 10 mg tablet; Take 1 tablet (10 mg total) by mouth in the morning   -     Lipid panel;  Future  -     TSH, 3rd generation with Free T4 reflex; Future    Moderate episode of recurrent major depressive disorder ([facility] [address] )  Comments: Following with psychiatry and psychology with appt June 17th    ELIAZAR (obstructive sleep apnea)  Comments:  Sleep specialist evaluation, last sleep study several years ago  Orders:  -     Ambulatory Referral to Sleep Medicine; Future  -     Ambulatory Referral to Weight Management; Future    Hyperglycemia  Comments:  office A1C 5 8  Orders:  -     POCT hemoglobin A1c    Class 3 severe obesity due to excess calories with serious comorbidity and body mass index (BMI) of 40 0 to 44 9 in [facility])  -     Ambulatory Referral to Weight Management; Future    Vitamin D deficiency  -     Vitamin D 25 hydroxy; Future    Other orders  -     Cancel: Pneumococcal Conjugate Vaccine 20-valent (PCV20)      BMI Counseling: Body mass index is 43 9 kg/m²  The BMI is above normal  Nutrition recommendations include reducing portion sizes, decreasing overall calorie intake, 3-5 servings of fruits/vegetables daily, reducing fast food intake, consuming healthier snacks, decreasing soda and/or juice intake and moderation in carbohydrate intake  Exercise recommendations include vigorous aerobic physical activity for 75 minutes/week and exercising 3-5 times per week  Subjective:     Patient ID: Bethany Simpson is a 41 y o  female  Patient presents accompanied by her  for hospitalization from May 17th to May 18th with a complaint, among others, of chest pain  Patient has an extensive cardiac history with multiple MIs, she is status post pacemaker for sick sinus syndrome  She presented to the emergency room on May 17th with complaints of chest pain radiating across her chest and down both arms, she received sublingual nitro x2 with relief of her pain, this was associated with shortness of breath, she also had headache, dizziness and vomited x1 on presentation day of admission    All hospital records, diagnostics, labs and consults reviewed  Patient had an EKG demonstrating normal sinus rhythm with inferior and anterior infarct, age indeterminate  PVCs that were present on EKG February 21, 2022 are no longer present  Patient had an elevated BNP at 505, she has been on diuretics, her CMP demonstrated a creatinine of 2 21, higher than baseline, taken again 2 days later with holding of diuretics decreased to 2 11, GFR 27 indicating stage 4 chronic kidney disease  She does have a follow-up appointment with Nephrology today  Patient had recent labs as well that demonstrated a microalbumin to creatinine ratio of 1540, and a protein creatinine ratio of 2 17, PTH elevated at 115  Her blood sugar at the time was 163, magnesium normal, CRP elevated at 37 8 sed rate elevated at 51, CBC demonstrated elevated white blood cell count at 16 however this is the lowest it has been  Troponins have fluctuated, chest x-ray and CT of the head were normal   Patient was seen in consult with Cardiology who did not believe her presenting symptoms were related to her history of CAD however were more consistent with her generalized anxiety disorder and social anxiety disorder, patient has a history of hypertrophic cardiomyopathy and does not want more intervention due to her fear of leaving the home  She does follow with Psychiatry  While in the hospital cardiology held her diuretics for 2 days due to the worsening renal function, with regards to her hypertrophic cardiomyopathy they will discontinue the beta-blocker, the elevated troponins with fluctuation Dr Morris Ireland did not feel was related to an acute coronary syndrome but rather the anxiety  Patient has an appointment with Nephrology today as so stated, she will see Psychiatry on June 17th and Cardiology again on June 23rd    Today patient is reporting intermittent pain described as a squeezing in her ches with pins and needles down her arms and UE weaknesst, very similar to when she presented to the ER, she also has dizziness and weakness, she took nitro which improved her symptoms, this occurred early this am and several times since discharge  She does not feel it is anxiety  She feels very strongly that her symptoms are not anxiety  Last stress echo done 7/26/2021 demonstrated no acute ischemia  Lipids reviewed which are worse, she is taking lipitor 80mg daily, she missed two days last week  Tchol 236, Trig 414, HDL 30, LDL - cannot be calculated due to trig  She does not report any change to her diet  Office A1C 5 8      Review of Systems   Constitutional: Positive for activity change  Negative for chills and fever  HENT: Negative  Eyes: Negative  Negative for visual disturbance  Respiratory: Positive for chest tightness and shortness of breath  Negative for cough and wheezing  Cardiovascular: Positive for chest pain, palpitations and leg swelling  Gastrointestinal: Positive for abdominal pain and nausea  Negative for constipation, diarrhea and vomiting  Feels very full and bloated   Endocrine: Positive for polydipsia and polyuria  Genitourinary: Positive for frequency  Negative for dysuria, hematuria and urgency  On lasix   Musculoskeletal: Negative for back pain and neck pain  Neurological: Positive for weakness  Negative for syncope, light-headedness and headaches  Psychiatric/Behavioral: Positive for dysphoric mood  The patient is nervous/anxious  Objective:     Physical Exam  Vitals and nursing note reviewed  Constitutional:       General: She is not in acute distress  Appearance: Normal appearance  She is obese  She is not ill-appearing or diaphoretic  HENT:      Head: Normocephalic and atraumatic  Eyes:      Conjunctiva/sclera: Conjunctivae normal    Cardiovascular:      Rate and Rhythm: Normal rate and regular rhythm  Heart sounds: Normal heart sounds  No murmur heard  Pulmonary:      Effort: Pulmonary effort is normal  No respiratory distress  Breath sounds: Normal breath sounds  No wheezing, rhonchi or rales  Abdominal:      General: There is no distension  Palpations: Abdomen is soft  There is no mass  Tenderness: There is no abdominal tenderness  There is no guarding or rebound  Musculoskeletal:      Cervical back: Normal range of motion and neck supple  Right lower leg: Edema present  Left lower leg: Edema present  Comments: 2+ non-pitting   Lymphadenopathy:      Cervical: No cervical adenopathy  Neurological:      General: No focal deficit present  Mental Status: She is alert and oriented to person, place, and time  Psychiatric:         Behavior: Behavior normal          Thought Content: Thought content normal            Vitals:    05/23/22 0742   BP: 120/76   BP Location: Left arm   Patient Position: Sitting   Pulse: 78   Temp: (!) 97 3 °F (36 3 °C)   TempSrc: Tympanic   SpO2: 100%   Height: 5' 6" (1 676 m)       Transitional Care Management Review:  Viet Haas is a 41 y o  female here for TCM follow up  During the TCM phone call patient stated:    TCM Call (since 4/22/2022)     Date and time call was made  5/18/2022 11:12 AM    Hospital care reviewed  Records reviewed    Patient was hospitialized at  [address]    Date of Admission  05/17/22    Date of discharge  05/18/22    Diagnosis  chest pain    Disposition  Home      TCM Call (since 4/22/2022)     Should patient be enrolled in anticoag monitoring? No    Scheduled for follow up?   Yes    Did you obtain your prescribed medications  Yes    Do you need help managing your prescriptions or medications  No    Is transportation to your appointment needed  No    I have advised the patient to call PCP with any new or worsening symptoms  mark Lang,

## 2022-05-23 NOTE — PATIENT INSTRUCTIONS
Take lasix 80 daily  Weigh yourself daily  Low sodium diet   Avoid processed foods  Call in weight log on Friday morning 5/27  Repeat lab work next Tuesday 5/31 because of the holiday

## 2022-05-24 ENCOUNTER — TELEPHONE (OUTPATIENT)
Dept: CARDIOLOGY CLINIC | Facility: CLINIC | Age: 47
End: 2022-05-24

## 2022-05-25 ENCOUNTER — OFFICE VISIT (OUTPATIENT)
Dept: CARDIOLOGY CLINIC | Facility: CLINIC | Age: 47
End: 2022-05-25
Payer: COMMERCIAL

## 2022-05-25 VITALS
BODY MASS INDEX: 46.77 KG/M2 | DIASTOLIC BLOOD PRESSURE: 78 MMHG | WEIGHT: 291 LBS | HEART RATE: 80 BPM | HEIGHT: 66 IN | SYSTOLIC BLOOD PRESSURE: 112 MMHG

## 2022-05-25 DIAGNOSIS — F33.1 MODERATE EPISODE OF RECURRENT MAJOR DEPRESSIVE DISORDER (HCC): ICD-10-CM

## 2022-05-25 DIAGNOSIS — R07.9 CHEST PAIN, UNSPECIFIED TYPE: ICD-10-CM

## 2022-05-25 DIAGNOSIS — I10 ESSENTIAL HYPERTENSION: Primary | ICD-10-CM

## 2022-05-25 DIAGNOSIS — I25.10 CORONARY ARTERY DISEASE INVOLVING NATIVE CORONARY ARTERY OF NATIVE HEART, UNSPECIFIED WHETHER ANGINA PRESENT: ICD-10-CM

## 2022-05-25 DIAGNOSIS — I42.2 HYPERTROPHIC CARDIOMYOPATHY (HCC): ICD-10-CM

## 2022-05-25 DIAGNOSIS — Z72.0 TOBACCO ABUSE: ICD-10-CM

## 2022-05-25 DIAGNOSIS — I50.33 ACUTE ON CHRONIC DIASTOLIC CONGESTIVE HEART FAILURE (HCC): ICD-10-CM

## 2022-05-25 PROCEDURE — 99214 OFFICE O/P EST MOD 30 MIN: CPT | Performed by: NURSE PRACTITIONER

## 2022-05-25 RX ORDER — ISOSORBIDE MONONITRATE 30 MG/1
30 TABLET, EXTENDED RELEASE ORAL DAILY
Qty: 30 TABLET | Refills: 11 | Status: SHIPPED | OUTPATIENT
Start: 2022-05-25

## 2022-05-25 RX ORDER — SERTRALINE HYDROCHLORIDE 100 MG/1
TABLET, FILM COATED ORAL
Qty: 30 TABLET | Refills: 1 | Status: SHIPPED | OUTPATIENT
Start: 2022-05-25

## 2022-05-25 NOTE — ASSESSMENT & PLAN NOTE
1  CAD s/p PCI to RCA, rPDA  · Inferior STEMI (3/23/21) --> initial PCI to [facility]  · Recurrent chest pain (3/23/21) --> GREG to RPDA  · Readmission, inferior STEMI (4/2/21) --> patent stents, no change on cath  Continue asa, Brilinta, statin, BB--Brilinta will be stopped when she runs out

## 2022-05-25 NOTE — ASSESSMENT & PLAN NOTE
Wt Readings from Last 3 Encounters:   05/25/22 132 kg (291 lb)   05/17/22 123 kg (272 lb)   03/15/22 125 kg (276 lb)     Continue lasix 80 mg daily  Low Na diet, daily weights

## 2022-05-25 NOTE — PROGRESS NOTES
Patient ID: Kyra Maki is a 41 y o  female  Plan:      Essential hypertension  Blood pressure well controlled  Continue current regimen    CAD (coronary artery disease)  1  CAD s/p PCI to RCA, rPDA  · Inferior STEMI (3/23/21) --> initial PCI to [facility]  · Recurrent chest pain (3/23/21) --> GREG to RPDA  · Readmission, inferior STEMI (4/2/21) --> patent stents, no change on cath  Continue asa, Brilinta, statin, BB--Brilinta will be stopped when she runs out  Hypertrophic cardiomyopathy ([address] )  By cardiac MRI and echo  Tolerating increased dose of betablocker    Tobacco abuse  Cessation encouraged     Chest pain  Will add Imdur 30 mg and monitor    Acute on chronic diastolic congestive heart failure (HCC)  Wt Readings from Last 3 Encounters:   05/25/22 132 kg (291 lb)   05/17/22 123 kg (272 lb)   03/15/22 125 kg (276 lb)     Continue lasix 80 mg daily  Low Na diet, daily weights         Follow up Plan/Summary Comments: It is difficult to determine whether not her discomfort represents true angina  She admittedly has significant anxiety as well as known coronary disease that is best managed medically-she has known coronary disease, not amenable to intervention as well as advanced renal disease  Therefore, I would be reluctant for her to have a cath  We discussed this at length and she is in agreement and does not want to have a cath again  Will initiate Imdur 30 mg daily  I will see her for follow-up in 6-8 weeks  She will call sooner if needed  HPI:  I had the pleasure seeing Sadi Atwoodschner in the office today for a follow-up visit  Dysart Dilan was recently evaluated in the emergency department with chest pain (05/17/2022)  She notes that she had been experiencing chest tightness and an overall tingling and hot sensation for several weeks prior to her presentation  This always occurred with activity, however on the day of presentation she felt that her symptoms were worse      At that time there was a mild troponin elevation which was flat without significant rise and fall  Her symptoms were not felt to be related to ACS  At that time, her diuretics were held due to ANTHONY on CKD  She was seen by Renal 5/23 who restarted her diuretics due to significant weight gain  Shahbaz Cazares reports that with the reinitiation of once daily Lasix, her symptoms have significantly improved and she notes a weight loss of 16 lb since Monday  She has been increasing her activity and is doing more every day  She does note some exertional dyspnea  Since her discharge from the hospital last week, she notes that she has mild chest discomfort with activity, but it does not occur all the time  Episodes last a few minutes  She sometimes uses nitro with relief  She has not had recurrent symptoms that are as severe as what she experienced prior to her hospital visit  Review of Systems   10  point ROS  was otherwise non pertinent or negative except as per HPI or as below  Gait: Normal      Most recent or relevant cardiac/vascular testing:    Holter 4/22/2021 SR, avg rate 88  Frequent PVCs, 8-beat run NSVT     Cath 4/2/2021 Distal LAD: There was a 60 % stenosis  Mid circumflex: There was a 50 % stenosis  Distal RCA: There was a 10 % stenosis at the site of a prior stent  Right PDA: There was a 0 % stenosis at the site of a prior stent  Echo 3/25/2021 60%, severe hypokinesis of the basal-mid inferior wall  Findings consistent with HCM  Mild-mod MR  Cardiac MRI 3/29/2021 severe LVH, EF 44%  Small circumferential pericardial effusion  Mild MR  Findings suggestive of underlying hypertrophic cardiomyopathy with ischemic scarring of the inferior and inferolateral walls  Stress echo 7/26/2021 Severe hypokinesis if the basal-mid inferior walls at peak stress, but overall conclusion was no evidence of stress induced ischemia        Objective:     /78   Pulse 80   Ht 5' 6" (1 676 m)   Wt 132 kg (291 lb)   LMP 04/26/2022   BMI 46 97 kg/m²     PHYSICAL EXAM:    General:  Normal appearance, no acute distress  Eyes:  Anicteric  Oral mucosa:  Wearing a mask  Neck:  Difficult to assess JVD due to body habitus  Chest:  Clear to auscultation   Cardiac:  No palpable PMI  Normal S1 and S2  No murmur gallop or rub  Abdomen:  Obese, soft and nontender  Extremities:  +1 bilat LE edema  Musculoskeletal:  Symmetric  Vascular:  Pedal pulses are intact  Neuro:  Grossly symmetric  Psych:  Alert and oriented x3      No Known Allergies    Current Outpatient Medications:     albuterol (PROVENTIL HFA,VENTOLIN HFA) 90 mcg/act inhaler, Inhale 2 puffs every 4 (four) hours as needed for wheezing or shortness of breath, Disp: 18 g, Rfl: 5    aspirin 81 mg chewable tablet, Chew 1 tablet (81 mg total) daily, Disp: 90 tablet, Rfl: 0    atorvastatin (LIPITOR) 80 mg tablet, Take 1 tablet (80 mg total) by mouth every evening, Disp: 90 tablet, Rfl: 0    ezetimibe (ZETIA) 10 mg tablet, Take 1 tablet (10 mg total) by mouth in the morning , Disp: 30 tablet, Rfl: 5    famotidine (PEPCID) 40 MG tablet, Take 1 tablet (40 mg total) by mouth daily, Disp: 30 tablet, Rfl: 5    furosemide (LASIX) 80 mg tablet, Take 1 tablet (80 mg total) by mouth daily as needed (weight over 275 lbs ) (Patient taking differently: Take 80 mg by mouth in the morning ), Disp: 30 tablet, Rfl: 5    LORazepam (ATIVAN) 0 5 mg tablet, Take 1 tablet (0 5 mg total) by mouth 2 (two) times a day as needed for anxiety, Disp: 60 tablet, Rfl: 0    metoprolol tartrate (LOPRESSOR) 100 mg tablet, Take 1 tablet (100 mg total) by mouth 2 (two) times a day, Disp: 180 tablet, Rfl: 5    nitroglycerin (NITROSTAT) 0 4 mg SL tablet, Place 1 tablet (0 4 mg total) under the tongue every 5 (five) minutes as needed for chest pain, Disp: 25 tablet, Rfl: 5    omeprazole (PriLOSEC) 20 mg delayed release capsule, Take 1 capsule (20 mg total) by mouth 2 (two) times a day, Disp: 60 capsule, Rfl: 0    QUEtiapine (SEROquel) 50 mg tablet, Take 1 tablet (50 mg total) by mouth daily at bedtime, Disp: 30 tablet, Rfl: 1    sertraline (ZOLOFT) 100 mg tablet, take 2 tablets by mouth once daily, Disp: 30 tablet, Rfl: 1    Diclofenac Sodium (VOLTAREN) 1 %, Apply 2 g topically 4 (four) times a day (Patient not taking: No sig reported), Disp: 150 g, Rfl: 2  Past Medical History:   Diagnosis Date    Acute MI ([address] )     Anxiety     CAD (coronary artery disease)     GREG mid RCA and GREG right PDA 3/25/2021    Cardiomyopathy ([address] )     CHF (congestive heart failure) (HCC)     Chronic kidney disease     COPD (chronic obstructive pulmonary disease) ([facility])     scheduled for sleep apnea test soon after pacemaker implant    Depression     History of chemotherapy     non hodgkins lymphoma     Hyperlipemia     Hypertension     Lymphoma, non-Hodgkin's (HCC)     Myocardial infarction ([address] )     [facility]     s/p medtronic dual chamber pacemaker 2021    Tobacco abuse     Ventricular tachycardia, non-sustained ([address] )     Wears glasses      Past Surgical History:   Procedure Laterality Date    CARDIAC PACEMAKER PLACEMENT Left 2021    Procedure: DUAL LEAD PACEMAKER IMPLANT;  Surgeon: Keina Grajeda MD;  Location: [address] [place] [address];  Service: Cardiology    CAROTID STENT       SECTION      CORONARY ANGIOPLASTY WITH STENT PLACEMENT  2021    GREG mid RCA and GREG right PDA    DENTAL SURGERY      IR BIOPSY KIDNEY RANDOM  10/18/2021       CMP:   Lab Results   Component Value Date    K 4 3 2022     (H) 2022    CO2 22 2022    BUN 24 2022    CREATININE 2 11 (H) 2022    EGFR 27 2022     Lipid Profile:    Lab Results   Component Value Date    TRIG 414 (H) 2022    HDL 30 (L) 2022         Social History     Tobacco Use   Smoking Status Current Every Day Smoker    Packs/day: 1 00    Types: Cigarettes    Last attempt to quit: 3/29/2021    Years since quittin 1   Smokeless Tobacco Current User   Tobacco Comment    1 pk/week per patient

## 2022-06-08 DIAGNOSIS — I25.10 CORONARY ARTERY DISEASE INVOLVING NATIVE CORONARY ARTERY OF NATIVE HEART WITHOUT ANGINA PECTORIS: ICD-10-CM

## 2022-06-08 DIAGNOSIS — Z95.5 S/P DRUG ELUTING CORONARY STENT PLACEMENT: ICD-10-CM

## 2022-06-08 DIAGNOSIS — F41.1 GENERALIZED ANXIETY DISORDER: ICD-10-CM

## 2022-06-08 DIAGNOSIS — F33.1 MODERATE EPISODE OF RECURRENT MAJOR DEPRESSIVE DISORDER (HCC): ICD-10-CM

## 2022-06-08 RX ORDER — QUETIAPINE FUMARATE 50 MG/1
TABLET, FILM COATED ORAL
Qty: 30 TABLET | Refills: 1 | Status: SHIPPED | OUTPATIENT
Start: 2022-06-08

## 2022-06-08 RX ORDER — NITROGLYCERIN 0.4 MG/1
0.4 TABLET SUBLINGUAL
Qty: 25 TABLET | Refills: 1 | Status: SHIPPED | OUTPATIENT
Start: 2022-06-08 | End: 2022-06-20

## 2022-06-08 RX ORDER — LORAZEPAM 0.5 MG/1
0.5 TABLET ORAL 2 TIMES DAILY PRN
Qty: 60 TABLET | Refills: 0 | Status: SHIPPED | OUTPATIENT
Start: 2022-06-08

## 2022-06-21 ENCOUNTER — TELEPHONE (OUTPATIENT)
Dept: CARDIOLOGY CLINIC | Facility: CLINIC | Age: 47
End: 2022-06-21

## 2022-06-27 ENCOUNTER — IN-CLINIC DEVICE VISIT (OUTPATIENT)
Dept: CARDIOLOGY CLINIC | Facility: CLINIC | Age: 47
End: 2022-06-27
Payer: COMMERCIAL

## 2022-06-27 ENCOUNTER — APPOINTMENT (OUTPATIENT)
Dept: LAB | Facility: CLINIC | Age: 47
End: 2022-06-27
Payer: COMMERCIAL

## 2022-06-27 DIAGNOSIS — N18.4 CKD (CHRONIC KIDNEY DISEASE) STAGE 4, GFR 15-29 ML/MIN (HCC): ICD-10-CM

## 2022-06-27 DIAGNOSIS — I49.5 SICK SINUS SYNDROME (HCC): Primary | ICD-10-CM

## 2022-06-27 DIAGNOSIS — Z45.010 ENCOUNTER FOR CHECKING AND TESTING OF CARDIAC PACEMAKER PULSE GENERATOR (BATTERY): ICD-10-CM

## 2022-06-27 LAB
ANION GAP SERPL CALCULATED.3IONS-SCNC: 8 MMOL/L (ref 4–13)
BUN SERPL-MCNC: 23 MG/DL (ref 5–25)
CALCIUM SERPL-MCNC: 9.4 MG/DL (ref 8.3–10.1)
CHLORIDE SERPL-SCNC: 113 MMOL/L (ref 100–108)
CO2 SERPL-SCNC: 23 MMOL/L (ref 21–32)
CREAT SERPL-MCNC: 2 MG/DL (ref 0.6–1.3)
GFR SERPL CREATININE-BSD FRML MDRD: 29 ML/MIN/1.73SQ M
GLUCOSE P FAST SERPL-MCNC: 91 MG/DL (ref 65–99)
POTASSIUM SERPL-SCNC: 4.4 MMOL/L (ref 3.5–5.3)
SODIUM SERPL-SCNC: 144 MMOL/L (ref 136–145)

## 2022-06-27 PROCEDURE — 36415 COLL VENOUS BLD VENIPUNCTURE: CPT

## 2022-06-27 PROCEDURE — 80048 BASIC METABOLIC PNL TOTAL CA: CPT

## 2022-06-27 PROCEDURE — 93280 PM DEVICE PROGR EVAL DUAL: CPT | Performed by: INTERNAL MEDICINE

## 2022-06-28 NOTE — PROGRESS NOTES
Device check in person pacer  11 short NSVT  3 FAST A&V  Normal battery function        Current Outpatient Medications:     albuterol (PROVENTIL HFA,VENTOLIN HFA) 90 mcg/act inhaler, Inhale 2 puffs every 4 (four) hours as needed for wheezing or shortness of breath, Disp: 18 g, Rfl: 5    aspirin 81 mg chewable tablet, Chew 1 tablet (81 mg total) daily, Disp: 90 tablet, Rfl: 0    atorvastatin (LIPITOR) 80 mg tablet, Take 1 tablet (80 mg total) by mouth every evening, Disp: 90 tablet, Rfl: 0    Diclofenac Sodium (VOLTAREN) 1 %, Apply 2 g topically 4 (four) times a day (Patient not taking: No sig reported), Disp: 150 g, Rfl: 2    ezetimibe (ZETIA) 10 mg tablet, Take 1 tablet (10 mg total) by mouth in the morning , Disp: 30 tablet, Rfl: 5    famotidine (PEPCID) 40 MG tablet, Take 1 tablet (40 mg total) by mouth daily, Disp: 30 tablet, Rfl: 5    furosemide (LASIX) 80 mg tablet, Take 1 tablet (80 mg total) by mouth daily as needed (weight over 275 lbs ), Disp: 30 tablet, Rfl: 5    isosorbide mononitrate (IMDUR) 30 mg 24 hr tablet, Take 1 tablet (30 mg total) by mouth daily, Disp: 30 tablet, Rfl: 11    LORazepam (ATIVAN) 0 5 mg tablet, Take 1 tablet (0 5 mg total) by mouth 2 (two) times a day as needed for anxiety, Disp: 60 tablet, Rfl: 0    metoprolol tartrate (LOPRESSOR) 100 mg tablet, Take 1 tablet (100 mg total) by mouth 2 (two) times a day, Disp: 60 tablet, Rfl: 5    nitroglycerin (NITROSTAT) 0 4 mg SL tablet, Place 1 tablet (0 4 mg total) under the tongue every 5 (five) minutes as needed for chest pain, Disp: 25 tablet, Rfl: 5    omeprazole (PriLOSEC) 20 mg delayed release capsule, Take 1 capsule (20 mg total) by mouth 2 (two) times a day, Disp: 60 capsule, Rfl: 0    QUEtiapine (SEROquel) 50 mg tablet, take 1 tablet by mouth at bedtime, Disp: 30 tablet, Rfl: 1    sertraline (ZOLOFT) 100 mg tablet, take 2 tablets by mouth once daily, Disp: 30 tablet, Rfl: 1

## 2022-07-11 ENCOUNTER — OFFICE VISIT (OUTPATIENT)
Dept: NEPHROLOGY | Facility: CLINIC | Age: 47
End: 2022-07-11
Payer: COMMERCIAL

## 2022-07-11 VITALS
HEIGHT: 66 IN | BODY MASS INDEX: 45.32 KG/M2 | DIASTOLIC BLOOD PRESSURE: 84 MMHG | HEART RATE: 84 BPM | SYSTOLIC BLOOD PRESSURE: 126 MMHG | WEIGHT: 282 LBS

## 2022-07-11 DIAGNOSIS — Q87.81 ALPORT SYNDROME: ICD-10-CM

## 2022-07-11 DIAGNOSIS — E55.9 VITAMIN D DEFICIENCY: ICD-10-CM

## 2022-07-11 DIAGNOSIS — I10 ESSENTIAL HYPERTENSION: ICD-10-CM

## 2022-07-11 DIAGNOSIS — M70.21 OLECRANON BURSITIS, RIGHT ELBOW: ICD-10-CM

## 2022-07-11 DIAGNOSIS — I42.2 HYPERTROPHIC CARDIOMYOPATHY (HCC): ICD-10-CM

## 2022-07-11 DIAGNOSIS — N18.4 CKD (CHRONIC KIDNEY DISEASE) STAGE 4, GFR 15-29 ML/MIN (HCC): Primary | ICD-10-CM

## 2022-07-11 PROCEDURE — 99214 OFFICE O/P EST MOD 30 MIN: CPT | Performed by: NURSE PRACTITIONER
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
## 2022-07-11 NOTE — PATIENT INSTRUCTIONS
Bring blood pressure cuff in for correlation   See orthopedic surgeon for your elbow  Blood work in August

## 2022-07-13 DIAGNOSIS — I10 HYPERTENSION, UNSPECIFIED TYPE: ICD-10-CM

## 2022-07-13 DIAGNOSIS — E78.2 MIXED HYPERLIPIDEMIA: ICD-10-CM

## 2022-07-13 DIAGNOSIS — Z95.5 S/P DRUG ELUTING CORONARY STENT PLACEMENT: ICD-10-CM

## 2022-07-13 RX ORDER — ATORVASTATIN CALCIUM 80 MG/1
80 TABLET, FILM COATED ORAL EVERY EVENING
Qty: 90 TABLET | Refills: 0 | Status: SHIPPED | OUTPATIENT
Start: 2022-07-13 | End: 2022-10-11

## 2022-07-14 ENCOUNTER — OFFICE VISIT (OUTPATIENT)
Dept: CARDIOLOGY CLINIC | Facility: CLINIC | Age: 47
End: 2022-07-14
Payer: COMMERCIAL

## 2022-07-14 VITALS
BODY MASS INDEX: 44.68 KG/M2 | WEIGHT: 278 LBS | SYSTOLIC BLOOD PRESSURE: 118 MMHG | HEART RATE: 88 BPM | HEIGHT: 66 IN | DIASTOLIC BLOOD PRESSURE: 80 MMHG

## 2022-07-14 DIAGNOSIS — I50.32 CHRONIC DIASTOLIC CONGESTIVE HEART FAILURE (HCC): ICD-10-CM

## 2022-07-14 DIAGNOSIS — I42.2 HYPERTROPHIC CARDIOMYOPATHY (HCC): ICD-10-CM

## 2022-07-14 DIAGNOSIS — I25.10 CORONARY ARTERY DISEASE INVOLVING NATIVE CORONARY ARTERY OF NATIVE HEART, UNSPECIFIED WHETHER ANGINA PRESENT: ICD-10-CM

## 2022-07-14 DIAGNOSIS — Q87.81 ALPORT SYNDROME: ICD-10-CM

## 2022-07-14 DIAGNOSIS — I10 ESSENTIAL HYPERTENSION: Primary | ICD-10-CM

## 2022-07-14 PROCEDURE — 99214 OFFICE O/P EST MOD 30 MIN: CPT | Performed by: NURSE PRACTITIONER

## 2022-07-14 RX ORDER — BUMETANIDE 1 MG/1
1 TABLET ORAL DAILY
Qty: 30 TABLET | Refills: 6 | Status: SHIPPED | OUTPATIENT
Start: 2022-07-14

## 2022-07-14 NOTE — PROGRESS NOTES
Patient ID: Josselyn Spangler is a 41 y o  female  Plan:      Essential hypertension  Blood pressure well controlled  Continue current regimen    CAD (coronary artery disease)  1  CAD s/p PCI to RCA, rPDA  · Inferior STEMI (3/23/21) --> initial PCI to [facility]  · Recurrent chest pain (3/23/21) --> GREG to RPDA  · Readmission, inferior STEMI (4/2/21) --> patent stents, no change on cath  Continue asa, statin, BB    Hypertrophic cardiomyopathy ([address] )  By cardiac MRI and echo  Tolerating increased dose of betablocker    Alport syndrome  Biopsy proven  Follows with renal    Chronic diastolic congestive heart failure (HCC)  Wt Readings from Last 3 Encounters:   07/14/22 126 kg (278 lb)   07/11/22 128 kg (282 lb)   05/25/22 132 kg (291 lb)     Continue lasix 80 mg daily  Low Na diet, daily weights         Follow up Plan/Summary Comments:  Continue current medications  Concern for her becoming resistant to lasix as per Renal's note  Will change to Bumex  BMP in 1 week  Follow up in Sept, or sooner if needed  HPI: I had the pleasure of seeing Lucita Clinton in the office today for a follow up visit  At her last visit, we started imdur for some episodes of chest discomfort  She noted an improvement in the frequency of her symptoms initially  However, she has had an increase in stressors at home and now she has had an increase in frequency, but no more than it was previously  Sometimes her chest pain goes away with deep breathing and relaxation techniques, other times she takes a nitro with relief  She has been following a low Na diet, trying to make healthy food choices and increase her activity  Review of Systems   10  point ROS  was otherwise non pertinent or negative except as per HPI or as below  Gait: Normal      Most recent or relevant cardiac/vascular testing:      Holter 4/22/2021 SR, avg rate 88  Frequent PVCs, 8-beat run NSVT     Cath 4/2/2021 Distal LAD: There was a 60 % stenosis    Mid circumflex: There was a 50 % stenosis  Distal RCA: There was a 10 % stenosis at the site of a prior stent  Right PDA: There was a 0 % stenosis at the site of a prior stent  Echo 3/25/2021 60%, severe hypokinesis of the basal-mid inferior wall  Findings consistent with HCM  Mild-mod MR  Cardiac MRI 3/29/2021 severe LVH, EF 44%  Small circumferential pericardial effusion  Mild MR  Findings suggestive of underlying hypertrophic cardiomyopathy with ischemic scarring of the inferior and inferolateral walls  Stress echo 7/26/2021 Severe hypokinesis if the basal-mid inferior walls at peak stress, but overall conclusion was no evidence of stress induced ischemia  Objective:     /80   Pulse 88   Ht 5' 6" (1 676 m)   Wt 126 kg (278 lb)   BMI 44 87 kg/m²     PHYSICAL EXAM:    General:  Normal appearance, no acute distress  Eyes:  Anicteric  Oral mucosa:  Moist   Neck:  No JVD  Carotid upstrokes are brisk without bruits  No masses  Chest:  Clear to auscultation, L SC device  Cardiac:  No palpable PMI  Normal S1 and S2  No murmur gallop or rub  Abdomen:  Soft and nontender  No palpable organomegaly or aortic enlargement  Extremities:  Trace bilat peripheral edema  Musculoskeletal:  Symmetric  Vascular:  Pedal pulses are intact  Neuro:  Grossly symmetric  Psych:  Alert and oriented x3      No Known Allergies    Current Outpatient Medications:     albuterol (PROVENTIL HFA,VENTOLIN HFA) 90 mcg/act inhaler, Inhale 2 puffs every 4 (four) hours as needed for wheezing or shortness of breath, Disp: 18 g, Rfl: 5    aspirin 81 mg chewable tablet, Chew 1 tablet (81 mg total) daily, Disp: 90 tablet, Rfl: 0    atorvastatin (LIPITOR) 80 mg tablet, Take 1 tablet (80 mg total) by mouth every evening, Disp: 90 tablet, Rfl: 0    ezetimibe (ZETIA) 10 mg tablet, Take 1 tablet (10 mg total) by mouth in the morning , Disp: 30 tablet, Rfl: 5    famotidine (PEPCID) 40 MG tablet, Take 1 tablet (40 mg total) by mouth daily, Disp: 30 tablet, Rfl: 5    furosemide (LASIX) 80 mg tablet, Take 1 tablet (80 mg total) by mouth daily as needed (weight over 275 lbs ), Disp: 30 tablet, Rfl: 5    isosorbide mononitrate (IMDUR) 30 mg 24 hr tablet, Take 1 tablet (30 mg total) by mouth daily, Disp: 30 tablet, Rfl: 11    LORazepam (ATIVAN) 0 5 mg tablet, Take 1 tablet (0 5 mg total) by mouth 2 (two) times a day as needed for anxiety, Disp: 60 tablet, Rfl: 0    metoprolol tartrate (LOPRESSOR) 100 mg tablet, Take 1 tablet (100 mg total) by mouth 2 (two) times a day, Disp: 60 tablet, Rfl: 5    nitroglycerin (NITROSTAT) 0 4 mg SL tablet, Place 1 tablet (0 4 mg total) under the tongue every 5 (five) minutes as needed for chest pain, Disp: 25 tablet, Rfl: 5    omeprazole (PriLOSEC) 20 mg delayed release capsule, Take 1 capsule (20 mg total) by mouth 2 (two) times a day, Disp: 60 capsule, Rfl: 0    QUEtiapine (SEROquel) 50 mg tablet, take 1 tablet by mouth at bedtime, Disp: 30 tablet, Rfl: 1    sertraline (ZOLOFT) 100 mg tablet, take 2 tablets by mouth once daily, Disp: 30 tablet, Rfl: 1    Diclofenac Sodium (VOLTAREN) 1 %, Apply 2 g topically 4 (four) times a day (Patient not taking: No sig reported), Disp: 150 g, Rfl: 2  Past Medical History:   Diagnosis Date    Acute MI ([place] [address] )     Anxiety     CAD (coronary artery disease)     GREG mid RCA and GREG right PDA 3/25/2021    Cardiomyopathy ([place] [address] )     CH[facility]est[facility] failure) [facility]     Chronic kidney disease     COPD (chronic obstructive pulmonary disease) ([facility])     scheduled for sleep apnea test soon after pacemaker implant    Depression     History of chemotherapy     non hodgkins lymphoma 2008    Hyperlipemia     Hypertension     Hypertrophic cardiomyopathy ([place] [address] )     Lymphoma, non-Hodgkin's ([facility])     Myocardial infarction ([place] [address] )     SSS (sick sinus syndrome) ([facility])     s/p medtronic dual chamber pacemaker 5/25/2021    Tobacco abuse     Ventricular tachycardia, non-sustained ([address] )     Wears glasses      Past Surgical History:   Procedure Laterality Date    CARDIAC PACEMAKER PLACEMENT Left 2021    Procedure: DUAL LEAD PACEMAKER IMPLANT;  Surgeon: Brenda Keith MD;  Location: [address] MAIN OR;  Service: Cardiology    CAROTID STENT       SECTION      CORONARY ANGIOPLASTY WITH STENT PLACEMENT  2021    GREG mid RCA and GREG right PDA    DENTAL SURGERY      IR BIOPSY KIDNEY RANDOM  10/18/2021       CMP:   Lab Results   Component Value Date    K 4 4 2022     (H) 2022    CO2 23 2022    BUN 23 2022    CREATININE 2 00 (H) 2022    EGFR 29 2022     Lipid Profile:    Lab Results   Component Value Date    TRIG 414 (H) 2022    HDL 30 (L) 2022         Social History     Tobacco Use   Smoking Status Current Every Day Smoker    Packs/day: 1 00    Types: Cigarettes    Last attempt to quit: 3/29/2021    Years since quittin 2   Smokeless Tobacco Current User   Tobacco Comment    1 pk/week per patient

## 2022-07-14 NOTE — ASSESSMENT & PLAN NOTE
Wt Readings from Last 3 Encounters:   07/14/22 126 kg (278 lb)   07/11/22 128 kg (282 lb)   05/25/22 132 kg (291 lb)     Continue lasix 80 mg daily  Low Na diet, daily weights

## 2022-07-14 NOTE — ASSESSMENT & PLAN NOTE
1  CAD s/p PCI to RCA, rPDA  · Inferior STEMI (3/23/21) --> initial PCI to [facility]  · Recurrent chest pain (3/23/21) --> GREG to RPDA  · Readmission, inferior STEMI (4/2/21) --> patent stents, no change on cath  Continue asa, statin, BB

## 2022-07-18 ENCOUNTER — APPOINTMENT (OUTPATIENT)
Dept: LAB | Facility: CLINIC | Age: 47
End: 2022-07-18
Payer: COMMERCIAL

## 2022-07-18 LAB
ANION GAP SERPL CALCULATED.3IONS-SCNC: 13 MMOL/L (ref 4–13)
BUN SERPL-MCNC: 37 MG/DL (ref 5–25)
CALCIUM SERPL-MCNC: 9.3 MG/DL (ref 8.3–10.1)
CHLORIDE SERPL-SCNC: 107 MMOL/L (ref 96–108)
CO2 SERPL-SCNC: 20 MMOL/L (ref 21–32)
CREAT SERPL-MCNC: 2.88 MG/DL (ref 0.6–1.3)
GFR SERPL CREATININE-BSD FRML MDRD: 18 ML/MIN/1.73SQ M
GLUCOSE P FAST SERPL-MCNC: 102 MG/DL (ref 65–99)
POTASSIUM SERPL-SCNC: 4 MMOL/L (ref 3.5–5.3)
SODIUM SERPL-SCNC: 140 MMOL/L (ref 135–147)

## 2022-07-18 PROCEDURE — 80048 BASIC METABOLIC PNL TOTAL CA: CPT | Performed by: NURSE PRACTITIONER

## 2022-07-18 PROCEDURE — 36415 COLL VENOUS BLD VENIPUNCTURE: CPT | Performed by: NURSE PRACTITIONER

## 2022-07-19 ENCOUNTER — TELEPHONE (OUTPATIENT)
Dept: NEPHROLOGY | Facility: CLINIC | Age: 47
End: 2022-07-19

## 2022-07-19 ENCOUNTER — TELEPHONE (OUTPATIENT)
Dept: OTHER | Facility: HOSPITAL | Age: 47
End: 2022-07-19

## 2022-07-19 DIAGNOSIS — I10 HYPERTENSION, UNSPECIFIED TYPE: Primary | ICD-10-CM

## 2022-07-19 NOTE — TELEPHONE ENCOUNTER
Spoke with patients she is aware  Patient stated she does not have a weight log for this week  Patient stated she will start logging weights

## 2022-07-19 NOTE — TELEPHONE ENCOUNTER
Continue bumex as ordered by cardiology but hold if she is having profuse diarrhea, vomiting  Weigh self daily with empty bladder, write down weights and call them in in 1 week  Low sodium diet is imperative  Bmp in 10 days- please place verbal order

## 2022-07-26 NOTE — TELEPHONE ENCOUNTER
Call patient and ask for updated weight log from [facility]  Verify diuretic name, dose, frequency  Ask for repeat bmp this week- please place verbal order

## 2022-07-28 ENCOUNTER — APPOINTMENT (OUTPATIENT)
Dept: LAB | Facility: CLINIC | Age: 47
End: 2022-07-28
Payer: COMMERCIAL

## 2022-07-28 DIAGNOSIS — N18.4 CKD (CHRONIC KIDNEY DISEASE) STAGE 4, GFR 15-29 ML/MIN (HCC): ICD-10-CM

## 2022-07-28 DIAGNOSIS — E78.2 MIXED HYPERLIPIDEMIA: ICD-10-CM

## 2022-07-28 DIAGNOSIS — I10 ESSENTIAL HYPERTENSION: ICD-10-CM

## 2022-07-28 DIAGNOSIS — I50.32 CHRONIC DIASTOLIC CONGESTIVE HEART FAILURE (HCC): ICD-10-CM

## 2022-07-28 DIAGNOSIS — N17.9 ACUTE RENAL FAILURE SUPERIMPOSED ON STAGE 4 CHRONIC KIDNEY DISEASE, UNSPECIFIED ACUTE RENAL FAILURE TYPE (HCC): ICD-10-CM

## 2022-07-28 DIAGNOSIS — E55.9 VITAMIN D DEFICIENCY: ICD-10-CM

## 2022-07-28 DIAGNOSIS — N18.4 ACUTE RENAL FAILURE SUPERIMPOSED ON STAGE 4 CHRONIC KIDNEY DISEASE, UNSPECIFIED ACUTE RENAL FAILURE TYPE (HCC): ICD-10-CM

## 2022-07-28 DIAGNOSIS — I10 HYPERTENSION, UNSPECIFIED TYPE: ICD-10-CM

## 2022-07-28 DIAGNOSIS — I42.2 HYPERTROPHIC CARDIOMYOPATHY (HCC): ICD-10-CM

## 2022-07-28 LAB
ANION GAP SERPL CALCULATED.3IONS-SCNC: 10 MMOL/L (ref 4–13)
BUN SERPL-MCNC: 48 MG/DL (ref 5–25)
CALCIUM SERPL-MCNC: 9.3 MG/DL (ref 8.3–10.1)
CHLORIDE SERPL-SCNC: 110 MMOL/L (ref 96–108)
CO2 SERPL-SCNC: 19 MMOL/L (ref 21–32)
CREAT SERPL-MCNC: 2.74 MG/DL (ref 0.6–1.3)
GFR SERPL CREATININE-BSD FRML MDRD: 20 ML/MIN/1.73SQ M
GLUCOSE P FAST SERPL-MCNC: 137 MG/DL (ref 65–99)
POTASSIUM SERPL-SCNC: 3.8 MMOL/L (ref 3.5–5.3)
SODIUM SERPL-SCNC: 139 MMOL/L (ref 135–147)

## 2022-07-28 PROCEDURE — 36415 COLL VENOUS BLD VENIPUNCTURE: CPT

## 2022-07-28 PROCEDURE — 80048 BASIC METABOLIC PNL TOTAL CA: CPT

## 2022-07-28 NOTE — TELEPHONE ENCOUNTER
Spoke with patient, she stated the diuretic she is currently taking is Bumex 1 mg daily  Patient stated she went for her labs  Weights as follow    Sunday 283  Monday 282   Tuesday 283 5  Wednesday 281 5  Today 282 Statement Selected

## 2022-07-29 ENCOUNTER — APPOINTMENT (OUTPATIENT)
Dept: RADIOLOGY | Facility: CLINIC | Age: 47
End: 2022-07-29
Payer: COMMERCIAL

## 2022-07-29 ENCOUNTER — OFFICE VISIT (OUTPATIENT)
Dept: OBGYN CLINIC | Facility: CLINIC | Age: 47
End: 2022-07-29
Payer: COMMERCIAL

## 2022-07-29 VITALS
HEIGHT: 66 IN | SYSTOLIC BLOOD PRESSURE: 111 MMHG | HEART RATE: 76 BPM | BODY MASS INDEX: 44.87 KG/M2 | DIASTOLIC BLOOD PRESSURE: 74 MMHG

## 2022-07-29 DIAGNOSIS — G89.29 ELBOW PAIN, CHRONIC, RIGHT: ICD-10-CM

## 2022-07-29 DIAGNOSIS — N17.9 AKI (ACUTE KIDNEY INJURY) (HCC): Primary | ICD-10-CM

## 2022-07-29 DIAGNOSIS — M25.521 ELBOW PAIN, CHRONIC, RIGHT: ICD-10-CM

## 2022-07-29 DIAGNOSIS — M70.21 OLECRANON BURSITIS, RIGHT ELBOW: Primary | ICD-10-CM

## 2022-07-29 DIAGNOSIS — M70.21 OLECRANON BURSITIS, RIGHT ELBOW: ICD-10-CM

## 2022-07-29 PROCEDURE — 73080 X-RAY EXAM OF ELBOW: CPT

## 2022-07-29 PROCEDURE — 99214 OFFICE O/P EST MOD 30 MIN: CPT | Performed by: FAMILY MEDICINE

## 2022-07-29 PROCEDURE — 20605 DRAIN/INJ JOINT/BURSA W/O US: CPT | Performed by: FAMILY MEDICINE

## 2022-07-29 NOTE — TELEPHONE ENCOUNTER
Kidney function remains reduced on lab work  If weight from cardiology office accurate, seems she lost about 9 pounds  Is she feeling better with this weight loss? Any nsaid use, nausea, vomiting, diarrhea?  If not, will send for kidney ultrasound

## 2022-07-29 NOTE — TELEPHONE ENCOUNTER
Patient called back stating that she has nausea but on sometimes  She doesn't not have any on symptoms  She states that she feels like someone kick her in the side for about 1-2 weeks now

## 2022-07-29 NOTE — TELEPHONE ENCOUNTER
Patient is scheduled for Mon 8/1/22 at 9:30 am at [facility]  patient is aware of appointment and that she must 24 oz of water 1 hr prior to appointment and hold her bladder

## 2022-07-29 NOTE — PROGRESS NOTES
[facility] SPECIALISTS [place] [id number]  [facility] [address] [phone]      Chief Complaint:  Chief Complaint   Patient presents with    Right Elbow - Pain       Vitals:  /74   Pulse 76   Ht 5' 6" (1 676 m)   BMI 44 87 kg/m²     The following portions of the patient's history were reviewed and updated as appropriate: allergies, current medications, past family history, past medical history, past social history, past surgical history, and problem list       Subjective:   Patient ID: Armand Dixon is a 41 y o  female  Here c/o R elbow pain and swelling  Pain for several weeks  More painful  Denies injury/trauma  Hurts to move her arm  Taking tylenol  Hx of gout      Review of Systems   Constitutional: Negative for fatigue and fever  Respiratory: Negative for shortness of breath  Cardiovascular: Negative for chest pain  Gastrointestinal: Negative for abdominal pain and nausea  Genitourinary: Negative for dysuria  Musculoskeletal: Positive for arthralgias and joint swelling  Skin: Negative for rash and wound  Neurological: Negative for weakness and headaches  Objective:  Right Elbow Exam     Tenderness   Right elbow tenderness location: olecranon TTP, swelling  Muscle Strength   The patient has normal right elbow strength  Comments:  Pain with flex/ext            Physical Exam  Vitals and nursing note reviewed  Constitutional:       Appearance: Normal appearance  She is well-developed and normal weight  HENT:      Head: Normocephalic  Mouth/Throat:      Mouth: Mucous membranes are moist    Eyes:      Extraocular Movements: Extraocular movements intact  Cardiovascular:      Rate and Rhythm: Normal rate and regular rhythm  Heart sounds: Normal heart sounds  Pulmonary:      Effort: Pulmonary effort is normal       Breath sounds: Normal breath sounds     Abdominal:      General: Bowel sounds are normal  Palpations: Abdomen is soft  Musculoskeletal:         General: Normal range of motion  Cervical back: Normal range of motion  Skin:     General: Skin is warm and dry  Neurological:      General: No focal deficit present  Mental Status: She is alert and oriented to person, place, and time  Mental status is at baseline  Psychiatric:         Mood and Affect: Mood normal          Behavior: Behavior normal          Thought Content: Thought content normal          Judgment: Judgment normal      Medium joint arthrocentesis: R olecranon bursa  Universal Protocol:  Consent: Verbal consent obtained  Risks and benefits: risks, benefits and alternatives were discussed  Consent given by: patient  Time out: Immediately prior to procedure a "time out" was called to verify the correct patient, procedure, equipment, support staff and site/side marked as required  Timeout called at: 7/29/2022 1:18 PM   Supporting Documentation  Indications: joint swelling and pain   Procedure Details  Location: elbow - R olecranon bursa  Preparation: Patient was prepped and draped in the usual sterile fashion  Needle size: 18 G  Ultrasound guidance: no  Approach: dorsal    Aspirate amount: 8 mL  Aspirate: bloody    Patient tolerance: patient tolerated the procedure well with no immediate complications  Dressing:  Sterile dressing applied            I have personally reviewed pertinent films in PACS and my interpretation is R elbow- olcranon swelling, no fx  Assessment/Plan:  Assessment/Plan   Diagnoses and all orders for this visit:    Olecranon bursitis, right elbow  -     Ambulatory Referral to Orthopedic Surgery  -     XR elbow 3+ vw right; Future    Elbow pain, chronic, right  -     XR elbow 3+ vw right; Future    Other orders  -     Cancel: Medium joint arthrocentesis  -     Cancel: Hand/upper extremity injection  -     Medium joint arthrocentesis: R olecranon bursa        Return if symptoms worsen or fail to improve  Gisela Payton MD

## 2022-08-01 ENCOUNTER — HOSPITAL ENCOUNTER (OUTPATIENT)
Dept: ULTRASOUND IMAGING | Facility: HOSPITAL | Age: 47
Discharge: HOME | End: 2022-08-01
Payer: COMMERCIAL

## 2022-08-01 DIAGNOSIS — N17.9 AKI (ACUTE KIDNEY INJURY) (HCC): ICD-10-CM

## 2022-08-01 PROCEDURE — 76770 US EXAM ABDO BACK WALL COMP: CPT

## 2022-08-04 ENCOUNTER — TELEPHONE (OUTPATIENT)
Dept: NEPHROLOGY | Facility: CLINIC | Age: 47
End: 2022-08-04

## 2022-08-04 NOTE — TELEPHONE ENCOUNTER
Call from patient, she wanted you to know she has had back rib pain  For the last 3-4 days  Not sleeping well and has been nauseous

## 2022-08-04 NOTE — TELEPHONE ENCOUNTER
Spoke with patient, has not been weighing herself  She will start doing that  She states with Bumex she still only goes to the bathroom, 3-4 times a day  She will contact PCP about symptoms, if she can't get in with him she will go to the ER

## 2022-08-23 ENCOUNTER — APPOINTMENT (OUTPATIENT)
Dept: LAB | Facility: CLINIC | Age: 47
End: 2022-08-23
Payer: COMMERCIAL

## 2022-08-23 LAB
25(OH)D3 SERPL-MCNC: 10 NG/ML (ref 30–100)
ALBUMIN SERPL BCP-MCNC: 3.2 G/DL (ref 3.5–5)
ALP SERPL-CCNC: 86 U/L (ref 46–116)
ALT SERPL W P-5'-P-CCNC: 35 U/L (ref 12–78)
ANION GAP SERPL CALCULATED.3IONS-SCNC: 7 MMOL/L (ref 4–13)
AST SERPL W P-5'-P-CCNC: 20 U/L (ref 5–45)
BASOPHILS # BLD AUTO: 0.11 THOUSANDS/ΜL (ref 0–0.1)
BASOPHILS NFR BLD AUTO: 1 % (ref 0–1)
BILIRUB SERPL-MCNC: 0.25 MG/DL (ref 0.2–1)
BUN SERPL-MCNC: 30 MG/DL (ref 5–25)
CALCIUM ALBUM COR SERPL-MCNC: 9.8 MG/DL (ref 8.3–10.1)
CALCIUM SERPL-MCNC: 9.2 MG/DL (ref 8.3–10.1)
CHLORIDE SERPL-SCNC: 116 MMOL/L (ref 96–108)
CHOLEST SERPL-MCNC: 136 MG/DL
CO2 SERPL-SCNC: 17 MMOL/L (ref 21–32)
CREAT SERPL-MCNC: 2.34 MG/DL (ref 0.6–1.3)
EOSINOPHIL # BLD AUTO: 0.36 THOUSAND/ΜL (ref 0–0.61)
EOSINOPHIL NFR BLD AUTO: 3 % (ref 0–6)
ERYTHROCYTE [DISTWIDTH] IN BLOOD BY AUTOMATED COUNT: 21.3 % (ref 11.6–15.1)
GFR SERPL CREATININE-BSD FRML MDRD: 24 ML/MIN/1.73SQ M
GLUCOSE P FAST SERPL-MCNC: 130 MG/DL (ref 65–99)
HCT VFR BLD AUTO: 39 % (ref 34.8–46.1)
HDLC SERPL-MCNC: 29 MG/DL
HGB BLD-MCNC: 12.1 G/DL (ref 11.5–15.4)
IMM GRANULOCYTES # BLD AUTO: 0.17 THOUSAND/UL (ref 0–0.2)
IMM GRANULOCYTES NFR BLD AUTO: 1 % (ref 0–2)
LDLC SERPL CALC-MCNC: 27 MG/DL (ref 0–100)
LYMPHOCYTES # BLD AUTO: 2.34 THOUSANDS/ΜL (ref 0.6–4.47)
LYMPHOCYTES NFR BLD AUTO: 18 % (ref 14–44)
MCH RBC QN AUTO: 27.9 PG (ref 26.8–34.3)
MCHC RBC AUTO-ENTMCNC: 31 G/DL (ref 31.4–37.4)
MCV RBC AUTO: 90 FL (ref 82–98)
MONOCYTES # BLD AUTO: 0.78 THOUSAND/ΜL (ref 0.17–1.22)
MONOCYTES NFR BLD AUTO: 6 % (ref 4–12)
NEUTROPHILS # BLD AUTO: 9.12 THOUSANDS/ΜL (ref 1.85–7.62)
NEUTS SEG NFR BLD AUTO: 71 % (ref 43–75)
NONHDLC SERPL-MCNC: 107 MG/DL
NRBC BLD AUTO-RTO: 0 /100 WBCS
PLATELET # BLD AUTO: 291 THOUSANDS/UL (ref 149–390)
PMV BLD AUTO: 10.7 FL (ref 8.9–12.7)
POTASSIUM SERPL-SCNC: 4.4 MMOL/L (ref 3.5–5.3)
PROT SERPL-MCNC: 6.9 G/DL (ref 6.4–8.4)
RBC # BLD AUTO: 4.33 MILLION/UL (ref 3.81–5.12)
SODIUM SERPL-SCNC: 140 MMOL/L (ref 135–147)
TRIGL SERPL-MCNC: 399 MG/DL
TSH SERPL DL<=0.05 MIU/L-ACNC: 2.19 UIU/ML (ref 0.45–4.5)
WBC # BLD AUTO: 12.88 THOUSAND/UL (ref 4.31–10.16)

## 2022-08-23 PROCEDURE — 85025 COMPLETE CBC W/AUTO DIFF WBC: CPT

## 2022-08-23 PROCEDURE — 36415 COLL VENOUS BLD VENIPUNCTURE: CPT

## 2022-08-23 PROCEDURE — 80053 COMPREHEN METABOLIC PANEL: CPT

## 2022-08-23 PROCEDURE — 82306 VITAMIN D 25 HYDROXY: CPT

## 2022-08-23 PROCEDURE — 80061 LIPID PANEL: CPT

## 2022-08-23 PROCEDURE — 84443 ASSAY THYROID STIM HORMONE: CPT

## 2022-08-25 ENCOUNTER — OFFICE VISIT (OUTPATIENT)
Dept: OBGYN CLINIC | Facility: CLINIC | Age: 47
End: 2022-08-25
Payer: COMMERCIAL

## 2022-08-25 VITALS
SYSTOLIC BLOOD PRESSURE: 129 MMHG | DIASTOLIC BLOOD PRESSURE: 76 MMHG | HEART RATE: 69 BPM | BODY MASS INDEX: 44.87 KG/M2 | HEIGHT: 66 IN

## 2022-08-25 DIAGNOSIS — M25.521 ELBOW PAIN, CHRONIC, RIGHT: ICD-10-CM

## 2022-08-25 DIAGNOSIS — G89.29 ELBOW PAIN, CHRONIC, RIGHT: ICD-10-CM

## 2022-08-25 DIAGNOSIS — M70.21 OLECRANON BURSITIS, RIGHT ELBOW: Primary | ICD-10-CM

## 2022-08-25 PROCEDURE — 99213 OFFICE O/P EST LOW 20 MIN: CPT | Performed by: FAMILY MEDICINE

## 2022-08-25 PROCEDURE — 20605 DRAIN/INJ JOINT/BURSA W/O US: CPT | Performed by: FAMILY MEDICINE

## 2022-08-25 PROCEDURE — 99214 OFFICE O/P EST MOD 30 MIN: CPT | Performed by: FAMILY MEDICINE

## 2022-08-25 PROCEDURE — 29105 APPLICATION LONG ARM SPLINT: CPT | Performed by: FAMILY MEDICINE

## 2022-08-25 RX ORDER — LIDOCAINE HYDROCHLORIDE 10 MG/ML
0.5 INJECTION, SOLUTION INFILTRATION; PERINEURAL
Status: COMPLETED | OUTPATIENT
Start: 2022-08-25 | End: 2022-08-25

## 2022-08-25 RX ORDER — METHYLPREDNISOLONE ACETATE 80 MG/ML
0.5 INJECTION, SUSPENSION INTRA-ARTICULAR; INTRALESIONAL; INTRAMUSCULAR; SOFT TISSUE
Status: COMPLETED | OUTPATIENT
Start: 2022-08-25 | End: 2022-08-25

## 2022-08-25 RX ADMIN — METHYLPREDNISOLONE ACETATE 0.5 ML: 80 INJECTION, SUSPENSION INTRA-ARTICULAR; INTRALESIONAL; INTRAMUSCULAR; SOFT TISSUE at 12:38

## 2022-08-25 RX ADMIN — LIDOCAINE HYDROCHLORIDE 0.5 ML: 10 INJECTION, SOLUTION INFILTRATION; PERINEURAL at 12:38

## 2022-08-25 NOTE — PATIENT INSTRUCTIONS
F/u 2 wks  Begin wearing elbow splint  R elbow steroid injection/aspiration  Icing/OTC pain meds as needed

## 2022-08-25 NOTE — PROGRESS NOTES
[facility] SPECIAL[facility] [place]  [address]  [place] [facility] [address]  [phone]      Chief Complaint:  Chief Complaint   Patient presents with    Right Elbow - Follow-up       Vitals:  /76   Pulse 69   Ht 5' 6" (1 676 m)   BMI 44 87 kg/m²     The following portions of the patient's history were reviewed and updated as appropriate: allergies, current medications, past family history, past medical history, past social history, past surgical history, and problem list       Subjective:   Patient ID: Katy Vergara is a 41 y o  female  Here c/o R elbow pain and swelling  No new injury  Aspiration helped for a few days  Swelled up again  More painful  Hurts to move her arm  Taking tylenol  Hx of gout      Review of Systems   Constitutional: Negative for fatigue and fever  Respiratory: Negative for shortness of breath  Cardiovascular: Negative for chest pain  Gastrointestinal: Negative for abdominal pain and nausea  Genitourinary: Negative for dysuria  Musculoskeletal: Positive for arthralgias  Skin: Negative for rash and wound  Neurological: Negative for weakness and headaches  Objective:  Ortho Exam      Physical Exam  Constitutional:       Appearance: Normal appearance  She is normal weight  HENT:      Head: Normocephalic  Eyes:      Extraocular Movements: Extraocular movements intact  Pulmonary:      Effort: Pulmonary effort is normal    Musculoskeletal:         General: Swelling and tenderness present  Cervical back: Normal range of motion  Comments: R elbow- olecranon swelling, TTP   Skin:     General: Skin is warm and dry  Neurological:      General: No focal deficit present  Mental Status: She is alert and oriented to person, place, and time  Mental status is at baseline  Psychiatric:         Mood and Affect: Mood normal          Behavior: Behavior normal          Thought Content:  Thought content normal  Judgment: Judgment normal          Medium joint arthrocentesis: R olecranon bursa  Universal Protocol:  Consent: Verbal consent obtained  Risks and benefits: risks, benefits and alternatives were discussed  Consent given by: patient  Timeout called at: 8/25/2022 12:36 PM   Supporting Documentation  Indications: joint swelling and pain   Procedure Details  Location: elbow - R olecranon bursa  Preparation: Patient was prepped and draped in the usual sterile fashion  Needle size: 18 G  Ultrasound guidance: no  Approach: dorsal  Medications administered: 0 5 mL lidocaine 1 %; 0 5 mL methylPREDNISolone acetate 80 mg/mL    Aspirate amount: 5 mL  Aspirate: bloody    Dressing:  Sterile dressing applied    Splint application    Date/Time: 8/25/2022 12:37 PM  Performed by: Rafa Ling MD  Authorized by: Rafa Ling MD   Universal Protocol:  Consent: Verbal consent obtained  Risks and benefits: risks, benefits and alternatives were discussed  Consent given by: patient  Timeout called at: 8/25/2022 12:37 PM     Pre-procedure details:     Sensation:  Normal  Procedure details:     Laterality:  Right    Location:  Elbow    Elbow:  R elbow    Splint type:  Long arm    Supplies:  Cotton padding and fiberglass          Assessment/Plan:  Assessment/Plan   There are no diagnoses linked to this encounter  No follow-ups on file       Rafa Ling MD

## 2022-09-06 DIAGNOSIS — F33.1 MODERATE EPISODE OF RECURRENT MAJOR DEPRESSIVE DISORDER (HCC): ICD-10-CM

## 2022-09-06 DIAGNOSIS — F41.1 GENERALIZED ANXIETY DISORDER: ICD-10-CM

## 2022-09-06 NOTE — TELEPHONE ENCOUNTER
Patient is scheduled for 09/14/2022  Spoke with the patient regarding her zoloft, patient stated she has only been taking one tablet daily  Spoke with the pharmacist he stated the 05/25/2022 script was filled and plus she has had some from before  She has only been taking one a day and not 2 a day

## 2022-09-07 RX ORDER — QUETIAPINE FUMARATE 50 MG/1
TABLET, FILM COATED ORAL
Qty: 30 TABLET | Refills: 1 | Status: SHIPPED | OUTPATIENT
Start: 2022-09-07

## 2022-09-07 RX ORDER — LORAZEPAM 0.5 MG/1
0.5 TABLET ORAL 2 TIMES DAILY PRN
Qty: 60 TABLET | Refills: 0 | Status: SHIPPED | OUTPATIENT
Start: 2022-09-07 | End: 2022-10-10

## 2022-09-19 ENCOUNTER — OFFICE VISIT (OUTPATIENT)
Dept: PSYCHIATRY | Facility: CLINIC | Age: 47
End: 2022-09-19
Payer: COMMERCIAL

## 2022-09-19 DIAGNOSIS — F33.1 MODERATE EPISODE OF RECURRENT MAJOR DEPRESSIVE DISORDER (HCC): Primary | ICD-10-CM

## 2022-09-19 DIAGNOSIS — F33.1 MODERATE EPISODE OF RECURRENT MAJOR DEPRESSIVE DISORDER (HCC): ICD-10-CM

## 2022-09-19 DIAGNOSIS — F41.1 GENERALIZED ANXIETY DISORDER: ICD-10-CM

## 2022-09-19 DIAGNOSIS — F40.10 SOCIAL ANXIETY DISORDER: ICD-10-CM

## 2022-09-19 PROCEDURE — 99214 OFFICE O/P EST MOD 30 MIN: CPT

## 2022-09-19 RX ORDER — SERTRALINE HYDROCHLORIDE 100 MG/1
150 TABLET, FILM COATED ORAL DAILY
Qty: 45 TABLET | Refills: 1 | Status: SHIPPED | OUTPATIENT
Start: 2022-09-19

## 2022-09-19 NOTE — BH TREATMENT PLAN
TREATMENT PLAN (Medication Management Only)        [address]    Name and Date of Birth:  Ligia Medrano 41 y o  1975  Date of Treatment Plan: September 19, 2022  Diagnosis/Diagnoses:    1  Moderate episode of recurrent major depressive disorder ([facility] [address] )    2  Generalized anxiety disorder    3  Moderate episode of recurrent major depressive disorder [facility])      Strengths/Personal Resources for Self-Care: supportive family, supportive friends  Area/Areas of need (in own words): anxiety symptoms, depressive symptoms  1  Long Term Goal: continue improvement in acceptable anxiety level  Target Date:6 months - 3/19/2023  Person/Persons responsible for completion of goal: Prema Sandhu  2  Short Term Objective (s) - How will we reach this goal?:   A  Provider new recommended medication/dosage changes and/or continue medication(s): continue current medications as prescribed  B  Take psychiatric medications responsibly  C  Attend medication management appointments regularly  Target Date:6 months - 3/19/2023  Person/Persons Responsible for Completion of Goal: [employer]ie brothers  Progress Towards Goals: continuing treatment  Treatment Modality: medication management every 6 weeks  Review due 180 days from date of this plan: 6 months - 3/19/2023  Expected length of service: maintenance  My Physician/PA/NP and I have developed this plan together and I agree to work on the goals and objectives  I understand the treatment goals that were developed for my treatment

## 2022-09-19 NOTE — PSYCH
Regular Visit    Problem List Items Addressed This Visit        Other    Major depressive disorder with current active episode - Primary    Relevant Medications    sertraline (ZOLOFT) 100 mg tablet    Social anxiety disorder    Relevant Medications    sertraline (ZOLOFT) 100 mg tablet      Other Visit Diagnoses     Generalized anxiety disorder        Relevant Medications    sertraline (ZOLOFT) 100 mg tablet             Encounter provider JALIL Bloom    Provider located at    [address] [place] [address]  [address] [facility] [address] [phone]    Recent Visits  No visits were found meeting these conditions  Showing recent visits within past 7 days and meeting all other requirements  Today's Visits  Date Type Provider Dept   09/19/22 Office Visit JALIL Bloom  [facility]   Showing today's visits and meeting all other requirements  Future Appointments  No visits were found meeting these conditions  Showing future appointments within next 150 days and meeting all other requirements       HPI     Current Outpatient Medications   Medication Sig Dispense Refill    albuterol (PROVENTIL HFA,VENTOLIN HFA) 90 mcg/act inhaler Inhale 2 puffs every 4 (four) hours as needed for wheezing or shortness of breath 18 g 5    aspirin 81 mg chewable tablet Chew 1 tablet (81 mg total) daily 90 tablet 0    atorvastatin (LIPITOR) 80 mg tablet Take 1 tablet (80 mg total) by mouth every evening 90 tablet 0    bumetanide (BUMEX) 1 mg tablet Take 1 tablet (1 mg total) by mouth daily 30 tablet 6    Diclofenac Sodium (VOLTAREN) 1 % Apply 2 g topically 4 (four) times a day 150 g 2    ezetimibe (ZETIA) 10 mg tablet Take 1 tablet (10 mg total) by mouth in the morning   30 tablet 5    famotidine (PEPCID) 40 MG tablet Take 1 tablet (40 mg total) by mouth daily 30 tablet 5    isosorbide mononitrate (IMDUR) 30 mg 24 hr tablet Take 1 tablet (30 mg total) by mouth daily 30 tablet 11    LORazepam (ATIVAN) 0 5 mg tablet Take 1 tablet (0 5 mg total) by mouth 2 (two) times a day as needed for anxiety 60 tablet 0    metoprolol tartrate (LOPRESSOR) 100 mg tablet Take 1 tablet (100 mg total) by mouth 2 (two) times a day 60 tablet 5    nitroglycerin (NITROSTAT) 0 4 mg SL tablet Place 1 tablet (0 4 mg total) under the tongue every 5 (five) minutes as needed for chest pain 25 tablet 5    omeprazole (PriLOSEC) 20 mg delayed release capsule Take 1 capsule (20 mg total) by mouth 2 (two) times a day 60 capsule 0    QUEtiapine (SEROquel) 50 mg tablet take 1 tablet by mouth at bedtime 30 tablet 1    sertraline (ZOLOFT) 100 mg tablet Take 1 5 tablets (150 mg total) by mouth daily 45 tablet 1     No current facility-administered medications for this visit  Review of Systems        MEDICATION MANAGEMENT NOTE        [facility]    Name and Date of Birth:  Gin  41 y o  1975 MRN: [id number]    Date of Visit: September 19, 2022    No Known Allergies  SUBJECTIVE:    Mustapha Bueno is seen today for a follow up for Major Depressive Disorder and Generalized Anxiety Disorder  She continues to experience ongoing symptoms since the last visit  Patient continues to expressed high levels of anxiety and depression which has recently been worsened due to  being in trouble with the law and spending time in [facility]  Discusses how her 15-year-old son is now taking care of the household and she feels guilty for putting so much responsibility on him  Patient has multiple physical co-morbidities including chronic COPD, sleep apnea, hypertension, heart failure, acute kidney disease which greatly affect her ability to function in the household  Continues to use a wheelchair, in process of applying for disability    Patient has been accidentally only taking one pill of Zoloft 100 mg daily, medication increased to Zoloft 150 mg daily today to aid with anxiety + depression management, patient verbalized understanding  Continues with social anxiety to the point where she is afraid to leave the house and only leaves for doctor's appointments  Increase lorazepam to 0 5 mg t i d  for constant anxiety, racing thoughts which greatly affect her ability sleep, sleeping 2-5 hours per night, and high anxiety impacting her ability to complete ADL's  Patient has been using medication appropriately according to her and PDMP, denies all side effects  Main stressors continue to be her chronic deteriorating health and limited support system  Would significantly benefit from weekly psychotherapy, has remained on wait list since 3/22, given further psychotherapy resources today  She continues to declines the partial program and additional group therapy depression reports being run on this office  Denies SI  She denies any side effects from medications  PLAN:    Increase  Zoloft to 150 mg daily for depression and anxiety symptoms  Increase lorazepam 0 5 mg TID for high anxiety which continues to interrupt sleep  Continue Seroquel to 50mg daily at hs for insomnia                Aware of 24 hour and weekend coverage for urgent situations accessed by calling [facility] main practice number  Referral for individual psychotherapy    Diagnoses and all orders for this visit:    Moderate episode of recurrent major depressive disorder ([facility])  -     sertraline (ZOLOFT) 100 mg tablet; Take 1 5 tablets (150 mg total) by mouth daily    Generalized anxiety disorder    Social anxiety disorder    Moderate episode of recurrent major depressive disorder ([facility] [address] )  Comments:  Refer to psychiatry, increased the zoloft to 150mg daily  Orders:  -     sertraline (ZOLOFT) 100 mg tablet;  Take 1 5 tablets (150 mg total) by mouth daily        Current Outpatient Medications on File Prior to Visit   Medication Sig Dispense Refill    albuterol (PROVENTIL HFA,VENTOLIN HFA) 90 mcg/act inhaler Inhale 2 puffs every 4 (four) hours as needed for wheezing or shortness of breath 18 g 5    aspirin 81 mg chewable tablet Chew 1 tablet (81 mg total) daily 90 tablet 0    atorvastatin (LIPITOR) 80 mg tablet Take 1 tablet (80 mg total) by mouth every evening 90 tablet 0    bumetanide (BUMEX) 1 mg tablet Take 1 tablet (1 mg total) by mouth daily 30 tablet 6    Diclofenac Sodium (VOLTAREN) 1 % Apply 2 g topically 4 (four) times a day 150 g 2    ezetimibe (ZETIA) 10 mg tablet Take 1 tablet (10 mg total) by mouth in the morning  30 tablet 5    famotidine (PEPCID) 40 MG tablet Take 1 tablet (40 mg total) by mouth daily 30 tablet 5    isosorbide mononitrate (IMDUR) 30 mg 24 hr tablet Take 1 tablet (30 mg total) by mouth daily 30 tablet 11    LORazepam (ATIVAN) 0 5 mg tablet Take 1 tablet (0 5 mg total) by mouth 2 (two) times a day as needed for anxiety 60 tablet 0    metoprolol tartrate (LOPRESSOR) 100 mg tablet Take 1 tablet (100 mg total) by mouth 2 (two) times a day 60 tablet 5    nitroglycerin (NITROSTAT) 0 4 mg SL tablet Place 1 tablet (0 4 mg total) under the tongue every 5 (five) minutes as needed for chest pain 25 tablet 5    omeprazole (PriLOSEC) 20 mg delayed release capsule Take 1 capsule (20 mg total) by mouth 2 (two) times a day 60 capsule 0    QUEtiapine (SEROquel) 50 mg tablet take 1 tablet by mouth at bedtime 30 tablet 1    [DISCONTINUED] sertraline (ZOLOFT) 100 mg tablet take 2 tablets by mouth once daily 30 tablet 1     No current facility-administered medications on file prior to visit  HPI ROS Appetite Changes and Sleep:     She reports difficulty falling asleep, frequent awakenings, fluctuating sleep pattern, adequate appetite, low energy   Denies homicidal ideation, denies suicidal ideation    Review Of Systems:      HPI ROS:               Medication Side Effects:  denies    Depression (10 worst): 8/10    Anxiety (10 worst): 8/10    Safety concerns (SI, HI, etc): Denies si and hi    Sleep: 2-5 hrs    Energy: low    Appetite: fair    Weight Change: denies        Mental Status Evaluation:    Appearance Adequate hygiene and grooming   Behavior calm and cooperative   Mood anxious and depressed  Depression Scale - 8 of 10 (0 = No depression)  Anxiety Scale - 8 of 10 (0 = No anxiety)   Speech Soft   Affect Flat   Thought Processes Goal directed and coherent   Thought Content Does not verbalize delusional material   Associations Tightly connected   Perceptual Disturbances Denies hallucinations and does not appear to be responding to internal stimuli   Risk Potential Suicidal/Homicidal Ideation - No evidence of suicidal or homicidal ideation and patient does not verbalize suicidal or homicidal ideation  Risk of Violence - No evidence of risk for violence found on assessment  Risk of Self Mutilation - No evidence of risk for self mutilation found on assessment   Orientation oriented to person, place, time/date and situation   Memory recent and remote memory grossly intact   Consciousness alert and awake   Attention/Concentration attention span and concentration are age appropriate   Insight intact   Judgement intact   Muscle Strength and Gait decreased muscle tone, decreased muscle strength   Motor Activity no abnormal movements   Language no difficulty naming common objects, no difficulty repeating a phrase, no difficulty writing a sentence   Fund of Knowledge adequate knowledge of current events  adequate fund of knowledge regarding past history  adequate fund of knowledge regarding vocabulary      Past Psychiatric History Update:     Inpatient Psychiatric Admission Since Last Encounter:   no  Changes to Outpatient Psychiatric Treatment Team:    no  Suicide Attempt Or Self Mutilation Since Last Encounter:   no  Incidence of Violent Behavior Since Last Encounter:   no    Traumatic History Update:     New Onset of Abuse Since Last Encounter:   no  Traumatic Events Since Last Encounter:   no    Past Medical History:    Past Medical History:   Diagnosis Date    [facility]     Anxiety     CAD (coronary artery disease)     GREG mid RCA and GREG right PDA 3/25/2021    Cardiomyopathy ([place] [address] )     CH[facility]estive [facility] failure) [facility]     Chronic kidney disease     COPD (chronic obstructive pulmonary disease) ([facility])     scheduled for sleep apnea test soon after pacemaker implant    Depression     History of chemotherapy     non hodgkins lymphoma 2008    Hyperlipemia     Hypertension     Hypertrophic cardiomyopathy ([place] [address] )     Lymphoma, non-Hodgkin's (HCC)     My[facility] infarction ([facility]     SSS [facility]sick sinus syndrome) [facility]  )     s/p medtronic dual chamber pacemaker 2021    Tobacco abuse     Ventricular tachycardia, non-sustained ([place] [address] )     Wears glasses         Past Surgical History:   Procedure Laterality Date    CARDIAC PACEMAKER PLACEMENT Left 2021    Procedure: DUAL LEAD PACEMAKER IMPLANT;  Surgeon: Chandler Deleon MD;  Location: [address] [place] [address];  Service: Cardiology    CAROTID [address] [place] [address] WITH STENT PLACEMENT  2021    GREG mid RCA and GREG right PDA    DENTAL SURGERY      IR BIOPSY KIDNEY RANDOM  10/18/2021     No Known Allergies  Substance Abuse History:    Social History     Substance and Sexual Activity   Alcohol Use Yes    Comment: 1-2 times years     Social History     Substance and Sexual Activity   Drug Use Never     Social History:    Social History     Socioeconomic History    Marital status: /Civil Union     Spouse name: Not on file    Number of children: Not on file    Years of education: Not on file    Highest education level: Not on file   Occupational History    Not on file   Tobacco Use    Smoking status: Current Every Day Smoker     Packs/day: 1 00     Types: Cigarettes     Last attempt to quit: 3/29/2021     Years since quittin 4    Smokeless tobacco: Current User   Lamin Hidalgo Tobacco comment: 1 pk/week per patient    Vaping Use    Vaping Use: Never used   Substance and Sexual Activity    Alcohol use: Yes     Comment: 1-2 times years    Drug use: Never    Sexual activity: Yes     Partners: Male     Birth control/protection: Male Sterilization     Comment:    Other Topics Concern    Not on file   Social History Narrative    Not on file     Social Determinants of Health     Financial Resource Strain: Not on file   Food Insecurity: Not on file   Transportation Needs: Not on file   Physical Activity: Not on file   Stress: Not on file   Social Connections: Not on file   Intimate Partner Violence: Not on file   Housing Stability: Not on file     Family Psychiatric History:     Family History   Problem Relation Age of Onset    No Known Problems Mother     No Known Problems Father     No Known Problems Daughter     Brain cancer Maternal Grandfather     No Known Problems Paternal Aunt     No Known Problems Paternal Aunt      History Review: The following portions of the patient's history were reviewed and updated as appropriate: allergies, current medications, past family history, past medical history, past social history, past surgical history and problem list     OBJECTIVE:     Vital signs in last 24 hours: There were no vitals filed for this visit    Laboratory Results:   Recent Labs (last 2 months):   Appointment on 07/28/2022   Component Date Value    Sodium 07/28/2022 139     Potassium 07/28/2022 3 8     Chloride 07/28/2022 110 (A)    CO2 07/28/2022 19 (A)    ANION GAP 07/28/2022 10     BUN 07/28/2022 48 (A)    Creatinine 07/28/2022 2 74 (A)    Glucose, Fasting 07/28/2022 137 (A)    Calcium 07/28/2022 9 3     eGFR 07/28/2022 20     Vit D, 25-Hydroxy 08/23/2022 10 0 (A)    Cholesterol 08/23/2022 136     Triglycerides 08/23/2022 399 (A)    HDL, Direct 08/23/2022 29 (A)    LDL Calculated 08/23/2022 27     Non-HDL-Chol (CHOL-HDL) 08/23/2022 107     TSH 3RD GENERATON 08/23/2022 2 190     WBC 08/23/2022 12 88 (A)    RBC 08/23/2022 4 33     Hemoglobin 08/23/2022 12 1     Hematocrit 08/23/2022 39 0     MCV 08/23/2022 90     MCH 08/23/2022 27 9     MCHC 08/23/2022 31 0 (A)    RDW 08/23/2022 21 3 (A)    MPV 08/23/2022 10 7     Platelets 12/62/0472 291     nRBC 08/23/2022 0     Neutrophils Relative 08/23/2022 71     Immat GRANS % 08/23/2022 1     Lymphocytes Relative 08/23/2022 18     Monocytes Relative 08/23/2022 6     Eosinophils Relative 08/23/2022 3     Basophils Relative 08/23/2022 1     Neutrophils Absolute 08/23/2022 9 12 (A)    Immature Grans Absolute 08/23/2022 0 17     Lymphocytes Absolute 08/23/2022 2 34     Monocytes Absolute 08/23/2022 0 78     Eosinophils Absolute 08/23/2022 0 36     Basophils Absolute 08/23/2022 0 11 (A)    Sodium 08/23/2022 140     Potassium 08/23/2022 4 4     Chloride 08/23/2022 116 (A)    CO2 08/23/2022 17 (A)    ANION GAP 08/23/2022 7     BUN 08/23/2022 30 (A)    Creatinine 08/23/2022 2 34 (A)    Glucose, Fasting 08/23/2022 130 (A)    Calcium 08/23/2022 9 2     Corrected Calcium 08/23/2022 9 8     AST 08/23/2022 20     ALT 08/23/2022 35     Alkaline Phosphatase 08/23/2022 86     Total Protein 08/23/2022 6 9     Albumin 08/23/2022 3 2 (A)    Total Bilirubin 08/23/2022 0 25     eGFR 08/23/2022 24      I have personally reviewed all pertinent laboratory/tests results      Suicide/Homicide Risk Assessment:    Risk of Harm to Self:  Protective Factors: no current suicidal ideation, access to mental health treatment, being a parent, being , compliant with medications, compliant with mental health treatment, connection to community, connection to own children  Based on today's assessment, Adela Felder presents the following risk of harm to self: low    Risk of Harm to Others:  Based on today's assessment, Adela Felder presents the following risk of harm to others: none    The following interventions are recommended: referral for psychotherapy    Medications Risks/Benefits:      Risks, Benefits And Possible Side Effects Of Medications:    Discussed risks and benefits of treatment with patient including risk of suicidality, serotonin syndrome, increased QTc interval and SIADH related to treatment with antidepressants; Risk of induction of manic symptoms in certain patient populations, risk of parkinsonian symptoms, metabolic syndrome, tardive dyskinesia and neuroleptic malignant syndrome related to treatment with antipsychotic medications and risks of misuse, abuse or dependence, sedation and respiratory depression related to treatment with benzodiazepine medications     Controlled Medication Discussion:     Alexa Lopez has been filling controlled prescriptions on time as prescribed according to [address] [place] [address] Drive Monitoring Program  Discussed with Alexa Lopez the risks of sedation, respiratory depression, impairment of ability to drive and potential for abuse and addiction related to treatment with benzodiazepine medications  She understands risk of treatment with benzodiazepine medications, agrees to not drive if feels impaired and agrees to take medications as prescribed  Discussed with Bonnie Roger warning on concurrent use of benzodiazepines and opioid medications including sedation, respiratory depression, coma and death  She understands the risk of treatment with benzodiazepines in addition to opioids and wants to continue taking those medications  Discussed with Alexa Lopez increased risk of impairment related to concurrent use of benzodiazepines and hypnotic medications including excessive sedation, psychomotor impairment and respiratory depression  She understands the risk of treatment with benzodiazepines in addition to hypnotic medications and wants to continue taking those medications    Treatment Plan:    Due for update/Updated:   yes  Last treatment plan done 9/19/22 by Radha Ascencio    Treatment Plan due on 3/19/23      Adam Ybarra, JALIL 09/19/22

## 2022-09-22 ENCOUNTER — OFFICE VISIT (OUTPATIENT)
Dept: FAMILY MEDICINE CLINIC | Facility: CLINIC | Age: 47
End: 2022-09-22
Payer: COMMERCIAL

## 2022-09-22 VITALS
BODY MASS INDEX: 44.87 KG/M2 | HEIGHT: 66 IN | OXYGEN SATURATION: 100 % | HEART RATE: 90 BPM | DIASTOLIC BLOOD PRESSURE: 70 MMHG | TEMPERATURE: 96.1 F | SYSTOLIC BLOOD PRESSURE: 132 MMHG

## 2022-09-22 DIAGNOSIS — M54.9 CHRONIC BACK PAIN, UNSPECIFIED BACK LOCATION, UNSPECIFIED BACK PAIN LATERALITY: ICD-10-CM

## 2022-09-22 DIAGNOSIS — Z72.0 TOBACCO USE: ICD-10-CM

## 2022-09-22 DIAGNOSIS — F32.A MODERATELY SEVERE DEPRESSION: ICD-10-CM

## 2022-09-22 DIAGNOSIS — R73.03 PRE-DIABETES: Primary | ICD-10-CM

## 2022-09-22 DIAGNOSIS — R26.2 AMBULATORY DYSFUNCTION: ICD-10-CM

## 2022-09-22 DIAGNOSIS — G89.29 CHRONIC BACK PAIN, UNSPECIFIED BACK LOCATION, UNSPECIFIED BACK PAIN LATERALITY: ICD-10-CM

## 2022-09-22 DIAGNOSIS — I10 ESSENTIAL HYPERTENSION: ICD-10-CM

## 2022-09-22 DIAGNOSIS — Z86.39 H/O HYPERGLYCEMIA: ICD-10-CM

## 2022-09-22 LAB — SL AMB POCT HEMOGLOBIN AIC: 6.1 (ref ?–6.5)

## 2022-09-22 PROCEDURE — 83036 HEMOGLOBIN GLYCOSYLATED A1C: CPT | Performed by: FAMILY MEDICINE

## 2022-09-22 PROCEDURE — 99214 OFFICE O/P EST MOD 30 MIN: CPT | Performed by: FAMILY MEDICINE

## 2022-09-22 NOTE — PROGRESS NOTES
FAMILY MEDICINE OFFICE VISIT  [facility]      NAME: Dana Bennett  AGE: 41 y o  SEX: female    DATE OF ENCOUNTER: 9/22/2022    Assessment and Plan     Patient is 40 yo F with CHF, COPD, HOCM, Alport's syndrome presented to office for review of labs  Recent Bl  Sugar levels elevated, in-office HbA1C 6 1  Discussed regarding lifestyle modifications and dietary restriction  Blood pressure under control  Filled and signed the forms for food stamps as per the patient's request   Follow up in 3 months to recheck HbA1C     1  Pre-diabetes  Comments:  HbA1C in office 6 1   recommend diatary management  repeat HbA1C in 3months  2  Essential hypertension  Comments:  BP stable  3  H/O hyperglycemia  Comments:  HbA1C 6 1  follwo up in 3 months  Orders:  -     POCT hemoglobin A1c    4  Chronic back pain, unspecified back location, unspecified back pain laterality  -     Diclofenac Sodium (VOLTAREN) 1 %; Apply 2 g topically 4 (four) times a day    5  Ambulatory dysfunction  Comments:  dependednt on cane at home and wheele chair to go out for appointments  Because of multiple medical conditions  6  BMI 40 0-44 9, adult ([facility] [address] )  Comments:  Discussed lifestyle modifications  7  Tobacco use  Comments:  1/2 a pack a day  not ready to quit  8  Moderately severe depression  Comments:  continue current medication  Chief Complaint     Chief Complaint   Patient presents with    Follow-up     4 month follow up        History of Present Illness     Patient is 40 yo F with HOCM, Alports syndrome, COPD, heart failure presented to the office for follow up visit  Reports that she is using cane at home to ambulate and wheelchair when she required to go out of home  feels shortness of breath, muscle aches and back pain which limit her ambulation     Reports that her left foot will get swollen because of multiple metatarsal fractures in the past    Also reports that her  is in halfway and requesting to fill out the forms for food stamps  The following portions of the patient's history were reviewed and updated as appropriate: allergies, current medications, past family history, past medical history, past social history, past surgical history and problem list     Review of Systems     Review of Systems   Constitutional: Negative for activity change, appetite change, chills, fever and unexpected weight change  HENT: Negative for ear pain, hearing loss, mouth sores, rhinorrhea, sneezing and sore throat  Eyes: Negative for pain and visual disturbance  Respiratory: Negative for cough, chest tightness, shortness of breath and wheezing  Cardiovascular: Negative for chest pain and palpitations  Gastrointestinal: Negative for abdominal pain, constipation, diarrhea, nausea and vomiting  Endocrine: Negative for cold intolerance, heat intolerance, polydipsia, polyphagia and polyuria  Genitourinary: Negative for dysuria, frequency, hematuria and urgency  Musculoskeletal: Positive for back pain, gait problem and myalgias  Negative for arthralgias  Skin: Negative for color change and rash  Neurological: Negative for dizziness, tremors, seizures, syncope and light-headedness  Psychiatric/Behavioral: Negative for behavioral problems, confusion, decreased concentration and sleep disturbance  The patient is not nervous/anxious  All other systems reviewed and are negative        Active Problem List     Patient Active Problem List   Diagnosis    Chest pain    Essential hypertension    Tobacco abuse    History of MI (myocardial infarction)    Chronic diastolic congestive heart failure (HCC)    Major depressive disorder with current active episode    Anxiety    CAD (coronary artery disease)    Lump of left breast    Non-Hodgkin lymphoma of lymph nodes of multiple regions (HCC)    Positive depression screening    Mixed hyperlipidemia    BMI 40 0-44 9, adult ([facility] [address] )    Cervical cyst    Nabothian cyst    Pelvic floor dysfunction    Cervical motion tenderness    Iron deficiency anemia secondary to inadequate dietary iron intake    Hypercalcemia    Proteinuria    Hypertrophic cardiomyopathy (HCC)    COPD (chronic obstructive pulmonary disease) (HCC)    Chronic kidney disease-mineral and bone disorder    Hematuria    Chronic tubulo-interstitial nephritis    Sinus pause    ELIAZAR (obstructive sleep apnea)    Nocturnal hypoxemia    Elevated troponin    Elevated brain natriuretic peptide (BNP) level    Benign hypertension with CKD (chronic kidney disease) stage III (HCC)    Edema    Hypotension    CKD (chronic kidney disease) stage 4, GFR 15-29 ml/min (HCC)    Hypervolemia    Chronic fatigue    Alport syndrome    Social anxiety disorder    Acute renal failure superimposed on stage 4 chronic kidney disease (HCC)    Leukemoid reaction    Olecranon bursitis, right elbow       Objective     /70 (BP Location: Left arm, Patient Position: Sitting, Cuff Size: Large)   Pulse 90   Temp (!) 96 1 °F (35 6 °C) (Tympanic)   Ht 5' 6" (1 676 m)   LMP 07/01/2022   SpO2 100%   BMI 44 87 kg/m²     Physical Exam  Vitals and nursing note reviewed  Exam conducted with a chaperone present  Constitutional:       General: She is not in acute distress  Appearance: She is obese  HENT:      Head: Normocephalic and atraumatic  Right Ear: External ear normal       Left Ear: External ear normal       Nose: Nose normal  No congestion or rhinorrhea  Mouth/Throat:      Mouth: Mucous membranes are moist       Pharynx: Oropharynx is clear  Eyes:      Conjunctiva/sclera: Conjunctivae normal       Pupils: Pupils are equal, round, and reactive to light  Cardiovascular:      Rate and Rhythm: Normal rate and regular rhythm  Pulses: Normal pulses  Heart sounds: Normal heart sounds  No murmur heard    Pulmonary:      Effort: Pulmonary effort is normal       Breath sounds: Normal breath sounds  No wheezing  Abdominal:      General: Abdomen is flat  Bowel sounds are normal       Palpations: Abdomen is soft  Tenderness: There is no abdominal tenderness  Musculoskeletal:      Cervical back: Neck supple  Right lower leg: No edema  Left lower leg: No edema  Comments: Limited ambulation 2-2 foot injury I the past and lower back pain  Skin:     General: Skin is warm and dry  Capillary Refill: Capillary refill takes less than 2 seconds  Neurological:      General: No focal deficit present  Mental Status: She is alert and oriented to person, place, and time     Psychiatric:         Behavior: Behavior normal          Pertinent Laboratory/Diagnostic Studies:          Component      Latest Ref Rng & Units 10/15/2018 7/8/2020 5/23/2022 9/22/2022   Hemoglobin A1C      6 5 4 9 5 3 5 8 6 1       Component      Latest Ref Rng & Units 3/25/2021 8/24/2021 5/20/2022 8/23/2022   Cholesterol      See Comment mg/dL 202 (H) 139 236 (H) 136   Triglycerides      See Comment mg/dL 230 (H) 338 (H) 414 (H) 399 (H)   HDL      >=50 mg/dL 34 (L) 32 (L) 30 (L) 29 (L)   LDL Calculated      0 - 100 mg/dL 122 (H) 39  27   Non-HDL Cholesterol      mg/dl  107 206 107       Current Medications     Current Outpatient Medications:     albuterol (PROVENTIL HFA,VENTOLIN HFA) 90 mcg/act inhaler, Inhale 2 puffs every 4 (four) hours as needed for wheezing or shortness of breath, Disp: 18 g, Rfl: 5    aspirin 81 mg chewable tablet, Chew 1 tablet (81 mg total) daily, Disp: 90 tablet, Rfl: 0    atorvastatin (LIPITOR) 80 mg tablet, Take 1 tablet (80 mg total) by mouth every evening, Disp: 90 tablet, Rfl: 0    bumetanide (BUMEX) 1 mg tablet, Take 1 tablet (1 mg total) by mouth daily, Disp: 30 tablet, Rfl: 6    Diclofenac Sodium (VOLTAREN) 1 %, Apply 2 g topically 4 (four) times a day, Disp: 150 g, Rfl: 2    Diclofenac Sodium (VOLTAREN) 1 %, Apply 2 g topically 4 (four) times a day, Disp: 100 g, Rfl: 1    ezetimibe (ZETIA) 10 mg tablet, Take 1 tablet (10 mg total) by mouth in the morning , Disp: 30 tablet, Rfl: 5    famotidine (PEPCID) 40 MG tablet, Take 1 tablet (40 mg total) by mouth daily, Disp: 30 tablet, Rfl: 5    isosorbide mononitrate (IMDUR) 30 mg 24 hr tablet, Take 1 tablet (30 mg total) by mouth daily, Disp: 30 tablet, Rfl: 11    LORazepam (ATIVAN) 0 5 mg tablet, Take 1 tablet (0 5 mg total) by mouth 2 (two) times a day as needed for anxiety, Disp: 60 tablet, Rfl: 0    metoprolol tartrate (LOPRESSOR) 100 mg tablet, Take 1 tablet (100 mg total) by mouth 2 (two) times a day, Disp: 60 tablet, Rfl: 5    nitroglycerin (NITROSTAT) 0 4 mg SL tablet, Place 1 tablet (0 4 mg total) under the tongue every 5 (five) minutes as needed for chest pain, Disp: 25 tablet, Rfl: 5    omeprazole (PriLOSEC) 20 mg delayed release capsule, Take 1 capsule (20 mg total) by mouth 2 (two) times a day, Disp: 60 capsule, Rfl: 0    QUEtiapine (SEROquel) 50 mg tablet, take 1 tablet by mouth at bedtime, Disp: 30 tablet, Rfl: 1    sertraline (ZOLOFT) 100 mg tablet, Take 1 5 tablets (150 mg total) by mouth daily, Disp: 45 tablet, Rfl: 1    Health Maintenance     Health Maintenance   Topic Date Due    COVID-19 Vaccine (1) Never done    Pneumococcal Vaccine: Pediatrics (0 to 5 Years) and At-Risk Patients (6 to 59 Years) (1 - PCV) Never done    Breast Cancer Screening: Mammogram  07/14/2021    Annual Physical  08/06/2021    Influenza Vaccine (1) 09/01/2022    BMI: Adult  07/14/2023    Depression Remission PHQ  09/22/2023    BMI: Followup Plan  09/23/2023    Cervical Cancer Screening  09/08/2025    Colorectal Cancer Screening  09/26/2028    DTaP,Tdap,and Td Vaccines (3 - Td or Tdap) 08/06/2030    HIV Screening  Completed    Hepatitis C Screening  Completed    HIB Vaccine  Aged Out    Hepatitis B Vaccine  Aged Out    IPV Vaccine  Aged Out    Hepatitis A Vaccine  Aged Out    Meningococcal ACWY Vaccine  Aged Out    HPV Vaccine  Aged Out     Immunization History   Administered Date(s) Administered    Tdap 12/01/2006, 08/06/2020     BMI Counseling: Body mass index is 44 87 kg/m²  The BMI is above normal  Nutrition recommendations include decreasing portion sizes, encouraging healthy choices of fruits and vegetables, decreasing fast food intake, consuming healthier snacks, limiting drinks that contain sugar, moderation in carbohydrate intake, increasing intake of lean protein, reducing intake of saturated and trans fat and reducing intake of cholesterol  No pharmacotherapy was ordered  Rationale for BMI follow-up plan is due to patient being overweight or obese  Depression Screening and Follow-up Plan: Their PHQ-9 score was 18  Continue regular follow-up with their mental health provider who is managing their mental health condition(s)            Kelton Singh MD   Family Medicine  PGY-2  9/23/2022 1:40 PM

## 2022-09-26 ENCOUNTER — REMOTE DEVICE CLINIC VISIT (OUTPATIENT)
Dept: CARDIOLOGY CLINIC | Facility: CLINIC | Age: 47
End: 2022-09-26
Payer: COMMERCIAL

## 2022-09-26 DIAGNOSIS — Z95.0 CARDIAC PACEMAKER IN SITU: Primary | ICD-10-CM

## 2022-09-26 PROCEDURE — 93294 REM INTERROG EVL PM/LDLS PM: CPT | Performed by: INTERNAL MEDICINE

## 2022-09-26 PROCEDURE — 93296 REM INTERROG EVL PM/IDS: CPT | Performed by: INTERNAL MEDICINE

## 2022-09-26 NOTE — PROGRESS NOTES
Results for orders placed or performed in visit on 09/26/22   Cardiac EP device report    Narrative    MDT DUAL PM/ ACTIVE SYSTEM IS MRI CONDITIONAL  CARELINK TRANSMISSION: BATTERY VOLTAGE ADEQUATE (14 YRS)  AP-4%, <0 1%  ALL AVAILABLE LEAD PARAMETERS WITHIN NORMAL LIMITS  3 DEVICE CLASSIFIED NSVT EPISODES- PAT ON EGM'S  PT ON ASA 81MG & METOPROLOL  NORMAL DEVICE FUNCTION   GV

## 2022-09-29 ENCOUNTER — TELEPHONE (OUTPATIENT)
Dept: OTHER | Facility: HOSPITAL | Age: 47
End: 2022-09-29

## 2022-09-29 DIAGNOSIS — N18.4 CKD (CHRONIC KIDNEY DISEASE) STAGE 4, GFR 15-29 ML/MIN (HCC): Primary | ICD-10-CM

## 2022-09-29 NOTE — TELEPHONE ENCOUNTER
Left message for patient to have fasting lab work and urine done prior to appointment  Told to call back if any questions

## 2022-10-10 DIAGNOSIS — F41.1 GENERALIZED ANXIETY DISORDER: ICD-10-CM

## 2022-10-10 RX ORDER — LORAZEPAM 0.5 MG/1
0.5 TABLET ORAL 3 TIMES DAILY PRN
Qty: 90 TABLET | Refills: 0 | Status: SHIPPED | OUTPATIENT
Start: 2022-10-10

## 2022-10-10 RX ORDER — LORAZEPAM 0.5 MG/1
0.5 TABLET ORAL 2 TIMES DAILY PRN
Qty: 60 TABLET | Refills: 0 | Status: CANCELLED | OUTPATIENT
Start: 2022-10-10

## 2022-10-13 ENCOUNTER — TELEPHONE (OUTPATIENT)
Dept: NEPHROLOGY | Facility: CLINIC | Age: 47
End: 2022-10-13

## 2022-10-19 ENCOUNTER — APPOINTMENT (OUTPATIENT)
Dept: LAB | Facility: CLINIC | Age: 47
End: 2022-10-19
Payer: COMMERCIAL

## 2022-10-19 DIAGNOSIS — N18.4 CKD (CHRONIC KIDNEY DISEASE) STAGE 4, GFR 15-29 ML/MIN (HCC): ICD-10-CM

## 2022-10-19 LAB
ALBUMIN SERPL BCP-MCNC: 3.2 G/DL (ref 3.5–5)
ALP SERPL-CCNC: 104 U/L (ref 46–116)
ALT SERPL W P-5'-P-CCNC: 35 U/L (ref 12–78)
ANION GAP SERPL CALCULATED.3IONS-SCNC: 8 MMOL/L (ref 4–13)
AST SERPL W P-5'-P-CCNC: 14 U/L (ref 5–45)
BASOPHILS # BLD AUTO: 0.11 THOUSANDS/ÂΜL (ref 0–0.1)
BASOPHILS NFR BLD AUTO: 1 % (ref 0–1)
BILIRUB SERPL-MCNC: 0.33 MG/DL (ref 0.2–1)
BUN SERPL-MCNC: 33 MG/DL (ref 5–25)
CALCIUM ALBUM COR SERPL-MCNC: 10.5 MG/DL (ref 8.3–10.1)
CALCIUM SERPL-MCNC: 9.9 MG/DL (ref 8.3–10.1)
CHLORIDE SERPL-SCNC: 111 MMOL/L (ref 96–108)
CO2 SERPL-SCNC: 21 MMOL/L (ref 21–32)
CREAT SERPL-MCNC: 2.55 MG/DL (ref 0.6–1.3)
EOSINOPHIL # BLD AUTO: 0.32 THOUSAND/ÂΜL (ref 0–0.61)
EOSINOPHIL NFR BLD AUTO: 2 % (ref 0–6)
ERYTHROCYTE [DISTWIDTH] IN BLOOD BY AUTOMATED COUNT: 18 % (ref 11.6–15.1)
GFR SERPL CREATININE-BSD FRML MDRD: 21 ML/MIN/1.73SQ M
GLUCOSE P FAST SERPL-MCNC: 96 MG/DL (ref 65–99)
HCT VFR BLD AUTO: 43.2 % (ref 34.8–46.1)
HGB BLD-MCNC: 14 G/DL (ref 11.5–15.4)
IMM GRANULOCYTES # BLD AUTO: 0.14 THOUSAND/UL (ref 0–0.2)
IMM GRANULOCYTES NFR BLD AUTO: 1 % (ref 0–2)
LYMPHOCYTES # BLD AUTO: 2.45 THOUSANDS/ÂΜL (ref 0.6–4.47)
LYMPHOCYTES NFR BLD AUTO: 15 % (ref 14–44)
MCH RBC QN AUTO: 27.7 PG (ref 26.8–34.3)
MCHC RBC AUTO-ENTMCNC: 32.4 G/DL (ref 31.4–37.4)
MCV RBC AUTO: 86 FL (ref 82–98)
MONOCYTES # BLD AUTO: 1.15 THOUSAND/ÂΜL (ref 0.17–1.22)
MONOCYTES NFR BLD AUTO: 7 % (ref 4–12)
NEUTROPHILS # BLD AUTO: 11.79 THOUSANDS/ÂΜL (ref 1.85–7.62)
NEUTS SEG NFR BLD AUTO: 74 % (ref 43–75)
NRBC BLD AUTO-RTO: 0 /100 WBCS
PHOSPHATE SERPL-MCNC: 3.9 MG/DL (ref 2.7–4.5)
PLATELET # BLD AUTO: 342 THOUSANDS/UL (ref 149–390)
PMV BLD AUTO: 11 FL (ref 8.9–12.7)
POTASSIUM SERPL-SCNC: 4.1 MMOL/L (ref 3.5–5.3)
PROT SERPL-MCNC: 7.7 G/DL (ref 6.4–8.4)
PTH-INTACT SERPL-MCNC: 181.8 PG/ML (ref 18.4–80.1)
RBC # BLD AUTO: 5.05 MILLION/UL (ref 3.81–5.12)
SODIUM SERPL-SCNC: 140 MMOL/L (ref 135–147)
WBC # BLD AUTO: 15.96 THOUSAND/UL (ref 4.31–10.16)

## 2022-10-19 PROCEDURE — 83970 ASSAY OF PARATHORMONE: CPT

## 2022-10-19 PROCEDURE — 80053 COMPREHEN METABOLIC PANEL: CPT

## 2022-10-19 PROCEDURE — 36415 COLL VENOUS BLD VENIPUNCTURE: CPT

## 2022-10-19 PROCEDURE — 85025 COMPLETE CBC W/AUTO DIFF WBC: CPT

## 2022-10-19 PROCEDURE — 84100 ASSAY OF PHOSPHORUS: CPT

## 2022-10-24 ENCOUNTER — OFFICE VISIT (OUTPATIENT)
Dept: NEPHROLOGY | Facility: CLINIC | Age: 47
End: 2022-10-24
Payer: COMMERCIAL

## 2022-10-24 VITALS
HEART RATE: 81 BPM | OXYGEN SATURATION: 99 % | BODY MASS INDEX: 44.87 KG/M2 | SYSTOLIC BLOOD PRESSURE: 130 MMHG | HEIGHT: 66 IN | DIASTOLIC BLOOD PRESSURE: 80 MMHG

## 2022-10-24 DIAGNOSIS — N11.9 CHRONIC TUBULO-INTERSTITIAL NEPHRITIS: Primary | ICD-10-CM

## 2022-10-24 DIAGNOSIS — J44.9 CHRONIC OBSTRUCTIVE PULMONARY DISEASE, UNSPECIFIED COPD TYPE (HCC): ICD-10-CM

## 2022-10-24 DIAGNOSIS — N25.81 SECONDARY HYPERPARATHYROIDISM OF RENAL ORIGIN (HCC): ICD-10-CM

## 2022-10-24 DIAGNOSIS — I42.2 HYPERTROPHIC CARDIOMYOPATHY (HCC): ICD-10-CM

## 2022-10-24 DIAGNOSIS — G47.33 OSA (OBSTRUCTIVE SLEEP APNEA): ICD-10-CM

## 2022-10-24 DIAGNOSIS — E55.9 VITAMIN D DEFICIENCY: ICD-10-CM

## 2022-10-24 DIAGNOSIS — C83.38 DIFFUSE LARGE B-CELL LYMPHOMA OF LYMPH NODES OF MULTIPLE REGIONS (HCC): ICD-10-CM

## 2022-10-24 DIAGNOSIS — I25.10 CORONARY ARTERY DISEASE INVOLVING NATIVE CORONARY ARTERY OF NATIVE HEART, UNSPECIFIED WHETHER ANGINA PRESENT: ICD-10-CM

## 2022-10-24 DIAGNOSIS — Q87.81 ALPORT SYNDROME: ICD-10-CM

## 2022-10-24 DIAGNOSIS — G47.34 NOCTURNAL HYPOXEMIA: ICD-10-CM

## 2022-10-24 DIAGNOSIS — E78.5 HYPERLIPIDEMIA, UNSPECIFIED HYPERLIPIDEMIA TYPE: ICD-10-CM

## 2022-10-24 PROCEDURE — 99214 OFFICE O/P EST MOD 30 MIN: CPT | Performed by: INTERNAL MEDICINE

## 2022-10-24 RX ORDER — CALCITRIOL 0.25 UG/1
0.25 CAPSULE, LIQUID FILLED ORAL DAILY
Qty: 36 CAPSULE | Refills: 4 | Status: SHIPPED | OUTPATIENT
Start: 2022-10-24

## 2022-10-24 NOTE — ASSESSMENT & PLAN NOTE
Lab Results   Component Value Date    EGFR 21 10/19/2022    EGFR 24 08/23/2022    EGFR 20 07/28/2022    CREATININE 2 55 (H) 10/19/2022    CREATININE 2 34 (H) 08/23/2022    CREATININE 2 74 (H) 07/28/2022    blood pressure is controlled

## 2022-10-24 NOTE — PROGRESS NOTES
[facility] of [address] [place] [address]    Name: Junie Hamlin  YOB: 1975      Assessment/Plan:           Problem List Items Addressed This Visit        Endocrine    Secondary hyperparathyroidism of renal origin ([facility] [address] )      PTH has risen to 181 9  I explained vitamin-D activation and her lack of this mild heel  Will start calcitriol 0 25 3 times a week         Relevant Medications    calcitriol (ROCALTROL) 0 25 mcg capsule       Respiratory    COPD (chronic obstructive pulmonary disease) ([facility])      Continues to smoke         ELIAZAR (obstructive sleep apnea)      Uses nightly CPAP         Nocturnal hypoxemia       Cardiovascular and Mediastinum    CAD (coronary artery disease)    Hypertrophic cardiomyopathy ([facility] [address] )      Following with cardiology  She is currently on Bumex 1 mg daily and appears euvolemic            Nervous and Auditory    Alport syndrome      Has hearing loss in the left ear as did her grandfather and her son            Immune and Lymphatic    Non-Hodgkin lymphoma of lymph nodes of multiple regions [facility])      She is in remission            Genitourinary    Chronic tubulo-interstitial nephritis - Primary    Relevant Orders    CBC and differential    Comprehensive metabolic panel    LDL cholesterol, direct    Vitamin D 25 hydroxy    Urinalysis with microscopic    Protein / creatinine ratio, urine    Microalbumin / creatinine urine ratio      Other Visit Diagnoses     Vitamin D deficiency        Relevant Orders    Vitamin D 25 hydroxy    Hyperlipidemia, unspecified hyperlipidemia type        Relevant Orders    LDL cholesterol, direct            Subjective:      Patient ID: Junie Hamlin is a 41 y o  female  referred by Dr Kristen Metcalf    HPI Has a history of chronic kidney disease stage 4, vitamin-D deficiency, Alport syndrome, hypertrophic cardiomyopathy, primary hypertension with better control at home in the 130/80 range and a history of bursitis of the right elbow    Since last visit she says she is not feeling well    She has back pain in the flank area  She has constant pain  She has not spoken to her PCP yet  Denies any injury or unusual exercise  She walks at home with a cane but is here in a [facility]     she saw Cardiology in July  She has a history of coronary disease status post PCI to right coronary, inferior STEMI  She has a history of hypertrophic cardiomyopathy by cardiac MRI and echo and chronic diastolic heart failure  She does not weigh herself but is following a fluid restriction of 32-48 ounces  She avoids salt She was changed to Bumex 1mg daily and reports improvement in edema and abd girth    The following portions of the patient's history were reviewed and updated as appropriate: allergies, current medications, past family history, past medical history, past social history, past surgical history and problem list     Review of Systems   Constitutional: Positive for fatigue  HENT: Positive for hearing loss  Has dec hearing in left ear   Eyes: Negative for visual disturbance  Respiratory: Positive for chest tightness and shortness of breath  Occasional chest tightness and MCGOWAN on steps   Cardiovascular: Positive for palpitations and leg swelling  Negative for chest pain  Gastrointestinal: Positive for nausea  Negative for abdominal pain, blood in stool, constipation and diarrhea  Occasional nausea   Genitourinary: Positive for flank pain  Negative for decreased urine volume, difficulty urinating, dysuria, hematuria and urgency  Wears Depends on going out   Musculoskeletal: Positive for arthralgias, back pain and gait problem  Feels like her "bones hurt"   Neurological: Positive for dizziness, weakness and headaches  Bitemporal or behind eyes  Has postural dizziness   Hematological: Does not bruise/bleed easily          She has a bruising feeling but no visible bruise   Psychiatric/Behavioral: Positive for dysphoric mood           Social History     Socioeconomic History   • Marital status: /Civil Union     Spouse name: None   • Number of children: None   • Years of education: None   • Highest education level: None   Occupational History   • None   Tobacco Use   • Smoking status: Former Smoker     Packs/day: 1 00     Years: 25 00     Pack years: 25 00     Types: Cigarettes   • Smokeless tobacco: Never Used   • Tobacco comment: Recently and currently quitting cigarettes   Vaping Use   • Vaping Use: Never used   Substance and Sexual Activity   • Alcohol use: Yes     Comment: 1-2 times years   • Drug use: No   • Sexual activity: Yes     Partners: Male     Birth control/protection: None     Comment:    Other Topics Concern   • None   Social History Narrative   • None     Social Determinants of Health     Financial Resource Strain: Not on file   Food Insecurity: Not on file   Transportation Needs: Not on file   Physical Activity: Not on file   Stress: Not on file   Social Connections: Not on file   Intimate Partner Violence: Not on file   Housing Stability: Not on file     Past Medical History:   Diagnosis Date   • Acute MI ([place] [address] )    • Anemia    • Anxiety    • CAD (coronary artery disease)     GREG mid RCA and GREG right PDA 3/25/2021   • Cancer ([place] [address] ) 2008   • Cardiomyopathy ([place] [address] )    • CHF (congestive heart failure) ([place] [address] )    • Chronic kidney disease    • COPD (chronic obstructive pulmonary disease) ([facility])     scheduled for sleep apnea test soon after pacemaker implant   • Coronary artery disease    • Depression    • History of chemotherapy     non hodgkins lymphoma 2008   • Hyperlipemia    • Hypertension    • Hypertrophic cardiomyopathy ([place] [address] )    • Lymphoma, non-Hodgkin's (HCC)    • Myocardial infarction [facility])    • Obesity    • SSS (sick sinus syndrome) ([place] [address] )     s/p medtronic dual chamber pacemaker 5/25/2021   • Tobacco abuse    • Ventricular tachycardia, non-sustained    • Wears glasses      Past Surgical History:   Procedure Laterality Date   • CARDIAC PACEMAKER PLACEMENT Left 2021    Procedure: DUAL LEAD PACEMAKER IMPLANT;  Surgeon: Jory Rees MD;  Location: [address] MAIN OR;  Service: Cardiology   • CAROTID STENT     •  SECTION     • CORONARY ANGIOPLASTY WITH STENT PLACEMENT  2021    GREG mid RCA and GREG right PDA   • DENTAL SURGERY     • IR BIOPSY KIDNEY RANDOM  10/18/2021       Current Outpatient Medications:   •  albuterol (PROVENTIL HFA,VENTOLIN HFA) 90 mcg/act inhaler, Inhale 2 puffs every 4 (four) hours as needed for wheezing or shortness of breath, Disp: 18 g, Rfl: 5  •  aspirin 81 mg chewable tablet, Chew 1 tablet (81 mg total) daily, Disp: 90 tablet, Rfl: 0  •  bumetanide (BUMEX) 1 mg tablet, Take 1 tablet (1 mg total) by mouth daily, Disp: 30 tablet, Rfl: 6  •  calcitriol (ROCALTROL) 0 25 mcg capsule, Take 1 capsule (0 25 mcg total) by mouth daily, Disp: 36 capsule, Rfl: 4  •  Diclofenac Sodium (VOLTAREN) 1 %, Apply 2 g topically 4 (four) times a day, Disp: 150 g, Rfl: 2  •  Diclofenac Sodium (VOLTAREN) 1 %, Apply 2 g topically 4 (four) times a day, Disp: 100 g, Rfl: 1  •  ezetimibe (ZETIA) 10 mg tablet, Take 1 tablet (10 mg total) by mouth in the morning , Disp: 30 tablet, Rfl: 5  •  isosorbide mononitrate (IMDUR) 30 mg 24 hr tablet, Take 1 tablet (30 mg total) by mouth daily, Disp: 30 tablet, Rfl: 11  •  LORazepam (ATIVAN) 0 5 mg tablet, Take 1 tablet (0 5 mg total) by mouth 3 (three) times a day as needed for anxiety, Disp: 90 tablet, Rfl: 0  •  metoprolol tartrate (LOPRESSOR) 100 mg tablet, Take 1 tablet (100 mg total) by mouth 2 (two) times a day, Disp: 60 tablet, Rfl: 5  •  nitroglycerin (NITROSTAT) 0 4 mg SL tablet, Place 1 tablet (0 4 mg total) under the tongue every 5 (five) minutes as needed for chest pain, Disp: 25 tablet, Rfl: 5  •  QUEtiapine (SEROquel) 50 mg tablet, take 1 tablet by mouth at bedtime, Disp: 30 tablet, Rfl: 1  •  sertraline (ZOLOFT) 100 mg tablet, Take 1 5 tablets (150 mg total) by mouth daily, Disp: 45 tablet, Rfl: 1  •  atorvastatin (LIPITOR) 80 mg tablet, Take 1 tablet (80 mg total) by mouth every evening, Disp: 90 tablet, Rfl: 0  •  famotidine (PEPCID) 40 MG tablet, Take 1 tablet (40 mg total) by mouth daily (Patient not taking: Reported on 10/24/2022), Disp: 30 tablet, Rfl: 5  •  omeprazole (PriLOSEC) 20 mg delayed release capsule, Take 1 capsule (20 mg total) by mouth 2 (two) times a day (Patient not taking: No sig reported), Disp: 60 capsule, Rfl: 0    Lab Results   Component Value Date    SODIUM 140 10/19/2022    K 4 1 10/19/2022     (H) 10/19/2022    CO2 21 10/19/2022    AGAP 8 10/19/2022    BUN 33 (H) 10/19/2022    CREATININE 2 55 (H) 10/19/2022    GLUC 163 (H) 05/17/2022    GLUF 96 10/19/2022    CALCIUM 9 9 10/19/2022    AST 14 10/19/2022    ALT 35 10/19/2022    ALKPHOS 104 10/19/2022    TP 7 7 10/19/2022    TBILI 0 33 10/19/2022    EGFR 21 10/19/2022     Lab Results   Component Value Date    WBC 15 96 (H) 10/19/2022    HGB 14 0 10/19/2022    HCT 43 2 10/19/2022    MCV 86 10/19/2022     10/19/2022     Lab Results   Component Value Date    CHOLESTEROL 136 08/23/2022    CHOLESTEROL 236 (H) 05/20/2022    CHOLESTEROL 139 08/24/2021     Lab Results   Component Value Date    HDL 29 (L) 08/23/2022    HDL 30 (L) 05/20/2022    HDL 32 (L) 08/24/2021     Lab Results   Component Value Date    LDLCALC 27 08/23/2022    [address]  05/20/2022      Comment:      Calculated LDL invalid, triglycerides >400 mg/dl  This screening LDL is a calculated result  It does not have the accuracy of the Direct Measured LDL in the monitoring of patients with hyperlipidemia and/or statin therapy  Direct Measure LDL (OQZ492) must be ordered separately in these patients      LDLCALC 39 08/24/2021     Lab Results   Component Value Date    TRIG 399 (H) 08/23/2022    TRIG 414 (H) 05/20/2022    TRIG 338 (H) 08/24/2021     No results found for: [place], [place]  Lab Results   Component Value Date    URZ7IGEZIHOS 2 190 08/23/2022     Lab Results   Component Value Date     8 (H) 10/19/2022    CALCIUM 9 9 10/19/2022    PHOS 3 9 10/19/2022     Lab Results   Component Value Date    SPEP  09/15/2020     No monoclonal bands noted  Reviewed by: Alexandrea Segovia DO **Electronic Signature**    UPEP  09/15/2020     The UPEP shows non-selective proteinuria   No monoclonal bands noted  Reviewed by: Alexandrea Segovia DO **Electronic Signature**     No results found for: Guerline Negro    Kidney biopsy done last year demonstrated focal segmenta glomerulosclerosis cons with Alports Syndrome    Objective:      /80   Pulse 81   Ht 5' 6" (1 676 m)   SpO2 99%   BMI 44 87 kg/m²          Physical Exam  Constitutional:       General: She is not in acute distress  Appearance: She is obese  She is not toxic-appearing  HENT:      Head: Normocephalic and atraumatic  Right Ear: External ear normal       Left Ear: External ear normal    Eyes:      Extraocular Movements: Extraocular movements intact  Conjunctiva/sclera: Conjunctivae normal       Pupils: Pupils are equal, round, and reactive to light  Neck:      Vascular: No carotid bruit  Cardiovascular:      Rate and Rhythm: Normal rate and regular rhythm  Pulmonary:      Effort: Pulmonary effort is normal  No respiratory distress  Breath sounds: Normal breath sounds  No wheezing, rhonchi or rales  Abdominal:      General: Bowel sounds are normal  There is no distension  Palpations: Abdomen is soft  Tenderness: There is no abdominal tenderness  There is no guarding  Musculoskeletal:         General: Normal range of motion  Cervical back: Normal range of motion and neck supple  Right lower leg: No edema  Left lower leg: No edema  Skin:     General: Skin is warm and dry  Findings: No bruising  Neurological:      Mental Status: She is alert and oriented to person, place, and time   Mental status is at baseline  Motor: Weakness present  Gait: Gait abnormal    Psychiatric:         Mood and Affect: Mood normal          Behavior: Behavior normal          Thought Content:  Thought content normal          Judgment: Judgment normal

## 2022-10-24 NOTE — ASSESSMENT & PLAN NOTE
Lab Results   Component Value Date    EGFR 21 10/19/2022    EGFR 24 08/23/2022    EGFR 20 07/28/2022    CREATININE 2 55 (H) 10/19/2022    CREATININE 2 34 (H) 08/23/2022    CREATININE 2 74 (H) 07/28/2022     She has stable stage 4 chronic kidney disease with a baseline creatinine of approximately 2 5 mg/dL ( EGFR 21 mils per minute   I reviewed the meaning of creatinine and GFR and she understands  She is voiding well and has no symptoms referable to her underlying diagnosis   She would like to have her children tested    Will arrange renal genetic testing for her children  Kidney ultrasound demonstrated medical renal disease but no obstruction and simple cysts

## 2022-10-24 NOTE — ASSESSMENT & PLAN NOTE
PTH has risen to 181 9  I explained vitamin-D activation and her lack of this mild heel      Will start calcitriol 0 25 3 times a week

## 2022-11-11 ENCOUNTER — OFFICE VISIT (OUTPATIENT)
Dept: FAMILY MEDICINE CLINIC | Facility: CLINIC | Age: 47
End: 2022-11-11

## 2022-11-11 VITALS
BODY MASS INDEX: 44.87 KG/M2 | DIASTOLIC BLOOD PRESSURE: 82 MMHG | HEIGHT: 66 IN | OXYGEN SATURATION: 99 % | SYSTOLIC BLOOD PRESSURE: 128 MMHG | HEART RATE: 81 BPM | TEMPERATURE: 98.1 F

## 2022-11-11 DIAGNOSIS — K21.9 GASTROESOPHAGEAL REFLUX DISEASE WITHOUT ESOPHAGITIS: ICD-10-CM

## 2022-11-11 DIAGNOSIS — M54.50 THORACOLUMBAR BACK PAIN: Primary | ICD-10-CM

## 2022-11-11 DIAGNOSIS — M54.6 THORACOLUMBAR BACK PAIN: Primary | ICD-10-CM

## 2022-11-11 RX ORDER — OMEPRAZOLE 40 MG/1
40 CAPSULE, DELAYED RELEASE ORAL DAILY
Qty: 30 CAPSULE | Refills: 0 | Status: SHIPPED | OUTPATIENT
Start: 2022-11-11

## 2022-11-11 RX ORDER — LIDOCAINE 50 MG/G
2 PATCH TOPICAL DAILY
Qty: 30 PATCH | Refills: 0 | Status: SHIPPED | OUTPATIENT
Start: 2022-11-11

## 2022-11-11 RX ORDER — METHOCARBAMOL 500 MG/1
500 TABLET, FILM COATED ORAL 2 TIMES DAILY PRN
Qty: 40 TABLET | Refills: 0 | Status: SHIPPED | OUTPATIENT
Start: 2022-11-11

## 2022-11-11 NOTE — PROGRESS NOTES
Assessment/Plan:    No problem-specific Assessment & Plan notes found for this encounter  Diagnoses and all orders for this visit:    Thoracolumbar back pain  Comments:  Moist heat, stretching, Recommend PT - patient declines, check x-rays  Orders:  -     XR spine thoracic 3 vw; Future  -     XR spine lumbar minimum 4 views non injury; Future  -     lidocaine (Lidoderm) 5 %; Apply 2 patches topically daily Remove & Discard patch within 12 hours or as directed by MD  -     methocarbamol (ROBAXIN) 500 mg tablet; Take 1 tablet (500 mg total) by mouth 2 (two) times a day as needed for muscle spasms  -     Ambulatory Referral to Physical Therapy; Future    Gastroesophageal reflux disease without esophagitis  Comments:  Nausea secondary to GERD  Orders:  -     omeprazole (PriLOSEC) 40 MG capsule; Take 1 capsule (40 mg total) by mouth daily          PHQ-2/9 Depression Screening    Little interest or pleasure in doing things: 2 - more than half the days  Feeling down, depressed, or hopeless: 2 - more than half the days  Trouble falling or staying asleep, or sleeping too much: 3 - nearly every day  Feeling tired or having little energy: 2 - more than half the days  Poor appetite or overeatin - several days  Feeling bad about yourself - or that you are a failure or have let yourself or your family down: 2 - more than half the days  Trouble concentrating on things, such as reading the newspaper or watching television: 2 - more than half the days  Moving or speaking so slowly that other people could have noticed  Or the opposite - being so fidgety or restless that you have been moving around a lot more than usual: 2 - more than half the days  Thoughts that you would be better off dead, or of hurting yourself in some way: 0 - not at all  PHQ-9 Score: 16   PHQ-9 Interpretation: Moderately severe depression             Subjective:      Patient ID: Ajit Sanford is a 41 y o  female      Patient presents for complaint of pain in both sides of mid to low back described as a constant hot radiating pain, she takes tylenol which doesn't help much  The pain is not sharp or stabbing  She does not ambulate much, she sits and lies down the majority of the time  She will get nauseous and have saliva come up the back of her throat, she stopped taking her prilosec and pepcid bc she ran out  The following portions of the patient's history were reviewed and updated as appropriate: allergies, current medications, past family history, past medical history, past social history, past surgical history and problem list     Review of Systems   Constitutional: Negative for activity change  Respiratory: Negative for cough, chest tightness and shortness of breath  Cardiovascular: Negative for chest pain, palpitations and leg swelling  Musculoskeletal: Positive for back pain and gait problem  Objective:    /82 (BP Location: Left arm, Patient Position: Sitting)   Pulse 81   Temp 98 1 °F (36 7 °C) (Tympanic)   Ht 5' 6" (1 676 m)   LMP 10/16/2022 (Approximate)   SpO2 99%   BMI 44 87 kg/m²      Physical Exam  Vitals and nursing note reviewed  Constitutional:       Appearance: Normal appearance  Musculoskeletal:         General: Tenderness present  Comments: TTP left lateral ribs, bilateral PVM, no bony midline TTP   Neurological:      Mental Status: She is alert  Psychiatric:         Mood and Affect: Mood normal          Behavior: Behavior normal          Thought Content:  Thought content normal          Judgment: Judgment normal

## 2022-12-27 ENCOUNTER — REMOTE DEVICE CLINIC VISIT (OUTPATIENT)
Dept: CARDIOLOGY CLINIC | Facility: CLINIC | Age: 47
End: 2022-12-27

## 2022-12-27 DIAGNOSIS — Z95.0 PRESENCE OF CARDIAC PACEMAKER: Primary | ICD-10-CM

## 2022-12-27 NOTE — PROGRESS NOTES
Results for orders placed or performed in visit on 12/27/22   Cardiac EP device report    Narrative    MDT DUAL PM/ ACTIVE SYSTEM IS MRI CONDITIONAL  CARELINK TRANSMISSION: BATTERY VOLTAGE ADEQUATE (13 7 YRS)  AP: 3 6%  : <0 1% (MVP-ON)  ALL AVAILABLE LEAD PARAMETERS WITHIN NORMAL LIMITS  5 VT EPISODES W/AVAIL EGMS SHOWING PAT W/ -170 BPM, MAX DURATION 3 SECS  PT TAKES METOPROLOL TART, ASA 81MG  EF: 65% (ST ECHO 7/26/21)  PACEMAKER FUNCTIONING APPROPRIATELY    [address] [place] [address] Street

## 2023-01-02 ENCOUNTER — APPOINTMENT (EMERGENCY)
Dept: RADIOLOGY | Facility: HOSPITAL | Age: 48
End: 2023-01-02

## 2023-01-02 ENCOUNTER — APPOINTMENT (EMERGENCY)
Dept: NON INVASIVE DIAGNOSTICS | Facility: HOSPITAL | Age: 48
End: 2023-01-02

## 2023-01-02 ENCOUNTER — HOSPITAL ENCOUNTER (INPATIENT)
Facility: HOSPITAL | Age: 48
LOS: 3 days | Discharge: HOME | End: 2023-01-05
Attending: FAMILY MEDICINE | Admitting: FAMILY MEDICINE

## 2023-01-02 ENCOUNTER — HOSPITAL ENCOUNTER (EMERGENCY)
Facility: HOSPITAL | Age: 48
End: 2023-01-02
Attending: EMERGENCY MEDICINE

## 2023-01-02 VITALS
WEIGHT: 270 LBS | OXYGEN SATURATION: 97 % | DIASTOLIC BLOOD PRESSURE: 64 MMHG | TEMPERATURE: 98.1 F | SYSTOLIC BLOOD PRESSURE: 121 MMHG | HEIGHT: 66 IN | BODY MASS INDEX: 43.39 KG/M2 | HEART RATE: 71 BPM | RESPIRATION RATE: 14 BRPM

## 2023-01-02 DIAGNOSIS — I25.10 CORONARY ARTERY DISEASE INVOLVING NATIVE CORONARY ARTERY OF NATIVE HEART, UNSPECIFIED WHETHER ANGINA PRESENT: ICD-10-CM

## 2023-01-02 DIAGNOSIS — I42.2 HYPERTROPHIC CARDIOMYOPATHY (HCC): ICD-10-CM

## 2023-01-02 DIAGNOSIS — R77.8 ELEVATED TROPONIN: Primary | ICD-10-CM

## 2023-01-02 DIAGNOSIS — N18.4 CKD (CHRONIC KIDNEY DISEASE) STAGE 4, GFR 15-29 ML/MIN (HCC): ICD-10-CM

## 2023-01-02 DIAGNOSIS — E78.2 MIXED HYPERLIPIDEMIA: ICD-10-CM

## 2023-01-02 DIAGNOSIS — N17.9 ACUTE RENAL FAILURE SUPERIMPOSED ON STAGE 4 CHRONIC KIDNEY DISEASE, UNSPECIFIED ACUTE RENAL FAILURE TYPE (HCC): ICD-10-CM

## 2023-01-02 DIAGNOSIS — R77.8 ELEVATED TROPONIN: ICD-10-CM

## 2023-01-02 DIAGNOSIS — N18.30 BENIGN HYPERTENSION WITH CKD (CHRONIC KIDNEY DISEASE) STAGE III (HCC): ICD-10-CM

## 2023-01-02 DIAGNOSIS — N18.4 ACUTE RENAL FAILURE SUPERIMPOSED ON STAGE 4 CHRONIC KIDNEY DISEASE, UNSPECIFIED ACUTE RENAL FAILURE TYPE (HCC): ICD-10-CM

## 2023-01-02 DIAGNOSIS — G47.33 OSA (OBSTRUCTIVE SLEEP APNEA): ICD-10-CM

## 2023-01-02 DIAGNOSIS — R07.9 CHEST PAIN: ICD-10-CM

## 2023-01-02 DIAGNOSIS — I10 ESSENTIAL HYPERTENSION: Primary | ICD-10-CM

## 2023-01-02 DIAGNOSIS — I12.9 BENIGN HYPERTENSION WITH CKD (CHRONIC KIDNEY DISEASE) STAGE III (HCC): ICD-10-CM

## 2023-01-02 DIAGNOSIS — I50.32 CHRONIC DIASTOLIC CONGESTIVE HEART FAILURE (HCC): ICD-10-CM

## 2023-01-02 DIAGNOSIS — K08.89 PAIN, DENTAL: ICD-10-CM

## 2023-01-02 DIAGNOSIS — I24.9 ACUTE CORONARY SYNDROME (HCC): ICD-10-CM

## 2023-01-02 DIAGNOSIS — I45.5 SINUS PAUSE: ICD-10-CM

## 2023-01-02 DIAGNOSIS — I25.2 HISTORY OF MI (MYOCARDIAL INFARCTION): ICD-10-CM

## 2023-01-02 LAB
2HR DELTA HS TROPONIN: 1169 NG/L
4HR DELTA HS TROPONIN: 6666 NG/L
ALBUMIN SERPL BCP-MCNC: 3.9 G/DL (ref 3.5–5)
ALP SERPL-CCNC: 83 U/L (ref 34–104)
ALT SERPL W P-5'-P-CCNC: 22 U/L (ref 7–52)
ANION GAP SERPL CALCULATED.3IONS-SCNC: 10 MMOL/L (ref 4–13)
AORTIC ROOT: 3 CM
APICAL FOUR CHAMBER EJECTION FRACTION: 46 %
APTT PPP: 28 SECONDS (ref 23–37)
APTT PPP: 30 SECONDS (ref 23–37)
APTT PPP: 34 SECONDS (ref 23–37)
ASCENDING AORTA: 4.3 CM
AST SERPL W P-5'-P-CCNC: 13 U/L (ref 13–39)
ATRIAL RATE: 60 BPM
ATRIAL RATE: 62 BPM
ATRIAL RATE: 73 BPM
ATRIAL RATE: 73 BPM
ATRIAL RATE: 84 BPM
ATRIAL RATE: 91 BPM
ATRIAL RATE: 94 BPM
ATRIAL RATE: 96 BPM
AV LVOT MEAN GRADIENT: 2 MMHG
AV LVOT PEAK GRADIENT: 5 MMHG
BASOPHILS # BLD AUTO: 0.06 THOUSANDS/ÂΜL (ref 0–0.1)
BASOPHILS NFR BLD AUTO: 0 % (ref 0–1)
BILIRUB SERPL-MCNC: 0.23 MG/DL (ref 0.2–1)
BUN SERPL-MCNC: 24 MG/DL (ref 5–25)
CALCIUM SERPL-MCNC: 9.6 MG/DL (ref 8.4–10.2)
CARDIAC TROPONIN I PNL SERPL HS: 1307 NG/L
CARDIAC TROPONIN I PNL SERPL HS: 138 NG/L
CARDIAC TROPONIN I PNL SERPL HS: 6804 NG/L
CARDIAC TROPONIN I PNL SERPL HS: ABNORMAL NG/L (ref 8–18)
CHLORIDE SERPL-SCNC: 107 MMOL/L (ref 96–108)
CHOLEST SERPL-MCNC: 234 MG/DL
CO2 SERPL-SCNC: 19 MMOL/L (ref 21–32)
CREAT SERPL-MCNC: 2.58 MG/DL (ref 0.6–1.3)
DOP CALC LVOT PEAK VEL VTI: 21.55 CM
DOP CALC LVOT PEAK VEL: 1.09 M/S
E WAVE DECELERATION TIME: 215 MS
EOSINOPHIL # BLD AUTO: 0.17 THOUSAND/ÂΜL (ref 0–0.61)
EOSINOPHIL NFR BLD AUTO: 1 % (ref 0–6)
ERYTHROCYTE [DISTWIDTH] IN BLOOD BY AUTOMATED COUNT: 17.6 % (ref 11.6–15.1)
FRACTIONAL SHORTENING: 26 (ref 28–44)
GFR SERPL CREATININE-BSD FRML MDRD: 21 ML/MIN/1.73SQ M
GLUCOSE SERPL-MCNC: 146 MG/DL (ref 65–140)
HCT VFR BLD AUTO: 40.6 % (ref 34.8–46.1)
HDLC SERPL-MCNC: 33 MG/DL
HGB BLD-MCNC: 13.3 G/DL (ref 11.5–15.4)
IMM GRANULOCYTES # BLD AUTO: 0.16 THOUSAND/UL (ref 0–0.2)
IMM GRANULOCYTES NFR BLD AUTO: 1 % (ref 0–2)
INR PPP: 1.01 (ref 0.84–1.19)
INR PPP: 1.06 (ref 0.84–1.19)
INTERVENTRICULAR SEPTUM IN DIASTOLE (PARASTERNAL SHORT AXIS VIEW): 1.3 CM
INTERVENTRICULAR SEPTUM: 1.3 CM (ref 0.6–1.1)
LAAS-AP4: 38.5 CM2
LDLC SERPL CALC-MCNC: 136 MG/DL (ref 0–100)
LDLC SERPL DIRECT ASSAY-MCNC: 163 MG/DL (ref 0–100)
LEFT ATRIUM SIZE: 4.9 CM
LEFT INTERNAL DIMENSION IN SYSTOLE: 4.6 CM (ref 2.1–4)
LEFT VENTRICULAR INTERNAL DIMENSION IN DIASTOLE: 6.2 CM (ref 3.5–6)
LEFT VENTRICULAR POSTERIOR WALL IN END DIASTOLE: 1.1 CM
LEFT VENTRICULAR STROKE VOLUME: 95 ML
LVSV (TEICH): 95 ML
LYMPHOCYTES # BLD AUTO: 1.78 THOUSANDS/ÂΜL (ref 0.6–4.47)
LYMPHOCYTES NFR BLD AUTO: 11 % (ref 14–44)
MAGNESIUM SERPL-MCNC: 2 MG/DL (ref 1.9–2.7)
MCH RBC QN AUTO: 29.4 PG (ref 26.8–34.3)
MCHC RBC AUTO-ENTMCNC: 32.8 G/DL (ref 31.4–37.4)
MCV RBC AUTO: 90 FL (ref 82–98)
MONOCYTES # BLD AUTO: 1.03 THOUSAND/ÂΜL (ref 0.17–1.22)
MONOCYTES NFR BLD AUTO: 6 % (ref 4–12)
MV PEAK A VEL: 0.55 M/S
MV PEAK E VEL: 117 CM/S
MV STENOSIS PRESSURE HALF TIME: 62 MS
MV VALVE AREA P 1/2 METHOD: 3.55
NEUTROPHILS # BLD AUTO: 13.82 THOUSANDS/ÂΜL (ref 1.85–7.62)
NEUTS SEG NFR BLD AUTO: 81 % (ref 43–75)
NONHDLC SERPL-MCNC: 201 MG/DL
NRBC BLD AUTO-RTO: 0 /100 WBCS
P AXIS: -70 DEGREES
P AXIS: 15 DEGREES
P AXIS: 23 DEGREES
P AXIS: 33 DEGREES
P AXIS: 38 DEGREES
P AXIS: 39 DEGREES
P AXIS: 45 DEGREES
P AXIS: 49 DEGREES
PLATELET # BLD AUTO: 287 THOUSANDS/UL (ref 149–390)
PMV BLD AUTO: 10.5 FL (ref 8.9–12.7)
POTASSIUM SERPL-SCNC: 3.8 MMOL/L (ref 3.5–5.3)
PR INTERVAL: 160 MS
PR INTERVAL: 164 MS
PR INTERVAL: 164 MS
PR INTERVAL: 166 MS
PR INTERVAL: 168 MS
PR INTERVAL: 170 MS
PR INTERVAL: 186 MS
PR INTERVAL: 224 MS
PROT SERPL-MCNC: 7.2 G/DL (ref 6.4–8.4)
PROTHROMBIN TIME: 13.5 SECONDS (ref 11.6–14.5)
PROTHROMBIN TIME: 13.8 SECONDS (ref 11.6–14.5)
QRS AXIS: -2 DEGREES
QRS AXIS: -23 DEGREES
QRS AXIS: -40 DEGREES
QRS AXIS: 12 DEGREES
QRS AXIS: 21 DEGREES
QRS AXIS: 29 DEGREES
QRS AXIS: 47 DEGREES
QRS AXIS: 51 DEGREES
QRSD INTERVAL: 102 MS
QRSD INTERVAL: 108 MS
QRSD INTERVAL: 108 MS
QRSD INTERVAL: 110 MS
QRSD INTERVAL: 98 MS
QT INTERVAL: 348 MS
QT INTERVAL: 352 MS
QT INTERVAL: 364 MS
QT INTERVAL: 366 MS
QT INTERVAL: 386 MS
QT INTERVAL: 388 MS
QT INTERVAL: 412 MS
QT INTERVAL: 416 MS
QTC INTERVAL: 416 MS
QTC INTERVAL: 418 MS
QTC INTERVAL: 425 MS
QTC INTERVAL: 427 MS
QTC INTERVAL: 430 MS
QTC INTERVAL: 435 MS
QTC INTERVAL: 444 MS
QTC INTERVAL: 450 MS
RBC # BLD AUTO: 4.52 MILLION/UL (ref 3.81–5.12)
RIGHT VENTRICLE ID DIMENSION: 3.8 CM
SL CV LV EF: 45
SL CV PED ECHO LEFT VENTRICLE DIASTOLIC VOLUME (MOD BIPLANE) 2D: 191 ML
SL CV PED ECHO LEFT VENTRICLE SYSTOLIC VOLUME (MOD BIPLANE) 2D: 96 ML
SODIUM SERPL-SCNC: 136 MMOL/L (ref 135–147)
T WAVE AXIS: 11 DEGREES
T WAVE AXIS: 48 DEGREES
T WAVE AXIS: 57 DEGREES
T WAVE AXIS: 58 DEGREES
T WAVE AXIS: 68 DEGREES
T WAVE AXIS: 71 DEGREES
T WAVE AXIS: 73 DEGREES
T WAVE AXIS: 75 DEGREES
TRIGL SERPL-MCNC: 324 MG/DL
VENTRICULAR RATE: 60 BPM
VENTRICULAR RATE: 62 BPM
VENTRICULAR RATE: 73 BPM
VENTRICULAR RATE: 73 BPM
VENTRICULAR RATE: 84 BPM
VENTRICULAR RATE: 91 BPM
VENTRICULAR RATE: 94 BPM
VENTRICULAR RATE: 96 BPM
WBC # BLD AUTO: 17.02 THOUSAND/UL (ref 4.31–10.16)

## 2023-01-02 RX ORDER — HYDROMORPHONE HCL/PF 1 MG/ML
0.5 SYRINGE (ML) INJECTION ONCE
Status: COMPLETED | OUTPATIENT
Start: 2023-01-02 | End: 2023-01-02

## 2023-01-02 RX ORDER — NITROGLYCERIN 0.4 MG/1
0.4 TABLET SUBLINGUAL ONCE
Status: COMPLETED | OUTPATIENT
Start: 2023-01-02 | End: 2023-01-02

## 2023-01-02 RX ORDER — HEPARIN SODIUM 1000 [USP'U]/ML
4000 INJECTION, SOLUTION INTRAVENOUS; SUBCUTANEOUS ONCE
Status: COMPLETED | OUTPATIENT
Start: 2023-01-02 | End: 2023-01-02

## 2023-01-02 RX ORDER — NICOTINE 21 MG/24HR
1 PATCH, TRANSDERMAL 24 HOURS TRANSDERMAL DAILY
Status: DISCONTINUED | OUTPATIENT
Start: 2023-01-02 | End: 2023-01-05 | Stop reason: HOSPADM

## 2023-01-02 RX ORDER — PANTOPRAZOLE SODIUM 40 MG/1
40 TABLET, DELAYED RELEASE ORAL
Status: DISCONTINUED | OUTPATIENT
Start: 2023-01-03 | End: 2023-01-05 | Stop reason: HOSPADM

## 2023-01-02 RX ORDER — HEPARIN SODIUM 10000 [USP'U]/100ML
3-20 INJECTION, SOLUTION INTRAVENOUS
Status: DISCONTINUED | OUTPATIENT
Start: 2023-01-02 | End: 2023-01-02 | Stop reason: HOSPADM

## 2023-01-02 RX ORDER — METOPROLOL TARTRATE 5 MG/5ML
5 INJECTION INTRAVENOUS ONCE
Status: COMPLETED | OUTPATIENT
Start: 2023-01-02 | End: 2023-01-02

## 2023-01-02 RX ORDER — METOPROLOL TARTRATE 50 MG/1
100 TABLET, FILM COATED ORAL ONCE
Status: COMPLETED | OUTPATIENT
Start: 2023-01-02 | End: 2023-01-02

## 2023-01-02 RX ORDER — CALCITRIOL 0.25 UG/1
0.25 CAPSULE, LIQUID FILLED ORAL DAILY
Status: DISCONTINUED | OUTPATIENT
Start: 2023-01-02 | End: 2023-01-02

## 2023-01-02 RX ORDER — ISOSORBIDE MONONITRATE 30 MG/1
30 TABLET, EXTENDED RELEASE ORAL DAILY
Status: DISCONTINUED | OUTPATIENT
Start: 2023-01-02 | End: 2023-01-05 | Stop reason: HOSPADM

## 2023-01-02 RX ORDER — NITROGLYCERIN 0.4 MG/1
0.4 TABLET SUBLINGUAL
Status: DISCONTINUED | OUTPATIENT
Start: 2023-01-02 | End: 2023-01-05 | Stop reason: HOSPADM

## 2023-01-02 RX ORDER — EZETIMIBE 10 MG/1
10 TABLET ORAL DAILY
Status: DISCONTINUED | OUTPATIENT
Start: 2023-01-02 | End: 2023-01-05 | Stop reason: HOSPADM

## 2023-01-02 RX ORDER — ATORVASTATIN CALCIUM 80 MG/1
80 TABLET, FILM COATED ORAL EVERY EVENING
Status: DISCONTINUED | OUTPATIENT
Start: 2023-01-02 | End: 2023-01-05 | Stop reason: HOSPADM

## 2023-01-02 RX ORDER — LORAZEPAM 2 MG/ML
0.5 INJECTION INTRAMUSCULAR ONCE
Status: COMPLETED | OUTPATIENT
Start: 2023-01-02 | End: 2023-01-02

## 2023-01-02 RX ORDER — METOPROLOL TARTRATE 50 MG/1
100 TABLET, FILM COATED ORAL 2 TIMES DAILY
Status: DISCONTINUED | OUTPATIENT
Start: 2023-01-02 | End: 2023-01-05 | Stop reason: HOSPADM

## 2023-01-02 RX ORDER — HEPARIN SODIUM 10000 [USP'U]/100ML
3-20 INJECTION, SOLUTION INTRAVENOUS
Status: DISCONTINUED | OUTPATIENT
Start: 2023-01-02 | End: 2023-01-03

## 2023-01-02 RX ORDER — HEPARIN SODIUM 1000 [USP'U]/ML
4000 INJECTION, SOLUTION INTRAVENOUS; SUBCUTANEOUS EVERY 6 HOURS PRN
Status: DISCONTINUED | OUTPATIENT
Start: 2023-01-02 | End: 2023-01-03

## 2023-01-02 RX ORDER — HEPARIN SODIUM 1000 [USP'U]/ML
4000 INJECTION, SOLUTION INTRAVENOUS; SUBCUTANEOUS EVERY 6 HOURS PRN
Status: DISCONTINUED | OUTPATIENT
Start: 2023-01-02 | End: 2023-01-02 | Stop reason: HOSPADM

## 2023-01-02 RX ORDER — SODIUM CHLORIDE 9 MG/ML
3 INJECTION INTRAVENOUS
Status: DISCONTINUED | OUTPATIENT
Start: 2023-01-02 | End: 2023-01-02 | Stop reason: HOSPADM

## 2023-01-02 RX ORDER — HEPARIN SODIUM 1000 [USP'U]/ML
2000 INJECTION, SOLUTION INTRAVENOUS; SUBCUTANEOUS EVERY 6 HOURS PRN
Status: DISCONTINUED | OUTPATIENT
Start: 2023-01-02 | End: 2023-01-03

## 2023-01-02 RX ORDER — QUETIAPINE FUMARATE 25 MG/1
50 TABLET, FILM COATED ORAL
Status: DISCONTINUED | OUTPATIENT
Start: 2023-01-02 | End: 2023-01-05 | Stop reason: HOSPADM

## 2023-01-02 RX ORDER — HEPARIN SODIUM 1000 [USP'U]/ML
4000 INJECTION, SOLUTION INTRAVENOUS; SUBCUTANEOUS ONCE
Status: DISCONTINUED | OUTPATIENT
Start: 2023-01-02 | End: 2023-01-02

## 2023-01-02 RX ORDER — ASPIRIN 81 MG/1
81 TABLET, CHEWABLE ORAL DAILY
Status: DISCONTINUED | OUTPATIENT
Start: 2023-01-02 | End: 2023-01-05 | Stop reason: HOSPADM

## 2023-01-02 RX ORDER — PANTOPRAZOLE SODIUM 20 MG/1
20 TABLET, DELAYED RELEASE ORAL
Status: DISCONTINUED | OUTPATIENT
Start: 2023-01-03 | End: 2023-01-02

## 2023-01-02 RX ORDER — HEPARIN SODIUM 1000 [USP'U]/ML
2000 INJECTION, SOLUTION INTRAVENOUS; SUBCUTANEOUS EVERY 6 HOURS PRN
Status: DISCONTINUED | OUTPATIENT
Start: 2023-01-02 | End: 2023-01-02 | Stop reason: HOSPADM

## 2023-01-02 RX ORDER — BUMETANIDE 1 MG/1
1 TABLET ORAL DAILY
Status: DISCONTINUED | OUTPATIENT
Start: 2023-01-02 | End: 2023-01-05 | Stop reason: HOSPADM

## 2023-01-02 RX ORDER — LORAZEPAM 0.5 MG/1
0.5 TABLET ORAL 3 TIMES DAILY PRN
Status: DISCONTINUED | OUTPATIENT
Start: 2023-01-02 | End: 2023-01-05 | Stop reason: HOSPADM

## 2023-01-02 RX ADMIN — HYDROMORPHONE HYDROCHLORIDE 0.5 MG: 1 INJECTION, SOLUTION INTRAMUSCULAR; INTRAVENOUS; SUBCUTANEOUS at 14:26

## 2023-01-02 RX ADMIN — HYDROMORPHONE HYDROCHLORIDE 0.5 MG: 1 INJECTION, SOLUTION INTRAMUSCULAR; INTRAVENOUS; SUBCUTANEOUS at 08:30

## 2023-01-02 RX ADMIN — HEPARIN SODIUM 4000 UNITS: 1000 INJECTION INTRAVENOUS; SUBCUTANEOUS at 22:21

## 2023-01-02 RX ADMIN — HEPARIN SODIUM 11.1 UNITS/KG/HR: 10000 INJECTION, SOLUTION INTRAVENOUS at 14:27

## 2023-01-02 RX ADMIN — HEPARIN SODIUM 11.1 UNITS/KG/HR: 10000 INJECTION, SOLUTION INTRAVENOUS at 08:41

## 2023-01-02 RX ADMIN — TICAGRELOR 180 MG: 90 TABLET ORAL at 10:58

## 2023-01-02 RX ADMIN — METOPROLOL TARTRATE 100 MG: 50 TABLET ORAL at 10:58

## 2023-01-02 RX ADMIN — HEPARIN SODIUM 4000 UNITS: 1000 INJECTION INTRAVENOUS; SUBCUTANEOUS at 08:32

## 2023-01-02 RX ADMIN — QUETIAPINE FUMARATE 50 MG: 25 TABLET ORAL at 21:09

## 2023-01-02 RX ADMIN — SERTRALINE HYDROCHLORIDE 150 MG: 50 TABLET ORAL at 17:01

## 2023-01-02 RX ADMIN — ASPIRIN 81 MG: 81 TABLET, CHEWABLE ORAL at 14:56

## 2023-01-02 RX ADMIN — LORAZEPAM 0.5 MG: 0.5 TABLET ORAL at 17:05

## 2023-01-02 RX ADMIN — HEPARIN SODIUM 4000 UNITS: 1000 INJECTION INTRAVENOUS; SUBCUTANEOUS at 15:49

## 2023-01-02 RX ADMIN — ATORVASTATIN CALCIUM 80 MG: 80 TABLET, FILM COATED ORAL at 18:16

## 2023-01-02 RX ADMIN — LORAZEPAM 0.5 MG: 2 INJECTION INTRAMUSCULAR; INTRAVENOUS at 10:56

## 2023-01-02 RX ADMIN — ISOSORBIDE MONONITRATE 30 MG: 30 TABLET, EXTENDED RELEASE ORAL at 16:59

## 2023-01-02 RX ADMIN — NICOTINE 1 PATCH: 14 PATCH, EXTENDED RELEASE TRANSDERMAL at 14:57

## 2023-01-02 RX ADMIN — NITROGLYCERIN 0.4 MG: 0.4 TABLET SUBLINGUAL at 08:15

## 2023-01-02 RX ADMIN — HYDROMORPHONE HYDROCHLORIDE 0.5 MG: 1 INJECTION, SOLUTION INTRAMUSCULAR; INTRAVENOUS; SUBCUTANEOUS at 10:57

## 2023-01-02 RX ADMIN — EZETIMIBE 10 MG: 10 TABLET ORAL at 16:59

## 2023-01-02 RX ADMIN — METOPROLOL TARTRATE 5 MG: 5 INJECTION, SOLUTION INTRAVENOUS at 09:17

## 2023-01-02 RX ADMIN — HYDROMORPHONE HYDROCHLORIDE 0.5 MG: 1 INJECTION, SOLUTION INTRAMUSCULAR; INTRAVENOUS; SUBCUTANEOUS at 21:49

## 2023-01-02 NOTE — ED NOTES
PRIVATE EXAM for Dr Tasha Law, RN  01/02/23 18 [facility]  01/02/23 8941

## 2023-01-02 NOTE — ASSESSMENT & PLAN NOTE
Current active smoker, approx 1/2 pack per day since her 25s   -  on smoking cessation  - Continue NRT while inpatient

## 2023-01-02 NOTE — ASSESSMENT & PLAN NOTE
Initial /85  - H/O HTN  - Home regimen: Metoprolol 100mg BID    Assessment:   - Initial elevated pressure likely secondary CP and anxiety  - Most recent Blood Pressure: 841/67  - Systolic (71CJV), MAT:636 , Min:116 , Max:130     Plan:  - Continue home regimen  - Vitals per unit routine

## 2023-01-02 NOTE — ASSESSMENT & PLAN NOTE
- Concern for NSTEMI type I in the setting of patient's symptomatology with chest discomfort similar to previous MI and elevated troponins and echocardiography showing concern for new regional wall motion abnormality  -Continue aspirin 81 mg daily, Brilinta 90 mg twice daily as patient was loaded, heparin infusion  -Would recommend repeat limited transthoracic echocardiogram with ultrasound contrast enhancement for better regional wall motion delineation   -Continue metoprolol 100 mg twice daily and would increase antianginal regimen as patient tolerates  -We will need to be careful with nitro in the setting of HCM however can use low-dose infusion if necessary  -Patient will need nephrology evaluation in the setting of significant renal dysfunction and concern for need for inpatient ischemic evaluation  -Continue to monitor patient on telemetry  -Continue monitor renal function and electrolytes

## 2023-01-02 NOTE — ED NOTES
Ekg completed by this RN prior to triage       Eliane Blair, [facility]0 [facility]  01/02/23 0662

## 2023-01-02 NOTE — ASSESSMENT & PLAN NOTE
H/O HFrEF  - Home regimen: Bumex 1 mg daily, Imdur 30mg daily  - 1/2 EKG: NSR with occasional PVC's, prior inferior and anterior infarct, inverted T-waves in lateral leads, no STEMI    - 1/2 ECHO: LVEF 45%, mildly reduced systolic function, hypokinesis of the inferior and inferolateral walls, mod dilated LA, mild AR/MR/TR      Assessment:  - Not in clinical fluid-overloaded state on exam as of 1/5/2023  - Weight has been stable since last measurement (126kg on 7/14/22)    Plan:  - Continue home regimen  - Cardiac diet with 1500cc/day fluid restriction  - Daily weight, strict I&O

## 2023-01-02 NOTE — PROGRESS NOTES
I received a [place] text from [facility] at 8:23 am concerning this patient  She presented with CP beginning at 4 am     I reviewed her chart extensively dating back to 1 /2021 when she presented with significant inferior ST elevations on ECG and CP  Emergent cardiac cath showed an acute occlusion of the mid RCA which was successfully stented  She underwent repeat cardiac cath on 4/2/2021 for CP and troponin of 16  Cardiac cath showed a patent RCA and no critical lesions elsewhere  She has presented to the ER and to her regular cardiologist with CP on several occasions since then  She continues to smoke as per her chart and she has CKD with Creatinine today at 2 58  ECG today - NSR, no ST elevations c/w an acute MI  Troponin levels 138, 1307, 6804  Serial ECGs have not shown any change  The plan is to transfer her to [facility]  Her presentation is c/w a NSTEMI  I recommend medical management for now with ASA, Brilinta, nitrates, beta blocker  The risk of cardiac cath is significant given her creatinine of 2 58, and prior stenting  ECG does not show an acute STEMI  She presented in a similar manner in 4/2021 with troponin of 16 and cardiac cath showing patent RCA      She is be re evaluated when she gets to [facility] by the cardiac team

## 2023-01-02 NOTE — ASSESSMENT & PLAN NOTE
- With PCI to RCA and RPDA in March 2021  -Continue medical therapy with metoprolol tartrate 100 mg twice daily, aspirin 81 mg daily, atorvastatin 80 mg daily, Zetia

## 2023-01-02 NOTE — EMTALA/ACUTE CARE TRANSFER
[address] [facility] [address]  Dept: [phone]      EMTALA TRANSFER CONSENT    NAME Darlin Litten                                         1975                              MRN [id number]    I have been informed of my rights regarding examination, treatment, and transfer   by Dr Layne Kan DO    Benefits: Specialized equipment and/or services available at the receiving facility (Include comment)________________________, Continuity of care, Patient preference    Risks: Potential for delay in receiving treatment, Potential deterioration of medical condition, Loss of IV, Increased discomfort during transfer, Possible worsening of condition or death during transfer      Transfer Request   I acknowledge that my medical condition has been evaluated and explained to me by the emergency department physician or other qualified medical person and/or my attending physician who has recommended and offered to me further medical examination and treatment  I understand the Hospital's obligation with respect to the treatment and stabilization of my emergency medical condition  I nevertheless request to be transferred  I release the Hospital, the doctor, and any other persons caring for me from all responsibility or liability for any injury or ill effects that may result from my transfer and agree to accept all responsibility for the consequences of my choice to transfer, rather than receive stabilizing treatment at the Hospital  I understand that because the transfer is my request, my insurance may not provide reimbursement for the services  The Hospital will assist and direct me and my family in how to make arrangements for transfer, but the hospital is not liable for any fees charged by the transport service    In spite of this understanding, I refuse to consent to further medical examination and treatment which has been offered to me, and request transfer to    I authorize the performance of emergency medical procedures and treatments upon me in both transit and upon arrival at the receiving facility  Additionally, I authorize the release of any and all medical records to the receiving facility and request they be transported with me, if possible  I authorize the performance of emergency medical procedures and treatments upon me in both transit and upon arrival at the receiving facility  Additionally, I authorize the release of any and all medical records to the receiving facility and request they be transported with me, if possible  I understand that the safest mode of transportation during a medical emergency is an ambulance and that the Hospital advocates the use of this mode of transport  Risks of traveling to the receiving facility by car, including absence of medical control, life sustaining equipment, such as oxygen, and medical personnel has been explained to me and I fully understand them  (MINAL CORRECT BOX BELOW)  [  ]  I consent to the stated transfer and to be transported by ambulance/helicopter  [  ]  I consent to the stated transfer, but refuse transportation by ambulance and accept full responsibility for my transportation by car  I understand the risks of non-ambulance transfers and I exonerate the Hospital and its staff from any deterioration in my condition that results from this refusal     X___________________________________________    DATE  23  TIME________  Signature of patient or legally responsible individual signing on patient behalf           RELATIONSHIP TO PATIENT_________________________          Provider Certification    [facility] 1975                              MRN [id number]    A medical screening exam was performed on the above named patient  Based on the examination:    Condition Necessitating Transfer The primary encounter diagnosis was Chest pain  A diagnosis of Elevated troponin was also pertinent to this visit  Patient Condition: The patient has been stabilized such that within reasonable medical probability, no material deterioration of the patient condition or the condition of the unborn child(sasha) is likely to result from the transfer    Reason for Transfer: Level of Care needed not available at this facility    Transfer Requirements: Facility     · Space available and qualified personnel available for treatment as acknowledged by    · Agreed to accept transfer and to provide appropriate medical treatment as acknowledged by       Thierno Tariq  · Appropriate medical records of the examination and treatment of the patient are provided at the time of transfer   [address] [facility], [address] _______  · Transfer will be performed by qualified personnel from    and appropriate transfer equipment as required, including the use of necessary and appropriate life support measures      Provider Certification: I have examined the patient and explained the following risks and benefits of being transferred/refusing transfer to the patient/family:  General risk, such as traffic hazards, adverse weather conditions, rough terrain or turbulence, possible failure of equipment (including vehicle or aircraft), or consequences of actions of persons outside the control of the transport personnel, Unanticipated needs of medical equipment and personnel during transport, The possibility of a transport vehicle being unavailable, Consent was not obtained as patient is committed to psychiatric facility and transfer is mandated      Based on these reasonable risks and benefits to the patient and/or the unborn child(sasha), and based upon the information available at the time of the patient’s examination, I certify that the medical benefits reasonably to be expected from the provision of appropriate medical treatments at another medical facility outweigh the increasing risks, if any, to the individual’s medical condition, and in the case of labor to the unborn child, from effecting the transfer      X____________________________________________ DATE 01/02/23        TIME_______      ORIGINAL - SEND TO MEDICAL RECORDS   COPY - SEND WITH PATIENT DURING TRANSFER

## 2023-01-02 NOTE — ASSESSMENT & PLAN NOTE
Patient presented to [place] [facility] ED on 1/2/2023 for c/o tight/squeezing mid-sternal chest pain radiating to left shoulder that woke her up at 4 AM PTA, which feels similar to prior MI  Also endorses associated nausea, diaphoresis, and shortness of breath  Patient took SL NTG x2 and ASA 325mg at home without relief  - Pmhx MI s/p GREG to RCA and RPDA 3/25/21, sinus pause s/p dual-chamber permanent pacemaker in place, CHF (EF 45%), MCHC, HTN, and HLD  - 7/26/21 Stress ECHO: LVEF 65%, moderate hypokinesis of the basal-mid inferior wall  There was no echocardiographic evidence for stress-induced ischemia   - Home Regimen: ASA 81 mg QD, Atorvastatin 80mg QD, Lopressor 100mg BID, and Imdur 30mg QD  - 1/2 Troponin 138 > 1307 > 6804 > [place] at [address]  - 1/2 CXR: negative  - 1/2 EKG: NSR with occasional PVC's, prior inferior and anterior infarct, inverted T-waves in lateral leads, no STEMI    - 1/2 ECHO: LVEF 45%, mildly reduced systolic function, hypokinesis of the inferior and inferolateral walls, mod dilated LA, mild AR/MR/TR   - ED treatment: NTG, Metoprolol, UFH  - Cardiology at [facility]: Type-1 NSTEMI, rec'd c/w ASA 81mg QD, Brilinta 90mg BID, metoprolol 100mg BID, NTG PRN  - Transferred to [facility] for non-emergent cardiac intervention   - 1/3 LHC showed 100% occlusion of mid LAD S/P GREG placement on 1/3/22 with complete resolution  - 1/4 clear for discharge home per cardiology  Rec'd c/w ASA, Plavix, statin, B-blocker, home BP medication, Imdur and prn NTG SL on discharge  Assessment:   - LAD occlusion of mid LAD s/p GREG on 1/3/22  - Chest pain improved with current regimen    Plan:  - Telemetry  - DAPT with ASA 81mg QD and Plavix 75mg QD  - Metoprolol 100mg BID  - SL NTG PRN for chest pain  - Tylenol 975mg q6 for pain     - Cardiology following  - SD2 level of care    Results from last 7 days   Lab Units 01/02/23 2000 01/02/23  1459 01/02/23  1150 01/02/23  0936 01/02/23  0808   HS TNI 0HR ng/L  --   --   --   --  138* HS TNI 2HR ng/L  --   --   --  1,307*  --    HS TNI 4HR ng/L  --   --  6,804*  --   --    HS TNI RAND ng/L >22,973*   < >  --   --   --     < > = values in this interval not displayed

## 2023-01-02 NOTE — CONSULTS
[id number] Crispify 1975, 41 y o  female MRN: [id number]  Unit/Bed#: ED 20 Encounter: [id number]  Primary Care Provider: Divya Stewart DO   Date and time admitted to hospital: 1/2/2023  7:39 AM    Consult to Cardiology  Consult performed by: Elissa Camejo DO  Consult ordered by: JALIL Mann          ELIAZAR (obstructive sleep apnea)  Assessment & Plan  - Patient will need to be evaluated by respiratory therapy as she uses CPAP at home    Sinus pause  Assessment & Plan  - Currently with Medtronic dual-chamber permanent pacemaker in place  -Continue to monitor    Hypertrophic cardiomyopathy [facility])  Assessment & Plan  - Seen to be concentric on cardiac MRI  -Continue to uptitrate medical therapy  -We will need to be careful with nitrate use    BMI 40 0-44 9, adult [facility])  Assessment & Plan  - Counseled patient on the importance of dietary and lifestyle modifications    CAD (coronary artery disease)  Assessment & Plan  - With PCI to RCA and RPDA in March 2021  -Continue medical therapy with metoprolol tartrate 100 mg twice daily, aspirin 81 mg daily, atorvastatin 80 mg daily, Zetia    Tobacco abuse  Assessment & Plan  - Counseled patient on the importance of tobacco cessation    Acute coronary syndrome [facility])  Assessment & Plan  - Concern for NSTEMI type I in the setting of patient's symptomatology with chest discomfort similar to previous MI and elevated troponins and echocardiography showing concern for new regional wall motion abnormality  -Continue aspirin 81 mg daily, Brilinta 90 mg twice daily as patient was loaded, heparin infusion  -Would recommend repeat limited transthoracic echocardiogram with ultrasound contrast enhancement for better regional wall motion delineation   -Continue metoprolol 100 mg twice daily and would increase antianginal regimen as patient tolerates  -We will need to be careful with nitro in the setting of HCM however can use low-dose infusion if necessary  -Patient will need nephrology evaluation in the setting of significant renal dysfunction and concern for need for inpatient ischemic evaluation  -Continue to monitor patient on telemetry  -Continue monitor renal function and electrolytes    Other summary comments:   -Would recommend repeat limited echocardiogram with echo contrast to assist in more definitive evaluation of regional variations however in the setting of concerning symptoms, elevated troponin, and patient cardiac history I do recommend the patient be medically treated and transferred to [facility] in [place] for further evaluation and possible inpatient ischemic evaluation   -Patient should be seen and evaluated by nephrology as cardiac catheterization for concern for possible NSTEMI type I would likely be difficult with her renal dysfunction and may cause renal failure  -In meantime would continue aspirin, Brilinta, heparin infusion along with metoprolol 100 mg twice daily  -We will need to be careful with nitro as in the setting of HCM will need to monitor preload carefully  -Continue to treat patient medically along with atorvastatin and Zetia  -Case discussed with interventional team Dr Lawyer Chadwick who noted there are limited safe options in the setting of patient's renal dysfunction  -Cardiology team at [place] ( Dr Sandy Plata)  aware of transfer      Outpatient Cardiologist: Dr Herlinda Barrett    HPI: Ambar Cole is a 41y o  year old female with known coronary artery disease and PCI in March 2021, history of heart failure preserved ejection fraction, ischemic cardiomyopathy with known inferior wall motion abnormalities, hypertrophic cardiomyopathy with Medtronic dual-chamber permanent pacemaker in place along with obesity, obstructive sleep apnea with CPAP therapy, chronic kidney disease, and current tobacco use who presents to the hospital with worsening onset chest discomfort that woke her up out of sleep around 4 AM 1/2/2023  Patient states that the chest discomfort she felt was exactly the same if not somewhat worse than her previous MI that required cardiac catheterization and stent placement in March 2021   -Currently she does note after medical therapy her chest pain does seem to be somewhat improved but not completely gone and denies any active loss of consciousness, shortness of breath, abdominal pain or nausea but did note that prior to coming in she had episode of diaphoresis with nausea when chest pain first arose  EKG:   -Sinus rhythm with inferior infarct age-indeterminate and anteroseptal infarct age-indeterminate with nonspecific ST/T wave abnormality changes in the anterolateral leads    MOST  RECENT CARDIAC IMAGING:   -Transthoracic echocardiogram 1/2/2023 showing technically difficult and limited study however left ventricular systolic function did appear to be mildly reduced estimated LVEF 45% with hypokinesis of the inferior and inferolateral walls    -Cardiac catheterization 4/2/2021 showing distal LAD 60% stenosis, mid circumflex 50% stenosis, distal RCA 10% stenosis at site of prior stent and right PDA 0% stenosis at site of prior stent          Review of Systems:   Review of Systems   Constitutional: Positive for fatigue  Negative for chills, diaphoresis and fever  HENT: Negative for trouble swallowing and voice change  Eyes: Negative for pain and redness  Respiratory: Negative for shortness of breath and wheezing  Cardiovascular: Positive for chest pain and leg swelling  Negative for palpitations  Gastrointestinal: Negative for abdominal pain, constipation, diarrhea, nausea and vomiting  Genitourinary: Negative for dysuria  Musculoskeletal: Positive for arthralgias  Skin: Negative for rash  Neurological: Negative for dizziness, syncope, light-headedness and headaches  Psychiatric/Behavioral: Negative for confusion  The patient is nervous/anxious      All other systems reviewed and are negative          Historical Information   Past Medical History:   Diagnosis Date   • Acute MI ([place] [address] )    • Anemia    • Anxiety    • CAD (coronary artery disease)     GREG mid RCA and GREG right PDA 3/25/2021   • Cancer ([place] [address] )    • Cardiomyopathy ([place] [address] )    • CHF (congestive heart failure) ([facility])    • Chronic kidney disease    • COPD (chronic obstructive pulmonary disease) ([facility])     scheduled for sleep apnea test soon after pacemaker implant   • Coronary artery disease    • Depression    • History of chemotherapy     non hodgkins lymphoma    • Hyperlipemia    • Hypertension    • Hypertrophic cardiomyopathy ([place] [address] )    • Lymphoma, non-Hodgkin's ([facility])    • Myocardial infarction ([place] [address] )    • Obesity    • SSS (sick sinus syndrome) ([place] [address] )     s/p medtronic dual chamber pacemaker 2021   • Tobacco abuse    • Ventricular tachycardia, non-sustained    • Wears glasses      Past Surgical History:   Procedure Laterality Date   • CARDIAC PACEMAKER PLACEMENT Left 2021    Procedure: DUAL LEAD PACEMAKER IMPLANT;  Surgeon: Bryan Santos MD;  Location: [address] [place] [address] OR;  Service: Cardiology   • CAROTID STENT     •  SECTION     • CORONARY ANGIOPLASTY WITH STENT PLACEMENT  2021    GREG mid RCA and GREG right PDA   • DENTAL SURGERY     • IR BIOPSY KIDNEY RANDOM  10/18/2021     Social History     Substance and Sexual Activity   Alcohol Use Yes    Comment: 1-2 times years     Social History     Substance and Sexual Activity   Drug Use No     Social History     Tobacco Use   Smoking Status Every Day   • Packs/day: 1 00   • Years: 25 00   • Pack years: 25 00   • Types: Cigarettes   Smokeless Tobacco Never   Tobacco Comments    Recently and currently quitting cigarettes       Family History:   Family History   Problem Relation Age of Onset   • No Known Problems Mother    • No Known Problems Father    • No Known Problems Daughter    • Brain cancer Maternal Grandfather    • Heart disease Maternal Grandfather    • Cancer Maternal Grandfather    • No Known Problems Paternal Aunt    • No Known Problems Paternal Aunt        Meds/Allergies   all current active meds have been reviewed  (Not in a hospital admission)      No Known Allergies    Objective   Vitals: Blood pressure 121/64, pulse 71, temperature 98 1 °F (36 7 °C), resp  rate 14, height 5' 6" (1 676 m), weight 122 kg (270 lb), SpO2 97 %, not currently breastfeeding , Body mass index is 43 58 kg/m² ,   Orthostatic Blood Pressures    Flowsheet Row Most Recent Value   Blood Pressure 121/64 filed at 01/02/2023 1152   Patient Position - Orthostatic VS Sitting filed at 01/02/2023 5682          Systolic (52RNA), ZMP:814 , Min:121 , EMQ:203     Diastolic (26NJP), PSF:64, Min:64, Max:85    Physical Exam:  Physical Exam  Vitals reviewed  Constitutional:       General: She is not in acute distress  Appearance: She is obese  She is not diaphoretic  HENT:      Head: Normocephalic and atraumatic  Eyes:      General:         Right eye: No discharge  Left eye: No discharge  Neck:      Comments: Trachea midline, neck obese, difficult to assess JVD  Cardiovascular:      Rate and Rhythm: Normal rate and regular rhythm  Heart sounds: No friction rub  Pulmonary:      Effort: No respiratory distress  Breath sounds: No wheezing  Comments: Decreased breath sounds bilaterally  Chest:      Chest wall: No tenderness  Abdominal:      General: Bowel sounds are normal       Palpations: Abdomen is soft  Tenderness: There is no abdominal tenderness  There is no rebound  Musculoskeletal:      Right lower leg: Edema (trace) present  Left lower leg: Edema (trace) present  Skin:     General: Skin is warm and dry  Neurological:      Mental Status: She is alert  Comments: Awake, alert, able to answer questions appropriately, able to move extremities bilaterally     Psychiatric:      Comments: Anxious but pleasant affect             Lab Results: Troponins:    Results from last 7 days   Lab Units 01/02/23  1150 01/02/23  0936 01/02/23  0808   HS TNI 0HR ng/L  --   --  138*   HS TNI 2HR ng/L  --  1,307*  --    HSTNI D2 ng/L  --  1,169*  --    HS TNI 4HR ng/L 6,804*  --   --    HSTNI D4 ng/L 6,666*  --   --      BNP:       CBC :   Results from last 7 days   Lab Units 01/02/23  0808   WBC Thousand/uL 17 02*   HEMOGLOBIN g/dL 13 3   HEMATOCRIT % 40 6   MCV fL 90   PLATELETS Thousands/uL 287     TSH:     CMP:   Results from last 7 days   Lab Units 01/02/23  0808   POTASSIUM mmol/L 3 8   CHLORIDE mmol/L 107   CO2 mmol/L 19*   BUN mg/dL 24   CREATININE mg/dL 2 58*   AST U/L 13   ALT U/L 22   EGFR ml/min/1 73sq m 21     Lipid Profile:     Coags:   Results from last 7 days   Lab Units 01/02/23  0808   INR  1 06

## 2023-01-02 NOTE — H&P
H&P - Family Medicine Residency [facility] 1975, 41 y o  female  MRN: [id number]    Unit/Bed#:  Encounter: [id number]  Primary Care Provider: Kolby Brizuela DO      Admission Date: 1/2/2023 1339  Admitting Provider: Ruperto Kingsley DO  Code Status:  Level 1 - Full Code  Diet: Diet NPO; Sips with meds  Consult: IP CONSULT TO CARDIOLOGY  IP CONSULT TO MEDICAL CRITICAL CARE  IP CONSULT TO NEPHROLOGY      ASSESSMENT & PLAN:   Discussed with [facility] team and finalization is pending attending physician attestation  * Acute coronary syndrome [facility])  Assessment & Plan  Patient presented to [place] ED on 1/2/2023 for c/o tight/squeezing mid-sternal chest pain radiating to left shoulder that woke her up at 4 AM PTA, which feels similar to prior MI  Also endorses associated nausea, diaphoresis, and shortness of breath  Patient took SL NTG x2 and ASA 325mg at home without relief  - Pmhx MI s/p GREG to RCA and RPDA 3/25/21, sinus pause s/p dual-chamber permanent pacemaker in place, CHF (EF 45%), MCHC, HTM, and HLD  - 7/26/21 Stress ECHO: LVEF 65%, moderate hypokinesis of the basal-mid inferior wall   There was no echocardiographic evidence for stress-induced ischemia   - Home Regimen: ASA 81 mg, statin 80mg daily  - 1/2 Troponin 138 > 1307 > 6804  - 1/2 CXR: negative  - 1/2 EKG: NSR with occasional PVC's, prior inferior and anterior infarct, inverted T-waves in lateral leads, no STEMI    - 1/2 ECHO: LVEF 45%, mildly reduced systolic function, hypokinesis of the inferior and inferolateral walls, mod dilated LA, mild AR/MR/TR   - ED treatment: NTG, Metoprolol, UFH  - Cardiology at [place]: Type-1 NSTEMI, rec'd c/w ASA 81mg QD, Brilinta 90mg BID, metoprolol 100mg BID, NTG PRN  - Transferred to [facility] for non-emergent cardiac intervention     Assessment:   - Symptoms concerning or Type-1 NSTEMI with significantly elevated troponin and new new regional wall motion abnormality on ECHO    Plan:  - Telemetry  - Continue UFH ACS low  - Continue with ASA 81mg QD, Brilinta 90mg BID, metoprolol 100mg BID  - SL NTG PRN for chest pain  - Dilaudid 0 5mg IV Q4H PRN for chest pain  - STAT Cardiology consulted    Results from last 7 days   Lab Units 01/02/23  1150 01/02/23  0936 01/02/23  0808   HS TNI 0HR ng/L  --   --  138*   HS TNI 2HR ng/L  --  1,307*  --    HS TNI 4HR ng/L 6,804*  --   --        Chronic diastolic congestive heart failure (HCC)  Assessment & Plan  H/O HFrEF  - Home regimen: Bumex 1 mg daily, Imdur 30mg daily  - 1/2 EKG: NSR with occasional PVC's, prior inferior and anterior infarct, inverted T-waves in lateral leads, no STEMI    - 1/2 ECHO: LVEF 45%, mildly reduced systolic function, hypokinesis of the inferior and inferolateral walls, mod dilated LA, mild AR/MR/TR  Assessment:  - Not in clinical fluid-overloaded state on exam as of 1/2/2023  - Weight has been stable since last measurement (126kg on 7/14/22)    Plan:  - Continue home regimen  - Cardiac diet with 1500cc/day fluid restriction  - Daily weight, strict I&O    CKD (chronic kidney disease) stage 4, GFR 15-29 ml/min [facility])  Assessment & Plan  Initial sCr 2 58 on admission  - Baseline sCr 2 3-2 5  Lab Results   Component Value Date    CREATININEUR 106 0 05/21/2022    CREATININEUR 106 0 05/21/2022    Sayda Alexandre 1,540 0 (H) 05/21/2022    MICROALBCRE 1,453 (H) 05/21/2022     Assessment:  - Stable CKD-4 with severe macroalbuminuria, 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  - Estimated Creatinine Clearance: 36 7 mL/min (A) (by C-G formula based on SCr of 2 58 mg/dL (H))  Plan:  - Trend daily renal function  - Nephrology consulted    Results from last 7 days   Lab Units 01/02/23  0808   BUN mg/dL 24   CREATININE mg/dL 2 58*   EGFR ml/min/1 73sq m 21           COPD (chronic obstructive pulmonary disease) ([facility])  Assessment & Plan  Continue home albuterol 2 puffs q4 PRN   No wheezing or crackles on exam     ELIAZAR (obstructive sleep apnea)  Assessment & Plan  Continue home CPAP QHS  Mixed hyperlipidemia  Assessment & Plan  Continue home Atorvastatin 80mg QD and Zetia 10mg QD  Lab Results   Component Value Date    CHOLESTEROL 136 08/23/2022    TRIG 399 (H) 08/23/2022    HDL 29 (L) 08/23/2022    LDLCALC 27 08/23/2022       Anxiety  Assessment & Plan  Current mood stable  Continue home Zoloft 150mg QD and Ativan 0 5mg TID PRN for anxiety  Seroquel 50mg QHS for insomnia  Tobacco abuse  Assessment & Plan  Current active smoker, approx 1/2 pack per day since her 25s   -  on smoking cessation  - Continue NRT while inpatient    Essential hypertension  Assessment & Plan  Initial /85  - H/O HTN  - Home regimen: Metoprolol 100mg BID    Assessment:   - Initial elevated pressure likely secondary CP and anxiety  - Most recent Blood Pressure: 536/18  - Systolic (33WZP), PEX:481 , Min:121 , Max:175     Plan:  - Continue home regimen  - Vitals per unit routine      Disposition: Admit to Inpatient under Med-surg for  work-up of ACS  VTE Pharm PPX: Heparin  VTE [facility] PPX: sequential compression device and/or foot pump applied unless contraindicated otherwise      Patient Active Problem List   Diagnosis   • Acute coronary syndrome [facility])   • Essential hypertension   • Tobacco abuse   • History of MI (myocardial infarction)   • Chronic diastolic congestive heart failure (HCC)   • Major depressive disorder with current active episode   • Anxiety   • CAD (coronary artery disease)   • Lump of left breast   • Non-Hodgkin lymphoma of lymph nodes of multiple regions ([facility])   • Positive depression screening   • Mixed hyperlipidemia   • BMI 40 0-44 9, adult ([facility])   • Cervical cyst   • Nabothian cyst   • Pelvic floor dysfunction   • Cervical motion tenderness   • Iron deficiency anemia secondary to inadequate dietary iron intake   • Hypercalcemia   • Proteinuria   • Hypertrophic cardiomyopathy ([facility])   • COPD (chronic obstructive pulmonary disease) ([facility])   • Chronic kidney disease-mineral and bone disorder   • Hematuria   • Chronic tubulo-interstitial nephritis   • Sinus pause   • ELIAZAR (obstructive sleep apnea)   • Nocturnal hypoxemia   • Elevated troponin   • Elevated brain natriuretic peptide (BNP) level   • Benign hypertension with CKD (chronic kidney disease) stage III ([facility])   • Edema   • Hypotension   • CKD (chronic kidney disease) stage 4, GFR 15-29 ml/min ([facility])   • Hypervolemia   • Chronic fatigue   • Alport syndrome   • Social anxiety disorder   • Acute renal failure superimposed on stage 4 chronic kidney disease ([facility])   • Leukemoid reaction   • Olecranon bursitis, right elbow   • Secondary hyperparathyroidism of renal origin ([facility] )         Advance Directive and Living Will:      Power of :    POLST:    Emergency contact:    Primary Emergency Contact: Anna Cuadra, Home Phone: [phone]   Extended Emergency Contact Information  Primary Emergency Contact: Anna Cuadra  Home Phone: [phone]  Relation: Father In-Law  Preferred language: English  Secondary Emergency Contact: Christian Cuadra  Address: [address] [place] [address], [address] [place]  Mobile Phone: [phone]  Relation: Spouse      History of Present Illness      History of Presenting Illness (HPI):     Chief complaint:  Chest Pain ( [place] transfer for family medicine; bumped trop )      HPI:  41 y o  female admitted for cardiology work-up of ACS  PmhxMI s/p GREG to RCA and RPDA 3/25/21, sinus pause s/p dual-chamber permanent pacemaker in place, CHF (EF 45%), MCHC, HTM, and HLD  Most recent [facility] 2021  Patient originally presented to [address] [place] [address] ED on 1/2/2023 for c/o tight/squeezing mid-sternal chest pain radiating to left shoulder that woke her up at 4 AM, which feels similar to prior MI  Also endorses associated nausea, diaphoresis, and shortness of breath  Patient took SL NTG x2 and ASA 325mg at home without relief   Case was discussed with cardiology at [facility] (Dr Mario Holland) who does not feel this is an MI and based on kidney function does not feel pt needs to go to the cath lab  Case discussed with [facility] (Dr Art Cardenas) and recommended patient to be transferred to [facility] for non-urgent cath  ED Course:  • Vitals: /85, HR 93, RR 18, T 99 1F, SO2 98% ORA  • Labs: WBC 17, sCr 2 58 (baseline), Trop 138 (prior 81 on 5/17/22)  • Imaging: CXR (-)  EKG NSR with occasional PVC's, prior inferior and anterior infarct, inverted T-waves in lateral leads, no STEMI   ECHO pending  • Treatment: NTG, Metoprolol, UFH      ED Triage Vitals   Temp Pulse Respirations Blood Pressure SpO2   -- 01/02/23 1354 01/02/23 1430 01/02/23 1353 01/02/23 1354    72 22 (!) 145/113 96 %      Temp src Heart Rate Source Patient Position - Orthostatic VS BP Location FiO2 (%)   -- 01/02/23 1430 01/02/23 1430 01/02/23 1430 --    Monitor Sitting Right arm       Pain Score       01/02/23 1426       10 - Worst Possible Pain         Medications   heparin (porcine) 25,000 units in 0 45% NaCl 250 mL infusion (premix) (11 1 Units/kg/hr × 90 kg (Order-Specific) Intravenous New Bag 1/2/23 1427)   heparin (porcine) injection 4,000 Units (has no administration in time range)   heparin (porcine) injection 2,000 Units (has no administration in time range)   aspirin chewable tablet 81 mg (81 mg Oral Given 1/2/23 1456)   atorvastatin (LIPITOR) tablet 80 mg (has no administration in time range)   bumetanide (BUMEX) tablet 1 mg (has no administration in time range)   ezetimibe (ZETIA) tablet 10 mg (has no administration in time range)   isosorbide mononitrate (IMDUR) 24 hr tablet 30 mg (has no administration in time range)   LORazepam (ATIVAN) tablet 0 5 mg (has no administration in time range)   metoprolol tartrate (LOPRESSOR) tablet 100 mg (has no administration in time range)   nitroglycerin (NITROSTAT) SL tablet 0 4 mg (has no administration in time range)   pantoprazole (PROTONIX) EC tablet 20 mg (has no administration in time range)   QUEtiapine (SEROquel) tablet 50 mg (has no administration in time range)   sertraline (ZOLOFT) tablet 150 mg (has no administration in time range)   nicotine (NICODERM CQ) 14 mg/24hr TD 24 hr patch 1 patch (1 patch Transdermal Medication Applied 1/2/23 1890)   ticagrelor (BRILINTA) tablet 90 mg (has no administration in time range)   HYDROmorphone (DILAUDID) injection 0 5 mg (0 5 mg Intravenous Given 1/2/23 1426)       Subjective     Review of Systems (ROS):     Review of Systems   Constitutional: Negative for chills and fever  HENT: Negative for ear pain and trouble swallowing  Eyes: Negative for pain, discharge and redness  Respiratory: Positive for chest tightness and shortness of breath  Cardiovascular: Positive for chest pain  Gastrointestinal: Negative for abdominal pain  Endocrine: Negative for cold intolerance and heat intolerance  Genitourinary: Negative for dysuria and hematuria  Musculoskeletal: Positive for arthralgias (Left shoulder/arm pain  )  Negative for myalgias  Skin: Negative for rash  Neurological: Positive for headaches  Negative for seizures and syncope  Psychiatric/Behavioral: Negative for behavioral problems           Historical Information     Past medical / surgical history:     Past Medical History:   Diagnosis Date   • Acute MI ([facility] )    • Anemia    • Anxiety    • CAD (coronary artery disease)     GREG mid RCA and GREG right PDA 3/25/2021   • Cancer ([place] [address] ) 2008   • Cardiomyopathy ([place] [address] )    • CHF (congestive heart failure) ([facility])    • Chronic kidney disease    • COPD (chronic obstructive pulmonary disease) ([facility])     scheduled for sleep apnea test soon after pacemaker implant   • Coronary artery disease    • Depression    • History of chemotherapy     non hodgkins lymphoma 2008   • Hyperlipemia    • Hypertension    • Hypertrophic cardiomyopathy ([facility] )    • Lymphoma, non-Hodgkin's (HCC)    • Myocardial infarction ([facility] )    • Obesity    • SSS (sick sinus syndrome) ([facility] )     s/p medtronic dual chamber pacemaker 2021   • Tobacco abuse    • Ventricular tachycardia, non-sustained    • Wears glasses      Past Surgical History:   Procedure Laterality Date   • CARDIAC PACEMAKER PLACEMENT Left 2021    Procedure: DUAL LEAD PACEMAKER IMPLANT;  Surgeon: Sarmad Zhang MD;  Location: [facility] MAIN OR;  Service: Cardiology   • CAROTID STENT     •  SECTION     • CORONARY ANGIOPLASTY WITH STENT PLACEMENT  2021    GREG mid RCA and GREG right PDA   • DENTAL SURGERY     • IR BIOPSY KIDNEY RANDOM  10/18/2021           Social History     Social history:     Social History     Substance and Sexual Activity   Alcohol Use Yes    Comment: 1-2 times years     Social History     Substance and Sexual Activity   Drug Use No     Social History     Tobacco Use   Smoking Status Every Day   • Packs/day:  00   • Years:    • Pack years:    • Types: Cigarettes   Smokeless Tobacco Never   Tobacco Comments    Recently and currently quitting cigarettes     Family History   Problem Relation Age of Onset   • No Known Problems Mother    • No Known Problems Father    • No Known Problems Daughter    • Brain cancer Maternal Grandfather    • Heart disease Maternal Grandfather    • Cancer Maternal Grandfather    • No Known Problems Paternal Aunt    • No Known Problems Paternal Aunt        Meds/Allergies     Medications & Allergies:   all medications and allergies reviewed  No Known Allergies    PTA Medications:  Current Facility-Administered Medications on File Prior to Encounter   Medication Dose Route Frequency Provider Last Rate Last Admin   • [COMPLETED] heparin (porcine) injection 4,000 Units  4,000 Units Intravenous Once Hugo Fine, CRNP   4,000 Units at 23 8913   • [COMPLETED] HYDROmorphone (DILAUDID) injection 0 5 mg  0 5 mg Intravenous Once Hugo Fine, CRNP   0 5 mg at 23 0830   • [COMPLETED] HYDROmorphone (DILAUDID) injection 0 5 mg  0 5 mg Intravenous Once Hugo Fine, CRNP 0 5 mg at 01/02/23 1057   • [COMPLETED] LORazepam (ATIVAN) injection 0 5 mg  0 5 mg Intravenous Once Durwin Brewster, DO   0 5 mg at 01/02/23 1056   • [COMPLETED] metoprolol (LOPRESSOR) injection 5 mg  5 mg Intravenous Once JALIL Terry   5 mg at 01/02/23 7325   • [COMPLETED] metoprolol tartrate (LOPRESSOR) tablet 100 mg  100 mg Oral Once JALIL Terry   100 mg at 01/02/23 1058   • [COMPLETED] nitroglycerin (NITROSTAT) SL tablet 0 4 mg  0 4 mg Sublingual Once JALIL Terry   0 4 mg at 01/02/23 6108   • [COMPLETED] ticagrelor (BRILINTA) tablet 180 mg  180 mg Oral Once Glen [place], DO   180 mg at 01/02/23 1058   • [DISCONTINUED] heparin (ACS LOW)   Intravenous Once JALIL Terry       • [DISCONTINUED] heparin (porcine) 25,000 units in 0 45% NaCl 250 mL infusion (premix)  3-20 Units/kg/hr (Order-Specific) Intravenous Titrated JALIL Terry 10 mL/hr at 01/02/23 0841 11 1 Units/kg/hr at 01/02/23 6761   • [DISCONTINUED] heparin (porcine) injection 2,000 Units  2,000 Units Intravenous Q6H PRN JALIL Terry       • [DISCONTINUED] heparin (porcine) injection 4,000 Units  4,000 Units Intravenous Q6H PRN JALIL Terry       • [DISCONTINUED] sodium chloride (PF) 0 9 % injection 3 mL  3 mL Intravenous Q1H PRN JALIL Terry         Current Outpatient Medications on File Prior to Encounter   Medication Sig Dispense Refill   • albuterol (PROVENTIL HFA,VENTOLIN HFA) 90 mcg/act inhaler Inhale 2 puffs every 4 (four) hours as needed for wheezing or shortness of breath 18 g 5   • aspirin 81 mg chewable tablet Chew 1 tablet (81 mg total) daily 90 tablet 0   • atorvastatin (LIPITOR) 80 mg tablet Take 1 tablet (80 mg total) by mouth every evening 90 tablet 0   • bumetanide (BUMEX) 1 mg tablet Take 1 tablet (1 mg total) by mouth daily 30 tablet 6   • calcitriol (ROCALTROL) 0 25 mcg capsule Take 1 capsule (0 25 mcg total) by mouth daily (Patient taking differently: Take 0 25 mcg by mouth daily Takes Monday Wednesday and fridays as of 11/11/22) 36 capsule 4   • Diclofenac Sodium (VOLTAREN) 1 % Apply 2 g topically 4 (four) times a day 150 g 2   • ezetimibe (ZETIA) 10 mg tablet Take 1 tablet (10 mg total) by mouth in the morning  30 tablet 5   • isosorbide mononitrate (IMDUR) 30 mg 24 hr tablet Take 1 tablet (30 mg total) by mouth daily 30 tablet 11   • lidocaine (Lidoderm) 5 % Apply 2 patches topically daily Remove & Discard patch within 12 hours or as directed by MD 30 patch 0   • LORazepam (ATIVAN) 0 5 mg tablet Take 1 tablet (0 5 mg total) by mouth 3 (three) times a day as needed for anxiety 90 tablet 0   • methocarbamol (ROBAXIN) 500 mg tablet Take 1 tablet (500 mg total) by mouth 2 (two) times a day as needed for muscle spasms 40 tablet 0   • metoprolol tartrate (LOPRESSOR) 100 mg tablet Take 1 tablet (100 mg total) by mouth 2 (two) times a day 60 tablet 5   • nitroglycerin (NITROSTAT) 0 4 mg SL tablet Place 1 tablet (0 4 mg total) under the tongue every 5 (five) minutes as needed for chest pain 25 tablet 5   • omeprazole (PriLOSEC) 40 MG capsule Take 1 capsule (40 mg total) by mouth daily 30 capsule 0   • QUEtiapine (SEROquel) 50 mg tablet take 1 tablet by mouth at bedtime 30 tablet 1   • sertraline (ZOLOFT) 100 mg tablet Take 1 5 tablets (150 mg total) by mouth daily 45 tablet 1       Objective     Vitals:     Patient Vitals for the past 24 hrs:   BP Pulse Resp SpO2   01/02/23 1430 107/73 60 22 97 %   01/02/23 1354 -- 72 -- 96 %   01/02/23 1353 (!) 145/113 -- -- --       No intake or output data in the 24 hours ending 01/02/23 1531    Invasive Devices     Peripheral Intravenous Line  Duration           Peripheral IV 05/17/22 Left Antecubital 229 days    Peripheral IV 01/02/23 Left Antecubital <1 day    Peripheral IV 01/02/23 Right Antecubital <1 day                Labs: I have personally reviewed pertinent reports      Recent Results (from the past 24 hour(s))   ECG 12 lead    Collection Time: 01/02/23  7:38 AM   Result Value Ref Range    Ventricular Rate 96 BPM    Atrial Rate 96 BPM    DC Interval 160 ms    QRSD Interval 110 ms    QT Interval 352 ms    QTC Interval 444 ms    P Axis 45 degrees    QRS Axis 51 degrees    T Wave Axis 11 degrees   ECG 12 lead    Collection Time: 01/02/23  7:59 AM   Result Value Ref Range    Ventricular Rate 91 BPM    Atrial Rate 91 BPM    DC Interval 164 ms    QRSD Interval 108 ms    QT Interval 366 ms    QTC Interval 450 ms    P Axis 49 degrees    QRS Axis 47 degrees    T Wave Axis 71 degrees   CBC and differential    Collection Time: 01/02/23  8:08 AM   Result Value Ref Range    WBC 17 02 (H) 4 31 - 10 16 Thousand/uL    RBC 4 52 3 81 - 5 12 Million/uL    Hemoglobin 13 3 11 5 - 15 4 g/dL    Hematocrit 40 6 34 8 - 46 1 %    MCV 90 82 - 98 fL    MCH 29 4 26 8 - 34 3 pg    MCHC 32 8 31 4 - 37 4 g/dL    RDW 17 6 (H) 11 6 - 15 1 %    MPV 10 5 8 9 - 12 7 fL    Platelets 248 163 - 544 Thousands/uL    nRBC 0 /100 WBCs    Neutrophils Relative 81 (H) 43 - 75 %    Immat GRANS % 1 0 - 2 %    Lymphocytes Relative 11 (L) 14 - 44 %    Monocytes Relative 6 4 - 12 %    Eosinophils Relative 1 0 - 6 %    Basophils Relative 0 0 - 1 %    Neutrophils Absolute 13 82 (H) 1 85 - 7 62 Thousands/µL    Immature Grans Absolute 0 16 0 00 - 0 20 Thousand/uL    Lymphocytes Absolute 1 78 0 60 - 4 47 Thousands/µL    Monocytes Absolute 1 03 0 17 - 1 22 Thousand/µL    Eosinophils Absolute 0 17 0 00 - 0 61 Thousand/µL    Basophils Absolute 0 06 0 00 - 0 10 Thousands/µL   HS Troponin 0hr (reflex protocol)    Collection Time: 01/02/23  8:08 AM   Result Value Ref Range    hs TnI 0hr 138 (H) "Refer to ACS Flowchart"- see link ng/L   Comprehensive metabolic panel    Collection Time: 01/02/23  8:08 AM   Result Value Ref Range    Sodium 136 135 - 147 mmol/L    Potassium 3 8 3 5 - 5 3 mmol/L    Chloride 107 96 - 108 mmol/L    CO2 19 (L) 21 - 32 mmol/L    ANION GAP 10 4 - 13 mmol/L    BUN 24 5 - 25 mg/dL    Creatinine 2 58 (H) 0 60 - 1 30 mg/dL    Glucose 146 (H) 65 - 140 mg/dL    Calcium 9 6 8 4 - 10 2 mg/dL    AST 13 13 - 39 U/L    ALT 22 7 - 52 U/L    Alkaline Phosphatase 83 34 - 104 U/L    Total Protein 7 2 6 4 - 8 4 g/dL    Albumin 3 9 3 5 - 5 0 g/dL    Total Bilirubin 0 23 0 20 - 1 00 mg/dL    eGFR 21 ml/min/1 73sq m   Magnesium    Collection Time: 01/02/23  8:08 AM   Result Value Ref Range    Magnesium 2 0 1 9 - 2 7 mg/dL   Protime-INR    Collection Time: 01/02/23  8:08 AM   Result Value Ref Range    Protime 13 8 11 6 - 14 5 seconds    INR 1 06 0 84 - 1 19   APTT    Collection Time: 01/02/23  8:08 AM   Result Value Ref Range    PTT 28 23 - 37 seconds   ECG 12 lead    Collection Time: 01/02/23  8:19 AM   Result Value Ref Range    Ventricular Rate 94 BPM    Atrial Rate 94 BPM    CT Interval 164 ms    QRSD Interval 108 ms    QT Interval 348 ms    QTC Interval 435 ms    P Axis 33 degrees    QRS Axis -2 degrees    T Wave Logan 57 degrees   ECG 12 lead    Collection Time: 01/02/23  8:40 AM   Result Value Ref Range    Ventricular Rate 84 BPM    Atrial Rate 84 BPM    CT Interval 168 ms    QRSD Interval 110 ms    QT Interval 364 ms    QTC Interval 430 ms    P Axis 39 degrees    QRS Axis 12 degrees    T Wave Logan 75 degrees   HS Troponin I 2hr    Collection Time: 01/02/23  9:36 AM   Result Value Ref Range    hs TnI 2hr 1,307 (H) "Refer to ACS Flowchart"- see link ng/L    Delta 2hr hsTnI 1,169 (H) <20 ng/L   Echo complete w/ contrast if indicated    Collection Time: 01/02/23  9:47 AM   Result Value Ref Range    A4C EF 46 %    LVIDd 6 20 cm    LVIDS 4 60 cm    IVSd 1 30 cm    LVPWd 1 10 cm    FS 26 28 - 44    E wave deceleration time 215 ms    MV Peak E Jorge 117 cm/s    MV Peak A Jorge 0 55 m/s    AV LVOT peak gradient 5 mmHg    LVOT peak VTI 21 55 cm    LVOT peak jorge 1 09 m/s    RVID d 3 8 cm    LA size 4 9 cm    LVOT mn grad 2 0 mmHg    MV stenosis pressure 1/2 time 62 ms    MV valve area p 1/2 method 3 55     Ao root 3 00 cm    Asc Ao 4 3 cm    Left ventricular stroke volume (2D) 95 00 mL    IVS 1 3 cm    LEFT VENTRICLE SYSTOLIC VOLUME (MOD BIPLANE) 2D 96 mL    LV DIASTOLIC VOLUME (MOD BIPLANE) 2D 191 mL    Left Atrium Area-systolic Four Chamber 59 6 cm2    LVSV, 2D 95 mL    LV EF 45    ECG 12 lead    Collection Time: 01/02/23 10:15 AM   Result Value Ref Range    Ventricular Rate 73 BPM    Atrial Rate 73 BPM    NV Interval 166 ms    QRSD Interval 110 ms    QT Interval 388 ms    QTC Interval 427 ms    P [place] 23 degrees    QRS Axis 29 degrees    T Wave Axis 73 degrees   ECG 12 lead    Collection Time: 01/02/23 10:43 AM   Result Value Ref Range    Ventricular Rate 73 BPM    Atrial Rate 73 BPM    NV Interval 170 ms    QRSD Interval 110 ms    QT Interval 386 ms    QTC Interval 425 ms    P Axis 38 degrees    QRS Axis 21 degrees    T Wave [place] 58 degrees   HS Troponin I 4hr    Collection Time: 01/02/23 11:50 AM   Result Value Ref Range    hs TnI 4hr 6,804 (H) "Refer to ACS Flowchart"- see link ng/L    Delta 4hr hsTnI 6,666 (H) <20 ng/L   ECG 12 lead    Collection Time: 01/02/23  1:57 PM   Result Value Ref Range    Ventricular Rate 62 BPM    Atrial Rate 62 BPM    NV Interval 186 ms    QRSD Interval 102 ms    QT Interval 412 ms    QTC Interval 418 ms    P Axis 15 degrees    QRS Axis -23 degrees    T Wave Axis 68 degrees   APTT six (6) hours after Heparin bolus/drip initiation or dosing change    Collection Time: 01/02/23  2:24 PM   Result Value Ref Range    PTT 30 23 - 37 seconds   Protime-INR    Collection Time: 01/02/23  2:24 PM   Result Value Ref Range    Protime 13 5 11 6 - 14 5 seconds    INR 1 01 0 84 - 1 19       Imaging:     I have personally reviewed pertinent reports  No results found  EKG, Pathology, and Other Studies:   I have personally reviewed pertinent reports  Physical Exam    Physical Exam  Vitals and nursing note reviewed     Constitutional:       General: She is in acute distress  Appearance: Normal appearance  She is well-developed  She is obese  She is ill-appearing and diaphoretic  She is not toxic-appearing  HENT:      Head: Normocephalic and atraumatic  Right Ear: External ear normal       Left Ear: External ear normal       Nose: Nose normal       Mouth/Throat:      Mouth: Mucous membranes are moist    Eyes:      General:         Right eye: No discharge  Left eye: No discharge  Extraocular Movements: Extraocular movements intact  Cardiovascular:      Rate and Rhythm: Normal rate  Pulses: Normal pulses  Heart sounds: Murmur heard  Pulmonary:      Effort: Respiratory distress present  Breath sounds: Normal breath sounds  No stridor  No wheezing, rhonchi or rales  Abdominal:      General: There is no distension  Musculoskeletal:         General: Normal range of motion  Cervical back: Normal range of motion and neck supple  Skin:     General: Skin is warm  Neurological:      General: No focal deficit present  Mental Status: She is alert  Mental status is at baseline  Psychiatric:         Mood and Affect: Mood normal          Behavior: Behavior normal               Counseling / Coordination of Care  Total floor / unit time spent today 30 minutes  Greater than 50% of total time was spent with the patient and / or family counseling and / or coordination of care      Lucinda Russo DO  PGY-1, [facility]  01/02/23, 3:31 PM

## 2023-01-02 NOTE — QUICK NOTE
Ms Eliseo Pedraza was transferred from [facility] on 1/2/2023, complaining of excruciating substernal chest pain described as pressure/squeezing-like with radiation to left shoulder that woke her up at 4 AM   She took 2 doses of sublingual nitroglycerin at home with moderate alleviation  However, she was also experiencing diaphoresis, shortness of breath and nausea for which she decided to go to the emergency room  In ED she was found to have troponinemia without EKG changes suggestive of acute ischemia  Echocardiogram (1/2/2022) showed LVEF of 45%, with hypokinesis of the inferior and inferolateral walls  She was treated with nitroglycerin and was started on unfractionated heparin for NSTEMI, possibly type I  She also received full dose aspirin, Brilinta 90 mg, metoprolol 100 mg  Interventional cardiology was consulted, Dr Angeles Moore recommended to continue with medical management considering CKD status and ECG findings  At [facility], chest pain persisted, she described it as squeezing chest pain 4-5/10, nonradiating, with no associated symptoms  Repeat random troponin rised to 42[address] from 1307 on presentation  She also received x1 dilaudid for  CP  Aforementioned was discussed with Dr Angeles Moore again at 1600 on 1/2/2023  Repeat ECG at 1600 was unchanged from previous studies  Chest pain is improving  Echo images were reviewed by Dr Angeles Moore and no obvious/worrisome WMA was identified  Plan is to continue with Heparin GGT, serial ECG and conservative management  For the time being  Since BP is soft, she will not be started on BB/CCB( considering HCM)       Visit Vitals  BP 92/56   Pulse 60   Resp (!) 29   SpO2 97%   OB Status Unknown   Smoking Status Every Day

## 2023-01-02 NOTE — ED PROVIDER NOTES
History  Chief Complaint   Patient presents with   • Chest Pain     Chest pain start 4am; aspirin and 2 nitro taken prior to arrival; hx of MI     Pt is a 44-year-old female with a past medical history of MI, high cholesterol, pace maker placement with with stent placement, most recent cath in 2021, arriving for chest pain that woke her up at 4 AM, reports that chest pain is in the middle of her chest and radiates to her left shoulder to her left shoulder blade, down her left arm  Patient feels exactly like she did when she had an MI in the past   Patient took 1 nitro and had some relief but not total relief, after second nitro there is no relief at all  Patient reports that she took a 325 aspirin  Patient also states associated nausea, diaphoresis, and shortness of breath  Pt reports tight squeezing pain  Pt denies abdominal pain  Prior to Admission Medications   Prescriptions Last Dose Informant Patient Reported? Taking?    Diclofenac Sodium (VOLTAREN) 1 %   No No   Sig: Apply 2 g topically 4 (four) times a day   LORazepam (ATIVAN) 0 5 mg tablet   No No   Sig: Take 1 tablet (0 5 mg total) by mouth 3 (three) times a day as needed for anxiety   QUEtiapine (SEROquel) 50 mg tablet   No No   Sig: take 1 tablet by mouth at bedtime   albuterol (PROVENTIL HFA,VENTOLIN HFA) 90 mcg/act inhaler   No No   Sig: Inhale 2 puffs every 4 (four) hours as needed for wheezing or shortness of breath   aspirin 81 mg chewable tablet   No No   Sig: Chew 1 tablet (81 mg total) daily   atorvastatin (LIPITOR) 80 mg tablet   No No   Sig: Take 1 tablet (80 mg total) by mouth every evening   bumetanide (BUMEX) 1 mg tablet   No No   Sig: Take 1 tablet (1 mg total) by mouth daily   calcitriol (ROCALTROL) 0 25 mcg capsule   No No   Sig: Take 1 capsule (0 25 mcg total) by mouth daily   Patient taking differently: Take 0 25 mcg by mouth daily Takes Monday Wednesday and fridays as of 11/11/22   ezetimibe (ZETIA) 10 mg tablet   No No Sig: Take 1 tablet (10 mg total) by mouth in the morning     isosorbide mononitrate (IMDUR) 30 mg 24 hr tablet   No No   Sig: Take 1 tablet (30 mg total) by mouth daily   lidocaine (Lidoderm) 5 %   No No   Sig: Apply 2 patches topically daily Remove & Discard patch within 12 hours or as directed by MD   methocarbamol (ROBAXIN) 500 mg tablet   No No   Sig: Take 1 tablet (500 mg total) by mouth 2 (two) times a day as needed for muscle spasms   metoprolol tartrate (LOPRESSOR) 100 mg tablet   No No   Sig: Take 1 tablet (100 mg total) by mouth 2 (two) times a day   nitroglycerin (NITROSTAT) 0 4 mg SL tablet   No No   Sig: Place 1 tablet (0 4 mg total) under the tongue every 5 (five) minutes as needed for chest pain   omeprazole (PriLOSEC) 40 MG capsule   No No   Sig: Take 1 capsule (40 mg total) by mouth daily   sertraline (ZOLOFT) 100 mg tablet   No No   Sig: Take 1 5 tablets (150 mg total) by mouth daily      Facility-Administered Medications: None       Past Medical History:   Diagnosis Date   • Acute MI ([facility] )    • Anemia    • Anxiety    • CAD (coronary artery disease)     GREG mid RCA and GREG right PDA 3/25/2021   • Cancer ([facility] ) 2008   • Cardiomyopathy ([facility] )    • CHF (congestive heart failure) ([facility])    • Chronic kidney disease    • COPD (chronic obstructive pulmonary disease) ([facility])     scheduled for sleep apnea test soon after pacemaker implant   • Coronary artery disease    • Depression    • History of chemotherapy     non hodgkins lymphoma 2008   • Hyperlipemia    • Hypertension    • Hypertrophic cardiomyopathy ([facility] )    • Lymphoma, non-Hodgkin's ([facility])    • Myocardial infarction [facility])    • Obesity    • SSS (sick sinus syndrome) ([facility])     s/p medtronic dual chamber pacemaker 5/25/2021   • Tobacco abuse    • Ventricular tachycardia, non-sustained    • Wears glasses        Past Surgical History:   Procedure Laterality Date   • CARDIAC PACEMAKER PLACEMENT Left 5/25/2021    Procedure: DUAL LEAD PACEMAKER IMPLANT; Surgeon: Darryl Benito MD;  Location: [facility] MAIN OR;  Service: Cardiology   • CAROTID STENT     •  SECTION     • CORONARY ANGIOPLASTY WITH STENT PLACEMENT  2021    GREG mid RCA and GREG right PDA   • DENTAL SURGERY     • IR BIOPSY KIDNEY RANDOM  10/18/2021       Family History   Problem Relation Age of Onset   • No Known Problems Mother    • No Known Problems Father    • No Known Problems Daughter    • Brain cancer Maternal Grandfather    • Heart disease Maternal Grandfather    • Cancer Maternal Grandfather    • No Known Problems Paternal Aunt    • No Known Problems Paternal Aunt      I have reviewed and agree with the history as documented  E-Cigarette/Vaping   • E-Cigarette Use Never User      E-Cigarette/Vaping Substances   • Nicotine No    • THC No    • CBD No    • Flavoring No    • Other No    • Unknown No      Social History     Tobacco Use   • Smoking status: Every Day     Packs/day:  00     Years:      Pack years:      Types: Cigarettes   • Smokeless tobacco: Never   • Tobacco comments:     Recently and currently quitting cigarettes   Vaping Use   • Vaping Use: Never used   Substance Use Topics   • Alcohol use: Yes     Comment: 1-2 times years   • Drug use: No       Review of Systems   Constitutional: Negative  HENT: Negative  Eyes: Negative  Respiratory: Positive for shortness of breath  Cardiovascular: Positive for chest pain  Negative for palpitations and leg swelling  Gastrointestinal: Positive for nausea and vomiting  Negative for abdominal pain  Endocrine: Negative  Genitourinary: Negative  Musculoskeletal: Negative  Skin: Negative  Allergic/Immunologic: Negative  Neurological: Negative  Hematological: Negative  Psychiatric/Behavioral: Negative  All other systems reviewed and are negative  Physical Exam  Physical Exam  Vitals and nursing note reviewed  Constitutional:       General: She is in acute distress        Appearance: She is well-developed and normal weight  She is ill-appearing  She is not diaphoretic  Cardiovascular:      Rate and Rhythm: Normal rate and regular rhythm  Heart sounds: Normal heart sounds  Pulmonary:      Effort: Pulmonary effort is normal  No tachypnea  Breath sounds: Normal breath sounds  Abdominal:      General: Bowel sounds are normal       Palpations: Abdomen is soft  Musculoskeletal:         General: Normal range of motion  Cervical back: Normal range of motion and neck supple  Skin:     General: Skin is warm  Capillary Refill: Capillary refill takes less than 2 seconds  Neurological:      General: No focal deficit present  Mental Status: She is alert and oriented to person, place, and time           Vital Signs  ED Triage Vitals   Temperature Pulse Respirations Blood Pressure SpO2   01/02/23 0742 01/02/23 0739 01/02/23 0739 01/02/23 0741 01/02/23 0739   99 1 °F (37 3 °C) 93 18 (!) 175/85 98 %      Temp Source Heart Rate Source Patient Position - Orthostatic VS BP Location FiO2 (%)   01/02/23 0742 01/02/23 0739 01/02/23 0739 01/02/23 0739 --   Tympanic Monitor Sitting Left arm       Pain Score       01/02/23 0739       10 - Worst Possible Pain           Vitals:    01/02/23 0945 01/02/23 1016 01/02/23 1059 01/02/23 1152   BP: 140/80 122/64 133/67 121/64   Pulse: 82 70 74 71   Patient Position - Orthostatic VS:             Visual Acuity      ED Medications  Medications   nitroglycerin (NITROSTAT) SL tablet 0 4 mg (0 4 mg Sublingual Given 1/2/23 0815)   HYDROmorphone (DILAUDID) injection 0 5 mg (0 5 mg Intravenous Given 1/2/23 0830)   heparin (porcine) injection 4,000 Units (4,000 Units Intravenous Given 1/2/23 0832)   metoprolol (LOPRESSOR) injection 5 mg (5 mg Intravenous Given 1/2/23 0917)   LORazepam (ATIVAN) injection 0 5 mg (0 5 mg Intravenous Given 1/2/23 1056)   ticagrelor (BRILINTA) tablet 180 mg (180 mg Oral Given 1/2/23 1058)   HYDROmorphone (DILAUDID) injection 0 5 mg (0 5 mg Intravenous Given 1/2/23 1057)   metoprolol tartrate (LOPRESSOR) tablet 100 mg (100 mg Oral Given 1/2/23 1058)       Diagnostic Studies  Results Reviewed     Procedure Component Value Units Date/Time    HS Troponin I 4hr [[id number]]  (Abnormal) Collected: 01/02/23 1150    Lab Status: Final result Specimen: Blood from Arm, Left Updated: 01/02/23 1224     hs TnI 4hr 6,804 ng/L      Delta 4hr hsTnI 6,666 ng/L     HS Troponin I 2hr [[id number]]  (Abnormal) Collected: 01/02/23 0936    Lab Status: Final result Specimen: Blood from Arm, Left Updated: 01/02/23 1010     hs TnI 2hr 1,307 ng/L      Delta 2hr hsTnI 1,169 ng/L     HS Troponin 0hr (reflex protocol) [[id number]]  (Abnormal) Collected: 01/02/23 0808    Lab Status: Final result Specimen: Blood from Arm, Left Updated: 01/02/23 0839     hs TnI 0hr 138 ng/L     Comprehensive metabolic panel [[id number]]  (Abnormal) Collected: 01/02/23 0808    Lab Status: Final result Specimen: Blood from Arm, Left Updated: 01/02/23 4448     Sodium 136 mmol/L      Potassium 3 8 mmol/L      Chloride 107 mmol/L      CO2 19 mmol/L      ANION GAP 10 mmol/L      BUN 24 mg/dL      Creatinine 2 58 mg/dL      Glucose 146 mg/dL      Calcium 9 6 mg/dL      AST 13 U/L      ALT 22 U/L      Alkaline Phosphatase 83 U/L      Total Protein 7 2 g/dL      Albumin 3 9 g/dL      Total Bilirubin 0 23 mg/dL      eGFR 21 ml/min/1 73sq m     Narrative:      Nel guidelines for Chronic Kidney Disease (CKD):   •  Stage 1 with normal or high GFR (GFR > 90 mL/min/1 73 square meters)  •  Stage 2 Mild CKD (GFR = 60-89 mL/min/1 73 square meters)  •  Stage 3A Moderate CKD (GFR = 45-59 mL/min/1 73 square meters)  •  Stage 3B Moderate CKD (GFR = 30-44 mL/min/1 73 square meters)  •  Stage 4 Severe CKD (GFR = 15-29 mL/min/1 73 square meters)  •  Stage 5 End Stage CKD (GFR <15 mL/min/1 73 square meters)  Note: GFR calculation is accurate only with a steady state creatinine    Magnesium [[id number]]  (Normal) Collected: 01/02/23 0808    Lab Status: Final result Specimen: Blood from Arm, Left Updated: 01/02/23 0832     Magnesium 2 0 mg/dL     Protime-INR [[id number]]  (Normal) Collected: 01/02/23 0808    Lab Status: Final result Specimen: Blood from Arm, Left Updated: 01/02/23 0827     Protime 13 8 seconds      INR 1 06    APTT [[id number]]  (Normal) Collected: 01/02/23 0808    Lab Status: Final result Specimen: Blood from Arm, Left Updated: 01/02/23 0827     PTT 28 seconds     CBC and differential [[id number]]  (Abnormal) Collected: 01/02/23 0808    Lab Status: Final result Specimen: Blood from Arm, Left Updated: 01/02/23 0817     WBC 17 02 Thousand/uL      RBC 4 52 Million/uL      Hemoglobin 13 3 g/dL      Hematocrit 40 6 %      MCV 90 fL      MCH 29 4 pg      MCHC 32 8 g/dL      RDW 17 6 %      MPV 10 5 fL      Platelets 423 Thousands/uL      nRBC 0 /100 WBCs      Neutrophils Relative 81 %      Immat GRANS % 1 %      Lymphocytes Relative 11 %      Monocytes Relative 6 %      Eosinophils Relative 1 %      Basophils Relative 0 %      Neutrophils Absolute 13 82 Thousands/µL      Immature Grans Absolute 0 16 Thousand/uL      Lymphocytes Absolute 1 78 Thousands/µL      Monocytes Absolute 1 03 Thousand/µL      Eosinophils Absolute 0 17 Thousand/µL      Basophils Absolute 0 06 Thousands/µL                  X-ray chest 1 view portable   Final Result by Svetlana Mayfield MD (01/02 1118)   No acute cardiopulmonary findings  COMPARISON:  None      EXAM PERFORMED/VIEWS:  XR CHEST PORTABLE         FINDINGS:      Cardiomediastinal silhouette appears unremarkable  The lungs are clear  No pneumothorax or pleural effusion  Osseous structures appear within normal limits for patient age  IMPRESSION:       No acute cardiopulmonary disease                    Workstation performed: [id number]                    Procedures  ECG 12 Lead Documentation Only    Date/Time: 1/2/2023 12:03 PM  Performed by: JALIL Markham  Authorized by: JALIL Markham     Indications / Diagnosis:  Chest pain   Patient location:  ED  Previous ECG:     Previous ECG:  Compared to current    Similarity:  Changes noted  Interpretation:     Interpretation: abnormal    Rate:     ECG rate assessment: normal    Rhythm:     Rhythm: sinus rhythm    Ectopy:     Ectopy: none    QRS:     QRS axis:  Normal  Conduction:     Conduction: normal    ST segments:     ST segments:  Abnormal    Elevation:  II, V4, V5, V6 and aVF  Comments: These are dynamic changes from initial ekg at 0740 this morning     ECG 12 Lead Documentation Only    Date/Time: 1/2/2023 10:43 AM  Performed by: JALIL Markham  Authorized by: JALIL Markham     Indications / Diagnosis:  Chest pain 2H  Previous ECG:     Previous ECG:  Compared to current    Similarity:  Changes noted  Interpretation:     Interpretation: abnormal    Rate:     ECG rate:  73    ECG rate assessment: normal    Rhythm:     Rhythm: sinus rhythm    Ectopy:     Ectopy: none    QRS:     QRS axis:  Normal  Conduction:     Conduction: normal    ST segments:     ST segments:  Abnormal    Elevation:  V4, V5, V6, II, III and aVF  ECG 12 Lead Documentation Only    Date/Time: 1/2/2023 10:15 AM  Performed by: JALIL Markham  Authorized by: JALIL Markham     Indications / Diagnosis:  Chest Pain  Patient location:  ED  Interpretation:     Interpretation: abnormal    Rate:     ECG rate assessment: normal    Rhythm:     Rhythm: sinus rhythm    Ectopy:     Ectopy: none    QRS:     QRS axis:  Normal  Conduction:     Conduction: normal    ST segments:     ST segments:  Abnormal    Elevation:  II, III, V4, V5, V6 and aVF  ECG 12 Lead Documentation Only    Date/Time: 1/2/2023 8:40 AM  Performed by: JALIL Markham  Authorized by: JALIL Markham     Indications / Diagnosis:  Chest pain after nitro  Patient location:  ED  Previous ECG: Previous ECG:  Compared to current    Similarity:  Changes noted  Interpretation:     Interpretation: abnormal    Rate:     ECG rate:  84    ECG rate assessment: normal    Rhythm:     Rhythm: sinus rhythm    Ectopy:     Ectopy: none    QRS:     QRS axis:  Normal  Conduction:     Conduction: normal    ST segments:     ST segments:  Abnormal    Elevation:  II, aVF and V4    Depression:  V5 and V6  ECG 12 Lead Documentation Only    Date/Time: 1/2/2023 7:59 AM  Performed by: JALIL Marcial  Authorized by: JALIL Marcial     Indications / Diagnosis:  Chest pain  Patient location:  ED  Interpretation:     Interpretation: abnormal    Rate:     ECG rate:  91    ECG rate assessment: normal    Rhythm:     Rhythm: sinus rhythm    Ectopy:     Ectopy: none    QRS:     QRS axis:  Normal  ST segments:     ST segments:  Abnormal    Elevation:  V4 and aVF    Depression:  V5 and V6  ECG 12 Lead Documentation Only    Date/Time: 1/2/2023 7:40 AM  Performed by: JALIL Marcial  Authorized by: JALIL Marcial     Indications / Diagnosis:  Chest pain  Patient location:  ED  Previous ECG:     Previous ECG:  Compared to current    Comparison ECG info:  From May 2022    Similarity:  Changes noted  Interpretation:     Interpretation: abnormal    Rate:     ECG rate assessment: normal    Rhythm:     Rhythm: sinus rhythm    Ectopy:     Ectopy: PVCs      PVCs:  Infrequent  QRS:     QRS axis:  Normal  Conduction:     Conduction: normal    ST segments:     ST segments:  Abnormal    Elevation:  V4 and II    Depression:  V5 and V6  CriticalCare Time  Performed by: Patsy Dowd DO  Authorized by: Patsy Dowd DO     Critical care provider statement:     Critical care time (minutes):  230    Critical care time was exclusive of:  Separately billable procedures and treating other patients and teaching time    Critical care was necessary to treat or prevent imminent or life-threatening deterioration of the following conditions:  Cardiac failure    Critical care was time spent personally by me on the following activities:  Examination of patient, evaluation of patient's response to treatment, discussions with consultants, development of treatment plan with patient or surrogate, obtaining history from patient or surrogate, blood draw for specimens, review of old charts, re-evaluation of patient's condition, ordering and review of radiographic studies, ordering and review of laboratory studies and ordering and performing treatments and interventions             ED Course  ED Course as of 01/02/23 1801   Mon Jan 02, 2023   8324 Due to elevations in inferior leads and MI alert was called  Message sent to Dr Fili Moyer, who responded stating reviewing the chart  [address] directly with Dr Fili Moyer who does not feel this is an MI, and based on kidney function does not feel pt needs to go to the cath lab, but reports if Dr Tianna Julien wants to transfer pt to [facility] that is appropriate  200 Dr Tianna Julien made aware of decision to no take pt to the cath lab Dr Fili Moyer  Recommendation to give 5 mg lopressor appreciated and entered  0845 hs TnI 0hr(!): 138   0854 Creatinine(!): 2 58  At baseline for pt, no ANTHONY     0909 Discussed transfer with Dr Loreto Bocanegra, resident    3869 Updated Dr Fili Moyer regarding increase in troponin, and EKG  Following recommendation: "I reviewed her last cath  Limited options for safe intervention  I would not recommend urgent cath "   1055 Discussed case with critical care at [facility], Dr Sloane Gilman who agree pt's transfer should be expedited to B, but does not feel pt need to go to critical care  Stating SD2 would be appropriate                HEART Risk Score    Flowsheet Row Most Recent Value   Heart Score Risk Calculator    History 2 Filed at: 01/02/2023 1108   ECG 2 Filed at: 01/02/2023 1108   Age 2 Filed at: 01/02/2023 1108   Risk Factors 2 Filed at: 01/02/2023 1108   Troponin 2 Filed at: 01/02/2023 1108   HEART Score 10 Filed at: 01/02/2023 1108                        SBIRT 20yo+    Flowsheet Row Most Recent Value   SBIRT (23 yo +)    In order to provide better care to our patients, we are screening all of our patients for alcohol and drug use  Would it be okay to ask you these screening questions? Yes Filed at: 01/02/2023 0741   Initial Alcohol Screen: US AUDIT-C     1  How often do you have a drink containing alcohol? 0 Filed at: 01/02/2023 0741   2  How many drinks containing alcohol do you have on a typical day you are drinking? 0 Filed at: 01/02/2023 0741   3a  Male UNDER 65: How often do you have five or more drinks on one occasion? 0 Filed at: 01/02/2023 0741   3b  FEMALE Any Age, or MALE 65+: How often do you have 4 or more drinks on one occassion? 0 Filed at: 01/02/2023 0741   Audit-C Score 0 Filed at: 01/02/2023 8000   NELIA: How many times in the past year have you    Used an illegal drug or used a prescription medication for non-medical reasons? Never Filed at: 01/02/2023 2611                    Medical Decision Making  DDx: STEMI, NSTEMI  Pt's story is concerning for ACS as she remarked, "This is exactly like my previous heart attack " Pt has new EKG changes  Cardiology notified, via Dr Alyx Rae  As documented after pt received nitro, there was no change to her pain and EKG changes were occurring  At that time, (see course notes for specific times) an MI alert was called  Discussed case with Dr Narinder Clayton as documented with his decision that pt was not a candidate to take to the cath lab at this time, and felt this was not an MI  As documented endorses that if Dr Alyx Rae feels pt should be transferred to [facility] for further evaluation that should be done  PACs then placed a transfer order and spoke with Dr Jimmie Silva of [facility] medicine, who accepted the pt under Dr Jerman Godinez  Alerted Dr Narinder Clayton to the delta between the first and second troponin   Dr Alyx Rae also ordered and ECHO which was completed today prior to transfer  Dr Marty Camp was also contact multiple times throughout her stay in the ED, and stated that he spoke with Dr Hyun Florian as well  Dr Marty Camp feels pt should be transferred as well  After the second troponin resulted with a delta over 1000, discussed case with critical care, Dr Field who agrees patient's transfer should be expedited, and will contact Dr Arnulfo Chen for [facility]  Reviewed with PACs also on the line and reports will setup an ED to ED transfer with pt being a private for family medicine  At time of discharge pt had a third troponin result for a delta of 6,666  Throughout patient's stay she has not had an change in mental status, has remained AAO x 4, pain controlled with dilaudid, no dysrhythmias, no episodes of excessive hypertension, and no episodes of hypotension  Heparin infusing at time of discharge  Discussed patient with Dr Lilia Sherman who is in agreement with plan of care through out patient's stay  Chest pain: acute illness or injury  Elevated troponin: acute illness or injury  Amount and/or Complexity of Data Reviewed  External Data Reviewed: labs, radiology and ECG  Details: Pt's May ekg is different appearing than today's with ekg changes noted  Labs: ordered  Decision-making details documented in ED Course  Details: Has a leukocytosis of 17  This is in the presence of acute coronary syndrome and likely due to inflammation  Patient's troponin has steadily kvng, with last delta before discharge being 6,666  This was relayed to Dr Marty Camp  Pt's creatinine at baseline  Radiology: ordered and independent interpretation performed  Decision-making details documented in ED Course  ECG/medicine tests: ordered and independent interpretation performed  Decision-making details documented in ED Course  Details: Pt is having dynamic changes with evolving elevations in inferior leads     Discussion of management or test interpretation with external provider(s): Dr Geovanni Parekh cardiologist  Dr Romel Ayoub-- Critical care of [facility]  Dr Brittany Ham-- Cardiology of [facility]  Dr Lakhwinder Alvarado medicine of [facility]         Disposition  Final diagnoses:   Chest pain   Elevated troponin     Time reflects when diagnosis was documented in both MDM as applicable and the Disposition within this note     Time User Action Codes Description Comment    1/2/2023  8:45 AM Wesco Ibeth Add [R07 9] Chest pain     1/2/2023  8:45 AM Clarita Ibeth Add [R77 8] Elevated troponin     1/2/2023 11:52 AM Clarita Ibeth Modify [R07 9] Chest pain     1/2/2023 11:52 AM Clarita Ibeth Modify [R77 8] Elevated troponin       ED Disposition     ED Disposition   Transfer to Another Facility-In Network    Condition   --    Date/Time   Mon Jan 2, 2023  8:45 AM    Comment   Adrian Alcantara should be transferred out to [facility]             MD Documentation    Nimco Brenner Most Recent Value   Patient Condition The patient has been stabilized such that within reasonable medical probability, no material deterioration of the patient condition or the condition of the unborn child(sasha) is likely to result from the transfer   Reason for Transfer Level of Care needed not available at this facility   Benefits of Transfer Specialized equipment and/or services available at the receiving facility (Include comment)________________________, Continuity of care, Patient preference   Risks of Transfer Potential for delay in receiving treatment, Potential deterioration of medical condition, Loss of IV, Increased discomfort during transfer, Possible worsening of condition or death during transfer   Accepting Physician Phanimarilee   Provider Certification General risk, such as traffic hazards, adverse weather conditions, rough terrain or turbulence, possible failure of equipment (including vehicle or aircraft), or consequences of actions of persons outside the control of the transport personnel, Unanticipated needs of medical equipment and personnel during transport, The possibility of a transport vehicle being unavailable, Consent was not obtained as patient is committed to psychiatric facility and transfer is mandated      Follow-up Information    None         Discharge Medication List as of 1/2/2023 12:58 PM      CONTINUE these medications which have NOT CHANGED    Details   albuterol (PROVENTIL HFA,VENTOLIN HFA) 90 mcg/act inhaler Inhale 2 puffs every 4 (four) hours as needed for wheezing or shortness of breath, Starting Mon 5/23/2022, Normal      aspirin 81 mg chewable tablet Chew 1 tablet (81 mg total) daily, Starting Fri 4/2/2021, Normal      atorvastatin (LIPITOR) 80 mg tablet Take 1 tablet (80 mg total) by mouth every evening, Starting Wed 7/13/2022, Until Fri 11/11/2022, Normal      bumetanide (BUMEX) 1 mg tablet Take 1 tablet (1 mg total) by mouth daily, Starting Thu 7/14/2022, Normal      calcitriol (ROCALTROL) 0 25 mcg capsule Take 1 capsule (0 25 mcg total) by mouth daily, Starting Mon 10/24/2022, Normal      Diclofenac Sodium (VOLTAREN) 1 % Apply 2 g topically 4 (four) times a day, Starting Mon 8/9/2021, Normal      ezetimibe (ZETIA) 10 mg tablet Take 1 tablet (10 mg total) by mouth in the morning , Starting Mon 5/23/2022, Normal      isosorbide mononitrate (IMDUR) 30 mg 24 hr tablet Take 1 tablet (30 mg total) by mouth daily, Starting Wed 5/25/2022, Normal      lidocaine (Lidoderm) 5 % Apply 2 patches topically daily Remove & Discard patch within 12 hours or as directed by MD, Starting Fri 11/11/2022, Normal      LORazepam (ATIVAN) 0 5 mg tablet Take 1 tablet (0 5 mg total) by mouth 3 (three) times a day as needed for anxiety, Starting Mon 10/10/2022, Normal      methocarbamol (ROBAXIN) 500 mg tablet Take 1 tablet (500 mg total) by mouth 2 (two) times a day as needed for muscle spasms, Starting Fri 11/11/2022, Normal      metoprolol tartrate (LOPRESSOR) 100 mg tablet Take 1 tablet (100 mg total) by mouth 2 (two) times a day, Starting Tue 6/28/2022, Normal      nitroglycerin (NITROSTAT) 0 4 mg SL tablet Place 1 tablet (0 4 mg total) under the tongue every 5 (five) minutes as needed for chest pain, Starting Wed 8/3/2022, Normal      omeprazole (PriLOSEC) 40 MG capsule Take 1 capsule (40 mg total) by mouth daily, Starting Fri 11/11/2022, Normal      QUEtiapine (SEROquel) 50 mg tablet take 1 tablet by mouth at bedtime, Normal      sertraline (ZOLOFT) 100 mg tablet Take 1 5 tablets (150 mg total) by mouth daily, Starting Mon 9/19/2022, Normal             Outpatient Discharge Orders   Echo complete w/ contrast if indicated   Standing Status: Future Standing Exp   Date: 01/02/27       PDMP Review       Value Time User    PDMP Reviewed  Yes 10/10/2022 12:27 PM Robby Adkins, [facility] Provider  Electronically Signed by           JALIL Moss  01/02/23 [facility] Sue Deutsch DO  01/03/23 9003

## 2023-01-02 NOTE — ASSESSMENT & PLAN NOTE
Current mood stable  Continue home Zoloft 150mg QD and Ativan 0 5mg TID PRN for anxiety  Seroquel 50mg QHS for insomnia

## 2023-01-02 NOTE — ASSESSMENT & PLAN NOTE
Continue home Atorvastatin 80mg QD and Zetia 10mg QD  Non-candidate for fenofibrate as GFR < 30      Lab Results   Component Value Date    CHOLESTEROL 206 (H) 01/03/2023    TRIG 348 (H) 01/03/2023    HDL 31 (L) 01/03/2023    LDLCALC 105 (H) 01/03/2023

## 2023-01-02 NOTE — ED NOTES
Dr Naun Nation aware of patient arrival in ER, will be down to see pt     Tabitha Swift, SPEEDY  01/02/23 0387

## 2023-01-02 NOTE — ASSESSMENT & PLAN NOTE
- Seen to be concentric on cardiac MRI  -Continue to uptitrate medical therapy  -We will need to be careful with nitrate use

## 2023-01-03 PROBLEM — Z95.5 S/P DRUG ELUTING CORONARY STENT PLACEMENT: Status: ACTIVE | Noted: 2023-01-03

## 2023-01-03 PROBLEM — I21.4 NON-ST ELEVATION MI (NSTEMI) (HCC): Status: ACTIVE | Noted: 2017-03-11

## 2023-01-03 LAB
ANION GAP SERPL CALCULATED.3IONS-SCNC: 6 MMOL/L (ref 4–13)
APTT PPP: 31 SECONDS (ref 23–37)
ATRIAL RATE: 59 BPM
ATRIAL RATE: 59 BPM
ATRIAL RATE: 61 BPM
BUN SERPL-MCNC: 30 MG/DL (ref 5–25)
CALCIUM SERPL-MCNC: 9.2 MG/DL (ref 8.3–10.1)
CHLORIDE SERPL-SCNC: 116 MMOL/L (ref 96–108)
CHOLEST SERPL-MCNC: 206 MG/DL
CO2 SERPL-SCNC: 18 MMOL/L (ref 21–32)
CREAT SERPL-MCNC: 2.51 MG/DL (ref 0.6–1.3)
ERYTHROCYTE [DISTWIDTH] IN BLOOD BY AUTOMATED COUNT: 18.6 % (ref 11.6–15.1)
GFR SERPL CREATININE-BSD FRML MDRD: 22 ML/MIN/1.73SQ M
GLUCOSE SERPL-MCNC: 93 MG/DL (ref 65–140)
HCT VFR BLD AUTO: 38.5 % (ref 34.8–46.1)
HDLC SERPL-MCNC: 31 MG/DL
HGB BLD-MCNC: 11.9 G/DL (ref 11.5–15.4)
KCT BLD-ACNC: 279 SEC (ref 89–137)
LDLC SERPL CALC-MCNC: 105 MG/DL (ref 0–100)
MCH RBC QN AUTO: 29 PG (ref 26.8–34.3)
MCHC RBC AUTO-ENTMCNC: 30.9 G/DL (ref 31.4–37.4)
MCV RBC AUTO: 94 FL (ref 82–98)
P AXIS: 21 DEGREES
P AXIS: 53 DEGREES
P AXIS: 75 DEGREES
PLATELET # BLD AUTO: 272 THOUSANDS/UL (ref 149–390)
PMV BLD AUTO: 10.8 FL (ref 8.9–12.7)
POTASSIUM SERPL-SCNC: 4.3 MMOL/L (ref 3.5–5.3)
PR INTERVAL: 188 MS
PR INTERVAL: 200 MS
PR INTERVAL: 216 MS
QRS AXIS: 133 DEGREES
QRS AXIS: 168 DEGREES
QRS AXIS: 52 DEGREES
QRSD INTERVAL: 106 MS
QRSD INTERVAL: 108 MS
QRSD INTERVAL: 98 MS
QT INTERVAL: 414 MS
QT INTERVAL: 430 MS
QT INTERVAL: 448 MS
QTC INTERVAL: 409 MS
QTC INTERVAL: 425 MS
QTC INTERVAL: 450 MS
RBC # BLD AUTO: 4.1 MILLION/UL (ref 3.81–5.12)
SODIUM SERPL-SCNC: 140 MMOL/L (ref 135–147)
SPECIMEN SOURCE: ABNORMAL
T WAVE AXIS: 0 DEGREES
T WAVE AXIS: 111 DEGREES
T WAVE AXIS: 5 DEGREES
TRIGL SERPL-MCNC: 348 MG/DL
VENTRICULAR RATE: 59 BPM
VENTRICULAR RATE: 59 BPM
VENTRICULAR RATE: 61 BPM
WBC # BLD AUTO: 16.52 THOUSAND/UL (ref 4.31–10.16)

## 2023-01-03 PROCEDURE — B2111ZZ FLUOROSCOPY OF MULTIPLE CORONARY ARTERIES USING LOW OSMOLAR CONTRAST: ICD-10-PCS | Performed by: INTERNAL MEDICINE

## 2023-01-03 PROCEDURE — 02C03Z6 EXTIRPATION OF MATTER FROM CORONARY ARTERY, ONE ARTERY, BIFURCATION, PERCUTANEOUS APPROACH: ICD-10-PCS | Performed by: INTERNAL MEDICINE

## 2023-01-03 PROCEDURE — 0270346 DILATION OF CORONARY ARTERY, ONE ARTERY, BIFURCATION, WITH DRUG-ELUTING INTRALUMINAL DEVICE, PERCUTANEOUS APPROACH: ICD-10-PCS | Performed by: INTERNAL MEDICINE

## 2023-01-03 DEVICE — STENT ONYXNG22515UX ONYX 2.25X15RX
Type: IMPLANTABLE DEVICE | Site: HEART | Status: FUNCTIONAL
Brand: ONYX FRONTIER™

## 2023-01-03 DEVICE — PERCLOSE™ PROSTYLE™ SUTURE-MEDIATED CLOSURE AND REPAIR SYSTEM
Type: IMPLANTABLE DEVICE | Site: GROIN | Status: FUNCTIONAL
Brand: PERCLOSE™ PROSTYLE™

## 2023-01-03 DEVICE — ANGIO-SEAL VIP VASCULAR CLOSURE DEVICE
Type: IMPLANTABLE DEVICE | Site: GROIN | Status: FUNCTIONAL
Brand: ANGIO-SEAL

## 2023-01-03 RX ORDER — SODIUM CHLORIDE 9 MG/ML
INJECTION, SOLUTION INTRAVENOUS
Status: DISCONTINUED | OUTPATIENT
Start: 2023-01-03 | End: 2023-01-03

## 2023-01-03 RX ORDER — HYDROMORPHONE HCL/PF 1 MG/ML
1 SYRINGE (ML) INJECTION ONCE
Status: COMPLETED | OUTPATIENT
Start: 2023-01-03 | End: 2023-01-03

## 2023-01-03 RX ORDER — ACETAMINOPHEN 325 MG/1
975 TABLET ORAL EVERY 8 HOURS PRN
Status: DISCONTINUED | OUTPATIENT
Start: 2023-01-03 | End: 2023-01-05 | Stop reason: HOSPADM

## 2023-01-03 RX ORDER — LIDOCAINE HYDROCHLORIDE 10 MG/ML
INJECTION, SOLUTION EPIDURAL; INFILTRATION; INTRACAUDAL; PERINEURAL CODE/TRAUMA/SEDATION MEDICATION
Status: DISCONTINUED | OUTPATIENT
Start: 2023-01-03 | End: 2023-01-03 | Stop reason: HOSPADM

## 2023-01-03 RX ORDER — CLOPIDOGREL BISULFATE 75 MG/1
75 TABLET ORAL DAILY
Status: DISCONTINUED | OUTPATIENT
Start: 2023-01-04 | End: 2023-01-05 | Stop reason: HOSPADM

## 2023-01-03 RX ORDER — FUROSEMIDE 10 MG/ML
INJECTION INTRAMUSCULAR; INTRAVENOUS CODE/TRAUMA/SEDATION MEDICATION
Status: DISCONTINUED | OUTPATIENT
Start: 2023-01-03 | End: 2023-01-03 | Stop reason: HOSPADM

## 2023-01-03 RX ORDER — HYDROMORPHONE HCL/PF 1 MG/ML
0.5 SYRINGE (ML) INJECTION ONCE
Status: COMPLETED | OUTPATIENT
Start: 2023-01-03 | End: 2023-01-03

## 2023-01-03 RX ORDER — HEPARIN SODIUM 5000 [USP'U]/ML
7500 INJECTION, SOLUTION INTRAVENOUS; SUBCUTANEOUS EVERY 8 HOURS SCHEDULED
Status: DISCONTINUED | OUTPATIENT
Start: 2023-01-04 | End: 2023-01-05 | Stop reason: HOSPADM

## 2023-01-03 RX ORDER — VERAPAMIL HCL 2.5 MG/ML
AMPUL (ML) INTRAVENOUS CODE/TRAUMA/SEDATION MEDICATION
Status: DISCONTINUED | OUTPATIENT
Start: 2023-01-03 | End: 2023-01-03 | Stop reason: HOSPADM

## 2023-01-03 RX ORDER — SODIUM CHLORIDE 9 MG/ML
50 INJECTION, SOLUTION INTRAVENOUS CONTINUOUS
Status: DISCONTINUED | OUTPATIENT
Start: 2023-01-03 | End: 2023-01-03

## 2023-01-03 RX ORDER — HEPARIN SODIUM 1000 [USP'U]/ML
INJECTION, SOLUTION INTRAVENOUS; SUBCUTANEOUS CODE/TRAUMA/SEDATION MEDICATION
Status: DISCONTINUED | OUTPATIENT
Start: 2023-01-03 | End: 2023-01-03 | Stop reason: HOSPADM

## 2023-01-03 RX ORDER — CLOPIDOGREL BISULFATE 75 MG/1
600 TABLET ORAL ONCE
Status: COMPLETED | OUTPATIENT
Start: 2023-01-03 | End: 2023-01-03

## 2023-01-03 RX ORDER — MIDAZOLAM HYDROCHLORIDE 2 MG/2ML
INJECTION, SOLUTION INTRAMUSCULAR; INTRAVENOUS CODE/TRAUMA/SEDATION MEDICATION
Status: DISCONTINUED | OUTPATIENT
Start: 2023-01-03 | End: 2023-01-03 | Stop reason: HOSPADM

## 2023-01-03 RX ORDER — NITROGLYCERIN 20 MG/100ML
INJECTION INTRAVENOUS CODE/TRAUMA/SEDATION MEDICATION
Status: DISCONTINUED | OUTPATIENT
Start: 2023-01-03 | End: 2023-01-03 | Stop reason: HOSPADM

## 2023-01-03 RX ORDER — HEPARIN SODIUM 5000 [USP'U]/ML
5000 INJECTION, SOLUTION INTRAVENOUS; SUBCUTANEOUS EVERY 8 HOURS SCHEDULED
Status: DISCONTINUED | OUTPATIENT
Start: 2023-01-04 | End: 2023-01-03

## 2023-01-03 RX ORDER — FENTANYL CITRATE 50 UG/ML
INJECTION, SOLUTION INTRAMUSCULAR; INTRAVENOUS CODE/TRAUMA/SEDATION MEDICATION
Status: DISCONTINUED | OUTPATIENT
Start: 2023-01-03 | End: 2023-01-03 | Stop reason: HOSPADM

## 2023-01-03 RX ADMIN — ISOSORBIDE MONONITRATE 30 MG: 30 TABLET, EXTENDED RELEASE ORAL at 09:17

## 2023-01-03 RX ADMIN — BUMETANIDE 1 MG: 1 TABLET ORAL at 09:17

## 2023-01-03 RX ADMIN — METOPROLOL TARTRATE 100 MG: 50 TABLET, FILM COATED ORAL at 09:16

## 2023-01-03 RX ADMIN — NITROGLYCERIN 0.4 MG: 0.4 TABLET SUBLINGUAL at 15:13

## 2023-01-03 RX ADMIN — HYDROMORPHONE HYDROCHLORIDE 1 MG: 1 INJECTION, SOLUTION INTRAMUSCULAR; INTRAVENOUS; SUBCUTANEOUS at 21:27

## 2023-01-03 RX ADMIN — HYDROMORPHONE HYDROCHLORIDE 0.5 MG: 1 INJECTION, SOLUTION INTRAMUSCULAR; INTRAVENOUS; SUBCUTANEOUS at 16:45

## 2023-01-03 RX ADMIN — QUETIAPINE FUMARATE 50 MG: 25 TABLET ORAL at 21:27

## 2023-01-03 RX ADMIN — ASPIRIN 81 MG: 81 TABLET, CHEWABLE ORAL at 09:21

## 2023-01-03 RX ADMIN — NITROGLYCERIN 0.4 MG: 0.4 TABLET SUBLINGUAL at 20:39

## 2023-01-03 RX ADMIN — SERTRALINE HYDROCHLORIDE 150 MG: 50 TABLET ORAL at 09:16

## 2023-01-03 RX ADMIN — NICOTINE 1 PATCH: 14 PATCH, EXTENDED RELEASE TRANSDERMAL at 09:21

## 2023-01-03 RX ADMIN — ACETAMINOPHEN 975 MG: 325 TABLET, FILM COATED ORAL at 21:27

## 2023-01-03 RX ADMIN — HEPARIN SODIUM 19.1 UNITS/KG/HR: 10000 INJECTION, SOLUTION INTRAVENOUS at 05:34

## 2023-01-03 RX ADMIN — ATORVASTATIN CALCIUM 80 MG: 80 TABLET, FILM COATED ORAL at 17:00

## 2023-01-03 RX ADMIN — EZETIMIBE 10 MG: 10 TABLET ORAL at 09:17

## 2023-01-03 RX ADMIN — SODIUM CHLORIDE 50 ML/HR: 0.9 INJECTION, SOLUTION INTRAVENOUS at 13:40

## 2023-01-03 RX ADMIN — CLOPIDOGREL BISULFATE 600 MG: 75 TABLET ORAL at 10:12

## 2023-01-03 RX ADMIN — HEPARIN SODIUM 4000 UNITS: 1000 INJECTION INTRAVENOUS; SUBCUTANEOUS at 05:22

## 2023-01-03 NOTE — ED NOTES
Attending doctor notified regarding worsening chest pain and abnormal EKG     Stephon Rojo RN  01/02/23 2002

## 2023-01-03 NOTE — ED ATTENDING ATTESTATION
1/2/2023  IGlen DO, saw and evaluated the patient  I have discussed the patient with the resident/non-physician practitioner and agree with the resident's/non-physician practitioner's findings, Plan of Care, and MDM as documented in the resident's/non-physician practitioner's note, except where noted  All available labs and Radiology studies were reviewed  I was present for key portions of any procedure(s) performed by the resident/non-physician practitioner and I was immediately available to provide assistance  At this point I agree with the current assessment done in the Emergency Department  I have conducted an independent evaluation of this patient a history and physical is as follows:    Patient with symptoms consistent with ischemic chest pain, dynamic EKG changes and rising troponin  EKG did not meet STEMI criteria however advanced practitioner myself had high suspicion for acute MI  Case was discussed with interventional cardiology as stated above  I also talked with Dr Dary Baltazar who demonstrates high concern for patient and will discuss with interventional cardiology  Patient requires emergent transfer to cath capable facility if she were to decompensate as she is critically ill      ED Course         Critical Care Time  Procedures

## 2023-01-03 NOTE — UTILIZATION REVIEW
Initial Clinical Review    Admission: Date/Time/Statement:   Admission Orders (From admission, onward)     Ordered        01/02/23 1437  Inpatient Admission  Once                      Orders Placed This Encounter   Procedures   • Inpatient Admission     Standing Status:   Standing     Number of Occurrences:   1     Order Specific Question:   Level of Care     Answer:   Level 2 Stepdown / HOT [14]     Order Specific Question:   Estimated length of stay     Answer:   More than 2 Midnights     Order Specific Question:   Certification     Answer:   I certify that inpatient services are medically necessary for this patient for a duration of greater than two midnights  See H&P and MD Progress Notes for additional information about the patient's course of treatment  ED Arrival Information     Expected   1/2/2023     Arrival   1/2/2023 13:39    Acuity   Emergent            Means of arrival   Ambulance    Escorted by   [facility]    Service   Family Medicine    Admission type   Urgent            Arrival complaint   Chest pain           Chief Complaint   Patient presents with   • Chest Pain     [facility] transfer for family medicine; bumped trop        Initial Presentation: 41 y o  female with a pmh of MI s/p GREG to RCA and RPDA 3/25/21, sinus pause s/p dual chamber permanent pacemaker in place, CHF( EF 45%), MCHC, CKD,  ELIAZAR on Cpap, obesity,  HTN and HLD  She presented to the Ed at [facility] on 1/2 with c/o tight squeezing mid-sternal chest pain radiating to left shoulder that woke her from sleep at 4 am  She reports it feels similar to her prior MI, she endorses associated nausea, diaphoresis and SOB  She reports she tool LS Ntg x 2 and  mg at home without relief  EKG, NSR with occasional PVC's, prior inferior and anterior infarct, inverted T- waves in lateral leads   Case was discussed with Cardiology concern for  NSTEMI type 1, with recommendation that  she be transferred to [address] [place] [address] for a non-urgent cath  She is admitted inpatient to  [facility], new Type 1 NSTEMI  With significantly elevated  Trop - [phone] and new regional wall motion abnormality on ECHO  Date: 1/3/22   Day 2:   Symptoms concerning for Type 1 NSTEMI with Overnight pt with troponins continue to elevate 6,666 -  19,307 -  22,973  And new regional wall motion abnormality on ECHO  She remains on heparin gtt  With plan for a cardiac cath today  Nephrology Consult - 1/3 - CKD stage 4 with creatinine fluctuating around low to mid 2's since May 2022  If cath is planned, consider holding diuretics, consider gentle IV fluids, minimize  IV dye as much as possible  Cardiac Cath 1/3 - •  Successful PCI to mid % culprit req  rheolytic thrombectomy (progressed when compared to 4-2-21 study); given atretic distal LAD, 2 25 x 15 mm GREG was deployed from LAD into more sizable D2  •  Residual prox LCx 60% lesion with patent LAD, mid RCA & PDA stents  •  Elevated LVEDP (20-30 mmHg) with no LV-Ao gradient on pullback  •  R radial a  accessed but unable to advance equipment, likely occluded; RFA arteriotomy employed       Cardiology Consult - 1/3 - 40 yo f presented to the Ed at [facility] on 1/2 with chest pain similar t the last  Time she had an MI  IN the ED she received nitro, dilaudid, metoprolol, brilinta and Ativan  Heparin gtt started in the ED, She was transferred to [facility]  For definitive care  She had intermittent hypotension to low 74/49 - avg 110/60's  Troponin elevated to 22,973  ECG showed NSR  ECHO showed LVEF 45% with inferior and inferolateral hypokinesis, new from prior  EKG:  ECG 01/02: NSR with PVCs, no STEMI, prior inferior and anterior infarct, inverted T-waves in lateral leads      ECHO:  01/02: ECHO 01/02: LVEF 45%, mildly reduced systolic function, hypokinesis of inferior and inferolateral walls, moderate dilation of LA, mild AR/MR/TR   Previous stress ECHO in 2021 EF 65% with moderate hypokinesis of basal-mid inferior wall      7/26/21: Stress ECHO: LVEF 65%, moderate hypokinesis of basal-mid inferior wall  No echocardiographic evidence for stress-induced ischemia        ED Triage Vitals   Temperature Pulse Respirations Blood Pressure SpO2   01/03/23 0557 01/02/23 1354 01/02/23 1430 01/02/23 1353 01/02/23 1354   98 1 °F (36 7 °C) 72 22 (!) 145/113 96 %      Temp Source Heart Rate Source Patient Position - Orthostatic VS BP Location FiO2 (%)   01/03/23 0557 01/02/23 1430 01/02/23 1430 01/02/23 1430 --   Oral Monitor Sitting Right arm       Pain Score       01/02/23 1426       10 - Worst Possible Pain          Wt Readings from Last 1 Encounters:   01/02/23 122 kg (270 lb)     Additional Vital Signs:   Date/Time Temp Pulse Resp BP MAP (mmHg) SpO2 Calculated FIO2 (%) - Nasal Cannula Nasal Cannula O2 Flow Rate (L/min) O2 Device O2 Interface Device Patient Position - Orthostatic VS   01/03/23 0600 -- 60 12 104/56 74 99 % -- -- CPAP -- Lying   01/03/23 0557 98 1 °F (36 7 °C) -- -- -- -- -- -- -- -- -- --   01/03/23 0500 -- 64 16 98/57 73 98 % -- -- CPAP -- Lying   01/03/23 0400 -- 60 16 115/65 83 98 % -- -- CPAP -- Lying   01/03/23 0330 -- 60 15 127/69 90 99 % -- -- CPAP -- Lying   01/03/23 0230 -- 60 13 110/74 86 97 % -- -- CPAP -- Lying   01/03/23 0200 -- 58 22 91/51 65 98 % -- -- CPAP -- Lying   01/03/23 0147 -- -- -- 85/52 Abnormal  63 Abnormal  -- -- -- -- -- --   01/03/23 0130 -- 58 14 74/49 Abnormal    57 Abnormal  98 % -- -- CPAP -- Lying   BP: provider aware Dr Reggie Llanes at 01/03/23 0130   01/03/23 0100 -- 62 12 97/52 68 96 % 28 2 L/min Nasal cannula -- Lying   01/03/23 0030 -- 60 15 107/56 75 96 % -- -- -- -- --   01/03/23 0026 -- -- -- -- -- 94 % -- -- -- Face mask --   01/03/23 0000 -- 60 13 104/59 78 94 % 28 2 L/min Nasal cannula -- Lying   01/02/23 2330 -- 60 14 109/56 75 94 % 28 2 L/min Nasal cannula -- Lying   01/02/23 2300 -- 60 16 94/57 70 93 % 28 2 L/min Nasal cannula -- Lying 01/02/23 2230 -- 58 16 100/54 71 96 % 28 2 L/min Nasal cannula -- Lying   01/02/23 2130 -- 60 15 118/62 84 97 % 28 2 L/min Nasal cannula -- Lying   01/02/23 2100 -- 60 13 113/72 88 95 % 28 2 L/min Nasal cannula -- Lying   01/02/23 2030 -- 60 13 106/50 71 97 % 28 2 L/min Nasal cannula -- Lying   01/02/23 2000 -- 62 20 118/53 76 98 % -- -- -- -- --   01/02/23 1900 -- 58 17 101/60 76 97 % -- -- -- -- --   01/02/23 1830 -- 58 16 102/60 76 97 % -- -- -- -- --   01/02/23 1800 -- 60 -- 101/55 69 98 % -- -- -- -- --   01/02/23 1730 -- 60 20 101/67 80 98 % 28 2 L/min Nasal cannula -- Sitting   01/02/23 1701 -- 60 -- 94/64 -- -- -- -- -- -- --   01/02/23 1630 -- 58 12 92/57 70 98 % -- -- -- -- --   01/02/23 1600 -- 60 20 103/62 78 97 % -- -- -- -- --   01/02/23 1536 -- 60 29 Abnormal  92/56 69 -- -- -- -- -- --   01/02/23 1530 -- 58 19 88/62 Abnormal   70 97 % 28 2 L/min Nasal cannula -- Sitting   BP: Dr Ayesha Cartagena notified at 01/02/23 1530   01/02/23 1430 -- 60 22 107/73 84 97 % -- -- None (Room air) -- Sitting       Pertinent Labs/Diagnostic Test Results:   No orders to display         Results from last 7 days   Lab Units 01/03/23  0531 01/02/23  0808   WBC Thousand/uL 16 52* 17 02*   HEMOGLOBIN g/dL 11 9 13 3   HEMATOCRIT % 38 5 40 6   PLATELETS Thousands/uL 272 287   NEUTROS ABS Thousands/µL  --  13 82*         Results from last 7 days   Lab Units 01/03/23  0429 01/02/23  0808   SODIUM mmol/L 140 136   POTASSIUM mmol/L 4 3 3 8   CHLORIDE mmol/L 116* 107   CO2 mmol/L 18* 19*   ANION GAP mmol/L 6 10   BUN mg/dL 30* 24   CREATININE mg/dL 2 51* 2 58*   EGFR ml/min/1 73sq m 22 21   CALCIUM mg/dL 9 2 9 6   MAGNESIUM mg/dL  --  2 0     Results from last 7 days   Lab Units 01/02/23  0808   AST U/L 13   ALT U/L 22   ALK PHOS U/L 83   TOTAL PROTEIN g/dL 7 2   ALBUMIN g/dL 3 9   TOTAL BILIRUBIN mg/dL 0 23         Results from last 7 days   Lab Units 01/03/23  0429 01/02/23  0808   GLUCOSE RANDOM mg/dL 93 146*     Results from last 7 days   Lab Units 01/02/23  2000 01/02/23  1841 01/02/23  1656 01/02/23  1459   HS TNI RAND ng/L >22,973* >22,973* >22,973* 19,307*       Results from last 7 days   Lab Units 01/02/23  1150 01/02/23  0936 01/02/23  0808   HS TNI 0HR ng/L  --   --  138*   HS TNI 2HR ng/L  --  1,307*  --    HSTNI D2 ng/L  --  1,169*  --    HS TNI 4HR ng/L 6,804*  --   --    HSTNI D4 ng/L 6,666*  --   --          Results from last 7 days   Lab Units 01/03/23  0429 01/02/23  2148 01/02/23  1424 01/02/23  0808   PROTIME seconds  --   --  13 5 13 8   INR   --   --  1 01 1 06   PTT seconds 31 34 30 28       Past Medical History:   Diagnosis Date   • Acute MI ([place] [address] )    • Anemia    • Anxiety    • CAD (coronary artery disease)     GREG mid RCA and GREG right PDA 3/25/2021   • Cancer ([place] [address] ) 2008   • Cardiomyopathy ([place] [address] )    • CHF (congestive heart failure) ([facility])    • Chronic kidney disease    • COPD (chronic obstructive pulmonary disease) ([facility])     scheduled for sleep apnea test soon after pacemaker implant   • Coronary artery disease    • Depression    • History of chemotherapy     non hodgkins lymphoma 2008   • Hyperlipemia    • Hypertension    • Hypertrophic cardiomyopathy ([facility])    • Lymphoma, non-Hodgkin's ([facility])    • Myocardial infarction ([place] [address] )    • Obesity    • SSS (sick sinus syndrome) ([facility] )     s/p medtronic dual chamber pacemaker 5/25/2021   • Tobacco abuse    • Ventricular tachycardia, non-sustained    • Wears glasses      Present on Admission:  • Acute coronary syndrome [facility])  • Essential hypertension  • Chronic diastolic congestive heart failure ([facility])  • COPD (chronic obstructive pulmonary disease) ([facility])  • CKD (chronic kidney disease) stage 4, GFR 15-29 ml/min ([facility])  • Mixed hyperlipidemia  • Anxiety  • Tobacco abuse  • ELIAZAR (obstructive sleep apnea)      Admitting Diagnosis: Chest pain [R07 9]  Age/Sex: 41 y o  female  Admission Orders:  Scheduled Medications:  aspirin, 81 mg, Oral, Daily  atorvastatin, 80 mg, Oral, QPM  bumetanide, 1 mg, Oral, Daily  ezetimibe, 10 mg, Oral, Daily  isosorbide mononitrate, 30 mg, Oral, Daily  metoprolol tartrate, 100 mg, Oral, BID  nicotine, 1 patch, Transdermal, Daily  pantoprazole, 40 mg, Oral, Early Morning  QUEtiapine, 50 mg, Oral, HS  sertraline, 150 mg, Oral, Daily  ticagrelor, 90 mg, Oral, Q12H ABIEL      Continuous IV Infusions:  heparin (porcine), 3-20 Units/kg/hr (Order-Specific), Intravenous, Titrated      PRN Meds:  heparin (porcine), 2,000 Units, Intravenous, Q6H PRN  heparin (porcine), 4,000 Units, Intravenous, Q6H PRN  LORazepam, 0 5 mg, Oral, TID PRN  nitroglycerin, 0 4 mg, Sublingual, Q5 Min PRN            Network Utilization Review Department  ATTENTION: Please call with any questions or concerns to [phone] and carefully listen to the prompts so that you are directed to the right person  All voicemails are confidential   Madai Do all requests for admission clinical reviews, approved or denied determinations and any other requests to dedicated fax number below belonging to the campus where the patient is receiving treatment   List of dedicated fax numbers for the Facilities:  [address] DENIALS (Administrative/Medical Necessity) [phone]   [address] (Maternity/NICU/Pediatrics) [phone]   [address] Bela Gonzalez [phone]   [facility] [phone]   [address] [place] [address] Medical [address] [place] [id number] [place] [address] Sina Lo 28 [phone]   [address] [place] [facility] 134 815 [facility] [phone]

## 2023-01-03 NOTE — QUICK NOTE
Patient Update    Patient was seen and examined at bedside after reviewing recent troponin and EKG  Troponin continues to trend up  Most recent Troponin significantly elevated at >22,973 and increased from prior of 19,307  EKG changes notable for sinus bradycardia and right axis deviation  Unfortunately patient continues to have active chest pain  Pain is slightly improved by Dilaudid  I reached out to Cardiology fellow [facility]) who has been following patient case since patient was transferred from [facility] to [facility]  As well as Dr Gisela Serrano (Interventional cardiology) and Dr Joana Groves [facility] cardiology) who are aware of patient  - Plan is still for cardiac catheterization tomorrow (1/3/2023) - She has already been scheduled  Per cardiology team - She doesn't require an emergent catheterization since there are no ischemic changes on EKG  Will continue with medical management and monitor patients clinical status       - Discussed plan at bedside with patients children who were also present at bedside at time of my exam        Jonathan Bhakta MD

## 2023-01-03 NOTE — ED NOTES
Patient reporting increased chest pain, most recent ECG signed by Dr Samina Cat messaging family medicine about ECG  Awaiting new orders       Anu Conway RN  01/02/23 3077

## 2023-01-03 NOTE — CONSULTS
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  Kidney function overall is stable  Awaiting cardiology recommendation  Avoid relative hypotension  Avoid nephrotoxic  Monitor daily labs    #2 acute coronary syndrome, cardiology on board  She was transferred to [place] [address] for nonemergent cardiac intervention  Awaiting cardiology recommendation  IF cardiac cath is planned, consider holding diuretics, consider gentle IV fluids if okay with cardiology  Minimize IV dye as much as possible  Follow daily labs    #3 chronic diastolic congestive heart failure, on Bumex 1 mg daily  On exam does not look volume overload  If cardiac cath is planned, consider holding diuretics and gentle IV fluids if okay with cardiology    #4 hypertension, blood pressure is stable, initially it was elevated    #5 morbid obesity    #6 hemoglobin normal at 11 9      SUMMARY OF RECOMMENDATIONS:  Patient on exam looks near euvolemic  Awaiting cardiology recommendation  If cardiac cath is planned, consider holding diuretics and consider gentle IV fluids if okay with cardiology  Follow daily labs  Nephrotoxic  Minimize IV dye as much as possible      HISTORY OF PRESENT ILLNESS:  Requesting Physician: Arlene Harrison MD  Reason for Consult: CKD, need for cardiac cath    Manuel Perez is a 41 y o  female who was transferred from [address] [place] [address] ED to [place] [address] for cardiac catheterization after presenting with chest pain  A renal consultation is requested today for assistance in the management of CKD    This is a patient with history of chronic kidney disease stage IV with previous creatinine in the low to mid twos, follows with Dr Caroline Bruno, biopsy-proven Alport's syndrome, hypertension, chronic diastolic congestive heart failure, morbid obesity, COPD, hyperlipidemia, tobacco abuse, who presents to the hospital with chest pain, patient was transferred to [place] [address] for nonurgent cardiac catheterization  During my evaluation patient in general is doing better, states her chest pain significantly improved    No significant shortness of breath, no abdominal pain, no nausea, no vomiting, no urinary problems, no recent diarrhea  Denies any recent dietary indiscretion, reports been taking her diuretics daily    PAST MEDICAL HISTORY:  Past Medical History:   Diagnosis Date   • Acute MI ([facility] )    • Anemia    • Anxiety    • CAD (coronary artery disease)     GREG mid RCA and GREG right PDA 3/25/2021   • Cancer ([place] [address] )    • Cardiomyopathy ([place] [address] )    • CHF (congestive heart failure) ([facility])    • Chronic kidney disease    • COPD (chronic obstructive pulmonary disease) ([facility])     scheduled for sleep apnea test soon after pacemaker implant   • Coronary artery disease    • Depression    • History of chemotherapy     non hodgkins lymphoma    • Hyperlipemia    • Hypertension    • Hypertrophic cardiomyopathy ([place] [address] )    • Lymphoma, non-Hodgkin's ([facility])    • Myocardial infarction [facility])    • Obesity    • SSS (sick sinus syndrome) ([facility]  )     s/p medtronic dual chamber pacemaker 2021   • Tobacco abuse    • Ventricular tachycardia, non-sustained    • Wears glasses        PAST SURGICAL HISTORY:  Past Surgical History:   Procedure Laterality Date   • CARDIAC PACEMAKER PLACEMENT Left 2021    Procedure: DUAL LEAD PACEMAKER IMPLANT;  Surgeon: Aron Villatoro MD;  Location: [address] [place] [address];  Service: Cardiology   • CAROTID STENT     •  SECTION     • CORONARY ANGIOPLASTY WITH STENT PLACEMENT  2021    GREG mid RCA and GREG right PDA   • DENTAL SURGERY     • IR BIOPSY KIDNEY RANDOM  10/18/2021       SOCIAL HISTORY:  Social History Substance and Sexual Activity   Alcohol Use Yes    Comment: 1-2 times years     Social History     Substance and Sexual Activity   Drug Use No     Social History     Tobacco Use   Smoking Status Every Day   • Packs/day: 1 00   • Years: 25 00   • Pack years: 25 00   • Types: Cigarettes   Smokeless Tobacco Never   Tobacco Comments    Recently and currently quitting cigarettes       FAMILY HISTORY:  Family History   Problem Relation Age of Onset   • No Known Problems Mother    • No Known Problems Father    • No Known Problems Daughter    • Brain cancer Maternal Grandfather    • Heart disease Maternal Grandfather    • Cancer Maternal Grandfather    • No Known Problems Paternal Aunt    • No Known Problems Paternal Aunt        ALLERGIES:  No Known Allergies    MEDICATIONS:    Current Facility-Administered Medications:   •  aspirin chewable tablet 81 mg, 81 mg, Oral, Daily, Montie Lesch, MD, 81 mg at 01/02/23 1456  •  atorvastatin (LIPITOR) tablet 80 mg, 80 mg, Oral, QPM, Montie Lesch, MD, 80 mg at 01/02/23 1816  •  bumetanide (BUMEX) tablet 1 mg, 1 mg, Oral, Daily, Montie Lesch, MD  •  ezetimibe (ZETIA) tablet 10 mg, 10 mg, Oral, Daily, Montie Lesch, MD, 10 mg at 01/02/23 1659  •  heparin (porcine) 25,000 units in 0 45% NaCl 250 mL infusion (premix), 3-20 Units/kg/hr (Order-Specific), Intravenous, Titrated, Montie Lesch, MD, Last Rate: 17 2 mL/hr at 01/03/23 0534, 19 1 Units/kg/hr at 01/03/23 0534  •  heparin (porcine) injection 2,000 Units, 2,000 Units, Intravenous, Q6H PRN, Montie Lesch, MD  •  heparin (porcine) injection 4,000 Units, 4,000 Units, Intravenous, Q6H PRN, Montie Lesch, MD, 4,000 Units at 01/03/23 0522  •  isosorbide mononitrate (IMDUR) 24 hr tablet 30 mg, 30 mg, Oral, Daily, Montie Lesch, MD, 30 mg at 01/02/23 1659  •  LORazepam (ATIVAN) tablet 0 5 mg, 0 5 mg, Oral, TID PRN, Montie Lesch, MD, 0 5 mg at 01/02/23 1705  •  metoprolol tartrate (LOPRESSOR) tablet 100 mg, 100 mg, Oral, BID, Maria Elena Tang Leah Crespo MD  •  nicotine (NICODERM CQ) 14 mg/24hr TD 24 hr patch 1 patch, 1 patch, Transdermal, Daily, Nell Aguirre MD, 1 patch at 01/02/23 1457  •  nitroglycerin (NITROSTAT) SL tablet 0 4 mg, 0 4 mg, Sublingual, Q5 Min PRN, Nell Aguirre MD  •  pantoprazole (PROTONIX) EC tablet 40 mg, 40 mg, Oral, Early Morning, Rafaela Tay MD  •  QUEtiapine (SEROquel) tablet 50 mg, 50 mg, Oral, HS, Nell Aguirre MD, 50 mg at 01/02/23 2109  •  sertraline (ZOLOFT) tablet 150 mg, 150 mg, Oral, Daily, Nell Aguirre MD, 150 mg at 01/02/23 1701  •  ticagrelor (BRILINTA) tablet 90 mg, 90 mg, Oral, Q12H [facility], Nell Aguirre MD    Current Outpatient Medications:   •  albuterol (PROVENTIL HFA,VENTOLIN HFA) 90 mcg/act inhaler, Inhale 2 puffs every 4 (four) hours as needed for wheezing or shortness of breath, Disp: 18 g, Rfl: 5  •  aspirin 81 mg chewable tablet, Chew 1 tablet (81 mg total) daily, Disp: 90 tablet, Rfl: 0  •  atorvastatin (LIPITOR) 80 mg tablet, Take 1 tablet (80 mg total) by mouth every evening, Disp: 90 tablet, Rfl: 0  •  bumetanide (BUMEX) 1 mg tablet, Take 1 tablet (1 mg total) by mouth daily, Disp: 30 tablet, Rfl: 6  •  calcitriol (ROCALTROL) 0 25 mcg capsule, Take 1 capsule (0 25 mcg total) by mouth daily (Patient taking differently: Take 0 25 mcg by mouth daily Takes Monday Wednesday and fridays as of 11/11/22), Disp: 36 capsule, Rfl: 4  •  Diclofenac Sodium (VOLTAREN) 1 %, Apply 2 g topically 4 (four) times a day, Disp: 150 g, Rfl: 2  •  ezetimibe (ZETIA) 10 mg tablet, Take 1 tablet (10 mg total) by mouth in the morning , Disp: 30 tablet, Rfl: 5  •  isosorbide mononitrate (IMDUR) 30 mg 24 hr tablet, Take 1 tablet (30 mg total) by mouth daily, Disp: 30 tablet, Rfl: 11  •  lidocaine (Lidoderm) 5 %, Apply 2 patches topically daily Remove & Discard patch within 12 hours or as directed by MD, Disp: 30 patch, Rfl: 0  •  LORazepam (ATIVAN) 0 5 mg tablet, Take 1 tablet (0 5 mg total) by mouth 3 (three) times a day as needed for anxiety, Disp: 90 tablet, Rfl: 0  •  methocarbamol (ROBAXIN) 500 mg tablet, Take 1 tablet (500 mg total) by mouth 2 (two) times a day as needed for muscle spasms, Disp: 40 tablet, Rfl: 0  •  metoprolol tartrate (LOPRESSOR) 100 mg tablet, Take 1 tablet (100 mg total) by mouth 2 (two) times a day, Disp: 60 tablet, Rfl: 5  •  nitroglycerin (NITROSTAT) 0 4 mg SL tablet, Place 1 tablet (0 4 mg total) under the tongue every 5 (five) minutes as needed for chest pain, Disp: 25 tablet, Rfl: 5  •  omeprazole (PriLOSEC) 40 MG capsule, Take 1 capsule (40 mg total) by mouth daily, Disp: 30 capsule, Rfl: 0  •  QUEtiapine (SEROquel) 50 mg tablet, take 1 tablet by mouth at bedtime, Disp: 30 tablet, Rfl: 1  •  sertraline (ZOLOFT) 100 mg tablet, Take 1 5 tablets (150 mg total) by mouth daily, Disp: 45 tablet, Rfl: 1    REVIEW OF SYSTEMS:  All the systems were reviewed and were negative except as documented on the HPI      PHYSICAL EXAM:  Current Weight:    First Weight:    Vitals:    01/03/23 0500 01/03/23 0557 01/03/23 0600 01/03/23 0800   BP: 98/57  104/56 119/69   BP Location: Right arm  Right arm    Pulse: 64  60 62   Resp: 16  12 16   Temp:  98 1 °F (36 7 °C)     TempSrc:  Oral     SpO2: 98%  99% 97%     No intake or output data in the 24 hours ending 01/03/23 0841    General: Morbidly obese conscious, cooperative, in not acute distress  Eyes: conjunctivae pink, anicteric sclerae  ENT: lips and mucous membranes moist  Neck: supple, no JVD  Chest: clear breath sounds bilateral, no crackles, ronchus or wheezings  CVS: distinct S1 & S2, normal rate, regular rhythm  Abdomen: Obese, non-tender, non-distended, normoactive bowel sounds  Extremities: no significant edema of both legs  Skin: no rash  Neuro: awake, alert, oriented        Invasive Devices:        Lab Results:   Results from last 7 days   Lab Units 01/03/23  0531 01/03/23  0429 01/02/23  0808   WBC Thousand/uL 16 52*  --  17 02*   HEMOGLOBIN g/dL 11 9  --  13 3 HEMATOCRIT % 38 5  --  40 6   PLATELETS Thousands/uL 272  --  287   SODIUM mmol/L  --  140 136   POTASSIUM mmol/L  --  4 3 3 8   CHLORIDE mmol/L  --  116* 107   CO2 mmol/L  --  18* 19*   BUN mg/dL  --  30* 24   CREATININE mg/dL  --  2 51* 2 58*   CALCIUM mg/dL  --  9 2 9 6   MAGNESIUM mg/dL  --   --  2 0   ALK PHOS U/L  --   --  83   ALT U/L  --   --  22   AST U/L  --   --  13       Other Studies:   Chest x-ray on 01/02/2023, no effusions, clear lungs      Portions of the record may have been created with voice recognition software  Occasional wrong word or "sound a like" substitutions may have occurred due to the inherent limitations of voice recognition software  Read the chart carefully and recognize, using context, where substitutions have occurred  If you have any questions, please contact the dictating provider

## 2023-01-03 NOTE — PROGRESS NOTES
PROGRESS NOTE - Family Medicine Residency Sonya Wong 1975, 41 y o  female  MRN: [id number]    Unit/Bed#: [facility] 530-01 Encounter: [id number]  Primary Care Provider: Divya Stewart DO      Admission Date: 1/2/2023  Length of Stay: 1 days  Code Status:  Level 1 - Full Code  Diet: Diet Cardiovascular; Cardiac  Consult:   IP CONSULT TO CARDIOLOGY  IP CONSULT TO NEPHROLOGY      Assessment & Plan:     Discussed with [facility] team and finalization is pending attending physician attestation  * Non-ST elevation MI (NSTEMI) [facility])  Assessment & Plan  Patient presented to [address] [place] [address] ED on 1/2/2023 for c/o tight/squeezing mid-sternal chest pain radiating to left shoulder that woke her up at 4 AM PTA, which feels similar to prior MI  Also endorses associated nausea, diaphoresis, and shortness of breath  Patient took SL NTG x2 and ASA 325mg at home without relief  - Pmhx MI s/p GREG to RCA and RPDA 3/25/21, sinus pause s/p dual-chamber permanent pacemaker in place, CHF (EF 45%), MCHC, HTN, and HLD  - 7/26/21 Stress ECHO: LVEF 65%, moderate hypokinesis of the basal-mid inferior wall  There was no echocardiographic evidence for stress-induced ischemia   - Home Regimen: ASA 81 mg QD, Atorvastatin 80mg QD, Lopressor 100mg BID, and Imdur 30mg QD  - 1/2 Troponin 138 > 1307 > 6804 > [place] at [address]  - 1/2 CXR: negative  - 1/2 EKG: NSR with occasional PVC's, prior inferior and anterior infarct, inverted T-waves in lateral leads, no STEMI    - 1/2 ECHO: LVEF 45%, mildly reduced systolic function, hypokinesis of the inferior and inferolateral walls, mod dilated LA, mild AR/MR/TR   - ED treatment: NTG, Metoprolol, UFH  - Cardiology at [place]: Type-1 NSTEMI, rec'd c/w ASA 81mg QD, Brilinta 90mg BID, metoprolol 100mg BID, NTG PRN  - Transferred to \[facility]\"" for non-emergent cardiac intervention   - 1/3 LHC showed 100% occlusion of mid LAD S/P GREG placement on 1/3/22 with complete resolution      Assessment:   - LAD occlusion of mid LAD s/p GREG on 1/3/22    Plan:  - Telemetry  - Continue UFH ACS low  - Continue with ASA 81mg QD, Brilinta 90mg BID, metoprolol 100mg BID  - SL NTG PRN for chest pain  - Dilaudid 0 5mg IV Q4H PRN for chest pain  - STAT Cardiology consulted - appreciate recommendations    - SD2 level of care    Results from last 7 days   Lab Units 01/02/23 2000 01/02/23  1459 01/02/23  1150 01/02/23  0936 01/02/23  0808   HS TNI 0HR ng/L  --   --   --   --  138*   HS TNI 2HR ng/L  --   --   --  1,307*  --    HS TNI 4HR ng/L  --   --  8,866*  --   --    HS TNI RAND ng/L >22,973*   < >  --   --   --     < > = values in this interval not displayed  Chronic diastolic congestive heart failure (HCC)  Assessment & Plan  H/O HFrEF  - Home regimen: Bumex 1 mg daily, Imdur 30mg daily  - 1/2 EKG: NSR with occasional PVC's, prior inferior and anterior infarct, inverted T-waves in lateral leads, no STEMI    - 1/2 ECHO: LVEF 45%, mildly reduced systolic function, hypokinesis of the inferior and inferolateral walls, mod dilated LA, mild AR/MR/TR  Assessment:  - Not in clinical fluid-overloaded state on exam as of 1/3/2023  - Weight has been stable since last measurement (126kg on 7/14/22)    Plan:  - Continue home regimen  - Cardiac diet with 1500cc/day fluid restriction  - Daily weight, strict I&O    CKD (chronic kidney disease) stage 4, GFR 15-29 ml/min [facility])  Assessment & Plan  Initial sCr 2 58 on admission  - Baseline sCr 2 3-2 5  Lab Results   Component Value Date    CREATININEUR 106 0 05/21/2022    CREATININEUR 106 0 05/21/2022    Clementine Chinchilla 1,540 0 (H) 05/21/2022    MICROALBCRE 1,453 (H) 05/21/2022     Assessment:  - Stable CKD-4 with severe macroalbuminuria, 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  - Estimated Creatinine Clearance: 37 7 mL/min (A) (by C-G formula based on SCr of 2 51 mg/dL (H))      Plan:  - Trend daily renal function  - Nephrology consulted    Results from last 7 days   Lab Units 01/03/23  0429 01/02/23  0808   BUN mg/dL 30* 24   CREATININE mg/dL 2 51* 2 58*   EGFR ml/min/1 73sq m 22 21           COPD (chronic obstructive pulmonary disease) ([facility])  Assessment & Plan  Continue home albuterol 2 puffs q4 PRN  No wheezing or crackles on exam     ELIAZAR (obstructive sleep apnea)  Assessment & Plan  Continue home CPAP QHS  Mixed hyperlipidemia  Assessment & Plan  Continue home Atorvastatin 80mg QD and Zetia 10mg QD  Lab Results   Component Value Date    CHOLESTEROL 206 (H) 01/03/2023    TRIG 348 (H) 01/03/2023    HDL 31 (L) 01/03/2023    LDLCALC 105 (H) 01/03/2023       Anxiety  Assessment & Plan  Current mood stable  Continue home Zoloft 150mg QD and Ativan 0 5mg TID PRN for anxiety  Seroquel 50mg QHS for insomnia  Tobacco abuse  Assessment & Plan  Current active smoker, approx 1/2 pack per day since her 25s   -  on smoking cessation  - Continue NRT while inpatient    Essential hypertension  Assessment & Plan  Initial /85  - H/O HTN  - Home regimen: Metoprolol 100mg BID    Assessment:   - Initial elevated pressure likely secondary CP and anxiety  - Most recent Blood Pressure: 769/33  - Systolic (17LVC), UPX:713 , Min:74 , Max:165     Plan:  - Continue home regimen  - Vitals per unit routine      Principal Problem:    Non-ST elevation MI (NSTEMI) ([facility] [address] )  Active Problems:    Chronic diastolic congestive heart failure ([facility])    CKD (chronic kidney disease) stage 4, GFR 15-29 ml/min ([facility])    COPD (chronic obstructive pulmonary disease) ([facility])    Essential hypertension    Tobacco abuse    Anxiety    Mixed hyperlipidemia    ELIAZAR (obstructive sleep apnea)    S/P drug eluting coronary stent placement      VTE Pharm PPX: Heparin  VTE Mech PPX: sequential compression device and/or foot pump applied unless otherwise contraindicated    Subjective     Hospital Course & 24hr events:     Hospital course:  41 y o  female admitted 1/2/2023, now HD# 1, for ACS evaluation and possible cardiac intervention   Cardiology was consulted STAT upon [facility] and patient was deemed appropriated for SD2 per cardiology  Troponin continued to increase and maxed out at 22,973 with EKG showing new LBBB  No intervention was performed overnight  Patient continued to experience CP and subjective SOB  She was treated with heaprin drip, dilaudid, lopressor 100mg, NTG PRN  1/3/23 LHC showed 100% occlusion of mid LAD S/P GREG placement on 1/3/22 with complete resolution  1/2/23:  1359: notified by ED nurse of patient's arrival  [phone]: STAT consult to Cardiology  [phone]: TT "YPE-Ardylozwez-Mmwbugtk Call" without anyone on duty  1406: TT "NUJ-Dszsnladgz-NSM Day Call" to identify on-call cardiology  1410: Patient seen and evaluated at bedside, ordered UF gtt and Dilaudid  1411: Received response from "CGY-Pfmmxjbnsv-GCU Day Call" that Dr Sonam Franco is on-call for cardiology  1413: TT Dr Lesly Moscoso regarding patient, rec'd admission to [facility] under CCU  1426: TT [facility] attending, Brant Perera, and notified that "There is nothing different that we are going to do at a SD1 level of care at this time vs SD2  The cardiology fellow can speak with me directly if he is concerned  "  1430: TT Dr Lesly Moscoso, who is agreeable to admit patient to [place] [address]  Dr Lesly Moscoso also aware of elevated troponin from 138 to 6804 at 4 hour semaj  Dr Lesly Moscoso states patient is not candidate for [facility] today per Dr Higgins [place]  1540 notified Dr Lesly Moscoso that BP 88/62, and 92/56 on recheck  Dr Lesly Moscoso requested repeat EKG and Troponin Q2H until 8pm   1620: TT Dr Lesly Moscoso that Trop 68445 and forwarded new EKG  1855: TT Dr Lesly Moscoso that Trop >10007 and forwarded new EKG  1908: TT from Dr Haylee Connors regarding patient's worsening chest pain, rising Troponin, and right axis deviation  TT immediately forwarded to Dr Lesly Moscoso  1920: TT Dr Lesly Moscoso regarding worsening chest pain, who states that "everyone is aware of her situation and do not want to do anything tonight"    1932: TT from Dr Grace Ards to stop trending Troponin/EKG     1/3/23:  1126: LHC showed 100% occlusion of mid LAD S/P GREG placement on 1/3/22 with complete resolution  Overnight/24hr events:  Discussed with overnight team at sign-out and nursing staff and patient was seen and examined at bedside sleeping with cPAP in place  No acute distress at this time  Review of Systems:     Review of Systems   Constitutional: Negative for chills and fever  HENT: Negative for ear pain and trouble swallowing  Eyes: Negative for pain, discharge and redness  Respiratory: Positive for chest tightness and shortness of breath  Cardiovascular: Positive for chest pain  Gastrointestinal: Negative for abdominal pain  Endocrine: Negative for cold intolerance and heat intolerance  Genitourinary: Negative for dysuria and hematuria  Musculoskeletal: Positive for arthralgias (Left shoulder/arm pain  )  Negative for myalgias  Skin: Negative for rash  Neurological: Positive for headaches  Negative for seizures and syncope  Psychiatric/Behavioral: Negative for behavioral problems  Objective     Vitals:     Vitals:    01/03/23 1300 01/03/23 1330 01/03/23 1345 01/03/23 1400   BP: 100/60 103/58 103/61 104/60   BP Location:       Pulse: 60 65 64 64   Resp:       Temp:       TempSrc:       SpO2: 98% 98% 96% 97%     Temp:  [98 1 °F (36 7 °C)] 98 1 °F (36 7 °C)  HR:  [58-71] 64  Resp:  [12-29] 16  BP: ()/(49-74) 104/60  Weight (last 2 days)     None        No intake or output data in the 24 hours ending 01/03/23 1451  Invasive Devices     Peripheral Intravenous Line  Duration           Peripheral IV 05/17/22 Left Antecubital 230 days    Peripheral IV 01/02/23 Left Antecubital 1 day    Peripheral IV 01/02/23 Right Antecubital 1 day                  Labs: I have personally reviewed pertinent reports        Results from last 7 days   Lab Units 01/03/23  0531 01/02/23  0808   WBC Thousand/uL 16 52* 17 02*   HEMOGLOBIN g/dL 11 9 13 3   HEMATOCRIT % 38 5 40  6   PLATELETS Thousands/uL 272 287   NEUTROS ABS Thousands/µL  --  13 82*       Results from last 7 days   Lab Units 01/03/23  0429 01/02/23  0808   POTASSIUM mmol/L 4 3 3 8   CHLORIDE mmol/L 116* 107   CO2 mmol/L 18* 19*   BUN mg/dL 30* 24   CREATININE mg/dL 2 51* 2 58*   CALCIUM mg/dL 9 2 9 6   AST U/L  --  13   ALT U/L  --  22   ALK PHOS U/L  --  83   EGFR ml/min/1 73sq m 22 21   MAGNESIUM mg/dL  --  2 0                    EKG, Pathology, Imaging, and Other Studies:   I have personally reviewed pertinent reports  X-ray chest 1 view portable    Result Date: 1/2/2023  Impression: No acute cardiopulmonary findings  COMPARISON:  None EXAM PERFORMED/VIEWS:  XR CHEST PORTABLE FINDINGS: Cardiomediastinal silhouette appears unremarkable  The lungs are clear  No pneumothorax or pleural effusion  Osseous structures appear within normal limits for patient age  IMPRESSION: No acute cardiopulmonary disease  Workstation performed: [id number]     Echo complete w/ contrast if indicated    Addendum Date: 1/2/2023    •  Left Ventricle: Left ventricular cavity size is normal  Wall thickness is increased  The left ventricular ejection fraction is 45%  Systolic function is mildly reduced  While study is technically difficult and limited there does appear to be hypokinesis of the inferior and inferolateral walls  •  Left Atrium: The atrium is moderately dilated  •  Aortic Valve: There is mild regurgitation  •  Mitral Valve: There is mild regurgitation  •  Tricuspid Valve: There is mild regurgitation  •  Aorta: The aortic root is normal in size  The ascending aorta is mildly dilated  The aortic root is 3 00 cm  The ascending aorta is 4 3 cm           Meds/Allergies     All medications and allergies reviewed  No Known Allergies    Continuous Infusions:  sodium chloride, 50 mL/hr, Last Rate: 50 mL/hr (01/03/23 1340)        Scheduled Meds:  Current Facility-Administered Medications   Medication Dose Route Frequency Provider Last Rate   • aspirin  81 mg Oral Daily Bernard Roy MD     • atorvastatin  80 mg Oral QPM Bernard Roy MD     • bumetanide  1 mg Oral Daily Bernard Roy MD     • [START ON 1/4/2023] clopidogrel  75 mg Oral Daily Deepthi Inman MD     • ezetimibe  10 mg Oral Daily Bernard Roy MD     • isosorbide mononitrate  30 mg Oral Daily Bernard Roy MD     • LORazepam  0 5 mg Oral TID PRN Bernard Roy MD     • metoprolol tartrate  100 mg Oral BID Bernard Roy MD     • nicotine  1 patch Transdermal Daily Bernard Roy MD     • nitroglycerin  0 4 mg Sublingual Q5 Min PRN Bernard Roy MD     • pantoprazole  40 mg Oral Early Morning Serg August MD     • QUEtiapine  50 mg Oral HS Bernard Roy MD     • sertraline  150 mg Oral Daily Bernard Roy MD     • sodium chloride  50 mL/hr Intravenous Continuous JALIL Seymour 50 mL/hr (01/03/23 1340)       PRN Meds:  •  LORazepam  •  nitroglycerin      Physical Exam   Physical Exam  Vitals and nursing note reviewed  Constitutional:       General: She is in acute distress  Appearance: Normal appearance  She is well-developed  She is obese  She is ill-appearing and diaphoretic  She is not toxic-appearing  HENT:      Head: Normocephalic and atraumatic  Right Ear: External ear normal       Left Ear: External ear normal       Nose: Nose normal       Mouth/Throat:      Mouth: Mucous membranes are moist    Eyes:      General:         Right eye: No discharge  Left eye: No discharge  Extraocular Movements: Extraocular movements intact  Cardiovascular:      Rate and Rhythm: Normal rate  Pulses: Normal pulses  Heart sounds: Murmur heard  Pulmonary:      Effort: Respiratory distress present  Breath sounds: Normal breath sounds  No stridor  No wheezing, rhonchi or rales  Abdominal:      General: There is no distension  Musculoskeletal:         General: Normal range of motion        Cervical back: Normal range of motion and neck supple  Skin:     General: Skin is warm  Neurological:      General: No focal deficit present  Mental Status: She is alert  Mental status is at baseline     Psychiatric:         Mood and Affect: Mood normal          Behavior: Behavior normal               Angel Issa DO  PGY-1, [facility]  01/03/23, 2:51 PM

## 2023-01-03 NOTE — CONSULTS
Reason for Consult / Principal Problem: ACS    Physician Requesting Consult:  Justino Siegel DO    Cardiologist: Dr Fajardo Standard and Plan      Current Problem List   Principal Problem:    Non-ST elevation MI (NSTEMI) [facility])  Active Problems:    Essential hypertension    Tobacco abuse    Chronic diastolic congestive heart failure ([facility])    Anxiety    Mixed hyperlipidemia    COPD (chronic obstructive pulmonary disease) ([facility])    ELIAZAR (obstructive sleep apnea)    CKD (chronic kidney disease) stage 4, GFR 15-29 ml/min ([facility])    S/P drug eluting coronary stent placement    Assessment/Plan:    1  Acute Coronary Syndrome - NSTEMI  PMHx MI 2021 with GREG RCA and RPDA, sinus pause s/p dual-chamber permanent pacemaker, CHF (EF 45%), MCHC, and CAD with PCI 2014  Home medications ASA 81 mg qd, statin 80 mg qd, nitro prn  · Current presentation: chest pain starting on 01/02 in the am  Minimal response to nitro x2 and  at home  In ED given nitro, dilaudid, Brilinta, metoprolol  · ECG 01/02: NSR with PVCs, no STEMI, prior inferior and anterior infarct, inverted T-waves in lateral leads  · ECHO 01/02: LVEF 45%, mildly reduced systolic function, hypokinesis of inferior and inferolateral walls, moderate dilation of LA, mild AR/MR/TR  Previous stress ECHO in 2021 EF 65% with moderate hypokinesis of basal-mid inferior wall  · 01/02 Troponin 138 > 1307 > 6804  · 01/03 >22,073  · 01/03 Patient taken to the cath lab: PCI to mid LAD with 100% stenosis  Rheolytic thrombectomy performed, RGEG placed  · In procedure had elevated LVEDP (20-30 mmHg), was given furosemide 20 mg   · Following procedure on re-evaluation during rounding, had persistent non-positional 4/10 chest pain radiating down back of left arm  Plan:  · Telemetry  · Continue monitoring vitals  · Continue ASA 81 qd, Brilinta 90 mg BID, metoprolol 100 mg bid, SL NTG prn for chest pain  · 1/2" nitroglycerin paste for current persistent chest pain  Will consider low dose of dilaudid if pain persists if BP permits  · Prolonged DAPT with ASA + Clopidogrel    2  Chronic Diastolic Heart Failure  · History of HFrEF, home regimen Bumex 1 mg qd, Imdur 30mg qd  Weight has been stable since last measurement  · On exam does not appear volume overloaded  · ECG 01/02: NSR with PVCs, prior inferior and anterior infarct, inverted T-waves in lateral leads  · ECHO 01/02: LVEF 45%, mildly reduced systolic function, hypokinesis of inferior and inferolateral walls, moderate dilation of LA, mild AR/MR/TR  Plan:   · Continue home regimen  · Cardiac diet with 1500 cc/day fluid restriction  · Daily weights, strict I/O    3  CKD stage 4  · Stable  Baseline Cr 2 3-2 5  · Cr on admission 2 58  Plan:   · Avoid nephrotoxic medications  · Trend renal function  · Nephrology recommendations appreciated    4  HTN  · History of HTN  Baseline BP in 120/80's per chart review  Home Metoprolol 100 mg BID  · Initial BP on presentation 175/85  Intermittent low blood pressures during stay, lowest 74/49 (MAP 57) with average in the 110's/60's (MAP 70s)  · Most recent /61  Plan:  · Continue monitoring blood pressures  · Continue home metoprolol, hold if low    5  HLD  · On Atorvastatin 80 mg qd and Zetia 10 mg qd at home  · Continue home mediations while admitted  6  COPD  · Stable  On Albuterol 2 puffs q4 prn  · Continue home medications while admitted  7  ELIAZAR  · Stable  CPAP QHS  · Continue home CPAP    7  Tobacco use  ·  Current active smoker, approximately 1/2 pack/day since 20's  ·  on smoking cessation      Subjective     CC: Chest pain    HPI: Lilia Wood 41y o  year old female with PMHx of MI with right PDA stent placement in 2021, CAD with PCI and RCA and LAD in 2014, Post MI pericarditis, Pacemaker placed 5/2021, Hypertrophic cardiomyopathy, HTN, HLD, history of Non-Hodgkin Lymphoma, and tobacco use who presents for chest pain, with cardiology consult for ACS     Patient presented to [facility] on 1/2 after chest pain began at 4 am, reporting it felt the same as the last time she had an MI  Described as "tight" "squeezing" pain starting in the middle of her chest and radiating to the left shoulder, shoulder blade and arm  She also had associated nausea, diaphoresis and shortness of breath  Denied abdominal pain  Took two doses of nitro, first dose resulted in slight improvement of symptoms, second dose did not improve symptoms  She also took 325 of aspirin  In the ED she was given nitro, dilaudid, metoprolol, Brilinta and Ativan  Heparin infusion initiated in ED  Due to presentation, and uptrending Troponin, patient was transferred to [facility] for definitive care  During admission, vitals were stable with intermittent hypotension, lowest to 74/49, with average in the 110/60's  Troponin in ED was elevated, and on admission was >22,973  ECG showed NSR, no STEMI, and non-specific ECG changes  ECHO showed LVEF 45% with inferior and inferolateral hypokinesis, new from previous ECHO in 2021  Patient was seen in the [place] ED  Currently patient reports 5/10 "squeezing" chest pain  She currently denies shortness of breath, palpitations, orthopnea, PND, pedal edema, syncope, presyncope, diaphoresis, nausea/vomiting  Remainder of ROS done and negative    Once patient was roomed following cath, she reported having persistent 4/10 non-positional chest pain radiating to her left shoulder  Reported it did not improve with SL nitro  No other new complaints on re-evaluation  Overall feeling improved from presentation to ED  Physical exam unchanged  Weight: 122 kg (270 lb) on admission  EKG:  ECG 01/02: NSR with PVCs, no STEMI, prior inferior and anterior infarct, inverted T-waves in lateral leads  ECHO:  01/02: ECHO 01/02: LVEF 45%, mildly reduced systolic function, hypokinesis of inferior and inferolateral walls, moderate dilation of LA, mild AR/MR/TR   Previous stress ECHO in 2021 EF 65% with moderate hypokinesis of basal-mid inferior wall  7/26/21: Stress ECHO: LVEF 65%, moderate hypokinesis of basal-mid inferior wall  No echocardiographic evidence for stress-induced ischemia  Cardiac Catheterization:  01/03:   "•  Successful PCI to mid % culprit req   rheolytic thrombectomy (progressed when compared to 4-2-21 study); given atretic distal LAD, 2 25 x 15 mm GREG was deployed from LAD into more sizable D2  •  Residual prox LCx 60% lesion with patent LAD, mid RCA & PDA stents  •  Elevated LVEDP (20-30 mmHg) with no LV-Ao gradient on pullback  •  R radial a  accessed but unable to advance equipment, likely occluded; RFA arteriotomy employed"    CHEST X-RAY: No acute cardiopulmonary disease    Labs:   Troponin      Results from last 7 days   Lab Units 01/02/23 2000 01/02/23  1841 01/02/23  1656 01/02/23  1459   HS TNI RAND ng/L >22,973* >22,973* >22,973* 19,307*        Family History:   Family History   Problem Relation Age of Onset   • No Known Problems Mother    • No Known Problems Father    • No Known Problems Daughter    • Brain cancer Maternal Grandfather    • Heart disease Maternal Grandfather    • Cancer Maternal Grandfather    • No Known Problems Paternal Aunt    • No Known Problems Paternal Aunt      Historical Information   Past Medical History:   Diagnosis Date   • Acute MI ([facility] )    • Anemia    • Anxiety    • CAD (coronary artery disease)     GREG mid RCA and GREG right PDA 3/25/2021   • Cancer ([facility] ) 2008   • Cardiomyopathy ([facility] )    • CHF (congestive heart failure) ([facility])    • Chronic kidney disease    • COPD (chronic obstructive pulmonary disease) ([facility] )     scheduled for sleep apnea test soon after pacemaker implant   • Coronary artery disease    • Depression    • History of chemotherapy     non hodgkins lymphoma 2008   • Hyperlipemia    • Hypertension    • Hypertrophic cardiomyopathy (HCC)    • Lymphoma, non-Hodgkin's (HCC)    • Myocardial infarction [facility])    • Obesity    • SSS (sick sinus syndrome) ([address] )     s/p medtronic dual chamber pacemaker 2021   • Tobacco abuse    • Ventricular tachycardia, non-sustained    • Wears glasses      Past Surgical History:   Procedure Laterality Date   • CARDIAC PACEMAKER PLACEMENT Left 2021    Procedure: DUAL LEAD PACEMAKER IMPLANT;  Surgeon: Lesly Villatoro MD;  Location: [address] [place] [address] MAIN OR;  Service: Cardiology   • CAROTID STENT     •  SECTION     • CORONARY ANGIOPLASTY WITH STENT PLACEMENT  2021    GREG mid RCA and GREG right PDA   • DENTAL SURGERY     • IR BIOPSY KIDNEY RANDOM  10/18/2021     Social History   Social History     Substance and Sexual Activity   Alcohol Use Yes    Comment: 1-2 times years     Social History     Substance and Sexual Activity   Drug Use No     Social History     Tobacco Use   Smoking Status Every Day   • Packs/day:    • Years:    • Pack years:    • Types: Cigarettes   Smokeless Tobacco Never   Tobacco Comments    Recently and currently quitting cigarettes     Family History:   Family History   Problem Relation Age of Onset   • No Known Problems Mother    • No Known Problems Father    • No Known Problems Daughter    • Brain cancer Maternal Grandfather    • Heart disease Maternal Grandfather    • Cancer Maternal Grandfather    • No Known Problems Paternal Aunt    • No Known Problems Paternal Aunt      Review of Systems:  Review of Systems   Constitutional: Negative for diaphoresis and unexpected weight change  HENT: Negative for hearing loss, sinus pressure, sneezing, tinnitus, trouble swallowing and voice change  Eyes: Negative for visual disturbance  Respiratory: Negative for cough, shortness of breath and wheezing  Cardiovascular: Positive for chest pain (5/10 squeezing)  Negative for palpitations and leg swelling  Gastrointestinal: Negative for abdominal distention, abdominal pain, blood in stool, constipation, diarrhea, nausea and vomiting  Endocrine: Negative for polyuria  Genitourinary: Negative for difficulty urinating, dysuria, flank pain, frequency, hematuria and urgency  Musculoskeletal: Negative for arthralgias, back pain, neck pain and neck stiffness  Skin: Negative for color change, pallor and rash  Neurological: Negative for dizziness, weakness, light-headedness and headaches  Psychiatric/Behavioral: Negative for agitation, behavioral problems and confusion         Scheduled Meds:  Current Facility-Administered Medications   Medication Dose Route Frequency Provider Last Rate   • aspirin  81 mg Oral Daily Jackson Lockwood MD     • atorvastatin  80 mg Oral QPM Jackson Lockwood MD     • bumetanide  1 mg Oral Daily Jackson Lockwood MD     • [START ON 2023] clopidogrel  75 mg Oral Daily Britney Patel MD     • ezetimibe  10 mg Oral Daily Jackson Lockwood MD     • [START ON 2023] heparin (porcine)  5,000 Units Subcutaneous [facility] Kylie Mckeon DO     • isosorbide mononitrate  30 mg Oral Daily Jackson Lockwood MD     • LORazepam  0 5 mg Oral TID PRN Jackson Lockowod MD     • metoprolol tartrate  100 mg Oral BID Jackson Lockwood MD     • nicotine  1 patch Transdermal Daily Jackson Lockwood MD     • nitroglycerin  0 4 mg Sublingual Q5 Min PRN Jackson Lockwood MD     • pantoprazole  40 mg Oral Early Morning Angela Vergara MD     • QUEtiapine  50 mg Oral HS Jackson Lockwood MD     • sertraline  150 mg Oral Daily Jackson Lockwood MD     • sodium chloride  50 mL/hr Intravenous Continuous JALIL Rao 50 mL/hr (23 1340)     Continuous Infusions:sodium chloride, 50 mL/hr, Last Rate: 50 mL/hr (23 1340)      PRN Meds: •  LORazepam  •  nitroglycerin  all current active meds have been reviewed    No Known Allergies    Objective   Vitals: Temp (24hrs), Av 1 °F (36 7 °C), Min:98 1 °F (36 7 °C), Max:98 1 °F (36 7 °C)  Current: Temperature: 98 1 °F (36 7 °C)  Patient Vitals for the past 24 hrs:   BP Temp Temp src Pulse Resp SpO2 01/03/23 1630 94/61 -- -- 63 -- 98 %   01/03/23 1600 91/56 -- -- 62 -- --   01/03/23 1530 97/56 -- -- 64 -- 97 %   01/03/23 1510 95/56 98 1 °F (36 7 °C) Oral 62 17 96 %   01/03/23 1430 99/56 -- -- 62 -- 99 %   01/03/23 1400 104/60 -- -- 64 -- 97 %   01/03/23 1345 103/61 -- -- 64 -- 96 %   01/03/23 1330 103/58 -- -- 65 -- 98 %   01/03/23 1300 100/60 -- -- 60 -- 98 %   01/03/23 1059 -- -- -- -- -- 98 %   01/03/23 0916 114/61 -- -- 71 -- --   01/03/23 0800 119/69 -- -- 62 16 97 %   01/03/23 0600 104/56 -- -- 60 12 99 %   01/03/23 0557 -- 98 1 °F (36 7 °C) Oral -- -- --   01/03/23 0500 98/57 -- -- 64 16 98 %   01/03/23 0400 115/65 -- -- 60 16 98 %   01/03/23 0330 127/69 -- -- 60 15 99 %   01/03/23 0230 110/74 -- -- 60 13 97 %   01/03/23 0200 91/51 -- -- 58 22 98 %   01/03/23 0147 (!) 85/52 -- -- -- -- --   01/03/23 0130 (!) 74/49 -- -- 58 14 98 %   01/03/23 0100 97/52 -- -- 62 12 96 %   01/03/23 0030 107/56 -- -- 60 15 96 %   01/03/23 0026 -- -- -- -- -- 94 %   01/03/23 0000 104/59 -- -- 60 13 94 %   01/02/23 2330 109/56 -- -- 60 14 94 %   01/02/23 2300 94/57 -- -- 60 16 93 %   01/02/23 2230 100/54 -- -- 58 16 96 %   01/02/23 2130 118/62 -- -- 60 15 97 %   01/02/23 2100 113/72 -- -- 60 13 95 %   01/02/23 2030 106/50 -- -- 60 13 97 %   01/02/23 2000 118/53 -- -- 62 20 98 %   01/02/23 1900 101/60 -- -- 58 17 97 %   01/02/23 1830 102/60 -- -- 58 16 97 %   01/02/23 1800 101/55 -- -- 60 -- 98 %   01/02/23 1730 101/67 -- -- 60 20 98 %   01/02/23 1701 94/64 -- -- 60 -- --    There is no height or weight on file to calculate BMI    Orthostatic Blood Pressures    Flowsheet Row Most Recent Value   Blood Pressure 94/61 filed at 01/03/2023 1630   Patient Position - Orthostatic VS Lying filed at 01/03/2023 0600              Invasive Devices     Peripheral Intravenous Line  Duration           Peripheral IV 05/17/22 Left Antecubital 230 days    Peripheral IV 01/02/23 Left Antecubital 1 day    Peripheral IV 01/02/23 Right Antecubital 1 day                Physical Exam:  Physical Exam  Vitals and nursing note reviewed  Constitutional:       General: She is not in acute distress  Appearance: She is well-developed  She is not diaphoretic  HENT:      Head: Normocephalic and atraumatic  Nose: No congestion  Mouth/Throat:      Mouth: Mucous membranes are moist       Pharynx: Oropharynx is clear  Eyes:      Extraocular Movements: Extraocular movements intact  Conjunctiva/sclera: Conjunctivae normal    Cardiovascular:      Rate and Rhythm: Normal rate and regular rhythm  Pulses: Normal pulses  Heart sounds: Normal heart sounds  No murmur heard  Pulmonary:      Effort: Pulmonary effort is normal  No respiratory distress  Breath sounds: Normal breath sounds  No wheezing, rhonchi or rales  Abdominal:      General: Abdomen is flat  Palpations: Abdomen is soft  Tenderness: There is no abdominal tenderness  Musculoskeletal:      Cervical back: Normal range of motion  Right lower leg: No edema  Left lower leg: No edema  Skin:     General: Skin is warm and dry  Capillary Refill: Capillary refill takes less than 2 seconds  Neurological:      General: No focal deficit present  Mental Status: She is alert and oriented to person, place, and time  Sensory: No sensory deficit  Motor: No weakness     Psychiatric:         Mood and Affect: Mood normal          Behavior: Behavior normal              Lab Results:   Results from last 7 days   Lab Units 01/03/23  0531 01/02/23  0808   WBC Thousand/uL 16 52* 17 02*   HEMOGLOBIN g/dL 11 9 13 3   HEMATOCRIT % 38 5 40 6   PLATELETS Thousands/uL 272 287   NEUTROS PCT %  --  81*   MONOS PCT %  --  6      Results from last 7 days   Lab Units 01/03/23  0429 01/02/23  2148 01/02/23  1424 01/02/23  0808   SODIUM mmol/L 140  --   --  136   POTASSIUM mmol/L 4 3  --   --  3 8   CHLORIDE mmol/L 116*  --   --  107   CO2 mmol/L 18*  --   --  19*   BUN mg/dL 30*  --   --  24   CREATININE mg/dL 2 51*  --   --  2 58*   CALCIUM mg/dL 9 2  --   --  9 6   ALK PHOS U/L  --   --   --  83   ALT U/L  --   --   --  22   AST U/L  --   --   --  13   MAGNESIUM mg/dL  --   --   --  2 0   INR   --   --  1 01 1 06   PTT seconds 31 34 30 28   EGFR ml/min/1 73sq m 22  --   --  21     Results from last 7 days   Lab Units 01/03/23  0429 01/02/23  2148 01/02/23  1424 01/02/23  0808   INR   --   --  1 01 1 06   PTT seconds 31 34 30 28             No results found for: PHART, WAM1LGK, PO2ART, NNV6NHV, O7YEQRKQ, BEART, SOURCE  No components found for: HIV1X2  Lab Results   Component Value Date    HEPBIGM Non-reactive 09/07/2021    HEPBCAB Non-reactive 09/07/2021    HBEAG <2 0 09/15/2020    HEPCAB Non-reactive 09/07/2021     Lab Results   Component Value Date    SPEP  09/15/2020     No monoclonal bands noted  Reviewed by: Neyda Rinaldi DO **Electronic Signature**    UPEP  09/15/2020     The UPEP shows non-selective proteinuria   No monoclonal bands noted  Reviewed by: Neyda Rinaldi DO **Electronic Signature**      Lab Results   Component Value Date    HGBA1C 6 1 09/22/2022    HGBA1C 5 8 05/23/2022    HGBA1C 5 3 07/08/2020     No results found for: CHOL   Lab Results   Component Value Date    HDL 31 (L) 01/03/2023    HDL 33 (L) 01/02/2023    HDL 29 (L) 08/23/2022      Lab Results   Component Value Date    LDLCALC 105 (H) 01/03/2023    LDLCALC 136 (H) 01/02/2023    LDLCALC 27 08/23/2022      Lab Results   Component Value Date    TRIG 348 (H) 01/03/2023    TRIG 324 (H) 01/02/2023    TRIG 399 (H) 08/23/2022     No components found for: PROCAL          Imaging: I have personally reviewed pertinent reports     and I have personally reviewed pertinent films in PACS

## 2023-01-03 NOTE — ED NOTES
Report attempted at this time, RN was off the floor  P5 RN was provided with ED phone number and will call back for report       Juan Montes RN  01/03/23 1391

## 2023-01-03 NOTE — ED NOTES
Patient refusing Brilinta at this time stating that it causes her to have difficulties breathing  Patient inquiring if she can take Plavix instead  MD made aware       Uvaldo ePrdue, RN  01/03/23 8460

## 2023-01-03 NOTE — ED NOTES
Dr Jaxon Holt came down to see pt  Notify provider if MAP less than 65        Yvette ELIZABETH [facility]  01/03/23 0216

## 2023-01-03 NOTE — DISCHARGE INSTRUCTIONS
1  Please see the post angioplasty discharge instructions  No heavy lifting, greater than 10 lbs  or strenuous activity for 5 days  Follow angioplasty discharge instructions  2 Remove band aid tomorrow  Shower and wash area- groin gently with soap and water- beginning tomorrow  Rinse and pat dry  Apply new water seal band aid  Repeat this process for 5 days  No powders, creams lotions or antibiotic ointments  for 5 days  No tub baths, hot tubs or swimming for 5 days  3  [facility] [address] [facility] ([phone]) if you develop a fever, redness or drainage at your groin access site  4  No driving for 2 days    5  Do not stop aspirin or Plavix (clopiogrel) any reason without a cardiologist’s consent, or the stent could block up and cause a heart attack  6  Stent card and book      7 Perclose Booklet

## 2023-01-04 LAB
ANION GAP SERPL CALCULATED.3IONS-SCNC: 11 MMOL/L (ref 4–13)
ATRIAL RATE: 136 BPM
ATRIAL RATE: 69 BPM
BUN SERPL-MCNC: 29 MG/DL (ref 5–25)
CALCIUM SERPL-MCNC: 8.9 MG/DL (ref 8.3–10.1)
CHLORIDE SERPL-SCNC: 114 MMOL/L (ref 96–108)
CO2 SERPL-SCNC: 16 MMOL/L (ref 21–32)
CREAT SERPL-MCNC: 2.56 MG/DL (ref 0.6–1.3)
ERYTHROCYTE [DISTWIDTH] IN BLOOD BY AUTOMATED COUNT: 18.2 % (ref 11.6–15.1)
GFR SERPL CREATININE-BSD FRML MDRD: 21 ML/MIN/1.73SQ M
GLUCOSE SERPL-MCNC: 81 MG/DL (ref 65–140)
HCT VFR BLD AUTO: 36.4 % (ref 34.8–46.1)
HGB BLD-MCNC: 11.3 G/DL (ref 11.5–15.4)
MCH RBC QN AUTO: 29 PG (ref 26.8–34.3)
MCHC RBC AUTO-ENTMCNC: 31 G/DL (ref 31.4–37.4)
MCV RBC AUTO: 93 FL (ref 82–98)
P AXIS: 43 DEGREES
P AXIS: 45 DEGREES
PLATELET # BLD AUTO: 256 THOUSANDS/UL (ref 149–390)
PMV BLD AUTO: 10.6 FL (ref 8.9–12.7)
POTASSIUM SERPL-SCNC: 3.7 MMOL/L (ref 3.5–5.3)
PR INTERVAL: 146 MS
PR INTERVAL: 172 MS
QRS AXIS: 41 DEGREES
QRS AXIS: 45 DEGREES
QRSD INTERVAL: 112 MS
QRSD INTERVAL: 94 MS
QT INTERVAL: 284 MS
QT INTERVAL: 418 MS
QTC INTERVAL: 427 MS
QTC INTERVAL: 447 MS
RBC # BLD AUTO: 3.9 MILLION/UL (ref 3.81–5.12)
SODIUM SERPL-SCNC: 141 MMOL/L (ref 135–147)
T WAVE AXIS: 102 DEGREES
T WAVE AXIS: 16 DEGREES
VENTRICULAR RATE: 136 BPM
VENTRICULAR RATE: 69 BPM
WBC # BLD AUTO: 10.5 THOUSAND/UL (ref 4.31–10.16)

## 2023-01-04 RX ORDER — HYDROMORPHONE HCL IN WATER/PF 6 MG/30 ML
0.2 PATIENT CONTROLLED ANALGESIA SYRINGE INTRAVENOUS EVERY 4 HOURS PRN
Status: DISCONTINUED | OUTPATIENT
Start: 2023-01-04 | End: 2023-01-05

## 2023-01-04 RX ADMIN — ACETAMINOPHEN 975 MG: 325 TABLET, FILM COATED ORAL at 21:05

## 2023-01-04 RX ADMIN — METOPROLOL TARTRATE 100 MG: 50 TABLET, FILM COATED ORAL at 18:19

## 2023-01-04 RX ADMIN — HYDROMORPHONE HYDROCHLORIDE 0.2 MG: 0.2 INJECTION, SOLUTION INTRAMUSCULAR; INTRAVENOUS; SUBCUTANEOUS at 13:52

## 2023-01-04 RX ADMIN — NICOTINE 1 PATCH: 14 PATCH, EXTENDED RELEASE TRANSDERMAL at 10:35

## 2023-01-04 RX ADMIN — QUETIAPINE FUMARATE 50 MG: 25 TABLET ORAL at 21:05

## 2023-01-04 RX ADMIN — ATORVASTATIN CALCIUM 80 MG: 80 TABLET, FILM COATED ORAL at 18:19

## 2023-01-04 RX ADMIN — EZETIMIBE 10 MG: 10 TABLET ORAL at 10:34

## 2023-01-04 RX ADMIN — METOPROLOL TARTRATE 100 MG: 50 TABLET, FILM COATED ORAL at 10:33

## 2023-01-04 RX ADMIN — CLOPIDOGREL BISULFATE 75 MG: 75 TABLET ORAL at 10:34

## 2023-01-04 RX ADMIN — ASPIRIN 81 MG: 81 TABLET, CHEWABLE ORAL at 10:34

## 2023-01-04 RX ADMIN — SERTRALINE HYDROCHLORIDE 150 MG: 50 TABLET ORAL at 10:33

## 2023-01-04 RX ADMIN — HEPARIN SODIUM 7500 UNITS: 5000 INJECTION INTRAVENOUS; SUBCUTANEOUS at 10:56

## 2023-01-04 RX ADMIN — BUMETANIDE 1 MG: 1 TABLET ORAL at 10:34

## 2023-01-04 RX ADMIN — HEPARIN SODIUM 7500 UNITS: 5000 INJECTION INTRAVENOUS; SUBCUTANEOUS at 21:07

## 2023-01-04 RX ADMIN — HYDROMORPHONE HYDROCHLORIDE 0.2 MG: 0.2 INJECTION, SOLUTION INTRAMUSCULAR; INTRAVENOUS; SUBCUTANEOUS at 23:01

## 2023-01-04 RX ADMIN — LORAZEPAM 0.5 MG: 0.5 TABLET ORAL at 10:55

## 2023-01-04 RX ADMIN — PANTOPRAZOLE SODIUM 40 MG: 40 TABLET, DELAYED RELEASE ORAL at 05:32

## 2023-01-04 RX ADMIN — ISOSORBIDE MONONITRATE 30 MG: 30 TABLET, EXTENDED RELEASE ORAL at 10:33

## 2023-01-04 RX ADMIN — HYDROMORPHONE HYDROCHLORIDE 0.2 MG: 0.2 INJECTION, SOLUTION INTRAMUSCULAR; INTRAVENOUS; SUBCUTANEOUS at 18:23

## 2023-01-04 NOTE — PROGRESS NOTES
Cardiology Progress note  Unit/Bed#: [facility] 530-01 Encounter: [id number]        Nestor Gauthier 41 y o  female [id number]  Hospital Stay Days: 2    Assessment and Plan      Current Problem List   Principal Problem:    Non-ST elevation MI (NSTEMI) [facility])  Active Problems:    Essential hypertension    Tobacco abuse    Chronic diastolic congestive heart failure ([facility])    Anxiety    Mixed hyperlipidemia    COPD (chronic obstructive pulmonary disease) ([facility])    ELIAZAR (obstructive sleep apnea)    CKD (chronic kidney disease) stage 4, GFR 15-29 ml/min ([facility])    S/P drug eluting coronary stent placement    Assessment/Plan:    1  ACS - NSTEMI  POD1 s/p GREG placement in mLAD diagonal branch  Post-op had persistent non-positional chest pain and was given nitroglycerin gel, and low dose dilaudid overnight with improvement of symptoms - now 2/10 with nitroglycerine patch, and Imdur  · PMHx: MI 2021 with GREG RCA and RPDA, sinus pause s/p dual-chamber permanent pacemaker, CHF (EF 45%), MCHC, and CAD with PCI 2014  Home medications ASA 81 mg qd, statin 80 mg qd, nitro prn  · Presentation: chest pain starting on 01/02 in the am  Minimal response to nitro x2 and  at home  In ED given nitro, dilaudid, Brilinta, metoprolol  Patient reported dyspnea with Brilinta, is now being managed with Plavix  · ECG 01/02: NSR with PVCs, no STEMI, prior inferior and anterior infarct, inverted T-waves in lateral leads  · ECHO 01/02: LVEF 45%, mildly reduced systolic function, hypokinesis of inferior and inferolateral walls, moderate dilation of LA, mild AR/MR/TR  Previous stress ECHO in 2021 EF 65% with moderate hypokinesis of basal-mid inferior wall  · 01/02 Troponin 138 > 1307 > 6804  · 01/03 >22,073  · 01/03 Patient taken to the cath lab: PCI to mid LAD with 100% stenosis  Rheolytic thrombectomy performed, GREG placed     · In procedure had elevated LVEDP (20-30 mmHg), was given furosemide 20 mg   Plan:  · Continue telemetry  · Continue monitoring vitals  · Continue ASA 81 qd  · Continue atorvastatin 80 mg qd  · Continue metoprolol 100 mg bid  · Continue Imdur 30 mg qd  · Continue Plavix 75 mg qd  · Continue SL NTG prn, and nitroglycerin patch for chest pain    2  Chronic Diastolic Heart Failure  History of HFrEF, home regimen Bumex 1 mg qd, Imdur 30mg qd  · On exam does not appear volume overloaded  · ECHO 01/02: LVEF 45%, mildly reduced systolic function, hypokinesis of inferior and inferolateral walls, moderate dilation of LA, mild AR/MR/TR  Plan:   · Continue home regimen  · Cardiac diet with 1500 cc/day fluid restriction  · Daily weights, strict I/O    3  CKD stage 4  · Stable  Baseline Cr 2 3-2 5  · Cr on admission 2 58, most recent 2 56  Plan:   · Avoid nephrotoxic medications  · Trend renal function  · Nephrology recommendations appreciated    4  HTN  History of HTN  Baseline BP in 120/80's per chart review  Home Metoprolol 100 mg BID  · Most recent /68, averaging in 100's/60's  · Initial BP on presentation 175/85  Intermittent low blood pressures during stay, lowest 74/49 (MAP 57)  Plan:  · Continue monitoring blood pressures  · Continue home metoprolol, hold if BP low    5  HLD  · On Atorvastatin 80 mg qd and Zetia 10 mg qd at home  · Continue home mediations while admitted  6  COPD  · Stable  On Albuterol 2 puffs q4 prn  · Continue home medications while admitted  7  ELIAZAR  · Stable  CPAP QHS  · Continue home CPAP    7  Tobacco use  ·  Current active smoker, approximately 1/2 pack/day since 20's  ·  on smoking cessation  Subjective     POD1 s/p cath with stent placement in mLAD diagonal branch  Overnight there were no acute events  Vital signs were stable with BP's continuing to trend in the 372'E systolic  Patient was seen at bedside, asleep upon entrance but easily arousable and oriented once woken  Patient reports improvement of post-op chest pain, now 2/10   Patient denies headaches, dizziness, chest pain, palpitations, shortness of breath, abdominal pain, nausea, vomiting, constipation, diarrhea, urinary frequency, dysuria, and new extremity weakness, swelling and pain  Objective     Vitals: Temp (24hrs), Av °F (36 7 °C), Min:97 7 °F (36 5 °C), Max:98 3 °F (36 8 °C)  Current: Temperature: 98 °F (36 7 °C)  Patient Vitals for the past 24 hrs:   BP Temp Temp src Pulse Resp SpO2 Weight   23 1100 130/72 98 °F (36 7 °C) Oral 74 18 -- --   23 0710 123/68 97 7 °F (36 5 °C) Oral 71 17 97 % --   23 0535 -- -- -- -- -- -- 126 kg (278 lb 3 5 oz)   23 0221 101/59 98 3 °F (36 8 °C) Oral 71 20 95 % --   23 2030 104/56 -- -- -- -- -- --   23 2024 109/58 98 1 °F (36 7 °C) Oral 68 21 96 % --   23 1700 101/54 -- -- 70 -- 97 % --   23 1630 94/61 -- -- 63 -- 98 % --   23 1600 91/56 -- -- 62 -- -- --   23 1530 97/56 -- -- 64 -- 97 % --   23 1510 95/56 98 1 °F (36 7 °C) Oral 62 17 96 % --   23 1430 99/56 -- -- 62 -- 99 % --   23 1400 104/60 -- -- 64 -- 97 % --    Body mass index is 44 91 kg/m²  Physical Exam:  Physical Exam  Vitals and nursing note reviewed  Constitutional:       General: She is not in acute distress  Appearance: She is well-developed  She is not ill-appearing or diaphoretic  HENT:      Head: Normocephalic and atraumatic  Nose: No congestion  Mouth/Throat:      Mouth: Mucous membranes are moist       Pharynx: Oropharynx is clear  Eyes:      Extraocular Movements: Extraocular movements intact  Conjunctiva/sclera: Conjunctivae normal    Cardiovascular:      Rate and Rhythm: Normal rate and regular rhythm  Pulses: Normal pulses  Heart sounds: Normal heart sounds  No murmur heard  Pulmonary:      Effort: Pulmonary effort is normal  No respiratory distress  Breath sounds: Normal breath sounds  Abdominal:      General: Abdomen is flat  Palpations: Abdomen is soft  Tenderness: There is no abdominal tenderness  Musculoskeletal:      Cervical back: Neck supple  Right lower leg: No edema  Left lower leg: No edema  Skin:     General: Skin is warm and dry  Capillary Refill: Capillary refill takes less than 2 seconds  Neurological:      Mental Status: She is alert and oriented to person, place, and time     Psychiatric:         Mood and Affect: Mood normal          Invasive Devices     Peripheral Intravenous Line  Duration           Peripheral IV 05/17/22 Left Antecubital 231 days    Peripheral IV 01/02/23 Left Antecubital 2 days    Peripheral IV 01/02/23 Right Antecubital 1 day                    Labs:   Results from last 7 days   Lab Units 01/04/23  0608 01/03/23  0531 01/02/23  0808   WBC Thousand/uL 10 50* 16 52* 17 02*   HEMOGLOBIN g/dL 11 3* 11 9 13 3   HEMATOCRIT % 36 4 38 5 40 6   PLATELETS Thousands/uL 256 272 287   NEUTROS PCT %  --   --  81*   MONOS PCT %  --   --  6      Results from last 7 days   Lab Units 01/04/23  0608 01/03/23 0429 01/02/23 2148 01/02/23 1424 01/02/23  0808   SODIUM mmol/L 141 140  --   --  136   POTASSIUM mmol/L 3 7 4 3  --   --  3 8   CHLORIDE mmol/L 114* 116*  --   --  107   CO2 mmol/L 16* 18*  --   --  19*   BUN mg/dL 29* 30*  --   --  24   CREATININE mg/dL 2 56* 2 51*  --   --  2 58*   CALCIUM mg/dL 8 9 9 2  --   --  9 6   ALK PHOS U/L  --   --   --   --  83   ALT U/L  --   --   --   --  22   AST U/L  --   --   --   --  13   MAGNESIUM mg/dL  --   --   --   --  2 0   INR   --   --   --  1 01 1 06   PTT seconds  --  31 34 30 28   EGFR ml/min/1 73sq m 21 22  --   --  21     Results from last 7 days   Lab Units 01/03/23 0429 01/02/23 2148 01/02/23 1424 01/02/23  0808   INR   --   --  1 01 1 06   PTT seconds 31 34 30 28             No results found for: PHART, RWZ4QGP, PO2ART, JUD2XCG, X2YNJOUB, BEART, SOURCE  No components found for: HIV1X2  Lab Results   Component Value Date    HEPBIGM Non-reactive 09/07/2021 HEPBCAB Non-reactive 09/07/2021    HBEAG <2 0 09/15/2020    HEPCAB Non-reactive 09/07/2021     Lab Results   Component Value Date    SPEP  09/15/2020     No monoclonal bands noted  Reviewed by: Carlos Navarro DO **Electronic Signature**    UPEP  09/15/2020     The UPEP shows non-selective proteinuria   No monoclonal bands noted  Reviewed by: Carlos Navarro DO **Electronic Signature**      Lab Results   Component Value Date    HGBA1C 6 1 09/22/2022    HGBA1C 5 8 05/23/2022    HGBA1C 5 3 07/08/2020     No results found for: CHOL   Lab Results   Component Value Date    HDL 31 (L) 01/03/2023    HDL 33 (L) 01/02/2023    HDL 29 (L) 08/23/2022      Lab Results   Component Value Date    LDLCALC 105 (H) 01/03/2023    LDLCALC 136 (H) 01/02/2023    LDLCALC 27 08/23/2022      Lab Results   Component Value Date    TRIG 348 (H) 01/03/2023    TRIG 324 (H) 01/02/2023    TRIG 399 (H) 08/23/2022     No components found for: PROCAL      Micro:      Urinalysis:  Lab Results   Component Value Date    BDZUR Negative 10/24/2019    COCAINEUR Negative 10/24/2019    OPIATEUR Negative 10/24/2019    PCPUR Negative 10/24/2019    THCUR Negative 10/24/2019          Invalid input(s): URIBILINOGEN        Intake and Outputs:  I/O       01/02 0701  01/03 0700 01/03 0701  01/04 0700 01/04 0701  01/05 0700    P  O   1216     I V  (mL/kg)  816 7 (6 5)     Total Intake(mL/kg)  2032 7 (16 1)     Urine (mL/kg/hr)  1550 (0 5)     Total Output  1550     Net  +482 7                Nutrition:  Diet Cardiovascular; Cardiac  Radiology Results:   No orders to display     Scheduled Medications:  aspirin, 81 mg, Daily  atorvastatin, 80 mg, QPM  bumetanide, 1 mg, Daily  clopidogrel, 75 mg, Daily  ezetimibe, 10 mg, Daily  heparin (porcine), 7,500 Units, Q8H ABIEL  isosorbide mononitrate, 30 mg, Daily  metoprolol tartrate, 100 mg, BID  nicotine, 1 patch, Daily  pantoprazole, 40 mg, Early Morning  QUEtiapine, 50 mg, HS  sertraline, 150 mg, Daily      PRN MEDS:  acetaminophen, 975 mg, Q8H PRN  HYDROmorphone, 0 2 mg, Q4H PRN  LORazepam, 0 5 mg, TID PRN  nitroglycerin, 0 4 mg, Q5 Min PRN      Last 24 Hour Meds: :   Medication Administration - last 24 hours from 01/03/2023 1357 to 01/04/2023 1357       Date/Time Order Dose Route Action Action by     01/04/2023 1034 EST aspirin chewable tablet 81 mg 81 mg Oral Given Moses Howell, SPEEDY     01/03/2023 1700 EST atorvastatin (LIPITOR) tablet 80 mg 80 mg Oral Given Sujit Marino, SPEEDY     01/04/2023 1034 EST bumetanide (BUMEX) tablet 1 mg 1 mg Oral Given Moses Howell, SPEEDY     01/04/2023 1034 EST ezetimibe (ZETIA) tablet 10 mg 10 mg Oral Given Moses Howell, SPEEDY     01/04/2023 1033 EST isosorbide mononitrate (IMDUR) 24 hr tablet 30 mg 30 mg Oral Given Moses Howell, RN     01/04/2023 1055 EST LORazepam (ATIVAN) tablet 0 5 mg 0 5 mg Oral Given Moses Howell, SPEEDY     01/04/2023 1033 EST metoprolol tartrate (LOPRESSOR) tablet 100 mg 100 mg Oral Given Moses Howell, SPEEDY     01/03/2023 1714 EST metoprolol tartrate (LOPRESSOR) tablet 100 mg 0 mg Oral Hold Sujit Marino, SPEEDY     01/03/2023 2039 EST nitroglycerin (NITROSTAT) SL tablet 0 4 mg 0 4 mg Sublingual Given Jet Machado RN     01/03/2023 1513 EST nitroglycerin (NITROSTAT) SL tablet 0 4 mg 0 4 mg Sublingual Given Sujit Marino, SPEEDY     01/03/2023 2127 EST QUEtiapine (SEROquel) tablet 50 mg 50 mg Oral Given Jet Machado RN     01/04/2023 1033 EST sertraline (ZOLOFT) tablet 150 mg 150 mg Oral Given Moses Howell, SPEEDY     01/04/2023 1035 EST nicotine (NICODERM CQ) 14 mg/24hr TD 24 hr patch 1 patch 1 patch Transdermal Medication Applied Moses Howell RN     01/04/2023 1034 EST nicotine (NICODERM CQ) 14 mg/24hr TD 24 hr patch 1 patch 1 patch Transdermal Patch Removed Moses Howell RN     01/04/2023 0532 EST pantoprazole (PROTONIX) EC tablet 40 mg 40 mg Oral Given Jet Machado RN     01/04/2023 1034 EST clopidogrel (PLAVIX) tablet 75 mg 75 mg Oral Given Moses Howell RN     01/03/2023 1426 EST sodium chloride 0 9 % infusion 0 mL/hr Intravenous Stopped Armaan Elizabeth RN     01/04/2023 1214 EST sodium chloride 0 9 % infusion 0 mL/hr Intravenous Stopped Pearlie Moritz, RN     01/03/2023 1645 EST HYDROmorphone (DILAUDID) injection 0 5 mg 0 5 mg Intravenous Given Armaan Elizabeth RN     01/04/2023 1056 EST heparin (porcine) subcutaneous injection 7,500 Units 7,500 Units Subcutaneous Given Pearlie Moritz, RN     01/03/2023 2127 EST acetaminophen (TYLENOL) tablet 975 mg 975 mg Oral Given Feliz Mcadams RN     01/03/2023 2127 EST HYDROmorphone (DILAUDID) injection 1 mg 1 mg Intravenous Given Feliz Mcadams RN     01/04/2023 1352 EST HYDROmorphone HCl (DILAUDID) injection 0 2 mg 0 2 mg Intravenous Given Pearlie Moritz, RN          PLEASE NOTE:  This encounter was completed utilizing the [employer] Direct Speech Voice Recognition Software  Grammatical errors, random word insertions, pronoun errors and incomplete sentences are occasional consequences of the system due to software limitations, ambient noise and hardware issues  These may be missed by proof reading prior to affixing electronic signature  Any questions or concerns about the content, text or information contained within the body of this dictation should be directly addressed to the physician for clarification  Please do not hesitate to call me directly if you have any any questions or concerns

## 2023-01-04 NOTE — PROGRESS NOTES
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  · The patient is status post cardiac cath with 50 mL of dye on 1/3  · Renal function remained stable with slight increase in creatinine but today's creatinine was measured at less than 24 hours after dye exposure and therefore need to recheck BMP in a m  · Avoid nephrotoxins and relative hypotension  · Long-term, would try to avoid PPI if possible  · The patient is at high risk for ANTHONY given underlying CKD and dye exposure  Chronic diastolic CHF  · No overt CHF on today's exam  · We will Hep-Lock intravenous fluids  · Continue diuretics  Hypertension  · The patient had some hypotension on 1/3 but currently blood pressure is acceptable  · Trend blood pressure for now  Decreased bicarbonate level  · Normal anion gap  · Likely due to CKD  · Check VBG, may need oral bicarbonate therapy  Acute coronary syndrome  · Status postcardiac cath with GREG to LAD  Other issues:  · Morbid obesity  · Sleep apnea /COPD  · Mixed hyperlipidemia  · Tobacco abuse  · Anxiety        Subjective: The patient was seen and examined  She denied any shortness of breath, orthopnea, paroxysmal nocturnal dyspnea, chest pain or pressure  She did have some chest discomfort last p m  which was relieved with  pain medication  The patient denied any abdominal pain, nauseousness, vomiting, difficulty urinating, sweats or chills  Objective:     Vitals: Blood pressure 123/68, pulse 71, temperature 97 7 °F (36 5 °C), temperature source Oral, resp  rate 17, weight 126 kg (278 lb 3 5 oz), SpO2 97 %, not currently breastfeeding  ,Body mass index is 44 91 kg/m²  Temp (24hrs), Av 1 °F (36 7 °C), Min:97 7 °F (36 5 °C), Max:98 3 °F (36 8 °C)      Weight (last 2 days)     Date/Time Weight    01/04/23 0535 126 (278 22)                 Intake/Output Summary (Last 24 hours) at 1/4/2023 1022  Last data filed at 1/4/2023 0826  Gross per 24 hour   Intake 2152 67 ml   Output 1550 ml   Net 602 67 ml     I/O last 24 hours: In: 2152 7 [P O :1336;  I V :816 7]  Out: 1550 [Urine:1550]        Physical Exam    /68 (BP Location: Left arm)   Pulse 71   Temp 97 7 °F (36 5 °C) (Oral)   Resp 17   Wt 126 kg (278 lb 3 5 oz)   SpO2 97%   BMI 44 91 kg/m²   Vital signs were reviewed  Constitutional: The patient was awake, alert, pleasant, cooperative and in no apparent distress  Cardiovascular: No overt jugular venous distention was noted but difficult to assess due to body habitus, S1-S2, no pericardial friction rub or S3 were appreciated, there was no peripheral edema noted  Pulmonary: Adequate inspiratory effort, lungs were clear to auscultation and percussion with no rales/rhonchi or wheezing noted  Abdominal/GI: Soft, nontender, no rebound or guarding was noted  Skin: No hives noted            Invasive Devices     Peripheral Intravenous Line  Duration           Peripheral IV 05/17/22 Left Antecubital 231 days    Peripheral IV 01/02/23 Left Antecubital 2 days    Peripheral IV 01/02/23 Right Antecubital 1 day                Medications:    Scheduled Meds:  Current Facility-Administered Medications   Medication Dose Route Frequency Provider Last Rate   • acetaminophen  975 mg Oral Q8H PRN Corey Roblero DO     • aspirin  81 mg Oral Daily Jeni Castle MD     • atorvastatin  80 mg Oral QPM Jeni Castle MD     • bumetanide  1 mg Oral Daily Jeni Castle MD     • clopidogrel  75 mg Oral Daily Solitario Cox MD     • ezetimibe  10 mg Oral Daily Jeni Castle MD     • heparin (porcine)  7,500 Units Subcutaneous [facility] Kylie Mckeon DO     • isosorbide mononitrate  30 mg Oral Daily Jeni Castle MD     • LORazepam  0 5 mg Oral TID PRN Jeni Castle MD     • metoprolol tartrate  100 mg Oral BID Tricia Newsome MD     • nicotine  1 patch Transdermal Daily Tricia Newsome MD     • nitroglycerin  0 4 mg Sublingual Q5 Min PRN Tricia Newsome MD     • pantoprazole  40 mg Oral Early Morning Sagar Calzada MD     • QUEtiapine  50 mg Oral HS Tricia Newsome MD     • sertraline  150 mg Oral Daily Tricia Newsome MD         PRN Meds: •  acetaminophen  •  LORazepam  •  nitroglycerin    Continuous Infusions:         LAB RESULTS:      Results from last 7 days   Lab Units 01/04/23  0608 01/03/23  0531 01/03/23  0429 01/02/23  0808   WBC Thousand/uL 10 50* 16 52*  --  17 02*   HEMOGLOBIN g/dL 11 3* 11 9  --  13 3   HEMATOCRIT % 36 4 38 5  --  40 6   PLATELETS Thousands/uL 256 272  --  287   NEUTROS PCT %  --   --   --  81*   LYMPHS PCT %  --   --   --  11*   MONOS PCT %  --   --   --  6   EOS PCT %  --   --   --  1   POTASSIUM mmol/L 3 7  --  4 3 3 8   CHLORIDE mmol/L 114*  --  116* 107   CO2 mmol/L 16*  --  18* 19*   BUN mg/dL 29*  --  30* 24   CREATININE mg/dL 2 56*  --  2 51* 2 58*   CALCIUM mg/dL 8 9  --  9 2 9 6   ALK PHOS U/L  --   --   --  83   ALT U/L  --   --   --  22   AST U/L  --   --   --  13   MAGNESIUM mg/dL  --   --   --  2 0       CUTURES:  Lab Results   Component Value Date    BLOODCX No Growth After 5 Days  05/28/2021    BLOODCX No Growth After 5 Days  05/28/2021    BLOODCX No Growth After 5 Days  08/13/2020    BLOODCX No Growth After 5 Days  08/13/2020    BLOODCX No Growth After 5 Days  03/13/2017    BLOODCX No Growth After 5 Days  03/13/2017    URINECX >100,000 cfu/ml Escherichia coli 03/13/2017                 PLEASE NOTE:  This encounter was completed utilizing the [employer] Direct Speech Voice Recognition Software  Grammatical errors, random word insertions, pronoun errors and incomplete sentences are occasional consequences of the system due to software limitations, ambient noise and hardware issues  These may be missed by proof reading prior to affixing electronic signature  Any questions or concerns about the content, text or information contained within the body of this dictation should be directly addressed to the physician for clarification  Please do not hesitate to call me directly if you have any any questions or concerns

## 2023-01-04 NOTE — PROGRESS NOTES
PROGRESS NOTE - Family Medicine Residency Justin Camara 1975, 41 y o  female  MRN: [id number]    [facility] Encounter: [id number]  Primary Care Provider: Brenna Rios DO      Admission Date: 1/2/2023  Length of Stay: 2 days  Code Status:  Level 1 - Full Code  Diet: Diet Cardiovascular; Cardiac  Consult:   IP CONSULT TO CARDIOLOGY  IP CONSULT TO NEPHROLOGY      Assessment & Plan:     Discussed with [facility] team and finalization is pending attending physician attestation  * Non-ST elevation MI (NSTEMI) [facility])  Assessment & Plan  Patient presented to [address] [place] [address] ED on 1/2/2023 for c/o tight/squeezing mid-sternal chest pain radiating to left shoulder that woke her up at 4 AM PTA, which feels similar to prior MI  Also endorses associated nausea, diaphoresis, and shortness of breath  Patient took SL NTG x2 and ASA 325mg at home without relief  - Pmhx MI s/p GREG to RCA and RPDA 3/25/21, sinus pause s/p dual-chamber permanent pacemaker in place, CHF (EF 45%), MCHC, HTN, and HLD  - 7/26/21 Stress ECHO: LVEF 65%, moderate hypokinesis of the basal-mid inferior wall  There was no echocardiographic evidence for stress-induced ischemia   - Home Regimen: ASA 81 mg QD, Atorvastatin 80mg QD, Lopressor 100mg BID, and Imdur 30mg QD  - 1/2 Troponin 138 > 1307 > 6804 > max out at [address]  - 1/2 CXR: negative  - 1/2 EKG: NSR with occasional PVC's, prior inferior and anterior infarct, inverted T-waves in lateral leads, no STEMI    - 1/2 ECHO: LVEF 45%, mildly reduced systolic function, hypokinesis of the inferior and inferolateral walls, mod dilated LA, mild AR/MR/TR   - ED treatment: NTG, Metoprolol, UFH  - Cardiology at [place]: Type-1 NSTEMI, rec'd c/w ASA 81mg QD, Brilinta 90mg BID, metoprolol 100mg BID, NTG PRN  - [facility] non-emergent cardiac intervention   - 1/3 LHC showed 100% occlusion of mid LAD S/P GREG placement on 1/3/22 with complete resolution      Assessment:   - LAD occlusion of mid LAD s/p GREG on 1/3/22  - Chest pain improved with current regimen    Plan:  - Telemetry  - DAPT with ASA 81mg QD and Plavix 75mg QD  - Metoprolol 100mg BID  - SL NTG PRN for chest pain  - Dilaudid 0 2mg IV Q4H PRN for chest pain  - Cardiology following  - SD2 level of care    Results from last 7 days   Lab Units 01/02/23 2000 01/02/23  1459 01/02/23  1150 01/02/23  0936 01/02/23  0808   HS TNI 0HR ng/L  --   --   --   --  138*   HS TNI 2HR ng/L  --   --   --  1,307*  --    HS TNI 4HR ng/L  --   --  6,804*  --   --    HS TNI RAND ng/L >22,973*   < >  --   --   --     < > = values in this interval not displayed  CKD (chronic kidney disease) stage 4, GFR 15-29 ml/min ([facility])  Assessment & Plan  Initial sCr 2 58 on admission  - Baseline sCr 2 3-2 5  Lab Results   Component Value Date    CREATININEUR 106 0 05/21/2022    CREATININEUR 106 0 05/21/2022    Redmond Lei 1,540 0 (H) 05/21/2022    MICROALBCRE 1,453 (H) 05/21/2022     Assessment:  - Stable CKD-4 with severe macroalbuminuria, 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  - Estimated Creatinine Clearance: 37 7 mL/min (A) (by C-G formula based on SCr of 2 56 mg/dL (H))  Plan:  - Trend daily renal function  - Nephrology consulted    Results from last 7 days   Lab Units 01/04/23  0608 01/03/23  0429 01/02/23  0808   BUN mg/dL 29* 30* 24   CREATININE mg/dL 2 56* 2 51* 2 58*   EGFR ml/min/1 73sq m 21 22 21           Chronic diastolic congestive heart failure (HCC)  Assessment & Plan  H/O HFrEF  - Home regimen: Bumex 1 mg daily, Imdur 30mg daily  - 1/2 EKG: NSR with occasional PVC's, prior inferior and anterior infarct, inverted T-waves in lateral leads, no STEMI    - 1/2 ECHO: LVEF 45%, mildly reduced systolic function, hypokinesis of the inferior and inferolateral walls, mod dilated LA, mild AR/MR/TR      Assessment:  - Not in clinical fluid-overloaded state on exam as of 1/4/2023  - Weight has been stable since last measurement (126kg on 7/14/22)    Plan:  - Continue home regimen  - Cardiac diet with 1500cc/day fluid restriction  - Daily weight, strict I&O    Essential hypertension  Assessment & Plan  Initial /85  - H/O HTN  - Home regimen: Metoprolol 100mg BID    Assessment:   - Initial elevated pressure likely secondary CP and anxiety  - Most recent Blood Pressure: 732/52  - Systolic (48ZUH), WZK:556 , Min:91 , Max:130     Plan:  - Continue home regimen  - Vitals per unit routine    ELIAZAR (obstructive sleep apnea)  Assessment & Plan  Continue home CPAP QHS  COPD (chronic obstructive pulmonary disease) ([facility])  Assessment & Plan  Continue home albuterol 2 puffs q4 PRN  No wheezing or crackles on exam     Mixed hyperlipidemia  Assessment & Plan  Continue home Atorvastatin 80mg QD and Zetia 10mg QD  Contraindicated for fenofibrate as GFR < 30  Lab Results   Component Value Date    CHOLESTEROL 206 (H) 01/03/2023    TRIG 348 (H) 01/03/2023    HDL 31 (L) 01/03/2023    LDLCALC 105 (H) 01/03/2023       Anxiety  Assessment & Plan  Current mood stable  Continue home Zoloft 150mg QD and Ativan 0 5mg TID PRN for anxiety  Seroquel 50mg QHS for insomnia      Tobacco abuse  Assessment & Plan  Current active smoker, approx 1/2 pack per day since her 25s   -  on smoking cessation  - Continue NRT while inpatient      Principal Problem:    Non-ST elevation MI (NSTEMI) ([facility] [address] )  Active Problems:    CKD (chronic kidney disease) stage 4, GFR 15-29 ml/min ([facility])    Essential hypertension    Chronic diastolic congestive heart failure ([facility])    Tobacco abuse    Anxiety    Mixed hyperlipidemia    COPD (chronic obstructive pulmonary disease) ([facility])    ELIAZAR (obstructive sleep apnea)    S/P drug eluting coronary stent placement      VTE Pharm PPX: Heparin  VTE Mech PPX: sequential compression device and/or foot pump applied unless otherwise contraindicated    Subjective     Hospital Course & 24hr events:     Hospital course:  41 y o  female admitted 1/2/2023, now HD# 2, for ACS evaluation and possible cardiac intervention  Cardiology was consulted STAT upon arrival to [facility] and patient was deemed appropriated for SD2 per cardiology  Troponin continued to increase and maxed out at 22,973 with EKG showing new LBBB  No intervention was performed overnight  Patient continued to experience CP and subjective SOB  She was treated with heaprin drip, dilaudid, lopressor 100mg, NTG PRN  1/3/23 LHC showed 100% occlusion of mid LAD S/P GREG placement on 1/3/22 with complete resolution  1/2/23:  1359: notified by ED nurse of patient's arrival  [phone]: STAT consult to Cardiology  [phone]: TT "YEJ-Dsabjhcked-Mkavifel Call" without anyone on duty  1406: TT "WLK-Yvabjvufkv-TVX Day Call" to identify on-call cardiology  1410: Patient seen and evaluated at bedside, ordered UFH gtt and Dilaudid  1411: Received response from "ZGU-Rbvbjuqach-JOB Day Call" that Dr Sagar Calzada is on-call for cardiology  1413: TT Dr Jesse Tolentino regarding patient, rec'd admission to [facility] under CCU  1426: TT CV[facility] attending, El Bolton, and notified that "There is nothing different that we are going to do at a SD1 level of care at this time vs SD2  The cardiology fellow can speak with me directly if he is concerned  "  1430: TT Dr Jesse Tolentino, who is agreeable to admit patient to [place] [address]  Dr Jesse Tolentino also aware of elevated troponin from 138 to 6804 at 4 hour semaj  Dr Jesse Tolentino states patient is not candidate for [facility] today per Dr Dana Whiting  1540 notified Dr Jesse Tolentino that BP 88/62, and 92/56 on recheck  Dr Jesse Tolentino requested repeat EKG and Troponin Q2H until 8pm   1620: TT Dr Jesse Tolentino that Trop 49614 and forwarded new EKG  1855: TT Dr Jesse Tolentino that Trop >89356 and forwarded new EKG  1908: TT from Dr Bruce Elizabeth regarding patient's worsening chest pain, rising Troponin, and right axis deviation  TT immediately forwarded to Dr Jesse Tolentino    1920: TT Dr  Jesse Kenning regarding worsening chest pain, who states that "everyone is aware of her situation and do not want to do anything tonight"  1932: TT from Dr Carey Fried to stop trending Troponin/EKG     1/3/23:  1126: LHC showed 100% occlusion of mid LAD S/P GREG placement on 1/3/22 with complete resolution  Overnight/24hr events:  Discussed with overnight team at sign-out and nursing staff and patient was seen and examined at bedside sleeping with cPAP in place  No acute distress at this time  Review of Systems:     Review of Systems   Constitutional: Negative for chills and fever  HENT: Negative for ear pain and trouble swallowing  Eyes: Negative for pain, discharge and redness  Respiratory: Positive for chest tightness and shortness of breath  Cardiovascular: Positive for chest pain  Gastrointestinal: Negative for abdominal pain  Endocrine: Negative for cold intolerance and heat intolerance  Genitourinary: Negative for dysuria and hematuria  Musculoskeletal: Positive for arthralgias (Left shoulder/arm pain  )  Negative for myalgias  Skin: Negative for rash  Neurological: Positive for headaches  Negative for seizures and syncope  Psychiatric/Behavioral: Negative for behavioral problems           Objective     Vitals:     Vitals:    01/04/23 0221 01/04/23 0535 01/04/23 0710 01/04/23 1100   BP: 101/59  123/68 130/72   BP Location: Left arm  Left arm Left arm   Pulse: 71  71 74   Resp: 20  17 18   Temp: 98 3 °F (36 8 °C)  97 7 °F (36 5 °C) 98 °F (36 7 °C)   TempSrc: Oral  Oral Oral   SpO2: 95%  97%    Weight:  126 kg (278 lb 3 5 oz)       Temp:  [97 7 °F (36 5 °C)-98 3 °F (36 8 °C)] 98 °F (36 7 °C)  HR:  [62-74] 74  Resp:  [17-21] 18  BP: ()/(54-72) 130/72  Weight (last 2 days)     Date/Time Weight    01/04/23 0535 126 (278 22)          Intake/Output Summary (Last 24 hours) at 1/4/2023 1348  Last data filed at 1/4/2023 0826  Gross per 24 hour   Intake 2152 67 ml   Output 1550 ml   Net 602 67 ml     Invasive Devices     Peripheral Intravenous Line  Duration           Peripheral IV 05/17/22 Left Antecubital 231 days    Peripheral IV 01/02/23 Left Antecubital 2 days    Peripheral IV 01/02/23 Right Antecubital 1 day                  Labs: I have personally reviewed pertinent reports  Results from last 7 days   Lab Units 01/04/23  0608 01/03/23  0531 01/02/23  0808   WBC Thousand/uL 10 50* 16 52* 17 02*   HEMOGLOBIN g/dL 11 3* 11 9 13 3   HEMATOCRIT % 36 4 38 5 40 6   PLATELETS Thousands/uL 256 272 287   NEUTROS ABS Thousands/µL  --   --  13 82*       Results from last 7 days   Lab Units 01/04/23  0608 01/03/23  0429 01/02/23  0808   POTASSIUM mmol/L 3 7 4 3 3 8   CHLORIDE mmol/L 114* 116* 107   CO2 mmol/L 16* 18* 19*   BUN mg/dL 29* 30* 24   CREATININE mg/dL 2 56* 2 51* 2 58*   CALCIUM mg/dL 8 9 9 2 9 6   AST U/L  --   --  13   ALT U/L  --   --  22   ALK PHOS U/L  --   --  83   EGFR ml/min/1 73sq m 21 22 21   MAGNESIUM mg/dL  --   --  2 0                    EKG, Pathology, Imaging, and Other Studies:   I have personally reviewed pertinent reports  X-ray chest 1 view portable    Result Date: 1/2/2023  Impression: No acute cardiopulmonary findings  COMPARISON:  None EXAM PERFORMED/VIEWS:  XR CHEST PORTABLE FINDINGS: Cardiomediastinal silhouette appears unremarkable  The lungs are clear  No pneumothorax or pleural effusion  Osseous structures appear within normal limits for patient age  IMPRESSION: No acute cardiopulmonary disease  Workstation performed: [id number]     Echo complete w/ contrast if indicated    Addendum Date: 1/2/2023    •  Left Ventricle: Left ventricular cavity size is normal  Wall thickness is increased  The left ventricular ejection fraction is 45%  Systolic function is mildly reduced  While study is technically difficult and limited there does appear to be hypokinesis of the inferior and inferolateral walls  •  Left Atrium: The atrium is moderately dilated  •  Aortic Valve: There is mild regurgitation  •  Mitral Valve: There is mild regurgitation  •  Tricuspid Valve:  There is mild regurgitation  •  Aorta: The aortic root is normal in size  The ascending aorta is mildly dilated  The aortic root is 3 00 cm  The ascending aorta is 4 3 cm  Meds/Allergies     All medications and allergies reviewed  No Known Allergies    Continuous Infusions:       Scheduled Meds:  Current Facility-Administered Medications   Medication Dose Route Frequency Provider Last Rate   • acetaminophen  975 mg Oral Q8H PRN Ruba Schroeder DO     • aspirin  81 mg Oral Daily Jay Deleon MD     • atorvastatin  80 mg Oral QPM Jay Deleon MD     • bumetanide  1 mg Oral Daily Jay Deleon MD     • clopidogrel  75 mg Oral Daily Carolina Riggins MD     • ezetimibe  10 mg Oral Daily Jay Deleon MD     • heparin (porcine)  7,500 Units Subcutaneous [facility] Annamarie Prader Brandon, DO     • HYDROmorphone  0 2 mg Intravenous Q4H PRN Jay Deleon MD     • isosorbide mononitrate  30 mg Oral Daily Jay Deleon MD     • LORazepam  0 5 mg Oral TID PRN Jay Deleon MD     • metoprolol tartrate  100 mg Oral BID Jay Deleon MD     • nicotine  1 patch Transdermal Daily Jay Deleon MD     • nitroglycerin  0 4 mg Sublingual Q5 Min PRN Jay Deleon MD     • pantoprazole  40 mg Oral Early Morning Andre Mathews MD     • QUEtiapine  50 mg Oral HS Jay Deleon MD     • sertraline  150 mg Oral Daily Jay Deleon MD         PRN Meds:  •  acetaminophen  •  HYDROmorphone  •  LORazepam  •  nitroglycerin      Physical Exam   Physical Exam  Vitals and nursing note reviewed  Constitutional:       General: She is in acute distress  Appearance: Normal appearance  She is well-developed  She is obese  She is ill-appearing and diaphoretic  She is not toxic-appearing  HENT:      Head: Normocephalic and atraumatic  Right Ear: External ear normal       Left Ear: External ear normal       Nose: Nose normal       Mouth/Throat:      Mouth: Mucous membranes are moist    Eyes:      General:         Right eye: No discharge  Left eye: No discharge  Extraocular Movements: Extraocular movements intact  Cardiovascular:      Rate and Rhythm: Normal rate  Pulses: Normal pulses  Heart sounds: Murmur heard  Pulmonary:      Effort: Respiratory distress present  Breath sounds: Normal breath sounds  No stridor  No wheezing, rhonchi or rales  Abdominal:      General: There is no distension  Musculoskeletal:         General: Normal range of motion  Cervical back: Normal range of motion and neck supple  Skin:     General: Skin is warm  Neurological:      General: No focal deficit present  Mental Status: She is alert  Mental status is at baseline     Psychiatric:         Mood and Affect: Mood normal          Behavior: Behavior normal               Deb Chery MD  PGY-1, [facility]  01/04/23, 1:48 PM

## 2023-01-05 ENCOUNTER — TRANSITIONAL CARE MANAGEMENT (OUTPATIENT)
Dept: FAMILY MEDICINE CLINIC | Facility: CLINIC | Age: 48
End: 2023-01-05

## 2023-01-05 VITALS
WEIGHT: 276.02 LBS | RESPIRATION RATE: 17 BRPM | SYSTOLIC BLOOD PRESSURE: 125 MMHG | BODY MASS INDEX: 44.55 KG/M2 | OXYGEN SATURATION: 98 % | TEMPERATURE: 97.5 F | HEART RATE: 63 BPM | DIASTOLIC BLOOD PRESSURE: 77 MMHG

## 2023-01-05 LAB
ANION GAP SERPL CALCULATED.3IONS-SCNC: 6 MMOL/L (ref 4–13)
ATRIAL RATE: 62 BPM
BASE EX.OXY STD BLDV CALC-SCNC: 94.3 % (ref 60–80)
BASE EXCESS BLDV CALC-SCNC: -9.3 MMOL/L
BUN SERPL-MCNC: 31 MG/DL (ref 5–25)
CALCIUM SERPL-MCNC: 9.4 MG/DL (ref 8.3–10.1)
CHLORIDE SERPL-SCNC: 114 MMOL/L (ref 96–108)
CO2 SERPL-SCNC: 19 MMOL/L (ref 21–32)
CREAT SERPL-MCNC: 2.63 MG/DL (ref 0.6–1.3)
ERYTHROCYTE [DISTWIDTH] IN BLOOD BY AUTOMATED COUNT: 17.8 % (ref 11.6–15.1)
GFR SERPL CREATININE-BSD FRML MDRD: 21 ML/MIN/1.73SQ M
GLUCOSE SERPL-MCNC: 83 MG/DL (ref 65–140)
HCO3 BLDV-SCNC: 17.6 MMOL/L (ref 24–30)
HCT VFR BLD AUTO: 37 % (ref 34.8–46.1)
HGB BLD-MCNC: 11.7 G/DL (ref 11.5–15.4)
MCH RBC QN AUTO: 29 PG (ref 26.8–34.3)
MCHC RBC AUTO-ENTMCNC: 31.6 G/DL (ref 31.4–37.4)
MCV RBC AUTO: 92 FL (ref 82–98)
O2 CT BLDV-SCNC: 17.2 ML/DL
P AXIS: 20 DEGREES
PCO2 BLDV: 41.9 MM HG (ref 42–50)
PH BLDV: 7.24 [PH] (ref 7.3–7.4)
PHOSPHATE SERPL-MCNC: 3.9 MG/DL (ref 2.7–4.5)
PLATELET # BLD AUTO: 267 THOUSANDS/UL (ref 149–390)
PMV BLD AUTO: 11 FL (ref 8.9–12.7)
PO2 BLDV: 87.1 MM HG (ref 35–45)
POTASSIUM SERPL-SCNC: 3.8 MMOL/L (ref 3.5–5.3)
PR INTERVAL: 176 MS
QRS AXIS: 62 DEGREES
QRSD INTERVAL: 112 MS
QT INTERVAL: 420 MS
QTC INTERVAL: 426 MS
RBC # BLD AUTO: 4.04 MILLION/UL (ref 3.81–5.12)
SODIUM SERPL-SCNC: 139 MMOL/L (ref 135–147)
T WAVE AXIS: 40 DEGREES
VENTRICULAR RATE: 62 BPM
WBC # BLD AUTO: 8.73 THOUSAND/UL (ref 4.31–10.16)

## 2023-01-05 RX ORDER — SODIUM BICARBONATE 650 MG/1
650 TABLET ORAL
Status: DISCONTINUED | OUTPATIENT
Start: 2023-01-05 | End: 2023-01-05 | Stop reason: HOSPADM

## 2023-01-05 RX ORDER — CHLORHEXIDINE GLUCONATE 0.12 MG/ML
15 RINSE ORAL EVERY 12 HOURS SCHEDULED
Qty: 473 ML | Refills: 0 | Status: SHIPPED | OUTPATIENT
Start: 2023-01-05

## 2023-01-05 RX ORDER — SODIUM BICARBONATE 650 MG/1
650 TABLET ORAL
Qty: 60 TABLET | Refills: 0 | Status: SHIPPED | OUTPATIENT
Start: 2023-01-05

## 2023-01-05 RX ORDER — NICOTINE 21 MG/24HR
1 PATCH, TRANSDERMAL 24 HOURS TRANSDERMAL DAILY
Qty: 28 PATCH | Refills: 0 | Status: SHIPPED | OUTPATIENT
Start: 2023-01-06

## 2023-01-05 RX ORDER — OMEGA-3-ACID ETHYL ESTERS 1 G/1
1 CAPSULE, LIQUID FILLED ORAL 2 TIMES DAILY
Qty: 60 CAPSULE | Refills: 0 | Status: SHIPPED | OUTPATIENT
Start: 2023-01-05

## 2023-01-05 RX ORDER — CHLORHEXIDINE GLUCONATE 0.12 MG/ML
15 RINSE ORAL EVERY 12 HOURS SCHEDULED
Status: DISCONTINUED | OUTPATIENT
Start: 2023-01-05 | End: 2023-01-05 | Stop reason: HOSPADM

## 2023-01-05 RX ORDER — CLOPIDOGREL BISULFATE 75 MG/1
75 TABLET ORAL DAILY
Qty: 90 TABLET | Refills: 0 | Status: SHIPPED | OUTPATIENT
Start: 2023-01-06

## 2023-01-05 RX ORDER — ACETAMINOPHEN 325 MG/1
975 TABLET ORAL EVERY 8 HOURS PRN
Qty: 90 TABLET | Refills: 0 | Status: SHIPPED | OUTPATIENT
Start: 2023-01-05

## 2023-01-05 RX ADMIN — SERTRALINE HYDROCHLORIDE 150 MG: 50 TABLET ORAL at 09:13

## 2023-01-05 RX ADMIN — NICOTINE 1 PATCH: 14 PATCH, EXTENDED RELEASE TRANSDERMAL at 09:13

## 2023-01-05 RX ADMIN — BUMETANIDE 1 MG: 1 TABLET ORAL at 09:13

## 2023-01-05 RX ADMIN — SODIUM BICARBONATE 650 MG TABLET 650 MG: at 11:34

## 2023-01-05 RX ADMIN — HYDROMORPHONE HYDROCHLORIDE 0.2 MG: 0.2 INJECTION, SOLUTION INTRAMUSCULAR; INTRAVENOUS; SUBCUTANEOUS at 03:31

## 2023-01-05 RX ADMIN — METOPROLOL TARTRATE 100 MG: 50 TABLET, FILM COATED ORAL at 09:12

## 2023-01-05 RX ADMIN — ACETAMINOPHEN 975 MG: 325 TABLET, FILM COATED ORAL at 09:14

## 2023-01-05 RX ADMIN — ASPIRIN 81 MG: 81 TABLET, CHEWABLE ORAL at 09:13

## 2023-01-05 RX ADMIN — PANTOPRAZOLE SODIUM 40 MG: 40 TABLET, DELAYED RELEASE ORAL at 06:21

## 2023-01-05 RX ADMIN — LORAZEPAM 0.5 MG: 0.5 TABLET ORAL at 12:28

## 2023-01-05 RX ADMIN — EZETIMIBE 10 MG: 10 TABLET ORAL at 09:13

## 2023-01-05 RX ADMIN — CHLORHEXIDINE GLUCONATE 0.12% ORAL RINSE 15 ML: 1.2 LIQUID ORAL at 09:12

## 2023-01-05 RX ADMIN — LIDOCAINE HYDROCHLORIDE 10 ML: 20 SOLUTION ORAL; TOPICAL at 06:19

## 2023-01-05 RX ADMIN — HEPARIN SODIUM 7500 UNITS: 5000 INJECTION INTRAVENOUS; SUBCUTANEOUS at 06:15

## 2023-01-05 RX ADMIN — CLOPIDOGREL BISULFATE 75 MG: 75 TABLET ORAL at 09:13

## 2023-01-05 RX ADMIN — LORAZEPAM 0.5 MG: 0.5 TABLET ORAL at 06:21

## 2023-01-05 RX ADMIN — LIDOCAINE HYDROCHLORIDE 10 ML: 20 SOLUTION ORAL; TOPICAL at 00:40

## 2023-01-05 RX ADMIN — ISOSORBIDE MONONITRATE 30 MG: 30 TABLET, EXTENDED RELEASE ORAL at 09:13

## 2023-01-05 NOTE — PROGRESS NOTES
Progress Note - Nephrology   Umesh Garcia 41 y o  female MRN: [id number]  Unit/Bed#: [facility] 530-01 Encounter: [id number]      Assessment / Plan:    Chronic kidney disease stage IV  · Creatinine fluctuating in the low to mid twos since May 2022  · Etiology of chronic kidney disease is Alport's syndrome  · The patient is status post cardiac cath with 50 mL of dye on 1/3  · Renal function remained stable with slight worsening in creatinine but not meeting criteria for ANTHONY  · Avoid nephrotoxins and relative hypotension  · Long-term, would try to avoid PPI if possible  Chronic diastolic CHF  · No overt CHF on today's exam  · Continue diuretics  Hypertension  · The patient had some hypotension on 1/3 but currently blood pressure is acceptable  · Trend blood pressure for now  Decreased bicarbonate level  · Normal anion gap  · Likely due to CKD  · VBG - + metabolic acidosis  We will initiate oral bicarbonate therapy  Acute coronary syndrome  · Status postcardiac cath with GREG to LAD  Other issues:  · Morbid obesity  · Sleep apnea /COPD  · Mixed hyperlipidemia  · Tobacco abuse  · Anxiety  Disposition: The patient can be discharged from a renal standpoint and will need follow-up with Dr Caroline Bruno      Subjective: The patient was seen and examined  She denied any shortness of breath, paroxysmal nocturnal dyspnea, orthopnea, abdominal pain, vomiting, diarrhea, sweats, chills  She did have some chest discomfort earlier today, 5/10 which was relieved by pain medication  She had no chest pain when I visited with her today  Objective:     Vitals: Blood pressure 125/77, pulse 63, temperature 97 5 °F (36 4 °C), temperature source Oral, resp  rate 17, weight 125 kg (276 lb 0 3 oz), SpO2 98 %, not currently breastfeeding  ,Body mass index is 44 55 kg/m²  Temp (24hrs), Av 9 °F (36 6 °C), Min:97 2 °F (36 2 °C), Max:98 5 °F (36 9 °C)      Weight (last 2 days)     Date/Time Weight    23 0600 125 (276 02)    23 0535 126 (278 22)                 Intake/Output Summary (Last 24 hours) at 1/5/2023 1053  Last data filed at 1/5/2023 0827  Gross per 24 hour   Intake 820 ml   Output --   Net 820 ml     I/O last 24 hours:   In: 940 [P O :940]  Out: -         Physical Exam    /77 (BP Location: Left arm)   Pulse 63   Temp 97 5 °F (36 4 °C) (Oral)   Resp 17   Wt 125 kg (276 lb 0 3 oz)   SpO2 98%   BMI 44 55 kg/m²   Vital signs were reviewed  Constitutional: The patient was awake, alert, pleasant, cooperative and in no apparent distress  Cardiovascular: No overt jugular venous distention was noted but difficult to assess due to body habitus, S1-S2, no pericardial friction rub S3 were appreciated, no peripheral edema was noted  Pulmonary: Adequate inspiratory effort, lungs were clear to auscultation percussion with no rales/rhonchi or wheezing noted soft, nontender, no rebound or guarding was noted  Abdominal/GI: Soft, nontender, no rebound or guarding was noted  Skin: No hives noted            Invasive Devices     Peripheral Intravenous Line  Duration           Peripheral IV 05/17/22 Left Antecubital 232 days    Peripheral IV 01/02/23 Left Antecubital 3 days    Peripheral IV 01/02/23 Right Antecubital 2 days                Medications:    Scheduled Meds:  Current Facility-Administered Medications   Medication Dose Route Frequency Provider Last Rate   • acetaminophen  975 mg Oral Q8H PRN Leonard Sandy DO     • al mag oxide-diphenhydramine-lidocaine viscous  10 mL Swish & Spit Q4H PRN Dimple Gunn MD     • aspirin  81 mg Oral Daily Freda Haines MD     • atorvastatin  80 mg Oral QPM Freda Haines MD     • bumetanide  1 mg Oral Daily Freda Haines MD     • chlorhexidine  15 mL Swish & Spit Q12H [facility] Freda Haines MD     • clopidogrel  75 mg Oral Daily Erick Lindo MD     • ezetimibe  10 mg Oral Daily Freda Haines MD     • heparin (porcine)  7,500 Units Subcutaneous [facility] Kylie Mckeon,      • isosorbide mononitrate  30 mg Oral Daily Enrique Santana MD     • LORazepam  0 5 mg Oral TID PRN Enrique Santana MD     • metoprolol tartrate  100 mg Oral BID Enrique Santana MD     • nicotine  1 patch Transdermal Daily Enrique Santana MD     • nitroglycerin  0 4 mg Sublingual Q5 Min PRN Enrique Santana MD     • pantoprazole  40 mg Oral Early Morning Zay Eaton MD     • QUEtiapine  50 mg Oral HS Enrique Santana MD     • sertraline  150 mg Oral Daily Enrique Santana MD         PRN Meds: •  acetaminophen  •  al mag oxide-diphenhydramine-lidocaine viscous  •  LORazepam  •  nitroglycerin    Continuous Infusions:         LAB RESULTS:      Results from last 7 days   Lab Units 01/05/23  0540 01/04/23  0608 01/03/23  0531 01/03/23  0429 01/02/23  0808   WBC Thousand/uL 8 73 10 50* 16 52*  --  17 02*   HEMOGLOBIN g/dL 11 7 11 3* 11 9  --  13 3   HEMATOCRIT % 37 0 36 4 38 5  --  40 6   PLATELETS Thousands/uL 267 256 272  --  287   NEUTROS PCT %  --   --   --   --  81*   LYMPHS PCT %  --   --   --   --  11*   MONOS PCT %  --   --   --   --  6   EOS PCT %  --   --   --   --  1   POTASSIUM mmol/L 3 8 3 7  --  4 3 3 8   CHLORIDE mmol/L 114* 114*  --  116* 107   CO2 mmol/L 19* 16*  --  18* 19*   BUN mg/dL 31* 29*  --  30* 24   CREATININE mg/dL 2 63* 2 56*  --  2 51* 2 58*   CALCIUM mg/dL 9 4 8 9  --  9 2 9 6   ALK PHOS U/L  --   --   --   --  83   ALT U/L  --   --   --   --  22   AST U/L  --   --   --   --  13   MAGNESIUM mg/dL  --   --   --   --  2 0   PHOSPHORUS mg/dL 3 9  --   --   --   --        CUTURES:  Lab Results   Component Value Date    BLOODCX No Growth After 5 Days  05/28/2021    BLOODCX No Growth After 5 Days  05/28/2021    BLOODCX No Growth After 5 Days  08/13/2020    BLOODCX No Growth After 5 Days  08/13/2020    BLOODCX No Growth After 5 Days  03/13/2017    BLOODCX No Growth After 5 Days   03/13/2017    URINECX >100,000 cfu/ml Escherichia coli 03/13/2017                 PLEASE NOTE:  This encounter was completed utilizing the [employer]Flu[employer] Direct Speech Voice Recognition Software  Grammatical errors, random word insertions, pronoun errors and incomplete sentences are occasional consequences of the system due to software limitations, ambient noise and hardware issues  These may be missed by proof reading prior to affixing electronic signature  Any questions or concerns about the content, text or information contained within the body of this dictation should be directly addressed to the physician for clarification  Please do not hesitate to call me directly if you have any any questions or concerns

## 2023-01-05 NOTE — PROGRESS NOTES
PROGRESS NOTE - Family Medicine Residency Shelton Mohr 1975, 41 y o  female  MRN: [id number]    Unit/Bed#: [facility] 530-01 Encounter: [id number]  Primary Care Provider: Ree Ross DO      Admission Date: 1/2/2023  Length of Stay: 3 days  Code Status:  Level 1 - Full Code  Diet: Diet Cardiovascular; Cardiac  Consult:   IP CONSULT TO CARDIOLOGY  IP CONSULT TO NEPHROLOGY      Assessment & Plan:     Discussed with [facility] team and finalization is pending attending physician attestation  * Non-ST elevation MI (NSTEMI) [facility])  Assessment & Plan  Patient presented to [address] [place] [address] ED on 1/2/2023 for c/o tight/squeezing mid-sternal chest pain radiating to left shoulder that woke her up at 4 AM PTA, which feels similar to prior MI  Also endorses associated nausea, diaphoresis, and shortness of breath  Patient took SL NTG x2 and ASA 325mg at home without relief  - Pmhx MI s/p GREG to RCA and RPDA 3/25/21, sinus pause s/p dual-chamber permanent pacemaker in place, CHF (EF 45%), MCHC, HTN, and HLD  - 7/26/21 Stress ECHO: LVEF 65%, moderate hypokinesis of the basal-mid inferior wall  There was no echocardiographic evidence for stress-induced ischemia   - Home Regimen: ASA 81 mg QD, Atorvastatin 80mg QD, Lopressor 100mg BID, and Imdur 30mg QD  - 1/2 Troponin 138 > 1307 > 6804 > [place] at [address]  - 1/2 CXR: negative  - 1/2 EKG: NSR with occasional PVC's, prior inferior and anterior infarct, inverted T-waves in lateral leads, no STEMI    - 1/2 ECHO: LVEF 45%, mildly reduced systolic function, hypokinesis of the inferior and inferolateral walls, mod dilated LA, mild AR/MR/TR   - ED treatment: NTG, Metoprolol, UFH  - Cardiology at [place]: Type-1 NSTEMI, rec'd c/w ASA 81mg QD, Brilinta 90mg BID, metoprolol 100mg BID, NTG PRN  - Transferred to [facility] for non-emergent cardiac intervention   - 1/3 LHC showed 100% occlusion of mid LAD S/P GREG placement on 1/3/22 with complete resolution    - 1/4 clear for discharge home per cardiology  Rec'd c/w ASA, Plavix, statin, B-blocker, home BP medication, Imdur and prn NTG SL on discharge  Assessment:   - LAD occlusion of mid LAD s/p GREG on 1/3/22  - Chest pain improved with current regimen    Plan:  - Telemetry  - DAPT with ASA 81mg QD and Plavix 75mg QD  - Metoprolol 100mg BID  - SL NTG PRN for chest pain  - Tylenol 975mg q6 for pain  - Cardiology following  - SD2 level of care    Results from last 7 days   Lab Units 01/02/23 2000 01/02/23  1459 01/02/23  1150 01/02/23  0936 01/02/23  0808   HS TNI 0HR ng/L  --   --   --   --  138*   HS TNI 2HR ng/L  --   --   --  1,307*  --    HS TNI 4HR ng/L  --   --  6,804*  --   --    HS TNI RAND ng/L >22,973*   < >  --   --   --     < > = values in this interval not displayed  Chronic diastolic congestive heart failure (HCC)  Assessment & Plan  H/O HFrEF  - Home regimen: Bumex 1 mg daily, Imdur 30mg daily  - 1/2 EKG: NSR with occasional PVC's, prior inferior and anterior infarct, inverted T-waves in lateral leads, no STEMI    - 1/2 ECHO: LVEF 45%, mildly reduced systolic function, hypokinesis of the inferior and inferolateral walls, mod dilated LA, mild AR/MR/TR  Assessment:  - Not in clinical fluid-overloaded state on exam as of 1/5/2023  - Weight has been stable since last measurement (126kg on 7/14/22)    Plan:  - Continue home regimen  - Cardiac diet with 1500cc/day fluid restriction  - Daily weight, strict I&O    CKD (chronic kidney disease) stage 4, GFR 15-29 ml/min [facility])  Assessment & Plan  Initial sCr 2 58 on admission  - Baseline sCr 2 3-2 5  Lab Results   Component Value Date    CREATININEUR 106 0 05/21/2022    CREATININEUR 106 0 05/21/2022    Triny Ripple 1,540 0 (H) 05/21/2022    MICROALBCRE 1,453 (H) 05/21/2022     Assessment:  - Stable CKD-4 with severe macroalbuminuria, 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  - Estimated Creatinine Clearance: 36 5 mL/min (A) (by C-G formula based on SCr of 2 63 mg/dL (H))      Plan:  - Trend daily renal function  - Nephrology consulted    Results from last 7 days   Lab Units 01/05/23  0540 01/04/23  0608 01/03/23  0429 01/02/23  0808   BUN mg/dL 31* 29* 30* 24   CREATININE mg/dL 2 63* 2 56* 2 51* 2 58*   EGFR ml/min/1 73sq m 21 21 22 21           COPD (chronic obstructive pulmonary disease) ([facility])  Assessment & Plan  Continue home albuterol 2 puffs q4 PRN  No wheezing or crackles on exam     S/P drug eluting coronary stent placement  Assessment & Plan  Please see NSTEMI problem A/P    ELIAZAR (obstructive sleep apnea)  Assessment & Plan  Continue home CPAP QHS  Mixed hyperlipidemia  Assessment & Plan  Continue home Atorvastatin 80mg QD and Zetia 10mg QD  Non-candidate for fenofibrate as GFR < 30  Lab Results   Component Value Date    CHOLESTEROL 206 (H) 01/03/2023    TRIG 348 (H) 01/03/2023    HDL 31 (L) 01/03/2023    LDLCALC 105 (H) 01/03/2023       Anxiety  Assessment & Plan  Current mood stable  Continue home Zoloft 150mg QD and Ativan 0 5mg TID PRN for anxiety  Seroquel 50mg QHS for insomnia      Tobacco abuse  Assessment & Plan  Current active smoker, approx 1/2 pack per day since her 25s   -  on smoking cessation  - Continue NRT while inpatient    Essential hypertension  Assessment & Plan  Initial /85  - H/O HTN  - Home regimen: Metoprolol 100mg BID    Assessment:   - Initial elevated pressure likely secondary CP and anxiety  - Most recent Blood Pressure: 656/11  - Systolic (86INX), AWN:495 , Min:116 , Max:130     Plan:  - Continue home regimen  - Vitals per unit routine      Principal Problem:    Non-ST elevation MI (NSTEMI) ([facility] [address] )  Active Problems:    Chronic diastolic congestive heart failure (HCC)    CKD (chronic kidney disease) stage 4, GFR 15-29 ml/min ([facility])    COPD (chronic obstructive pulmonary disease) ([facility])    Essential hypertension    Tobacco abuse    Anxiety    Mixed hyperlipidemia    ELIAZAR (obstructive sleep apnea)    S/P drug eluting coronary stent placement      VTE [facility] PPX: Heparin  [facility] device and/or foot pump applied unless otherwise contraindicated    Subjective     Hospital Course & 24hr events:     Hospital course:  41 y o  female admitted 1/2/2023, now HD# 3, for ACS evaluation and possible cardiac intervention  Cardiology was consulted STAT upon arrival to [facility] and patient was deemed appropriated for SD2 per cardiology  Troponin continued to increase and maxed out at 22,973 with EKG showing new LBBB  No intervention was performed overnight  Patient continued to experience CP and subjective SOB  She was treated with heaprin drip, dilaudid, lopressor 100mg, NTG PRN  1/3/23 LHC showed 100% occlusion of mid LAD S/P GREG placement on 1/3/22 with complete resolution  1/2/23:  1359: notified by ED nurse of patient's arrival  [phone]: STAT consult to Cardiology  [phone]: TT "EAW-Njeishwjyw-Mnvojjzc Call" without anyone on duty  1406: TT "TMG-Cfprydrlwh-FNW Day Call" to identify on-call cardiology  1410: Patient seen and evaluated at bedside, ordered UFH gtt and Dilaudid  1411: Received response from "YPN-Cyyxezkgwh-ORJ Day Call" that Dr Woody Lawrence is on-call for cardiology  1413: TT Dr Christine Mendez regarding patient, rec'd admission to [facility] under CCU  1426: TT [facility] attending, John Friend, and notified that "There is nothing different that we are going to do at a SD1 level of care at this time vs SD2  The cardiology fellow can speak with me directly if he is concerned  "  1430: TT Dr Christine Mendez, who is agreeable to admit patient to [place] [address]  Dr Christine Mendez also aware of elevated troponin from 138 to 6804 at 4 hour semaj  Dr Christine Mendez states patient is not candidate for [facility] today per Dr Baxter Pi  1910 notified Dr Christine Mendez that BP 88/62, and 92/56 on recheck  Dr Christine Mendez requested repeat EKG and Troponin Q2H until 8pm   1620: TT Dr Christine Mendez that Trop 72260 and forwarded new EKG  1855: TT Dr Christine Mendez that Trop >15563 and forwarded new EKG    1908: TT from Dr Maryan Bates regarding patient's worsening chest pain, rising Troponin, and right axis deviation  TT immediately forwarded to Dr Samuel Robison  1920: TT Dr Samuel Robison regarding worsening chest pain, who states that "everyone is aware of her situation and do not want to do anything tonight"  1932: TT from Dr Samuel Robison to stop trending Troponin/EKG     1/3/23:  1126: LHC showed 100% occlusion of mid LAD S/P GREG placement on 1/3/22 with complete resolution  Overnight/24hr events:  Discussed with overnight team at sign-out and nursing staff: no ONE  Patient seen and examined seated upright in bed eating breakfast  Patient states that her pain is improving day by day  States that she had anxiety associated with the pain but Dr La Zaman explained to her that we do not expect her to be symptom free immediately  Patient demonstrated understanding  She has no further questions or concerns at this time  Review of Systems:     Review of Systems   Constitutional: Negative for chills and fever  HENT: Negative for ear pain and trouble swallowing  Eyes: Negative for pain, discharge and redness  Respiratory: Negative for chest tightness and shortness of breath  Cardiovascular: Negative for chest pain  Gastrointestinal: Negative for abdominal pain  Endocrine: Negative for cold intolerance and heat intolerance  Genitourinary: Negative for dysuria and hematuria  Musculoskeletal: Negative for arthralgias (Left shoulder/arm pain  ) and myalgias  Skin: Negative for rash  Neurological: Negative for seizures, syncope and headaches  Psychiatric/Behavioral: Negative for behavioral problems           Objective     Vitals:     Vitals:    01/04/23 1910 01/05/23 0000 01/05/23 0600 01/05/23 0701   BP: 116/63 116/60  125/77   BP Location:  Right arm  Left arm   Pulse: 66 67  63   Resp: 21   17   Temp: 98 5 °F (36 9 °C) 98 5 °F (36 9 °C)  97 5 °F (36 4 °C)   TempSrc: Oral Oral  Oral   SpO2: 97% 98%  98%   Weight:   125 kg (276 lb 0 3 oz) Temp:  [97 2 °F (36 2 °C)-98 5 °F (36 9 °C)] 97 5 °F (36 4 °C)  HR:  [63-74] 63  Resp:  [17-21] 17  BP: (116-130)/(60-77) 125/77  Weight (last 2 days)     Date/Time Weight    01/05/23 0600 125 (276 02)    01/04/23 0535 126 (278 22)          Intake/Output Summary (Last 24 hours) at 1/5/2023 0931  Last data filed at 1/5/2023 0827  Gross per 24 hour   Intake 820 ml   Output --   Net 820 ml     Invasive Devices     Peripheral Intravenous Line  Duration           Peripheral IV 05/17/22 Left Antecubital 232 days    Peripheral IV 01/02/23 Left Antecubital 3 days    Peripheral IV 01/02/23 Right Antecubital 2 days                  Labs: I have personally reviewed pertinent reports  Results from last 7 days   Lab Units 01/05/23  0540 01/04/23  0608 01/03/23  0531 01/02/23  0808   WBC Thousand/uL 8 73 10 50* 16 52* 17 02*   HEMOGLOBIN g/dL 11 7 11 3* 11 9 13 3   HEMATOCRIT % 37 0 36 4 38 5 40 6   PLATELETS Thousands/uL 267 256 272 287   NEUTROS ABS Thousands/µL  --   --   --  13 82*       Results from last 7 days   Lab Units 01/05/23  0540 01/04/23  0608 01/03/23  0429 01/02/23  0808   POTASSIUM mmol/L 3 8 3 7 4 3 3 8   CHLORIDE mmol/L 114* 114* 116* 107   CO2 mmol/L 19* 16* 18* 19*   BUN mg/dL 31* 29* 30* 24   CREATININE mg/dL 2 63* 2 56* 2 51* 2 58*   CALCIUM mg/dL 9 4 8 9 9 2 9 6   AST U/L  --   --   --  13   ALT U/L  --   --   --  22   ALK PHOS U/L  --   --   --  83   EGFR ml/min/1 73sq m 21 21 22 21   MAGNESIUM mg/dL  --   --   --  2 0   PHOSPHORUS mg/dL 3 9  --   --   --                     EKG, Pathology, Imaging, and Other Studies:   I have personally reviewed pertinent reports  X-ray chest 1 view portable    Result Date: 1/2/2023  Impression: No acute cardiopulmonary findings  COMPARISON:  None EXAM PERFORMED/VIEWS:  XR CHEST PORTABLE FINDINGS: Cardiomediastinal silhouette appears unremarkable  The lungs are clear  No pneumothorax or pleural effusion   Osseous structures appear within normal limits for patient age  IMPRESSION: No acute cardiopulmonary disease  Workstation performed: [id number]     Echo complete w/ contrast if indicated    Addendum Date: 1/2/2023    •  Left Ventricle: Left ventricular cavity size is normal  Wall thickness is increased  The left ventricular ejection fraction is 45%  Systolic function is mildly reduced  While study is technically difficult and limited there does appear to be hypokinesis of the inferior and inferolateral walls  •  Left Atrium: The atrium is moderately dilated  •  Aortic Valve: There is mild regurgitation  •  Mitral Valve: There is mild regurgitation  •  Tricuspid Valve: There is mild regurgitation  •  Aorta: The aortic root is normal in size  The ascending aorta is mildly dilated  The aortic root is 3 00 cm  The ascending aorta is 4 3 cm           Meds/Allergies     All medications and allergies reviewed  No Known Allergies    Continuous Infusions:       Scheduled Meds:  Current Facility-Administered Medications   Medication Dose Route Frequency Provider Last Rate   • acetaminophen  975 mg Oral Q8H PRN Marisol Glass DO     • al mag oxide-diphenhydramine-lidocaine viscous  10 mL Swish & Spit Q4H PRN Tara Myers MD     • aspirin  81 mg Oral Daily France Dixon MD     • atorvastatin  80 mg Oral QPM France Dixon MD     • bumetanide  1 mg Oral Daily France Dixon MD     • chlorhexidine  15 mL Swish & Spit Q12H [facility] France Dixon MD     • clopidogrel  75 mg Oral Daily León Segal MD     • ezetimibe  10 mg Oral Daily France Dixon MD     • heparin (porcine)  7,500 Units Subcutaneous [facility] Kylie Mckeon DO     • isosorbide mononitrate  30 mg Oral Daily France Dixon MD     • LORazepam  0 5 mg Oral TID PRN France Dixon MD     • metoprolol tartrate  100 mg Oral BID France Dixon MD     • nicotine  1 patch Transdermal Daily France Dixon MD     • nitroglycerin  0 4 mg Sublingual Q5 Min PRN France Dixon MD     • pantoprazole  40 mg Oral Early Morning Delene Litten, MD     • QUEtiapine  50 mg Oral HS Shayy Blackwood MD     • sertraline  150 mg Oral Daily Shayy Blackwood MD         PRN Meds:  •  acetaminophen  •  al mag oxide-diphenhydramine-lidocaine viscous  •  LORazepam  •  nitroglycerin      Physical Exam   Physical Exam  Vitals and nursing note reviewed  Constitutional:       General: She is not in acute distress  Appearance: Normal appearance  She is well-developed  She is obese  She is not ill-appearing, toxic-appearing or diaphoretic  HENT:      Head: Normocephalic and atraumatic  Right Ear: External ear normal       Left Ear: External ear normal       Nose: Nose normal       Mouth/Throat:      Mouth: Mucous membranes are moist    Eyes:      General:         Right eye: No discharge  Left eye: No discharge  Extraocular Movements: Extraocular movements intact  Cardiovascular:      Rate and Rhythm: Normal rate  Pulses: Normal pulses  Heart sounds: Murmur heard  Pulmonary:      Effort: No respiratory distress  Breath sounds: Normal breath sounds  No stridor  No wheezing, rhonchi or rales  Abdominal:      General: There is no distension  Musculoskeletal:         General: Normal range of motion  Cervical back: Normal range of motion and neck supple  Skin:     General: Skin is warm  Neurological:      General: No focal deficit present  Mental Status: She is alert  Mental status is at baseline     Psychiatric:         Mood and Affect: Mood normal          Behavior: Behavior normal               Ftáima Weems DO  PGY-1, [facility]  01/05/23, 9:31 AM

## 2023-01-05 NOTE — PLAN OF CARE
Problem: Potential for Falls  Goal: Patient will remain free of falls  Description: INTERVENTIONS:  - Educate patient/family on patient safety including physical limitations  - Instruct patient to call for assistance with activity   - Consult OT/PT to assist with strengthening/mobility   - Keep Call bell within reach  - Keep bed low and locked with side rails adjusted as appropriate  - Keep care items and personal belongings within reach  - Initiate and maintain comfort rounds  - Make Fall Risk Sign visible to staff  - Offer Toileting every Hours, in advance of need  - Initiate/Maintain alarm  - Obtain necessary fall risk management equipment:   - Apply yellow socks and bracelet for high fall risk patients  - Consider moving patient to room near nurses station  Outcome: Progressing     Problem: MOBILITY - ADULT  Goal: Maintain or return to baseline ADL function  Description: INTERVENTIONS:  -  Assess patient's ability to carry out ADLs; assess patient's baseline for ADL function and identify physical deficits which impact ability to perform ADLs (bathing, care of mouth/teeth, toileting, grooming, dressing, etc )  - Assess/evaluate cause of self-care deficits   - Assess range of motion  - Assess patient's mobility; develop plan if impaired  - Assess patient's need for assistive devices and provide as appropriate  - Encourage maximum independence but intervene and supervise when necessary  - Involve family in performance of ADLs  - Assess for home care needs following discharge   - Consider OT consult to assist with ADL evaluation and planning for discharge  - Provide patient education as appropriate  Outcome: Progressing  Goal: Maintains/Returns to pre admission functional level  Description: INTERVENTIONS:  - Perform BMAT or MOVE assessment daily    - Set and communicate daily mobility goal to care team and patient/family/caregiver     - Collaborate with rehabilitation services on mobility goals if consulted  - Perform Range of Motion  times a day  - Reposition patient even hours    - Dangle patient  times a day  - Stand patient  times a day  - Ambulate patient  times a day  - Out of bed to chair  times a day   - Out of bed for meals  times a day  - Out of bed for toileting  - Record patient progress and toleration of activity level   Outcome: Progressing     Problem: PAIN - ADULT  Goal: Verbalizes/displays adequate comfort level or baseline comfort level  Description: Interventions:  - Encourage patient to monitor pain and request assistance  - Assess pain using appropriate pain scale  - Administer analgesics based on type and severity of pain and evaluate response  - Implement non-pharmacological measures as appropriate and evaluate response  - Consider cultural and social influences on pain and pain management  - Notify physician/advanced practitioner if interventions unsuccessful or patient reports new pain  Outcome: Progressing     Problem: INFECTION - ADULT  Goal: Absence or prevention of progression during hospitalization  Description: INTERVENTIONS:  - Assess and monitor for signs and symptoms of infection  - Monitor lab/diagnostic results  - Monitor all insertion sites, i e  indwelling lines, tubes, and drains  - Monitor endotracheal if appropriate and nasal secretions for changes in amount and color  - [place] appropriate cooling/warming therapies per order  - Administer medications as ordered  - Instruct and encourage patient and family to use good hand hygiene technique  - Identify and instruct in appropriate isolation precautions for identified infection/condition  Outcome: Progressing  Goal: Absence of fever/infection during neutropenic period  Description: INTERVENTIONS:  - Monitor WBC    Outcome: Progressing     Problem: SAFETY ADULT  Goal: Patient will remain free of falls  Description: INTERVENTIONS:  - Educate patient/family on patient safety including physical limitations  - Instruct patient to call for assistance with activity   - Consult OT/PT to assist with strengthening/mobility   - Keep Call bell within reach  - Keep bed low and locked with side rails adjusted as appropriate  - Keep care items and personal belongings within reach  - Initiate and maintain comfort rounds  - Make Fall Risk Sign visible to staff  - Offer Toileting every  Hours, in advance of need  - Initiate/Maintain alarm  - Obtain necessary fall risk management equipment:   - Apply yellow socks and bracelet for high fall risk patients  - Consider moving patient to room near nurses station  Outcome: Progressing  Goal: Maintain or return to baseline ADL function  Description: INTERVENTIONS:  -  Assess patient's ability to carry out ADLs; assess patient's baseline for ADL function and identify physical deficits which impact ability to perform ADLs (bathing, care of mouth/teeth, toileting, grooming, dressing, etc )  - Assess/evaluate cause of self-care deficits   - Assess range of motion  - Assess patient's mobility; develop plan if impaired  - Assess patient's need for assistive devices and provide as appropriate  - Encourage maximum independence but intervene and supervise when necessary  - Involve family in performance of ADLs  - Assess for home care needs following discharge   - Consider OT consult to assist with ADL evaluation and planning for discharge  - Provide patient education as appropriate  Outcome: Progressing  Goal: Maintains/Returns to pre admission functional level  Description: INTERVENTIONS:  - Perform BMAT or MOVE assessment daily    - Set and communicate daily mobility goal to care team and patient/family/caregiver  - Collaborate with rehabilitation services on mobility goals if consulted  - Perform Range of Motion  times a day  - Reposition patient every  hours    - Dangle patient  times a day  - Stand patient  times a day  - Ambulate patient  times a day  - Out of bed to chair  times a day   - Out of bed for meals  times a day  - Out of bed for toileting  - Record patient progress and toleration of activity level   Outcome: Progressing     Problem: DISCHARGE PLANNING  Goal: Discharge to home or other facility with appropriate resources  Description: INTERVENTIONS:  - Identify barriers to discharge w/patient and caregiver  - Arrange for needed discharge resources and transportation as appropriate  - Identify discharge learning needs (meds, wound care, etc )  - Arrange for interpretive services to assist at discharge as needed  - Refer to Case Management Department for coordinating discharge planning if the patient needs post-hospital services based on physician/advanced practitioner order or complex needs related to functional status, cognitive ability, or social support system  Outcome: Progressing     Problem: Knowledge Deficit  Goal: Patient/family/caregiver demonstrates understanding of disease process, treatment plan, medications, and discharge instructions  Description: Complete learning assessment and assess knowledge base    Interventions:  - Provide teaching at level of understanding  - Provide teaching via preferred learning methods  Outcome: Progressing     Problem: Prexisting or High Potential for Compromised Skin Integrity  Goal: Skin integrity is maintained or improved  Description: INTERVENTIONS:  - Identify patients at risk for skin breakdown  - Assess and monitor skin integrity  - Assess and monitor nutrition and hydration status  - Monitor labs   - Assess for incontinence   - Turn and reposition patient  - Assist with mobility/ambulation  - Relieve pressure over bony prominences  - Avoid friction and shearing  - Provide appropriate hygiene as needed including keeping skin clean and dry  - Evaluate need for skin moisturizer/barrier cream  - Collaborate with interdisciplinary team   - Patient/family teaching  - Consider wound care consult   Outcome: Progressing     Problem: CARDIOVASCULAR - ADULT  Goal: Maintains optimal cardiac output and hemodynamic stability  Description: INTERVENTIONS:  - Monitor I/O, vital signs and rhythm  - Monitor for S/S and trends of decreased cardiac output  - Administer and titrate ordered vasoactive medications to optimize hemodynamic stability  - Assess quality of pulses, skin color and temperature  - Assess for signs of decreased coronary artery perfusion  - Instruct patient to report change in severity of symptoms  Outcome: Progressing  Goal: Absence of cardiac dysrhythmias or at baseline rhythm  Description: INTERVENTIONS:  - Continuous cardiac monitoring, vital signs, obtain 12 lead EKG if ordered  - Administer antiarrhythmic and heart rate control medications as ordered  - Monitor electrolytes and administer replacement therapy as ordered  Outcome: Progressing

## 2023-01-05 NOTE — DISCHARGE SUMMARY
DISCHARGE SUMMARY - Family Medicine Residency, Moustapha Muñoz 1975, 41 y o  female  MRN: [id number]    [facility] Encounter: [id number]  Primary Care Provider: Kristan Velasco DO      Admission Date: 1/2/2023  Discharge Date: 01/05/23  Length of Stay: 3 days  Diagnosis:   Principal Problem:    Non-ST elevation MI (NSTEMI) ([facility] [address] )  Active Problems:    Chronic diastolic congestive heart failure (HCC)    CKD (chronic kidney disease) stage 4, GFR 15-29 ml/min (HCC)    COPD (chronic obstructive pulmonary disease) ([facility] [address] )    Essential hypertension    Tobacco abuse    Anxiety    Mixed hyperlipidemia    ELIAZAR (obstructive sleep apnea)    S/P drug eluting coronary stent placement  Resolved Problems:    * No resolved hospital problems  *        HPI: (per admission H&P note on 1/2/23)      "41 y o  female admitted for cardiology work-up of ACS  PmhxMI s/p GREG to RCA and RPDA 3/25/21, sinus pause s/p dual-chamber permanent pacemaker in place, CHF (EF 45%), MCHC, HTM, and HLD  Most recent [facility] 2021  Patient originally presented to [address] [place] [address] ED on 1/2/2023 for c/o tight/squeezing mid-sternal chest pain radiating to left shoulder that woke her up at 4 AM, which feels similar to prior MI  Also endorses associated nausea, diaphoresis, and shortness of breath  Patient took SL NTG x2 and ASA 325mg at home without relief  Case was discussed with cardiology at [place] (Dr Bonifacio Shaw) who does not feel this is an MI and based on kidney function does not feel pt needs to go to the cath lab  Case discussed with [facility] (Dr Art Cardenas) and recommended patient to be transferred to [facility] for non-urgent cath "      HOSPITAL COURSE:   Please see progress note on discharge day for detailed list of diagnoses and related plan for additional information  Hospital Course:   41 y o  female admitted on 1/2/2023 for ACS evaluation and possible cardiac intervention   Cardiology was consulted STAT upon arrival to [facility] and patient was deemed appropriated for SD2 per cardiology  Troponin continued to increase and maxed out at 22,973 with EKG showing new LBBB  No intervention was performed overnight  Patient continued to experience CP and subjective SOB  She was treated with heaprin drip, dilaudid, lopressor 100mg, NTG PRN  1/3/23 LHC showed 100% occlusion of mid LAD S/P GREG placement on 1/3/22 with complete resolution      1/2/23:  1359: notified by ED nurse of patient's arrival  1401: STAT consult to Cardiology  [phone]: TT "BUG-Unwkcrojjd-Egfdlwkd Call" without anyone on duty  1406: TT "XZW-Iwnavdtlhr-BIN Day Call" to identify on-call cardiology  1410: Patient seen and evaluated at bedside, ordered UFH gtt and Dilaudid  1411: Received response from "IYX-Ihkowhltvu-XVT Day Call" that Dr Grisel Ricks is on-call for cardiology  1413: TT Dr Sanjuana Ye regarding patient, rec'd admission to [facility] under CCU  1426: TT [facility] attending, Ramirez Lorenzo, and notified that "There is nothing different that we are going to do at a SD1 level of care at this time vs SD2  The cardiology fellow can speak with me directly if he is concerned  "  1430: TT Dr Sanjuana Ye, who is agreeable to admit patient to [facility]  Dr Sanjuana Ye also aware of elevated troponin from 138 to 6804 at 4 hour semaj  Dr Sanjuana Ye states patient is not candidate for [facility] today per Dr Hyun Florian  1540 notified Dr Sanjuana Ye that BP 88/62, and 92/56 on recheck  Dr Sanjuana Ye requested repeat EKG and Troponin Q2H until 8pm   1620: TT Dr Sanjuana Ye that Trop 34411 and forwarded new EKG  1855: TT Dr Sanjuana Ye that Trop >94938 and forwarded new EKG  1908: TT from Dr Erica Mendez regarding patient's worsening chest pain, rising Troponin, and right axis deviation  TT immediately forwarded to Dr Sanjuana Ye  1920: TT Dr Sanjuana Ye regarding worsening chest pain, who states that "everyone is aware of her situation and do not want to do anything tonight"    1932: TT from Dr Sanjuana Ye to stop trending Troponin/EKG      1/3/23:  1126: [facility] showed 100% occlusion of mid LAD S/P GREG placement on 1/3/22 with complete resolution  1/4/23: Continued to improved from a CP standpoint  Her Cr bumped during admission likely to contrast administration during cath  Nephrology was following throughout her stay and deemed her stable for d/c on 1/5  On 01/05/23, HD# 3, pt remains stable and is medically cleared for discharge home  with instruction for close follow up with speacialists  Patient will need to make close follow-up appointment with PCP and other providers listed on AVS     Patient is informed of and is aware of current health status and understand the plan of treatment and outpatient follow-up  The patient understood and agreed with the plan  All pertinent lab results, imaging studies, procedures, and/or any incidental findings have been disclosed to the patient  All pertinent questions are answered to patient's satisfaction  Complications: NONE     Condition at Discharge: stable       PROCEDURES:     Procedures Performed:     Orders Placed This Encounter   Procedures   • Cardiac catheterization         SIGNIFICANT FINDINGS / ABNORMAL RESULTS:     Significant Findings/Abnormal Results with this admission:    X-ray chest 1 view portable    Result Date: 1/2/2023  Narrative: CHEST INDICATION:   chest pain  CHEST INDICATION:   chest pain  COMPARISON:  5/17/2022 EXAM PERFORMED/VIEWS:  XR CHEST PORTABLE FINDINGS:  Study limited by large body habitus and portable technique  Lungs are clear  No effusion or pneumothorax  Heart, mediastinal and hilar structures are within normal limits  Left dual-chamber pacemaker in standard position  No acute osseous or soft tissue pathology  Impression: No acute cardiopulmonary findings  COMPARISON:  None EXAM PERFORMED/VIEWS:  XR CHEST PORTABLE FINDINGS: Cardiomediastinal silhouette appears unremarkable  The lungs are clear  No pneumothorax or pleural effusion   Osseous structures appear within normal limits for patient age  IMPRESSION: No acute cardiopulmonary disease  Workstation performed: [id number]     Cardiac catheterization    Result Date: 1/3/2023  Narrative: •  Successful PCI to mid % culprit req  rheolytic thrombectomy (progressed when compared to 4-2-21 study); given atretic distal LAD, 2 25 x 15 mm GREG was deployed from LAD into more sizable D2 •  Residual prox LCx 60% lesion with patent LAD, mid RCA & PDA stents •  Elevated LVEDP (20-30 mmHg) with no LV-Ao gradient on pullback •  R radial a  accessed but unable to advance equipment, likely occluded; RFA arteriotomy employed     Cardiac EP device report    Result Date: 12/27/2022  Narrative: MDT DUAL PM/ ACTIVE SYSTEM IS MRI CONDITIONAL CARELINK TRANSMISSION: BATTERY VOLTAGE ADEQUATE (13 7 YRS)  AP: 3 6%  : <0 1% (MVP-ON)  ALL AVAILABLE LEAD PARAMETERS WITHIN NORMAL LIMITS  5 VT EPISODES W/AVAIL EGMS SHOWING PAT W/ -170 BPM, MAX DURATION 3 SECS  PT TAKES METOPROLOL TART, ASA 81MG  EF: 65% (ST ECHO 7/26/21)  PACEMAKER FUNCTIONING APPROPRIATELY  [address]     Echo complete w/ contrast if indicated    Addendum Date: 1/2/2023 Addendum:   •  Left Ventricle: Left ventricular cavity size is normal  Wall thickness is increased  The left ventricular ejection fraction is 45%  Systolic function is mildly reduced  While study is technically difficult and limited there does appear to be hypokinesis of the inferior and inferolateral walls  •  Left Atrium: The atrium is moderately dilated  •  Aortic Valve: There is mild regurgitation  •  Mitral Valve: There is mild regurgitation  •  Tricuspid Valve: There is mild regurgitation  •  Aorta: The aortic root is normal in size  The ascending aorta is mildly dilated  The aortic root is 3 00 cm  The ascending aorta is 4 3 cm  Result Date: 1/2/2023  Narrative: •  Left Ventricle: Left ventricular cavity size is normal  Wall thickness is increased  The left ventricular ejection fraction is 45%   Systolic function is mildly reduced  While study is technically difficult and limited he does appear to be hypokinesis of the inferior and inferolateral walls  •  Left Atrium: The atrium is moderately dilated  •  Aortic Valve: There is mild regurgitation  •  Mitral Valve: There is mild regurgitation  •  Tricuspid Valve: There is mild regurgitation  •  Aorta: The aortic root is normal in size  The ascending aorta is mildly dilated  The aortic root is 3 00 cm  The ascending aorta is 4 3 cm  VITALS ON DISCHARGE DATE:     Vitals  Blood Pressure: 125/77 (01/05/23 0701)  Temperature: 97 5 °F (36 4 °C) (01/05/23 0701)  Temp Source: Oral (01/05/23 0701)  Pulse: 63 (01/05/23 0701)  Respirations: 17 (01/05/23 0701)  SpO2: 98 % (01/05/23 0701)  Weight - Scale: 125 kg (276 lb 0 3 oz) (01/05/23 0600)    Temp:  [97 2 °F (36 2 °C)-98 5 °F (36 9 °C)] 97 5 °F (36 4 °C)  HR:  [63-67] 63  Resp:  [17-21] 17  BP: (116-125)/(60-77) 125/77    Weight (last 2 days)     Date/Time Weight    01/05/23 0600 125 (276 02)    01/04/23 0535 126 (278 22)            Intake/Output Summary (Last 24 hours) at 1/5/2023 1232  Last data filed at 1/5/2023 1216  Gross per 24 hour   Intake 1060 ml   Output --   Net 1060 ml       Invasive Devices     Peripheral Intravenous Line  Duration           Peripheral IV 05/17/22 Left Antecubital 232 days                  PHYSICAL EXAM ON DAY OF DISCHARGE:     Please see PE from progress note on day of discharge   DISCHARGE MEDICATIONS:     Discharge Medications:  See after visit summary (AVS) for detailed reconciled discharge medications, which was provided to patient and family  Summary of medication changes made with this admission:    · START:  1  Magic mouthwash every 4 hours as needed  2  Peridex oral solution   3  Plavix 75mg daily   4  Nicotine patches daily   5  Sodium bicarb 650mg two times daily   6  Omega-3 1 capsule daily    · RESUME:  1   All other home medications       FOLLOW-UP APPOINTMENTS / INSTRUCTION :     Important Physician Related Follow Up:     Paul Collins, [address] [facility]  [place] [address]  [phone]    Follow up on 1/19/2023  Follow up appt 1/19/2023 at 10:20 AM    Sue Puente DO  [facility]  [address] [address]  [phone]    Follow up in 3 day(s)          Comfirmed future (up-coming) appointments:  Future Appointments   Date Time Provider Yamila Pablo   1/19/2023 10:20 AM JALIL Contreras BM Cardio [facility]   1/30/2023 11:30 AM JALIL Mojica Neph [facility]   3/28/2023  5:15 AM DEVICE REMOTE [place] [facility]       Discharge instructions/Information to patient and family:   See after visit summary (AVS) for information provided to patient and family  Provisions for Follow-Up Care:  See after visit summary for information related to follow-up care and any pertinent home health orders  DISPOSITION:     Disposition: Home    Discharge Statement   I spent 30 minutes discharging the patient  This time was spent on the day of discharge  I had direct contact with the patient on the day of discharge  Additional documentation is required if more than 30 minutes were spent on discharge       Planned Readmission: Yadira Rodríguez DO  PGY-1, Family Medicine  01/05/23  12:32 PM

## 2023-01-06 ENCOUNTER — HOSPITAL ENCOUNTER (EMERGENCY)
Facility: HOSPITAL | Age: 48
End: 2023-01-07
Attending: EMERGENCY MEDICINE

## 2023-01-06 ENCOUNTER — PATIENT OUTREACH (OUTPATIENT)
Dept: FAMILY MEDICINE CLINIC | Facility: CLINIC | Age: 48
End: 2023-01-06

## 2023-01-06 ENCOUNTER — APPOINTMENT (EMERGENCY)
Dept: RADIOLOGY | Facility: HOSPITAL | Age: 48
End: 2023-01-06

## 2023-01-06 DIAGNOSIS — Z71.89 COMPLEX CARE COORDINATION: Primary | ICD-10-CM

## 2023-01-06 DIAGNOSIS — I21.4 NSTEMI (NON-ST ELEVATED MYOCARDIAL INFARCTION) (HCC): Primary | ICD-10-CM

## 2023-01-06 DIAGNOSIS — I25.10 CAD (CORONARY ARTERY DISEASE): ICD-10-CM

## 2023-01-06 DIAGNOSIS — N18.9 CKD (CHRONIC KIDNEY DISEASE): ICD-10-CM

## 2023-01-06 DIAGNOSIS — R07.9 CHEST PAIN: ICD-10-CM

## 2023-01-06 LAB
ANION GAP SERPL CALCULATED.3IONS-SCNC: 12 MMOL/L (ref 4–13)
BASOPHILS # BLD AUTO: 0.07 THOUSANDS/ÂΜL (ref 0–0.1)
BASOPHILS NFR BLD AUTO: 1 % (ref 0–1)
BNP SERPL-MCNC: 352 PG/ML (ref 0–100)
BUN SERPL-MCNC: 29 MG/DL (ref 5–25)
CALCIUM SERPL-MCNC: 9.8 MG/DL (ref 8.4–10.2)
CARDIAC TROPONIN I PNL SERPL HS: 5019 NG/L
CHLORIDE SERPL-SCNC: 107 MMOL/L (ref 96–108)
CO2 SERPL-SCNC: 19 MMOL/L (ref 21–32)
CREAT SERPL-MCNC: 2.79 MG/DL (ref 0.6–1.3)
EOSINOPHIL # BLD AUTO: 0.35 THOUSAND/ÂΜL (ref 0–0.61)
EOSINOPHIL NFR BLD AUTO: 3 % (ref 0–6)
ERYTHROCYTE [DISTWIDTH] IN BLOOD BY AUTOMATED COUNT: 17.3 % (ref 11.6–15.1)
GFR SERPL CREATININE-BSD FRML MDRD: 19 ML/MIN/1.73SQ M
GLUCOSE SERPL-MCNC: 69 MG/DL (ref 65–140)
HCT VFR BLD AUTO: 36.9 % (ref 34.8–46.1)
HGB BLD-MCNC: 12.3 G/DL (ref 11.5–15.4)
IMM GRANULOCYTES # BLD AUTO: 0.1 THOUSAND/UL (ref 0–0.2)
IMM GRANULOCYTES NFR BLD AUTO: 1 % (ref 0–2)
LYMPHOCYTES # BLD AUTO: 0.92 THOUSANDS/ÂΜL (ref 0.6–4.47)
LYMPHOCYTES NFR BLD AUTO: 7 % (ref 14–44)
MCH RBC QN AUTO: 29.9 PG (ref 26.8–34.3)
MCHC RBC AUTO-ENTMCNC: 33.3 G/DL (ref 31.4–37.4)
MCV RBC AUTO: 90 FL (ref 82–98)
MONOCYTES # BLD AUTO: 0.78 THOUSAND/ÂΜL (ref 0.17–1.22)
MONOCYTES NFR BLD AUTO: 6 % (ref 4–12)
NEUTROPHILS # BLD AUTO: 10.57 THOUSANDS/ÂΜL (ref 1.85–7.62)
NEUTS SEG NFR BLD AUTO: 82 % (ref 43–75)
NRBC BLD AUTO-RTO: 0 /100 WBCS
PLATELET # BLD AUTO: 298 THOUSANDS/UL (ref 149–390)
PMV BLD AUTO: 10.6 FL (ref 8.9–12.7)
POTASSIUM SERPL-SCNC: 3.9 MMOL/L (ref 3.5–5.3)
RBC # BLD AUTO: 4.11 MILLION/UL (ref 3.81–5.12)
SODIUM SERPL-SCNC: 138 MMOL/L (ref 135–147)
WBC # BLD AUTO: 12.79 THOUSAND/UL (ref 4.31–10.16)

## 2023-01-06 RX ORDER — ASPIRIN 81 MG/1
324 TABLET, CHEWABLE ORAL ONCE
Status: COMPLETED | OUTPATIENT
Start: 2023-01-06 | End: 2023-01-06

## 2023-01-06 RX ORDER — ONDANSETRON 2 MG/ML
4 INJECTION INTRAMUSCULAR; INTRAVENOUS ONCE
Status: COMPLETED | OUTPATIENT
Start: 2023-01-06 | End: 2023-01-06

## 2023-01-06 RX ORDER — NITROGLYCERIN 0.4 MG/1
0.4 TABLET SUBLINGUAL
Status: DISCONTINUED | OUTPATIENT
Start: 2023-01-06 | End: 2023-01-07 | Stop reason: HOSPADM

## 2023-01-06 RX ORDER — HEPARIN SODIUM 1000 [USP'U]/ML
2000 INJECTION, SOLUTION INTRAVENOUS; SUBCUTANEOUS EVERY 6 HOURS PRN
Status: DISCONTINUED | OUTPATIENT
Start: 2023-01-06 | End: 2023-01-07 | Stop reason: HOSPADM

## 2023-01-06 RX ORDER — HEPARIN SODIUM 1000 [USP'U]/ML
4000 INJECTION, SOLUTION INTRAVENOUS; SUBCUTANEOUS ONCE
Status: COMPLETED | OUTPATIENT
Start: 2023-01-06 | End: 2023-01-07

## 2023-01-06 RX ORDER — SODIUM CHLORIDE 9 MG/ML
3 INJECTION INTRAVENOUS
Status: DISCONTINUED | OUTPATIENT
Start: 2023-01-06 | End: 2023-01-07 | Stop reason: HOSPADM

## 2023-01-06 RX ORDER — HEPARIN SODIUM 1000 [USP'U]/ML
4000 INJECTION, SOLUTION INTRAVENOUS; SUBCUTANEOUS EVERY 6 HOURS PRN
Status: DISCONTINUED | OUTPATIENT
Start: 2023-01-06 | End: 2023-01-07 | Stop reason: HOSPADM

## 2023-01-06 RX ORDER — HYDROMORPHONE HCL/PF 1 MG/ML
0.5 SYRINGE (ML) INJECTION ONCE
Status: COMPLETED | OUTPATIENT
Start: 2023-01-06 | End: 2023-01-06

## 2023-01-06 RX ORDER — HEPARIN SODIUM 10000 [USP'U]/100ML
3-20 INJECTION, SOLUTION INTRAVENOUS
Status: DISCONTINUED | OUTPATIENT
Start: 2023-01-06 | End: 2023-01-07 | Stop reason: HOSPADM

## 2023-01-06 RX ADMIN — HYDROMORPHONE HYDROCHLORIDE 0.5 MG: 1 INJECTION, SOLUTION INTRAMUSCULAR; INTRAVENOUS; SUBCUTANEOUS at 23:37

## 2023-01-06 RX ADMIN — ONDANSETRON 4 MG: 2 INJECTION INTRAMUSCULAR; INTRAVENOUS at 23:37

## 2023-01-06 RX ADMIN — ASPIRIN 81 MG CHEWABLE TABLET 324 MG: 81 TABLET CHEWABLE at 22:46

## 2023-01-06 RX ADMIN — NITROGLYCERIN 0.4 MG: 0.4 TABLET SUBLINGUAL at 22:55

## 2023-01-06 NOTE — PROGRESS NOTES
Outpatient Care Management Note:  In basket referral for complex care management  Chart review completed

## 2023-01-06 NOTE — UTILIZATION REVIEW
NOTIFICATION OF ADMISSION DISCHARGE   This is a Notification of Discharge from [address]  Please be advised that this patient has been discharge from our facility  Below you will find the admission and discharge date and time including the patient’s disposition  UTILIZATION REVIEW CONTACT:  Maria Luisa Rayo  Utilization   Network Utilization Review Department  Phone: [phone] x carefully listen to the prompts  All voicemails are confidential   Email: [email or web address]     ADMISSION INFORMATION  PRESENTATION DATE: 1/2/2023  1:39 PM  OBERVATION ADMISSION DATE:  INPATIENT ADMISSION DATE: 1/2/23  2:37 PM   DISCHARGE DATE: 1/5/2023 12:59 PM   DISPOSITION:Home/Self Care    IMPORTANT INFORMATION:  Send all requests for admission clinical reviews, approved or denied determinations and any other requests to dedicated fax number below belonging to the campus where the patient is receiving treatment   List of dedicated fax numbers:  [address] DENIALS (Administrative/Medical Necessity) [phone]   [address] (Maternity/NICU/Pediatrics) [phone]   [facility] [phone]   [facility] 87 [phone]   [address] [phone]   [address] [facility] [phone] 90 [facility] [phone]   [address] [place] [address] [phone]   [facility]  [phone] 405[address] [facility] [phone]   412 [facility] [address] [facility] [phone]

## 2023-01-07 ENCOUNTER — HOSPITAL ENCOUNTER (INPATIENT)
Facility: HOSPITAL | Age: 48
LOS: 3 days | Discharge: HOME | End: 2023-01-10
Attending: INTERNAL MEDICINE | Admitting: INTERNAL MEDICINE

## 2023-01-07 VITALS
SYSTOLIC BLOOD PRESSURE: 127 MMHG | TEMPERATURE: 98.8 F | HEART RATE: 80 BPM | RESPIRATION RATE: 20 BRPM | OXYGEN SATURATION: 98 % | DIASTOLIC BLOOD PRESSURE: 71 MMHG

## 2023-01-07 DIAGNOSIS — Z95.5 S/P DRUG ELUTING CORONARY STENT PLACEMENT: Primary | ICD-10-CM

## 2023-01-07 DIAGNOSIS — N25.81 SECONDARY HYPERPARATHYROIDISM OF RENAL ORIGIN (HCC): ICD-10-CM

## 2023-01-07 DIAGNOSIS — F41.9 ANXIETY: ICD-10-CM

## 2023-01-07 DIAGNOSIS — Q87.81 ALPORT SYNDROME: ICD-10-CM

## 2023-01-07 PROBLEM — F40.00 AGORAPHOBIA: Status: ACTIVE | Noted: 2023-01-07

## 2023-01-07 LAB
2HR DELTA HS TROPONIN: -176 NG/L
2HR DELTA HS TROPONIN: -538 NG/L
4HR DELTA HS TROPONIN: -1556 NG/L
4HR DELTA HS TROPONIN: -576 NG/L
APTT PPP: 30 SECONDS (ref 23–37)
APTT PPP: 30 SECONDS (ref 23–37)
APTT PPP: 74 SECONDS (ref 23–37)
CARDIAC TROPONIN I PNL SERPL HS: 2279 NG/L
CARDIAC TROPONIN I PNL SERPL HS: 2679 NG/L
CARDIAC TROPONIN I PNL SERPL HS: 2855 NG/L
CARDIAC TROPONIN I PNL SERPL HS: 3463 NG/L
CARDIAC TROPONIN I PNL SERPL HS: 4481 NG/L
INR PPP: 1.2 (ref 0.84–1.19)
PROTHROMBIN TIME: 15.2 SECONDS (ref 11.6–14.5)

## 2023-01-07 RX ORDER — ACETAMINOPHEN 325 MG/1
650 TABLET ORAL EVERY 6 HOURS PRN
Status: DISCONTINUED | OUTPATIENT
Start: 2023-01-07 | End: 2023-01-10 | Stop reason: HOSPADM

## 2023-01-07 RX ORDER — PANTOPRAZOLE SODIUM 40 MG/1
40 TABLET, DELAYED RELEASE ORAL
Status: DISCONTINUED | OUTPATIENT
Start: 2023-01-07 | End: 2023-01-10 | Stop reason: HOSPADM

## 2023-01-07 RX ORDER — CHLORAL HYDRATE 500 MG
1000 CAPSULE ORAL 2 TIMES DAILY
Status: DISCONTINUED | OUTPATIENT
Start: 2023-01-07 | End: 2023-01-10 | Stop reason: HOSPADM

## 2023-01-07 RX ORDER — HYDROMORPHONE HCL IN WATER/PF 6 MG/30 ML
0.2 PATIENT CONTROLLED ANALGESIA SYRINGE INTRAVENOUS EVERY 2 HOUR PRN
Status: DISCONTINUED | OUTPATIENT
Start: 2023-01-07 | End: 2023-01-10 | Stop reason: HOSPADM

## 2023-01-07 RX ORDER — NICOTINE 21 MG/24HR
1 PATCH, TRANSDERMAL 24 HOURS TRANSDERMAL DAILY
Status: DISCONTINUED | OUTPATIENT
Start: 2023-01-07 | End: 2023-01-10 | Stop reason: HOSPADM

## 2023-01-07 RX ORDER — LORAZEPAM 2 MG/ML
0.5 INJECTION INTRAMUSCULAR EVERY 6 HOURS PRN
Status: DISCONTINUED | OUTPATIENT
Start: 2023-01-07 | End: 2023-01-10 | Stop reason: HOSPADM

## 2023-01-07 RX ORDER — HYDROMORPHONE HCL/PF 1 MG/ML
0.5 SYRINGE (ML) INJECTION ONCE
Status: COMPLETED | OUTPATIENT
Start: 2023-01-07 | End: 2023-01-07

## 2023-01-07 RX ORDER — NITROGLYCERIN 20 MG/100ML
5-200 INJECTION INTRAVENOUS
Status: DISCONTINUED | OUTPATIENT
Start: 2023-01-07 | End: 2023-01-08

## 2023-01-07 RX ORDER — HEPARIN SODIUM 1000 [USP'U]/ML
4000 INJECTION, SOLUTION INTRAVENOUS; SUBCUTANEOUS EVERY 6 HOURS PRN
Status: DISCONTINUED | OUTPATIENT
Start: 2023-01-07 | End: 2023-01-08

## 2023-01-07 RX ORDER — ONDANSETRON 2 MG/ML
4 INJECTION INTRAMUSCULAR; INTRAVENOUS EVERY 6 HOURS PRN
Status: DISCONTINUED | OUTPATIENT
Start: 2023-01-07 | End: 2023-01-10 | Stop reason: HOSPADM

## 2023-01-07 RX ORDER — NITROGLYCERIN 0.4 MG/1
0.4 TABLET SUBLINGUAL
Status: DISCONTINUED | OUTPATIENT
Start: 2023-01-07 | End: 2023-01-10 | Stop reason: HOSPADM

## 2023-01-07 RX ORDER — QUETIAPINE FUMARATE 50 MG/1
100 TABLET, EXTENDED RELEASE ORAL
Status: DISCONTINUED | OUTPATIENT
Start: 2023-01-07 | End: 2023-01-10 | Stop reason: HOSPADM

## 2023-01-07 RX ORDER — NITROGLYCERIN 20 MG/100ML
5-200 INJECTION INTRAVENOUS
Status: DISCONTINUED | OUTPATIENT
Start: 2023-01-07 | End: 2023-01-07

## 2023-01-07 RX ORDER — ASPIRIN 81 MG/1
81 TABLET, CHEWABLE ORAL DAILY
Status: DISCONTINUED | OUTPATIENT
Start: 2023-01-07 | End: 2023-01-10 | Stop reason: HOSPADM

## 2023-01-07 RX ORDER — OXYCODONE HYDROCHLORIDE 5 MG/1
2.5 TABLET ORAL EVERY 4 HOURS PRN
Status: DISCONTINUED | OUTPATIENT
Start: 2023-01-07 | End: 2023-01-10 | Stop reason: HOSPADM

## 2023-01-07 RX ORDER — QUETIAPINE FUMARATE 25 MG/1
50 TABLET, FILM COATED ORAL
Status: DISCONTINUED | OUTPATIENT
Start: 2023-01-07 | End: 2023-01-07

## 2023-01-07 RX ORDER — ALBUTEROL SULFATE 90 UG/1
2 AEROSOL, METERED RESPIRATORY (INHALATION) EVERY 4 HOURS PRN
Status: DISCONTINUED | OUTPATIENT
Start: 2023-01-07 | End: 2023-01-10 | Stop reason: HOSPADM

## 2023-01-07 RX ORDER — LORAZEPAM 0.5 MG/1
0.5 TABLET ORAL EVERY 8 HOURS
Status: DISCONTINUED | OUTPATIENT
Start: 2023-01-07 | End: 2023-01-10 | Stop reason: HOSPADM

## 2023-01-07 RX ORDER — LORAZEPAM 2 MG/ML
0.5 INJECTION INTRAMUSCULAR ONCE
Status: COMPLETED | OUTPATIENT
Start: 2023-01-07 | End: 2023-01-07

## 2023-01-07 RX ORDER — LORAZEPAM 1 MG/1
1 TABLET ORAL ONCE
Status: COMPLETED | OUTPATIENT
Start: 2023-01-07 | End: 2023-01-07

## 2023-01-07 RX ORDER — HEPARIN SODIUM 1000 [USP'U]/ML
2000 INJECTION, SOLUTION INTRAVENOUS; SUBCUTANEOUS EVERY 6 HOURS PRN
Status: DISCONTINUED | OUTPATIENT
Start: 2023-01-07 | End: 2023-01-08

## 2023-01-07 RX ORDER — LORAZEPAM 0.5 MG/1
0.5 TABLET ORAL 3 TIMES DAILY PRN
Status: DISCONTINUED | OUTPATIENT
Start: 2023-01-07 | End: 2023-01-07

## 2023-01-07 RX ORDER — HEPARIN SODIUM 10000 [USP'U]/100ML
3-20 INJECTION, SOLUTION INTRAVENOUS
Status: DISCONTINUED | OUTPATIENT
Start: 2023-01-07 | End: 2023-01-08

## 2023-01-07 RX ORDER — EZETIMIBE 10 MG/1
10 TABLET ORAL DAILY
Status: DISCONTINUED | OUTPATIENT
Start: 2023-01-07 | End: 2023-01-10 | Stop reason: HOSPADM

## 2023-01-07 RX ORDER — OXYCODONE HYDROCHLORIDE 5 MG/1
5 TABLET ORAL EVERY 4 HOURS PRN
Status: DISCONTINUED | OUTPATIENT
Start: 2023-01-07 | End: 2023-01-10 | Stop reason: HOSPADM

## 2023-01-07 RX ORDER — AMOXICILLIN 250 MG
1 CAPSULE ORAL
Status: DISCONTINUED | OUTPATIENT
Start: 2023-01-07 | End: 2023-01-10 | Stop reason: HOSPADM

## 2023-01-07 RX ORDER — SODIUM BICARBONATE 650 MG/1
650 TABLET ORAL
Status: DISCONTINUED | OUTPATIENT
Start: 2023-01-07 | End: 2023-01-08

## 2023-01-07 RX ORDER — ACETAMINOPHEN 325 MG/1
975 TABLET ORAL EVERY 8 HOURS PRN
Status: DISCONTINUED | OUTPATIENT
Start: 2023-01-07 | End: 2023-01-07

## 2023-01-07 RX ORDER — METOPROLOL SUCCINATE 50 MG/1
100 TABLET, EXTENDED RELEASE ORAL 2 TIMES DAILY
Status: DISCONTINUED | OUTPATIENT
Start: 2023-01-07 | End: 2023-01-08

## 2023-01-07 RX ORDER — METOPROLOL TARTRATE 100 MG/1
100 TABLET ORAL 2 TIMES DAILY
Status: DISCONTINUED | OUTPATIENT
Start: 2023-01-07 | End: 2023-01-07

## 2023-01-07 RX ORDER — CLOPIDOGREL BISULFATE 75 MG/1
75 TABLET ORAL DAILY
Status: DISCONTINUED | OUTPATIENT
Start: 2023-01-07 | End: 2023-01-10 | Stop reason: HOSPADM

## 2023-01-07 RX ORDER — ISOSORBIDE MONONITRATE 30 MG/1
30 TABLET, EXTENDED RELEASE ORAL DAILY
Status: DISCONTINUED | OUTPATIENT
Start: 2023-01-07 | End: 2023-01-08

## 2023-01-07 RX ORDER — NITROGLYCERIN 0.4 MG/1
0.4 TABLET SUBLINGUAL
Status: DISCONTINUED | OUTPATIENT
Start: 2023-01-07 | End: 2023-01-07 | Stop reason: SDUPTHER

## 2023-01-07 RX ORDER — SERTRALINE HYDROCHLORIDE 100 MG/1
200 TABLET, FILM COATED ORAL DAILY
Status: DISCONTINUED | OUTPATIENT
Start: 2023-01-08 | End: 2023-01-10 | Stop reason: HOSPADM

## 2023-01-07 RX ORDER — NITROGLYCERIN 20 MG/100ML
5-200 INJECTION INTRAVENOUS
Status: DISCONTINUED | OUTPATIENT
Start: 2023-01-07 | End: 2023-01-07 | Stop reason: HOSPADM

## 2023-01-07 RX ORDER — BUMETANIDE 1 MG/1
1 TABLET ORAL DAILY
Status: DISCONTINUED | OUTPATIENT
Start: 2023-01-07 | End: 2023-01-08

## 2023-01-07 RX ADMIN — PANTOPRAZOLE SODIUM 40 MG: 40 TABLET, DELAYED RELEASE ORAL at 11:05

## 2023-01-07 RX ADMIN — HEPARIN SODIUM 4000 UNITS: 1000 INJECTION INTRAVENOUS; SUBCUTANEOUS at 18:30

## 2023-01-07 RX ADMIN — NITROGLYCERIN 10 MCG/MIN: 20 INJECTION INTRAVENOUS at 01:15

## 2023-01-07 RX ADMIN — CLOPIDOGREL BISULFATE 75 MG: 75 TABLET ORAL at 11:04

## 2023-01-07 RX ADMIN — NITROGLYCERIN 10 MCG/MIN: 20 INJECTION INTRAVENOUS at 10:59

## 2023-01-07 RX ADMIN — EZETIMIBE 10 MG: 10 TABLET ORAL at 11:04

## 2023-01-07 RX ADMIN — Medication 1 PATCH: at 11:04

## 2023-01-07 RX ADMIN — OMEGA-3 FATTY ACIDS CAP 1000 MG 1000 MG: 1000 CAP at 11:04

## 2023-01-07 RX ADMIN — SENNOSIDES AND DOCUSATE SODIUM 1 TABLET: 8.6; 5 TABLET ORAL at 21:45

## 2023-01-07 RX ADMIN — HEPARIN SODIUM 4000 UNITS: 1000 INJECTION INTRAVENOUS; SUBCUTANEOUS at 08:00

## 2023-01-07 RX ADMIN — HYDROMORPHONE HYDROCHLORIDE 0.2 MG: 0.2 INJECTION, SOLUTION INTRAMUSCULAR; INTRAVENOUS; SUBCUTANEOUS at 13:43

## 2023-01-07 RX ADMIN — HEPARIN SODIUM 11.1 UNITS/KG/HR: 10000 INJECTION, SOLUTION INTRAVENOUS at 01:13

## 2023-01-07 RX ADMIN — SODIUM BICARBONATE 650 MG: 650 TABLET ORAL at 17:48

## 2023-01-07 RX ADMIN — ISOSORBIDE MONONITRATE 30 MG: 30 TABLET, EXTENDED RELEASE ORAL at 11:04

## 2023-01-07 RX ADMIN — HYDROMORPHONE HYDROCHLORIDE 0.2 MG: 0.2 INJECTION, SOLUTION INTRAMUSCULAR; INTRAVENOUS; SUBCUTANEOUS at 10:53

## 2023-01-07 RX ADMIN — HYDROMORPHONE HYDROCHLORIDE 0.5 MG: 1 INJECTION, SOLUTION INTRAMUSCULAR; INTRAVENOUS; SUBCUTANEOUS at 03:06

## 2023-01-07 RX ADMIN — METOPROLOL SUCCINATE 100 MG: 50 TABLET, EXTENDED RELEASE ORAL at 20:09

## 2023-01-07 RX ADMIN — LORAZEPAM 1 MG: 1 TABLET ORAL at 08:44

## 2023-01-07 RX ADMIN — LORAZEPAM 0.5 MG: 0.5 TABLET ORAL at 20:09

## 2023-01-07 RX ADMIN — NITROGLYCERIN 10 MCG/MIN: 20 INJECTION INTRAVENOUS at 19:16

## 2023-01-07 RX ADMIN — SODIUM BICARBONATE 650 MG: 650 TABLET ORAL at 11:04

## 2023-01-07 RX ADMIN — SERTRALINE HYDROCHLORIDE 150 MG: 50 TABLET ORAL at 11:04

## 2023-01-07 RX ADMIN — ASPIRIN 81 MG CHEWABLE TABLET 81 MG: 81 TABLET CHEWABLE at 11:04

## 2023-01-07 RX ADMIN — LORAZEPAM 0.5 MG: 0.5 TABLET ORAL at 12:29

## 2023-01-07 RX ADMIN — OMEGA-3 FATTY ACIDS CAP 1000 MG 1000 MG: 1000 CAP at 17:48

## 2023-01-07 RX ADMIN — BUMETANIDE 1 MG: 1 TABLET ORAL at 11:05

## 2023-01-07 RX ADMIN — METOPROLOL TARTRATE 100 MG: 100 TABLET, FILM COATED ORAL at 11:05

## 2023-01-07 RX ADMIN — HEPARIN SODIUM 4000 UNITS: 1000 INJECTION INTRAVENOUS; SUBCUTANEOUS at 00:06

## 2023-01-07 RX ADMIN — HEPARIN SODIUM 11.1 UNITS/KG/HR: 10000 INJECTION, SOLUTION INTRAVENOUS at 10:58

## 2023-01-07 RX ADMIN — LORAZEPAM 0.5 MG: 2 INJECTION INTRAMUSCULAR; INTRAVENOUS at 04:26

## 2023-01-07 NOTE — ASSESSMENT & PLAN NOTE
Suspected, severe anxiety noted around leaving home since son  few years ago   Would appreciate psych consult  Will continue home Zoloft   During acute illness and admission, will change PO PRN ativan to ATC q8h and add PRN IV ativan

## 2023-01-07 NOTE — ASSESSMENT & PLAN NOTE
Noted on prior cardiac MRI  TTE 01/02/2023: LV cavity normal increased wall thickness   LVEF 45% mild reduction in systolic function, LA moderately dilated, AV/MV/TV mild regurg, ascending aorta mildly dilated  careful use of nitrates/diuretics to avoid loss of preload

## 2023-01-07 NOTE — ASSESSMENT & PLAN NOTE
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

## 2023-01-07 NOTE — CONSULTS
Cardiology Consultation  MD Mario Aldana MD Roe Quick, DO, Charlane Copper Mansoor, MD Darryll Mcardle, DO, Ranjana Pompa DO, [facility] - WHITE [facility]  ----------------------------------------------------------------  [address] [facility]  [phone]   [facility], [address] [facility] 41 y o  female MRN: [id number]  Unit/Bed#: E4 -01 Encounter: [id number]      Reason for Consultation: Chest pain      ASSESSMENT:   • Precordial chest pain  • CAD  o NSTEMI s/p thrombectomy w/ GREG-mLAD and residual 60% pLCx, patent GREG-LAD, patent GREG-mRCA and patent GREG-rPDA, January 2023  • Hypertrophic cardiomyopathy  • Chronic combined systolic and diastolic heart failure  o LVEF 45%, LVH, inferior and inferolateral hypokinesis, moderate LA dilatation, mild AR/MR/TR, mild ascending aortic dilatation 4 3 cm, January 2023  • Hypertension  • Dyslipidemia  • Morbid obesity  • ELIAZAR  • CKD stage IV  • COPD  • Tobacco abuse  • Agoraphobia/social anxiety disorder/depression    PLAN:  Patient has change in ECG compared to study from January 4, but reviewing prior ECGs, this does not appear to be the ECG of the patient it was assigned to and likely in error  ECGs during this hospitalization appear similar to the ECGs from recent myocardial infarction without significant ST segment change showing prior infarct age-indeterminate  Her chest discomfort is approximately half of what it was before with some mild change in quality with troponins downward trending  Continue to trend troponin for an additional 2 sets to ensure they continue to come down  NT proBNP is lower than prior  Will place on nitroglycerin drip and uptitrate her antianginals  There is some concern that her symptoms may be related to her underlying anxiety and recommend psychiatric evaluation in the inpatient setting given her significant of agoraphobia and labile mood during discussing outpatient cardiac rehab  Minimum 1 year aspirin and clopidogrel  Continue heparin drip for now and titrate nitro drip to pain relief  Continue high intensity statin and Zetia  Continue Bumex, isosorbide  Discontinue Lopressor and initiate Toprol- mg twice daily with mildly reduced left ventricular systolic function    Signed: Deborah Hastings DO, FACC, EVAN, FACP      History of Present Illness:  Anna Castillo is a 41 y o  female with CAD with non-ST elevation MI in January 2023 and drug-eluting stent to the LAD with prior stents placed to the LAD, RCA and RPDA, hypertrophic cardiomyopathy, hypertension, dyslipidemia, obesity, ELIAZAR, CKD, COPD and depression presented to [facility] with chest pain  The patient was recently hospitalized in early January 2023 due to non-ST elevation MI with elevation in troponin to over 22,000  ECG was found to be abnormal and the patient was brought to the Cath Lab with thrombus noted in the mid LAD  The patient underwent thrombectomy with stent placement to the mid LAD  Patient's chest pain improved, but never fully resolved  She was subsequently discharged to home and after several days of her symptoms not improving and occasionally worsening, she returned to the emergency department and was transferred to [address] [place] [address] for management of her chest pain  Troponin has continued to trend down from 22,000 down to 5000 and eventually 3000  ECGs have been similar to prior  Chest pain is 50% or less of what she was experiencing during her prior admission  Denies lower extremity swelling, orthopnea or paroxysmal nocturnal dyspnea  Denies lightheadedness, dizziness or palpitations  Review of Systems:  Review of Systems   Constitutional: Negative for decreased appetite, fever, weight gain and weight loss  HENT: Negative for congestion and sore throat  Eyes: Negative for visual disturbance  Cardiovascular: Positive for chest pain and dyspnea on exertion   Negative for leg swelling, near-syncope and palpitations  Respiratory: Positive for shortness of breath  Negative for cough  Hematologic/Lymphatic: Negative for bleeding problem  Skin: Negative for rash  Musculoskeletal: Negative for myalgias and neck pain  Gastrointestinal: Negative for abdominal pain and nausea  Neurological: Negative for light-headedness and weakness  Psychiatric/Behavioral: Negative for depression  Past Medical History:   Diagnosis Date   • Acute MI ([place] [address] )    • Anemia    • Anxiety    • CAD (coronary artery disease)     GREG mid RCA and GREG right PDA 3/25/2021   • Cancer ([place] [address] ) 2008   • Cardiomyopathy ([place] [address] )    • CHF (congestive heart failure) ([facility])    • Chronic kidney disease    • COPD (chronic obstructive pulmonary disease) ([facility])     scheduled for sleep apnea test soon after pacemaker implant   • Coronary artery disease    • Depression    • History of chemotherapy     non hodgkins lymphoma 2008   • Hyperlipemia    • Hypertension    • Hypertrophic cardiomyopathy ([place] [address] )    • Lymphoma, non-Hodgkin's ([facility])    • Myocardial infarction [facility])    • Obesity    • SSS (sick sinus syndrome) ([place] [address] )     s/p medtronic dual chamber pacemaker 5/25/2021   • Tobacco abuse    • Ventricular tachycardia, non-sustained    • Wears glasses        Past Surgical History:   Procedure Laterality Date   • CARDIAC CATHETERIZATION N/A 1/3/2023    Procedure: Cardiac catheterization;  Surgeon: Sunny Franco MD;  Location: [facility] CARDIAC CATH LAB; Service: Cardiology   • CARDIAC CATHETERIZATION N/A 1/3/2023    Procedure: Cardiac Coronary Angiogram;  Surgeon: Sunny Franco MD;  Location: [facility] CARDIAC CATH LAB; Service: Cardiology   • CARDIAC CATHETERIZATION N/A 1/3/2023    Procedure: Cardiac pci;  Surgeon: Sunny Franco MD;  Location: [facility] CARDIAC CATH LAB;   Service: Cardiology   • CARDIAC PACEMAKER PLACEMENT Left 5/25/2021    Procedure: DUAL LEAD PACEMAKER IMPLANT;  Surgeon: Christian Hess MD;  Location: [facility] MAIN OR;  Service: Cardiology   • CAROTID STENT     •  SECTION     • CORONARY ANGIOPLASTY WITH STENT PLACEMENT  2021    GREG mid RCA and GREG right PDA   • DENTAL SURGERY     • IR BIOPSY KIDNEY RANDOM  10/18/2021       Social History     Socioeconomic History   • Marital status: /Civil Union     Spouse name: Not on file   • Number of children: Not on file   • Years of education: Not on file   • Highest education level: Not on file   Occupational History   • Not on file   Tobacco Use   • Smoking status: Every Day     Packs/day: 0 50     Years: 25 00     Pack years: 12 50     Types: Cigarettes   • Smokeless tobacco: Never   • Tobacco comments:     Recently and currently quitting cigarettes   Vaping Use   • Vaping Use: Never used   Substance and Sexual Activity   • Alcohol use: Yes     Comment: 1-2 times years   • Drug use: No   • Sexual activity: Yes     Partners: Male     Birth control/protection: None     Comment:    Other Topics Concern   • Not on file   Social History Narrative   • Not on file     Social Determinants of Health     Financial Resource Strain: Not on file   Food Insecurity: Not on file   Transportation Needs: Not on file   Physical Activity: Not on file   Stress: Not on file   Social Connections: Not on file   Intimate Partner Violence: Not on file   Housing Stability: Not on file       Family History   Problem Relation Age of Onset   • No Known Problems Mother    • No Known Problems Father    • No Known Problems Daughter    • Brain cancer Maternal Grandfather    • Heart disease Maternal Grandfather    • Cancer Maternal Grandfather    • No Known Problems Paternal Aunt    • No Known Problems Paternal Aunt        No Known Allergies    Current Facility-Administered Medications on File Prior to Encounter   Medication   • [COMPLETED] aspirin chewable tablet 324 mg   • [COMPLETED] heparin (porcine) injection 4,000 Units   • [COMPLETED] HYDROmorphone (DILAUDID) injection 0 5 mg   • [COMPLETED] HYDROmorphone (DILAUDID) injection 0 5 mg   • [COMPLETED] LORazepam (ATIVAN) injection 0 5 mg   • [COMPLETED] LORazepam (ATIVAN) tablet 1 mg   • [COMPLETED] ondansetron (ZOFRAN) injection 4 mg   • [DISCONTINUED] heparin (ACS LOW)   • [DISCONTINUED] heparin (porcine) 25,000 units in 0 45% NaCl 250 mL infusion (premix)   • [DISCONTINUED] heparin (porcine) injection 2,000 Units   • [DISCONTINUED] heparin (porcine) injection 4,000 Units   • [DISCONTINUED] nitroglycerin (NITROSTAT) SL tablet 0 4 mg   • [DISCONTINUED] nitroGLYcerin (TRIDIL) 50 mg in 250 mL infusion (premix)   • [DISCONTINUED] sodium chloride (PF) 0 9 % injection 3 mL     Current Outpatient Medications on File Prior to Encounter   Medication Sig   • acetaminophen (TYLENOL) 325 mg tablet Take 3 tablets (975 mg total) by mouth every 8 (eight) hours as needed for mild pain   • al mag oxide-diphenhydramine-lidocaine viscous (MAGIC MOUTHWASH) 1:1:1 suspension Swish and spit 10 mL every 4 (four) hours as needed for mouth pain or discomfort   • albuterol (PROVENTIL HFA,VENTOLIN HFA) 90 mcg/act inhaler Inhale 2 puffs every 4 (four) hours as needed for wheezing or shortness of breath   • aspirin 81 mg chewable tablet Chew 1 tablet (81 mg total) daily   • atorvastatin (LIPITOR) 80 mg tablet Take 1 tablet (80 mg total) by mouth every evening   • bumetanide (BUMEX) 1 mg tablet Take 1 tablet (1 mg total) by mouth daily   • calcitriol (ROCALTROL) 0 25 mcg capsule Take 1 capsule (0 25 mcg total) by mouth daily (Patient taking differently: Take 0 25 mcg by mouth daily Takes Monday Wednesday and fridays as of 11/11/22)   • chlorhexidine (PERIDEX) 0 12 % solution Apply 15 mL to the mouth or throat every 12 (twelve) hours   • clopidogrel (PLAVIX) 75 mg tablet Take 1 tablet (75 mg total) by mouth daily Do not start before January 6, 2023     • Diclofenac Sodium (VOLTAREN) 1 % Apply 2 g topically 4 (four) times a day   • ezetimibe (ZETIA) 10 mg tablet Take 1 tablet (10 mg total) by mouth in the morning  • isosorbide mononitrate (IMDUR) 30 mg 24 hr tablet Take 1 tablet (30 mg total) by mouth daily   • lidocaine (Lidoderm) 5 % Apply 2 patches topically daily Remove & Discard patch within 12 hours or as directed by MD   • LORazepam (ATIVAN) 0 5 mg tablet Take 1 tablet (0 5 mg total) by mouth 3 (three) times a day as needed for anxiety   • methocarbamol (ROBAXIN) 500 mg tablet Take 1 tablet (500 mg total) by mouth 2 (two) times a day as needed for muscle spasms   • metoprolol tartrate (LOPRESSOR) 100 mg tablet Take 1 tablet (100 mg total) by mouth 2 (two) times a day   • nicotine (NICODERM CQ) 14 mg/24hr TD 24 hr patch Place 1 patch on the skin daily Do not start before January 6, 2023     • nitroglycerin (NITROSTAT) 0 4 mg SL tablet Place 1 tablet (0 4 mg total) under the tongue every 5 (five) minutes as needed for chest pain   • omega-3-acid ethyl esters (LOVAZA) 1 g capsule Take 1 capsule (1 g total) by mouth 2 (two) times a day   • omeprazole (PriLOSEC) 40 MG capsule Take 1 capsule (40 mg total) by mouth daily   • QUEtiapine (SEROquel) 50 mg tablet take 1 tablet by mouth at bedtime   • sertraline (ZOLOFT) 100 mg tablet Take 1 5 tablets (150 mg total) by mouth daily   • sodium bicarbonate 650 mg tablet Take 1 tablet (650 mg total) by mouth 2 (two) times daily after meals        Current Facility-Administered Medications   Medication Dose Route Frequency Provider Last Rate   • acetaminophen  650 mg Oral Q6H PRN Tomaszlekaylin Owens, DO     • albuterol  2 puff Inhalation Q4H PRN Tomaszlean Roman, DO     • aspirin  81 mg Oral Daily Enrike Velázquez, DO     • bumetanide  1 mg Oral Daily Enrike Velázquez, DO     • clopidogrel  75 mg Oral Daily Enrike Velázquez, DO     • ezetimibe  10 mg Oral Daily Enrike Velázquez, DO     • fish oil  1,000 mg Oral BID Tomaszlekaylin Owens, DO     • heparin (porcine)  3-20 Units/kg/hr (Order-Specific) Intravenous Titrated Kristian Owens, DO 11 1 Units/kg/hr (01/07/23 1058)   • heparin (porcine)  2,000 Units Intravenous Q6H PRN Luigi Adjutant, DO     • heparin (porcine)  4,000 Units Intravenous Q6H PRN Luigi Adjutant, DO     • HYDROmorphone  0 2 mg Intravenous Q2H PRN Luigi Adjutant, DO     • isosorbide mononitrate  30 mg Oral Daily Enrike Rich Horns, DO     • LORazepam  0 5 mg Intravenous Q6H PRN Aleksandr Santiago PA-C     • LORazepam  0 5 mg Oral Q8H Aleksandr Santiago PA-C     • metoprolol tartrate  100 mg Oral BID Luigi Adjutant, DO     • naloxone  0 04 mg Intravenous Q1MIN PRN [facility] Adjutant, DO     • nicotine  1 patch Transdermal Daily Enrike Rich Horns, DO     • nitroglycerin  0 4 mg Sublingual Q5 Min PRN [facility] Adjutant, DO     • nitroGLYcerin  5-200 mcg/min Intravenous Titrated [facility] Cent[facility], DO 10 mcg/min (01/07/23 1059)   • ondansetron  4 mg Intravenous Q6H PRN [facility] Roryutant, DO     • oxyCODONE  2 5 mg Oral Q4H PRN [facility] Adjutant, DO     • oxyCODONE  5 mg Oral Q4H PRN [facility] Adjutant, DO     • pantoprazole  40 mg Oral Early Morning [facility] Adjutant, DO     • QUEtiapine  50 mg Oral HS Enrike Rich Horns, DO     • senna-docusate sodium  1 tablet Oral HS Enrike Rich Horns, DO     • sertraline  150 mg Oral Daily Enrike Rich Horns, DO     • sodium bicarbonate  650 mg Oral BID after meals [facility] Adjutant, DO         heparin (porcine), 3-20 Units/kg/hr (Order-Specific), Last Rate: 11 1 Units/kg/hr (01/07/23 1058)  nitroGLYcerin, 5-200 mcg/min, Last Rate: 10 mcg/min (01/07/23 1059)        Vitals:    01/07/23 1021 01/07/23 1105   BP:  111/64   BP Location:  Right arm   Pulse:  80   Resp:  20   Weight: 125 kg (275 lb 9 2 oz)        No intake/output data recorded      No intake or output data in the 24 hours ending 01/07/23 1217    Weight change:     PHYSICAL EXAMINATION:  Gen: Awake, Alert, NAD  Head/eyes: AT/NC, pupils equal and round, Anicteric  ENT: mmm  Neck: Supple, No elevated JVP, trachea midline  Resp: CTA bilaterally no w/r/r  CV: RRR +S1, S2, No m/r/g  Abd: Soft, obese, NT/ND + BS  Ext: no LE edema bilaterally  Neuro: Follows commands, moves all extermities  Psych: Appropriate affect, tearful mood, pleasant attitude, non-combative  Skin: warm; no rash, erythema or venous stasis changes on exposed skin    Lab Results:  Results from last 7 days   Lab Units 23   WBC Thousand/uL 12 79*   HEMOGLOBIN g/dL 12 3   HEMATOCRIT % 36 9   PLATELETS Thousands/uL 298     Results from last 7 days   Lab Units 23  2253 01/03/23  0429 23  0808   POTASSIUM mmol/L 3 9   < > 3 8   CHLORIDE mmol/L 107   < > 107   CO2 mmol/L 19*   < > 19*   BUN mg/dL 29*   < > 24   CREATININE mg/dL 2 79*   < > 2 58*   CALCIUM mg/dL 9 8   < > 9 6   ALK PHOS U/L  --   --  83   ALT U/L  --   --  22   AST U/L  --   --  13    < > = values in this interval not displayed       No results found for: TROPONINT      Results from last 7 days   Lab Units 23  0337 23  0110 23  2253 23  2000 23  1841 23  1656   HS TNI 0HR ng/L  --   --  5,019*  --   --   --    HS TNI 2HR ng/L  --  4,481*  --   --   --   --    HS TNI 4HR ng/L 3,463*  --   --   --   --   --    HS TNI RAND ng/L  --   --   --  >22,686* >22,973* >22,973*         Results from last 7 days   Lab Units 23  0046   INR  1 20*           Results for orders placed during the hospital encounter of 21    Echo Complete with Contrast if Indicated    Narrative  [address] [address]  [phone]    Transthoracic Echocardiogram  2D, M-mode, Doppler, and Color Doppler    Study date:  25-Mar-2021    Patient: Kristen Hollingsworth  MR number: [id number]  Account number: [de-identified]  : 1975  Age: 42 years  Gender: Female  Status: Inpatient  Location: Bedside  Height: 66 in  Weight: 279 4 lb  BP: 122/ 82 mmHg    Indications: Acute MI    Diagnoses: I21 4 - Non-ST elevation (NSTEMI) myocardial infarction    Sonographer:  Katiana Santos RDCS  Primary Physician:  Fernie Bean DO  Referring Physician:  Cipriano Daily DO  Group:  [facility]  Interpreting Physician:  Sunita Gonzalez MD    IMPRESSIONS:  Features are consistent with hypertrophic cardiomyopathy and inferior wall myocardial infarction  Clinical correlation is advised  SUMMARY    LEFT VENTRICLE:  Systolic function was normal  Ejection fraction was estimated to be 60 %  There was severe hypokinesis of the basal-mid inferior wall(s)  Wall thickness was markedly increased  There was severe assymetrical hypertrophy of the inferior septum (25 mm)  There was no dynamic obstruction at rest  Provocative maneuvers were not performed  Features were consistent with a pseudonormal left ventricular filling pattern, with concomitant abnormal relaxation and increased filling pressure (grade 2 diastolic dysfunction)  LEFT ATRIUM:  The atrium was mildly dilated  MITRAL VALVE:  There was moderate systolic anterior motion of the anterior leaflet  There was mild to moderate regurgitation  AORTIC VALVE:  There was trace regurgitation  HISTORY: PRIOR HISTORY: CHF, CAD, Tobacco Abuse, Obesity    PROCEDURE: The procedure was performed at the bedside  This was a routine study  The transthoracic approach was used  The study included complete 2D imaging, M-mode, complete spectral Doppler, and color Doppler  The heart rate was 66 bpm,  at the start of the study  Images were obtained from the parasternal, apical, subcostal, and suprasternal notch acoustic windows  Echocardiographic views were limited due to poor acoustic window availability and decreased penetration  This  was a technically difficult study  LEFT VENTRICLE: Size was normal  Systolic function was normal  Ejection fraction was estimated to be 60 %  There was severe hypokinesis of the basal-mid inferior wall(s)  Wall thickness was markedly increased   There was severe assymetrical  hypertrophy of the inferior septum (25 mm)  DOPPLER: There was no dynamic obstruction at rest  Provocative maneuvers were not performed  Features were consistent with a pseudonormal left ventricular filling pattern, with concomitant  abnormal relaxation and increased filling pressure (grade 2 diastolic dysfunction)  RIGHT VENTRICLE: The size was normal  Systolic function was normal  Wall thickness was normal     LEFT ATRIUM: The atrium was mildly dilated  RIGHT ATRIUM: Size was normal     MITRAL VALVE: Valve structure was normal  There was normal leaflet separation  There was moderate systolic anterior motion of the anterior leaflet  DOPPLER: The transmitral velocity was within the normal range  There was no evidence for  stenosis  There was mild to moderate regurgitation  AORTIC VALVE: The valve was trileaflet  Leaflets exhibited normal thickness and normal cuspal separation  DOPPLER: Transaortic velocity was within the normal range  There was no evidence for stenosis  There was trace regurgitation  TRICUSPID VALVE: The valve structure was normal  There was normal leaflet separation  DOPPLER: The transtricuspid velocity was within the normal range  There was no evidence for stenosis  There was no regurgitation  PULMONIC VALVE: Leaflets exhibited normal thickness, no calcification, and normal cuspal separation  DOPPLER: The transpulmonic velocity was within the normal range  There was no regurgitation  PERICARDIUM: There was no pericardial effusion  The pericardium was normal in appearance  AORTA: The root exhibited normal size      SYSTEM MEASUREMENT TABLES    2D  %FS: 27 94 %  Ao Diam: 3 95 cm  Ao asc: 3 6 cm  EDV(Teich): 66 84 ml  EF(Teich): 54 74 %  ESV(Teich): 30 25 ml  IVSd: 2 82 cm  LA Diam: 4 59 cm  LAAs A4C: 16 52 cm2  LAESV A-L A4C: 43 86 ml  LAESV MOD A4C: 42 09 ml  LALs A4C: 5 28 cm  LVEDV MOD A4C: 88 32 ml  LVEF MOD A4C: 67 58 %  LVESV MOD A4C: 28 63 ml  LVIDd: 3 92 cm  LVIDs: 2 83 cm  LVLd A4C: 6 93 cm  LVLs A4C: 5 55 cm  LVPWd: 1 43 cm  RAEDV A-L: 23 76 ml  RAEDV MOD: 22 46 ml  RALd: 4 9 cm  RVIDd: 3 61 cm  SV MOD A4C: 59 69 ml  SV(Teich): 36 59 ml    CW  AR Dec Mills: 1 39 m/s2  AR Dec Time: 2867 87 ms  AR PHT: 831 68 ms  AR Vmax: 3 99 m/s  AR maxP 65 mmHg  AV Env  Ti: 240 72 ms  AV VTI: 22 04 cm  AV Vmax: 1 31 m/s  AV Vmean: 0 92 m/s  AV maxP 85 mmHg  AV meanPG: 3 93 mmHg    MM  TAPSE: 1 87 cm    PW  LVOT Env  Ti: 253 09 ms  LVOT VTI: 15 47 cm  LVOT Vmax: 0 92 m/s  LVOT Vmean: 0 61 m/s  LVOT maxPG: 3 4 mmHg  LVOT meanP 75 mmHg  MV A Jorge: 0 71 m/s  MV Dec Mills: 5 89 m/s2  MV DecT: 156 23 ms  MV E Jorge: 0 91 m/s  MV E/A Ratio: 1 3    [facility] Accredited Echocardiography Laboratory    Prepared and electronically signed by    Tia Lambert MD  Signed 25-Mar-2021 13:06:46    Results for orders placed during the hospital encounter of 21    Cardiac catheterization    [address]  [address] [facility]  [phone]    [facility]    Invasive Cardiovascular Lab Complete Report    Patient: Mary Sherman  MR number: [id number]  Account number: [de-identified]  Study date: 2021  Gender: Female  : 1975  Height: 66 1 in  Weight: 266 2 lb  BSA: 2 26 mï¾²    Diagnostic Cardiologist:  Cristian Amanda DO  Interventional Cardiologist:  Cristian Amanda DO    SUMMARY    CORONARY CIRCULATION:  Distal LAD: There was a 60 % stenosis  Mid circumflex: There was a 50 % stenosis  Distal RCA: There was a 10 % stenosis at the site of a prior stent  Right PDA: There was a 0 % stenosis at the site of a prior stent  INDICATIONS:  --  Possible CAD: myocardial infarction with ST elevation (STEMI)  PROCEDURES PERFORMED    --  Right coronary angiography  --  Left coronary angiography  --  Acute Myocardial Infarct  --  Coronary Catheterization (w/o LHC)  --  Mod Sedation Same Physician Initial 15min      PROCEDURE: The risks and alternatives of the procedures and conscious sedation were explained to the patient and informed consent was obtained  The patient was brought to the cath lab and placed on the table  The planned puncture sites  were prepped and draped in the usual sterile fashion  --  Right radial artery access  After performing an Riley's test to verify adequate ulnar artery supply to the hand, the radial site was prepped  The puncture site was infiltrated with local anesthetic  The vessel was accessed using the  modified Seldinger technique, a wire was advanced into the vessel, and a sheath was advanced over the wire into the vessel  --  Right coronary artery angiography  A catheter was advanced over a guidewire into the aorta and positioned in the right coronary artery ostium under fluoroscopic guidance  Angiography was performed  --  Left coronary artery angiography  A catheter was advanced over a guidewire into the aorta and positioned in the left coronary artery ostium under fluoroscopic guidance  Angiography was performed  --  Acute Myocardial Infarct  --  Coronary Catheterization (w/o C)  --  Mod Sedation Same Physician Initial 15min  PROCEDURE COMPLETION: The patient tolerated the procedure well and was discharged from the cath lab  TIMING: Test started at 12:42  Test concluded at 13:02  HEMOSTASIS: The sheath was removed  The site was compressed with a Hemoband  device  Hemostasis was obtained  MEDICATIONS GIVEN: 1% Lidocaine, 1 ml, subcutaneously, at 12:48  Nitroglycerin (200mcg/ml), 200 mcg, at 12:48  Heparin 1000 units/ml, 4,000 units, IV, at 12:49  CONTRAST GIVEN: 65 ml Omnipaque (350 mg I  /ml)  RADIATION EXPOSURE: Fluoroscopy time: 2 17 min  CORONARY VESSELS:   --  The coronary circulation is right dominant  --  Left main: Angiography showed minor luminal irregularities  --  Distal LAD: There was a 60 % stenosis  --  Mid circumflex: There was a 50 % stenosis    --  Distal RCA: There was a 10 % stenosis at the site of a prior stent  --  Right PDA: There was a 0 % stenosis at the site of a prior stent  --  2nd posterolateral segment: The distal vessel was supplied by faint collaterals  IMPRESSIONS:  Patient without angiographic change on cath compared to 3/25/2021  Additionally, she had a return to the cath lab during that index hospitalization for inferior STEMI which demonstrated patent stents and was elected to stent rPDA branch  that was not flow limiting  She has an ECG with residual ST elevation and Q waves  Since she returned with cp today with [place] pass, was elected repeat cath once again  This demonstrated no change and patients chest pain syndrome is  therefore not related to further progression of epicardial CAD  RECOMMENDATIONS  GDMT CAD,    DISPOSITION:  The patient left the catheterization laboratory in stable condition  Prepared and signed by    Bennie Hough DO  Signed 2021 13:03:39    Study diagram    Angiographic findings  Native coronary lesions:  ï¾·Distal LAD: Lesion 1: 60 % stenosis  ï¾·Mid circumflex: Lesion 1: 50 % stenosis  ï¾·Distal RCA: Lesion 1: 10 % stenosis, site of prior stent  ï¾·RPDA: Lesion 1: 0 % stenosis, site of prior stent  Hemodynamic tables    Pressures:  Baseline  Pressures:  - HR: 81  Pressures:  - Rhythm:  Pressures:  -- Aortic Pressure (S/D/M): 116/81/97    Outputs:  Baseline  Outputs:  -- CALCULATIONS: [age]  Outputs:  -- CALCULATIONS: Body Surface Area: 2 26  Outputs:  -- CALCULATIONS: Height in cm: 168 00  Outputs:  -- CALCULATIONS: Sex: Female  Outputs:  -- CALCULATIONS: Weight in k 00    No results found for this or any previous visit      Results for orders placed during the hospital encounter of 17    NM myocardial perfusion spect (rx stress and/or rest)    [place] [phone]    Rest/Stress Gated SPECT Myocardial Perfusion Imaging After Regadenoson    Patient: Nancy Dupont  MR number: [id number]  Account number: [de-identified]  : 1975  Age: 46 years  Gender: Female  Status: Inpatient  Location: Stress lab  Height: 67 in  Weight: 220 lb  BP: 142/ 98 mmHg    Allergies: NO KNOWN ALLERGIES    Diagnosis: R07 9 - Chest pain, unspecified    RN:  Geoff Dubois RN  Interpreting Physician:  Cyndie Starr MD  Referring Physician:  Cyndie Starr MD  Technician:  Winifred Ramirez  Group:  [facility]  Report Prepared By[de-identified]  Winifred Ramirez    INDICATIONS: Evaluation of known coronary artery disease  HISTORY: The patient is a 46year old  female  Chest pain status: chest pain  Coronary artery disease risk factors: dyslipidemia, hypertension, and smoking  Cardiovascular history: coronary artery disease and congestive heart  failure  Prior cardiovascular procedures: percutaneous transluminal coronary angioplasty  Co-morbidity: obesity  PHYSICAL EXAM: Baseline physical exam screening: diminished at bases per Geoff Dubois RN  REST ECG: Normal sinus rhythm  Nonspecific T wave abnormalities were present  PROCEDURE: The study was performed in the stress lab  A regadenoson infusion pharmacologic stress test was performed  Gated SPECT myocardial perfusion imaging was performed after stress and at rest  Systolic blood pressure was 142 mmHg, at  the start of the study  Diastolic blood pressure was 98 mmHg, at the start of the study  The heart rate was 75 bpm, at the start of the study  Regadenoson protocol:  HR bpm SBP mmHg DBP mmHg Symptoms  Baseline 75 142 98 none  1 min 99 158 98 mild dyspnea, flushing, nausea  4 min 92 148 92 none  No medications or fluids given  The patient also performed low level exercise with hand  with foot wiggles  STRESS SUMMARY: Duration of pharmacologic stress was 3 min and 0 sec  The patient exercised to protocol stage 1   Maximal heart rate during stress was 100 bpm  The rate-pressure product for the peak heart rate and blood pressure was 22597  There was no chest pain during stress  The stress test was terminated due to protocol completion  Pre oxygen saturation: 99 %  Peak oxygen saturation: 99 %  The stress ECG was negative for ischemia and normal     ISOTOPE ADMINISTRATION:  Resting isotope administration Stress isotope administration  Agent Tetrofosmin Tetrofosmin  Dose 10 6 mCi 31 3 mCi  Date 03/13/2017 03/13/2017    The radiopharmaceutical was injected at the peak effect of pharmacologic stress  MYOCARDIAL PERFUSION IMAGING:  The image quality was fair  PERFUSION DEFECTS:  -  There was a small, mildly severe, fixed myocardial perfusion defect of the mid to apical anterior wall likely due to attenuation from breast tissue  GATED SPECT:  The calculated left ventricular ejection fraction was 48 %  Left ventricular ejection fraction was within normal limits by visual estimate  SUMMARY:  -  Stress results: There was no chest pain during stress  -  ECG conclusions: The stress ECG was negative for ischemia and normal   -  Perfusion imaging: There was a small, mildly severe, fixed myocardial perfusion defect of the mid to apical anterior wall likely due to attenuation from breast tissue   -  Gated SPECT: The calculated left ventricular ejection fraction was 48 %  Left ventricular ejection fraction was within normal limits by visual estimate  IMPRESSIONS: There was image artifact, without diagnostic evidence for perfusion abnormality  Prepared and signed by    Pranav Keating MD  Signed 03/13/2017 13:41:05      CXR: Results for orders placed during the hospital encounter of 08/31/21    XR chest pa & lateral    Narrative  CHEST    INDICATION:   I50 9: Heart failure, unspecified  I42 2: Other hypertrophic cardiomyopathy      COMPARISON:  7/24/2021    EXAM PERFORMED/VIEWS:  XR CHEST PA & LATERAL      FINDINGS:  Left-sided chest wall intracardiac device is identified  Leads are intact  Cardiomediastinal silhouette appears unremarkable  The lungs are clear  No pneumothorax or pleural effusion  Osseous structures appear within normal limits for patient age  Mild elevation right hemidiaphragm  Impression  No acute cardiopulmonary disease  Workstation performed: [id number]    Results for orders placed during the hospital encounter of 01/06/23    X-ray chest 1 view portable    Narrative  CHEST    INDICATION:   chest pain  COMPARISON:  CXR 1/2/2023 and chest CT 10/6/2021  EXAM PERFORMED/VIEWS:  XR CHEST PORTABLE      FINDINGS:    Mild cardiomegaly  Left subclavian pacemaker leads in right atrium and right ventricle  Low lung volumes with vascular crowding  Mild pulmonary venous congestion  No effusion or pneumothorax  Osseous structures appear within normal limits for patient age  Impression  Mild pulmonary venous congestion  Workstation performed: [id number]      ECG: Sinus rhythm with anteroseptal and inferior MIs age-indeterminate and inferolateral T wave abnormalities    This note was completed in part utilizing [employer] Software  Grammatical errors, random word insertions, spelling mistakes, and incomplete sentences may be an occasional consequence of this system secondary to software limitations, ambient noise, and hardware issues  If you have any questions or concerns about the content, text, or information contained within the body of this dictation, please contact the provider for clarification

## 2023-01-07 NOTE — TELEMEDICINE
TeleConsultation - [address] 41 y o  female MRN: [id number]  Unit/Bed#: E4 -01 Encounter: [id number]        REQUIRED DOCUMENTATION:     1  This service was provided via Telemedicine  2  Provider located at [place]  3  TeleMed provider: Neoma Nissen, MD   4  Identify all parties in room with patient during tele consult:  pt  5  Patient was then informed that this was a Telemedicine visit and that the exam was being conducted confidentially over secure lines  My office door was closed  No one else was in the room  Patient acknowledged consent and understanding of privacy and security of the Telemedicine visit, and gave us permission to have the assistant stay in the room in order to assist with the history and to conduct the exam   I informed the patient that I have reviewed their record in Epic and presented the opportunity for them to ask any questions regarding the visit today  The patient agreed to participate  Assessment/Plan     Principal Problem:    Non-ST elevation MI (NSTEMI) ([facility] [address] )  Active Problems:    Essential hypertension    Tobacco abuse    Mixed hyperlipidemia    Hypertrophic cardiomyopathy (HCC)    COPD (chronic obstructive pulmonary disease) ([facility])    ELIAZAR (obstructive sleep apnea)    CKD (chronic kidney disease) stage 4, GFR 15-29 ml/min ([facility])    Social anxiety disorder    Agoraphobia    Assessment:    Unspecified mood disorder; rule out mood disorder secondary to physiological condition; rule out major depression; unspecified anxiety disorder; provider generalized anxiety disorder    Treatment Plan:    Consider increasing Zoloft to 200 milligrams p o  daily for depression and anxiety  Also consider increasing Seroquel to 100 mg p o  nightly as an antidepressant adjunct as well as for anxiety and insomnia  The patient reports that both of these medications have been helpful in the past but have recently lost some of their efficacy    The patient is in agreement with this plan and gives her informed consent  No suicide precautions are indicated at this time  Reconsult psychiatry as needed  Patient should resume her outpatient psychiatric follow-up for medication management  It would be helpful to add a psychotherapy component to further assist the patient in developing her coping skills additionally      Current Medications:     Current Facility-Administered Medications   Medication Dose Route Frequency Provider Last Rate   • acetaminophen  650 mg Oral Q6H PRN Beverli Lot, DO     • albuterol  2 puff Inhalation Q4H PRN Beverli Lot, DO     • aspirin  81 mg Oral Daily Enrike Oxana Click, DO     • bumetanide  1 mg Oral Daily Enrike Oaxna Click, DO     • clopidogrel  75 mg Oral Daily Enrike Oxana Click, DO     • ezetimibe  10 mg Oral Daily Enrike Oxana Click, DO     • fish oil  1,000 mg Oral BID Beverli Lot, DO     • heparin (porcine)  3-20 Units/kg/hr (Order-Specific) Intravenous Titrated Beverli Lot, DO 11 1 Units/kg/hr (01/07/23 1058)   • heparin (porcine)  2,000 Units Intravenous Q6H PRN Beverli Lot, DO     • heparin (porcine)  4,000 Units Intravenous Q6H PRN Beverli Lot, DO     • HYDROmorphone  0 2 mg Intravenous Q2H PRN Beverli Lot, DO     • isosorbide mononitrate  30 mg Oral Daily Enrike Oxana Click, DO     • LORazepam  0 5 mg Intravenous Q6H PRN Aleksandr Santiago PA-C     • LORazepam  0 5 mg Oral Q8H Aleksandr Santiago PA-C     • metoprolol succinate  100 mg Oral BID Mu Alu, DO     • naloxone  0 04 mg Intravenous Q1MIN PRN Beverli Lot, DO     • nicotine  1 patch Transdermal Daily Enrike Oxana Click, DO     • nitroglycerin  0 4 mg Sublingual Q5 Min PRN Beverli Lot, DO     • nitroGLYcerin  5-200 mcg/min Intravenous Titrated Beverli Lot, DO 10 mcg/min (01/07/23 1059)   • ondansetron  4 mg Intravenous Q6H PRN Beverli Lot, DO     • oxyCODONE  2 5 mg Oral Q4H PRN Beverli Lot, DO     • oxyCODONE  5 mg Oral Q4H PRN Rehan Mitchell, DO     • pantoprazole  40 mg Oral Early Morning Rehan Bolds, DO     • QUEtiapine  50 mg Oral HS [facility], [facility]     • senna-docusate sodium  1 tablet Oral HS Rehan Bolds, DO     • sertraline  150 mg Oral Daily [facility] Homme, DO     • sodium bicarbonate  650 mg Oral BID after meals [facility], DO         Risks / Benefits of Treatment:    Risks, benefits, and possible side effects of medications explained to patient and patient verbalizes understanding  Inpatient consult to Psychiatry  Consult performed by: Amarilys Luis MD  Consult ordered by: Rehan Mitchell DO        Physician Requesting Consult: Rehan Mitchell DO  Principal Problem:Non-ST elevation MI (NSTEMI) [facility])    Reason for Consult: Anxiety      History of Present Illness      Patient is a 41 y o  female who presented to the emergency department where the physician documented the followin-year-old female with significant cardiac history including multiple prior heart attacks, most recently on 1/3/2023 where she was found to have 100% LAD occlusion status post PCI who presents to the emergency department for evaluation of severe left-sided chest pain  She states the pain started acutely approximately 2 hours ago while she was at rest   It has been constant since  She describes it as pressure-like in nature  It radiates to her left shoulder, back, and up into her face  Is associated with some numbness tingling sensation in the face as well as shortness of breath and nausea, but no vomiting  She states it feels similar to prior MI  She did take nitro prior to arrival without relief  She denies any headache, fever, chills, abdominal pain  No leg pain or swelling  She is currently on aspirin and Plavix              Prior to Admission Medications   Prescriptions Last Dose Informant Patient Reported? Taking?    Diclofenac Sodium (VOLTAREN) 1 %     No No   Sig: Apply 2 g topically 4 (four) times a day   LORazepam (ATIVAN) 0 5 mg tablet     No No   Sig: Take 1 tablet (0 5 mg total) by mouth 3 (three) times a day as needed for anxiety   QUEtiapine (SEROquel) 50 mg tablet     No No   Sig: take 1 tablet by mouth at bedtime   acetaminophen (TYLENOL) 325 mg tablet     No No   Sig: Take 3 tablets (975 mg total) by mouth every 8 (eight) hours as needed for mild pain   al mag oxide-diphenhydramine-lidocaine viscous (MAGIC MOUTHWASH) 1:1:1 suspension     No No   Sig: Swish and spit 10 mL every 4 (four) hours as needed for mouth pain or discomfort   albuterol (PROVENTIL HFA,VENTOLIN HFA) 90 mcg/act inhaler     No No   Sig: Inhale 2 puffs every 4 (four) hours as needed for wheezing or shortness of breath   aspirin 81 mg chewable tablet     No No   Sig: Chew 1 tablet (81 mg total) daily   atorvastatin (LIPITOR) 80 mg tablet     No No   Sig: Take 1 tablet (80 mg total) by mouth every evening   bumetanide (BUMEX) 1 mg tablet     No No   Sig: Take 1 tablet (1 mg total) by mouth daily   calcitriol (ROCALTROL) 0 25 mcg capsule     No No   Sig: Take 1 capsule (0 25 mcg total) by mouth daily   Patient taking differently: Take 0 25 mcg by mouth daily Takes Monday Wednesday and fridays as of 11/11/22   chlorhexidine (PERIDEX) 0 12 % solution     No No   Sig: Apply 15 mL to the mouth or throat every 12 (twelve) hours   clopidogrel (PLAVIX) 75 mg tablet     No No   Sig: Take 1 tablet (75 mg total) by mouth daily Do not start before January 6, 2023  ezetimibe (ZETIA) 10 mg tablet     No No   Sig: Take 1 tablet (10 mg total) by mouth in the morning     isosorbide mononitrate (IMDUR) 30 mg 24 hr tablet     No No   Sig: Take 1 tablet (30 mg total) by mouth daily   lidocaine (Lidoderm) 5 %     No No   Sig: Apply 2 patches topically daily Remove & Discard patch within 12 hours or as directed by MD   methocarbamol (ROBAXIN) 500 mg tablet     No No   Sig: Take 1 tablet (500 mg total) by mouth 2 (two) times a day as needed for muscle spasms   metoprolol tartrate (LOPRESSOR) 100 mg tablet     No No   Sig: Take 1 tablet (100 mg total) by mouth 2 (two) times a day   nicotine (NICODERM CQ) 14 mg/24hr TD 24 hr patch     No No   Sig: Place 1 patch on the skin daily Do not start before January 6, 2023     nitroglycerin (NITROSTAT) 0 4 mg SL tablet     No No   Sig: Place 1 tablet (0 4 mg total) under the tongue every 5 (five) minutes as needed for chest pain   omega-3-acid ethyl esters (LOVAZA) 1 g capsule     No No   Sig: Take 1 capsule (1 g total) by mouth 2 (two) times a day   omeprazole (PriLOSEC) 40 MG capsule     No No   Sig: Take 1 capsule (40 mg total) by mouth daily   sertraline (ZOLOFT) 100 mg tablet     No No   Sig: Take 1 5 tablets (150 mg total) by mouth daily   sodium bicarbonate 650 mg tablet     No No   Sig: Take 1 tablet (650 mg total) by mouth 2 (two) times daily after meals      Facility-Administered Medications: None         Medical History[]Expand by Default        Past Medical History:   Diagnosis Date   • Acute MI ([facility] )     • Anemia     • Anxiety     • CAD (coronary artery disease)       GREG mid RCA and GREG right PDA 3/25/2021   • [facility] ) 2008   • Cardiomyopathy ([facility] )     • CHF (congestive heart failure) ([facility])     • Chronic kidney disease     • COPD (chronic obstructive pulmonary disease) ([facility])       scheduled for sleep apnea test soon after pacemaker implant   • Coronary artery disease     • Depression     • History of chemotherapy       non hodgkins lymphoma 2008   • Hyperlipemia     • Hypertension     • Hypertrophic cardiomyopathy ([facility] )     • Lymphoma, non-Hodgkin's ([facility])     • Myocardial infarction ([facility] )     • Obesity     • SSS (sick sinus syndrome) ([facility])       s/p medtronic dual chamber pacemaker 5/25/2021   • Tobacco abuse     • Ventricular tachycardia, non-sustained     • Wears glasses              Surgical History[]Expand by Default         Past Surgical History:   Procedure Laterality Date   • CARDIAC CATHETERIZATION N/A 1/3/2023     Procedure: Cardiac catheterization;  Surgeon: Sally Mata MD;  Location: [facility]; Service: Cardiology   • CARDIAC CATHETERIZATION N/A 1/3/2023     Procedure: Cardiac Coronary Angiogram;  Surgeon: Sally Mata MD;  [facility]; Service: Cardiology   • CARDIAC CATHETERIZATION N/A 1/3/2023     Procedure: Cardiac pci;  Surgeon: Sally Mata MD;  Location: [facility]; Service: Cardiology   • CARDIAC PACEMAKER PLACEMENT Left 2021     Procedure: DUAL LEAD PACEMAKER IMPLANT;  Surgeon: Darryl Benito MD;  Location: [facility] MAIN OR;  Service: Cardiology   • CAROTID STENT       •  SECTION       • CORONARY ANGIOPLASTY WITH STENT PLACEMENT   2021     GREG mid RCA and GREG right PDA   • DENTAL SURGERY       • IR BIOPSY KIDNEY RANDOM   10/18/2021            Family History[]Expand by Default         Family History   Problem Relation Age of Onset   • No Known Problems Mother     • No Known Problems Father     • No Known Problems Daughter     • Brain cancer Maternal Grandfather     • Heart disease Maternal Grandfather     • Cancer Maternal Grandfather     • No Known Problems Paternal Aunt     • No Known Problems Paternal Aunt           I have reviewed and agree with the history as documented            E-Cigarette/Vaping   • E-Cigarette Use Never User              E-Cigarette/Vaping Substances   • Nicotine No     • THC No     • CBD No     • Flavoring No     • Other No     • Unknown No        Social History            Tobacco Use   • Smoking status: Every Day       Packs/day: 0 50       Years: 25 00       Pack years: 12 50       Types: Cigarettes   • Smokeless tobacco: Never   • Tobacco comments:       Recently and currently quitting cigarettes   Vaping Use   • Vaping Use: Never used   Substance Use Topics   • Alcohol use: Yes       Comment: 1-2 times years   • Drug use:  No         Review of Systems   Constitutional: Negative for chills and fever    HENT: Negative for ear pain and sore throat  Eyes: Negative for pain and visual disturbance  Respiratory: Positive for shortness of breath  Negative for cough  Cardiovascular: Positive for chest pain  Negative for palpitations  Gastrointestinal: Positive for nausea  Negative for abdominal pain and vomiting  Genitourinary: Negative for dysuria and hematuria  Musculoskeletal: Negative for arthralgias and back pain  Skin: Negative for color change and rash  Neurological: Positive for light-headedness  Negative for seizures and syncope  All other systems reviewed and are negative  The patient tells me that in addition to anxiety she is experiencing depression on an equivalent level of intensity  Past psychiatric history: Patient reports a long history of anxiety depression which recently had responded with at least partial benefit to the Zoloft 150 mg p o  daily and Seroquel 50 mg p o  nightly  She states recently however this medications appear to have lost their efficacy  Social history: The patient is  but her  is no longer at home  She has 2 children who still live at home and are very supportive to her  She is not important at this time  She reports no abuse  She states everything is okay at home  Family history: Patient reports schizophrenia runs in her family  She reports that her oldest son has schizophrenia  Substance use history: Patient states that up until the past week she smoked 1/2 to 1 pack of cigarettes per day and will stop smoking now  She denies any other alcohol or substance use  Mental status examination: Patient is alert and well oriented all spheres  Affect is somewhat depressed and constricted  Speech is unremarkable  Sensorium is clear  Thought process is logical and linear  Thought content is reality based  Associations are tight  Memory is intact in all spheres    She presented average intelligence by use of vocabulary, general fund of knowledge, sentence structure and syntax  She reports moderate levels of depression and anxiety  She states anxiety is there at all times but worse at times given situational events  She reports that sleep has been disrupted  She describes anhedonia  Energy levels have been lower  Appetite has been impaired  She denies suicidal homicidal ideation  She denies hallucinations and other psychotic features  Insight and judgment are intact  She is motivated for treatment for depression and anxiety  Past Medical History:   Diagnosis Date   • Acute MI ([place] [address] )    • Anemia    • Anxiety    • CAD (coronary artery disease)     GREG mid RCA and GREG right PDA 3/25/2021   • Cancer ([place] [address] ) 2008   • Cardiomyopathy ([place] [address] )    • CHF (congestive heart failure) ([facility])    • Chronic kidney disease    • COPD (chronic obstructive pulmonary disease) ([facility])     scheduled for sleep apnea test soon after pacemaker implant   • Coronary artery disease    • Depression    • History of chemotherapy     non hodgkins lymphoma 2008   • Hyperlipemia    • Hypertension    • Hypertrophic cardiomyopathy ([place] [address] )    • Lymphoma, non-Hodgkin's ([facility])    • Myocardial infarction ([place] [address] )    • Obesity    • SSS (sick sinus syndrome) ([place] [address] )     s/p medtronic dual chamber pacemaker 5/25/2021   • Tobacco abuse    • Ventricular tachycardia, non-sustained    • Wears glasses        Medical Review Of Systems:    Review of Systems    Meds/Allergies     all current active meds have been reviewed  No Known Allergies    Objective     Vital signs in last 24 hours:  Temp:  [98 8 °F (37 1 °C)-99 8 °F (37 7 °C)] 99 8 °F (37 7 °C)  HR:  [71-98] 72  Resp:  [14-27] 20  BP: (111-166)/(60-85) 117/66    No intake or output data in the 24 hours ending 01/07/23 2922        Lab Results: I have personally reviewed all pertinent laboratory/tests results  Imaging Studies: X-ray chest 1 view portable    Result Date: 1/7/2023  Narrative: CHEST INDICATION:   chest pain  COMPARISON:  CXR 1/2/2023 and chest CT 10/6/2021  EXAM PERFORMED/VIEWS:  XR CHEST PORTABLE FINDINGS: Mild cardiomegaly  Left subclavian pacemaker leads in right atrium and right ventricle  Low lung volumes with vascular crowding  Mild pulmonary venous congestion  No effusion or pneumothorax  Osseous structures appear within normal limits for patient age  Impression: Mild pulmonary venous congestion  Workstation performed: [id number]     X-ray chest 1 view portable    Result Date: 1/2/2023  Narrative: CHEST INDICATION:   chest pain  CHEST INDICATION:   chest pain  COMPARISON:  5/17/2022 EXAM PERFORMED/VIEWS:  XR CHEST PORTABLE FINDINGS:  Study limited by large body habitus and portable technique  Lungs are clear  No effusion or pneumothorax  Heart, mediastinal and hilar structures are within normal limits  Left dual-chamber pacemaker in standard position  No acute osseous or soft tissue pathology  Impression: No acute cardiopulmonary findings  COMPARISON:  None EXAM PERFORMED/VIEWS:  XR CHEST PORTABLE FINDINGS: Cardiomediastinal silhouette appears unremarkable  The lungs are clear  No pneumothorax or pleural effusion  Osseous structures appear within normal limits for patient age  IMPRESSION: No acute cardiopulmonary disease  Workstation performed: [id number]     Cardiac catheterization    Result Date: 1/3/2023  Narrative: •  Successful PCI to mid % culprit req   rheolytic thrombectomy (progressed when compared to 4-2-21 study); given atretic distal LAD, 2 25 x 15 mm GREG was deployed from LAD into more sizable D2 •  Residual prox LCx 60% lesion with patent LAD, mid RCA & PDA stents •  Elevated LVEDP (20-30 mmHg) with no LV-Ao gradient on pullback •  R radial a  accessed but unable to advance equipment, likely occluded; RFA arteriotomy employed     Cardiac EP device report    Result Date: 12/27/2022  Narrative: MDT DUAL PM/ ACTIVE SYSTEM IS MRI CONDITIONAL CARELINK TRANSMISSION: BATTERY VOLTAGE ADEQUATE (13 7 YRS)  AP: 3 6%  : <0 1% (MVP-ON)  ALL AVAILABLE LEAD PARAMETERS WITHIN NORMAL LIMITS  5 VT EPISODES W/AVAIL EGMS SHOWING PAT W/ -170 BPM, MAX DURATION 3 SECS  PT TAKES METOPROLOL TART, ASA 81MG  EF: 65% (ST ECHO 7/26/21)  PACEMAKER FUNCTIONING APPROPRIATELY  [address]     Echo complete w/ contrast if indicated    Addendum Date: 1/2/2023 Addendum:   •  Left Ventricle: Left ventricular cavity size is normal  Wall thickness is increased  The left ventricular ejection fraction is 45%  Systolic function is mildly reduced  While study is technically difficult and limited there does appear to be hypokinesis of the inferior and inferolateral walls  •  Left Atrium: The atrium is moderately dilated  •  Aortic Valve: There is mild regurgitation  •  Mitral Valve: There is mild regurgitation  •  Tricuspid Valve: There is mild regurgitation  •  Aorta: The aortic root is normal in size  The ascending aorta is mildly dilated  The aortic root is 3 00 cm  The ascending aorta is 4 3 cm  Result Date: 1/2/2023  Narrative: •  Left Ventricle: Left ventricular cavity size is normal  Wall thickness is increased  The left ventricular ejection fraction is 45%  Systolic function is mildly reduced  While study is technically difficult and limited he does appear to be hypokinesis of the inferior and inferolateral walls  •  Left Atrium: The atrium is moderately dilated  •  Aortic Valve: There is mild regurgitation  •  Mitral Valve: There is mild regurgitation  •  Tricuspid Valve: There is mild regurgitation  •  Aorta: The aortic root is normal in size  The ascending aorta is mildly dilated  The aortic root is 3 00 cm  The ascending aorta is 4 3 cm       EKG/Pathology/Other Studies:   Lab Results   Component Value Date    VENTRATE 85 01/06/2023    ATRIALRATE 85 01/06/2023    PRINT 182 01/06/2023    QRSDINT 112 01/06/2023    QTINT 356 01/06/2023    QTCINT 423 01/06/2023    PAXIS 43 01/06/2023    QRSAXIS 44 01/06/2023 MILENASRIRAM 69 01/06/2023        Code Status: Level 1 - Full Code  Advance Directive and Living Will:      Power of :    POLST:      Counseling / Coordination of Care: Total floor / unit time spent today 30 minutes  Greater than 50% of total time was spent with the patient and / or family counseling and / or coordination of care  A description of the counseling / coordination of care: Chart review, patient evaluation, coordination communication with staff, nursing and provider

## 2023-01-07 NOTE — NURSING NOTE
Patient transferred from [facility] ED  Patient transferred on heparin gtt and nitro gtt  Both gtts are currently DC'd due to medications being on transport tubing  Dr Barry Cons with [facility] aware that patient is currently not on any gtts because orders not yet placed  Awaiting orders to be placed  Will continue to monitor  Patient resting in bed, stating she is still having moderate chest pain of 5/10  Patient states she is feeling anxious and became tearful during assessment

## 2023-01-07 NOTE — ASSESSMENT & PLAN NOTE
Presents to [facility] with severe left sided chest pain, constant pressure with radiation, without relief on rest or NTG  Extensive cardiac history with most recent cath 01/03/2023 with 100% LAD occlusion s/p PCI, residual prox LCx 60% lesion with patent LAD, mid RCA/PDA stents       , HStrop 5k -> 4 5k -> trend  CXR no evidence of pulm edema/consolidation await rads read, EKG: NSR normal axis, non-specific T wave changes  ASA loaded, NTG gtt 10mcg/min avoid hypotension/careful use 2/2 hypertrophic cardiomyopathy, Heparin gtt (ACS)  Continue PTA Plavix, high intensity statin, beta-blocker  plan for cardiac cath likely Monday

## 2023-01-07 NOTE — EMTALA/ACUTE CARE TRANSFER
[address] [facility] [address]  Dept: [phone]      EMTALA TRANSFER CONSENT    NAME Nehemias Humphreys                                         1975                              MRN [id number]    I have been informed of my rights regarding examination, treatment, and transfer   by Dr Agnieszka Alcocer MD    Benefits: Specialized equipment and/or services available at the receiving facility (Include comment)________________________ (Interventional cardiology)    Risks: Potential for delay in receiving treatment, Potential deterioration of medical condition, Loss of IV, Increased discomfort during transfer, Possible worsening of condition or death during transfer      Consent for Transfer:  I acknowledge that my medical condition has been evaluated and explained to me by the emergency department physician or other qualified medical person and/or my attending physician, who has recommended that I be transferred to the service of  Accepting Physician: Dr Maria Esther Camarena at [address] [place] [address] Name, [address] : [place]  The above potential benefits of such transfer, the potential risks associated with such transfer, and the probable risks of not being transferred have been explained to me, and I fully understand them  The doctor has explained that, in my case, the benefits of transfer outweigh the risks  I agree to be transferred  I authorize the performance of emergency medical procedures and treatments upon me in both transit and upon arrival at the receiving facility  Additionally, I authorize the release of any and all medical records to the receiving facility and request they be transported with me, if possible  I understand that the safest mode of transportation during a medical emergency is an ambulance and that the Hospital advocates the use of this mode of transport   Risks of traveling to the receiving facility by car, including absence of medical control, life sustaining equipment, such as oxygen, and medical personnel has been explained to me and I fully understand them  (MINAL CORRECT BOX BELOW)  [  ]  I consent to the stated transfer and to be transported by ambulance/helicopter  [  ]  I consent to the stated transfer, but refuse transportation by ambulance and accept full responsibility for my transportation by car  I understand the risks of non-ambulance transfers and I exonerate the Hospital and its staff from any deterioration in my condition that results from this refusal     X___________________________________________    DATE  23  TIME________  Signature of patient or legally responsible individual signing on patient behalf           RELATIONSHIP TO PATIENT_________________________          Provider Certification    NAME Rosie Carlton                                        [facility] 1975                              MRN [id number]    A medical screening exam was performed on the above named patient  Based on the examination:    Condition Necessitating Transfer The primary encounter diagnosis was NSTEMI (non-ST elevated myocardial infarction) ([facility] [address] )  Diagnoses of CAD (coronary artery disease), Chest pain, and CKD (chronic kidney disease) were also pertinent to this visit      Patient Condition: The patient has been stabilized such that within reasonable medical probability, no material deterioration of the patient condition or the condition of the unborn child(sasha) is likely to result from the transfer    Reason for Transfer: Level of Care needed not available at this facility    Transfer Requirements: [place]   · Space available and qualified personnel available for treatment as acknowledged by    · Agreed to accept transfer and to provide appropriate medical treatment as acknowledged by       Dr Jeimy Aviles  · Appropriate medical records of the examination and treatment of the patient are provided at the time of transfer   [address] [facility] COMPLETED _______  · Transfer will be performed by qualified personnel from    and appropriate transfer equipment as required, including the use of necessary and appropriate life support measures  Provider Certification: I have examined the patient and explained the following risks and benefits of being transferred/refusing transfer to the patient/family:  General risk, such as traffic hazards, adverse weather conditions, rough terrain or turbulence, possible failure of equipment (including vehicle or aircraft), or consequences of actions of persons outside the control of the transport personnel      Based on these reasonable risks and benefits to the patient and/or the unborn child(sasha), and based upon the information available at the time of the patient’s examination, I certify that the medical benefits reasonably to be expected from the provision of appropriate medical treatments at another medical facility outweigh the increasing risks, if any, to the individual’s medical condition, and in the case of labor to the unborn child, from effecting the transfer      X____________________________________________ DATE 01/07/23        TIME_______      ORIGINAL - SEND TO MEDICAL RECORDS   COPY - SEND WITH PATIENT DURING TRANSFER

## 2023-01-07 NOTE — ED PROVIDER NOTES
History  Chief Complaint   Patient presents with   • Chest Pain     Patient reports midsternal chest pain that started today at 8pm  Patient recently had cath performed for a heart attack  Patient reports shortness of breath as well  19-year-old female with significant cardiac history including multiple prior heart attacks, most recently on 1/3/2023 where she was found to have 100% LAD occlusion status post PCI who presents to the emergency department for evaluation of severe left-sided chest pain  She states the pain started acutely approximately 2 hours ago while she was at rest   It has been constant since  She describes it as pressure-like in nature  It radiates to her left shoulder, back, and up into her face  Is associated with some numbness tingling sensation in the face as well as shortness of breath and nausea, but no vomiting  She states it feels similar to prior MI  She did take nitro prior to arrival without relief  She denies any headache, fever, chills, abdominal pain  No leg pain or swelling  She is currently on aspirin and Plavix  Prior to Admission Medications   Prescriptions Last Dose Informant Patient Reported? Taking?    Diclofenac Sodium (VOLTAREN) 1 %   No No   Sig: Apply 2 g topically 4 (four) times a day   LORazepam (ATIVAN) 0 5 mg tablet   No No   Sig: Take 1 tablet (0 5 mg total) by mouth 3 (three) times a day as needed for anxiety   QUEtiapine (SEROquel) 50 mg tablet   No No   Sig: take 1 tablet by mouth at bedtime   acetaminophen (TYLENOL) 325 mg tablet   No No   Sig: Take 3 tablets (975 mg total) by mouth every 8 (eight) hours as needed for mild pain   al mag oxide-diphenhydramine-lidocaine viscous (MAGIC MOUTHWASH) 1:1:1 suspension   No No   Sig: Swish and spit 10 mL every 4 (four) hours as needed for mouth pain or discomfort   albuterol (PROVENTIL HFA,VENTOLIN HFA) 90 mcg/act inhaler   No No   Sig: Inhale 2 puffs every 4 (four) hours as needed for wheezing or shortness of breath   aspirin 81 mg chewable tablet   No No   Sig: Chew 1 tablet (81 mg total) daily   atorvastatin (LIPITOR) 80 mg tablet   No No   Sig: Take 1 tablet (80 mg total) by mouth every evening   bumetanide (BUMEX) 1 mg tablet   No No   Sig: Take 1 tablet (1 mg total) by mouth daily   calcitriol (ROCALTROL) 0 25 mcg capsule   No No   Sig: Take 1 capsule (0 25 mcg total) by mouth daily   Patient taking differently: Take 0 25 mcg by mouth daily Takes Monday Wednesday and fridays as of 11/11/22   chlorhexidine (PERIDEX) 0 12 % solution   No No   Sig: Apply 15 mL to the mouth or throat every 12 (twelve) hours   clopidogrel (PLAVIX) 75 mg tablet   No No   Sig: Take 1 tablet (75 mg total) by mouth daily Do not start before January 6, 2023  ezetimibe (ZETIA) 10 mg tablet   No No   Sig: Take 1 tablet (10 mg total) by mouth in the morning  isosorbide mononitrate (IMDUR) 30 mg 24 hr tablet   No No   Sig: Take 1 tablet (30 mg total) by mouth daily   lidocaine (Lidoderm) 5 %   No No   Sig: Apply 2 patches topically daily Remove & Discard patch within 12 hours or as directed by MD   methocarbamol (ROBAXIN) 500 mg tablet   No No   Sig: Take 1 tablet (500 mg total) by mouth 2 (two) times a day as needed for muscle spasms   metoprolol tartrate (LOPRESSOR) 100 mg tablet   No No   Sig: Take 1 tablet (100 mg total) by mouth 2 (two) times a day   nicotine (NICODERM CQ) 14 mg/24hr TD 24 hr patch   No No   Sig: Place 1 patch on the skin daily Do not start before January 6, 2023     nitroglycerin (NITROSTAT) 0 4 mg SL tablet   No No   Sig: Place 1 tablet (0 4 mg total) under the tongue every 5 (five) minutes as needed for chest pain   omega-3-acid ethyl esters (LOVAZA) 1 g capsule   No No   Sig: Take 1 capsule (1 g total) by mouth 2 (two) times a day   omeprazole (PriLOSEC) 40 MG capsule   No No   Sig: Take 1 capsule (40 mg total) by mouth daily   sertraline (ZOLOFT) 100 mg tablet   No No   Sig: Take 1 5 tablets (150 mg total) by mouth daily   sodium bicarbonate 650 mg tablet   No No   Sig: Take 1 tablet (650 mg total) by mouth 2 (two) times daily after meals      Facility-Administered Medications: None       Past Medical History:   Diagnosis Date   • Acute MI ([place] [address] )    • Anemia    • Anxiety    • CAD (coronary artery disease)     GREG mid RCA and GREG right PDA 3/25/2021   • Cancer ([place] [address] )    • Cardiomyopathy ([place] [address] )    • CHF (congestive heart failure) ([facility])    • Chronic kidney disease    • COPD (chronic obstructive pulmonary disease) ([facility])     scheduled for sleep apnea test soon after pacemaker implant   • Coronary artery disease    • Depression    • History of chemotherapy     non hodgkins lymphoma    • Hyperlipemia    • Hypertension    • Hypertrophic cardiomyopathy ([place] [address] )    • Lymphoma, non-Hodgkin's ([facility])    • Myocardial infarction [facility])    • Obesity    • SSS (sick sinus syndrome) ([place] [address] )     s/p medtronic dual chamber pacemaker 2021   • Tobacco abuse    • Ventricular tachycardia, non-sustained    • Wears glasses        Past Surgical History:   Procedure Laterality Date   • CARDIAC CATHETERIZATION N/A 1/3/2023    Procedure: Cardiac catheterization;  Surgeon: Sunny Franco MD;  [facility]: [facility]; Service: Cardiology   • CARDIAC CATHETERIZATION N/A 1/3/2023    Procedure: Cardiac Coronary Angiogram;  Surgeon: Sunny Franco MD;  [facility]; Service: Cardiology   • CARDIAC CATHETERIZATION N/A 1/3/2023    Procedure: Cardiac pci;  Surgeon: Sunny Franco MD;  [facility];   Service: Cardiology   • CARDIAC PACEMAKER PLACEMENT Left 2021    Procedure: DUAL LEAD PACEMAKER IMPLANT;  Surgeon: Christian Hess MD;  [facility];  Service: Cardiology   • CAROTID STENT     •  SECTION     • CORONARY ANGIOPLASTY WITH STENT PLACEMENT  2021    GREG mid RCA and GREG right PDA   • DENTAL SURGERY     • IR BIOPSY KIDNEY RANDOM  10/18/2021       Family History   Problem Relation Age of Onset   • No Known Problems Mother    • No Known Problems Father    • No Known Problems Daughter    • Brain cancer Maternal Grandfather    • Heart disease Maternal Grandfather    • Cancer Maternal Grandfather    • No Known Problems Paternal Aunt    • No Known Problems Paternal Aunt      I have reviewed and agree with the history as documented  E-Cigarette/Vaping   • E-Cigarette Use Never User      E-Cigarette/Vaping Substances   • Nicotine No    • THC No    • CBD No    • Flavoring No    • Other No    • Unknown No      Social History     Tobacco Use   • Smoking status: Every Day     Packs/day: 0 50     Years: 25 00     Pack years: 12 50     Types: Cigarettes   • Smokeless tobacco: Never   • Tobacco comments:     Recently and currently quitting cigarettes   Vaping Use   • Vaping Use: Never used   Substance Use Topics   • Alcohol use: Yes     Comment: 1-2 times years   • Drug use: No       Review of Systems   Constitutional: Negative for chills and fever  HENT: Negative for ear pain and sore throat  Eyes: Negative for pain and visual disturbance  Respiratory: Positive for shortness of breath  Negative for cough  Cardiovascular: Positive for chest pain  Negative for palpitations  Gastrointestinal: Positive for nausea  Negative for abdominal pain and vomiting  Genitourinary: Negative for dysuria and hematuria  Musculoskeletal: Negative for arthralgias and back pain  Skin: Negative for color change and rash  Neurological: Positive for light-headedness  Negative for seizures and syncope  All other systems reviewed and are negative  Physical Exam  Physical Exam  Vitals and nursing note reviewed  Constitutional:       General: She is not in acute distress  Appearance: She is obese  She is ill-appearing  HENT:      Head: Normocephalic and atraumatic        Right Ear: External ear normal       Left Ear: External ear normal       Nose: Nose normal       Mouth/Throat: Mouth: Mucous membranes are moist    Eyes:      Extraocular Movements: Extraocular movements intact  Conjunctiva/sclera: Conjunctivae normal       Pupils: Pupils are equal, round, and reactive to light  Cardiovascular:      Rate and Rhythm: Normal rate and regular rhythm  Pulses: Normal pulses  Heart sounds: Normal heart sounds  Pulmonary:      Effort: Pulmonary effort is normal  No respiratory distress  Breath sounds: Normal breath sounds  Abdominal:      General: Abdomen is flat  Bowel sounds are normal       Tenderness: There is no abdominal tenderness  There is no guarding or rebound  Musculoskeletal:         General: No deformity  Normal range of motion  Cervical back: Normal range of motion and neck supple  Skin:     General: Skin is warm and dry  Capillary Refill: Capillary refill takes less than 2 seconds  Neurological:      General: No focal deficit present  Mental Status: She is alert and oriented to person, place, and time     Psychiatric:         Mood and Affect: Mood normal          Behavior: Behavior normal          Vital Signs  ED Triage Vitals   Temperature Pulse Respirations Blood Pressure SpO2   01/06/23 2231 01/06/23 2231 01/06/23 2231 01/06/23 2231 01/06/23 2231   99 5 °F (37 5 °C) 84 14 166/85 95 %      Temp Source Heart Rate Source Patient Position - Orthostatic VS BP Location FiO2 (%)   01/06/23 2231 01/06/23 2255 -- 01/06/23 2255 --   Tympanic Monitor  Left arm       Pain Score       01/06/23 2231       10 - Worst Possible Pain           Vitals:    01/07/23 0200 01/07/23 0215 01/07/23 0230 01/07/23 0245   BP:    127/64   Pulse: 78 78 75 76         Visual Acuity      ED Medications  Medications   sodium chloride (PF) 0 9 % injection 3 mL (has no administration in time range)   nitroglycerin (NITROSTAT) SL tablet 0 4 mg (0 4 mg Sublingual Given 1/6/23 2255)   heparin (porcine) 25,000 units in 0 45% NaCl 250 mL infusion (premix) (11 1 Units/kg/hr × 90 kg (Order-Specific) Intravenous New Bag 1/7/23 0113)   heparin (porcine) injection 4,000 Units (has no administration in time range)   heparin (porcine) injection 2,000 Units (has no administration in time range)   nitroGLYcerin (TRIDIL) 50 mg in 250 mL infusion (premix) (10 mcg/min Intravenous New Bag 1/7/23 0115)   aspirin chewable tablet 324 mg (324 mg Oral Given 1/6/23 2246)   HYDROmorphone (DILAUDID) injection 0 5 mg (0 5 mg Intravenous Given 1/6/23 2337)   ondansetron (ZOFRAN) injection 4 mg (4 mg Intravenous Given 1/6/23 2337)   heparin (porcine) injection 4,000 Units (4,000 Units Intravenous Given 1/7/23 0006)   HYDROmorphone (DILAUDID) injection 0 5 mg (0 5 mg Intravenous Given 1/7/23 0306)       Diagnostic Studies  Results Reviewed     Procedure Component Value Units Date/Time    HS Troponin I 4hr [[id number]] Collected: 01/07/23 0337    Lab Status:  In process Specimen: Blood from Hand, Right Updated: 01/07/23 0339    HS Troponin I 2hr [[id number]]  (Abnormal) Collected: 01/07/23 0110    Lab Status: Final result Specimen: Blood from Arm, Left Updated: 01/07/23 0138     hs TnI 2hr 4,481 ng/L      Delta 2hr hsTnI -538 ng/L     APTT six (6) hours after Heparin bolus/drip initiation or dosing change [[id number]]  (Abnormal) Collected: 01/07/23 0046    Lab Status: Final result Specimen: Blood from Arm, Left Updated: 01/07/23 0107     PTT 74 seconds     Protime-INR [[id number]]  (Abnormal) Collected: 01/07/23 0046    Lab Status: Final result Specimen: Blood from Arm, Left Updated: 01/07/23 0107     Protime 15 2 seconds      INR 1 20    HS Troponin 0hr (reflex protocol) [[id number]]  (Abnormal) Collected: 01/06/23 2253    Lab Status: Final result Specimen: Blood from Arm, Left Updated: 01/06/23 2324     hs TnI 0hr 5,019 ng/L     B-Type Natriuretic Peptide(BNP) AN, CA, EA Camp[facility] Only [[id number]]  (Abnormal) Collected: 01/06/23 6597    Lab Status: Final result Specimen: Blood from Arm, Left Updated: 01/06/23 9406  pg/mL     Basic metabolic panel [[id number]]  (Abnormal) Collected: 01/06/23 2253    Lab Status: Final result Specimen: Blood from Arm, Left Updated: 01/06/23 2318     Sodium 138 mmol/L      Potassium 3 9 mmol/L      Chloride 107 mmol/L      CO2 19 mmol/L      ANION GAP 12 mmol/L      BUN 29 mg/dL      Creatinine 2 79 mg/dL      Glucose 69 mg/dL      Calcium 9 8 mg/dL      eGFR 19 ml/min/1 73sq m     Narrative:      [place] guidelines for Chronic Kidney Disease (CKD):   •  Stage 1 with normal or high GFR (GFR > 90 mL/min/1 73 square meters)  •  Stage 2 Mild CKD (GFR = 60-89 mL/min/1 73 square meters)  •  Stage 3A Moderate CKD (GFR = 45-59 mL/min/1 73 square meters)  •  Stage 3B Moderate CKD (GFR = 30-44 mL/min/1 73 square meters)  •  Stage 4 Severe CKD (GFR = 15-29 mL/min/1 73 square meters)  •  Stage 5 End Stage CKD (GFR <15 mL/min/1 73 square meters)  Note: GFR calculation is accurate only with a steady state creatinine    CBC and differential [[id number]]  (Abnormal) Collected: 01/06/23 2253    Lab Status: Final result Specimen: Blood from Arm, Left Updated: 01/06/23 2302     WBC 12 79 Thousand/uL      RBC 4 11 Million/uL      Hemoglobin 12 3 g/dL      Hematocrit 36 9 %      MCV 90 fL      MCH 29 9 pg      MCHC 33 3 g/dL      RDW 17 3 %      MPV 10 6 fL      Platelets 953 Thousands/uL      nRBC 0 /100 WBCs      Neutrophils Relative 82 %      Immat GRANS % 1 %      Lymphocytes Relative 7 %      Monocytes Relative 6 %      Eosinophils Relative 3 %      Basophils Relative 1 %      Neutrophils Absolute 10 57 Thousands/µL      Immature Grans Absolute 0 10 Thousand/uL      Lymphocytes Absolute 0 92 Thousands/µL      Monocytes Absolute 0 78 Thousand/µL      Eosinophils Absolute 0 35 Thousand/µL      Basophils Absolute 0 07 Thousands/µL                  X-ray chest 1 view portable   ED Interpretation by Mayank Cooper MD (01/06 2255)   No acute cardiopulmonary abnormality, stable cardiomegaly, appears unchanged from recent chest x-ray                 Procedures  CriticalCare Time  Performed by: Toi Bishop MD  Authorized by: Toi Bishop MD     Critical care provider statement:     Critical care time (minutes):  50    Critical care time was exclusive of:  Separately billable procedures and treating other patients and teaching time    Critical care was necessary to treat or prevent imminent or life-threatening deterioration of the following conditions:  Cardiac failure    Critical care was time spent personally by me on the following activities:  Obtaining history from patient or surrogate, development of treatment plan with patient or surrogate, discussions with consultants, evaluation of patient's response to treatment, examination of patient, ordering and performing treatments and interventions, ordering and review of laboratory studies, ordering and review of radiographic studies, re-evaluation of patient's condition and review of old charts             ED Course  ED Course as of 01/07/23 0343   Fri Jan 06, 2023 2236 EKG interpreted by myself demonstrates normal sinus rhythm with a rate of 85, normal axis, normal intervals, nonspecific ST segment and T waves, when compared to recent EKG, nonspecific changes have developed   2319 Creatinine(!): 2 79  ckd   2338 Discussing  case with cardiology Dr Yohan Tinoco   2349 Repeat EKG interpreted by myself demonstrates normal sinus rhythm with a rate of 85, normal axis, normal intervals, nonspecific T waves, unchanged from previous EKG             HEART Risk Score    Flowsheet Row Most Recent Value   Heart Score Risk Calculator    History 2 Filed at: 01/06/2023 2339   ECG 1 Filed at: 01/06/2023 2339   Age 1 Filed at: 01/06/2023 2339   Risk Factors 2 Filed at: 01/06/2023 2339   Troponin 2 Filed at: 01/06/2023 2339   HEART Score 8 Filed at: 01/06/2023 2339                        SBIRT 22yo+    Flowsheet Row Most Recent Value   SBIRT (23 yo +) In order to provide better care to our patients, we are screening all of our patients for alcohol and drug use  Would it be okay to ask you these screening questions? Yes Filed at: 01/06/2023 2247   Initial Alcohol Screen: US AUDIT-C     1  How often do you have a drink containing alcohol? 0 Filed at: 01/06/2023 2247   2  How many drinks containing alcohol do you have on a typical day you are drinking? 0 Filed at: 01/06/2023 2247   3a  Male UNDER 65: How often do you have five or more drinks on one occasion? 0 Filed at: 01/06/2023 2247   3b  FEMALE Any Age, or MALE 65+: How often do you have 4 or more drinks on one occassion? 0 Filed at: 01/06/2023 2247   Audit-C Score 0 Filed at: 01/06/2023 2247   NELIA: How many times in the past year have you    Used an illegal drug or used a prescription medication for non-medical reasons? Never Filed at: 01/06/2023 2247                    Medical Decision Making  56-year-old female with significant cardiac history including recent MI presents for chest pain and shortness of breath  On exam, she is ill-appearing, but in no acute distress  Initial EKG with nonspecific ST segment and T waves with some changes from prior EKG  We will check cardiac work-up and discussed case with cardiology  Cardiac work-up remarkable for troponin of 5000, per chart review, patient had troponin values greater than 20,000 during recent admission  Unsure if this troponin elevation is secondary to new ischemia, or is still downtrending from recent MI  Case discussed with cardiologist Dr Soledad Sanon who is on-call whom recommended transfer to facility with cardiac catheterization capabilities  Patient does not meet criteria for STEMI at this time  Patient received 324 of aspirin  Will start on nitro infusion and heparin drip per cardiologist request   No beds available at [facility] at this time, [facility] [place] [facility] [place]      CAD (coronary artery disease): self-limited or minor problem  Chest pain: acute illness or injury  CKD (chronic kidney disease): acute illness or injury  NSTEMI (non-ST elevated myocardial infarction) [facility]): acute illness or injury  Amount and/or Complexity of Data Reviewed  External Data Reviewed: labs, radiology, ECG and notes  Labs: ordered  Decision-making details documented in ED Course  Radiology: ordered and independent interpretation performed  Risk  OTC drugs  Prescription drug management  Disposition  Final diagnoses:   CAD (coronary artery disease)   Chest pain   CKD (chronic kidney disease)   NSTEMI (non-ST elevated myocardial infarction) ([facility] )     Time reflects when diagnosis was documented in both MDM as applicable and the Disposition within this note     Time User Action Codes Description Comment    1/6/2023 11:19 PM Check, Cyndra Ream Add [I25 10] CAD (coronary artery disease)     1/6/2023 11:19 PM Check, Cyndra Ream Add [R07 9] Chest pain     1/6/2023 11:19 PM Check, Cyndra Ream Add [N18 9] CKD (chronic kidney disease)     1/6/2023 11:37 PM Check, Cyndra Ream Add [I21 4] NSTEMI (non-ST elevated myocardial infarction) ([facility] )     1/6/2023 11:37 PM Check, Cyndra Ream Modify [I25 10] CAD (coronary artery disease)     1/6/2023 11:37 PM Check, Cyndra Ream Modify [I21 4] NSTEMI (non-ST elevated myocardial infarction) [facility])       ED Disposition     ED Disposition   Transfer to Another Facility-In Network    Condition   --    Date/Time   Sat Jan 7, 2023 12:30 AM    Comment   Penny Holliday should be transferred out to [facility]             MD Documentation    Joelle Porter Most Recent Value   Patient Condition The patient has been stabilized such that within reasonable medical probability, no material deterioration of the patient condition or the condition of the unborn child(sasha) is likely to result from the transfer   Reason for Transfer Level of Care needed not available at this facility   Benefits of Transfer Specialized equipment and/or services available at the receiving facility (Include comment)________________________  [facility]]   Risks of Transfer Potential for delay in receiving treatment, Potential deterioration of medical condition, Loss of IV, Increased discomfort during transfer, Possible worsening of condition or death during transfer   Accepting Physician Dr Juin Yousif Name, Abdon Adhikari   Sending MD Wynn  Check   Provider Certification General risk, such as traffic hazards, adverse weather conditions, rough terrain or turbulence, possible failure of equipment (including vehicle or aircraft), or consequences of actions of persons outside the control of the transport personnel      RN Documentation    [address] Juanita Maier NameAbdon   Transport Mode Ambulance   Level of Care Advanced life support      Follow-up Information    None         Patient's Medications   Discharge Prescriptions    No medications on file       No discharge procedures on file      PDMP Review       Value Time User    PDMP Reviewed  Yes 10/10/2022 12:27 PM Sabra Monroy, [address]          ED Provider  Electronically Signed by           Julene Litten, MD  01/07/23 7555

## 2023-01-07 NOTE — H&P
[address] [facility]  H&P- Pankaj Arteaga 1975, 41 y o  female MRN: [id number]  Unit/Bed#: E4 -01 Encounter: [id number]  Primary Care Provider: Yumiko Mcmillan DO   Date and time admitted to hospital: 1/7/2023  9:47 AM    * Non-ST elevation MI (NSTEMI) [facility])  Assessment & Plan  Presents to [facility] with severe left sided chest pain, constant pressure with radiation, without relief on rest or NTG  Extensive cardiac history with most recent cath 01/03/2023 with 100% LAD occlusion s/p PCI, residual prox LCx 60% lesion with patent LAD, mid RCA/PDA stents  , HStrop 5k -> 4 5k -> trend  CXR no evidence of pulm edema/consolidation await rads read, EKG: NSR normal axis, non-specific T wave changes  ASA loaded, NTG gtt 10mcg/min avoid hypotension/careful use 2/2 hypertrophic cardiomyopathy, Heparin gtt (ACS)  Continue PTA Plavix, high intensity statin, beta-blocker  plan for cardiac cath likely Monday     CKD (chronic kidney disease) stage 4, GFR 15-29 ml/min [facility])  Assessment & Plan  Lab Results   Component Value Date    EGFR 19 01/06/2023    EGFR 21 01/05/2023    EGFR 21 01/04/2023    CREATININE 2 79 (H) 01/06/2023    CREATININE 2 63 (H) 01/05/2023    CREATININE 2 56 (H) 01/04/2023     Appears around baseline since decrease during last admission  Seen by nephrology last admission  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  Baseline Cr: ~2 4-2 7   Decreased bicarbonate level with normal AG will continue oral bicarbonate   monitor renal function, renal dose medications, maintain normotension, avoid nephrotoxic agents, I&Os    Hypertrophic cardiomyopathy ([address] )  Assessment & Plan  Noted on prior cardiac MRI  TTE 01/02/2023: LV cavity normal increased wall thickness   LVEF 45% mild reduction in systolic function, LA moderately dilated, AV/MV/TV mild regurg, ascending aorta mildly dilated  careful use of nitrates/diuretics to avoid loss of preload    Agoraphobia  Assessment & Plan  Suspected, severe anxiety noted around leaving home since son  few years ago   Would appreciate psych consult  Will continue home Zoloft   During acute illness and admission, will change PO PRN ativan to ATC q8h and add PRN IV ativan     Social anxiety disorder  Assessment & Plan  Continue PTA ativan, seroquel, sertraline     ELIAZAR (obstructive sleep apnea)  Assessment & Plan  Encourage use of CPAP    COPD (chronic obstructive pulmonary disease) ([facility] [address] )  Assessment & Plan  Continue PTA albuterol rescue, encourage smoking cessation    Mixed hyperlipidemia  Assessment & Plan  Continue high intensity statin and ezetimibe    Tobacco abuse  Assessment & Plan  Continued tobacco use  Encourage cessation, tobacco replacement inpatient     Essential hypertension  Assessment & Plan  Monitor BP inpatient, continue PTA antihypertensives     VTE Pharmacologic Prophylaxis: VTE Score: 2 Moderate Risk (Score 3-4) - Pharmacological DVT Prophylaxis Ordered: heparin drip  Code Status: Level 1 - Full Code   Discussion with family: Patient declined call to   Anticipated Length of Stay: Patient will be admitted on an inpatient basis with an anticipated length of stay of greater than 2 midnights secondary to CAD, trop elevation, need Cards eval and cardiac cath   Total Time for Visit, including Counseling / Coordination of Care: 45 minutes Greater than 50% of this total time spent on direct patient counseling and coordination of care  Chief Complaint: severe L sided chest pain     History of Present Illness:  David Francois is a 41 y o  female with a PMH of CAD s/p GREG, CMP, CHF, severe anxiety, CKD who presents with severe left sided chest pain  Pain started the day of admission and was associated with SOB> SHe reports prior MI most recently 1/3/23 and underwent PCI to LAD occlusion  Pain started 2 hours prior to admission at rest  She reported pain felt similar to prior MI  Took nitro PO without relief   Seen in ED at [facility] and transferred to [facility] for cardiac cath likely Monday  She was started on nitro drip and heparin drip and is currently chest pain free  She is severely anxious and reports not leaving her home since her oldest son  a few years ago  Review of Systems:  Review of Systems   Constitutional: Negative for chills and fever  Respiratory: Positive for chest tightness and shortness of breath  Cardiovascular: Positive for chest pain  Negative for leg swelling  Gastrointestinal: Negative for abdominal pain  Genitourinary: Negative for dysuria  Musculoskeletal: Negative for back pain  Skin: Negative for wound  Neurological: Negative for dizziness and light-headedness  Psychiatric/Behavioral: Positive for sleep disturbance  The patient is nervous/anxious  Past Medical and Surgical History:   Past Medical History:   Diagnosis Date   • Acute MI ([place] [address] )    • Anemia    • Anxiety    • CAD (coronary artery disease)     GREG mid RCA and GREG right PDA 3/25/2021   • Cancer ([place] [address] )    • Cardiomyopathy ([place] [address] )    • CHF (congestive heart failure) ([facility])    • Chronic kidney disease    • COPD (chronic obstructive pulmonary disease) ([facility])     scheduled for sleep apnea test soon after pacemaker implant   • Coronary artery disease    • Depression    • History of chemotherapy     non hodgkins lymphoma    • Hyperlipemia    • Hypertension    • Hypertrophic cardiomyopathy ([place] [address] )    • Lymphoma, non-Hodgkin's ([facility])    • Myocardial infarction [facility])    • Obesity    • SSS (sick sinus syndrome) ([place] [address] )     s/p medtronic dual chamber pacemaker 2021   • Tobacco abuse    • Ventricular tachycardia, non-sustained    • Wears glasses        Past Surgical History:   Procedure Laterality Date   • CARDIAC CATHETERIZATION N/A 1/3/2023    Procedure: Cardiac catheterization;  Surgeon: Weston Gray MD;  Location: [facility] CARD[facility];   Service: Cardiology   • CARDIAC CATHETERIZATION N/A 1/3/2023    Procedure: Cardiac Coronary Angiogram;  Surgeon: Jake Campos Lamonte Ross MD;  Location: [facility]; Service: Cardiology   • CARDIAC CATHETERIZATION N/A 1/3/2023    Procedure: Cardiac pci;  Surgeon: Ronaldo Houston MD;  Location: [facility] CARDIAC [facility]; Service: Cardiology   • CARDIAC PACEMAKER PLACEMENT Left 2021    Procedure: DUAL LEAD PACEMAKER IMPLANT;  Surgeon: Harinder Jerez MD;  Location: [address] MAIN OR;  Service: Cardiology   • CAROTID STENT     •  SECTION     • CORONARY ANGIOPLASTY WITH STENT PLACEMENT  2021    GREG mid RCA and GREG right PDA   • DENTAL SURGERY     • IR BIOPSY KIDNEY RANDOM  10/18/2021       Meds/Allergies:  Prior to Admission medications    Medication Sig Start Date End Date Taking?  Authorizing Provider   acetaminophen (TYLENOL) 325 mg tablet Take 3 tablets (975 mg total) by mouth every 8 (eight) hours as needed for mild pain 23   Shayy Blackwood MD   al mag oxide-diphenhydramine-lidocaine viscous (MAGIC MOUTHWASH) 1:1:1 suspension Swish and spit 10 mL every 4 (four) hours as needed for mouth pain or discomfort 23   Shayy Blackwood MD   albuterol (PROVENTIL HFA,VENTOLIN HFA) 90 mcg/act inhaler Inhale 2 puffs every 4 (four) hours as needed for wheezing or shortness of breath 22   Alannah Betts DO   aspirin 81 mg chewable tablet Chew 1 tablet (81 mg total) daily 21   Toma Holden DO   atorvastatin (LIPITOR) 80 mg tablet Take 1 tablet (80 mg total) by mouth every evening 22  Harinder Jerez MD   bumetanide (BUMEX) 1 mg tablet Take 1 tablet (1 mg total) by mouth daily 22   JALIL Delacruz   calcitriol (ROCALTROL) 0 25 mcg capsule Take 1 capsule (0 25 mcg total) by mouth daily  Patient taking differently: Take 0 25 mcg by mouth daily Takes  and  as of 11/11/22 10/24/22   Ilene Garay MD   chlorhexidine (PERIDEX) 0 12 % solution Apply 15 mL to the mouth or throat every 12 (twelve) hours 23   Shayy Blackwood MD   clopidogrel (PLAVIX) 75 mg tablet Take 1 tablet (75 mg total) by mouth daily Do not start before January 6, 2023 1/6/23   Aris Phillisp MD   Diclofenac Sodium (VOLTAREN) 1 % Apply 2 g topically 4 (four) times a day 8/9/21   Екатерина Fuentes DPM   ezetimibe (ZETIA) 10 mg tablet Take 1 tablet (10 mg total) by mouth in the morning  5/23/22   Ismael Mauro DO   isosorbide mononitrate (IMDUR) 30 mg 24 hr tablet Take 1 tablet (30 mg total) by mouth daily 5/25/22   JALIL Shirley   lidocaine (Lidoderm) 5 % Apply 2 patches topically daily Remove & Discard patch within 12 hours or as directed by MD 11/11/22   Ismael Mauro DO   LORazepam (ATIVAN) 0 5 mg tablet Take 1 tablet (0 5 mg total) by mouth 3 (three) times a day as needed for anxiety 10/10/22   JALIL Rios   methocarbamol (ROBAXIN) 500 mg tablet Take 1 tablet (500 mg total) by mouth 2 (two) times a day as needed for muscle spasms 11/11/22   Ismael Mauro DO   metoprolol tartrate (LOPRESSOR) 100 mg tablet Take 1 tablet (100 mg total) by mouth 2 (two) times a day 6/28/22   Jesus Lima MD   nicotine (NICODERM CQ) 14 mg/24hr TD 24 hr patch Place 1 patch on the skin daily Do not start before January 6, 2023 1/6/23   Aris Phillips MD   nitroglycerin (NITROSTAT) 0 4 mg SL tablet Place 1 tablet (0 4 mg total) under the tongue every 5 (five) minutes as needed for chest pain 8/3/22   Jesus Lima MD   omega-3-acid ethyl esters (LOVAZA) 1 g capsule Take 1 capsule (1 g total) by mouth 2 (two) times a day 1/5/23   Aris Phillips MD   omeprazole (PriLOSEC) 40 MG capsule Take 1 capsule (40 mg total) by mouth daily 11/11/22   Ismael Mauro DO   QUEtiapine (SEROquel) 50 mg tablet take 1 tablet by mouth at bedtime 9/7/22   JALIL Rios   sertraline (ZOLOFT) 100 mg tablet Take 1 5 tablets (150 mg total) by mouth daily 9/19/22   JALIL Rios   sodium bicarbonate 650 mg tablet Take 1 tablet (650 mg total) by mouth 2 (two) times daily after meals 1/5/23   Aris Phillips MD     I have reviewed home medications with patient personally  Allergies: No Known Allergies    Social History:  Marital Status: /Civil Union   Occupation: retired [employer]   Patient Pre-hospital Living Situation: Home  Patient Pre-hospital Level of Mobility: walks  Patient Pre-hospital Diet Restrictions:   Substance Use History:   Social History     Substance and Sexual Activity   Alcohol Use Yes    Comment: 1-2 times years     Social History     Tobacco Use   Smoking Status Every Day   • Packs/day: 0 50   • Years: 25 00   • Pack years: 12 50   • Types: Cigarettes   Smokeless Tobacco Never   Tobacco Comments    Recently and currently quitting cigarettes     Social History     Substance and Sexual Activity   Drug Use No       Family History:  Family History   Problem Relation Age of Onset   • No Known Problems Mother    • No Known Problems Father    • No Known Problems Daughter    • Brain cancer Maternal Grandfather    • Heart disease Maternal Grandfather    • Cancer Maternal Grandfather    • No Known Problems Paternal Aunt    • No Known Problems Paternal Aunt        Physical Exam:     Vitals:   Blood Pressure: 111/64 (01/07/23 1105)  Pulse: 80 (01/07/23 1105)  Respirations: 20 (01/07/23 1105)  Weight - Scale: 125 kg (275 lb 9 2 oz) (01/07/23 1021)    Physical Exam  Constitutional:       General: She is in acute distress (secondary to anxiety )  Appearance: Normal appearance  She is obese  HENT:      Head: Normocephalic and atraumatic  Mouth/Throat:      Mouth: Mucous membranes are moist    Eyes:      Extraocular Movements: Extraocular movements intact  Cardiovascular:      Rate and Rhythm: Normal rate and regular rhythm  Pulmonary:      Effort: Pulmonary effort is normal    Abdominal:      General: Bowel sounds are normal       Palpations: Abdomen is soft  Musculoskeletal:         General: No swelling  Normal range of motion  Skin:     General: Skin is warm  Coloration: Skin is not jaundiced  Neurological:      General: No focal deficit present  Mental Status: She is alert and oriented to person, place, and time  Psychiatric:      Comments: Anxious, tearful         Additional Data:     Lab Results:  Results from last 7 days   Lab Units 01/06/23  2253   WBC Thousand/uL 12 79*   HEMOGLOBIN g/dL 12 3   HEMATOCRIT % 36 9   PLATELETS Thousands/uL 298   NEUTROS PCT % 82*   LYMPHS PCT % 7*   MONOS PCT % 6   EOS PCT % 3     Results from last 7 days   Lab Units 01/06/23  2253 01/03/23  0429 01/02/23  0808   SODIUM mmol/L 138   < > 136   POTASSIUM mmol/L 3 9   < > 3 8   CHLORIDE mmol/L 107   < > 107   CO2 mmol/L 19*   < > 19*   BUN mg/dL 29*   < > 24   CREATININE mg/dL 2 79*   < > 2 58*   ANION GAP mmol/L 12   < > 10   CALCIUM mg/dL 9 8   < > 9 6   ALBUMIN g/dL  --   --  3 9   TOTAL BILIRUBIN mg/dL  --   --  0 23   ALK PHOS U/L  --   --  83   ALT U/L  --   --  22   AST U/L  --   --  13   GLUCOSE RANDOM mg/dL 69   < > 146*    < > = values in this interval not displayed  Results from last 7 days   Lab Units 01/07/23  0046   INR  1 20*                   Lines/Drains:  Invasive Devices     Peripheral Intravenous Line  Duration           Peripheral IV 01/06/23 Right Antecubital <1 day    Peripheral IV 01/07/23 Left Antecubital <1 day                    Imaging: Reviewed radiology reports from this admission including: chest xray  No orders to display       EKG and Other Studies Reviewed on Admission:   · EKG: NSR  HR 85     ** Please Note: This note has been constructed using a voice recognition system   **

## 2023-01-08 ENCOUNTER — APPOINTMENT (INPATIENT)
Dept: CT IMAGING | Facility: HOSPITAL | Age: 48
End: 2023-01-08

## 2023-01-08 LAB
ANION GAP SERPL CALCULATED.3IONS-SCNC: 15 MMOL/L (ref 4–13)
APTT PPP: 48 SECONDS (ref 23–37)
APTT PPP: 58 SECONDS (ref 23–37)
APTT PPP: 63 SECONDS (ref 23–37)
ATRIAL RATE: 85 BPM
BACTERIA UR QL AUTO: ABNORMAL /HPF
BASOPHILS # BLD AUTO: 0.06 THOUSANDS/ÂΜL (ref 0–0.1)
BASOPHILS NFR BLD AUTO: 1 % (ref 0–1)
BILIRUB UR QL STRIP: NEGATIVE
BUN SERPL-MCNC: 28 MG/DL (ref 5–25)
CALCIUM SERPL-MCNC: 8.9 MG/DL (ref 8.3–10.1)
CHLORIDE SERPL-SCNC: 107 MMOL/L (ref 96–108)
CLARITY UR: CLEAR
CO2 SERPL-SCNC: 19 MMOL/L (ref 21–32)
COLOR UR: YELLOW
CREAT SERPL-MCNC: 3.09 MG/DL (ref 0.6–1.3)
EOSINOPHIL # BLD AUTO: 0.09 THOUSAND/ÂΜL (ref 0–0.61)
EOSINOPHIL NFR BLD AUTO: 1 % (ref 0–6)
ERYTHROCYTE [DISTWIDTH] IN BLOOD BY AUTOMATED COUNT: 18 % (ref 11.6–15.1)
GFR SERPL CREATININE-BSD FRML MDRD: 17 ML/MIN/1.73SQ M
GLUCOSE SERPL-MCNC: 77 MG/DL (ref 65–140)
GLUCOSE UR STRIP-MCNC: NEGATIVE MG/DL
HCT VFR BLD AUTO: 34 % (ref 34.8–46.1)
HGB BLD-MCNC: 11.2 G/DL (ref 11.5–15.4)
HGB UR QL STRIP.AUTO: ABNORMAL
IMM GRANULOCYTES # BLD AUTO: 0.08 THOUSAND/UL (ref 0–0.2)
IMM GRANULOCYTES NFR BLD AUTO: 1 % (ref 0–2)
KETONES UR STRIP-MCNC: NEGATIVE MG/DL
LEUKOCYTE ESTERASE UR QL STRIP: ABNORMAL
LYMPHOCYTES # BLD AUTO: 1.67 THOUSANDS/ÂΜL (ref 0.6–4.47)
LYMPHOCYTES NFR BLD AUTO: 25 % (ref 14–44)
MCH RBC QN AUTO: 29.7 PG (ref 26.8–34.3)
MCHC RBC AUTO-ENTMCNC: 32.9 G/DL (ref 31.4–37.4)
MCV RBC AUTO: 90 FL (ref 82–98)
MONOCYTES # BLD AUTO: 0.83 THOUSAND/ÂΜL (ref 0.17–1.22)
MONOCYTES NFR BLD AUTO: 12 % (ref 4–12)
NEUTROPHILS # BLD AUTO: 3.95 THOUSANDS/ÂΜL (ref 1.85–7.62)
NEUTS SEG NFR BLD AUTO: 60 % (ref 43–75)
NITRITE UR QL STRIP: NEGATIVE
NON-SQ EPI CELLS URNS QL MICRO: ABNORMAL /HPF
NRBC BLD AUTO-RTO: 0 /100 WBCS
P AXIS: 43 DEGREES
PH UR STRIP.AUTO: 6 [PH]
PLATELET # BLD AUTO: 232 THOUSANDS/UL (ref 149–390)
PMV BLD AUTO: 10.3 FL (ref 8.9–12.7)
POTASSIUM SERPL-SCNC: 3.9 MMOL/L (ref 3.5–5.3)
PR INTERVAL: 182 MS
PROT UR STRIP-MCNC: ABNORMAL MG/DL
QRS AXIS: 44 DEGREES
QRSD INTERVAL: 112 MS
QT INTERVAL: 356 MS
QTC INTERVAL: 423 MS
RBC # BLD AUTO: 3.77 MILLION/UL (ref 3.81–5.12)
RBC #/AREA URNS AUTO: ABNORMAL /HPF
SODIUM SERPL-SCNC: 141 MMOL/L (ref 135–147)
SP GR UR STRIP.AUTO: 1.02 (ref 1–1.03)
T WAVE AXIS: 69 DEGREES
UROBILINOGEN UR QL STRIP.AUTO: 0.2 E.U./DL
VENTRICULAR RATE: 85 BPM
WBC # BLD AUTO: 6.68 THOUSAND/UL (ref 4.31–10.16)
WBC #/AREA URNS AUTO: ABNORMAL /HPF

## 2023-01-08 RX ORDER — SODIUM BICARBONATE 650 MG/1
1300 TABLET ORAL
Status: DISCONTINUED | OUTPATIENT
Start: 2023-01-08 | End: 2023-01-10 | Stop reason: HOSPADM

## 2023-01-08 RX ORDER — METOPROLOL SUCCINATE 50 MG/1
100 TABLET, EXTENDED RELEASE ORAL DAILY
Status: DISCONTINUED | OUTPATIENT
Start: 2023-01-09 | End: 2023-01-10 | Stop reason: HOSPADM

## 2023-01-08 RX ADMIN — OMEGA-3 FATTY ACIDS CAP 1000 MG 1000 MG: 1000 CAP at 08:12

## 2023-01-08 RX ADMIN — LORAZEPAM 0.5 MG: 0.5 TABLET ORAL at 12:28

## 2023-01-08 RX ADMIN — OXYCODONE 2.5 MG: 5 TABLET ORAL at 01:05

## 2023-01-08 RX ADMIN — Medication 1 PATCH: at 08:12

## 2023-01-08 RX ADMIN — HEPARIN SODIUM 2000 UNITS: 1000 INJECTION INTRAVENOUS; SUBCUTANEOUS at 15:03

## 2023-01-08 RX ADMIN — CLOPIDOGREL BISULFATE 75 MG: 75 TABLET ORAL at 08:14

## 2023-01-08 RX ADMIN — OXYCODONE 2.5 MG: 5 TABLET ORAL at 15:14

## 2023-01-08 RX ADMIN — LORAZEPAM 0.5 MG: 0.5 TABLET ORAL at 20:26

## 2023-01-08 RX ADMIN — OMEGA-3 FATTY ACIDS CAP 1000 MG 1000 MG: 1000 CAP at 17:21

## 2023-01-08 RX ADMIN — SENNOSIDES AND DOCUSATE SODIUM 1 TABLET: 8.6; 5 TABLET ORAL at 21:12

## 2023-01-08 RX ADMIN — SODIUM BICARBONATE 1300 MG: 650 TABLET ORAL at 17:21

## 2023-01-08 RX ADMIN — LORAZEPAM 0.5 MG: 0.5 TABLET ORAL at 04:56

## 2023-01-08 RX ADMIN — HEPARIN SODIUM 2000 UNITS: 1000 INJECTION INTRAVENOUS; SUBCUTANEOUS at 01:38

## 2023-01-08 RX ADMIN — EZETIMIBE 10 MG: 10 TABLET ORAL at 08:12

## 2023-01-08 RX ADMIN — OXYCODONE 5 MG: 5 TABLET ORAL at 19:44

## 2023-01-08 RX ADMIN — OXYCODONE 5 MG: 5 TABLET ORAL at 08:12

## 2023-01-08 RX ADMIN — PANTOPRAZOLE SODIUM 40 MG: 40 TABLET, DELAYED RELEASE ORAL at 05:00

## 2023-01-08 RX ADMIN — QUETIAPINE FUMARATE 100 MG: 50 TABLET, EXTENDED RELEASE ORAL at 21:12

## 2023-01-08 RX ADMIN — ASPIRIN 81 MG CHEWABLE TABLET 81 MG: 81 TABLET CHEWABLE at 08:14

## 2023-01-08 RX ADMIN — HEPARIN SODIUM 17.1 UNITS/KG/HR: 10000 INJECTION, SOLUTION INTRAVENOUS at 07:32

## 2023-01-08 RX ADMIN — SODIUM BICARBONATE 650 MG: 650 TABLET ORAL at 08:14

## 2023-01-08 RX ADMIN — SERTRALINE 200 MG: 100 TABLET, FILM COATED ORAL at 08:14

## 2023-01-08 NOTE — CONSULTS
Consultation - Nephrology   Umesh Garcia 41 y o  female MRN: [id number]  Unit/Bed#: E4 -01 Encounter: [id number]    ASSESSMENT and PLAN:  Mild acute kidney injury  Etiology: Suspect hemodynamic instability with perturbations of blood pressure, NSTEMI, diuretic use and in the setting of recent JARAD,   Assessment:  • After review medical records through [facility] and Care everywhere baseline creatinine around-2 0, however since July 2022 creatinine fluctuating 2 5-2 8  • Admission creatinine 2 79  • Current creatinine 3 09  • Urinary output not documented well  Workup:  •  Renal imaging 8/1/2022 revealed: Did not reveal renal mass nephrolithiasis or hydronephrosis bilaterally  Did reveal small left upper pole renal cysts as well as abnormal increased echotexture of the bilateral kidneys noted to be consistent with medical renal disease-was reviewed by her primary nephrologist  Plan:  • AVoid nephrotoxins, NSAIDs and limit IV contrast  • Avoid hypotension or perturbations of blood pressure to prevent decreased renal perfusion  • Agree with holding Bumex as per cardiology-already discontinued-last dose yesterday  • Trend BMP off diuretics  • Check U/A with micro  • Bladder scan with PVR  • Adjust meds to appropriate GFR  • Optimize hemodynamic status to avoid delay in renal recovery  ANTHONY risk reduction recommendations in preparation for cardiac catheterization  • Continue to hold Bumex at this time  • Would like to see creatinine improving prior to moving forward with cardiac catheterization, unless urgent  • Would need IV hydration pre and post cardiac catheterization  • Using q calculate-pt has a 57% risk of Contrast induced nephropathy and 12 6% risk of dialysis-discussed with patient and is aware  Would accept HD if needed  • Discussed with cardiolog via [employer] text  • Could be a component of anxiety per cardiology      Chronic kidney disease IV    Etiology:  Suspect secondary to chronic tubulointerstitial disease from long-term NSAID use, hypertensive nephrosclerosis, arthrosclerotic renal disease as well as biopsy-proven Alport syndrome  Assessment and Plan:  • After review of medical records for epic and Care everywhere baseline creatinine 2 5-2 8 most recently  • Follows with Dr Yoel Joyce  • Recommend follow-up on discharge for long-term management with her primary nephrologist    Alport Syndrome  Assessment and Plan:  · biopsy-proven Alport syndrome (10/28/2021)  · Has hearing loss    Blood pressure/Hypertension:  Assessment and Plan:  • Current blood pressure soft  • Home medications: Imdur 30 mg daily, Lopressor 100 mg 2 times daily, Bumex 1 mg daily,  • Current medications: Toprol- mg daily,-Bumex discontinued today  • Maximize hemodynamics to maintain MAP >65  • Avoid hypotension or fluctuations in blood pressure  • Will continue to trend    Anemia: Likely of CKD  Assessment and Plan:  • Current hemoglobin 11 2  • Recommend checking iron stores  • Per primary team  • Transfuse for Hgb <7 0    Acid-base:  Anion gap metabolic acidosis  Assessment and Plan:  • Current bicarb 19/15  • Currently on sodium bicarbonate 650 2 times daily  • Will increase to 2 tablets 2 times daily  • Sinew to monitor bicarb closely     CAD/non-STEMI  Assessment and Plan:  • Status post thrombectomy with GREG and LAD-1/3/2023  • Cardiology following  • Now readmitted with chest pain  • For cardiac catheterization this admission  • ANTHONY risk reduction recommendations as above    Hypertrophic cardiomyopathy/find systolic diastolic heart failure  Assessment and plan  · Recent echocardiogram reveals EF of 45%  · Cardiology following  · Previously on Bumex 1 mg daily  · Currently off in the setting of worsening creatinine and need for cardiac catheterization    Anxiety/Agoraphobia: haven't left house in 8 months per report    Lower Back pain, lower abdominal pressure with urinating  Assessment and plan:  · Previous U/S kidney an bladder did not reveal obstructive process and reviewed by primary nephrologist  · Discussed with Dr Sherrie Singh  · Will check U/A with micro      HISTORY OF PRESENT ILLNESS:  Requesting Physician: Shane Pedro DO  Reason for Consult: ANTHONY with CKD on top of CKD 4    Ambar Cole is a 41 y o  female with past medical history of CKD IV, biopsy-proven Alport syndrome (10/28/2021), CAD previous PCI, hypertension, hyperlipidemia, hypertrophic CM, ELIAZAR on CPAP, tobacco abuse,  recent cardiac catheterization with PCI to LAD on 1/3/2023 who presented to [address] [place] [address] 1/7/2023 with ongoing left-sided chest pain and shortness of breath  She was ultimately transferred to [facility] for possible cardiac catheterization Monday  She was treated with Afrin drip and nitro drip with relief of her symptoms  Since admission creatinine is increasing and nephrology consulted for acute kidney injury  Patient follows with Licia Kehr for CKD IV         PAST MEDICAL HISTORY:  Past Medical History:   Diagnosis Date   • Acute MI ([facility] )    • Anemia    • Anxiety    • CAD (coronary artery disease)     GREG mid RCA and GREG right PDA 3/25/2021   • Cancer ([facility] ) 2008   • Cardiomyopathy ([facility] )    • CHF (congestive heart failure) ([facility])    • Chronic kidney disease    • COPD (chronic obstructive pulmonary disease) ([facility])     scheduled for sleep apnea test soon after pacemaker implant   • Coronary artery disease    • Depression    • History of chemotherapy     non hodgkins lymphoma 2008   • Hyperlipemia    • Hypertension    • Hypertrophic cardiomyopathy ([facility] )    • Lymphoma, non-Hodgkin's (HCC)    • Myocardial infarction [facility])    • Obesity    • SSS (sick sinus syndrome) ([facility] )     s/p medtronic dual chamber pacemaker 5/25/2021   • Tobacco abuse    • Ventricular tachycardia, non-sustained    • Wears glasses        PAST SURGICAL HISTORY:  Past Surgical History:   Procedure Laterality Date   • CARDIAC CATHETERIZATION N/A 1/3/2023    Procedure: Cardiac catheterization; Surgeon: Florentino Booker MD;  Location: [facility] CARDIAC CATH LAB; Service: Cardiology   • CARDIAC CATHETERIZATION N/A 1/3/2023    Procedure: Cardiac Coronary Angiogram;  Surgeon: Florentino Booker MD;  Location: [facility]; Service: Cardiology   • CARDIAC CATHETERIZATION N/A 1/3/2023    Procedure: Cardiac pci;  Surgeon: Florentino Booker MD;  Location: [facility] CARDIAC CATH [facility];   Service: Cardiology   • CARDIAC PACEMAKER PLACEMENT Left 2021    Procedure: DUAL LEAD PACEMAKER IMPLANT;  Surgeon: Jeff Heck MD;  Location: [address] MAIN OR;  Service: Cardiology   • CAROTID STENT     •  SECTION     • CORONARY ANGIOPLASTY WITH STENT PLACEMENT  2021    GREG mid RCA and GREG right PDA   • DENTAL SURGERY     • IR BIOPSY KIDNEY RANDOM  10/18/2021       ALLERGIES:  No Known Allergies    SOCIAL HISTORY:  Social History     Substance and Sexual Activity   Alcohol Use Yes    Comment: 1-2 times years     Social History     Substance and Sexual Activity   Drug Use No     Social History     Tobacco Use   Smoking Status Every Day   • Packs/day: 0 50   • Years: 25 00   • Pack years: 12 50   • Types: Cigarettes   Smokeless Tobacco Never   Tobacco Comments    Recently and currently quitting cigarettes       FAMILY HISTORY:  Family History   Problem Relation Age of Onset   • No Known Problems Mother    • No Known Problems Father    • No Known Problems Daughter    • Brain cancer Maternal Grandfather    • Heart disease Maternal Grandfather    • Cancer Maternal Grandfather    • No Known Problems Paternal Aunt    • No Known Problems Paternal Aunt        MEDICATIONS:    Current Facility-Administered Medications:   •  acetaminophen (TYLENOL) tablet 650 mg, 650 mg, Oral, Q6H PRN, Enrique Whiting DO  •  albuterol (PROVENTIL HFA,VENTOLIN HFA) inhaler 2 puff, 2 puff, Inhalation, Q4H PRN, Enrique Whiting DO  •  aspirin chewable tablet 81 mg, 81 mg, Oral, Daily, Enrique Whiting DO, 81 mg at 23 0814  •  clopidogrel (PLAVIX) tablet 75 mg, 75 mg, Oral, Daily, Alexysli Lot, DO, 75 mg at 01/08/23 0072  •  ezetimibe (ZETIA) tablet 10 mg, 10 mg, Oral, Daily, Enrike Oxana Click, DO, 10 mg at 01/08/23 4804  •  fish oil capsule 1,000 mg, 1,000 mg, Oral, BID, Alexysli Lot, DO, 1,000 mg at 01/08/23 1357  •  heparin (porcine) 25,000 units in 0 45% NaCl 250 mL infusion (premix), 3-20 Units/kg/hr (Order-Specific), Intravenous, Titrated, Enrike Daigle Click, DO, Last Rate: 15 4 mL/hr at 01/08/23 0843, 17 1 Units/kg/hr at 01/08/23 0843  •  heparin (porcine) injection 2,000 Units, 2,000 Units, Intravenous, Q6H PRN, Beverli Lot, DO, 2,000 Units at 01/08/23 0138  •  heparin (porcine) injection 4,000 Units, 4,000 Units, Intravenous, Q6H PRN, Beverli Lot, DO, 4,000 Units at 01/07/23 1830  •  HYDROmorphone HCl (DILAUDID) injection 0 2 mg, 0 2 mg, Intravenous, Q2H PRN, Alexysli Lot, DO, 0 2 mg at 01/07/23 1343  •  LORazepam (ATIVAN) injection 0 5 mg, 0 5 mg, Intravenous, Q6H PRN, Aleksandr Santiago PA-C  •  LORazepam (ATIVAN) tablet 0 5 mg, 0 5 mg, Oral, Q8H, Aleksandr Santiago PA-C, 0 5 mg at 01/08/23 1228  •  [START ON 1/9/2023] metoprolol succinate (TOPROL-XL) 24 hr tablet 100 mg, 100 mg, Oral, Daily, Mu Alu, DO  •  naloxone (NARCAN) 0 04 mg/mL syringe 0 04 mg, 0 04 mg, Intravenous, Q1MIN PRN, Alexysli Lot, DO  •  nicotine (NICODERM CQ) 14 mg/24hr TD 24 hr patch 1 patch, 1 patch, Transdermal, Daily, Enrike Daigle Click, DO, 1 patch at 01/08/23 0263  •  nitroglycerin (NITROSTAT) SL tablet 0 4 mg, 0 4 mg, Sublingual, Q5 Min PRN, Jaky Lot, DO  •  nitroGLYcerin (TRIDIL) 50 mg in 250 mL infusion (premix), 5-200 mcg/min, Intravenous, Titrated, Lyubov Henry PA-C, Stopped at 01/08/23 6988  •  ondansetron (ZOFRAN) injection 4 mg, 4 mg, Intravenous, Q6H PRN, Jaky Lot, DO  •  oxyCODONE (ROXICODONE) IR tablet 2 5 mg, 2 5 mg, Oral, Q4H PRN, Jaky Lot, DO, 2 5 mg at 01/08/23 0105  •  oxyCODONE ([place]) IR tablet 5 mg, 5 mg, Oral, Q4H PRN, Nini Oconnell DO, 5 mg at 01/08/23 2665  •  pantoprazole (PROTONIX) EC tablet 40 mg, 40 mg, Oral, Early Morning, Enrike Corado, DO, 40 mg at 01/08/23 0500  •  QUEtiapine (SEROquel XR) 24 hr tablet 100 mg, 100 mg, Oral, HS, Enrike Corado, DO  •  senna-docusate sodium (SENOKOT S) 8 6-50 mg per tablet 1 tablet, 1 tablet, Oral, HS, Enrike Corado, DO, 1 tablet at 01/07/23 2145  •  sertraline (ZOLOFT) tablet 200 mg, 200 mg, Oral, Daily, Nini Oconnell DO, 200 mg at 01/08/23 9716  •  sodium bicarbonate tablet 650 mg, 650 mg, Oral, BID after meals, Nini Oconnell DO, 650 mg at 01/08/23 8349    REVIEW OF SYSTEMS:  Patient seen and examined at bedside  Review of Systems   Constitutional: Positive for activity change  HENT: Negative  Eyes: Negative  Respiratory: Positive for chest tightness  Cardiovascular: Positive for chest pain  Gastrointestinal: Negative  Endocrine: Negative  Genitourinary: Positive for pelvic pain  With urinating   Musculoskeletal:        Bilateral lower back pain   Skin: Negative  Neurological: Negative  Hematological: Negative  Psychiatric/Behavioral: Negative  PHYSICAL EXAM:  Current Weight: Weight - Scale: 125 kg (275 lb 9 2 oz)  First Weight: Weight - Scale: 125 kg (275 lb 9 2 oz)  Vitals:    01/08/23 0500 01/08/23 0809 01/08/23 0812 01/08/23 1104   BP: 102/66  (!) 84/61 93/62   BP Location: Left arm   Left arm   Pulse: 61 63  (!) 49   Resp:  20  20   Temp:  98 2 °F (36 8 °C)  98 1 °F (36 7 °C)   TempSrc:  Temporal  Temporal   SpO2:  97%     Weight:           Intake/Output Summary (Last 24 hours) at 1/8/2023 1328  Last data filed at 1/8/2023 0701  Gross per 24 hour   Intake 1269 69 ml   Output --   Net 1269 69 ml       Physical Exam  Vitals reviewed  Constitutional:       Appearance: Normal appearance  She is obese  HENT:      Head: Normocephalic and atraumatic        Nose: Nose normal  Mouth/Throat:      Mouth: Mucous membranes are moist       Pharynx: Oropharynx is clear  Eyes:      Extraocular Movements: Extraocular movements intact  Conjunctiva/sclera: Conjunctivae normal    Cardiovascular:      Rate and Rhythm: Normal rate and regular rhythm  Pulses: Normal pulses  Heart sounds: Normal heart sounds  Pulmonary:      Effort: Pulmonary effort is normal       Breath sounds: Normal breath sounds  Abdominal:      General: Bowel sounds are normal       Palpations: Abdomen is soft  Musculoskeletal:         General: Normal range of motion  Cervical back: Normal range of motion and neck supple  Comments: + cva tenderness bilaterally    Skin:     General: Skin is warm and dry  Neurological:      General: No focal deficit present  Mental Status: She is alert and oriented to person, place, and time  Psychiatric:         Mood and Affect: Mood normal          Behavior: Behavior normal          Thought Content: Thought content normal          Judgment: Judgment normal              Invasive Devices:        Lab Results:   Results from last 7 days   Lab Units 01/08/23  0803 01/06/23  2253 01/05/23  0540 01/03/23  0429 01/02/23  0808   WBC Thousand/uL 6 68 12 79* 8 73   < > 17 02*   HEMOGLOBIN g/dL 11 2* 12 3 11 7   < > 13 3   HEMATOCRIT % 34 0* 36 9 37 0   < > 40 6   PLATELETS Thousands/uL 232 298 267   < > 287   SODIUM mmol/L 141 138 139   < > 136   POTASSIUM mmol/L 3 9 3 9 3 8   < > 3 8   CHLORIDE mmol/L 107 107 114*   < > 107   CO2 mmol/L 19* 19* 19*   < > 19*   BUN mg/dL 28* 29* 31*   < > 24   CREATININE mg/dL 3 09* 2 79* 2 63*   < > 2 58*   CALCIUM mg/dL 8 9 9 8 9 4   < > 9 6   MAGNESIUM mg/dL  --   --   --   --  2 0   PHOSPHORUS mg/dL  --   --  3 9  --   --    ALK PHOS U/L  --   --   --   --  83   ALT U/L  --   --   --   --  22   AST U/L  --   --   --   --  13    < > = values in this interval not displayed

## 2023-01-08 NOTE — PLAN OF CARE
Problem: MOBILITY - ADULT  Goal: Maintain or return to baseline ADL function  Description: INTERVENTIONS:  -  Assess patient's ability to carry out ADLs; assess patient's baseline for ADL function and identify physical deficits which impact ability to perform ADLs (bathing, care of mouth/teeth, toileting, grooming, dressing, etc )  - Assess/evaluate cause of self-care deficits   - Assess range of motion  - Assess patient's mobility; develop plan if impaired  - Assess patient's need for assistive devices and provide as appropriate  - Encourage maximum independence but intervene and supervise when necessary  - Involve family in performance of ADLs  - Assess for home care needs following discharge   - Consider OT consult to assist with ADL evaluation and planning for discharge  - Provide patient education as appropriate  Outcome: Progressing  Goal: Maintains/Returns to pre admission functional level  Description: INTERVENTIONS:  - Perform BMAT or MOVE assessment daily    - Set and communicate daily mobility goal to care team and patient/family/caregiver  - Collaborate with rehabilitation services on mobility goals if consulted  - Perform Range of Motion 3 times a day  - Reposition patient every 2 hours    - Dangle patient 3 times a day  - Stand patient 3 times a day  - Ambulate patient 3 times a day  - Out of bed to chair 3 times a day   - Out of bed for meals 3 times a day  - Out of bed for toileting  - Record patient progress and toleration of activity level   Outcome: Progressing     Problem: Potential for Falls  Goal: Patient will remain free of falls  Description: INTERVENTIONS:  - Educate patient/family on patient safety including physical limitations  - Instruct patient to call for assistance with activity   - Consult OT/PT to assist with strengthening/mobility   - Keep Call bell within reach  - Keep bed low and locked with side rails adjusted as appropriate  - Keep care items and personal belongings within reach  - Initiate and maintain comfort rounds  - Make Fall Risk Sign visible to staff  - Offer Toileting every 2 Hours, in advance of need  - Obtain necessary fall risk management equipment  - Apply yellow socks and bracelet for high fall risk patients  - Consider moving patient to room near nurses station  Outcome: Progressing     Problem: PAIN - ADULT  Goal: Verbalizes/displays adequate comfort level or baseline comfort level  Description: Interventions:  - Encourage patient to monitor pain and request assistance  - Assess pain using appropriate pain scale  - Administer analgesics based on type and severity of pain and evaluate response  - Implement non-pharmacological measures as appropriate and evaluate response  - Consider cultural and social influences on pain and pain management  - Notify physician/advanced practitioner if interventions unsuccessful or patient reports new pain  Outcome: Progressing     Problem: INFECTION - ADULT  Goal: Absence or prevention of progression during hospitalization  Description: INTERVENTIONS:  - Assess and monitor for signs and symptoms of infection  - Monitor lab/diagnostic results  - Monitor all insertion sites, i e  indwelling lines, tubes, and drains  - Monitor endotracheal if appropriate and nasal secretions for changes in amount and color  - [place] appropriate cooling/warming therapies per order  - Administer medications as ordered  - Instruct and encourage patient and family to use good hand hygiene technique  - Identify and instruct in appropriate isolation precautions for identified infection/condition  Outcome: Progressing  Goal: Absence of fever/infection during neutropenic period  Description: INTERVENTIONS:  - Monitor WBC    Outcome: Progressing     Problem: SAFETY ADULT  Goal: Maintain or return to baseline ADL function  Description: INTERVENTIONS:  -  Assess patient's ability to carry out ADLs; assess patient's baseline for ADL function and identify physical deficits which impact ability to perform ADLs (bathing, care of mouth/teeth, toileting, grooming, dressing, etc )  - Assess/evaluate cause of self-care deficits   - Assess range of motion  - Assess patient's mobility; develop plan if impaired  - Assess patient's need for assistive devices and provide as appropriate  - Encourage maximum independence but intervene and supervise when necessary  - Involve family in performance of ADLs  - Assess for home care needs following discharge   - Consider OT consult to assist with ADL evaluation and planning for discharge  - Provide patient education as appropriate  Outcome: Progressing  Goal: Maintains/Returns to pre admission functional level  Description: INTERVENTIONS:  - Perform BMAT or MOVE assessment daily    - Set and communicate daily mobility goal to care team and patient/family/caregiver  - Collaborate with rehabilitation services on mobility goals if consulted  - Perform Range of Motion 3 times a day  - Reposition patient every 2 hours    - Dangle patient 3 times a day  - Stand patient 3 times a day  - Ambulate patient 3 times a day  - Out of bed to chair 3 times a day   - Out of bed for meals 3 times a day  - Out of bed for toileting  - Record patient progress and toleration of activity level   Outcome: Progressing  Goal: Patient will remain free of falls  Description: INTERVENTIONS:  - Educate patient/family on patient safety including physical limitations  - Instruct patient to call for assistance with activity   - Consult OT/PT to assist with strengthening/mobility   - Keep Call bell within reach  - Keep bed low and locked with side rails adjusted as appropriate  - Keep care items and personal belongings within reach  - Initiate and maintain comfort rounds  - Make Fall Risk Sign visible to staff  - Offer Toileting every 2 Hours, in advance of need  - Obtain necessary fall risk management equipment  - Apply yellow socks and bracelet for high fall risk patients  - Consider moving patient to room near nurses station  Outcome: Progressing     Problem: DISCHARGE PLANNING  Goal: Discharge to home or other facility with appropriate resources  Description: INTERVENTIONS:  - Identify barriers to discharge w/patient and caregiver  - Arrange for needed discharge resources and transportation as appropriate  - Identify discharge learning needs (meds, wound care, etc )  - Arrange for interpretive services to assist at discharge as needed  - Refer to Case Management Department for coordinating discharge planning if the patient needs post-hospital services based on physician/advanced practitioner order or complex needs related to functional status, cognitive ability, or social support system  Outcome: Progressing     Problem: Knowledge Deficit  Goal: Patient/family/caregiver demonstrates understanding of disease process, treatment plan, medications, and discharge instructions  Description: Complete learning assessment and assess knowledge base    Interventions:  - Provide teaching at level of understanding  - Provide teaching via preferred learning methods  Outcome: Progressing     Problem: CARDIOVASCULAR - ADULT  Goal: Maintains optimal cardiac output and hemodynamic stability  Description: INTERVENTIONS:  - Monitor I/O, vital signs and rhythm  - Monitor for S/S and trends of decreased cardiac output  - Administer and titrate ordered vasoactive medications to optimize hemodynamic stability  - Assess quality of pulses, skin color and temperature  - Assess for signs of decreased coronary artery perfusion  - Instruct patient to report change in severity of symptoms  Outcome: Progressing  Goal: Absence of cardiac dysrhythmias or at baseline rhythm  Description: INTERVENTIONS:  - Continuous cardiac monitoring, vital signs, obtain 12 lead EKG if ordered  - Administer antiarrhythmic and heart rate control medications as ordered  - Monitor electrolytes and administer replacement therapy as ordered  Outcome: Progressing

## 2023-01-08 NOTE — ASSESSMENT & PLAN NOTE
Lab Results   Component Value Date    EGFR 17 01/08/2023    EGFR 19 01/06/2023    EGFR 21 01/05/2023    CREATININE 3 09 (H) 01/08/2023    CREATININE 2 79 (H) 01/06/2023    CREATININE 2 63 (H) 01/05/2023   Worsening since cardiac catherization  Hx of Alport syndrome  Nephrology consult  ANTHONY on CKD  Bicarbonate increased - avoid hypotension  Check CT KUB given flank pain to rule out nephrolithiasis

## 2023-01-08 NOTE — ASSESSMENT & PLAN NOTE
Suspected, severe anxiety noted around leaving home since son  few years ago   [de-identified] and Seroquel adjusted by psychiatry never

## 2023-01-08 NOTE — ASSESSMENT & PLAN NOTE
Presents to [facility] with severe left sided chest pain, constant pressure with radiation, without relief on rest or NTG  Extensive cardiac history with most recent cath 01/03/2023 with 100% LAD occlusion s/p PCI, residual prox LCx 60% lesion with patent LAD, mid RCA/PDA stents     Await cardiology recommendations on catherization - would hold if possible until ANTHONY resolves

## 2023-01-08 NOTE — PLAN OF CARE
Problem: MOBILITY - ADULT  Goal: Maintain or return to baseline ADL function  Description: INTERVENTIONS:  -  Assess patient's ability to carry out ADLs; assess patient's baseline for ADL function and identify physical deficits which impact ability to perform ADLs (bathing, care of mouth/teeth, toileting, grooming, dressing, etc )  - Assess/evaluate cause of self-care deficits   - Assess range of motion  - Assess patient's mobility; develop plan if impaired  - Assess patient's need for assistive devices and provide as appropriate  - Encourage maximum independence but intervene and supervise when necessary  - Involve family in performance of ADLs  - Assess for home care needs following discharge   - Consider OT consult to assist with ADL evaluation and planning for discharge  - Provide patient education as appropriate  Outcome: Progressing  Goal: Maintains/Returns to pre admission functional level  Description: INTERVENTIONS:  - Perform BMAT or MOVE assessment daily    - Set and communicate daily mobility goal to care team and patient/family/caregiver  - Collaborate with rehabilitation services on mobility goals if consulted  - Perform Range of Motion  times a day  - Reposition patient every  hours    - Dangle patient  times a day  - Stand patient  times a day  - Ambulate patient  times a day  - Out of bed to chair  times a day   - Out of bed for meals  times a day  - Out of bed for toileting  - Record patient progress and toleration of activity level   Outcome: Progressing     Problem: Potential for Falls  Goal: Patient will remain free of falls  Description: INTERVENTIONS:  - Educate patient/family on patient safety including physical limitations  - Instruct patient to call for assistance with activity   - Consult OT/PT to assist with strengthening/mobility   - Keep Call bell within reach  - Keep bed low and locked with side rails adjusted as appropriate  - Keep care items and personal belongings within reach  - Initiate and maintain comfort rounds  - Make Fall Risk Sign visible to staff  - Offer Toileting every  Hours, in advance of need  - Initiate/Maintain alarm  - Obtain necessary fall risk management equipment:   - Apply yellow socks and bracelet for high fall risk patients  - Consider moving patient to room near nurses station  Outcome: Progressing     Problem: PAIN - ADULT  Goal: Verbalizes/displays adequate comfort level or baseline comfort level  Description: Interventions:  - Encourage patient to monitor pain and request assistance  - Assess pain using appropriate pain scale  - Administer analgesics based on type and severity of pain and evaluate response  - Implement non-pharmacological measures as appropriate and evaluate response  - Consider cultural and social influences on pain and pain management  - Notify physician/advanced practitioner if interventions unsuccessful or patient reports new pain  Outcome: Progressing     Problem: INFECTION - ADULT  Goal: Absence or prevention of progression during hospitalization  Description: INTERVENTIONS:  - Assess and monitor for signs and symptoms of infection  - Monitor lab/diagnostic results  - Monitor all insertion sites, i e  indwelling lines, tubes, and drains  - Monitor endotracheal if appropriate and nasal secretions for changes in amount and color  - [place] appropriate cooling/warming therapies per order  - Administer medications as ordered  - Instruct and encourage patient and family to use good hand hygiene technique  - Identify and instruct in appropriate isolation precautions for identified infection/condition  Outcome: Progressing  Goal: Absence of fever/infection during neutropenic period  Description: INTERVENTIONS:  - Monitor WBC    Outcome: Progressing     Problem: SAFETY ADULT  Goal: Maintain or return to baseline ADL function  Description: INTERVENTIONS:  -  Assess patient's ability to carry out ADLs; assess patient's baseline for ADL function and identify physical deficits which impact ability to perform ADLs (bathing, care of mouth/teeth, toileting, grooming, dressing, etc )  - Assess/evaluate cause of self-care deficits   - Assess range of motion  - Assess patient's mobility; develop plan if impaired  - Assess patient's need for assistive devices and provide as appropriate  - Encourage maximum independence but intervene and supervise when necessary  - Involve family in performance of ADLs  - Assess for home care needs following discharge   - Consider OT consult to assist with ADL evaluation and planning for discharge  - Provide patient education as appropriate  Outcome: Progressing  Goal: Maintains/Returns to pre admission functional level  Description: INTERVENTIONS:  - Perform BMAT or MOVE assessment daily    - Set and communicate daily mobility goal to care team and patient/family/caregiver  - Collaborate with rehabilitation services on mobility goals if consulted  - Perform Range of Motion  times a day  - Reposition patient every  hours    - Dangle patient  times a day  - Stand patient  times a day  - Ambulate patient  times a day  - Out of bed to chair  times a day   - Out of bed for meals  times a day  - Out of bed for toileting  - Record patient progress and toleration of activity level   Outcome: Progressing  Goal: Patient will remain free of falls  Description: INTERVENTIONS:  - Educate patient/family on patient safety including physical limitations  - Instruct patient to call for assistance with activity   - Consult OT/PT to assist with strengthening/mobility   - Keep Call bell within reach  - Keep bed low and locked with side rails adjusted as appropriate  - Keep care items and personal belongings within reach  - Initiate and maintain comfort rounds  - Make Fall Risk Sign visible to staff  - Offer Toileting every  Hours, in advance of need  - Initiate/Maintain alarm  - Obtain necessary fall risk management equipment:  - Apply yellow socks and bracelet for high fall risk patients  - Consider moving patient to room near nurses station  Outcome: Progressing     Problem: DISCHARGE PLANNING  Goal: Discharge to home or other facility with appropriate resources  Description: INTERVENTIONS:  - Identify barriers to discharge w/patient and caregiver  - Arrange for needed discharge resources and transportation as appropriate  - Identify discharge learning needs (meds, wound care, etc )  - Arrange for interpretive services to assist at discharge as needed  - Refer to Case Management Department for coordinating discharge planning if the patient needs post-hospital services based on physician/advanced practitioner order or complex needs related to functional status, cognitive ability, or social support system  Outcome: Progressing     Problem: Knowledge Deficit  Goal: Patient/family/caregiver demonstrates understanding of disease process, treatment plan, medications, and discharge instructions  Description: Complete learning assessment and assess knowledge base    Interventions:  - Provide teaching at level of understanding  - Provide teaching via preferred learning methods  Outcome: Progressing     Problem: CARDIOVASCULAR - ADULT  Goal: Maintains optimal cardiac output and hemodynamic stability  Description: INTERVENTIONS:  - Monitor I/O, vital signs and rhythm  - Monitor for S/S and trends of decreased cardiac output  - Administer and titrate ordered vasoactive medications to optimize hemodynamic stability  - Assess quality of pulses, skin color and temperature  - Assess for signs of decreased coronary artery perfusion  - Instruct patient to report change in severity of symptoms  Outcome: Progressing  Goal: Absence of cardiac dysrhythmias or at baseline rhythm  Description: INTERVENTIONS:  - Continuous cardiac monitoring, vital signs, obtain 12 lead EKG if ordered  - Administer antiarrhythmic and heart rate control medications as ordered  - Monitor electrolytes and administer replacement therapy as ordered  Outcome: Progressing

## 2023-01-08 NOTE — PROGRESS NOTES
[facility]  Progress Note - Jose Manuel Kemp 1975, 41 y o  female MRN: [id number]  Unit/Bed#: E4 -01 Encounter: [id number]  Primary Care Provider: Stevan Kang DO   Date and time admitted to hospital: 2023  9:47 AM    * Non-ST elevation MI (NSTEMI) [facility])  Assessment & Plan  Presents to [facility] with severe left sided chest pain, constant pressure with radiation, without relief on rest or NTG  Extensive cardiac history with most recent cath 2023 with 100% LAD occlusion s/p PCI, residual prox LCx 60% lesion with patent LAD, mid RCA/PDA stents  Await cardiology recommendations on catherization - would hold if possible until ANTHONY resolves    Agoraphobia  Assessment & Plan  Suspected, severe anxiety noted around leaving home since son  few years ago   [de-identified] and Seroquel adjusted by psychiatry    Social anxiety disorder  Assessment & Plan  Continue PTA ativan, seroquel, sertraline     CKD (chronic kidney disease) stage 4, GFR 15-29 ml/min [facility])  Assessment & Plan  Lab Results   Component Value Date    EGFR 17 2023    EGFR 19 2023    EGFR 21 2023    CREATININE 3 09 (H) 2023    CREATININE 2 79 (H) 2023    CREATININE 2 63 (H) 2023   Worsening since cardiac catherization  Hx of [place] syndrome  Nephrology consult  ANTHONY on CKD  Bicarbonate increased - avoid hypotension  Check CT KUB given flank pain to rule out nephrolithiasis    ELIAZAR (obstructive sleep apnea)  Assessment & Plan  Encourage use of CPAP    COPD (chronic obstructive pulmonary disease) ([facility] [address] )  Assessment & Plan  Continue PTA albuterol rescue, encourage smoking cessation    Hypertrophic cardiomyopathy ([facility] [address] )  Assessment & Plan  Noted on prior cardiac MRI  TTE 2023: LV cavity normal increased wall thickness   LVEF 45% mild reduction in systolic function, LA moderately dilated, AV/MV/TV mild regurg, ascending aorta mildly dilated  careful use of nitrates/diuretics to avoid loss of preload  Bumex on hold    Mixed hyperlipidemia  Assessment & Plan  Continue high intensity statin and ezetimibe    Tobacco abuse  Assessment & Plan  Continued tobacco use  Encourage cessation, tobacco replacement inpatient     Essential hypertension  Assessment & Plan  Monitor BP inpatient, avoid hypotension    [facility] Internal Medicine Progress Note  Patient: Umesh Garcia 41 y o  female   MRN: [id number]  PCP: Jd Leslie DO  Unit/Bed#: E4 -01 Encounter: [id number]  Date Of Visit: 01/08/23    Assessment:    Principal Problem:    Non-ST elevation MI (NSTEMI) ([facility] [address] )  Active Problems:    Essential hypertension    Tobacco abuse    Mixed hyperlipidemia    Hypertrophic cardiomyopathy (HCC)    COPD (chronic obstructive pulmonary disease) (HCC)    ELIAZAR (obstructive sleep apnea)    CKD (chronic kidney disease) stage 4, GFR 15-29 ml/min (HCC)    Social anxiety disorder    Agoraphobia      Plan:    · CT KUB study  · Nephrology consult       VTE Pharmacologic Prophylaxis:   Pharmacologic: Heparin Drip  Mechanical VTE Prophylaxis in Place: Yes    Patient Centered Rounds: I have performed bedside rounds with nursing staff today  Discussions with Specialists or Other Care Team Provider: case management    Education and Discussions with Family / Patient: Patient updating family    Time Spent for Care: 1 hour  More than 50% of total time spent on counseling and coordination of care as described above  Current Length of Stay: 1 day(s)    Current Patient Status: Inpatient   Certification Statement: The patient will continue to require additional inpatient hospital stay due to [facility]    Discharge Plan / Estimated Discharge Date: 72H    Code Status: Level 1 - Full Code      Subjective:   Seen and examined, no acute complaints  No nausea, vomitting    A complete and comprehensive 14 point organ system review has been performed and all other systems are negative other than stated above      Objective:     Vitals:   Temp (24hrs), Av °F (36 7 °C), Min:96 5 °F (35 8 °C), Max:99 9 °F (37 7 °C)    Temp:  [96 5 °F (35 8 °C)-99 9 °F (37 7 °C)] 98 4 °F (36 9 °C)  HR:  [49-93] 93  Resp:  [20] 20  BP: ()/(49-72) 91/49  SpO2:  [96 %-100 %] 97 %  Body mass index is 44 48 kg/m²  Input and Output Summary (last 24 hours):        Intake/Output Summary (Last 24 hours) at 2023 1751  Last data filed at 2023 0701  Gross per 24 hour   Intake 1269 69 ml   Output --   Net 1269 69 ml       Physical Exam:     General: well appearing, no acute distress  HEENT: atraumatic, PERRLA, moist mucosa, normal pharynx, normal tonsils and adenoids, normal tongue, no fluid in sinuses  Neck: Trachea midline, no carotid bruit, no masses  Respiratory: normal chest wall expansion, CTA B, no r/r/w, no rubs  Cardiovascular: RRR, no m/r/g, Normal S1 and S2  Abdomen: Soft, non-tender, non-distended, normal bowel sounds in all quadrants, no hepatosplenomegaly, no tympany  Rectal: deferred  Musculoskeletal: normal ROM in upper and lower extremities  Integumentary: warm, dry, and pink, with no rash, purpura, or petechia  Heme/Lymph: no lymphadenopathy, no bruises  Neurological: Cranial Nerves II-XII grossly intact, no tics, normal sensation to pressure and light touch  Psychiatric: cooperative with normal mood, affect, and cognition      Additional Data:     Labs:    Results from last 7 days   Lab Units 23  0803   WBC Thousand/uL 6 68   HEMOGLOBIN g/dL 11 2*   HEMATOCRIT % 34 0*   PLATELETS Thousands/uL 232   NEUTROS PCT % 60   LYMPHS PCT % 25   MONOS PCT % 12   EOS PCT % 1     Results from last 7 days   Lab Units 23  0803 23  0429 23  0808   POTASSIUM mmol/L 3 9   < > 3 8   CHLORIDE mmol/L 107   < > 107   CO2 mmol/L 19*   < > 19*   BUN mg/dL 28*   < > 24   CREATININE mg/dL 3 09*   < > 2 58*   CALCIUM mg/dL 8 9   < > 9 6   ALK PHOS U/L  --   --  83   ALT U/L  --   --  22   AST U/L  --   --  13    < > = values in this interval not displayed  Results from last 7 days   Lab Units 01/07/23  0046   INR  1 20*       * I Have Reviewed All Lab Data Listed Above  * Additional Pertinent Lab Tests Reviewed:  Jessica 68 Admission Reviewed    Imaging:    Imaging Reports Reviewed Today Include: None  Imaging Personally Reviewed by Myself Includes:  none    Recent Cultures (last 7 days):           Last 24 Hours Medication List:   Current Facility-Administered Medications   Medication Dose Route Frequency Provider Last Rate   • acetaminophen  650 mg Oral Q6H PRN Johana Hence, DO     • albuterol  2 puff Inhalation Q4H PRN Johana Hence, DO     • aspirin  81 mg Oral Daily Enrike Adan, DO     • clopidogrel  75 mg Oral Daily Enrike Adan, DO     • ezetimibe  10 mg Oral Daily Enrike Adan, DO     • fish oil  1,000 mg Oral BID Johana Hence, DO     • heparin (porcine)  3-20 Units/kg/hr (Order-Specific) Intravenous Titrated Johana Hence, DO 19 1 Units/kg/hr (01/08/23 1505)   • heparin (porcine)  2,000 Units Intravenous Q6H PRN Johana Hence, DO     • heparin (porcine)  4,000 Units Intravenous Q6H PRN Johana Hence, DO     • HYDROmorphone  0 2 mg Intravenous Q2H PRN Johana Hence, DO     • LORazepam  0 5 mg Intravenous Q6H PRN Aleksandr Santiago PA-C     • LORazepam  0 5 mg Oral Q8H Aleksandr Santiago PA-C     • [START ON 1/9/2023] metoprolol succinate  100 mg Oral Daily Nesha Haskins, DO     • naloxone  0 04 mg Intravenous Q1MIN PRN Johana Hence, DO     • nicotine  1 patch Transdermal Daily Enrike Adan, DO     • nitroglycerin  0 4 mg Sublingual Q5 Min PRN Enrike Adan, DO     • nitroGLYcerin  5-200 mcg/min Intravenous Titrated Bladimir Cartagena PA-C Stopped (01/08/23 9917)   • ondansetron  4 mg Intravenous Q6H PRN Johana Hence, DO     • oxyCODONE  2 5 mg Oral Q4H PRN Johana Hence, DO     • oxyCODONE  5 mg Oral Q4H PRN Johana Hence, DO     • pantoprazole  40 mg Oral Early Morning Enrike Suazo DO     • QUEtiapine  100 mg Oral HS Enrike Suazo DO     • senna-docusate sodium  1 tablet Oral HS Daquan Vargas DO     • sertraline  200 mg Oral Daily Enrike Suazo DO     • sodium bicarbonate  1,300 mg Oral BID after meals Amarilys Wang MD          Today, Patient Was Seen By: Daquan Vargas DO    ** Please Note: This note was completed in part utilizing [employer] Direct Software  Grammatical errors, random word insertions, spelling mistakes, and incomplete sentences may be an occasional consequence of this system secondary to software limitations, ambient noise, and hardware issues  If you have any questions or concerns about the content, text, or information contained within the body of this dictation, please contact the provider for clarification   **

## 2023-01-08 NOTE — PROGRESS NOTES
Cardiology Progress Note   MD Sujatha Renee MD Shayla Real, DO, MD Bogdan Cooper DO, Brooks Nickerson DO, [facility] [place]  ----------------------------------------------------------------  [address]  [address] [place] [address], [address] [facility] [place] 41 y o  female MRN: [id number]  Unit/Bed#: E4 -01 Encounter: [id number]      ASSESSMENT:   • Precordial chest pain  • CAD  ? NSTEMI s/p thrombectomy w/ GREG-mLAD and residual 60% pLCx, patent GREG-LAD, patent GREG-mRCA and patent GREG-rPDA, January 2023  • Hypertrophic cardiomyopathy  • Chronic combined systolic and diastolic heart failure  ?  LVEF 45%, LVH, inferior and inferolateral hypokinesis, moderate LA dilatation, mild AR/MR/TR, mild ascending aortic dilatation 4 3 cm, January 2023  • Hypertension  • Dyslipidemia  • Morbid obesity  • ELIAZAR  • CKD stage IV  • COPD  • Tobacco abuse  • Agoraphobia/social anxiety disorder/depression     PLAN:  Patient's blood pressure has been borderline  Cardiac enzymes have continued to trend downward for 2 additional sets  Symptoms of chest discomfort are improving, but have not resolved  Will review interventional films with cath team tomorrow, but at this time would favor against further cardiac catheterization unless there is change in clinical status  Hold on nitro drip for now and can discontinue heparin drip  Psychiatric input appreciated; recommend patient follow-up with both psychiatry and psychology as the patient's life depends on her continuing to get out and increase her physical activity level and make changes that require her to leave the house  Minimum 1 year dual antiplatelet therapy with aspirin and Plavix  Continue high intensity statin and Zetia  Hold Bumex and isosorbide with borderline blood pressure and rising creatinine  Decreased Toprol-XL to 100 mg daily  Hopeful discharge tomorrow from CV perspective if symptoms continue to improve    Signed: Manny Cohen DO, FACC, FASE, FACP      History of Present Illness:  Patient seen and examined  Admits to continued chest discomfort with some improvement  Denies lower extremity swelling, orthopnea or paroxysmal nocturnal dyspnea  Denies lightheadedness, dizziness or palpitations  Review of Systems:  Review of Systems   Constitutional: Negative for decreased appetite, fever, weight gain and weight loss  HENT: Negative for congestion and sore throat  Eyes: Negative for visual disturbance  Cardiovascular: Negative for chest pain, dyspnea on exertion, leg swelling, near-syncope and palpitations  Respiratory: Negative for cough and shortness of breath  Hematologic/Lymphatic: Negative for bleeding problem  Skin: Negative for rash  Musculoskeletal: Negative for myalgias and neck pain  Gastrointestinal: Negative for abdominal pain and nausea  Neurological: Negative for light-headedness and weakness  Psychiatric/Behavioral: Negative for depression         No Known Allergies    Current Facility-Administered Medications on File Prior to Encounter   Medication   • [DISCONTINUED] heparin (porcine) 25,000 units in 0 45% NaCl 250 mL infusion (premix)   • [DISCONTINUED] heparin (porcine) injection 2,000 Units   • [DISCONTINUED] heparin (porcine) injection 4,000 Units   • [DISCONTINUED] nitroglycerin (NITROSTAT) SL tablet 0 4 mg   • [DISCONTINUED] nitroGLYcerin (TRIDIL) 50 mg in 250 mL infusion (premix)   • [DISCONTINUED] sodium chloride (PF) 0 9 % injection 3 mL     Current Outpatient Medications on File Prior to Encounter   Medication Sig   • acetaminophen (TYLENOL) 325 mg tablet Take 3 tablets (975 mg total) by mouth every 8 (eight) hours as needed for mild pain   • al mag oxide-diphenhydramine-lidocaine viscous (MAGIC MOUTHWASH) 1:1:1 suspension Swish and spit 10 mL every 4 (four) hours as needed for mouth pain or discomfort   • albuterol (PROVENTIL HFA,VENTOLIN HFA) 90 mcg/act inhaler Inhale 2 puffs every 4 (four) hours as needed for wheezing or shortness of breath   • aspirin 81 mg chewable tablet Chew 1 tablet (81 mg total) daily   • atorvastatin (LIPITOR) 80 mg tablet Take 1 tablet (80 mg total) by mouth every evening   • bumetanide (BUMEX) 1 mg tablet Take 1 tablet (1 mg total) by mouth daily   • calcitriol (ROCALTROL) 0 25 mcg capsule Take 1 capsule (0 25 mcg total) by mouth daily (Patient taking differently: Take 0 25 mcg by mouth daily Takes Monday Wednesday and fridays as of 11/11/22)   • chlorhexidine (PERIDEX) 0 12 % solution Apply 15 mL to the mouth or throat every 12 (twelve) hours   • clopidogrel (PLAVIX) 75 mg tablet Take 1 tablet (75 mg total) by mouth daily Do not start before January 6, 2023  • Diclofenac Sodium (VOLTAREN) 1 % Apply 2 g topically 4 (four) times a day   • ezetimibe (ZETIA) 10 mg tablet Take 1 tablet (10 mg total) by mouth in the morning  • isosorbide mononitrate (IMDUR) 30 mg 24 hr tablet Take 1 tablet (30 mg total) by mouth daily   • lidocaine (Lidoderm) 5 % Apply 2 patches topically daily Remove & Discard patch within 12 hours or as directed by MD   • LORazepam (ATIVAN) 0 5 mg tablet Take 1 tablet (0 5 mg total) by mouth 3 (three) times a day as needed for anxiety   • methocarbamol (ROBAXIN) 500 mg tablet Take 1 tablet (500 mg total) by mouth 2 (two) times a day as needed for muscle spasms   • metoprolol tartrate (LOPRESSOR) 100 mg tablet Take 1 tablet (100 mg total) by mouth 2 (two) times a day   • nicotine (NICODERM CQ) 14 mg/24hr TD 24 hr patch Place 1 patch on the skin daily Do not start before January 6, 2023     • nitroglycerin (NITROSTAT) 0 4 mg SL tablet Place 1 tablet (0 4 mg total) under the tongue every 5 (five) minutes as needed for chest pain   • omega-3-acid ethyl esters (LOVAZA) 1 g capsule Take 1 capsule (1 g total) by mouth 2 (two) times a day   • omeprazole (PriLOSEC) 40 MG capsule Take 1 capsule (40 mg total) by mouth daily   • QUEtiapine (SEROquel) 50 mg tablet take 1 tablet by mouth at bedtime   • sertraline (ZOLOFT) 100 mg tablet Take 1 5 tablets (150 mg total) by mouth daily   • sodium bicarbonate 650 mg tablet Take 1 tablet (650 mg total) by mouth 2 (two) times daily after meals        Current Facility-Administered Medications   Medication Dose Route Frequency Provider Last Rate   • acetaminophen  650 mg Oral Q6H PRN Michele Askew, DO     • albuterol  2 puff Inhalation Q4H PRN Michele Askew, DO     • aspirin  81 mg Oral Daily Enrike Earlis Stairs, DO     • bumetanide  1 mg Oral Daily Enrike Earlis Stairs, DO     • clopidogrel  75 mg Oral Daily Enrike Earlis Stairs, DO     • ezetimibe  10 mg Oral Daily Enrike Earlis Stairs, DO     • fish oil  1,000 mg Oral BID Dimplea Azar, DO     • heparin (porcine)  3-20 Units/kg/hr (Order-Specific) Intravenous Titrated Michele Askew, DO 17 1 Units/kg/hr (01/08/23 0843)   • heparin (porcine)  2,000 Units Intravenous Q6H PRN Michele Askew, DO     • heparin (porcine)  4,000 Units Intravenous Q6H PRN Michele Askew, DO     • HYDROmorphone  0 2 mg Intravenous Q2H PRN Michele Askew, DO     • isosorbide mononitrate  30 mg Oral Daily Enrike Earlis Stairs, DO     • LORazepam  0 5 mg Intravenous Q6H PRN Aleksandr Santiago PA-C     • LORazepam  0 5 mg Oral Q8H Aleksandr Santiago PA-C     • metoprolol succinate  100 mg Oral BID Pankaj Marshall, DO     • naloxone  0 04 mg Intravenous Q1MIN PRN Michele Askew, DO     • nicotine  1 patch Transdermal Daily Enrike Earlis Stairs, DO     • nitroglycerin  0 4 mg Sublingual Q5 Min PRN Michele Askew, DO     • nitroGLYcerin  5-200 mcg/min Intravenous Titrated Maria Elena Ellis PA-C Stopped (01/08/23 0995)   • ondansetron  4 mg Intravenous Q6H PRN Michele Askew, DO     • oxyCODONE  2 5 mg Oral Q4H PRN Michele Askew, DO     • oxyCODONE  5 mg Oral Q4H PRN Michele Askew, DO     • pantoprazole  40 mg Oral Early Morning Enrike Abelardo Garcia, DO     • QUEtiapine  100 mg Oral HS Enrike Leif Fagan DO     • senna-docusate sodium  1 tablet Oral HS Ary Serrano DO     • sertraline  200 mg Oral Daily Enrike Leif Fagan DO     • sodium bicarbonate  650 mg Oral BID after meals Ary Serrano DO         heparin (porcine), 3-20 Units/kg/hr (Order-Specific), Last Rate: 17 1 Units/kg/hr (01/08/23 0843)  nitroGLYcerin, 5-200 mcg/min, Last Rate: Stopped (01/08/23 1370)        Vitals:    01/08/23 0409 01/08/23 0500 01/08/23 0809 01/08/23 0812   BP:  102/66  (!) 84/61   BP Location:  Left arm     Pulse:  61 63    Resp:   20    Temp:   98 2 °F (36 8 °C)    TempSrc:   Temporal    SpO2: 98%  97%    Weight:         Body mass index is 44 48 kg/m²  Intake/Output Summary (Last 24 hours) at 1/8/2023 6640  Last data filed at 1/8/2023 0701  Gross per 24 hour   Intake 1269 69 ml   Output --   Net 1269 69 ml       Weight change:     PHYSICAL EXAMINATION:  Gen: Awake, Alert, NAD  Head/eyes: AT/NC, pupils equal and round, Anicteric  ENT: mmm  Neck: Supple, No elevated JVP, trachea midline  Resp: CTA bilaterally no w/r/r  CV: RRR +S1, S2, No m/r/g  Abd: Soft, obese, NT/ND + BS  Ext: no LE edema bilaterally  Neuro: Follows commands, moves all extermities  Psych: Appropriate affect, normal mood, pleasant attitude, non-combative  Skin: warm; no rash, erythema or venous stasis changes on exposed skin    Lab Results:  Results from last 7 days   Lab Units 01/08/23  0803   WBC Thousand/uL 6 68   HEMOGLOBIN g/dL 11 2*   HEMATOCRIT % 34 0*   PLATELETS Thousands/uL 232     Results from last 7 days   Lab Units 01/08/23  0803 01/03/23  0429 01/02/23  0808   POTASSIUM mmol/L 3 9   < > 3 8   CHLORIDE mmol/L 107   < > 107   CO2 mmol/L 19*   < > 19*   BUN mg/dL 28*   < > 24   CREATININE mg/dL 3 09*   < > 2 58*   CALCIUM mg/dL 8 9   < > 9 6   ALK PHOS U/L  --   --  83   ALT U/L  --   --  22   AST U/L  --   --  13    < > = values in this interval not displayed       No results found for: TROPONINT      Results from last 7 days   Lab Units 01/07/23  1753 01/07/23  1552 01/07/23  1211 01/06/23  2253 01/02/23  2000 01/02/23  1841 01/02/23  1656   HS TNI 0HR ng/L  --   --  2,855*   < >  --   --   --    HS TNI 2HR ng/L  --  2,679*  --    < >  --   --   --    HS TNI 4HR ng/L 2,279*  --   --    < >  --   --   --    HS TNI RAND ng/L  --   --   --   --  >42,104* >22,973* >22,973*    < > = values in this interval not displayed  Results from last 7 days   Lab Units 01/07/23  0046   INR  1 20*       Tele: SB to SR    This note was completed in part utilizing [facility]-[employer] Direct Software  Grammatical errors, random word insertions, spelling mistakes, and incomplete sentences may be an occasional consequence of this system secondary to software limitations, ambient noise, and hardware issues  If you have any questions or concerns about the content, text, or information contained within the body of this dictation, please contact the provider for clarification

## 2023-01-08 NOTE — UTILIZATION REVIEW
Initial Clinical Review    Admission: Date/Time/Statement:   Admission Orders (From admission, onward)     Ordered        01/07/23 1016  Inpatient Admission  Once                      Orders Placed This Encounter   Procedures   • Inpatient Admission     Standing Status:   Standing     Number of Occurrences:   1     Order Specific Question:   Level of Care     Answer:   Med Surg [16]     Order Specific Question:   Bed Type     Answer:   [place] [4]     Order Specific Question:   Estimated length of stay     Answer:   More than 2 Midnights     Order Specific Question:   Certification     Answer:   I certify that inpatient services are medically necessary for this patient for a duration of greater than two midnights  See H&P and MD Progress Notes for additional information about the patient's course of treatment  ED Arrival Information     Patient not seen in ED                     No chief complaint on file  Initial Presentation: 41 y o  female with PMH of CAD s/p GREG, CMP, CHF, severe anxiety, CKD was transferred from [facility] to [facility] for a higher level of care  Pt initially presented to [facility] w/ severe left sided chest pain  Pain started the day of admission and was associated with SOB  Pt had prior MI most recently 1/3/23 and underwent PCI to LAD occlusion  Pain started 2 hours prior to admission at rest  Reported pain similar to prior MI  Took nitro PO without relief  Seen in ED at [facility] and transferred to [address] [facility] for cardiac cath  , HStrop 5k -> 4 5k  EKG: NSR normal axis, non-specific T wave changes  She was loaded w/ ASA, started on nitro drip and heparin drip  On arrival to [facility], pain free, on hep gtt and nitro gtt  On exam, aaox3, anxious and tearful, normal HR and regular rhythm  Plan: Inpatient admission for evaluation and treatment of NSTEMI: Cardiology consult  Cont hep gtt, nitro gtt  Trend trops  Continue PTA Plavix, high intensity statin, beta-blocker   plan for cardiac cath likely Monday  Will continue home Zoloft, Ativan q8h an dprn, Psych consult  01/07   Cardiology Consult: Precordial chest pain: Pt ECGs during this hospitalization appear similar to the ECGs from recent myocardial infarction without significant ST segment change showing prior infarct age-indeterminate  Trops down trending  concern that her symptoms may be related to her underlying anxiety   Plan: Cont to trend trops  Cont Nitro gtt, uptitrate antianginals  Psych consult for her agoraphobia  Cont ASA, Plavix  Cont hep gtt for now  Cont statin, Zetia  Cont Bumex, isosorbide  DC lopressor and initiate Toprol-XL  Behavioral Health Consult: Unspecified mood disorder; rule out mood disorder secondary to physiological condition; rule out major depression; unspecified anxiety disorder; provider generalized anxiety disorder  Consider increasing Zoloft to 200 milligrams p o  daily, Seroquel to 100 mg p o  nightly  No suicide precautions are indicated at this time  Resume OP psych f/u  Date: 01/08  Day 2:   Cardiology Notes: Pt's BP has been borderline  Cardiac enzyme cont to trend downward  Chest discomfort improving but not resolved  No cardiac cath at this time unless there is change in clinical status  Hold Nitro gtt, dc hep gtt  Cont ASA, plavix, statin zetia  Hold Bumex and isosorbide  Decreased Toprol-XL to 100 mg daily  PE: awake, alert, NAD  Regular HR and rhythm  Nephrology Consult: ANTHONY: Creatinine on admission is 2 79 up to 3 09 today  Etiology possibly prerenal azotemia  from low BP vs ATN from contrast nephropathy  SCr has been rising since cardiac cath done on 1/3/23  Need to consider atheroembolic disease as well  Hold Bumex  Mon renal function  Inc Na bicarb to 1300 mg BID      ED Triage Vitals   Temperature Pulse Respirations Blood Pressure SpO2   01/07/23 1517 01/07/23 1105 01/07/23 1105 01/07/23 1105 01/07/23 1517   99 8 °F (37 7 °C) 80 20 111/64 92 %      Temp Source Heart Rate Source Patient Position - Orthostatic VS BP Location FiO2 (%)   01/07/23 1517 01/07/23 1105 01/07/23 1105 01/07/23 1105 --   Temporal Monitor Lying Right arm       Pain Score       01/07/23 1000       6          Wt Readings from Last 1 Encounters:   01/07/23 125 kg (275 lb 9 2 oz)     Additional Vital Signs:   Date/Time Temp Pulse Resp BP MAP (mmHg) SpO2 O2 Device O2 Interface Device Patient Position - Orthostatic VS   01/08/23 1530 98 4 °F (36 9 °C) 93 20 91/49 Abnormal  60 Abnormal  97 % None (Room air) -- Sitting   01/08/23 1104 98 1 °F (36 7 °C) 49 Abnormal  20 93/62 67 -- -- -- --   01/08/23 0812 -- -- -- 84/61 Abnormal  70 -- -- -- --   01/08/23 0809 98 2 °F (36 8 °C) 63 20 -- -- 97 % None (Room air) -- Sitting   01/08/23 0500 -- 61 -- 102/66 -- -- -- -- Lying   01/08/23 0409 -- -- -- -- -- 98 % -- Full face mask --   01/08/23 0302 96 5 °F (35 8 °C) Abnormal  63 20 92/56 62 Abnormal  100 % CPAP -- Lying   01/08/23 0038 -- -- -- 95/61 67 -- -- -- Lying   01/07/23 2320 96 9 °F (36 1 °C) Abnormal  60 20 97/63 72 99 % None (Room air) -- Lying   01/07/23 2208 -- -- -- -- -- 99 % -- Full face mask --   01/07/23 2004 99 9 °F (37 7 °C) 83 20 109/72 78 96 % None (Room air) -- Lying   01/07/23 1626 -- -- -- -- -- 98 % -- Full face mask --   01/07/23 1517 99 8 °F (37 7 °C) 72 20 117/66 73 92 % None (Room air) -- Lying       Pertinent Labs/Diagnostic Test Results:   01/06   CXR: Mild pulmonary venous congestion      EKG result: Normal sinus rhythm      Results from last 7 days   Lab Units 01/08/23  0803 01/06/23 2253 01/05/23  0540 01/04/23  0608 01/03/23  0531 01/02/23  0808   WBC Thousand/uL 6 68 12 79* 8 73 10 50* 16 52* 17 02*   HEMOGLOBIN g/dL 11 2* 12 3 11 7 11 3* 11 9 13 3   HEMATOCRIT % 34 0* 36 9 37 0 36 4 38 5 40 6   PLATELETS Thousands/uL 232 298 267 256 272 287   NEUTROS ABS Thousands/µL 3 95 10 57*  --   --   --  13 82*         Results from last 7 days   Lab Units 01/08/23  0803 01/06/23  2253 01/05/23  0540 01/04/23  2259 01/03/23  0429 01/02/23  0808   SODIUM mmol/L 141 138 139 141 140 136   POTASSIUM mmol/L 3 9 3 9 3 8 3 7 4 3 3 8   CHLORIDE mmol/L 107 107 114* 114* 116* 107   CO2 mmol/L 19* 19* 19* 16* 18* 19*   ANION GAP mmol/L 15* 12 6 11 6 10   BUN mg/dL 28* 29* 31* 29* 30* 24   CREATININE mg/dL 3 09* 2 79* 2 63* 2 56* 2 51* 2 58*   EGFR ml/min/1 73sq m 17 19 21 21 22 21   CALCIUM mg/dL 8 9 9 8 9 4 8 9 9 2 9 6   MAGNESIUM mg/dL  --   --   --   --   --  2 0   PHOSPHORUS mg/dL  --   --  3 9  --   --   --      Results from last 7 days   Lab Units 01/02/23  0808   AST U/L 13   ALT U/L 22   ALK PHOS U/L 83   TOTAL PROTEIN g/dL 7 2   ALBUMIN g/dL 3 9   TOTAL BILIRUBIN mg/dL 0 23         Results from last 7 days   Lab Units 01/08/23  0803 01/06/23  2253 01/05/23  0540 01/04/23  0608 01/03/23  0429 01/02/23  0808   GLUCOSE RANDOM mg/dL 77 69 83 81 93 146*       Results from last 7 days   Lab Units 01/05/23  0540   PH CHINTAN  7 241*   PCO2 CHINTAN mm Hg 41 9*   PO2 CHINTAN mm Hg 87 1*   HCO3 CHINTAN mmol/L 17 6*   BASE EXC CHINTAN mmol/L -9 3   O2 CONTENT CHINTAN ml/dL 17 2   O2 HGB, VENOUS % 94 3*             Results from last 7 days   Lab Units 01/07/23  1753 01/07/23  1552 01/07/23  1211 01/07/23  0337 01/07/23  0110 01/06/23  2253 01/02/23  1150 01/02/23  0936 01/02/23  0808   HS TNI 0HR ng/L  --   --  2,855*  --   --  5,019*  --   --  138*   HS TNI 2HR ng/L  --  2,679*  --   --  4,481*  --   --  1,307*  --    HSTNI D2 ng/L  --  -176  --   --  -538  --   --  1,169*  --    HS TNI 4HR ng/L 2,279*  --   --  3,463*  --   --  6,804*  --   --    HSTNI D4 ng/L -576  --   --  -1,556  --   --  6,666*  --   --          Results from last 7 days   Lab Units 01/08/23  1414 01/08/23  0803 01/08/23  0043 01/07/23  0736 01/07/23  0046 01/02/23  2148 01/02/23  1424 01/02/23  0808   PROTIME seconds  --   --   --   --  15 2*  --  13 5 13 8   INR   --   --   --   --  1 20*  --  1 01 1 06   PTT seconds 58* 63* 48*   < > 74*   < > 30 28    < > = values in this interval not displayed  Results from last 7 days   Lab Units 01/06/23  2253   BNP pg/mL 352*           Results from last 7 days   Lab Units 01/08/23  1623   CLARITY UA  Clear   COLOR UA  Yellow   SPEC GRAV UA  1 025   PH UA  6 0   GLUCOSE UA mg/dl Negative   KETONES UA mg/dl Negative   BLOOD UA  Large*   PROTEIN UA mg/dl 100 (2+)*   NITRITE UA  Negative   BILIRUBIN UA  Negative   UROBILINOGEN UA E U /dl 0 2   LEUKOCYTES UA  Small*   WBC UA /hpf 4-10*   RBC UA /hpf 1-2*   BACTERIA UA /hpf Occasional   EPITHELIAL CELLS WET PREP /hpf Occasional     ED Treatment:   Medication Administration - No Administrations Displayed (No Start Event Found)     None        Past Medical History:   Diagnosis Date   • Acute MI ([place] [address] )    • Anemia    • Anxiety    • CAD (coronary artery disease)     GREG mid RCA and GREG right PDA 3/25/2021   • Cancer ([place] [address] ) 2008   • Cardiomyopathy ([facility])    • CHF (congestive heart failure) ([facility])    • Chronic kidney disease    • COPD (chronic obstructive pulmonary disease) ([facility])     scheduled for sleep apnea test soon after pacemaker implant   • Coronary artery disease    • Depression    • History of chemotherapy     non hodgkins lymphoma 2008   • Hyperlipemia    • Hypertension    • Hypertrophic cardiomyopathy ([facility])    • Lymphoma, non-Hodgkin's ([facility])    • Myocardial infarction ([facility])    • Obesity    • SSS (sick sinus syndrome) ([facility])     s/p medtronic dual chamber pacemaker 5/25/2021   • Tobacco abuse    • Ventricular tachycardia, non-sustained    • Wears glasses      Present on Admission:  • Tobacco abuse  • Social anxiety disorder  • ELIAZAR (obstructive sleep apnea)  • Non-ST elevation MI (NSTEMI) ([facility])  • Mixed hyperlipidemia  • Hypertrophic cardiomyopathy ([facility])  • Essential hypertension  • COPD (chronic obstructive pulmonary disease) ([facility])  • CKD (chronic kidney disease) stage 4, GFR 15-29 ml/min ([facility])      Admitting Diagnosis: NSTEMI (non-ST elevated myocardial infarction) ([place] [address] ) [I21 4]  Age/Sex: 41 y o  female  Admission Orders:  50 Hr telemetry monitoring  PT/OT    Scheduled Medications:  aspirin, 81 mg, Oral, Daily  clopidogrel, 75 mg, Oral, Daily  ezetimibe, 10 mg, Oral, Daily  fish oil, 1,000 mg, Oral, BID  LORazepam, 0 5 mg, Oral, Q8H  [START ON 1/9/2023] metoprolol succinate, 100 mg, Oral, Daily  nicotine, 1 patch, Transdermal, Daily  pantoprazole, 40 mg, Oral, Early Morning  QUEtiapine, 100 mg, Oral, HS  senna-docusate sodium, 1 tablet, Oral, HS  sertraline, 200 mg, Oral, Daily  sodium bicarbonate, 1,300 mg, Oral, BID after meals      Continuous IV Infusions:  heparin (porcine), 3-20 Units/kg/hr (Order-Specific), Intravenous, Titrated  nitroGLYcerin, 5-200 mcg/min, Intravenous, Titrated      PRN Meds:  acetaminophen, 650 mg, Oral, Q6H PRN  albuterol, 2 puff, Inhalation, Q4H PRN  heparin (porcine), 2,000 Units, Intravenous, Q6H PRN 01/08 x 2  heparin (porcine), 4,000 Units, Intravenous, Q6H PRN 01/07 x 1  HYDROmorphone, 0 2 mg, Intravenous, Q2H PRN 01/07 x 2  LORazepam, 0 5 mg, Intravenous, Q6H PRN  naloxone, 0 04 mg, Intravenous, Q1MIN PRN  nitroglycerin, 0 4 mg, Sublingual, Q5 Min PRN  ondansetron, 4 mg, Intravenous, Q6H PRN  oxyCODONE, 2 5 mg, Oral, Q4H PRN 01/08 x 2  oxyCODONE, 5 mg, Oral, Q4H PRN 01/08 x 1        IP CONSULT TO CARDIOLOGY  IP CONSULT TO PSYCHIATRY  IP CONSULT TO NEPHROLOGY    Network Utilization Review Department  ATTENTION: Please call with any questions or concerns to [phone] and carefully listen to the prompts so that you are directed to the right person  All voicemails are confidential   [place] all requests for admission clinical reviews, approved or denied determinations and any other requests to dedicated fax number below belonging to the campus where the patient is receiving treatment   List of dedicated fax numbers for the Facilities:  FACILITY NAME UR FAX NUMBER   ADMISSION DENIALS (Administrative/Medical Necessity) [phone]   [address] (Maternity/NICU/Pediatrics) Juanita Womack [place] [address] [facility] [phone]   [facility] [phone] [address] [place] [address] [facility] [address] [facility] 310 [facility] 134 8[address] [facility] [phone]

## 2023-01-09 ENCOUNTER — PATIENT OUTREACH (OUTPATIENT)
Dept: FAMILY MEDICINE CLINIC | Facility: CLINIC | Age: 48
End: 2023-01-09

## 2023-01-09 ENCOUNTER — APPOINTMENT (INPATIENT)
Dept: NON INVASIVE DIAGNOSTICS | Facility: HOSPITAL | Age: 48
End: 2023-01-09

## 2023-01-09 LAB
2HR DELTA HS TROPONIN: -25 NG/L
4HR DELTA HS TROPONIN: 10 NG/L
ANION GAP SERPL CALCULATED.3IONS-SCNC: 15 MMOL/L (ref 4–13)
APICAL FOUR CHAMBER EJECTION FRACTION: 41 %
ATRIAL RATE: 60 BPM
ATRIAL RATE: 68 BPM
ATRIAL RATE: 73 BPM
ATRIAL RATE: 78 BPM
BASOPHILS # BLD AUTO: 0.03 THOUSANDS/ÂΜL (ref 0–0.1)
BASOPHILS NFR BLD AUTO: 0 % (ref 0–1)
BUN SERPL-MCNC: 25 MG/DL (ref 5–25)
CALCIUM SERPL-MCNC: 8.9 MG/DL (ref 8.3–10.1)
CARDIAC TROPONIN I PNL SERPL HS: 784 NG/L
CARDIAC TROPONIN I PNL SERPL HS: 809 NG/L
CARDIAC TROPONIN I PNL SERPL HS: 819 NG/L
CHLORIDE SERPL-SCNC: 106 MMOL/L (ref 96–108)
CO2 SERPL-SCNC: 16 MMOL/L (ref 21–32)
CREAT SERPL-MCNC: 2.99 MG/DL (ref 0.6–1.3)
E WAVE DECELERATION TIME: 257 MS
EOSINOPHIL # BLD AUTO: 0.17 THOUSAND/ÂΜL (ref 0–0.61)
EOSINOPHIL NFR BLD AUTO: 3 % (ref 0–6)
ERYTHROCYTE [DISTWIDTH] IN BLOOD BY AUTOMATED COUNT: 17.7 % (ref 11.6–15.1)
GFR SERPL CREATININE-BSD FRML MDRD: 18 ML/MIN/1.73SQ M
GLUCOSE SERPL-MCNC: 83 MG/DL (ref 65–140)
HCT VFR BLD AUTO: 33 % (ref 34.8–46.1)
HGB BLD-MCNC: 10.9 G/DL (ref 11.5–15.4)
IMM GRANULOCYTES # BLD AUTO: 0.06 THOUSAND/UL (ref 0–0.2)
IMM GRANULOCYTES NFR BLD AUTO: 1 % (ref 0–2)
LYMPHOCYTES # BLD AUTO: 1.58 THOUSANDS/ÂΜL (ref 0.6–4.47)
LYMPHOCYTES NFR BLD AUTO: 23 % (ref 14–44)
MCH RBC QN AUTO: 29.9 PG (ref 26.8–34.3)
MCHC RBC AUTO-ENTMCNC: 33 G/DL (ref 31.4–37.4)
MCV RBC AUTO: 90 FL (ref 82–98)
MONOCYTES # BLD AUTO: 0.47 THOUSAND/ÂΜL (ref 0.17–1.22)
MONOCYTES NFR BLD AUTO: 7 % (ref 4–12)
MV E'TISSUE VEL-SEP: 5 CM/S
MV PEAK A VEL: 0.86 M/S
MV PEAK E VEL: 92 CM/S
MV STENOSIS PRESSURE HALF TIME: 75 MS
MV VALVE AREA P 1/2 METHOD: 2.93
NEUTROPHILS # BLD AUTO: 4.56 THOUSANDS/ÂΜL (ref 1.85–7.62)
NEUTS SEG NFR BLD AUTO: 66 % (ref 43–75)
NRBC BLD AUTO-RTO: 0 /100 WBCS
P AXIS: 24 DEGREES
P AXIS: 33 DEGREES
P AXIS: 36 DEGREES
P AXIS: 41 DEGREES
PLATELET # BLD AUTO: 205 THOUSANDS/UL (ref 149–390)
PMV BLD AUTO: 10.5 FL (ref 8.9–12.7)
POTASSIUM SERPL-SCNC: 4.6 MMOL/L (ref 3.5–5.3)
PR INTERVAL: 172 MS
PR INTERVAL: 176 MS
PR INTERVAL: 178 MS
PR INTERVAL: 224 MS
QRS AXIS: 13 DEGREES
QRS AXIS: 23 DEGREES
QRS AXIS: 59 DEGREES
QRS AXIS: 61 DEGREES
QRSD INTERVAL: 112 MS
QRSD INTERVAL: 112 MS
QRSD INTERVAL: 114 MS
QRSD INTERVAL: 116 MS
QT INTERVAL: 384 MS
QT INTERVAL: 402 MS
QT INTERVAL: 406 MS
QT INTERVAL: 438 MS
QTC INTERVAL: 431 MS
QTC INTERVAL: 437 MS
QTC INTERVAL: 438 MS
QTC INTERVAL: 442 MS
RBC # BLD AUTO: 3.65 MILLION/UL (ref 3.81–5.12)
SL CV LV EF: 45
SODIUM SERPL-SCNC: 137 MMOL/L (ref 135–147)
T WAVE AXIS: -88 DEGREES
T WAVE AXIS: 263 DEGREES
T WAVE AXIS: 58 DEGREES
T WAVE AXIS: 97 DEGREES
VENTRICULAR RATE: 60 BPM
VENTRICULAR RATE: 68 BPM
VENTRICULAR RATE: 73 BPM
VENTRICULAR RATE: 78 BPM
WBC # BLD AUTO: 6.87 THOUSAND/UL (ref 4.31–10.16)

## 2023-01-09 RX ORDER — RANOLAZINE 500 MG/1
500 TABLET, EXTENDED RELEASE ORAL EVERY 12 HOURS SCHEDULED
Status: DISCONTINUED | OUTPATIENT
Start: 2023-01-09 | End: 2023-01-10 | Stop reason: HOSPADM

## 2023-01-09 RX ORDER — HEPARIN SODIUM 5000 [USP'U]/ML
5000 INJECTION, SOLUTION INTRAVENOUS; SUBCUTANEOUS EVERY 8 HOURS SCHEDULED
Status: DISCONTINUED | OUTPATIENT
Start: 2023-01-09 | End: 2023-01-10 | Stop reason: HOSPADM

## 2023-01-09 RX ADMIN — RANOLAZINE 500 MG: 500 TABLET, EXTENDED RELEASE ORAL at 20:44

## 2023-01-09 RX ADMIN — HEPARIN SODIUM 5000 UNITS: 5000 INJECTION INTRAVENOUS; SUBCUTANEOUS at 18:23

## 2023-01-09 RX ADMIN — OMEGA-3 FATTY ACIDS CAP 1000 MG 1000 MG: 1000 CAP at 08:47

## 2023-01-09 RX ADMIN — LORAZEPAM 0.5 MG: 0.5 TABLET ORAL at 13:20

## 2023-01-09 RX ADMIN — OXYCODONE 5 MG: 5 TABLET ORAL at 22:26

## 2023-01-09 RX ADMIN — ASPIRIN 81 MG CHEWABLE TABLET 81 MG: 81 TABLET CHEWABLE at 08:47

## 2023-01-09 RX ADMIN — SODIUM BICARBONATE 1300 MG: 650 TABLET ORAL at 08:47

## 2023-01-09 RX ADMIN — OMEGA-3 FATTY ACIDS CAP 1000 MG 1000 MG: 1000 CAP at 18:19

## 2023-01-09 RX ADMIN — EZETIMIBE 10 MG: 10 TABLET ORAL at 08:47

## 2023-01-09 RX ADMIN — LORAZEPAM 0.5 MG: 0.5 TABLET ORAL at 20:44

## 2023-01-09 RX ADMIN — RANOLAZINE 500 MG: 500 TABLET, EXTENDED RELEASE ORAL at 11:29

## 2023-01-09 RX ADMIN — QUETIAPINE FUMARATE 100 MG: 50 TABLET, EXTENDED RELEASE ORAL at 21:56

## 2023-01-09 RX ADMIN — PANTOPRAZOLE SODIUM 40 MG: 40 TABLET, DELAYED RELEASE ORAL at 05:02

## 2023-01-09 RX ADMIN — Medication 1 PATCH: at 08:48

## 2023-01-09 RX ADMIN — SODIUM BICARBONATE 1300 MG: 650 TABLET ORAL at 18:19

## 2023-01-09 RX ADMIN — OXYCODONE 5 MG: 5 TABLET ORAL at 05:04

## 2023-01-09 RX ADMIN — LORAZEPAM 0.5 MG: 0.5 TABLET ORAL at 04:58

## 2023-01-09 RX ADMIN — SERTRALINE 200 MG: 100 TABLET, FILM COATED ORAL at 08:47

## 2023-01-09 RX ADMIN — OXYCODONE 5 MG: 5 TABLET ORAL at 18:23

## 2023-01-09 RX ADMIN — OXYCODONE 5 MG: 5 TABLET ORAL at 11:29

## 2023-01-09 RX ADMIN — CLOPIDOGREL BISULFATE 75 MG: 75 TABLET ORAL at 08:47

## 2023-01-09 NOTE — ASSESSMENT & PLAN NOTE
Lab Results   Component Value Date    EGFR 18 01/09/2023    EGFR 17 01/08/2023    EGFR 19 01/06/2023    CREATININE 2 99 (H) 01/09/2023    CREATININE 3 09 (H) 01/08/2023    CREATININE 2 79 (H) 01/06/2023     · ANTHONY on CKD 4 secondary to recent contrast for cardiac catheterization  · following with underlying [place] syndrome  · Holding diuretics for now

## 2023-01-09 NOTE — PROGRESS NOTES
[address] [facility]  Progress Note - Nehemias Humphreys 1975, 41 y o  female MRN: [id number]  Unit/Bed#: E4 -01 Encounter: [id number]  Primary Care Provider: Ismael Mauro DO   Date and time admitted to hospital: 1/7/2023  9:47 AM    Agoraphobia  Assessment & Plan  · Severe anxiety stable on sertraline quetiapine    CKD (chronic kidney disease) stage 4, GFR 15-29 ml/min [facility])  Assessment & Plan  Lab Results   Component Value Date    EGFR 18 01/09/2023    EGFR 17 01/08/2023    EGFR 19 01/06/2023    CREATININE 2 99 (H) 01/09/2023    CREATININE 3 09 (H) 01/08/2023    CREATININE 2 79 (H) 01/06/2023     · ANTHONY on CKD 4 secondary to recent contrast for cardiac catheterization  · following with underlying [place] syndrome  · Holding diuretics for now    Hypertrophic cardiomyopathy ([address] )  Assessment & Plan  · Repeat echo ordered today by cardiology  · Holding diuretics secondary to kidney injury    Mixed hyperlipidemia  Assessment & Plan  · Continue zetia    CAD (coronary artery disease)  Assessment & Plan  · CAD with recent cardiac catheterization and stenting  · Continue aspirin and clopidogrel  Ranexa added by cardiology today due to ongoing chest pain    Essential hypertension  Assessment & Plan  · On metoprolol for cardiomyopathy  Monitoring for hypotension    * Chest pain in adult  Assessment & Plan  · Presentation to hospital for worsening chest pain with recent cardiac catheterization/stenting  · Cardiology following  · Avoiding further contrast studies due to risk of renal injury  · Ranexa added  Lab Results   Component Value Date    HSTNI0 809 (H) 01/09/2023    HSTNI2 784 (H) 01/09/2023    HSTNI4 819 (H) 01/09/2023    HSTNI >22,973 (H) 01/02/2023       VTE Pharmacologic Prophylaxis:  Moderate Risk (Score 3-4) - Pharmacological DVT Prophylaxis Ordered: heparin  Currently off heparin infusion    Add heparin subcutaneous    Patient Centered Rounds: I have performed bedside rounds with nursing staff today  Discussions with Specialists or Other Care Team Provider: Case management and cardiology    Education and Discussions with Family / Patient:     Time Spent for Care:  mins  More than 50% of total time spent on counseling and coordination of care as described above  Current Length of Stay: 2 day(s)  Current Patient Status: Inpatient   Certification Statement: The patient will continue to require additional inpatient hospital stay due to pain  Discharge Plan / Estimated Discharge Date: Anticipate discharge in 24-48 hrs to home  Code Status: Level 1 - Full Code      Subjective:   Patient seen and examined during morning rounds  Still had vague chest pain  Objective:   Vitals: Blood pressure 116/66, pulse 66, temperature 97 9 °F (36 6 °C), temperature source Tympanic, resp  rate 18, height 5' 6" (1 676 m), weight 126 kg (278 lb), SpO2 99 %, not currently breastfeeding  Intake/Output Summary (Last 24 hours) at 1/9/2023 1704  Last data filed at 1/9/2023 1219  Gross per 24 hour   Intake 572 02 ml   Output 1200 ml   Net -627 98 ml       Physical Exam  Vitals reviewed  Constitutional:       General: She is not in acute distress  Appearance: Normal appearance  She is obese  HENT:      Head: Atraumatic  Eyes:      General: Scleral icterus present  Cardiovascular:      Rate and Rhythm: Regular rhythm  Pulmonary:      Breath sounds: Decreased breath sounds present  No wheezing  Abdominal:      Palpations: Abdomen is soft  Tenderness: There is no guarding or rebound  Musculoskeletal:         General: No swelling  Skin:     General: Skin is warm  Neurological:      General: No focal deficit present  Mental Status: She is alert     Psychiatric:         Mood and Affect: Mood normal        Additional Data:   Labs:  Results from last 7 days   Lab Units 01/09/23  1111 01/08/23  0803 01/07/23  0046 01/06/23  2253   WBC Thousand/uL 6 87 6 68  --  12 79*   HEMOGLOBIN g/dL 10 9* 11 2*  --  12 3   PLATELETS Thousands/uL 205 232  --  298   MCV fL 90 90  --  90   INR   --   --  1 20*  --      Results from last 7 days   Lab Units 01/09/23  0639 01/08/23  0803 01/06/23  2253   SODIUM mmol/L 137 141 138   POTASSIUM mmol/L 4 6 3 9 3 9   CHLORIDE mmol/L 106 107 107   CO2 mmol/L 16* 19* 19*   ANION GAP mmol/L 15* 15* 12   BUN mg/dL 25 28* 29*   CREATININE mg/dL 2 99* 3 09* 2 79*   CALCIUM mg/dL 8 9 8 9 9 8   EGFR ml/min/1 73sq m 18 17 19   GLUCOSE RANDOM mg/dL 83 77 69     Results from last 7 days   Lab Units 01/05/23  0540   PHOSPHORUS mg/dL 3 9         Results from last 7 days   Lab Units 01/09/23  1319 01/09/23  1111 01/07/23  1753 01/07/23  1211 01/07/23  0337   HS TNI 0HR ng/L  --  809*  --    < >  --    HS TNI 2HR ng/L 784*  --   --    < >  --    HS TNI 4HR ng/L  --   --  2,279*  --  3,463*    < > = values in this interval not displayed  * I Have Reviewed All Lab Data Listed Above  Cultures:                   Lines/Drains:  Invasive Devices     Peripheral Intravenous Line  Duration           Peripheral IV 01/06/23 Right Antecubital 2 days    Peripheral IV 01/07/23 Dorsal (posterior); Left Hand 2 days    Peripheral IV 01/07/23 Left Antecubital 2 days              Telemetry:   Telemetry Orders (From admission, onward)             48 Hour Telemetry Monitoring  Continuous x 48 hours        References:    Telemetry Guidelines   Question:  Reason for 48 Hour Telemetry  Answer:  Acute MI, chest pain - R/O MI, or unstable angina                  Imaging:  Imaging Reports Reviewed Today Include:   CT renal stone study abdomen pelvis wo contrast    Result Date: 1/9/2023  Impression: Punctate right upper renal pole calculus identified, nonobstructive  No additional urinary calculi identified   Workstation performed: [id number]       Scheduled Meds:  Current Facility-Administered Medications   Medication Dose Route Frequency Provider Last Rate   • acetaminophen  650 mg Oral Q6H PRN Kareen Dandy, DO     • albuterol  2 puff Inhalation Q4H PRN Kareen Dandy, DO     • aspirin  81 mg Oral Daily [facility]     • clopidogrel  75 mg Oral Daily [facility]     • ezetimibe  10 mg Oral Daily [facility], DO     • fish oil  1,000 mg Oral BID Kareen Dandy, DO     • HYDROmorphone  0 2 mg Intravenous Q2H PRN Kareen Dandy, DO     • LORazepam  0 5 mg Intravenous Q6H PRN Aleksandr Santiago PA-C     • LORazepam  0 5 mg Oral Q8H Aleksandr Santiago PA-C     • metoprolol succinate  100 mg Oral Daily Olam [place], DO     • naloxone  0 04 mg Intravenous Q1MIN PRN Kareen Dandy, DO     • nicotine  1 patch Transdermal Daily [facility], DO     • nitroglycerin  0 4 mg Sublingual Q5 Min PRN Kareen Dandy, DO     • ondansetron  4 mg Intravenous Q6H PRN Kareen Dandy, DO     • oxyCODONE  2 5 mg Oral Q4H PRN Kareen Dandy, DO     • oxyCODONE  5 mg Oral Q4H PRN Kareen Dandy, DO     • pantoprazole  40 mg Oral Early Morning Kareen Dandy, DO     • QUEtiapine  100 mg Oral HS [facility], DO     • ranolazine  500 mg Oral Q12H [address], DO     • senna-docusate sodium  1 tablet Oral HS [facility] Rumpf, DO     • sertraline  200 mg Oral Daily [facility], DO     • sodium bicarbonate  1,300 mg Oral BID after meals Jg Soto MD         Today, Patient Was Seen By: Anup Nielsen DO    ** Please Note: Dictation voice to text software may have been used in the creation of this document   **

## 2023-01-09 NOTE — PHYSICAL THERAPY NOTE
Physical Therapy Cancellation Note             01/09/23 0942   PT Last Visit   PT Visit Date 01/09/23   Note Type   Note type Cancelled Session   Cancel Reasons Medical status   Additional Comments PT consult received  Pt admitted w chest pain  Cardiologist present during initial exam and pt to get EKG for consistent chest pain  Will defer evaluation at this time and continue to follow as medically appropriate           Imelda Mcwilliams, PT

## 2023-01-09 NOTE — ASSESSMENT & PLAN NOTE
· Presentation to hospital for worsening chest pain with recent cardiac catheterization/stenting  · Cardiology following  · Avoiding further contrast studies due to risk of renal injury  · Ranexa added      Lab Results   Component Value Date    HSTNI0 809 (H) 01/09/2023    HSTNI2 784 (H) 01/09/2023    HSTNI4 819 (H) 01/09/2023    HSTNI >22,973 (H) 01/02/2023

## 2023-01-09 NOTE — PROGRESS NOTES
Cardiology Progress Note   MD Sloedad Echols MD Estanislado Langdon, DO, MD Brenda Kearns DO, Byron Mederos DO, [facility] [place]  ----------------------------------------------------------------  [address]  [address], [address]    Kelly Para 41 y o  female MRN: [id number]  Unit/Bed#: E4 -01 Encounter: [id number]      ASSESSMENT:   • Precordial chest pain  • CAD  ? NSTEMI s/p thrombectomy w/ GREG-mLAD and residual 60% pLCx, patent GREG-LAD, patent GREG-mRCA and patent GREG-rPDA, January 2023  • Hypertrophic cardiomyopathy  • Chronic combined systolic and diastolic heart failure  ? LVEF 45%, LVH, inferior and inferolateral hypokinesis, moderate LA dilatation, mild AR/MR/TR, mild ascending aortic dilatation 4 3 cm, January 2023  • SSS s/p Medtronic dual-chamber permanent pacemaker  • Hypertension  • Dyslipidemia  • Morbid obesity  • ELIAZAR  • CKD stage IV  • COPD  • Tobacco abuse  • Agoraphobia/social anxiety disorder/depression     PLAN:  Patient is having waxing and waning episodes of chest discomfort that she describes like her prior angina  Blood pressure remains in the 90s to 100s  She has been unable to receive metoprolol due to her borderline pressures  She has also been unable to receive isosorbide or Bumex    We will obtain a limited repeat echocardiogram to reassess cardiac structure and function with borderline blood pressures  Management of diuretics as per nephrology  Continue high intensity statin and Zetia  Discussed case with interventional cardiology and given the patient's negative troponins, no evidence of acute ST segment elevation and CKD stage IV, would likely be more harmful to proceed with any invasive angiography at this time versus continuing with medical therapy  Start Ranexa 500 mg twice daily; risks and benefits of medications discussed  Supportive care    Signed: Tyler Fletcher DO, FACC, FASE, FACP      History of Present Illness:  Patient seen and examined  States that chest discomfort has again mildly worsened, but is nothing like her prior MI  Sahil Carlson Denies lower extremity swelling, orthopnea or paroxysmal nocturnal dyspnea  Denies lightheadedness, dizziness or palpitations  Review of Systems:  Review of Systems   Constitutional: Negative for decreased appetite, fever, weight gain and weight loss  HENT: Negative for congestion and sore throat  Eyes: Negative for visual disturbance  Cardiovascular: Positive for chest pain  Negative for dyspnea on exertion, leg swelling, near-syncope and palpitations  Respiratory: Negative for cough and shortness of breath  Hematologic/Lymphatic: Negative for bleeding problem  Skin: Negative for rash  Musculoskeletal: Negative for myalgias and neck pain  Gastrointestinal: Negative for abdominal pain and nausea  Neurological: Negative for light-headedness and weakness  Psychiatric/Behavioral: Negative for depression  No Known Allergies    No current facility-administered medications on file prior to encounter       Current Outpatient Medications on File Prior to Encounter   Medication Sig   • acetaminophen (TYLENOL) 325 mg tablet Take 3 tablets (975 mg total) by mouth every 8 (eight) hours as needed for mild pain   • al mag oxide-diphenhydramine-lidocaine viscous (MAGIC MOUTHWASH) 1:1:1 suspension Swish and spit 10 mL every 4 (four) hours as needed for mouth pain or discomfort   • albuterol (PROVENTIL HFA,VENTOLIN HFA) 90 mcg/act inhaler Inhale 2 puffs every 4 (four) hours as needed for wheezing or shortness of breath   • aspirin 81 mg chewable tablet Chew 1 tablet (81 mg total) daily   • atorvastatin (LIPITOR) 80 mg tablet Take 1 tablet (80 mg total) by mouth every evening   • bumetanide (BUMEX) 1 mg tablet Take 1 tablet (1 mg total) by mouth daily   • calcitriol (ROCALTROL) 0 25 mcg capsule Take 1 capsule (0 25 mcg total) by mouth daily (Patient taking differently: Take 0 25 mcg by mouth daily Takes Monday Wednesday and fridays as of 11/11/22)   • chlorhexidine (PERIDEX) 0 12 % solution Apply 15 mL to the mouth or throat every 12 (twelve) hours   • clopidogrel (PLAVIX) 75 mg tablet Take 1 tablet (75 mg total) by mouth daily Do not start before January 6, 2023  • Diclofenac Sodium (VOLTAREN) 1 % Apply 2 g topically 4 (four) times a day   • ezetimibe (ZETIA) 10 mg tablet Take 1 tablet (10 mg total) by mouth in the morning  • isosorbide mononitrate (IMDUR) 30 mg 24 hr tablet Take 1 tablet (30 mg total) by mouth daily   • lidocaine (Lidoderm) 5 % Apply 2 patches topically daily Remove & Discard patch within 12 hours or as directed by MD   • LORazepam (ATIVAN) 0 5 mg tablet Take 1 tablet (0 5 mg total) by mouth 3 (three) times a day as needed for anxiety   • methocarbamol (ROBAXIN) 500 mg tablet Take 1 tablet (500 mg total) by mouth 2 (two) times a day as needed for muscle spasms   • metoprolol tartrate (LOPRESSOR) 100 mg tablet Take 1 tablet (100 mg total) by mouth 2 (two) times a day   • nicotine (NICODERM CQ) 14 mg/24hr TD 24 hr patch Place 1 patch on the skin daily Do not start before January 6, 2023     • nitroglycerin (NITROSTAT) 0 4 mg SL tablet Place 1 tablet (0 4 mg total) under the tongue every 5 (five) minutes as needed for chest pain   • omega-3-acid ethyl esters (LOVAZA) 1 g capsule Take 1 capsule (1 g total) by mouth 2 (two) times a day   • omeprazole (PriLOSEC) 40 MG capsule Take 1 capsule (40 mg total) by mouth daily   • QUEtiapine (SEROquel) 50 mg tablet take 1 tablet by mouth at bedtime   • sertraline (ZOLOFT) 100 mg tablet Take 1 5 tablets (150 mg total) by mouth daily   • sodium bicarbonate 650 mg tablet Take 1 tablet (650 mg total) by mouth 2 (two) times daily after meals        Current Facility-Administered Medications   Medication Dose Route Frequency Provider Last Rate   • acetaminophen  650 mg Oral Q6H PRN Alissa Counter, DO     • albuterol  2 puff Inhalation Q4H PRN Kaur Cho, DO     • aspirin  81 mg Oral Daily Enrike Maria, DO     • clopidogrel  75 mg Oral Daily Enrike Maria, DO     • ezetimibe  10 mg Oral Daily Enrike Maria, DO     • fish oil  1,000 mg Oral BID Kaur Marquis, DO     • HYDROmorphone  0 2 mg Intravenous Q2H PRN Kaur Cho, DO     • LORazepam  0 5 mg Intravenous Q6H PRN Aleksandr Santiago PA-C     • LORazepam  0 5 mg Oral Q8H Aleksandr Santiago PA-C     • metoprolol succinate  100 mg Oral Daily Chantell Bose, DO     • naloxone  0 04 mg Intravenous Q1MIN PRN Kaur Cho, DO     • nicotine  1 patch Transdermal Daily Enrike Maria, DO     • nitroglycerin  0 4 mg Sublingual Q5 Min PRN Kaur Cho, DO     • ondansetron  4 mg Intravenous Q6H PRN Kaur Cho, DO     • oxyCODONE  2 5 mg Oral Q4H PRN Kaur Cho, DO     • oxyCODONE  5 mg Oral Q4H PRN Kaur Cho, DO     • pantoprazole  40 mg Oral Early Morning Kaur Marquis, DO     • QUEtiapine  100 mg Oral HS Enrike Maria, DO     • senna-docusate sodium  1 tablet Oral HS Enrikegeovanni Maria, DO     • sertraline  200 mg Oral Daily Enrike Maria, DO     • sodium bicarbonate  1,300 mg Oral BID after meals Puneet Todd MD              Vitals:    01/09/23 0561 01/09/23 0356 01/09/23 0600 01/09/23 0820   BP:  102/67  95/54   BP Location:  Left arm  Right arm   Pulse:  59  65   Resp:  16  16   Temp:  (!) 96 9 °F (36 1 °C)  97 8 °F (36 6 °C)   TempSrc:  Tympanic  Temporal   SpO2: 99% 99%  98%   Weight:   126 kg (278 lb 3 5 oz)      Body mass index is 44 91 kg/m²        Intake/Output Summary (Last 24 hours) at 1/9/2023 1056  Last data filed at 1/9/2023 1691  Gross per 24 hour   Intake 572 02 ml   Output 640 ml   Net -67 98 ml       Weight change: 1 2 kg (2 lb 10 3 oz)    PHYSICAL EXAMINATION:  Gen: Awake, Alert, NAD  Head/eyes: AT/NC, pupils equal and round, Anicteric  ENT: mmm  Neck: Supple, No elevated JVP, trachea midline  Resp: CTA bilaterally no w/r/r  CV: RRR +S1, S2, No m/r/g  Abd: Soft, obese, NT/ND + BS  Ext: no LE edema bilaterally  Neuro: Follows commands, moves all extermities  Psych: Appropriate affect, normal mood, pleasant attitude, non-combative  Skin: warm; no rash, erythema or venous stasis changes on exposed skin    Lab Results:  Results from last 7 days   Lab Units 01/08/23  0803   WBC Thousand/uL 6 68   HEMOGLOBIN g/dL 11 2*   HEMATOCRIT % 34 0*   PLATELETS Thousands/uL 232     Results from last 7 days   Lab Units 01/09/23  0639   POTASSIUM mmol/L 4 6   CHLORIDE mmol/L 106   CO2 mmol/L 16*   BUN mg/dL 25   CREATININE mg/dL 2 99*   CALCIUM mg/dL 8 9     No results found for: TROPONINT      Results from last 7 days   Lab Units 01/07/23  1753 01/07/23  1552 01/07/23  1211 01/06/23  2253 01/02/23  2000 01/02/23  1841 01/02/23  1656   HS TNI 0HR ng/L  --   --  2,855*   < >  --   --   --    HS TNI 2HR ng/L  --  2,679*  --    < >  --   --   --    HS TNI 4HR ng/L 2,279*  --   --    < >  --   --   --    HS TNI RAND ng/L  --   --   --   --  >80,580* >22,973* >22,973*    < > = values in this interval not displayed  Results from last 7 days   Lab Units 01/07/23  0046   INR  1 20*       Tele: SR    This note was completed in part utilizing [employer] Fluency Direct Software  Grammatical errors, random word insertions, spelling mistakes, and incomplete sentences may be an occasional consequence of this system secondary to software limitations, ambient noise, and hardware issues  If you have any questions or concerns about the content, text, or information contained within the body of this dictation, please contact the provider for clarification

## 2023-01-09 NOTE — PLAN OF CARE
Problem: MOBILITY - ADULT  Goal: Maintain or return to baseline ADL function  Description: INTERVENTIONS:  -  Assess patient's ability to carry out ADLs; assess patient's baseline for ADL function and identify physical deficits which impact ability to perform ADLs (bathing, care of mouth/teeth, toileting, grooming, dressing, etc )  - Assess/evaluate cause of self-care deficits   - Assess range of motion  - Assess patient's mobility; develop plan if impaired  - Assess patient's need for assistive devices and provide as appropriate  - Encourage maximum independence but intervene and supervise when necessary  - Involve family in performance of ADLs  - Assess for home care needs following discharge   - Consider OT consult to assist with ADL evaluation and planning for discharge  - Provide patient education as appropriate  Outcome: Progressing  Goal: Maintains/Returns to pre admission functional level  Description: INTERVENTIONS:  - Perform BMAT or MOVE assessment daily    - Set and communicate daily mobility goal to care team and patient/family/caregiver  - Collaborate with rehabilitation services on mobility goals if consulted  - Perform Range of Motion 3 times a day  - Reposition patient every 3 hours    - Dangle patient 3 times a day  - Stand patient 3 times a day  - Ambulate patient 3 times a day  - Out of bed to chair 3 times a day   - Out of bed for meals 3 times a day  - Out of bed for toileting  - Record patient progress and toleration of activity level   Outcome: Progressing     Problem: Potential for Falls  Goal: Patient will remain free of falls  Description: INTERVENTIONS:  - Educate patient/family on patient safety including physical limitations  - Instruct patient to call for assistance with activity   - Consult OT/PT to assist with strengthening/mobility   - Keep Call bell within reach  - Keep bed low and locked with side rails adjusted as appropriate  - Keep care items and personal belongings within reach  - Initiate and maintain comfort rounds  - Make Fall Risk Sign visible to staff  - Offer Toileting every 2 Hours, in advance of need  - Initiate/Maintain bed alarm  - Obtain necessary fall risk management equipment: alarm  - Apply yellow socks and bracelet for high fall risk patients  - Consider moving patient to room near nurses station  Outcome: Progressing     Problem: PAIN - ADULT  Goal: Verbalizes/displays adequate comfort level or baseline comfort level  Description: Interventions:  - Encourage patient to monitor pain and request assistance  - Assess pain using appropriate pain scale  - Administer analgesics based on type and severity of pain and evaluate response  - Implement non-pharmacological measures as appropriate and evaluate response  - Consider cultural and social influences on pain and pain management  - Notify physician/advanced practitioner if interventions unsuccessful or patient reports new pain  Outcome: Progressing     Problem: INFECTION - ADULT  Goal: Absence or prevention of progression during hospitalization  Description: INTERVENTIONS:  - Assess and monitor for signs and symptoms of infection  - Monitor lab/diagnostic results  - Monitor all insertion sites, i e  indwelling lines, tubes, and drains  - Monitor endotracheal if appropriate and nasal secretions for changes in amount and color  - [place] appropriate cooling/warming therapies per order  - Administer medications as ordered  - Instruct and encourage patient and family to use good hand hygiene technique  - Identify and instruct in appropriate isolation precautions for identified infection/condition  Outcome: Progressing  Goal: Absence of fever/infection during neutropenic period  Description: INTERVENTIONS:  - Monitor WBC    Outcome: Progressing     Problem: SAFETY ADULT  Goal: Maintain or return to baseline ADL function  Description: INTERVENTIONS:  -  Assess patient's ability to carry out ADLs; assess patient's baseline for ADL function and identify physical deficits which impact ability to perform ADLs (bathing, care of mouth/teeth, toileting, grooming, dressing, etc )  - Assess/evaluate cause of self-care deficits   - Assess range of motion  - Assess patient's mobility; develop plan if impaired  - Assess patient's need for assistive devices and provide as appropriate  - Encourage maximum independence but intervene and supervise when necessary  - Involve family in performance of ADLs  - Assess for home care needs following discharge   - Consider OT consult to assist with ADL evaluation and planning for discharge  - Provide patient education as appropriate  Outcome: Progressing  Goal: Maintains/Returns to pre admission functional level  Description: INTERVENTIONS:  - Perform BMAT or MOVE assessment daily    - Set and communicate daily mobility goal to care team and patient/family/caregiver  - Collaborate with rehabilitation services on mobility goals if consulted  - Perform Range of Motion 3 times a day  - Reposition patient every 3 hours    - Dangle patient 3 times a day  - Stand patient 3 times a day  - Ambulate patient 3 times a day  - Out of bed to chair 3 times a day   - Out of bed for meals 3 times a day  - Out of bed for toileting  - Record patient progress and toleration of activity level   Outcome: Progressing  Goal: Patient will remain free of falls  Description: INTERVENTIONS:  - Educate patient/family on patient safety including physical limitations  - Instruct patient to call for assistance with activity   - Consult OT/PT to assist with strengthening/mobility   - Keep Call bell within reach  - Keep bed low and locked with side rails adjusted as appropriate  - Keep care items and personal belongings within reach  - Initiate and maintain comfort rounds  - Make Fall Risk Sign visible to staff  - Offer Toileting every 2 Hours, in advance of need  - Initiate/Maintain bed alarm  - Obtain necessary fall risk management equipment: alarm  - Apply yellow socks and bracelet for high fall risk patients  - Consider moving patient to room near nurses station  Outcome: Progressing     Problem: DISCHARGE PLANNING  Goal: Discharge to home or other facility with appropriate resources  Description: INTERVENTIONS:  - Identify barriers to discharge w/patient and caregiver  - Arrange for needed discharge resources and transportation as appropriate  - Identify discharge learning needs (meds, wound care, etc )  - Arrange for interpretive services to assist at discharge as needed  - Refer to Case Management Department for coordinating discharge planning if the patient needs post-hospital services based on physician/advanced practitioner order or complex needs related to functional status, cognitive ability, or social support system  Outcome: Progressing     Problem: Knowledge Deficit  Goal: Patient/family/caregiver demonstrates understanding of disease process, treatment plan, medications, and discharge instructions  Description: Complete learning assessment and assess knowledge base    Interventions:  - Provide teaching at level of understanding  - Provide teaching via preferred learning methods  Outcome: Progressing     Problem: CARDIOVASCULAR - ADULT  Goal: Maintains optimal cardiac output and hemodynamic stability  Description: INTERVENTIONS:  - Monitor I/O, vital signs and rhythm  - Monitor for S/S and trends of decreased cardiac output  - Administer and titrate ordered vasoactive medications to optimize hemodynamic stability  - Assess quality of pulses, skin color and temperature  - Assess for signs of decreased coronary artery perfusion  - Instruct patient to report change in severity of symptoms  Outcome: Progressing  Goal: Absence of cardiac dysrhythmias or at baseline rhythm  Description: INTERVENTIONS:  - Continuous cardiac monitoring, vital signs, obtain 12 lead EKG if ordered  - Administer antiarrhythmic and heart rate control medications as ordered  - Monitor electrolytes and administer replacement therapy as ordered  Outcome: Progressing

## 2023-01-09 NOTE — UTILIZATION REVIEW
NOTIFICATION OF INPATIENT ADMISSION   AUTHORIZATION REQUEST   SERVICING FACILITY:   [address] [place] [address] [facility]  Tax ID: [id number]  NPI: [id number] ATTENDING PROVIDER:  Attending Name and NPI#: Yefri Hitesh [[id number]]  Address: [address] [place] [address] [facility]  Phone: [phone]     ADMISSION INFORMATION:  Place of Service: [place] [address]  Place of Service Code: 21  Inpatient Admission Date/Time: 1/7/23  9:47 AM  Discharge Date/Time: No discharge date for patient encounter  Admitting Diagnosis Code/Description:  NSTEMI (non-ST elevated [facility] [I21 4]     UTILIZATION REVIEW CONTACT:  Usman Simon Utilization   Network Utilization Review Department  Phone: [phone]  Fax: [phone]  Email: [email or web address]  Contact for approvals/pending authorizations, clinical reviews, and discharge  PHYSICIAN ADVISORY SERVICES:  Medical Necessity Denial & [employer] Review  Phone: [phone]  Fax: [phone]  Email: [email or web address]

## 2023-01-09 NOTE — OCCUPATIONAL THERAPY NOTE
Occupational Therapy Cancellation        Patient Name: Walt Panchal  Today's Date: 1/9/2023    Attempted to see patient  Cardiologist present during initial eval and pt reporting chest pain and going to get an EKG and recommending to  Hold therapy at this time  Will continue to follow to address OT eval as appropriate       Antonina Dick MS, OTR/L

## 2023-01-09 NOTE — PLAN OF CARE
Problem: MOBILITY - ADULT  Goal: Maintain or return to baseline ADL function  Description: INTERVENTIONS:  -  Assess patient's ability to carry out ADLs; assess patient's baseline for ADL function and identify physical deficits which impact ability to perform ADLs (bathing, care of mouth/teeth, toileting, grooming, dressing, etc )  - Assess/evaluate cause of self-care deficits   - Assess range of motion  - Assess patient's mobility; develop plan if impaired  - Assess patient's need for assistive devices and provide as appropriate  - Encourage maximum independence but intervene and supervise when necessary  - Involve family in performance of ADLs  - Assess for home care needs following discharge   - Consider OT consult to assist with ADL evaluation and planning for discharge  - Provide patient education as appropriate  Outcome: Progressing  Goal: Maintains/Returns to pre admission functional level  Description: INTERVENTIONS:  - Perform BMAT or MOVE assessment daily    - Set and communicate daily mobility goal to care team and patient/family/caregiver  - Collaborate with rehabilitation services on mobility goals if consulted  - Perform Range of Motion 3 times a day  - Reposition patient every 2 hours    - Dangle patient 3 times a day  - Stand patient 3 times a day  - Ambulate patient 3 times a day  - Out of bed to chair 3 times a day   - Out of bed for meals 3 times a day  - Out of bed for toileting  - Record patient progress and toleration of activity level   Outcome: Progressing     Problem: Potential for Falls  Goal: Patient will remain free of falls  Description: INTERVENTIONS:  - Educate patient/family on patient safety including physical limitations  - Instruct patient to call for assistance with activity   - Consult OT/PT to assist with strengthening/mobility   - Keep Call bell within reach  - Keep bed low and locked with side rails adjusted as appropriate  - Keep care items and personal belongings within reach  - Initiate and maintain comfort rounds  - Make Fall Risk Sign visible to staff  - Offer Toileting every 2 Hours, in advance of need  - Initiate/Maintain bed alarm  - Obtain necessary fall risk management equipment  - Apply yellow socks and bracelet for high fall risk patients  - Consider moving patient to room near nurses station  Outcome: Progressing     Problem: PAIN - ADULT  Goal: Verbalizes/displays adequate comfort level or baseline comfort level  Description: Interventions:  - Encourage patient to monitor pain and request assistance  - Assess pain using appropriate pain scale  - Administer analgesics based on type and severity of pain and evaluate response  - Implement non-pharmacological measures as appropriate and evaluate response  - Consider cultural and social influences on pain and pain management  - Notify physician/advanced practitioner if interventions unsuccessful or patient reports new pain  Outcome: Progressing     Problem: INFECTION - ADULT  Goal: Absence or prevention of progression during hospitalization  Description: INTERVENTIONS:  - Assess and monitor for signs and symptoms of infection  - Monitor lab/diagnostic results  - Monitor all insertion sites, i e  indwelling lines, tubes, and drains  - Monitor endotracheal if appropriate and nasal secretions for changes in amount and color  - [place] appropriate cooling/warming therapies per order  - Administer medications as ordered  - Instruct and encourage patient and family to use good hand hygiene technique  - Identify and instruct in appropriate isolation precautions for identified infection/condition  Outcome: Progressing  Goal: Absence of fever/infection during neutropenic period  Description: INTERVENTIONS:  - Monitor WBC    Outcome: Progressing     Problem: SAFETY ADULT  Goal: Maintain or return to baseline ADL function  Description: INTERVENTIONS:  -  Assess patient's ability to carry out ADLs; assess patient's baseline for ADL function and identify physical deficits which impact ability to perform ADLs (bathing, care of mouth/teeth, toileting, grooming, dressing, etc )  - Assess/evaluate cause of self-care deficits   - Assess range of motion  - Assess patient's mobility; develop plan if impaired  - Assess patient's need for assistive devices and provide as appropriate  - Encourage maximum independence but intervene and supervise when necessary  - Involve family in performance of ADLs  - Assess for home care needs following discharge   - Consider OT consult to assist with ADL evaluation and planning for discharge  - Provide patient education as appropriate  Outcome: Progressing  Goal: Maintains/Returns to pre admission functional level  Description: INTERVENTIONS:  - Perform BMAT or MOVE assessment daily    - Set and communicate daily mobility goal to care team and patient/family/caregiver  - Collaborate with rehabilitation services on mobility goals if consulted  - Perform Range of Motion 3 times a day  - Reposition patient every 2 hours    - Dangle patient 3 times a day  - Stand patient 3 times a day  - Ambulate patient 3 times a day  - Out of bed to chair 3 times a day   - Out of bed for meals 3 times a day  - Out of bed for toileting  - Record patient progress and toleration of activity level   Outcome: Progressing  Goal: Patient will remain free of falls  Description: INTERVENTIONS:  - Educate patient/family on patient safety including physical limitations  - Instruct patient to call for assistance with activity   - Consult OT/PT to assist with strengthening/mobility   - Keep Call bell within reach  - Keep bed low and locked with side rails adjusted as appropriate  - Keep care items and personal belongings within reach  - Initiate and maintain comfort rounds  - Make Fall Risk Sign visible to staff  - Offer Toileting every 2 Hours, in advance of need  - Initiate/Maintain bed alarm  - Obtain necessary fall risk management equipment  - Apply yellow socks and bracelet for high fall risk patients  - Consider moving patient to room near nurses station  Outcome: Progressing     Problem: DISCHARGE PLANNING  Goal: Discharge to home or other facility with appropriate resources  Description: INTERVENTIONS:  - Identify barriers to discharge w/patient and caregiver  - Arrange for needed discharge resources and transportation as appropriate  - Identify discharge learning needs (meds, wound care, etc )  - Arrange for interpretive services to assist at discharge as needed  - Refer to Case Management Department for coordinating discharge planning if the patient needs post-hospital services based on physician/advanced practitioner order or complex needs related to functional status, cognitive ability, or social support system  Outcome: Progressing     Problem: Knowledge Deficit  Goal: Patient/family/caregiver demonstrates understanding of disease process, treatment plan, medications, and discharge instructions  Description: Complete learning assessment and assess knowledge base    Interventions:  - Provide teaching at level of understanding  - Provide teaching via preferred learning methods  Outcome: Progressing     Problem: CARDIOVASCULAR - ADULT  Goal: Maintains optimal cardiac output and hemodynamic stability  Description: INTERVENTIONS:  - Monitor I/O, vital signs and rhythm  - Monitor for S/S and trends of decreased cardiac output  - Administer and titrate ordered vasoactive medications to optimize hemodynamic stability  - Assess quality of pulses, skin color and temperature  - Assess for signs of decreased coronary artery perfusion  - Instruct patient to report change in severity of symptoms  Outcome: Progressing  Goal: Absence of cardiac dysrhythmias or at baseline rhythm  Description: INTERVENTIONS:  - Continuous cardiac monitoring, vital signs, obtain 12 lead EKG if ordered  - Administer antiarrhythmic and heart rate control medications as ordered  - Monitor electrolytes and administer replacement therapy as ordered  Outcome: Progressing

## 2023-01-09 NOTE — ASSESSMENT & PLAN NOTE
· CAD with recent cardiac catheterization and stenting  · Continue aspirin and clopidogrel    Ranexa added by cardiology today due to ongoing chest pain

## 2023-01-10 VITALS
SYSTOLIC BLOOD PRESSURE: 119 MMHG | OXYGEN SATURATION: 96 % | BODY MASS INDEX: 44.15 KG/M2 | HEIGHT: 66 IN | DIASTOLIC BLOOD PRESSURE: 66 MMHG | WEIGHT: 274.69 LBS | TEMPERATURE: 98 F | HEART RATE: 60 BPM | RESPIRATION RATE: 18 BRPM

## 2023-01-10 DIAGNOSIS — N18.9 CHRONIC KIDNEY DISEASE-MINERAL AND BONE DISORDER: ICD-10-CM

## 2023-01-10 DIAGNOSIS — E83.9 CHRONIC KIDNEY DISEASE-MINERAL AND BONE DISORDER: ICD-10-CM

## 2023-01-10 DIAGNOSIS — N18.30 BENIGN HYPERTENSION WITH CKD (CHRONIC KIDNEY DISEASE) STAGE III (HCC): ICD-10-CM

## 2023-01-10 DIAGNOSIS — M89.9 CHRONIC KIDNEY DISEASE-MINERAL AND BONE DISORDER: ICD-10-CM

## 2023-01-10 DIAGNOSIS — I12.9 BENIGN HYPERTENSION WITH CKD (CHRONIC KIDNEY DISEASE) STAGE III (HCC): ICD-10-CM

## 2023-01-10 DIAGNOSIS — I10 ESSENTIAL HYPERTENSION: Primary | ICD-10-CM

## 2023-01-10 LAB
ANION GAP SERPL CALCULATED.3IONS-SCNC: 12 MMOL/L (ref 4–13)
BASOPHILS # BLD AUTO: 0.06 THOUSANDS/ÂΜL (ref 0–0.1)
BASOPHILS NFR BLD AUTO: 1 % (ref 0–1)
BUN SERPL-MCNC: 26 MG/DL (ref 5–25)
CALCIUM SERPL-MCNC: 9.1 MG/DL (ref 8.3–10.1)
CHLORIDE SERPL-SCNC: 107 MMOL/L (ref 96–108)
CO2 SERPL-SCNC: 20 MMOL/L (ref 21–32)
CREAT SERPL-MCNC: 2.72 MG/DL (ref 0.6–1.3)
EOSINOPHIL # BLD AUTO: 0.21 THOUSAND/ÂΜL (ref 0–0.61)
EOSINOPHIL NFR BLD AUTO: 3 % (ref 0–6)
ERYTHROCYTE [DISTWIDTH] IN BLOOD BY AUTOMATED COUNT: 17.4 % (ref 11.6–15.1)
GFR SERPL CREATININE-BSD FRML MDRD: 20 ML/MIN/1.73SQ M
GLUCOSE SERPL-MCNC: 80 MG/DL (ref 65–140)
HCT VFR BLD AUTO: 35.7 % (ref 34.8–46.1)
HGB BLD-MCNC: 11.5 G/DL (ref 11.5–15.4)
IMM GRANULOCYTES # BLD AUTO: 0.08 THOUSAND/UL (ref 0–0.2)
IMM GRANULOCYTES NFR BLD AUTO: 1 % (ref 0–2)
LYMPHOCYTES # BLD AUTO: 1.75 THOUSANDS/ÂΜL (ref 0.6–4.47)
LYMPHOCYTES NFR BLD AUTO: 22 % (ref 14–44)
MCH RBC QN AUTO: 28.8 PG (ref 26.8–34.3)
MCHC RBC AUTO-ENTMCNC: 32.2 G/DL (ref 31.4–37.4)
MCV RBC AUTO: 90 FL (ref 82–98)
MONOCYTES # BLD AUTO: 0.64 THOUSAND/ÂΜL (ref 0.17–1.22)
MONOCYTES NFR BLD AUTO: 8 % (ref 4–12)
NEUTROPHILS # BLD AUTO: 5.12 THOUSANDS/ÂΜL (ref 1.85–7.62)
NEUTS SEG NFR BLD AUTO: 65 % (ref 43–75)
NRBC BLD AUTO-RTO: 0 /100 WBCS
PLATELET # BLD AUTO: 233 THOUSANDS/UL (ref 149–390)
PMV BLD AUTO: 11.3 FL (ref 8.9–12.7)
POTASSIUM SERPL-SCNC: 4 MMOL/L (ref 3.5–5.3)
RBC # BLD AUTO: 3.99 MILLION/UL (ref 3.81–5.12)
SODIUM SERPL-SCNC: 139 MMOL/L (ref 135–147)
WBC # BLD AUTO: 7.86 THOUSAND/UL (ref 4.31–10.16)

## 2023-01-10 RX ORDER — CALCITRIOL 0.25 UG/1
0.25 CAPSULE, LIQUID FILLED ORAL DAILY
COMMUNITY
Start: 2023-01-10

## 2023-01-10 RX ORDER — RANOLAZINE 500 MG/1
500 TABLET, EXTENDED RELEASE ORAL EVERY 12 HOURS SCHEDULED
Qty: 60 TABLET | Refills: 0 | Status: SHIPPED | OUTPATIENT
Start: 2023-01-10

## 2023-01-10 RX ORDER — METOPROLOL SUCCINATE 100 MG/1
100 TABLET, EXTENDED RELEASE ORAL DAILY
Qty: 30 TABLET | Refills: 0 | Status: SHIPPED | OUTPATIENT
Start: 2023-01-11

## 2023-01-10 RX ORDER — ATORVASTATIN CALCIUM 80 MG/1
80 TABLET, FILM COATED ORAL
Status: DISCONTINUED | OUTPATIENT
Start: 2023-01-10 | End: 2023-01-10 | Stop reason: HOSPADM

## 2023-01-10 RX ADMIN — EZETIMIBE 10 MG: 10 TABLET ORAL at 09:28

## 2023-01-10 RX ADMIN — OXYCODONE 5 MG: 5 TABLET ORAL at 02:26

## 2023-01-10 RX ADMIN — CLOPIDOGREL BISULFATE 75 MG: 75 TABLET ORAL at 09:28

## 2023-01-10 RX ADMIN — RANOLAZINE 500 MG: 500 TABLET, EXTENDED RELEASE ORAL at 09:29

## 2023-01-10 RX ADMIN — METOPROLOL SUCCINATE 100 MG: 50 TABLET, EXTENDED RELEASE ORAL at 09:28

## 2023-01-10 RX ADMIN — ASPIRIN 81 MG CHEWABLE TABLET 81 MG: 81 TABLET CHEWABLE at 09:27

## 2023-01-10 RX ADMIN — ACETAMINOPHEN 650 MG: 325 TABLET ORAL at 15:23

## 2023-01-10 RX ADMIN — Medication 1 PATCH: at 09:27

## 2023-01-10 RX ADMIN — HEPARIN SODIUM 5000 UNITS: 5000 INJECTION INTRAVENOUS; SUBCUTANEOUS at 13:04

## 2023-01-10 RX ADMIN — LORAZEPAM 0.5 MG: 0.5 TABLET ORAL at 13:03

## 2023-01-10 RX ADMIN — OMEGA-3 FATTY ACIDS CAP 1000 MG 1000 MG: 1000 CAP at 09:28

## 2023-01-10 RX ADMIN — SERTRALINE 200 MG: 100 TABLET, FILM COATED ORAL at 09:28

## 2023-01-10 RX ADMIN — HEPARIN SODIUM 5000 UNITS: 5000 INJECTION INTRAVENOUS; SUBCUTANEOUS at 05:00

## 2023-01-10 RX ADMIN — SODIUM BICARBONATE 1300 MG: 650 TABLET ORAL at 09:29

## 2023-01-10 RX ADMIN — ATORVASTATIN CALCIUM 80 MG: 80 TABLET, FILM COATED ORAL at 16:01

## 2023-01-10 RX ADMIN — PANTOPRAZOLE SODIUM 40 MG: 40 TABLET, DELAYED RELEASE ORAL at 05:00

## 2023-01-10 NOTE — PROGRESS NOTES
Cardiology Progress Note   MD Sourav Granda MD Burt Duster, DO, MD Sebastian Mccurdy DO, Chaitanya Garcia DO, [facility] [place]  ----------------------------------------------------------------  [address]  [address], [address] [facility] [place] 41 y o  female MRN: [id number]  Unit/Bed#: E4 -01 Encounter: [id number]      ASSESSMENT:   • Precordial chest pain  • CAD  ? NSTEMI s/p thrombectomy w/ GREG-mLAD and residual 60% pLCx, patent GREG-LAD, patent GREG-mRCA and patent GREG-rPDA, January 2023  • Hypertrophic cardiomyopathy  • Chronic combined systolic and diastolic heart failure  ? LVEF 45%, LVH, inferior and inferolateral hypokinesis, moderate LA dilatation, mild AR/MR/TR, mild ascending aortic dilatation 4 3 cm, January 2023  • SSS s/p Medtronic dual-chamber permanent pacemaker  • Hypertension  • Dyslipidemia  • Morbid obesity  • ELIAZAR  • CKD stage IV  • COPD  • Tobacco abuse  • Agoraphobia/social anxiety disorder/depression     PLAN:  Patient is now chest pain-free on the Ranexa medication  Renal function is continuing to improve  Weights are stable and blood pressure is improving this morning with 1 borderline pressure overnight  Restart diuretic as per nephrology  Minimum 1 year dual antiplatelet therapy with aspirin and Plavix  Continue Zetia, Toprol- mg daily and home dose of atorvastatin he milligrams daily  Continue Ranexa  The patient feeling good with ambulation and blood pressures remained stable, reasonable for discharge later today from CV perspective  Outpatient follow-up primary cardiologist within 2 weeks of discharge for additional CV testing as clinically indicated  The importance of cardiac rehabilitation discussed with patient at length; encouraged follow-up with psychiatry    Signed: Gregorio Garland DO, EVAN TIERNEY FACP    History of Present Illness:  Patient seen and examined  Patient remains chest pain-free    Denies chest pain, pressure, tightness or squeezing  Denies lightheadedness, dizziness or palpitations  Denies lower extremity swelling, orthopnea or paroxysmal nocturnal dyspnea  States she feels back to her baseline  Review of Systems:  Review of Systems   Constitutional: Negative for decreased appetite, fever, weight gain and weight loss  HENT: Negative for congestion and sore throat  Eyes: Negative for visual disturbance  Cardiovascular: Negative for chest pain, dyspnea on exertion, leg swelling, near-syncope and palpitations  Respiratory: Negative for cough and shortness of breath  Hematologic/Lymphatic: Negative for bleeding problem  Skin: Negative for rash  Musculoskeletal: Negative for myalgias and neck pain  Gastrointestinal: Negative for abdominal pain and nausea  Neurological: Negative for light-headedness and weakness  Psychiatric/Behavioral: Negative for depression  No Known Allergies    No current facility-administered medications on file prior to encounter       Current Outpatient Medications on File Prior to Encounter   Medication Sig   • acetaminophen (TYLENOL) 325 mg tablet Take 3 tablets (975 mg total) by mouth every 8 (eight) hours as needed for mild pain   • al mag oxide-diphenhydramine-lidocaine viscous (MAGIC MOUTHWASH) 1:1:1 suspension Swish and spit 10 mL every 4 (four) hours as needed for mouth pain or discomfort   • albuterol (PROVENTIL HFA,VENTOLIN HFA) 90 mcg/act inhaler Inhale 2 puffs every 4 (four) hours as needed for wheezing or shortness of breath   • aspirin 81 mg chewable tablet Chew 1 tablet (81 mg total) daily   • atorvastatin (LIPITOR) 80 mg tablet Take 1 tablet (80 mg total) by mouth every evening   • bumetanide (BUMEX) 1 mg tablet Take 1 tablet (1 mg total) by mouth daily   • calcitriol (ROCALTROL) 0 25 mcg capsule Take 1 capsule (0 25 mcg total) by mouth daily (Patient taking differently: Take 0 25 mcg by mouth daily Takes Monday Wednesday and fridays as of 11/11/22)   • chlorhexidine (PERIDEX) 0 12 % solution Apply 15 mL to the mouth or throat every 12 (twelve) hours   • clopidogrel (PLAVIX) 75 mg tablet Take 1 tablet (75 mg total) by mouth daily Do not start before January 6, 2023  • Diclofenac Sodium (VOLTAREN) 1 % Apply 2 g topically 4 (four) times a day   • ezetimibe (ZETIA) 10 mg tablet Take 1 tablet (10 mg total) by mouth in the morning  • isosorbide mononitrate (IMDUR) 30 mg 24 hr tablet Take 1 tablet (30 mg total) by mouth daily   • lidocaine (Lidoderm) 5 % Apply 2 patches topically daily Remove & Discard patch within 12 hours or as directed by MD   • LORazepam (ATIVAN) 0 5 mg tablet Take 1 tablet (0 5 mg total) by mouth 3 (three) times a day as needed for anxiety   • methocarbamol (ROBAXIN) 500 mg tablet Take 1 tablet (500 mg total) by mouth 2 (two) times a day as needed for muscle spasms   • metoprolol tartrate (LOPRESSOR) 100 mg tablet Take 1 tablet (100 mg total) by mouth 2 (two) times a day   • nicotine (NICODERM CQ) 14 mg/24hr TD 24 hr patch Place 1 patch on the skin daily Do not start before January 6, 2023     • nitroglycerin (NITROSTAT) 0 4 mg SL tablet Place 1 tablet (0 4 mg total) under the tongue every 5 (five) minutes as needed for chest pain   • omega-3-acid ethyl esters (LOVAZA) 1 g capsule Take 1 capsule (1 g total) by mouth 2 (two) times a day   • omeprazole (PriLOSEC) 40 MG capsule Take 1 capsule (40 mg total) by mouth daily   • QUEtiapine (SEROquel) 50 mg tablet take 1 tablet by mouth at bedtime   • sertraline (ZOLOFT) 100 mg tablet Take 1 5 tablets (150 mg total) by mouth daily   • sodium bicarbonate 650 mg tablet Take 1 tablet (650 mg total) by mouth 2 (two) times daily after meals        Current Facility-Administered Medications   Medication Dose Route Frequency Provider Last Rate   • acetaminophen  650 mg Oral Q6H PRN Renford Canavan, DO     • albuterol  2 puff Inhalation Q4H PRN Enrike Aguilar, DO     • aspirin  81 mg Oral Daily Ernike Cam, DO     • clopidogrel  75 mg Oral Daily Enrike Cam, DO     • ezetimibe  10 mg Oral Daily Enrike Cam, DO     • fish oil  1,000 mg Oral BID Brenna Schofield, DO     • heparin (porcine)  5,000 Units Subcutaneous [facility] Vivian Charlene Ulises, DO     • HYDROmorphone  0 2 mg Intravenous Q2H PRN Brenna Schofield, DO     • LORazepam  0 5 mg Intravenous Q6H PRN Aleksandr Santiago PA-C     • LORazepam  0 5 mg Oral Q8H Aleksandr Santiago PA-C     • metoprolol succinate  100 mg Oral Daily Lugenia Curtis, DO     • naloxone  0 04 mg Intravenous Q1MIN PRN Brenna Schofield, DO     • nicotine  1 patch Transdermal Daily Enrike Cam, DO     • nitroglycerin  0 4 mg Sublingual Q5 Min PRN Brenna Schofield, DO     • ondansetron  4 mg Intravenous Q6H PRN Brenna Schofield, DO     • oxyCODONE  2 5 mg Oral Q4H PRN Brenna Schofield, DO     • oxyCODONE  5 mg Oral Q4H PRN Brenna Schofield, DO     • pantoprazole  40 mg Oral Early Morning Brenna Schofield, DO     • QUEtiapine  100 mg Oral HS Enrike Cam, DO     • ranolazine  500 mg Oral Q12H [address], DO     • senna-docusate sodium  1 tablet Oral HS Enrike Cam, DO     • sertraline  200 mg Oral Daily Enrike Cam, DO     • sodium bicarbonate  1,300 mg Oral BID after meals Donna Neves MD              Vitals:    01/10/23 0437 01/10/23 0600 01/10/23 0618 01/10/23 0739   BP: (!) 83/56  130/73 115/66   BP Location: Right arm  Left arm Right arm   Pulse: 92   60   Resp: 20   18   Temp: (!) 96 7 °F (35 9 °C)   97 9 °F (36 6 °C)   TempSrc: Temporal   Temporal   SpO2: 94%   96%   Weight:  125 kg (274 lb 11 1 oz)     Height:         Body mass index is 44 34 kg/m²        Intake/Output Summary (Last 24 hours) at 1/10/2023 1052  Last data filed at 1/10/2023 0931  Gross per 24 hour   Intake 420 ml   Output 2250 ml   Net -1830 ml       Weight change: -0 1 kg (-3 5 oz)    PHYSICAL EXAMINATION:  Gen: Awake, Alert, NAD  Head/eyes: AT/NC, pupils equal and round, Anicteric  ENT: mmm  Neck: Supple, No elevated JVP, trachea midline  Resp: CTA bilaterally no w/r/r  CV: RRR +S1, S2, No m/r/g  Abd: Soft, obese, NT/ND + BS  Ext: no LE edema bilaterally  Neuro: Follows commands, moves all extermities  Psych: Appropriate affect, happy mood, pleasant attitude, non-combative  Skin: warm; no rash, erythema or venous stasis changes on exposed skin    Lab Results:  Results from last 7 days   Lab Units 01/10/23  0450   WBC Thousand/uL 7 86   HEMOGLOBIN g/dL 11 5   HEMATOCRIT % 35 7   PLATELETS Thousands/uL 233     Results from last 7 days   Lab Units 01/10/23  0450   POTASSIUM mmol/L 4 0   CHLORIDE mmol/L 107   CO2 mmol/L 20*   BUN mg/dL 26*   CREATININE mg/dL 2 72*   CALCIUM mg/dL 9 1     No results found for: TROPONINT      Results from last 7 days   Lab Units 01/09/23  1611 01/09/23  1319 01/09/23  1111   HS TNI 0HR ng/L  --   --  809*   HS TNI 2HR ng/L  --  784*  --    HS TNI 4HR ng/L 819*  --   --      Results from last 7 days   Lab Units 01/07/23  0046   INR  1 20*       Tele: SR, nocturnal V paced beats    This note was completed in part utilizing M-Modal Fluency Direct Software  Grammatical errors, random word insertions, spelling mistakes, and incomplete sentences may be an occasional consequence of this system secondary to software limitations, ambient noise, and hardware issues  If you have any questions or concerns about the content, text, or information contained within the body of this dictation, please contact the provider for clarification

## 2023-01-10 NOTE — PLAN OF CARE
Problem: MOBILITY - ADULT  Goal: Maintain or return to baseline ADL function  Description: INTERVENTIONS:  -  Assess patient's ability to carry out ADLs; assess patient's baseline for ADL function and identify physical deficits which impact ability to perform ADLs (bathing, care of mouth/teeth, toileting, grooming, dressing, etc )  - Assess/evaluate cause of self-care deficits   - Assess range of motion  - Assess patient's mobility; develop plan if impaired  - Assess patient's need for assistive devices and provide as appropriate  - Encourage maximum independence but intervene and supervise when necessary  - Involve family in performance of ADLs  - Assess for home care needs following discharge   - Consider OT consult to assist with ADL evaluation and planning for discharge  - Provide patient education as appropriate  Outcome: Progressing  Goal: Maintains/Returns to pre admission functional level  Description: INTERVENTIONS:  - Perform BMAT or MOVE assessment daily    - Set and communicate daily mobility goal to care team and patient/family/caregiver  - Collaborate with rehabilitation services on mobility goals if consulted  - Perform Range of Motion 3 times a day  - Reposition patient every 3 hours    - Dangle patient 3 times a day  - Stand patient 3 times a day  - Ambulate patient 3 times a day  - Out of bed to chair 3 times a day   - Out of bed for meals 3 times a day  - Out of bed for toileting  - Record patient progress and toleration of activity level   Outcome: Progressing     Problem: Potential for Falls  Goal: Patient will remain free of falls  Description: INTERVENTIONS:  - Educate patient/family on patient safety including physical limitations  - Instruct patient to call for assistance with activity   - Consult OT/PT to assist with strengthening/mobility   - Keep Call bell within reach  - Keep bed low and locked with side rails adjusted as appropriate  - Keep care items and personal belongings within reach  - Initiate and maintain comfort rounds  - Make Fall Risk Sign visible to staff  - Offer Toileting every 2 Hours, in advance of need  - Initiate/Maintain bed alarm  - Obtain necessary fall risk management equipment: alarm  - Apply yellow socks and bracelet for high fall risk patients  - Consider moving patient to room near nurses station  Outcome: Progressing     Problem: PAIN - ADULT  Goal: Verbalizes/displays adequate comfort level or baseline comfort level  Description: Interventions:  - Encourage patient to monitor pain and request assistance  - Assess pain using appropriate pain scale  - Administer analgesics based on type and severity of pain and evaluate response  - Implement non-pharmacological measures as appropriate and evaluate response  - Consider cultural and social influences on pain and pain management  - Notify physician/advanced practitioner if interventions unsuccessful or patient reports new pain  Outcome: Progressing     Problem: INFECTION - ADULT  Goal: Absence or prevention of progression during hospitalization  Description: INTERVENTIONS:  - Assess and monitor for signs and symptoms of infection  - Monitor lab/diagnostic results  - Monitor all insertion sites, i e  indwelling lines, tubes, and drains  - Monitor endotracheal if appropriate and nasal secretions for changes in amount and color  - [place] appropriate cooling/warming therapies per order  - Administer medications as ordered  - Instruct and encourage patient and family to use good hand hygiene technique  - Identify and instruct in appropriate isolation precautions for identified infection/condition  Outcome: Progressing  Goal: Absence of fever/infection during neutropenic period  Description: INTERVENTIONS:  - Monitor WBC    Outcome: Progressing     Problem: SAFETY ADULT  Goal: Maintain or return to baseline ADL function  Description: INTERVENTIONS:  -  Assess patient's ability to carry out ADLs; assess patient's baseline for ADL function and identify physical deficits which impact ability to perform ADLs (bathing, care of mouth/teeth, toileting, grooming, dressing, etc )  - Assess/evaluate cause of self-care deficits   - Assess range of motion  - Assess patient's mobility; develop plan if impaired  - Assess patient's need for assistive devices and provide as appropriate  - Encourage maximum independence but intervene and supervise when necessary  - Involve family in performance of ADLs  - Assess for home care needs following discharge   - Consider OT consult to assist with ADL evaluation and planning for discharge  - Provide patient education as appropriate  Outcome: Progressing  Goal: Maintains/Returns to pre admission functional level  Description: INTERVENTIONS:  - Perform BMAT or MOVE assessment daily    - Set and communicate daily mobility goal to care team and patient/family/caregiver  - Collaborate with rehabilitation services on mobility goals if consulted  - Perform Range of Motion 3 times a day  - Reposition patient every 3 hours    - Dangle patient 3 times a day  - Stand patient 3 times a day  - Ambulate patient 3 times a day  - Out of bed to chair 3 times a day   - Out of bed for meals 3 times a day  - Out of bed for toileting  - Record patient progress and toleration of activity level   Outcome: Progressing  Goal: Patient will remain free of falls  Description: INTERVENTIONS:  - Educate patient/family on patient safety including physical limitations  - Instruct patient to call for assistance with activity   - Consult OT/PT to assist with strengthening/mobility   - Keep Call bell within reach  - Keep bed low and locked with side rails adjusted as appropriate  - Keep care items and personal belongings within reach  - Initiate and maintain comfort rounds  - Make Fall Risk Sign visible to staff  - Offer Toileting every 2 Hours, in advance of need  - Initiate/Maintain bed alarm  - Obtain necessary fall risk management equipment: alarm  - Apply yellow socks and bracelet for high fall risk patients  - Consider moving patient to room near nurses station  Outcome: Progressing     Problem: DISCHARGE PLANNING  Goal: Discharge to home or other facility with appropriate resources  Description: INTERVENTIONS:  - Identify barriers to discharge w/patient and caregiver  - Arrange for needed discharge resources and transportation as appropriate  - Identify discharge learning needs (meds, wound care, etc )  - Arrange for interpretive services to assist at discharge as needed  - Refer to Case Management Department for coordinating discharge planning if the patient needs post-hospital services based on physician/advanced practitioner order or complex needs related to functional status, cognitive ability, or social support system  Outcome: Progressing     Problem: Knowledge Deficit  Goal: Patient/family/caregiver demonstrates understanding of disease process, treatment plan, medications, and discharge instructions  Description: Complete learning assessment and assess knowledge base    Interventions:  - Provide teaching at level of understanding  - Provide teaching via preferred learning methods  Outcome: Progressing     Problem: CARDIOVASCULAR - ADULT  Goal: Maintains optimal cardiac output and hemodynamic stability  Description: INTERVENTIONS:  - Monitor I/O, vital signs and rhythm  - Monitor for S/S and trends of decreased cardiac output  - Administer and titrate ordered vasoactive medications to optimize hemodynamic stability  - Assess quality of pulses, skin color and temperature  - Assess for signs of decreased coronary artery perfusion  - Instruct patient to report change in severity of symptoms  Outcome: Progressing  Goal: Absence of cardiac dysrhythmias or at baseline rhythm  Description: INTERVENTIONS:  - Continuous cardiac monitoring, vital signs, obtain 12 lead EKG if ordered  - Administer antiarrhythmic and heart rate control medications as ordered  - Monitor electrolytes and administer replacement therapy as ordered  Outcome: Progressing

## 2023-01-10 NOTE — PHYSICAL THERAPY NOTE
PT EVALUATION    Pt  Name: Rosie Carlton  Pt  Age: 41 y o    MRN: [id number]  LENGTH OF STAY: 3    Patient Active Problem List   Diagnosis   • Chest pain in adult   • Essential hypertension   • Tobacco abuse   • History of MI (myocardial infarction)   • Chronic diastolic congestive heart failure (HCC)   • Major depressive disorder with current active episode   • Anxiety   • CAD (coronary artery disease)   • Lump of left breast   • Non-Hodgkin lymphoma of lymph nodes of multiple regions ([facility])   • Positive depression screening   • Mixed hyperlipidemia   • BMI 40 0-44 9, adult ([facility])   • Cervical cyst   • Nabothian cyst   • Pelvic floor dysfunction   • Cervical motion tenderness   • Iron deficiency anemia secondary to inadequate dietary iron intake   • Hypercalcemia   • Proteinuria   • Hypertrophic cardiomyopathy ([facility])   • COPD (chronic obstructive pulmonary disease) ([facility])   • Chronic kidney disease-mineral and bone disorder   • Hematuria   • Chronic tubulo-interstitial nephritis   • Sinus pause   • ELIAZAR (obstructive sleep apnea)   • Nocturnal hypoxemia   • Elevated troponin   • Elevated brain natriuretic peptide (BNP) level   • Benign hypertension with CKD (chronic kidney disease) stage III ([facility])   • Edema   • Hypotension   • CKD (chronic kidney disease) stage 4, GFR 15-29 ml/min ([facility])   • Hypervolemia   • Chronic fatigue   • Alport syndrome   • Social anxiety disorder   • Acute renal failure superimposed on stage 4 chronic kidney disease ([facility])   • Leukemoid reaction   • Olecranon bursitis, right elbow   • Secondary hyperparathyroidism of renal origin ([facility] )   • S/P drug eluting coronary stent placement   • Agoraphobia       Admitting Diagnoses:   NSTEMI (non-ST elevated myocardial infarction) ([place] [address] ) [I21 4]    Past Medical History:   Diagnosis Date   • Acute MI ([facility] )    • Anemia    • Anxiety    • CAD (coronary artery disease)     GREG mid RCA and GREG right PDA 3/25/2021   • Cancer ([facility] ) 2008   • Cardiomyopathy ([facility] ) • CHF (congestive heart failure) ([facility])    • Chronic kidney disease    • COPD (chronic obstructive pulmonary disease) ([facility])     scheduled for sleep apnea test soon after pacemaker implant   • Coronary artery disease    • Depression    • History of chemotherapy     non hodgkins lymphoma    • Hyperlipemia    • Hypertension    • Hypertrophic cardiomyopathy ([facility] )    • Lymphoma, non-Hodgkin's ([facility])    • Myocardial infarction [facility])    • Obesity    • SSS (sick sinus syndrome) ([facility] )     s/p medtronic dual chamber pacemaker 2021   • Tobacco abuse    • Ventricular tachycardia, non-sustained    • Wears glasses        Past Surgical History:   Procedure Laterality Date   • CARDIAC CATHETERIZATION N/A 1/3/2023    Procedure: Cardiac catheterization;  Surgeon: Lisa Moreno MD;  [facility]: [facility] CARDIAC CATH LAB; Service: Cardiology   • CARDIAC CATHETERIZATION N/A 1/3/2023    Procedure: Cardiac Coronary Angiogram;  Surgeon: Lisa Moreno MD;  Location: [facility] CARDIAC CATH LAB; Service: Cardiology   • CARDIAC CATHETERIZATION N/A 1/3/2023    Procedure: Cardiac pci;  Surgeon: Lisa Moreno MD;  Location: [facility] CARDIAC CATH LAB; Service: Cardiology   • CARDIAC PACEMAKER PLACEMENT Left 2021    Procedure: DUAL LEAD PACEMAKER IMPLANT;  Surgeon: Brenda Keith MD;  Location: [facility] MAIN OR;  Service: Cardiology   • CAROTID STENT     •  SECTION     • CORONARY ANGIOPLASTY WITH STENT PLACEMENT  2021    GREG mid RCA and GREG right PDA   • DENTAL SURGERY     • IR BIOPSY KIDNEY RANDOM  10/18/2021       Imaging Studies:  CT renal stone study abdomen pelvis wo contrast   Final Result by Ray Garcia MD ([id number])   Punctate right upper renal pole calculus identified, nonobstructive  No additional urinary calculi identified                 Workstation performed: [id number]              01/10/23 1442   PT Last Visit   PT Visit Date 01/10/23   Note Type   Note type Evaluation   Pain Assessment   Pain Assessment Tool 0-10   Pain Score No Pain   Restrictions/Precautions   Weight Bearing Precautions Per Order No   Other Precautions Bed Alarm;Telemetry; Fall Risk   Home Living   Type of [facility] Multi-level;Performs ADLs on one level; Able to live on main level with bedroom/bathroom;Stairs to enter with rails  (5-10 JAYLEEN w/ hand rails)   Bathroom Shower/Tub Walk-in shower   Bathroom Toilet Standard   Bathroom Equipment Other (Comment)  (No DME)   Bathroom Accessibility Accessible  (Unable to fit WC in bathroom)   [facility]   Additional Comments Use of manual WC in house when having inc LE pain and swelling however no use of other AD   Prior Function   Level of [place] Independent with ADLs;Modified independent with wheelchair;Needs assistance with IADLS   Lives With Family;Son;Daughter   Receives Help From Family   IADLs Independent with meal prep; Independent with medication management; Family/Friend/Other provides transportation   Falls in the last 6 months 0   Comments Son who is 24 and daughter who is 16 are home and help with the chores   General   Family/Caregiver Present Yes   Cognition   Overall Cognitive Status WFL   Arousal/Participation Alert   Attention Within functional limits   Orientation Level Oriented X4   Following Commands Follows all commands and directions without difficulty   Comments Pleasant   Subjective   Subjective Pt agreeable to PT eval   RUE Assessment   RUE Assessment WFL  (4-/5 grossly)   LUE Assessment   LUE Assessment WFL  (4-/5 grossly)   RLE Assessment   RLE Assessment WFL  (4-/5 grossly)   LLE Assessment   LLE Assessment WFL  (4-/5 grossly)   Light Touch   RLE Light Touch Grossly intact   LLE Light Touch Grossly intact   Bed Mobility   Supine to Sit 5  Supervision   Additional items Bedrails; Increased time required   Sit to Supine 5  Supervision   Additional items Bedrails; Increased time required   Additional Comments demonstrates proper techniques and safety Transfers   Sit to Stand 5  Supervision   Stand to Sit 5  Supervision   Toilet transfer 6  Modified independent   Additional items Standard toilet  (grab bars)   Additional Comments demonstrates proper technique and safety   Ambulation/Elevation   Gait pattern Improper Weight shift; Wide SARA; Short stride;Decreased hip extension;Decreased heel strike;Decreased toe off;Step through pattern   Gait Assistance 5  Supervision   Assistive Device None   Distance 10'x1; 10'x1   Ambulation/Elevation Additional Comments no LOB noted   Balance   Static Sitting Normal   Dynamic Sitting Good   Static Standing Fair +   Dynamic Standing Fair   Ambulatory Fair   Activity Tolerance   Activity Tolerance Patient tolerated treatment well   Nurse Made Aware SPEEDY Villar   Assessment   Prognosis Good   Problem List   (PT eval only)   Assessment Pt  41 y  o female presents with L sided chest pain  Past medical hx includes CAD s/p GREG, CMP, CHF, severe anxiety, CKD, recent MI on 1/3/23  Pt admitted for Chest pain in adult w/ NSTEMI  Pt referred to PT for functional mobility evaluation & D/C planning w/ orders of ambulate patient  PTA, pt reports being modified independent with manual wheelchair or independent without AD  Pt reports all symptoms have resolved  Please see flow sheet above for objective findings and level of assistance required for safe completion of functional tasks  S for bed mobility, transfers, and ambulation  Mod I for toilet transfer due to use of grab bars  Pt able to ambulate 10' w/o AD from bed to bathroom and then an addition 10' from bathroom back to EOB with no gross LOB noted  Subjective report of pt ambulation feeling at baseline  Pt appears to be functioning at baseline level  Denies reports of dizziness or SOB t/o session  Pt was educated on fall precautions and reinforced w/ good understanding  Pt functioning at baseline level with no acute PT needs at this time  Will D/C PT   The patient's AM-PAC Basic Mobility Inpatient Short Form Raw Score is 18  A Raw score of greater than 16 suggests the patient may benefit from discharge to home  Please also refer to the recommendation of the Physical Therapist for safe discharge planning  From PT/mobility standpoint recommendation for D/C to home with family assist and support, when medically cleared based on objective measures above, current function  CM to follow  Pt may benefit from a bedside commode at discharge  Nsg staff to continue to mobilized pt (OOB in chair for all meals & ambulate in room/unit) as tolerated to prevent further decline in function  Nsg notified  Will D/C PT  Goals   Patient Goals to go home           PT Treatment Day 0   Plan   Treatment/Interventions   (PT eval only)   PT Frequency   (D/C PT)   Recommendation   PT Discharge Recommendation   (Home w/ family support and assist)   Equipment Recommended Other (Comment)  (Bedside commode)   AM-PAC Basic Mobility Inpatient   Turning in Flat Bed Without Bedrails 3   Lying on Back to Sitting on Edge of Flat Bed Without Bedrails 3   Moving Bed to Chair 3   Standing Up From Chair Using Arms 3   Walk in Room 3   Climb 3-5 Stairs With Railing 3   Basic Mobility Inpatient Raw Score 18   Basic Mobility Standardized Score 41 05   Highest Level Of Mobility   JH-HLM Goal 6: Walk 10 steps or more   JH-HLM Achieved 6: Walk 10 steps or more   End of Consult   Patient Position at End of Consult All needs within reach; Seated edge of bed   End of Consult Comments Pt in stable condition at end of session  RN notified     Hx/personal factors: co-morbidities, telemetry, use of AD, fall risk, assist w/ ADL's, and obesity  Examination: dec endurance, risk for falls, assessed body system, balance, endurance, amb, D/C disposition & fall risk, impairements in locomotion, musculoskeletal, balance, endurance, posture, coordination  Clinical: unpredictable (ongoing medical status, abnormal lab values, and risk for falls)  Complexity: high      Glen Muñoz, PT

## 2023-01-10 NOTE — ASSESSMENT & PLAN NOTE
· CAD with recent cardiac catheterization and stenting  · Continue aspirin and clopidogrel    Ranexa added by cardiology due to ongoing chest pain with good response

## 2023-01-10 NOTE — OCCUPATIONAL THERAPY NOTE
Occupational Therapy Evaluation     Patient Name: Dana Bennett  Today's Date: 1/10/2023  Problem List  Principal Problem:    Chest pain in adult  Active Problems:    Essential hypertension    Tobacco abuse    CAD (coronary artery disease)    Mixed hyperlipidemia    Hypertrophic cardiomyopathy (HCC)    ELIAZAR (obstructive sleep apnea)    CKD (chronic kidney disease) stage 4, GFR 15-29 ml/min ([facility])    Agoraphobia    Past Medical History  Past Medical History:   Diagnosis Date   • Acute MI ([facility] )    • Anemia    • Anxiety    • CAD (coronary artery disease)     GREG mid RCA and GREG right PDA 3/25/2021   • [facility] ([facility] ) 2008   • Cardiomyopathy ([place] [address] )    • CHF (congestive heart failure) ([facility])    • Chronic kidney disease    • COPD (chronic obstructive pulmonary disease) ([facility])     scheduled for sleep apnea test soon after pacemaker implant   • Coronary artery disease    • Depression    • History of chemotherapy     non hodgkins lymphoma 2008   • Hyperlipemia    • Hypertension    • Hypertrophic cardiomyopathy ([facility])    • Lymphoma, non-Hodgkin's ([facility])    • Myocardial infarction [facility])    • Obesity    • SSS (sick sinus syndrome) ([facility] )     s/p medtronic dual chamber pacemaker 5/25/2021   • Tobacco abuse    • Ventricular tachycardia, non-sustained    • Wears glasses      Past Surgical History  Past Surgical History:   Procedure Laterality Date   • CARDIAC CATHETERIZATION N/A 1/3/2023    Procedure: Cardiac catheterization;  Surgeon: Ildefonso Yin MD;  Location: [facility] CARDIAC CATH LAB; Service: Cardiology   • CARDIAC CATHETERIZATION N/A 1/3/2023    Procedure: Cardiac Coronary Angiogram;  Surgeon: Ildefonso Yin MD;  Location: [facility] CARDI[facility]; Service: Cardiology   • CARDIAC CATHETERIZATION N/A 1/3/2023    Procedure: Cardiac pci;  Surgeon: Ildefonso Yin MD;  Location: [facility]ARDI[facility] C[facility];   Service: Cardiology   • CARDIAC PACEMAKER PLACEMENT Left 5/25/2021    Procedure: DUAL LEAD PACEMAKER IMPLANT;  Surgeon: Sarmad Zhang MD; Location: [address] [place] [address] MAIN OR;  Service: Cardiology   • CAROTID STENT     •  SECTION     • CORONARY ANGIOPLASTY WITH STENT PLACEMENT  2021    GREG mid RCA and GREG right PDA   • DENTAL SURGERY     • IR BIOPSY KIDNEY RANDOM  10/18/2021         01/10/23 1038   OT Last Visit   OT Visit Date 01/10/23   Note Type   Note type Evaluation   Pain Assessment   Pain Assessment Tool 0-10   Pain Score No Pain   Restrictions/Precautions   Weight Bearing Precautions Per Order No   Other Precautions Telemetry; Bed Alarm; Chair Alarm; Fall Risk;Multiple lines   Home Living   Type of Home House   Home Layout Multi-level; Able to live on main level with bedroom/bathroom; Performs ADLs on one level;Stairs to enter with rails  (5-10STE)   Bathroom Shower/Tub Walk-in shower   Bathroom Toilet Standard   [address]   Additional Comments pt reports use of w/c to get around in house due to pain and edema in LEs when ambulating, reports use of furniture in bathroom as w/c does not fit al the way in   Prior Function   Level of [place] Independent with ADLs; Independent with functional mobility;Modified independent with wheelchair;Needs assistance with IADLS  (shares cook/clean/laundry w/ children)   Lives With Family;Son;Daughter   Receives Help From Family   IADLs Family/Friend/Other provides transportation; Independent with meal prep; Independent with medication management  (reports does not leave the house)   Falls in the last 6 months 0   Comments pt reports one son is home and able to assist her daughter gets home at [address] [place] [address] Dr per pt independent w/ ADLs, independent w/ functional transfers and mobility w/ w/c, shares IADLs w/ children   Reciprocal Relationships children   Intrinsic Gratification spending time w/ children   Subjective   Subjective "I just feel out of it"   ADL   Where Assessed Edge of bed   Eating Assistance 5  Supervision/Setup   Grooming Assistance 5  Supervision/Setup   UB Bathing Assistance 5  Supervision/Setup    [address] 5  Supervision/Setup   [facility]  Minimal Assistance   Bed Mobility   Supine to Sit 5  Supervision   Additional items Increased time required;Verbal cues;LE management;HOB elevated; Bedrails   Sit to Supine 5  Supervision   Additional items Increased time required;Verbal cues   Transfers   Sit to Stand 5  Supervision   Additional items Increased time required; Bedrails   Stand to Sit 5  Supervision   Additional items Increased time required; Bedrails   Functional Mobility   Functional Mobility 4  Minimal assistance   Additional Comments contact guard assist w/ increased time to complete and no AD   Balance   Static Sitting Normal   Dynamic Sitting Good   Static Standing Fair +   Dynamic Standing Fair   Ambulatory Fair   Activity Tolerance   Activity Tolerance Patient limited by fatigue;Patient limited by pain;Treatment limited secondary to medical complications (Comment)   Nurse Made Aware appropriate to see per Shahab RUFF   RUE Assessment   RUE Assessment WFL  (4-/5)   LUE Assessment   LUE Assessment WFL  (4-/5)   Hand Function   Gross Motor Coordination Functional   Fine Motor Coordination Functional   Sensation   Light Touch No apparent deficits   Proprioception   Proprioception No apparent deficits   Vision-Basic Assessment   Current Vision No visual deficits   Vision - Complex Assessment   Ocular Range of Motion Intact   Acuity Able to read clock/calendar on wall without difficulty   Perception   Inattention/Neglect Appears intact   Cognition   Overall Cognitive Status [facility]   Arousal/Participation Alert; Cooperative   Attention Attends with cues to redirect  (closing eyes t/o w/ redirection to tasks due to lethargy)   Orientation Level Oriented to person;Oriented to place;Oriented to time  (not specific date)   Memory Within functional limits   Following Commands Follows one step commands with increased time or repetition   Comments pleasant, increased processing time, pt lethargy and RN reports due to not wearing CPAP last night   Assessment   Limitation Decreased ADL status; Decreased UE strength;Decreased Safe judgement during ADL;Decreased cognition;Decreased endurance;Decreased self-care trans;Decreased high-level ADLs   Prognosis Good   Assessment Pt is a 41 y o  female seen for OT evaluation s/p admit to [facility] on 1/7/2023 w/ Chest pain in adult  Comorbidities affecting pt's functional performance at time of assessment include: CKD IV, alport syndrome, CAD, morbid obesity, tobacco abuse, ELIAZAR, depression, CHF w/ EF of 45%, agoraphobia, hyperlipidemia, HTN  Personal factors affecting pt at time of IE include:limited home support, difficulty performing ADLS, difficulty performing IADLS , flat affect and decreased initiation and engagement , does not leave the house  Prior to admission, pt was living w/ children and reports per pt independent w/ ADLs, independent w/ functional transfers and mobility w/ w/c, shares IADLs w/ children  Upon evaluation: Pt requires setup UB ADLs, supervision-min assist LB ADLs, MIN assist toileting, supervision functional transfers, contact guard assist functional mobility w/ no AD, 2* the following deficits impacting occupational performance: decreased strength and endudrance, impaired balance, impaired activity tolerance, lethargy, redirection to tasks due to closing eyes  Pt to benefit from continued skilled OT tx while in the hospital to address deficits as defined above and maximize level of functional independence w ADL's and functional mobility   Occupational Performance areas to address include: grooming, bathing/shower, toilet hygiene, dressing, health maintenance, functional mobility, clothing management, cleaning and meal prep, CHF education  From OT standpoint, recommendation at time of d/c would be home w/ family support/assist  The patient's raw score on the AM-PAC Daily Activity inpatient short form is 20, standardized score is 42 03, greater than 39 4  Patients at this level are likely to benefit from discharge to home  Please refer to the recommendation of the Occupational Therapist for safe discharge planning  Recommend BSC  Goals   Patient Goals "get better"   LTG Time Frame 10-14   Long Term Goal please see below goals   Plan   Treatment Interventions ADL retraining;Functional transfer training;UE strengthening/ROM; Endurance training;Cognitive reorientation;Patient/family training;Equipment evaluation/education; Compensatory technique education; Energy conservation; Activityengagement   Goal Expiration Date 01/24/23   OT Frequency 1-2x/wk   Recommendation   OT Discharge Recommendation   (HOME w/ family support/assist)   AM-PAC Daily Activity Inpatient   Lower Body Dressing 3   Bathing 3   Toileting 3   Upper Body Dressing 3   Grooming 4   Eating 4   Daily Activity Raw Score 20   Daily Activity Standardized Score (Calc for Raw Score >=11) 42 03   AM-[facility] Applied Cognition Inpatient   Following a Speech/Presentation 3   Understanding Ordinary Conversation 4   Taking Medications 4   Remembering Where Things Are Placed or Put Away 4   Remembering List of 4-5 Errands 3   Taking Care of Complicated Tasks 3   Applied Cognition Raw Score 21   Applied Cognition Standardized Score 44 3   End of Consult   Education Provided Yes   Patient Position at End of Consult All needs within reach; Supine   Nurse Communication Nurse aware of consult     Occupational Therapy Goals to be met in 10-14 days:  1) Pt will improve activity tolerance to G for 30 min txment sessions to enhance ADLs  2) Pt will complete ADLs/self care w/ mod I w/ LHAE prn  3) Pt will complete toileting w/ mod I w/ G hygiene/thoroughness using DME PRN  4) Pt will improve functional transfers on/off all surfaces using DME PRN w/ G balance/safety including toileting w/ mod I  5) Pt will improve fx'l mobility during I/ADl/leisure tasks using DME PRN w/ g balance/safety w/ mod I  6) Pt will engage in ongoing cognitive assessment w/ G participation to A w/ safe d/c planning/recommendations  7) Pt will demonstrate G carryover of pt/caregiver education and training as appropriate w/ mod I  w/ G tolerance  8) Pt will engage in depression screen/leisure interest checklist w/ G participation to monitor s/s depression and ID 3 positive coping strategies to A w/ emotional regulation and management  9) Pt will demonstrate 100% carryover of E C  techniques w/ mod I t/o fx'l I/ADL/leisure tasks w/o cues s/p skilled education  10) Pt will engage in activity configuration activity w/ G participation and mod I to increase time management skills and improve participation in a structured routine to improve overall quality of life  11) Pt will demonstrate 100% carryover of LHAE for LB ADLs/self care and leisure s/p skilled education w/ mod I and G participation  12) Pt will demonstrate improved standing tolerance to 3-5 minutes during functional tasks w/ Good - dynamic standing balance to enhance ADL performance  13) Pt will demonstrate improved b/l UE strength by 1 MMT grade to enhance ADLS and functional transfers    Documentation completed by: Iliana Lacey MS, OTR/L

## 2023-01-10 NOTE — ASSESSMENT & PLAN NOTE
· On metoprolol tartrate 100 mg twice daily for cardiomyopathy      · With lower blood pressures this has been changed to metoprolol succinate 100 mg daily

## 2023-01-10 NOTE — NURSING NOTE
Additional Notes: Will discontinue minocycline after this month,  continue spironolactone after discontinuing minocycline Patient discharged via wheelchair accompanied by PCA and her son  AVS and tobacco cessation paper work reviewed with the patient  No further questions at this time  All belongings were taken  Detail Level: Simple Render Risk Assessment In Note?: no

## 2023-01-10 NOTE — ASSESSMENT & PLAN NOTE
· Presentation to hospital for worsening chest pain with recent cardiac catheterization/stenting  · Cardiology following  Avoiding further contrast studies due to risk of renal injury    · Isosorbide discontinued and ranexa added with good relief of any further chest pain    Lab Results   Component Value Date    HSTNI0 809 (H) 01/09/2023    HSTNI2 784 (H) 01/09/2023    HSTNI4 819 (H) 01/09/2023    HSTNI >22,973 (H) 01/02/2023

## 2023-01-10 NOTE — DISCHARGE SUMMARY
[facility]  Discharge- Armand Dixon 1975, 41 y o  female MRN: [id number]  Unit/Bed#: E4 -01 Encounter: [id number]  Primary Care Provider: Nader Cheung DO   Date and time admitted to hospital: 1/7/2023  9:47 AM    Admitting Provider:  Enrique Whiting DO  Discharge Provider:  Debbie Denny DO  Admission Date: 1/7/2023       Discharge Date: 01/10/23   LOS: 3  Primary Care Physician at Discharge: Nader Cheung DO None    DISCHARGE DIAGNOSES  * Chest pain in adult  Assessment & Plan  · Presentation to hospital for worsening chest pain with recent cardiac catheterization/stenting  · Cardiology following  Avoiding further contrast studies due to risk of renal injury  · Isosorbide discontinued and ranexa added with good relief of any further chest pain    Lab Results   Component Value Date    HSTNI0 809 (H) 01/09/2023    HSTNI2 784 (H) 01/09/2023    HSTNI4 819 (H) 01/09/2023    HSTNI >22,973 (H) 01/02/2023       Agoraphobia  Assessment & Plan  · Severe anxiety stable on sertraline and quetiapine    CKD (chronic kidney disease) stage 4, GFR 15-29 ml/min [facility])  Assessment & Plan  Lab Results   Component Value Date    EGFR 20 01/10/2023    EGFR 18 01/09/2023    EGFR 17 01/08/2023    CREATININE 2 72 (H) 01/10/2023    CREATININE 2 99 (H) 01/09/2023    CREATININE 3 09 (H) 01/08/2023     · ANTHONY on CKD 4 secondary to recent contrast for cardiac catheterization  · Nephrology following with underlying Alport syndrome  · Diuretics were held during hospitalization    Patient advised can restart bumex 1 mg as previously taken and follow-up with Dr Flaco Segovia in the near future    Hypertrophic cardiomyopathy [facility])  Assessment & Plan  · Limited repeat echo ordered by cardiology this admission  · Holding diuretics secondary to kidney injury during hospitalization but can be restarted    Mixed hyperlipidemia  Assessment & Plan  · Continue atorvastatin as previously taken    CAD (coronary artery disease)  Assessment & Plan  · CAD with recent cardiac catheterization and stenting  · Continue aspirin and clopidogrel  Ranexa added by cardiology due to ongoing chest pain with good response    Essential hypertension  Assessment & Plan  · On metoprolol tartrate 100 mg twice daily for cardiomyopathy  · With lower blood pressures this has been changed to metoprolol succinate 100 mg daily    REASON FOR ADMISSION  Recurrent chest pain    HOSPITAL COURSE:  Adrian Alcantara is a 41 y o  female with a history of CKD, severe anxiety, and recent CAD status post drug-eluting stenting who presented initially to [address] [place] [address] for recurrent chest pain  The patient recently had NSTEMI requiring GREG  She was discharged home in good condition but on 1/6/2023 she really presented to [address] [place] [address] for chest pain  She was transferred here where she was reevaluated by cardiology  She was briefly on heparin and nitroglycerin infusion for her symptoms  Renal function improved while holding diuretics as she was seen by nephrology  There was downtrending of her cardiac enzymes without any further evidence of NSTEMI  Her isosorbide was changed to Ranexa which helped control her symptoms and she is being discharged home in good condition Ranexa and metoprolol succinate  Please see problem list listed above  CONSULTING PROVIDERS   IP CONSULT TO CARDIOLOGY  IP CONSULT TO PSYCHIATRY  IP CONSULT TO NEPHROLOGY    PROCEDURES PERFORMED  Echo follow up/limited w/ contrast if indicated    Result Date: 1/9/2023  Narrative: •  Left Ventricle: Left ventricular cavity size is normal  There is mild concentric hypertrophy  The left ventricular ejection fraction is 45% by visual estimation  Systolic function is mildly reduced  There is severe inferobasilar hypokinesis and severe inferolateral hypokinesis from mid to apex    Other wall motion appears low normal  Diastolic function is moderately abnormal, consistent with grade II (pseudonormal) relaxation  Left atrial filling pressure is elevated  •  Left Atrium: The atrium is mildly dilated  •  Aortic Valve: There is trace regurgitation  There is aortic sclerosis  •  Mitral Valve: There is mild annular calcification  There is mild to moderate regurgitation  •  Tricuspid Valve: Pulmonary artery systolic pressures cannot be estimated due to lack of tricuspid regurgitation jet  •  Compared to report from January 2, 2023, there are no significant changes  RADIOLOGY RESULTS  CT renal stone study abdomen pelvis wo contrast    Result Date: 1/9/2023  Impression: Punctate right upper renal pole calculus identified, nonobstructive  No additional urinary calculi identified   Workstation performed: [id number]       LABS  Results from last 7 days   Lab Units 01/10/23  0450 01/09/23  1111 01/08/23  0803 01/07/23  0046 01/06/23  2253 01/05/23  0540 01/04/23  0608   WBC Thousand/uL 7 86 6 87 6 68  --  12 79* 8 73 10 50*   HEMOGLOBIN g/dL 11 5 10 9* 11 2*  --  12 3 11 7 11 3*   HEMATOCRIT % 35 7 33 0* 34 0*  --  36 9 37 0 36 4   MCV fL 90 90 90  --  90 92 93   PLATELETS Thousands/uL 233 205 232  --  298 267 256   INR   --   --   --  1 20*  --   --   --      Results from last 7 days   Lab Units 01/10/23  0450 01/09/23  0639 01/08/23  0803 01/06/23  2253 01/05/23  0540 01/04/23  0608   SODIUM mmol/L 139 137 141 138 139 141   POTASSIUM mmol/L 4 0 4 6 3 9 3 9 3 8 3 7   CHLORIDE mmol/L 107 106 107 107 114* 114*   CO2 mmol/L 20* 16* 19* 19* 19* 16*   BUN mg/dL 26* 25 28* 29* 31* 29*   CREATININE mg/dL 2 72* 2 99* 3 09* 2 79* 2 63* 2 56*   CALCIUM mg/dL 9 1 8 9 8 9 9 8 9 4 8 9   EGFR ml/min/1 73sq m 20 18 17 19 21 21   GLUCOSE RANDOM mg/dL 80 83 77 69 83 81         Results from last 7 days   Lab Units 01/09/23  1611 01/09/23  1319 01/09/23  1111 01/07/23  1753 01/07/23  1211 01/07/23  0337   HS TNI 0HR ng/L  --   --  809*  --    < >  --    HS TNI 2HR ng/L  --  784*  --   --    < >  --    HS TNI 4HR ng/L 819*  --   -- 2,279*  --  3,463*    < > = values in this interval not displayed  Cultures:   Results from last 7 days   Lab Units 01/08/23  1623   COLOR UA  Yellow   CLARITY UA  Clear   SPEC GRAV UA  1 025   PH UA  6 0   LEUKOCYTES UA  Small*   NITRITE UA  Negative   GLUCOSE UA mg/dl Negative   KETONES UA mg/dl Negative   BILIRUBIN UA  Negative   BLOOD UA  Large*      Results from last 7 days   Lab Units 01/08/23  1623   RBC UA /hpf 1-2*   WBC UA /hpf 4-10*   EPITHELIAL CELLS WET PREP /hpf Occasional   BACTERIA UA /hpf Occasional                      DISCHARGE DAY VISIT AND PHYSICAL EXAM:  Subjective: Patient seen and examined  Feeling much better no chest pain today  Wants to go home  Vitals:   Blood Pressure: 119/66 (01/10/23 1100)  Pulse: 60 (01/10/23 1100)  Temperature: 98 °F (36 7 °C) (01/10/23 1100)  Temp Source: Tympanic (01/10/23 1100)  Respirations: 18 (01/10/23 1100)  Height: 5' 6" (167 6 cm) (01/09/23 1335)  Weight - Scale: 125 kg (274 lb 11 1 oz) (01/10/23 0600)  SpO2: 96 % (01/10/23 1100)    Physical Exam  Vitals reviewed  Constitutional:       General: She is not in acute distress  Appearance: She is obese  HENT:      Head: Atraumatic  Eyes:      Extraocular Movements: Extraocular movements intact  Cardiovascular:      Rate and Rhythm: Regular rhythm  Heart sounds: Normal heart sounds  Pulmonary:      Breath sounds: Decreased breath sounds present  No wheezing  Abdominal:      General: Bowel sounds are normal       Palpations: Abdomen is soft  Tenderness: There is no guarding or rebound  Musculoskeletal:         General: No swelling  Skin:     General: Skin is warm  Neurological:      Mental Status: She is alert  Cranial Nerves: No cranial nerve deficit  Motor: No weakness     Psychiatric:         Mood and Affect: Mood normal        Planned Re-admission: No  Discharge Disposition: Home/Self Care  Facility:     Test Results Pending at Discharge:   Incidental findings:     Medications   · Discharge Medication List: See after visit summary for reconciled discharge medications  Diet restrictions: Low-sodium diet  Activity restrictions: No strenuous activity  Discharge Condition: stable    Outpatient Follow-Up and Discharge Instructions  See after visit summary section titled Discharge Instructions for information provided to patient and family  Code Status: Level 1 - Full Code  Discharge Statement   I spent 35 minutes discharging the patient  This time was spent on the day of discharge  Greater than 50% of total time was spent with the patient and / or family counseling and / or coordination of care  ** Please Note: This note has been constructed using a voice recognition system   **

## 2023-01-10 NOTE — ASSESSMENT & PLAN NOTE
· Limited repeat echo ordered by cardiology this admission  · Holding diuretics secondary to kidney injury during hospitalization but can be restarted

## 2023-01-10 NOTE — PLAN OF CARE
Problem: OCCUPATIONAL THERAPY ADULT  Goal: Performs self-care activities at highest level of function for planned discharge setting  See evaluation for individualized goals  Description: Treatment Interventions: ADL retraining, Functional transfer training, UE strengthening/ROM, Endurance training, Cognitive reorientation, Patient/family training, Equipment evaluation/education, Compensatory technique education, Energy conservation, Activityengagement          See flowsheet documentation for full assessment, interventions and recommendations  1/10/2023 1312 by Tamy Christianson OT  Note: Limitation: Decreased ADL status, Decreased UE strength, Decreased Safe judgement during ADL, Decreased cognition, Decreased endurance, Decreased self-care trans, Decreased high-level ADLs  Prognosis: Good  Assessment: Pt is a 41 y o  female seen for OT evaluation s/p admit to [facility] on 1/7/2023 w/ Chest pain in adult  Comorbidities affecting pt's functional performance at time of assessment include: CKD IV, alport syndrome, CAD, morbid obesity, tobacco abuse, ELIAZAR, depression, CHF w/ EF of 45%, agoraphobia, hyperlipidemia, HTN  Personal factors affecting pt at time of IE include:limited home support, difficulty performing ADLS, difficulty performing IADLS , flat affect and decreased initiation and engagement , does not leave the house  Prior to admission, pt was living w/ children and reports per pt independent w/ ADLs, independent w/ functional transfers and mobility w/ w/c, shares IADLs w/ children  Upon evaluation: Pt requires setup UB ADLs, supervision-min assist LB ADLs, MIN assist toileting, supervision functional transfers, contact guard assist functional mobility w/ no AD, 2* the following deficits impacting occupational performance: decreased strength and endudrance, impaired balance, impaired activity tolerance, lethargy, redirection to tasks due to closing eyes   Pt to benefit from continued skilled OT tx while in the hospital to address deficits as defined above and maximize level of functional independence w ADL's and functional mobility  Occupational Performance areas to address include: grooming, bathing/shower, toilet hygiene, dressing, health maintenance, functional mobility, clothing management, cleaning and meal prep, CHF education  From OT standpoint, recommendation at time of d/c would be home w/ family support/assist  The patient's raw score on the AM-PAC Daily Activity inpatient short form is 20, standardized score is 42 03, greater than 39 4  Patients at this level are likely to benefit from discharge to home  Please refer to the recommendation of the Occupational Therapist for safe discharge planning  OT Discharge Recommendation:  (HOME w/ family support/assist)       1/10/2023 1311 by Camelia De Leon OT  Note: Limitation: Decreased ADL status, Decreased UE strength, Decreased Safe judgement during ADL, Decreased cognition, Decreased endurance, Decreased self-care trans, Decreased high-level ADLs  Prognosis: Good  Assessment: Pt is a 41 y o  female seen for OT evaluation s/p admit to [facility] on 1/7/2023 w/ Chest pain in adult  Comorbidities affecting pt's functional performance at time of assessment include: CKD IV, alport syndrome, CAD, morbid obesity, tobacco abuse, ELIAZAR, depression, CHF w/ EF of 45%, agoraphobia, hyperlipidemia, HTN  Personal factors affecting pt at time of IE include:limited home support, difficulty performing ADLS, difficulty performing IADLS , flat affect and decreased initiation and engagement , does not leave the house  Prior to admission, pt was living w/ children and reports per pt independent w/ ADLs, independent w/ functional transfers and mobility w/ w/c, shares IADLs w/ children   Upon evaluation: Pt requires setup UB ADLs, supervision-min assist LB ADLs, MIN assist toileting, supervision functional transfers, contact guard assist functional mobility w/ no AD, 2* the following deficits impacting occupational performance: decreased strength and endudrance, impaired balance, impaired activity tolerance, lethargy, redirection to tasks due to closing eyes  Pt to benefit from continued skilled OT tx while in the hospital to address deficits as defined above and maximize level of functional independence w ADL's and functional mobility  Occupational Performance areas to address include: grooming, bathing/shower, toilet hygiene, dressing, health maintenance, functional mobility, clothing management, cleaning and meal prep, CHF education  From OT standpoint, recommendation at time of d/c would be home w/ family support/assist  The patient's raw score on the AM-PAC Daily Activity inpatient short form is 20, standardized score is 42 03, greater than 39 4  Patients at this level are likely to benefit from discharge to home  Please refer to the recommendation of the Occupational Therapist for safe discharge planning       OT Discharge Recommendation:  (HOME w/ family support/assist)

## 2023-01-10 NOTE — CASE MANAGEMENT
Case Management Discharge Planning Note    Patient name Nehemias Humphreys  Location [facility] /E4 [facility] 18-* MRN [id number]  : 1975 Date 1/10/2023       Current Admission Date: 2023  Current Admission Diagnosis:Chest pain in adult   Patient Active Problem List    Diagnosis Date Noted   • Agoraphobia 2023   • S/P drug eluting coronary stent placement 2023   • Secondary hyperparathyroidism of renal origin ([place] [address] ) 10/24/2022   • Olecranon bursitis, right elbow 2022   • Acute renal failure superimposed on stage 4 chronic kidney disease ([place] [address] ) 2022   • Leukemoid reaction 2022   • Social anxiety disorder 2022   • Alport syndrome 2021   • Chronic fatigue 10/21/2021   • Hypotension 2021   • CKD (chronic kidney disease) stage 4, GFR 15-29 ml/min ([facility]) 2021   • Hypervolemia 2021   • Elevated brain natriuretic peptide (BNP) level 2021   • Benign hypertension with CKD (chronic kidney disease) stage III ([place] [address] ) 2021   • Edema 2021   • Elevated troponin 2021   • Nocturnal hypoxemia    • ELIAZAR (obstructive sleep apnea)    • Sinus pause 2021   • Chronic kidney disease-mineral and bone disorder 2021   • Hematuria 2021   • Chronic tubulo-interstitial nephritis 2021   • COPD (chronic obstructive pulmonary disease) ([place] [address] ) 2021   • Hypertrophic cardiomyopathy ([place] [address] ) 2021   • Hypercalcemia 10/29/2020   • Proteinuria 10/29/2020   • Iron deficiency anemia secondary to inadequate dietary iron intake 10/13/2020   • Nabothian cyst 2020   • Pelvic floor dysfunction 2020   • Cervical motion tenderness 2020   • Cervical cyst 2020   • Mixed hyperlipidemia 2020   • BMI 40 0-44 9, adult ([place] [address] ) 2020   • Lump of left breast 2020   • Non-Hodgkin lymphoma of lymph nodes of multiple regions ([place] [address] ) 2020   • Positive depression screening 2020   • Major depressive disorder with current active episode 11/04/2019   • Anxiety 10/14/2018   • CAD (coronary artery disease) 10/14/2018   • Chest pain in adult 03/11/2017   • Essential hypertension 03/11/2017   • Tobacco abuse 03/11/2017   • History of MI (myocardial infarction) 03/11/2017   • Chronic diastolic congestive heart failure ([facility] [address] ) 03/11/2017      LOS (days): 3  Geometric Mean LOS (GMLOS) (days):   Days to GMLOS:     OBJECTIVE:  Risk of Unplanned Readmission Score: 31 92         Current admission status: Inpatient   Preferred Pharmacy:   [address] [place] [address], [address] [place] [address] [address]  [address]  [place] [id number]  Phone: [phone] Fax: [phone]    Primary Care Provider: Emilee Grajeda DO    Primary Insurance: Félix Lizama  Secondary Insurance:     DISCHARGE DETAILS:       Additional Comments: MD is planning on discharging pt home today and pt has no needs  In Basket the OP CM as pt is a yellow readmission status

## 2023-01-10 NOTE — PLAN OF CARE
Problem: MOBILITY - ADULT  Goal: Maintain or return to baseline ADL function  Description: INTERVENTIONS:  -  Assess patient's ability to carry out ADLs; assess patient's baseline for ADL function and identify physical deficits which impact ability to perform ADLs (bathing, care of mouth/teeth, toileting, grooming, dressing, etc )  - Assess/evaluate cause of self-care deficits   - Assess range of motion  - Assess patient's mobility; develop plan if impaired  - Assess patient's need for assistive devices and provide as appropriate  - Encourage maximum independence but intervene and supervise when necessary  - Involve family in performance of ADLs  - Assess for home care needs following discharge   - Consider OT consult to assist with ADL evaluation and planning for discharge  - Provide patient education as appropriate  Outcome: Progressing  Goal: Maintains/Returns to pre admission functional level  Description: INTERVENTIONS:  - Perform BMAT or MOVE assessment daily    - Set and communicate daily mobility goal to care team and patient/family/caregiver  - Collaborate with rehabilitation services on mobility goals if consulted  - Perform Range of Motion 3 times a day  - Reposition patient every 2 hours    - Dangle patient 3 times a day  - Stand patient 3 times a day  - Ambulate patient 3 times a day  - Out of bed to chair 3 times a day   - Out of bed for meals 3 times a day  - Out of bed for toileting  - Record patient progress and toleration of activity level   Outcome: Progressing     Problem: Potential for Falls  Goal: Patient will remain free of falls  Description: INTERVENTIONS:  - Educate patient/family on patient safety including physical limitations  - Instruct patient to call for assistance with activity   - Consult OT/PT to assist with strengthening/mobility   - Keep Call bell within reach  - Keep bed low and locked with side rails adjusted as appropriate  - Keep care items and personal belongings within reach  - Initiate and maintain comfort rounds  - Make Fall Risk Sign visible to staff  - Offer Toileting every 2 Hours, in advance of need  - Initiate/Maintain bed alarm  - Obtain necessary fall risk management equipment  - Apply yellow socks and bracelet for high fall risk patients  - Consider moving patient to room near nurses station  Outcome: Progressing     Problem: PAIN - ADULT  Goal: Verbalizes/displays adequate comfort level or baseline comfort level  Description: Interventions:  - Encourage patient to monitor pain and request assistance  - Assess pain using appropriate pain scale  - Administer analgesics based on type and severity of pain and evaluate response  - Implement non-pharmacological measures as appropriate and evaluate response  - Consider cultural and social influences on pain and pain management  - Notify physician/advanced practitioner if interventions unsuccessful or patient reports new pain  Outcome: Progressing     Problem: INFECTION - ADULT  Goal: Absence or prevention of progression during hospitalization  Description: INTERVENTIONS:  - Assess and monitor for signs and symptoms of infection  - Monitor lab/diagnostic results  - Monitor all insertion sites, i e  indwelling lines, tubes, and drains  - Monitor endotracheal if appropriate and nasal secretions for changes in amount and color  - [place] appropriate cooling/warming therapies per order  - Administer medications as ordered  - Instruct and encourage patient and family to use good hand hygiene technique  - Identify and instruct in appropriate isolation precautions for identified infection/condition  Outcome: Progressing  Goal: Absence of fever/infection during neutropenic period  Description: INTERVENTIONS:  - Monitor WBC    Outcome: Progressing     Problem: SAFETY ADULT  Goal: Maintain or return to baseline ADL function  Description: INTERVENTIONS:  -  Assess patient's ability to carry out ADLs; assess patient's baseline for ADL function and identify physical deficits which impact ability to perform ADLs (bathing, care of mouth/teeth, toileting, grooming, dressing, etc )  - Assess/evaluate cause of self-care deficits   - Assess range of motion  - Assess patient's mobility; develop plan if impaired  - Assess patient's need for assistive devices and provide as appropriate  - Encourage maximum independence but intervene and supervise when necessary  - Involve family in performance of ADLs  - Assess for home care needs following discharge   - Consider OT consult to assist with ADL evaluation and planning for discharge  - Provide patient education as appropriate  Outcome: Progressing  Goal: Maintains/Returns to pre admission functional level  Description: INTERVENTIONS:  - Perform BMAT or MOVE assessment daily    - Set and communicate daily mobility goal to care team and patient/family/caregiver  - Collaborate with rehabilitation services on mobility goals if consulted  - Perform Range of Motion 3 times a day  - Reposition patient every 2 hours    - Dangle patient 3 times a day  - Stand patient 3 times a day  - Ambulate patient 3 times a day  - Out of bed to chair 3 times a day   - Out of bed for meals 3 times a day  - Out of bed for toileting  - Record patient progress and toleration of activity level   Outcome: Progressing  Goal: Patient will remain free of falls  Description: INTERVENTIONS:  - Educate patient/family on patient safety including physical limitations  - Instruct patient to call for assistance with activity   - Consult OT/PT to assist with strengthening/mobility   - Keep Call bell within reach  - Keep bed low and locked with side rails adjusted as appropriate  - Keep care items and personal belongings within reach  - Initiate and maintain comfort rounds  - Make Fall Risk Sign visible to staff  - Offer Toileting every 2 Hours, in advance of need  - Initiate/Maintain bed alarm  - Obtain necessary fall risk management equipment  - Apply yellow socks and bracelet for high fall risk patients  - Consider moving patient to room near nurses station  Outcome: Progressing     Problem: DISCHARGE PLANNING  Goal: Discharge to home or other facility with appropriate resources  Description: INTERVENTIONS:  - Identify barriers to discharge w/patient and caregiver  - Arrange for needed discharge resources and transportation as appropriate  - Identify discharge learning needs (meds, wound care, etc )  - Arrange for interpretive services to assist at discharge as needed  - Refer to Case Management Department for coordinating discharge planning if the patient needs post-hospital services based on physician/advanced practitioner order or complex needs related to functional status, cognitive ability, or social support system  Outcome: Progressing     Problem: Knowledge Deficit  Goal: Patient/family/caregiver demonstrates understanding of disease process, treatment plan, medications, and discharge instructions  Description: Complete learning assessment and assess knowledge base    Interventions:  - Provide teaching at level of understanding  - Provide teaching via preferred learning methods  Outcome: Progressing     Problem: CARDIOVASCULAR - ADULT  Goal: Maintains optimal cardiac output and hemodynamic stability  Description: INTERVENTIONS:  - Monitor I/O, vital signs and rhythm  - Monitor for S/S and trends of decreased cardiac output  - Administer and titrate ordered vasoactive medications to optimize hemodynamic stability  - Assess quality of pulses, skin color and temperature  - Assess for signs of decreased coronary artery perfusion  - Instruct patient to report change in severity of symptoms  Outcome: Progressing  Goal: Absence of cardiac dysrhythmias or at baseline rhythm  Description: INTERVENTIONS:  - Continuous cardiac monitoring, vital signs, obtain 12 lead EKG if ordered  - Administer antiarrhythmic and heart rate control medications as ordered  - Monitor electrolytes and administer replacement therapy as ordered  Outcome: Progressing

## 2023-01-10 NOTE — PROGRESS NOTES
NEPHROLOGY PROGRESS NOTE   Chilo Garner 41 y o  female MRN: [id number]  Unit/Bed#: E4 -01 Encounter: [id number]      HPI/24hr EVENTS:    -44-year-old female past medical history of CKD 4, Alport syndrome, CAD, HFrEF, morbid obesity, tobacco use, ELIAZAR, depression    -Creatinine at baseline, mild improvement in blood pressure trends, chest pain-free    ASSESSMENT/PLAN:    Creatinine elevation on CKD 4  -Baseline creatinine: 2 3-2 8  -Creatinine on admission 2 79, most recent creatinine 2 72  -Etiology of ANTHONY possible prerenal azotemia from diuretic use versus ATN from contrast versus hypotensive given borderline blood pressures  -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 syndrome, follows with Dr Yoel Joyce  -UA: Large blood, small leukocytes, 2+ protein, 1-2 RBCs, 4-10 WBCs  -Renal imaging: CT renal stone study from 1/8 with punctate right upper pole nonobstructive renal calculus, no hydronephrosis/hydroureter of left kidney and ureter  -Avoid hypotension, avoid nephrotoxins, avoid NSAIDS  -Trend BMP  -1 3 L urine output, urinary retention protocol, bladder scans  -Is on Bumex 1 mg daily which has was last received 1/7 and then since discontinued  -Overall appears euvolemic at this time, reports she does have a history of lower extremity edema that is well managed with her Bumex 1 mg daily, would restart home dose 1 mg daily on discharge  -She should follow-up with her primary nephrologist Dr Yoel Joyce in 2 to 3 weeks after discharge (she has an appointment 1/30)    Hypertension  -Outpatient regimen Bumex 1 mg daily, Imdur 30 mg daily, Lopressor 100 mg twice daily  -Currently on Toprol- mg daily  -Overall blood pressure trends have improved, had an isolated finding of 83/56 earlier this morning which improved to 115/66 most recently     HFrEF/CAD  -1/2/2023 TTE: EF 45%, mildly reduced systolic function, mild AR, mild MR, mild TR, normal caliber IVC  -Recent non-ST elevation MI earlier in January 2023 with placement of GREG to the LAD, has prior stents in the LAD/RCA/RPDA, underwent cardiac catheterization on 1/3  -Outpatient diuretic is Bumex 1 mg p o  daily    CKD MBD  -Secondary hyperparathyroidism on calcitriol 0 25 mcg daily as outpatient this is been on hold on admission     Metabolic acidosis  -On bicarb repletion 650 mg twice daily as outpatient, currently on 1 3 g twice daily as inpatient  -Recent bicarb level improved to 20     Anemia  -Hemoglobin 11 5  -Recommend transfuse goal greater than 7 per primary team     Addition medical problems: ELIAZAR on CPAP, morbid obesity, hyperlipidemia    DISPOSITION:  Stable for discharge from nephrology standpoint, should follow-up with her nephrologist as outpatient, has an outpatient nephrology appointment on 1/30, should have BMP done prior to that appointment    SUBJECTIVE:  Reports she feels better than yesterday, reports her appetite is mildly decreased but stable, reports she is attempting to stay well-hydrated, reports her breathing feels stable, reports she ambulated around the unit this morning, denies dizziness/lightheadedness    ROS:  Review of Systems   Constitutional: Negative  HENT: Negative  Respiratory: Negative  Cardiovascular: Negative  Gastrointestinal: Negative  Genitourinary: Negative  Musculoskeletal: Negative  A complete 10 point review of systems was performed and found to be negative unless otherwise noted above or in the HPI      OBJECTIVE:  Current Weight: Weight - Scale: 125 kg (274 lb 11 1 oz)  Vitals:    01/10/23 0437 01/10/23 0600 01/10/23 0618 01/10/23 0739   BP: (!) 83/56  130/73 115/66   BP Location: Right arm  Left arm Right arm   Pulse: 92   60   Resp: 20   18   Temp: (!) 96 7 °F (35 9 °C)   97 9 °F (36 6 °C)   TempSrc: Temporal   Temporal   SpO2: 94%   96%   Weight:  125 kg (274 lb 11 1 oz)     Height:           Intake/Output Summary (Last 24 hours) at 1/10/2023 1153  Last data filed at 1/10/2023 0931  Gross per 24 hour   Intake 420 ml   Output 2250 ml   Net -1830 ml     Physical Exam  Vitals and nursing note reviewed  Constitutional:       General: She is not in acute distress  Appearance: Normal appearance  She is obese  She is not toxic-appearing or diaphoretic  HENT:      Head: Normocephalic and atraumatic  Nose: Nose normal       Mouth/Throat:      Mouth: Mucous membranes are moist    Eyes:      General: No scleral icterus  Cardiovascular:      Rate and Rhythm: Normal rate and regular rhythm  Pulses: Normal pulses  Pulmonary:      Effort: Pulmonary effort is normal  No respiratory distress  Breath sounds: Normal breath sounds  No wheezing or rales  Abdominal:      General: Abdomen is flat  There is no distension  Palpations: Abdomen is soft  Tenderness: There is no abdominal tenderness  Musculoskeletal:      Cervical back: Neck supple  Right lower leg: No edema  Left lower leg: No edema  Skin:     General: Skin is warm and dry  Capillary Refill: Capillary refill takes less than 2 seconds  Neurological:      General: No focal deficit present  Mental Status: She is alert and oriented to person, place, and time     Psychiatric:         Mood and Affect: Mood normal           Medications:    Current Facility-Administered Medications:   •  acetaminophen (TYLENOL) tablet 650 mg, 650 mg, Oral, Q6H PRN, Patria Duckworth, DO  •  albuterol (PROVENTIL HFA,VENTOLIN HFA) inhaler 2 puff, 2 puff, Inhalation, Q4H PRN, Patria Duckworth, DO  •  aspirin chewable tablet 81 mg, 81 mg, Oral, Daily, Patria Cordonl, DO, 81 mg at 01/10/23 5022  •  atorvastatin (LIPITOR) tablet 80 mg, 80 mg, Oral, Daily With Dinner, Brit Lorenz DO  •  clopidogrel (PLAVIX) tablet 75 mg, 75 mg, Oral, Daily, Patria Duckworth, DO, 75 mg at 01/10/23 1659  •  ezetimibe (ZETIA) tablet 10 mg, 10 mg, Oral, Daily, Patria Duckworth, DO, 10 mg at 01/10/23 3118  •  fish oil capsule 1,000 mg, 1,000 mg, Oral, BID, Enrike Jonathan Crawley, DO, 1,000 mg at 01/10/23 9336  •  heparin (porcine) subcutaneous injection 5,000 Units, 5,000 Units, Subcutaneous, Q8H [facility], Lorne UlisesDO, 5,000 Units at 01/10/23 0500  •  HYDROmorphone HCl (DILAUDID) injection 0 2 mg, 0 2 mg, Intravenous, Q2H PRN, Benicia Claw, DO, 0 2 mg at 01/07/23 1343  •  LORazepam (ATIVAN) injection 0 5 mg, 0 5 mg, Intravenous, Q6H PRN, Aleksandr Santiago PA-C  •  LORazepam (ATIVAN) tablet 0 5 mg, 0 5 mg, Oral, Q8H, Aleksandr Santiago PA-C, 0 5 mg at 01/09/23 2044  •  metoprolol succinate (TOPROL-XL) 24 hr tablet 100 mg, 100 mg, Oral, Daily, Jenni Mancuso DO, 100 mg at 01/10/23 5643  •  naloxone (NARCAN) 0 04 mg/mL syringe 0 04 mg, 0 04 mg, Intravenous, Q1MIN PRN, Margarette Claw, DO  •  nicotine (NICODERM CQ) 14 mg/24hr TD 24 hr patch 1 patch, 1 patch, Transdermal, Daily, Enrike Crawley DO, 1 patch at 01/10/23 1015  •  nitroglycerin (NITROSTAT) SL tablet 0 4 mg, 0 4 mg, Sublingual, Q5 Min PRN, Margarette Claw, DO  •  [facility]ron [facility]) injection 4 mg, 4 mg, Intravenous, Q6H PRN, Margarette Claw, DO  •  oxyCODONE (ROXICODONE) IR tablet 2 5 mg, 2 5 mg, Oral, Q4H PRN, Benicia Claw, DO, 2 5 mg at 01/08/23 1514  •  oxyCODONE (ROXICODONE) IR tablet 5 mg, 5 mg, Oral, Q4H PRN, Benicia Claw, DO, 5 mg at 01/10/23 0226  •  pantoprazole (PROTONIX) EC tablet 40 mg, 40 mg, Oral, Early Morning, Enrike Crawley DO, 40 mg at 01/10/23 0500  •  QUEtiapine (SEROquel XR) 24 hr tablet 100 mg, 100 mg, Oral, HS, Enrike Crawley DO, 100 mg at 01/09/23 2156  •  ranolazine (RANEXA) 12 hr tablet 500 mg, 500 mg, Oral, Q12H [facility], [facility] DO, 500 mg at 01/10/23 2080  •  senna-docusate sodium (SENOKOT S) 8 6-50 mg per tablet 1 tablet, 1 tablet, Oral, HS, Enrike Crawley DO, 1 tablet at 01/08/23 2112  •  sertraline (ZOLOFT) tablet 200 mg, 200 mg, Oral, Daily, Margarette Soto DO, 200 mg at 01/10/23 6610  •  sodium bicarbonate tablet 1,300 mg, 1,300 mg, Oral, BID after meals, Carson Flanagan MD, 1,300 mg at 01/10/23 3778    Laboratory Results:  Results from last 7 days   Lab Units 01/10/23  0450 01/09/23  1111 01/09/23  2187 01/08/23  0803 01/06/23  2253 01/05/23  0540 01/04/23  0608   WBC Thousand/uL 7 86 6 87  --  6 68 12 79* 8 73 10 50*   HEMOGLOBIN g/dL 11 5 10 9*  --  11 2* 12 3 11 7 11 3*   HEMATOCRIT % 35 7 33 0*  --  34 0* 36 9 37 0 36 4   PLATELETS Thousands/uL 233 205  --  232 298 267 256   POTASSIUM mmol/L 4 0  --  4 6 3 9 3 9 3 8 3 7   CHLORIDE mmol/L 107  --  106 107 107 114* 114*   CO2 mmol/L 20*  --  16* 19* 19* 19* 16*   BUN mg/dL 26*  --  25 28* 29* 31* 29*   CREATININE mg/dL 2 72*  --  2 99* 3 09* 2 79* 2 63* 2 56*   CALCIUM mg/dL 9 1  --  8 9 8 9 9 8 9 4 8 9   PHOSPHORUS mg/dL  --   --   --   --   --  3 9  --        I have personally reviewed the blood work as stated above and in my note  I have personally reviewed internal medicine and cardiology note

## 2023-01-10 NOTE — ASSESSMENT & PLAN NOTE
Lab Results   Component Value Date    EGFR 20 01/10/2023    EGFR 18 01/09/2023    EGFR 17 01/08/2023    CREATININE 2 72 (H) 01/10/2023    CREATININE 2 99 (H) 01/09/2023    CREATININE 3 09 (H) 01/08/2023     · ANTHONY on CKD 4 secondary to recent contrast for cardiac catheterization  · Nephrology following with underlying Alport syndrome  · Diuretics were held during hospitalization    Patient advised can restart bumex 1 mg as previously taken and follow-up with Dr Mary Willoughby in the near future

## 2023-01-11 ENCOUNTER — TRANSITIONAL CARE MANAGEMENT (OUTPATIENT)
Dept: FAMILY MEDICINE CLINIC | Facility: CLINIC | Age: 48
End: 2023-01-11

## 2023-01-11 ENCOUNTER — PATIENT OUTREACH (OUTPATIENT)
Dept: FAMILY MEDICINE CLINIC | Facility: CLINIC | Age: 48
End: 2023-01-11

## 2023-01-12 ENCOUNTER — PATIENT OUTREACH (OUTPATIENT)
Dept: FAMILY MEDICINE CLINIC | Facility: CLINIC | Age: 48
End: 2023-01-12

## 2023-01-12 NOTE — PROGRESS NOTES
Outpatient Care Management Note: Outreach call attempted to Ms Mary Quirozon  Message left for patient to please return call  Contact information left on message

## 2023-01-17 ENCOUNTER — PATIENT OUTREACH (OUTPATIENT)
Dept: FAMILY MEDICINE CLINIC | Facility: CLINIC | Age: 48
End: 2023-01-17

## 2023-01-17 DIAGNOSIS — Z95.5 S/P DRUG ELUTING CORONARY STENT PLACEMENT: ICD-10-CM

## 2023-01-17 DIAGNOSIS — I10 HYPERTENSION, UNSPECIFIED TYPE: ICD-10-CM

## 2023-01-17 DIAGNOSIS — E78.2 MIXED HYPERLIPIDEMIA: ICD-10-CM

## 2023-01-17 RX ORDER — ATORVASTATIN CALCIUM 80 MG/1
80 TABLET, FILM COATED ORAL EVERY EVENING
Qty: 90 TABLET | Refills: 0 | Status: SHIPPED | OUTPATIENT
Start: 2023-01-17 | End: 2023-04-17

## 2023-01-17 NOTE — PROGRESS NOTES
Outpatient Care Management Note:  Outreach call attempted to Enrique Higgins  Message left for patient to please return call  Contact information left on message

## 2023-01-19 ENCOUNTER — PATIENT OUTREACH (OUTPATIENT)
Dept: FAMILY MEDICINE CLINIC | Facility: CLINIC | Age: 48
End: 2023-01-19

## 2023-01-19 NOTE — PROGRESS NOTES
Outpatient Care Management Note:  No response to telephone outreach attempts  [facility] letter mailed per protocol

## 2023-01-19 NOTE — LETTER
Date: 01/19/23    Dear [facility] Nurse,   My name is Betty Templeton; I am a registered nurse care manager working with [address] [place] [address]  I have not been able to reach you and would like to set a time that I can talk with you over the phone or in-person  My work is to help patients that have complex medical conditions get the care they need  This includes patients who may have been in the hospital or emergency room  I have enclosed information for you  Please call me with any questions you may have  I look forward to  with you    Sincerely,  Betty Templeton RN, BSN  [phone]  Outpatient Care Manager

## 2023-01-27 ENCOUNTER — VBI (OUTPATIENT)
Dept: ADMINISTRATIVE | Facility: OTHER | Age: 48
End: 2023-01-27

## 2023-01-28 ENCOUNTER — APPOINTMENT (EMERGENCY)
Dept: RADIOLOGY | Facility: HOSPITAL | Age: 48
End: 2023-01-28

## 2023-01-28 ENCOUNTER — HOSPITAL ENCOUNTER (EMERGENCY)
Facility: HOSPITAL | Age: 48
Discharge: HOME | End: 2023-01-28
Attending: EMERGENCY MEDICINE

## 2023-01-28 VITALS
RESPIRATION RATE: 18 BRPM | HEIGHT: 66 IN | SYSTOLIC BLOOD PRESSURE: 127 MMHG | BODY MASS INDEX: 43.39 KG/M2 | WEIGHT: 270 LBS | DIASTOLIC BLOOD PRESSURE: 77 MMHG | TEMPERATURE: 97.6 F | OXYGEN SATURATION: 96 % | HEART RATE: 84 BPM

## 2023-01-28 DIAGNOSIS — R07.9 CHEST PAIN IN ADULT: Primary | ICD-10-CM

## 2023-01-28 LAB
2HR DELTA HS TROPONIN: -1 NG/L
ANION GAP SERPL CALCULATED.3IONS-SCNC: 13 MMOL/L (ref 4–13)
APTT PPP: 29 SECONDS (ref 23–37)
ATRIAL RATE: 92 BPM
BASOPHILS # BLD AUTO: 0.1 THOUSANDS/ÂΜL (ref 0–0.1)
BASOPHILS NFR BLD AUTO: 1 % (ref 0–1)
BUN SERPL-MCNC: 32 MG/DL (ref 5–25)
CALCIUM SERPL-MCNC: 9.6 MG/DL (ref 8.4–10.2)
CARDIAC TROPONIN I PNL SERPL HS: 30 NG/L
CARDIAC TROPONIN I PNL SERPL HS: 31 NG/L
CHLORIDE SERPL-SCNC: 106 MMOL/L (ref 96–108)
CO2 SERPL-SCNC: 18 MMOL/L (ref 21–32)
CREAT SERPL-MCNC: 2.64 MG/DL (ref 0.6–1.3)
EOSINOPHIL # BLD AUTO: 0.33 THOUSAND/ÂΜL (ref 0–0.61)
EOSINOPHIL NFR BLD AUTO: 2 % (ref 0–6)
ERYTHROCYTE [DISTWIDTH] IN BLOOD BY AUTOMATED COUNT: 17.6 % (ref 11.6–15.1)
GFR SERPL CREATININE-BSD FRML MDRD: 21 ML/MIN/1.73SQ M
GLUCOSE SERPL-MCNC: 77 MG/DL (ref 65–140)
HCT VFR BLD AUTO: 37.2 % (ref 34.8–46.1)
HGB BLD-MCNC: 12.1 G/DL (ref 11.5–15.4)
IMM GRANULOCYTES # BLD AUTO: 0.27 THOUSAND/UL (ref 0–0.2)
IMM GRANULOCYTES NFR BLD AUTO: 2 % (ref 0–2)
INR PPP: 1.08 (ref 0.84–1.19)
LYMPHOCYTES # BLD AUTO: 3.42 THOUSANDS/ÂΜL (ref 0.6–4.47)
LYMPHOCYTES NFR BLD AUTO: 21 % (ref 14–44)
MCH RBC QN AUTO: 29.3 PG (ref 26.8–34.3)
MCHC RBC AUTO-ENTMCNC: 32.5 G/DL (ref 31.4–37.4)
MCV RBC AUTO: 90 FL (ref 82–98)
MONOCYTES # BLD AUTO: 1.02 THOUSAND/ÂΜL (ref 0.17–1.22)
MONOCYTES NFR BLD AUTO: 6 % (ref 4–12)
NEUTROPHILS # BLD AUTO: 10.95 THOUSANDS/ÂΜL (ref 1.85–7.62)
NEUTS SEG NFR BLD AUTO: 68 % (ref 43–75)
NRBC BLD AUTO-RTO: 0 /100 WBCS
P AXIS: 37 DEGREES
PLATELET # BLD AUTO: 477 THOUSANDS/UL (ref 149–390)
PMV BLD AUTO: 9.8 FL (ref 8.9–12.7)
POTASSIUM SERPL-SCNC: 4.1 MMOL/L (ref 3.5–5.3)
PR INTERVAL: 162 MS
PROTHROMBIN TIME: 14 SECONDS (ref 11.6–14.5)
QRS AXIS: -6 DEGREES
QRSD INTERVAL: 94 MS
QT INTERVAL: 366 MS
QTC INTERVAL: 452 MS
RBC # BLD AUTO: 4.13 MILLION/UL (ref 3.81–5.12)
SODIUM SERPL-SCNC: 137 MMOL/L (ref 135–147)
T WAVE AXIS: 66 DEGREES
VENTRICULAR RATE: 92 BPM
WBC # BLD AUTO: 16.09 THOUSAND/UL (ref 4.31–10.16)

## 2023-01-28 RX ORDER — FENTANYL CITRATE 50 UG/ML
50 INJECTION, SOLUTION INTRAMUSCULAR; INTRAVENOUS ONCE
Status: COMPLETED | OUTPATIENT
Start: 2023-01-28 | End: 2023-01-28

## 2023-01-28 RX ORDER — LORAZEPAM 2 MG/ML
0.5 INJECTION INTRAMUSCULAR ONCE
Status: COMPLETED | OUTPATIENT
Start: 2023-01-28 | End: 2023-01-28

## 2023-01-28 RX ADMIN — LORAZEPAM 0.5 MG: 2 INJECTION INTRAMUSCULAR; INTRAVENOUS at 09:40

## 2023-01-28 RX ADMIN — FENTANYL CITRATE 50 MCG: 50 INJECTION INTRAMUSCULAR; INTRAVENOUS at 07:46

## 2023-01-28 NOTE — ED TRIAGE NOTES
Pt reports chest pressure started at 0500 this morning and  took nitrox2 PTA last one at 0650 no resolves

## 2023-01-28 NOTE — ED PROVIDER NOTES
History  Chief Complaint   Patient presents with   • Chest Pain     Patient reports chest pain that started at 0500 this morning  Patient reports she was diaphoretic and took two nitro      51-year-old female presenting for chest pain which started approximately 2 half hours prior to arrival   Recently had an MI with drug-eluting stent placed few weeks ago  Stating feels similar to this but more intense  Also complaining of nausea, diaphoresis and shortness of breath  Denies recent illness, sore throat, cough, abdominal pain, diarrhea constipation dysuria, hematuria  Prior to Admission Medications   Prescriptions Last Dose Informant Patient Reported? Taking?    Diclofenac Sodium (VOLTAREN) 1 %   No No   Sig: Apply 2 g topically 4 (four) times a day   LORazepam (ATIVAN) 0 5 mg tablet   No No   Sig: Take 1 tablet (0 5 mg total) by mouth 3 (three) times a day as needed for anxiety   QUEtiapine (SEROquel) 50 mg tablet   No No   Sig: take 1 tablet by mouth at bedtime   acetaminophen (TYLENOL) 325 mg tablet   No No   Sig: Take 3 tablets (975 mg total) by mouth every 8 (eight) hours as needed for mild pain   al mag oxide-diphenhydramine-lidocaine viscous (MAGIC MOUTHWASH) 1:1:1 suspension   No No   Sig: Swish and spit 10 mL every 4 (four) hours as needed for mouth pain or discomfort   albuterol (PROVENTIL HFA,VENTOLIN HFA) 90 mcg/act inhaler   No No   Sig: Inhale 2 puffs every 4 (four) hours as needed for wheezing or shortness of breath   aspirin 81 mg chewable tablet   No No   Sig: Chew 1 tablet (81 mg total) daily   atorvastatin (LIPITOR) 80 mg tablet   No No   Sig: Take 1 tablet (80 mg total) by mouth every evening   bumetanide (BUMEX) 1 mg tablet   No No   Sig: Take 1 tablet (1 mg total) by mouth daily   calcitriol (ROCALTROL) 0 25 mcg capsule   Yes No   Sig: Take 1 capsule (0 25 mcg total) by mouth daily Takes Monday Wednesday and fridays as of 11/11/22   chlorhexidine (PERIDEX) 0 12 % solution   No No Sig: Apply 15 mL to the mouth or throat every 12 (twelve) hours   clopidogrel (PLAVIX) 75 mg tablet   No No   Sig: Take 1 tablet (75 mg total) by mouth daily Do not start before January 6, 2023  ezetimibe (ZETIA) 10 mg tablet   No No   Sig: Take 1 tablet (10 mg total) by mouth in the morning  lidocaine (Lidoderm) 5 %   No No   Sig: Apply 2 patches topically daily Remove & Discard patch within 12 hours or as directed by MD   methocarbamol (ROBAXIN) 500 mg tablet   No No   Sig: Take 1 tablet (500 mg total) by mouth 2 (two) times a day as needed for muscle spasms   metoprolol succinate (TOPROL-XL) 100 mg 24 hr tablet   No No   Sig: Take 1 tablet (100 mg total) by mouth daily Do not start before January 11, 2023  nicotine (NICODERM CQ) 14 mg/24hr TD 24 hr patch   No No   Sig: Place 1 patch on the skin daily Do not start before January 6, 2023     nitroglycerin (NITROSTAT) 0 4 mg SL tablet   No No   Sig: Place 1 tablet (0 4 mg total) under the tongue every 5 (five) minutes as needed for chest pain   omega-3-acid ethyl esters (LOVAZA) 1 g capsule   No No   Sig: Take 1 capsule (1 g total) by mouth 2 (two) times a day   omeprazole (PriLOSEC) 40 MG capsule   No No   Sig: Take 1 capsule (40 mg total) by mouth daily   ranolazine (RANEXA) 500 mg 12 hr tablet   No No   Sig: Take 1 tablet (500 mg total) by mouth every 12 (twelve) hours   sertraline (ZOLOFT) 100 mg tablet   No No   Sig: Take 1 5 tablets (150 mg total) by mouth daily   sodium bicarbonate 650 mg tablet   No No   Sig: Take 1 tablet (650 mg total) by mouth 2 (two) times daily after meals      Facility-Administered Medications: None       Past Medical History:   Diagnosis Date   • Anemia    • Anxiety    • CAD (coronary artery disease)    • Cancer ([facility] ) 2008   • Cardiomyopathy ([facility] )    • CHF (congestive heart failure) ([facility])    • Chronic kidney disease    • COPD (chronic obstructive pulmonary disease) ([facility])     scheduled for sleep apnea test soon after pacemaker implant   • Coronary artery disease    • Depression    • History of chemotherapy     non hodgkins lymphoma    • Hyperlipemia    • Hypertension    • Hypertrophic cardiomyopathy ([facility] )    • Lymphoma, non-Hodgkin's (HCC)    • Myocardial infarction [facility])    • Non-ST elevation MI (NSTEMI) 2023   • Obesity    • Obstructive sleep apnea    • SSS (sick sinus syndrome) ([facility])     s/p medtronic dual chamber pacemaker 2021   • Tobacco abuse    • Ventricular tachycardia, non-sustained    • Wears glasses        Past Surgical History:   Procedure Laterality Date   • CARDIAC CATHETERIZATION N/A 1/3/2023    Procedure: Cardiac catheterization;  Surgeon: Ulises Pandey MD;  Location: [facility] CARDIAC CATH [facility]; Service: Cardiology   • CARDIAC CATHETERIZATION N/A 1/3/2023    Procedure: Cardiac Coronary Angiogram;  Surgeon: Ulises Pandey MD;  [facility]; Service: Cardiology   • CARDIAC CATHETERIZATION N/A 1/3/2023    Procedure: Cardiac pci;  Surgeon: Ulises Pandey MD;  [facility]: [facility]; Service: Cardiology   • CARDIAC PACEMAKER PLACEMENT Left 2021    Procedure: DUAL LEAD PACEMAKER IMPLANT;  Surgeon: Ynes Perea MD;  Location: [address] MAIN OR;  Service: Cardiology   • CAROTID STENT     •  SECTION     • CORONARY ANGIOPLASTY WITH STENT PLACEMENT  2021    GREG mid RCA and GREG right PDA   • DENTAL SURGERY     • IR BIOPSY KIDNEY RANDOM  10/18/2021       Family History   Problem Relation Age of Onset   • No Known Problems Mother    • No Known Problems Father    • No Known Problems Daughter    • Brain cancer Maternal Grandfather    • Heart disease Maternal Grandfather    • Cancer Maternal Grandfather    • No Known Problems Paternal Aunt    • No Known Problems Paternal Aunt      I have reviewed and agree with the history as documented      E-Cigarette/Vaping   • E-Cigarette Use Never User      E-Cigarette/Vaping Substances   • Nicotine No    • THC No    • CBD No    • Flavoring No    • Other No    • Unknown No      Social History     Tobacco Use   • Smoking status: Every Day     Packs/day: 0 50     Years: 25 00     Pack years: 12 50     Types: Cigarettes   • Smokeless tobacco: Never   • Tobacco comments:     Recently and currently quitting cigarettes   Vaping Use   • Vaping Use: Never used   Substance Use Topics   • Alcohol use: Yes     Comment: 1-2 times years   • Drug use: No       Review of Systems   Constitutional: Positive for diaphoresis  Negative for activity change, appetite change, chills, fatigue and fever  HENT: Negative for congestion, nosebleeds, postnasal drip, rhinorrhea, sinus pressure, sinus pain, sneezing and sore throat  Eyes: Negative for redness and visual disturbance  Respiratory: Positive for shortness of breath  Negative for apnea, cough, chest tightness, wheezing and stridor  Cardiovascular: Positive for chest pain  Negative for palpitations and leg swelling  Gastrointestinal: Positive for nausea  Negative for abdominal distention, abdominal pain, blood in stool, constipation, diarrhea and vomiting  Genitourinary: Negative for difficulty urinating, dysuria, flank pain, frequency, hematuria and urgency  Musculoskeletal: Negative for arthralgias, back pain, gait problem, joint swelling, myalgias, neck pain and neck stiffness  Skin: Negative for color change, pallor, rash and wound  Neurological: Negative for dizziness, facial asymmetry, weakness, light-headedness, numbness and headaches  Psychiatric/Behavioral: Negative for confusion and decreased concentration  The patient is not nervous/anxious  All other systems reviewed and are negative  Physical Exam  Physical Exam  Vitals and nursing note reviewed  Constitutional:       General: She is not in acute distress  Appearance: She is well-developed  She is not diaphoretic  HENT:      Head: Normocephalic and atraumatic        Right Ear: External ear normal       Left Ear: External ear normal       Nose: Nose normal    Eyes:      General: No scleral icterus  Right eye: No discharge  Left eye: No discharge  Conjunctiva/sclera: Conjunctivae normal       Pupils: Pupils are equal, round, and reactive to light  Neck:      Vascular: No JVD  Trachea: No tracheal deviation  Cardiovascular:      Rate and Rhythm: Normal rate and regular rhythm  Heart sounds: Normal heart sounds  No murmur heard  No friction rub  No gallop  Pulmonary:      Effort: Pulmonary effort is normal  No respiratory distress  Breath sounds: Normal breath sounds  No stridor  No decreased breath sounds, wheezing, rhonchi or rales  Abdominal:      General: Bowel sounds are normal  There is no distension  Palpations: Abdomen is soft  There is no mass  Tenderness: There is no abdominal tenderness  There is no guarding  Musculoskeletal:         General: No tenderness or deformity  Normal range of motion  Cervical back: Normal range of motion and neck supple  Right lower leg: No tenderness  No edema  Left lower leg: No tenderness  No edema  Skin:     General: Skin is warm and dry  Coloration: Skin is not pale  Findings: No erythema or rash  Neurological:      Mental Status: She is alert and oriented to person, place, and time  Cranial Nerves: No cranial nerve deficit  Sensory: No sensory deficit  Motor: No abnormal muscle tone  Psychiatric:         Behavior: Behavior normal          Thought Content:  Thought content normal          Judgment: Judgment normal          Vital Signs  ED Triage Vitals [01/28/23 0729]   Temperature Pulse Respirations Blood Pressure SpO2   98 3 °F (36 8 °C) 82 21 128/72 98 %      Temp Source Heart Rate Source Patient Position - Orthostatic VS BP Location FiO2 (%)   Oral Monitor Lying Left arm --      Pain Score       10 - Worst Possible Pain           Vitals:    01/28/23 0730 01/28/23 0900 01/28/23 0930 01/28/23 1100   BP: 131/69 133/67 125/70 127/77   Pulse: 88 74 75 84   Patient Position - Orthostatic VS:  Lying Lying          Visual Acuity      ED Medications  Medications   fentanyl citrate (PF) 100 MCG/2ML 50 mcg (50 mcg Intravenous Given 1/28/23 0746)   LORazepam (ATIVAN) injection 0 5 mg (0 5 mg Intravenous Given 1/28/23 0940)       Diagnostic Studies  Results Reviewed     Procedure Component Value Units Date/Time    HS Troponin I 2hr [[id number]]  (Normal) Collected: 01/28/23 0935    Lab Status: Final result Specimen: Blood from Arm, Right Updated: 01/28/23 1016     hs TnI 2hr 30 ng/L      Delta 2hr hsTnI -1 ng/L     HS Troponin 0hr (reflex protocol) [[id number]]  (Normal) Collected: 01/28/23 0748    Lab Status: Final result Specimen: Blood from Arm, Right Updated: 01/28/23 0817     hs TnI 0hr 31 ng/L     Basic metabolic panel [[id number]]  (Abnormal) Collected: 01/28/23 0748    Lab Status: Final result Specimen: Blood from Arm, Right Updated: 01/28/23 0813     Sodium 137 mmol/L      Potassium 4 1 mmol/L      Chloride 106 mmol/L      CO2 18 mmol/L      ANION GAP 13 mmol/L      BUN 32 mg/dL      Creatinine 2 64 mg/dL      Glucose 77 mg/dL      Calcium 9 6 mg/dL      eGFR 21 ml/min/1 73sq m     Narrative:      [place] guidelines for Chronic Kidney Disease (CKD):   •  Stage 1 with normal or high GFR (GFR > 90 mL/min/1 73 square meters)  •  Stage 2 Mild CKD (GFR = 60-89 mL/min/1 73 square meters)  •  Stage 3A Moderate CKD (GFR = 45-59 mL/min/1 73 square meters)  •  Stage 3B Moderate CKD (GFR = 30-44 mL/min/1 73 square meters)  •  Stage 4 Severe CKD (GFR = 15-29 mL/min/1 73 square meters)  •  Stage 5 End Stage CKD (GFR <15 mL/min/1 73 square meters)  Note: GFR calculation is accurate only with a steady state creatinine    Protime-INR [[id number]]  (Normal) Collected: 01/28/23 0748    Lab Status: Final result Specimen: Blood from Arm, Right Updated: 01/28/23 0806     Protime 14 0 seconds      INR 1  08    APTT [[id number]]  (Normal) Collected: 01/28/23 0748    Lab Status: Final result Specimen: Blood from Arm, Right Updated: 01/28/23 0806     PTT 29 seconds     CBC and differential [[id number]]  (Abnormal) Collected: 01/28/23 0748    Lab Status: Final result Specimen: Blood from Arm, Right Updated: 01/28/23 0754     WBC 16 09 Thousand/uL      RBC 4 13 Million/uL      Hemoglobin 12 1 g/dL      Hematocrit 37 2 %      MCV 90 fL      MCH 29 3 pg      MCHC 32 5 g/dL      RDW 17 6 %      MPV 9 8 fL      Platelets 778 Thousands/uL      nRBC 0 /100 WBCs      Neutrophils Relative 68 %      Immat GRANS % 2 %      Lymphocytes Relative 21 %      Monocytes Relative 6 %      Eosinophils Relative 2 %      Basophils Relative 1 %      Neutrophils Absolute 10 95 Thousands/µL      Immature Grans Absolute 0 27 Thousand/uL      Lymphocytes Absolute 3 42 Thousands/µL      Monocytes Absolute 1 02 Thousand/µL      Eosinophils Absolute 0 33 Thousand/µL      Basophils Absolute 0 10 Thousands/µL                  XR chest 1 view portable   Final Result by Emily East MD (01/28 1032)      No acute cardiopulmonary disease  Workstation performed: [id number]                    Procedures  ECG 12 Lead Documentation Only    Date/Time: 1/28/2023 4:43 PM  Performed by: Ainsley Manning DO  Authorized by: Ainsley Manning DO     Interpretation:     Interpretation: abnormal    Rate:     ECG rate:  92    ECG rate assessment: normal    Rhythm:     Rhythm: sinus rhythm    Ectopy:     Ectopy: PVCs    QRS:     QRS axis:  Normal    QRS intervals:  Normal  Conduction:     Conduction: normal    ST segments:     ST segments:  Normal  T waves:     T waves: normal               ED Course  ED Course as of 01/28/23 1646   Sat Jan 28, 2023   0815 Creatinine(!): 2 64  Continues mild trend of improvement   1103 Discussed all findings at bedside with patient and patient's family members  Discussed return precautions    Patient does state that she feels somewhat better at this time however she does have some mild chest pain  Patient feels comfortable going home at this time  HEART Risk Score    Flowsheet Row Most Recent Value   Heart Score Risk Calculator    History 1 Filed at: 01/28/2023 1645   ECG 0 Filed at: 01/28/2023 1645   Age 1 Filed at: 01/28/2023 1645   Risk Factors 2 Filed at: 01/28/2023 1645   Troponin 1 Filed at: 01/28/2023 1645   HEART Score 5 Filed at: 01/28/2023 [address] Making  22-year-old female with recent MI presenting for chest pain which she states is similar to her prior MI   EKG showing no obvious ischemic changes and discussed and reviewed with on-call cardiology  Patient's troponin significantly downtrending since her admission for chest pain and MI  Case again discussed with cardiology and state as troponin is downtrending likely not cardiac in nature  Patient with history as above presented with chest pain  History obtained from patient  Patient was nontoxic, stable  Ambulatory  Exam as above  EKG reviewed  Labs reviewed  Independently reviewed imaging  Reviewed external records  Differential diagnosis considered  Overall presentation is consistent with low risk chest pain  Low suspicion for ACS  Low risk by EDACS  Low suspicion for PE, low risk by clinical criteria  Low suspicion for pneumothorax, pneumonia, pericarditis, dissection, or other serious cause of chest pain  Patient was treated with pain medications with improvement in symptoms  Patient was stable for outpatient management  Disposition: Discussed need to follow up diagnostics, including incidental findings  Discharged with instructions to obtain outpatient follow up of patient’s symptoms and findings, with strict return precautions if patient develops new or worsening symptoms        This medical documentation was created using an electronic medical record system with M Mod[employer] voice recognition  Although this document has been carefully reviewed, there may still be some phonetic and typographical errors  These errors are purely typographical and due to imperfections of the software program, do not reflect any compromise in the patient's medical care  Chest pain in adult: self-limited or minor problem  Amount and/or Complexity of Data Reviewed  Labs: ordered  Decision-making details documented in ED Course  Radiology: ordered  Risk  Prescription drug management  Disposition  Final diagnoses:   Chest pain in adult     Time reflects when diagnosis was documented in both MDM as applicable and the Disposition within this note     Time User Action Codes Description Comment    1/28/2023  7:55 AM Rosanne Suarez Add [R07 9] Chest pain in adult       ED Disposition     ED Disposition   Discharge    Condition   Stable    Date/Time   Sat Jan 28, 2023 11:03 AM    Comment   Philip Borja discharge to home/self care  Follow-up Information     Follow up With Specialties Details Why Contact Info Additional [id number] [facility],  Family Medicine   [address] [address]  [phone]       [facility] Emergency Department Emergency Medicine Go to  As needed, If symptoms worsen [address] [place] [id number] [phone] [facility] Emergency Department, [address] [place] [address] AFFILIATED WITH [address]          Discharge Medication List as of 1/28/2023 11:04 AM      CONTINUE these medications which have NOT CHANGED    Details   acetaminophen (TYLENOL) 325 mg tablet Take 3 tablets (975 mg total) by mouth every 8 (eight) hours as needed for mild pain, Starting Thu 1/5/2023, Normal      al mag oxide-diphenhydramine-lidocaine viscous (MAGIC MOUTHWASH) 1:1:1 suspension Swish and spit 10 mL every 4 (four) hours as needed for mouth pain or discomfort, Starting Thu 1/5/2023, Normal      albuterol (PROVENTIL HFA,VENTOLIN HFA) 90 mcg/act inhaler Inhale 2 puffs every 4 (four) hours as needed for wheezing or shortness of breath, Starting Mon 5/23/2022, Normal      aspirin 81 mg chewable tablet Chew 1 tablet (81 mg total) daily, Starting Fri 4/2/2021, Normal      atorvastatin (LIPITOR) 80 mg tablet Take 1 tablet (80 mg total) by mouth every evening, Starting Tue 1/17/2023, Until Mon 4/17/2023, Normal      bumetanide (BUMEX) 1 mg tablet Take 1 tablet (1 mg total) by mouth daily, Starting Thu 7/14/2022, Normal      calcitriol (ROCALTROL) 0 25 mcg capsule Take 1 capsule (0 25 mcg total) by mouth daily Takes Monday Wednesday and fridays as of 11/11/22, Starting Tue 1/10/2023, Historical Med      chlorhexidine (PERIDEX) 0 12 % solution Apply 15 mL to the mouth or throat every 12 (twelve) hours, Starting Thu 1/5/2023, Normal      clopidogrel (PLAVIX) 75 mg tablet Take 1 tablet (75 mg total) by mouth daily Do not start before January 6, 2023 , Starting Fri 1/6/2023, Normal      Diclofenac Sodium (VOLTAREN) 1 % Apply 2 g topically 4 (four) times a day, Starting Mon 8/9/2021, Normal      ezetimibe (ZETIA) 10 mg tablet Take 1 tablet (10 mg total) by mouth in the morning , Starting Mon 5/23/2022, Normal      lidocaine (Lidoderm) 5 % Apply 2 patches topically daily Remove & Discard patch within 12 hours or as directed by MD, Starting Fri 11/11/2022, Normal      LORazepam (ATIVAN) 0 5 mg tablet Take 1 tablet (0 5 mg total) by mouth 3 (three) times a day as needed for anxiety, Starting Mon 10/10/2022, Normal      methocarbamol (ROBAXIN) 500 mg tablet Take 1 tablet (500 mg total) by mouth 2 (two) times a day as needed for muscle spasms, Starting Fri 11/11/2022, Normal      metoprolol succinate (TOPROL-XL) 100 mg 24 hr tablet Take 1 tablet (100 mg total) by mouth daily Do not start before January 11, 2023 , Starting Wed 1/11/2023, Normal      nicotine (NICODERM CQ) 14 mg/24hr TD 24 hr patch Place 1 patch on the skin daily Do not start before January 6, 2023 , Starting Fri 1/6/2023, Normal      nitroglycerin (NITROSTAT) 0 4 mg SL tablet Place 1 tablet (0 4 mg total) under the tongue every 5 (five) minutes as needed for chest pain, Starting Tue 1/17/2023, Normal      omega-3-acid ethyl esters (LOVAZA) 1 g capsule Take 1 capsule (1 g total) by mouth 2 (two) times a day, Starting Thu 1/5/2023, Normal      omeprazole (PriLOSEC) 40 MG capsule Take 1 capsule (40 mg total) by mouth daily, Starting Fri 11/11/2022, Normal      QUEtiapine (SEROquel) 50 mg tablet take 1 tablet by mouth at bedtime, Normal      ranolazine (RANEXA) 500 mg 12 hr tablet Take 1 tablet (500 mg total) by mouth every 12 (twelve) hours, Starting Tue 1/10/2023, Normal      sertraline (ZOLOFT) 100 mg tablet Take 1 5 tablets (150 mg total) by mouth daily, Starting Mon 9/19/2022, Normal      sodium bicarbonate 650 mg tablet Take 1 tablet (650 mg total) by mouth 2 (two) times daily after meals, Starting Thu 1/5/2023, Normal             No discharge procedures on file      PDMP Review       Value Time User    PDMP Reviewed  Yes 10/10/2022 12:27 PM Annemarie Barlow, 10 [facility] Provider  Electronically Signed by           Torito Garcia DO  01/28/23 5508

## 2023-01-30 ENCOUNTER — PATIENT OUTREACH (OUTPATIENT)
Dept: FAMILY MEDICINE CLINIC | Facility: CLINIC | Age: 48
End: 2023-01-30

## 2023-01-30 NOTE — PROGRESS NOTES
Outpatient Care Management Note:  In basket ER alert received  Chart review completed  [facility] letter mailed to Ms Bernabe Mensah on 1/19/2023

## 2023-01-31 ENCOUNTER — OFFICE VISIT (OUTPATIENT)
Dept: CARDIOLOGY CLINIC | Facility: CLINIC | Age: 48
End: 2023-01-31

## 2023-01-31 VITALS
HEART RATE: 82 BPM | SYSTOLIC BLOOD PRESSURE: 104 MMHG | HEIGHT: 66 IN | DIASTOLIC BLOOD PRESSURE: 64 MMHG | BODY MASS INDEX: 43.58 KG/M2

## 2023-01-31 DIAGNOSIS — I42.2 HYPERTROPHIC CARDIOMYOPATHY (HCC): ICD-10-CM

## 2023-01-31 DIAGNOSIS — I10 ESSENTIAL HYPERTENSION: ICD-10-CM

## 2023-01-31 DIAGNOSIS — I50.32 CHRONIC DIASTOLIC CONGESTIVE HEART FAILURE (HCC): ICD-10-CM

## 2023-01-31 DIAGNOSIS — I25.10 CORONARY ARTERY DISEASE INVOLVING NATIVE CORONARY ARTERY OF NATIVE HEART, UNSPECIFIED WHETHER ANGINA PRESENT: Primary | ICD-10-CM

## 2023-01-31 NOTE — PROGRESS NOTES
Patient ID: Manuel Jones is a 41 y o  female  Plan:      Essential hypertension  Blood pressure is well controlled  Continue current regimen    Hypertrophic cardiomyopathy (HCC)  Stable  Continue beta-blocker therapy    CAD (coronary artery disease)  1  CAD s/p PCI to RCA, rPDA  · Inferior STEMI (3/23/21) --> initial PCI to [facility]  · Recurrent chest pain (3/23/21) --> GREG to RPDA  · Readmission, inferior STEMI (4/2/21) --> patent stents, no change on cath  · NSTEMI 1/3/2023--> GREG LAD/D2  Continue asa, plavix, statin, BB, ranexa    Chronic diastolic congestive heart failure (HCC)  Wt Readings from Last 3 Encounters:   01/28/23 122 kg (270 lb)   01/10/23 125 kg (274 lb 11 1 oz)   01/05/23 125 kg (276 lb 0 3 oz)     Euvolemic on exam  Continue Bumex 1 mg daily  Monitor renal function closely           Follow up Plan/Summary Comments:  Wendy Wilkinson seems to be well controlled on her current medication regimen  It should be noted that she tolerates Plavix better than Brilinta which caused some shortness of breath  We discussed cardiac rehab, however she unfortunately suffers from agoraphobia and severe anxiety and does not think she would be able to do it  She will notify us if she changes her mind  Continue current cardiac medications without change  I advised her to follow-up with her PCP regarding her significant leg pain  I do not think this is vascular as she has warm extremities and distal pulses are equal bilaterally  Follow-up in our office in 3 months or sooner if needed  HPI: I the pleasure of seeing Wendy Wilkinson in the office today for hospital follow-up visit  Wendy Wilkinson is followed in our office for coronary disease, hypertrophic cardiomyopathy, and HFpEF  She was recently hospitalized for an NSTEMI and underwent cardiac cath  A GREG was placed in the LAD/D2 following thrombectomy  She returned to the hospital 1/7/2023 with recurrent chest pain  Cardiac enzymes were downtrending    Isosorbide was changed to Ranexa with improvement in her symptoms  She was seen again in the ER 1/28/2023 with persistent chest pain  Troponin levels at that time were normal and no further work-up was advised  [facility] reports that since her most recent stent, she has had occasional "stickers" of pain in her chest at random times  The subsequent ER visits for chest pain was a more intense and longer lasting pain, similar to what she experienced prior to heart attacks in the past     She has been experiencing significant bilateral leg pain from the knee down for the last approximate 2 weeks  She notes she has difficultly standing and ambulating even short distances in her room  She feels as though she is unable to fully extend her leg  Symptoms seem worse in the left leg than the right  Review of Systems   10  point ROS  was otherwise non pertinent or negative except as per HPI or as below  Gait:  wheelchair      Most recent or relevant cardiac/vascular testing:    Echo 1/9/2023 EF 45%, moderate diastolic dysfunction  Mildly dilated LA  Mild to moderate MR  No change compared to 1/2/2023    Holter 4/22/2021 SR, avg rate 88  Frequent PVCs, 8-beat run NSVT    Cardiac cath 1/3/2023   Successful PCI to mid % culprit req  rheolytic thrombectomy (progressed when compared to 4-2-21 study); given atretic distal LAD, 2 25 x 15 mm GREG was deployed from LAD into more sizable D2  •  Residual prox LCx 60% lesion with patent LAD, mid RCA & PDA stents  •  Elevated LVEDP (20-30 mmHg) with no LV-Ao gradient on pullback  •  R radial a  accessed but unable to advance equipment, likely occluded; RFA arteriotomy employed       Cath 4/2/2021 Distal LAD: There was a 60 % stenosis  Mid circumflex: There was a 50 % stenosis  Distal RCA: There was a 10 % stenosis at the site of a prior stent  Right PDA: There was a 0 % stenosis at the site of a prior stent  Echo 3/25/2021 60%, severe hypokinesis of the basal-mid inferior wall  Findings consistent with HCM  Mild-mod MR  Cardiac MRI 3/29/2021 severe LVH, EF 44%  Small circumferential pericardial effusion  Mild MR  Findings suggestive of underlying hypertrophic cardiomyopathy with ischemic scarring of the inferior and inferolateral walls  Stress echo 7/26/2021 Severe hypokinesis if the basal-mid inferior walls at peak stress, but overall conclusion was no evidence of stress induced ischemia  Objective:     /64   Pulse 82   Ht 5' 6" (1 676 m)   BMI 43 58 kg/m²     PHYSICAL EXAM:    General:  Normal appearance, no acute distress  Eyes:  Anicteric  Oral mucosa:  Moist   Neck:  No JVD  Carotid upstrokes are brisk without bruits  No masses  Chest:  Clear to auscultation   Cardiac:  No palpable PMI  Normal S1 and S2  No murmur gallop or rub  Abdomen:  Soft and nontender  No palpable organomegaly or aortic enlargement  Extremities:  No peripheral edema, she experiences significant pain with very minimal touch to LLE  Musculoskeletal:  Symmetric  Vascular:  Pedal pulses are intact  Neuro:  Grossly symmetric  Psych:  Alert and oriented x3      No Known Allergies    Current Outpatient Medications:   •  acetaminophen (TYLENOL) 325 mg tablet, Take 3 tablets (975 mg total) by mouth every 8 (eight) hours as needed for mild pain, Disp: 90 tablet, Rfl: 0  •  al mag oxide-diphenhydramine-lidocaine viscous (MAGIC MOUTHWASH) 1:1:1 suspension, Swish and spit 10 mL every 4 (four) hours as needed for mouth pain or discomfort, Disp: 90 mL, Rfl: 0  •  albuterol (PROVENTIL HFA,VENTOLIN HFA) 90 mcg/act inhaler, Inhale 2 puffs every 4 (four) hours as needed for wheezing or shortness of breath, Disp: 18 g, Rfl: 5  •  aspirin 81 mg chewable tablet, Chew 1 tablet (81 mg total) daily, Disp: 90 tablet, Rfl: 0  •  atorvastatin (LIPITOR) 80 mg tablet, Take 1 tablet (80 mg total) by mouth every evening, Disp: 90 tablet, Rfl: 0  •  bumetanide (BUMEX) 1 mg tablet, Take 1 tablet (1 mg total) by mouth daily, Disp: 30 tablet, Rfl: 6  •  calcitriol (ROCALTROL) 0 25 mcg capsule, Take 1 capsule (0 25 mcg total) by mouth daily Takes Monday Wednesday and fridays as of 11/11/22, Disp: , Rfl:   •  chlorhexidine (PERIDEX) 0 12 % solution, Apply 15 mL to the mouth or throat every 12 (twelve) hours, Disp: 473 mL, Rfl: 0  •  clopidogrel (PLAVIX) 75 mg tablet, Take 1 tablet (75 mg total) by mouth daily Do not start before January 6, 2023 , Disp: 90 tablet, Rfl: 0  •  Diclofenac Sodium (VOLTAREN) 1 %, Apply 2 g topically 4 (four) times a day, Disp: 150 g, Rfl: 2  •  ezetimibe (ZETIA) 10 mg tablet, Take 1 tablet (10 mg total) by mouth in the morning , Disp: 30 tablet, Rfl: 5  •  lidocaine (Lidoderm) 5 %, Apply 2 patches topically daily Remove & Discard patch within 12 hours or as directed by MD, Disp: 30 patch, Rfl: 0  •  LORazepam (ATIVAN) 0 5 mg tablet, Take 1 tablet (0 5 mg total) by mouth 3 (three) times a day as needed for anxiety, Disp: 90 tablet, Rfl: 0  •  methocarbamol (ROBAXIN) 500 mg tablet, Take 1 tablet (500 mg total) by mouth 2 (two) times a day as needed for muscle spasms, Disp: 40 tablet, Rfl: 0  •  metoprolol succinate (TOPROL-XL) 100 mg 24 hr tablet, Take 1 tablet (100 mg total) by mouth daily Do not start before January 11, 2023 , Disp: 30 tablet, Rfl: 0  •  nicotine (NICODERM CQ) 14 mg/24hr TD 24 hr patch, Place 1 patch on the skin daily Do not start before January 6, 2023 , Disp: 28 patch, Rfl: 0  •  nitroglycerin (NITROSTAT) 0 4 mg SL tablet, Place 1 tablet (0 4 mg total) under the tongue every 5 (five) minutes as needed for chest pain, Disp: 25 tablet, Rfl: 5  •  omega-3-acid ethyl esters (LOVAZA) 1 g capsule, Take 1 capsule (1 g total) by mouth 2 (two) times a day, Disp: 60 capsule, Rfl: 0  •  omeprazole (PriLOSEC) 40 MG capsule, Take 1 capsule (40 mg total) by mouth daily, Disp: 30 capsule, Rfl: 0  •  QUEtiapine (SEROquel) 50 mg tablet, take 1 tablet by mouth at bedtime, Disp: 30 tablet, Rfl: 1  •  ranolazine (RANEXA) 500 mg 12 hr tablet, Take 1 tablet (500 mg total) by mouth every 12 (twelve) hours, Disp: 60 tablet, Rfl: 0  •  sertraline (ZOLOFT) 100 mg tablet, Take 1 5 tablets (150 mg total) by mouth daily, Disp: 45 tablet, Rfl: 1  •  sodium bicarbonate 650 mg tablet, Take 1 tablet (650 mg total) by mouth 2 (two) times daily after meals, Disp: 60 tablet, Rfl: 0  Past Medical History:   Diagnosis Date   • Anemia    • Anxiety    • CAD (coronary artery disease)    • Cancer ([place] [address] ) 2008   • Cardiomyopathy ([place] [address] )    • CHF (congestive heart failure) ([facility])    • Chronic kidney disease    • COPD (chronic obstructive pulmonary disease) ([facility])     scheduled for sleep apnea test soon after pacemaker implant   • Coronary artery disease    • Depression    • History of chemotherapy     non hodgkins lymphoma 2008   • Hyperlipemia    • Hypertension    • Hypertrophic cardiomyopathy ([place] [address] )    • Lymphoma, non-Hodgkin's ([facility])    • Myocardial infarction [facility])    • Non-ST elevation MI (NSTEMI) 01/02/2023   • Obesity    • Obstructive sleep apnea    • SSS (sick sinus syndrome) ([place] [address] )     s/p medtronic dual chamber pacemaker 5/25/2021   • Tobacco abuse    • Ventricular tachycardia, non-sustained    • Wears glasses      Past Surgical History:   Procedure Laterality Date   • CARDIAC CATHETERIZATION N/A 1/3/2023    Procedure: Cardiac catheterization;  Surgeon: Loan Giordano MD;  Location: [facility]; Service: Cardiology   • CARDIAC CATHETERIZATION N/A 1/3/2023    Procedure: Cardiac Coronary Angiogram;  Surgeon: Loan Giordano MD;  Location: [facility]; Service: Cardiology   • CARDIAC CATHETERIZATION N/A 1/3/2023    Procedure: Cardiac pci;  Surgeon: Loan Giordano MD;  Location: [facility];   Service: Cardiology   • CARDIAC PACEMAKER PLACEMENT Left 5/25/2021    Procedure: DUAL LEAD PACEMAKER IMPLANT;  Surgeon: Eugenia Naik MD;  Location: [facility] MAIN OR;  Service: Cardiology   • CAROTID STENT     •  SECTION     • CORONARY ANGIOPLASTY WITH STENT PLACEMENT  2021    GREG mid RCA and GREG right PDA   • DENTAL SURGERY     • IR BIOPSY KIDNEY RANDOM  10/18/2021       CMP:   Lab Results   Component Value Date    K 4 1 2023     2023    CO2 18 (L) 2023    BUN 32 (H) 2023    CREATININE 2 64 (H) 2023    EGFR 21 2023     Lipid Profile:    Lab Results   Component Value Date    TRIG 348 (H) 2023    HDL 31 (L) 2023         Social History     Tobacco Use   Smoking Status Every Day   • Packs/day: 0 50   • Years: 25 00   • Pack years: 12 50   • Types: Cigarettes   Smokeless Tobacco Never   Tobacco Comments    Recently and currently quitting cigarettes

## 2023-01-31 NOTE — ASSESSMENT & PLAN NOTE
1  CAD s/p PCI to RCA, rPDA  · Inferior STEMI (3/23/21) --> initial PCI to [facility]  · Recurrent chest pain (3/23/21) --> GREG to RPDA  · Readmission, inferior STEMI (4/2/21) --> patent stents, no change on cath  · NSTEMI 1/3/2023--> GREG LAD/D2  Continue asa, plavix, statin, BB, ranexa

## 2023-01-31 NOTE — ASSESSMENT & PLAN NOTE
Wt Readings from Last 3 Encounters:   01/28/23 122 kg (270 lb)   01/10/23 125 kg (274 lb 11 1 oz)   01/05/23 125 kg (276 lb 0 3 oz)     Euvolemic on exam  Continue Bumex 1 mg daily  Monitor renal function closely

## 2023-02-02 ENCOUNTER — PATIENT OUTREACH (OUTPATIENT)
Dept: FAMILY MEDICINE CLINIC | Facility: CLINIC | Age: 48
End: 2023-02-02

## 2023-02-02 NOTE — PROGRESS NOTES
Outpatient Care Management Note:  No response to telephone outreach attempts or [facility] letter  Closing care management episode at this time

## 2023-02-03 ENCOUNTER — APPOINTMENT (EMERGENCY)
Dept: RADIOLOGY | Facility: HOSPITAL | Age: 48
End: 2023-02-03

## 2023-02-03 ENCOUNTER — HOSPITAL ENCOUNTER (EMERGENCY)
Facility: HOSPITAL | Age: 48
Discharge: HOME | End: 2023-02-03
Attending: EMERGENCY MEDICINE

## 2023-02-03 VITALS
BODY MASS INDEX: 45 KG/M2 | SYSTOLIC BLOOD PRESSURE: 150 MMHG | OXYGEN SATURATION: 96 % | WEIGHT: 280 LBS | RESPIRATION RATE: 15 BRPM | TEMPERATURE: 97.6 F | HEART RATE: 79 BPM | HEIGHT: 66 IN | DIASTOLIC BLOOD PRESSURE: 71 MMHG

## 2023-02-03 DIAGNOSIS — I25.10 CAD (CORONARY ARTERY DISEASE): ICD-10-CM

## 2023-02-03 DIAGNOSIS — N18.9 CKD (CHRONIC KIDNEY DISEASE): ICD-10-CM

## 2023-02-03 DIAGNOSIS — G47.33 OSA (OBSTRUCTIVE SLEEP APNEA): ICD-10-CM

## 2023-02-03 DIAGNOSIS — R07.9 CHEST PAIN: Primary | ICD-10-CM

## 2023-02-03 DIAGNOSIS — I50.9 CHF (CONGESTIVE HEART FAILURE) (HCC): ICD-10-CM

## 2023-02-03 LAB
2HR DELTA HS TROPONIN: -3 NG/L
ALBUMIN SERPL BCP-MCNC: 3.9 G/DL (ref 3.5–5)
ALP SERPL-CCNC: 88 U/L (ref 34–104)
ALT SERPL W P-5'-P-CCNC: 18 U/L (ref 7–52)
ANION GAP SERPL CALCULATED.3IONS-SCNC: 10 MMOL/L (ref 4–13)
AST SERPL W P-5'-P-CCNC: 14 U/L (ref 13–39)
ATRIAL RATE: 82 BPM
ATRIAL RATE: 95 BPM
BASOPHILS # BLD AUTO: 0.17 THOUSANDS/ÂΜL (ref 0–0.1)
BASOPHILS NFR BLD AUTO: 1 % (ref 0–1)
BILIRUB DIRECT SERPL-MCNC: 0.04 MG/DL (ref 0–0.2)
BILIRUB SERPL-MCNC: 0.26 MG/DL (ref 0.2–1)
BNP SERPL-MCNC: 230 PG/ML (ref 0–100)
BUN SERPL-MCNC: 37 MG/DL (ref 5–25)
CALCIUM SERPL-MCNC: 9.8 MG/DL (ref 8.4–10.2)
CARDIAC TROPONIN I PNL SERPL HS: 34 NG/L
CARDIAC TROPONIN I PNL SERPL HS: 37 NG/L
CHLORIDE SERPL-SCNC: 105 MMOL/L (ref 96–108)
CO2 SERPL-SCNC: 17 MMOL/L (ref 21–32)
CREAT SERPL-MCNC: 2.38 MG/DL (ref 0.6–1.3)
EOSINOPHIL # BLD AUTO: 0.38 THOUSAND/ÂΜL (ref 0–0.61)
EOSINOPHIL NFR BLD AUTO: 2 % (ref 0–6)
ERYTHROCYTE [DISTWIDTH] IN BLOOD BY AUTOMATED COUNT: 17.8 % (ref 11.6–15.1)
GFR SERPL CREATININE-BSD FRML MDRD: 23 ML/MIN/1.73SQ M
GLUCOSE SERPL-MCNC: 102 MG/DL (ref 65–140)
HCT VFR BLD AUTO: 39.4 % (ref 34.8–46.1)
HGB BLD-MCNC: 12.8 G/DL (ref 11.5–15.4)
IMM GRANULOCYTES # BLD AUTO: 0.41 THOUSAND/UL (ref 0–0.2)
IMM GRANULOCYTES NFR BLD AUTO: 2 % (ref 0–2)
LIPASE SERPL-CCNC: 86 U/L (ref 11–82)
LYMPHOCYTES # BLD AUTO: 4.35 THOUSANDS/ÂΜL (ref 0.6–4.47)
LYMPHOCYTES NFR BLD AUTO: 21 % (ref 14–44)
MCH RBC QN AUTO: 29.2 PG (ref 26.8–34.3)
MCHC RBC AUTO-ENTMCNC: 32.5 G/DL (ref 31.4–37.4)
MCV RBC AUTO: 90 FL (ref 82–98)
MONOCYTES # BLD AUTO: 1.23 THOUSAND/ÂΜL (ref 0.17–1.22)
MONOCYTES NFR BLD AUTO: 6 % (ref 4–12)
NEUTROPHILS # BLD AUTO: 14.1 THOUSANDS/ÂΜL (ref 1.85–7.62)
NEUTS SEG NFR BLD AUTO: 68 % (ref 43–75)
NRBC BLD AUTO-RTO: 0 /100 WBCS
P AXIS: 36 DEGREES
P AXIS: 52 DEGREES
PLATELET # BLD AUTO: 468 THOUSANDS/UL (ref 149–390)
PMV BLD AUTO: 10.1 FL (ref 8.9–12.7)
POTASSIUM SERPL-SCNC: 4 MMOL/L (ref 3.5–5.3)
PR INTERVAL: 158 MS
PR INTERVAL: 170 MS
PROT SERPL-MCNC: 7.3 G/DL (ref 6.4–8.4)
QRS AXIS: -8 DEGREES
QRS AXIS: 12 DEGREES
QRSD INTERVAL: 100 MS
QRSD INTERVAL: 98 MS
QT INTERVAL: 364 MS
QT INTERVAL: 402 MS
QTC INTERVAL: 457 MS
QTC INTERVAL: 469 MS
RBC # BLD AUTO: 4.38 MILLION/UL (ref 3.81–5.12)
SODIUM SERPL-SCNC: 132 MMOL/L (ref 135–147)
T WAVE AXIS: 66 DEGREES
T WAVE AXIS: 79 DEGREES
VENTRICULAR RATE: 82 BPM
VENTRICULAR RATE: 95 BPM
WBC # BLD AUTO: 20.64 THOUSAND/UL (ref 4.31–10.16)

## 2023-02-03 RX ORDER — ONDANSETRON 2 MG/ML
4 INJECTION INTRAMUSCULAR; INTRAVENOUS ONCE
Status: COMPLETED | OUTPATIENT
Start: 2023-02-03 | End: 2023-02-03

## 2023-02-03 RX ORDER — LORAZEPAM 2 MG/ML
0.5 INJECTION INTRAMUSCULAR ONCE
Status: COMPLETED | OUTPATIENT
Start: 2023-02-03 | End: 2023-02-03

## 2023-02-03 RX ORDER — SODIUM CHLORIDE 9 MG/ML
3 INJECTION INTRAVENOUS
Status: DISCONTINUED | OUTPATIENT
Start: 2023-02-03 | End: 2023-02-03 | Stop reason: HOSPADM

## 2023-02-03 RX ORDER — NITROGLYCERIN 0.4 MG/1
0.4 TABLET SUBLINGUAL ONCE
Status: COMPLETED | OUTPATIENT
Start: 2023-02-03 | End: 2023-02-03

## 2023-02-03 RX ORDER — ASPIRIN 81 MG/1
243 TABLET, CHEWABLE ORAL ONCE
Status: COMPLETED | OUTPATIENT
Start: 2023-02-03 | End: 2023-02-03

## 2023-02-03 RX ORDER — FENTANYL CITRATE 50 UG/ML
50 INJECTION, SOLUTION INTRAMUSCULAR; INTRAVENOUS ONCE
Status: COMPLETED | OUTPATIENT
Start: 2023-02-03 | End: 2023-02-03

## 2023-02-03 RX ADMIN — LORAZEPAM 0.5 MG: 2 INJECTION INTRAMUSCULAR; INTRAVENOUS at 04:50

## 2023-02-03 RX ADMIN — ASPIRIN 81 MG CHEWABLE TABLET 243 MG: 81 TABLET CHEWABLE at 04:35

## 2023-02-03 RX ADMIN — ONDANSETRON 4 MG: 2 INJECTION INTRAMUSCULAR; INTRAVENOUS at 04:35

## 2023-02-03 RX ADMIN — FENTANYL CITRATE 50 MCG: 50 INJECTION, SOLUTION INTRAMUSCULAR; INTRAVENOUS at 04:51

## 2023-02-03 RX ADMIN — NITROGLYCERIN 0.4 MG: 0.4 TABLET SUBLINGUAL at 04:36

## 2023-02-03 NOTE — ED PROVIDER NOTES
History  Chief Complaint   Patient presents with   • Chest Pain     Woke up with chest pain around 0100hrs with pain and SOB  3 Nitro and 1 additional aspirin     21-year-old female with history of CAD, recent MI in January status post successful PCI to mid % lesion with drug-eluting stent placed, who presents to the emergency department for evaluation of chest pain and shortness of breath  Patient states she woke up around 1 AM with severe left-sided chest pain that radiates into the left shoulder  Is associated with shortness of breath, nausea and vomiting  He denies any associated palpitations, lightheadedness, diaphoresis, fever or chills  No recent illnesses  No abdominal pain  No leg pain or swelling  She took nitroglycerin and baby aspirin prior to arrival             Prior to Admission Medications   Prescriptions Last Dose Informant Patient Reported? Taking?    Diclofenac Sodium (VOLTAREN) 1 %   No No   Sig: Apply 2 g topically 4 (four) times a day   LORazepam (ATIVAN) 0 5 mg tablet   No No   Sig: Take 1 tablet (0 5 mg total) by mouth 3 (three) times a day as needed for anxiety   QUEtiapine (SEROquel) 50 mg tablet   No No   Sig: take 1 tablet by mouth at bedtime   acetaminophen (TYLENOL) 325 mg tablet   No No   Sig: Take 3 tablets (975 mg total) by mouth every 8 (eight) hours as needed for mild pain   al mag oxide-diphenhydramine-lidocaine viscous (MAGIC MOUTHWASH) 1:1:1 suspension   No No   Sig: Swish and spit 10 mL every 4 (four) hours as needed for mouth pain or discomfort   albuterol (PROVENTIL HFA,VENTOLIN HFA) 90 mcg/act inhaler   No No   Sig: Inhale 2 puffs every 4 (four) hours as needed for wheezing or shortness of breath   aspirin 81 mg chewable tablet   No No   Sig: Chew 1 tablet (81 mg total) daily   atorvastatin (LIPITOR) 80 mg tablet   No No   Sig: Take 1 tablet (80 mg total) by mouth every evening   bumetanide (BUMEX) 1 mg tablet   No No   Sig: Take 1 tablet (1 mg total) by mouth daily   calcitriol (ROCALTROL) 0 25 mcg capsule   Yes No   Sig: Take 1 capsule (0 25 mcg total) by mouth daily Takes Monday Wednesday and fridays as of 11/11/22   chlorhexidine (PERIDEX) 0 12 % solution   No No   Sig: Apply 15 mL to the mouth or throat every 12 (twelve) hours   clopidogrel (PLAVIX) 75 mg tablet   No No   Sig: Take 1 tablet (75 mg total) by mouth daily Do not start before January 6, 2023  ezetimibe (ZETIA) 10 mg tablet   No No   Sig: Take 1 tablet (10 mg total) by mouth in the morning  lidocaine (Lidoderm) 5 %   No No   Sig: Apply 2 patches topically daily Remove & Discard patch within 12 hours or as directed by MD   methocarbamol (ROBAXIN) 500 mg tablet   No No   Sig: Take 1 tablet (500 mg total) by mouth 2 (two) times a day as needed for muscle spasms   metoprolol succinate (TOPROL-XL) 100 mg 24 hr tablet   No No   Sig: Take 1 tablet (100 mg total) by mouth daily Do not start before January 11, 2023  nicotine (NICODERM CQ) 14 mg/24hr TD 24 hr patch   No No   Sig: Place 1 patch on the skin daily Do not start before January 6, 2023     nitroglycerin (NITROSTAT) 0 4 mg SL tablet   No No   Sig: Place 1 tablet (0 4 mg total) under the tongue every 5 (five) minutes as needed for chest pain   omega-3-acid ethyl esters (LOVAZA) 1 g capsule   No No   Sig: Take 1 capsule (1 g total) by mouth 2 (two) times a day   omeprazole (PriLOSEC) 40 MG capsule   No No   Sig: Take 1 capsule (40 mg total) by mouth daily   ranolazine (RANEXA) 500 mg 12 hr tablet   No No   Sig: Take 1 tablet (500 mg total) by mouth every 12 (twelve) hours   sertraline (ZOLOFT) 100 mg tablet   No No   Sig: Take 1 5 tablets (150 mg total) by mouth daily   sodium bicarbonate 650 mg tablet   No No   Sig: Take 1 tablet (650 mg total) by mouth 2 (two) times daily after meals      Facility-Administered Medications: None       Past Medical History:   Diagnosis Date   • Anemia    • Anxiety    • CAD (coronary artery disease)    • Cancer ([facility] )    • Cardiomyopathy [facility])    • CHF (congestive heart failure) ([facility])    • Chronic kidney disease    • COPD (chronic obstructive pulmonary disease) ([facility])     scheduled for sleep apnea test soon after pacemaker implant   • Coronary artery disease    • Depression    • History of chemotherapy     non hodgkins lymphoma    • Hyperlipemia    • Hypertension    • Hypertrophic cardiomyopathy ([facility] [address] )    • Lymphoma, non-Hodgkin's ([facility])    • Myocardial infarction [facility])    • Non-ST elevation MI (NSTEMI) 2023   • Obesity    • Obstructive sleep apnea    • SSS (sick sinus syndrome) ([facility])     s/p medtronic dual chamber pacemaker 2021   • Tobacco abuse    • Ventricular tachycardia, non-sustained    • Wears glasses        Past Surgical History:   Procedure Laterality Date   • CARDIAC CATHETERIZATION N/A 1/3/2023    Procedure: Cardiac catheterization;  Surgeon: Natty Dunn MD;  [facility]: [facility]; Service: Cardiology   • CARDIAC CATHETERIZATION N/A 1/3/2023    Procedure: Cardiac Coronary Angiogram;  Surgeon: Natty Dunn MD;  [facility]; Service: Cardiology   • CARDIAC CATHETERIZATION N/A 1/3/2023    Procedure: Cardiac pci;  Surgeon: Natty Dunn MD;  [facility]: [facility];   Service: Cardiology   • CARDIAC PACEMAKER PLACEMENT Left 2021    Procedure: DUAL LEAD PACEMAKER IMPLANT;  Surgeon: Yoselin Aranda MD;  Location: [facility] MAIN OR;  Service: Cardiology   • CAROTID STENT     •  SECTION     • CORONARY ANGIOPLASTY WITH STENT PLACEMENT  2021    GREG mid RCA and GREG right PDA   • DENTAL SURGERY     • IR BIOPSY KIDNEY RANDOM  10/18/2021       Family History   Problem Relation Age of Onset   • No Known Problems Mother    • No Known Problems Father    • No Known Problems Daughter    • Brain cancer Maternal Grandfather    • Heart disease Maternal Grandfather    • Cancer Maternal Grandfather    • No Known Problems Paternal Aunt    • No Known Problems Paternal Aunt I have reviewed and agree with the history as documented  E-Cigarette/Vaping   • E-Cigarette Use Never User      E-Cigarette/Vaping Substances   • Nicotine No    • THC No    • CBD No    • Flavoring No    • Other No    • Unknown No      Social History     Tobacco Use   • Smoking status: Every Day     Packs/day: 0 50     Years: 25 00     Pack years: 12 50     Types: Cigarettes   • Smokeless tobacco: Never   • Tobacco comments:     Recently and currently quitting cigarettes   Vaping Use   • Vaping Use: Never used   Substance Use Topics   • Alcohol use: Yes     Comment: 1-2 times years   • Drug use: No       Review of Systems   Constitutional: Negative for chills and fever  HENT: Negative for ear pain and sore throat  Eyes: Negative for pain and visual disturbance  Respiratory: Positive for shortness of breath  Negative for cough  Cardiovascular: Positive for chest pain  Negative for palpitations  Gastrointestinal: Positive for nausea and vomiting  Negative for abdominal pain  Genitourinary: Negative for dysuria and hematuria  Musculoskeletal: Negative for arthralgias and back pain  Skin: Negative for color change and rash  Neurological: Negative for seizures and syncope  Psychiatric/Behavioral: Negative  All other systems reviewed and are negative  Physical Exam  Physical Exam  Vitals and nursing note reviewed  Constitutional:       General: She is not in acute distress  Appearance: She is obese  HENT:      Head: Normocephalic and atraumatic  Right Ear: External ear normal       Left Ear: External ear normal       Nose: Nose normal       Mouth/Throat:      Mouth: Mucous membranes are moist    Eyes:      General: No scleral icterus  Extraocular Movements: Extraocular movements intact  Conjunctiva/sclera: Conjunctivae normal       Pupils: Pupils are equal, round, and reactive to light  Cardiovascular:      Rate and Rhythm: Normal rate and regular rhythm  Pulses: Normal pulses  Heart sounds: Normal heart sounds  No murmur heard  Pulmonary:      Effort: Pulmonary effort is normal  No respiratory distress  Breath sounds: Normal breath sounds  No stridor  Abdominal:      General: Abdomen is flat  Bowel sounds are normal       Tenderness: There is no abdominal tenderness  There is no guarding or rebound  Musculoskeletal:         General: No deformity  Normal range of motion  Cervical back: Normal range of motion and neck supple  Right lower leg: No edema  Left lower leg: No edema  Skin:     General: Skin is warm and dry  Capillary Refill: Capillary refill takes less than 2 seconds  Neurological:      General: No focal deficit present  Mental Status: She is alert and oriented to person, place, and time     Psychiatric:      Comments: anxious         Vital Signs  ED Triage Vitals [02/03/23 0430]   Temperature Pulse Respirations Blood Pressure SpO2   97 6 °F (36 4 °C) 96 20 160/89 96 %      Temp src Heart Rate Source Patient Position - Orthostatic VS BP Location FiO2 (%)   -- Monitor Sitting Left arm --      Pain Score       10 - Worst Possible Pain           Vitals:    02/03/23 0500 02/03/23 0530 02/03/23 0600 02/03/23 0630   BP: 109/61 145/69 145/67 150/71   Pulse: 83 81 78 79   Patient Position - Orthostatic VS:  Sitting           Visual Acuity      ED Medications  Medications   sodium chloride (PF) 0 9 % injection 3 mL (has no administration in time range)   nitroglycerin (NITROSTAT) SL tablet 0 4 mg (0 4 mg Sublingual Given 2/3/23 0436)   aspirin chewable tablet 243 mg (243 mg Oral Given 2/3/23 0435)   ondansetron (ZOFRAN) injection 4 mg (4 mg Intravenous Given 2/3/23 0435)   fentanyl citrate (PF) 100 MCG/2ML 50 mcg (50 mcg Intravenous Given 2/3/23 0451)   LORazepam (ATIVAN) injection 0 5 mg (0 5 mg Intravenous Given 2/3/23 0450)       Diagnostic Studies  Results Reviewed     Procedure Component Value Units Date/Time    HS Troponin I 2hr [[id number]]  (Normal) Collected: 02/03/23 0619    Lab Status: Final result Specimen: Blood from Arm, Right Updated: 02/03/23 0651     hs TnI 2hr 34 ng/L      Delta 2hr hsTnI -3 ng/L     HS Troponin I 4hr [[id number]]     Lab Status: No result Specimen: Blood     Basic metabolic panel [[id number]]  (Abnormal) Collected: 02/03/23 0436    Lab Status: Final result Specimen: Blood from Arm, Right Updated: 02/03/23 0526     Sodium 132 mmol/L      Potassium 4 0 mmol/L      Chloride 105 mmol/L      CO2 17 mmol/L      ANION GAP 10 mmol/L      BUN 37 mg/dL      Creatinine 2 38 mg/dL      Glucose 102 mg/dL      Calcium 9 8 mg/dL      eGFR 23 ml/min/1 73sq m     Narrative:      [place] guidelines for Chronic Kidney Disease (CKD):   •  Stage 1 with normal or high GFR (GFR > 90 mL/min/1 73 square meters)  •  Stage 2 Mild CKD (GFR = 60-89 mL/min/1 73 square meters)  •  Stage 3A Moderate CKD (GFR = 45-59 mL/min/1 73 square meters)  •  Stage 3B Moderate CKD (GFR = 30-44 mL/min/1 73 square meters)  •  Stage 4 Severe CKD (GFR = 15-29 mL/min/1 73 square meters)  •  Stage 5 End Stage CKD (GFR <15 mL/min/1 73 square meters)  Note: GFR calculation is accurate only with a steady state creatinine    Lipase [[id number]]  (Abnormal) Collected: 02/03/23 0436    Lab Status: Final result Specimen: Blood from Arm, Right Updated: 02/03/23 0526     Lipase 86 u/L     Hepatic function panel [[id number]]  (Normal) Collected: 02/03/23 0436    Lab Status: Final result Specimen: Blood from Arm, Right Updated: 02/03/23 0526     Total Bilirubin 0 26 mg/dL      Bilirubin, Direct 0 04 mg/dL      Alkaline Phosphatase 88 U/L      AST 14 U/L      ALT 18 U/L      Total Protein 7 3 g/dL      Albumin 3 9 g/dL     B-Type Natriuretic Peptide(BNP) [[id number]]  (Abnormal) Collected: 02/03/23 0436    Lab Status: Final result Specimen: Blood from Arm, Right Updated: 02/03/23 0521      pg/mL     HS Troponin 0hr (reflex protocol) [[id number]]  (Normal) Collected: 02/03/23 0436    Lab Status: Final result Specimen: Blood from Arm, Right Updated: 02/03/23 0516     hs TnI 0hr 37 ng/L     CBC and differential [[id number]]  (Abnormal) Collected: 02/03/23 0436    Lab Status: Final result Specimen: Blood from Arm, Right Updated: 02/03/23 0454     WBC 20 64 Thousand/uL      RBC 4 38 Million/uL      Hemoglobin 12 8 g/dL      Hematocrit 39 4 %      MCV 90 fL      MCH 29 2 pg      MCHC 32 5 g/dL      RDW 17 8 %      MPV 10 1 fL      Platelets 170 Thousands/uL      nRBC 0 /100 WBCs      Neutrophils Relative 68 %      Immat GRANS % 2 %      Lymphocytes Relative 21 %      Monocytes Relative 6 %      Eosinophils Relative 2 %      Basophils Relative 1 %      Neutrophils Absolute 14 10 Thousands/µL      Immature Grans Absolute 0 41 Thousand/uL      Lymphocytes Absolute 4 35 Thousands/µL      Monocytes Absolute 1 23 Thousand/µL      Eosinophils Absolute 0 38 Thousand/µL      Basophils Absolute 0 17 Thousands/µL                  X-ray chest 1 view portable   ED Interpretation by America Cochran MD (02/03 9682)   No acute cardiopulmonary abnormality                 Procedures  Procedures         ED Course  ED Course as of 02/03/23 0654   Fri Feb 03, 2023   0434 EKG interpreted by myself demonstrates sinus rhythm with a rate of 95, normal axis, normal intervals, nonspecific ST segments, I compared this EKG to EKG performed on January 20 8/2023, no change was found   0519 hs TnI 0hr: 37   0522 BNP(!): 230  Improved from prior   0526 Creatinine(!): 2 38  Improved from prior   0527 Will check delta troponin and ekg at 2 hrs   0620 Repeat EKG interpreted by myself demonstrates normal sinus rhythm with a rate 82, normal axis, normal intervals, nonspecific ST segments, when compared to prior EKG, no significant change found   0632 Patient placed on O2 due to hypoxia while sleeping    Patient is supposed to wear CPAP while sleeping    0646 WBC(!): 20 64  Possibly reactive in the setting of pain and vomiting, no signs of infection at this time   0652 Delta 2hr hsTnI: -3             HEART Risk Score    Flowsheet Row Most Recent Value   Heart Score Risk Calculator    History 0 Filed at: 02/03/2023 0519   ECG 1 Filed at: 02/03/2023 4878   Age 0 Filed at: 02/03/2023 3614   Risk Factors 2 Filed at: 02/03/2023 0519   Troponin 2 Filed at: 02/03/2023 8155   HEART Score 5 Filed at: 02/03/2023 7838                        SBIRT 20yo+    Flowsheet Row Most Recent Value   SBIRT (25 yo +)    In order to provide better care to our patients, we are screening all of our patients for alcohol and drug use  Would it be okay to ask you these screening questions? No Filed at: 02/03/2023 3167                    Medical Decision Making  44-year-old female with significant cardiac history presents to the emergency department for evaluation of chest pain and shortness of breath  On exam, she is anxious appearing, but in no acute distress  Differential diagnosis includes but is not limited to ACS, anxiety, musculoskeletal chest pain, pneumothorax, pericarditis, pneumonia, pancreatitis, gallbladder disease  We will check cardiac work-up in addition to hepatic function panel and lipase and reassess  Initial EKG unchanged from EKG from January 28  Labs demonstrate leukocytosis and CKD, but were otherwise unremarkable  Initial troponin 37  We will recheck troponin and EKG in 2 hours  Repeat EKG was unchanged  Delta troponin improved  Patient symptoms improved as well  Heart score is 5  She will be discharged home to follow-up with cardiology and primary care physician  She was given strict return precautions      CAD (coronary artery disease): chronic illness or injury  Chest pain: acute illness or injury  CHF (congestive heart failure) ([facility] [address] ): chronic illness or injury  CKD (chronic kidney disease): chronic illness or injury  ELIAZAR (obstructive sleep apnea): self-limited or minor problem  Amount and/or Complexity of Data Reviewed  External Data Reviewed: labs, ECG and notes  Labs: ordered  Decision-making details documented in ED Course  Radiology: ordered and independent interpretation performed  ECG/medicine tests: ordered and independent interpretation performed  Risk  OTC drugs  Prescription drug management  Disposition  Final diagnoses:   Chest pain   CAD (coronary artery disease)   ELIAZAR (obstructive sleep apnea)   CKD (chronic kidney disease)   CHF (congestive heart failure) ([facility] [address] )     Time reflects when diagnosis was documented in both MDM as applicable and the Disposition within this note     Time User Action Codes Description Comment    2/3/2023  5:27 AM Check, Cristobal Moreno Add [R07 9] Chest pain     2/3/2023  5:27 AM Check, Cristobal Moreno Add [I25 10] CAD (coronary artery disease)     2/3/2023  5:27 AM Check, Cristobal Moreno Add [R79 89] Elevated serum creatinine     2/3/2023  6:42 AM Check, Cristobal Moreno Add [G47 33] ELIAZAR (obstructive sleep apnea)     2/3/2023  6:43 AM Check, Cristobal Moreno Add [N18 9] CKD (chronic kidney disease)     2/3/2023  6:43 AM Check, Aissatou Manzo [R79 89] Elevated serum creatinine     2/3/2023  6:43 AM Check, Cristobal Doe [I50 9] CHF (congestive heart failure) [facility])       ED Disposition     ED Disposition   Discharge    Condition   Stable    Date/Time   Fri Feb 3, 2023  6:52 AM    Comment   Ankit Munson discharge to home/self care  Follow-up Information     Follow up With Specialties Details Why Contact Info Additional [address]  Emergency Department Emergency Medicine  If symptoms worsen [address]  [place] [id number] [phone] [facility] Department, [address] [place] [address] Cassi Wynn, [address] [facility], DO Family Medicine  If symptoms worsen [address] [place] [address]  [phone]       Jayro Javier MD Cardiology Schedule an appointment as soon as possible for a visit   [address]  [phone]             Patient's Medications   Discharge Prescriptions    No medications on file       No discharge procedures on file      PDMP Review       Value Time User    PDMP Reviewed  Yes 10/10/2022 12:27 PM Karis Young, [facility]          ED Provider  Electronically Signed by           Clara Gill MD  02/03/23 0782

## 2023-02-10 ENCOUNTER — OFFICE VISIT (OUTPATIENT)
Dept: FAMILY MEDICINE CLINIC | Facility: CLINIC | Age: 48
End: 2023-02-10

## 2023-02-10 VITALS
RESPIRATION RATE: 15 BRPM | HEART RATE: 105 BPM | DIASTOLIC BLOOD PRESSURE: 84 MMHG | OXYGEN SATURATION: 96 % | TEMPERATURE: 98.7 F | HEIGHT: 66 IN | SYSTOLIC BLOOD PRESSURE: 124 MMHG | BODY MASS INDEX: 45.19 KG/M2

## 2023-02-10 DIAGNOSIS — M79.604 PAIN IN BOTH LOWER EXTREMITIES: ICD-10-CM

## 2023-02-10 DIAGNOSIS — M79.605 PAIN IN BOTH LOWER EXTREMITIES: ICD-10-CM

## 2023-02-10 DIAGNOSIS — R25.2 SPASM: ICD-10-CM

## 2023-02-10 DIAGNOSIS — M25.50 ARTHRALGIA, UNSPECIFIED JOINT: Primary | ICD-10-CM

## 2023-02-10 DIAGNOSIS — Z74.09 MOBILITY IMPAIRED: ICD-10-CM

## 2023-02-10 NOTE — PROGRESS NOTES
Name: Amanda Dunlap      : 1975      MRN: [id number]  Encounter Provider: JALIL Connelly  Encounter Date: 2/10/2023   Encounter department: [address] [place] [address]     1  Arthralgia, unspecified joint  -     Vitamin B12; Future  -     Iron Panel (Includes Ferritin, Iron Sat%, Iron, and TIBC); Future  -     CBC and differential; Future  -     Comprehensive metabolic panel; Future  -     Magnesium; Future  -     XR knee 3 vw left non injury; Future; Expected date: 02/10/2023  -     XR knee 3 vw right non injury; Future; Expected date: 02/10/2023  -     XR spine lumbar minimum 4 views non injury; Future; Expected date: 02/10/2023  -     XR hips bilateral 5+ w pelvis if performed; Future; Expected date: 02/10/2023  -     RF Screen w/ Reflex to Titer; Future  -     SHANNA Screen w/ Reflex to Titer/Pattern; Future    2  Pain in both lower extremities  -     Phosphorus; Future  -     Vitamin D 25 hydroxy; Future  -     HEMOGLOBIN A1C W/ EAG ESTIMATION; Future    3  Spasm  -     Vitamin B12; Future  -     Iron Panel (Includes Ferritin, Iron Sat%, Iron, and TIBC); Future  -     CBC and differential; Future  -     Comprehensive metabolic panel; Future  -     Magnesium; Future  -     Phosphorus; Future    4  Mobility impaired  -     Commode chair         Subjective      Patient presents with complaints of bilateral lower extremity pain  Patient states that she had 2 heart attacks last month and then around  she started with severe debilitating pain in both lower extremities  Patient states that the pain starts in her toes and goes up to the top of her legs into her hips  She states that she has such significant pain in her left knee that she is unable to extend it all the way and her right knee is also very painful  She states that she does have intermittent swelling in her feet which does not follow a pattern    Patient states that she has had swelling and pain in the past but this is different than her previous  Patient also states that she has "dead foot" intermittently in the right foot which is happened twice in the last 2 weeks  She also has intermittent tingling in the left lower extremity  Patient also has significant back pain  This pain has been ongoing for greater than 1 year  Patient is unable to tell me if her pain travels from her back down into her legs  Patient states that she has not done anything for her back pain in the past   She did not has had multiple x-rays ordered in the past and has not had them completed from previous PCP  Patient states that over the last 1 year she has been using a wheelchair to ambulate due to pain in the back and intermittent pain in her legs  When asked if this pain was the same, patient states no this pain is different the pain that she had in her legs resolved and this is a new and worse pain  She also states that she has been having some intermittent cold sensation in her feet and some discoloration like a blue discoloration when she sits for a while  I do not think this is a vascular issue as she has strong pulses, warm extremities and her circulation and sensation are intact  Patient states that she used to be able to get up and move around her house and not be in the wheelchair but since January 16 she has been in her wheelchair constantly  She also states that she has significant muscle spasms and cramps in the lower extremities  Patient states that she drinks enough water throughout the day but that her mother states that she does not drink as much she is saying  Patient states that she has been taking Tylenol but it is not helping her pain  She is not taking it consistently  She also tried Voltaren gel and it has not helped  Patient has been following with cardiology after her heart attack  However, she states that she has had continued chest pain on and off since her heart attacks      Patient has significant pain and intolerance to exam   Patient was crying in pain with barely touching her lower extremities  I was able to assess her pulses in her lower extremities but otherwise was unable to complete full joint exams due to her pain  There was no significant swelling noted on exam today  She did have a normal left foot  -Recommended patient have labs and x-rays completed to try to determine the cause of her pain as the etiology is unclear  Patient became angry and upset with provider as I would not provide a prescription for narcotic pain medication  It is unclear what is causing her pain at this point and if it is neuropathic narcotics will not help  Also, his pain has been going on for several weeks and her lower back pain has been ongoing for a year  Therefore narcotic pain medication is not recommended for chronic pain  -I recommended Tylenol 975 mg 3 times a day along with lidocaine for topical use along with compression, elevation and heat  However patient was very angry with these recommendations and states that she can no longer take this pain as it is debilitating and very severe  Recommend that patient go to the emergency department for evaluation if her pain is intolerable for further evaluation  I spent 45 minutes with patient trying to obtain accurate history and develop a plan but she ended the visit and states that she does not wish to return to see me for follow-up  My recommendations were provided and recommend she follow-up with another provider for further assessment and go to the ER at this time as instructed  She requests that she is provided with an order for commode as she has been urinating on a pot due to her pain she cannot use the bathroom  Provided for patient  Review of Systems   Constitutional: Positive for activity change  Negative for appetite change, fatigue, fever and unexpected weight change     HENT: Negative for congestion, rhinorrhea, sneezing and sore throat  Eyes: Negative for visual disturbance  Respiratory: Negative for cough, chest tightness, shortness of breath and wheezing  Cardiovascular: Positive for chest pain  Negative for palpitations and leg swelling  Gastrointestinal: Negative for abdominal pain, blood in stool, constipation, diarrhea, nausea and vomiting  Genitourinary: Negative for difficulty urinating, dysuria and urgency  Musculoskeletal: Positive for arthralgias, back pain and myalgias  Negative for joint swelling  Skin: Negative for color change and rash  Neurological: Positive for numbness  Negative for dizziness, seizures, weakness and headaches  Hematological: Negative for adenopathy  Does not bruise/bleed easily  Current Outpatient Medications on File Prior to Visit   Medication Sig   • acetaminophen (TYLENOL) 325 mg tablet Take 3 tablets (975 mg total) by mouth every 8 (eight) hours as needed for mild pain   • al mag oxide-diphenhydramine-lidocaine viscous (MAGIC MOUTHWASH) 1:1:1 suspension Swish and spit 10 mL every 4 (four) hours as needed for mouth pain or discomfort   • albuterol (PROVENTIL HFA,VENTOLIN HFA) 90 mcg/act inhaler Inhale 2 puffs every 4 (four) hours as needed for wheezing or shortness of breath   • aspirin 81 mg chewable tablet Chew 1 tablet (81 mg total) daily   • atorvastatin (LIPITOR) 80 mg tablet Take 1 tablet (80 mg total) by mouth every evening   • bumetanide (BUMEX) 1 mg tablet Take 1 tablet (1 mg total) by mouth daily   • calcitriol (ROCALTROL) 0 25 mcg capsule Take 1 capsule (0 25 mcg total) by mouth daily Takes Monday Wednesday and fridays as of 11/11/22   • chlorhexidine (PERIDEX) 0 12 % solution Apply 15 mL to the mouth or throat every 12 (twelve) hours   • clopidogrel (PLAVIX) 75 mg tablet Take 1 tablet (75 mg total) by mouth daily Do not start before January 6, 2023     • Diclofenac Sodium (VOLTAREN) 1 % Apply 2 g topically 4 (four) times a day   • ezetimibe (ZETIA) 10 mg tablet Take 1 tablet (10 mg total) by mouth in the morning  • lidocaine (Lidoderm) 5 % Apply 2 patches topically daily Remove & Discard patch within 12 hours or as directed by MD   • LORazepam (ATIVAN) 0 5 mg tablet Take 1 tablet (0 5 mg total) by mouth 3 (three) times a day as needed for anxiety   • methocarbamol (ROBAXIN) 500 mg tablet Take 1 tablet (500 mg total) by mouth 2 (two) times a day as needed for muscle spasms   • metoprolol succinate (TOPROL-XL) 100 mg 24 hr tablet Take 1 tablet (100 mg total) by mouth daily Do not start before January 11, 2023  • nicotine (NICODERM CQ) 14 mg/24hr TD 24 hr patch Place 1 patch on the skin daily Do not start before January 6, 2023  • nitroglycerin (NITROSTAT) 0 4 mg SL tablet Place 1 tablet (0 4 mg total) under the tongue every 5 (five) minutes as needed for chest pain   • omega-3-acid ethyl esters (LOVAZA) 1 g capsule Take 1 capsule (1 g total) by mouth 2 (two) times a day   • omeprazole (PriLOSEC) 40 MG capsule Take 1 capsule (40 mg total) by mouth daily   • QUEtiapine (SEROquel) 50 mg tablet take 1 tablet by mouth at bedtime   • ranolazine (RANEXA) 500 mg 12 hr tablet Take 1 tablet (500 mg total) by mouth every 12 (twelve) hours   • sertraline (ZOLOFT) 100 mg tablet Take 1 5 tablets (150 mg total) by mouth daily   • sodium bicarbonate 650 mg tablet Take 1 tablet (650 mg total) by mouth 2 (two) times daily after meals       Objective     /84   Pulse 105   Temp 98 7 °F (37 1 °C)   Resp 15   Ht 5' 6" (1 676 m)   LMP  (LMP Unknown)   SpO2 96%   BMI 45 19 kg/m²     Physical Exam  Vitals and nursing note reviewed  Cardiovascular:      Rate and Rhythm: Normal rate and regular rhythm  Pulses: Normal pulses  Heart sounds: Normal heart sounds  No murmur heard  No friction rub  No gallop  Pulmonary:      Effort: Pulmonary effort is normal       Breath sounds: Normal breath sounds  No wheezing, rhonchi or rales     Musculoskeletal:      Right lower leg: No edema  Left lower leg: No edema  Comments: Unable to complete due to pain intolerance but was unable to straighten her knees, patient jumped from the chair with very light palpation to the lumbar spine  Unable to touch her calf area as even the lightest touch was too painful for patient  Neurological:      General: No focal deficit present  Psychiatric:         Mood and Affect: Affect is tearful  Behavior: Behavior is agitated         Chata Toney

## 2023-02-11 ENCOUNTER — HOSPITAL ENCOUNTER (EMERGENCY)
Facility: HOSPITAL | Age: 48
Discharge: HOME | End: 2023-02-11
Attending: FAMILY MEDICINE

## 2023-02-11 ENCOUNTER — HOSPITAL ENCOUNTER (OUTPATIENT)
Dept: RADIOLOGY | Facility: HOSPITAL | Age: 48
Discharge: HOME | End: 2023-02-11

## 2023-02-11 ENCOUNTER — APPOINTMENT (OUTPATIENT)
Dept: LAB | Facility: HOSPITAL | Age: 48
End: 2023-02-11

## 2023-02-11 VITALS
TEMPERATURE: 98 F | OXYGEN SATURATION: 97 % | HEART RATE: 85 BPM | RESPIRATION RATE: 18 BRPM | BODY MASS INDEX: 45.19 KG/M2 | DIASTOLIC BLOOD PRESSURE: 78 MMHG | WEIGHT: 280 LBS | SYSTOLIC BLOOD PRESSURE: 165 MMHG

## 2023-02-11 DIAGNOSIS — M79.605 PAIN IN BOTH LOWER EXTREMITIES: ICD-10-CM

## 2023-02-11 DIAGNOSIS — M89.9 CHRONIC KIDNEY DISEASE-MINERAL AND BONE DISORDER: ICD-10-CM

## 2023-02-11 DIAGNOSIS — M25.50 ARTHRALGIA, UNSPECIFIED JOINT: ICD-10-CM

## 2023-02-11 DIAGNOSIS — M79.604 PAIN IN BOTH LOWER EXTREMITIES: ICD-10-CM

## 2023-02-11 DIAGNOSIS — R25.2 SPASM: ICD-10-CM

## 2023-02-11 DIAGNOSIS — I12.9 BENIGN HYPERTENSION WITH CKD (CHRONIC KIDNEY DISEASE) STAGE III (HCC): ICD-10-CM

## 2023-02-11 DIAGNOSIS — I10 ESSENTIAL HYPERTENSION: ICD-10-CM

## 2023-02-11 DIAGNOSIS — N18.30 BENIGN HYPERTENSION WITH CKD (CHRONIC KIDNEY DISEASE) STAGE III (HCC): ICD-10-CM

## 2023-02-11 DIAGNOSIS — E83.9 CHRONIC KIDNEY DISEASE-MINERAL AND BONE DISORDER: ICD-10-CM

## 2023-02-11 DIAGNOSIS — G62.9 PERIPHERAL NEUROPATHY: Primary | ICD-10-CM

## 2023-02-11 DIAGNOSIS — N18.9 CHRONIC KIDNEY DISEASE-MINERAL AND BONE DISORDER: ICD-10-CM

## 2023-02-11 LAB
ALBUMIN SERPL BCP-MCNC: 3.8 G/DL (ref 3.5–5)
ALBUMIN SERPL BCP-MCNC: 3.9 G/DL (ref 3.5–5)
ALP SERPL-CCNC: 86 U/L (ref 34–104)
ALP SERPL-CCNC: 87 U/L (ref 34–104)
ALT SERPL W P-5'-P-CCNC: 15 U/L (ref 7–52)
ALT SERPL W P-5'-P-CCNC: 17 U/L (ref 7–52)
ANION GAP SERPL CALCULATED.3IONS-SCNC: 10 MMOL/L (ref 4–13)
ANION GAP SERPL CALCULATED.3IONS-SCNC: 10 MMOL/L (ref 4–13)
APAP SERPL-MCNC: <10 UG/ML (ref 10–20)
AST SERPL W P-5'-P-CCNC: 10 U/L (ref 13–39)
AST SERPL W P-5'-P-CCNC: 11 U/L (ref 13–39)
BASOPHILS # BLD AUTO: 0.11 THOUSANDS/ÂΜL (ref 0–0.1)
BASOPHILS # BLD AUTO: 0.11 THOUSANDS/ÂΜL (ref 0–0.1)
BASOPHILS NFR BLD AUTO: 1 % (ref 0–1)
BASOPHILS NFR BLD AUTO: 1 % (ref 0–1)
BILIRUB SERPL-MCNC: 0.31 MG/DL (ref 0.2–1)
BILIRUB SERPL-MCNC: 0.33 MG/DL (ref 0.2–1)
BUN SERPL-MCNC: 25 MG/DL (ref 5–25)
BUN SERPL-MCNC: 25 MG/DL (ref 5–25)
CALCIUM SERPL-MCNC: 10.2 MG/DL (ref 8.4–10.2)
CALCIUM SERPL-MCNC: 10.2 MG/DL (ref 8.4–10.2)
CHLORIDE SERPL-SCNC: 107 MMOL/L (ref 96–108)
CHLORIDE SERPL-SCNC: 108 MMOL/L (ref 96–108)
CO2 SERPL-SCNC: 20 MMOL/L (ref 21–32)
CO2 SERPL-SCNC: 21 MMOL/L (ref 21–32)
CREAT SERPL-MCNC: 2.13 MG/DL (ref 0.6–1.3)
CREAT SERPL-MCNC: 2.19 MG/DL (ref 0.6–1.3)
EOSINOPHIL # BLD AUTO: 0.33 THOUSAND/ÂΜL (ref 0–0.61)
EOSINOPHIL # BLD AUTO: 0.34 THOUSAND/ÂΜL (ref 0–0.61)
EOSINOPHIL NFR BLD AUTO: 2 % (ref 0–6)
EOSINOPHIL NFR BLD AUTO: 2 % (ref 0–6)
ERYTHROCYTE [DISTWIDTH] IN BLOOD BY AUTOMATED COUNT: 18.2 % (ref 11.6–15.1)
ERYTHROCYTE [DISTWIDTH] IN BLOOD BY AUTOMATED COUNT: 18.3 % (ref 11.6–15.1)
GFR SERPL CREATININE-BSD FRML MDRD: 26 ML/MIN/1.73SQ M
GFR SERPL CREATININE-BSD FRML MDRD: 27 ML/MIN/1.73SQ M
GLUCOSE P FAST SERPL-MCNC: 82 MG/DL (ref 65–99)
GLUCOSE SERPL-MCNC: 97 MG/DL (ref 65–140)
HCT VFR BLD AUTO: 37.2 % (ref 34.8–46.1)
HCT VFR BLD AUTO: 39.6 % (ref 34.8–46.1)
HGB BLD-MCNC: 11.9 G/DL (ref 11.5–15.4)
HGB BLD-MCNC: 12.7 G/DL (ref 11.5–15.4)
IMM GRANULOCYTES # BLD AUTO: 0.21 THOUSAND/UL (ref 0–0.2)
IMM GRANULOCYTES # BLD AUTO: 0.22 THOUSAND/UL (ref 0–0.2)
IMM GRANULOCYTES NFR BLD AUTO: 1 % (ref 0–2)
IMM GRANULOCYTES NFR BLD AUTO: 1 % (ref 0–2)
LYMPHOCYTES # BLD AUTO: 2.61 THOUSANDS/ÂΜL (ref 0.6–4.47)
LYMPHOCYTES # BLD AUTO: 2.97 THOUSANDS/ÂΜL (ref 0.6–4.47)
LYMPHOCYTES NFR BLD AUTO: 14 % (ref 14–44)
LYMPHOCYTES NFR BLD AUTO: 15 % (ref 14–44)
MAGNESIUM SERPL-MCNC: 1.9 MG/DL (ref 1.9–2.7)
MCH RBC QN AUTO: 28.9 PG (ref 26.8–34.3)
MCH RBC QN AUTO: 29.1 PG (ref 26.8–34.3)
MCHC RBC AUTO-ENTMCNC: 32 G/DL (ref 31.4–37.4)
MCHC RBC AUTO-ENTMCNC: 32.1 G/DL (ref 31.4–37.4)
MCV RBC AUTO: 90 FL (ref 82–98)
MCV RBC AUTO: 91 FL (ref 82–98)
MONOCYTES # BLD AUTO: 1.12 THOUSAND/ÂΜL (ref 0.17–1.22)
MONOCYTES # BLD AUTO: 1.26 THOUSAND/ÂΜL (ref 0.17–1.22)
MONOCYTES NFR BLD AUTO: 6 % (ref 4–12)
MONOCYTES NFR BLD AUTO: 6 % (ref 4–12)
NEUTROPHILS # BLD AUTO: 14.22 THOUSANDS/ÂΜL (ref 1.85–7.62)
NEUTROPHILS # BLD AUTO: 14.88 THOUSANDS/ÂΜL (ref 1.85–7.62)
NEUTS SEG NFR BLD AUTO: 75 % (ref 43–75)
NEUTS SEG NFR BLD AUTO: 76 % (ref 43–75)
NRBC BLD AUTO-RTO: 0 /100 WBCS
NRBC BLD AUTO-RTO: 0 /100 WBCS
PHOSPHATE SERPL-MCNC: 3.6 MG/DL (ref 2.7–4.5)
PLATELET # BLD AUTO: 357 THOUSANDS/UL (ref 149–390)
PLATELET # BLD AUTO: 381 THOUSANDS/UL (ref 149–390)
PMV BLD AUTO: 10.1 FL (ref 8.9–12.7)
PMV BLD AUTO: 9.8 FL (ref 8.9–12.7)
POTASSIUM SERPL-SCNC: 3.9 MMOL/L (ref 3.5–5.3)
POTASSIUM SERPL-SCNC: 4.5 MMOL/L (ref 3.5–5.3)
PROT SERPL-MCNC: 7.6 G/DL (ref 6.4–8.4)
PROT SERPL-MCNC: 7.9 G/DL (ref 6.4–8.4)
RBC # BLD AUTO: 4.12 MILLION/UL (ref 3.81–5.12)
RBC # BLD AUTO: 4.37 MILLION/UL (ref 3.81–5.12)
SODIUM SERPL-SCNC: 138 MMOL/L (ref 135–147)
SODIUM SERPL-SCNC: 138 MMOL/L (ref 135–147)
WBC # BLD AUTO: 18.61 THOUSAND/UL (ref 4.31–10.16)
WBC # BLD AUTO: 19.77 THOUSAND/UL (ref 4.31–10.16)

## 2023-02-11 RX ORDER — GABAPENTIN 300 MG/1
300 CAPSULE ORAL ONCE
Status: COMPLETED | OUTPATIENT
Start: 2023-02-11 | End: 2023-02-11

## 2023-02-11 RX ORDER — GABAPENTIN 300 MG/1
300 CAPSULE ORAL 3 TIMES DAILY
Qty: 90 CAPSULE | Refills: 0 | Status: SHIPPED | OUTPATIENT
Start: 2023-02-11

## 2023-02-11 RX ADMIN — SODIUM CHLORIDE 1000 ML: 0.9 INJECTION, SOLUTION INTRAVENOUS at 12:34

## 2023-02-11 RX ADMIN — GABAPENTIN 300 MG: 300 CAPSULE ORAL at 12:34

## 2023-02-12 LAB
25(OH)D3 SERPL-MCNC: 9.7 NG/ML (ref 30–100)
ANA SER QL IA: NEGATIVE
EST. AVERAGE GLUCOSE BLD GHB EST-MCNC: 97 MG/DL
FERRITIN SERPL-MCNC: 30 NG/ML (ref 8–388)
HBA1C MFR BLD: 5 %
IRON SATN MFR SERPL: 12 % (ref 15–50)
IRON SERPL-MCNC: 40 UG/DL (ref 50–170)
TIBC SERPL-MCNC: 344 UG/DL (ref 250–450)
VIT B12 SERPL-MCNC: 222 PG/ML (ref 100–900)

## 2023-02-12 NOTE — ED PROVIDER NOTES
History  Chief Complaint   Patient presents with   • Difficulty Walking     Patient reports that she has not been able to walk since January 16  Patient reporting severe pain of her bilateral legs  44-year-old female presents to the emergency department seek evaluation for difficulty walking that has been ongoing and somewhat worsening since January 16  Patient states that she has been largely bedbound at home due to paresthesias of the bilateral lower extremities  No reported injuries or trauma to the area  No low back pain  No urinary or bowel incontinence  No saddle anesthesia  Patient states that she feels as though she has pins-and-needles and hypersensitivity primarily of the feet and lower portions of the bilateral lower extremities  Patient appears tearful due to her symptoms stating that she wants to get help in hopes that she can have the pain improved so that she can ambulate again  Patient reports that she has worsening pain in her legs with ambulation  Uncertain if this is muscular in nature or cutaneous  Patient also is an active smoker  Allergies reviewed          Prior to Admission Medications   Prescriptions Last Dose Informant Patient Reported? Taking?    Diclofenac Sodium (VOLTAREN) 1 %   No No   Sig: Apply 2 g topically 4 (four) times a day   LORazepam (ATIVAN) 0 5 mg tablet   No No   Sig: Take 1 tablet (0 5 mg total) by mouth 3 (three) times a day as needed for anxiety   QUEtiapine (SEROquel) 50 mg tablet   No No   Sig: take 1 tablet by mouth at bedtime   acetaminophen (TYLENOL) 325 mg tablet   No No   Sig: Take 3 tablets (975 mg total) by mouth every 8 (eight) hours as needed for mild pain   al mag oxide-diphenhydramine-lidocaine viscous (MAGIC MOUTHWASH) 1:1:1 suspension   No No   Sig: Swish and spit 10 mL every 4 (four) hours as needed for mouth pain or discomfort   albuterol (PROVENTIL HFA,VENTOLIN HFA) 90 mcg/act inhaler   No No   Sig: Inhale 2 puffs every 4 (four) hours as needed for wheezing or shortness of breath   aspirin 81 mg chewable tablet   No No   Sig: Chew 1 tablet (81 mg total) daily   atorvastatin (LIPITOR) 80 mg tablet   No No   Sig: Take 1 tablet (80 mg total) by mouth every evening   bumetanide (BUMEX) 1 mg tablet   No No   Sig: Take 1 tablet (1 mg total) by mouth daily   calcitriol (ROCALTROL) 0 25 mcg capsule   Yes No   Sig: Take 1 capsule (0 25 mcg total) by mouth daily Takes Monday Wednesday and fridays as of 11/11/22   chlorhexidine (PERIDEX) 0 12 % solution   No No   Sig: Apply 15 mL to the mouth or throat every 12 (twelve) hours   clopidogrel (PLAVIX) 75 mg tablet   No No   Sig: Take 1 tablet (75 mg total) by mouth daily Do not start before January 6, 2023  ezetimibe (ZETIA) 10 mg tablet   No No   Sig: Take 1 tablet (10 mg total) by mouth in the morning  lidocaine (Lidoderm) 5 %   No No   Sig: Apply 2 patches topically daily Remove & Discard patch within 12 hours or as directed by MD   methocarbamol (ROBAXIN) 500 mg tablet   No No   Sig: Take 1 tablet (500 mg total) by mouth 2 (two) times a day as needed for muscle spasms   metoprolol succinate (TOPROL-XL) 100 mg 24 hr tablet   No No   Sig: Take 1 tablet (100 mg total) by mouth daily Do not start before January 11, 2023  nicotine (NICODERM CQ) 14 mg/24hr TD 24 hr patch   No No   Sig: Place 1 patch on the skin daily Do not start before January 6, 2023     nitroglycerin (NITROSTAT) 0 4 mg SL tablet   No No   Sig: Place 1 tablet (0 4 mg total) under the tongue every 5 (five) minutes as needed for chest pain   omega-3-acid ethyl esters (LOVAZA) 1 g capsule   No No   Sig: Take 1 capsule (1 g total) by mouth 2 (two) times a day   omeprazole (PriLOSEC) 40 MG capsule   No No   Sig: Take 1 capsule (40 mg total) by mouth daily   ranolazine (RANEXA) 500 mg 12 hr tablet   No No   Sig: Take 1 tablet (500 mg total) by mouth every 12 (twelve) hours   sertraline (ZOLOFT) 100 mg tablet   No No   Sig: Take 1 5 tablets (150 mg total) by mouth daily   sodium bicarbonate 650 mg tablet   No No   Sig: Take 1 tablet (650 mg total) by mouth 2 (two) times daily after meals      Facility-Administered Medications: None       Past Medical History:   Diagnosis Date   • Anemia    • Anxiety    • CAD (coronary artery disease)    • Cancer ([facility] [address] )    • Cardiomyopathy ([facility] )    • CHF (congestive heart failure) ([facility])    • Chronic kidney disease    • COPD (chronic obstructive pulmonary disease) ([facility])     scheduled for sleep apnea test soon after pacemaker implant   • Coronary artery disease    • Depression    • History of chemotherapy     non hodgkins lymphoma    • Hyperlipemia    • Hypertension    • Hypertrophic cardiomyopathy ([facility])    • Lymphoma, non-Hodgkin's ([facility])    • Myocardial infarction [facility])    • Non-ST elevation MI (NSTEMI) 2023   • Obesity    • Obstructive sleep apnea    • SSS (sick sinus syndrome) ([facility])     s/p medtronic dual chamber pacemaker 2021   • Tobacco abuse    • Ventricular tachycardia, non-sustained    • Wears glasses        Past Surgical History:   Procedure Laterality Date   • CARDIAC CATHETERIZATION N/A 1/3/2023    Procedure: Cardiac catheterization;  Surgeon: Caroline Swift MD;  Location: [facility]; Service: Cardiology   • CARDIAC CATHETERIZATION N/A 1/3/2023    Procedure: Cardiac Coronary Angiogram;  Surgeon: Caroline Swift MD;  [facility]; Service: Cardiology   • CARDIAC CATHETERIZATION N/A 1/3/2023    Procedure: Cardiac pci;  Surgeon: Caroline Swift MD;  Location: [facility];   Service: Cardiology   • CARDIAC PACEMAKER PLACEMENT Left 2021    Procedure: DUAL LEAD PACEMAKER IMPLANT;  Surgeon: Sarita Oliveira MD;  Location: [address] [place], [place] [address]o [place] MAIN OR;  Service: Cardiology   • CAROTID STENT     •  SECTION     • CORONARY ANGIOPLASTY WITH STENT PLACEMENT  2021    GREG mid RCA and GREG right PDA   • DENTAL SURGERY     • IR BIOPSY KIDNEY RANDOM  10/18/2021       Family History Problem Relation Age of Onset   • No Known Problems Mother    • No Known Problems Father    • No Known Problems Daughter    • Brain cancer Maternal Grandfather    • Heart disease Maternal Grandfather    • Cancer Maternal Grandfather    • No Known Problems Paternal Aunt    • No Known Problems Paternal Aunt      I have reviewed and agree with the history as documented  E-Cigarette/Vaping   • E-Cigarette Use Never User      E-Cigarette/Vaping Substances   • Nicotine No    • THC No    • CBD No    • Flavoring No    • Other No    • Unknown No      Social History     Tobacco Use   • Smoking status: Every Day     Packs/day: 0 50     Years: 25 00     Pack years: 12 50     Types: Cigarettes   • Smokeless tobacco: Never   • Tobacco comments:     Recently and currently quitting cigarettes   Vaping Use   • Vaping Use: Never used   Substance Use Topics   • Alcohol use: Yes     Comment: 1-2 times years   • Drug use: No       Review of Systems   Constitutional: Negative for chills, fatigue and fever  HENT: Negative for congestion, ear pain, rhinorrhea, sinus pressure, sneezing, sore throat and trouble swallowing  Eyes: Negative for discharge and itching  Respiratory: Negative for cough, chest tightness, shortness of breath, wheezing and stridor  Cardiovascular: Negative for chest pain and palpitations  Gastrointestinal: Negative for abdominal pain, diarrhea, nausea and vomiting  Musculoskeletal: Positive for gait problem  Neurological: Negative for dizziness, syncope, numbness and headaches  Parasthesias of b/l lower extremeties   All other systems reviewed and are negative  Physical Exam  Physical Exam  Vitals and nursing note reviewed  Constitutional:       General: She is not in acute distress  Appearance: She is well-developed  She is not diaphoretic  HENT:      Head: Normocephalic and atraumatic        Right Ear: External ear normal       Left Ear: External ear normal       Nose: Nose normal    Eyes:      Conjunctiva/sclera: Conjunctivae normal       Pupils: Pupils are equal, round, and reactive to light  Cardiovascular:      Rate and Rhythm: Normal rate and regular rhythm  Heart sounds: Normal heart sounds  No murmur heard  No friction rub  No gallop  Pulmonary:      Effort: Pulmonary effort is normal  No respiratory distress  Breath sounds: Normal breath sounds  No stridor  No wheezing or rales  Abdominal:      General: Bowel sounds are normal  There is no distension  Palpations: Abdomen is soft  Tenderness: There is no abdominal tenderness  There is no guarding  Musculoskeletal:         General: No tenderness  Normal range of motion  Cervical back: Normal range of motion  Comments: Strong pedal pulses present in the bilateral lower extremities  Lower extremities appear to be well-perfused  Skin:     General: Skin is warm  Capillary Refill: Capillary refill takes less than 2 seconds  Neurological:      Mental Status: She is alert and oriented to person, place, and time           Vital Signs  ED Triage Vitals [02/11/23 1058]   Temperature Pulse Respirations Blood Pressure SpO2   98 °F (36 7 °C) 88 16 (!) 173/82 98 %      Temp Source Heart Rate Source Patient Position - Orthostatic VS BP Location FiO2 (%)   Tympanic Monitor Sitting Left arm --      Pain Score       4           Vitals:    02/11/23 1058 02/11/23 1200   BP: (!) 173/82 165/78   Pulse: 88 85   Patient Position - Orthostatic VS: Sitting Lying         Visual Acuity      ED Medications  Medications   sodium chloride 0 9 % bolus 1,000 mL (0 mL Intravenous Stopped 2/11/23 1354)   gabapentin (NEURONTIN) capsule 300 mg (300 mg Oral Given 2/11/23 1234)       Diagnostic Studies  Results Reviewed     Procedure Component Value Units Date/Time    Comprehensive metabolic panel [[id number]]  (Abnormal) Collected: 02/11/23 1234    Lab Status: Final result Specimen: Blood from Arm, Right Updated: 02/11/23 1310     Sodium 138 mmol/L      Potassium 3 9 mmol/L      Chloride 107 mmol/L      CO2 21 mmol/L      ANION GAP 10 mmol/L      BUN 25 mg/dL      Creatinine 2 13 mg/dL      Glucose 97 mg/dL      Calcium 10 2 mg/dL      AST 10 U/L      ALT 15 U/L      Alkaline Phosphatase 87 U/L      Total Protein 7 6 g/dL      Albumin 3 8 g/dL      Total Bilirubin 0 33 mg/dL      eGFR 27 ml/min/1 73sq m     Narrative:      National Kidney Disease Foundation guidelines for Chronic Kidney Disease (CKD):   •  Stage 1 with normal or high GFR (GFR > 90 mL/min/1 73 square meters)  •  Stage 2 Mild CKD (GFR = 60-89 mL/min/1 73 square meters)  •  Stage 3A Moderate CKD (GFR = 45-59 mL/min/1 73 square meters)  •  Stage 3B Moderate CKD (GFR = 30-44 mL/min/1 73 square meters)  •  Stage 4 Severe CKD (GFR = 15-29 mL/min/1 73 square meters)  •  Stage 5 End Stage CKD (GFR <15 mL/min/1 73 square meters)  Note: GFR calculation is accurate only with a steady state creatinine    Acetaminophen level-"If concentration is detectable, please discuss with medical  on call " [[id number]]  (Abnormal) Collected: 02/11/23 1234    Lab Status: Final result Specimen: Blood from Arm, Right Updated: 02/11/23 1309     Acetaminophen Level <10 ug/mL     CBC and differential [[id number]]  (Abnormal) Collected: 02/11/23 1234    Lab Status: Final result Specimen: Blood from Arm, Right Updated: 02/11/23 1242     WBC 19 77 Thousand/uL      RBC 4 12 Million/uL      Hemoglobin 11 9 g/dL      Hematocrit 37 2 %      MCV 90 fL      MCH 28 9 pg      MCHC 32 0 g/dL      RDW 18 2 %      MPV 9 8 fL      Platelets 556 Thousands/uL      nRBC 0 /100 WBCs      Neutrophils Relative 75 %      Immat GRANS % 1 %      Lymphocytes Relative 15 %      Monocytes Relative 6 %      Eosinophils Relative 2 %      Basophils Relative 1 %      Neutrophils Absolute 14 88 Thousands/µL      Immature Grans Absolute 0 21 Thousand/uL      Lymphocytes Absolute 2 97 Thousands/µL      Monocytes Absolute 1 26 Thousand/µL      Eosinophils Absolute 0 34 Thousand/µL      Basophils Absolute 0 11 Thousands/µL                  No orders to display              Procedures  Procedures         ED Course  ED Course as of 02/12/23 1615   Sat Feb 11, 2023   1322 Reporting dramatic improvement in symptoms with gabapentin  Likely peripheral neuropathy  Patient states that she is satisfied going home with prescription for gabapentin at this time  She denies feeling ill and states that she usually has an abnormally high white blood cell count  Patient states that she feels well and denies any infectious type symptoms  SBIRT 20yo+    Flowsheet Row Most Recent Value   SBIRT (25 yo +)    In order to provide better care to our patients, we are screening all of our patients for alcohol and drug use  Would it be okay to ask you these screening questions? Yes Filed at: 02/11/2023 1112   Initial Alcohol Screen: US AUDIT-C     1  How often do you have a drink containing alcohol? 0 Filed at: 02/11/2023 1112   2  How many drinks containing alcohol do you have on a typical day you are drinking? 0 Filed at: 02/11/2023 1112   3a  Male UNDER 65: How often do you have five or more drinks on one occasion? 0 Filed at: 02/11/2023 1112   3b  FEMALE Any Age, or MALE 65+: How often do you have 4 or more drinks on one occassion? 0 Filed at: 02/11/2023 1112   Audit-C Score 0 Filed at: 02/11/2023 1112   NELIA: How many times in the past year have you    Used an illegal drug or used a prescription medication for non-medical reasons? Never Filed at: 02/11/2023 1112                    Medical Decision Making  Patient reports feeling significantly improved with dose of gabapentin in the ER  I suspect the patient's symptoms are mostly related to peripheral neuropathy  Wrongly encouraged close outpatient follow-up    There was some limited concern for possible vascular disease resulting in bilateral lower extremity pain and pain with ambulation as the patient was potentially describing claudication  Patient does have strong pulses present in the bilateral lower extremities and they do appear to be well-perfused  I do not believe this patient to have any acute vascular insufficiency of the lower extremities at this time  Recommend outpatient follow-up with PCP for further evaluation  Outpatient prescription for gabapentin given  Peripheral neuropathy: undiagnosed new problem with uncertain prognosis  Amount and/or Complexity of Data Reviewed  External Data Reviewed: labs and radiology  Labs: ordered  Risk  Prescription drug management  Disposition  Final diagnoses:   Peripheral neuropathy     Time reflects when diagnosis was documented in both MDM as applicable and the Disposition within this note     Time User Action Codes Description Comment    2/11/2023  1:22 PM Mikel Landis Add [G62 9] Peripheral neuropathy       ED Disposition     ED Disposition   Discharge    Condition   Stable    Date/Time   Sat Feb 11, 2023  1:22 PM    Comment   Jaylon Vázquez discharge to home/self care  Follow-up Information     Follow up With Specialties Details Why [address] [facility], [address] [facility], Nurse Practitioner   [address] [address]  [phone]            Discharge Medication List as of 2/11/2023  1:24 PM      START taking these medications    Details   gabapentin (Neurontin) 300 mg capsule Take 1 capsule (300 mg total) by mouth 3 (three) times a day For post-herpetic neuralgia:  Take 1 tablet on day 1,  Then take 2 tablets on day 2, Then take 3 tablets on day 3 and every day after that as instructed by your doctor , Starting Sat 2/11/2023,  Normal         CONTINUE these medications which have NOT CHANGED    Details   acetaminophen (TYLENOL) 325 mg tablet Take 3 tablets (975 mg total) by mouth every 8 (eight) hours as needed for mild pain, Starting Thu 1/5/2023, Normal al mag oxide-diphenhydramine-lidocaine viscous (MAGIC MOUTHWASH) 1:1:1 suspension Swish and spit 10 mL every 4 (four) hours as needed for mouth pain or discomfort, Starting Thu 1/5/2023, Normal      albuterol (PROVENTIL HFA,VENTOLIN HFA) 90 mcg/act inhaler Inhale 2 puffs every 4 (four) hours as needed for wheezing or shortness of breath, Starting Mon 5/23/2022, Normal      aspirin 81 mg chewable tablet Chew 1 tablet (81 mg total) daily, Starting Fri 4/2/2021, Normal      atorvastatin (LIPITOR) 80 mg tablet Take 1 tablet (80 mg total) by mouth every evening, Starting Tue 1/17/2023, Until Mon 4/17/2023, Normal      bumetanide (BUMEX) 1 mg tablet Take 1 tablet (1 mg total) by mouth daily, Starting Thu 7/14/2022, Normal      calcitriol (ROCALTROL) 0 25 mcg capsule Take 1 capsule (0 25 mcg total) by mouth daily Takes Monday Wednesday and fridays as of 11/11/22, Starting Tue 1/10/2023, Historical Med      chlorhexidine (PERIDEX) 0 12 % solution Apply 15 mL to the mouth or throat every 12 (twelve) hours, Starting Thu 1/5/2023, Normal      clopidogrel (PLAVIX) 75 mg tablet Take 1 tablet (75 mg total) by mouth daily Do not start before January 6, 2023 , Starting Fri 1/6/2023, Normal      Diclofenac Sodium (VOLTAREN) 1 % Apply 2 g topically 4 (four) times a day, Starting Mon 8/9/2021, Normal      ezetimibe (ZETIA) 10 mg tablet Take 1 tablet (10 mg total) by mouth in the morning , Starting Mon 5/23/2022, Normal      lidocaine (Lidoderm) 5 % Apply 2 patches topically daily Remove & Discard patch within 12 hours or as directed by MD, Starting Fri 11/11/2022, Normal      LORazepam (ATIVAN) 0 5 mg tablet Take 1 tablet (0 5 mg total) by mouth 3 (three) times a day as needed for anxiety, Starting Mon 10/10/2022, Normal      methocarbamol (ROBAXIN) 500 mg tablet Take 1 tablet (500 mg total) by mouth 2 (two) times a day as needed for muscle spasms, Starting Fri 11/11/2022, Normal      metoprolol succinate (TOPROL-XL) 100 mg 24 hr tablet Take 1 tablet (100 mg total) by mouth daily Do not start before January 11, 2023 , Starting Wed 1/11/2023, Normal      nicotine (NICODERM CQ) 14 mg/24hr TD 24 hr patch Place 1 patch on the skin daily Do not start before January 6, 2023 , Starting Fri 1/6/2023, Normal      nitroglycerin (NITROSTAT) 0 4 mg SL tablet Place 1 tablet (0 4 mg total) under the tongue every 5 (five) minutes as needed for chest pain, Starting Tue 1/17/2023, Normal      omega-3-acid ethyl esters (LOVAZA) 1 g capsule Take 1 capsule (1 g total) by mouth 2 (two) times a day, Starting Thu 1/5/2023, Normal      omeprazole (PriLOSEC) 40 MG capsule Take 1 capsule (40 mg total) by mouth daily, Starting Fri 11/11/2022, Normal      QUEtiapine (SEROquel) 50 mg tablet take 1 tablet by mouth at bedtime, Normal      ranolazine (RANEXA) 500 mg 12 hr tablet Take 1 tablet (500 mg total) by mouth every 12 (twelve) hours, Starting Tue 1/10/2023, Normal      sertraline (ZOLOFT) 100 mg tablet Take 1 5 tablets (150 mg total) by mouth daily, Starting Mon 9/19/2022, Normal      sodium bicarbonate 650 mg tablet Take 1 tablet (650 mg total) by mouth 2 (two) times daily after meals, Starting Thu 1/5/2023, Normal             No discharge procedures on file      PDMP Review       Value Time User    PDMP Reviewed  Yes 10/10/2022 12:27 PM JALIL Fuentes          ED Provider  Electronically Signed by           Terry Roca PA-C  02/12/23 5411

## 2023-02-13 LAB — RHEUMATOID FACT SER QL LA: NEGATIVE

## 2023-02-14 ENCOUNTER — OFFICE VISIT (OUTPATIENT)
Dept: FAMILY MEDICINE CLINIC | Facility: CLINIC | Age: 48
End: 2023-02-14

## 2023-02-14 VITALS
SYSTOLIC BLOOD PRESSURE: 146 MMHG | BODY MASS INDEX: 45 KG/M2 | DIASTOLIC BLOOD PRESSURE: 82 MMHG | TEMPERATURE: 97.4 F | HEART RATE: 85 BPM | WEIGHT: 280 LBS | HEIGHT: 66 IN | OXYGEN SATURATION: 100 %

## 2023-02-14 DIAGNOSIS — M79.605 BILATERAL LEG AND FOOT PAIN: ICD-10-CM

## 2023-02-14 DIAGNOSIS — M79.672 BILATERAL LEG AND FOOT PAIN: ICD-10-CM

## 2023-02-14 DIAGNOSIS — Z23 ENCOUNTER FOR IMMUNIZATION: ICD-10-CM

## 2023-02-14 DIAGNOSIS — G89.29 CHRONIC BILATERAL LOW BACK PAIN, UNSPECIFIED WHETHER SCIATICA PRESENT: Primary | ICD-10-CM

## 2023-02-14 DIAGNOSIS — M79.604 BILATERAL LEG AND FOOT PAIN: ICD-10-CM

## 2023-02-14 DIAGNOSIS — Z12.31 ENCOUNTER FOR SCREENING MAMMOGRAM FOR BREAST CANCER: ICD-10-CM

## 2023-02-14 DIAGNOSIS — M79.671 BILATERAL LEG AND FOOT PAIN: ICD-10-CM

## 2023-02-14 DIAGNOSIS — M54.50 CHRONIC BILATERAL LOW BACK PAIN, UNSPECIFIED WHETHER SCIATICA PRESENT: Primary | ICD-10-CM

## 2023-02-14 NOTE — PROGRESS NOTES
Assessment/Plan:      Diagnoses and all orders for this visit:    Chronic bilateral low back pain, unspecified whether sciatica present  -     MRI lumbar spine wo contrast; Future    Bilateral leg and foot pain  -     MRI lumbar spine wo contrast; Future    Encounter for screening mammogram for breast cancer  -     Mammo screening bilateral w 3d & cad; Future    Encounter for immunization  -     Pneumococcal Conjugate Vaccine 20-valent (PCV20)          Return in about 3 weeks (around 3/7/2023) for Annual physical with pcp  The following portions of the patient's history were reviewed and updated as appropriate: allergies, current medications, past family history, past medical history, past social history, past surgical history, and problem list      Subjective:     Patient ID: Donneta Nyhan is a 41 y o  female  HPI    Patient here for ED follow up from 2/11/2023  Of note was seen in the office by PCP 2/10/2023  See that visit for additional details  In short patient was seen for leg pain bilaterally since 1/16/2023  She had multiple XRs reordered to determine cause of pain  Patient had XR hips, lumbar spine, left and right knees  They were largely normal apart from mild OA in knees  Also noted, L3-L4 through L5-S1 mild disc space narrowing with tiny osteophytes  She presented to the ED as above  Patient was given gabapentin and reported improvement to ED physician after dosing  She was discharged home with prescription for gabapentin 300 mg TID  #90 tablets  Reports today has helped with the pain  Reports can stand up and get to the bathroom without significant pain  Reports currently on 300 mg TID  She is here with mother who she reports has been helping her out at home  She reports the swelling, tingling, numbness in the feet has resolved as well  She continues to have back pain which is chronic, bilateral lower lumbar  She reports no worsening there  She reports numb in the back   She reports never been evaluated for the back  We did discuss results of her XR with possible disc narrowing in the lower back  MRI ordered for further evaluation with plan to send to pain management/neurosurgery pending results  Plan to continue gabapentin at current dosing for full 4 weeks prior to considering dose increase  HTN: patient on bumex and metoprolol  She reports compliance with meds including this morning  Has BP cuff at home but does not check  Denies headache, change in vision, worsening edema, shortness of breath, palpitations  She recently had a MI and reports following with cards    - monitor BP at home daily and log- bring to next visit to monitor         PHQ-9 Depression Screening            Current Outpatient Medications on File Prior to Visit   Medication Sig Dispense Refill   • acetaminophen (TYLENOL) 325 mg tablet Take 3 tablets (975 mg total) by mouth every 8 (eight) hours as needed for mild pain 90 tablet 0   • al mag oxide-diphenhydramine-lidocaine viscous (MAGIC MOUTHWASH) 1:1:1 suspension Swish and spit 10 mL every 4 (four) hours as needed for mouth pain or discomfort 90 mL 0   • albuterol (PROVENTIL HFA,VENTOLIN HFA) 90 mcg/act inhaler Inhale 2 puffs every 4 (four) hours as needed for wheezing or shortness of breath 18 g 5   • aspirin 81 mg chewable tablet Chew 1 tablet (81 mg total) daily 90 tablet 0   • atorvastatin (LIPITOR) 80 mg tablet Take 1 tablet (80 mg total) by mouth every evening 90 tablet 0   • bumetanide (BUMEX) 1 mg tablet Take 1 tablet (1 mg total) by mouth daily 30 tablet 6   • calcitriol (ROCALTROL) 0 25 mcg capsule Take 1 capsule (0 25 mcg total) by mouth daily Takes Monday Wednesday and fridays as of 11/11/22     • chlorhexidine (PERIDEX) 0 12 % solution Apply 15 mL to the mouth or throat every 12 (twelve) hours 473 mL 0   • clopidogrel (PLAVIX) 75 mg tablet Take 1 tablet (75 mg total) by mouth daily Do not start before January 6, 2023  90 tablet 0   • Diclofenac Sodium (VOLTAREN) 1 % Apply 2 g topically 4 (four) times a day 150 g 2   • ezetimibe (ZETIA) 10 mg tablet Take 1 tablet (10 mg total) by mouth in the morning  30 tablet 5   • gabapentin (Neurontin) 300 mg capsule Take 1 capsule (300 mg total) by mouth 3 (three) times a day For post-herpetic neuralgia: Take 1 tablet on day 1,  Then take 2 tablets on day 2, Then take 3 tablets on day 3 and every day after that as instructed by your doctor   90 capsule 0   • lidocaine (Lidoderm) 5 % Apply 2 patches topically daily Remove & Discard patch within 12 hours or as directed by MD 30 patch 0   • LORazepam (ATIVAN) 0 5 mg tablet Take 1 tablet (0 5 mg total) by mouth 3 (three) times a day as needed for anxiety 90 tablet 0   • methocarbamol (ROBAXIN) 500 mg tablet Take 1 tablet (500 mg total) by mouth 2 (two) times a day as needed for muscle spasms 40 tablet 0   • metoprolol succinate (TOPROL-XL) 100 mg 24 hr tablet Take 1 tablet (100 mg total) by mouth daily Do not start before January 11, 2023  30 tablet 0   • nicotine (NICODERM CQ) 14 mg/24hr TD 24 hr patch Place 1 patch on the skin daily Do not start before January 6, 2023  28 patch 0   • nitroglycerin (NITROSTAT) 0 4 mg SL tablet Place 1 tablet (0 4 mg total) under the tongue every 5 (five) minutes as needed for chest pain 25 tablet 5   • omega-3-acid ethyl esters (LOVAZA) 1 g capsule Take 1 capsule (1 g total) by mouth 2 (two) times a day 60 capsule 0   • omeprazole (PriLOSEC) 40 MG capsule Take 1 capsule (40 mg total) by mouth daily 30 capsule 0   • QUEtiapine (SEROquel) 50 mg tablet take 1 tablet by mouth at bedtime 30 tablet 1   • ranolazine (RANEXA) 500 mg 12 hr tablet Take 1 tablet (500 mg total) by mouth every 12 (twelve) hours 60 tablet 0   • sertraline (ZOLOFT) 100 mg tablet Take 1 5 tablets (150 mg total) by mouth daily 45 tablet 1   • sodium bicarbonate 650 mg tablet Take 1 tablet (650 mg total) by mouth 2 (two) times daily after meals 60 tablet 0     No current facility-administered medications on file prior to visit  Review of Systems      Objective:    Vitals:    02/14/23 0902   BP: 146/82   Pulse: 85   Temp: (!) 97 4 °F (36 3 °C)   TempSrc: Temporal   SpO2: 100%   Weight: 127 kg (280 lb)   Height: 5' 6" (1 676 m)         Physical Exam  Vitals and nursing note reviewed  Constitutional:       General: She is not in acute distress  Appearance: Normal appearance  She is not ill-appearing or toxic-appearing  Comments: Sitting in wheelchair   HENT:      Head: Normocephalic and atraumatic  Right Ear: External ear normal       Left Ear: External ear normal    Eyes:      General: No scleral icterus  Right eye: No discharge  Left eye: No discharge  Extraocular Movements: Extraocular movements intact  Conjunctiva/sclera: Conjunctivae normal    Cardiovascular:      Rate and Rhythm: Normal rate and regular rhythm  Heart sounds: Normal heart sounds  No murmur heard  No friction rub  No gallop  Pulmonary:      Effort: Pulmonary effort is normal  No respiratory distress  Breath sounds: Normal breath sounds  No wheezing or rales  Musculoskeletal:         General: No swelling or tenderness  Right lower leg: No edema  Left lower leg: No edema  Comments: DP/pt pulses 2+   Neurological:      General: No focal deficit present  Mental Status: She is alert  Sensory: No sensory deficit  Motor: Weakness (4/5 lower extremities throughout bilateral) present        Deep Tendon Reflexes: Reflexes normal

## 2023-02-17 DIAGNOSIS — E55.9 VITAMIN D DEFICIENCY: Primary | ICD-10-CM

## 2023-02-23 ENCOUNTER — HOSPITAL ENCOUNTER (OUTPATIENT)
Dept: MAMMOGRAPHY | Facility: HOSPITAL | Age: 48
End: 2023-02-23

## 2023-02-23 VITALS — WEIGHT: 280 LBS | BODY MASS INDEX: 45 KG/M2 | HEIGHT: 66 IN

## 2023-02-23 DIAGNOSIS — Z12.31 ENCOUNTER FOR SCREENING MAMMOGRAM FOR BREAST CANCER: ICD-10-CM

## 2023-03-07 DIAGNOSIS — G62.9 PERIPHERAL NEUROPATHY: ICD-10-CM

## 2023-03-08 RX ORDER — GABAPENTIN 300 MG/1
300 CAPSULE ORAL 3 TIMES DAILY
Qty: 90 CAPSULE | Refills: 0 | Status: SHIPPED | OUTPATIENT
Start: 2023-03-08

## 2023-03-17 ENCOUNTER — HOSPITAL ENCOUNTER (EMERGENCY)
Facility: HOSPITAL | Age: 48
Discharge: HOME | End: 2023-03-17
Attending: EMERGENCY MEDICINE

## 2023-03-17 ENCOUNTER — APPOINTMENT (EMERGENCY)
Dept: RADIOLOGY | Facility: HOSPITAL | Age: 48
End: 2023-03-17

## 2023-03-17 VITALS
RESPIRATION RATE: 18 BRPM | TEMPERATURE: 99.2 F | HEART RATE: 84 BPM | OXYGEN SATURATION: 95 % | WEIGHT: 270 LBS | BODY MASS INDEX: 43.39 KG/M2 | SYSTOLIC BLOOD PRESSURE: 166 MMHG | DIASTOLIC BLOOD PRESSURE: 105 MMHG | HEIGHT: 66 IN

## 2023-03-17 DIAGNOSIS — R07.9 CHEST PAIN: Primary | ICD-10-CM

## 2023-03-17 DIAGNOSIS — I25.10 CAD (CORONARY ARTERY DISEASE): ICD-10-CM

## 2023-03-17 LAB
2HR DELTA HS TROPONIN: -1 NG/L
ANION GAP SERPL CALCULATED.3IONS-SCNC: 7 MMOL/L (ref 4–13)
ANION GAP SERPL CALCULATED.3IONS-SCNC: 9 MMOL/L (ref 4–13)
ATRIAL RATE: 76 BPM
ATRIAL RATE: 76 BPM
BASOPHILS # BLD AUTO: 0.07 THOUSANDS/ÂΜL (ref 0–0.1)
BASOPHILS NFR BLD AUTO: 1 % (ref 0–1)
BUN SERPL-MCNC: 43 MG/DL (ref 5–25)
BUN SERPL-MCNC: 45 MG/DL (ref 5–25)
CALCIUM SERPL-MCNC: 9 MG/DL (ref 8.4–10.2)
CALCIUM SERPL-MCNC: 9.1 MG/DL (ref 8.4–10.2)
CARDIAC TROPONIN I PNL SERPL HS: 32 NG/L
CARDIAC TROPONIN I PNL SERPL HS: 33 NG/L
CHLORIDE SERPL-SCNC: 107 MMOL/L (ref 96–108)
CHLORIDE SERPL-SCNC: 108 MMOL/L (ref 96–108)
CO2 SERPL-SCNC: 19 MMOL/L (ref 21–32)
CO2 SERPL-SCNC: 20 MMOL/L (ref 21–32)
CREAT SERPL-MCNC: 2.88 MG/DL (ref 0.6–1.3)
CREAT SERPL-MCNC: 3.08 MG/DL (ref 0.6–1.3)
EOSINOPHIL # BLD AUTO: 0.52 THOUSAND/ÂΜL (ref 0–0.61)
EOSINOPHIL NFR BLD AUTO: 4 % (ref 0–6)
ERYTHROCYTE [DISTWIDTH] IN BLOOD BY AUTOMATED COUNT: 17.8 % (ref 11.6–15.1)
GFR SERPL CREATININE-BSD FRML MDRD: 17 ML/MIN/1.73SQ M
GFR SERPL CREATININE-BSD FRML MDRD: 18 ML/MIN/1.73SQ M
GLUCOSE SERPL-MCNC: 95 MG/DL (ref 65–140)
GLUCOSE SERPL-MCNC: 97 MG/DL (ref 65–140)
HCT VFR BLD AUTO: 37.3 % (ref 34.8–46.1)
HGB BLD-MCNC: 11.8 G/DL (ref 11.5–15.4)
IMM GRANULOCYTES # BLD AUTO: 0.15 THOUSAND/UL (ref 0–0.2)
IMM GRANULOCYTES NFR BLD AUTO: 1 % (ref 0–2)
LYMPHOCYTES # BLD AUTO: 2.8 THOUSANDS/ÂΜL (ref 0.6–4.47)
LYMPHOCYTES NFR BLD AUTO: 20 % (ref 14–44)
MCH RBC QN AUTO: 28.4 PG (ref 26.8–34.3)
MCHC RBC AUTO-ENTMCNC: 31.6 G/DL (ref 31.4–37.4)
MCV RBC AUTO: 90 FL (ref 82–98)
MONOCYTES # BLD AUTO: 1.17 THOUSAND/ÂΜL (ref 0.17–1.22)
MONOCYTES NFR BLD AUTO: 8 % (ref 4–12)
NEUTROPHILS # BLD AUTO: 9.47 THOUSANDS/ÂΜL (ref 1.85–7.62)
NEUTS SEG NFR BLD AUTO: 66 % (ref 43–75)
NRBC BLD AUTO-RTO: 0 /100 WBCS
P AXIS: 46 DEGREES
P AXIS: 55 DEGREES
PLATELET # BLD AUTO: 308 THOUSANDS/UL (ref 149–390)
PMV BLD AUTO: 10 FL (ref 8.9–12.7)
POTASSIUM SERPL-SCNC: 4.2 MMOL/L (ref 3.5–5.3)
POTASSIUM SERPL-SCNC: 4.2 MMOL/L (ref 3.5–5.3)
PR INTERVAL: 168 MS
PR INTERVAL: 176 MS
QRS AXIS: 65 DEGREES
QRS AXIS: 95 DEGREES
QRSD INTERVAL: 100 MS
QRSD INTERVAL: 102 MS
QT INTERVAL: 378 MS
QT INTERVAL: 398 MS
QTC INTERVAL: 425 MS
QTC INTERVAL: 447 MS
RBC # BLD AUTO: 4.15 MILLION/UL (ref 3.81–5.12)
SODIUM SERPL-SCNC: 135 MMOL/L (ref 135–147)
SODIUM SERPL-SCNC: 135 MMOL/L (ref 135–147)
T WAVE AXIS: 16 DEGREES
T WAVE AXIS: 22 DEGREES
VENTRICULAR RATE: 76 BPM
VENTRICULAR RATE: 76 BPM
WBC # BLD AUTO: 14.18 THOUSAND/UL (ref 4.31–10.16)

## 2023-03-17 RX ORDER — LORAZEPAM 2 MG/ML
0.5 INJECTION INTRAMUSCULAR ONCE
Status: COMPLETED | OUTPATIENT
Start: 2023-03-17 | End: 2023-03-17

## 2023-03-17 RX ORDER — FENTANYL CITRATE 50 UG/ML
50 INJECTION, SOLUTION INTRAMUSCULAR; INTRAVENOUS ONCE
Status: COMPLETED | OUTPATIENT
Start: 2023-03-17 | End: 2023-03-17

## 2023-03-17 RX ORDER — SODIUM CHLORIDE 9 MG/ML
3 INJECTION INTRAVENOUS
Status: DISCONTINUED | OUTPATIENT
Start: 2023-03-17 | End: 2023-03-17 | Stop reason: HOSPADM

## 2023-03-17 RX ADMIN — FENTANYL CITRATE 50 MCG: 50 INJECTION INTRAMUSCULAR; INTRAVENOUS at 07:46

## 2023-03-17 RX ADMIN — SODIUM CHLORIDE 500 ML: 0.9 INJECTION, SOLUTION INTRAVENOUS at 07:46

## 2023-03-17 RX ADMIN — LORAZEPAM 0.5 MG: 2 INJECTION INTRAMUSCULAR; INTRAVENOUS at 07:00

## 2023-03-17 NOTE — ED PROVIDER NOTES
History  Chief Complaint   Patient presents with   • Chest Pain     States of left sided chest pain waking pt  Up @ 0230 and took 2 ntg over a period of time with relief , but pain returned  Also took 4 baby ASA  States of SOB  Hx of MI x7 and pace maker     60-year-old female with a history of CAD, recent MI in January status post successful PCI to mid % lesion with drug-eluting stent placed who presents to the emergency department for evaluation of chest pain and shortness of breath  Patient states she woke up around 3 AM with severe left-sided chest pain that she describes as a pressure  It radiates into the left shoulder and was initially associated with diaphoresis  Is now associated with some shortness of breath, nausea, but no vomiting  No palpitations  She does endorse some lightheadedness and palpitations  Denies any headache, fever, chills  No abdominal pain  No constipation or diarrhea  No leg pain or swelling  She took 2 doses of nitroglycerin which provided her some temporary improvement  She also took 4 baby aspirin prior to arrival                   Prior to Admission Medications   Prescriptions Last Dose Informant Patient Reported? Taking?    Diclofenac Sodium (VOLTAREN) 1 %   No No   Sig: Apply 2 g topically 4 (four) times a day   LORazepam (ATIVAN) 0 5 mg tablet   No No   Sig: Take 1 tablet (0 5 mg total) by mouth 3 (three) times a day as needed for anxiety   QUEtiapine (SEROquel) 50 mg tablet   No No   Sig: take 1 tablet by mouth at bedtime   acetaminophen (TYLENOL) 325 mg tablet   No No   Sig: Take 3 tablets (975 mg total) by mouth every 8 (eight) hours as needed for mild pain   al mag oxide-diphenhydramine-lidocaine viscous (MAGIC MOUTHWASH) 1:1:1 suspension   No No   Sig: Swish and spit 10 mL every 4 (four) hours as needed for mouth pain or discomfort   albuterol (PROVENTIL HFA,VENTOLIN HFA) 90 mcg/act inhaler   No No   Sig: Inhale 2 puffs every 4 (four) hours as needed for wheezing or shortness of breath   aspirin 81 mg chewable tablet   No No   Sig: Chew 1 tablet (81 mg total) daily   atorvastatin (LIPITOR) 80 mg tablet   No No   Sig: Take 1 tablet (80 mg total) by mouth every evening   bumetanide (BUMEX) 1 mg tablet   No No   Sig: Take 1 tablet (1 mg total) by mouth daily   calcitriol (ROCALTROL) 0 25 mcg capsule   Yes No   Sig: Take 1 capsule (0 25 mcg total) by mouth daily Takes Monday Wednesday and fridays as of 11/11/22   chlorhexidine (PERIDEX) 0 12 % solution   No No   Sig: Apply 15 mL to the mouth or throat every 12 (twelve) hours   clopidogrel (PLAVIX) 75 mg tablet   No No   Sig: Take 1 tablet (75 mg total) by mouth daily Do not start before January 6, 2023  ezetimibe (ZETIA) 10 mg tablet   No No   Sig: Take 1 tablet (10 mg total) by mouth in the morning    gabapentin (Neurontin) 300 mg capsule   No No   Sig: Take 1 capsule (300 mg total) by mouth 3 (three) times a day   lidocaine (Lidoderm) 5 %   No No   Sig: Apply 2 patches topically daily Remove & Discard patch within 12 hours or as directed by MD   methocarbamol (ROBAXIN) 500 mg tablet   No No   Sig: Take 1 tablet (500 mg total) by mouth 2 (two) times a day as needed for muscle spasms   metoprolol succinate (TOPROL-XL) 100 mg 24 hr tablet   No No   Sig: Take 1 tablet (100 mg total) by mouth daily Do not start before January 11, 2023  nicotine (NICODERM CQ) 14 mg/24hr TD 24 hr patch   No No   Sig: Place 1 patch on the skin daily Do not start before January 6, 2023     nitroglycerin (NITROSTAT) 0 4 mg SL tablet   No No   Sig: Place 1 tablet (0 4 mg total) under the tongue every 5 (five) minutes as needed for chest pain   omega-3-acid ethyl esters (LOVAZA) 1 g capsule   No No   Sig: Take 1 capsule (1 g total) by mouth 2 (two) times a day   omeprazole (PriLOSEC) 40 MG capsule   No No   Sig: Take 1 capsule (40 mg total) by mouth daily   ranolazine (RANEXA) 500 mg 12 hr tablet   No No   Sig: Take 1 tablet (500 mg total) by mouth every 12 (twelve) hours   sertraline (ZOLOFT) 100 mg tablet   No No   Sig: Take 1 5 tablets (150 mg total) by mouth daily   sodium bicarbonate 650 mg tablet   No No   Sig: Take 1 tablet (650 mg total) by mouth 2 (two) times daily after meals      Facility-Administered Medications: None       Past Medical History:   Diagnosis Date   • Anemia    • Anxiety    • CAD (coronary artery disease)    • Cancer ([place] [address] )    • Cardiomyopathy ([place] [address] )    • CHF (congestive heart failure) ([facility])    • Chronic kidney disease    • COPD (chronic obstructive pulmonary disease) ([facility])     scheduled for sleep apnea test soon after pacemaker implant   • Coronary artery disease    • Depression    • History of chemotherapy     non hodgkins lymphoma    • Hyperlipemia    • Hypertension    • Hypertrophic cardiomyopathy ([place] [address] )    • Lymphoma, non-Hodgkin's ([facility])    • Myocardial infarction [facility])    • Non-ST elevation MI (NSTEMI) 2023   • Obesity    • Obstructive sleep apnea    • SSS (sick sinus syndrome) ([place] [address] )     s/p medtronic dual chamber pacemaker 2021   • Tobacco abuse    • Ventricular tachycardia, non-sustained    • Wears glasses        Past Surgical History:   Procedure Laterality Date   • CARDIAC CATHETERIZATION N/A 1/3/2023    Procedure: Cardiac catheterization;  Surgeon: Beck Baker MD;  Location: [facility]; Service: Cardiology   • CARDIAC CATHETERIZATION N/A 1/3/2023    Procedure: Cardiac Coronary Angiogram;  Surgeon: Beck Baker MD;  [facility]; Service: Cardiology   • CARDIAC CATHETERIZATION N/A 1/3/2023    Procedure: Cardiac pci;  Surgeon: Beck Baker MD;  [facility]: [facility];   Service: Cardiology   • CARDIAC PACEMAKER PLACEMENT Left 2021    Procedure: DUAL LEAD PACEMAKER IMPLANT;  Surgeon: Sharath Newman MD;  Location: [facility] MAIN OR;  Service: Cardiology   • CAROTID STENT     •  SECTION     • CORONARY ANGIOPLASTY WITH STENT PLACEMENT  2021    GREG mid RCA and GREG right PDA   • DENTAL SURGERY     • IR BIOPSY KIDNEY RANDOM  10/18/2021       Family History   Problem Relation Age of Onset   • No Known Problems Mother    • No Known Problems Father    • No Known Problems Daughter    • Brain cancer Maternal Grandfather    • Heart disease Maternal Grandfather    • Cancer Maternal Grandfather    • No Known Problems Paternal Aunt    • No Known Problems Paternal Aunt      I have reviewed and agree with the history as documented  E-Cigarette/Vaping   • E-Cigarette Use Never User      E-Cigarette/Vaping Substances   • Nicotine No    • THC No    • CBD No    • Flavoring No    • Other No    • Unknown No      Social History     Tobacco Use   • Smoking status: Every Day     Packs/day: 0 50     Years: 25 00     Pack years: 12 50     Types: Cigarettes   • Smokeless tobacco: Never   • Tobacco comments:     Recently and currently quitting cigarettes   Vaping Use   • Vaping Use: Never used   Substance Use Topics   • Alcohol use: Yes     Comment: 1-2 times years   • Drug use: No       Review of Systems   Constitutional: Positive for diaphoresis  Negative for chills and fever  HENT: Negative for ear pain and sore throat  Eyes: Negative for pain and visual disturbance  Respiratory: Positive for shortness of breath  Negative for cough  Cardiovascular: Positive for chest pain and palpitations  Gastrointestinal: Positive for nausea  Negative for abdominal pain and vomiting  Genitourinary: Negative for dysuria and hematuria  Musculoskeletal: Negative for arthralgias and back pain  Skin: Negative for color change and rash  Neurological: Positive for light-headedness  Negative for seizures and syncope  All other systems reviewed and are negative  Physical Exam  Physical Exam  Vitals and nursing note reviewed  Constitutional:       General: She is not in acute distress  Appearance: She is obese  HENT:      Head: Normocephalic and atraumatic        Right Ear: External ear normal       Left Ear: External ear normal       Nose: Nose normal       Mouth/Throat:      Mouth: Mucous membranes are moist    Eyes:      Extraocular Movements: Extraocular movements intact  Conjunctiva/sclera: Conjunctivae normal       Pupils: Pupils are equal, round, and reactive to light  Cardiovascular:      Rate and Rhythm: Normal rate and regular rhythm  Pulses: Normal pulses  Pulmonary:      Effort: Pulmonary effort is normal  No respiratory distress  Breath sounds: Normal breath sounds  No stridor  Abdominal:      General: Abdomen is flat  Bowel sounds are normal       Tenderness: There is no abdominal tenderness  There is no guarding or rebound  Musculoskeletal:         General: No tenderness  Normal range of motion  Cervical back: Normal range of motion and neck supple  Right lower leg: No edema  Left lower leg: No edema  Skin:     General: Skin is warm and dry  Capillary Refill: Capillary refill takes less than 2 seconds  Neurological:      General: No focal deficit present  Mental Status: She is alert and oriented to person, place, and time     Psychiatric:         Mood and Affect: Mood normal          Behavior: Behavior normal          Vital Signs  ED Triage Vitals   Temperature Pulse Respirations Blood Pressure SpO2   03/17/23 0638 03/17/23 0638 03/17/23 0638 03/17/23 0638 03/17/23 0638   99 2 °F (37 3 °C) 81 (!) 24 132/78 97 %      Temp Source Heart Rate Source Patient Position - Orthostatic VS BP Location FiO2 (%)   03/17/23 0638 03/17/23 0800 03/17/23 0638 03/17/23 0638 --   Tympanic Monitor Lying Left arm       Pain Score       03/17/23 0638       10 - Worst Possible Pain           Vitals:    03/17/23 0638 03/17/23 0800 03/17/23 0900 03/17/23 0930   BP: 132/78 149/72 154/93 (!) 166/105   Pulse: 81 74 75 84   Patient Position - Orthostatic VS: Lying Sitting Sitting Sitting         Visual Acuity      ED Medications  Medications LORazepam (ATIVAN) injection 0 5 mg (0 5 mg Intravenous Given 3/17/23 0700)   fentanyl citrate (PF) 100 MCG/2ML 50 mcg (50 mcg Intravenous Given 3/17/23 0746)   sodium chloride 0 9 % bolus 500 mL (0 mL Intravenous Stopped 3/17/23 0941)       Diagnostic Studies  Results Reviewed     Procedure Component Value Units Date/Time    HS Troponin I 2hr [[id number]]  (Normal) Collected: 03/17/23 0901    Lab Status: Final result Specimen: Blood from Arm, Left Updated: 03/17/23 0931     hs TnI 2hr 32 ng/L      Delta 2hr hsTnI -1 ng/L     Basic metabolic panel [[id number]]  (Abnormal) Collected: 03/17/23 0901    Lab Status: Final result Specimen: Blood from Arm, Left Updated: 03/17/23 0925     Sodium 135 mmol/L      Potassium 4 2 mmol/L      Chloride 107 mmol/L      CO2 19 mmol/L      ANION GAP 9 mmol/L      BUN 43 mg/dL      Creatinine 2 88 mg/dL      Glucose 97 mg/dL      Calcium 9 0 mg/dL      eGFR 18 ml/min/1 73sq m     Narrative:      [facility] guidelines for Chronic Kidney Disease (CKD):   •  Stage 1 with normal or high GFR (GFR > 90 mL/min/1 73 square meters)  •  Stage 2 Mild CKD (GFR = 60-89 mL/min/1 73 square meters)  •  Stage 3A Moderate CKD (GFR = 45-59 mL/min/1 73 square meters)  •  Stage 3B Moderate CKD (GFR = 30-44 mL/min/1 73 square meters)  •  Stage 4 Severe CKD (GFR = 15-29 mL/min/1 73 square meters)  •  Stage 5 End Stage CKD (GFR <15 mL/min/1 73 square meters)  Note: GFR calculation is accurate only with a steady state creatinine    HS Troponin 0hr (reflex protocol) [[id number]]  (Normal) Collected: 03/17/23 0659    Lab Status: Final result Specimen: Blood from Arm, Left Updated: 03/17/23 0739     hs TnI 0hr 33 ng/L     Basic metabolic panel [[id number]]  (Abnormal) Collected: 03/17/23 0659    Lab Status: Final result Specimen: Blood from Arm, Left Updated: 03/17/23 0730     Sodium 135 mmol/L      Potassium 4 2 mmol/L      Chloride 108 mmol/L      CO2 20 mmol/L      ANION GAP 7 mmol/L BUN 45 mg/dL      Creatinine 3 08 mg/dL      Glucose 95 mg/dL      Calcium 9 1 mg/dL      eGFR 17 ml/min/1 73sq m     Narrative:      National Kid[employer] guidelines for Chronic Kidney Disease (CKD):   •  Stage 1 with normal or high GFR (GFR > 90 mL/min/1 73 square meters)  •  Stage 2 Mild CKD (GFR = 60-89 mL/min/1 73 square meters)  •  Stage 3A Moderate CKD (GFR = 45-59 mL/min/1 73 square meters)  •  Stage 3B Moderate CKD (GFR = 30-44 mL/min/1 73 square meters)  •  Stage 4 Severe CKD (GFR = 15-29 mL/min/1 73 square meters)  •  Stage 5 End Stage CKD (GFR <15 mL/min/1 73 square meters)  Note: GFR calculation is accurate only with a steady state creatinine    CBC and differential [[id number]]  (Abnormal) Collected: 03/17/23 0659    Lab Status: Final result Specimen: Blood from Arm, Left Updated: 03/17/23 0713     WBC 14 18 Thousand/uL      RBC 4 15 Million/uL      Hemoglobin 11 8 g/dL      Hematocrit 37 3 %      MCV 90 fL      MCH 28 4 pg      MCHC 31 6 g/dL      RDW 17 8 %      MPV 10 0 fL      Platelets 526 Thousands/uL      nRBC 0 /100 WBCs      Neutrophils Relative 66 %      Immat GRANS % 1 %      Lymphocytes Relative 20 %      Monocytes Relative 8 %      Eosinophils Relative 4 %      Basophils Relative 1 %      Neutrophils Absolute 9 47 Thousands/µL      Immature Grans Absolute 0 15 Thousand/uL      Lymphocytes Absolute 2 80 Thousands/µL      Monocytes Absolute 1 17 Thousand/µL      Eosinophils Absolute 0 52 Thousand/µL      Basophils Absolute 0 07 Thousands/µL                  X-ray chest 1 view portable   Final Result by Royal Gonzáles MD (03/17 1240)      No acute cardiopulmonary disease                    Workstation performed: [id number]                    Procedures  Procedures         ED Course  ED Course as of 03/17/23 2059   Fri Mar 17, 2023   6160 EKG interpreted by myself demonstrates normal sinus rhythm with a rate of 76, normal axis, normal intervals, incomplete right bundle branch block, nonspecific T waves             HEART Risk Score    Flowsheet Row Most Recent Value   Heart Score Risk Calculator    History 1 Filed at: 03/17/2023 0934   ECG 1 Filed at: 03/17/2023 0934   Age 0 Filed at: 03/17/2023 0934   Risk Factors 2 Filed at: 03/17/2023 0934   Troponin 1 Filed at: 03/17/2023 0934   HEART Score 5 Filed at: 03/17/2023 3418                        SBIRT 20yo+    Flowsheet Row Most Recent Value   SBIRT (23 yo +)    In order to provide better care to our patients, we are screening all of our patients for alcohol and drug use  Would it be okay to ask you these screening questions? Yes Filed at: 03/17/2023 9185   Initial Alcohol Screen: US AUDIT-C     1  How often do you have a drink containing alcohol? 1 Filed at: 03/17/2023 0653   2  How many drinks containing alcohol do you have on a typical day you are drinking? 0 Filed at: 03/17/2023 0653   3a  Male UNDER 65: How often do you have five or more drinks on one occasion? 0 Filed at: 03/17/2023 0653   3b  FEMALE Any Age, or MALE 65+: How often do you have 4 or more drinks on one occassion? 0 Filed at: 03/17/2023 0653   Audit-C Score 1 Filed at: 03/17/2023 9556   NELIA: How many times in the past year have you    Used an illegal drug or used a prescription medication for non-medical reasons? Never Filed at: 03/17/2023 6695                    Medical Decision Making  29-year-old female with significant cardiac history presents to the emergency department for evaluation of chest pain  On exam, she is uncomfortable appearing, but in no acute distress  Differential diagnosis includes but is not limited to ACS, arrhythmia, pericarditis, pneumothorax, metabolic derangement, infectious etiology  We will check cardiac work-up and reassess      Patient signed out to Dr Swati Givens pending lab results     CAD (coronary artery disease): acute illness or injury  Chest pain: acute illness or injury  Amount and/or Complexity of Data Reviewed  External Data Reviewed: labs, radiology, ECG and notes  Labs: ordered  Radiology: ordered and independent interpretation performed  ECG/medicine tests: ordered and independent interpretation performed  Risk  Prescription drug management  Disposition  Final diagnoses:   CAD (coronary artery disease)   Chest pain     Time reflects when diagnosis was documented in both MDM as applicable and the Disposition within this note     Time User Action Codes Description Comment    3/17/2023  6:52 AM Check, Sukhjinder Wells Add [I25 10] CAD (coronary artery disease)     3/17/2023  6:52 AM Check, Sukhjinder Wells Add [R07 9] Chest pain     3/17/2023  6:52 AM Check, Sukhjinder Casa Modify [I25 10] CAD (coronary artery disease)     3/17/2023  6:52 AM Check, Sukhjinder Wells Modify [R07 9] Chest pain       ED Disposition     ED Disposition   Discharge    Condition   Stable    Date/Time   Fri Mar 17, 2023  9:35 AM    Comment   Clois Show discharge to home/self care  Follow-up Information     Follow up With Specialties Details Why Contact Info Additional [address] Emergency Department Emergency Medicine  If symptoms worsen [address] [id number]  [facility] Emergency Department, [address] [place] [address]    Olinda Graves MD Cardiology Schedule an appointment as soon as possible for a visit   [facility] Professor [phone] [facility]             Discharge Medication List as of 3/17/2023  9:36 AM      CONTINUE these medications which have NOT CHANGED    Details   acetaminophen (TYLENOL) 325 mg tablet Take 3 tablets (975 mg total) by mouth every 8 (eight) hours as needed for mild pain, Starting Thu 1/5/2023, Normal      al mag oxide-diphenhydramine-lidocaine viscous (MAGIC MOUTHWASH) 1:1:1 suspension Swish and spit 10 mL every 4 (four) hours as needed for mouth pain or discomfort, Starting Thu 1/5/2023, Normal albuterol (PROVENTIL HFA,VENTOLIN HFA) 90 mcg/act inhaler Inhale 2 puffs every 4 (four) hours as needed for wheezing or shortness of breath, Starting Mon 5/23/2022, Normal      aspirin 81 mg chewable tablet Chew 1 tablet (81 mg total) daily, Starting Fri 4/2/2021, Normal      atorvastatin (LIPITOR) 80 mg tablet Take 1 tablet (80 mg total) by mouth every evening, Starting Tue 1/17/2023, Until Mon 4/17/2023, Normal      bumetanide (BUMEX) 1 mg tablet Take 1 tablet (1 mg total) by mouth daily, Starting Thu 7/14/2022, Normal      calcitriol (ROCALTROL) 0 25 mcg capsule Take 1 capsule (0 25 mcg total) by mouth daily Takes Monday Wednesday and fridays as of 11/11/22, Starting Tue 1/10/2023, Historical Med      chlorhexidine (PERIDEX) 0 12 % solution Apply 15 mL to the mouth or throat every 12 (twelve) hours, Starting Thu 1/5/2023, Normal      clopidogrel (PLAVIX) 75 mg tablet Take 1 tablet (75 mg total) by mouth daily Do not start before January 6, 2023 , Starting Fri 1/6/2023, Normal      Diclofenac Sodium (VOLTAREN) 1 % Apply 2 g topically 4 (four) times a day, Starting Mon 8/9/2021, Normal      ezetimibe (ZETIA) 10 mg tablet Take 1 tablet (10 mg total) by mouth in the morning , Starting Mon 5/23/2022, Normal      gabapentin (Neurontin) 300 mg capsule Take 1 capsule (300 mg total) by mouth 3 (three) times a day, Starting Wed 3/8/2023, Normal      lidocaine (Lidoderm) 5 % Apply 2 patches topically daily Remove & Discard patch within 12 hours or as directed by MD, Starting Fri 11/11/2022, Normal      LORazepam (ATIVAN) 0 5 mg tablet Take 1 tablet (0 5 mg total) by mouth 3 (three) times a day as needed for anxiety, Starting Mon 10/10/2022, Normal      methocarbamol (ROBAXIN) 500 mg tablet Take 1 tablet (500 mg total) by mouth 2 (two) times a day as needed for muscle spasms, Starting Fri 11/11/2022, Normal      metoprolol succinate (TOPROL-XL) 100 mg 24 hr tablet Take 1 tablet (100 mg total) by mouth daily Do not start before January 11, 2023 , Starting Wed 1/11/2023, Normal      nicotine (NICODERM CQ) 14 mg/24hr TD 24 hr patch Place 1 patch on the skin daily Do not start before January 6, 2023 , Starting Fri 1/6/2023, Normal      nitroglycerin (NITROSTAT) 0 4 mg SL tablet Place 1 tablet (0 4 mg total) under the tongue every 5 (five) minutes as needed for chest pain, Starting Fri 3/3/2023, Normal      omega-3-acid ethyl esters (LOVAZA) 1 g capsule Take 1 capsule (1 g total) by mouth 2 (two) times a day, Starting Thu 1/5/2023, Normal      omeprazole (PriLOSEC) 40 MG capsule Take 1 capsule (40 mg total) by mouth daily, Starting Fri 11/11/2022, Normal      QUEtiapine (SEROquel) 50 mg tablet take 1 tablet by mouth at bedtime, Normal      ranolazine (RANEXA) 500 mg 12 hr tablet Take 1 tablet (500 mg total) by mouth every 12 (twelve) hours, Starting Tue 1/10/2023, Normal      sertraline (ZOLOFT) 100 mg tablet Take 1 5 tablets (150 mg total) by mouth daily, Starting Mon 9/19/2022, Normal      sodium bicarbonate 650 mg tablet Take 1 tablet (650 mg total) by mouth 2 (two) times daily after meals, Starting Thu 1/5/2023, Normal             No discharge procedures on file      PDMP Review       Value Time User    PDMP Reviewed  Yes 10/10/2022 12:27 PM Anusha Perez, [facility] Provider  Electronically Signed by           Patrice Mcmahon MD  03/17/23 2059

## 2023-03-17 NOTE — DISCHARGE INSTRUCTIONS
-Today, you were evaluated for chest pain  Your cardiac enzyme was negative twice and your EKG is consistent with prior      -Today your kidney function was slightly worse than baseline but it did improve with IV fluids    It is importantly follow-up with your nephrologist over the next week for further work-up and management and return to care if you develop any new or worsening symptoms

## 2023-03-22 ENCOUNTER — HOSPITAL ENCOUNTER (OUTPATIENT)
Dept: MRI IMAGING | Facility: HOSPITAL | Age: 48
Discharge: HOME | End: 2023-03-22

## 2023-03-22 DIAGNOSIS — M79.671 BILATERAL LEG AND FOOT PAIN: ICD-10-CM

## 2023-03-22 DIAGNOSIS — M79.672 BILATERAL LEG AND FOOT PAIN: ICD-10-CM

## 2023-03-22 DIAGNOSIS — G89.29 CHRONIC BILATERAL LOW BACK PAIN, UNSPECIFIED WHETHER SCIATICA PRESENT: ICD-10-CM

## 2023-03-22 DIAGNOSIS — M79.605 BILATERAL LEG AND FOOT PAIN: ICD-10-CM

## 2023-03-22 DIAGNOSIS — M54.50 CHRONIC BILATERAL LOW BACK PAIN, UNSPECIFIED WHETHER SCIATICA PRESENT: ICD-10-CM

## 2023-03-22 DIAGNOSIS — M79.604 BILATERAL LEG AND FOOT PAIN: ICD-10-CM

## 2023-03-22 NOTE — NURSING NOTE
Pt ppm placed into MRI safe mode by Juanpablo Flores with medtronic  Patient monitored throughout MRI, no issues   After MRI PPM placed back into original settings by [facility] with [employer]

## 2023-03-26 ENCOUNTER — HOSPITAL ENCOUNTER (EMERGENCY)
Facility: HOSPITAL | Age: 48
Discharge: HOME | End: 2023-03-26
Attending: EMERGENCY MEDICINE | Admitting: EMERGENCY MEDICINE

## 2023-03-26 VITALS
HEIGHT: 66 IN | WEIGHT: 270 LBS | BODY MASS INDEX: 43.39 KG/M2 | DIASTOLIC BLOOD PRESSURE: 77 MMHG | HEART RATE: 79 BPM | TEMPERATURE: 97.5 F | RESPIRATION RATE: 18 BRPM | SYSTOLIC BLOOD PRESSURE: 163 MMHG | OXYGEN SATURATION: 98 %

## 2023-03-26 DIAGNOSIS — K02.9 DENTAL CARIES: Primary | ICD-10-CM

## 2023-03-26 DIAGNOSIS — K08.89 PAIN, DENTAL: ICD-10-CM

## 2023-03-26 RX ORDER — PENICILLIN V POTASSIUM 250 MG/1
500 TABLET ORAL ONCE
Status: COMPLETED | OUTPATIENT
Start: 2023-03-26 | End: 2023-03-26

## 2023-03-26 RX ORDER — OXYCODONE HYDROCHLORIDE 10 MG/1
5 TABLET ORAL EVERY 6 HOURS PRN
Qty: 6 TABLET | Refills: 0 | Status: SHIPPED | OUTPATIENT
Start: 2023-03-26 | End: 2023-03-29

## 2023-03-26 RX ORDER — PENICILLIN V POTASSIUM 500 MG/1
500 TABLET ORAL EVERY 6 HOURS SCHEDULED
Qty: 40 TABLET | Refills: 0 | Status: SHIPPED | OUTPATIENT
Start: 2023-03-26 | End: 2023-04-05

## 2023-03-26 RX ORDER — DEXAMETHASONE SODIUM PHOSPHATE 4 MG/ML
8 INJECTION, SOLUTION INTRA-ARTICULAR; INTRALESIONAL; INTRAMUSCULAR; INTRAVENOUS; SOFT TISSUE ONCE
Status: COMPLETED | OUTPATIENT
Start: 2023-03-26 | End: 2023-03-26

## 2023-03-26 RX ORDER — OXYCODONE HYDROCHLORIDE 5 MG/1
5 TABLET ORAL ONCE
Status: COMPLETED | OUTPATIENT
Start: 2023-03-26 | End: 2023-03-26

## 2023-03-26 RX ORDER — ACETAMINOPHEN 325 MG/1
650 TABLET ORAL ONCE
Status: COMPLETED | OUTPATIENT
Start: 2023-03-26 | End: 2023-03-26

## 2023-03-26 RX ADMIN — OXYCODONE HYDROCHLORIDE 5 MG: 5 TABLET ORAL at 17:46

## 2023-03-26 RX ADMIN — DEXAMETHASONE SODIUM PHOSPHATE 8 MG: 4 INJECTION, SOLUTION INTRAMUSCULAR; INTRAVENOUS at 17:46

## 2023-03-26 RX ADMIN — ACETAMINOPHEN 650 MG: 325 TABLET ORAL at 17:46

## 2023-03-26 RX ADMIN — PENICILLIN V POTASSIUM 500 MG: 250 TABLET, FILM COATED ORAL at 17:46

## 2023-03-26 NOTE — ED PROVIDER NOTES
History  Chief Complaint   Patient presents with   • Dental Pain     L lower     HPI      This is a very pleasant, non toxic appearing 48-year patient denies any fever, chills-old female, morbidly obese, presents emergency department with left jaw pain upper dental pain for an extended period of time, greater than 2 months  Patient was seen evaluated by her dentist recently and stated that she needs to go to oral maxillofacial surgery as of her prior history of MIs  Denies any chest pain, nausea, diaphoresis  Patient denies any chest pain, vomiting, back pain, numbness the upper and lower extremities  Past medical history significant for obesity, agoraphobia, chronic kidney disease stage IV, hypertrophic cardiomyopathy, hyperlipidemia, and essential hypertension  CAD with cardiac catheterization and stenting on Plavix  Prior to Admission Medications   Prescriptions Last Dose Informant Patient Reported? Taking?    Diclofenac Sodium (VOLTAREN) 1 %   No No   Sig: Apply 2 g topically 4 (four) times a day   LORazepam (ATIVAN) 0 5 mg tablet   No No   Sig: Take 1 tablet (0 5 mg total) by mouth 3 (three) times a day as needed for anxiety   QUEtiapine (SEROquel) 50 mg tablet   No No   Sig: take 1 tablet by mouth at bedtime   acetaminophen (TYLENOL) 325 mg tablet   No No   Sig: Take 3 tablets (975 mg total) by mouth every 8 (eight) hours as needed for mild pain   al mag oxide-diphenhydramine-lidocaine viscous (MAGIC MOUTHWASH) 1:1:1 suspension   No No   Sig: Swish and spit 10 mL every 4 (four) hours as needed for mouth pain or discomfort   albuterol (PROVENTIL HFA,VENTOLIN HFA) 90 mcg/act inhaler   No No   Sig: Inhale 2 puffs every 4 (four) hours as needed for wheezing or shortness of breath   aspirin 81 mg chewable tablet   No No   Sig: Chew 1 tablet (81 mg total) daily   atorvastatin (LIPITOR) 80 mg tablet   No No   Sig: Take 1 tablet (80 mg total) by mouth every evening   bumetanide (BUMEX) 1 mg tablet   No No   Sig: Take 1 tablet (1 mg total) by mouth daily   calcitriol (ROCALTROL) 0 25 mcg capsule   Yes No   Sig: Take 1 capsule (0 25 mcg total) by mouth daily Takes Monday Wednesday and fridays as of 11/11/22   chlorhexidine (PERIDEX) 0 12 % solution   No No   Sig: Apply 15 mL to the mouth or throat every 12 (twelve) hours   clopidogrel (PLAVIX) 75 mg tablet   No No   Sig: Take 1 tablet (75 mg total) by mouth daily Do not start before January 6, 2023  ezetimibe (ZETIA) 10 mg tablet   No No   Sig: Take 1 tablet (10 mg total) by mouth in the morning    gabapentin (Neurontin) 300 mg capsule   No No   Sig: Take 1 capsule (300 mg total) by mouth 3 (three) times a day   lidocaine (Lidoderm) 5 %   No No   Sig: Apply 2 patches topically daily Remove & Discard patch within 12 hours or as directed by MD   methocarbamol (ROBAXIN) 500 mg tablet   No No   Sig: Take 1 tablet (500 mg total) by mouth 2 (two) times a day as needed for muscle spasms   metoprolol succinate (TOPROL-XL) 100 mg 24 hr tablet   No No   Sig: Take 1 tablet (100 mg total) by mouth daily Do not start before January 11, 2023  nicotine (NICODERM CQ) 14 mg/24hr TD 24 hr patch   No No   Sig: Place 1 patch on the skin daily Do not start before January 6, 2023     nitroglycerin (NITROSTAT) 0 4 mg SL tablet   No No   Sig: Place 1 tablet (0 4 mg total) under the tongue every 5 (five) minutes as needed for chest pain   omega-3-acid ethyl esters (LOVAZA) 1 g capsule   No No   Sig: Take 1 capsule (1 g total) by mouth 2 (two) times a day   omeprazole (PriLOSEC) 40 MG capsule   No No   Sig: Take 1 capsule (40 mg total) by mouth daily   ranolazine (RANEXA) 500 mg 12 hr tablet   No No   Sig: Take 1 tablet (500 mg total) by mouth every 12 (twelve) hours   sertraline (ZOLOFT) 100 mg tablet   No No   Sig: Take 1 5 tablets (150 mg total) by mouth daily   sodium bicarbonate 650 mg tablet   No No   Sig: Take 1 tablet (650 mg total) by mouth 2 (two) times daily after meals Facility-Administered Medications: None       Past Medical History:   Diagnosis Date   • Anemia    • Anxiety    • CAD (coronary artery disease)    • [facility]    • [facility]    • CHF (congestive heart failure) ([facility])    • Chronic kidney disease    • COPD (chronic obstructive pulmonary disease) ([facility])     scheduled for sleep apnea test soon after pacemaker implant   • Coronary artery disease    • Depression    • History of chemotherapy     non hodgkins lymphoma    • Hyperlipemia    • Hypertension    • Hypertrophic cardiomyopathy ([facility])    • Lymphoma, non-Hodgkin's ([facility])    • Myocardial infarction [facility])    • Non-ST elevation MI (NSTEMI) 2023   • Obesity    • Obstructive sleep apnea    • SSS (sick sinus syndrome) ([facility])     s/p medtronic dual chamber pacemaker 2021   • Tobacco abuse    • Ventricular tachycardia, non-sustained    • Wears glasses        Past Surgical History:   Procedure Laterality Date   • CARDIAC CATHETERIZATION N/A 1/3/2023    Procedure: Cardiac catheterization;  Surgeon: Celso Damon MD;  Location: [facility]; Service: Cardiology   • CARDIAC CATHETERIZATION N/A 1/3/2023    Procedure: Cardiac Coronary Angiogram;  Surgeon: Celso Damon MD;  [facility]; Service: Cardiology   • CARDIAC CATHETERIZATION N/A 1/3/2023    Procedure: Cardiac pci;  Surgeon: Celso Damon MD;  Location: [facility];   Service: Cardiology   • CARDIAC PACEMAKER PLACEMENT Left 2021    Procedure: DUAL LEAD PACEMAKER IMPLANT;  Surgeon: Chito Ulloa MD;  Location: [facility] MAIN OR;  Service: Cardiology   • CAROTID STENT     •  SECTION     • CORONARY ANGIOPLASTY WITH STENT PLACEMENT  2021    GREG mid RCA and GREG right PDA   • DENTAL SURGERY     • IR BIOPSY KIDNEY RANDOM  10/18/2021       Family History   Problem Relation Age of Onset   • No Known Problems Mother    • No Known Problems Father    • No Known Problems Daughter    • Brain cancer Maternal Grandfather    • Heart disease Maternal Grandfather    • Cancer Maternal Grandfather    • No Known Problems Paternal Aunt    • No Known Problems Paternal Aunt      I have reviewed and agree with the history as documented  E-Cigarette/Vaping   • E-Cigarette Use Never User      E-Cigarette/Vaping Substances   • Nicotine No    • THC No    • CBD No    • Flavoring No    • Other No    • Unknown No      Social History     Tobacco Use   • Smoking status: Every Day     Packs/day: 0 50     Years: 25 00     Pack years: 12 50     Types: Cigarettes   • Smokeless tobacco: Never   • Tobacco comments:     Recently and currently quitting cigarettes   Vaping Use   • Vaping Use: Never used   Substance Use Topics   • Alcohol use: Yes     Comment: 1-2 times years   • Drug use: No       Review of Systems   Constitutional: Negative  HENT: Negative  Eyes: Negative  Respiratory: Negative  Negative for chest tightness and shortness of breath  Cardiovascular: Negative  Negative for chest pain  Gastrointestinal: Negative  Negative for abdominal pain  Endocrine: Negative  Genitourinary: Negative  Musculoskeletal: Negative  Skin: Negative  Allergic/Immunologic: Negative  Neurological: Negative  Hematological: Negative  Psychiatric/Behavioral: Negative  Physical Exam  Physical Exam  Vitals and nursing note reviewed  Constitutional:       Appearance: Normal appearance  She is normal weight  HENT:      Head: Normocephalic and atraumatic  Comments: Patient maintaining airway maintaining secretions  No stridor  No brawniness under the tongue  Uvula midline without edema  Right Ear: External ear normal       Left Ear: External ear normal       Nose: Nose normal       Mouth/Throat:      Mouth: Mucous membranes are moist       Pharynx: Oropharynx is clear  Eyes:      Extraocular Movements: Extraocular movements intact        Conjunctiva/sclera: Conjunctivae normal  Pupils: Pupils are equal, round, and reactive to light  Cardiovascular:      Rate and Rhythm: Normal rate and regular rhythm  Pulses: Normal pulses  Heart sounds: Normal heart sounds  Pulmonary:      Effort: Pulmonary effort is normal       Breath sounds: Normal breath sounds  Abdominal:      General: Abdomen is flat  Bowel sounds are normal    Musculoskeletal:         General: Normal range of motion  Cervical back: Normal range of motion  Skin:     General: Skin is warm  Capillary Refill: Capillary refill takes less than 2 seconds  Neurological:      General: No focal deficit present  Mental Status: She is alert and oriented to person, place, and time  Mental status is at baseline  Psychiatric:         Mood and Affect: Mood normal          Behavior: Behavior normal          Thought Content:  Thought content normal          Judgment: Judgment normal          Vital Signs  ED Triage Vitals [03/26/23 1723]   Temperature Pulse Respirations Blood Pressure SpO2   97 5 °F (36 4 °C) 79 18 163/77 98 %      Temp Source Heart Rate Source Patient Position - Orthostatic VS BP Location FiO2 (%)   Tympanic Monitor Sitting Right arm --      Pain Score       10 - Worst Possible Pain           Vitals:    03/26/23 1723   BP: 163/77   Pulse: 79   Patient Position - Orthostatic VS: Sitting         Visual Acuity  Visual Acuity    Flowsheet Row Most Recent Value   L Pupil Size (mm) 3   R Pupil Size (mm) 3          ED Medications  Medications   penicillin V potassium (VEETID) tablet 500 mg (500 mg Oral Given 3/26/23 1746)   oxyCODONE (ROXICODONE) IR tablet 5 mg (5 mg Oral Given 3/26/23 1746)   dexamethasone (DECADRON) injection 8 mg (8 mg Intramuscular Given 3/26/23 1746)   acetaminophen (TYLENOL) tablet 650 mg (650 mg Oral Given 3/26/23 1746)       Diagnostic Studies  Results Reviewed     None                 No orders to display              Procedures  ECG 12 Lead Documentation Only    Date/Time: 3/26/2023 5:59 PM  Performed by: Torin Nunes DO  Authorized by: Tri Craig III, DO     Indications / Diagnosis:  Tooth pain, jaw pain  ECG reviewed by me, the ED Provider: yes    Patient location:  ED  Comments:      I personally reviewed this EKG that was performed the patient March 26, 2023, EKG was completed at 5:57 PM and interpreted by me at 5:59 PM, normal sinus rhythm with a ventricular rate of 75 bpm, remaining portion of intervals within normal limits  Patient has Q waves in inferior lateral leads  EKG is unchanged from prior tracings of this year March 17, 2023 on February 3, 2023  No diffuse elevations to indicate pericarditis  No coved ST elevations greater than 2mm with negative T waves in V1-3 to indicate concern for brugada  No biphasic T waves in V2, V3 to indicate Wellens (critical stenosis of LAD)  No elevation in aVR or deviation when compared to V1 (can be associated with ST depression in I,II, V4-6 when left main occlusion is present)  ED Course  ED Course as of 03/26/23 Layla Diallo Mar 26, 2023   1731 Patient seen and evaluated, orders placed   1806 Patient's information was queried in the [facility] drug monitoring program, no red flags, patient was going to be prescribed tramadol given the fact that she is on Zoloft there is a increased risk for serotonin syndrome like symptoms, patient was given a short course of oxycodone due to severe amount of pain  EKG unchanged from prior tracings  SBIRT 22yo+    Flowsheet Row Most Recent Value   SBIRT (25 yo +)    In order to provide better care to our patients, we are screening all of our patients for alcohol and drug use  Would it be okay to ask you these screening questions? Yes Filed at: 03/26/2023 1726   Initial Alcohol Screen: US AUDIT-C     1  How often do you have a drink containing alcohol? 0 Filed at: 03/26/2023 1726   2   How many drinks "containing alcohol do you have on a typical day you are drinking? 0 Filed at: 03/26/2023 1726   3a  Male UNDER 65: How often do you have five or more drinks on one occasion? 0 Filed at: 03/26/2023 1726   3b  FEMALE Any Age, or MALE 65+: How often do you have 4 or more drinks on one occassion? 0 Filed at: 03/26/2023 1726   Audit-C Score 0 Filed at: 03/26/2023 1726   NELIA: How many times in the past year have you    Used an illegal drug or used a prescription medication for non-medical reasons? Never Filed at: 03/26/2023 1726                    Medical Decision Making  Nontoxic-appearing 61-year-old female presents emergency department left-sided upper jaw pain for months, see ED course in [facility] for specifics, EKG unchanged, no ACS-like symptoms, no nausea, diaphoresis, no shortness of breath, no chest pain, no extremity pain  Patient reports been taking Tylenol Motrin without any significant relief, patient was seen and evaluated by local dentist referred to oral maxillofacial surgery down in the [facility] area and was placed on a waiting list   First dose of antibiotics provided here, oxycodone prescribed x1 along with drawn  Patient stable for discharge  Counseling: I had a detailed discussion with the patient and/or guardian regarding: the historical points, exam findings, and any diagnostic results supporting the discharge diagnosis, lab results, radiology results, discharge instructions reviewed with patient and/or family/caregiver and understanding was verbalized  Instructions given to return to the emergency department if symptoms worsen or persist, or if there are any questions or concerns that arise at home      Portions of the record may have been created with voice recognition software  Occasional wrong word or \"sound a like\" substitutions may have occurred due to the inherent limitations of voice recognition software   Read the chart carefully and recognize, using context, where " "substitutions have occurred  This patient was examined during the Covid-19 pandemic, and appropriate PPE was employed as defined by OSHA to minimize exposure to the patient and to avoid spread in the event that I am an asymptomatic carrier  All efforts were made to avoid direct contact with the patient per CDC guidelines (\"social distancing\") unless otherwise necessary to rule out a medical emergency and/or to provide life-saving interventions  Donning and doffing of PPE was performed per recommended guidelines, and personal PPE was employed if /when institutional PPE was not readily available or was deemed to be less than the recommended as defined by OSHA  Risk  OTC drugs  Prescription drug management  Disposition  Final diagnoses:   Dental caries   Pain, dental     Time reflects when diagnosis was documented in both MDM as applicable and the Disposition within this note     Time User Action Codes Description Comment    3/26/2023  6:02 PM Rana Alu Add [K02 9] Dental caries     3/26/2023  6:02 PM Rana Alu Add [K08 89] Pain, dental       ED Disposition     ED Disposition   Discharge    Condition   Stable    Date/Time   Sun Mar 26, 2023  6:02 PM    Comment   [place] discharge to home/self care  Follow-up Information     Follow up With Specialties Details Why Venessa Lockwood MD Family Medicine   [address] [place] [id number]  [phone]            Discharge Medication List as of 3/26/2023  6:07 PM      START taking these medications    Details   oxyCODONE (ROXICODONE) 10 MG TABS Take 0 5 tablets (5 mg total) by mouth every 6 (six) hours as needed for moderate pain for up to 3 days Max Daily Amount: 20 mg, Starting Sun 3/26/2023, Until Wed 3/29/2023 at 2359, Normal      penicillin V potassium (VEETID) 500 mg tablet Take 1 tablet (500 mg total) by mouth every 6 (six) hours for 10 days, Starting Sun 3/26/2023, Until Wed 4/5/2023, Normal         CONTINUE " these medications which have NOT CHANGED    Details   acetaminophen (TYLENOL) 325 mg tablet Take 3 tablets (975 mg total) by mouth every 8 (eight) hours as needed for mild pain, Starting Thu 1/5/2023, Normal      al mag oxide-diphenhydramine-lidocaine viscous (MAGIC MOUTHWASH) 1:1:1 suspension Swish and spit 10 mL every 4 (four) hours as needed for mouth pain or discomfort, Starting Thu 1/5/2023, Normal      albuterol (PROVENTIL HFA,VENTOLIN HFA) 90 mcg/act inhaler Inhale 2 puffs every 4 (four) hours as needed for wheezing or shortness of breath, Starting Mon 5/23/2022, Normal      aspirin 81 mg chewable tablet Chew 1 tablet (81 mg total) daily, Starting Fri 4/2/2021, Normal      atorvastatin (LIPITOR) 80 mg tablet Take 1 tablet (80 mg total) by mouth every evening, Starting Tue 1/17/2023, Until Mon 4/17/2023, Normal      bumetanide (BUMEX) 1 mg tablet Take 1 tablet (1 mg total) by mouth daily, Starting Thu 7/14/2022, Normal      calcitriol (ROCALTROL) 0 25 mcg capsule Take 1 capsule (0 25 mcg total) by mouth daily Takes Monday Wednesday and fridays as of 11/11/22, Starting Tue 1/10/2023, Historical Med      chlorhexidine (PERIDEX) 0 12 % solution Apply 15 mL to the mouth or throat every 12 (twelve) hours, Starting Thu 1/5/2023, Normal      clopidogrel (PLAVIX) 75 mg tablet Take 1 tablet (75 mg total) by mouth daily Do not start before January 6, 2023 , Starting Fri 1/6/2023, Normal      Diclofenac Sodium (VOLTAREN) 1 % Apply 2 g topically 4 (four) times a day, Starting Mon 8/9/2021, Normal      ezetimibe (ZETIA) 10 mg tablet Take 1 tablet (10 mg total) by mouth in the morning , Starting Mon 5/23/2022, Normal      gabapentin (Neurontin) 300 mg capsule Take 1 capsule (300 mg total) by mouth 3 (three) times a day, Starting Wed 3/8/2023, Normal      lidocaine (Lidoderm) 5 % Apply 2 patches topically daily Remove & Discard patch within 12 hours or as directed by MD, Starting Fri 11/11/2022, Normal      LORazepam (ATIVAN) 0 5 mg tablet Take 1 tablet (0 5 mg total) by mouth 3 (three) times a day as needed for anxiety, Starting Mon 10/10/2022, Normal      methocarbamol (ROBAXIN) 500 mg tablet Take 1 tablet (500 mg total) by mouth 2 (two) times a day as needed for muscle spasms, Starting Fri 11/11/2022, Normal      metoprolol succinate (TOPROL-XL) 100 mg 24 hr tablet Take 1 tablet (100 mg total) by mouth daily Do not start before January 11, 2023 , Starting Wed 1/11/2023, Normal      nicotine (NICODERM CQ) 14 mg/24hr TD 24 hr patch Place 1 patch on the skin daily Do not start before January 6, 2023 , Starting Fri 1/6/2023, Normal      nitroglycerin (NITROSTAT) 0 4 mg SL tablet Place 1 tablet (0 4 mg total) under the tongue every 5 (five) minutes as needed for chest pain, Starting Fri 3/3/2023, Normal      omega-3-acid ethyl esters (LOVAZA) 1 g capsule Take 1 capsule (1 g total) by mouth 2 (two) times a day, Starting Thu 1/5/2023, Normal      omeprazole (PriLOSEC) 40 MG capsule Take 1 capsule (40 mg total) by mouth daily, Starting Fri 11/11/2022, Normal      QUEtiapine (SEROquel) 50 mg tablet take 1 tablet by mouth at bedtime, Normal      ranolazine (RANEXA) 500 mg 12 hr tablet Take 1 tablet (500 mg total) by mouth every 12 (twelve) hours, Starting Tue 1/10/2023, Normal      sertraline (ZOLOFT) 100 mg tablet Take 1 5 tablets (150 mg total) by mouth daily, Starting Mon 9/19/2022, Normal      sodium bicarbonate 650 mg tablet Take 1 tablet (650 mg total) by mouth 2 (two) times daily after meals, Starting Thu 1/5/2023, Normal             No discharge procedures on file      PDMP Review       Value Time User    PDMP Reviewed  Yes 3/26/2023  6:06 PM Corwin Delgado DO ED Provider  Electronically Signed by           Kira Fox III,   03/26/23 Michele Xie III,   03/26/23 [id number]

## 2023-03-27 LAB
ATRIAL RATE: 75 BPM
P AXIS: 45 DEGREES
PR INTERVAL: 168 MS
QRS AXIS: 4 DEGREES
QRSD INTERVAL: 98 MS
QT INTERVAL: 416 MS
QTC INTERVAL: 464 MS
T WAVE AXIS: 78 DEGREES
VENTRICULAR RATE: 75 BPM

## 2023-03-28 ENCOUNTER — REMOTE DEVICE CLINIC VISIT (OUTPATIENT)
Dept: CARDIOLOGY CLINIC | Facility: CLINIC | Age: 48
End: 2023-03-28

## 2023-03-28 DIAGNOSIS — Z95.0 PRESENCE OF CARDIAC PACEMAKER: Primary | ICD-10-CM

## 2023-03-28 NOTE — PROGRESS NOTES
MDT DUAL PM/ ACTIVE SYSTEM IS MRI CONDITIONAL   CARELINK TRANSMISSION: BATTERY VOLTAGE ADEQUATE (13 5 YRS)  AP 4 8%    <0 1% (AAI-DDD 60 PPM)  ALL AVAILABLE LEAD PARAMETERS WITHIN NORMAL LIMITS   SINCE 3/22/23: NO NEW SIGNIFICANT HIGH RATE EPISODES   PATIENT TAKES ASA 81, CLOPIDOGREL, METO SUCC ; PACEMAKER FUNCTIONING APPROPRIATELY   ES

## 2023-04-07 ENCOUNTER — OFFICE VISIT (OUTPATIENT)
Dept: FAMILY MEDICINE CLINIC | Facility: CLINIC | Age: 48
End: 2023-04-07

## 2023-04-07 VITALS
BODY MASS INDEX: 43.58 KG/M2 | OXYGEN SATURATION: 98 % | HEIGHT: 66 IN | HEART RATE: 83 BPM | SYSTOLIC BLOOD PRESSURE: 142 MMHG | TEMPERATURE: 97.6 F | DIASTOLIC BLOOD PRESSURE: 76 MMHG

## 2023-04-07 DIAGNOSIS — M54.41 CHRONIC BILATERAL LOW BACK PAIN WITH BILATERAL SCIATICA: ICD-10-CM

## 2023-04-07 DIAGNOSIS — M54.42 CHRONIC BILATERAL LOW BACK PAIN WITH BILATERAL SCIATICA: ICD-10-CM

## 2023-04-07 DIAGNOSIS — Z00.00 ANNUAL PHYSICAL EXAM: Primary | ICD-10-CM

## 2023-04-07 DIAGNOSIS — G62.9 PERIPHERAL NEUROPATHY: ICD-10-CM

## 2023-04-07 DIAGNOSIS — N39.46 MIXED STRESS AND URGE URINARY INCONTINENCE: ICD-10-CM

## 2023-04-07 DIAGNOSIS — G89.29 CHRONIC BILATERAL LOW BACK PAIN WITH BILATERAL SCIATICA: ICD-10-CM

## 2023-04-07 DIAGNOSIS — F33.1 MODERATE EPISODE OF RECURRENT MAJOR DEPRESSIVE DISORDER (HCC): ICD-10-CM

## 2023-04-07 RX ORDER — SERTRALINE HYDROCHLORIDE 100 MG/1
150 TABLET, FILM COATED ORAL DAILY
Qty: 45 TABLET | Refills: 1 | Status: CANCELLED | OUTPATIENT
Start: 2023-04-07

## 2023-04-07 RX ORDER — GABAPENTIN 300 MG/1
300 CAPSULE ORAL 3 TIMES DAILY
Qty: 90 CAPSULE | Refills: 0 | Status: SHIPPED | OUTPATIENT
Start: 2023-04-07

## 2023-04-07 NOTE — PROGRESS NOTES
[address] [facility]    NAME: Nilda Wilcox  AGE: 57 y o  SEX: female  : 1975     DATE: 2023     Assessment and Plan:     Problem List Items Addressed This Visit        Other    Major depressive disorder with current active episode   Other Visit Diagnoses     Annual physical exam    -  Primary    Peripheral neuropathy        Relevant Medications    gabapentin (Neurontin) 300 mg capsule    Other Relevant Orders    Ambulatory Referral to Comprehensive Spine Program    Mixed stress and urge urinary incontinence        Relevant Orders    Ambulatory Referral to Comprehensive Spine Program    Ambulatory Referral to Urogynecology    Chronic bilateral low back pain with bilateral sciatica        Relevant Orders    Ambulatory Referral to Comprehensive Spine Program      MRI lumbar spine showed spondylosis and mammogram was normal    Immunizations and preventive care screenings were discussed with patient today  Appropriate education was printed on patient's after visit summary  Counseling:  Alcohol/drug use: discussed moderation in alcohol intake, the recommendations for healthy alcohol use, and avoidance of illicit drug use  Dental Health: discussed importance of regular tooth brushing, flossing, and dental visits  Return in about 1 week (around 2023) for finger pain both hands  Chief Complaint:     Chief Complaint   Patient presents with   • Physical Exam     Annual physical  ( would like to review MRI)      History of Present Illness:     Adult Annual Physical   Patient here for a comprehensive physical exam  The patient reports continued back and leg pain on both sides  Reports burning in the legs  Needs refill of gabapentin  MRI did not show any specific findings so referred to [facility]  Patient also reports having incontinence   She feels like she goes 2-3 times a day and reports most times can feel it but sometimes by the time she gets up to go she urinates herself  Reports pain in fingers as well  Advised at this point we can make a follow up visit for remaining concerns  Diet and Physical Activity  Diet/Nutrition: heart healthy (low sodium) diet, frequent junk food, consuming 3-5 servings of fruits/vegetables daily and soda and potatoes  reports cut down in soda sometimes for ice water  But still drinking a lot everyday  Exercise: no formal exercise  Depression Screening  PHQ-2/9 Depression Screening    Little interest or pleasure in doing things: 2 - more than half the days  Feeling down, depressed, or hopeless: 2 - more than half the days  Trouble falling or staying asleep, or sleeping too much: 1 - several days  Feeling tired or having little energy: 1 - several days  Poor appetite or overeatin - not at all  Feeling bad about yourself - or that you are a failure or have let yourself or your family down: 1 - several days  Trouble concentrating on things, such as reading the newspaper or watching television: 1 - several days  Moving or speaking so slowly that other people could have noticed  Or the opposite - being so fidgety or restless that you have been moving around a lot more than usual: 1 - several days  Thoughts that you would be better off dead, or of hurting yourself in some way: 0 - not at all  PHQ-9 Score: 9   PHQ-9 Interpretation: Mild depression        General Health  Sleep: gets 4-6 hours of sleep on average and reports every 2 days  Hearing: normal - bilateral   Vision: most recent eye exam >1 year ago and wears glasses  Dental: brushes teeth twice daily and does not floss  /GYN Health  Patient is: premenopausal  Last menstrual period: 3/21/2023  Contraceptive method: none       Review of Systems:     Review of Systems   Past Medical History:     Past Medical History:   Diagnosis Date   • Anemia    • Anxiety    • CAD (coronary artery disease)    • Cancer ([facility] )    • Cardiomyopathy ([facility] ) • CHF (congestive heart failure) ([facility])    • Chronic kidney disease    • COPD (chronic obstructive pulmonary disease) ([facility])     scheduled for sleep apnea test soon after pacemaker implant   • Coronary artery disease    • Depression    • History of chemotherapy     non hodgkins lymphoma    • Hyperlipemia    • Hypertension    • Hypertrophic cardiomyopathy ([facility] )    • Lymphoma, non-Hodgkin's ([facility])    • Myocardial infarction [facility])    • Non-ST elevation MI (NSTEMI) 2023   • Obesity    • Obstructive sleep apnea    • SSS (sick sinus syndrome) ([facility])     s/p medtronic dual chamber pacemaker 2021   • Tobacco abuse    • Ventricular tachycardia, non-sustained ([facility] )    • Wears glasses       Past Surgical History:     Past Surgical History:   Procedure Laterality Date   • CARDIAC CATHETERIZATION N/A 1/3/2023    Procedure: Cardiac catheterization;  Surgeon: Justine Cantrell MD;  [facility]; Service: Cardiology   • CARDIAC CATHETERIZATION N/A 1/3/2023    Procedure: Cardiac Coronary Angiogram;  Surgeon: Justine Cantrell MD;  [facility]; Service: Cardiology   • CARDIAC CATHETERIZATION N/A 1/3/2023    Procedure: Cardiac pci;  Surgeon: Justine Cantrell MD;  Location: [facility] CARD[facility];   Service: Cardiology   • CARDIAC PACEMAKER PLACEMENT Left 2021    Procedure: DUAL LEAD PACEMAKER IMPLANT;  Surgeon: Morris Orozco MD;  Location: [address] MAIN OR;  Service: Cardiology   • CAROTID STENT     •  SECTION     • CORONARY ANGIOPLASTY WITH STENT PLACEMENT  2021    GREG mid RCA and GREG right PDA   • DENTAL SURGERY     • IR BIOPSY KIDNEY RANDOM  10/18/2021      Social History:     Social History     Socioeconomic History   • Marital status: /Civil Union     Spouse name: None   • Number of children: None   • Years of education: None   • Highest education level: None   Occupational History   • None   Tobacco Use   • Smoking status: Every Day     Packs/day: 0 50     Years: 25 00     Pack years: 12 50     Types: Cigarettes   • Smokeless tobacco: Never   • Tobacco comments:     Recently and currently quitting cigarettes   Vaping Use   • Vaping Use: Never used   Substance and Sexual Activity   • Alcohol use: Yes     Comment: 1-2 times years   • Drug use: No   • Sexual activity: Yes     Partners: Male     Birth control/protection: None     Comment:    Other Topics Concern   • None   Social History Narrative   • None     Social Determinants of Health     Financial Resource Strain: Not on file   Food Insecurity: Not on file   Transportation Needs: Not on file   Physical Activity: Not on file   Stress: Not on file   Social Connections: Not on file   Intimate Partner Violence: Not on file   Housing Stability: Not on file      Family History:     Family History   Problem Relation Age of Onset   • No Known Problems Mother    • No Known Problems Father    • No Known Problems Daughter    • Brain cancer Maternal Grandfather    • Heart disease Maternal Grandfather    • Cancer Maternal Grandfather    • No Known Problems Paternal Aunt    • No Known Problems Paternal Aunt       Current Medications:     Current Outpatient Medications   Medication Sig Dispense Refill   • acetaminophen (TYLENOL) 325 mg tablet Take 3 tablets (975 mg total) by mouth every 8 (eight) hours as needed for mild pain 90 tablet 0   • albuterol (PROVENTIL HFA,VENTOLIN HFA) 90 mcg/act inhaler Inhale 2 puffs every 4 (four) hours as needed for wheezing or shortness of breath 18 g 5   • aspirin 81 mg chewable tablet Chew 1 tablet (81 mg total) daily 90 tablet 0   • atorvastatin (LIPITOR) 80 mg tablet Take 1 tablet (80 mg total) by mouth every evening 90 tablet 0   • bumetanide (BUMEX) 1 mg tablet Take 1 tablet (1 mg total) by mouth daily 30 tablet 6   • calcitriol (ROCALTROL) 0 25 mcg capsule Take 1 capsule (0 25 mcg total) by mouth daily Takes Monday Wednesday and fridays as of 11/11/22     • clopidogrel (PLAVIX) 75 mg tablet "Take 1 tablet (75 mg total) by mouth daily Do not start before January 6, 2023  90 tablet 0   • ezetimibe (ZETIA) 10 mg tablet Take 1 tablet (10 mg total) by mouth in the morning  30 tablet 5   • gabapentin (Neurontin) 300 mg capsule Take 1 capsule (300 mg total) by mouth 3 (three) times a day 90 capsule 0   • metoprolol succinate (TOPROL-XL) 100 mg 24 hr tablet Take 1 tablet (100 mg total) by mouth daily Do not start before January 11, 2023  30 tablet 0   • nitroglycerin (NITROSTAT) 0 4 mg SL tablet Place 1 tablet (0 4 mg total) under the tongue every 5 (five) minutes as needed for chest pain 25 tablet 2   • omega-3-acid ethyl esters (LOVAZA) 1 g capsule Take 1 capsule (1 g total) by mouth 2 (two) times a day 60 capsule 0   • omeprazole (PriLOSEC) 40 MG capsule Take 1 capsule (40 mg total) by mouth daily 30 capsule 0   • ranolazine (RANEXA) 500 mg 12 hr tablet Take 1 tablet (500 mg total) by mouth every 12 (twelve) hours 60 tablet 0   • sodium bicarbonate 650 mg tablet Take 1 tablet (650 mg total) by mouth 2 (two) times daily after meals 60 tablet 0     No current facility-administered medications for this visit  Allergies:     No Known Allergies   Physical Exam:     /76 (BP Location: Left arm, Patient Position: Sitting)   Pulse 83   Temp 97 6 °F (36 4 °C) (Tympanic)   Ht 5' 6\" (1 676 m)   SpO2 98%   BMI 43 58 kg/m²     Physical Exam  Vitals and nursing note reviewed  Constitutional:       General: She is not in acute distress  Appearance: Normal appearance  She is well-developed  She is obese  She is not ill-appearing, toxic-appearing or diaphoretic  HENT:      Head: Normocephalic and atraumatic  Right Ear: Tympanic membrane, ear canal and external ear normal       Left Ear: Tympanic membrane, ear canal and external ear normal       Nose: Nose normal  No congestion or rhinorrhea  Mouth/Throat:      Mouth: Mucous membranes are moist       Pharynx: Oropharynx is clear   No " oropharyngeal exudate or posterior oropharyngeal erythema  Eyes:      Extraocular Movements: Extraocular movements intact  Conjunctiva/sclera: Conjunctivae normal       Pupils: Pupils are equal, round, and reactive to light  Neck:      Vascular: No carotid bruit  Cardiovascular:      Rate and Rhythm: Normal rate and regular rhythm  Pulses: Normal pulses  Heart sounds: Normal heart sounds  No murmur heard  No friction rub  No gallop  Pulmonary:      Effort: Pulmonary effort is normal  No respiratory distress  Breath sounds: Normal breath sounds  No wheezing or rales  Abdominal:      Tenderness: There is no abdominal tenderness  Comments: Patient was unable to get out of wheelchair to get proper examination   Musculoskeletal:      Cervical back: Neck supple  No muscular tenderness  Right lower leg: No edema  Left lower leg: No edema  Comments: Patient reports she was unable to bear me touching her feet even with light tough- so unable to check dorsi/plantarflexion    Patient 3/5 strength in bilateral lower extremities otherwise   Lymphadenopathy:      Cervical: No cervical adenopathy  Skin:     General: Skin is warm and dry  Findings: No lesion  Neurological:      General: No focal deficit present  Mental Status: She is alert  Cranial Nerves: No cranial nerve deficit  Sensory: No sensory deficit            Cayla Umaña MD  [address] [facility]

## 2023-04-26 ENCOUNTER — TELEPHONE (OUTPATIENT)
Dept: PHYSICAL THERAPY | Facility: OTHER | Age: 48
End: 2023-04-26

## 2023-04-28 ENCOUNTER — LAB (OUTPATIENT)
Dept: LAB | Facility: CLINIC | Age: 48
End: 2023-04-28

## 2023-04-28 ENCOUNTER — APPOINTMENT (OUTPATIENT)
Dept: RADIOLOGY | Facility: CLINIC | Age: 48
End: 2023-04-28

## 2023-04-28 ENCOUNTER — OFFICE VISIT (OUTPATIENT)
Dept: CARDIOLOGY CLINIC | Facility: CLINIC | Age: 48
End: 2023-04-28

## 2023-04-28 VITALS
DIASTOLIC BLOOD PRESSURE: 80 MMHG | HEART RATE: 82 BPM | WEIGHT: 285 LBS | HEIGHT: 66 IN | SYSTOLIC BLOOD PRESSURE: 120 MMHG | BODY MASS INDEX: 45.8 KG/M2

## 2023-04-28 DIAGNOSIS — I50.32 CHRONIC DIASTOLIC CONGESTIVE HEART FAILURE (HCC): ICD-10-CM

## 2023-04-28 DIAGNOSIS — M25.542 ARTHRALGIA OF BOTH HANDS: ICD-10-CM

## 2023-04-28 DIAGNOSIS — M25.541 ARTHRALGIA OF BOTH HANDS: ICD-10-CM

## 2023-04-28 DIAGNOSIS — I10 ESSENTIAL HYPERTENSION: Primary | ICD-10-CM

## 2023-04-28 DIAGNOSIS — I42.2 HYPERTROPHIC CARDIOMYOPATHY (HCC): ICD-10-CM

## 2023-04-28 DIAGNOSIS — I25.10 CORONARY ARTERY DISEASE INVOLVING NATIVE CORONARY ARTERY OF NATIVE HEART, UNSPECIFIED WHETHER ANGINA PRESENT: ICD-10-CM

## 2023-04-28 DIAGNOSIS — I45.5 SINUS PAUSE: ICD-10-CM

## 2023-04-28 LAB
CRP SERPL QL: 14 MG/L
ERYTHROCYTE [SEDIMENTATION RATE] IN BLOOD: 52 MM/HOUR (ref 0–19)

## 2023-04-28 RX ORDER — BUMETANIDE 1 MG/1
1 TABLET ORAL DAILY
Qty: 30 TABLET | Refills: 6 | Status: SHIPPED | OUTPATIENT
Start: 2023-04-28

## 2023-04-28 NOTE — PROGRESS NOTES
" Patient ID: Cami Shea is a 57 y o  female  Plan:      Chronic diastolic congestive heart failure (HCC)  Wt Readings from Last 3 Encounters:   04/28/23 129 kg (285 lb)   03/26/23 122 kg (270 lb)   03/17/23 122 kg (270 lb)     Euvolemic on exam  Continue Bumex 1 mg daily  Monitor renal function closely        Essential hypertension  Blood pressure is well controlled  Continue current regimen    CAD (coronary artery disease)  1  CAD s/p PCI to RCA, rPDA  · Inferior STEMI (3/23/21) --> initial PCI to [facility]  · Recurrent chest pain (3/23/21) --> GREG to RPDA  · Readmission, inferior STEMI (4/2/21) --> patent stents, no change on cath  · NSTEMI 1/3/2023--> GREG LAD/D2  Continue asa, plavix, statin, BB, ranexa    Hypertrophic cardiomyopathy ([address] )  Stable  Continue beta-blocker therapy    Sinus pause  Pacemaker in place       Follow up Plan/Summary Comments:  Luann Mariscal is doing well from a cardiac perspective  I have not made any medication adjustments at this time  No indication for cardiac testing  I will see her back in 6 months  She knows to call sooner if needed  HPI: Luann Mariscal is seen in the office today for routine visit  Since her last visit, she did have a few episodes of persistent chest pain and sought evaluation in the emergency department  Each time, troponin levels were unremarkable  She continues to experience occasional \"stickers\" in her chest, but these are brief and not concerning  She does have occasional shortness of breath and edema, but overall this has been well controlled  Her leg pain is slowly improving  She has been started on gabapentin and Cymbalta  She is also scheduled for an evaluation for a back issue that may be contributing  This is the main contributing factor to her limited activity      Review of Systems   10  point ROS  was otherwise non pertinent or negative except as per HPI or as below     Gait: wheelchair      Most recent or relevant cardiac/vascular testing: " "   Echo 1/9/2023 EF 45%, moderate diastolic dysfunction  Mildly dilated LA  Mild to moderate MR  No change compared to 1/2/2023    Holter 4/22/2021 SR, avg rate 88  Frequent PVCs, 8-beat run NSVT    Cardiac cath 1/3/2023   Successful PCI to mid % culprit req  rheolytic thrombectomy (progressed when compared to 4-2-21 study); given atretic distal LAD, 2 25 x 15 mm GREG was deployed from LAD into more sizable D2  •  Residual prox LCx 60% lesion with patent LAD, mid RCA & PDA stents  •  Elevated LVEDP (20-30 mmHg) with no LV-Ao gradient on pullback  •  R radial a  accessed but unable to advance equipment, likely occluded; RFA arteriotomy employed       Cath 4/2/2021 Distal LAD: There was a 60 % stenosis  Mid circumflex: There was a 50 % stenosis  Distal RCA: There was a 10 % stenosis at the site of a prior stent  Right PDA: There was a 0 % stenosis at the site of a prior stent  Echo 3/25/2021 60%, severe hypokinesis of the basal-mid inferior wall  Findings consistent with HCM  Mild-mod MR  Cardiac MRI 3/29/2021 severe LVH, EF 44%  Small circumferential pericardial effusion  Mild MR  Findings suggestive of underlying hypertrophic cardiomyopathy with ischemic scarring of the inferior and inferolateral walls  Stress echo 7/26/2021 Severe hypokinesis if the basal-mid inferior walls at peak stress, but overall conclusion was no evidence of stress induced ischemia  Objective:     /80   Pulse 82   Ht 5' 6\" (1 676 m)   Wt 129 kg (285 lb)   BMI 46 00 kg/m²     PHYSICAL EXAM:    General:  Normal appearance, no acute distress  Eyes:  Anicteric  Oral mucosa:  Moist   Neck:  No JVD  Carotid upstrokes are brisk without bruits  No masses  Chest:  Clear to auscultation   Cardiac:  No palpable PMI  Normal S1 and S2  No murmur gallop or rub  Abdomen:  Soft and nontender  No palpable organomegaly or aortic enlargement  Extremities:  No peripheral edema  Musculoskeletal:  Symmetric   " Vascular: Pedal pulses are intact  Neuro:  Grossly symmetric  Psych:  Alert and oriented x3      No Known Allergies    Current Outpatient Medications:   •  acetaminophen (TYLENOL) 325 mg tablet, Take 3 tablets (975 mg total) by mouth every 8 (eight) hours as needed for mild pain, Disp: 90 tablet, Rfl: 0  •  albuterol (PROVENTIL HFA,VENTOLIN HFA) 90 mcg/act inhaler, Inhale 2 puffs every 4 (four) hours as needed for wheezing or shortness of breath, Disp: 18 g, Rfl: 5  •  aspirin 81 mg chewable tablet, Chew 1 tablet (81 mg total) daily, Disp: 90 tablet, Rfl: 0  •  atorvastatin (LIPITOR) 80 mg tablet, Take 1 tablet (80 mg total) by mouth every evening, Disp: 90 tablet, Rfl: 0  •  bumetanide (BUMEX) 1 mg tablet, Take 1 tablet (1 mg total) by mouth daily, Disp: 30 tablet, Rfl: 6  •  calcitriol (ROCALTROL) 0 25 mcg capsule, Take 1 capsule (0 25 mcg total) by mouth daily Takes Monday Wednesday and fridays as of 11/11/22, Disp: , Rfl:   •  clopidogrel (PLAVIX) 75 mg tablet, Take 1 tablet (75 mg total) by mouth daily Do not start before January 6, 2023 , Disp: 90 tablet, Rfl: 0  •  DULoxetine (CYMBALTA) 30 mg delayed release capsule, Take 1 capsule (30 mg total) by mouth daily, Disp: 30 capsule, Rfl: 0  •  ezetimibe (ZETIA) 10 mg tablet, Take 1 tablet (10 mg total) by mouth in the morning , Disp: 30 tablet, Rfl: 5  •  gabapentin (Neurontin) 300 mg capsule, Take 1 capsule (300 mg total) by mouth 3 (three) times a day, Disp: 90 capsule, Rfl: 0  •  metoprolol succinate (TOPROL-XL) 100 mg 24 hr tablet, Take 1 tablet (100 mg total) by mouth daily Do not start before January 11, 2023 , Disp: 30 tablet, Rfl: 0  •  nitroglycerin (NITROSTAT) 0 4 mg SL tablet, Place 1 tablet (0 4 mg total) under the tongue every 5 (five) minutes as needed for chest pain, Disp: 25 tablet, Rfl: 2  •  omega-3-acid ethyl esters (LOVAZA) 1 g capsule, Take 1 capsule (1 g total) by mouth 2 (two) times a day, Disp: 60 capsule, Rfl: 0  •  ranolazine (RANEXA) 500 mg 12 hr tablet, Take 1 tablet (500 mg total) by mouth every 12 (twelve) hours, Disp: 60 tablet, Rfl: 0  •  sodium bicarbonate 650 mg tablet, Take 1 tablet (650 mg total) by mouth 2 (two) times daily after meals, Disp: 60 tablet, Rfl: 0  •  omeprazole (PriLOSEC) 40 MG capsule, Take 1 capsule (40 mg total) by mouth daily (Patient not taking: Reported on 4/28/2023), Disp: 30 capsule, Rfl: 0  Past Medical History:   Diagnosis Date   • Anemia    • Anxiety    • CAD (coronary artery disease)    • Cancer ([place] [address] ) 2008   • Cardiomyopathy ([place] [address] )    • CHF [facility]congestive heart failure) [facility]    • Chronic kidney disease    • COPD (chronic obstructive pulmonary disease) ([place] [address] )     scheduled for sleep apnea test soon after pacemaker implant   • Coronary artery disease    • Depression    • History of chemotherapy     non hodgkins lymphoma 2008   • Hyperlipemia    • Hypertension    • Hypertrophic cardiomyopathy ([place] [address] )    • Lymphoma, non-Hodgkin's (HCC)    • Myocardial infarction [facility])    • Non-ST elevation MI (NSTEMI) 01/02/2023   • Obesity    • Obstructive sleep apnea    • SSS (sick sinus syndrome) ([place] [address] )     s/p medtronic dual chamber pacemaker 5/25/2021   • Tobacco abuse    • Ventricular tachycardia, non-sustained ([place] [address] )    • Wears glasses      Past Surgical History:   Procedure Laterality Date   • CARDIAC CATHETERIZATION N/A 1/3/2023    Procedure: Cardiac catheterization;  Surgeon: Pepe Cottrell MD;  [facility]; Service: Cardiology   • CARDIAC CATHETERIZATION N/A 1/3/2023    Procedure: Cardiac Coronary Angiogram;  Surgeon: Pepe Cottrell MD;  [facility]; Service: Cardiology   • CARDIAC CATHETERIZATION N/A 1/3/2023    Procedure: Cardiac pci;  Surgeon: Pepe Cottrell MD;  Location: [facility];   Service: Cardiology   • CARDIAC PACEMAKER PLACEMENT Left 5/25/2021    Procedure: DUAL LEAD PACEMAKER IMPLANT;  Surgeon: Elmer Talbert MD;  Location: [address] MAIN OR;  Service: Cardiology   • CAROTID STENT     •  SECTION     • CORONARY ANGIOPLASTY WITH STENT PLACEMENT  2021    GREG mid RCA and GREG right PDA   • DENTAL SURGERY     • IR BIOPSY KIDNEY RANDOM  10/18/2021       CMP:   Lab Results   Component Value Date    K 4 1 2023     2023    CO2 18 (L) 2023    BUN 31 (H) 2023    CREATININE 2 13 (H) 2023    EGFR 27 2023     Lipid Profile:    Lab Results   Component Value Date    TRIG 348 (H) 2023    HDL 31 (L) 2023         Social History     Tobacco Use   Smoking Status Every Day   • Packs/day: 0 50   • Years: 25 00   • Pack years: 12 50   • Types: Cigarettes   Smokeless Tobacco Never   Tobacco Comments    Recently and currently quitting cigarettes

## 2023-04-28 NOTE — ASSESSMENT & PLAN NOTE
1  CAD s/p PCI to RCA, rPDA  · Inferior STEMI (3/23/21) --> initial PCI to [facility]  · Recurrent chest pain (3/23/21) --> GREG to RPDA  · Readmission, inferior STEMI (4/2/21) --> patent stents, no change on cath  · NSTEMI 1/3/2023--> GREG LAD/D2  Continue asa, plavix, statin, BB, ranexa

## 2023-04-28 NOTE — ASSESSMENT & PLAN NOTE
Wt Readings from Last 3 Encounters:   04/28/23 129 kg (285 lb)   03/26/23 122 kg (270 lb)   03/17/23 122 kg (270 lb)     Euvolemic on exam  Continue Bumex 1 mg daily  Monitor renal function closely

## 2023-05-02 LAB — CCP AB SER IA-ACNC: 1.2

## 2023-05-08 DIAGNOSIS — G62.9 PERIPHERAL NEUROPATHY: ICD-10-CM

## 2023-05-08 NOTE — TELEPHONE ENCOUNTER
Patient called in stated she had x-rays of her hands they were negative but has lumps and deformities and pain in the hands would like to know what to do about this       Please advise    Boni Forman

## 2023-05-10 ENCOUNTER — HOSPITAL ENCOUNTER (EMERGENCY)
Facility: HOSPITAL | Age: 48
Discharge: HOME | End: 2023-05-10
Attending: EMERGENCY MEDICINE

## 2023-05-10 ENCOUNTER — APPOINTMENT (EMERGENCY)
Dept: RADIOLOGY | Facility: HOSPITAL | Age: 48
End: 2023-05-10

## 2023-05-10 VITALS
SYSTOLIC BLOOD PRESSURE: 140 MMHG | OXYGEN SATURATION: 96 % | WEIGHT: 280 LBS | RESPIRATION RATE: 18 BRPM | HEART RATE: 83 BPM | HEIGHT: 66 IN | DIASTOLIC BLOOD PRESSURE: 76 MMHG | TEMPERATURE: 98.7 F | BODY MASS INDEX: 45 KG/M2

## 2023-05-10 DIAGNOSIS — R07.9 CHEST PAIN: Primary | ICD-10-CM

## 2023-05-10 DIAGNOSIS — I25.10 CORONARY ARTERY DISEASE INVOLVING NATIVE CORONARY ARTERY OF NATIVE HEART WITHOUT ANGINA PECTORIS: Primary | ICD-10-CM

## 2023-05-10 LAB
2HR DELTA HS TROPONIN: 9 NG/L
4HR DELTA HS TROPONIN: 7 NG/L
ALBUMIN SERPL BCP-MCNC: 4 G/DL (ref 3.5–5)
ALP SERPL-CCNC: 98 U/L (ref 34–104)
ALT SERPL W P-5'-P-CCNC: 20 U/L (ref 7–52)
ANION GAP SERPL CALCULATED.3IONS-SCNC: 12 MMOL/L (ref 4–13)
AST SERPL W P-5'-P-CCNC: 15 U/L (ref 13–39)
ATRIAL RATE: 78 BPM
ATRIAL RATE: 84 BPM
ATRIAL RATE: 94 BPM
BASOPHILS # BLD AUTO: 0.12 THOUSANDS/ÂΜL (ref 0–0.1)
BASOPHILS NFR BLD AUTO: 1 % (ref 0–1)
BILIRUB SERPL-MCNC: 0.36 MG/DL (ref 0.2–1)
BUN SERPL-MCNC: 27 MG/DL (ref 5–25)
CALCIUM SERPL-MCNC: 9.2 MG/DL (ref 8.4–10.2)
CARDIAC TROPONIN I PNL SERPL HS: 35 NG/L
CARDIAC TROPONIN I PNL SERPL HS: 42 NG/L
CARDIAC TROPONIN I PNL SERPL HS: 44 NG/L
CHLORIDE SERPL-SCNC: 107 MMOL/L (ref 96–108)
CO2 SERPL-SCNC: 16 MMOL/L (ref 21–32)
CREAT SERPL-MCNC: 2.31 MG/DL (ref 0.6–1.3)
EOSINOPHIL # BLD AUTO: 0.31 THOUSAND/ÂΜL (ref 0–0.61)
EOSINOPHIL NFR BLD AUTO: 2 % (ref 0–6)
ERYTHROCYTE [DISTWIDTH] IN BLOOD BY AUTOMATED COUNT: 19.2 % (ref 11.6–15.1)
GFR SERPL CREATININE-BSD FRML MDRD: 24 ML/MIN/1.73SQ M
GLUCOSE SERPL-MCNC: 134 MG/DL (ref 65–140)
HCT VFR BLD AUTO: 43.3 % (ref 34.8–46.1)
HGB BLD-MCNC: 13.8 G/DL (ref 11.5–15.4)
IMM GRANULOCYTES # BLD AUTO: 0.19 THOUSAND/UL (ref 0–0.2)
IMM GRANULOCYTES NFR BLD AUTO: 1 % (ref 0–2)
LYMPHOCYTES # BLD AUTO: 3.16 THOUSANDS/ÂΜL (ref 0.6–4.47)
LYMPHOCYTES NFR BLD AUTO: 20 % (ref 14–44)
MAGNESIUM SERPL-MCNC: 1.7 MG/DL (ref 1.9–2.7)
MCH RBC QN AUTO: 28 PG (ref 26.8–34.3)
MCHC RBC AUTO-ENTMCNC: 31.9 G/DL (ref 31.4–37.4)
MCV RBC AUTO: 88 FL (ref 82–98)
MONOCYTES # BLD AUTO: 1.12 THOUSAND/ÂΜL (ref 0.17–1.22)
MONOCYTES NFR BLD AUTO: 7 % (ref 4–12)
NEUTROPHILS # BLD AUTO: 11.19 THOUSANDS/ÂΜL (ref 1.85–7.62)
NEUTS SEG NFR BLD AUTO: 69 % (ref 43–75)
NRBC BLD AUTO-RTO: 0 /100 WBCS
P AXIS: 30 DEGREES
P AXIS: 41 DEGREES
P AXIS: 60 DEGREES
PLATELET # BLD AUTO: 275 THOUSANDS/UL (ref 149–390)
PMV BLD AUTO: 10.4 FL (ref 8.9–12.7)
POTASSIUM SERPL-SCNC: 4.5 MMOL/L (ref 3.5–5.3)
PR INTERVAL: 164 MS
PR INTERVAL: 172 MS
PR INTERVAL: 178 MS
PROT SERPL-MCNC: 7.7 G/DL (ref 6.4–8.4)
QRS AXIS: 49 DEGREES
QRS AXIS: 52 DEGREES
QRS AXIS: 71 DEGREES
QRSD INTERVAL: 100 MS
QRSD INTERVAL: 92 MS
QRSD INTERVAL: 96 MS
QT INTERVAL: 364 MS
QT INTERVAL: 390 MS
QT INTERVAL: 400 MS
QTC INTERVAL: 455 MS
QTC INTERVAL: 456 MS
QTC INTERVAL: 460 MS
RBC # BLD AUTO: 4.92 MILLION/UL (ref 3.81–5.12)
SODIUM SERPL-SCNC: 135 MMOL/L (ref 135–147)
T WAVE AXIS: 16 DEGREES
T WAVE AXIS: 31 DEGREES
T WAVE AXIS: 41 DEGREES
VENTRICULAR RATE: 78 BPM
VENTRICULAR RATE: 84 BPM
VENTRICULAR RATE: 94 BPM
WBC # BLD AUTO: 16.09 THOUSAND/UL (ref 4.31–10.16)

## 2023-05-10 RX ORDER — ISOSORBIDE MONONITRATE 30 MG/1
30 TABLET, EXTENDED RELEASE ORAL DAILY
Qty: 30 TABLET | Refills: 3 | Status: SHIPPED | OUTPATIENT
Start: 2023-05-10

## 2023-05-10 RX ORDER — LORAZEPAM 2 MG/ML
1 INJECTION INTRAMUSCULAR ONCE
Status: COMPLETED | OUTPATIENT
Start: 2023-05-10 | End: 2023-05-10

## 2023-05-10 RX ORDER — FENTANYL CITRATE 50 UG/ML
25 INJECTION, SOLUTION INTRAMUSCULAR; INTRAVENOUS ONCE
Status: COMPLETED | OUTPATIENT
Start: 2023-05-10 | End: 2023-05-10

## 2023-05-10 RX ORDER — RANOLAZINE 500 MG/1
500 TABLET, EXTENDED RELEASE ORAL EVERY 12 HOURS SCHEDULED
Status: DISCONTINUED | OUTPATIENT
Start: 2023-05-10 | End: 2023-05-10 | Stop reason: HOSPADM

## 2023-05-10 RX ORDER — RANOLAZINE 500 MG/1
1000 TABLET, EXTENDED RELEASE ORAL EVERY 12 HOURS SCHEDULED
Status: DISCONTINUED | OUTPATIENT
Start: 2023-05-10 | End: 2023-05-10

## 2023-05-10 RX ORDER — ONDANSETRON 2 MG/ML
4 INJECTION INTRAMUSCULAR; INTRAVENOUS ONCE
Status: COMPLETED | OUTPATIENT
Start: 2023-05-10 | End: 2023-05-10

## 2023-05-10 RX ORDER — GABAPENTIN 300 MG/1
300 CAPSULE ORAL 3 TIMES DAILY
Qty: 90 CAPSULE | Refills: 3 | Status: SHIPPED | OUTPATIENT
Start: 2023-05-10

## 2023-05-10 RX ORDER — MAGNESIUM SULFATE 1 G/100ML
1 INJECTION INTRAVENOUS ONCE
Status: COMPLETED | OUTPATIENT
Start: 2023-05-10 | End: 2023-05-10

## 2023-05-10 RX ORDER — ISOSORBIDE MONONITRATE 30 MG/1
15 TABLET, EXTENDED RELEASE ORAL DAILY
Status: DISCONTINUED | OUTPATIENT
Start: 2023-05-10 | End: 2023-05-10 | Stop reason: HOSPADM

## 2023-05-10 RX ORDER — METOPROLOL SUCCINATE 50 MG/1
100 TABLET, EXTENDED RELEASE ORAL 2 TIMES DAILY
Status: DISCONTINUED | OUTPATIENT
Start: 2023-05-10 | End: 2023-05-10 | Stop reason: HOSPADM

## 2023-05-10 RX ADMIN — MAGNESIUM SULFATE HEPTAHYDRATE 1 G: 1 INJECTION, SOLUTION INTRAVENOUS at 05:37

## 2023-05-10 RX ADMIN — ONDANSETRON 4 MG: 2 INJECTION INTRAMUSCULAR; INTRAVENOUS at 03:53

## 2023-05-10 RX ADMIN — LORAZEPAM 1 MG: 2 INJECTION INTRAMUSCULAR; INTRAVENOUS at 09:22

## 2023-05-10 RX ADMIN — RANOLAZINE 1000 MG: 500 TABLET, FILM COATED, EXTENDED RELEASE ORAL at 08:53

## 2023-05-10 RX ADMIN — FENTANYL CITRATE 25 MCG: 50 INJECTION INTRAMUSCULAR; INTRAVENOUS at 06:01

## 2023-05-10 RX ADMIN — METOPROLOL SUCCINATE 100 MG: 50 TABLET, EXTENDED RELEASE ORAL at 08:53

## 2023-05-10 RX ADMIN — FENTANYL CITRATE 25 MCG: 50 INJECTION, SOLUTION INTRAMUSCULAR; INTRAVENOUS at 03:53

## 2023-05-10 RX ADMIN — ONDANSETRON 4 MG: 2 INJECTION INTRAMUSCULAR; INTRAVENOUS at 07:37

## 2023-05-10 RX ADMIN — SODIUM CHLORIDE 1000 ML: 0.9 INJECTION, SOLUTION INTRAVENOUS at 03:52

## 2023-05-10 NOTE — ASSESSMENT & PLAN NOTE
Presenting with chest tightness that resembles angina of prior STEMI and NSTEMI  Patient with extensive History of CAD as described   On Medical management with DAPT, BB and Ranexa  EKG is non-ischemic   HS troponin is negative     BP is not optimally controlled   BB will be increased to metoprolol 100 BID   Given reduced renal function ranexa will be kept at the same dose  Imdur will be added (Rx sent to Pharmacy)    Patient will follow up in the office in the OP setting   Appointment made for May 25th at 2 pm with her primary Cardiologist Isai Whiteside to discuss options

## 2023-05-10 NOTE — ED PROVIDER NOTES
History  Chief Complaint   Patient presents with   • Chest Pain      Patient reports chest heaviness that started at  0100 , patient has a cardiac hx, Patient reports taking two aspirin and a nitro, reports nitro did give relief        50-YEAR-OLD FEMALE    1 PACK-A-DAY SMOKER      Chief complaint:   Left mid chest pain     HPI:  Started feeling ill yesterday  Now w/ about 2 hours of cp  Radiating to left shoulder  Associated w/ sob and nausea    PAIN IS RATED AS SEVERE  DESCRIBED AS Pressure    NO COMPLAINTS OF DIAPHORESIS      INTERVENTIONS: NONE    PATIENT DENIES ANY COUGH, NO FEVERS OR CHILLS  NO URI SYMPTOMS      VTE  RISK FACTORS:  NONE  NO HISTORY OF PE OR DVT  NO LONG CAR RIDES OR PLANE RIDES  NO IMMOBILIZATION  NO RECENT SURGERY OR TRAUMA  NO HEMOPTYSIS  OTHER ASSOCIATED SYMPTOMS:  NO ABDOMINAL PAIN  REPORTS NAUSEA, NO VOMITING  NO STOOL CHANGES  NO URINARY COMPLAINTS: NO DYSURIA, NO HEMATURIA, NO FREQUENCY        History provided by:  Patient  Chest Pain  Pain location:  L chest  Pain quality: pressure    Pain severity:  Severe  Onset quality:  Gradual  Chronicity:  New  Relieved by:  Nothing  Worsened by:  Nothing tried  Ineffective treatments:  None tried  Associated symptoms: nausea and shortness of breath    Associated symptoms: no abdominal pain, no altered mental status, no back pain, no cough, no diaphoresis, no dizziness, no fatigue, no fever, no headache, no palpitations and not vomiting        Prior to Admission Medications   Prescriptions Last Dose Informant Patient Reported? Taking?    DULoxetine (CYMBALTA) 30 mg delayed release capsule   No No   Sig: Take 1 capsule (30 mg total) by mouth daily   acetaminophen (TYLENOL) 325 mg tablet   No No   Sig: Take 3 tablets (975 mg total) by mouth every 8 (eight) hours as needed for mild pain   albuterol (PROVENTIL HFA,VENTOLIN HFA) 90 mcg/act inhaler   No No   Sig: Inhale 2 puffs every 4 (four) hours as needed for wheezing or shortness of breath   aspirin 81 mg chewable tablet   No No   Sig: Chew 1 tablet (81 mg total) daily   atorvastatin (LIPITOR) 80 mg tablet   No No   Sig: Take 1 tablet (80 mg total) by mouth every evening   bumetanide (BUMEX) 1 mg tablet   No No   Sig: Take 1 tablet (1 mg total) by mouth daily   calcitriol (ROCALTROL) 0 25 mcg capsule   Yes No   Sig: Take 1 capsule (0 25 mcg total) by mouth daily Takes Monday Wednesday and fridays as of 11/11/22   clopidogrel (PLAVIX) 75 mg tablet   No No   Sig: Take 1 tablet (75 mg total) by mouth daily Do not start before January 6, 2023  ezetimibe (ZETIA) 10 mg tablet   No No   Sig: Take 1 tablet (10 mg total) by mouth in the morning    gabapentin (Neurontin) 300 mg capsule   No No   Sig: Take 1 capsule (300 mg total) by mouth 3 (three) times a day   metoprolol succinate (TOPROL-XL) 100 mg 24 hr tablet   No No   Sig: Take 1 tablet (100 mg total) by mouth daily Do not start before January 11, 2023     nitroglycerin (NITROSTAT) 0 4 mg SL tablet   No No   Sig: Place 1 tablet (0 4 mg total) under the tongue every 5 (five) minutes as needed for chest pain   omega-3-acid ethyl esters (LOVAZA) 1 g capsule   No No   Sig: Take 1 capsule (1 g total) by mouth 2 (two) times a day   omeprazole (PriLOSEC) 40 MG capsule   No No   Sig: Take 1 capsule (40 mg total) by mouth daily   Patient not taking: Reported on 4/28/2023   ranolazine (RANEXA) 500 mg 12 hr tablet   No No   Sig: Take 1 tablet (500 mg total) by mouth every 12 (twelve) hours   sodium bicarbonate 650 mg tablet   No No   Sig: Take 1 tablet (650 mg total) by mouth 2 (two) times daily after meals      Facility-Administered Medications: None       Past Medical History:   Diagnosis Date   • Anemia    • Anxiety    • CAD (coronary artery disease)    • Cancer ([place] [address] ) 2008   • Cardiomyopathy ([place] [address] )    • CHF (congestive heart failure) (HCC)    • Chronic kidney disease    • COPD (chronic obstructive pulmonary disease) ([place] [address] )     scheduled for sleep apnea test soon after pacemaker implant   • Coronary artery disease    • Depression    • History of chemotherapy     non hodgkins lymphoma    • Hyperlipemia    • Hypertension    • Hypertrophic cardiomyopathy ([facility] )    • Lymphoma, non-Hodgkin's (HCC)    • Myocardial infarction [facility])    • Non-ST elevation MI (NSTEMI) 2023   • Obesity    • Obstructive sleep apnea    • SSS (sick sinus syndrome) ([facility] )     s/p medtronic dual chamber pacemaker 2021   • Tobacco abuse    • Ventricular tachycardia, non-sustained ([place] [id number] )    • Wears glasses        Past Surgical History:   Procedure Laterality Date   • CARDIAC CATHETERIZATION N/A 1/3/2023    Procedure: Cardiac catheterization;  Surgeon: Renata Montgomery MD;  Location: [facility] CARD[facility]AC CATH LAB; Service: Cardiology   • CARDIAC CATHETERIZATION N/A 1/3/2023    Procedure: Cardiac Coronary Angiogram;  Surgeon: Renata Montgomery MD;  Location: [facility]; Service: Cardiology   • CARDIAC CATHETERIZATION N/A 1/3/2023    Procedure: Cardiac pci;  Surgeon: Renata Montgomery MD;  Location: [facility] CARD[facility]; Service: Cardiology   • CARDIAC PACEMAKER PLACEMENT Left 2021    Procedure: DUAL LEAD PACEMAKER IMPLANT;  Surgeon: Kaelyn Hurley MD;  Location: [address] MAIN OR;  Service: Cardiology   • CAROTID STENT     •  SECTION     • CORONARY ANGIOPLASTY WITH STENT PLACEMENT  2021    GREG mid RCA and GREG right PDA   • DENTAL SURGERY     • IR BIOPSY KIDNEY RANDOM  10/18/2021       Family History   Problem Relation Age of Onset   • No Known Problems Mother    • No Known Problems Father    • No Known Problems Daughter    • Brain cancer Maternal Grandfather    • Heart disease Maternal Grandfather    • Cancer Maternal Grandfather    • No Known Problems Paternal Aunt    • No Known Problems Paternal Aunt      I have reviewed and agree with the history as documented      E-Cigarette/Vaping   • E-Cigarette Use Never User      E-Cigarette/Vaping Substances   • Nicotine No    • THC No    • CBD No    • Flavoring No    • Other No    • Unknown No      Social History     Tobacco Use   • Smoking status: Every Day     Packs/day: 0 50     Years: 25 00     Pack years: 12 50     Types: Cigarettes   • Smokeless tobacco: Never   • Tobacco comments:     Recently and currently quitting cigarettes   Vaping Use   • Vaping Use: Never used   Substance Use Topics   • Alcohol use: Yes     Comment: 1-2 times years   • Drug use: No       Review of Systems   Constitutional: Negative for chills, diaphoresis, fatigue and fever  Respiratory: Positive for shortness of breath  Negative for cough, wheezing and stridor  Cardiovascular: Positive for chest pain  Negative for palpitations and leg swelling  Gastrointestinal: Positive for nausea  Negative for abdominal pain, blood in stool, diarrhea and vomiting  Genitourinary: Negative for difficulty urinating, dysuria, flank pain and frequency  Musculoskeletal: Negative for arthralgias, back pain, gait problem, joint swelling, myalgias, neck pain and neck stiffness  Skin: Negative for rash and wound  Neurological: Negative for dizziness, light-headedness and headaches  All other systems reviewed and are negative  Physical Exam  Physical Exam  Constitutional:       General: She is not in acute distress  Appearance: She is well-developed  She is not ill-appearing, toxic-appearing or diaphoretic  HENT:      Head: Normocephalic and atraumatic  Nose: Nose normal       Mouth/Throat:      Pharynx: No oropharyngeal exudate  Eyes:      General: No scleral icterus  Right eye: No discharge  Left eye: No discharge  Extraocular Movements: Extraocular movements intact  Conjunctiva/sclera: Conjunctivae normal       Pupils: Pupils are equal, round, and reactive to light  Neck:      Vascular: No JVD  Trachea: No tracheal deviation  Cardiovascular:      Rate and Rhythm: Normal rate and regular rhythm        Pulses:           Carotid pulses are 2+ on the right side and 2+ on the left side  Radial pulses are 2+ on the right side and 2+ on the left side  Heart sounds: Normal heart sounds  No murmur heard  No friction rub  No gallop  Pulmonary:      Effort: Pulmonary effort is normal  No accessory muscle usage or respiratory distress  Breath sounds: Normal breath sounds  No stridor  No wheezing, rhonchi or rales  Chest:      Chest wall: No tenderness  Abdominal:      General: Bowel sounds are normal  There is no distension  Palpations: Abdomen is soft  There is no mass  Tenderness: There is no abdominal tenderness  There is no guarding or rebound  Hernia: No hernia is present  Musculoskeletal:         General: No tenderness or deformity  Normal range of motion  Cervical back: Normal range of motion and neck supple  Right lower leg: No tenderness  No edema  Left lower leg: No tenderness  No edema  Lymphadenopathy:      Cervical: No cervical adenopathy  Skin:     General: Skin is warm  Capillary Refill: Capillary refill takes less than 2 seconds  Coloration: Skin is not cyanotic or pale  Findings: No ecchymosis, erythema or rash  Neurological:      General: No focal deficit present  Mental Status: She is alert and oriented to person, place, and time  Cranial Nerves: No cranial nerve deficit  Sensory: No sensory deficit  Motor: No abnormal muscle tone  Coordination: Coordination normal    Psychiatric:         Mood and Affect: Mood is anxious  Behavior: Behavior normal          Thought Content:  Thought content normal          Judgment: Judgment normal          Vital Signs  ED Triage Vitals [05/10/23 0317]   Temperature Pulse Respirations Blood Pressure SpO2   98 7 °F (37 1 °C) 93 21 140/63 97 %      Temp Source Heart Rate Source Patient Position - Orthostatic VS BP Location FiO2 (%)   Tympanic -- -- -- --      Pain Score       9 Vitals:    05/10/23 0317   BP: 140/63   Pulse: 93         Visual Acuity      ED Medications  Medications - No data to display    Diagnostic Studies  Results Reviewed     Procedure Component Value Units Date/Time    CBC and differential [[id number]]     Lab Status: No result Specimen: Blood     Comprehensive metabolic panel [[id number]]     Lab Status: No result Specimen: Blood     HS Troponin 0hr (reflex protocol) [[id number]]     Lab Status: No result Specimen: Blood                  XR chest 1 view portable    (Results Pending)              Procedures  ECG 12 Lead Documentation Only    Date/Time: 5/10/2023 3:15 AM  Performed by: Pedro Dobson MD  Authorized by: Pedro Dobson MD     Indications / Diagnosis:  CP, SOB   ECG reviewed by me, the ED Provider: yes    Patient location:  ED  Rate:     ECG rate:  94    ECG rate assessment: normal    Rhythm:     Rhythm: sinus rhythm    Ectopy:     Ectopy: none    QRS:     QRS axis:  Normal    QRS intervals:  Normal  Conduction:     Conduction: normal    ST segments:     ST segments:  Non-specific  T waves:     T waves: non-specific      ECG 12 Lead Documentation Only    Date/Time: 5/10/2023 5:50 AM  Performed by: Pedro Dobson MD  Authorized by: Pedro Dobson MD     Indications / Diagnosis:  Cp  ECG reviewed by me, the ED Provider: yes    Patient location:  ED  Interpretation:     Interpretation: non-specific    Rate:     ECG rate:  84    ECG rate assessment: normal    Rhythm:     Rhythm: sinus rhythm    Ectopy:     Ectopy: none    QRS:     QRS axis:  Normal    QRS intervals:  Normal  Conduction:     Conduction: normal    ST segments:     ST segments:  Non-specific  T waves:     T waves: non-specific               ED Course  ED Course as of 05/10/23 0713   Wed May 10, 2023   0349 CBC and differential(!)   0349 Cbc at baseline, including leukocytosis   0549 Pt woke up with sudden CP  EKG ordered  Repeat Fentanyl ordered     0624 Delta 2hr hsTnI: 9   0625 PT Stable  She understands the work up results    She is very nervous about her pain   0700 Braman texted Dr Analia Barlow for admission    0707 Reviewed case w/ Dr Jackie Aguilera that it would be best to have Dr Nino See come and round on the pt to assist w/ dispo    I have tiger texted Dr Nino See    2179 Dr Nino See has responded  He will come to see the pt and assist w/ disposition                                SBIRT 20yo+    Flowsheet Row Most Recent Value   Initial Alcohol Screen: US AUDIT-C     1  How often do you have a drink containing alcohol? 0 Filed at: 05/10/2023 0327   2  How many drinks containing alcohol do you have on a typical day you are drinking? 0 Filed at: 05/10/2023 0327   3a  Male UNDER 65: How often do you have five or more drinks on one occasion? 0 Filed at: 05/10/2023 0327   3b  FEMALE Any Age, or MALE 65+: How often do you have 4 or more drinks on one occassion? 0 Filed at: 05/10/2023 0327   Audit-C Score 0 Filed at: 05/10/2023 4365   NELIA: How many times in the past year have you    Used an illegal drug or used a prescription medication for non-medical reasons? Never Filed at: 05/10/2023 0327                    Medical Decision Making  58 yo F  Complex cardiac hx  dispo pending at time of sign out     Chest pain: acute illness or injury  Amount and/or Complexity of Data Reviewed  Labs: ordered  Decision-making details documented in ED Course  Radiology: ordered and independent interpretation performed  Disposition  Final diagnoses:   None     ED Disposition     None      Follow-up Information    None         Patient's Medications   Discharge Prescriptions    No medications on file       No discharge procedures on file      PDMP Review       Value Time User    PDMP Reviewed  Yes 3/26/2023  6:06 PM Sakshi Titus DO          ED Provider  Electronically Signed by           Keron Hardin MD  05/10/23 6221

## 2023-05-10 NOTE — ASSESSMENT & PLAN NOTE
Initial PCI to RCA and rPDA  Inferior STEMI 3/23/21: with subsequent PCI to mRCA  Recurrent chest pain 3/23/21 with GREG to [facility]  Readmission 4/2/21 with inferior STEMI resulting in cath showing patent stents, no change   NSTEMI 1/3/23: with GREG to LAD/D2  On ASA Plavix metoprolol and Ranexa     Patient now presenting with Chest pain   EKG non-ischemic, unchanged from prior   HS Troponin negative   See additional comments

## 2023-05-10 NOTE — ED PROCEDURE NOTE
PROCEDURE  ECG 12 Lead Documentation Only    Date/Time: 5/10/2023 8:41 AM  Performed by: Angela De Leon DO  Authorized by:  Angela De Leon DO     Indications / Diagnosis:  Chest pain  ECG reviewed by me, the ED Provider: yes    Patient location:  ED  Previous ECG:     Previous ECG:  Compared to current    Similarity:  No change  Interpretation:     Interpretation: abnormal    Rate:     ECG rate:  78    ECG rate assessment: normal    Rhythm:     Rhythm: sinus rhythm    Ectopy:     Ectopy: none    Conduction:     Conduction: normal    ST segments:     ST segments:  Normal  T waves:     T waves: normal           Angela De Leon DO  05/10/23 1434

## 2023-05-10 NOTE — ASSESSMENT & PLAN NOTE
Lab Results   Component Value Date    EGFR 24 05/10/2023    EGFR 27 04/14/2023    EGFR 18 03/17/2023    CREATININE 2 31 (H) 05/10/2023    CREATININE 2 13 (H) 04/14/2023    CREATININE 2 88 (H) 03/17/2023   BP not optimally controlled   She did not take medication this am   Will administer metoprolol and increase to 100 mg BID   Imdur will also be added   Due reduced renal function Ranexa will be kept at current dose

## 2023-05-10 NOTE — CONSULTS
Jordy 47  Consult  Name: Alex Wright 57 y o  female I MRN: [id number]  Unit/Bed#: ED 20 I Date of Admission: 5/10/2023   Date of Service: 5/10/2023 I Hospital Day: 0    Consult to cardiology  Consult performed by: Lexii Arriola PA-C  Consult ordered by: Uvaldo Juarez MD          Assessment/Plan   Benign hypertension with CKD (chronic kidney disease) stage III [facility])  Assessment & Plan  Lab Results   Component Value Date    EGFR 24 05/10/2023    EGFR 27 04/14/2023    EGFR 18 03/17/2023    CREATININE 2 31 (H) 05/10/2023    CREATININE 2 13 (H) 04/14/2023    CREATININE 2 88 (H) 03/17/2023   BP not optimally controlled   She did not take medication this am   Will administer metoprolol and increase to 100 mg BID   Imdur will also be added   Due reduced renal function Ranexa will be kept at current dose     Hypertrophic cardiomyopathy ([facility] [address] )  Assessment & Plan  Last Known EF of 45%  Continue BB therapy     Mixed hyperlipidemia  Assessment & Plan  Tolerating high intensity statin therapy  Continue atorvastatin 80 mg daily     CAD (coronary artery disease)  Assessment & Plan  Initial PCI to RCA and rPDA  Inferior STEMI 3/23/21: with subsequent PCI to mRCA  Recurrent chest pain 3/23/21 with GREG to [facility]  Readmission 4/2/21 with inferior STEMI resulting in cath showing patent stents, no change   NSTEMI 1/3/23: with GREG to LAD/D2  On ASA Plavix metoprolol and Ranexa     Patient now presenting with Chest pain   EKG non-ischemic, unchanged from prior   HS Troponin negative   See additional comments       Chest pain in adult  Assessment & Plan  Presenting with chest tightness that resembles angina of prior STEMI and NSTEMI  Patient with extensive History of CAD as described   On Medical management with DAPT, BB and Ranexa  EKG is non-ischemic   HS troponin is negative     BP is not optimally controlled   BB will be increased to metoprolol 100 BID   Given reduced renal function ranexa will be kept at "the same dose  Imdur will be added (Rx sent to Pharmacy)    Patient will follow up in the office in the OP setting   Appointment made for May 25th at 2 pm with her primary Cardiologist Jazmyn Santillan to discuss options  Summary Comments:  Patient presented with stable anginal symptoms  EKG and troponin levels with negative  Medical management will be optimized  Metoprolol will be increased to BID and Imdur will be added  An RX will be sent to her pharmacy  Renaxa will be kept at her current dose given low renal function  Thank you for allowing us to see this pleasant patient in consult  We will follow course along with you and make outpatient plans outlined above with all arrangements  Patient has follow up appointment scheduled for May 25, 2023 at 2 pm      Outpatient Cardiologist:Nadine Knight    Chief Complaint:  Chief Complaint   Patient presents with   • Chest Pain      Patient reports chest heaviness that started at  0100 , patient has a cardiac hx, Patient reports taking two aspirin and a nitro, reports nitro did give relief         History of Present Illness: The patient is a 57year old with a His of extensive CAD as described  She has had multiple stenting on several separate occasions  She also has a PMH of Hypertorphic CM with last known EF of 45%,  HTN morbid obesity and HLD  She comes to the ED after waking up in the middle of the night because she was \"not feeling right  \" she states that she was feeling \"overly tired\" and nauseated  She also noted some SOB  She noticed that she also developed some chest tightness that was described as a 9/10 in its intensity  This lasted several minutes in duration  She took a nitro and baby aspirin  This alleviated the pain somewhat, but did not take it away completely  She decided to come to the ED since the pain was reminiscent of that she had prior to having her previous stents  EKG was non-ischemic and unchanged from prior   HS troponin " levels were negative  Currently she is chest pain free  She has not received her medication this morning and her BP is on the higher end of normal  She has no palpitations, or near syncope  She has no developing edema she has no orthopnea or PND  Review of Systems: a 10 point review of systems was conducted and is negative except for as mentioned in the HPI or as below  Review of Systems   Constitutional: Positive for diaphoresis  HENT: Negative  Eyes: Negative  Cardiovascular: Positive for chest pain  Negative for claudication, cyanosis, dyspnea on exertion, irregular heartbeat, leg swelling, near-syncope, orthopnea, palpitations, paroxysmal nocturnal dyspnea and syncope  Respiratory: Positive for shortness of breath  Negative for cough, hemoptysis, sleep disturbances due to breathing, snoring, sputum production and wheezing  Endocrine: Negative  Hematologic/Lymphatic: Negative  Skin: Negative  Musculoskeletal: Negative  Gastrointestinal: Positive for nausea  Genitourinary: Negative  Neurological: Negative  Psychiatric/Behavioral: Negative  Allergic/Immunologic: Negative          Past Medical and Surgical History:  Past Medical History:   Diagnosis Date   • Anemia    • Anxiety    • CAD (coronary artery disease)    • Cancer ([place] [address] ) 2008   • Cardiomyopathy ([place] [address] )    • CHF (congestive heart failure) ([facility])    • Chronic kidney disease    • COPD (chronic obstructive pulmonary disease) ([facility])     scheduled for sleep apnea test soon after pacemaker implant   • Coronary artery disease    • Depression    • History of chemotherapy     non hodgkins lymphoma 2008   • Hyperlipemia    • Hypertension    • Hypertrophic cardiomyopathy (HCC)    • Lymphoma, non-Hodgkin's (HCC)    • Myocardial infarction [facility])    • Non-ST elevation MI (NSTEMI) 01/02/2023   • Obesity    • Obstructive sleep apnea    • SSS (sick sinus syndrome) ([place] [address] )     s/p medtronic dual chamber pacemaker 5/25/2021   • Tobacco "abuse    • Ventricular tachycardia, non-sustained (HCC)    • Wears glasses      Past Surgical History:   Procedure Laterality Date   • CARDIAC CATHETERIZATION N/A 1/3/2023    Procedure: Cardiac catheterization;  Surgeon: Adela Austin MD;  Location: [facility] CARDIAC CATH LAB; Service: Cardiology   • CARDIAC CATHETERIZATION N/A 1/3/2023    Procedure: Cardiac Coronary Angiogram;  Surgeon: Adela Austin MD;  Location: [facility] CARDIAC CATH LAB; Service: Cardiology   • CARDIAC CATHETERIZATION N/A 1/3/2023    Procedure: Cardiac pci;  Surgeon: Adela Austin MD;  Location: [facility] CARDIAC CATH LAB; Service: Cardiology   • CARDIAC PACEMAKER PLACEMENT Left 2021    Procedure: DUAL LEAD PACEMAKER IMPLANT;  Surgeon: Irina Buitrago MD;  Location: [facility] MAIN OR;  Service: Cardiology   • CAROTID STENT     •  SECTION     • CORONARY ANGIOPLASTY WITH STENT PLACEMENT  2021    GREG mid RCA and GREG right PDA   • DENTAL SURGERY     • IR BIOPSY KIDNEY RANDOM  10/18/2021     Social History     Substance and Sexual Activity   Alcohol Use Yes    Comment: 1-2 times years     Social History     Substance and Sexual Activity   Drug Use No     Social History     Tobacco Use   Smoking Status Every Day   • Packs/day: 0 50   • Years: 25 00   • Pack years: 12 50   • Types: Cigarettes   Smokeless Tobacco Never   Tobacco Comments    Recently and currently quitting cigarettes       Family History:  Family History   Problem Relation Age of Onset   • No Known Problems Mother    • No Known Problems Father    • No Known Problems Daughter    • Brain cancer Maternal Grandfather    • Heart disease Maternal Grandfather    • Cancer Maternal Grandfather    • No Known Problems Paternal Aunt    • No Known Problems Paternal Aunt        Medication:  (Not in a hospital admission)       Allergies:  No Known Allergies    Physical Exam:  Vitals: Blood pressure 140/76, pulse 83, temperature 98 7 °F (37 1 °C), temperature source Tympanic, resp   rate 18, height 5' 6\" " (1 676 m), weight 127 kg (280 lb), SpO2 96 %, not currently breastfeeding , Body mass index is 45 19 kg/m² ,   Orthostatic Blood Pressures    Flowsheet Row Most Recent Value   Blood Pressure 140/76 filed at 05/10/2023 0853   Patient Position - Orthostatic VS Sitting filed at 05/10/2023 9077      , Systolic (90ZJG), PMR:623 , Min:135 , TFK:738   , Diastolic (01PHO), OWN:92, Min:60, Max:79    Physical Exam  Vitals and nursing note reviewed  Constitutional:       Appearance: She is well-developed  She is morbidly obese  HENT:      Head: Normocephalic and atraumatic  Mouth/Throat:      Mouth: Mucous membranes are moist    Eyes:      General: No scleral icterus  Conjunctiva/sclera: Conjunctivae normal    Neck:      Trachea: No tracheal deviation  Cardiovascular:      Rate and Rhythm: Normal rate and regular rhythm  Pulses: Intact distal pulses  Heart sounds: Normal heart sounds, S1 normal and S2 normal  No murmur heard  No friction rub  No gallop  Pulmonary:      Effort: Pulmonary effort is normal  No respiratory distress  Breath sounds: Normal breath sounds  No wheezing or rales  Chest:      Chest wall: No tenderness  Abdominal:      General: Bowel sounds are normal  There is no distension  Palpations: Abdomen is soft  Tenderness: There is no abdominal tenderness  Comments: Aorta not palpable    Musculoskeletal:         General: No tenderness  Normal range of motion  Cervical back: Normal range of motion and neck supple  Right lower leg: No edema  Left lower leg: No edema  Skin:     General: Skin is warm and dry  Coloration: Skin is not pale  Findings: No erythema or rash  Neurological:      General: No focal deficit present  Mental Status: She is alert and oriented to person, place, and time     Psychiatric:         Mood and Affect: Mood normal          Behavior: Behavior normal          Judgment: Judgment normal            Most Recent Cardiac Imaging:   Echo 1/9/23 Reduced LVEF 45% mild LVH severe inferobasilar HK and severe inferolateral HK from mid to apex   G2 DD mild LAE, trace AI, Ao Sclerosis, Mild MAC, Mod MR      EKG/Telemetry:   Normal sinus rhythm  Inferior infarct (cited on or before 07-JAN-2023)  Anterolateral infarct (cited on or before 07-JAN-2023)      Lab Results:   Troponins:   Results from last 7 days   Lab Units 05/10/23  0735 05/10/23  0547 05/10/23  0330   HS TNI 0HR ng/L  --   --  35   HS TNI 2HR ng/L  --  44  --    HSTNI D2 ng/L  --  9  --    HS TNI 4HR ng/L 42  --   --    HSTNI D4 ng/L 7  --   --       BNP:  Results from last 6 Months   Lab Units 02/03/23  0436 01/06/23  2253   BNP pg/mL 230* 352*     CBC with diff:   Results from last 7 days   Lab Units 05/10/23  0330   WBC Thousand/uL 16 09*   HEMOGLOBIN g/dL 13 8   HEMATOCRIT % 43 3   PLATELETS Thousands/uL 275   RBC Million/uL 4 92     CMP:  Results from last 7 days   Lab Units 05/10/23  0330   POTASSIUM mmol/L 4 5   CHLORIDE mmol/L 107   BUN mg/dL 27*   CREATININE mg/dL 2 31*   CALCIUM mg/dL 9 2   AST U/L 15   ALT U/L 20   ALK PHOS U/L 98   EGFR ml/min/1 73sq m 24     Lipid Profile:

## 2023-05-10 NOTE — ED NOTES
Patient woke up with reoccurring chest pain after sleeping  Pain 8/10, having mutlifocal PVCs and skipped beats with increased pain        Johnna Ghosh RN  05/10/23 8824

## 2023-05-19 ENCOUNTER — TELEPHONE (OUTPATIENT)
Dept: CARDIOLOGY CLINIC | Facility: CLINIC | Age: 48
End: 2023-05-19

## 2023-05-26 ENCOUNTER — OFFICE VISIT (OUTPATIENT)
Dept: CARDIOLOGY CLINIC | Facility: CLINIC | Age: 48
End: 2023-05-26

## 2023-05-26 VITALS
WEIGHT: 280 LBS | BODY MASS INDEX: 45 KG/M2 | HEART RATE: 80 BPM | HEIGHT: 66 IN | SYSTOLIC BLOOD PRESSURE: 120 MMHG | DIASTOLIC BLOOD PRESSURE: 80 MMHG

## 2023-05-26 DIAGNOSIS — I45.5 SINUS PAUSE: ICD-10-CM

## 2023-05-26 DIAGNOSIS — I25.10 CORONARY ARTERY DISEASE INVOLVING NATIVE CORONARY ARTERY OF NATIVE HEART, UNSPECIFIED WHETHER ANGINA PRESENT: Primary | ICD-10-CM

## 2023-05-26 DIAGNOSIS — I50.32 CHRONIC DIASTOLIC CONGESTIVE HEART FAILURE (HCC): ICD-10-CM

## 2023-05-26 DIAGNOSIS — N18.4 CKD (CHRONIC KIDNEY DISEASE) STAGE 4, GFR 15-29 ML/MIN (HCC): ICD-10-CM

## 2023-05-26 DIAGNOSIS — I42.2 HYPERTROPHIC CARDIOMYOPATHY (HCC): ICD-10-CM

## 2023-05-26 NOTE — ASSESSMENT & PLAN NOTE
Lab Results   Component Value Date    CREATININE 2 31 (H) 05/10/2023    CREATININE 2 13 (H) 04/14/2023    CREATININE 2 88 (H) 03/17/2023    EGFR 24 05/10/2023    EGFR 27 04/14/2023    EGFR 18 03/17/2023     Periodically monitor renal function

## 2023-05-26 NOTE — PATIENT INSTRUCTIONS
Increase Bumex to twice daily for 2 days  Blood work on [address] [facility] [address] not have to be fasting

## 2023-05-26 NOTE — ASSESSMENT & PLAN NOTE
1  CAD s/p PCI to RCA, rPDA  · Inferior STEMI (3/23/21) --> initial PCI to [facility]  · Recurrent chest pain (3/23/21) --> GREG to RPDA  · Readmission, inferior STEMI (4/2/21) --> patent stents, no change on cath  · NSTEMI 1/3/2023--> GREG LAD/D2  Continue asa, plavix, statin, BB, ranexa, imdur

## 2023-05-26 NOTE — ASSESSMENT & PLAN NOTE
Wt Readings from Last 3 Encounters:   05/26/23 127 kg (280 lb)   05/10/23 127 kg (280 lb)   04/28/23 129 kg (285 lb)     Continue Bumex 1 mg daily  Monitor renal function closely

## 2023-05-26 NOTE — PROGRESS NOTES
Patient ID: Bernardino Amanda is a 57 y o  female  Plan:      CAD (coronary artery disease)  1  CAD s/p PCI to RCA, rPDA  · Inferior STEMI (3/23/21) --> initial PCI to [facility]  · Recurrent chest pain (3/23/21) --> GREG to RPDA  · Readmission, inferior STEMI (4/2/21) --> patent stents, no change on cath  · NSTEMI 1/3/2023--> GREG LAD/D2  Continue asa, plavix, statin, BB, ranexa, imdur    Chronic diastolic congestive heart failure (HCC)  Wt Readings from Last 3 Encounters:   05/26/23 127 kg (280 lb)   05/10/23 127 kg (280 lb)   04/28/23 129 kg (285 lb)     Continue Bumex 1 mg daily  Monitor renal function closely        Hypertrophic cardiomyopathy (HCC)  Stable  Continue beta-blocker therapy    Sinus pause  Pacemaker in place    CKD (chronic kidney disease) stage 4, GFR 15-29 ml/min ([facility])  Lab Results   Component Value Date    CREATININE 2 31 (H) 05/10/2023    CREATININE 2 13 (H) 04/14/2023    CREATININE 2 88 (H) 03/17/2023    EGFR 24 05/10/2023    EGFR 27 04/14/2023    EGFR 18 03/17/2023     Periodically monitor renal function       Follow up Plan/Summary Comments: There is doing well from a cardiac perspective  She is a little volume overloaded so I advised that she take additional Bumex today and tomorrow and gave her a slip for labs to assess renal function and electrolytes early next week  We will continue her current medications including the newly added Imdur  We discussed reducing her fluid consumption and continuing to follow a low-sodium diet  She is scheduled to follow-up in our office in the fall  She knows to call sooner if needed  HPI: Maria Luisa Bansal is seen in the office today for a hospital follow-up visit  Maria Luisa Bansal was seen in the hospital at [address] 5/10/2023 with chest pain  She was started on isosorbide during that hospitalization  Cardiac catheterization was discussed, however she politely declined      Since her discharge from the hospital, she did have a day where she felt as "though she was filling up with fluid and \"felt like I was suffocating \" when she laid down  She was advised to take additional diuretics  She notes improvement in the frequency of her chest pain symptoms with the addition of Imdur  She noted improvement with the additional diuretics given on 5/19/2023  She has been following a low-sodium diet, however has been drinking a lot of fluids and eating a lot of ice  We discussed the importance of continuing a low-sodium diet and reducing her fluid intake  Review of Systems   10  point ROS  was otherwise non pertinent or negative except as per HPI or as below  Gait: Wheelchair      Most recent or relevant cardiac/vascular testing:    Echo 1/9/2023 EF 45%, moderate diastolic dysfunction  Mildly dilated LA  Mild to moderate MR  No change compared to 1/2/2023    Holter 4/22/2021 SR, avg rate 88  Frequent PVCs, 8-beat run NSVT    Cardiac cath 1/3/2023   Successful PCI to mid % culprit req  rheolytic thrombectomy (progressed when compared to 4-2-21 study); given atretic distal LAD, 2 25 x 15 mm GREG was deployed from LAD into more sizable D2  •  Residual prox LCx 60% lesion with patent LAD, mid RCA & PDA stents  •  Elevated LVEDP (20-30 mmHg) with no LV-Ao gradient on pullback  •  R radial a  accessed but unable to advance equipment, likely occluded; RFA arteriotomy employed       Cath 4/2/2021 Distal LAD: There was a 60 % stenosis  Mid circumflex: There was a 50 % stenosis  Distal RCA: There was a 10 % stenosis at the site of a prior stent  Right PDA: There was a 0 % stenosis at the site of a prior stent  Echo 3/25/2021 60%, severe hypokinesis of the basal-mid inferior wall  Findings consistent with HCM  Mild-mod MR  Cardiac MRI 3/29/2021 severe LVH, EF 44%  Small circumferential pericardial effusion  Mild MR  Findings suggestive of underlying hypertrophic cardiomyopathy with ischemic scarring of the inferior and inferolateral walls   " "    Stress echo 7/26/2021 Severe hypokinesis if the basal-mid inferior walls at peak stress, but overall conclusion was no evidence of stress induced ischemia  Objective:     /80   Pulse 80   Ht 5' 6\" (1 676 m)   Wt 127 kg (280 lb)   BMI 45 19 kg/m²     PHYSICAL EXAM:    General:  Normal appearance, no acute distress  Eyes:  Anicteric  Oral mucosa:  Moist   Neck: Unable to assess due to body habitus  Chest:  Clear to auscultation, L SC device  Cardiac:  No palpable PMI  Normal S1 and S2  No murmur gallop or rub  Abdomen:  Soft and nontender  No palpable organomegaly or aortic enlargement  Extremities:  +1 bilat LE edema  Musculoskeletal:  Symmetric  Vascular:  Pedal pulses are intact  Neuro:  Grossly symmetric  Psych:  Alert and oriented x3      No Known Allergies    Current Outpatient Medications:   •  acetaminophen (TYLENOL) 325 mg tablet, Take 3 tablets (975 mg total) by mouth every 8 (eight) hours as needed for mild pain, Disp: 90 tablet, Rfl: 0  •  albuterol (PROVENTIL HFA,VENTOLIN HFA) 90 mcg/act inhaler, Inhale 2 puffs every 4 (four) hours as needed for wheezing or shortness of breath, Disp: 18 g, Rfl: 5  •  aspirin 81 mg chewable tablet, Chew 1 tablet (81 mg total) daily, Disp: 90 tablet, Rfl: 0  •  atorvastatin (LIPITOR) 80 mg tablet, Take 1 tablet (80 mg total) by mouth every evening, Disp: 90 tablet, Rfl: 0  •  bumetanide (BUMEX) 1 mg tablet, Take 1 tablet (1 mg total) by mouth daily, Disp: 30 tablet, Rfl: 6  •  calcitriol (ROCALTROL) 0 25 mcg capsule, Take 1 capsule (0 25 mcg total) by mouth daily Takes Monday Wednesday and fridays as of 11/11/22, Disp: , Rfl:   •  clopidogrel (PLAVIX) 75 mg tablet, Take 1 tablet (75 mg total) by mouth daily Do not start before January 6, 2023 , Disp: 90 tablet, Rfl: 0  •  DULoxetine (CYMBALTA) 30 mg delayed release capsule, Take 1 capsule (30 mg total) by mouth daily, Disp: 30 capsule, Rfl: 0  •  ezetimibe (ZETIA) 10 mg tablet, Take 1 tablet (10 " mg total) by mouth in the morning , Disp: 30 tablet, Rfl: 5  •  gabapentin (Neurontin) 300 mg capsule, Take 1 capsule (300 mg total) by mouth 3 (three) times a day, Disp: 90 capsule, Rfl: 3  •  isosorbide mononitrate (IMDUR) 30 mg 24 hr tablet, Take 1 tablet (30 mg total) by mouth daily, Disp: 30 tablet, Rfl: 3  •  metoprolol succinate (TOPROL-XL) 100 mg 24 hr tablet, Take 1 tablet (100 mg total) by mouth daily Do not start before January 11, 2023 , Disp: 30 tablet, Rfl: 0  •  nitroglycerin (NITROSTAT) 0 4 mg SL tablet, Place 1 tablet (0 4 mg total) under the tongue every 5 (five) minutes as needed for chest pain, Disp: 25 tablet, Rfl: 5  •  omega-3-acid ethyl esters (LOVAZA) 1 g capsule, Take 1 capsule (1 g total) by mouth 2 (two) times a day, Disp: 60 capsule, Rfl: 0  •  ranolazine (RANEXA) 500 mg 12 hr tablet, Take 1 tablet (500 mg total) by mouth every 12 (twelve) hours, Disp: 60 tablet, Rfl: 0  •  sodium bicarbonate 650 mg tablet, Take 1 tablet (650 mg total) by mouth 2 (two) times daily after meals, Disp: 60 tablet, Rfl: 0  •  omeprazole (PriLOSEC) 40 MG capsule, Take 1 capsule (40 mg total) by mouth daily (Patient not taking: Reported on 4/28/2023), Disp: 30 capsule, Rfl: 0  Past Medical History:   Diagnosis Date   • Anemia    • Anxiety    • CAD (coronary artery disease)    • Cancer ([facility] ) 2008   • Cardiomyopathy ([place] [address] )    • CHF (congestive heart failure) ([facility])    • Chronic kidney disease    • COPD (chronic obstructive pulmonary disease) ([place] [address] )     scheduled for sleep apnea test soon after pacemaker implant   • Coronary artery disease    • Depression    • History of chemotherapy     non hodgkins lymphoma 2008   • Hyperlipemia    • Hypertension    • Hypertrophic cardiomyopathy ([facility] )    • Lymphoma, non-Hodgkin's (HCC)    • Myocardial infarction [facility])    • Non-ST elevation MI (NSTEMI) 01/02/2023   • Obesity    • Obstructive sleep apnea    • SSS (sick sinus syndrome) (HCC)     s/p medtronic dual chamber pacemaker 2021   • Tobacco abuse    • Ventricular tachycardia, non-sustained (HCC)    • Wears glasses      Past Surgical History:   Procedure Laterality Date   • CARDIAC CATHETERIZATION N/A 1/3/2023    Procedure: Cardiac catheterization;  Surgeon: Selwyn Paz MD;  Location: [facility] CARDIAC CATH LAB; Service: Cardiology   • CARDIAC CATHETERIZATION N/A 1/3/2023    Procedure: Cardiac Coronary Angiogram;  Surgeon: Selwyn Pza MD;  Location: [facility] CARDIAC CATH LAB; Service: Cardiology   • CARDIAC CATHETERIZATION N/A 1/3/2023    Procedure: Cardiac pci;  Surgeon: Selwyn Paz MD;  Location: [facility] CARDIAC CATH LAB;   Service: Cardiology   • CARDIAC PACEMAKER PLACEMENT Left 2021    Procedure: DUAL LEAD PACEMAKER IMPLANT;  Surgeon: Lindsey Betancourt MD;  Location: [facility] MAIN OR;  Service: Cardiology   • CAROTID STENT     •  SECTION     • CORONARY ANGIOPLASTY WITH STENT PLACEMENT  2021    GREG mid RCA and GREG right PDA   • DENTAL SURGERY     • IR BIOPSY KIDNEY RANDOM  10/18/2021       CMP:   Lab Results   Component Value Date    BUN 27 (H) 05/10/2023     05/10/2023    CO2 16 (L) 05/10/2023    CREATININE 2 31 (H) 05/10/2023    EGFR 24 05/10/2023    K 4 5 05/10/2023     Lipid Profile:    Lab Results   Component Value Date    HDL 31 (L) 2023    TRIG 348 (H) 2023         Social History     Tobacco Use   Smoking Status Every Day   • Packs/day: 0 50   • Years: 25 00   • Total pack years: 12 50   • Types: Cigarettes   Smokeless Tobacco Never   Tobacco Comments    Recently and currently quitting cigarettes

## 2023-06-24 DIAGNOSIS — G89.29 OTHER CHRONIC PAIN: ICD-10-CM

## 2023-06-24 RX ORDER — DULOXETIN HYDROCHLORIDE 30 MG/1
CAPSULE, DELAYED RELEASE ORAL
Qty: 30 CAPSULE | Refills: 0 | Status: SHIPPED | OUTPATIENT
Start: 2023-06-24

## 2023-06-27 ENCOUNTER — HOSPITAL ENCOUNTER (EMERGENCY)
Facility: HOSPITAL | Age: 48
Discharge: HOME | End: 2023-06-27
Attending: EMERGENCY MEDICINE
Payer: COMMERCIAL

## 2023-06-27 ENCOUNTER — APPOINTMENT (EMERGENCY)
Dept: RADIOLOGY | Facility: HOSPITAL | Age: 48
End: 2023-06-27
Payer: COMMERCIAL

## 2023-06-27 VITALS
RESPIRATION RATE: 19 BRPM | TEMPERATURE: 98.2 F | OXYGEN SATURATION: 96 % | SYSTOLIC BLOOD PRESSURE: 128 MMHG | HEART RATE: 73 BPM | DIASTOLIC BLOOD PRESSURE: 60 MMHG

## 2023-06-27 DIAGNOSIS — R07.9 ACUTE CHEST PAIN: Primary | ICD-10-CM

## 2023-06-27 DIAGNOSIS — E83.42 HYPOMAGNESEMIA: ICD-10-CM

## 2023-06-27 PROBLEM — I25.118 CORONARY ARTERY DISEASE OF NATIVE ARTERY OF NATIVE HEART WITH STABLE ANGINA PECTORIS (HCC): Status: ACTIVE | Noted: 2018-10-14

## 2023-06-27 LAB
2HR DELTA HS TROPONIN: -5 NG/L
ALBUMIN SERPL BCP-MCNC: 4 G/DL (ref 3.5–5)
ALP SERPL-CCNC: 113 U/L (ref 34–104)
ALT SERPL W P-5'-P-CCNC: 24 U/L (ref 7–52)
ANION GAP SERPL CALCULATED.3IONS-SCNC: 11 MMOL/L
AST SERPL W P-5'-P-CCNC: 14 U/L (ref 13–39)
ATRIAL RATE: 74 BPM
ATRIAL RATE: 75 BPM
ATRIAL RATE: 77 BPM
ATRIAL RATE: 79 BPM
ATRIAL RATE: 83 BPM
BASOPHILS # BLD AUTO: 0.09 THOUSANDS/ÂΜL (ref 0–0.1)
BASOPHILS NFR BLD AUTO: 1 % (ref 0–1)
BILIRUB SERPL-MCNC: 0.35 MG/DL (ref 0.2–1)
BNP SERPL-MCNC: 416 PG/ML (ref 0–100)
BUN SERPL-MCNC: 27 MG/DL (ref 5–25)
CALCIUM SERPL-MCNC: 9.5 MG/DL (ref 8.4–10.2)
CARDIAC TROPONIN I PNL SERPL HS: 37 NG/L
CARDIAC TROPONIN I PNL SERPL HS: 42 NG/L
CHLORIDE SERPL-SCNC: 110 MMOL/L (ref 96–108)
CO2 SERPL-SCNC: 18 MMOL/L (ref 21–32)
CREAT SERPL-MCNC: 2.24 MG/DL (ref 0.6–1.3)
EOSINOPHIL # BLD AUTO: 0.47 THOUSAND/ÂΜL (ref 0–0.61)
EOSINOPHIL NFR BLD AUTO: 3 % (ref 0–6)
ERYTHROCYTE [DISTWIDTH] IN BLOOD BY AUTOMATED COUNT: 18 % (ref 11.6–15.1)
GFR SERPL CREATININE-BSD FRML MDRD: 25 ML/MIN/1.73SQ M
GLUCOSE SERPL-MCNC: 107 MG/DL (ref 65–140)
HCT VFR BLD AUTO: 41.5 % (ref 34.8–46.1)
HGB BLD-MCNC: 13.3 G/DL (ref 11.5–15.4)
IMM GRANULOCYTES # BLD AUTO: 0.1 THOUSAND/UL (ref 0–0.2)
IMM GRANULOCYTES NFR BLD AUTO: 1 % (ref 0–2)
LYMPHOCYTES # BLD AUTO: 2.77 THOUSANDS/ÂΜL (ref 0.6–4.47)
LYMPHOCYTES NFR BLD AUTO: 20 % (ref 14–44)
MAGNESIUM SERPL-MCNC: 1.8 MG/DL (ref 1.9–2.7)
MCH RBC QN AUTO: 27.8 PG (ref 26.8–34.3)
MCHC RBC AUTO-ENTMCNC: 32 G/DL (ref 31.4–37.4)
MCV RBC AUTO: 87 FL (ref 82–98)
MONOCYTES # BLD AUTO: 0.96 THOUSAND/ÂΜL (ref 0.17–1.22)
MONOCYTES NFR BLD AUTO: 7 % (ref 4–12)
NEUTROPHILS # BLD AUTO: 9.85 THOUSANDS/ÂΜL (ref 1.85–7.62)
NEUTS SEG NFR BLD AUTO: 68 % (ref 43–75)
NRBC BLD AUTO-RTO: 0 /100 WBCS
P AXIS: 70 DEGREES
P AXIS: 76 DEGREES
P AXIS: 77 DEGREES
P AXIS: 77 DEGREES
P AXIS: 78 DEGREES
PLATELET # BLD AUTO: 357 THOUSANDS/UL (ref 149–390)
PMV BLD AUTO: 10.1 FL (ref 8.9–12.7)
POTASSIUM SERPL-SCNC: 3.7 MMOL/L (ref 3.5–5.3)
PR INTERVAL: 168 MS
PR INTERVAL: 176 MS
PR INTERVAL: 180 MS
PR INTERVAL: 180 MS
PR INTERVAL: 182 MS
PROT SERPL-MCNC: 7.7 G/DL (ref 6.4–8.4)
QRS AXIS: 72 DEGREES
QRS AXIS: 85 DEGREES
QRS AXIS: 86 DEGREES
QRS AXIS: 87 DEGREES
QRS AXIS: 89 DEGREES
QRSD INTERVAL: 100 MS
QRSD INTERVAL: 102 MS
QRSD INTERVAL: 104 MS
QRSD INTERVAL: 106 MS
QRSD INTERVAL: 92 MS
QT INTERVAL: 364 MS
QT INTERVAL: 402 MS
QT INTERVAL: 406 MS
QT INTERVAL: 408 MS
QT INTERVAL: 410 MS
QTC INTERVAL: 427 MS
QTC INTERVAL: 452 MS
QTC INTERVAL: 453 MS
QTC INTERVAL: 460 MS
QTC INTERVAL: 463 MS
RBC # BLD AUTO: 4.78 MILLION/UL (ref 3.81–5.12)
SODIUM SERPL-SCNC: 139 MMOL/L (ref 135–147)
T WAVE AXIS: 67 DEGREES
T WAVE AXIS: 71 DEGREES
T WAVE AXIS: 76 DEGREES
T WAVE AXIS: 79 DEGREES
T WAVE AXIS: 81 DEGREES
VENTRICULAR RATE: 74 BPM
VENTRICULAR RATE: 75 BPM
VENTRICULAR RATE: 77 BPM
VENTRICULAR RATE: 79 BPM
VENTRICULAR RATE: 83 BPM
WBC # BLD AUTO: 14.24 THOUSAND/UL (ref 4.31–10.16)

## 2023-06-27 PROCEDURE — 83880 ASSAY OF NATRIURETIC PEPTIDE: CPT | Performed by: EMERGENCY MEDICINE

## 2023-06-27 PROCEDURE — 80053 COMPREHEN METABOLIC PANEL: CPT | Performed by: EMERGENCY MEDICINE

## 2023-06-27 PROCEDURE — 36415 COLL VENOUS BLD VENIPUNCTURE: CPT | Performed by: EMERGENCY MEDICINE

## 2023-06-27 PROCEDURE — 84484 ASSAY OF TROPONIN QUANT: CPT | Performed by: EMERGENCY MEDICINE

## 2023-06-27 PROCEDURE — 93010 ELECTROCARDIOGRAM REPORT: CPT | Performed by: INTERNAL MEDICINE

## 2023-06-27 PROCEDURE — 83735 ASSAY OF MAGNESIUM: CPT | Performed by: EMERGENCY MEDICINE

## 2023-06-27 PROCEDURE — 71045 X-RAY EXAM CHEST 1 VIEW: CPT

## 2023-06-27 PROCEDURE — 85025 COMPLETE CBC W/AUTO DIFF WBC: CPT | Performed by: EMERGENCY MEDICINE

## 2023-06-27 PROCEDURE — 93005 ELECTROCARDIOGRAM TRACING: CPT

## 2023-06-27 PROCEDURE — 99245 OFF/OP CONSLTJ NEW/EST HI 55: CPT | Performed by: INTERNAL MEDICINE

## 2023-06-27 RX ORDER — ONDANSETRON 2 MG/ML
4 INJECTION INTRAMUSCULAR; INTRAVENOUS ONCE
Status: COMPLETED | OUTPATIENT
Start: 2023-06-27 | End: 2023-06-27

## 2023-06-27 RX ORDER — FENTANYL CITRATE 50 UG/ML
25 INJECTION, SOLUTION INTRAMUSCULAR; INTRAVENOUS ONCE
Status: COMPLETED | OUTPATIENT
Start: 2023-06-27 | End: 2023-06-27

## 2023-06-27 RX ORDER — MAGNESIUM SULFATE 1 G/100ML
1 INJECTION INTRAVENOUS ONCE
Status: COMPLETED | OUTPATIENT
Start: 2023-06-27 | End: 2023-06-27

## 2023-06-27 RX ORDER — HYDROMORPHONE HCL/PF 1 MG/ML
1 SYRINGE (ML) INJECTION ONCE
Status: COMPLETED | OUTPATIENT
Start: 2023-06-27 | End: 2023-06-27

## 2023-06-27 RX ORDER — SODIUM CHLORIDE 9 MG/ML
3 INJECTION INTRAVENOUS
Status: DISCONTINUED | OUTPATIENT
Start: 2023-06-27 | End: 2023-06-27 | Stop reason: HOSPADM

## 2023-06-27 RX ADMIN — ONDANSETRON 4 MG: 2 INJECTION INTRAMUSCULAR; INTRAVENOUS at 05:45

## 2023-06-27 RX ADMIN — FENTANYL CITRATE 25 MCG: 50 INJECTION, SOLUTION INTRAMUSCULAR; INTRAVENOUS at 05:44

## 2023-06-27 RX ADMIN — MAGNESIUM SULFATE HEPTAHYDRATE 1 G: 1 INJECTION, SOLUTION INTRAVENOUS at 07:35

## 2023-06-27 RX ADMIN — HYDROMORPHONE HYDROCHLORIDE 1 MG: 1 INJECTION, SOLUTION INTRAMUSCULAR; INTRAVENOUS; SUBCUTANEOUS at 06:17

## 2023-06-27 NOTE — DISCHARGE INSTRUCTIONS
Return to the ER with any new, concerning, or worsening issues  Other instructions per Dr Warren Dale

## 2023-06-27 NOTE — ED CARE HANDOFF
"Emergency Department Sign Out Note        Sign out and transfer of care from Dr Olimpia Corbin  See Separate Emergency Department note  The patient, Renzo Zelaya, was evaluated by the previous provider for chest pain  Patient is a 63-year-old female with history of multiple myocardial infarctions and multiple stent placements  The patient continues to smoke, and states that as of yesterday around midnight she started getting the pain in her chest which she felt like a tightness or a pressure, which she notes is the same type of pain she has had when she has had her heart attacks in the past   Patient states that occasionally she gets \"stickers\" of pain which come and go, and this pain was not like that  The patient notes that normally she takes nitro and tries to rest and a lot of times this pain eases on its own  Patient however states that this time, the pain was not resolving so she came to the hospital     Workup Completed:  Upon my evaluation, the patient's pain is resolved  Patient had emergency department work-up prior to my evaluation which consisted of CBC, CMP, troponin, EKG, BNP, magnesium, and a chest x-ray  ED Course / Workup Pending (followup): Patient reevaluated by myself and heart is regular rate and rhythm without clicks rubs or gallops and lungs are clear  Patient is +1 lower extremity edema bilaterally  EKGs and lab work reviewed  Will consult cardiology due to recommendation of prior emergency physician, as well as the fact that the patient notes that her pain is similar to her \"heart pain  \"      HEART Risk Score    Flowsheet Row Most Recent Value   Heart Score Risk Calculator    History 2 Filed at: 06/27/2023 0706   ECG 2 Filed at: 06/27/2023 6714   Age 1 Filed at: 06/27/2023 0706   Risk Factors 2 Filed at: 06/27/2023 0706   Troponin 2 Filed at: 06/27/2023 0706   HEART Score 9 Filed at: 06/27/2023 5224                                  ED Course as of 06/27/23 1631   Tue Jun 27, 2023 " 0192 Elevated troponin, chest pain relieved with narcotic  Incompletely relieved with nitro prior to arrival   Patient heart score of 9   0933 Case discussed with Dr Leandro Bosworth who will consult on the patient  1006 Patient evaluated by Dr Leandro Bosworth  Feels that she is safe for discharge and follow-up  Procedures  Medical Decision Making  Patient is a 82-year-old female with chest pain which she describes as similar to her previous episodes of cardiac chest pain  Patient was found to have no significant worsening of her her troponin, as her levels are pretty consistently in the 30-40 range  Patient's EKG is also nonacute in appearance at this time  Because of the patient's significant heart score as well as pain that is described as being similar to her heart attacks in the past, the patient will have a cardiac consult  Dr Leandro Bosworth called, who is the patient's private cardiologist     Patient evaluated with Dr Leandro Bosworth and felt that she may be discharged home for follow-up in the office  Acute chest pain: self-limited or minor problem  Amount and/or Complexity of Data Reviewed  Labs: ordered  Radiology: ordered and independent interpretation performed  Risk  Prescription drug management  Disposition  Final diagnoses:   Acute chest pain   Hypomagnesemia     Time reflects when diagnosis was documented in both MDM as applicable and the Disposition within this note     Time User Action Codes Description Comment    6/27/2023  6:29 AM Asyarichard Vázquez Add [R07 9] Acute chest pain     6/27/2023  6:29 AM Asya Vázquez Add [E83 42] Hypomagnesemia       ED Disposition     ED Disposition   Discharge    Condition   Stable    Date/Time   Tue Jun 27, 2023 10:06 AM    Yaz Jesus Single discharge to home/self care  Follow-up Information     Follow up With Specialties Details Why Tammy Hernández MD Cardiology On 7/3/2023  [address] [place] [address]  [phone]          Discharge Medication List as of 6/27/2023 10:07 AM      CONTINUE these medications which have NOT CHANGED    Details   acetaminophen (TYLENOL) 325 mg tablet Take 3 tablets (975 mg total) by mouth every 8 (eight) hours as needed for mild pain, Starting Thu 1/5/2023, Normal      albuterol (PROVENTIL HFA,VENTOLIN HFA) 90 mcg/act inhaler Inhale 2 puffs every 4 (four) hours as needed for wheezing or shortness of breath, Starting Mon 5/23/2022, Normal      aspirin 81 mg chewable tablet Chew 1 tablet (81 mg total) daily, Starting Fri 4/2/2021, Normal      atorvastatin (LIPITOR) 80 mg tablet Take 1 tablet (80 mg total) by mouth every evening, Starting Tue 1/17/2023, Normal      bumetanide (BUMEX) 1 mg tablet Take 1 tablet (1 mg total) by mouth daily, Starting Fri 4/28/2023, Normal      calcitriol (ROCALTROL) 0 25 mcg capsule Take 1 capsule (0 25 mcg total) by mouth daily Takes Monday Wednesday and fridays as of 11/11/22, Starting Tue 1/10/2023, Historical Med      clopidogrel (PLAVIX) 75 mg tablet Take 1 tablet (75 mg total) by mouth daily Do not start before January 6, 2023 , Starting Fri 1/6/2023, Normal      DULoxetine (CYMBALTA) 30 mg delayed release capsule take 1 capsule by mouth once daily, Normal      ezetimibe (ZETIA) 10 mg tablet Take 1 tablet (10 mg total) by mouth in the morning , Starting Mon 5/23/2022, Normal      gabapentin (Neurontin) 300 mg capsule Take 1 capsule (300 mg total) by mouth 3 (three) times a day, Starting Wed 5/10/2023, Normal      isosorbide mononitrate (IMDUR) 30 mg 24 hr tablet Take 1 tablet (30 mg total) by mouth daily, Starting Wed 5/10/2023, Normal      metoprolol succinate (TOPROL-XL) 100 mg 24 hr tablet Take 1 tablet (100 mg total) by mouth daily Do not start before January 11, 2023 , Starting Wed 1/11/2023, Normal      nitroglycerin (NITROSTAT) 0 4 mg SL tablet Place 1 tablet (0 4 mg total) under the tongue every 5 (five) minutes as needed for chest pain, Starting Mon 5/8/2023, Normal      omega-3-acid ethyl esters (LOVAZA) 1 g capsule Take 1 capsule (1 g total) by mouth 2 (two) times a day, Starting Thu 1/5/2023, Normal      omeprazole (PriLOSEC) 40 MG capsule Take 1 capsule (40 mg total) by mouth daily, Starting Fri 11/11/2022, Normal      ranolazine (RANEXA) 500 mg 12 hr tablet Take 1 tablet (500 mg total) by mouth every 12 (twelve) hours, Starting Tue 1/10/2023, Normal      sodium bicarbonate 650 mg tablet Take 1 tablet (650 mg total) by mouth 2 (two) times daily after meals, Starting Thu 1/5/2023, Normal           No discharge procedures on file         ED Provider  Electronically Signed by     Jose Luis Mancuso DO  06/27/23 4940

## 2023-06-27 NOTE — CONSULTS
Shweta U  68   Consult  Name: Amirah Hunter 57 y o  female I MRN: [id number]  Unit/Bed#: ED 05 I Date of Admission: 6/27/2023   Date of Service: 6/27/2023 I Hospital Day: 0    Consult to cardiology  Consult performed by: Shanique Giron MD  Consult ordered by: Lourdes Rachel , DO          Assessment/Plan   Mixed hyperlipidemia  Assessment & Plan  Tolerating high-intensity statin therapy and will continue current regimen  Chest pain in adult  Assessment & Plan  Although coronary disease is present I do not believe that explains current presentation  She had more nausea with this episode  As well despite several hours of symptoms troponin were negative and EKG was unchanged from prior  Coronary artery disease of native artery of native heart with stable angina pectoris [facility])  Assessment & Plan  1  CAD s/p PCI to RCA, rPDA  · Inferior STEMI (3/23/21) --> initial PCI to [facility]  · Recurrent chest pain (3/23/21) --> GREG to RPDA  · Readmission, inferior STEMI (4/2/21) --> patent stents, no change on cath  · NSTEMI 1/3/2023--> GREG LAD/D2  Continue current regimen including dual antiplatelet agents  Other summary comments:      I reviewed my thoughts at length with the patient  CAD has to be managed for the long-term  She certainly is going to have CAD symptoms as well as known CAD symptoms in the future  I am reassured by the fact that despite several hours of symptoms troponin was negative  Patient was happy for our discussion and will continue prior follow-up schedule  We also talked about trying cardiac rehab but she has some fear of being around too many people  Outpatient Cardiologist: Aleksey Almendarez    HPI: Amirah Hunter is a 57y o  year old female who presented with chest pain  She has had lots of evaluations for chest pain over the past few years  They are not all of the same character  She has had numerous interventions as previously reviewed    The most recent one was in January  Current set of symptoms started last night when she just did not quite feel right  Later she developed significant nausea and then chest pain  Nitro was not truly helpful  She became nervous and came to the emergency room  GI cocktail seems to have helped at least palliate nausea  Troponins were negative  EKG was nonischemic  I am asked to comment further  Symptoms were not quite the same as what she experienced prior to the most recent intervention  To reiterate patient has a history of chest pain and acute coronary syndrome  See testing below  She also has been found to have hypertrophic cardiomyopathy as well as obstructive sleep apnea treated with CPAP  On 03/25/2021 she underwent drug-eluting stent to the mid RCA and to the posterior descending artery  Repeat heart catheterization on 04/02/2021 revealed patent stents  On 1/3/2023 she underwent GREG of the LAD and second diagonal      On 05/25/2021 she underwent dual chamber Medtronic pacemaker implantation because of significant long sinus pauses  EKG:      Sinus rhythm  Prior inferior wall MI  Prior anteroseptal MI  MOST  RECENT CARDIAC IMAGING:   Echo 3/25/2021 60%, severe hypokinesis of the basal-mid inferior wall  Findings consistent with HCM  Mild-mod MR  TTE 1/9/2023: LVEF 45% with severe inferobasal hypokinesia and inferolateral hypokinesia  Mild LVH  Cardiac MRI 3/29/2021 severe LVH, EF 44%  Small circumferential pericardial effusion  Mild MR  Findings suggestive of underlying hypertrophic cardiomyopathy with ischemic scarring of the inferior and inferolateral walls  Review of Systems: a 10 point review of systems was conducted and is negative except for as mentioned in the HPI or as below          Historical Information   Past Medical History:   Diagnosis Date   • Anemia    • Anxiety    • CAD (coronary artery disease)    • Cancer ([facility] ) 2008   • Cardiomyopathy ([facility] )    • CHF (congestive heart failure) ([facility])    • Chronic kidney disease    • COPD (chronic obstructive pulmonary disease) ([facility])     scheduled for sleep apnea test soon after pacemaker implant   • Coronary artery disease    • Depression    • History of chemotherapy     non hodgkins lymphoma    • Hyperlipemia    • Hypertension    • Hypertrophic cardiomyopathy ([facility] )    • Lymphoma, non-Hodgkin's ([facility])    • Myocardial infarction [facility])    • Non-ST elevation MI (NSTEMI) 2023   • Obesity    • Obstructive sleep apnea    • SSS (sick sinus syndrome) ([facility] )     s/p medtronic dual chamber pacemaker 2021   • Tobacco abuse    • Ventricular tachycardia, non-sustained ([facility] )    • Wears glasses      Past Surgical History:   Procedure Laterality Date   • CARDIAC CATHETERIZATION N/A 1/3/2023    Procedure: Cardiac catheterization;  Surgeon: Mikel Neves MD;  Location: [facility] C[facility]AC [facility]; Service: Cardiology   • CARDIAC CATHETERIZATION N/A 1/3/2023    Procedure: Cardiac Coronary Angiogram;  Surgeon: Mikel Neves MD;  Location: [facility]; Service: Cardiology   • CARDIAC CATHETERIZATION N/A 1/3/2023    Procedure: Cardiac pci;  Surgeon: Mikel Neves MD;  Location: [facility];   Service: Cardiology   • CARDIAC PACEMAKER PLACEMENT Left 2021    Procedure: DUAL LEAD PACEMAKER IMPLANT;  Surgeon: Shanique Giron MD;  Location: [address] [place], [place] [address]o [facility] OR;  Service: Cardiology   • CAROTID STENT     •  SECTION     • CORONARY ANGIOPLASTY WITH STENT PLACEMENT  2021    GREG mid RCA and GREG right PDA   • DENTAL SURGERY     • IR BIOPSY KIDNEY RANDOM  10/18/2021     Social History     Substance and Sexual Activity   Alcohol Use Yes    Comment: 1-2 times years     Social History     Substance and Sexual Activity   Drug Use No     Social History     Tobacco Use   Smoking Status Every Day   • Packs/day: 0 50   • Years: 25 00   • Total pack years: 12 50   • Types: Cigarettes   Smokeless Tobacco Never   Tobacco Comments Recently and currently quitting cigarettes       Family History:   No longer relevant    Meds/Allergies   all current active meds have been reviewed  (Not in a hospital admission)      No Known Allergies    Objective   Vitals: Blood pressure 128/60, pulse 73, temperature 98 2 °F (36 8 °C), temperature source Tympanic, resp  rate 19, SpO2 96 %, not currently breastfeeding  , There is no height or weight on file to calculate BMI ,   Orthostatic Blood Pressures    Flowsheet Row Most Recent Value   Blood Pressure 128/60 filed at 06/27/2023 1000   Patient Position - Orthostatic VS Sitting filed at 06/27/2023 6679          Systolic (41BWM), DZJ:934 , Min:107 , MWJ:566     Diastolic (52PJU), IGW:88, Min:56, Max:72              Physical Exam:    General:  Normal appearance in no distress  Eyes:  Anicteric  Oral mucosa:  Moist   Neck:  No JVD  Carotid upstrokes are brisk without bruits  No masses  Chest:  Clear to auscultation  Pacemaker left subclavian region well-healed  Cardiac:  No palpable PMI  Normal S1 and S2  No murmur gallop or rub  Abdomen:  Soft and nontender  No palpable organomegaly or aortic enlargement  Extremities:  No peripheral edema  Musculoskeletal:  Symmetric  Vascular:  Femoral pulses are brisk without bruits  Popliteal  pulses are intact bilaterally  Pedal pulses are intact  Neuro:  Grossly symmetric  Psych:  Alert and oriented x3          Lab Results:     Troponins:    Results from last 7 days   Lab Units 06/27/23  0734 06/27/23  0539   HS TNI 0HR ng/L  --  42   HS TNI 2HR ng/L 37  --    HSTNI D2 ng/L -5  --      BNP:   Results from last 6 Months   Lab Units 06/27/23  0539 02/03/23  0436   BNP pg/mL 416* 230*       CBC :   Results from last 7 days   Lab Units 06/27/23  0539   WBC Thousand/uL 14 24*   HEMOGLOBIN g/dL 13 3   HEMATOCRIT % 41 5   MCV fL 87   PLATELETS Thousands/uL 357     TSH:     CMP:   Results from last 7 days   Lab Units 06/27/23  0539   POTASSIUM mmol/L 3 7   CHLORIDE mmol/L 110*   CO2 mmol/L 18*   BUN mg/dL 27*   CREATININE mg/dL 2 24*   AST U/L 14   ALT U/L 24   EGFR ml/min/1 73sq m 25     Lipid Profile:     Coags:

## 2023-06-27 NOTE — ASSESSMENT & PLAN NOTE
Although coronary disease is present I do not believe that explains current presentation  She had more nausea with this episode  As well despite several hours of symptoms troponin were negative and EKG was unchanged from prior

## 2023-06-27 NOTE — ASSESSMENT & PLAN NOTE
1  CAD s/p PCI to RCA, rPDA  · Inferior STEMI (3/23/21) --> initial PCI to [facility]  · Recurrent chest pain (3/23/21) --> GREG to RPDA  · Readmission, inferior STEMI (4/2/21) --> patent stents, no change on cath  · NSTEMI 1/3/2023--> GREG LAD/D2  Continue current regimen including dual antiplatelet agents

## 2023-06-27 NOTE — ED PROVIDER NOTES
History  Chief Complaint   Patient presents with   • Chest Pain     Started around 5469-0647 doesn't feel right  Constant pressure since 0400  Hx: MI and pacemaker  • Shortness of Breath       57YEAR-OLD FEMALE     PMH:   CAD  Obesity  1 PACK-A-DAY SMOKER        Chief complaint:   Left mid chest pain      HPI:  Cp started 1am  Radiating to left shoulder    Associated w/ sob and nausea  No diphoresis     PAIN IS RATED AS SEVERE  DESCRIBED AS Pressure           INTERVENTIONS:   2 baby aspirin at 1am  2am took 1 SLN, which helped  4am took 2nd SLN       PATIENT DENIES ANY COUGH, NO FEVERS OR CHILLS  NO URI SYMPTOMS        VTE  RISK FACTORS:  NONE  NO HISTORY OF PE OR DVT  NO LONG CAR RIDES OR PLANE RIDES  NO IMMOBILIZATION  NO RECENT SURGERY OR TRAUMA  NO HEMOPTYSIS         OTHER ASSOCIATED SYMPTOMS:  NO ABDOMINAL PAIN  REPORTS NAUSEA, NO VOMITING  NO STOOL CHANGES    No acute back pain       NO URINARY COMPLAINTS: NO DYSURIA, NO HEMATURIA, NO FREQUENCY    No other complaints        History provided by:  Patient  Chest Pain  Pain location:  L chest  Pain quality: pressure    Pain radiates to:  L arm  Pain severity:  Severe  Timing:  Constant  Chronicity:  Recurrent  Relieved by:  Nothing  Worsened by:  Nothing tried  Ineffective treatments:  None tried  Associated symptoms: nausea and shortness of breath    Associated symptoms: no abdominal pain, no altered mental status, no back pain, no claudication, no cough, no diaphoresis, no dizziness, no dysphagia, no fatigue, no fever, no headache, no heartburn, no lower extremity edema, no near-syncope, no numbness, no palpitations, no syncope, not vomiting and no weakness    Shortness of Breath  Associated symptoms: chest pain    Associated symptoms: no abdominal pain, no claudication, no cough, no diaphoresis, no fever, no headaches, no neck pain, no rash, no sore throat, no syncope, no vomiting and no wheezing        Prior to Admission Medications Prescriptions Last Dose Informant Patient Reported? Taking? DULoxetine (CYMBALTA) 30 mg delayed release capsule   No No   Sig: take 1 capsule by mouth once daily   acetaminophen (TYLENOL) 325 mg tablet   No No   Sig: Take 3 tablets (975 mg total) by mouth every 8 (eight) hours as needed for mild pain   albuterol (PROVENTIL HFA,VENTOLIN HFA) 90 mcg/act inhaler   No No   Sig: Inhale 2 puffs every 4 (four) hours as needed for wheezing or shortness of breath   aspirin 81 mg chewable tablet  Self No No   Sig: Chew 1 tablet (81 mg total) daily   atorvastatin (LIPITOR) 80 mg tablet   No No   Sig: Take 1 tablet (80 mg total) by mouth every evening   bumetanide (BUMEX) 1 mg tablet   No No   Sig: Take 1 tablet (1 mg total) by mouth daily   calcitriol (ROCALTROL) 0 25 mcg capsule   Yes No   Sig: Take 1 capsule (0 25 mcg total) by mouth daily Takes Monday Wednesday and fridays as of 11/11/22   clopidogrel (PLAVIX) 75 mg tablet   No No   Sig: Take 1 tablet (75 mg total) by mouth daily Do not start before January 6, 2023  ezetimibe (ZETIA) 10 mg tablet   No No   Sig: Take 1 tablet (10 mg total) by mouth in the morning    gabapentin (Neurontin) 300 mg capsule   No No   Sig: Take 1 capsule (300 mg total) by mouth 3 (three) times a day   isosorbide mononitrate (IMDUR) 30 mg 24 hr tablet   No No   Sig: Take 1 tablet (30 mg total) by mouth daily   metoprolol succinate (TOPROL-XL) 100 mg 24 hr tablet   No No   Sig: Take 1 tablet (100 mg total) by mouth daily Do not start before January 11, 2023     nitroglycerin (NITROSTAT) 0 4 mg SL tablet   No No   Sig: Place 1 tablet (0 4 mg total) under the tongue every 5 (five) minutes as needed for chest pain   omega-3-acid ethyl esters (LOVAZA) 1 g capsule   No No   Sig: Take 1 capsule (1 g total) by mouth 2 (two) times a day   omeprazole (PriLOSEC) 40 MG capsule   No No   Sig: Take 1 capsule (40 mg total) by mouth daily   Patient not taking: Reported on 4/28/2023   ranolazine (RANEXA) 500 mg 12 hr tablet   No No   Sig: Take 1 tablet (500 mg total) by mouth every 12 (twelve) hours   sodium bicarbonate 650 mg tablet   No No   Sig: Take 1 tablet (650 mg total) by mouth 2 (two) times daily after meals      Facility-Administered Medications: None       Past Medical History:   Diagnosis Date   • Anemia    • Anxiety    • CAD (coronary artery disease)    • Cancer ([place] [address] )    • Cardiomyopathy ([place] [address] )    • CHF (congestive heart failure) ([facility])    • Chronic kidney disease    • COPD (chronic obstructive pulmonary disease) ([facility])     scheduled for sleep apnea test soon after pacemaker implant   • Coronary artery disease    • Depression    • History of chemotherapy     non hodgkins lymphoma    • Hyperlipemia    • Hypertension    • Hypertrophic cardiomyopathy ([place] [address] )    • Lymphoma, non-Hodgkin's ([facility])    • Myocardial infarction [facility])    • Non-ST elevation MI (NSTEMI) 2023   • Obesity    • Obstructive sleep apnea    • SSS (sick sinus syndrome) ([place] [address] )     s/p medtronic dual chamber pacemaker 2021   • Tobacco abuse    • Ventricular tachycardia, non-sustained ([place] [address] )    • Wears glasses        Past Surgical History:   Procedure Laterality Date   • CARDIAC CATHETERIZATION N/A 1/3/2023    Procedure: Cardiac catheterization;  Surgeon: Nay Jenkins MD;  Location: [facility]; Service: Cardiology   • CARDIAC CATHETERIZATION N/A 1/3/2023    Procedure: Cardiac Coronary Angiogram;  Surgeon: Nay Jenkins MD;  [facility]; Service: Cardiology   • CARDIAC CATHETERIZATION N/A 1/3/2023    Procedure: Cardiac pci;  Surgeon: Nay Jenkins MD;  Location: [facility];   Service: Cardiology   • CARDIAC PACEMAKER PLACEMENT Left 2021    Procedure: DUAL LEAD PACEMAKER IMPLANT;  Surgeon: Cassie Warner MD;  Location: [facility] MAIN OR;  Service: Cardiology   • CAROTID STENT     •  SECTION     • CORONARY ANGIOPLASTY WITH STENT PLACEMENT  2021    GREG mid RCA and GREG right PDA   • DENTAL SURGERY     • IR BIOPSY KIDNEY RANDOM  10/18/2021       Family History   Problem Relation Age of Onset   • No Known Problems Mother    • No Known Problems Father    • No Known Problems Daughter    • Brain cancer Maternal Grandfather    • Heart disease Maternal Grandfather    • Cancer Maternal Grandfather    • No Known Problems Paternal Aunt    • No Known Problems Paternal Aunt      I have reviewed and agree with the history as documented  E-Cigarette/Vaping   • E-Cigarette Use Never User      E-Cigarette/Vaping Substances   • Nicotine No    • THC No    • CBD No    • Flavoring No    • Other No    • Unknown No      Social History     Tobacco Use   • Smoking status: Every Day     Packs/day: 0 50     Years: 25 00     Total pack years: 12 50     Types: Cigarettes   • Smokeless tobacco: Never   • Tobacco comments:     Recently and currently quitting cigarettes   Vaping Use   • Vaping Use: Never used   Substance Use Topics   • Alcohol use: Yes     Comment: 1-2 times years   • Drug use: No       Review of Systems   Constitutional: Negative for chills, diaphoresis, fatigue and fever  HENT: Negative for rhinorrhea, sinus pressure, sinus pain, sneezing, sore throat, trouble swallowing and voice change  Respiratory: Positive for shortness of breath  Negative for cough, wheezing and stridor  Cardiovascular: Positive for chest pain  Negative for palpitations, claudication, leg swelling, syncope and near-syncope  Gastrointestinal: Positive for nausea  Negative for abdominal pain, blood in stool, diarrhea, heartburn and vomiting  Genitourinary: Negative for difficulty urinating, dysuria, flank pain and frequency  Musculoskeletal: Negative for arthralgias, back pain, gait problem, joint swelling, myalgias, neck pain and neck stiffness  Skin: Negative for rash and wound  Neurological: Negative for dizziness, weakness, light-headedness, numbness and headaches     All other systems reviewed and are negative  Physical Exam  Physical Exam  Constitutional:       General: She is in acute distress (due to pain )  Appearance: She is well-developed  She is not ill-appearing, toxic-appearing or diaphoretic  HENT:      Head: Normocephalic and atraumatic  Nose: Nose normal       Mouth/Throat:      Pharynx: No oropharyngeal exudate  Eyes:      General: No scleral icterus  Right eye: No discharge  Left eye: No discharge  Extraocular Movements: Extraocular movements intact  Conjunctiva/sclera: Conjunctivae normal       Pupils: Pupils are equal, round, and reactive to light  Neck:      Vascular: No JVD  Trachea: No tracheal deviation  Cardiovascular:      Rate and Rhythm: Normal rate and regular rhythm  Pulses:           Carotid pulses are 2+ on the right side and 2+ on the left side  Radial pulses are 2+ on the right side and 2+ on the left side  Heart sounds: Normal heart sounds  No murmur heard  No friction rub  No gallop  Pulmonary:      Effort: Pulmonary effort is normal  No respiratory distress  Breath sounds: Normal breath sounds  No stridor  No wheezing, rhonchi or rales  Chest:      Chest wall: No tenderness  Abdominal:      General: Bowel sounds are normal  There is no distension  Palpations: Abdomen is soft  There is no mass  Tenderness: There is no abdominal tenderness  There is no guarding or rebound  Hernia: No hernia is present  Musculoskeletal:         General: No tenderness or deformity  Normal range of motion  Cervical back: Normal range of motion and neck supple  Lymphadenopathy:      Cervical: No cervical adenopathy  Skin:     General: Skin is warm  Capillary Refill: Capillary refill takes less than 2 seconds  Coloration: Skin is not cyanotic or pale  Findings: No ecchymosis, erythema or rash  Nails: There is no clubbing  Neurological:      General: No focal deficit present  Mental Status: She is alert and oriented to person, place, and time  Cranial Nerves: No cranial nerve deficit  Sensory: No sensory deficit  Motor: No weakness or abnormal muscle tone  Coordination: Coordination normal    Psychiatric:         Mood and Affect: Mood is anxious  Behavior: Behavior normal          Thought Content: Thought content normal          Judgment: Judgment normal          Vital Signs  ED Triage Vitals   Temp Pulse Resp BP SpO2   -- -- -- -- --      Temp src Heart Rate Source Patient Position - Orthostatic VS BP Location FiO2 (%)   -- -- -- -- --      Pain Score       --           There were no vitals filed for this visit        Visual Acuity      ED Medications  Medications - No data to display    Diagnostic Studies  Results Reviewed     None                 No orders to display              Procedures  ECG 12 Lead Documentation Only    Date/Time: 6/27/2023 5:25 AM    Performed by: Nara Hamilton MD  Authorized by: Nara Hamilton MD    Indications / Diagnosis:  CP   ECG reviewed by me, the ED Provider: yes    Patient location:  ED  Interpretation:     Interpretation: abnormal    Rate:     ECG rate:  83    ECG rate assessment: normal    Rhythm:     Rhythm: sinus rhythm    Ectopy:     Ectopy: PVCs    QRS:     QRS axis:  Normal  Conduction:     Conduction: normal    ST segments:     ST segments:  Non-specific  T waves:     T waves: non-specific    ECG 12 Lead Documentation Only    Date/Time: 6/27/2023 5:52 AM    Performed by: Nara Hamilton MD  Authorized by: Nara Hamilton MD    Indications / Diagnosis:  Cp   ECG reviewed by me, the ED Provider: yes    Patient location:  ED  Rate:     ECG rate:  75    ECG rate assessment: normal    Rhythm:     Rhythm: sinus rhythm    Ectopy:     Ectopy: none    QRS:     QRS axis:  Normal  Conduction:     Conduction: normal    ST segments:     ST segments:  Non-specific  T waves:     T waves: non-specific    ECG 12 Lead Documentation Only    Date/Time: 6/27/2023 6:59 AM    Performed by: Gorge Arellano MD  Authorized by: Gorge Arellano MD    Indications / Diagnosis:  Cp   ECG reviewed by me, the ED Provider: yes    Patient location:  ED  Interpretation:     Interpretation: non-specific    Rate:     ECG rate:  77    ECG rate assessment: normal    Rhythm:     Rhythm: sinus rhythm    Ectopy:     Ectopy: none    QRS:     QRS axis:  Normal  ST segments:     ST segments:  Non-specific  T waves:     T waves: non-specific               ED Course  ED Course as of 06/28/23 0731   Tue Jun 27, 2023   0539 Pt seen and evaluated  Here for 4 hours of acute cp  Pt has long hx of CAD w/ 7 MI    EKG is abnormal, PVCs, J point elevation in V4 w/o reciprocal changes   0609 CBC and differential(!)   0615 Pt states she feels cp coming back   Repeat of pain medications ordered  EKG stable    0628 hs TnI 0hr: 42   0628 BNP(!): 416   0628 Magnesium(!): 1 8   0658 Pt is cp free now   Ekg stable    0702 Sign out:     Dr Miko Ling to f/u on lab results and assist w/ disposition of the patient                                              Medical Decision Making  Dispo pending at time of sign out     Acute chest pain: acute illness or injury  Hypomagnesemia: acute illness or injury  Amount and/or Complexity of Data Reviewed  Labs: ordered  Decision-making details documented in ED Course  Radiology: ordered and independent interpretation performed  Risk  Prescription drug management  Disposition  Final diagnoses:   None     ED Disposition     None      Follow-up Information    None         Patient's Medications   Discharge Prescriptions    No medications on file       No discharge procedures on file      PDMP Review       Value Time User    PDMP Reviewed  Yes 3/26/2023  6:06 PM Marti Andrew DO          ED Provider  Electronically Signed by           Gorge Arellano MD  06/28/23 6900

## 2023-06-29 ENCOUNTER — REMOTE DEVICE CLINIC VISIT (OUTPATIENT)
Dept: CARDIOLOGY CLINIC | Facility: CLINIC | Age: 48
End: 2023-06-29
Payer: COMMERCIAL

## 2023-06-29 DIAGNOSIS — Z95.0 CARDIAC PACEMAKER IN SITU: Primary | ICD-10-CM

## 2023-06-29 PROCEDURE — 93296 REM INTERROG EVL PM/IDS: CPT | Performed by: INTERNAL MEDICINE

## 2023-06-29 PROCEDURE — 93294 REM INTERROG EVL PM/LDLS PM: CPT | Performed by: INTERNAL MEDICINE

## 2023-06-29 NOTE — PROGRESS NOTES
"Results for orders placed or performed in visit on 06/29/23   Cardiac EP device report    Narrative    MDT DUAL PM/ ACTIVE SYSTEM IS MRI CONDITIONAL  CARELINK TRANSMISSION: BATTERY STATUS \"13 YRS  \" AP 1%  0%  ALL AVAILABLE LEAD PARAMETERS WITHIN NORMAL LIMITS  2 NSVT NOTED; #37 APPROX 8 BEATS@ 200 BPM; #36 APPROX 16 BEATS@ 162 BPM  PT ON METO SUCC & EF 41% (ECHO 2023)  NORMAL DEVICE FUNCTION   NC         "

## 2023-07-12 DIAGNOSIS — G89.29 OTHER CHRONIC PAIN: ICD-10-CM

## 2023-07-12 DIAGNOSIS — G62.9 PERIPHERAL NEUROPATHY: ICD-10-CM

## 2023-07-12 RX ORDER — DULOXETIN HYDROCHLORIDE 30 MG/1
30 CAPSULE, DELAYED RELEASE ORAL DAILY
Qty: 90 CAPSULE | Refills: 0 | Status: SHIPPED | OUTPATIENT
Start: 2023-07-12

## 2023-07-12 RX ORDER — GABAPENTIN 300 MG/1
300 CAPSULE ORAL 3 TIMES DAILY
Qty: 90 CAPSULE | Refills: 3 | Status: SHIPPED | OUTPATIENT
Start: 2023-07-12

## 2023-08-17 ENCOUNTER — OFFICE VISIT (OUTPATIENT)
Dept: FAMILY MEDICINE CLINIC | Facility: CLINIC | Age: 48
End: 2023-08-17
Payer: COMMERCIAL

## 2023-08-17 VITALS
OXYGEN SATURATION: 99 % | DIASTOLIC BLOOD PRESSURE: 74 MMHG | TEMPERATURE: 98.4 F | SYSTOLIC BLOOD PRESSURE: 126 MMHG | HEART RATE: 83 BPM

## 2023-08-17 DIAGNOSIS — M54.42 CHRONIC MIDLINE LOW BACK PAIN WITH BILATERAL SCIATICA: ICD-10-CM

## 2023-08-17 DIAGNOSIS — M54.41 CHRONIC MIDLINE LOW BACK PAIN WITH BILATERAL SCIATICA: ICD-10-CM

## 2023-08-17 DIAGNOSIS — F32.A DEPRESSION, UNSPECIFIED DEPRESSION TYPE: Primary | ICD-10-CM

## 2023-08-17 DIAGNOSIS — G89.29 CHRONIC MIDLINE LOW BACK PAIN WITH BILATERAL SCIATICA: ICD-10-CM

## 2023-08-17 DIAGNOSIS — G62.9 PERIPHERAL NEUROPATHY: ICD-10-CM

## 2023-08-17 PROCEDURE — 99213 OFFICE O/P EST LOW 20 MIN: CPT | Performed by: FAMILY MEDICINE

## 2023-08-17 RX ORDER — GABAPENTIN 300 MG/1
300 CAPSULE ORAL 3 TIMES DAILY
Qty: 90 CAPSULE | Refills: 3 | Status: SHIPPED | OUTPATIENT
Start: 2023-08-17

## 2023-08-17 RX ORDER — VENLAFAXINE HYDROCHLORIDE 75 MG/1
75 CAPSULE, EXTENDED RELEASE ORAL
Qty: 30 CAPSULE | Refills: 5 | Status: SHIPPED | OUTPATIENT
Start: 2023-08-17

## 2023-08-17 NOTE — PROGRESS NOTES
Assessment/Plan:    No problem-specific Assessment & Plan notes found for this encounter. Diagnoses and all orders for this visit:    Depression, unspecified depression type  -     venlafaxine (EFFEXOR-XR) 75 mg 24 hr capsule; Take 1 capsule (75 mg total) by mouth daily with breakfast    Peripheral neuropathy  -     gabapentin (Neurontin) 300 mg capsule; Take 1 capsule (300 mg total) by mouth 3 (three) times a day    Chronic midline low back pain with bilateral sciatica  -     Ambulatory Referral to Spine Surgery; Future          PHQ-2/9 Depression Screening    Little interest or pleasure in doing things: 2 - more than half the days  Feeling down, depressed, or hopeless: 2 - more than half the days  Trouble falling or staying asleep, or sleeping too much: 2 - more than half the days  Feeling tired or having little energy: 2 - more than half the days  Poor appetite or overeatin - more than half the days  Feeling bad about yourself - or that you are a failure or have let yourself or your family down: 2 - more than half the days  Trouble concentrating on things, such as reading the newspaper or watching television: 1 - several days  Moving or speaking so slowly that other people could have noticed. Or the opposite - being so fidgety or restless that you have been moving around a lot more than usual: 0 - not at all  Thoughts that you would be better off dead, or of hurting yourself in some way: 0 - not at all  PHQ-9 Score: 13   PHQ-9 Interpretation: Moderate depression         Depression Screening Follow-up Plan: Patient's depression screening was positive with a PHQ-2 score of . Their PHQ-9 score was 13. Patient assessed for underlying major depression. They have no active suicidal ideations. Brief counseling provided and recommend additional follow-up/re-evaluation next office visit. Subjective:      Patient ID: Octavio Donnelly is a 57 y.o. female.     Follow up for depression, pt had a positive depression test, The following portions of the patient's history were reviewed and updated as appropriate: allergies, current medications, past family history, past medical history, past social history, past surgical history and problem list.    Review of Systems      Objective:    /74   Pulse 83   Temp 98.4 °F (36.9 °C)   SpO2 99%      Physical Exam

## 2023-08-30 DIAGNOSIS — Z95.5 S/P DRUG ELUTING CORONARY STENT PLACEMENT: ICD-10-CM

## 2023-08-30 DIAGNOSIS — I25.10 CORONARY ARTERY DISEASE INVOLVING NATIVE CORONARY ARTERY OF NATIVE HEART WITHOUT ANGINA PECTORIS: ICD-10-CM

## 2023-08-30 RX ORDER — NITROGLYCERIN 0.4 MG/1
0.4 TABLET SUBLINGUAL
Qty: 25 TABLET | Refills: 5 | Status: SHIPPED | OUTPATIENT
Start: 2023-08-30

## 2023-09-11 ENCOUNTER — HOSPITAL ENCOUNTER (EMERGENCY)
Facility: HOSPITAL | Age: 48
Discharge: HOME | End: 2023-09-12
Attending: INTERNAL MEDICINE | Admitting: INTERNAL MEDICINE
Payer: COMMERCIAL

## 2023-09-11 ENCOUNTER — APPOINTMENT (EMERGENCY)
Dept: RADIOLOGY | Facility: HOSPITAL | Age: 48
End: 2023-09-11
Payer: COMMERCIAL

## 2023-09-11 DIAGNOSIS — R07.9 CHEST PAIN: Primary | ICD-10-CM

## 2023-09-11 LAB
ALBUMIN SERPL BCP-MCNC: 4 G/DL (ref 3.5–5)
ALP SERPL-CCNC: 127 U/L (ref 34–104)
ALT SERPL W P-5'-P-CCNC: 26 U/L (ref 7–52)
ANION GAP SERPL CALCULATED.3IONS-SCNC: 11 MMOL/L
AST SERPL W P-5'-P-CCNC: 16 U/L (ref 13–39)
ATRIAL RATE: 95 BPM
BASOPHILS # BLD AUTO: 0.06 THOUSANDS/ÂΜL (ref 0–0.1)
BASOPHILS NFR BLD AUTO: 0 % (ref 0–1)
BILIRUB SERPL-MCNC: 0.34 MG/DL (ref 0.2–1)
BUN SERPL-MCNC: 28 MG/DL (ref 5–25)
CALCIUM SERPL-MCNC: 9.9 MG/DL (ref 8.4–10.2)
CARDIAC TROPONIN I PNL SERPL HS: 30 NG/L
CHLORIDE SERPL-SCNC: 107 MMOL/L (ref 96–108)
CO2 SERPL-SCNC: 21 MMOL/L (ref 21–32)
CREAT SERPL-MCNC: 2.47 MG/DL (ref 0.6–1.3)
EOSINOPHIL # BLD AUTO: 0.31 THOUSAND/ÂΜL (ref 0–0.61)
EOSINOPHIL NFR BLD AUTO: 2 % (ref 0–6)
ERYTHROCYTE [DISTWIDTH] IN BLOOD BY AUTOMATED COUNT: 17.5 % (ref 11.6–15.1)
GFR SERPL CREATININE-BSD FRML MDRD: 22 ML/MIN/1.73SQ M
GLUCOSE SERPL-MCNC: 175 MG/DL (ref 65–140)
HCT VFR BLD AUTO: 41.9 % (ref 34.8–46.1)
HGB BLD-MCNC: 13.4 G/DL (ref 11.5–15.4)
IMM GRANULOCYTES # BLD AUTO: 0.13 THOUSAND/UL (ref 0–0.2)
IMM GRANULOCYTES NFR BLD AUTO: 1 % (ref 0–2)
LYMPHOCYTES # BLD AUTO: 2.54 THOUSANDS/ÂΜL (ref 0.6–4.47)
LYMPHOCYTES NFR BLD AUTO: 15 % (ref 14–44)
MCH RBC QN AUTO: 27.3 PG (ref 26.8–34.3)
MCHC RBC AUTO-ENTMCNC: 32 G/DL (ref 31.4–37.4)
MCV RBC AUTO: 85 FL (ref 82–98)
MONOCYTES # BLD AUTO: 0.81 THOUSAND/ÂΜL (ref 0.17–1.22)
MONOCYTES NFR BLD AUTO: 5 % (ref 4–12)
NEUTROPHILS # BLD AUTO: 13.04 THOUSANDS/ÂΜL (ref 1.85–7.62)
NEUTS SEG NFR BLD AUTO: 77 % (ref 43–75)
NRBC BLD AUTO-RTO: 0 /100 WBCS
P AXIS: 63 DEGREES
PLATELET # BLD AUTO: 396 THOUSANDS/UL (ref 149–390)
PMV BLD AUTO: 10.4 FL (ref 8.9–12.7)
POTASSIUM SERPL-SCNC: 3.9 MMOL/L (ref 3.5–5.3)
PR INTERVAL: 166 MS
PROT SERPL-MCNC: 7.7 G/DL (ref 6.4–8.4)
QRS AXIS: 53 DEGREES
QRSD INTERVAL: 106 MS
QT INTERVAL: 344 MS
QTC INTERVAL: 432 MS
RBC # BLD AUTO: 4.91 MILLION/UL (ref 3.81–5.12)
SODIUM SERPL-SCNC: 139 MMOL/L (ref 135–147)
T WAVE AXIS: 59 DEGREES
VENTRICULAR RATE: 95 BPM
WBC # BLD AUTO: 16.89 THOUSAND/UL (ref 4.31–10.16)

## 2023-09-11 PROCEDURE — 99285 EMERGENCY DEPT VISIT HI MDM: CPT

## 2023-09-11 PROCEDURE — 99285 EMERGENCY DEPT VISIT HI MDM: CPT | Performed by: INTERNAL MEDICINE

## 2023-09-11 PROCEDURE — 80053 COMPREHEN METABOLIC PANEL: CPT

## 2023-09-11 PROCEDURE — 84484 ASSAY OF TROPONIN QUANT: CPT

## 2023-09-11 PROCEDURE — 93005 ELECTROCARDIOGRAM TRACING: CPT

## 2023-09-11 PROCEDURE — 36415 COLL VENOUS BLD VENIPUNCTURE: CPT

## 2023-09-11 PROCEDURE — 93010 ELECTROCARDIOGRAM REPORT: CPT | Performed by: INTERNAL MEDICINE

## 2023-09-11 PROCEDURE — 96374 THER/PROPH/DIAG INJ IV PUSH: CPT

## 2023-09-11 PROCEDURE — 96375 TX/PRO/DX INJ NEW DRUG ADDON: CPT

## 2023-09-11 PROCEDURE — 71045 X-RAY EXAM CHEST 1 VIEW: CPT

## 2023-09-11 PROCEDURE — 85025 COMPLETE CBC W/AUTO DIFF WBC: CPT

## 2023-09-11 RX ORDER — ONDANSETRON 2 MG/ML
4 INJECTION INTRAMUSCULAR; INTRAVENOUS ONCE
Status: COMPLETED | OUTPATIENT
Start: 2023-09-11 | End: 2023-09-11

## 2023-09-11 RX ORDER — HYDROMORPHONE HCL/PF 1 MG/ML
0.5 SYRINGE (ML) INJECTION ONCE
Status: COMPLETED | OUTPATIENT
Start: 2023-09-11 | End: 2023-09-11

## 2023-09-11 RX ADMIN — ONDANSETRON 4 MG: 2 INJECTION INTRAMUSCULAR; INTRAVENOUS at 22:46

## 2023-09-11 RX ADMIN — HYDROMORPHONE HYDROCHLORIDE 0.5 MG: 1 INJECTION, SOLUTION INTRAMUSCULAR; INTRAVENOUS; SUBCUTANEOUS at 22:46

## 2023-09-12 VITALS
TEMPERATURE: 98.6 F | WEIGHT: 285 LBS | HEIGHT: 66 IN | HEART RATE: 76 BPM | DIASTOLIC BLOOD PRESSURE: 59 MMHG | OXYGEN SATURATION: 95 % | RESPIRATION RATE: 19 BRPM | SYSTOLIC BLOOD PRESSURE: 122 MMHG | BODY MASS INDEX: 45.8 KG/M2

## 2023-09-12 LAB
2HR DELTA HS TROPONIN: 7 NG/L
4HR DELTA HS TROPONIN: 13 NG/L
ATRIAL RATE: 77 BPM
CARDIAC TROPONIN I PNL SERPL HS: 37 NG/L
CARDIAC TROPONIN I PNL SERPL HS: 43 NG/L
P AXIS: 61 DEGREES
PR INTERVAL: 178 MS
QRS AXIS: 49 DEGREES
QRSD INTERVAL: 104 MS
QT INTERVAL: 378 MS
QTC INTERVAL: 427 MS
T WAVE AXIS: 28 DEGREES
VENTRICULAR RATE: 77 BPM

## 2023-09-12 PROCEDURE — 84484 ASSAY OF TROPONIN QUANT: CPT

## 2023-09-12 PROCEDURE — 36415 COLL VENOUS BLD VENIPUNCTURE: CPT

## 2023-09-12 PROCEDURE — 93010 ELECTROCARDIOGRAM REPORT: CPT | Performed by: INTERNAL MEDICINE

## 2023-09-12 PROCEDURE — 93005 ELECTROCARDIOGRAM TRACING: CPT

## 2023-09-12 PROCEDURE — 96376 TX/PRO/DX INJ SAME DRUG ADON: CPT

## 2023-09-12 RX ORDER — HYDROMORPHONE HCL/PF 1 MG/ML
0.5 SYRINGE (ML) INJECTION ONCE
Status: COMPLETED | OUTPATIENT
Start: 2023-09-12 | End: 2023-09-12

## 2023-09-12 RX ADMIN — HYDROMORPHONE HYDROCHLORIDE 0.5 MG: 1 INJECTION, SOLUTION INTRAMUSCULAR; INTRAVENOUS; SUBCUTANEOUS at 01:10

## 2023-09-12 NOTE — ED CARE HANDOFF
Emergency Department Sign Out Note        Sign out and transfer of care from Dr Shefali Salter. See Separate Emergency Department note. The patient, Jacinda Duncan, was evaluated by the previous provider for CP, recurrent. Workup Completed:  Cardiac work up    ED Course / Workup Pending (followup):                                      ED Course as of 09/12/23 0307   Tue Sep 12, 2023   0045 Sign out:     Awaiting Delta trop for Disp  Initial trop is at pt's baseline , 30   0052 Delta 2hr hsTnI: 7   0054 Pt stable  Understands work up results and plan of 4 hour Troponin   Labs all at baseline    0301 hs TnI 4hr: 43   0302 Troponins are flat relative to 4 months ago  EKG stable and non ischemic    0303 Pt understands work up results  No concerning findings    Pt feels much much better    She has no signs of life or limb threatening process. EKG stable. Troponins stable      I offered further tx, I offered admission, if she felt ill, or her pain was too great, but she declined  She is ready to manage from home  She is very appreciative of her ED care and is ready to manage from home  She understands return precautions    She will f/u w/ PCP and Cardiology tomorrow       Procedures  MDM        Disposition  Final diagnoses:   Chest pain     Time reflects when diagnosis was documented in both MDM as applicable and the Disposition within this note     Time User Action Codes Description Comment    9/12/2023  3:04 AM Abrahan Mcgovern Add [R07.9] Chest pain       ED Disposition     ED Disposition   Discharge    Condition   Stable    Date/Time   Tue Sep 12, 2023  3:04 AM    Comment   Jacinda Duncan discharge to home/self care. Follow-up Information     Follow up With Specialties Details Why Contact Tushar Ellis DO Family Medicine Call today  [address] [place] [address]  [phone]      Crista Wilson MD Cardiology Call today  [address] [facility].  [address] [place] [address] Patient's Medications   Discharge Prescriptions    No medications on file     No discharge procedures on file.        ED Provider  Electronically Signed by     Leti Brown MD  09/12/23 7278

## 2023-09-12 NOTE — ED NOTES
Patient resting in bed. Patient reporting 8/10 pain, Dilaudid administered per physician orders. Patient provided with blanket and updated on plan of care. Call light within reach, educated on call light use, and verbalized understanding. VSS, cardiac monitoring in place. Patient denies any other needs at this time.       Stacy Hadley  09/12/23 2858

## 2023-09-12 NOTE — ED PROVIDER NOTES
History  Chief Complaint   Patient presents with   • Chest Pain     Started yesterday , radiates to left arm and center of back. Took 2 nitro, relieves pain but comes back, nauseated but no vomiting. 810     43-year-old female presents with midsternal chest pain nonradiating which started last evening. Patient has a extensive history of coronary artery disease including previous MIs with stents and a pacemaker placement as well as stage IV renal disease and hypertrophic cardiomyopathy. Patient states she had midsternal chest pain nonradiating associate with shortness of breath, nausea no vomiting, no diaphoresis, occasional lightheadedness and dizziness. Patient denies recent illness fever chills rigors. Pain does not seem to have a respiratory or positional component. Patient states the pain started last evening patient at that time took a nitroglycerin and was able to go back to sleep. Patient states over the past 24 hours she is taking roughly 5 sublingual nitroglycerin          Prior to Admission Medications   Prescriptions Last Dose Informant Patient Reported? Taking?    DULoxetine (CYMBALTA) 30 mg delayed release capsule   No No   Sig: Take 1 capsule (30 mg total) by mouth daily   acetaminophen (TYLENOL) 325 mg tablet   No No   Sig: Take 3 tablets (975 mg total) by mouth every 8 (eight) hours as needed for mild pain   albuterol (PROVENTIL HFA,VENTOLIN HFA) 90 mcg/act inhaler   No No   Sig: Inhale 2 puffs every 4 (four) hours as needed for wheezing or shortness of breath   aspirin 81 mg chewable tablet  Self No No   Sig: Chew 1 tablet (81 mg total) daily   atorvastatin (LIPITOR) 80 mg tablet   No No   Sig: Take 1 tablet (80 mg total) by mouth every evening   bumetanide (BUMEX) 1 mg tablet   No No   Sig: Take 1 tablet (1 mg total) by mouth daily   calcitriol (ROCALTROL) 0.25 mcg capsule   Yes No   Sig: Take 1 capsule (0.25 mcg total) by mouth daily Takes Monday Wednesday and fridays as of 11/11/22 clopidogrel (PLAVIX) 75 mg tablet   No No   Sig: Take 1 tablet (75 mg total) by mouth daily Do not start before January 6, 2023. ezetimibe (ZETIA) 10 mg tablet   No No   Sig: Take 1 tablet (10 mg total) by mouth in the morning.   gabapentin (Neurontin) 300 mg capsule   No No   Sig: Take 1 capsule (300 mg total) by mouth 3 (three) times a day   isosorbide mononitrate (IMDUR) 30 mg 24 hr tablet   No No   Sig: Take 1 tablet (30 mg total) by mouth daily   metoprolol succinate (TOPROL-XL) 100 mg 24 hr tablet   No No   Sig: Take 1 tablet (100 mg total) by mouth daily Do not start before January 11, 2023.    nitroglycerin (NITROSTAT) 0.4 mg SL tablet   No No   Sig: Place 1 tablet (0.4 mg total) under the tongue every 5 (five) minutes as needed for chest pain   omega-3-acid ethyl esters (LOVAZA) 1 g capsule   No No   Sig: Take 1 capsule (1 g total) by mouth 2 (two) times a day   omeprazole (PriLOSEC) 40 MG capsule   No No   Sig: Take 1 capsule (40 mg total) by mouth daily   Patient not taking: Reported on 4/28/2023   ranolazine (RANEXA) 500 mg 12 hr tablet   No No   Sig: Take 1 tablet (500 mg total) by mouth every 12 (twelve) hours   sodium bicarbonate 650 mg tablet   No No   Sig: Take 1 tablet (650 mg total) by mouth 2 (two) times daily after meals   venlafaxine (EFFEXOR-XR) 75 mg 24 hr capsule   No No   Sig: Take 1 capsule (75 mg total) by mouth daily with breakfast      Facility-Administered Medications: None       Past Medical History:   Diagnosis Date   • Anemia    • Anxiety    • CAD (coronary artery disease)    • Cancer ([address]) 2008   • Cardiomyopathy ([address])    • CHF (congestive heart failure) (HCC)    • Chronic kidney disease    • COPD (chronic obstructive pulmonary disease) ([address])     scheduled for sleep apnea test soon after pacemaker implant   • Coronary artery disease    • Depression    • History of chemotherapy     non hodgkins lymphoma 2008   • Hyperlipemia    • Hypertension    • Hypertrophic cardiomyopathy [facility])    • Lymphoma, non-Hodgkin's ([address])    • Myocardial infarction [facility])    • Non-ST elevation MI (NSTEMI) 2023   • Obesity    • Obstructive sleep apnea    • SSS (sick sinus syndrome) ([address])     s/p medtronic dual chamber pacemaker 2021   • Tobacco abuse    • Ventricular tachycardia, non-sustained (HCC)    • Wears glasses        Past Surgical History:   Procedure Laterality Date   • CARDIAC CATHETERIZATION N/A 1/3/2023    Procedure: Cardiac catheterization;  Surgeon: Daquan Harvey MD;  Location: [facility]; Service: Cardiology   • CARDIAC CATHETERIZATION N/A 1/3/2023    Procedure: Cardiac Coronary Angiogram;  Surgeon: Daquan Harvey MD;  [facility]; Service: Cardiology   • CARDIAC CATHETERIZATION N/A 1/3/2023    Procedure: Cardiac pci;  Surgeon: Daquan Harvey MD;  Location: [facility]; Service: Cardiology   • CARDIAC PACEMAKER PLACEMENT Left 2021    Procedure: DUAL LEAD PACEMAKER IMPLANT;  Surgeon: Julissa Andrew MD;  Location: [address] [place] [address];  Service: Cardiology   • CAROTID STENT     •  SECTION     • CORONARY ANGIOPLASTY WITH STENT PLACEMENT  2021    GREG mid RCA and GREG right PDA   • DENTAL SURGERY     • IR BIOPSY KIDNEY RANDOM  10/18/2021       Family History   Problem Relation Age of Onset   • No Known Problems Mother    • No Known Problems Father    • No Known Problems Daughter    • Brain cancer Maternal Grandfather    • Heart disease Maternal Grandfather    • Cancer Maternal Grandfather    • No Known Problems Paternal Aunt    • No Known Problems Paternal Aunt      I have reviewed and agree with the history as documented.     E-Cigarette/Vaping   • E-Cigarette Use Never User      E-Cigarette/Vaping Substances   • Nicotine No    • THC No    • CBD No    • Flavoring No    • Other No    • Unknown No      Social History     Tobacco Use   • Smoking status: Every Day     Packs/day: 0.50     Years: 25.00     Total pack years: 12.50     Types: Cigarettes   • Smokeless tobacco: Never   • Tobacco comments:     Recently and currently quitting cigarettes   Vaping Use   • Vaping Use: Never used   Substance Use Topics   • Alcohol use: Yes     Comment: 1-2 times years   • Drug use: No       Review of Systems   Constitutional: Negative. Respiratory: Negative. Cardiovascular: Positive for chest pain. Negative for palpitations and leg swelling. Gastrointestinal: Positive for nausea. Genitourinary: Negative. Skin: Negative. Neurological: Negative. Hematological: Negative. Psychiatric/Behavioral: Negative. Physical Exam  Physical Exam  Vitals and nursing note reviewed. Constitutional:       General: She is not in acute distress. Appearance: She is obese. She is not ill-appearing, toxic-appearing or diaphoretic. Cardiovascular:      Rate and Rhythm: Normal rate and regular rhythm. Heart sounds: Normal heart sounds. Pulmonary:      Breath sounds: Normal breath sounds. Chest:      Chest wall: No mass, deformity, tenderness, crepitus or edema. There is no dullness to percussion. Abdominal:      General: Bowel sounds are normal.      Palpations: Abdomen is soft. Musculoskeletal:      Right lower leg: No edema. Left lower leg: No edema. Skin:     General: Skin is warm and dry. Capillary Refill: Capillary refill takes less than 2 seconds. Neurological:      General: No focal deficit present. Psychiatric:         Mood and Affect: Mood is anxious.          Behavior: Behavior normal.         Vital Signs  ED Triage Vitals   Temperature Pulse Respirations Blood Pressure SpO2   09/11/23 2206 09/11/23 2201 09/11/23 2206 09/11/23 2201 09/11/23 2201   98.6 °F (37 °C) (!) 110 18 116/77 97 %      Temp Source Heart Rate Source Patient Position - Orthostatic VS BP Location FiO2 (%)   09/11/23 2206 09/11/23 2201 09/11/23 2201 09/11/23 2201 --   Oral Monitor Lying Left arm       Pain Score       09/11/23 2201       No Pain Vitals:    09/11/23 2201   BP: 116/77   Pulse: (!) 110   Patient Position - Orthostatic VS: Lying         Visual Acuity      ED Medications  Medications - No data to display    Diagnostic Studies  Results Reviewed     Procedure Component Value Units Date/Time    CBC and differential [[id number]]  (Abnormal) Collected: 09/11/23 2215    Lab Status: Final result Specimen: Blood from Arm, Right Updated: 09/11/23 2222     WBC 16.89 Thousand/uL      RBC 4.91 Million/uL      Hemoglobin 13.4 g/dL      Hematocrit 41.9 %      MCV 85 fL      MCH 27.3 pg      MCHC 32.0 g/dL      RDW 17.5 %      MPV 10.4 fL      Platelets 907 Thousands/uL      nRBC 0 /100 WBCs      Neutrophils Relative 77 %      Immat GRANS % 1 %      Lymphocytes Relative 15 %      Monocytes Relative 5 %      Eosinophils Relative 2 %      Basophils Relative 0 %      Neutrophils Absolute 13.04 Thousands/µL      Immature Grans Absolute 0.13 Thousand/uL      Lymphocytes Absolute 2.54 Thousands/µL      Monocytes Absolute 0.81 Thousand/µL      Eosinophils Absolute 0.31 Thousand/µL      Basophils Absolute 0.06 Thousands/µL     HS Troponin 0hr (reflex protocol) [[id number]] Collected: 09/11/23 2215    Lab Status: In process Specimen: Blood from Arm, Right Updated: 09/11/23 2221    Comprehensive metabolic panel [[id number]] Collected: 09/11/23 2215    Lab Status: In process Specimen: Blood from Arm, Right Updated: 09/11/23 2221                 XR chest 1 view portable    (Results Pending)              Procedures  Procedures         ED Course  ED Course as of 09/12/23 0041   Tue Sep 12, 2023   0040 Pt w CP signed out to Dr Anna Link 20yo+    Flowsheet Row Most Recent Value   Initial Alcohol Screen: US AUDIT-C     1. How often do you have a drink containing alcohol? 0 Filed at: 09/11/2023 2205   2. How many drinks containing alcohol do you have on a typical day you are drinking? 0 Filed at: 09/11/2023 2205   3a. Male UNDER 65:  How often do you have five or more drinks on one occasion? 0 Filed at: 09/11/2023 2205   3b. FEMALE Any Age, or MALE 65+: How often do you have 4 or more drinks on one occassion? 0 Filed at: 09/11/2023 2205   Audit-C Score 0 Filed at: 09/11/2023 2205   NELIA: How many times in the past year have you. .. Used an illegal drug or used a prescription medication for non-medical reasons? Never Filed at: 09/11/2023 2205                    MDM    Disposition  Final diagnoses:   None     ED Disposition     None      Follow-up Information    None         Patient's Medications   Discharge Prescriptions    No medications on file       No discharge procedures on file.     PDMP Review       Value Time User    PDMP Reviewed  Yes 3/26/2023  6:06 PM Victor Manuel Saunders DO          ED Provider  Electronically Signed by           Debbie Hinton MD  09/16/23 158 Freddy Munoz MD  09/16/23 4363

## 2023-09-12 NOTE — DISCHARGE INSTRUCTIONS
RETURN IF WORSE IN ANY WAY:   WORSENING CHEST PAIN,   SHORTNESS OF BREATH,   FEVER OR FLU LIKE SYMPTOMS,   OR NEW AND CONCERNING SYMPTOMS SIGNS OR SYMPTOMS      PLEASE CALL YOUR PRIMARY DOCTOR and Cardiologist IN THE MORNING TO SET UP FOLLOW UP   PLEASE REVIEW THE WORK UP RESULTS WITH YOUR DOCTORs

## 2023-09-16 DIAGNOSIS — I25.10 CORONARY ARTERY DISEASE INVOLVING NATIVE CORONARY ARTERY OF NATIVE HEART, UNSPECIFIED WHETHER ANGINA PRESENT: ICD-10-CM

## 2023-09-16 DIAGNOSIS — I25.10 CORONARY ARTERY DISEASE INVOLVING NATIVE CORONARY ARTERY OF NATIVE HEART WITHOUT ANGINA PECTORIS: ICD-10-CM

## 2023-09-16 DIAGNOSIS — Z95.5 S/P DRUG ELUTING CORONARY STENT PLACEMENT: ICD-10-CM

## 2023-09-18 DIAGNOSIS — I25.10 CORONARY ARTERY DISEASE INVOLVING NATIVE CORONARY ARTERY OF NATIVE HEART WITHOUT ANGINA PECTORIS: ICD-10-CM

## 2023-09-18 RX ORDER — ISOSORBIDE MONONITRATE 30 MG/1
30 TABLET, EXTENDED RELEASE ORAL DAILY
Qty: 30 TABLET | Refills: 0 | Status: SHIPPED | OUTPATIENT
Start: 2023-09-18 | End: 2023-09-27

## 2023-09-18 RX ORDER — NITROGLYCERIN 0.4 MG/1
0.4 TABLET SUBLINGUAL
Qty: 25 TABLET | Refills: 0 | Status: SHIPPED | OUTPATIENT
Start: 2023-09-18 | End: 2023-09-28 | Stop reason: SDUPTHER

## 2023-09-18 NOTE — ED NOTES
Initial heparin drip started at [address] at 4690  Heparin has been running at 11 1 units/kg/hr since then  PTT was sent down at 1440, 6 hours after Heparin was started  PTT after 6 hours of Herparin is 30, 4,000 units bolused to pt  New PTT to be drawn now       Dana Chapin RN  01/02/23 9079 No apparent complications or complaints regarding anesthesia care at this time/All questions were answered

## 2023-09-19 RX ORDER — CLOPIDOGREL BISULFATE 75 MG/1
75 TABLET ORAL DAILY
Qty: 90 TABLET | Refills: 0 | Status: SHIPPED | OUTPATIENT
Start: 2023-09-19

## 2023-09-22 ENCOUNTER — APPOINTMENT (EMERGENCY)
Dept: RADIOLOGY | Facility: HOSPITAL | Age: 48
End: 2023-09-22
Payer: COMMERCIAL

## 2023-09-22 ENCOUNTER — HOSPITAL ENCOUNTER (EMERGENCY)
Facility: HOSPITAL | Age: 48
End: 2023-09-23
Attending: EMERGENCY MEDICINE | Admitting: EMERGENCY MEDICINE
Payer: COMMERCIAL

## 2023-09-22 DIAGNOSIS — I21.4 NSTEMI (NON-ST ELEVATED MYOCARDIAL INFARCTION) (HCC): Primary | ICD-10-CM

## 2023-09-22 DIAGNOSIS — I21.4 NON-STEMI (NON-ST ELEVATED MYOCARDIAL INFARCTION) (HCC): ICD-10-CM

## 2023-09-22 LAB
2HR DELTA HS TROPONIN: 645 NG/L
ANION GAP SERPL CALCULATED.3IONS-SCNC: 11 MMOL/L
ATRIAL RATE: 77 BPM
ATRIAL RATE: 99 BPM
BASOPHILS # BLD AUTO: 0.03 THOUSANDS/ÂΜL (ref 0–0.1)
BASOPHILS NFR BLD AUTO: 0 % (ref 0–1)
BUN SERPL-MCNC: 28 MG/DL (ref 5–25)
CALCIUM SERPL-MCNC: 8.6 MG/DL (ref 8.4–10.2)
CARDIAC TROPONIN I PNL SERPL HS: 50 NG/L
CARDIAC TROPONIN I PNL SERPL HS: 695 NG/L
CHLORIDE SERPL-SCNC: 104 MMOL/L (ref 96–108)
CO2 SERPL-SCNC: 20 MMOL/L (ref 21–32)
CREAT SERPL-MCNC: 2.93 MG/DL (ref 0.6–1.3)
EOSINOPHIL # BLD AUTO: 0.22 THOUSAND/ÂΜL (ref 0–0.61)
EOSINOPHIL NFR BLD AUTO: 2 % (ref 0–6)
ERYTHROCYTE [DISTWIDTH] IN BLOOD BY AUTOMATED COUNT: 18.6 % (ref 11.6–15.1)
FLUAV RNA RESP QL NAA+PROBE: NEGATIVE
FLUBV RNA RESP QL NAA+PROBE: NEGATIVE
GFR SERPL CREATININE-BSD FRML MDRD: 18 ML/MIN/1.73SQ M
GLUCOSE SERPL-MCNC: 318 MG/DL (ref 65–140)
HCT VFR BLD AUTO: 41.1 % (ref 34.8–46.1)
HGB BLD-MCNC: 13.1 G/DL (ref 11.5–15.4)
IMM GRANULOCYTES # BLD AUTO: 0.13 THOUSAND/UL (ref 0–0.2)
IMM GRANULOCYTES NFR BLD AUTO: 1 % (ref 0–2)
LYMPHOCYTES # BLD AUTO: 1.89 THOUSANDS/ÂΜL (ref 0.6–4.47)
LYMPHOCYTES NFR BLD AUTO: 13 % (ref 14–44)
MCH RBC QN AUTO: 27.6 PG (ref 26.8–34.3)
MCHC RBC AUTO-ENTMCNC: 31.9 G/DL (ref 31.4–37.4)
MCV RBC AUTO: 87 FL (ref 82–98)
MONOCYTES # BLD AUTO: 0.66 THOUSAND/ÂΜL (ref 0.17–1.22)
MONOCYTES NFR BLD AUTO: 5 % (ref 4–12)
NEUTROPHILS # BLD AUTO: 11.77 THOUSANDS/ÂΜL (ref 1.85–7.62)
NEUTS SEG NFR BLD AUTO: 79 % (ref 43–75)
NRBC BLD AUTO-RTO: 0 /100 WBCS
P AXIS: 56 DEGREES
P AXIS: 59 DEGREES
PLATELET # BLD AUTO: 307 THOUSANDS/UL (ref 149–390)
PMV BLD AUTO: 10.6 FL (ref 8.9–12.7)
POTASSIUM SERPL-SCNC: 3.6 MMOL/L (ref 3.5–5.3)
PR INTERVAL: 172 MS
PR INTERVAL: 172 MS
QRS AXIS: 33 DEGREES
QRS AXIS: 49 DEGREES
QRSD INTERVAL: 108 MS
QRSD INTERVAL: 114 MS
QT INTERVAL: 360 MS
QT INTERVAL: 410 MS
QTC INTERVAL: 462 MS
QTC INTERVAL: 463 MS
RBC # BLD AUTO: 4.75 MILLION/UL (ref 3.81–5.12)
RSV RNA RESP QL NAA+PROBE: NEGATIVE
SARS-COV-2 RNA RESP QL NAA+PROBE: NEGATIVE
SODIUM SERPL-SCNC: 135 MMOL/L (ref 135–147)
T WAVE AXIS: 35 DEGREES
T WAVE AXIS: 49 DEGREES
VENTRICULAR RATE: 77 BPM
VENTRICULAR RATE: 99 BPM
WBC # BLD AUTO: 14.7 THOUSAND/UL (ref 4.31–10.16)

## 2023-09-22 PROCEDURE — 93005 ELECTROCARDIOGRAM TRACING: CPT

## 2023-09-22 PROCEDURE — 96375 TX/PRO/DX INJ NEW DRUG ADDON: CPT

## 2023-09-22 PROCEDURE — 99285 EMERGENCY DEPT VISIT HI MDM: CPT

## 2023-09-22 PROCEDURE — 80048 BASIC METABOLIC PNL TOTAL CA: CPT | Performed by: EMERGENCY MEDICINE

## 2023-09-22 PROCEDURE — 36415 COLL VENOUS BLD VENIPUNCTURE: CPT | Performed by: EMERGENCY MEDICINE

## 2023-09-22 PROCEDURE — 96361 HYDRATE IV INFUSION ADD-ON: CPT

## 2023-09-22 PROCEDURE — 93010 ELECTROCARDIOGRAM REPORT: CPT | Performed by: INTERNAL MEDICINE

## 2023-09-22 PROCEDURE — 71045 X-RAY EXAM CHEST 1 VIEW: CPT

## 2023-09-22 PROCEDURE — 84484 ASSAY OF TROPONIN QUANT: CPT | Performed by: EMERGENCY MEDICINE

## 2023-09-22 PROCEDURE — 99291 CRITICAL CARE FIRST HOUR: CPT | Performed by: EMERGENCY MEDICINE

## 2023-09-22 PROCEDURE — 85025 COMPLETE CBC W/AUTO DIFF WBC: CPT | Performed by: EMERGENCY MEDICINE

## 2023-09-22 PROCEDURE — 0241U HB NFCT DS VIR RESP RNA 4 TRGT: CPT | Performed by: EMERGENCY MEDICINE

## 2023-09-22 RX ORDER — ONDANSETRON 2 MG/ML
4 INJECTION INTRAMUSCULAR; INTRAVENOUS ONCE
Status: COMPLETED | OUTPATIENT
Start: 2023-09-22 | End: 2023-09-22

## 2023-09-22 RX ORDER — SODIUM CHLORIDE 9 MG/ML
3 INJECTION INTRAVENOUS
Status: DISCONTINUED | OUTPATIENT
Start: 2023-09-22 | End: 2023-09-23 | Stop reason: HOSPADM

## 2023-09-22 RX ORDER — ASPIRIN 325 MG
325 TABLET ORAL ONCE
Status: COMPLETED | OUTPATIENT
Start: 2023-09-23 | End: 2023-09-23

## 2023-09-22 RX ORDER — LORAZEPAM 2 MG/ML
1 INJECTION INTRAMUSCULAR ONCE
Status: COMPLETED | OUTPATIENT
Start: 2023-09-22 | End: 2023-09-22

## 2023-09-22 RX ORDER — FENTANYL CITRATE 50 UG/ML
25 INJECTION, SOLUTION INTRAMUSCULAR; INTRAVENOUS ONCE
Status: COMPLETED | OUTPATIENT
Start: 2023-09-22 | End: 2023-09-22

## 2023-09-22 RX ADMIN — LORAZEPAM 1 MG: 2 INJECTION INTRAMUSCULAR; INTRAVENOUS at 21:20

## 2023-09-22 RX ADMIN — FENTANYL CITRATE 25 MCG: 50 INJECTION, SOLUTION INTRAMUSCULAR; INTRAVENOUS at 23:06

## 2023-09-22 RX ADMIN — ONDANSETRON 4 MG: 2 INJECTION INTRAMUSCULAR; INTRAVENOUS at 23:06

## 2023-09-22 RX ADMIN — SODIUM CHLORIDE 1000 ML: 0.9 INJECTION, SOLUTION INTRAVENOUS at 22:51

## 2023-09-23 ENCOUNTER — HOSPITAL ENCOUNTER (INPATIENT)
Facility: HOSPITAL | Age: 48
LOS: 4 days | Discharge: HOME | DRG: 192 | End: 2023-09-27
Attending: FAMILY MEDICINE | Admitting: FAMILY MEDICINE
Payer: COMMERCIAL

## 2023-09-23 VITALS
DIASTOLIC BLOOD PRESSURE: 60 MMHG | TEMPERATURE: 98.2 F | SYSTOLIC BLOOD PRESSURE: 120 MMHG | RESPIRATION RATE: 16 BRPM | HEART RATE: 88 BPM | OXYGEN SATURATION: 95 %

## 2023-09-23 DIAGNOSIS — I21.4 NON-STEMI (NON-ST ELEVATED MYOCARDIAL INFARCTION) (HCC): Primary | ICD-10-CM

## 2023-09-23 DIAGNOSIS — N18.4 STAGE 4 CHRONIC KIDNEY DISEASE (HCC): ICD-10-CM

## 2023-09-23 DIAGNOSIS — I10 ESSENTIAL HYPERTENSION: Chronic | ICD-10-CM

## 2023-09-23 DIAGNOSIS — R07.9 CHEST PAIN IN ADULT: ICD-10-CM

## 2023-09-23 DIAGNOSIS — Q87.81 ALPORT SYNDROME: ICD-10-CM

## 2023-09-23 PROBLEM — D72.823 LEUKEMOID REACTION: Status: RESOLVED | Noted: 2022-05-18 | Resolved: 2023-09-23

## 2023-09-23 PROBLEM — F40.00 AGORAPHOBIA: Chronic | Status: ACTIVE | Noted: 2023-01-07

## 2023-09-23 PROBLEM — E87.70 HYPERVOLEMIA: Status: RESOLVED | Noted: 2021-09-21 | Resolved: 2023-09-23

## 2023-09-23 PROBLEM — J44.9 COPD (CHRONIC OBSTRUCTIVE PULMONARY DISEASE) (HCC): Chronic | Status: ACTIVE | Noted: 2021-04-01

## 2023-09-23 PROBLEM — R31.9 HEMATURIA: Status: RESOLVED | Noted: 2021-04-28 | Resolved: 2023-09-23

## 2023-09-23 PROBLEM — I50.32 CHRONIC DIASTOLIC CONGESTIVE HEART FAILURE (HCC): Chronic | Status: ACTIVE | Noted: 2017-03-11

## 2023-09-23 PROBLEM — M70.21 OLECRANON BURSITIS, RIGHT ELBOW: Status: RESOLVED | Noted: 2022-07-11 | Resolved: 2023-09-23

## 2023-09-23 PROBLEM — E78.2 MIXED HYPERLIPIDEMIA: Chronic | Status: ACTIVE | Noted: 2020-08-06

## 2023-09-23 PROBLEM — E66.01 MORBID OBESITY WITH BMI OF 45.0-49.9, ADULT (HCC): Status: ACTIVE | Noted: 2020-08-06

## 2023-09-23 PROBLEM — I42.2 HYPERTROPHIC CARDIOMYOPATHY (HCC): Chronic | Status: ACTIVE | Noted: 2021-03-26

## 2023-09-23 PROBLEM — E83.52 HYPERCALCEMIA: Status: RESOLVED | Noted: 2020-10-29 | Resolved: 2023-09-23

## 2023-09-23 PROBLEM — I25.118 CORONARY ARTERY DISEASE OF NATIVE ARTERY OF NATIVE HEART WITH STABLE ANGINA PECTORIS (HCC): Chronic | Status: ACTIVE | Noted: 2018-10-14

## 2023-09-23 PROBLEM — I95.9 HYPOTENSION: Status: RESOLVED | Noted: 2021-09-21 | Resolved: 2023-09-23

## 2023-09-23 PROBLEM — F40.10 SOCIAL ANXIETY DISORDER: Chronic | Status: ACTIVE | Noted: 2022-01-27

## 2023-09-23 LAB
2HR DELTA HS TROPONIN: -17 NG/L
2HR DELTA HS TROPONIN: -425 NG/L
4HR DELTA HS TROPONIN: 2482 NG/L
APTT PPP: 27 SECONDS (ref 23–37)
APTT PPP: 30 SECONDS (ref 23–37)
APTT PPP: 36 SECONDS (ref 23–37)
APTT PPP: 38 SECONDS (ref 23–37)
ATRIAL RATE: 79 BPM
ATRIAL RATE: 81 BPM
ATRIAL RATE: 81 BPM
CARDIAC TROPONIN I PNL SERPL HS: 2532 NG/L
CARDIAC TROPONIN I PNL SERPL HS: 3294 NG/L
CARDIAC TROPONIN I PNL SERPL HS: 3311 NG/L
CARDIAC TROPONIN I PNL SERPL HS: 3375 NG/L
CARDIAC TROPONIN I PNL SERPL HS: 3800 NG/L
ERYTHROCYTE [DISTWIDTH] IN BLOOD BY AUTOMATED COUNT: 18.4 % (ref 11.6–15.1)
ERYTHROCYTE [DISTWIDTH] IN BLOOD BY AUTOMATED COUNT: 19.3 % (ref 11.6–15.1)
HCT VFR BLD AUTO: 39.3 % (ref 34.8–46.1)
HCT VFR BLD AUTO: 39.5 % (ref 34.8–46.1)
HGB BLD-MCNC: 12.3 G/DL (ref 11.5–15.4)
HGB BLD-MCNC: 12.6 G/DL (ref 11.5–15.4)
INR PPP: 1.01 (ref 0.84–1.19)
INR PPP: 1.04 (ref 0.84–1.19)
MCH RBC QN AUTO: 27.2 PG (ref 26.8–34.3)
MCH RBC QN AUTO: 27.5 PG (ref 26.8–34.3)
MCHC RBC AUTO-ENTMCNC: 31.3 G/DL (ref 31.4–37.4)
MCHC RBC AUTO-ENTMCNC: 31.9 G/DL (ref 31.4–37.4)
MCV RBC AUTO: 86 FL (ref 82–98)
MCV RBC AUTO: 87 FL (ref 82–98)
P AXIS: 45 DEGREES
P AXIS: 56 DEGREES
P AXIS: 58 DEGREES
PLATELET # BLD AUTO: 310 THOUSANDS/UL (ref 149–390)
PLATELET # BLD AUTO: 316 THOUSANDS/UL (ref 149–390)
PMV BLD AUTO: 10.3 FL (ref 8.9–12.7)
PMV BLD AUTO: 10.3 FL (ref 8.9–12.7)
PR INTERVAL: 166 MS
PR INTERVAL: 172 MS
PR INTERVAL: 174 MS
PROTHROMBIN TIME: 13.6 SECONDS (ref 11.6–14.5)
PROTHROMBIN TIME: 13.7 SECONDS (ref 11.6–14.5)
QRS AXIS: 28 DEGREES
QRS AXIS: 42 DEGREES
QRS AXIS: 44 DEGREES
QRSD INTERVAL: 110 MS
QRSD INTERVAL: 110 MS
QRSD INTERVAL: 112 MS
QT INTERVAL: 394 MS
QT INTERVAL: 406 MS
QT INTERVAL: 410 MS
QTC INTERVAL: 457 MS
QTC INTERVAL: 470 MS
QTC INTERVAL: 471 MS
RBC # BLD AUTO: 4.52 MILLION/UL (ref 3.81–5.12)
RBC # BLD AUTO: 4.58 MILLION/UL (ref 3.81–5.12)
T WAVE AXIS: -65 DEGREES
T WAVE AXIS: 32 DEGREES
T WAVE AXIS: 48 DEGREES
VENTRICULAR RATE: 79 BPM
VENTRICULAR RATE: 81 BPM
VENTRICULAR RATE: 81 BPM
WBC # BLD AUTO: 16.75 THOUSAND/UL (ref 4.31–10.16)
WBC # BLD AUTO: 17.21 THOUSAND/UL (ref 4.31–10.16)

## 2023-09-23 PROCEDURE — 85027 COMPLETE CBC AUTOMATED: CPT | Performed by: EMERGENCY MEDICINE

## 2023-09-23 PROCEDURE — 85610 PROTHROMBIN TIME: CPT | Performed by: EMERGENCY MEDICINE

## 2023-09-23 PROCEDURE — 96376 TX/PRO/DX INJ SAME DRUG ADON: CPT

## 2023-09-23 PROCEDURE — 85730 THROMBOPLASTIN TIME PARTIAL: CPT | Performed by: EMERGENCY MEDICINE

## 2023-09-23 PROCEDURE — 99222 1ST HOSP IP/OBS MODERATE 55: CPT | Performed by: FAMILY MEDICINE

## 2023-09-23 PROCEDURE — 96375 TX/PRO/DX INJ NEW DRUG ADDON: CPT

## 2023-09-23 PROCEDURE — 85730 THROMBOPLASTIN TIME PARTIAL: CPT

## 2023-09-23 PROCEDURE — 94760 N-INVAS EAR/PLS OXIMETRY 1: CPT

## 2023-09-23 PROCEDURE — 84484 ASSAY OF TROPONIN QUANT: CPT | Performed by: EMERGENCY MEDICINE

## 2023-09-23 PROCEDURE — 99244 OFF/OP CNSLTJ NEW/EST MOD 40: CPT | Performed by: PHYSICIAN ASSISTANT

## 2023-09-23 PROCEDURE — 84484 ASSAY OF TROPONIN QUANT: CPT

## 2023-09-23 PROCEDURE — 36415 COLL VENOUS BLD VENIPUNCTURE: CPT | Performed by: EMERGENCY MEDICINE

## 2023-09-23 PROCEDURE — 85610 PROTHROMBIN TIME: CPT

## 2023-09-23 PROCEDURE — 96366 THER/PROPH/DIAG IV INF ADDON: CPT

## 2023-09-23 PROCEDURE — 94660 CPAP INITIATION&MGMT: CPT

## 2023-09-23 PROCEDURE — 93010 ELECTROCARDIOGRAM REPORT: CPT | Performed by: INTERNAL MEDICINE

## 2023-09-23 PROCEDURE — 93005 ELECTROCARDIOGRAM TRACING: CPT

## 2023-09-23 PROCEDURE — 96365 THER/PROPH/DIAG IV INF INIT: CPT

## 2023-09-23 PROCEDURE — 85027 COMPLETE CBC AUTOMATED: CPT

## 2023-09-23 RX ORDER — VENLAFAXINE HYDROCHLORIDE 75 MG/1
75 CAPSULE, EXTENDED RELEASE ORAL
Status: DISCONTINUED | OUTPATIENT
Start: 2023-09-24 | End: 2023-09-27 | Stop reason: HOSPADM

## 2023-09-23 RX ORDER — ISOSORBIDE MONONITRATE 30 MG/1
30 TABLET, EXTENDED RELEASE ORAL DAILY
Status: DISCONTINUED | OUTPATIENT
Start: 2023-09-23 | End: 2023-09-23 | Stop reason: HOSPADM

## 2023-09-23 RX ORDER — ASPIRIN 81 MG/1
81 TABLET, CHEWABLE ORAL DAILY
Status: DISCONTINUED | OUTPATIENT
Start: 2023-09-23 | End: 2023-09-23 | Stop reason: HOSPADM

## 2023-09-23 RX ORDER — ASPIRIN 81 MG/1
81 TABLET, CHEWABLE ORAL DAILY
Status: DISCONTINUED | OUTPATIENT
Start: 2023-09-24 | End: 2023-09-27 | Stop reason: HOSPADM

## 2023-09-23 RX ORDER — ONDANSETRON 2 MG/ML
4 INJECTION INTRAMUSCULAR; INTRAVENOUS EVERY 4 HOURS PRN
Status: DISCONTINUED | OUTPATIENT
Start: 2023-09-23 | End: 2023-09-27 | Stop reason: HOSPADM

## 2023-09-23 RX ORDER — HYDROMORPHONE HCL IN WATER/PF 6 MG/30 ML
0.2 PATIENT CONTROLLED ANALGESIA SYRINGE INTRAVENOUS EVERY 6 HOURS PRN
Status: DISCONTINUED | OUTPATIENT
Start: 2023-09-23 | End: 2023-09-26

## 2023-09-23 RX ORDER — HEPARIN SODIUM 10000 [USP'U]/100ML
3-20 INJECTION, SOLUTION INTRAVENOUS
Status: DISCONTINUED | OUTPATIENT
Start: 2023-09-23 | End: 2023-09-25

## 2023-09-23 RX ORDER — ISOSORBIDE MONONITRATE 30 MG/1
30 TABLET, EXTENDED RELEASE ORAL DAILY
Status: DISCONTINUED | OUTPATIENT
Start: 2023-09-24 | End: 2023-09-24

## 2023-09-23 RX ORDER — NICOTINE 21 MG/24HR
1 PATCH, TRANSDERMAL 24 HOURS TRANSDERMAL DAILY
Status: DISCONTINUED | OUTPATIENT
Start: 2023-09-24 | End: 2023-09-27 | Stop reason: HOSPADM

## 2023-09-23 RX ORDER — METOPROLOL SUCCINATE 100 MG/1
100 TABLET, EXTENDED RELEASE ORAL DAILY
Status: DISCONTINUED | OUTPATIENT
Start: 2023-09-24 | End: 2023-09-26

## 2023-09-23 RX ORDER — HEPARIN SODIUM 10000 [USP'U]/100ML
3-20 INJECTION, SOLUTION INTRAVENOUS
Status: DISCONTINUED | OUTPATIENT
Start: 2023-09-23 | End: 2023-09-23 | Stop reason: HOSPADM

## 2023-09-23 RX ORDER — GABAPENTIN 300 MG/1
300 CAPSULE ORAL 3 TIMES DAILY
Status: DISCONTINUED | OUTPATIENT
Start: 2023-09-23 | End: 2023-09-23 | Stop reason: HOSPADM

## 2023-09-23 RX ORDER — OXYCODONE HYDROCHLORIDE 5 MG/1
5 TABLET ORAL EVERY 4 HOURS PRN
Status: DISCONTINUED | OUTPATIENT
Start: 2023-09-23 | End: 2023-09-25

## 2023-09-23 RX ORDER — CALCITRIOL 0.25 UG/1
0.25 CAPSULE, LIQUID FILLED ORAL
Status: DISCONTINUED | OUTPATIENT
Start: 2023-09-25 | End: 2023-09-23 | Stop reason: HOSPADM

## 2023-09-23 RX ORDER — HEPARIN SODIUM 1000 [USP'U]/ML
4000 INJECTION, SOLUTION INTRAVENOUS; SUBCUTANEOUS EVERY 6 HOURS PRN
Status: DISCONTINUED | OUTPATIENT
Start: 2023-09-23 | End: 2023-09-23 | Stop reason: HOSPADM

## 2023-09-23 RX ORDER — SODIUM BICARBONATE 650 MG/1
650 TABLET ORAL
Status: DISCONTINUED | OUTPATIENT
Start: 2023-09-23 | End: 2023-09-23 | Stop reason: HOSPADM

## 2023-09-23 RX ORDER — BUMETANIDE 1 MG/1
1 TABLET ORAL DAILY
Status: DISCONTINUED | OUTPATIENT
Start: 2023-09-24 | End: 2023-09-24

## 2023-09-23 RX ORDER — CLOPIDOGREL BISULFATE 75 MG/1
75 TABLET ORAL DAILY
Status: DISCONTINUED | OUTPATIENT
Start: 2023-09-24 | End: 2023-09-27 | Stop reason: HOSPADM

## 2023-09-23 RX ORDER — HEPARIN SODIUM 1000 [USP'U]/ML
2000 INJECTION, SOLUTION INTRAVENOUS; SUBCUTANEOUS EVERY 6 HOURS PRN
Status: DISCONTINUED | OUTPATIENT
Start: 2023-09-23 | End: 2023-09-25

## 2023-09-23 RX ORDER — ALBUTEROL SULFATE 90 UG/1
2 AEROSOL, METERED RESPIRATORY (INHALATION) EVERY 4 HOURS PRN
Status: DISCONTINUED | OUTPATIENT
Start: 2023-09-23 | End: 2023-09-23 | Stop reason: HOSPADM

## 2023-09-23 RX ORDER — HYDROMORPHONE HCL/PF 1 MG/ML
1 SYRINGE (ML) INJECTION EVERY 4 HOURS PRN
Status: DISCONTINUED | OUTPATIENT
Start: 2023-09-23 | End: 2023-09-23 | Stop reason: HOSPADM

## 2023-09-23 RX ORDER — ATORVASTATIN CALCIUM 80 MG/1
80 TABLET, FILM COATED ORAL EVERY EVENING
Status: DISCONTINUED | OUTPATIENT
Start: 2023-09-23 | End: 2023-09-27 | Stop reason: HOSPADM

## 2023-09-23 RX ORDER — EZETIMIBE 10 MG/1
10 TABLET ORAL DAILY
Status: DISCONTINUED | OUTPATIENT
Start: 2023-09-24 | End: 2023-09-27 | Stop reason: HOSPADM

## 2023-09-23 RX ORDER — ATORVASTATIN CALCIUM 40 MG/1
80 TABLET, FILM COATED ORAL EVERY EVENING
Status: DISCONTINUED | OUTPATIENT
Start: 2023-09-23 | End: 2023-09-23 | Stop reason: HOSPADM

## 2023-09-23 RX ORDER — LORAZEPAM 2 MG/ML
0.5 INJECTION INTRAMUSCULAR ONCE
Status: COMPLETED | OUTPATIENT
Start: 2023-09-23 | End: 2023-09-23

## 2023-09-23 RX ORDER — HEPARIN SODIUM 1000 [USP'U]/ML
2000 INJECTION, SOLUTION INTRAVENOUS; SUBCUTANEOUS EVERY 6 HOURS PRN
Status: DISCONTINUED | OUTPATIENT
Start: 2023-09-23 | End: 2023-09-23 | Stop reason: HOSPADM

## 2023-09-23 RX ORDER — HEPARIN SODIUM 1000 [USP'U]/ML
4000 INJECTION, SOLUTION INTRAVENOUS; SUBCUTANEOUS ONCE
Status: COMPLETED | OUTPATIENT
Start: 2023-09-23 | End: 2023-09-23

## 2023-09-23 RX ORDER — ALBUTEROL SULFATE 90 UG/1
2 AEROSOL, METERED RESPIRATORY (INHALATION) EVERY 4 HOURS PRN
Status: DISCONTINUED | OUTPATIENT
Start: 2023-09-23 | End: 2023-09-27 | Stop reason: HOSPADM

## 2023-09-23 RX ORDER — HEPARIN SODIUM 1000 [USP'U]/ML
4000 INJECTION, SOLUTION INTRAVENOUS; SUBCUTANEOUS EVERY 6 HOURS PRN
Status: DISCONTINUED | OUTPATIENT
Start: 2023-09-23 | End: 2023-09-25

## 2023-09-23 RX ORDER — METOPROLOL SUCCINATE 50 MG/1
100 TABLET, EXTENDED RELEASE ORAL DAILY
Status: DISCONTINUED | OUTPATIENT
Start: 2023-09-23 | End: 2023-09-23 | Stop reason: HOSPADM

## 2023-09-23 RX ORDER — POLYETHYLENE GLYCOL 3350 17 G/17G
17 POWDER, FOR SOLUTION ORAL DAILY
Status: DISCONTINUED | OUTPATIENT
Start: 2023-09-24 | End: 2023-09-27 | Stop reason: HOSPADM

## 2023-09-23 RX ORDER — GABAPENTIN 300 MG/1
300 CAPSULE ORAL 3 TIMES DAILY
Status: DISCONTINUED | OUTPATIENT
Start: 2023-09-23 | End: 2023-09-25

## 2023-09-23 RX ORDER — SODIUM BICARBONATE 650 MG/1
650 TABLET ORAL
Status: DISCONTINUED | OUTPATIENT
Start: 2023-09-23 | End: 2023-09-27 | Stop reason: HOSPADM

## 2023-09-23 RX ORDER — ACETAMINOPHEN 325 MG/1
975 TABLET ORAL EVERY 8 HOURS PRN
Status: DISCONTINUED | OUTPATIENT
Start: 2023-09-23 | End: 2023-09-25

## 2023-09-23 RX ORDER — BUMETANIDE 1 MG/1
1 TABLET ORAL DAILY
Status: DISCONTINUED | OUTPATIENT
Start: 2023-09-23 | End: 2023-09-23 | Stop reason: HOSPADM

## 2023-09-23 RX ORDER — VENLAFAXINE HYDROCHLORIDE 75 MG/1
75 CAPSULE, EXTENDED RELEASE ORAL
Status: DISCONTINUED | OUTPATIENT
Start: 2023-09-23 | End: 2023-09-23 | Stop reason: HOSPADM

## 2023-09-23 RX ORDER — FENTANYL CITRATE 50 UG/ML
25 INJECTION, SOLUTION INTRAMUSCULAR; INTRAVENOUS ONCE
Status: COMPLETED | OUTPATIENT
Start: 2023-09-23 | End: 2023-09-23

## 2023-09-23 RX ORDER — NITROGLYCERIN 0.4 MG/1
0.4 TABLET SUBLINGUAL
Status: DISCONTINUED | OUTPATIENT
Start: 2023-09-23 | End: 2023-09-27 | Stop reason: HOSPADM

## 2023-09-23 RX ORDER — CALCITRIOL 0.25 UG/1
0.25 CAPSULE, LIQUID FILLED ORAL DAILY
Status: DISCONTINUED | OUTPATIENT
Start: 2023-09-23 | End: 2023-09-23

## 2023-09-23 RX ORDER — EZETIMIBE 10 MG/1
10 TABLET ORAL DAILY
Status: DISCONTINUED | OUTPATIENT
Start: 2023-09-23 | End: 2023-09-23 | Stop reason: HOSPADM

## 2023-09-23 RX ORDER — FENTANYL CITRATE 50 UG/ML
25 INJECTION, SOLUTION INTRAMUSCULAR; INTRAVENOUS EVERY 2 HOUR PRN
Status: DISCONTINUED | OUTPATIENT
Start: 2023-09-23 | End: 2023-09-23

## 2023-09-23 RX ORDER — LIDOCAINE 50 MG/G
3 PATCH TOPICAL DAILY
Status: DISCONTINUED | OUTPATIENT
Start: 2023-09-24 | End: 2023-09-27 | Stop reason: HOSPADM

## 2023-09-23 RX ORDER — CLOPIDOGREL BISULFATE 75 MG/1
75 TABLET ORAL DAILY
Status: DISCONTINUED | OUTPATIENT
Start: 2023-09-23 | End: 2023-09-23 | Stop reason: HOSPADM

## 2023-09-23 RX ADMIN — VENLAFAXINE HYDROCHLORIDE 75 MG: 75 CAPSULE, EXTENDED RELEASE ORAL at 09:18

## 2023-09-23 RX ADMIN — FENTANYL CITRATE 25 MCG: 50 INJECTION, SOLUTION INTRAMUSCULAR; INTRAVENOUS at 01:35

## 2023-09-23 RX ADMIN — CLOPIDOGREL BISULFATE 75 MG: 75 TABLET ORAL at 08:11

## 2023-09-23 RX ADMIN — EZETIMIBE 10 MG: 10 TABLET ORAL at 08:11

## 2023-09-23 RX ADMIN — ASPIRIN 325 MG: 325 TABLET ORAL at 00:04

## 2023-09-23 RX ADMIN — GABAPENTIN 300 MG: 300 CAPSULE ORAL at 20:39

## 2023-09-23 RX ADMIN — GABAPENTIN 300 MG: 300 CAPSULE ORAL at 08:11

## 2023-09-23 RX ADMIN — HYDROMORPHONE HYDROCHLORIDE 1 MG: 1 INJECTION, SOLUTION INTRAMUSCULAR; INTRAVENOUS; SUBCUTANEOUS at 07:36

## 2023-09-23 RX ADMIN — SODIUM BICARBONATE 650 MG: 650 TABLET ORAL at 08:11

## 2023-09-23 RX ADMIN — HEPARIN SODIUM 4000 UNITS: 1000 INJECTION INTRAVENOUS; SUBCUTANEOUS at 00:18

## 2023-09-23 RX ADMIN — NITROGLYCERIN 0.4 MG: 0.4 TABLET SUBLINGUAL at 18:42

## 2023-09-23 RX ADMIN — HEPARIN SODIUM 19.1 UNITS/KG/HR: 10000 INJECTION, SOLUTION INTRAVENOUS at 17:45

## 2023-09-23 RX ADMIN — HEPARIN SODIUM 4000 UNITS: 1000 INJECTION INTRAVENOUS; SUBCUTANEOUS at 19:22

## 2023-09-23 RX ADMIN — HEPARIN SODIUM 4000 UNITS: 1000 INJECTION INTRAVENOUS; SUBCUTANEOUS at 13:41

## 2023-09-23 RX ADMIN — HEPARIN SODIUM 11.1 UNITS/KG/HR: 10000 INJECTION, SOLUTION INTRAVENOUS at 00:14

## 2023-09-23 RX ADMIN — SODIUM BICARBONATE 650 MG TABLET 650 MG: at 18:22

## 2023-09-23 RX ADMIN — ATORVASTATIN CALCIUM 80 MG: 80 TABLET, FILM COATED ORAL at 18:22

## 2023-09-23 RX ADMIN — HEPARIN SODIUM 4000 UNITS: 1000 INJECTION INTRAVENOUS; SUBCUTANEOUS at 06:57

## 2023-09-23 RX ADMIN — ASPIRIN 81 MG CHEWABLE TABLET 81 MG: 81 TABLET CHEWABLE at 08:11

## 2023-09-23 RX ADMIN — LORAZEPAM 0.5 MG: 2 INJECTION INTRAMUSCULAR; INTRAVENOUS at 15:45

## 2023-09-23 RX ADMIN — NITROGLYCERIN 0.4 MG: 0.4 TABLET SUBLINGUAL at 18:32

## 2023-09-23 RX ADMIN — ACETAMINOPHEN 975 MG: 325 TABLET, FILM COATED ORAL at 18:32

## 2023-09-23 RX ADMIN — HYDROMORPHONE HYDROCHLORIDE 1 MG: 1 INJECTION, SOLUTION INTRAMUSCULAR; INTRAVENOUS; SUBCUTANEOUS at 02:52

## 2023-09-23 NOTE — ASSESSMENT & PLAN NOTE
· 9/22/23: [facility] ED for left-sided chest pain radiating to back and right side. +SOB, weakness, dizziness, nausea   · Troponin: 3800s peak  · EKG: Normal sinus rhythm, Inferior infarct (cited on or before 07-JAN-2023), Anteroseptal infarct (cited on or before 07-JAN-2023), Inverted T waves have replaced nonspecific T wave abnormality in V6  · Tx:  mg load, heparin gtt  · Cardiology consulted, recommended transfer [facility], arrived 9/23/22   · Repeat EKG & trops on adm d/t continued chest pain  · Repeat EKG 9/25 AM per nurse concerns about ongoing chest pains; unchanged EKG from one on admission, NSR  · 9/25 cath: 100% occlusion of LAD, likely culprit of MI, unable to re-stent, 80% stenosis of left circumflex    · ASSESSMENT: MI w/100% obstruction of LAD as culprit, unable to re-stent. Continual L chest pain radiating to R and back. Currently rates it 9/10 pain (adm 10/10). Nitro held d/t soft BP. Ranexa held d/t renal function. Currently prn oxy 5 and 7.5 and dilaudid for pain control and scheduled tylenol 975mg Q8. PLAN:  · Appreciate cardiology recommendations:   · Cautious hydration given renal status and elevated end LVEDP  · Cont GDMT optimization for CAD  · Aggressive risk factor reduction  ·  on diet/lifestyle modification, referral to  Weight Mgmt program  ·  on smoking cessation, referral to  Smoking Cessation program  · Cardiac rehab upon discharge  · Consider other routes of chest pain control; currently on Oxy 5mg, 7.5mg; Dilaudid 0.2mg prn; nitroglycerin 0.4mg prn (however, has had soft BP 90s/50s) per cardio recs   · Heparin gtt d/c last night per cardio recs  · C/W home meds: aspirin 81mg daily, plavix 75mg daily, atorvastatin 80mg daily, zetia 10mg daily;  Toprol-XL 100mg daily, Imdur 30mg daily, bumex 1mg daily  · Cardiovascular diet  · Continue telemetry monitoring  · Follow BMP, Mg, Phos; maintain K>4.0, Mg>2.0

## 2023-09-23 NOTE — ASSESSMENT & PLAN NOTE
CAD s/p PCI to RCA, rPDA  · Inferior STEMI (3/23/21) --> initial PCI to [facility]  · Recurrent chest pain (3/23/21) --> GREG to RPDA  · Readmission, inferior STEMI (4/2/21) --> patent stents, no change on cath  · NSTEMI 1/3/2023--> GREG LAD/D2  · Continue asa, plavix, statin, BB, ranexa, imdur

## 2023-09-23 NOTE — ASSESSMENT & PLAN NOTE
· Home medications: venlafaxine 75 mg daily     PLAN:  · Continue venlafaxine 75mg daily  · Provided 1x dose of ativan today

## 2023-09-23 NOTE — ASSESSMENT & PLAN NOTE
Wt Readings from Last 3 Encounters:   09/11/23 129 kg (285 lb)   05/26/23 127 kg (280 lb)   05/10/23 127 kg (280 lb)     · On Bumex 1 mg daily

## 2023-09-23 NOTE — ASSESSMENT & PLAN NOTE
· Last cardio outpatient appointment 5/26/23  · CAD s/p PCI to [facility], RPDA, LAD:  · Inferior STEMI (3/23/21) -> GREG to [facility]  · Recurrent chest pains (3/23/21) -> GREG to RPDA  · Readmission for recurrent inferior STEMI (4/2/21) -> stents patent, no change on cath  · NSTEMI (1/3/23) -> RGEG to LAD/D2  · Home meds: aspirin 81mg daily, plavix 75mg daily, atorvastatin 80mg daily, zetia 10mg daily, Toprol-XL 100mg daily, Imdur 30mg daily    PLAN:   · Cardiology following  · C/W home meds (aspirin 81mg daily, plavix 75mg daily, atorvastatin 80mg daily, zetia 10mg daily, Toprol-XL 100mg daily, Imdur 30mg daily)

## 2023-09-23 NOTE — EMTALA/ACUTE CARE TRANSFER
[address] [address]  Dept: [phone]      EMTALA TRANSFER CONSENT    NAME Matilde Muniz                                         1975                              MRN [id number]    I have been informed of my rights regarding examination, treatment, and transfer   by Dr. Amelia Fowler DO    Benefits: Specialized equipment and/or services available at the receiving facility (Include comment)________________________    Risks: Potential for delay in receiving treatment, Potential deterioration of medical condition, Loss of IV, Increased discomfort during transfer, Possible worsening of condition or death during transfer      Consent for Transfer:  I acknowledge that my medical condition has been evaluated and explained to me by the emergency department physician or other qualified medical person and/or my attending physician, who has recommended that I be transferred to the service of  Accepting Physician: Dr. Irwin Christianson at [address] [place] [address] [address] Name, [facility] : [facility]. The above potential benefits of such transfer, the potential risks associated with such transfer, and the probable risks of not being transferred have been explained to me, and I fully understand them. The doctor has explained that, in my case, the benefits of transfer outweigh the risks. I agree to be transferred. I authorize the performance of emergency medical procedures and treatments upon me in both transit and upon arrival at the receiving facility. Additionally, I authorize the release of any and all medical records to the receiving facility and request they be transported with me, if possible. I understand that the safest mode of transportation during a medical emergency is an ambulance and that the Hospital advocates the use of this mode of transport.  Risks of traveling to the receiving facility by car, including absence of medical control, life sustaining equipment, such as oxygen, and medical personnel has been explained to me and I fully understand them. (MINAL CORRECT BOX BELOW)  [  ]  I consent to the stated transfer and to be transported by ambulance/helicopter. [  ]  I consent to the stated transfer, but refuse transportation by ambulance and accept full responsibility for my transportation by car. I understand the risks of non-ambulance transfers and I exonerate the Hospital and its staff from any deterioration in my condition that results from this refusal.    X___________________________________________    DATE  23  TIME________  Signature of patient or legally responsible individual signing on patient behalf           RELATIONSHIP TO PATIENT_________________________          Provider Certification    NAME Robert Alcantar                                         1975                              MRN [id number]    A medical screening exam was performed on the above named patient. Based on the examination:    Condition Necessitating Transfer The primary encounter diagnosis was NSTEMI (non-ST elevated myocardial infarction) ([address]). A diagnosis of Non-STEMI (non-ST elevated myocardial infarction) ([address]) was also pertinent to this visit.     Patient Condition: The patient has been stabilized such that within reasonable medical probability, no material deterioration of the patient condition or the condition of the unborn child(sasha) is likely to result from the transfer    Reason for Transfer: Level of Care needed not available at this facility    Transfer Requirements: [address]   · Space available and qualified personnel available for treatment as acknowledged by    · Agreed to accept transfer and to provide appropriate medical treatment as acknowledged by       Dr. Caitlyn Lombardi  · Appropriate medical records of the examination and treatment of the patient are provided at the time of transfer   [address] _______  · Transfer will be performed by qualified personnel from    and appropriate transfer equipment as required, including the use of necessary and appropriate life support measures. Provider Certification: I have examined the patient and explained the following risks and benefits of being transferred/refusing transfer to the patient/family:  General risk, such as traffic hazards, adverse weather conditions, rough terrain or turbulence, possible failure of equipment (including vehicle or aircraft), or consequences of actions of persons outside the control of the transport personnel, Unanticipated needs of medical equipment and personnel during transport, Risk of worsening condition      Based on these reasonable risks and benefits to the patient and/or the unborn child(sasha), and based upon the information available at the time of the patient’s examination, I certify that the medical benefits reasonably to be expected from the provision of appropriate medical treatments at another medical facility outweigh the increasing risks, if any, to the individual’s medical condition, and in the case of labor to the unborn child, from effecting the transfer.     X____________________________________________ DATE 09/23/23        TIME_______      ORIGINAL - SEND TO MEDICAL RECORDS   COPY - SEND WITH PATIENT DURING TRANSFER

## 2023-09-23 NOTE — ED PROVIDER NOTES
History  Chief Complaint   Patient presents with   • Chest Pain     Pt reports CP that started at 1700, pressure constant. Took 3 nitro and did help but came back. Pacemaker since 2021     States the pain was severe. States it feels different than her previous myocardial infarction. Notes she has had multiple MIs as well as hypertrophic cardiomyopathy. States she is having some dyspnea. States the pain radiates to her neck. Nitroglycerin makes it feel better. Nothing is making it feel worse. No fevers or chills. No sick contacts. No coughing. Does note some dyspnea. Prior to Admission Medications   Prescriptions Last Dose Informant Patient Reported?  Taking?   acetaminophen (TYLENOL) 325 mg tablet   No No   Sig: Take 3 tablets (975 mg total) by mouth every 8 (eight) hours as needed for mild pain   albuterol (PROVENTIL HFA,VENTOLIN HFA) 90 mcg/act inhaler   No No   Sig: Inhale 2 puffs every 4 (four) hours as needed for wheezing or shortness of breath   aspirin 81 mg chewable tablet  Self No No   Sig: Chew 1 tablet (81 mg total) daily   atorvastatin (LIPITOR) 80 mg tablet   No No   Sig: Take 1 tablet (80 mg total) by mouth every evening   bumetanide (BUMEX) 1 mg tablet   No No   Sig: Take 1 tablet (1 mg total) by mouth daily   calcitriol (ROCALTROL) 0.25 mcg capsule   Yes No   Sig: Take 1 capsule (0.25 mcg total) by mouth daily Takes Monday Wednesday and fridays as of 11/11/22   clopidogrel (PLAVIX) 75 mg tablet   No No   Sig: Take 1 tablet (75 mg total) by mouth daily   ezetimibe (ZETIA) 10 mg tablet   No No   Sig: Take 1 tablet (10 mg total) by mouth in the morning.   gabapentin (Neurontin) 300 mg capsule   No No   Sig: Take 1 capsule (300 mg total) by mouth 3 (three) times a day   isosorbide mononitrate (IMDUR) 30 mg 24 hr tablet   No No   Sig: Take 1 tablet (30 mg total) by mouth daily   metoprolol succinate (TOPROL-XL) 100 mg 24 hr tablet   No No   Sig: Take 1 tablet (100 mg total) by mouth daily Do not start before January 11, 2023. nitroglycerin (NITROSTAT) 0.4 mg SL tablet   No No   Sig: Place 1 tablet (0.4 mg total) under the tongue every 5 (five) minutes as needed for chest pain   sodium bicarbonate 650 mg tablet   No No   Sig: Take 1 tablet (650 mg total) by mouth 2 (two) times daily after meals   venlafaxine (EFFEXOR-XR) 75 mg 24 hr capsule   No No   Sig: Take 1 capsule (75 mg total) by mouth daily with breakfast      Facility-Administered Medications: None       Past Medical History:   Diagnosis Date   • Anemia    • Anxiety    • CAD (coronary artery disease)    • Cancer ([address]) 2008   • Cardiomyopathy ([address])    • CHF (congestive heart failure) ([facility])    • Chronic kidney disease    • COPD (chronic obstructive pulmonary disease) ([address])     scheduled for sleep apnea test soon after pacemaker implant   • Coronary artery disease    • Depression    • History of chemotherapy     non hodgkins lymphoma 2008   • Hyperlipemia    • Hypertension    • Hypertrophic cardiomyopathy ([address])    • Lymphoma, non-Hodgkin's (HCC)    • Myocardial infarction [facility])    • Non-ST elevation MI (NSTEMI) 01/02/2023   • Obesity    • Obstructive sleep apnea    • Olecranon bursitis, right elbow 7/11/2022   • SSS (sick sinus syndrome) ([address])     s/p medtronic dual chamber pacemaker 5/25/2021   • Tobacco abuse    • Ventricular tachycardia, non-sustained ([address])    • Wears glasses        Past Surgical History:   Procedure Laterality Date   • CARDIAC CATHETERIZATION N/A 1/3/2023    Procedure: Cardiac catheterization;  Surgeon: Bhaskar Sosa MD;  Location: [facility] CARDIAC CATH LAB; Service: Cardiology   • CARDIAC CATHETERIZATION N/A 1/3/2023    Procedure: Cardiac Coronary Angiogram;  Surgeon: Bhaskar Sosa MD;  Location: [facility]; Service: Cardiology   • CARDIAC CATHETERIZATION N/A 1/3/2023    Procedure: Cardiac pci;  Surgeon: Bhaskar Sosa MD;  Location: [facility] CARD[facility]AC [facility];   Service: Cardiology   • CARDIAC PACEMAKER PLACEMENT Left 2021    Procedure: DUAL LEAD PACEMAKER IMPLANT;  Surgeon: Jonathon Rae MD;  Location: [address] [place] [address] OR;  Service: Cardiology   • CAROTID STENT     •  SECTION     • CORONARY ANGIOPLASTY WITH STENT PLACEMENT  2021    GREG mid RCA and GREG right PDA   • DENTAL SURGERY     • IR BIOPSY KIDNEY RANDOM  10/18/2021       Family History   Problem Relation Age of Onset   • No Known Problems Mother    • No Known Problems Father    • No Known Problems Daughter    • Brain cancer Maternal Grandfather    • Heart disease Maternal Grandfather    • Cancer Maternal Grandfather    • No Known Problems Paternal Aunt    • No Known Problems Paternal Aunt      I have reviewed and agree with the history as documented. E-Cigarette/Vaping   • E-Cigarette Use Never User      E-Cigarette/Vaping Substances   • Nicotine No    • THC No    • CBD No    • Flavoring No    • Other No    • Unknown No      Social History     Tobacco Use   • Smoking status: Every Day     Packs/day: 0.50     Years: 25.00     Total pack years: 12.50     Types: Cigarettes   • Smokeless tobacco: Never   • Tobacco comments:     Recently and currently quitting cigarettes   Vaping Use   • Vaping Use: Never used   Substance Use Topics   • Alcohol use: Yes     Comment: 1-2 times years   • Drug use: No       Review of Systems   Constitutional: Positive for fatigue. Negative for activity change, chills and fever. HENT: Negative for congestion. Eyes: Negative for visual disturbance. Respiratory: Positive for shortness of breath. Negative for cough and chest tightness. Cardiovascular: Positive for chest pain. Negative for leg swelling. Gastrointestinal: Negative for abdominal pain, diarrhea and vomiting. Genitourinary: Negative for dysuria. Skin: Negative for rash. Neurological: Negative for dizziness, weakness and numbness. Physical Exam  Physical Exam  Constitutional:       General: She is not in acute distress.      Appearance: She is well-developed. She is not ill-appearing, toxic-appearing or diaphoretic. HENT:      Head: Normocephalic and atraumatic. Eyes:      Conjunctiva/sclera: Conjunctivae normal.      Pupils: Pupils are equal, round, and reactive to light. Cardiovascular:      Rate and Rhythm: Normal rate and regular rhythm. Heart sounds: Normal heart sounds. Pulmonary:      Effort: Pulmonary effort is normal. No respiratory distress. Breath sounds: Normal breath sounds. Abdominal:      General: Bowel sounds are normal.      Palpations: Abdomen is soft. Musculoskeletal:         General: Normal range of motion. Cervical back: Normal range of motion and neck supple. Skin:     General: Skin is warm and dry. Capillary Refill: Capillary refill takes less than 2 seconds. Neurological:      Mental Status: She is alert and oriented to person, place, and time.    Psychiatric:         Behavior: Behavior normal.         Vital Signs  ED Triage Vitals   Temperature Pulse Respirations Blood Pressure SpO2   09/22/23 2036 09/22/23 2036 09/22/23 2036 09/22/23 2037 09/22/23 2036   98.2 °F (36.8 °C) 100 21 (!) 182/79 99 %      Temp Source Heart Rate Source Patient Position - Orthostatic VS BP Location FiO2 (%)   09/22/23 2036 09/22/23 2036 09/22/23 2036 09/22/23 2036 --   Tympanic Monitor Lying Left arm       Pain Score       09/22/23 2036       10 - Worst Possible Pain           Vitals:    09/23/23 1331 09/23/23 1404 09/23/23 1430 09/23/23 1600   BP: 128/65 137/66 138/64 120/60   Pulse: 85 91 86 88   Patient Position - Orthostatic VS:             Visual Acuity      ED Medications  Medications   sodium chloride (PF) 0.9 % injection 3 mL (has no administration in time range)   nitroglycerin (NITRO-BID) 2 % TD ointment 1 inch (0 inches Topical Hold 9/23/23 0010)   heparin (porcine) 25,000 units in 0.45% NaCl 250 mL infusion (premix) (19.1 Units/kg/hr × 90 kg (Order-Specific) Intravenous Rate/Dose Change 9/23/23 1343) heparin (porcine) injection 4,000 Units (4,000 Units Intravenous Given 9/23/23 1341)   heparin (porcine) injection 2,000 Units (has no administration in time range)   albuterol (PROVENTIL HFA,VENTOLIN HFA) inhaler 2 puff (has no administration in time range)   aspirin chewable tablet 81 mg (81 mg Oral Given 9/23/23 0811)   atorvastatin (LIPITOR) tablet 80 mg (has no administration in time range)   bumetanide (BUMEX) tablet 1 mg (1 mg Oral Not Given 9/23/23 0809)   clopidogrel (PLAVIX) tablet 75 mg (75 mg Oral Given 9/23/23 0811)   ezetimibe (ZETIA) tablet 10 mg (10 mg Oral Given 9/23/23 0811)   gabapentin (NEURONTIN) capsule 300 mg (300 mg Oral Given 9/23/23 0811)   isosorbide mononitrate (IMDUR) 24 hr tablet 30 mg (30 mg Oral Not Given 9/23/23 0808)   metoprolol succinate (TOPROL-XL) 24 hr tablet 100 mg (100 mg Oral Not Given 9/23/23 0808)   sodium bicarbonate tablet 650 mg (650 mg Oral Given 9/23/23 0811)   venlafaxine (EFFEXOR-XR) 24 hr capsule 75 mg (75 mg Oral Given 9/23/23 0918)   HYDROmorphone (DILAUDID) injection 1 mg (1 mg Intravenous Given 9/23/23 0736)   calcitriol (ROCALTROL) capsule 0.25 mcg (has no administration in time range)   LORazepam (ATIVAN) injection 1 mg (1 mg Intravenous Given 9/22/23 2120)   sodium chloride 0.9 % bolus 1,000 mL (0 mL Intravenous Stopped 9/23/23 0051)   ondansetron (ZOFRAN) injection 4 mg (4 mg Intravenous Given 9/22/23 2306)   fentanyl citrate (PF) 100 MCG/2ML 25 mcg (25 mcg Intravenous Given 9/22/23 2306)   aspirin tablet 325 mg (325 mg Oral Given 9/23/23 0004)   heparin (porcine) injection 4,000 Units (4,000 Units Intravenous Given 9/23/23 0018)   fentanyl citrate (PF) 100 MCG/2ML 25 mcg (25 mcg Intravenous Given 9/23/23 0135)   LORazepam (ATIVAN) injection 0.5 mg (0.5 mg Intravenous Given 9/23/23 1239)       Diagnostic Studies  Results Reviewed     Procedure Component Value Units Date/Time    APTT [[id number]]     Lab Status: No result Specimen: Blood     HS Troponin I 2hr [[id number]]  (Abnormal) Collected: 09/23/23 1418    Lab Status: Final result Specimen: Blood from Arm, Left Updated: 09/23/23 1443     hs TnI 2hr 3,375 ng/L      Delta 2hr hsTnI -425 ng/L     APTT [[id number]]  (Abnormal) Collected: 09/23/23 1247    Lab Status: Final result Specimen: Blood from Arm, Left Updated: 09/23/23 1335     PTT 38 seconds     HS Troponin 0hr (reflex protocol) [[id number]]  (Abnormal) Collected: 09/23/23 1143    Lab Status: Final result Specimen: Blood from Arm, Left Updated: 09/23/23 1220     hs TnI 0hr 3,800 ng/L     HS Troponin I 4hr [[id number]]     Lab Status: No result Specimen: Blood     APTT [[id number]]  (Normal) Collected: 09/23/23 0623    Lab Status: Final result Specimen: Blood from Arm, Left Updated: 09/23/23 0641     PTT 30 seconds     HS Troponin I 4hr [[id number]]  (Abnormal) Collected: 09/23/23 0225    Lab Status: Final result Specimen: Blood from Arm, Left Updated: 09/23/23 0305     hs TnI 4hr 2,532 ng/L      Delta 4hr hsTnI 2,482 ng/L     APTT six (6) hours after Heparin bolus/drip initiation or dosing change [[id number]]  (Normal) Collected: 09/23/23 0017    Lab Status: Final result Specimen: Blood from Arm, Right Updated: 09/23/23 0037     PTT 27 seconds     Protime-INR [[id number]]  (Normal) Collected: 09/23/23 0017    Lab Status: Final result Specimen: Blood from Arm, Right Updated: 09/23/23 0037     Protime 13.7 seconds      INR 1.04    CBC [[id number]]  (Abnormal) Collected: 09/23/23 0017    Lab Status: Final result Specimen: Blood from Arm, Right Updated: 09/23/23 0022     WBC 16.75 Thousand/uL      RBC 4.58 Million/uL      Hemoglobin 12.6 g/dL      Hematocrit 39.5 %      MCV 86 fL      MCH 27.5 pg      MCHC 31.9 g/dL      RDW 18.4 %      Platelets 517 Thousands/uL      MPV 10.3 fL     HS Troponin I 2hr [[id number]]  (Abnormal) Collected: 09/22/23 2313    Lab Status: Final result Specimen: Blood from Arm, Right Updated: 09/22/23 2341     hs TnI 2hr 695 ng/L      Delta 2hr hsTnI 645 ng/L     FLU/RSV/COVID - if FLU/RSV clinically relevant [[id number]]  (Normal) Collected: 09/22/23 2133    Lab Status: Final result Specimen: Nares from Nose Updated: 09/22/23 2253     SARS-CoV-2 Negative     INFLUENZA A PCR Negative     INFLUENZA B PCR Negative     RSV PCR Negative    Narrative:      FOR PEDIATRIC PATIENTS - copy/paste COVID Guidelines URL to browser: [email or web address]    SARS-CoV-2 assay is a Nucleic Acid Amplification assay intended for the  qualitative detection of nucleic acid from SARS-CoV-2 in nasopharyngeal  swabs. Results are for the presumptive identification of SARS-CoV-2 RNA. Positive results are indicative of infection with SARS-CoV-2, the virus  causing COVID-19, but do not rule out bacterial infection or co-infection  with other viruses. Laboratories within the [facility] and its  territories are required to report all positive results to the appropriate  public health authorities. Negative results do not preclude SARS-CoV-2  infection and should not be used as the sole basis for treatment or other  patient management decisions. Negative results must be combined with  clinical observations, patient history, and epidemiological information. This test has not been FDA cleared or approved. This test has been authorized by FDA under an Emergency Use Authorization  (EUA). This test is only authorized for the duration of time the  declaration that circumstances exist justifying the authorization of the  emergency use of an in vitro diagnostic tests for detection of SARS-CoV-2  virus and/or diagnosis of COVID-19 infection under section 564(b)(1) of  the Act, 21 U. S.C. 435QYC-8(A)(9), unless the authorization is terminated  or revoked sooner. The test has been validated but independent review by FDA  and CLIA is pending. Test performed using [employer]:  This RT-PCR assay targets N2,  a region unique to SARS-CoV-2. A conserved region in the E-gene was chosen  for pan-Sarbecovirus detection which includes SARS-CoV-2. According to CMS-2020-01-R, this platform meets the definition of high-throughput technology.     HS Troponin 0hr (reflex protocol) [[id number]]  (Abnormal) Collected: 09/22/23 2102    Lab Status: Final result Specimen: Blood from Arm, Right Updated: 09/22/23 2135     hs TnI 0hr 50 ng/L     Basic metabolic panel [[id number]]  (Abnormal) Collected: 09/22/23 2102    Lab Status: Final result Specimen: Blood from Arm, Right Updated: 09/22/23 2129     Sodium 135 mmol/L      Potassium 3.6 mmol/L      Chloride 104 mmol/L      CO2 20 mmol/L      ANION GAP 11 mmol/L      BUN 28 mg/dL      Creatinine 2.93 mg/dL      Glucose 318 mg/dL      Calcium 8.6 mg/dL      eGFR 18 ml/min/1.73sq m     Narrative:      [facility] guidelines for Chronic Kidney Disease (CKD):   •  Stage 1 with normal or high GFR (GFR > 90 mL/min/1.73 square meters)  •  Stage 2 Mild CKD (GFR = 60-89 mL/min/1.73 square meters)  •  Stage 3A Moderate CKD (GFR = 45-59 mL/min/1.73 square meters)  •  Stage 3B Moderate CKD (GFR = 30-44 mL/min/1.73 square meters)  •  Stage 4 Severe CKD (GFR = 15-29 mL/min/1.73 square meters)  •  Stage 5 End Stage CKD (GFR <15 mL/min/1.73 square meters)  Note: GFR calculation is accurate only with a steady state creatinine    CBC and differential [[id number]]  (Abnormal) Collected: 09/22/23 2102    Lab Status: Final result Specimen: Blood from Arm, Right Updated: 09/22/23 2109     WBC 14.70 Thousand/uL      RBC 4.75 Million/uL      Hemoglobin 13.1 g/dL      Hematocrit 41.1 %      MCV 87 fL      MCH 27.6 pg      MCHC 31.9 g/dL      RDW 18.6 %      MPV 10.6 fL      Platelets 810 Thousands/uL      nRBC 0 /100 WBCs      Neutrophils Relative 79 %      Immat GRANS % 1 %      Lymphocytes Relative 13 %      Monocytes Relative 5 %      Eosinophils Relative 2 %      Basophils Relative 0 %      Neutrophils Absolute 11.77 Thousands/µL      Immature Grans Absolute 0.13 Thousand/uL      Lymphocytes Absolute 1.89 Thousands/µL      Monocytes Absolute 0.66 Thousand/µL      Eosinophils Absolute 0.22 Thousand/µL      Basophils Absolute 0.03 Thousands/µL                  X-ray chest 1 view portable   Final Result by Aryan Levy MD (09/23 1444)      No acute cardiopulmonary disease.                Workstation performed: [id number]                    Procedures  ECG 12 Lead Documentation Only    Date/Time: 9/23/2023 4:03 PM    Performed by: Zac Mckeon MD  Authorized by: Zac Mckeon MD    ECG reviewed by me, the ED Provider: yes    Patient location:  ED  Previous ECG:     Comparison to cardiac monitor: Yes    Interpretation:     Interpretation: normal    Rate:     ECG rate assessment: normal    Rhythm:     Rhythm: sinus rhythm    Ectopy:     Ectopy: none    QRS:     QRS axis:  Normal  Conduction:     Conduction: normal    ST segments:     ST segments:  Non-specific  T waves:     T waves: normal and non-specific    CriticalCare Time    Date/Time: 9/23/2023 4:05 PM    Performed by: Zac Mckeon MD  Authorized by: Zac Mckeon MD    Critical care provider statement:     Critical care time (minutes):  35    Critical care time was exclusive of:  Separately billable procedures and treating other patients and teaching time    Critical care was necessary to treat or prevent imminent or life-threatening deterioration of the following conditions:  Cardiac failure    Critical care was time spent personally by me on the following activities:  Blood draw for specimens, obtaining history from patient or surrogate, development of treatment plan with patient or surrogate, evaluation of patient's response to treatment, examination of patient, interpretation of cardiac output measurements, ordering and performing treatments and interventions, ordering and review of laboratory studies, ordering and review of radiographic studies, re-evaluation of patient's condition, review of old charts and discussions with consultants    I assumed direction of critical care for this patient from another provider in my specialty: no               ED Course             HEART Risk Score    Flowsheet Row Most Recent Value   Heart Score Risk Calculator    History 1 Filed at: 09/22/2023 2351   ECG 1 Filed at: 09/22/2023 2351   Age 1 Filed at: 09/22/2023 2351   Risk Factors 2 Filed at: 09/22/2023 2351   Troponin 2 Filed at: 09/22/2023 2351   HEART Score 7 Filed at: 09/22/2023 2351                        SBIRT 20yo+    Flowsheet Row Most Recent Value   Initial Alcohol Screen: US AUDIT-C     1. How often do you have a drink containing alcohol? 0 Filed at: 09/22/2023 2036   2. How many drinks containing alcohol do you have on a typical day you are drinking? 0 Filed at: 09/22/2023 2036   3a. Male UNDER 65: How often do you have five or more drinks on one occasion? 0 Filed at: 09/22/2023 2036   3b. FEMALE Any Age, or MALE 65+: How often do you have 4 or more drinks on one occassion? 0 Filed at: 09/22/2023 2036   Audit-C Score 0 Filed at: 09/22/2023 2036   NELIA: How many times in the past year have you. .. Used an illegal drug or used a prescription medication for non-medical reasons? Never Filed at: 09/22/2023 2036                    Medical Decision Making  82-year-old female who appears to have a non-STEMI. 1 second troponin came back discussed case with Dr. Waylon Peralta of cardiology who agreed with assessment. Patient started on aspirin heparin. Excepted for transfer to [facility] for interventional cardiology evaluation. Stable at the time of signout. Patient is awaiting transport however logistics are continuing to be worked up by transfer center. She states understanding agreement with the plan.     Non-STEMI (non-ST elevated myocardial infarction) [facility]): acute illness or injury  NSTEMI (non-ST elevated myocardial infarction) [facility]): acute illness or injury  Amount and/or Complexity of Data Reviewed  External Data Reviewed: notes. Labs: ordered. Radiology: ordered. ECG/medicine tests: ordered and independent interpretation performed. Details: Normal sinus rhythm, no ST elevations  Discussion of management or test interpretation with external provider(s): As discussed with Dr. Bert Landau of cardiology who agrees with plan for transfer    Risk  OTC drugs. Prescription drug management. Parenteral controlled substances. Drug therapy requiring intensive monitoring for toxicity. Disposition  Final diagnoses:   NSTEMI (non-ST elevated myocardial infarction) ([address])   Non-STEMI (non-ST elevated myocardial infarction) ([address])     Time reflects when diagnosis was documented in both MDM as applicable and the Disposition within this note     Time User Action Codes Description Comment    9/23/2023 12:58 AM Genaro Dudley Add [I21.4] NSTEMI (non-ST elevated myocardial infarction) ([address])     9/23/2023  2:31 AM Palladino, Lauretha Mattock Add [I21.4] Non-STEMI (non-ST elevated myocardial infarction) [facility])       ED Disposition     ED Disposition   Transfer to Another Facility-In Network    Condition   --    Date/Time   Sat Sep 23, 2023 12:58 AM    Comment   Shellie Hanson should be transferred out to [place].            MD Documentation    [facility] Most Recent Value   Patient Condition The patient has been stabilized such that within reasonable medical probability, no material deterioration of the patient condition or the condition of the unborn child(sasha) is likely to result from the transfer   Reason for Transfer Level of Care needed not available at this facility   Benefits of Transfer Specialized equipment and/or services available at the receiving facility (Include comment)________________________   Risks of Transfer Potential for delay in receiving treatment, Potential deterioration of medical condition, Loss of IV, Increased discomfort during transfer, Possible worsening of condition or death during transfer   Accepting Physician Dr. Caryn Hathaway [facility], [facility], [facility]   Sending MD Dr. Turner Galloway   Provider Certification General risk, such as traffic hazards, adverse weather conditions, rough terrain or turbulence, possible failure of equipment (including vehicle or aircraft), or consequences of actions of persons outside the control of the transport personnel, Unanticipated needs of medical equipment and personnel during transport, Risk of worsening condition      RN Documentation    [address] [facility], [facility], [facility]      Follow-up Information    None         Patient's Medications   Discharge Prescriptions    No medications on file       No discharge procedures on file.     PDMP Review       Value Time User    PDMP Reviewed  Yes 3/26/2023  6:06 PM Virgil Trent DO          ED Provider  Electronically Signed by           Kana Mazariegos MD  09/23/23 1399

## 2023-09-23 NOTE — H&P
[address] [facility]  History and Physical - Family Medicine Residency  Vamsi Diaz 1975, 57 y.o. female. MRN: [id number]  Unit/Bed#: [facility] 107-64 Encounter: [id number]  Primary Care Provider: Sahil Fleming DO  Admission Date: 9/23/2023 1713  Code Status:  Level 3 - DNAR and DNI      Assessment/Plan:      Plans discussed with [facility] team and finalization is pending attending physician attestation. Please, call  for any clarification. * Non-STEMI (non-ST elevated myocardial infarction) [facility])  Assessment & Plan  -  9/22/23: Patient presented to [facility] ED 2/2 to chest pain  · Troponin: 3800 > 3375 > 2532  · EKG: Normal sinus rhythm, Inferior infarct (cited on or before 07-JAN-2023), Anteroseptal infarct (cited on or before 07-JAN-2023), Inverted T waves have replaced nonspecific T wave abnormality in V6  · Tx: heparin drip,  mg load  - Cardiology consulted; recommended transfer to [facility]  · Transferred on 9/23/22  · Continue heparin drip  · Continued telemetry  · Repeat EKG and trops on admission to [place] due to continued chest pain  · Plan for cath on monday      CKD (chronic kidney disease) stage 4, GFR 15-29 ml/min ([facility])  Assessment & Plan    Renal      Results from last 7 days   Lab Units 09/22/23  2102   BUN mg/dL 28*   CREATININE mg/dL 2.93*   EGFR ml/min/1.73sq m 18     - Baseline creatinine: 2.4-2.7, currently 2.9  - Home meds; Bumex 1 mg daily   - Per notes, history of Alport syndrome      Plan:   · Avoid nephrotoxic agents  · Consult Nephrology, appreciate recommendations      Hypertrophic cardiomyopathy ([address])  Assessment & Plan     - Echo 3/25/2021 60%, severe hypokinesis of the basal-mid inferior wall.  Findings consistent with HCM.  Mild-mod MR. TTE 1/9/2023: LVEF 45% with severe inferobasal hypokinesia and inferolateral hypokinesia. Mild LVH.   - Cardiac MRI 3/29/2021 severe LVH, EF 44%.  Small circumferential pericardial effusion. [address] MR.  Findings suggestive of underlying hypertrophic cardiomyopathy with ischemic scarring of the inferior and inferolateral walls.     Plan:  · Cardiology consult for recommendations  · Continue Toprol 24h 100 mg daily     Chronic diastolic congestive heart failure (HCC)  Assessment & Plan  Wt Readings from Last 3 Encounters:   09/11/23 129 kg (285 lb)   05/26/23 127 kg (280 lb)   05/10/23 127 kg (280 lb)     Net IO: No IO data has been entered for this period [09/23/23 1622]  No intake or output data in the 24 hours ending 09/23/23 1622     - Echo 3/25/2021 60%, severe hypokinesis of the basal-mid inferior wall.  Findings consistent with HCM.  Mild-mod MR. TTE 1/9/2023: LVEF 45% with severe inferobasal hypokinesia and inferolateral hypokinesia.   Mild LVH.   - Cardiac MRI 3/29/2021 severe LVH, EF 44%.  Small circumferential pericardial effusion.  Mild MR.  Findings suggestive of underlying hypertrophic cardiomyopathy with ischemic scarring of the inferior and inferolateral walls.     Plan:  · Cardiology consult placed, appreciate recommendations  · Repeat EKG and troponin on admission to [place]  · Monitor I/O            Agoraphobia  Assessment & Plan  Home medications: Effexor 75 mg daily     · Continue home medication    Social anxiety disorder  Assessment & Plan  Home meds: venlafaxine 75 mg daily     · Continue home meds     ELIAZAR (obstructive sleep apnea)  Assessment & Plan  - Home CPAP machine    · CPAP QHS    COPD (chronic obstructive pulmonary disease) ([facility])  Assessment & Plan  - Home meds: albuterol 2 puffs Q4H PRN    Currently stable    · Albuterol PRN    Morbid obesity with BMI of 45.0-49.9, adult ([facility])  Assessment & Plan  - BMI: 46    · Offer lifestyle modification recommendations for diet and exercise  · Consider outpatient weight management referral    Mixed hyperlipidemia  Assessment & Plan    Lipid       Lab Results   Component Value Date    CHOLESTEROL 206 (H) 01/03/2023    TRIG 348 (H) 01/03/2023    HDL 31 (L) 01/03/2023    LDLCALC 105 (H) 01/03/2023     Home meds: atorvastatin 80 mg daily, ezetimibe 10 mg daily     · Continue home meds    Coronary artery disease of native artery of native heart with stable angina pectoris [facility])  Assessment & Plan  - Follows with outpatient cardiology  - Last appointment 6/23/29:   CAD s/p PCI to RCA, rPDA  • Inferior STEMI (3/23/21) --> initial PCI to [facility]  • Recurrent chest pain (3/23/21) --> GREG to RPDA  • Readmission, inferior STEMI (4/2/21) --> patent stents, no change on cath  • NSTEMI 1/3/2023--> GREG LAD/D2  • Home medications: Continue asa, plavix, statin, BB, imdur      Plan:   · Cardiology on consult  · Continue home mediations:  · ASA 81 mg daily  · Plavix 75 mg daily  · Atorvastatin 80 mg daily  · Toprol 100 mg daily   · Imdur 30 mg daily      Tobacco abuse  Assessment & Plan  - Smokes half pack per day    • Nicotine patch 21 mg  • Smoking cessation recommended        POLST:    Diet: Diet Cardiovascular; Cardiac  VTE Pharm PPX: Heparin  VTE Mech PPX: sequential compression device      History of Present Illness      HPI:  Jacinda Duncan is a 57 y.o. female with a past medical history of multiple prior STEMIs with stenting, prior NSTEMI and [address] with cardiac pacemaker placed in 2021, CKD stage IV, and ELIAZAR. She presented to [facility] ED with 10/10 "clawing and sharp" left sided chest pain that radiates to the back and right side of the chest. Pain began around 5 pm on 9/22 and did not respond to 3 doses of nitroglycerine, prompting ER visit. Patient also endorses accompanying shortness of breath, weakness, dizziness, and nausea but denies any loss of consciousness. Patient follows with cardiology and denies missing any doses of her home medications. She states she has been using nitroglycerin about every other day at home for intermittent chest pain.      ED Management at [address] [place] [address] Avenue:   Vitals: T: 98.2 HR: 100 BP: 182/79  99%  EKG showed T wave abnormalities in V6, changed from prior EKG, no stemi. Troponin: 50-> 695->2,532->3,800->3,375  WBC: 14.7  Hgb:13.1  ED Medications: Aspirin loading dose and started on heparin drip. Home medications continued. Fentanyl and lorazepam given for pain and anxiety. Patient diagnosed with NSTEMI and transferred to [place] for further management. Patient transferred to [facility] and seen at bedside. Currently patient endorses continued unchanged chest pain but states she feels tired and confused from receiving ativan before transport to [place]. Review of Systems   Constitutional: Negative for chills and fever. HENT: Negative for mouth sores and nosebleeds. Eyes: Negative for pain and itching. Respiratory: Positive for shortness of breath. Negative for cough. Cardiovascular: Positive for chest pain. Negative for leg swelling. Gastrointestinal: Negative for abdominal pain and blood in stool. Genitourinary: Negative for decreased urine volume and dysuria. Musculoskeletal: Positive for arthralgias and back pain. Neurological: Positive for light-headedness. Negative for syncope. Psychiatric/Behavioral: Positive for confusion.           Historical Information   Past Medical History:   Diagnosis Date   • Anemia    • Anxiety    • CAD (coronary artery disease)    • Cancer ([address]) 2008   • Cardiomyopathy ([address])    • CHF (congestive heart failure) ([facility])    • Chronic kidney disease    • COPD (chronic obstructive pulmonary disease) ([facility])     scheduled for sleep apnea test soon after pacemaker implant   • Coronary artery disease    • Depression    • History of chemotherapy     non hodgkins lymphoma 2008   • Hyperlipemia    • Hypertension    • Hypertrophic cardiomyopathy ([facility])    • Lymphoma, non-Hodgkin's ([facility])    • Myocardial infarction [facility])    • Non-ST elevation MI (NSTEMI) 01/02/2023   • Obesity    • Obstructive sleep apnea    • Olecranon bursitis, right elbow 7/11/2022   • SSS (sick sinus syndrome) [facility])     s/p medtronic dual chamber pacemaker 2021   • Tobacco abuse    • Ventricular tachycardia, non-sustained ([address])    • Wears glasses      Past Surgical History:   Procedure Laterality Date   • CARDIAC CATHETERIZATION N/A 1/3/2023    Procedure: Cardiac catheterization;  Surgeon: Armida Halsted, MD;  Location: [facility] CARDIAC CATH LAB; Service: Cardiology   • CARDIAC CATHETERIZATION N/A 1/3/2023    Procedure: Cardiac Coronary Angiogram;  Surgeon: Armida Halsted, MD;  Location: [facility]; Service: Cardiology   • CARDIAC CATHETERIZATION N/A 1/3/2023    Procedure: Cardiac pci;  Surgeon: Armida Halsted, MD;  Location: [facility] CARDIAC C[facility];   Service: Cardiology   • CARDIAC PACEMAKER PLACEMENT Left 2021    Procedure: DUAL LEAD PACEMAKER IMPLANT;  Surgeon: Jonathon Rae MD;  Location: [facility] MAIN OR;  Service: Cardiology   • CAROTID STENT     •  SECTION     • CORONARY ANGIOPLASTY WITH STENT PLACEMENT  2021    GREG mid RCA and GREG right PDA   • DENTAL SURGERY     • IR BIOPSY KIDNEY RANDOM  10/18/2021     Social History   Social History     Substance and Sexual Activity   Alcohol Use Yes    Comment: 1-2 times years     Social History     Substance and Sexual Activity   Drug Use No     Social History     Tobacco Use   Smoking Status Every Day   • Packs/day: 0.50   • Years: 25.00   • Total pack years: 12.50   • Types: Cigarettes   Smokeless Tobacco Never   Tobacco Comments    Recently and currently quitting cigarettes     Family History: non-contributory    Objective:     Vitals:    23 1732   BP: 131/80   BP Location: Right arm   Pulse: 86   Resp: 17   TempSrc: Oral   SpO2: 98%     Temp:  [98.2 °F (36.8 °C)] 98.2 °F (36.8 °C)  HR:  [] 86  Resp:  [10-22] 17  BP: ()/(49-80) 131/80  Weight (last 2 days)     None        No intake or output data in the 24 hours ending 23 1838  Invasive Devices     Peripheral Intravenous Line  Duration           Peripheral IV 23 Left;Ventral (anterior) Forearm <1 day    Peripheral IV 09/23/23 Right;Ventral (anterior) Forearm <1 day                  Physical Exam:     Physical Exam  Constitutional:       Appearance: She is obese. She is not diaphoretic. HENT:      Head: Normocephalic. Right Ear: External ear normal.      Left Ear: External ear normal.      Nose: Nose normal.      Mouth/Throat:      Mouth: Mucous membranes are moist.      Pharynx: Oropharynx is clear. Eyes:      Extraocular Movements: Extraocular movements intact. Cardiovascular:      Rate and Rhythm: Normal rate and regular rhythm. Pulses: Normal pulses. Heart sounds: Normal heart sounds. No murmur heard. No friction rub. No gallop. Pulmonary:      Effort: Pulmonary effort is normal. No respiratory distress. Breath sounds: Normal breath sounds. Comments: On 2 L NC  Abdominal:      General: Bowel sounds are normal. There is no distension. Palpations: Abdomen is soft. Tenderness: There is no abdominal tenderness. Musculoskeletal:         General: No swelling or tenderness. Right lower leg: No edema. Left lower leg: No edema. Skin:     Capillary Refill: Capillary refill takes less than 2 seconds. Psychiatric:      Comments: Patient intermittently falls asleep during history and physical. Arouses to voice and light touch           Labs:     CBC:  Results from last 7 days   Lab Units 09/23/23  0017 09/22/23  2102   WBC Thousand/uL 16.75* 14.70*   HEMOGLOBIN g/dL 12.6 13.1   HEMATOCRIT % 39.5 41.1   PLATELETS Thousands/uL 316 307   NEUTROS ABS Thousands/µL  --  11.77*       CMP:  Results from last 7 days   Lab Units 09/22/23  2102   POTASSIUM mmol/L 3.6   CHLORIDE mmol/L 104   CO2 mmol/L 20*   BUN mg/dL 28*   CREATININE mg/dL 2.93*   CALCIUM mg/dL 8.6   EGFR ml/min/1.73sq m 18       Sepsis:        Micro:         Imaging:     No results found.       Medications:     Current Facility-Administered Medications   Medication Dose Route Frequency   • acetaminophen (TYLENOL) tablet 975 mg  975 mg Oral Q8H PRN   • albuterol (PROVENTIL HFA,VENTOLIN HFA) inhaler 2 puff  2 puff Inhalation Q4H PRN   • [START ON 9/24/2023] aspirin chewable tablet 81 mg  81 mg Oral Daily   • atorvastatin (LIPITOR) tablet 80 mg  80 mg Oral QPM   • [START ON 9/24/2023] bumetanide (BUMEX) tablet 1 mg  1 mg Oral Daily   • [START ON 9/24/2023] clopidogrel (PLAVIX) tablet 75 mg  75 mg Oral Daily   • [START ON 9/24/2023] ezetimibe (ZETIA) tablet 10 mg  10 mg Oral Daily   • fentanyl citrate (PF) 100 MCG/2ML 25 mcg  25 mcg Intravenous Q2H PRN   • gabapentin (NEURONTIN) capsule 300 mg  300 mg Oral TID   • heparin (porcine) 25,000 units in 0.45% NaCl 250 mL infusion (premix)  3-20 Units/kg/hr (Order-Specific) Intravenous Titrated   • heparin (porcine) injection 2,000 Units  2,000 Units Intravenous Q6H PRN   • heparin (porcine) injection 4,000 Units  4,000 Units Intravenous Q6H PRN   • [START ON 9/24/2023] isosorbide mononitrate (IMDUR) 24 hr tablet 30 mg  30 mg Oral Daily   • [START ON 9/24/2023] metoprolol succinate (TOPROL-XL) 24 hr tablet 100 mg  100 mg Oral Daily   • [START ON 9/24/2023] nicotine (NICODERM CQ) 21 mg/24 hr TD 24 hr patch 1 patch  1 patch Transdermal Daily   • nitroglycerin (NITROSTAT) SL tablet 0.4 mg  0.4 mg Sublingual Q5 Min PRN   • ondansetron (ZOFRAN) injection 4 mg  4 mg Intravenous Q4H PRN   • oxyCODONE (ROXICODONE) IR tablet 5 mg  5 mg Oral Q4H PRN   • oxyCODONE (ROXICODONE) split tablet 2.5 mg  2.5 mg Oral Q4H PRN   • [START ON 9/24/2023] polyethylene glycol (MIRALAX) packet 17 g  17 g Oral Daily   • sodium bicarbonate tablet 650 mg  650 mg Oral BID after meals   • [START ON 9/24/2023] venlafaxine (EFFEXOR-XR) 24 hr capsule 75 mg  75 mg Oral Daily With Breakfast       Meds/Allergies   all medications and allergies reviewed  No Known Allergies    Counseling / Coordination of Care  Total floor / unit time spent today 30 minutes.   Greater than 50% of total time was spent with the patient and / or family counseling and / or coordination of care.     Juany Espino MD  Family Medicine, PGY-2  9/23/2023

## 2023-09-23 NOTE — QUICK NOTE
Cardiology consulted. Repeat EKG and troponins at admission due to continued chest pain. Troponins at 3311 (peaked at 3850), EKG with no significant changes. Cardiology reviewed case. Plan for cardiology to see patient tomorrow AM with tentative plan for cath on Monday. Successive troponins d/c'ed. Patient on telemetry; will continue to monitor.

## 2023-09-23 NOTE — ASSESSMENT & PLAN NOTE
Wt Readings from Last 3 Encounters:   09/24/23 129 kg (283 lb 11.7 oz)   09/11/23 129 kg (285 lb)   05/26/23 127 kg (280 lb)     Net IO Since Admission: -1,078 mL [09/24/23 1259]    Intake/Output Summary (Last 24 hours) at 9/24/2023 1259  Last data filed at 9/24/2023 1121  Gross per 24 hour   Intake 222 ml   Output 1300 ml   Net -1078 ml       · Echo 3/25/2021 60%, severe hypokinesis of the basal-mid inferior wall.  Findings consistent with HCM. Mild-mod MR.  · TTE 1/9/2023: LVEF 45% with severe inferobasal hypokinesia and inferolateral hypokinesia. Mild LVH. · Cardiac MRI 3/29/2021 severe LVH, EF 44%.  Small circumferential pericardial effusion.  Mild MR. Findings suggestive of underlying hypertrophic cardiomyopathy with ischemic scarring of the inferior and inferolateral walls.   · Echo 0/48/8513: EF 48%, systolic function mildly reduced, diastolic function moderately abnormal consistent w/grade II relaxation; hypokinetic basal inferior, mid inferior and mid inferolateral segments. LV cavity dilated, wall thickness is normal. Severe asymmetric hypertrophy of basal septal wall. Mild AR w/mildly dilated aortic root ans ascending aorta. Mild MAC. No significant changes compared to prior study.     ASSESSMENT: currently not fluid overloaded (no JVD, no peripheral edema, no rales/rhonchi on auscultation of lungs)    PLAN:  · Cardiology following   · Monitor VS, weight, I/O  · C/W Home Meds: Toprol-XL 100mg daily, Imdur 30mg daily, bumex 1mg daily

## 2023-09-23 NOTE — ASSESSMENT & PLAN NOTE
· Home meds: venlafaxine 75 mg daily     PLAN:  · Continue venlafaxine 75mg daily  · Provided 1x dose of ativan today

## 2023-09-23 NOTE — ASSESSMENT & PLAN NOTE
· BMI: 46    PLAN:  · Offer lifestyle modification recommendations for diet and exercise  · Consider outpatient weight management referral

## 2023-09-23 NOTE — ASSESSMENT & PLAN NOTE
With severe anxiety noted around leaving home since her son  a few years ago  · Continue Effexor and Cymbalta

## 2023-09-23 NOTE — ASSESSMENT & PLAN NOTE
Results from last 7 days   Lab Units 09/26/23  0532 09/25/23  0445 09/24/23  0744 09/22/23  2102   BUN mg/dL 36* 37* 35* 28*   CREATININE mg/dL 3.07* 3.31* 3.00* 2.93*   EGFR ml/min/1.73sq m 17 15 17 18     · Home med: sodium bicarb 650mg BID  · Per notes, h/o Alport Syndrome  · Dejon in Cr 3.0 > 3.3 morning of cath (9/25); was at risk of contrast-induced nephropathy on top of ANTHONY -- per nephro recs, gentle IV fluids 6hrs prior and 6hrs after cath, mucomyst 1200mg PO BID x 4 doses. Held bumex, jardiance, changed gabapentin TID to daily.    · Baseline Cr: 2.4-2.7  · Cr trend: 2.93 > 3.00 > 3.31 > 3.07    PLAN:  · Avoid nephrotoxic agents  · Consult nephrology, appreciate recommendations  · Consider continue holding bumex 1mg daily given pt is currently not volume overloaded  · Consider continue hold jardiance -> appreciate nephro recs on this  · Mucomyst 1200mg PO BID for 4 doses; received 2 yesterday  · C/W home med: sodium bicarb 650mg BID  · Monitor BMP, Mg, Phos, I/O, weight

## 2023-09-23 NOTE — ASSESSMENT & PLAN NOTE
Patient presented to ED secondary to chest pain, has elevated and rising troponins. ED reviewed with cardiology and recommended for transfer.   Patient has history of CAD status post PCI to RCA's rPDA  · Heparin drip initiated  · Aspirin 325 started

## 2023-09-23 NOTE — ASSESSMENT & PLAN NOTE
Lipid       Lab Results   Component Value Date    CHOLESTEROL 206 (H) 01/03/2023    TRIG 348 (H) 01/03/2023    HDL 31 (L) 01/03/2023    LDLCALC 105 (H) 01/03/2023     PLAN:   · C/W home meds: atorvastatin 80 mg daily, ezetimibe 10 mg daily

## 2023-09-23 NOTE — EMTALA/ACUTE CARE TRANSFER
[address]  [id number]  [address] [facility] [address]  Dept: [phone]      EMTALA TRANSFER CONSENT    NAME Shon Vee                                         1975                              MRN [id number]    I have been informed of my rights regarding examination, treatment, and transfer   by Dr. Roney Chambers, DO    Benefits:      Risks:        Consent for Transfer:  I acknowledge that my medical condition has been evaluated and explained to me by the emergency department physician or other qualified medical person and/or my attending physician, who has recommended that I be transferred to the service of    at  . The above potential benefits of such transfer, the potential risks associated with such transfer, and the probable risks of not being transferred have been explained to me, and I fully understand them. The doctor has explained that, in my case, the benefits of transfer outweigh the risks. I agree to be transferred. I authorize the performance of emergency medical procedures and treatments upon me in both transit and upon arrival at the receiving facility. Additionally, I authorize the release of any and all medical records to the receiving facility and request they be transported with me, if possible. I understand that the safest mode of transportation during a medical emergency is an ambulance and that the Hospital advocates the use of this mode of transport. Risks of traveling to the receiving facility by car, including absence of medical control, life sustaining equipment, such as oxygen, and medical personnel has been explained to me and I fully understand them. (MINAL CORRECT BOX BELOW)  [  ]  I consent to the stated transfer and to be transported by ambulance/helicopter. [  ]  I consent to the stated transfer, but refuse transportation by ambulance and accept full responsibility for my transportation by car.   I understand the risks of non-ambulance transfers and I exonerate the Hospital and its staff from any deterioration in my condition that results from this refusal.    X___________________________________________    DATE  23  TIME________  Signature of patient or legally responsible individual signing on patient behalf           RELATIONSHIP TO PATIENT_________________________          Provider Certification    NAME Rosita Manjarrez                                         1975                              MRN [id number]    A medical screening exam was performed on the above named patient. Based on the examination:    Condition Necessitating Transfer The primary encounter diagnosis was NSTEMI (non-ST elevated myocardial infarction) ([address]). A diagnosis of Non-STEMI (non-ST elevated myocardial infarction) ([address]) was also pertinent to this visit. Patient Condition:      Reason for Transfer:      Transfer Requirements: Facility     · Space available and qualified personnel available for treatment as acknowledged by    · Agreed to accept transfer and to provide appropriate medical treatment as acknowledged by          · Appropriate medical records of the examination and treatment of the patient are provided at the time of transfer   [address] _______  · Transfer will be performed by qualified personnel from    and appropriate transfer equipment as required, including the use of necessary and appropriate life support measures.     Provider Certification: I have examined the patient and explained the following risks and benefits of being transferred/refusing transfer to the patient/family:         Based on these reasonable risks and benefits to the patient and/or the unborn child(sasha), and based upon the information available at the time of the patient’s examination, I certify that the medical benefits reasonably to be expected from the provision of appropriate medical treatments at another medical facility outweigh the increasing risks, if any, to the individual’s medical condition, and in the case of labor to the unborn child, from effecting the transfer.     X____________________________________________ DATE 09/23/23        TIME_______      ORIGINAL - SEND TO MEDICAL RECORDS   COPY - SEND WITH PATIENT DURING TRANSFER

## 2023-09-23 NOTE — CONSULTS
[id number] [facility]  Consult  Name: Tono Johnson 57 y.o. female I MRN: [id number]  Unit/Bed#: ED 32 I Date of Admission: 2023   Date of Service: 2023 I Hospital Day: 0    Consult to internal medicine  Consult performed by: Karen Bullock PA-C  Consult ordered by: Caterina Serna MD          Assessment/Plan   * Non-STEMI (non-ST elevated myocardial infarction) [facility])  Assessment & Plan  Patient presented to ED secondary to chest pain, has elevated and rising troponins. ED reviewed with cardiology and recommended for transfer.   Patient has history of CAD status post PCI to RCA's rPDA  · Heparin drip initiated  · Aspirin 325 started    Agoraphobia  Assessment & Plan  With severe anxiety noted around leaving home since her son  a few years ago  · Continue Effexor and Cymbalta    Social anxiety disorder  Assessment & Plan  · Continue Effexor and Cymbalta    CKD (chronic kidney disease) stage 4, GFR 15-29 ml/min [facility])  Assessment & Plan  Lab Results   Component Value Date    EGFR 18 2023    EGFR 22 2023    EGFR 25 2023    CREATININE 2.93 (H) 2023    CREATININE 2.47 (H) 2023    CREATININE 2.24 (H) 2023   · Baseline creat 2.4-2.7, currently 2.9  · Monitor  · Nephrology consult as indicated    ELIAZAR (obstructive sleep apnea)  Assessment & Plan  · Oxygen placed  · Advised to have family bring machine from home when they visit    COPD (chronic obstructive pulmonary disease) ([address])  Assessment & Plan  · No exacerbation  · Albuterol MDI    Hypertrophic cardiomyopathy ([address])  Assessment & Plan  Noted on prior cardiac MRI      Morbid obesity with BMI of 45.0-49.9, adult ([address])  Assessment & Plan  BMI 46  · Healthy diet lifestyle modification recommended    Mixed hyperlipidemia  Assessment & Plan  · Continue atorvastatin, Zetia and Lovaza  · Check lipid panel    Coronary artery disease of native artery of native heart with stable angina pectoris [facility])  Assessment & Plan  CAD s/p PCI to RCA, rPDA  · Inferior STEMI (3/23/21) --> initial PCI to [facility]  · Recurrent chest pain (3/23/21) --> GREG to RPDA  · Readmission, inferior STEMI (4/2/21) --> patent stents, no change on cath  · NSTEMI 1/3/2023--> GREG LAD/D2  · Continue asa, plavix, statin, BB, ranexa, imdur    Chronic diastolic congestive heart failure (HCC)  Assessment & Plan  Wt Readings from Last 3 Encounters:   09/11/23 129 kg (285 lb)   05/26/23 127 kg (280 lb)   05/10/23 127 kg (280 lb)     · On Bumex 1 mg daily        Tobacco abuse  Assessment & Plan  Smokes half pack per day  · Nicotine patch 21 mg  · Smoking cessation recommended    Essential hypertension  Assessment & Plan  · Continue home medication with hold parameters          VTE Prophylaxis:   Heparin Drip    Recommendations for Discharge:  · For Cardiac evaluation and likely cardiac cath in near future    Total Time Spent on Date of Encounter in care of patient: 75 mins. This time was spent on one or more of the following: performing physical exam; counseling and coordination of care; obtaining or reviewing history; documenting in the medical record; reviewing/ordering tests, medications or procedures; communicating with other healthcare professionals and discussing with patient's family/caregivers. Collaboration of Care: Were Recommendations Directly Discussed with Primary Treatment Team? Yes    History of Present Illness:  Holden Merritt is a 57 y.o. female who has PMHx of morbid obesity, obstructive sleep apnea, chronic kidney disease stage IV with hypertension, severe anxiety with agoraphobia, diastolic CHF, hypertrophic cardiomyopathy, CAD status post stent was evaluated in the ED secondary to chest pain. We are consulted for continuation of her multiple medications while she is awaiting transfer. Patient reports severe chest pain started around 4-5pm. Tried taking her nitro with no relief. Feels dizzy, vision is blurry. Has SOB.  Feels heavy in chest. Pain over left breast up her neck and down her back and into her arms. Review of Systems:  Review of Systems   Constitutional: Positive for fatigue. Negative for chills and fever. HENT: Positive for trouble swallowing. Negative for rhinorrhea and sore throat. Eyes: Positive for visual disturbance. Negative for discharge. Respiratory: Positive for shortness of breath. Negative for cough. Cardiovascular: Positive for chest pain. Gastrointestinal: Positive for nausea. Negative for abdominal pain, diarrhea and vomiting. Genitourinary: Negative for dysuria and hematuria. Musculoskeletal: Positive for back pain and neck pain. Skin: Negative for rash and wound. Neurological: Positive for dizziness and weakness. Psychiatric/Behavioral: Negative for agitation and confusion.        Past Medical and Surgical History:   Past Medical History:   Diagnosis Date   • Anemia    • Anxiety    • CAD (coronary artery disease)    • Cancer ([address]) 2008   • Cardiomyopathy ([address])    • CHF (congestive heart failure) ([facility])    • Chronic kidney disease    • COPD (chronic obstructive pulmonary disease) ([facility])     scheduled for sleep apnea test soon after pacemaker implant   • Coronary artery disease    • Depression    • History of chemotherapy     non hodgkins lymphoma 2008   • Hyperlipemia    • Hypertension    • Hypertrophic cardiomyopathy ([facility])    • Lymphoma, non-Hodgkin's ([facility])    • Myocardial infarction [facility])    • Non-ST elevation MI (NSTEMI) 01/02/2023   • Obesity    • Obstructive sleep apnea    • Olecranon bursitis, right elbow 7/11/2022   • SSS (sick sinus syndrome) ([facility])     s/p medtronic dual chamber pacemaker 5/25/2021   • Tobacco abuse    • Ventricular tachycardia, non-sustained ([address])    • Wears glasses        Past Surgical History:   Procedure Laterality Date   • CARDIAC CATHETERIZATION N/A 1/3/2023    Procedure: Cardiac catheterization;  Surgeon: Armida Halsted, MD;  Location: [facility] CARD[facility]AC [facility]; Service: Cardiology   • CARDIAC CATHETERIZATION N/A 1/3/2023    Procedure: Cardiac Coronary Angiogram;  Surgeon: Pipe Cunningham MD;  Location: [facility]; Service: Cardiology   • CARDIAC CATHETERIZATION N/A 1/3/2023    Procedure: Cardiac pci;  Surgeon: Pipe Cunningham MD;  Location: [facility] CARD[facility]AC [facility]; Service: Cardiology   • CARDIAC PACEMAKER PLACEMENT Left 2021    Procedure: DUAL LEAD PACEMAKER IMPLANT;  Surgeon: Velvet Buchanan MD;  Location: [facility] MAIN OR;  Service: Cardiology   • CAROTID STENT     •  SECTION     • CORONARY ANGIOPLASTY WITH STENT PLACEMENT  2021    GREG mid RCA and GREG right PDA   • DENTAL SURGERY     • IR BIOPSY KIDNEY RANDOM  10/18/2021       Meds/Allergies:  all medications and allergies reviewed    Allergies: No Known Allergies    Social History:  Marital Status: /Civil Union  Substance Use History:   Social History     Substance and Sexual Activity   Alcohol Use Yes    Comment: 1-2 times years     Social History     Tobacco Use   Smoking Status Every Day   • Packs/day: 0.50   • Years: 25.00   • Total pack years: 12.50   • Types: Cigarettes   Smokeless Tobacco Never   Tobacco Comments    Recently and currently quitting cigarettes     Social History     Substance and Sexual Activity   Drug Use No       Family History:  Family History   Problem Relation Age of Onset   • No Known Problems Mother    • No Known Problems Father    • No Known Problems Daughter    • Brain cancer Maternal Grandfather    • Heart disease Maternal Grandfather    • Cancer Maternal Grandfather    • No Known Problems Paternal Aunt    • No Known Problems Paternal Aunt        Physical Exam:   Vitals:   Blood Pressure: 117/53 (23)  Pulse: 86 (23)  Temperature: 98.2 °F (36.8 °C) (23)  Temp Source: Tympanic (23)  Respirations: 22 (23)  SpO2: 97 % (23)    Physical Exam  Vitals reviewed.    Constitutional:       General: She is in acute distress. Appearance: Normal appearance. She is morbidly obese. Interventions: Nasal cannula in place. HENT:      Head: Normocephalic and atraumatic. Nose: Nose normal.   Eyes:      General:         Right eye: No discharge. Left eye: No discharge. Extraocular Movements: Extraocular movements intact. Conjunctiva/sclera: Conjunctivae normal.   Cardiovascular:      Rate and Rhythm: Normal rate and regular rhythm. Pulmonary:      Effort: Pulmonary effort is normal. No respiratory distress. Breath sounds: Normal breath sounds. No wheezing. Abdominal:      General: Bowel sounds are normal. There is no distension. Palpations: Abdomen is soft. Tenderness: There is no abdominal tenderness. There is no guarding. Musculoskeletal:         General: No swelling or tenderness. Normal range of motion. Cervical back: Normal range of motion. Right lower leg: No edema. Left lower leg: No edema. Skin:     General: Skin is warm and dry. Capillary Refill: Capillary refill takes less than 2 seconds. Coloration: Skin is pale. Neurological:      General: No focal deficit present. Mental Status: She is alert and oriented to person, place, and time. Mental status is at baseline. Psychiatric:         Mood and Affect: Mood normal.         Behavior: Behavior normal.         Thought Content:  Thought content normal.         Judgment: Judgment normal.            Additional Data:   Lab Results:    Results from last 7 days   Lab Units 09/23/23  0017 09/22/23  2102   WBC Thousand/uL 16.75* 14.70*   HEMOGLOBIN g/dL 12.6 13.1   HEMATOCRIT % 39.5 41.1   PLATELETS Thousands/uL 316 307   NEUTROS PCT %  --  79*   LYMPHS PCT %  --  13*   MONOS PCT %  --  5   EOS PCT %  --  2     Results from last 7 days   Lab Units 09/22/23  2102   SODIUM mmol/L 135   POTASSIUM mmol/L 3.6   CHLORIDE mmol/L 104   CO2 mmol/L 20*   BUN mg/dL 28*   CREATININE mg/dL 2.93*   ANION GAP mmol/L 11   CALCIUM mg/dL 8.6   GLUCOSE RANDOM mg/dL 318*     Results from last 7 days   Lab Units 09/23/23  0017   INR  1.04         Lab Results   Component Value Date/Time    HGBA1C 5.0 02/11/2023 10:17 AM    HGBA1C 6.1 09/22/2022 12:36 PM    HGBA1C 5.8 05/23/2022 09:15 AM    HGBA1C 5.3 07/08/2020 01:26 PM    HGBA1C 4.9 10/15/2018 02:46 AM       Imaging: Large heart, vascular congestion my read, formal read pending  X-ray chest 1 view portable    (Results Pending)       EKG, Pathology, and Other Studies Reviewed on Admission:   · EKG: NSR. HR 77, reviewed in muse personally. ** Please Note: This note may have been constructed using a voice recognition system.  **

## 2023-09-23 NOTE — ASSESSMENT & PLAN NOTE
Lab Results   Component Value Date    EGFR 18 09/22/2023    EGFR 22 09/11/2023    EGFR 25 06/27/2023    CREATININE 2.93 (H) 09/22/2023    CREATININE 2.47 (H) 09/11/2023    CREATININE 2.24 (H) 06/27/2023   · Baseline creat 2.4-2.7, currently 2.9  · Monitor  · Nephrology consult as indicated

## 2023-09-24 ENCOUNTER — APPOINTMENT (INPATIENT)
Dept: NON INVASIVE DIAGNOSTICS | Facility: HOSPITAL | Age: 48
DRG: 192 | End: 2023-09-24
Payer: COMMERCIAL

## 2023-09-24 PROBLEM — E11.9 NEW ONSET TYPE 2 DIABETES MELLITUS (HCC): Status: ACTIVE | Noted: 2023-09-24

## 2023-09-24 LAB
ALBUMIN SERPL BCP-MCNC: 3.6 G/DL (ref 3.5–5)
ALP SERPL-CCNC: 107 U/L (ref 34–104)
ALT SERPL W P-5'-P-CCNC: 16 U/L (ref 7–52)
ANION GAP SERPL CALCULATED.3IONS-SCNC: 7 MMOL/L
AORTIC ROOT: 3.9 CM
AORTIC VALVE MEAN VELOCITY: 8.6 M/S
APICAL FOUR CHAMBER EJECTION FRACTION: 45 %
APTT PPP: 63 SECONDS (ref 23–37)
APTT PPP: 73 SECONDS (ref 23–37)
ASCENDING AORTA: 4.2 CM
AST SERPL W P-5'-P-CCNC: 21 U/L (ref 13–39)
AV AREA BY CONTINUOUS VTI: 2 CM2
AV AREA PEAK VELOCITY: 2.1 CM2
AV LVOT MEAN GRADIENT: 1 MMHG
AV LVOT PEAK GRADIENT: 2 MMHG
AV MEAN GRADIENT: 3 MMHG
AV PEAK GRADIENT: 6 MMHG
AV REGURGITATION PRESSURE HALF TIME: 410 MS
AV VALVE AREA: 2.04 CM2
AV VELOCITY RATIO: 0.6
BASOPHILS # BLD AUTO: 0.07 THOUSANDS/ÂΜL (ref 0–0.1)
BASOPHILS NFR BLD AUTO: 1 % (ref 0–1)
BILIRUB SERPL-MCNC: 0.37 MG/DL (ref 0.2–1)
BUN SERPL-MCNC: 35 MG/DL (ref 5–25)
CALCIUM SERPL-MCNC: 9.3 MG/DL (ref 8.4–10.2)
CHLORIDE SERPL-SCNC: 111 MMOL/L (ref 96–108)
CHOLEST SERPL-MCNC: 137 MG/DL
CO2 SERPL-SCNC: 21 MMOL/L (ref 21–32)
CREAT SERPL-MCNC: 3 MG/DL (ref 0.6–1.3)
DOP CALC AO PEAK VEL: 1.26 M/S
DOP CALC AO VTI: 24.73 CM
DOP CALC LVOT AREA: 3.46 CM2
DOP CALC LVOT CARDIAC INDEX: 1.41 L/MIN/M2
DOP CALC LVOT CARDIAC OUTPUT: 3.27 L/MIN
DOP CALC LVOT DIAMETER: 2.1 CM
DOP CALC LVOT PEAK VEL VTI: 14.55 CM
DOP CALC LVOT PEAK VEL: 0.76 M/S
DOP CALC LVOT STROKE INDEX: 22 ML/M2
DOP CALC LVOT STROKE VOLUME: 50.37 CM3
E WAVE DECELERATION TIME: 182 MS
EOSINOPHIL # BLD AUTO: 0.29 THOUSAND/ÂΜL (ref 0–0.61)
EOSINOPHIL NFR BLD AUTO: 3 % (ref 0–6)
ERYTHROCYTE [DISTWIDTH] IN BLOOD BY AUTOMATED COUNT: 18.9 % (ref 11.6–15.1)
EST. AVERAGE GLUCOSE BLD GHB EST-MCNC: 143 MG/DL
FRACTIONAL SHORTENING: 13 % (ref 28–44)
GFR SERPL CREATININE-BSD FRML MDRD: 17 ML/MIN/1.73SQ M
GLUCOSE SERPL-MCNC: 100 MG/DL (ref 65–140)
GLUCOSE SERPL-MCNC: 106 MG/DL (ref 65–140)
GLUCOSE SERPL-MCNC: 114 MG/DL (ref 65–140)
GLUCOSE SERPL-MCNC: 121 MG/DL (ref 65–140)
GLUCOSE SERPL-MCNC: 98 MG/DL (ref 65–140)
HBA1C MFR BLD: 6.6 %
HCT VFR BLD AUTO: 38.8 % (ref 34.8–46.1)
HDLC SERPL-MCNC: 30 MG/DL
HGB BLD-MCNC: 11.9 G/DL (ref 11.5–15.4)
IMM GRANULOCYTES # BLD AUTO: 0.15 THOUSAND/UL (ref 0–0.2)
IMM GRANULOCYTES NFR BLD AUTO: 1 % (ref 0–2)
INTERVENTRICULAR SEPTUM IN DIASTOLE (PARASTERNAL SHORT AXIS VIEW): 2.1 CM
INTERVENTRICULAR SEPTUM: 2.1 CM (ref 0.6–1.1)
LAAS-AP2: 30.4 CM2
LAAS-AP4: 24.1 CM2
LDLC SERPL CALC-MCNC: 65 MG/DL (ref 0–100)
LEFT ATRIUM SIZE: 5.7 CM
LEFT ATRIUM VOLUME (MOD BIPLANE): 94 ML
LEFT INTERNAL DIMENSION IN SYSTOLE: 5.2 CM (ref 2.1–4)
LEFT VENTRICLE DIASTOLIC VOLUME (MOD BIPLANE): 290 ML
LEFT VENTRICLE SYSTOLIC VOLUME (MOD BIPLANE): 158 ML
LEFT VENTRICULAR INTERNAL DIMENSION IN DIASTOLE: 6 CM (ref 3.5–6)
LEFT VENTRICULAR POSTERIOR WALL IN END DIASTOLE: 2 CM
LEFT VENTRICULAR STROKE VOLUME: 53 ML
LV EF: 46 %
LVSV (TEICH): 53 ML
LYMPHOCYTES # BLD AUTO: 1.83 THOUSANDS/ÂΜL (ref 0.6–4.47)
LYMPHOCYTES NFR BLD AUTO: 16 % (ref 14–44)
MCH RBC QN AUTO: 27.2 PG (ref 26.8–34.3)
MCHC RBC AUTO-ENTMCNC: 30.7 G/DL (ref 31.4–37.4)
MCV RBC AUTO: 89 FL (ref 82–98)
MONOCYTES # BLD AUTO: 0.68 THOUSAND/ÂΜL (ref 0.17–1.22)
MONOCYTES NFR BLD AUTO: 6 % (ref 4–12)
MV PEAK A VEL: 0.65 M/S
MV PEAK E VEL: 123 CM/S
NEUTROPHILS # BLD AUTO: 8.77 THOUSANDS/ÂΜL (ref 1.85–7.62)
NEUTS SEG NFR BLD AUTO: 73 % (ref 43–75)
NRBC BLD AUTO-RTO: 0 /100 WBCS
PLATELET # BLD AUTO: 293 THOUSANDS/UL (ref 149–390)
PMV BLD AUTO: 10.3 FL (ref 8.9–12.7)
POTASSIUM SERPL-SCNC: 4.3 MMOL/L (ref 3.5–5.3)
PROT SERPL-MCNC: 7.1 G/DL (ref 6.4–8.4)
RBC # BLD AUTO: 4.37 MILLION/UL (ref 3.81–5.12)
RIGHT ATRIUM AREA SYSTOLE A4C: 18.4 CM2
RIGHT VENTRICLE ID DIMENSION: 4.6 CM
SINOTUBULAR JUNCTION: 3.3 CM
SL CV AV DECELERATION TIME RETROGRADE: 1413 MS
SL CV AV PEAK GRADIENT RETROGRADE: 24 MMHG
SL CV LEFT ATRIUM LENGTH A2C: 6.6 CM
SL CV LV EF: 40
SL CV PED ECHO LEFT VENTRICLE DIASTOLIC VOLUME (MOD BIPLANE) 2D: 181 ML
SL CV PED ECHO LEFT VENTRICLE SYSTOLIC VOLUME (MOD BIPLANE) 2D: 128 ML
SODIUM SERPL-SCNC: 139 MMOL/L (ref 135–147)
STJ: 3.3 CM
TR MAX PG: 43 MMHG
TR PEAK VELOCITY: 3.3 M/S
TRICUSPID ANNULAR PLANE SYSTOLIC EXCURSION: 1.6 CM
TRICUSPID VALVE PEAK REGURGITATION VELOCITY: 3.27 M/S
TRIGL SERPL-MCNC: 208 MG/DL
WBC # BLD AUTO: 11.79 THOUSAND/UL (ref 4.31–10.16)

## 2023-09-24 PROCEDURE — 93306 TTE W/DOPPLER COMPLETE: CPT

## 2023-09-24 PROCEDURE — 80061 LIPID PANEL: CPT | Performed by: INTERNAL MEDICINE

## 2023-09-24 PROCEDURE — 85730 THROMBOPLASTIN TIME PARTIAL: CPT | Performed by: FAMILY MEDICINE

## 2023-09-24 PROCEDURE — 99255 IP/OBS CONSLTJ NEW/EST HI 80: CPT | Performed by: INTERNAL MEDICINE

## 2023-09-24 PROCEDURE — 80053 COMPREHEN METABOLIC PANEL: CPT

## 2023-09-24 PROCEDURE — 82948 REAGENT STRIP/BLOOD GLUCOSE: CPT

## 2023-09-24 PROCEDURE — 83036 HEMOGLOBIN GLYCOSYLATED A1C: CPT

## 2023-09-24 PROCEDURE — 93306 TTE W/DOPPLER COMPLETE: CPT | Performed by: INTERNAL MEDICINE

## 2023-09-24 PROCEDURE — 94760 N-INVAS EAR/PLS OXIMETRY 1: CPT

## 2023-09-24 PROCEDURE — 94660 CPAP INITIATION&MGMT: CPT

## 2023-09-24 PROCEDURE — 85025 COMPLETE CBC W/AUTO DIFF WBC: CPT

## 2023-09-24 PROCEDURE — 99232 SBSQ HOSP IP/OBS MODERATE 35: CPT | Performed by: FAMILY MEDICINE

## 2023-09-24 RX ORDER — ISOSORBIDE MONONITRATE 30 MG/1
30 TABLET, EXTENDED RELEASE ORAL ONCE
Status: DISCONTINUED | OUTPATIENT
Start: 2023-09-24 | End: 2023-09-25

## 2023-09-24 RX ORDER — BUMETANIDE 1 MG/1
1 TABLET ORAL DAILY
Status: DISCONTINUED | OUTPATIENT
Start: 2023-09-26 | End: 2023-09-25

## 2023-09-24 RX ORDER — INSULIN LISPRO 100 [IU]/ML
1-5 INJECTION, SOLUTION INTRAVENOUS; SUBCUTANEOUS
Status: DISCONTINUED | OUTPATIENT
Start: 2023-09-24 | End: 2023-09-24

## 2023-09-24 RX ORDER — SODIUM CHLORIDE 9 MG/ML
75 INJECTION, SOLUTION INTRAVENOUS CONTINUOUS
Status: DISCONTINUED | OUTPATIENT
Start: 2023-09-25 | End: 2023-09-24

## 2023-09-24 RX ORDER — INSULIN LISPRO 100 [IU]/ML
1-5 INJECTION, SOLUTION INTRAVENOUS; SUBCUTANEOUS EVERY 6 HOURS
Status: DISCONTINUED | OUTPATIENT
Start: 2023-09-25 | End: 2023-09-25

## 2023-09-24 RX ORDER — ISOSORBIDE MONONITRATE 60 MG/1
60 TABLET, EXTENDED RELEASE ORAL DAILY
Status: DISCONTINUED | OUTPATIENT
Start: 2023-09-25 | End: 2023-09-25

## 2023-09-24 RX ORDER — SODIUM CHLORIDE 9 MG/ML
75 INJECTION, SOLUTION INTRAVENOUS CONTINUOUS
Status: DISCONTINUED | OUTPATIENT
Start: 2023-09-25 | End: 2023-09-25

## 2023-09-24 RX ORDER — LORAZEPAM 1 MG/1
1 TABLET ORAL ONCE
Status: DISCONTINUED | OUTPATIENT
Start: 2023-09-24 | End: 2023-09-27 | Stop reason: HOSPADM

## 2023-09-24 RX ADMIN — ATORVASTATIN CALCIUM 80 MG: 80 TABLET, FILM COATED ORAL at 17:01

## 2023-09-24 RX ADMIN — GABAPENTIN 300 MG: 300 CAPSULE ORAL at 15:37

## 2023-09-24 RX ADMIN — OXYCODONE HYDROCHLORIDE 5 MG: 5 TABLET ORAL at 21:02

## 2023-09-24 RX ADMIN — POLYETHYLENE GLYCOL 3350 17 G: 17 POWDER, FOR SOLUTION ORAL at 08:20

## 2023-09-24 RX ADMIN — CLOPIDOGREL BISULFATE 75 MG: 75 TABLET ORAL at 08:20

## 2023-09-24 RX ADMIN — BUMETANIDE 1 MG: 1 TABLET ORAL at 08:20

## 2023-09-24 RX ADMIN — EMPAGLIFLOZIN 10 MG: 10 TABLET, FILM COATED ORAL at 13:44

## 2023-09-24 RX ADMIN — OXYCODONE HYDROCHLORIDE 5 MG: 5 TABLET ORAL at 15:37

## 2023-09-24 RX ADMIN — ACETAMINOPHEN 975 MG: 325 TABLET, FILM COATED ORAL at 08:19

## 2023-09-24 RX ADMIN — LIDOCAINE 5% 3 PATCH: 700 PATCH TOPICAL at 08:20

## 2023-09-24 RX ADMIN — HYDROMORPHONE HYDROCHLORIDE 0.2 MG: 0.2 INJECTION, SOLUTION INTRAMUSCULAR; INTRAVENOUS; SUBCUTANEOUS at 17:34

## 2023-09-24 RX ADMIN — NICOTINE 1 PATCH: 21 PATCH, EXTENDED RELEASE TRANSDERMAL at 08:20

## 2023-09-24 RX ADMIN — SODIUM BICARBONATE 650 MG TABLET 650 MG: at 16:58

## 2023-09-24 RX ADMIN — SODIUM BICARBONATE 650 MG TABLET 650 MG: at 08:20

## 2023-09-24 RX ADMIN — OXYCODONE HYDROCHLORIDE 5 MG: 5 TABLET ORAL at 06:41

## 2023-09-24 RX ADMIN — HYDROMORPHONE HYDROCHLORIDE 0.2 MG: 0.2 INJECTION, SOLUTION INTRAMUSCULAR; INTRAVENOUS; SUBCUTANEOUS at 09:01

## 2023-09-24 RX ADMIN — HEPARIN SODIUM 23.1 UNITS/KG/HR: 10000 INJECTION, SOLUTION INTRAVENOUS at 16:58

## 2023-09-24 RX ADMIN — NITROGLYCERIN 0.4 MG: 0.4 TABLET SUBLINGUAL at 07:00

## 2023-09-24 RX ADMIN — ISOSORBIDE MONONITRATE 30 MG: 30 TABLET, EXTENDED RELEASE ORAL at 08:20

## 2023-09-24 RX ADMIN — NITROGLYCERIN 0.4 MG: 0.4 TABLET SUBLINGUAL at 07:08

## 2023-09-24 RX ADMIN — EZETIMIBE 10 MG: 10 TABLET ORAL at 08:20

## 2023-09-24 RX ADMIN — METOPROLOL SUCCINATE 100 MG: 100 TABLET, EXTENDED RELEASE ORAL at 08:20

## 2023-09-24 RX ADMIN — GABAPENTIN 300 MG: 300 CAPSULE ORAL at 08:20

## 2023-09-24 RX ADMIN — HEPARIN SODIUM 23.1 UNITS/KG/HR: 10000 INJECTION, SOLUTION INTRAVENOUS at 05:00

## 2023-09-24 RX ADMIN — GABAPENTIN 300 MG: 300 CAPSULE ORAL at 21:02

## 2023-09-24 RX ADMIN — ASPIRIN 81 MG CHEWABLE TABLET 81 MG: 81 TABLET CHEWABLE at 08:19

## 2023-09-24 RX ADMIN — VENLAFAXINE HYDROCHLORIDE 75 MG: 75 CAPSULE, EXTENDED RELEASE ORAL at 09:01

## 2023-09-24 RX ADMIN — HYDROMORPHONE HYDROCHLORIDE 0.2 MG: 0.2 INJECTION, SOLUTION INTRAMUSCULAR; INTRAVENOUS; SUBCUTANEOUS at 23:40

## 2023-09-24 RX ADMIN — ONDANSETRON 4 MG: 2 INJECTION INTRAMUSCULAR; INTRAVENOUS at 17:34

## 2023-09-24 NOTE — UTILIZATION REVIEW
Initial Clinical Review    OBSERVATION WRITTEN 9/23/23 @ 1745 CONVERTED TO INPATIENT ADMISSION 9/23/23 @ 2258 DUE TO FURTHER DIAGNOSTIC WORKUP REQUIRED FOR NSTEMI, REQUIRING AT LEAST A 2 MIDNIGHT STAY. Admission: Date/Time/Statement:   Admission Orders (From admission, onward)     Ordered        09/23/23 2258  Inpatient Admission  Once            09/23/23 1745  Place in Observation  Once                      Orders Placed This Encounter   Procedures   • Inpatient Admission     Standing Status:   Standing     Number of Occurrences:   1     Order Specific Question:   Level of Care     Answer:   Med Surg [16]     Order Specific Question:   Estimated length of stay     Answer:   More than 2 Midnights     Order Specific Question:   Certification     Answer:   I certify that inpatient services are medically necessary for this patient for a duration of greater than two midnights. See H&P and MD Progress Notes for additional information about the patient's course of treatment. Initial Presentation: 57 y.o. female who presented initially to [address] [place] [address] “” [place] then transferred to [facility], admitted Inpatient status dt NSTEMI. Presented due to left-sided chest pain radiating to back and right side of chest, no relief with nitro x3. Also states of SOB, weakness, dizziness and nausea. EKG in ED revealed showed T wave abnormalities in V6, changed from prior EKG, no stemi. Troponin: 50-> 695->2,532->3,800->3,375. Given ASA load, started on heparin drip, fentanyl and lorazepam given for anxiety. Patient diagnosed with NSTEMI and transferred to [place] for further management. PMHx:  of multiple prior STEMIs with stenting, prior NSTEMI and [address] with cardiac pacemaker placed in 2021, CKD stage IV, and ELIAZAR. Plan:  med surg, telemetry, cardiology consult, continue heparin drip, repeat EKG and trops, continue toprol, ASA, plavix, statin and imdur. Nephrology consult dt CKD.     Date: 9/24   Day 2:   Received message from nursing. Patient had asymptomatic 7 beats of Vtach. Will continue to monitor. Keep NPO, continue heparin drip. 9/24 Per Cardiology: Still has intermittent left-sided chest pain, not relieved by sublingual nitroglycerin but relieved by Dilaudid. Abnormal EKG with prior inferior and anterolateral infarction pattern. Peak troponin 3800. Continue antianginal therapy, aspirin, statins and heparin. Creatinine 2.9, very high risk of contrast-induced nephropathy. Hold diuretics tomorrow. Gentle hydration overnight prior to cardiac catheterization to lower the risk of contrast-induced nephropathy. Plans for cardiac catheterization tomorrow. 9/24 Per Nephrology: order BMP, hold bumex tomorrow, consider gentle IVF prior and post cath.      Triage Vitals   Temperature Pulse Respirations Blood Pressure SpO2   09/23/23 1923 09/23/23 1732 09/23/23 1732 09/23/23 1732 09/23/23 1732   98 °F (36.7 °C) 86 17 131/80 98 %      Temp Source Heart Rate Source Patient Position - Orthostatic VS BP Location FiO2 (%)   09/23/23 1732 -- 09/23/23 1732 09/23/23 1732 --   Oral  Lying Right arm       Pain Score       09/23/23 1732       10 - Worst Possible Pain          Wt Readings from Last 1 Encounters:   09/24/23 129 kg (283 lb 11.7 oz)     Additional Vital Signs:   Date/Time Temp Pulse Resp BP MAP (mmHg) SpO2 Calculated FIO2 (%) - Nasal Cannula Nasal Cannula O2 Flow Rate (L/min) O2 Device O2 Interface Device Patient Position - Orthostatic VS   09/24/23 1121 -- -- -- 86/53 Abnormal  -- -- -- -- -- -- --   09/24/23 11:10:12 97.9 °F (36.6 °C) 60 18 94/64 66 94 % -- -- Nasal cannula -- Lying   09/24/23 0800 -- -- -- -- -- 98 % -- -- Nasal cannula -- --   09/24/23 07:46:43 98.1 °F (36.7 °C) 69 11 Abnormal  137/77 99 99 % -- -- Nasal cannula -- Lying   09/24/23 0700 -- 72 -- 113/69 -- -- -- -- -- -- --   09/24/23 0334 -- -- -- -- -- 98 % -- -- -- Face mask --   09/24/23 0329 -- 69 14 112/64 82 96 % -- -- -- -- --   09/24/23 02:07:03 98 °F (36.7 °C) 69 13 120/68 -- 98 % -- -- BiPAP -- Lying   09/23/23 2238 -- -- -- -- -- 98 % -- -- -- Face mask --   09/23/23 22:25:09 98.7 °F (37.1 °C) 69 18 139/77 -- 98 % -- -- -- -- --   09/23/23 19:23:30 98 °F (36.7 °C) -- -- 119/66 -- -- -- -- -- -- --   09/23/23 1732 -- 86 17 131/80 -- 98 % 28 2 L/min Nasal cannula -- Lying     Pertinent Labs/Diagnostic Test Results:  9/23 EKG:  Normal sinus rhythm  Inferior infarct (cited on or before 07-JAN-2023)  Anteroseptal infarct (cited on or before 07-JAN-2023)  Abnormal ECG  When compared with ECG of 23-SEP-2023 07:41, (unconfirmed)  Inverted T waves have replaced nonspecific T wave abnormality in V6    Results from last 7 days   Lab Units 09/22/23  2133   SARS-COV-2  Negative     Results from last 7 days   Lab Units 09/24/23  0744 09/23/23  1832 09/23/23  0017 09/22/23  2102   WBC Thousand/uL 11.79* 17.21* 16.75* 14.70*   HEMOGLOBIN g/dL 11.9 12.3 12.6 13.1   HEMATOCRIT % 38.8 39.3 39.5 41.1   PLATELETS Thousands/uL 293 310 316 307   NEUTROS ABS Thousands/µL 8.77*  --   --  11.77*         Results from last 7 days   Lab Units 09/24/23  0744 09/22/23  2102   SODIUM mmol/L 139 135   POTASSIUM mmol/L 4.3 3.6   CHLORIDE mmol/L 111* 104   CO2 mmol/L 21 20*   ANION GAP mmol/L 7 11   BUN mg/dL 35* 28*   CREATININE mg/dL 3.00* 2.93*   EGFR ml/min/1.73sq m 17 18   CALCIUM mg/dL 9.3 8.6     Results from last 7 days   Lab Units 09/24/23  0744   AST U/L 21   ALT U/L 16   ALK PHOS U/L 107*   TOTAL PROTEIN g/dL 7.1   ALBUMIN g/dL 3.6   TOTAL BILIRUBIN mg/dL 0.37         Results from last 7 days   Lab Units 09/24/23  0744 09/22/23 2102   GLUCOSE RANDOM mg/dL 121 318*         Results from last 7 days   Lab Units 09/24/23 0744   HEMOGLOBIN A1C % 6.6*   EAG mg/dl 143     Results from last 7 days   Lab Units 09/23/23  2036 09/23/23  1832 09/23/23  1418 09/23/23  1143 09/23/23  0225 09/22/23  2313 09/22/23  2102   HS TNI 0HR ng/L  --  9,239*  --  3,800*  --   --  50*   HS TNI 2HR ng/L 3,294*  --  3,375*  --   --  695*  --    HSTNI D2 ng/L -17  --  -425  --   --  645*  --    HS TNI 4HR ng/L  --   --   --   --  2,532*  --   --    HSTNI D4 ng/L  --   --   --   --  2,482*  --   --          Results from last 7 days   Lab Units 09/24/23  0744 09/24/23  0127 09/23/23  1832 09/23/23  0623 09/23/23  0017   PROTIME seconds  --   --  13.6  --  13.7   INR   --   --  1.01  --  1.04   PTT seconds 63* 73* 36   < > 27    < > = values in this interval not displayed.      Results from last 7 days   Lab Units 09/22/23  2133   INFLUENZA A PCR  Negative   INFLUENZA B PCR  Negative   RSV PCR  Negative       Past Medical History:   Diagnosis Date   • Anemia    • Anxiety    • CAD (coronary artery disease)    • Cancer ([address]) 2008   • Cardiomyopathy ([address])    • CHF (congestive heart failure) ([facility])    • Chronic kidney disease    • COPD (chronic obstructive pulmonary disease) ([facility])     scheduled for sleep apnea test soon after pacemaker implant   • Coronary artery disease    • Depression    • History of chemotherapy     non hodgkins lymphoma 2008   • Hyperlipemia    • Hypertension    • Hypertrophic cardiomyopathy ([facility])    • Lymphoma, non-Hodgkin's ([facility])    • Myocardial infarction [facility])    • Non-ST elevation MI (NSTEMI) 01/02/2023   • Obesity    • Obstructive sleep apnea    • Olecranon bursitis, right elbow 7/11/2022   • SSS (sick sinus syndrome) ([facility])     s/p medtronic dual chamber pacemaker 5/25/2021   • Tobacco abuse    • Ventricular tachycardia, non-sustained ([facility])    • Wears glasses      Present on Admission:  • Non-STEMI (non-ST elevated myocardial infarction) ([address])  • Chronic diastolic congestive heart failure ([facility])  • Hypertrophic cardiomyopathy ([facility])  • Agoraphobia  • Social anxiety disorder  • CKD (chronic kidney disease) stage 4, GFR 15-29 ml/min ([facility])  • Mixed hyperlipidemia  • COPD (chronic obstructive pulmonary disease) ([facility])  • ELIAZAR (obstructive sleep apnea)  • Coronary artery disease of native artery of native heart with stable angina pectoris (HCC)  • Tobacco abuse      Admitting Diagnosis: NSTEMI (non-ST elevated myocardial infarction) ([facility] [address] [facility]) [I21.4]  Age/Sex: 57 y.o. female  Admission Orders:  Scheduled Medications:  aspirin, 81 mg, Oral, Daily  atorvastatin, 80 mg, Oral, QPM  bumetanide, 1 mg, Oral, Daily  clopidogrel, 75 mg, Oral, Daily  Empagliflozin, 10 mg, Oral, Daily  ezetimibe, 10 mg, Oral, Daily  gabapentin, 300 mg, Oral, TID  insulin lispro, 1-5 Units, Subcutaneous, TID AC  insulin lispro, 1-5 Units, Subcutaneous, HS  isosorbide mononitrate, 30 mg, Oral, Once  [START ON 9/25/2023] isosorbide mononitrate, 60 mg, Oral, Daily  lidocaine, 3 patch, Topical, Daily  LORazepam, 1 mg, Oral, Once  metoprolol succinate, 100 mg, Oral, Daily  nicotine, 1 patch, Transdermal, Daily  polyethylene glycol, 17 g, Oral, Daily  sodium bicarbonate, 650 mg, Oral, BID after meals  venlafaxine, 75 mg, Oral, Daily With Breakfast      Continuous IV Infusions:  heparin (porcine), 3-20 Units/kg/hr (Order-Specific), Intravenous, Titrated      PRN Meds:  acetaminophen, 975 mg, Oral, Q8H PRN x2 thus far  albuterol, 2 puff, Inhalation, Q4H PRN  heparin (porcine), 2,000 Units, Intravenous, Q6H PRN  heparin (porcine), 4,000 Units, Intravenous, Q6H PRN  HYDROmorphone, 0.2 mg, Intravenous, Q6H PRN x1 thus far  nitroglycerin, 0.4 mg, Sublingual, Q5 Min PRN x4 thus far  ondansetron, 4 mg, Intravenous, Q4H PRN  oxyCODONE, 5 mg, Oral, Q4H PRN x1 thus far  oxyCODONE, 2.5 mg, Oral, Q4H PRN      scd    IP CONSULT TO CARDIOLOGY  IP CONSULT TO NEPHROLOGY    Network Utilization Review Department  ATTENTION: Please call with any questions or concerns to [phone] and carefully listen to the prompts so that you are directed to the right person.  All voicemails are confidential.  Violet Sales all requests for admission clinical reviews, approved or denied determinations and any other requests to dedicated fax number below belonging to the campus where the patient is receiving treatment.  List of dedicated fax numbers for the Facilities:  [place] DENIALS (Administrative/Medical Necessity) [phone] [address] (Maternity/NICU/Pediatrics) [phone]   [address] [place] [address] [phone]   [facility] [address] [facility] [phone] [address] [place] [address] [phone]   [address] [address] [phone]

## 2023-09-24 NOTE — PROGRESS NOTES
[address] [facility]  Progress Notes - Family Medicine    Shon Squibb 1975, 57 y.o. female. MRN: [id number]  Unit/Bed#: [facility] 508-01 Encounter: [id number]  Primary Care Provider: Raul Arriola DO      Admission Date: 9/23/2023 1713  Length of Stay: 1 days  Code Status: Level 1 - Full Code  Diet: Diet NPO; Sips with meds  Diet Jef/CHO Controlled; Consistent Carbohydrate Diet Level 2 (5 carb servings/75 grams CHO/meal)    Consult:   IP CONSULT TO CARDIOLOGY  IP CONSULT TO NEPHROLOGY    Assessments & Plans:     Disposition: continue current level of care     * Non-STEMI (non-ST elevated myocardial infarction) ([address])  Assessment & Plan  · 9/22/23: [facility] ED for left-sided chest pain radiating to back and right side. +SOB, weakness, dizziness, nausea   · Troponin: 3800s peak  · EKG: Normal sinus rhythm, Inferior infarct (cited on or before 07-JAN-2023), Anteroseptal infarct (cited on or before 07-JAN-2023), Inverted T waves have replaced nonspecific T wave abnormality in V6  · Tx:  mg load, heparin gtt  · Cardiology consulted, recommended transfer to [facility] for further management, arrived 9/23/22   · Repeat EKG & trops on adm d/t continued chest pain    · ASSESSMENT: Unstable angina vs NSTEMI given elevated troponins without ST segment elevations/depressions and extensive CAD history with multiple stents    PLAN:  · Appreciate cardiology recommendations   · Plan for cath tomorrow 9/25 -> NPO after midnight, will need fluids 6hrs prior and 4hrs after  · C/W heparin gtt  · C/W home meds: aspirin 81mg daily, plavix 75mg daily, atorvastatin 80mg daily, zetia 10mg daily;  Toprol-XL 100mg daily, Imdur 30mg daily, Bumex 1mg daily  · Hold ranexa d/t renal function  · Continue monitoring on telemetry    Coronary artery disease of native artery of native heart with stable angina pectoris [facility])  Assessment & Plan  · Last cardio outpatient appointment 5/26/23  · CAD s/p PCI to mRCA, RPDA, LAD:  · Inferior STEMI (3/23/21) -> GREG to mRCA  · Recurrent chest pains (3/23/21) -> GREG to RPDA  · Readmission for recurrent inferior STEMI (4/2/21) -> stents patent, no change on cath  · NSTEMI (1/3/23) -> GREG to LAD/D2  · Home meds: aspirin 81mg daily, plavix 75mg daily, atorvastatin 80mg daily, zetia 10mg daily, Toprol-XL 100mg daily, Imdur 30mg daily    PLAN:   · Cardiology consulted  · C/W home meds (aspirin 81mg daily, plavix 75mg daily, atorvastatin 80mg daily, zetia 10mg daily, Toprol-XL 100mg daily, Imdur 30mg daily)  · Hold ranexa d/t renal function    Hypertrophic cardiomyopathy ([address] [facility])  Assessment & Plan  See assessment & plan for chronic diastolic CHF    CKD (chronic kidney disease) stage 4, GFR 15-29 ml/min ([facility])  Assessment & Plan    Results from last 7 days   Lab Units 09/22/23  2102   BUN mg/dL 28*   CREATININE mg/dL 2.93*   EGFR ml/min/1.73sq m 18     · Baseline Cr: 2.4-2.7  · Cr trend: 2.9 > 3.0  · Home med: sodium bicarb 650mg BID  · Per notes, h/o Alport Syndrome    PLAN:  · Avoid nephrotoxic agents  · Consult nephrology, appreciate recommendations:  · At-risk for contrast-induced nephropathy  · Hold bumex (on 1mg) on day of cath (tomorrow)  · Gentle IV fluids 75mL/hr, starting 6hrs pre-cath and continuing 4hrs post-cath  · Suspecting metabolic acidosis in setting of advanced renal failure  · C/W home regimen of sodium bicarb 650mg BID    Chronic diastolic congestive heart failure (HCC)  Assessment & Plan  Wt Readings from Last 3 Encounters:   09/24/23 129 kg (283 lb 11.7 oz)   09/11/23 129 kg (285 lb)   05/26/23 127 kg (280 lb)     Net IO Since Admission: -1,078 mL [09/24/23 1259]    Intake/Output Summary (Last 24 hours) at 9/24/2023 1259  Last data filed at 9/24/2023 1121  Gross per 24 hour   Intake 222 ml   Output 1300 ml   Net -1078 ml       · Echo 3/25/2021 60%, severe hypokinesis of the basal-mid inferior wall.  Findings consistent with HCM.  Mild-mod MR.  · TTE 1/9/2023: LVEF 45% with severe inferobasal hypokinesia and inferolateral hypokinesia. Mild LVH. · Cardiac MRI 3/29/2021 severe LVH, EF 44%.  Small circumferential pericardial effusion.  Mild MR. Findings suggestive of underlying hypertrophic cardiomyopathy with ischemic scarring of the inferior and inferolateral walls.     PLAN:  · Cardiology consult placed, appreciate recommendations  · Repeat echo   · C/W   · Monitor VS, weight, I/O  · C/W Home Meds: Toprol-XL 100mg daily, Imdur 30mg daily, Bumex 1mg daily    New onset type 2 diabetes mellitus ([address] [facility])  Assessment & Plan  Lab Results   Component Value Date    HGBA1C 6.6 (H) 09/24/2023       No results for input(s): "POCGLU" in the last 72 hours.     · Previous A1c 5.0 in 2/2023  · On recheck A1c 6.6 on 9/24/2023  · Rechecked d/t continually elevated glucose >200     PLAN:  · Start jardiance 10mg daily, given HF  · Start SSI algorithm 2    Mixed hyperlipidemia  Assessment & Plan    Lipid       Lab Results   Component Value Date    CHOLESTEROL 206 (H) 01/03/2023    TRIG 348 (H) 01/03/2023    HDL 31 (L) 01/03/2023    LDLCALC 105 (H) 01/03/2023     PLAN:   · C/W home meds: atorvastatin 80 mg daily, ezetimibe 10 mg daily     Morbid obesity with BMI of 45.0-49.9, adult ([facility])  Assessment & Plan  · BMI: 46    PLAN:  · Offer lifestyle modification recommendations for diet and exercise  · Consider outpatient weight management referral    Tobacco abuse  Assessment & Plan  · Smokes half pack per day    PLAN:  • Nicotine patch 21 mg  • Smoking cessation recommended    COPD (chronic obstructive pulmonary disease) ([facility])  Assessment & Plan  · Home meds: albuterol 2 puffs Q4hrs prn    PLAN:   · Albuterol prn    ELIAZAR (obstructive sleep apnea)  Assessment & Plan  · Home CPAP machine    PLAN:  · CPAP QHS    Agoraphobia  Assessment & Plan  · Home medications: venlafaxine 75 mg daily     PLAN:  · Continue venlafaxine 75mg daily  · Provided 1x dose of ativan today    Social anxiety disorder  Assessment & Plan  · Home meds: venlafaxine 75 mg daily     PLAN:  · Continue venlafaxine 75mg daily  · Provided 1x dose of ativan today        VTE Pharm PPX: Heparin  VTE [facility] PPX: sequential compression device    Subjective:   Patient with no acute events overnight per handoff. Patient seen and examined at bedside. Not in any acute distress. Patient denies fever, N/V, chest pain, dizziness, SOB, palpitations, headaches. Pt is able to tolerate PO; does endorse decreased appetite, did not eat dinner, only had cranberry juice this AM. Denies any diarrhea, constipation. Vitals:     Vitals:    09/24/23 0746 09/24/23 0800 09/24/23 1110 09/24/23 1121   BP: 137/77  94/64 (!) 86/53   BP Location: Right arm  Left arm    Pulse: 69  60    Resp: (!) 11  18    Temp: 98.1 °F (36.7 °C)  97.9 °F (36.6 °C)    TempSrc: Oral  Oral    SpO2: 99% 98% 94%    Weight:         Temp:  [97.9 °F (36.6 °C)-98.7 °F (37.1 °C)] 97.9 °F (36.6 °C)  HR:  [60-91] 60  Resp:  [11-18] 18  BP: ()/(53-80) 86/53  Weight (last 2 days)     Date/Time Weight    09/24/23 0350 129 (283.73)    09/23/23 1732 130 (286.38)          Intake/Output Summary (Last 24 hours) at 9/24/2023 1348  Last data filed at 9/24/2023 1121  Gross per 24 hour   Intake 222 ml   Output 1300 ml   Net -1078 ml     Invasive Devices     Peripheral Intravenous Line  Duration           Peripheral IV 09/23/23 Left;Ventral (anterior) Forearm 1 day    Peripheral IV 09/23/23 Right;Ventral (anterior) Forearm 1 day                Physical Exam:   Physical Exam  Vitals reviewed. Constitutional:       General: She is not in acute distress. Appearance: She is not ill-appearing, toxic-appearing or diaphoretic. HENT:      Head: Normocephalic and atraumatic. Nose: No congestion or rhinorrhea. Mouth/Throat:      Mouth: Mucous membranes are moist.      Pharynx: Oropharynx is clear. No oropharyngeal exudate or posterior oropharyngeal erythema.    Eyes:      General:         Right eye: No discharge. Left eye: No discharge. Conjunctiva/sclera: Conjunctivae normal.   Neck:      Vascular: No carotid bruit. Cardiovascular:      Rate and Rhythm: Normal rate and regular rhythm. Pulses: Normal pulses. Heart sounds: No murmur heard. No friction rub. No gallop. Pulmonary:      Effort: Pulmonary effort is normal. No respiratory distress. Breath sounds: Normal breath sounds. No stridor. No wheezing, rhonchi or rales. Chest:      Chest wall: No tenderness. Abdominal:      General: Abdomen is flat. Bowel sounds are normal. There is no distension. Palpations: There is no mass. Tenderness: There is no abdominal tenderness. There is no guarding or rebound. Hernia: No hernia is present. Musculoskeletal:      Cervical back: Normal range of motion and neck supple. No rigidity or tenderness. Right lower leg: No edema. Left lower leg: No edema. Lymphadenopathy:      Cervical: No cervical adenopathy. Skin:     General: Skin is warm and dry. Capillary Refill: Capillary refill takes less than 2 seconds. Neurological:      Mental Status: She is alert and oriented to person, place, and time. Labs:     CBC:  Results from last 7 days   Lab Units 09/24/23  0744 09/23/23  1832 09/23/23  0017 09/22/23  2102   WBC Thousand/uL 11.79* 17.21* 16.75* 14.70*   HEMOGLOBIN g/dL 11.9 12.3 12.6 13.1   HEMATOCRIT % 38.8 39.3 39.5 41.1   PLATELETS Thousands/uL 293 310 316 307   NEUTROS ABS Thousands/µL 8.77*  --   --  11.77*       CMP:  Results from last 7 days   Lab Units 09/24/23  0744 09/22/23  2102   POTASSIUM mmol/L 4.3 3.6   CHLORIDE mmol/L 111* 104   CO2 mmol/L 21 20*   BUN mg/dL 35* 28*   CREATININE mg/dL 3.00* 2.93*   CALCIUM mg/dL 9.3 8.6   AST U/L 21  --    ALT U/L 16  --    ALK PHOS U/L 107*  --    EGFR ml/min/1.73sq m 17 18       Sepsis:        Micro:       Imaging:   No results found.     Medications:     Current Facility-Administered Medications   Medication Dose Route Frequency   • acetaminophen (TYLENOL) tablet 975 mg  975 mg Oral Q8H PRN   • albuterol (PROVENTIL HFA,VENTOLIN HFA) inhaler 2 puff  2 puff Inhalation Q4H PRN   • aspirin chewable tablet 81 mg  81 mg Oral Daily   • atorvastatin (LIPITOR) tablet 80 mg  80 mg Oral QPM   • bumetanide (BUMEX) tablet 1 mg  1 mg Oral Daily   • clopidogrel (PLAVIX) tablet 75 mg  75 mg Oral Daily   • Empagliflozin (JARDIANCE) tablet 10 mg  10 mg Oral Daily   • ezetimibe (ZETIA) tablet 10 mg  10 mg Oral Daily   • gabapentin (NEURONTIN) capsule 300 mg  300 mg Oral TID   • heparin (porcine) 25,000 units in 0.45% NaCl 250 mL infusion (premix)  3-20 Units/kg/hr (Order-Specific) Intravenous Titrated   • heparin (porcine) injection 2,000 Units  2,000 Units Intravenous Q6H PRN   • heparin (porcine) injection 4,000 Units  4,000 Units Intravenous Q6H PRN   • HYDROmorphone HCl (DILAUDID) injection 0.2 mg  0.2 mg Intravenous Q6H PRN   • insulin lispro (HumaLOG) 100 units/mL subcutaneous injection 1-5 Units  1-5 Units Subcutaneous TID AC   • insulin lispro (HumaLOG) 100 units/mL subcutaneous injection 1-5 Units  1-5 Units Subcutaneous HS   • isosorbide mononitrate (IMDUR) 24 hr tablet 30 mg  30 mg Oral Once   • [START ON 9/25/2023] isosorbide mononitrate (IMDUR) 24 hr tablet 60 mg  60 mg Oral Daily   • lidocaine (LIDODERM) 5 % patch 3 patch  3 patch Topical Daily   • LORazepam (ATIVAN) tablet 1 mg  1 mg Oral Once   • metoprolol succinate (TOPROL-XL) 24 hr tablet 100 mg  100 mg Oral Daily   • nicotine (NICODERM CQ) 21 mg/24 hr TD 24 hr patch 1 patch  1 patch Transdermal Daily   • nitroglycerin (NITROSTAT) SL tablet 0.4 mg  0.4 mg Sublingual Q5 Min PRN   • ondansetron (ZOFRAN) injection 4 mg  4 mg Intravenous Q4H PRN   • oxyCODONE (ROXICODONE) IR tablet 5 mg  5 mg Oral Q4H PRN   • oxyCODONE (ROXICODONE) split tablet 2.5 mg  2.5 mg Oral Q4H PRN   • polyethylene glycol (MIRALAX) packet 17 g  17 g Oral Daily   • sodium bicarbonate tablet 650 mg  650 mg Oral BID after meals   • venlafaxine (EFFEXOR-XR) 24 hr capsule 75 mg  75 mg Oral Daily With Breakfast       Patient was discussed with [facility] Attending on-call, Dr. Hattie Carter DO. See attestation above.       Yoel Liu DO  PGY-1 Family Medicine  09/24/23

## 2023-09-24 NOTE — CONSULTS
Consultation - Nephrology   Shon Vee 57 y.o. female MRN: [id number]  Unit/Bed#: [facility] 508-01 Encounter: [id number]    ASSESSMENT AND PLAN:  Patient is 47-year-old female with significant medical issues of CAD, status post PCI, CKD stage IV, sleep apnea on CPAP, COPD, hypertrophic cardiomyopathy, morbid obesity, diastolic CHF, presented with chest pain. We are consulted for CKD management. CKD stage IV, baseline creatinine low to high twos, follows with Dr. Figueroa Rincon  -Last creatinine 2.9 on 9/22/2023.  -No BMP available today, check BMP, results to follow  -I had detailed discussion with patient regarding risk of ANTHONY with contrast exposure with anticipated cardiac cath and possibility of dialysis. She verbalized understanding of my discussion with her.  -Would recommend holding Bumex tomorrow on the day of cardiac cath  -Depending on timing of cardiac cath, may consider gentle IV fluid 75 mill per hour starting 6 hours prior to cardiac cath and continue for 4 hours post cath. Combined CHF  -Echo earlier this year shows EF 78%, grade 2 diastolic dysfunction, mild to moderate MR  -Patient was on 2 L O2 via nasal cannula, currently on CPAP for sleep apnea at the time of encounter  -On Bumex 1 mg daily Home regimen  -Hold Bumex tomorrow on the day of cardiac cath. -Continue to monitor daily weight, accurate intake and output. Suspect metabolic acidosis in the setting of advanced renal failure. Bicarb level 20 on last BMP. BMP results from today to follow  -Currently on outpatient regimen sodium bicarbonate 1 tablet p.o. twice daily    Proteinuria, prior UACR 1.4 g in 2022.  -Suspect in the setting of morbid obesity which can cause second FSGS, hypertension  -Continue to monitor as outpatient.     Chest pain, cardiology evaluation pending, tentative plan for cardiac cath tomorrow    Discussed above plan in detail with primary team regarding monitoring renal function, holding Bumex tomorrow, consideration for gentle IV fluid and they agree with above recommendations. HISTORY OF PRESENT ILLNESS:  Requesting Physician: Hattie Carter DO  Reason for Consult: CKD    Shon Vee is a 57y.o. year old female who was admitted to [facility] after presenting with chest pain. A renal consultation is requested today for assistance in the management of CKD. Old medical records including prior values were reviewed from [facility] records. Patient presented with chest pain and transferred to [address] [place] [address] for further management by cardiology. Cardiology evaluation currently pending. Patient at the time of encounter remains on CPAP for her sleep apnea. She was on 2 L O2 via nasal cannula prior to that as per my discussion with floor nurse. She currently denies any urinary complaint. Denies any nausea or vomiting. She is still having ongoing recurrent chest pain issues. Denies any recent NSAID use as outpatient.     PAST MEDICAL HISTORY:  Past Medical History:   Diagnosis Date   • Anemia    • Anxiety    • CAD (coronary artery disease)    • Cancer ([address] 2008   • Cardiomyopathy ([address] [facility])    • CHF (congestive heart failure) ([facility])    • Chronic kidney disease    • COPD (chronic obstructive pulmonary disease) ([facility])     scheduled for sleep apnea test soon after pacemaker implant   • Coronary artery disease    • Depression    • History of chemotherapy     non hodgkins lymphoma 2008   • Hyperlipemia    • Hypertension    • Hypertrophic cardiomyopathy ([address])    • Lymphoma, non-Hodgkin's ([facility])    • Myocardial infarction [facility])    • Non-ST elevation MI (NSTEMI) 01/02/2023   • Obesity    • Obstructive sleep apnea    • Olecranon bursitis, right elbow 7/11/2022   • SSS (sick sinus syndrome) ([facility])     s/p medtronic dual chamber pacemaker 5/25/2021   • Tobacco abuse    • Ventricular tachycardia, non-sustained ([facility])    • Wears glasses        PAST SURGICAL HISTORY:  Past Surgical History:   Procedure Laterality Date   • CARDIAC CATHETERIZATION N/A 1/3/2023    Procedure: Cardiac catheterization;  Surgeon: Annette Wells MD;  Location: [facility] CARDIAC CATH LAB; Service: Cardiology   • CARDIAC CATHETERIZATION N/A 1/3/2023    Procedure: Cardiac Coronary Angiogram;  Surgeon: Annette Wells MD;  Location: [facility]; Service: Cardiology   • CARDIAC CATHETERIZATION N/A 1/3/2023    Procedure: Cardiac pci;  Surgeon: Annette Wells MD;  Location: [facility] CARDIAC CATH LAB;   Service: Cardiology   • CARDIAC PACEMAKER PLACEMENT Left 2021    Procedure: DUAL LEAD PACEMAKER IMPLANT;  Surgeon: Tom Dia MD;  Location: [address] [place] [address] MAIN OR;  Service: Cardiology   • CAROTID STENT     •  SECTION     • CORONARY ANGIOPLASTY WITH STENT PLACEMENT  2021    GREG mid RCA and GREG right PDA   • DENTAL SURGERY     • IR BIOPSY KIDNEY RANDOM  10/18/2021       ALLERGIES:  No Known Allergies    SOCIAL HISTORY:  Social History     Substance and Sexual Activity   Alcohol Use Yes    Comment: 1-2 times years     Social History     Substance and Sexual Activity   Drug Use No     Social History     Tobacco Use   Smoking Status Every Day   • Packs/day: 0.50   • Years: 25.00   • Total pack years: 12.50   • Types: Cigarettes   Smokeless Tobacco Never   Tobacco Comments    Recently and currently quitting cigarettes       FAMILY HISTORY:  Family History   Problem Relation Age of Onset   • No Known Problems Mother    • No Known Problems Father    • No Known Problems Daughter    • Brain cancer Maternal Grandfather    • Heart disease Maternal Grandfather    • Cancer Maternal Grandfather    • No Known Problems Paternal Aunt    • No Known Problems Paternal Aunt        MEDICATIONS:    Current Facility-Administered Medications:   •  acetaminophen (TYLENOL) tablet 975 mg, 975 mg, Oral, Q8H PRN, Ruben Navarrete MD, 975 mg at 23 1832  •  albuterol (PROVENTIL HFA,VENTOLIN HFA) inhaler 2 puff, 2 puff, Inhalation, Q4H PRN, Ruben Navarrete MD  •  aspirin chewable tablet 81 mg, 81 mg, Oral, Daily, Rc Trevizo MD  •  atorvastatin (LIPITOR) tablet 80 mg, 80 mg, Oral, QPM, Rc Trevizo MD, 80 mg at 09/23/23 1822  •  bumetanide (BUMEX) tablet 1 mg, 1 mg, Oral, Daily, Rc Trevizo MD  •  clopidogrel (PLAVIX) tablet 75 mg, 75 mg, Oral, Daily, Rc Trevizo MD  •  ezetimibe (ZETIA) tablet 10 mg, 10 mg, Oral, Daily, Rc Trevizo MD  •  gabapentin (NEURONTIN) capsule 300 mg, 300 mg, Oral, TID, Rc Trevizo MD, 300 mg at 09/23/23 2039  •  heparin (porcine) 25,000 units in 0.45% NaCl 250 mL infusion (premix), 3-20 Units/kg/hr (Order-Specific), Intravenous, Titrated, Rc Trevizo MD, Last Rate: 20.8 mL/hr at 09/24/23 0500, 23.1 Units/kg/hr at 09/24/23 0500  •  heparin (porcine) injection 2,000 Units, 2,000 Units, Intravenous, Q6H PRN, Rc Trevizo MD  •  heparin (porcine) injection 4,000 Units, 4,000 Units, Intravenous, Q6H PRN, Rc Trevizo MD, 4,000 Units at 09/23/23 1922  •  HYDROmorphone HCl (DILAUDID) injection 0.2 mg, 0.2 mg, Intravenous, Q6H PRN, Rc Trevizo MD  •  isosorbide mononitrate (IMDUR) 24 hr tablet 30 mg, 30 mg, Oral, Daily, Rc Trevizo MD  •  lidocaine (LIDODERM) 5 % patch 3 patch, 3 patch, Topical, Daily, Rc Trevizo MD  •  metoprolol succinate (TOPROL-XL) 24 hr tablet 100 mg, 100 mg, Oral, Daily, Rc Trevizo MD  •  nicotine (NICODERM CQ) 21 mg/24 hr TD 24 hr patch 1 patch, 1 patch, Transdermal, Daily, Rc Trevizo MD  •  nitroglycerin (NITROSTAT) SL tablet 0.4 mg, 0.4 mg, Sublingual, Q5 Min PRN, Rc Trevizo MD, 0.4 mg at 09/24/23 0708  •  ondansetron (ZOFRAN) injection 4 mg, 4 mg, Intravenous, Q4H PRN, Rc Trevizo MD  •  oxyCODONE (ROXICODONE) IR tablet 5 mg, 5 mg, Oral, Q4H PRN, Rc Trevizo MD, 5 mg at 09/24/23 0641  •  oxyCODONE (ROXICODONE) split tablet 2.5 mg, 2.5 mg, Oral, Q4H PRN, Rc Trevizo MD  •  polyethylene glycol (MIRALAX) packet 17 g, 17 g, Oral, Daily, Rc Trevizo MD  •  sodium bicarbonate tablet 650 mg, 650 mg, Oral, BID after meals, Rc Trevizo MD, 650 mg at 09/23/23 1822  •  venlafaxine (EFFEXOR-XR) 24 hr capsule 75 mg, 75 mg, Oral, Daily With Breakfast, Rc Trevizo MD    REVIEW OF SYSTEMS:  Complete 10 point review of systems were obtained and discussed in length with the patient. Complete review of systems were negative / unremarkable except mentioned in the HPI section.      PHYSICAL EXAM:  Current Weight: Weight - Scale: 129 kg (283 lb 11.7 oz)  First Weight: Weight - Scale: 130 kg (286 lb 6 oz)  Vitals:    09/24/23 0700   BP: 113/69   Pulse: 72   Resp:    Temp:    SpO2:        Intake/Output Summary (Last 24 hours) at 9/24/2023 0738  Last data filed at 9/24/2023 0100  Gross per 24 hour   Intake --   Output 750 ml   Net -750 ml     Wt Readings from Last 3 Encounters:   09/24/23 129 kg (283 lb 11.7 oz)   09/11/23 129 kg (285 lb)   05/26/23 127 kg (280 lb)     Temp Readings from Last 3 Encounters:   09/24/23 98 °F (36.7 °C) (Axillary)   09/22/23 98.2 °F (36.8 °C) (Tympanic)   09/11/23 98.6 °F (37 °C) (Oral)     BP Readings from Last 3 Encounters:   09/24/23 113/69   09/23/23 120/60   09/12/23 122/59     Pulse Readings from Last 3 Encounters:   09/24/23 72   09/23/23 88   09/12/23 76        Physical Examination:  Eyes: No conjunctival pallor present  ENT: External examination of ear and nose unremarkable  Neck: No obvious lymphadenopathy appreciated  Respiratory: Decreased breath sound at base  CVS: No significant edema  GI: Soft, nondistended  CNS: Active, alert, follows commands  Skin: No new rash  Musculoskeletal: No obvious new gross deformity noted    Invasive Devices:      Lab Results:   Results from last 7 days   Lab Units 09/23/23  1832 09/23/23  0017 09/22/23  2102   WBC Thousand/uL 17.21* 16.75* 14.70*   HEMOGLOBIN g/dL 12.3 12.6 13.1   HEMATOCRIT % 39.3 39.5 41.1   PLATELETS Thousands/uL 310 316 307   POTASSIUM mmol/L  --   --  3.6   CHLORIDE mmol/L  --   --  104   CO2 mmol/L  --   --  20*   BUN mg/dL  --   --  28*   CREATININE mg/dL  --   --  2.93*   CALCIUM mg/dL  --   --  8.6       Other Studies:   No orders to display   Chest x-ray images personally reviewed shows cardiomegaly    Portions of the record may have been created with voice recognition software. Occasional wrong word or "sound a like" substitutions may have occurred due to the inherent limitations of voice recognition software. Read the chart carefully and recognize, using context, where substitutions have occurred.

## 2023-09-24 NOTE — PROGRESS NOTES
Follow up Consultation    Nephrology   Isaak Eric 57 y.o. female MRN: [id number]  Unit/Bed#: [facility] CATH LAB ROOM Encounter: [id number]      Physician Requesting Consult: Lior Prescott MD        ASSESSMENT:   61-year-old female with multiple comorbidities including CAD, status post prior PCI, CKD stage IV, ELIAZAR, COPD, hypertrophic cardiomyopathy, morbid obesity, CHF presented with chest pain. Nephrology following for perioperative as patient likely needs cardiac catheterization. Perioperative optimization to reduce incidence for acute kidney injury and now with ANTHONY/CKD stage IV:   ANTHONY most likely secondary to cardiorenal syndrome in light of ongoing chest pain plus patient on Jardiance with hypotension. After review of records it appears that the patient has a baseline Creatinine of 2.2-3.0 mg/dL. Admission creatinine of 2.93 mg/dL on 9/22/2023. Creatinine today is at 3.31 mg/dL elevated from yesterday. patient is stable and cleared from renal standpoint to undergo cardiac cath today despite being high risk for contrast associated acute kidney injury. Minimize IV contrast exposure as much as possible both in terms of the amount of dye used and in the frequency of studies performed. Please use iso-osmolar or low osmolar contrast material.  Continue current IV fluids and DC 6 hours post cath. Recommend giving mucomyst 1200mg po bid for two doses pre procedure and two doses post procedure for total of 4 doses. Please check the renal function approxinately 48 hours after dye exposure to assess renal response to contrast.   please avoid the radial or brachial artery access for cardiac cath for those with ESRD or stage 4 CKD.   All risks and benefits of the procedure from a renal perspective were discussed with the patient in depth including risk for acute kidney injury due to JARAD as well as probability of needing renal replacement therapy for severe acute kidney injury if it occurs and the patient voiced understanding. All questions were answered. Likely his underlying CKD secondary to age-related nephron loss plus hypertensive nephrosclerosis plus obesity related hyperfiltration plus cardiorenal syndrome. Outpatient follows up with nephrology with Dr. Angélica Hernandez    H&H/anemia:  most recent hemoglobin at 11.2 g/dL  Continue to monitor for now    Acid-base electrolytes:  Acid-base: Most recent bicarbonate 18 continue to monitor for now. On sodium bicarb 650 mg p.o. daily May need to adjust dosage    Blood pressure/hypotension:   On Jardiance, Imdur 60 mg p.o. daily, Toprol-XL 2 mg p.o. daily  Hold Jardiance for now hold diuretics for now unless patient with volume overload  Optimize hemodynamics  Hold Bumex today precath may give post-cath if patient with worsening volume status    Other medical problems:  Proteinuria: Most recent UACR 1.4 g in 2022. 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 CHF:  Management per primary team follow-up with cardiology most recent echo with EF of 21% grade 2 diastolic dysfunction. Clinically euvolemic to hypovolemic continue IV fluids for now  Chest pain: Follow-up with cardiology. See above with regards to cardiac cath on 9/25    PLAN:  • Optimize hemodynamic status to avoid delay in renal recovery. • check BMP, magnesium, phosphorus in a.m. • Cleared from renal standpoint for cardiac cath today as patient is high risk and with ongoing chest pain  • Continue IV fluids of normal saline at 75 cc an hour for 6 hours post then DC  • Decrease gabapentin dosage to 300 mg p.o. daily based on GFR  • Hold Jardiance for now  • Placed on Mucomyst 1200 mg p.o. twice daily for 4 doses  • Hold Bumex today precath  • Await renal recovery. • Place on a renal diet when allowed diet order. • Strict I/O.  • Daily weights  • Urinary retention protocol   • Avoid nephrotoxins, adjust meds to appropriate GFR.   • will need to follow up with Nephrology post hospital discharge with blood work prior to the visit  • Optimize hemodynamics. • Maintain MAP > 65mmHg  • Avoid BP fluctuations. • Maintain hemoglobin greater than 8 grams/deciliter      Thanks for the consult  Will continue to follow  Please call with questions/ concerns. Above-mentioned orders and Plan stable and cleared from renal standpoint for cardiac cath today despite being high risk hold Jardiance continue IV fluids were discussed with the team in depth and they agree. Lucita Wilson MD, FASN, 2023, 12:54 PM                Objective :   Patient seen and examined in her room earlier this a.m. blood pressure fluctuating remains afebrile with persistent chest pain this a.m. agrees with risk for associated JARAD if she undergoes cardiac catheterization especially since creatinine is elevated this a.m. however wishes to proceed, urine output 1.4 L / 24 hours      PHYSICAL EXAM  /57 (BP Location: Right arm)   Pulse 60   Temp 97.7 °F (36.5 °C) (Oral)   Resp 16   Ht 5' 6" (1.676 m)   Wt 128 kg (283 lb)   SpO2 97%   BMI 45.68 kg/m²   Temp (24hrs), Av.8 °F (36.6 °C), Min:97.7 °F (36.5 °C), Max:97.9 °F (36.6 °C)        Intake/Output Summary (Last 24 hours) at 2023 1254  Last data filed at 2023 0530  Gross per 24 hour   Intake 907.78 ml   Output 850 ml   Net 57.78 ml       I/O last 24 hours: In: 1129.8 [P.O.:222; I.V.:907.8]  Out: 1400 [Urine:1400]      Current Weight: Weight - Scale: 128 kg (283 lb)  First Weight: Weight - Scale: 130 kg (286 lb 6 oz)  Physical Exam  Vitals and nursing note reviewed. Constitutional:       General: She is not in acute distress. Appearance: Normal appearance. She is obese. She is ill-appearing. She is not toxic-appearing or diaphoretic. HENT:      Head: Normocephalic and atraumatic. Mouth/Throat:      Mouth: Mucous membranes are dry. Pharynx: No oropharyngeal exudate. Eyes:      General: No scleral icterus.      Conjunctiva/sclera: Conjunctivae normal.   Cardiovascular: Rate and Rhythm: Normal rate. Pulmonary:      Effort: Pulmonary effort is normal. No respiratory distress. Breath sounds: No stridor. No wheezing. Abdominal:      Palpations: There is no mass. Tenderness: There is no abdominal tenderness. Musculoskeletal:         General: No swelling. Cervical back: Normal range of motion. No rigidity. Skin:     General: Skin is dry. Coloration: Skin is not jaundiced. Neurological:      General: No focal deficit present. Mental Status: She is alert and oriented to person, place, and time. Psychiatric:         Mood and Affect: Mood normal.         Behavior: Behavior normal.             Review of Systems   Constitutional: Positive for fatigue. Negative for chills. HENT: Negative for congestion. Respiratory: Positive for chest tightness. Negative for shortness of breath. Cardiovascular: Positive for chest pain. Negative for leg swelling. Gastrointestinal: Negative for abdominal pain and diarrhea. Genitourinary: Negative for flank pain. Musculoskeletal: Negative for back pain. Neurological: Negative for headaches. Psychiatric/Behavioral: Negative for agitation and confusion. All other systems reviewed and are negative.       Scheduled Meds:  Current Facility-Administered Medications   Medication Dose Route Frequency Provider Last Rate   • acetaminophen  975 mg Oral Q8H PRN Rc Trevizo MD     • acetylcysteine  1,200 mg Oral BID Rosa Cortez DO     • albuterol  2 puff Inhalation Q4H PRN Rc Trevizo MD     • aspirin  81 mg Oral Daily Rc Trevizo MD     • atorvastatin  80 mg Oral QPM Rc Trevizo MD     • [START ON 9/26/2023] bumetanide  1 mg Oral Daily Pari Wagner MD     • clopidogrel  75 mg Oral Daily Rc Trevizo MD     • ezetimibe  10 mg Oral Daily Rc Trevizo MD     • fentanyl citrate (PF)   Intravenous Code/Trauma/Sedation Med [facility], MD     • gabapentin  300 mg Oral Daily Veamos Fuentes, DO     • heparin (porcine)  3-20 Units/kg/hr (Order-Specific) Intravenous Titrated Manny Méndez MD 23.1 Units/kg/hr (09/25/23 9716)   • heparin (porcine)  2,000 Units Intravenous Q6H PRN Manny Méndez MD     • heparin (porcine)  4,000 Units Intravenous Q6H PRN Manny Méndez MD     • heparin (porcine)   Intravenous Code/Trauma/Sedation Med Savannah Gifford DO     • HYDROmorphone  0.2 mg Intravenous Q6H PRN Manny Méndez MD     • insulin lispro  1-5 Units Subcutaneous Q6H Pari Dixon MD     • isosorbide mononitrate  30 mg Oral Once Horace Amanda DO     • isosorbide mononitrate  60 mg Oral Daily Horace Amanda DO     • lidocaine  3 patch Topical Daily Manny Méndez MD     • lidocaine (PF)   Infiltration Code/Trauma/Sedation Med [facility], MD     • LORazepam  1 mg Oral Once Eluterio Jennifer, DO     • metoprolol succinate  100 mg Oral Daily Manny Méndez MD     • midazolam   Intravenous Code/Trauma/Sedation Med [facility], MD     • nicotine  1 patch Transdermal Daily Manny Méndez MD     • nitroglycerin  0.4 mg Sublingual Q5 Min PRN Manny Méndez MD     • ondansetron  4 mg Intravenous Q4H PRN Manny Méndez MD     • oxyCODONE  5 mg Oral Q4H PRN Manny Méndez MD     • oxyCODONE  2.5 mg Oral Q4H PRN Manny Méndez MD     • polyethylene glycol  17 g Oral Daily Manny Méndez MD     • sodium bicarbonate  650 mg Oral BID after meals Manny Méndez MD     • sodium chloride  75 mL/hr Intravenous Continuous Ketan Zhane Bentley MD 75 mL/hr (09/25/23 0314)   • venlafaxine  75 mg Oral Daily With Breakfast Manny Méndez MD         PRN Meds:.•  acetaminophen  •  albuterol  •  fentanyl citrate (PF)  •  heparin (porcine)  •  heparin (porcine)  •  heparin (porcine)  •  HYDROmorphone  •  lidocaine (PF)  •  midazolam  •  nitroglycerin  •  ondansetron  •  oxyCODONE  • oxyCODONE    Continuous Infusions:heparin (porcine), 3-20 Units/kg/hr (Order-Specific), Last Rate: 23.1 Units/kg/hr (09/25/23 0312)  sodium chloride, 75 mL/hr, Last Rate: 75 mL/hr (09/25/23 0314)          Invasive Devices: Invasive Devices     Peripheral Intravenous Line  Duration           Peripheral IV 09/23/23 Left;Ventral (anterior) Forearm 2 days    Peripheral IV 09/23/23 Right;Ventral (anterior) Forearm 2 days                  LABORATORY:    Results from last 7 days   Lab Units 09/25/23  0445 09/24/23  0744 09/23/23  1832 09/23/23  0017 09/22/23  2102   WBC Thousand/uL 14.27* 11.79* 17.21* 16.75* 14.70*   HEMOGLOBIN g/dL 11.2* 11.9 12.3 12.6 13.1   HEMATOCRIT % 36.8 38.8 39.3 39.5 41.1   PLATELETS Thousands/uL 256 293 310 316 307   POTASSIUM mmol/L 4.5 4.3  --   --  3.6   CHLORIDE mmol/L 108 111*  --   --  104   CO2 mmol/L 18* 21  --   --  20*   BUN mg/dL 37* 35*  --   --  28*   CREATININE mg/dL 3.31* 3.00*  --   --  2.93*   CALCIUM mg/dL 8.8 9.3  --   --  8.6      rest all reviewed    RADIOLOGY:  No orders to display     Rest all reviewed    Portions of the record may have been created with voice recognition software. Occasional wrong word or "sound a like" substitutions may have occurred due to the inherent limitations of voice recognition software. Read the chart carefully and recognize, using context, where substitutions have occurred. If you have any questions, please contact the dictating provider.

## 2023-09-24 NOTE — ASSESSMENT & PLAN NOTE
Lab Results   Component Value Date    HGBA1C 6.6 (H) 09/24/2023       Recent Labs     09/25/23  1642 09/25/23  1838 09/25/23 2052 09/26/23  0613   POCGLU 72 106 91 93       · (P) 95. 2Previous A1c 5.0 in 2/2023  · D/T elevated glucose >200, rechecked A1c -> 6.6 on 9/24/2023      PLAN:  · Consider continual hold of jardiance vs restarting -> appreciate nephro recs   · C/W SSI algorithm 2

## 2023-09-24 NOTE — CONSULTS
Consultation - Cardiology   Alex Keith 57 y.o. female MRN: [id number]  Unit/Bed#: [facility] 508-01 Encounter: [id number]    Assessment/Plan     Assessment:  1. NSTEMI  - EKG demonstrated normal sinus rhythm, rate of 82, MA interval 172, significant Q waves in inferior leads and anterolateral leads, QTc 471.   - troponin was 50 on arrival and uptrended to 3800 and is downtrending now   - on heparin drip   - continued on home ASA 81mg, atorvastatin 80mg daily, imdur 30mg daily, zetia 10mg daily, metoprolol succinate 100mg, plavix 75mg daily   - home ranexa is being held due to renal function   - no recent lipid panel  - A1c is 6.6, new diagnosis of diabetes. 2. NSVT   - Patient had 7 beat run of nonsustained ventricular tachycardia overnight as well as a 5 beat run of accelerated idioventricular rhythm  - May be reperfusion arrhythmia however patient did have 2 episodes of nonsustained ventricular tachycardia on her last device report  - Continue beta-blocker  - Continue to monitor on telemetry    3. CAD s/p PCI to RCA, rPDA   - Inferior STEMI (3/2021) --> GREG to RPDA  - recurrent c/p after STEMI --> GREG to rPDA   - Inferior STEMI on 4/2021-> stents patent, no intervention  - NSTEMI 1/3/2023 --> GREG LAD/D2  - Follows with JALIL Colmenares. Last visit on 5/26/2023    4. CKD   - Secondary to Alport syndrome  - Cr has meghan recently labile with most recent baseline 2.3-3   - Cr this morning 3.0   - nephrology is on board and appreciate recommendations: hold bumex and start IV fluids prior to cardiac cath     5. Hypertrophic Cardiomyopathy   6. Chronic Diastolic heart failure   - outpatient weight on 5/26/2023 was 280 pounds   - echo 1/2023 demonstrated EF 45%, severe inferobasilar hypokinesis and severe inferolateral hypokinesis from mid to apex. Grade 2 diastolic dysfunction, LA filling pressure elevated.  LA mildly dilated, trace AI, Aortic sclerosis, mild to moderate mitral regurg.   - on bumex 1mg daily at home    7. Sinus pause   - s/p MDT dual chamber pacemaker   - last device report demonstrated battery life 13 years, AP 1%,  0%. 2 episodes of NSVT 8 beats and 16 beats. 8. HLD    9. ELIAZAR   10. COPD  6. DM  - A1c 6.6  - New diagnosis diabetes mellitus  - Management per primary team      Plan:  - echo ordered    - add on lipid profile  - increase imdur to 60mg daily   - plan for cath tomorrow, NPO after midnight   - Patient notes that she has severe anxiety and may need anxiolytic prior to cath      History of Present Illness   Physician Requesting Consult: Chaitanya Harmon DO  Reason for Consult / Principal Problem: Chest pain  HPI: Robert Alcantar is a 57y.o. 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 She states that she was started on Imdur a couple months ago and this is significantly reduced the need to take sublingual nitro; however, over the past 2 weeks she has been noticing an increased need to take sublingual nitro. On the day of admission she had chest pain that was tearing across her chest and her upper back. She stated that the chest pain was unlike her previous MIs. It was constant in nature nothing made it better or worse. She tried to take sublingual nitro without any relief. The only thing that has improved it is Dilaudid. Initial labs demonstrated troponin of 50 which then up trended to 3800. Initial EKG was significant Q waves in inferior and anterolateral leads. Creatinine 2.93 on arrival.  Chest x-ray demonstrated no acute cardiopulmonary disease. Patient is still having chest pain and is getting relief from Dilaudid.     Inpatient consult to Cardiology  Consult performed by: Michael Martínez DO  Consult ordered by: Merlin Gamez MD          Review of Systems   Constitutional: Negative for chills and fever. HENT: Negative for ear pain and sore throat. Eyes: Negative for pain and visual disturbance. Respiratory: Negative for cough and shortness of breath. Cardiovascular: Positive for chest pain. Negative for palpitations. Gastrointestinal: Negative for abdominal pain and vomiting. Genitourinary: Negative for dysuria and hematuria. Musculoskeletal: Negative for arthralgias and back pain. Skin: Negative for color change and rash. Neurological: Negative for seizures and syncope. Psychiatric/Behavioral: The patient is nervous/anxious. All other systems reviewed and are negative. Historical Information   Past Medical History:   Diagnosis Date   • Anemia    • Anxiety    • CAD (coronary artery disease)    • Cancer ([address]) 2008   • Cardiomyopathy ([address])    • CHF (congestive heart failure) ([facility])    • Chronic kidney disease    • COPD (chronic obstructive pulmonary disease) ([facility])     scheduled for sleep apnea test soon after pacemaker implant   • Coronary artery disease    • Depression    • History of chemotherapy     non hodgkins lymphoma 2008   • Hyperlipemia    • Hypertension    • Hypertrophic cardiomyopathy ([facility])    • Lymphoma, non-Hodgkin's ([facility])    • Myocardial infarction [facility])    • Non-ST elevation MI (NSTEMI) 01/02/2023   • Obesity    • Obstructive sleep apnea    • Olecranon bursitis, right elbow 7/11/2022   • SSS (sick sinus syndrome) ([facility])     s/p medtronic dual chamber pacemaker 5/25/2021   • Tobacco abuse    • Ventricular tachycardia, non-sustained ([address])    • Wears glasses      Past Surgical History:   Procedure Laterality Date   • CARDIAC CATHETERIZATION N/A 1/3/2023    Procedure: Cardiac catheterization;  Surgeon: Kylie Bowers MD;  Location: [facility];   Service: Cardiology   • CARDIAC CATHETERIZATION N/A 1/3/2023    Procedure: Cardiac Coronary Angiogram;  Surgeon: Kylie Bowers MD;  Location: [facility]; Service: Cardiology   • CARDIAC CATHETERIZATION N/A 1/3/2023    Procedure: Cardiac pci;  Surgeon: Huan Osborne MD;  Location: [facility];   Service: Cardiology   • CARDIAC PACEMAKER PLACEMENT Left 2021    Procedure: DUAL LEAD PACEMAKER IMPLANT;  Surgeon: Debbie Wilcox MD;  Location: [address] [place] [address] MAIN OR;  Service: Cardiology   • CAROTID STENT     •  SECTION     • CORONARY ANGIOPLASTY WITH STENT PLACEMENT  2021    GREG mid RCA and GREG right PDA   • DENTAL SURGERY     • IR BIOPSY KIDNEY RANDOM  10/18/2021     Social History     Substance and Sexual Activity   Alcohol Use Yes    Comment: 1-2 times years     Social History     Substance and Sexual Activity   Drug Use No     E-Cigarette/Vaping   • E-Cigarette Use Never User      E-Cigarette/Vaping Substances   • Nicotine No    • THC No    • CBD No    • Flavoring No    • Other No    • Unknown No      Social History     Tobacco Use   Smoking Status Every Day   • Packs/day: 0.50   • Years: 25.00   • Total pack years: 12.50   • Types: Cigarettes   Smokeless Tobacco Never   Tobacco Comments    Recently and currently quitting cigarettes     Family History:   Family History   Problem Relation Age of Onset   • No Known Problems Mother    • No Known Problems Father    • No Known Problems Daughter    • Brain cancer Maternal Grandfather    • Heart disease Maternal Grandfather    • Cancer Maternal Grandfather    • No Known Problems Paternal Aunt    • No Known Problems Paternal Aunt        Meds/Allergies   all current active meds have been reviewed, current meds:   Current Facility-Administered Medications   Medication Dose Route Frequency   • acetaminophen (TYLENOL) tablet 975 mg  975 mg Oral Q8H PRN   • albuterol (PROVENTIL HFA,VENTOLIN HFA) inhaler 2 puff  2 puff Inhalation Q4H PRN   • aspirin chewable tablet 81 mg  81 mg Oral Daily   • atorvastatin (LIPITOR) tablet 80 mg  80 mg Oral QPM   • bumetanide (BUMEX) tablet 1 mg  1 mg Oral Daily • clopidogrel (PLAVIX) tablet 75 mg  75 mg Oral Daily   • ezetimibe (ZETIA) tablet 10 mg  10 mg Oral Daily   • gabapentin (NEURONTIN) capsule 300 mg  300 mg Oral TID   • heparin (porcine) 25,000 units in 0.45% NaCl 250 mL infusion (premix)  3-20 Units/kg/hr (Order-Specific) Intravenous Titrated   • heparin (porcine) injection 2,000 Units  2,000 Units Intravenous Q6H PRN   • heparin (porcine) injection 4,000 Units  4,000 Units Intravenous Q6H PRN   • HYDROmorphone HCl (DILAUDID) injection 0.2 mg  0.2 mg Intravenous Q6H PRN   • isosorbide mononitrate (IMDUR) 24 hr tablet 30 mg  30 mg Oral Once   • [START ON 9/25/2023] isosorbide mononitrate (IMDUR) 24 hr tablet 60 mg  60 mg Oral Daily   • lidocaine (LIDODERM) 5 % patch 3 patch  3 patch Topical Daily   • metoprolol succinate (TOPROL-XL) 24 hr tablet 100 mg  100 mg Oral Daily   • nicotine (NICODERM CQ) 21 mg/24 hr TD 24 hr patch 1 patch  1 patch Transdermal Daily   • nitroglycerin (NITROSTAT) SL tablet 0.4 mg  0.4 mg Sublingual Q5 Min PRN   • ondansetron (ZOFRAN) injection 4 mg  4 mg Intravenous Q4H PRN   • oxyCODONE (ROXICODONE) IR tablet 5 mg  5 mg Oral Q4H PRN   • oxyCODONE (ROXICODONE) split tablet 2.5 mg  2.5 mg Oral Q4H PRN   • polyethylene glycol (MIRALAX) packet 17 g  17 g Oral Daily   • sodium bicarbonate tablet 650 mg  650 mg Oral BID after meals   • venlafaxine (EFFEXOR-XR) 24 hr capsule 75 mg  75 mg Oral Daily With Breakfast    and PTA meds:   Prior to Admission Medications   Prescriptions Last Dose Informant Patient Reported?  Taking?   acetaminophen (TYLENOL) 325 mg tablet 9/23/2023  No Yes   Sig: Take 3 tablets (975 mg total) by mouth every 8 (eight) hours as needed for mild pain   albuterol (PROVENTIL HFA,VENTOLIN HFA) 90 mcg/act inhaler 9/23/2023  No Yes   Sig: Inhale 2 puffs every 4 (four) hours as needed for wheezing or shortness of breath   aspirin 81 mg chewable tablet 9/23/2023 Self No Yes   Sig: Chew 1 tablet (81 mg total) daily   atorvastatin (LIPITOR) 80 mg tablet 9/23/2023  No Yes   Sig: Take 1 tablet (80 mg total) by mouth every evening   bumetanide (BUMEX) 1 mg tablet 9/23/2023  No Yes   Sig: Take 1 tablet (1 mg total) by mouth daily   calcitriol (ROCALTROL) 0.25 mcg capsule 9/23/2023  Yes Yes   Sig: Take 1 capsule (0.25 mcg total) by mouth daily Takes Monday Wednesday and fridays as of 11/11/22   clopidogrel (PLAVIX) 75 mg tablet 9/23/2023  No Yes   Sig: Take 1 tablet (75 mg total) by mouth daily   ezetimibe (ZETIA) 10 mg tablet 9/23/2023  No Yes   Sig: Take 1 tablet (10 mg total) by mouth in the morning.   gabapentin (Neurontin) 300 mg capsule 9/23/2023  No Yes   Sig: Take 1 capsule (300 mg total) by mouth 3 (three) times a day   isosorbide mononitrate (IMDUR) 30 mg 24 hr tablet 9/23/2023  No Yes   Sig: Take 1 tablet (30 mg total) by mouth daily   metoprolol succinate (TOPROL-XL) 100 mg 24 hr tablet 9/23/2023  No Yes   Sig: Take 1 tablet (100 mg total) by mouth daily Do not start before January 11, 2023. nitroglycerin (NITROSTAT) 0.4 mg SL tablet 9/23/2023  No Yes   Sig: Place 1 tablet (0.4 mg total) under the tongue every 5 (five) minutes as needed for chest pain   sodium bicarbonate 650 mg tablet 9/23/2023  No Yes   Sig: Take 1 tablet (650 mg total) by mouth 2 (two) times daily after meals   venlafaxine (EFFEXOR-XR) 75 mg 24 hr capsule 9/23/2023  No Yes   Sig: Take 1 capsule (75 mg total) by mouth daily with breakfast      Facility-Administered Medications: None     No Known Allergies    Objective   Vitals: Blood pressure 137/77, pulse 69, temperature 98.1 °F (36.7 °C), temperature source Oral, resp. rate (!) 11, weight 129 kg (283 lb 11.7 oz), SpO2 99 %, not currently breastfeeding.   Orthostatic Blood Pressures    Flowsheet Row Most Recent Value   Blood Pressure 137/77 filed at 09/24/2023 0746   Patient Position - Orthostatic VS Lying filed at 09/24/2023 0746            Intake/Output Summary (Last 24 hours) at 9/24/2023 0044  Last data filed at 9/24/2023 0100  Gross per 24 hour   Intake --   Output 750 ml   Net -750 ml       Invasive Devices     Peripheral Intravenous Line  Duration           Peripheral IV 09/23/23 Left;Ventral (anterior) Forearm 1 day    Peripheral IV 09/23/23 Right;Ventral (anterior) Forearm 1 day                Physical Exam  Vitals and nursing note reviewed. Constitutional:       General: She is not in acute distress. Appearance: She is well-developed. HENT:      Head: Normocephalic and atraumatic. Eyes:      Conjunctiva/sclera: Conjunctivae normal.   Neck:      Comments: No JVD  Cardiovascular:      Rate and Rhythm: Normal rate and regular rhythm. Heart sounds: No murmur heard. No friction rub. No gallop. Pulmonary:      Effort: Pulmonary effort is normal. No respiratory distress. Breath sounds: Normal breath sounds. Comments: On 2L NC  Abdominal:      Palpations: Abdomen is soft. Tenderness: There is no abdominal tenderness. Musculoskeletal:         General: No swelling. Cervical back: Neck supple. Skin:     General: Skin is warm and dry. Capillary Refill: Capillary refill takes less than 2 seconds. Neurological:      Mental Status: She is alert. Psychiatric:      Comments: Anxious and tearful         Lab Results:   I have personally reviewed pertinent lab results.     CBC with diff:   Results from last 7 days   Lab Units 09/24/23  0744   WBC Thousand/uL 11.79*   RBC Million/uL 4.37   HEMOGLOBIN g/dL 11.9   HEMATOCRIT % 38.8   MCV fL 89   MCH pg 27.2   MCHC g/dL 30.7*   RDW % 18.9*   MPV fL 10.3   PLATELETS Thousands/uL 293     CMP:   Results from last 7 days   Lab Units 09/24/23  0744   SODIUM mmol/L 139   CHLORIDE mmol/L 111*   CO2 mmol/L 21   BUN mg/dL 35*   CREATININE mg/dL 3.00*   CALCIUM mg/dL 9.3   AST U/L 21   ALT U/L 16   ALK PHOS U/L 107*   EGFR ml/min/1.73sq m 17     HS Troponin:   0   Lab Value Date/Time    HSTNI >22,973 (H) 01/02/2023 2000    HSTNI0 3,311 (H) 09/23/2023 1832    HSTNI2 3,294 (H) 09/23/2023 2036    HSTNI4 2,532 (H) 09/23/2023 0225    HSTNI4 43 09/12/2023 0206    HSTNI4 42 05/10/2023 0735    HSTNI4 819 (H) 01/09/2023 1611    HSTNI4 2,279 (H) 01/07/2023 1753    HSTNI4 3,463 (H) 01/07/2023 0337    HSTNI4 6,804 (H) 01/02/2023 1150    HSTNI4 82 (H) 05/18/2022 0310    HSTNI4 46 02/21/2022 1744     BNP:   Results from last 7 days   Lab Units 09/24/23  0744   POTASSIUM mmol/L 4.3   CHLORIDE mmol/L 111*   CO2 mmol/L 21   BUN mg/dL 35*   CREATININE mg/dL 3.00*   CALCIUM mg/dL 9.3   EGFR ml/min/1.73sq m 17     Coags:   Results from last 7 days   Lab Units 09/24/23  0744 09/24/23  0127 09/23/23  1832   PTT seconds 63*   < > 36   INR   --   --  1.01    < > = values in this interval not displayed. Imaging: I have personally reviewed pertinent reports.    and I have personally reviewed pertinent films in PACS

## 2023-09-25 LAB
ANION GAP SERPL CALCULATED.3IONS-SCNC: 12 MMOL/L
APTT PPP: 61 SECONDS (ref 23–37)
ATRIAL RATE: 62 BPM
ATRIAL RATE: 83 BPM
BASOPHILS # BLD AUTO: 0.09 THOUSANDS/ÂΜL (ref 0–0.1)
BASOPHILS NFR BLD AUTO: 1 % (ref 0–1)
BUN SERPL-MCNC: 37 MG/DL (ref 5–25)
CALCIUM SERPL-MCNC: 8.8 MG/DL (ref 8.4–10.2)
CHLORIDE SERPL-SCNC: 108 MMOL/L (ref 96–108)
CO2 SERPL-SCNC: 18 MMOL/L (ref 21–32)
CREAT SERPL-MCNC: 3.31 MG/DL (ref 0.6–1.3)
EOSINOPHIL # BLD AUTO: 0.29 THOUSAND/ÂΜL (ref 0–0.61)
EOSINOPHIL NFR BLD AUTO: 2 % (ref 0–6)
ERYTHROCYTE [DISTWIDTH] IN BLOOD BY AUTOMATED COUNT: 18.8 % (ref 11.6–15.1)
EXT PREGNANCY TEST URINE: NEGATIVE
EXT. CONTROL: NORMAL
GFR SERPL CREATININE-BSD FRML MDRD: 15 ML/MIN/1.73SQ M
GLUCOSE SERPL-MCNC: 106 MG/DL (ref 65–140)
GLUCOSE SERPL-MCNC: 106 MG/DL (ref 65–140)
GLUCOSE SERPL-MCNC: 107 MG/DL (ref 65–140)
GLUCOSE SERPL-MCNC: 65 MG/DL (ref 65–140)
GLUCOSE SERPL-MCNC: 72 MG/DL (ref 65–140)
GLUCOSE SERPL-MCNC: 91 MG/DL (ref 65–140)
HCT VFR BLD AUTO: 36.8 % (ref 34.8–46.1)
HGB BLD-MCNC: 11.2 G/DL (ref 11.5–15.4)
IMM GRANULOCYTES # BLD AUTO: 0.11 THOUSAND/UL (ref 0–0.2)
IMM GRANULOCYTES NFR BLD AUTO: 1 % (ref 0–2)
INR PPP: 1.03 (ref 0.84–1.19)
KCT BLD-ACNC: 246 SEC (ref 89–137)
LYMPHOCYTES # BLD AUTO: 2.04 THOUSANDS/ÂΜL (ref 0.6–4.47)
LYMPHOCYTES NFR BLD AUTO: 14 % (ref 14–44)
MCH RBC QN AUTO: 27.1 PG (ref 26.8–34.3)
MCHC RBC AUTO-ENTMCNC: 30.4 G/DL (ref 31.4–37.4)
MCV RBC AUTO: 89 FL (ref 82–98)
MONOCYTES # BLD AUTO: 1.04 THOUSAND/ÂΜL (ref 0.17–1.22)
MONOCYTES NFR BLD AUTO: 7 % (ref 4–12)
NEUTROPHILS # BLD AUTO: 10.7 THOUSANDS/ÂΜL (ref 1.85–7.62)
NEUTS SEG NFR BLD AUTO: 75 % (ref 43–75)
NRBC BLD AUTO-RTO: 0 /100 WBCS
P AXIS: 27 DEGREES
P AXIS: 94 DEGREES
PLATELET # BLD AUTO: 256 THOUSANDS/UL (ref 149–390)
PMV BLD AUTO: 10.4 FL (ref 8.9–12.7)
POTASSIUM SERPL-SCNC: 4.5 MMOL/L (ref 3.5–5.3)
PR INTERVAL: 170 MS
PR INTERVAL: 172 MS
PROTHROMBIN TIME: 13.7 SECONDS (ref 11.6–14.5)
QRS AXIS: 10 DEGREES
QRS AXIS: 11 DEGREES
QRSD INTERVAL: 102 MS
QRSD INTERVAL: 104 MS
QT INTERVAL: 388 MS
QT INTERVAL: 446 MS
QTC INTERVAL: 452 MS
QTC INTERVAL: 455 MS
RBC # BLD AUTO: 4.13 MILLION/UL (ref 3.81–5.12)
SODIUM SERPL-SCNC: 138 MMOL/L (ref 135–147)
SPECIMEN SOURCE: ABNORMAL
T WAVE AXIS: 34 DEGREES
T WAVE AXIS: 66 DEGREES
VENTRICULAR RATE: 62 BPM
VENTRICULAR RATE: 83 BPM
WBC # BLD AUTO: 14.27 THOUSAND/UL (ref 4.31–10.16)

## 2023-09-25 PROCEDURE — 85025 COMPLETE CBC W/AUTO DIFF WBC: CPT | Performed by: FAMILY MEDICINE

## 2023-09-25 PROCEDURE — C1769 GUIDE WIRE: HCPCS | Performed by: INTERNAL MEDICINE

## 2023-09-25 PROCEDURE — 80048 BASIC METABOLIC PNL TOTAL CA: CPT | Performed by: INTERNAL MEDICINE

## 2023-09-25 PROCEDURE — C1894 INTRO/SHEATH, NON-LASER: HCPCS | Performed by: INTERNAL MEDICINE

## 2023-09-25 PROCEDURE — C1887 CATHETER, GUIDING: HCPCS | Performed by: INTERNAL MEDICINE

## 2023-09-25 PROCEDURE — 99233 SBSQ HOSP IP/OBS HIGH 50: CPT | Performed by: INTERNAL MEDICINE

## 2023-09-25 PROCEDURE — 93458 L HRT ARTERY/VENTRICLE ANGIO: CPT | Performed by: INTERNAL MEDICINE

## 2023-09-25 PROCEDURE — 85610 PROTHROMBIN TIME: CPT | Performed by: FAMILY MEDICINE

## 2023-09-25 PROCEDURE — 93005 ELECTROCARDIOGRAM TRACING: CPT

## 2023-09-25 PROCEDURE — C1760 CLOSURE DEV, VASC: HCPCS | Performed by: INTERNAL MEDICINE

## 2023-09-25 PROCEDURE — 93010 ELECTROCARDIOGRAM REPORT: CPT | Performed by: INTERNAL MEDICINE

## 2023-09-25 PROCEDURE — 4A023N7 MEASUREMENT OF CARDIAC SAMPLING AND PRESSURE, LEFT HEART, PERCUTANEOUS APPROACH: ICD-10-PCS | Performed by: INTERNAL MEDICINE

## 2023-09-25 PROCEDURE — 85347 COAGULATION TIME ACTIVATED: CPT

## 2023-09-25 PROCEDURE — 92920 PRQ TRLUML C ANGIOP 1ART&/BR: CPT | Performed by: INTERNAL MEDICINE

## 2023-09-25 PROCEDURE — 85730 THROMBOPLASTIN TIME PARTIAL: CPT | Performed by: FAMILY MEDICINE

## 2023-09-25 PROCEDURE — 99152 MOD SED SAME PHYS/QHP 5/>YRS: CPT | Performed by: INTERNAL MEDICINE

## 2023-09-25 PROCEDURE — C1725 CATH, TRANSLUMIN NON-LASER: HCPCS | Performed by: INTERNAL MEDICINE

## 2023-09-25 PROCEDURE — 99232 SBSQ HOSP IP/OBS MODERATE 35: CPT | Performed by: INTERNAL MEDICINE

## 2023-09-25 PROCEDURE — NC001 PR NO CHARGE: Performed by: INTERNAL MEDICINE

## 2023-09-25 PROCEDURE — 94760 N-INVAS EAR/PLS OXIMETRY 1: CPT

## 2023-09-25 PROCEDURE — 82948 REAGENT STRIP/BLOOD GLUCOSE: CPT

## 2023-09-25 PROCEDURE — 94660 CPAP INITIATION&MGMT: CPT

## 2023-09-25 PROCEDURE — 99153 MOD SED SAME PHYS/QHP EA: CPT | Performed by: INTERNAL MEDICINE

## 2023-09-25 PROCEDURE — 81025 URINE PREGNANCY TEST: CPT | Performed by: INTERNAL MEDICINE

## 2023-09-25 PROCEDURE — 99233 SBSQ HOSP IP/OBS HIGH 50: CPT | Performed by: FAMILY MEDICINE

## 2023-09-25 PROCEDURE — B2111ZZ FLUOROSCOPY OF MULTIPLE CORONARY ARTERIES USING LOW OSMOLAR CONTRAST: ICD-10-PCS | Performed by: INTERNAL MEDICINE

## 2023-09-25 DEVICE — ANGIO-SEAL VIP VASCULAR CLOSURE DEVICE
Type: IMPLANTABLE DEVICE | Site: GROIN | Status: FUNCTIONAL
Brand: ANGIO-SEAL

## 2023-09-25 RX ORDER — OXYCODONE HYDROCHLORIDE 5 MG/1
5 TABLET ORAL EVERY 4 HOURS PRN
Status: DISCONTINUED | OUTPATIENT
Start: 2023-09-25 | End: 2023-09-27 | Stop reason: HOSPADM

## 2023-09-25 RX ORDER — ACETAMINOPHEN 325 MG/1
975 TABLET ORAL EVERY 8 HOURS SCHEDULED
Status: DISCONTINUED | OUTPATIENT
Start: 2023-09-25 | End: 2023-09-27 | Stop reason: HOSPADM

## 2023-09-25 RX ORDER — LIDOCAINE HYDROCHLORIDE 10 MG/ML
INJECTION, SOLUTION EPIDURAL; INFILTRATION; INTRACAUDAL; PERINEURAL CODE/TRAUMA/SEDATION MEDICATION
Status: DISCONTINUED | OUTPATIENT
Start: 2023-09-25 | End: 2023-09-25 | Stop reason: HOSPADM

## 2023-09-25 RX ORDER — SODIUM CHLORIDE 9 MG/ML
75 INJECTION, SOLUTION INTRAVENOUS CONTINUOUS
Status: DISPENSED | OUTPATIENT
Start: 2023-09-25 | End: 2023-09-25

## 2023-09-25 RX ORDER — ACETYLCYSTEINE 200 MG/ML
1200 SOLUTION ORAL; RESPIRATORY (INHALATION) 2 TIMES DAILY
Status: COMPLETED | OUTPATIENT
Start: 2023-09-25 | End: 2023-09-26

## 2023-09-25 RX ORDER — MIDAZOLAM HYDROCHLORIDE 2 MG/2ML
INJECTION, SOLUTION INTRAMUSCULAR; INTRAVENOUS CODE/TRAUMA/SEDATION MEDICATION
Status: DISCONTINUED | OUTPATIENT
Start: 2023-09-25 | End: 2023-09-25 | Stop reason: HOSPADM

## 2023-09-25 RX ORDER — SODIUM CHLORIDE 9 MG/ML
50 INJECTION, SOLUTION INTRAVENOUS CONTINUOUS
Status: DISCONTINUED | OUTPATIENT
Start: 2023-09-25 | End: 2023-09-25

## 2023-09-25 RX ORDER — INSULIN LISPRO 100 [IU]/ML
1-5 INJECTION, SOLUTION INTRAVENOUS; SUBCUTANEOUS
Status: DISCONTINUED | OUTPATIENT
Start: 2023-09-26 | End: 2023-09-27 | Stop reason: HOSPADM

## 2023-09-25 RX ORDER — FENTANYL CITRATE 50 UG/ML
INJECTION, SOLUTION INTRAMUSCULAR; INTRAVENOUS CODE/TRAUMA/SEDATION MEDICATION
Status: DISCONTINUED | OUTPATIENT
Start: 2023-09-25 | End: 2023-09-25 | Stop reason: HOSPADM

## 2023-09-25 RX ORDER — GABAPENTIN 300 MG/1
300 CAPSULE ORAL DAILY
Status: DISCONTINUED | OUTPATIENT
Start: 2023-09-25 | End: 2023-09-27 | Stop reason: HOSPADM

## 2023-09-25 RX ORDER — BUMETANIDE 1 MG/1
1 TABLET ORAL DAILY
Status: DISCONTINUED | OUTPATIENT
Start: 2023-09-26 | End: 2023-09-27 | Stop reason: HOSPADM

## 2023-09-25 RX ORDER — HEPARIN SODIUM 1000 [USP'U]/ML
INJECTION, SOLUTION INTRAVENOUS; SUBCUTANEOUS CODE/TRAUMA/SEDATION MEDICATION
Status: DISCONTINUED | OUTPATIENT
Start: 2023-09-25 | End: 2023-09-25 | Stop reason: HOSPADM

## 2023-09-25 RX ORDER — ISOSORBIDE MONONITRATE 60 MG/1
60 TABLET, EXTENDED RELEASE ORAL DAILY
Status: DISCONTINUED | OUTPATIENT
Start: 2023-09-25 | End: 2023-09-26

## 2023-09-25 RX ADMIN — SODIUM BICARBONATE 650 MG TABLET 650 MG: at 08:57

## 2023-09-25 RX ADMIN — ACETYLCYSTEINE 1200 MG: 200 SOLUTION ORAL; RESPIRATORY (INHALATION) at 10:25

## 2023-09-25 RX ADMIN — SODIUM CHLORIDE 75 ML/HR: 0.9 INJECTION, SOLUTION INTRAVENOUS at 14:09

## 2023-09-25 RX ADMIN — NICOTINE 1 PATCH: 21 PATCH, EXTENDED RELEASE TRANSDERMAL at 10:28

## 2023-09-25 RX ADMIN — HYDROMORPHONE HYDROCHLORIDE 0.2 MG: 0.2 INJECTION, SOLUTION INTRAMUSCULAR; INTRAVENOUS; SUBCUTANEOUS at 22:32

## 2023-09-25 RX ADMIN — GABAPENTIN 300 MG: 300 CAPSULE ORAL at 08:57

## 2023-09-25 RX ADMIN — Medication 7.5 MG: at 15:52

## 2023-09-25 RX ADMIN — LIDOCAINE 5% 3 PATCH: 700 PATCH TOPICAL at 09:53

## 2023-09-25 RX ADMIN — ATORVASTATIN CALCIUM 80 MG: 80 TABLET, FILM COATED ORAL at 15:53

## 2023-09-25 RX ADMIN — HEPARIN SODIUM 23.1 UNITS/KG/HR: 10000 INJECTION, SOLUTION INTRAVENOUS at 03:12

## 2023-09-25 RX ADMIN — EZETIMIBE 10 MG: 10 TABLET ORAL at 08:58

## 2023-09-25 RX ADMIN — SODIUM CHLORIDE 75 ML/HR: 0.9 INJECTION, SOLUTION INTRAVENOUS at 03:14

## 2023-09-25 RX ADMIN — ASPIRIN 81 MG CHEWABLE TABLET 81 MG: 81 TABLET CHEWABLE at 08:57

## 2023-09-25 RX ADMIN — OXYCODONE HYDROCHLORIDE 5 MG: 5 TABLET ORAL at 20:46

## 2023-09-25 RX ADMIN — OXYCODONE HYDROCHLORIDE 5 MG: 5 TABLET ORAL at 03:17

## 2023-09-25 RX ADMIN — SODIUM BICARBONATE 650 MG TABLET 650 MG: at 16:56

## 2023-09-25 RX ADMIN — ACETYLCYSTEINE 1200 MG: 200 SOLUTION ORAL; RESPIRATORY (INHALATION) at 16:52

## 2023-09-25 RX ADMIN — CLOPIDOGREL BISULFATE 75 MG: 75 TABLET ORAL at 08:58

## 2023-09-25 RX ADMIN — NITROGLYCERIN 0.4 MG: 0.4 TABLET SUBLINGUAL at 08:57

## 2023-09-25 RX ADMIN — OXYCODONE HYDROCHLORIDE 5 MG: 5 TABLET ORAL at 09:51

## 2023-09-25 RX ADMIN — ACETAMINOPHEN 975 MG: 325 TABLET, FILM COATED ORAL at 21:00

## 2023-09-25 RX ADMIN — HYDROMORPHONE HYDROCHLORIDE 0.2 MG: 0.2 INJECTION, SOLUTION INTRAMUSCULAR; INTRAVENOUS; SUBCUTANEOUS at 16:56

## 2023-09-25 RX ADMIN — VENLAFAXINE HYDROCHLORIDE 75 MG: 75 CAPSULE, EXTENDED RELEASE ORAL at 09:55

## 2023-09-25 NOTE — QUICK NOTE
Rounded on patient this evening. Overall patient is without pain and she is still feeling effects of anesthesia. Patient is accompanied with best friend who had questions on what the next steps will be. Reached out to cardiology for updates. Discussed again with patient the changes in her pain regimen given current kidney function. Per cardiology " Cont GDMT optimization for CAD, Aggressive risk factor reduction,  on diet/lifestyle modification, referral to  Weight Mgmt program,  on smoking cessation, referral to  Smoking Cessation program and Cardiac rehab upon discharge"       Contacted by the nursing staff that patient had CP. VS stable x 2. Patient previously on dilaudid q4 however was changed due to worsening cr. Talked to patient who understood risk but felt that pain control is priority over kidney fnx. Will allow one time early dosage of dilaudid as patient stats it is the only thing that helps with pain.

## 2023-09-25 NOTE — PROGRESS NOTES
[address] [facility]  Progress Notes - Family Medicine    Shyrl  1975, 57 y.o. female. MRN: [id number]  Unit/Bed#: [facility] 484-33 Encounter: [id number]  Primary Care Provider: Patrick Richmond DO      Admission Date: 9/23/2023 1713  Length of Stay: 2 days  Code Status: Level 1 - Full Code  Diet: Diet NPO; Sips with meds    Consult:   IP CONSULT TO CARDIOLOGY  IP CONSULT TO NEPHROLOGY    Assessments & Plans:     Disposition: inpatient, likely discharge to home    * Non-STEMI (non-ST elevated myocardial infarction) ([address])  Assessment & Plan  · 9/22/23: [facility] ED for left-sided chest pain radiating to back and right side. +SOB, weakness, dizziness, nausea   · Troponin: 3800s peak  · EKG: Normal sinus rhythm, Inferior infarct (cited on or before 07-JAN-2023), Anteroseptal infarct (cited on or before 07-JAN-2023), Inverted T waves have replaced nonspecific T wave abnormality in V6  · Tx:  mg load, heparin gtt  · Cardiology consulted, recommended transfer to [facility] for further management, arrived 9/23/22   · Repeat EKG & trops on adm d/t continued chest pain  · Repeat EKG 9/25 AM per nurse concerns about ongoing chest pains; unchanged EKG from one on admission, NSR    · ASSESSMENT: Unstable angina vs NSTEMI given elevated troponins without ST segment elevations/depressions and extensive CAD history with multiple stents    PLAN:  · Appreciate cardiology recommendations   · Cath today 9/25; started gentle fluids last night, will continue after cath for 6hrs   · Cr increase 3.0>3.3; nephro discussed with pt risk of JARAD vs ongoing chest pain; pt agreeable to cath; nephro recs d/c jardiance for now and change gabapentin 300mg to daily (from TID)  · C/W heparin gtt  · C/W home meds (except bumex 1mg daily; held until tomorrow): aspirin 81mg daily, plavix 75mg daily, atorvastatin 80mg daily, zetia 10mg daily;  Toprol-XL 100mg daily, Imdur 30mg daily  · Hold ranexa d/t renal function  · Continue monitoring on telemetry    Coronary artery disease of native artery of native heart with stable angina pectoris [facility])  Assessment & Plan  · Last cardio outpatient appointment 5/26/23  · CAD s/p PCI to [facility], RPDA, LAD:  · Inferior STEMI (3/23/21) -> GREG to [facility]  · Recurrent chest pains (3/23/21) -> GREG to RPDA  · Readmission for recurrent inferior STEMI (4/2/21) -> stents patent, no change on cath  · NSTEMI (1/3/23) -> GREG to LAD/D2  · Home meds: aspirin 81mg daily, plavix 75mg daily, atorvastatin 80mg daily, zetia 10mg daily, Toprol-XL 100mg daily, Imdur 30mg daily    PLAN:   · Cardiology consulted  · C/W home meds (aspirin 81mg daily, plavix 75mg daily, atorvastatin 80mg daily, zetia 10mg daily, Toprol-XL 100mg daily, Imdur 30mg daily)  · Hold ranexa d/t renal function    Hypertrophic cardiomyopathy ([address])  Assessment & Plan  See assessment & plan for chronic diastolic CHF    CKD (chronic kidney disease) stage 4, GFR 15-29 ml/min ([facility])  Assessment & Plan    Results from last 7 days   Lab Units 09/22/23  2102   BUN mg/dL 28*   CREATININE mg/dL 2.93*   EGFR ml/min/1.73sq m 18     · Home med: sodium bicarb 650mg BID  · Per notes, h/o Alport Syndrome  · Baseline Cr: 2.4-2.7  · Cr trend: 2.9 > 3.0 > 3.3  · At risk for contrast-induced nephropathy;  nephro aware of Cr 3.0 > 3.3 -- d/t ongoing chest pains, recommend proceed with cath.  Nephro discussed with pt JARAD risk, pt agreeable to proceed with cath      PLAN:  · Avoid nephrotoxic agents  · Consult nephrology, appreciate recommendations:  · Hold bumex today (cath day)  · D/C jardiance today  · Change gabapentin 300mg TID -> daily  · Gentle IV fluids 75mL/hr, started overnight pre-cath; will continue for 6hrs post-cath  · Add mucomyst 1200mg PO BID for 4 doses   · C/W home regimen of sodium bicarb 650mg BID    Chronic diastolic congestive heart failure (HCC)  Assessment & Plan  Wt Readings from Last 3 Encounters:   09/24/23 129 kg (283 lb 11.7 oz) 09/11/23 129 kg (285 lb)   05/26/23 127 kg (280 lb)     Net IO Since Admission: -1,078 mL [09/24/23 1259]    Intake/Output Summary (Last 24 hours) at 9/24/2023 1259  Last data filed at 9/24/2023 1121  Gross per 24 hour   Intake 222 ml   Output 1300 ml   Net -1078 ml       · Echo 3/25/2021 60%, severe hypokinesis of the basal-mid inferior wall.  Findings consistent with HCM. Mild-mod MR.  · TTE 1/9/2023: LVEF 45% with severe inferobasal hypokinesia and inferolateral hypokinesia. Mild LVH. · Cardiac MRI 3/29/2021 severe LVH, EF 44%.  Small circumferential pericardial effusion.  Mild MR. Findings suggestive of underlying hypertrophic cardiomyopathy with ischemic scarring of the inferior and inferolateral walls.   · Echo 4/73/1019: EF 40%, systolic function mildly reduced, diastolic function moderately abnormal consistent w/grade II relaxation; hypokinetic basal inferior, mid inferior and mid inferolateral segments. LV cavity dilated, wall thickness is normal. Severe asymmetric hypertrophy of basal septal wall. Mild AR w/mildly dilated aortic root ans ascending aorta. Mild MAC. No significant changes compared to prior study. PLAN:  · Cardiology consult placed, appreciate recommendations  · Monitor VS, weight, I/O  · C/W Home Meds (except bumex 1mg daily held today for cath): Toprol-XL 100mg daily, Imdur 30mg daily    New onset type 2 diabetes mellitus ([address])  Assessment & Plan  Lab Results   Component Value Date    HGBA1C 6.6 (H) 09/24/2023       No results for input(s): "POCGLU" in the last 72 hours.     · Previous A1c 5.0 in 2/2023  · On recheck A1c 6.6 on 9/24/2023  · Rechecked d/t continually elevated glucose >200     PLAN:  · D/C jardiance today in setting of ANTHONY   · C/W SSI algorithm 2    Mixed hyperlipidemia  Assessment & Plan    Lipid       Lab Results   Component Value Date    CHOLESTEROL 206 (H) 01/03/2023    TRIG 348 (H) 01/03/2023    HDL 31 (L) 01/03/2023    LDLCALC 105 (H) 01/03/2023     PLAN:   · C/W home meds: atorvastatin 80 mg daily, ezetimibe 10 mg daily     Morbid obesity with BMI of 45.0-49.9, adult ([facility])  Assessment & Plan  · BMI: 46    PLAN:  · Offer lifestyle modification recommendations for diet and exercise  · Consider outpatient weight management referral    Tobacco abuse  Assessment & Plan  · Smokes half pack per day    PLAN:  • Nicotine patch 21 mg  • Smoking cessation recommended    COPD (chronic obstructive pulmonary disease) ([facility])  Assessment & Plan  · Home meds: albuterol 2 puffs Q4hrs prn    PLAN:   · Albuterol prn    ELIAZAR (obstructive sleep apnea)  Assessment & Plan  · Home CPAP machine    PLAN:  · CPAP QHS    Agoraphobia  Assessment & Plan  · Home medications: venlafaxine 75 mg daily     PLAN:  · Continue venlafaxine 75mg daily  · Provided 1x dose of ativan today    Social anxiety disorder  Assessment & Plan  · Home meds: venlafaxine 75 mg daily     PLAN:  · Continue venlafaxine 75mg daily  · Provided 1x dose of ativan today        VTE Pharm PPX: Heparin  VTE Mech PPX: sequential compression device    Subjective:   Patient with no acute events overnight per handoff. No concern or report per nursing staff. Patient seen and examined at bedside, was not in any acute distress. Patient denies fever, N/V, dizziness, SOB, palpitations. Endorses continual left-sided chest pains that radiates to back that has improved since ED presentation. 10/10 pain in ED. Today 6-7/10. Pain has intermittently improved with prn dilaudid to about 1-2/10. Headaches improved from yesterday. Continues to not have much of an appetite, having had yesterday a chicken finger, few bites of turkey and green beans. Has c/o thirst, drinking ice water and cranberry juice. Otherwise denies any constipation or diarrhea.      Vitals:     Vitals:    09/24/23 2352 09/25/23 0247 09/25/23 0301 09/25/23 0815   BP:  102/64  103/57   BP Location:    Right arm   Pulse:  66  60   Resp:  18  16   Temp:    97.7 °F (36.5 °C)   TempSrc:    Oral SpO2: 98% 98% 97%    Weight:       Height:         Temp:  [97.7 °F (36.5 °C)-97.9 °F (36.6 °C)] 97.7 °F (36.5 °C)  HR:  [60-66] 60  Resp:  [14-20] 16  BP: ()/(53-69) 103/57  Weight (last 2 days)     Date/Time Weight    09/24/23 1415 128 (283)    09/24/23 0350 129 (283.73)    09/23/23 1732 130 (286.38)          Intake/Output Summary (Last 24 hours) at 9/25/2023 1116  Last data filed at 9/25/2023 0530  Gross per 24 hour   Intake 1129.78 ml   Output 850 ml   Net 279.78 ml     Invasive Devices     Peripheral Intravenous Line  Duration           Peripheral IV 09/23/23 Left;Ventral (anterior) Forearm 2 days    Peripheral IV 09/23/23 Right;Ventral (anterior) Forearm 2 days                Physical Exam:   Physical Exam  Vitals reviewed. Constitutional:       General: She is not in acute distress. Appearance: She is not ill-appearing, toxic-appearing or diaphoretic. HENT:      Head: Normocephalic and atraumatic. Nose: No congestion or rhinorrhea. Eyes:      General: No scleral icterus. Right eye: No discharge. Left eye: No discharge. Conjunctiva/sclera: Conjunctivae normal.   Neck:      Vascular: No carotid bruit. Cardiovascular:      Rate and Rhythm: Normal rate and regular rhythm. Pulses: Normal pulses. Heart sounds: Normal heart sounds. No murmur heard. No friction rub. No gallop. Pulmonary:      Effort: Pulmonary effort is normal. No respiratory distress. Breath sounds: Normal breath sounds. No stridor. No wheezing, rhonchi or rales. Chest:      Chest wall: No tenderness. Abdominal:      General: Abdomen is flat. Bowel sounds are normal. There is no distension. Palpations: Abdomen is soft. There is no mass. Tenderness: There is no abdominal tenderness. There is no guarding or rebound. Hernia: No hernia is present. Musculoskeletal:      Cervical back: Normal range of motion and neck supple. No rigidity or tenderness.       Right lower leg: No edema. Left lower leg: No edema. Lymphadenopathy:      Cervical: No cervical adenopathy. Skin:     General: Skin is warm and dry. Capillary Refill: Capillary refill takes less than 2 seconds. Comments: No erythema or other lesions noted on back   Neurological:      Mental Status: She is alert. Labs:     CBC:  Results from last 7 days   Lab Units 09/25/23  0445 09/24/23  0744 09/23/23  1832 09/23/23  0017 09/22/23  2102   WBC Thousand/uL 14.27* 11.79* 17.21* 16.75* 14.70*   HEMOGLOBIN g/dL 11.2* 11.9 12.3 12.6 13.1   HEMATOCRIT % 36.8 38.8 39.3 39.5 41.1   PLATELETS Thousands/uL 256 293 310 316 307   NEUTROS ABS Thousands/µL 10.70* 8.77*  --   --  11.77*       CMP:  Results from last 7 days   Lab Units 09/25/23 0445 09/24/23  0744 09/22/23  2102   POTASSIUM mmol/L 4.5 4.3 3.6   CHLORIDE mmol/L 108 111* 104   CO2 mmol/L 18* 21 20*   BUN mg/dL 37* 35* 28*   CREATININE mg/dL 3.31* 3.00* 2.93*   CALCIUM mg/dL 8.8 9.3 8.6   AST U/L  --  21  --    ALT U/L  --  16  --    ALK PHOS U/L  --  107*  --    EGFR ml/min/1.73sq m 15 17 18       Sepsis:        Micro:       Imaging:   No results found.     Medications:     Current Facility-Administered Medications   Medication Dose Route Frequency   • acetaminophen (TYLENOL) tablet 975 mg  975 mg Oral Q8H PRN   • acetylcysteine (MUCOMYST) 200 mg/mL oral solution 1,200 mg  1,200 mg Oral BID   • albuterol (PROVENTIL HFA,VENTOLIN HFA) inhaler 2 puff  2 puff Inhalation Q4H PRN   • aspirin chewable tablet 81 mg  81 mg Oral Daily   • atorvastatin (LIPITOR) tablet 80 mg  80 mg Oral QPM   • [START ON 9/26/2023] bumetanide (BUMEX) tablet 1 mg  1 mg Oral Daily   • clopidogrel (PLAVIX) tablet 75 mg  75 mg Oral Daily   • ezetimibe (ZETIA) tablet 10 mg  10 mg Oral Daily   • gabapentin (NEURONTIN) capsule 300 mg  300 mg Oral Daily   • heparin (porcine) 25,000 units in 0.45% NaCl 250 mL infusion (premix)  3-20 Units/kg/hr (Order-Specific) Intravenous Titrated • heparin (porcine) injection 2,000 Units  2,000 Units Intravenous Q6H PRN   • heparin (porcine) injection 4,000 Units  4,000 Units Intravenous Q6H PRN   • HYDROmorphone HCl (DILAUDID) injection 0.2 mg  0.2 mg Intravenous Q6H PRN   • insulin lispro (HumaLOG) 100 units/mL subcutaneous injection 1-5 Units  1-5 Units Subcutaneous Q6H   • isosorbide mononitrate (IMDUR) 24 hr tablet 30 mg  30 mg Oral Once   • isosorbide mononitrate (IMDUR) 24 hr tablet 60 mg  60 mg Oral Daily   • lidocaine (LIDODERM) 5 % patch 3 patch  3 patch Topical Daily   • LORazepam (ATIVAN) tablet 1 mg  1 mg Oral Once   • metoprolol succinate (TOPROL-XL) 24 hr tablet 100 mg  100 mg Oral Daily   • nicotine (NICODERM CQ) 21 mg/24 hr TD 24 hr patch 1 patch  1 patch Transdermal Daily   • nitroglycerin (NITROSTAT) SL tablet 0.4 mg  0.4 mg Sublingual Q5 Min PRN   • ondansetron (ZOFRAN) injection 4 mg  4 mg Intravenous Q4H PRN   • oxyCODONE (ROXICODONE) IR tablet 5 mg  5 mg Oral Q4H PRN   • oxyCODONE (ROXICODONE) split tablet 2.5 mg  2.5 mg Oral Q4H PRN   • polyethylene glycol (MIRALAX) packet 17 g  17 g Oral Daily   • sodium bicarbonate tablet 650 mg  650 mg Oral BID after meals   • sodium chloride 0.9 % infusion  75 mL/hr Intravenous Continuous   • venlafaxine (EFFEXOR-XR) 24 hr capsule 75 mg  75 mg Oral Daily With Breakfast       Patient was discussed with [facility] Attending on-call, Dr. Guanakito Cruz MD. See attestation above.       Katy Hernandez DO  PGY-1 Family Medicine  09/25/23

## 2023-09-25 NOTE — PROGRESS NOTES
Cardiology Team 2 Progress Note - Jacinda Duncan 57 y.o. female MRN: [id number]    Unit/Bed#: [facility] 916-71 Encounter: [id number]      CC: chest pain  HPI:   Jacinda Duncan is a 57y.o. 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 She states that she was started on Imdur a couple months ago and this is significantly reduced the need to take sublingual nitro; however, over the past 2 weeks she has been noticing an increased need to take sublingual nitro. On the day of admission she had chest pain that was tearing across her chest and her upper back. She stated that the chest pain was unlike her previous MIs. It was constant in nature nothing made it better or worse. She tried to take sublingual nitro without any relief. The only thing that has improved it is Dilaudid. Initial labs demonstrated troponin of 50 which then up trended to 3800. Initial EKG was significant Q waves in inferior and anterolateral leads. Creatinine 2.93 on arrival.  Chest x-ray demonstrated no acute cardiopulmonary disease. What has been done:   Continue nitroglycerin 0.4mg q6hr for chest pain, aspirin 81mg QD, atorvastatin 80mg QD  and heparin. High risk of contrast-induced nephropathy -- Seen by nephrology   Holding bumex today since she is getting Cardiac cath today scheduled for 11:30am (on bumex at home 1mg daily)  Gentle IV fluid 75 mL /hr starting 6 hrs prior to cardiac cath and cont for 4 hrs post cath     Subjective:   Patient seen and examined. No significant events overnight. Pt reports continued 10/10 intense, constant ripping, chest pain. Pt given some oxycodone. Vitals: Blood pressure 103/57, pulse 60, temperature 97.7 °F (36.5 °C), temperature source Oral, resp.  rate 16, height 5' 6" (1.676 m), weight 128 kg (283 lb), SpO2 97 %, not currently breastfeeding., Body mass index is 45.68 kg/m².,     Is 1.1L Os 1.4L Net -270[facility]        Review of System:  Review of system was conducted and was negative except for as stated in the subjective course. Physical Exam:    GEN: Phillip Cordon appears well, alert and oriented x 3, pleasant and cooperative   HEENT:  Normocephalic, atraumatic, anicteric, moist mucous membranes  NECK: No JVD or carotid bruits   HEART: Difficult to hear. Regular rate, normal S1 and S2, no murmurs, clicks, gallops or rubs   LUNGS: Clear to auscultation bilaterally; no wheezes, rales, or rhonchi; respiration nonlabored   ABDOMEN:  Normoactive bowel sounds, soft, no tenderness, no distention  EXTREMITIES: peripheral pulses palpable; no edema  NEURO: no gross focal findings; cranial nerves grossly intact   SKIN:  Dry, intact, warm to touch      Current Facility-Administered Medications:   •  acetaminophen (TYLENOL) tablet 975 mg, 975 mg, Oral, Q8H PRN, Tl Lama MD, 975 mg at 09/24/23 8586  •  acetylcysteine (MUCOMYST) 200 mg/mL oral solution 1,200 mg, 1,200 mg, Oral, BID, Beck Vickers,   •  albuterol (PROVENTIL HFA,VENTOLIN HFA) inhaler 2 puff, 2 puff, Inhalation, Q4H PRN, Tl Lama MD  •  aspirin chewable tablet 81 mg, 81 mg, Oral, Daily, Tl Lama MD, 81 mg at 09/25/23 0857  •  atorvastatin (LIPITOR) tablet 80 mg, 80 mg, Oral, QPM, Tl Lama MD, 80 mg at 09/24/23 1701  •  [START ON 9/26/2023] bumetanide (BUMEX) tablet 1 mg, 1 mg, Oral, Daily, Pari Lamb MD  •  clopidogrel (PLAVIX) tablet 75 mg, 75 mg, Oral, Daily, Tl Lama MD, 75 mg at 09/25/23 1348  •  ezetimibe (ZETIA) tablet 10 mg, 10 mg, Oral, Daily, Tl Lama MD, 10 mg at 09/25/23 6030  •  gabapentin (NEURONTIN) capsule 300 mg, 300 mg, Oral, Daily, Kiran Vickers DO, 300 mg at 09/25/23 0857  •  heparin (porcine) 25,000 units in 0.45% NaCl 250 mL infusion (premix), 3-20 Units/kg/hr (Order-Specific), Intravenous, Titrated, Dhaval Cabral MD, Last Rate: 20.8 mL/hr at 09/25/23 0312, 23.1 Units/kg/hr at 09/25/23 7775  •  heparin (porcine) injection 2,000 Units, 2,000 Units, Intravenous, Q6H PRN, Dhaval Cabral MD  •  heparin (porcine) injection 4,000 Units, 4,000 Units, Intravenous, Q6H PRN, Dhaval Cabral MD, 4,000 Units at 09/23/23 1922  •  HYDROmorphone HCl (DILAUDID) injection 0.2 mg, 0.2 mg, Intravenous, Q6H PRN, Dhaval Cabral MD, 0.2 mg at 09/24/23 2340  •  insulin lispro (HumaLOG) 100 units/mL subcutaneous injection 1-5 Units, 1-5 Units, Subcutaneous, Q6H **AND** Fingerstick Glucose (POCT), , , Q6H, Pari Dior MD  •  isosorbide mononitrate (IMDUR) 24 hr tablet 30 mg, 30 mg, Oral, Once, [facility], DO  •  isosorbide mononitrate (IMDUR) 24 hr tablet 60 mg, 60 mg, Oral, Daily, [facility], DO  •  lidocaine (LIDODERM) 5 % patch 3 patch, 3 patch, Topical, Daily, Dhaval Cabral MD, 3 patch at 09/25/23 8051  •  LORazepam (ATIVAN) tablet 1 mg, 1 mg, Oral, Once, Carol Lopez DO  •  metoprolol succinate (TOPROL-XL) 24 hr tablet 100 mg, 100 mg, Oral, Daily, Dhaval Cabral MD, 100 mg at 09/24/23 0820  •  nicotine (NICODERM CQ) 21 mg/24 hr TD 24 hr patch 1 patch, 1 patch, Transdermal, Daily, Dhaval Cabral MD, 1 patch at 09/24/23 0820  •  nitroglycerin (NITROSTAT) SL tablet 0.4 mg, 0.4 mg, Sublingual, Q5 Min PRN, Dhaval Cabral MD, 0.4 mg at 09/25/23 0857  •  ondansetron (ZOFRAN) injection 4 mg, 4 mg, Intravenous, Q4H PRN, Dhaval Cabral MD, 4 mg at 09/24/23 1734  •  oxyCODONE (ROXICODONE) IR tablet 5 mg, 5 mg, Oral, Q4H PRN, Dhaval Cabral MD, 5 mg at 09/25/23 0765  •  oxyCODONE (ROXICODONE) split tablet 2.5 mg, 2.5 mg, Oral, Q4H PRN, Dhaval Cabral MD  •  polyethylene glycol (MIRALAX) packet 17 g, 17 g, Oral, Daily, Dhaval Cabral MD, 17 g at 09/24/23 0820  •  sodium bicarbonate tablet 650 mg, 650 mg, Oral, BID after meals, Lavern Castillo MD, 650 mg at 09/25/23 0857  •  sodium chloride 0.9 % infusion, 75 mL/hr, Intravenous, Continuous, Ketan Gomes MD, Last Rate: 75 mL/hr at 09/25/23 0314, 75 mL/hr at 09/25/23 0314  •  venlafaxine (EFFEXOR-XR) 24 hr capsule 75 mg, 75 mg, Oral, Daily With Breakfast, Lavern Castillo MD, 75 mg at 09/25/23 0955    Labs & Results:    Labs  PTT in therapeutic heparin range 61 (60-90)  BMP  Stable  BUN 37 trending up  Cr 3.3 trending up   CBC: stable WBC 14 Hgb 11 plts 256     Results from last 7 days   Lab Units 09/23/23 2036 09/23/23  1832 09/23/23  1143 09/23/23  0225   HS TNI 0HR ng/L  --  3,311*   < >  --    HS TNI 2HR ng/L 3,294*  --    < >  --    HS TNI 4HR ng/L  --   --   --  2,532*    < > = values in this interval not displayed.          Results from last 7 days   Lab Units 09/25/23 0445 09/24/23  0744 09/22/23  2102   POTASSIUM mmol/L 4.5 4.3 3.6   CO2 mmol/L 18* 21 20*   CHLORIDE mmol/L 108 111* 104   BUN mg/dL 37* 35* 28*   CREATININE mg/dL 3.31* 3.00* 2.93*     Results from last 7 days   Lab Units 09/25/23 0445 09/24/23  0744 09/23/23  1832   HEMOGLOBIN g/dL 11.2* 11.9 12.3   HEMATOCRIT % 36.8 38.8 39.3   PLATELETS Thousands/uL 256 293 310     Results from last 7 days   Lab Units 09/24/23  0744   TRIGLYCERIDES mg/dL 208*   HDL mg/dL 30*   LDL CALC mg/dL 65   HEMOGLOBIN A1C % 6.6*       Telemetry:   Personally reviewed by Madeleine Lewis: No events overnight     Imaging: TTE performed yday 9/24   LVEF 40%, grade II diastolic dysfunction   No significant changes compared to prior study from 1/9/2023       Assessment:  Principal Problem:    Non-STEMI (non-ST elevated myocardial infarction) ([address])  Active Problems:    Tobacco abuse    Chronic diastolic congestive heart failure (HCC)    Coronary artery disease of native artery of native heart with stable angina pectoris ([address])    Mixed hyperlipidemia    Morbid obesity with BMI of 45.0-49.9, adult ([address])    Hypertrophic cardiomyopathy ([address])    COPD (chronic obstructive pulmonary disease) ([facility])    ELIAZAR (obstructive sleep apnea)    CKD (chronic kidney disease) stage 4, GFR 15-29 ml/min ([facility])    Social anxiety disorder    Agoraphobia    New onset type 2 diabetes mellitus ([address])      1. Continue nitroglycerin 0.4mg q6hr, oxycodone 5mg q4hrs, dilaudid 0.2mg q6hrs  for chest pain, aspirin 81mg QD, atorvastatin 80mg QD  and heparin. 2. Holding bumex for Cardiac cath today  3. Cont  IV fluid 75 mL /hr for 4 hrs post cath             Case discussed and reviewed with Dr. Ashutosh Stewart who agrees with my assessment and plan. Thank you for involving us in the care of your patient. Darby Frankel  MS4    ** Please Note: Fluency DirectDictation voice to text software may have been used in the creation of this document.  **

## 2023-09-25 NOTE — PROGRESS NOTES
Cardiology Team 2 Progress Note - Rosita Manjarrez 57 y.o. female MRN: [id number]    Unit/Bed#: [facility] 508-01 Encounter: [id number]      Hospital Course:   Rosita Manjarrez is a 57y.o. 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 Underwent cardiac cath yesterday. Subjective:   Patient seen and examined. No significant events overnight. Still endorses chest chain. Denies lightheadedness, dizziness, syncope, headache, vision changes, diaphoresis. Vitals: Blood pressure 109/68, pulse 69, temperature 98 °F (36.7 °C), temperature source Oral, resp. rate 16, height 5' 6" (1.676 m), weight 128 kg (283 lb), SpO2 94 %, not currently breastfeeding., Body mass index is 45.68 kg/m².,     Review of System:  Review of system was conducted and was negative except for as stated in the subjective course. Physical Exam:    GEN: Rosita Manjarrez appears well, alert and oriented x 3, pleasant and cooperative   HEENT:  Normocephalic, atraumatic, anicteric, moist mucous membranes  NECK: No JVD or carotid bruits   HEART: Difficult to auscultate. LUNGS: Clear to auscultation bilaterally; no wheezes, rales, or rhonchi; respiration nonlabored   ABDOMEN:  Normoactive bowel sounds, soft, no tenderness, no distention  EXTREMITIES: peripheral pulses palpable; no edema  NEURO: no gross focal findings; cranial nerves grossly intact   SKIN:  Dry, intact, warm to touch    Labs & Results:      Results from last 7 days   Lab Units 09/23/23  2036 09/23/23  1832 09/23/23  1143 09/23/23  0225   HS TNI 0HR ng/L  --  3,311*   < >  --    HS TNI 2HR ng/L 3,294*  --    < >  --    HS TNI 4HR ng/L  --   --   --  2,532*    < > = values in this interval not displayed.          Results from last 7 days   Lab Units 09/25/23  0445 09/24/23  0744 09/22/23  2102   POTASSIUM mmol/L 4.5 4.3 3.6   CO2 mmol/L 18* 21 20*   CHLORIDE mmol/L 108 111* 104   BUN mg/dL 37* 35* 28*   CREATININE mg/dL 3.31* 3.00* 2.93*     Results from last 7 days   Lab Units 09/25/23  0445 09/24/23  0744 09/23/23  1832   HEMOGLOBIN g/dL 11.2* 11.9 12.3   HEMATOCRIT % 36.8 38.8 39.3   PLATELETS Thousands/uL 256 293 310     Results from last 7 days   Lab Units 09/24/23  0744   TRIGLYCERIDES mg/dL 208*   HDL mg/dL 30*   LDL CALC mg/dL 65   HEMOGLOBIN A1C % 6.6*       Telemetry:   Personally reviewed by Concha Hunting:     Imaging:   Cardiac cath showed progression of coronary artery disease noted: previously occluded L anterior descending with continued collaterals, prior stent now 100% occluded, also noted progression in the L circumflex 80% stenosis. Patient with severe disease has  poor targets for revascularization      Assessment:  Principal Problem:    Non-STEMI (non-ST elevated myocardial infarction) ([address])  Active Problems:    Tobacco abuse    Chronic diastolic congestive heart failure (HCC)    Coronary artery disease of native artery of native heart with stable angina pectoris (HCC)    Mixed hyperlipidemia    Morbid obesity with BMI of 45.0-49.9, adult (HCC)    Hypertrophic cardiomyopathy (HCC)    COPD (chronic obstructive pulmonary disease) (HCC)    ELIAZAR (obstructive sleep apnea)    CKD (chronic kidney disease) stage 4, GFR 15-29 ml/min (HCC)    Social anxiety disorder    Agoraphobia    New onset type 2 diabetes mellitus (HCC)      1. Optimize antianginal therapy  Adjusted Toprol XL to 50mg BID (from 100mg QD), Imdur to 30 BID (from 60 QD) and added Ranexa 500mg q12hrs  Monitor for improvement of angina with the addition of Ranexa  Monitor for improvement of hypotension with adjusted regimen    Cont DAPT      Case discussed and reviewed with Dr. Mikal Haider who agrees with my assessment and plan.     Thank you for involving us in the care of your patient. Joni Range  [id number]      ** Please Note: Fluency DirectDictation voice to text software may have been used in the creation of this document.  **

## 2023-09-25 NOTE — PROGRESS NOTES
Follow up Consultation    Nephrology   Jamal Minor 57 y.o. female MRN: [id number]  Unit/Bed#: [facility] 508-01 Encounter: [id number]      Physician Requesting Consult: Blanca Holman MD        ASSESSMENT:   65-year-old female with multiple comorbidities including CAD, status post prior PCI, CKD stage IV, ELIAZAR, COPD, hypertrophic cardiomyopathy, morbid obesity, CHF presented with chest pain. Nephrology following for perioperative as patient likely needs cardiac catheterization. Perioperative optimization to reduce incidence for acute kidney injury and now with ANTHONY/CKD stage IV:   ANTHONY most likely secondary to cardiorenal syndrome in light of ongoing chest pain plus patient on Jardiance with hypotension and now at risk for ANTHONY secondary to contrast exposure on 9/25/2023 with cardiac catheterization. After review of records it appears that the patient has a baseline Creatinine of 2.2-3.0 mg/dL. Admission creatinine of 2.93 mg/dL on 9/22/2023. Creatinine today is at 3.07 mg/dL stable and improving towards baseline   complete Mucomyst for now  Hold further IV fluids  Likely his underlying CKD secondary to age-related nephron loss plus hypertensive nephrosclerosis plus obesity related hyperfiltration plus cardiorenal syndrome. Outpatient follows up with nephrology with Dr. Calixto Driscoll    H&H/anemia:  most recent hemoglobin at 11.0 g/dL  Continue to monitor for now    Acid-base electrolytes:  Acid-base: Most recent bicarbonate 20 acidosis improving. Continue sodium bicarb 650 mg p.o. twice daily for now    Blood pressure/hypotension:   On Bumex 1 mg p.o. daily, Imdur 60 mg p.o. daily, Toprol- mg p.o. daily   Hold Jardiance today if renal parameters continue to be stable consider restarting tomorrow  Optimize hemodynamics  Follow-up with cardiology consider decreasing antihypertensive regimen    Other medical problems:  Proteinuria: Most recent UACR 1.4 g in 2022. 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   CHF: Management per primary team follow-up with cardiology most recent echo with EF of 07% grade 2 diastolic dysfunction. Clinically euvolemic. Okay to start on low-dose Bumex per cardiology for now. Chest pain: Follow-up with cardiology. Status post cath  significant disease not amenable to revascularization. PLAN:  • Optimize hemodynamic status to avoid delay in renal recovery. • check BMP, magnesium, phosphorus in a.m.  • Hold Jardiance for now if renal parameters remain stable can consider restarting tomorrow  • Complete Mucomyst  • Await renal recovery. • Follow-up with cardiology to help with adjustments in blood pressure regimen due to low blood pressures  • Place on a renal diet when allowed diet order. • Strict I/O.  • Daily weights  • Urinary retention protocol   • Avoid nephrotoxins, adjust meds to appropriate GFR. • will need to follow up with Nephrology post hospital discharge with blood work prior to the visit  • Optimize hemodynamics. • Maintain MAP > 65mmHg  • Avoid BP fluctuations. Thanks for the consult  Will continue to follow  Please call with questions/ concerns. Above-mentioned orders and Plan complete Mucomyst check BMP in a.m. hold Jardiance today follow-up with cardiology to adjust BP medication regimen were discussed with the team in depth and they agree. Desean Gray MD, FASN, 2023, 10:38 AM                Objective :   Patient seen and examined in her room no overnight events status post cardiac cath yesterday showing significant disease not amenable to revascularization. Happy to hear renal parameters are improving however did have persistent chest pain. SBP in the 90s.   Remains afebrile      PHYSICAL EXAM  BP 93/57 (BP Location: Right arm)   Pulse 60   Temp 97.9 °F (36.6 °C) (Axillary)   Resp 12   Ht 5' 6" (1.676 m)   Wt 130 kg (286 lb 9.6 oz)   SpO2 96%   BMI 46.26 kg/m²   Temp (24hrs), Av.9 °F (36.6 °C), Min:97.6 °F (36.4 °C), Max:98 °F (36.7 °C)        Intake/Output Summary (Last 24 hours) at 9/26/2023 1038  Last data filed at 9/26/2023 0900  Gross per 24 hour   Intake 1426.75 ml   Output 1300 ml   Net 126.75 ml       I/O last 24 hours: In: 1426.8 [P.O.:958; I.V.:468.8]  Out: 1300 [Urine:1300]      Current Weight: Weight - Scale: 130 kg (286 lb 9.6 oz)  First Weight: Weight - Scale: 130 kg (286 lb 6 oz)  Physical Exam  Vitals and nursing note reviewed. Constitutional:       General: She is not in acute distress. Appearance: Normal appearance. She is obese. She is not ill-appearing, toxic-appearing or diaphoretic. HENT:      Head: Normocephalic and atraumatic. Mouth/Throat:      Pharynx: No oropharyngeal exudate. Eyes:      General: No scleral icterus. Conjunctiva/sclera: Conjunctivae normal.   Cardiovascular:      Rate and Rhythm: Normal rate. Pulmonary:      Effort: No respiratory distress. Breath sounds: Normal breath sounds. No stridor. Abdominal:      Palpations: There is no mass. Tenderness: There is no abdominal tenderness. Musculoskeletal:         General: No swelling. Cervical back: Normal range of motion. No rigidity. Skin:     General: Skin is dry. Coloration: Skin is not jaundiced. Neurological:      General: No focal deficit present. Mental Status: She is alert and oriented to person, place, and time. Psychiatric:         Mood and Affect: Mood normal.         Behavior: Behavior normal.             Review of Systems   Constitutional: Negative for chills and fatigue. HENT: Negative for congestion. Respiratory: Negative for cough and shortness of breath. Cardiovascular: Positive for chest pain. Negative for leg swelling. Gastrointestinal: Negative for diarrhea. Genitourinary: Negative for dysuria. Musculoskeletal: Negative for back pain. Neurological: Negative for headaches. Psychiatric/Behavioral: Negative for agitation and confusion.    All other systems reviewed and are negative. Scheduled Meds:  Current Facility-Administered Medications   Medication Dose Route Frequency Provider Last Rate   • acetaminophen  975 mg Oral [facility] Katelyn Wright DO     • acetylcysteine  1,200 mg Oral BID Katelyn Wright DO     • albuterol  2 puff Inhalation Q4H PRN Simba Castillo MD     • aspirin  81 mg Oral Daily Simba Castillo MD     • atorvastatin  80 mg Oral QPM Simba Castillo MD     • bumetanide  1 mg Oral Daily Pari Morrison MD     • clopidogrel  75 mg Oral Daily Simba Castillo MD     • ezetimibe  10 mg Oral Daily Simba Castillo MD     • gabapentin  300 mg Oral Daily Katelyn Wright DO     • HYDROmorphone  0.2 mg Intravenous Q6H PRN Simba Castillo MD     • insulin lispro  1-5 Units Subcutaneous 4x Daily (AC & HS) Joi Haji MD     • isosorbide mononitrate  60 mg Oral Daily AJLIL Ramsey     • lidocaine  3 patch Topical Daily Simba Castillo MD     • LORazepam  1 mg Oral Once Gaurav Suarez DO     • metoprolol succinate  100 mg Oral Daily Simba Castillo MD     • nicotine  1 patch Transdermal Daily Simba Castillo MD     • nitroglycerin  0.4 mg Sublingual Q5 Min PRN Simba Castillo MD     • ondansetron  4 mg Intravenous Q4H PRN Simba Castillo MD     • oxyCODONE  5 mg Oral Q4H PRN Katelyn Wright DO     • oxyCODONE  7.5 mg Oral Q4H PRN Katelyn Wright DO     • polyethylene glycol  17 g Oral Daily Simba Castillo MD     • sodium bicarbonate  650 mg Oral BID after meals Simba Castillo MD     • venlafaxine  75 mg Oral Daily With Breakfast Simba Castillo MD         PRN Meds:.•  albuterol  •  HYDROmorphone  •  nitroglycerin  •  ondansetron  •  oxyCODONE  •  oxyCODONE    Continuous Infusions:       Invasive Devices:      Invasive Devices     Peripheral Intravenous Line  Duration           Peripheral IV 09/23/23 Left;Ventral (anterior) Forearm 3 days    Peripheral IV 09/23/23 Right;Ventral (anterior) Forearm 3 days LABORATORY:    Results from last 7 days   Lab Units 09/26/23  0532 09/25/23  0445 09/24/23  0744 09/23/23  1832 09/23/23  0017 09/22/23  2102   WBC Thousand/uL 10.29* 14.27* 11.79* 17.21* 16.75* 14.70*   HEMOGLOBIN g/dL 11.0* 11.2* 11.9 12.3 12.6 13.1   HEMATOCRIT % 35.8 36.8 38.8 39.3 39.5 41.1   PLATELETS Thousands/uL 251 256 293 310 316 307   POTASSIUM mmol/L 4.2 4.5 4.3  --   --  3.6   CHLORIDE mmol/L 108 108 111*  --   --  104   CO2 mmol/L 20* 18* 21  --   --  20*   BUN mg/dL 36* 37* 35*  --   --  28*   CREATININE mg/dL 3.07* 3.31* 3.00*  --   --  2.93*   CALCIUM mg/dL 9.2 8.8 9.3  --   --  8.6   MAGNESIUM mg/dL 2.0  --   --   --   --   --    PHOSPHORUS mg/dL 4.7*  --   --   --   --   --       rest all reviewed    RADIOLOGY:  No orders to display     Rest all reviewed    Portions of the record may have been created with voice recognition software. Occasional wrong word or "sound a like" substitutions may have occurred due to the inherent limitations of voice recognition software. Read the chart carefully and recognize, using context, where substitutions have occurred. If you have any questions, please contact the dictating provider.

## 2023-09-25 NOTE — DISCHARGE INSTR - AVS FIRST PAGE
1. Please see the post cardiac catheterization dishcarge instructions. No heavy lifting, greater than 10 lbs. or strenuous  activity for 48 hrs. 2.Remove band aid tomorrow. Shower and wash area- groin gently with soap and water- beginning tomorrow. Rinse and pat dry. Apply new water seal band aid. Repeat this process for 5 days. No powders, creams lotions or antibiotic ointments  for 5 days. No tub baths, hot tubs or swimming for 5 days. 3. Please call our office ([phone]) if you have any fever, redness, swelling, discharge from your groin access site. 4.No driving for 2 days    5. Angioseal Booklet       Medication changes:  Begin Ranexa 500mg every 12 hours   Switch Toprol from 100mg every day to 50mg twice a day   Increase Imdur from 30mg once a day to 30mg twice a day   Discontinue jardiance and Bumex; if gaining weight can restart Bumex 1mg every other day    Other instructions:  Please get labwork- Basic Metabolic Panel in 1 week  Please follow up with your PCP, nephrologist, and cardiologist as an outpatient.

## 2023-09-25 NOTE — QUICK NOTE
Patient seen and examined post-cath with Dr. Fung . Patient's best friend/sister-in-law also present at bedside. Discussed cath results including 80% stenosis of proximal circumflex, 100% stenosis of mid-LAD. Attempted to deliver stent by way of balloon expansion but patient has severe disease with poor targets for revascularization. Patient continues to have the same chest pain. States the current pain regimen Oxycodone 2.5/5mg q4hrs PRN for moderate/severe pain does nothing. Only the Dilaudid provides pain relief. We discussed that given her worsening renal function, we have to be cautious about pain control. Discussed with nephrology and we will increase Oxycodone to 5/7.5mg q4hrs PRN for mod/severe pain. Also reached out to cardiology team for additional recommendations given patient was a poor candidate for revascularization.

## 2023-09-26 LAB
ANION GAP SERPL CALCULATED.3IONS-SCNC: 9 MMOL/L
BASOPHILS # BLD AUTO: 0.04 THOUSANDS/ÂΜL (ref 0–0.1)
BASOPHILS NFR BLD AUTO: 0 % (ref 0–1)
BUN SERPL-MCNC: 36 MG/DL (ref 5–25)
CALCIUM SERPL-MCNC: 9.2 MG/DL (ref 8.4–10.2)
CHLORIDE SERPL-SCNC: 108 MMOL/L (ref 96–108)
CO2 SERPL-SCNC: 20 MMOL/L (ref 21–32)
CREAT SERPL-MCNC: 3.07 MG/DL (ref 0.6–1.3)
EOSINOPHIL # BLD AUTO: 0.24 THOUSAND/ÂΜL (ref 0–0.61)
EOSINOPHIL NFR BLD AUTO: 2 % (ref 0–6)
ERYTHROCYTE [DISTWIDTH] IN BLOOD BY AUTOMATED COUNT: 18.8 % (ref 11.6–15.1)
GFR SERPL CREATININE-BSD FRML MDRD: 17 ML/MIN/1.73SQ M
GLUCOSE SERPL-MCNC: 101 MG/DL (ref 65–140)
GLUCOSE SERPL-MCNC: 126 MG/DL (ref 65–140)
GLUCOSE SERPL-MCNC: 82 MG/DL (ref 65–140)
GLUCOSE SERPL-MCNC: 93 MG/DL (ref 65–140)
GLUCOSE SERPL-MCNC: 97 MG/DL (ref 65–140)
HCT VFR BLD AUTO: 35.8 % (ref 34.8–46.1)
HGB BLD-MCNC: 11 G/DL (ref 11.5–15.4)
IMM GRANULOCYTES # BLD AUTO: 0.09 THOUSAND/UL (ref 0–0.2)
IMM GRANULOCYTES NFR BLD AUTO: 1 % (ref 0–2)
LYMPHOCYTES # BLD AUTO: 1.43 THOUSANDS/ÂΜL (ref 0.6–4.47)
LYMPHOCYTES NFR BLD AUTO: 14 % (ref 14–44)
MAGNESIUM SERPL-MCNC: 2 MG/DL (ref 1.9–2.7)
MCH RBC QN AUTO: 27.5 PG (ref 26.8–34.3)
MCHC RBC AUTO-ENTMCNC: 30.7 G/DL (ref 31.4–37.4)
MCV RBC AUTO: 90 FL (ref 82–98)
MONOCYTES # BLD AUTO: 0.69 THOUSAND/ÂΜL (ref 0.17–1.22)
MONOCYTES NFR BLD AUTO: 7 % (ref 4–12)
NEUTROPHILS # BLD AUTO: 7.8 THOUSANDS/ÂΜL (ref 1.85–7.62)
NEUTS SEG NFR BLD AUTO: 76 % (ref 43–75)
NRBC BLD AUTO-RTO: 0 /100 WBCS
PHOSPHATE SERPL-MCNC: 4.7 MG/DL (ref 2.7–4.5)
PLATELET # BLD AUTO: 251 THOUSANDS/UL (ref 149–390)
PMV BLD AUTO: 11 FL (ref 8.9–12.7)
POTASSIUM SERPL-SCNC: 4.2 MMOL/L (ref 3.5–5.3)
RBC # BLD AUTO: 4 MILLION/UL (ref 3.81–5.12)
SODIUM SERPL-SCNC: 137 MMOL/L (ref 135–147)
WBC # BLD AUTO: 10.29 THOUSAND/UL (ref 4.31–10.16)

## 2023-09-26 PROCEDURE — 83735 ASSAY OF MAGNESIUM: CPT | Performed by: INTERNAL MEDICINE

## 2023-09-26 PROCEDURE — 99232 SBSQ HOSP IP/OBS MODERATE 35: CPT | Performed by: INTERNAL MEDICINE

## 2023-09-26 PROCEDURE — 99232 SBSQ HOSP IP/OBS MODERATE 35: CPT | Performed by: FAMILY MEDICINE

## 2023-09-26 PROCEDURE — 80048 BASIC METABOLIC PNL TOTAL CA: CPT | Performed by: INTERNAL MEDICINE

## 2023-09-26 PROCEDURE — 94760 N-INVAS EAR/PLS OXIMETRY 1: CPT

## 2023-09-26 PROCEDURE — 82948 REAGENT STRIP/BLOOD GLUCOSE: CPT

## 2023-09-26 PROCEDURE — 85025 COMPLETE CBC W/AUTO DIFF WBC: CPT

## 2023-09-26 PROCEDURE — 94660 CPAP INITIATION&MGMT: CPT

## 2023-09-26 PROCEDURE — 84100 ASSAY OF PHOSPHORUS: CPT | Performed by: INTERNAL MEDICINE

## 2023-09-26 RX ORDER — ISOSORBIDE MONONITRATE 30 MG/1
30 TABLET, EXTENDED RELEASE ORAL 2 TIMES DAILY
Status: DISCONTINUED | OUTPATIENT
Start: 2023-09-26 | End: 2023-09-27 | Stop reason: HOSPADM

## 2023-09-26 RX ORDER — RANOLAZINE 500 MG/1
500 TABLET, EXTENDED RELEASE ORAL EVERY 12 HOURS SCHEDULED
Status: DISCONTINUED | OUTPATIENT
Start: 2023-09-26 | End: 2023-09-27 | Stop reason: HOSPADM

## 2023-09-26 RX ORDER — HYDROMORPHONE HCL IN WATER/PF 6 MG/30 ML
0.2 PATIENT CONTROLLED ANALGESIA SYRINGE INTRAVENOUS EVERY 6 HOURS PRN
Status: DISCONTINUED | OUTPATIENT
Start: 2023-09-26 | End: 2023-09-27 | Stop reason: HOSPADM

## 2023-09-26 RX ORDER — RANOLAZINE 500 MG/1
500 TABLET, EXTENDED RELEASE ORAL EVERY 12 HOURS SCHEDULED
Qty: 60 TABLET | Refills: 0 | Status: SHIPPED | OUTPATIENT
Start: 2023-09-26 | End: 2023-10-26

## 2023-09-26 RX ORDER — METOPROLOL SUCCINATE 50 MG/1
50 TABLET, EXTENDED RELEASE ORAL 2 TIMES DAILY
Status: DISCONTINUED | OUTPATIENT
Start: 2023-09-26 | End: 2023-09-27 | Stop reason: HOSPADM

## 2023-09-26 RX ADMIN — LIDOCAINE 5% 3 PATCH: 700 PATCH TOPICAL at 09:22

## 2023-09-26 RX ADMIN — NICOTINE 1 PATCH: 21 PATCH, EXTENDED RELEASE TRANSDERMAL at 09:21

## 2023-09-26 RX ADMIN — VENLAFAXINE HYDROCHLORIDE 75 MG: 75 CAPSULE, EXTENDED RELEASE ORAL at 09:23

## 2023-09-26 RX ADMIN — ASPIRIN 81 MG CHEWABLE TABLET 81 MG: 81 TABLET CHEWABLE at 09:21

## 2023-09-26 RX ADMIN — HYDROMORPHONE HYDROCHLORIDE 0.2 MG: 0.2 INJECTION, SOLUTION INTRAMUSCULAR; INTRAVENOUS; SUBCUTANEOUS at 10:44

## 2023-09-26 RX ADMIN — Medication 7.5 MG: at 14:02

## 2023-09-26 RX ADMIN — RANOLAZINE 500 MG: 500 TABLET, FILM COATED, EXTENDED RELEASE ORAL at 12:19

## 2023-09-26 RX ADMIN — ACETYLCYSTEINE 1200 MG: 200 SOLUTION ORAL; RESPIRATORY (INHALATION) at 17:43

## 2023-09-26 RX ADMIN — ACETAMINOPHEN 975 MG: 325 TABLET, FILM COATED ORAL at 05:08

## 2023-09-26 RX ADMIN — HYDROMORPHONE HYDROCHLORIDE 0.2 MG: 0.2 INJECTION, SOLUTION INTRAMUSCULAR; INTRAVENOUS; SUBCUTANEOUS at 05:06

## 2023-09-26 RX ADMIN — SODIUM BICARBONATE 650 MG TABLET 650 MG: at 17:43

## 2023-09-26 RX ADMIN — EZETIMIBE 10 MG: 10 TABLET ORAL at 09:20

## 2023-09-26 RX ADMIN — CLOPIDOGREL BISULFATE 75 MG: 75 TABLET ORAL at 09:21

## 2023-09-26 RX ADMIN — ACETYLCYSTEINE 1200 MG: 200 SOLUTION ORAL; RESPIRATORY (INHALATION) at 09:22

## 2023-09-26 RX ADMIN — HYDROMORPHONE HYDROCHLORIDE 0.2 MG: 0.2 INJECTION, SOLUTION INTRAMUSCULAR; INTRAVENOUS; SUBCUTANEOUS at 18:29

## 2023-09-26 RX ADMIN — HYDROMORPHONE HYDROCHLORIDE 0.2 MG: 0.2 INJECTION, SOLUTION INTRAMUSCULAR; INTRAVENOUS; SUBCUTANEOUS at 23:03

## 2023-09-26 RX ADMIN — ACETAMINOPHEN 975 MG: 325 TABLET, FILM COATED ORAL at 13:59

## 2023-09-26 RX ADMIN — ATORVASTATIN CALCIUM 80 MG: 80 TABLET, FILM COATED ORAL at 17:43

## 2023-09-26 RX ADMIN — ACETAMINOPHEN 975 MG: 325 TABLET, FILM COATED ORAL at 21:25

## 2023-09-26 RX ADMIN — Medication 7.5 MG: at 17:48

## 2023-09-26 RX ADMIN — SODIUM BICARBONATE 650 MG TABLET 650 MG: at 09:21

## 2023-09-26 RX ADMIN — Medication 7.5 MG: at 21:50

## 2023-09-26 RX ADMIN — Medication 7.5 MG: at 03:27

## 2023-09-26 RX ADMIN — GABAPENTIN 300 MG: 300 CAPSULE ORAL at 09:21

## 2023-09-26 RX ADMIN — OXYCODONE HYDROCHLORIDE 5 MG: 5 TABLET ORAL at 09:29

## 2023-09-26 RX ADMIN — RANOLAZINE 500 MG: 500 TABLET, FILM COATED, EXTENDED RELEASE ORAL at 21:25

## 2023-09-26 NOTE — QUICK NOTE
Rounded on patient tonight. Reporting CP that only improved with dilaudid. VS stable on exam. Patient reporting concerns that current oral medications are not controlling pain. Concerns address and will relay to day team.       Updates: 5:30 am received text that patient had some irration after IV removal. Nurse also noted some white discharge. Told nurse to put war compress and to let me know if patient feels tightness in area, get fever, unable to move fingers or pulse can not be felt. Will let day team know.

## 2023-09-26 NOTE — PROGRESS NOTES
Follow up Consultation    Nephrology   Jayy Santillan 57 y.o. female MRN: [id number]  Unit/Bed#: [facility] 508-01 Encounter: [id number]      Physician Requesting Consult: Aby Carlin MD        ASSESSMENT:   61-year-old female with multiple comorbidities including CAD, status post prior PCI, CKD stage IV, ELIAZAR, COPD, hypertrophic cardiomyopathy, morbid obesity, CHF presented with chest pain. Nephrology following for perioperative as patient likely needs cardiac catheterization. Perioperative optimization to reduce incidence for acute kidney injury and now with ANTHONY/CKD stage IV:   ANTHONY most likely secondary to cardiorenal syndrome in light of ongoing chest pain plus patient on Jardiance with hypotension and now at risk for ANTHONY secondary to contrast exposure on 9/25/2023 with cardiac catheterization. After review of records it appears that the patient has a baseline Creatinine of 2.2-3.0 mg/dL. Admission creatinine of 2.93 mg/dL on 9/22/2023. Creatinine today is at 3.37 mg/dL slightly elevated today likely secondary to hemodynamic perturbations in the last 24 hours post recent contrast exposure  Hold further IV fluids  Low threshold to discontinue diuretics if patient with worsening renal status  Likely his underlying CKD secondary to age-related nephron loss plus hypertensive nephrosclerosis plus obesity related hyperfiltration plus cardiorenal syndrome. Outpatient follows up with nephrology with Dr. Joe Marie    H&H/anemia:  most recent hemoglobin at 11.0 g/dL  Continue to monitor for now    Acid-base electrolytes:  Acid-base: Most recent bicarbonate 21acidosis improving.   Continue sodium bicarb 650 mg p.o. twice daily for now, if bicarb continues to improve tomorrow consider decreasing dosage    Blood pressure/hypotension:   On Bumex 1 mg p.o. daily, Imdur 30 mg p.o. twice daily, Toprol-XL 50 mg p.o. twice daily  Hold Jardiance for now and upon discharge  Low threshold to DC Bumex if continues to have worsening renal parameters in next 24 hours  Optimize hemodynamics  Follow-up with cardiology consider decreasing antihypertensive regimen    Other medical problems:  Proteinuria: Most recent UACR 1.4 g in . 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 CHF:  Management per primary team follow-up with cardiology most recent echo with EF of 35% grade 2 diastolic dysfunction. Clinically euvolemic. Okay to start on low-dose Bumex per cardiology for now. Chest pain: Follow-up with cardiology. Status post cath  significant disease not amenable to revascularization. PLAN:  • Optimize hemodynamic status to avoid delay in renal recovery. • check BMP, magnesium, phosphorus in a.m.  • Hold Jardiance for now and upon discharge  • Await renal recovery. • Follow-up with cardiology to help with adjustments in blood pressure regimen due to low blood pressures  • Place on a renal diet when allowed diet order. • Strict I/O.  • Daily weights  • Urinary retention protocol   • Avoid nephrotoxins, adjust meds to appropriate GFR. • will need to follow up with Nephrology post hospital discharge with blood work prior to the visit  • Optimize hemodynamics. • Maintain MAP > 65mmHg  • Avoid BP fluctuations. Thanks for the consult  Will continue to follow  Please call with questions/ concerns. Above-mentioned orders and Plan continue hold Jardiance for now were discussed with the team in depth and they agree. Leonard Mason MD, [facility], [facility], 10:27 AM                Objective :   Patient seen and examined in her room blood pressure starting to improve remains afebrile continues to have chest pain urine output 700 cc plus. Frustrated by being in the hospital and no appropriate pain management.       PHYSICAL EXAM  /64   Pulse 69   Temp 98.1 °F (36.7 °C) (Oral)   Resp (!) 23   Ht 5' 6" (1.676 m)   Wt 130 kg (286 lb 9.6 oz)   SpO2 96%   BMI 46.26 kg/m²   Temp (24hrs), Av.8 °F (36.6 °C), Min:97.2 °F (36.2 °C), Max:98.1 °F (36.7 °C)        Intake/Output Summary (Last 24 hours) at 9/27/2023 1027  Last data filed at 9/27/2023 0831  Gross per 24 hour   Intake 340 ml   Output 700 ml   Net -360 ml       I/O last 24 hours: In: 458 [P.O.:458]  Out: 700 [Urine:700]      Current Weight: Weight - Scale: 130 kg (286 lb 9.6 oz)  First Weight: Weight - Scale: 130 kg (286 lb 6 oz)  Physical Exam  Vitals and nursing note reviewed. Constitutional:       General: She is not in acute distress. Appearance: Normal appearance. She is obese. She is not ill-appearing, toxic-appearing or diaphoretic. HENT:      Head: Normocephalic and atraumatic. Mouth/Throat:      Mouth: Mucous membranes are moist.      Pharynx: No oropharyngeal exudate. Eyes:      General: No scleral icterus. Conjunctiva/sclera: Conjunctivae normal.   Cardiovascular:      Rate and Rhythm: Normal rate. Pulmonary:      Effort: No respiratory distress. Breath sounds: Normal breath sounds. No stridor. Abdominal:      Palpations: There is no mass. Tenderness: There is no abdominal tenderness. Musculoskeletal:         General: No swelling. Cervical back: Normal range of motion. No rigidity. Skin:     General: Skin is dry. Coloration: Skin is not jaundiced. Neurological:      General: No focal deficit present. Mental Status: She is alert and oriented to person, place, and time. Psychiatric:         Mood and Affect: Mood normal.         Behavior: Behavior normal.             Review of Systems   Constitutional: Negative for chills and fatigue. HENT: Negative for congestion. Respiratory: Negative for shortness of breath. Cardiovascular: Positive for chest pain. Negative for leg swelling. Gastrointestinal: Negative for abdominal pain and diarrhea. Genitourinary: Negative for dysuria. Musculoskeletal: Negative for back pain. Neurological: Negative for headaches.    Psychiatric/Behavioral: Negative for agitation and confusion. All other systems reviewed and are negative. Scheduled Meds:  Current Facility-Administered Medications   Medication Dose Route Frequency Provider Last Rate   • acetaminophen  975 mg Oral [facility] Shante Hoyos DO     • albuterol  2 puff Inhalation Q4H PRN Benny Rasmussen MD     • aspirin  81 mg Oral Daily Benny Rasmussen MD     • atorvastatin  80 mg Oral QPM Benny Rasmussen MD     • bumetanide  1 mg Oral Daily Pari Barragan MD     • clopidogrel  75 mg Oral Daily Benny Rasmussen MD     • ezetimibe  10 mg Oral Daily Benny Rasmussen MD     • gabapentin  300 mg Oral Daily Shante Hoyos DO     • HYDROmorphone  0.2 mg Intravenous Q6H PRN [address], MD     • insulin lispro  1-5 Units Subcutaneous 4x Daily (AC & HS) [address] [facility], [place]     • isosorbide mononitrate  30 mg Oral BID Lane Lubin MD     • lidocaine  3 patch Topical Daily Benny Rasmussen MD     • LORazepam  1 mg Oral Once Gerhardt Fairy, DO     • metoprolol succinate  50 mg Oral BID Lane Lubin MD     • nicotine  1 patch Transdermal Daily Benny Rasmussen MD     • nitroglycerin  0.4 mg Sublingual Q5 Min PRN Benny Rasmussen MD     • ondansetron  4 mg Intravenous Q4H PRN Benny Rasmussen MD     • oxyCODONE  5 mg Oral Q4H PRN Shante Hoyos DO     • oxyCODONE  7.5 mg Oral Q4H PRN Shante Hooys DO     • polyethylene glycol  17 g Oral Daily Benny Rasmussen MD     • ranolazine  500 mg Oral Q12H Amarilis Rodríguez MD     • sodium bicarbonate  650 mg Oral BID after meals Benny Rasmussen MD     • venlafaxine  75 mg Oral Daily With Breakfast Benny Rasmussen MD         PRN Meds:.•  albuterol  •  HYDROmorphone  •  nitroglycerin  •  ondansetron  •  oxyCODONE  •  oxyCODONE    Continuous Infusions:       Invasive Devices:      Invasive Devices     Peripheral Intravenous Line  Duration           Peripheral IV 09/27/23 Right Antecubital <1 day LABORATORY:    Results from last 7 days   Lab Units 09/27/23  0517 09/26/23  0532 09/25/23  0445 09/24/23  0744 09/23/23  1832 09/23/23  0017 09/22/23  2102   WBC Thousand/uL  --  10.29* 14.27* 11.79* 17.21* 16.75* 14.70*   HEMOGLOBIN g/dL  --  11.0* 11.2* 11.9 12.3 12.6 13.1   HEMATOCRIT %  --  35.8 36.8 38.8 39.3 39.5 41.1   PLATELETS Thousands/uL  --  251 256 293 310 316 307   POTASSIUM mmol/L 4.3 4.2 4.5 4.3  --   --  3.6   CHLORIDE mmol/L 108 108 108 111*  --   --  104   CO2 mmol/L 21 20* 18* 21  --   --  20*   BUN mg/dL 40* 36* 37* 35*  --   --  28*   CREATININE mg/dL 3.37* 3.07* 3.31* 3.00*  --   --  2.93*   CALCIUM mg/dL 9.5 9.2 8.8 9.3  --   --  8.6   MAGNESIUM mg/dL 2.3 2.0  --   --   --   --   --    PHOSPHORUS mg/dL 4.7* 4.7*  --   --   --   --   --       rest all reviewed    RADIOLOGY:  No orders to display     Rest all reviewed    Portions of the record may have been created with voice recognition software. Occasional wrong word or "sound a like" substitutions may have occurred due to the inherent limitations of voice recognition software. Read the chart carefully and recognize, using context, where substitutions have occurred. If you have any questions, please contact the dictating provider.

## 2023-09-26 NOTE — PROGRESS NOTES
[address] [facility]  Progress Notes - Family Medicine    Yenny Maravilla 1975, 57 y.o. female. MRN: [id number]  [facility] Encounter: [id number]  Primary Care Provider: Marcela Hayden DO      Admission Date: 9/23/2023 1712  Length of Stay: 3 days  Code Status: Level 1 - Full Code  Diet: Diet Cardiovascular; Cardiac; Consistent Carbohydrate Diet Level 2 (5 carb servings/75 grams CHO/meal)    Consult:   IP CONSULT TO CARDIOLOGY  IP CONSULT TO NEPHROLOGY    Assessments & Plans:     Disposition: inpatient, discharge to cardiac rehab per cardio recs    * Non-STEMI (non-ST elevated myocardial infarction) ([address])  Assessment & Plan  · 9/22/23: [facility] ED for left-sided chest pain radiating to back and right side. +SOB, weakness, dizziness, nausea   · Troponin: 3800s peak  · EKG: Normal sinus rhythm, Inferior infarct (cited on or before 07-JAN-2023), Anteroseptal infarct (cited on or before 07-JAN-2023), Inverted T waves have replaced nonspecific T wave abnormality in V6  · Tx:  mg load, heparin gtt  · Cardiology consulted, recommended [facility], arrived 9/23/22   · Repeat EKG & trops on adm d/t continued chest pain  · Repeat EKG 9/25 AM per nurse concerns about ongoing chest pains; unchanged EKG from one on admission, NSR  · 9/25 cath: 100% occlusion of LAD, likely culprit of MI, unable to re-stent, 80% stenosis of left circumflex    · ASSESSMENT: MI w/100% obstruction of LAD as culprit, unable to re-stent. Continual L chest pain radiating to R and back. Currently rates it 9/10 pain (adm 10/10). Nitro held d/t soft BP. Ranexa held d/t renal function. Currently prn oxy 5 and 7.5 and dilaudid for pain control and scheduled tylenol 975mg Q8.     PLAN:  · Appreciate cardiology recommendations:   · Cautious hydration given renal status and elevated end LVEDP  · Cont GDMT optimization for CAD  · Aggressive risk factor reduction  ·  on diet/lifestyle modification, referral to  Weight Mgmt program  ·  on smoking cessation, referral to  Smoking Cessation program  · Cardiac rehab upon discharge  · Consider other routes of chest pain control; currently on Oxy 5mg, 7.5mg; Dilaudid 0.2mg prn; nitroglycerin 0.4mg prn (however, has had soft BP 90s/50s) per cardio recs   · Heparin gtt d/c last night per cardio recs  · C/W home meds: aspirin 81mg daily, plavix 75mg daily, atorvastatin 80mg daily, zetia 10mg daily;  Toprol-XL 100mg daily, Imdur 30mg daily, bumex 1mg daily  · Cardiovascular diet  · Continue telemetry monitoring  · Follow BMP, Mg, Phos; maintain K>4.0, Mg>2.0    Coronary artery disease of native artery of native heart with stable angina pectoris [facility])  Assessment & Plan  · Last cardio outpatient appointment 5/26/23  · CAD s/p PCI to [facility], RPDA, LAD:  · Inferior STEMI (3/23/21) -> GREG to [facility]  · Recurrent chest pains (3/23/21) -> GREG to RPDA  · Readmission for recurrent inferior STEMI (4/2/21) -> stents patent, no change on cath  · NSTEMI (1/3/23) -> GREG to LAD/D2  · Home meds: aspirin 81mg daily, plavix 75mg daily, atorvastatin 80mg daily, zetia 10mg daily, Toprol-XL 100mg daily, Imdur 30mg daily    PLAN:   · Cardiology following  · C/W home meds (aspirin 81mg daily, plavix 75mg daily, atorvastatin 80mg daily, zetia 10mg daily, Toprol-XL 100mg daily, Imdur 30mg daily)    Hypertrophic cardiomyopathy ([address])  Assessment & Plan  See assessment & plan for chronic diastolic CHF    CKD (chronic kidney disease) stage 4, GFR 15-29 ml/min [facility])  Assessment & Plan    Results from last 7 days   Lab Units 09/26/23  0532 09/25/23  0445 09/24/23  0744 09/22/23  2102   BUN mg/dL 36* 37* 35* 28*   CREATININE mg/dL 3.07* 3.31* 3.00* 2.93*   EGFR ml/min/1.73sq m 17 15 17 18     · Home med: sodium bicarb 650mg BID  · Per notes, h/o Alport Syndrome  · Dejon in Cr 3.0 > 3.3 morning of cath (9/25); was at risk of contrast-induced nephropathy on top of ANTHONY -- per nephro recs, gentle IV fluids 6hrs prior and 6hrs after cath, mucomyst 1200mg PO BID x 4 doses. Held bumex, jardiance, changed gabapentin TID to daily. · Baseline Cr: 2.4-2.7  · Cr trend: 2.93 > 3.00 > 3.31 > 3.07    PLAN:  · Avoid nephrotoxic agents  · Consult nephrology, appreciate recommendations  · Consider continue holding bumex 1mg daily given pt is currently not volume overloaded  · Consider continue hold jardiance -> appreciate nephro recs on this  · Mucomyst 1200mg PO BID for 4 doses; received 2 yesterday  · C/W home med: sodium bicarb 650mg BID  · Monitor BMP, Mg, Phos, I/O, weight    Chronic diastolic congestive heart failure (HCC)  Assessment & Plan  Wt Readings from Last 3 Encounters:   09/24/23 129 kg (283 lb 11.7 oz)   09/11/23 129 kg (285 lb)   05/26/23 127 kg (280 lb)     Net IO Since Admission: -1,078 mL [09/24/23 1259]    Intake/Output Summary (Last 24 hours) at 9/24/2023 1259  Last data filed at 9/24/2023 1121  Gross per 24 hour   Intake 222 ml   Output 1300 ml   Net -1078 ml       · Echo 3/25/2021 60%, severe hypokinesis of the basal-mid inferior wall.  Findings consistent with HCM. Mild-mod MR.  · TTE 1/9/2023: LVEF 45% with severe inferobasal hypokinesia and inferolateral hypokinesia. Mild LVH. · Cardiac MRI 3/29/2021 severe LVH, EF 44%.  Small circumferential pericardial effusion.  Mild MR. Findings suggestive of underlying hypertrophic cardiomyopathy with ischemic scarring of the inferior and inferolateral walls.   · Echo 1/66/4500: EF 79%, systolic function mildly reduced, diastolic function moderately abnormal consistent w/grade II relaxation; hypokinetic basal inferior, mid inferior and mid inferolateral segments. LV cavity dilated, wall thickness is normal. Severe asymmetric hypertrophy of basal septal wall. Mild AR w/mildly dilated aortic root ans ascending aorta. Mild MAC. No significant changes compared to prior study.     ASSESSMENT: currently not fluid overloaded (no JVD, no peripheral edema, no rales/rhonchi on auscultation of lungs)    PLAN:  · Cardiology following   · Monitor VS, weight, I/O  · C/W Home Meds: Toprol-XL 100mg daily, Imdur 30mg daily, bumex 1mg daily    New onset type 2 diabetes mellitus [facility])  Assessment & Plan  Lab Results   Component Value Date    HGBA1C 6.6 (H) 09/24/2023       Recent Labs     09/25/23  1642 09/25/23  1838 09/25/23 2052 09/26/23  0613   POCGLU 72 106 91 93       · (P) 95. 2Previous A1c 5.0 in 2/2023  · D/T elevated glucose >200, rechecked A1c -> 6.6 on 9/24/2023      PLAN:  · Consider continual hold of jardiance vs restarting -> appreciate nephro recs   · C/W SSI algorithm 2    Mixed hyperlipidemia  Assessment & Plan    Lipid       Lab Results   Component Value Date    CHOLESTEROL 206 (H) 01/03/2023    TRIG 348 (H) 01/03/2023    HDL 31 (L) 01/03/2023    LDLCALC 105 (H) 01/03/2023     PLAN:   · C/W home meds: atorvastatin 80 mg daily, ezetimibe 10 mg daily     Morbid obesity with BMI of 45.0-49.9, adult ([address])  Assessment & Plan  · BMI: 46    PLAN:  · Offer lifestyle modification recommendations for diet and exercise  · Consider outpatient weight management referral    Tobacco abuse  Assessment & Plan  · Smokes half pack per day    PLAN:  • Nicotine patch 21 mg  • Smoking cessation recommended    COPD (chronic obstructive pulmonary disease) ([address])  Assessment & Plan  · Home meds: albuterol 2 puffs Q4hrs prn    PLAN:   · Albuterol prn    ELIAZAR (obstructive sleep apnea)  Assessment & Plan  · Home CPAP machine    PLAN:  · CPAP QHS    Agoraphobia  Assessment & Plan  · Home medications: venlafaxine 75 mg daily     PLAN:  · Continue venlafaxine 75mg daily  · Provided 1x dose of ativan today    Social anxiety disorder  Assessment & Plan  · Home meds: venlafaxine 75 mg daily     PLAN:  · Continue venlafaxine 75mg daily  · Provided 1x dose of ativan today        VTE Pharm PPX: d/c heparin per cardio recs; currently on aspirin 81mg, plavix 75mg   VTE [facility] PPX: sequential compression device    Subjective:   Continued to have chest pains OVN. Changed dilaudid to Q6. Patient seen and examined at bedside. Not in any acute distress, but gets teary eyed when talking about her chest pains--"can't handle it anymore", "rather be asleep than in pain." Finished her plate of breakfast this morning. Same CP pattern, currently rates 9/10. Otherwise denies any fevers, chills, N/V, headaches, dizziness, lightheadedness, palpitations, SOB, diarrhea, constipation. Tolerated PO intake --finished entire breakfast plate this morning. Vitals:     Vitals:    09/25/23 2307 09/26/23 0216 09/26/23 0333 09/26/23 0716   BP:    93/57   BP Location:    Right arm   Pulse:    60   Resp:    12   Temp:    97.9 °F (36.6 °C)   TempSrc:    Axillary   SpO2: 97% 98%  96%   Weight:   130 kg (286 lb 9.6 oz)    Height:         Temp:  [97.6 °F (36.4 °C)-98 °F (36.7 °C)] 97.9 °F (36.6 °C)  HR:  [60-69] 60  Resp:  [12-18] 12  BP: ()/(49-68) 93/57  Weight (last 2 days)     Date/Time Weight    09/26/23 0333 130 (286.6)    09/24/23 1415 128 (283)    09/24/23 0350 129 (283.73)          Intake/Output Summary (Last 24 hours) at 9/26/2023 0951  Last data filed at 9/26/2023 0900  Gross per 24 hour   Intake 1426.75 ml   Output 1300 ml   Net 126.75 ml     Invasive Devices     Peripheral Intravenous Line  Duration           Peripheral IV 09/23/23 Left;Ventral (anterior) Forearm 3 days    Peripheral IV 09/23/23 Right;Ventral (anterior) Forearm 3 days                Physical Exam:   Physical Exam  Vitals reviewed. Constitutional:       General: She is not in acute distress. Appearance: She is well-developed. She is obese. She is not diaphoretic. HENT:      Head: Normocephalic and atraumatic. Nose: Nose normal. No congestion or rhinorrhea. Mouth/Throat:      Mouth: Mucous membranes are moist.      Pharynx: Oropharynx is clear. No oropharyngeal exudate or posterior oropharyngeal erythema. Eyes:      General: No scleral icterus. Right eye: No discharge. Left eye: No discharge. Conjunctiva/sclera: Conjunctivae normal.   Neck:      Vascular: No carotid bruit. Cardiovascular:      Rate and Rhythm: Normal rate and regular rhythm. Pulses: Normal pulses. Heart sounds: No murmur heard. No friction rub. No gallop. Pulmonary:      Effort: Pulmonary effort is normal. No respiratory distress. Breath sounds: Normal breath sounds. No stridor. No wheezing, rhonchi or rales. Chest:      Chest wall: No tenderness. Abdominal:      General: Abdomen is flat. Bowel sounds are normal. There is no distension. Palpations: Abdomen is soft. There is no mass. Tenderness: There is no abdominal tenderness. There is no guarding or rebound. Hernia: No hernia is present. Musculoskeletal:      Cervical back: Normal range of motion and neck supple. No rigidity or tenderness. Right lower leg: No edema. Left lower leg: No edema. Lymphadenopathy:      Cervical: No cervical adenopathy. Skin:     General: Skin is warm and dry. Capillary Refill: Capillary refill takes less than 2 seconds. Comments: No erythema, lesions on back skin   Neurological:      General: No focal deficit present. Mental Status: She is alert.             Labs:     CBC:  Results from last 7 days   Lab Units 09/26/23  0532 09/25/23  0445 09/24/23  0744 09/23/23  1832 09/23/23  0017 09/22/23  2102   WBC Thousand/uL 10.29* 14.27* 11.79* 17.21* 16.75* 14.70*   HEMOGLOBIN g/dL 11.0* 11.2* 11.9 12.3 12.6 13.1   HEMATOCRIT % 35.8 36.8 38.8 39.3 39.5 41.1   PLATELETS Thousands/uL 251 256 293 310 316 307   NEUTROS ABS Thousands/µL 7.80* 10.70* 8.77*  --   --  11.77*       CMP:  Results from last 7 days   Lab Units 09/26/23  0532 09/25/23  0445 09/24/23  0744 09/22/23  2102   POTASSIUM mmol/L 4.2 4.5 4.3 3.6   CHLORIDE mmol/L 108 108 111* 104   CO2 mmol/L 20* 18* 21 20*   BUN mg/dL 36* 37* 35* 28*   CREATININE mg/dL 3.07* 3.31* 3.00* 2.93*   CALCIUM mg/dL 9.2 8.8 9.3 8.6   AST U/L  --   --  21  --    ALT U/L  --   --  16  --    ALK PHOS U/L  --   --  107*  --    EGFR ml/min/1.73sq m 17 15 17 18   MAGNESIUM mg/dL 2.0  --   --   --    PHOSPHORUS mg/dL 4.7*  --   --   --        Sepsis:        Micro:       Imaging:   No results found.     Medications:     Current Facility-Administered Medications   Medication Dose Route Frequency   • acetaminophen (TYLENOL) tablet 975 mg  975 mg Oral Q8H [address]   • acetylcysteine (MUCOMYST) 200 mg/mL oral solution 1,200 mg  1,200 mg Oral BID   • albuterol (PROVENTIL HFA,VENTOLIN HFA) inhaler 2 puff  2 puff Inhalation Q4H PRN   • aspirin chewable tablet 81 mg  81 mg Oral Daily   • atorvastatin (LIPITOR) tablet 80 mg  80 mg Oral QPM   • bumetanide (BUMEX) tablet 1 mg  1 mg Oral Daily   • clopidogrel (PLAVIX) tablet 75 mg  75 mg Oral Daily   • ezetimibe (ZETIA) tablet 10 mg  10 mg Oral Daily   • gabapentin (NEURONTIN) capsule 300 mg  300 mg Oral Daily   • HYDROmorphone HCl (DILAUDID) injection 0.2 mg  0.2 mg Intravenous Q6H PRN   • insulin lispro (HumaLOG) 100 units/mL subcutaneous injection 1-5 Units  1-5 Units Subcutaneous 4x Daily (AC & HS)   • isosorbide mononitrate (IMDUR) 24 hr tablet 60 mg  60 mg Oral Daily   • lidocaine (LIDODERM) 5 % patch 3 patch  3 patch Topical Daily   • LORazepam (ATIVAN) tablet 1 mg  1 mg Oral Once   • metoprolol succinate (TOPROL-XL) 24 hr tablet 100 mg  100 mg Oral Daily   • nicotine (NICODERM CQ) 21 mg/24 hr TD 24 hr patch 1 patch  1 patch Transdermal Daily   • nitroglycerin (NITROSTAT) SL tablet 0.4 mg  0.4 mg Sublingual Q5 Min PRN   • ondansetron (ZOFRAN) injection 4 mg  4 mg Intravenous Q4H PRN   • oxyCODONE (ROXICODONE) IR tablet 5 mg  5 mg Oral Q4H PRN   • oxyCODONE (ROXICODONE) split tablet 7.5 mg  7.5 mg Oral Q4H PRN   • polyethylene glycol (MIRALAX) packet 17 g  17 g Oral Daily   • sodium bicarbonate tablet 650 mg  650 mg Oral BID after meals   • venlafaxine (EFFEXOR-XR) 24 hr capsule 75 mg  75 mg Oral Daily With Breakfast       Patient was discussed with [facility] Attending on-call, Dr. Nadege Pierce MD. See attestation above.       Kamran Quiroz DO  PGY-1 Family Medicine  09/26/23

## 2023-09-26 NOTE — DISCHARGE SUMMARY
DISCHARGE SUMMARY - Family Medicine [facility], Rachel Copper 1975, 57 y.o. female. MRN: [id number]    Unit/Bed#: [facility] 837-78 Encounter: [id number]  Primary Care Provider: Ben Hendricks DO      Admission Date: 9/23/2023  Discharge Date: 09/27/23  Length of Stay: 4 days  Diagnosis:   Principal Problem:    Non-STEMI (non-ST elevated myocardial infarction) ([address])  Active Problems:    Tobacco abuse    Chronic diastolic congestive heart failure ([address])    Coronary artery disease of native artery of native heart with stable angina pectoris ([address])    Mixed hyperlipidemia    Morbid obesity with BMI of 45.0-49.9, adult (HCC)    Hypertrophic cardiomyopathy (HCC)    COPD (chronic obstructive pulmonary disease) (HCC)    ELIAZAR (obstructive sleep apnea)    CKD (chronic kidney disease) stage 4, GFR 15-29 ml/min ([facility])    Social anxiety disorder    Agoraphobia    New onset type 2 diabetes mellitus ([address])        ASSESSMENTS & PLANS:   Plans discussed with [facility] team and finalization is pending attending physician attestation. New onset type 2 diabetes mellitus [facility])  Assessment & Plan  Lab Results   Component Value Date    HGBA1C 6.6 (H) 09/24/2023       Recent Labs     09/25/23  1642 09/25/23  1838 09/25/23  2052 09/26/23  0613   POCGLU 72 106 91 93       · (P) 95. 2Previous A1c 5.0 in 2/2023  · D/T elevated glucose >200, rechecked A1c -> 6.6 on 9/24/2023      PLAN:  · Consider continual hold of jardiance vs restarting -> appreciate nephro recs   · C/W SSI algorithm 2    Agoraphobia  Assessment & Plan  · Home medications: venlafaxine 75 mg daily     PLAN:  · Continue venlafaxine 75mg daily  · Provided 1x dose of ativan today    Social anxiety disorder  Assessment & Plan  · Home meds: venlafaxine 75 mg daily     PLAN:  · Continue venlafaxine 75mg daily  · Provided 1x dose of ativan today    CKD (chronic kidney disease) stage 4, GFR 15-29 ml/min (HCC)  Assessment & Plan    Results from last 7 days   Lab Units 09/26/23  0532 09/25/23  0445 09/24/23  0744 09/22/23  2102   BUN mg/dL 36* 37* 35* 28*   CREATININE mg/dL 3.07* 3.31* 3.00* 2.93*   EGFR ml/min/1.73sq m 17 15 17 18     · Home med: sodium bicarb 650mg BID  · Per notes, h/o Alport Syndrome  · Dejon in Cr 3.0 > 3.3 morning of cath (9/25); was at risk of contrast-induced nephropathy on top of ANTHONY -- per nephro recs, gentle IV fluids 6hrs prior and 6hrs after cath, mucomyst 1200mg PO BID x 4 doses. Held bumex, jardiance, changed gabapentin TID to daily.    · Baseline Cr: 2.4-2.7  · Cr trend: 2.93 > 3.00 > 3.31 > 3.07    PLAN:  · Avoid nephrotoxic agents  · Consult nephrology, appreciate recommendations  · Consider continue holding bumex 1mg daily given pt is currently not volume overloaded  · Consider continue hold jardiance -> appreciate nephro recs on this  · Mucomyst 1200mg PO BID for 4 doses; received 2 yesterday  · C/W home med: sodium bicarb 650mg BID  · Monitor BMP, Mg, Phos, I/O, weight    ELIAZAR (obstructive sleep apnea)  Assessment & Plan  · Home CPAP machine    PLAN:  · CPAP QHS    COPD (chronic obstructive pulmonary disease) ([facility])  Assessment & Plan  · Home meds: albuterol 2 puffs Q4hrs prn    PLAN:   · Albuterol prn    Hypertrophic cardiomyopathy ([facility])  Assessment & Plan  See assessment & plan for chronic diastolic CHF    Morbid obesity with BMI of 45.0-49.9, adult ([facility])  Assessment & Plan  · BMI: 46    PLAN:  · Offer lifestyle modification recommendations for diet and exercise  · Consider outpatient weight management referral    Mixed hyperlipidemia  Assessment & Plan    Lipid       Lab Results   Component Value Date    CHOLESTEROL 206 (H) 01/03/2023    TRIG 348 (H) 01/03/2023    HDL 31 (L) 01/03/2023    LDLCALC 105 (H) 01/03/2023     PLAN:   · C/W home meds: atorvastatin 80 mg daily, ezetimibe 10 mg daily     Coronary artery disease of native artery of native heart with stable angina pectoris [facility])  Assessment & Plan  · Last cardio outpatient appointment 5/26/23  · CAD s/p PCI to [facility], RPDA, LAD:  · Inferior STEMI (3/23/21) -> GREG to [facility]  · Recurrent chest pains (3/23/21) -> GREG to RPDA  · Readmission for recurrent inferior STEMI (4/2/21) -> stents patent, no change on cath  · NSTEMI (1/3/23) -> GREG to LAD/D2  · Home meds: aspirin 81mg daily, plavix 75mg daily, atorvastatin 80mg daily, zetia 10mg daily, Toprol-XL 100mg daily, Imdur 30mg daily    PLAN:   · Cardiology following  · C/W home meds (aspirin 81mg daily, plavix 75mg daily, atorvastatin 80mg daily, zetia 10mg daily, Toprol-XL 100mg daily, Imdur 30mg daily)    Chronic diastolic congestive heart failure (HCC)  Assessment & Plan  Wt Readings from Last 3 Encounters:   09/24/23 129 kg (283 lb 11.7 oz)   09/11/23 129 kg (285 lb)   05/26/23 127 kg (280 lb)     Net IO Since Admission: -1,078 mL [09/24/23 1259]    Intake/Output Summary (Last 24 hours) at 9/24/2023 1259  Last data filed at 9/24/2023 1121  Gross per 24 hour   Intake 222 ml   Output 1300 ml   Net -1078 ml       · Echo 3/25/2021 60%, severe hypokinesis of the basal-mid inferior wall.  Findings consistent with HCM. Mild-mod MR.  · TTE 1/9/2023: LVEF 45% with severe inferobasal hypokinesia and inferolateral hypokinesia. Mild LVH. · Cardiac MRI 3/29/2021 severe LVH, EF 44%.  Small circumferential pericardial effusion.  Mild MR. Findings suggestive of underlying hypertrophic cardiomyopathy with ischemic scarring of the inferior and inferolateral walls.   · Echo 7/35/7749: EF 13%, systolic function mildly reduced, diastolic function moderately abnormal consistent w/grade II relaxation; hypokinetic basal inferior, mid inferior and mid inferolateral segments. LV cavity dilated, wall thickness is normal. Severe asymmetric hypertrophy of basal septal wall. Mild AR w/mildly dilated aortic root ans ascending aorta. Mild MAC. No significant changes compared to prior study.     ASSESSMENT: currently not fluid overloaded (no JVD, no peripheral edema, no rales/rhonchi on auscultation of lungs)    PLAN:  · Cardiology following   · Monitor VS, weight, I/O  · C/W Home Meds: Toprol-XL 100mg daily, Imdur 30mg daily, bumex 1mg daily    Tobacco abuse  Assessment & Plan  · Smokes half pack per day    PLAN:  • Nicotine patch 21 mg  • Smoking cessation recommended    * Non-STEMI (non-ST elevated myocardial infarction) ([address])  Assessment & Plan  · 9/22/23: [facility] ED for left-sided chest pain radiating to back and right side. +SOB, weakness, dizziness, nausea   · Troponin: 3800s peak  · EKG: Normal sinus rhythm, Inferior infarct (cited on or before 07-JAN-2023), Anteroseptal infarct (cited on or before 07-JAN-2023), Inverted T waves have replaced nonspecific T wave abnormality in V6  · Tx:  mg load, heparin gtt  · Cardiology consulted, recommended transfer to [facility] for further management, arrived 9/23/22   · Repeat EKG & trops on adm d/t continued chest pain  · Repeat EKG 9/25 AM per nurse concerns about ongoing chest pains; unchanged EKG from one on admission, NSR  · 9/25 cath: 100% occlusion of LAD, likely culprit of MI, unable to re-stent, 80% stenosis of left circumflex    · ASSESSMENT: MI w/100% obstruction of LAD as culprit, unable to re-stent. Continual L chest pain radiating to R and back. Currently rates it 9/10 pain (adm 10/10). Nitro held d/t soft BP. Ranexa held d/t renal function. Currently prn oxy 5 and 7.5 and dilaudid for pain control and scheduled tylenol 975mg Q8.     PLAN:  · Appreciate cardiology recommendations:   · Cautious hydration given renal status and elevated end LVEDP  · Cont GDMT optimization for CAD  · Aggressive risk factor reduction  ·  on diet/lifestyle modification, referral to  Weight Mgmt program  ·  on smoking cessation, referral to  Smoking Cessation program  · Cardiac rehab upon discharge  · Consider other routes of chest pain control; currently on Oxy 5mg, 7.5mg; Dilaudid 0.2mg prn; nitroglycerin 0.4mg prn (however, has had soft BP 90s/50s) per cardio recs   · Heparin gtt d/c last night per cardio recs  · C/W home meds: aspirin 81mg daily, plavix 75mg daily, atorvastatin 80mg daily, zetia 10mg daily; Toprol-XL 100mg daily, Imdur 30mg daily, bumex 1mg daily  · Cardiovascular diet  · Continue telemetry monitoring  · Follow BMP, Mg, Phos; maintain K>4.0, Mg>2.0      Patient Active Problem List   Diagnosis   • Chest pain in adult   • Essential hypertension   • Tobacco abuse   • Chronic diastolic congestive heart failure (HCC)   • Major depressive disorder with current active episode   • Anxiety   • Coronary artery disease of native artery of native heart with stable angina pectoris ([facility])   • Lump of left breast   • Non-Hodgkin lymphoma of lymph nodes of multiple regions ([address])   • Mixed hyperlipidemia   • Morbid obesity with BMI of 45.0-49.9, adult ([facility])   • Cervical cyst   • Nabothian cyst   • Pelvic floor dysfunction   • Cervical motion tenderness   • Iron deficiency anemia secondary to inadequate dietary iron intake   • Proteinuria   • Hypertrophic cardiomyopathy ([facility])   • COPD (chronic obstructive pulmonary disease) ([facility])   • Chronic kidney disease-mineral and bone disorder   • Chronic tubulo-interstitial nephritis   • Sinus pause   • ELIAZAR (obstructive sleep apnea)   • Nocturnal hypoxemia   • Elevated brain natriuretic peptide (BNP) level   • Benign hypertension with CKD (chronic kidney disease) stage III ([facility])   • CKD (chronic kidney disease) stage 4, GFR 15-29 ml/min ([facility])   • Chronic fatigue   • Alport syndrome   • Social anxiety disorder   • Acute renal failure superimposed on stage 4 chronic kidney disease ([facility])   • Secondary hyperparathyroidism of renal origin ([address])   • S/P drug eluting coronary stent placement   • Agoraphobia   • Non-STEMI (non-ST elevated myocardial infarction) ([address])   • New onset type 2 diabetes mellitus (HCC)         HPI (per admission H&P note on 9/23/2023)     HPI:   Huan Butt is a 57 y.o. female with a past medical history of multiple prior STEMIs with stenting, prior NSTEMI and [address] with cardiac pacemaker placed in 2021, CKD stage IV, and ELIAZAR. She presented to [facility] ED with 10/10 "clawing and sharp" left sided chest pain that radiates to the back and right side of the chest. Pain began around 5 pm on 9/22 and did not respond to 3 doses of nitroglycerine, prompting ER visit. Patient also endorses accompanying shortness of breath, weakness, dizziness, and nausea but denies any loss of consciousness. Patient follows with cardiology and denies missing any doses of her home medications. She states she has been using nitroglycerin about every other day at home for intermittent chest pain.      ED Management at [address] [place] [address]:   Vitals: T: 98.2 HR: 100 BP: 182/79  99%  EKG showed T wave abnormalities in V6, changed from prior EKG, no stemi. Troponin: 50-> 695->2,532->3,800->3,375  WBC: 14.7  Hgb:13.1  ED Medications: Aspirin loading dose and started on heparin drip. Home medications continued. Fentanyl and lorazepam given for pain and anxiety. Patient diagnosed with NSTEMI and transferred to [place] for further management.        Patient transferred to [facility] and seen at bedside. Currently patient endorses continued unchanged chest pain but states she feels tired and confused from receiving ativan before transport to [place]. HOSPITAL COURSE:     Hospital Course:   57 y.o. female (w/PMHx of h/o multiple prior MI's with stenting (RPDA, LAD), hypertrophic cardiomyopathy, diastolic HF w/cardiac pacemaker placed in 2021, CKD stage IV, and ELIAZAR) presenting from [address] [place] [address] 151 on 9/23/2023 for persistent 10/10 left-sided chest pains that radiated to back and right side and admitted for possible NSTEMI w/troponins peaking at 3850. EKG showed no changes besides T wave abnormalities in V6. Pt was loaded on aspirin 325mg and heparin gtt was started while at [address] [place] [address].  Cardiology was consulted, recommended transfer to [facility] for further management. Pt [facility]. Home meds including aspirin, plavix, atorvastatin, zetia, bumex, toprol xl, imdur, sodium bicarb, gabapentin, effexor were continued. Hemodynamically stable. Cardiology evaluated her and planned for cath the next day--given that pt had Cr 2.9, CKD4, pt was at very high risk for contrast-induced nephropathy, nephrology was consulted. They recommended gentle hydration 75mL/hr 6hrs prior and 6hrs after cath. Also recommended holding bumex on day of cath. There was an increase of her Cr from 3.0 to 3.3 on day of planned cath, but given her continual chest pain that was only controlled by dilaudid, and after discussion risk/benefits with pt, nephrology recommended proceeding with cath, adding on mucomyst 1200mg PO BID for 4 doses, holding jardiance and change gabapentin 300mg TID to daily. Pt on admission was found to have elevated glucose. Her A1c was 5.0 earlier this year; but given her significant atherosclerotic disease, A1c was rechecked, found to be 6.6. 9/24 had started her on jardiance, given her heart failure, as well as SSI. However, the next day (day of cath), Cr bumped from 3.0 to 3.3, which was when nephro recommended holding jardiance. Otherwise glucose has been controlled with SSI.     9/25 cardiac cath showed 100% stenosis of LAD and 80% stenosis of left circumflex. LAD could not be re-stented. Pt was updated with best friend (sister-in-law) by her side. Her chest pain has continued throughout her stay, only controlled with dilaudid 0.2mg. Oxycodone did not help. Pt was off ranexa initially d/t renal function. Could not do nitroglycerin given her soft BP's; pt had also tried nitro x3 doses prior to admission without alleviation of pain. 9/26 cardiology restarted her on ranexa given her Cr had improved to 3.0.  Due to her softer BP's (90s/60s) on that day, also adjusted her meds (Toprol XL 100mg -> 50mg BID, Imdur 60mg daily -> 30mg BID). On 9/27 pt was cleared for discharge to home with medication changes, cardiac rehab, and plans for close follow up. Nephrology requested to keep patient an additional day at Cr bumped from 3.0 to 3.37 on 9/27. Pt declined staying any longer in the hospital, stating she would leave AMA if she had to. Nephrology was made aware and was agreeable to discharge on 9/27 with instructions to discontinue jardiance and bumex, and felicia a repeat BMP in 1 week. Patient may resume bumex every other day if she begins gaining weight. Patient was discharged to home on 9/27 with referral to cardiac rehab and instructions for medication changes and outpatient follow up. Otherwise pt used CPAP nightly, was on RA during day. Tolerated oral intake. COMPLICATIONS:     Complications: NONE       PROCEDURES:     Procedures Performed:   Orders Placed This Encounter   Procedures   • Cardiac catheterization         SIGNIFICANT FINDINGS / ABNORMAL RESULTS:     Significant Findings/Abnormal Results with this admission:  See cardiac catheterization report    No results found.       VITALS ON DISCHARGE DATE:     Vitals  Blood Pressure: 103/62 (09/27/23 1039)  Temperature: 97.8 °F (36.6 °C) (09/27/23 1039)  Temp Source: Oral (09/27/23 1039)  Pulse: 66 (09/27/23 1039)  Respirations: 20 (09/27/23 1039)  SpO2: 95 % (09/27/23 1039)  Height: 5' 6" (167.6 cm) (09/24/23 1415)  Weight - Scale: 130 kg (286 lb 9.6 oz) (09/26/23 0333)    Temp:  [97.2 °F (36.2 °C)-98.1 °F (36.7 °C)] 97.8 °F (36.6 °C)  HR:  [62-86] 66  Resp:  [20-23] 20  BP: ()/(60-73) 103/62    Weight (last 2 days)     Date/Time Weight    09/26/23 0333 130 (286.6)            Intake/Output Summary (Last 24 hours) at 9/27/2023 1225  Last data filed at 9/27/2023 1039  Gross per 24 hour   Intake 340 ml   Output 1200 ml   Net -860 ml       Invasive Devices     None                   PHYSICAL EXAM ON DAY OF DISCHARGE:       Physical Exam: General: Pt observed lying comfortably in bed, NAD. Not toxic/ill-appearing. No cachectic or diaphoresis. No obvious sign of trauma or bleeding. Psych: AAOx4, able to converse appropriately. Denies SH/SI. Neuro: no gross neurological deficits, CN 2-12 grossly intact  Head: atraumatic, normocephalic. Eyes: open spontaneously, EOM intact, FALLON, conjunctiva non-injected, no scleral icterus, no discharge. Ear: normal external ear, no visible drainage at external auditory orifice  Nose: clear, no epistaxis, no rhinorrhea. Throat: clear, no hoarse voice, no cough. Neck: supple, normal ROM. Heart: RRR, no murmur/distant heart sound appreciated. Lungs: LCTABL, nml respiratory effort, no agonal/labored breathing, no accessory muscle use. Abdomen: soft, nontender, nondistended, normal bowel sound  Extremities: +4 strengths b/l. Strong radial/pedal pulses. No weakness/paresthesia/edema. CONDITION AT DISCHARGE:   On day of discharge patient is seen and evaluated at bedside. Patient is stable and without concern. Patient denies any pain or SOB. Patient able to tolerate PO food without N/V/D and had bowel movement and baseline urine output. Patient able to ambulate independently without assistance. Patient is aware of current health status and understand plan of treatment and outpatient follow-up. The patient understood and agreed with the plan. All pertinent lab results, imaging studies, procedures, and/or any incidental findings have been disclosed to the patient. All pertinent questions are answered to patient's satisfaction. On day of discharge, the patient was hemodynamically stable and appropriate for discharge home. Condition at Discharge: stable       DISCHARGE MEDICATIONS:     Discharge Medications:  See after visit summary (AVS) for detailed reconciled discharge medications, which was provided to patient and family. Summary of medication changes made with this admission:    · START:  1.  Ranexa 500mg every 12 hours     · STOP:   1. Jardiance   2. Bumex (can restart every other day if gaining weight)    · CHANGE:   1.Switch Toprol from 100mg every day to 50mg twice a day    2. Increase Imdur from 30mg once a day to 30mg twice a day             FOLLOW-UP APPOINTMENTS / INSTRUCTION :     Important Physician Related Follow Up:     Appointment confirmed:  Future Appointments   Date Time Provider [address]   10/2/2023  1:15 PM [facility] [place] [facility]   10/13/2023 11:20 [facility]   10/19/2023  1:40 PM JALIL Andrew [facility]   1/2/2024  1:15 PM [facility] [place] [facility] [place] [id number] [facility]       Discharge instructions/Information to patient and family:   See after visit summary (AVS) for information provided to patient and family. Provisions for Follow-Up Care:  See after visit summary for information related to follow-up care and any pertinent home health orders. DISPOSITION:     Disposition: Home    Discharge Statement   I spent 30  minutes discharging the patient. This time was spent on the day of discharge. I had direct contact with the patient on the day of discharge. Additional documentation is required if more than 30 minutes were spent on discharge. Planned Readmission: Yadira Guerrero DO  PGY-3, Family Medicine  09/27/23  12:25 PM    Dear reader, please be aware that portions of my note contain dictated text. I have done my best to proof-read this note prior to signing. However, there may be occasional unnoticed errors pertaining to "sound-alike" words and/or grammar during my dictation process. If there is any words or information that is unclear or appears erroneous, please kindly let me know and I will clarify and/or addend my notes accordingly. Thank you for your understanding.

## 2023-09-27 ENCOUNTER — TRANSITIONAL CARE MANAGEMENT (OUTPATIENT)
Dept: FAMILY MEDICINE CLINIC | Facility: CLINIC | Age: 48
End: 2023-09-27

## 2023-09-27 VITALS
HEART RATE: 66 BPM | WEIGHT: 286.6 LBS | BODY MASS INDEX: 46.06 KG/M2 | HEIGHT: 66 IN | RESPIRATION RATE: 20 BRPM | OXYGEN SATURATION: 99 % | DIASTOLIC BLOOD PRESSURE: 62 MMHG | TEMPERATURE: 99.5 F | SYSTOLIC BLOOD PRESSURE: 103 MMHG

## 2023-09-27 LAB
ANION GAP SERPL CALCULATED.3IONS-SCNC: 8 MMOL/L
BUN SERPL-MCNC: 40 MG/DL (ref 5–25)
CALCIUM SERPL-MCNC: 9.5 MG/DL (ref 8.4–10.2)
CHLORIDE SERPL-SCNC: 108 MMOL/L (ref 96–108)
CO2 SERPL-SCNC: 21 MMOL/L (ref 21–32)
CREAT SERPL-MCNC: 3.37 MG/DL (ref 0.6–1.3)
GFR SERPL CREATININE-BSD FRML MDRD: 15 ML/MIN/1.73SQ M
GLUCOSE SERPL-MCNC: 78 MG/DL (ref 65–140)
GLUCOSE SERPL-MCNC: 80 MG/DL (ref 65–140)
GLUCOSE SERPL-MCNC: 81 MG/DL (ref 65–140)
MAGNESIUM SERPL-MCNC: 2.3 MG/DL (ref 1.9–2.7)
PHOSPHATE SERPL-MCNC: 4.7 MG/DL (ref 2.7–4.5)
POTASSIUM SERPL-SCNC: 4.3 MMOL/L (ref 3.5–5.3)
SODIUM SERPL-SCNC: 137 MMOL/L (ref 135–147)

## 2023-09-27 PROCEDURE — 80048 BASIC METABOLIC PNL TOTAL CA: CPT | Performed by: INTERNAL MEDICINE

## 2023-09-27 PROCEDURE — 97163 PT EVAL HIGH COMPLEX 45 MIN: CPT

## 2023-09-27 PROCEDURE — NC001 PR NO CHARGE: Performed by: INTERNAL MEDICINE

## 2023-09-27 PROCEDURE — 82948 REAGENT STRIP/BLOOD GLUCOSE: CPT

## 2023-09-27 PROCEDURE — 97166 OT EVAL MOD COMPLEX 45 MIN: CPT

## 2023-09-27 PROCEDURE — 99232 SBSQ HOSP IP/OBS MODERATE 35: CPT | Performed by: INTERNAL MEDICINE

## 2023-09-27 PROCEDURE — 84100 ASSAY OF PHOSPHORUS: CPT

## 2023-09-27 PROCEDURE — 83735 ASSAY OF MAGNESIUM: CPT

## 2023-09-27 PROCEDURE — 99238 HOSP IP/OBS DSCHRG MGMT 30/<: CPT | Performed by: FAMILY MEDICINE

## 2023-09-27 RX ORDER — ISOSORBIDE MONONITRATE 30 MG/1
30 TABLET, EXTENDED RELEASE ORAL 2 TIMES DAILY
Qty: 60 TABLET | Refills: 0 | Status: SHIPPED | OUTPATIENT
Start: 2023-09-27

## 2023-09-27 RX ORDER — OXYCODONE HYDROCHLORIDE AND ACETAMINOPHEN 5; 325 MG/1; MG/1
2 TABLET ORAL EVERY 6 HOURS PRN
Qty: 56 TABLET | Refills: 0 | Status: SHIPPED | OUTPATIENT
Start: 2023-09-27 | End: 2023-10-04

## 2023-09-27 RX ORDER — METOPROLOL SUCCINATE 50 MG/1
50 TABLET, EXTENDED RELEASE ORAL 2 TIMES DAILY
Qty: 60 TABLET | Refills: 0 | Status: SHIPPED | OUTPATIENT
Start: 2023-09-27

## 2023-09-27 RX ORDER — OXYCODONE HYDROCHLORIDE AND ACETAMINOPHEN 5; 325 MG/1; MG/1
2 TABLET ORAL EVERY 6 HOURS PRN
Qty: 60 TABLET | Refills: 0 | OUTPATIENT
Start: 2023-09-27

## 2023-09-27 RX ADMIN — EZETIMIBE 10 MG: 10 TABLET ORAL at 08:18

## 2023-09-27 RX ADMIN — CLOPIDOGREL BISULFATE 75 MG: 75 TABLET ORAL at 08:19

## 2023-09-27 RX ADMIN — METOPROLOL SUCCINATE 50 MG: 50 TABLET, EXTENDED RELEASE ORAL at 08:18

## 2023-09-27 RX ADMIN — Medication 7.5 MG: at 08:23

## 2023-09-27 RX ADMIN — HYDROMORPHONE HYDROCHLORIDE 0.2 MG: 0.2 INJECTION, SOLUTION INTRAMUSCULAR; INTRAVENOUS; SUBCUTANEOUS at 06:21

## 2023-09-27 RX ADMIN — RANOLAZINE 500 MG: 500 TABLET, FILM COATED, EXTENDED RELEASE ORAL at 08:18

## 2023-09-27 RX ADMIN — ACETAMINOPHEN 975 MG: 325 TABLET, FILM COATED ORAL at 05:16

## 2023-09-27 RX ADMIN — VENLAFAXINE HYDROCHLORIDE 75 MG: 75 CAPSULE, EXTENDED RELEASE ORAL at 08:23

## 2023-09-27 RX ADMIN — BUMETANIDE 1 MG: 1 TABLET ORAL at 08:19

## 2023-09-27 RX ADMIN — ASPIRIN 81 MG CHEWABLE TABLET 81 MG: 81 TABLET CHEWABLE at 08:18

## 2023-09-27 RX ADMIN — SODIUM BICARBONATE 650 MG TABLET 650 MG: at 08:18

## 2023-09-27 RX ADMIN — Medication 7.5 MG: at 05:16

## 2023-09-27 RX ADMIN — GABAPENTIN 300 MG: 300 CAPSULE ORAL at 08:18

## 2023-09-27 RX ADMIN — ISOSORBIDE MONONITRATE 30 MG: 30 TABLET, EXTENDED RELEASE ORAL at 08:19

## 2023-09-27 NOTE — OCCUPATIONAL THERAPY NOTE
Occupational Therapy Evaluation     Patient Name: Logan Chatman  Today's Date: 9/27/2023  Problem List  Principal Problem:    Non-STEMI (non-ST elevated myocardial infarction) [facility])  Active Problems:    Tobacco abuse    Chronic diastolic congestive heart failure ([address])    Coronary artery disease of native artery of native heart with stable angina pectoris ([address])    Mixed hyperlipidemia    Morbid obesity with BMI of 45.0-49.9, adult ([address])    Hypertrophic cardiomyopathy (HCC)    COPD (chronic obstructive pulmonary disease) ([facility])    ELIAZAR (obstructive sleep apnea)    CKD (chronic kidney disease) stage 4, GFR 15-29 ml/min (HCC)    Social anxiety disorder    Agoraphobia    New onset type 2 diabetes mellitus ([address])    Past Medical History  Past Medical History:   Diagnosis Date    Anemia     Anxiety     CAD (coronary artery disease)     Cancer ([address]) 2008    Cardiomyopathy ([address])     CHF (congestive heart failure) (HCC)     Chronic kidney disease     COPD (chronic obstructive pulmonary disease) ([facility])     scheduled for sleep apnea test soon after pacemaker implant    Coronary artery disease     Depression     History of chemotherapy     non hodgkins lymphoma 2008    Hyperlipemia     Hypertension     Hypertrophic cardiomyopathy (HCC)     Lymphoma, non-Hodgkin's (HCC)     Myocardial infarction ([address])     Non-ST elevation MI (NSTEMI) 01/02/2023    Obesity     Obstructive sleep apnea     Olecranon bursitis, right elbow 7/11/2022    SSS (sick sinus syndrome) ([address])     s/p medtronic dual chamber pacemaker 5/25/2021    Tobacco abuse     Ventricular tachycardia, non-sustained ([facility])     Wears glasses      Past Surgical History  Past Surgical History:   Procedure Laterality Date    CARDIAC CATHETERIZATION N/A 1/3/2023    Procedure: Cardiac catheterization;  Surgeon: Huan Osborne MD;  [facility]: [facility];   Service: Cardiology    CARDIAC CATHETERIZATION N/A 1/3/2023    Procedure: Cardiac Coronary Angiogram;  Surgeon: Kary Johnson Samantha Gusman MD;  [facility]: [facility]; Service: Cardiology    CARDIAC CATHETERIZATION N/A 1/3/2023    Procedure: Cardiac pci;  Surgeon: González Santillan MD;  [facility]: [facility]; Service: Cardiology    CARDIAC CATHETERIZATION Left 2023    Procedure: Cardiac Left Heart Cath;  Surgeon: Philipp Hall DO;  [facility]; Service: Cardiology    CARDIAC CATHETERIZATION  2023    Procedure: Cardiac catheterization;  Surgeon: Philipp Hall DO;  [facility]; Service: Cardiology    CARDIAC CATHETERIZATION N/A 2023    Procedure: Cardiac Coronary Angiogram;  Surgeon: Philipp Hall DO;  [facility]; Service: Cardiology    CARDIAC CATHETERIZATION N/A 2023    Procedure: Cardiac pci;  Surgeon: Philipp Hall DO;  [facility]; Service: Cardiology    CARDIAC PACEMAKER PLACEMENT Left 2021    Procedure: DUAL LEAD PACEMAKER IMPLANT;  Surgeon: Nba Delgado MD;  Location: [address] [place] [address];  Service: Cardiology    CAROTID STENT       SECTION      CORONARY ANGIOPLASTY WITH STENT PLACEMENT  2021    GREG mid RCA and GREG right PDA    DENTAL SURGERY      IR BIOPSY KIDNEY RANDOM  10/18/2021           09/27/23 0820   OT Last Visit   OT Visit Date 23   Note Type   Note type Evaluation   Pain Assessment   Pain Assessment Tool 0-10   Pain Score 10 - Worst Possible Pain   Pain Location/Orientation Location: Chest   Patient's Stated Pain Goal No pain   Hospital Pain Intervention(s) Repositioned; Emotional support; Ambulation/increased activity   Restrictions/Precautions   Weight Bearing Precautions Per Order No   Other Precautions Telemetry   Home Living   Type of [address] [place] [address]  Multi-level;Stairs to enter with rails; Able to live on main level with bedroom/bathroom; Performs ADLs on one level  (5-10 payam)   Bathroom Shower/Tub Walk-in shower   Bathroom Toilet Standard   Bathroom Accessibility Accessible   Home Equipment Walker; Wheelchair-manual  (used manual wc, furniture walked where wc does not fit in the home (bathroom). )   Prior Function   Level of [place] Needs assistance with ADLs; Needs assistance with IADLS;Modified independent with wheelchair   Lives With Family   Receives Help From Family   IADLs Independent with meal prep; Independent with medication management; Family/Friend/Other provides transportation   Falls in the last 6 months 0   Vocational On disability   Lifestyle   Autonomy Pta, pt rq a W ADL/IADL, wc for mobility, was able to perform FM short distances (furniture walking). - , family provides transportation as needed. Reciprocal Relationships supportive family who can assist   Service to Others on disability   Intrinsic Gratification spending time with family. Subjective   Subjective "I am doing ok"   ADL   Where Assessed Edge of bed   Eating Assistance 7  Independent   Grooming Assistance 7  Independent   UB Bathing Assistance 6  Modified Independent   LB Bathing Assistance 5  Supervision/Setup   UB Dressing Assistance 6  Modified independent   [facility]  5  Supervision/Setup   Functional Assistance 5  Supervision/Setup   Bed Mobility   Supine to Sit 5  Supervision   Additional items Assist x 1; Increased time required;Verbal cues   Sit to Supine 5  Supervision   Additional Comments found and left in bed per pts request with all needs in reach   Transfers   Sit to Stand 5  Supervision   Stand to Sit 5  Supervision   Additional Comments rw   Functional Mobility   Functional Mobility 5  Supervision   Additional Comments EOB>bath and back. pt reports this is her baseline level of FM distance.    Additional items Rolling walker   Balance   Static Sitting Good   Dynamic Sitting Fair +   Static Standing Fair   Dynamic Standing Fair -   Ambulatory Fair -   Activity Tolerance   Activity Tolerance Patient tolerated treatment well   Medical Staff Made Aware DPT Idalmis 2' pts med complexity, comorbidities and regression from baseline. Nurse Made Aware ok per RN   RUE Assessment   RUE Assessment WFL   LUE Assessment   LUE Assessment WFL   Hand Function   Gross Motor Coordination Functional   Fine Motor Coordination Functional   Psychosocial   Psychosocial (WDL) WDL   Cognition   Overall Cognitive Status WFL   Arousal/Participation Alert; Responsive; Cooperative   Attention Within functional limits   Orientation Level Oriented X4   Memory Within functional limits   Following Commands Follows all commands and directions without difficulty   Comments pleasant and cooperative,overall fair safety awareness and insight to condition t/o session. Assessment   Prognosis Good   Assessment Pt is a 57 y.o. female admitted 9/24/23 w chest pain. Final reason for admission is non-STEMI. Pt w active OT eval and treat orders at this time. PMH includes  has a past medical history of Anemia, Anxiety, CAD (coronary artery disease), Cancer ([address]), Cardiomyopathy ([address]), CHF (congestive heart failure) ([address]), Chronic kidney disease, COPD (chronic obstructive pulmonary disease) ([address]), Coronary artery disease, Depression, History of chemotherapy, Hyperlipemia, Hypertension, Hypertrophic cardiomyopathy ([address]), Lymphoma, non-Hodgkin's ([address]), Myocardial infarction ([address]), Non-ST elevation MI (NSTEMI), Obesity, Obstructive sleep apnea, Olecranon bursitis, right elbow, SSS (sick sinus syndrome) ([address]), Tobacco abuse, Ventricular tachycardia, non-sustained ([address]), and Wears glasses. Pt lives w family in a multi level home with 5-10 JAYLEEN, lives on first floor with walk in shower with standard toilet. Pta, pt was independent w/ ADL/IADL and  Used wc for functional mobility (if wc cannot fit, furniture walks), was not driving. Currently, pt is mod I for UB ADL, s For LB ADL and completed transfers/FM w S w RW for support.  From OT standpoint, pt is functioning at baseline level and does not appear to require additional acute OT at this time. Discussed pt's comfort with d/c from OT and any concerns with returning to previous environment; pt does not report any at this time. The patient's raw score on the AM-PAC Daily Activity inpatient short form is 22, standardized score is 47.1, greater than 39.4. Patients at this level are likely to benefit from discharge to home. Please refer to the recommendation of the Occupational Therapist for safe discharge planning. From OT perspective, pt should D/C HOME when medically stable. Acute OT no longer rq at this time, D/C OT. Goals   Patient Goals get home   Recommendation   OT Discharge Recommendation No rehabilitation needs   AM-PAC Daily Activity Inpatient   Lower Body Dressing 3   Bathing 3   Toileting 4   Upper Body Dressing 4   Grooming 4   Eating 4   Daily Activity Raw Score 22   Daily Activity Standardized Score (Calc for Raw Score >=11) 47. 1   AM-PAC Applied Cognition Inpatient   Following a Speech/Presentation 4   Understanding Ordinary Conversation 4   Taking Medications 4   Remembering Where Things Are Placed or Put Away 4   Remembering List of 4-5 Errands 4   Taking Care of Complicated Tasks 4   Applied Cognition Raw Score 24   Applied Cognition Standardized Score 62.21   Modified [place] Scale   Modified [place] Scale 3   End of Consult   Education Provided Yes   Patient Position at End of Consult Bedside chair; All needs within reach   Nurse Communication Nurse aware of consult       TERRI Rodriguez, OTR/L

## 2023-09-27 NOTE — PROGRESS NOTES
Cardiology Team 2 Progress Note - Maliha Chacon 57 y.o. female MRN: [id number]    Unit/Bed#: [facility] 508-01 Encounter: [id number]          Subjective:   Patient seen and examined. No significant events overnight. Pt continues to endorse 9/10 chest pain. Denies lightheadedness, dizziness, syncope, headache, vision changes, diaphoresis, chest pain, palpitations, shortness of breath, PND, orthopnea, nausea, vomiting, abdominal pain or lower extremity edema. Hospital Course:   Maliha Chacon is a 57y.o. year old female with a history of multiple MIs presented to the ED d/t chest pain     Vitals: Blood pressure 110/64, pulse 69, temperature 98.1 °F (36.7 °C), temperature source Oral, resp. rate (!) 23, height 5' 6" (1.676 m), weight 130 kg (286 lb 9.6 oz), SpO2 96 %, not currently breastfeeding., Body mass index is 46.26 kg/m².,       Intake/Output Summary (Last 24 hours) at 9/27/2023 0844  Last data filed at 9/27/2023 0831  Gross per 24 hour   Intake 458 ml   Output 700 ml   Net -242 ml       Review of System:  Review of system was conducted and was negative except for as stated in the subjective course.     Physical Exam:    GEN: Maliha Chacon appears well, alert and oriented x 3, pleasant and cooperative   HEENT:  Normocephalic, atraumatic, anicteric, moist mucous membranes  NECK: No JVD or carotid bruits   HEART: regular rhythm, regular rate, normal S1 and S2, no murmurs, clicks, gallops or rubs   LUNGS: Clear to auscultation bilaterally; no wheezes, rales, or rhonchi; respiration nonlabored   ABDOMEN:  Normoactive bowel sounds, soft, no tenderness, no distention  EXTREMITIES: peripheral pulses palpable; no edema  NEURO: no gross focal findings; cranial nerves grossly intact   SKIN:  Dry, intact, warm to touch      Current Facility-Administered Medications:   •  acetaminophen (TYLENOL) tablet 975 mg, 975 mg, Oral, Q8H [address], Charlee Avalos, DO, 975 mg at 09/27/23 0516  •  albuterol (PROVENTIL HFA,VENTOLIN HFA) inhaler 2 puff, 2 puff, Inhalation, Q4H PRN, Ruben Navarrete MD  •  aspirin chewable tablet 81 mg, 81 mg, Oral, Daily, Ruben Navarrete MD, 81 mg at 09/27/23 0818  •  atorvastatin (LIPITOR) tablet 80 mg, 80 mg, Oral, QPM, Ruben Navarrete MD, 80 mg at 09/26/23 1743  •  bumetanide (BUMEX) tablet 1 mg, 1 mg, Oral, Daily, Pari Jones MD, 1 mg at 09/27/23 2475  •  clopidogrel (PLAVIX) tablet 75 mg, 75 mg, Oral, Daily, Ruben Navarrete MD, 75 mg at 09/27/23 7715  •  ezetimibe (ZETIA) tablet 10 mg, 10 mg, Oral, Daily, Ruben Navarrete MD, 10 mg at 09/27/23 0818  •  gabapentin (NEURONTIN) capsule 300 mg, 300 mg, Oral, Daily, Erika Nelson DO, 300 mg at 09/27/23 0818  •  HYDROmorphone HCl (DILAUDID) injection 0.2 mg, 0.2 mg, Intravenous, Q6H PRN, Talisha Aldana MD, 0.2 mg at 09/27/23 0298  •  insulin lispro (HumaLOG) 100 units/mL subcutaneous injection 1-5 Units, 1-5 Units, Subcutaneous, 4x Daily (AC & HS) **AND** Fingerstick Glucose (POCT), , , 4x Daily AC and at bedtime, Talisha Aldana MD  •  isosorbide mononitrate (IMDUR) 24 hr tablet 30 mg, 30 mg, Oral, BID, Criss Mack MD, 30 mg at 09/27/23 9696  •  lidocaine (LIDODERM) 5 % patch 3 patch, 3 patch, Topical, Daily, Ruben Navarrete MD, 3 patch at 09/26/23 9318  •  LORazepam (ATIVAN) tablet 1 mg, 1 mg, Oral, Once, Christiano Cano DO  •  metoprolol succinate (TOPROL-XL) 24 hr tablet 50 mg, 50 mg, Oral, BID, Criss Mack MD, 50 mg at 09/27/23 0818  •  nicotine (NICODERM CQ) 21 mg/24 hr TD 24 hr patch 1 patch, 1 patch, Transdermal, Daily, Ruben Navarrete MD, 1 patch at 09/26/23 0921  •  nitroglycerin (NITROSTAT) SL tablet 0.4 mg, 0.4 mg, Sublingual, Q5 Min PRN, Ruben Navarrete MD, 0.4 mg at 09/25/23 0857  •  ondansetron (ZOFRAN) injection 4 mg, 4 mg, Intravenous, Q4H PRN, Ruben Navarrete MD, 4 mg at 09/24/23 5250  •  oxyCODONE (ROXICODONE) IR tablet 5 mg, 5 mg, Oral, Q4H PRN, Erika Nelson DO, 5 mg at 09/26/23 0093  •  oxyCODONE (ROXICODONE) split tablet 7.5 mg, 7.5 mg, Oral, Q4H PRN, Warren Fuentes, DO, 7.5 mg at 09/27/23 9261  •  polyethylene glycol (MIRALAX) packet 17 g, 17 g, Oral, Daily, Manny Méndez MD, 17 g at 09/24/23 0820  •  ranolazine (RANEXA) 12 hr tablet 500 mg, 500 mg, Oral, Q12H [facility], 500 mg at 09/27/23 0818  •  sodium bicarbonate tablet 650 mg, 650 mg, Oral, BID after meals, Manny Méndez MD, 650 mg at 09/27/23 0818  •  venlafaxine (EFFEXOR-XR) 24 hr capsule 75 mg, 75 mg, Oral, Daily With Breakfast, Manny Méndez MD, 75 mg at 09/27/23 0823    Labs & Results:  Results from last 7 days   Lab Units 09/23/23  2036 09/23/23  1832 09/23/23  1143 09/23/23  0225   HS TNI 0HR ng/L  --  3,311*   < >  --    HS TNI 2HR ng/L 3,294*  --    < >  --    HS TNI 4HR ng/L  --   --   --  2,532*    < > = values in this interval not displayed.          Results from last 7 days   Lab Units 09/27/23  0517 09/26/23  0532 09/25/23  0445   POTASSIUM mmol/L 4.3 4.2 4.5   CO2 mmol/L 21 20* 18*   CHLORIDE mmol/L 108 108 108   BUN mg/dL 40* 36* 37*   CREATININE mg/dL 3.37* 3.07* 3.31*     Results from last 7 days   Lab Units 09/26/23  0532 09/25/23  0445 09/24/23  0744   HEMOGLOBIN g/dL 11.0* 11.2* 11.9   HEMATOCRIT % 35.8 36.8 38.8   PLATELETS Thousands/uL 251 256 293     Results from last 7 days   Lab Units 09/24/23  0744   TRIGLYCERIDES mg/dL 208*   HDL mg/dL 30*   LDL CALC mg/dL 65   HEMOGLOBIN A1C % 6.6*       Telemetry:   Personally reviewed by Inessa Orf: no significant events recorded on tele    Imaging: no new         Assessment:  Principal Problem:    Non-STEMI (non-ST elevated myocardial infarction) ([address])  Active Problems:    Tobacco abuse    Chronic diastolic congestive heart failure (HCC)    Coronary artery disease of native artery of native heart with stable angina pectoris ([address])    Mixed hyperlipidemia    Morbid obesity with BMI of 45.0-49.9, adult (HCC)    Hypertrophic cardiomyopathy (HCC)    COPD (chronic obstructive pulmonary disease) (HCC)    ELIAZAR (obstructive sleep apnea)    CKD (chronic kidney disease) stage 4, GFR 15-29 ml/min (HCC)    Social anxiety disorder    Agoraphobia    New onset type 2 diabetes mellitus ([address])        1. Cont antianginal therapy  Toprol XL to 50mg BID   Imdur 30 BID  Ranexa 500mg q12hrs     2. Cont DAPT      Case discussed and reviewed with Dr. Hao Gama  who agrees with my assessment and plan. Thank you for involving us in the care of your patient. Rigo Castle  MS4    ** Please Note: Fluency DirectDictation voice to text software may have been used in the creation of this document.  **

## 2023-09-27 NOTE — PHYSICAL THERAPY NOTE
PT Treatment Note    NAME:  Irineo Pinedo  DATE: 09/27/23    AGE:   57 y.o. Mrn:   [id number]  ADMIT DX:  NSTEMI (non-ST elevated myocardial infarction) [facility]) [I21.4]  Performed at least 2 patient identifiers during session: Name James Padilla       09/27/23 0816   PT Last Visit   PT Visit Date 09/27/23   Note Type   Note type Evaluation   Pain Assessment   Pain Assessment Tool 0-10   Pain Score 10 - Worst Possible Pain   Pain Location/Orientation Location: Chest   Pain Onset/Description Onset: Ongoing   Hospital Pain Intervention(s) Repositioned   Restrictions/Precautions   Weight Bearing Precautions Per Order No   Other Precautions Telemetry;Pain   Home Living   Type of [address] [place] [address] Multi-level;Stairs to enter with rails; Able to live on main level with bedroom/bathroom; Performs ADLs on one level  (5-10 JAYLEEN)   Bathroom Shower/Tub Walk-in shower   Bathroom Toilet Standard   Bathroom Equipment   (none reported)   [place]; Wheelchair-manual   Additional Comments used manual wc, furniture walked where wc does not fit in the home (bathroom). Prior Function   Level of [place] Needs assistance with ADLs; Needs assistance with IADLS;Modified independent with wheelchair  (pt reports she only walks 10-15 ft in her home)   Lives With [facility] Help From Family   IADLs Independent with meal prep; Independent with medication management; Family/Friend/Other provides transportation   Falls in the last 6 months 0   Vocational On disability   Comments family is always present in the home and can assist as needed   General   Family/Caregiver Present No   Cognition   Overall Cognitive Status WFL   Arousal/Participation Alert   Attention Within functional limits   Orientation Level Oriented X4   Memory Within functional limits   Following Commands Follows all commands and directions without difficulty   RUE Assessment   RUE Assessment WFL   LUE Assessment   LUE Assessment WFL   RLE Assessment RLE Assessment WFL   LLE Assessment   LLE Assessment WFL   Coordination   Sensation WFL   Bed Mobility   Supine to Sit 5  Supervision   Additional items Assist x 1; Increased time required;Verbal cues   Sit to Supine 5  Supervision   Transfers   Sit to Stand 5  Supervision   Additional items Verbal cues   Stand to Sit 5  Supervision   Additional items Verbal cues   Ambulation/Elevation   Gait pattern Improper Weight shift; Antalgic;Decreased foot clearance   Gait Assistance 5  Supervision   Additional items Verbal cues   Assistive Device Rolling walker   Distance 15 ft   Balance   Static Sitting Good   Dynamic Sitting Fair +   Static Standing Fair   Dynamic Standing Fair -   Ambulatory Fair -   Endurance Deficit   Endurance Deficit Yes   Endurance Deficit Description unable to tolerate further activity   Activity Tolerance   Activity Tolerance Patient tolerated treatment well   Assessment   Prognosis Good   Assessment Pt is 57 y.o. female seen for PT evaluation s/p admit to [address] [place] [address] on 9/23/2023 w/ Non-STEMI (non-ST elevated myocardial infarction) ([address]). PT consulted to assess pt's functional mobility and d/c needs. Order placed for PT eval and tx, w/ up and OOB as tolerated order. Pt agreeable to PT  session upon arrival, pt found supine in bed. PTA, pt was independent w/ all functional mobility w/ RW and w/c, ambulates household distances, has 5-10 JAYLEEN, lives w/ family in multi level home, on disability and ambulates short distances with RW in her home and otherwise uses w/c. Pt to benefit from continued PT tx to address deficits and maximize level of functional independent mobility and consistency. From PT/mobility standpoint, recommendation at time of d/c would be no rehabilitation needs pending progress in order to facilitate return to PLOF. Upon conclusion pt  supine in bed.  Complexity: Comorbidities affecting pt's physical performance at time of assessment include: CHF, COPD, CAD, anxiety and depression. Personal factors affecting pt at time of IE include: limited mobility, lives in [facility], ambulating with assistive device, steps to enter home, depression and anxiety. Please find objective findings from PT assessment regarding body systems outlined above with impairments and limitations including impaired balance, decreased endurance, gait deviations, pain, decreased activity tolerance, decreased functional mobility tolerance and altered sensation. Pt's clinical presentation is currently unstable/unpredictable seen in pt's presentation of abnormal renal values, abnormal WBC count, pain and recent procedure. The patient's AM-PAC Basic Mobility Inpatient Short Form Raw Score is 22. A Raw score of greater than 16 suggests the patient may benefit from discharge to home. Please also refer to the recommendation of the Physical Therapist for safe discharge planning. RN verbalized pt appropriate for PT session. Pt seen as a co-eval with OT due to the patient's co-morbidities, clinically unstable presentation, and present impairments which are a regression from the patient's baseline. Barriers to Discharge Inaccessible home environment   Goals   Patient Goals go home   LTG Expiration Date 10/07/23   Long Term Goal #1 Pt will: Perform bed mobility tasks to modified I to improve ease of bed mobility. Perform transfers to modified I to improve ease of transfers. Perform ambulation with MI and RW for 15 feet to  increase Indep in home environment. Increase dynamic standing balance to F+ to decrease fall risk. Increase OOB activity tolerance to 10 minutes without s/s of exertion to decrease fall risk. Navigate up and down 10 steps with MI so patient can enter and exit home. Plan   Treatment/Interventions Functional transfer training;Elevations; Therapeutic exercise; Endurance training;Gait training;Bed mobility; Equipment eval/education   PT Frequency 3-5x/wk   Recommendation   PT Discharge Recommendation No rehabilitation needs   Equipment Recommended   (pt owns RW)   AM-PAC Basic Mobility Inpatient   Turning in Flat Bed Without Bedrails 4   Lying on Back to Sitting on Edge of Flat Bed Without Bedrails 4   Moving Bed to Chair 4   Standing Up From Chair Using Arms 4   Walk in Room 4   Climb 3-5 Stairs With Railing 2   Basic Mobility Inpatient Raw Score 22   Basic Mobility Standardized Score 47.4   Highest Level Of Mobility   [facility] Goal 7: Walk 25 feet or more       Time In: 0808  Time Out: 0816  Total Treatment Minutes: 8    Idalmis Andrade, PT

## 2023-09-27 NOTE — PLAN OF CARE
Problem: MOBILITY - ADULT  Goal: Maintain or return to baseline ADL function  Description: INTERVENTIONS:  -  Assess patient's ability to carry out ADLs; assess patient's baseline for ADL function and identify physical deficits which impact ability to perform ADLs (bathing, care of mouth/teeth, toileting, grooming, dressing, etc.)  - Assess/evaluate cause of self-care deficits   - Assess range of motion  - Assess patient's mobility; develop plan if impaired  - Assess patient's need for assistive devices and provide as appropriate  - Encourage maximum independence but intervene and supervise when necessary  - Involve family in performance of ADLs  - Assess for home care needs following discharge   - Consider OT consult to assist with ADL evaluation and planning for discharge  - Provide patient education as appropriate  Outcome: Progressing  Goal: Maintains/Returns to pre admission functional level  Description: INTERVENTIONS:  - Perform BMAT or MOVE assessment daily.   - Set and communicate daily mobility goal to care team and patient/family/caregiver. - Collaborate with rehabilitation services on mobility goals if consulted  - Perform Range of Motion times a day. - Reposition patient every  hours.   - Dangle patient  times a day  - Stand patient times a day  - Ambulate patient  times a day  - Out of bed to chair  times a day   - Out of bed for meals times a day  - Out of bed for toileting  - Record patient progress and toleration of activity level   Outcome: Progressing     Problem: PAIN - ADULT  Goal: Verbalizes/displays adequate comfort level or baseline comfort level  Description: Interventions:  - Encourage patient to monitor pain and request assistance  - Assess pain using appropriate pain scale  - Administer analgesics based on type and severity of pain and evaluate response  - Implement non-pharmacological measures as appropriate and evaluate response  - Consider cultural and social influences on pain and pain management  - Notify physician/advanced practitioner if interventions unsuccessful or patient reports new pain  Outcome: Progressing     Problem: SAFETY ADULT  Goal: Maintain or return to baseline ADL function  Description: INTERVENTIONS:  -  Assess patient's ability to carry out ADLs; assess patient's baseline for ADL function and identify physical deficits which impact ability to perform ADLs (bathing, care of mouth/teeth, toileting, grooming, dressing, etc.)  - Assess/evaluate cause of self-care deficits   - Assess range of motion  - Assess patient's mobility; develop plan if impaired  - Assess patient's need for assistive devices and provide as appropriate  - Encourage maximum independence but intervene and supervise when necessary  - Involve family in performance of ADLs  - Assess for home care needs following discharge   - Consider OT consult to assist with ADL evaluation and planning for discharge  - Provide patient education as appropriate  Outcome: Progressing  Goal: Maintains/Returns to pre admission functional level  Description: INTERVENTIONS:  - Perform BMAT or MOVE assessment daily.   - Set and communicate daily mobility goal to care team and patient/family/caregiver. - Collaborate with rehabilitation services on mobility goals if consulted  - Perform Range of Motion  times a day. - Reposition patient every  hours.   - Dangle patient  times a day  - Stand patient  times a day  - Ambulate patient  times a day  - Out of bed to chair  times a day   - Out of bed for meals times a day  - Out of bed for toileting  - Record patient progress and toleration of activity level   Outcome: Progressing  Goal: Patient will remain free of falls  Description: INTERVENTIONS:  - Educate patient/family on patient safety including physical limitations  - Instruct patient to call for assistance with activity   - Consult OT/PT to assist with strengthening/mobility   - Keep Call bell within reach  - Keep bed low and locked with side rails adjusted as appropriate  - Keep care items and personal belongings within reach  - Initiate and maintain comfort rounds  - Make Fall Risk Sign visible to staff  - Offer Toileting every Hours, in advance of need  - Initiate/Maintain alarm  - Obtain necessary fall risk management equipment:   - Apply yellow socks and bracelet for high fall risk patients  - Consider moving patient to room near nurses station  Outcome: Progressing     Problem: Knowledge Deficit  Goal: Patient/family/caregiver demonstrates understanding of disease process, treatment plan, medications, and discharge instructions  Description: Complete learning assessment and assess knowledge base.   Interventions:  - Provide teaching at level of understanding  - Provide teaching via preferred learning methods  Outcome: Progressing

## 2023-09-28 ENCOUNTER — TELEPHONE (OUTPATIENT)
Dept: FAMILY MEDICINE CLINIC | Facility: CLINIC | Age: 48
End: 2023-09-28

## 2023-09-28 DIAGNOSIS — Z71.89 COMPLEX CARE COORDINATION: Primary | ICD-10-CM

## 2023-09-28 NOTE — UTILIZATION REVIEW
NOTIFICATION OF ADMISSION DISCHARGE   This is a Notification of Discharge from [address] [place] [address]. Please be advised that this patient has been discharge from our facility. Below you will find the admission and discharge date and time including the patient’s disposition. UTILIZATION REVIEW CONTACT:  Felipe De Leon  Utilization   Network Utilization Review Department  Phone: [phone] x carefully listen to the prompts. All voicemails are confidential.  Email: [email or web address]     ADMISSION INFORMATION  PRESENTATION DATE: 9/23/2023  5:13 PM  OBERVATION ADMISSION DATE:  INPATIENT ADMISSION DATE: 9/23/23  5:13 PM   DISCHARGE DATE: 9/27/2023 12:28 PM   DISPOSITION:Home/Self Care    IMPORTANT INFORMATION:  Send all requests for admission clinical reviews, approved or denied determinations and any other requests to dedicated fax number below belonging to the campus where the patient is receiving treatment.  List of dedicated fax numbers:  [place] DENIALS (Administrative/Medical Necessity) [phone] 230[address] [facility] (Maternity/NICU/Pediatrics) [phone]   [facility] [phone]   [facility] [phone] 1636 [facility] [phone] 401 [facility] [phone]   [facility] [phone]   [address] [place] [address] 608 [facility] [phone] 506 [facility] [phone] 3[address] [facility] [phone] 2720 [facility] [address] [facility] [phone]

## 2023-09-29 ENCOUNTER — PATIENT OUTREACH (OUTPATIENT)
Dept: CASE MANAGEMENT | Facility: OTHER | Age: 48
End: 2023-09-29

## 2023-09-29 NOTE — PROGRESS NOTES
Outreach TC to patient for CM assessment. No answer to call. Left message on voicemail requesting a return [facility] from [facility]; Outpatient Care Manager @ [phone]. First unanswered call to patient. Chart review complete.

## 2023-10-02 ENCOUNTER — PATIENT OUTREACH (OUTPATIENT)
Dept: CASE MANAGEMENT | Facility: OTHER | Age: 48
End: 2023-10-02

## 2023-10-02 ENCOUNTER — APPOINTMENT (EMERGENCY)
Dept: RADIOLOGY | Facility: HOSPITAL | Age: 48
End: 2023-10-02
Payer: COMMERCIAL

## 2023-10-02 ENCOUNTER — HOSPITAL ENCOUNTER (EMERGENCY)
Facility: HOSPITAL | Age: 48
Discharge: HOME | End: 2023-10-02
Attending: EMERGENCY MEDICINE
Payer: COMMERCIAL

## 2023-10-02 ENCOUNTER — OFFICE VISIT (OUTPATIENT)
Dept: FAMILY MEDICINE CLINIC | Facility: CLINIC | Age: 48
End: 2023-10-02
Payer: COMMERCIAL

## 2023-10-02 VITALS
HEART RATE: 83 BPM | TEMPERATURE: 98.7 F | SYSTOLIC BLOOD PRESSURE: 96 MMHG | OXYGEN SATURATION: 95 % | BODY MASS INDEX: 46 KG/M2 | HEIGHT: 66 IN | DIASTOLIC BLOOD PRESSURE: 60 MMHG

## 2023-10-02 VITALS
HEART RATE: 75 BPM | TEMPERATURE: 97.4 F | WEIGHT: 285 LBS | RESPIRATION RATE: 20 BRPM | DIASTOLIC BLOOD PRESSURE: 48 MMHG | HEIGHT: 66 IN | BODY MASS INDEX: 45.8 KG/M2 | OXYGEN SATURATION: 93 % | SYSTOLIC BLOOD PRESSURE: 94 MMHG

## 2023-10-02 DIAGNOSIS — F17.200 SMOKING: ICD-10-CM

## 2023-10-02 DIAGNOSIS — R01.1 HEART MURMUR: ICD-10-CM

## 2023-10-02 DIAGNOSIS — Z13.5 SCREENING FOR DIABETIC RETINOPATHY: ICD-10-CM

## 2023-10-02 DIAGNOSIS — N18.4 CHRONIC RENAL DISEASE, STAGE 4, SEVERELY DECREASED GLOMERULAR FILTRATION RATE BETWEEN 15-29 ML/MIN/1.73 SQUARE METER (HCC): ICD-10-CM

## 2023-10-02 DIAGNOSIS — J44.9 CHRONIC OBSTRUCTIVE PULMONARY DISEASE, UNSPECIFIED COPD TYPE (HCC): ICD-10-CM

## 2023-10-02 DIAGNOSIS — E11.9 NEW ONSET TYPE 2 DIABETES MELLITUS (HCC): ICD-10-CM

## 2023-10-02 DIAGNOSIS — R07.9 CHEST PAIN IN ADULT: Primary | ICD-10-CM

## 2023-10-02 DIAGNOSIS — I10 ESSENTIAL HYPERTENSION: Chronic | ICD-10-CM

## 2023-10-02 DIAGNOSIS — L89.152 PRESSURE INJURY OF SACRAL REGION, STAGE 2 (HCC): ICD-10-CM

## 2023-10-02 DIAGNOSIS — R26.2 AMBULATORY DYSFUNCTION: Primary | ICD-10-CM

## 2023-10-02 DIAGNOSIS — I21.4 NON-STEMI (NON-ST ELEVATED MYOCARDIAL INFARCTION) (HCC): Primary | ICD-10-CM

## 2023-10-02 LAB
2HR DELTA HS TROPONIN: 8 NG/L
4HR DELTA HS TROPONIN: 8 NG/L
ALBUMIN SERPL BCP-MCNC: 3.8 G/DL (ref 3.5–5)
ALP SERPL-CCNC: 98 U/L (ref 34–104)
ALT SERPL W P-5'-P-CCNC: 17 U/L (ref 7–52)
ANION GAP SERPL CALCULATED.3IONS-SCNC: 11 MMOL/L
APTT PPP: 22 SECONDS (ref 23–37)
AST SERPL W P-5'-P-CCNC: 19 U/L (ref 13–39)
ATRIAL RATE: 71 BPM
ATRIAL RATE: 76 BPM
ATRIAL RATE: 82 BPM
BASOPHILS # BLD AUTO: 0.09 THOUSANDS/ÂΜL (ref 0–0.1)
BASOPHILS NFR BLD AUTO: 1 % (ref 0–1)
BILIRUB SERPL-MCNC: 0.38 MG/DL (ref 0.2–1)
BNP SERPL-MCNC: 544 PG/ML (ref 0–100)
BUN SERPL-MCNC: 33 MG/DL (ref 5–25)
CALCIUM SERPL-MCNC: 9.3 MG/DL (ref 8.4–10.2)
CARDIAC TROPONIN I PNL SERPL HS: 43 NG/L
CARDIAC TROPONIN I PNL SERPL HS: 51 NG/L
CARDIAC TROPONIN I PNL SERPL HS: 51 NG/L
CHLORIDE SERPL-SCNC: 109 MMOL/L (ref 96–108)
CO2 SERPL-SCNC: 17 MMOL/L (ref 21–32)
CREAT SERPL-MCNC: 2.77 MG/DL (ref 0.6–1.3)
EOSINOPHIL # BLD AUTO: 0.62 THOUSAND/ÂΜL (ref 0–0.61)
EOSINOPHIL NFR BLD AUTO: 5 % (ref 0–6)
ERYTHROCYTE [DISTWIDTH] IN BLOOD BY AUTOMATED COUNT: 18.8 % (ref 11.6–15.1)
GFR SERPL CREATININE-BSD FRML MDRD: 19 ML/MIN/1.73SQ M
GLUCOSE SERPL-MCNC: 120 MG/DL (ref 65–140)
HCT VFR BLD AUTO: 38.6 % (ref 34.8–46.1)
HGB BLD-MCNC: 11.8 G/DL (ref 11.5–15.4)
IMM GRANULOCYTES # BLD AUTO: 0.22 THOUSAND/UL (ref 0–0.2)
IMM GRANULOCYTES NFR BLD AUTO: 2 % (ref 0–2)
INR PPP: 1.03 (ref 0.84–1.19)
LYMPHOCYTES # BLD AUTO: 2.77 THOUSANDS/ÂΜL (ref 0.6–4.47)
LYMPHOCYTES NFR BLD AUTO: 20 % (ref 14–44)
MCH RBC QN AUTO: 26.9 PG (ref 26.8–34.3)
MCHC RBC AUTO-ENTMCNC: 30.6 G/DL (ref 31.4–37.4)
MCV RBC AUTO: 88 FL (ref 82–98)
MONOCYTES # BLD AUTO: 0.81 THOUSAND/ÂΜL (ref 0.17–1.22)
MONOCYTES NFR BLD AUTO: 6 % (ref 4–12)
NEUTROPHILS # BLD AUTO: 9.34 THOUSANDS/ÂΜL (ref 1.85–7.62)
NEUTS SEG NFR BLD AUTO: 66 % (ref 43–75)
NRBC BLD AUTO-RTO: 0 /100 WBCS
P AXIS: 22 DEGREES
P AXIS: 40 DEGREES
P AXIS: 44 DEGREES
PLATELET # BLD AUTO: 349 THOUSANDS/UL (ref 149–390)
PMV BLD AUTO: 10 FL (ref 8.9–12.7)
POTASSIUM SERPL-SCNC: 4.1 MMOL/L (ref 3.5–5.3)
PR INTERVAL: 188 MS
PR INTERVAL: 190 MS
PR INTERVAL: 194 MS
PROT SERPL-MCNC: 7.6 G/DL (ref 6.4–8.4)
PROTHROMBIN TIME: 13.7 SECONDS (ref 11.6–14.5)
QRS AXIS: -4 DEGREES
QRS AXIS: 0 DEGREES
QRS AXIS: 24 DEGREES
QRSD INTERVAL: 108 MS
QRSD INTERVAL: 114 MS
QRSD INTERVAL: 114 MS
QT INTERVAL: 372 MS
QT INTERVAL: 378 MS
QT INTERVAL: 398 MS
QTC INTERVAL: 418 MS
QTC INTERVAL: 432 MS
QTC INTERVAL: 441 MS
RBC # BLD AUTO: 4.38 MILLION/UL (ref 3.81–5.12)
SODIUM SERPL-SCNC: 137 MMOL/L (ref 135–147)
T WAVE AXIS: 45 DEGREES
T WAVE AXIS: 72 DEGREES
T WAVE AXIS: 73 DEGREES
VENTRICULAR RATE: 71 BPM
VENTRICULAR RATE: 76 BPM
VENTRICULAR RATE: 82 BPM
WBC # BLD AUTO: 13.85 THOUSAND/UL (ref 4.31–10.16)

## 2023-10-02 PROCEDURE — 99496 TRANSJ CARE MGMT HIGH F2F 7D: CPT | Performed by: FAMILY MEDICINE

## 2023-10-02 PROCEDURE — 80053 COMPREHEN METABOLIC PANEL: CPT | Performed by: EMERGENCY MEDICINE

## 2023-10-02 PROCEDURE — 71045 X-RAY EXAM CHEST 1 VIEW: CPT

## 2023-10-02 PROCEDURE — 85610 PROTHROMBIN TIME: CPT | Performed by: EMERGENCY MEDICINE

## 2023-10-02 PROCEDURE — 96374 THER/PROPH/DIAG INJ IV PUSH: CPT

## 2023-10-02 PROCEDURE — 99285 EMERGENCY DEPT VISIT HI MDM: CPT

## 2023-10-02 PROCEDURE — 85025 COMPLETE CBC W/AUTO DIFF WBC: CPT | Performed by: EMERGENCY MEDICINE

## 2023-10-02 PROCEDURE — 83880 ASSAY OF NATRIURETIC PEPTIDE: CPT | Performed by: EMERGENCY MEDICINE

## 2023-10-02 PROCEDURE — 99285 EMERGENCY DEPT VISIT HI MDM: CPT | Performed by: EMERGENCY MEDICINE

## 2023-10-02 PROCEDURE — 85730 THROMBOPLASTIN TIME PARTIAL: CPT | Performed by: EMERGENCY MEDICINE

## 2023-10-02 PROCEDURE — 93005 ELECTROCARDIOGRAM TRACING: CPT

## 2023-10-02 PROCEDURE — 36415 COLL VENOUS BLD VENIPUNCTURE: CPT | Performed by: EMERGENCY MEDICINE

## 2023-10-02 PROCEDURE — 99244 OFF/OP CNSLTJ NEW/EST MOD 40: CPT | Performed by: INTERNAL MEDICINE

## 2023-10-02 PROCEDURE — 84484 ASSAY OF TROPONIN QUANT: CPT | Performed by: EMERGENCY MEDICINE

## 2023-10-02 PROCEDURE — 96376 TX/PRO/DX INJ SAME DRUG ADON: CPT

## 2023-10-02 PROCEDURE — 93010 ELECTROCARDIOGRAM REPORT: CPT | Performed by: INTERNAL MEDICINE

## 2023-10-02 RX ORDER — HYDROMORPHONE HCL/PF 1 MG/ML
0.5 SYRINGE (ML) INJECTION ONCE
Status: COMPLETED | OUTPATIENT
Start: 2023-10-02 | End: 2023-10-02

## 2023-10-02 RX ORDER — ISOSORBIDE MONONITRATE 60 MG/1
60 TABLET, EXTENDED RELEASE ORAL DAILY
Qty: 30 TABLET | Refills: 0 | Status: SHIPPED | OUTPATIENT
Start: 2023-10-02 | End: 2023-11-01

## 2023-10-02 RX ORDER — AMOXICILLIN 500 MG/1
TABLET, FILM COATED ORAL
COMMUNITY
Start: 2023-09-18

## 2023-10-02 RX ORDER — ISOSORBIDE MONONITRATE 30 MG/1
60 TABLET, EXTENDED RELEASE ORAL DAILY
Status: DISCONTINUED | OUTPATIENT
Start: 2023-10-02 | End: 2023-10-02 | Stop reason: HOSPADM

## 2023-10-02 RX ADMIN — HYDROMORPHONE HYDROCHLORIDE 0.5 MG: 1 INJECTION, SOLUTION INTRAMUSCULAR; INTRAVENOUS; SUBCUTANEOUS at 07:41

## 2023-10-02 RX ADMIN — ISOSORBIDE MONONITRATE 60 MG: 30 TABLET, EXTENDED RELEASE ORAL at 09:12

## 2023-10-02 RX ADMIN — HYDROMORPHONE HYDROCHLORIDE 0.5 MG: 1 INJECTION, SOLUTION INTRAMUSCULAR; INTRAVENOUS; SUBCUTANEOUS at 05:23

## 2023-10-02 NOTE — ED CARE HANDOFF
Emergency Department Sign Out Note        Sign out and transfer of care from Dr. Turner Galloway. See Separate Emergency Department note. The patient, Cyrus Palomino, was evaluated by the previous provider for Chest Pain. Workup Completed:  Baseline bloodwork and initial EKG    ED Course / Workup Pending (followup):  Delta troponin [facility]                                  ED Course as of 10/02/23 1456   Mon Oct 02, 2023   0640 CP initially at 2200 last night took nitro with improvement. Then woke up around 0400 with resumption of pain. Took 2 aspirin prior to arrival  Cath'd  on 9/25 with cath showing: Patient with severe disease though poor targets for revascularization      Plan delta trop and cards consult. 1213 Case discussed with cardiology and they recommend increasing Imdur dose to 60 mg daily and will follow-up with her soon.      ECG 12 Lead Documentation Only    Date/Time: 10/2/2023 7:33 AM    Performed by: Jacqueline Mariscal DO  Authorized by: Jacqueline Mariscal DO    Indications / Diagnosis:  Delta, CP  Patient location:  ED  Previous ECG:     Previous ECG:  Compared to current    Similarity:  No change    Comparison to cardiac monitor: Yes    Interpretation:     Interpretation: abnormal    Rate:     ECG rate:  76    ECG rate assessment: normal    Rhythm:     Rhythm: sinus rhythm    Ectopy:     Ectopy: none    QRS:     QRS axis:  Normal    QRS intervals:  Normal  Conduction:     Conduction: normal    ST segments:     ST segments:  Normal  T waves:     T waves: normal    Q waves:     Q waves:  III, aVF, V1, V2, V3 and V4      MDM        Disposition  Final diagnoses:   Chest pain in adult     Time reflects when diagnosis was documented in both MDM as applicable and the Disposition within this note     Time User Action Codes Description Comment    10/2/2023  6:41 AM Lenny Box Add [R07.9] Chest pain in adult       ED Disposition     None      Follow-up Information    None Patient's Medications   Discharge Prescriptions    No medications on file     No discharge procedures on file.        ED Provider  Electronically Signed by     Nahum Singh DO  10/02/23 5619

## 2023-10-02 NOTE — ASSESSMENT & PLAN NOTE
Although CAD is present, Chest pain is not likely representative of ACI as it has been long-lasting without real alleviation   Patient reports having chest pain continually since most recent cath   Will increase Imdur dose in an attempt to reduce the amount of nitro she is taking on a daily basis   Follow up will be arrange in the OP setting

## 2023-10-02 NOTE — PROGRESS NOTES
Incoming in-basket message regarding a patient that is currently in the ED at [address] [place] [address] secondary to chest pain. Patient arrived early this AM . Lab work has revealed slightly elevated BNP. BUN and creat elevated. Troponin slightly elevated. Chart review will continue to assess progress toward D/C and JOHN.

## 2023-10-02 NOTE — ED PROVIDER NOTES
History  Chief Complaint   Patient presents with   • Chest Pain     Last night around 2145 while laying in bed and took 1 nitro and that relieved the pain. Pt fell asleep and woke up around 345 this morning and she took another nitro and 2 ASA. Left sided chest pain into the back and shoulder and down the left arm. Hot squeeze that wont let go. Ripping or tearing sensation     Patient is a 29-year-old female with a history of CAD, cardiomyopathy, CHF, who presents for evaluation of chest pain. Patient says that the symptoms started around quarter 10 PM last night when she was lying in bed. She says that initially lasted for about 15 minutes. She says that she took a nitro which helped with the pain. She says it is not uncommon for developed chest pain at rest.  She says she then woke up around 3:45 and developed the chest pain again. She describes it as a intense squeezing pain across her chest.  It was associate with some shortness of breath, dizziness and nausea. Patient says it feels similar to chest pain that she has had before. The patient was recently seen here on 9-22 and transferred to [address] [place] [address] for a NSTEMI. She had a cardiac catheterization which showed severe disease that was unable to be stented."  He says that "there is nothing that they are able to do."          Prior to Admission Medications   Prescriptions Last Dose Informant Patient Reported?  Taking?   acetaminophen (TYLENOL) 325 mg tablet   No No   Sig: Take 3 tablets (975 mg total) by mouth every 8 (eight) hours as needed for mild pain   albuterol (PROVENTIL HFA,VENTOLIN HFA) 90 mcg/act inhaler   No No   Sig: Inhale 2 puffs every 4 (four) hours as needed for wheezing or shortness of breath   aspirin 81 mg chewable tablet  Self No No   Sig: Chew 1 tablet (81 mg total) daily   atorvastatin (LIPITOR) 80 mg tablet   No No   Sig: Take 1 tablet (80 mg total) by mouth every evening   calcitriol (ROCALTROL) 0.25 mcg capsule   Yes No Sig: Take 1 capsule (0.25 mcg total) by mouth daily Takes Monday Wednesday and fridays as of 11/11/22   clopidogrel (PLAVIX) 75 mg tablet   No No   Sig: Take 1 tablet (75 mg total) by mouth daily   ezetimibe (ZETIA) 10 mg tablet   No No   Sig: Take 1 tablet (10 mg total) by mouth in the morning.    Patient not taking: Reported on 10/2/2023   gabapentin (Neurontin) 300 mg capsule   No No   Sig: Take 1 capsule (300 mg total) by mouth 3 (three) times a day   isosorbide mononitrate (IMDUR) 30 mg 24 hr tablet   No No   Sig: Take 1 tablet (30 mg total) by mouth 2 (two) times a day   metoprolol succinate (TOPROL-XL) 50 mg 24 hr tablet   No No   Sig: Take 1 tablet (50 mg total) by mouth 2 (two) times a day   nitroglycerin (NITROSTAT) 0.4 mg SL tablet   No No   Sig: Place 1 tablet (0.4 mg total) under the tongue every 5 (five) minutes as needed for chest pain   oxyCODONE-acetaminophen (Percocet) 5-325 mg per tablet   No No   Sig: Take 2 tablets by mouth every 6 (six) hours as needed for moderate pain for up to 7 days Max Daily Amount: 8 tablets   ranolazine (RANEXA) 500 mg 12 hr tablet   No No   Sig: Take 1 tablet (500 mg total) by mouth every 12 (twelve) hours   sodium bicarbonate 650 mg tablet   No No   Sig: Take 1 tablet (650 mg total) by mouth 2 (two) times daily after meals   venlafaxine (EFFEXOR-XR) 75 mg 24 hr capsule   No No   Sig: Take 1 capsule (75 mg total) by mouth daily with breakfast      Facility-Administered Medications: None       Past Medical History:   Diagnosis Date   • Anemia    • Anxiety    • CAD (coronary artery disease)    • Cancer ([address]) 2008   • Cardiomyopathy ([address])    • CHF (congestive heart failure) (HCC)    • Chronic kidney disease    • COPD (chronic obstructive pulmonary disease) (HCC)     scheduled for sleep apnea test soon after pacemaker implant   • Coronary artery disease    • Depression    • History of chemotherapy     non hodgkins lymphoma 2008   • Hyperlipemia    • Hypertension    • Hypertrophic cardiomyopathy (HCC)    • Lymphoma, non-Hodgkin's ([address])    • Myocardial infarction [facility])    • Non-ST elevation MI (NSTEMI) 2023   • Obesity    • Obstructive sleep apnea    • Olecranon bursitis, right elbow 2022   • SSS (sick sinus syndrome) ([facility])     s/p medtronic dual chamber pacemaker 2021   • Tobacco abuse    • Ventricular tachycardia, non-sustained ([address])    • Wears glasses        Past Surgical History:   Procedure Laterality Date   • CARDIAC CATHETERIZATION N/A 1/3/2023    Procedure: Cardiac catheterization;  Surgeon: Annette Wells MD;  [facility]: [facility]; Service: Cardiology   • CARDIAC CATHETERIZATION N/A 1/3/2023    Procedure: Cardiac Coronary Angiogram;  Surgeon: Annette Wells MD;  [facility]; Service: Cardiology   • CARDIAC CATHETERIZATION N/A 1/3/2023    Procedure: Cardiac pci;  Surgeon: Annette Wells MD;  [facility]; Service: Cardiology   • CARDIAC CATHETERIZATION Left 2023    Procedure: Cardiac Left Heart Cath;  Surgeon: Lance Mitchell DO;  [facility]; Service: Cardiology   • CARDIAC CATHETERIZATION  2023    Procedure: Cardiac catheterization;  Surgeon: Lance Mitchell DO;  [facility]; Service: Cardiology   • CARDIAC CATHETERIZATION N/A 2023    Procedure: Cardiac Coronary Angiogram;  Surgeon: Lance Mitchell DO;  [facility];   Service: Cardiology   • CARDIAC PACEMAKER PLACEMENT Left 2021    Procedure: DUAL LEAD PACEMAKER IMPLANT;  Surgeon: Tom Dia MD;  Location: [address] [place] [address];  Service: Cardiology   • CAROTID STENT     •  SECTION     • CORONARY ANGIOPLASTY WITH STENT PLACEMENT  2021    GREG mid RCA and GREG right PDA   • DENTAL SURGERY     • IR BIOPSY KIDNEY RANDOM  10/18/2021       Family History   Problem Relation Age of Onset   • No Known Problems Mother    • No Known Problems Father    • No Known Problems Daughter • Brain cancer Maternal Grandfather    • Heart disease Maternal Grandfather    • Cancer Maternal Grandfather    • No Known Problems Paternal Aunt    • No Known Problems Paternal Aunt      I have reviewed and agree with the history as documented. E-Cigarette/Vaping   • E-Cigarette Use Never User      E-Cigarette/Vaping Substances   • Nicotine No    • THC No    • CBD No    • Flavoring No    • Other No    • Unknown No      Social History     Tobacco Use   • Smoking status: Every Day     Packs/day: 0.50     Years: 25.00     Total pack years: 12.50     Types: Cigarettes   • Smokeless tobacco: Never   • Tobacco comments:     Recently and currently quitting cigarettes   Vaping Use   • Vaping Use: Never used   Substance Use Topics   • Alcohol use: Yes     Comment: 1-2 times years   • Drug use: No       Review of Systems   Constitutional: Negative for fever and unexpected weight change. HENT: Negative for congestion, ear pain, sore throat and trouble swallowing. Eyes: Negative for pain and redness. Respiratory: Positive for shortness of breath. Negative for cough and chest tightness. Cardiovascular: Positive for chest pain. Negative for leg swelling. Gastrointestinal: Positive for nausea. Negative for abdominal distention, abdominal pain, diarrhea and vomiting. Endocrine: Negative for polyuria. Genitourinary: Negative for dysuria, hematuria, pelvic pain and vaginal bleeding. Musculoskeletal: Negative for back pain and myalgias. Skin: Negative for rash. Neurological: Positive for light-headedness. Negative for dizziness, syncope, weakness and headaches. Physical Exam  Physical Exam  Vitals and nursing note reviewed. Constitutional:       General: She is not in acute distress. Appearance: She is well-developed. HENT:      Head: Normocephalic and atraumatic.       Right Ear: External ear normal.      Left Ear: External ear normal.      Nose: Nose normal.      Mouth/Throat:      Mouth: Mucous membranes are moist.      Pharynx: No oropharyngeal exudate. Eyes:      Conjunctiva/sclera: Conjunctivae normal.      Pupils: Pupils are equal, round, and reactive to light. Cardiovascular:      Rate and Rhythm: Normal rate and regular rhythm. Heart sounds: Normal heart sounds. No murmur heard. No friction rub. No gallop. Pulmonary:      Effort: Pulmonary effort is normal. No respiratory distress. Breath sounds: Normal breath sounds. No wheezing or rales. Abdominal:      General: There is no distension. Palpations: Abdomen is soft. Tenderness: There is no abdominal tenderness. There is no guarding. Musculoskeletal:         General: No swelling, tenderness or deformity. Normal range of motion. Cervical back: Normal range of motion and neck supple. Lymphadenopathy:      Cervical: No cervical adenopathy. Skin:     General: Skin is warm and dry. Neurological:      General: No focal deficit present. Mental Status: She is alert and oriented to person, place, and time. Mental status is at baseline. Cranial Nerves: No cranial nerve deficit. Sensory: No sensory deficit. Motor: No weakness or abnormal muscle tone.       Coordination: Coordination normal.         Vital Signs  ED Triage Vitals   Temperature Pulse Respirations Blood Pressure SpO2   10/02/23 0743 10/02/23 0505 10/02/23 0505 10/02/23 0505 10/02/23 0505   (!) 97.4 °F (36.3 °C) 87 22 162/80 98 %      Temp Source Heart Rate Source Patient Position - Orthostatic VS BP Location FiO2 (%)   10/02/23 0743 10/02/23 0505 10/02/23 0505 10/02/23 0505 --   Tympanic Monitor Lying Left arm       Pain Score       10/02/23 0505       10 - Worst Possible Pain           Vitals:    10/02/23 0910 10/02/23 1000 10/02/23 1100 10/02/23 1200   BP: 114/55 110/58 103/53 (!) 94/48   Pulse: 66 70 68 75   Patient Position - Orthostatic VS:  Sitting Sitting Sitting         Visual Acuity      ED Medications  Medications HYDROmorphone (DILAUDID) injection 0.5 mg (0.5 mg Intravenous Given 10/2/23 0523)   HYDROmorphone (DILAUDID) injection 0.5 mg (0.5 mg Intravenous Given 10/2/23 0741)       Diagnostic Studies  Results Reviewed     Procedure Component Value Units Date/Time    HS Troponin I 4hr [[id number]]  (Abnormal) Collected: 10/02/23 0914    Lab Status: Final result Specimen: Blood from Arm, Left Updated: 10/02/23 0949     hs TnI 4hr 51 ng/L      Delta 4hr hsTnI 8 ng/L     HS Troponin I 2hr [[id number]]  (Abnormal) Collected: 10/02/23 0729    Lab Status: Final result Specimen: Blood from Arm, Left Updated: 10/02/23 0805     hs TnI 2hr 51 ng/L      Delta 2hr hsTnI 8 ng/L     HS Troponin 0hr (reflex protocol) [[id number]]  (Normal) Collected: 10/02/23 0513    Lab Status: Final result Specimen: Blood from Arm, Right Updated: 10/02/23 0542     hs TnI 0hr 43 ng/L     B-Type Natriuretic Peptide(BNP) [[id number]]  (Abnormal) Collected: 10/02/23 0513    Lab Status: Final result Specimen: Blood from Arm, Right Updated: 10/02/23 0541      pg/mL     Comprehensive metabolic panel [[id number]]  (Abnormal) Collected: 10/02/23 0513    Lab Status: Final result Specimen: Blood from Arm, Right Updated: 10/02/23 0536     Sodium 137 mmol/L      Potassium 4.1 mmol/L      Chloride 109 mmol/L      CO2 17 mmol/L      ANION GAP 11 mmol/L      BUN 33 mg/dL      Creatinine 2.77 mg/dL      Glucose 120 mg/dL      Calcium 9.3 mg/dL      AST 19 U/L      ALT 17 U/L      Alkaline Phosphatase 98 U/L      Total Protein 7.6 g/dL      Albumin 3.8 g/dL      Total Bilirubin 0.38 mg/dL      eGFR 19 ml/min/1.73sq m     Narrative:      [place] guidelines for Chronic Kidney Disease (CKD):   •  Stage 1 with normal or high GFR (GFR > 90 mL/min/1.73 square meters)  •  Stage 2 Mild CKD (GFR = 60-89 mL/min/1.73 square meters)  •  Stage 3A Moderate CKD (GFR = 45-59 mL/min/1.73 square meters)  •  Stage 3B Moderate CKD (GFR = 30-44 mL/min/1.73 square meters)  •  Stage 4 Severe CKD (GFR = 15-29 mL/min/1.73 square meters)  •  Stage 5 End Stage CKD (GFR <15 mL/min/1.73 square meters)  Note: GFR calculation is accurate only with a steady state creatinine    Protime-INR [[id number]]  (Normal) Collected: 10/02/23 0513    Lab Status: Final result Specimen: Blood from Arm, Right Updated: 10/02/23 0531     Protime 13.7 seconds      INR 1.03    APTT [[id number]]  (Abnormal) Collected: 10/02/23 0513    Lab Status: Final result Specimen: Blood from Arm, Right Updated: 10/02/23 0531     PTT 22 seconds     CBC and differential [[id number]]  (Abnormal) Collected: 10/02/23 0513    Lab Status: Final result Specimen: Blood from Arm, Left Updated: 10/02/23 0518     WBC 13.85 Thousand/uL      RBC 4.38 Million/uL      Hemoglobin 11.8 g/dL      Hematocrit 38.6 %      MCV 88 fL      MCH 26.9 pg      MCHC 30.6 g/dL      RDW 18.8 %      MPV 10.0 fL      Platelets 671 Thousands/uL      nRBC 0 /100 WBCs      Neutrophils Relative 66 %      Immat GRANS % 2 %      Lymphocytes Relative 20 %      Monocytes Relative 6 %      Eosinophils Relative 5 %      Basophils Relative 1 %      Neutrophils Absolute 9.34 Thousands/µL      Immature Grans Absolute 0.22 Thousand/uL      Lymphocytes Absolute 2.77 Thousands/µL      Monocytes Absolute 0.81 Thousand/µL      Eosinophils Absolute 0.62 Thousand/µL      Basophils Absolute 0.09 Thousands/µL                  XR chest 1 view portable   Final Result by Josefina Pennington MD (10/02 0700)      No acute cardiopulmonary disease.                   Workstation performed: [id number]                    Procedures  ECG 12 Lead Documentation Only    Date/Time: 10/2/2023 5:07 AM    Performed by: Rogers Pavon DO  Authorized by: Rogers Pavon DO    Indications / Diagnosis:  Chest pain  ECG reviewed by me, the ED Provider: yes    Patient location:  ED  Previous ECG:     Previous ECG:  Compared to current    Similarity:  No change    Comparison to cardiac monitor: Yes Interpretation:     Interpretation: abnormal    Rate:     ECG rate:  82    ECG rate assessment: normal    Rhythm:     Rhythm: sinus rhythm    Ectopy:     Ectopy: none    QRS:     QRS axis:  Normal  Conduction:     Conduction: normal    ST segments:     ST segments:  Non-specific  T waves:     T waves: non-specific               ED Course                               SBIRT 22yo+    Flowsheet Row Most Recent Value   Initial Alcohol Screen: US AUDIT-C     1. How often do you have a drink containing alcohol? 0 Filed at: 10/02/2023 0508   2. How many drinks containing alcohol do you have on a typical day you are drinking? 0 Filed at: 10/02/2023 0508   3a. Male UNDER 65: How often do you have five or more drinks on one occasion? 0 Filed at: 10/02/2023 0508   3b. FEMALE Any Age, or MALE 65+: How often do you have 4 or more drinks on one occassion? 0 Filed at: 10/02/2023 6838   Audit-C Score 0 Filed at: 10/02/2023 2011   NELIA: How many times in the past year have you. .. Used an illegal drug or used a prescription medication for non-medical reasons? Never Filed at: 10/02/2023 4856                    Medical Decision Making  61-year-old female with history of extensive CAD presenting for chest pain. Intermittent since 10 PM last night. Occurring with rest.  Improved with nitro. Feels similar to previous chest pain. Had a catheterization on 9-24 which was unamenable to stenting. Vitals within normal limits except for mild hypertension. Differential includes ACS, arrhythmia, musculoskeletal pain  We will obtain cardiac work-up. Will obtain EKG. Will obtain chest x-ray. Given cardiac history, will consult cardiology  Initial troponin 43, EKG no ischemic changes, unchanged from previous  Patient signed out to Dr. Gage Meneses pending repeat troponin and cardiology recomendations     Chest pain in adult: acute illness or injury  Amount and/or Complexity of Data Reviewed  Labs: ordered.   Radiology: ordered. Risk  Prescription drug management. Disposition  Final diagnoses:   Chest pain in adult     Time reflects when diagnosis was documented in both MDM as applicable and the Disposition within this note     Time User Action Codes Description Comment    10/2/2023  6:41 AM Mary Schaefer Brook [R07.9] Chest pain in adult       ED Disposition     ED Disposition   Discharge    Condition   Stable    Date/Time   Mon Oct 2, 2023 12:15 PM    Comment   Irineo Pinedo discharge to home/self care. Follow-up Information     Follow up With Specialties Details Why Contact Info Additional Information    Troy Beltran DO Family Medicine   [address] [place] [id number] [phone]       Your Cardiologist         [address] [facility] Emergency Department Emergency Medicine Go to  As needed, If symptoms worsen [address] [place] [facility] [id number]  [address] [place] [address] Emergency Department, [address] [facility], [address]          Discharge Medication List as of 10/2/2023 12:15 PM      START taking these medications    Details   !! isosorbide mononitrate (IMDUR) 60 mg 24 hr tablet Take 1 tablet (60 mg total) by mouth daily, Starting Mon 10/2/2023, Until Wed 11/1/2023, Normal       !! - Potential duplicate medications found. Please discuss with provider.       CONTINUE these medications which have NOT CHANGED    Details   acetaminophen (TYLENOL) 325 mg tablet Take 3 tablets (975 mg total) by mouth every 8 (eight) hours as needed for mild pain, Starting Thu 1/5/2023, Normal      albuterol (PROVENTIL HFA,VENTOLIN HFA) 90 mcg/act inhaler Inhale 2 puffs every 4 (four) hours as needed for wheezing or shortness of breath, Starting Mon 5/23/2022, Normal      aspirin 81 mg chewable tablet Chew 1 tablet (81 mg total) daily, Starting Fri 4/2/2021, Normal      atorvastatin (LIPITOR) 80 mg tablet Take 1 tablet (80 mg total) by mouth every evening, Starting Tue 1/17/2023, Normal      calcitriol (ROCALTROL) 0.25 mcg capsule Take 1 capsule (0.25 mcg total) by mouth daily Takes Monday Wednesday and fridays as of 11/11/22, Starting Tue 1/10/2023, Historical Med      clopidogrel (PLAVIX) 75 mg tablet Take 1 tablet (75 mg total) by mouth daily, Starting Tue 9/19/2023, Normal      ezetimibe (ZETIA) 10 mg tablet Take 1 tablet (10 mg total) by mouth in the morning., Starting Mon 5/23/2022, Normal      gabapentin (Neurontin) 300 mg capsule Take 1 capsule (300 mg total) by mouth 3 (three) times a day, Starting Thu 8/17/2023, Normal      !! isosorbide mononitrate (IMDUR) 30 mg 24 hr tablet Take 1 tablet (30 mg total) by mouth 2 (two) times a day, Starting Wed 9/27/2023, Normal      metoprolol succinate (TOPROL-XL) 50 mg 24 hr tablet Take 1 tablet (50 mg total) by mouth 2 (two) times a day, Starting Wed 9/27/2023, Normal      nitroglycerin (NITROSTAT) 0.4 mg SL tablet Place 1 tablet (0.4 mg total) under the tongue every 5 (five) minutes as needed for chest pain, Starting Thu 9/28/2023, Normal      oxyCODONE-acetaminophen (Percocet) 5-325 mg per tablet Take 2 tablets by mouth every 6 (six) hours as needed for moderate pain for up to 7 days Max Daily Amount: 8 tablets, Starting Wed 9/27/2023, Until Wed 10/4/2023 at 2359, Normal      ranolazine (RANEXA) 500 mg 12 hr tablet Take 1 tablet (500 mg total) by mouth every 12 (twelve) hours, Starting Tue 9/26/2023, Until Thu 10/26/2023, Normal      sodium bicarbonate 650 mg tablet Take 1 tablet (650 mg total) by mouth 2 (two) times daily after meals, Starting Thu 1/5/2023, Normal      venlafaxine (EFFEXOR-XR) 75 mg 24 hr capsule Take 1 capsule (75 mg total) by mouth daily with breakfast, Starting Thu 8/17/2023, Normal       !! - Potential duplicate medications found. Please discuss with provider. No discharge procedures on file.     PDMP Review       Value Time User    PDMP Reviewed  Yes 3/26/2023  6:06 PM Marty Ponce Vitor Nieto, DO          ED Provider  Electronically Signed by           Roney Chambers DO  10/02/23 2154

## 2023-10-02 NOTE — CONSULTS
[id number] [facility]  Consult  Name: Daniel Tovar 57 y.o. female I MRN: [id number]  Unit/Bed#: ED 25 I Date of Admission: 10/2/2023   Date of Service: 10/2/2023 I Hospital Day: 0    Consult to cardiology  Consult performed by: Bhakti Gonzalez PA-C  Consult ordered by: Rogers Pavon DO          Assessment/Plan   Benign hypertension with CKD (chronic kidney disease) stage III [facility])  Assessment & Plan  Lab Results   Component Value Date    EGFR 19 10/02/2023    EGFR 15 09/27/2023    EGFR 17 09/26/2023    CREATININE 2.77 (H) 10/02/2023    CREATININE 3.37 (H) 09/27/2023    CREATININE 3.07 (H) 09/26/2023   BP does remain soft in the setting of taking nitro   Will continue to monitor with the use of Imdur   Patient has been taking multiple nitro daily   Will try to increase Imdur dose in an attempt to reduce amount of nitro taken on a daily basis     Coronary artery disease of native artery of native heart with stable angina pectoris [facility])  Assessment & Plan  CAD s/p PCI to RCA, rPDA and GREG to LAD/D2  • Inferior STEMI (3/23/21) --> initial PCI to [facility]  • Recurrent chest pain (3/23/21) --> GREG to RPDA  • Readmission, inferior STEMI (4/2/21) --> patent stents, no change on cath  • NSTEMI 1/3/2023--> GREG LAD/D2  Continue asa, plavix, statin, BB, ranexa, imdur    Imdur will be increased to help with symptoms       Anxiety  Assessment & Plan  Plays a role in symptoms     Chest pain in adult  Assessment & Plan  Although CAD is present, Chest pain is not likely representative of ACI as it has been long-lasting without real alleviation   Patient reports having chest pain continually since most recent cath   Will increase Imdur dose in an attempt to reduce the amount of nitro she is taking on a daily basis   Follow up will be arrange in the OP setting            Summary Comments:  Long-lasting chest pain with only mild troponin bump. Not likely due to ACI.  Would recommend increasing Imdur to try to reduce the amount of nitro taken on a daily basis. Thank you for allowing us to see this pleasant patient in consult. We will follow course along with you and make outpatient plans outlined above with all arrangements. Outpatient Cardiologist: Dr. Yonis Neves    Chief Complaint:  Chief Complaint   Patient presents with   • Chest Pain     Last night around 2145 while laying in bed and took 1 nitro and that relieved the pain. Pt fell asleep and woke up around 345 this morning and she took another nitro and 2 ASA. Left sided chest pain into the back and shoulder and down the left arm. Hot squeeze that wont let go. Ripping or tearing sensation        History of Present Illness: The patient is an obese 57year old with CAD and multiple PCI as described above. She has a PMH of HTN, HLD and anxiety. She states that yesterday evening she had chest pain that was more severe than usual. She had to take nitro that alleviated the chest pain some what and when she woke up this morning the pain returned. She took another nitro and 2 ASA and came to the ED for further evaluation and treatment as she became frightened. She states that the pain radiated into his jaw, neck and into the left shoulder. She reports that the pain has been on-going since the time of her last cath and has never completely resolved. She takes her medications faithfully. She states she takes nitro at least once sometimes twice daily and is asking if we can prescribe more. She has no nausea, or diaphoresis associated. She did not have any SOB or dizziness. She did not report palpitations or syncope. She has no increasing edema orthopnea or PND. Review of Systems: a 10 point review of systems was conducted and is negative except for as mentioned in the HPI or as below. Review of Systems   Constitutional: Negative. HENT: Negative. Eyes: Negative. Cardiovascular: Positive for chest pain (radiating into the jaw neck and left shoulder).  Negative for claudication, cyanosis, dyspnea on exertion, irregular heartbeat, leg swelling, near-syncope, orthopnea, palpitations, paroxysmal nocturnal dyspnea and syncope. Respiratory: Negative. Negative for cough, hemoptysis, shortness of breath, sleep disturbances due to breathing, snoring, sputum production and wheezing. Endocrine: Negative. Hematologic/Lymphatic: Negative. Skin: Negative. Musculoskeletal: Negative. Gastrointestinal: Negative. Genitourinary: Negative. Neurological: Negative. Psychiatric/Behavioral: Negative. Allergic/Immunologic: Negative. Past Medical and Surgical History:  Past Medical History:   Diagnosis Date   • Anemia    • Anxiety    • CAD (coronary artery disease)    • Cancer ([address]) 2008   • Cardiomyopathy ([address])    • CHF (congestive heart failure) ([facility])    • Chronic kidney disease    • COPD (chronic obstructive pulmonary disease) ([facility])     scheduled for sleep apnea test soon after pacemaker implant   • Coronary artery disease    • Depression    • History of chemotherapy     non hodgkins lymphoma 2008   • Hyperlipemia    • Hypertension    • Hypertrophic cardiomyopathy ([facility])    • Lymphoma, non-Hodgkin's ([facility])    • Myocardial infarction [facility])    • Non-ST elevation MI (NSTEMI) 01/02/2023   • Obesity    • Obstructive sleep apnea    • Olecranon bursitis, right elbow 7/11/2022   • SSS (sick sinus syndrome) ([facility])     s/p medtronic dual chamber pacemaker 5/25/2021   • Tobacco abuse    • Ventricular tachycardia, non-sustained ([address])    • Wears glasses      Past Surgical History:   Procedure Laterality Date   • CARDIAC CATHETERIZATION N/A 1/3/2023    Procedure: Cardiac catheterization;  Surgeon: An Gerber MD;  Location: [facility]; Service: Cardiology   • CARDIAC CATHETERIZATION N/A 1/3/2023    Procedure: Cardiac Coronary Angiogram;  Surgeon: An Gerber MD;  Location: [facility];   Service: Cardiology   • CARDIAC CATHETERIZATION N/A 1/3/2023 Procedure: Cardiac pci;  Surgeon: Tete Rush MD;  Location: [facility]; Service: Cardiology   • CARDIAC CATHETERIZATION Left 2023    Procedure: Cardiac Left Heart Cath;  Surgeon: Zeinab Mcgregor DO;  Location: [facility]; Service: Cardiology   • CARDIAC CATHETERIZATION  2023    Procedure: Cardiac catheterization;  Surgeon: Zeinab Mcgregor DO;  [facility]; Service: Cardiology   • CARDIAC CATHETERIZATION N/A 2023    Procedure: Cardiac Coronary Angiogram;  Surgeon: Zeinab Mcgregor DO;  Location: [facility];   Service: Cardiology   • CARDIAC PACEMAKER PLACEMENT Left 2021    Procedure: DUAL LEAD PACEMAKER IMPLANT;  Surgeon: Rossy Gomez MD;  Location: [address] [place] [address] OR;  Service: Cardiology   • CAROTID STENT     •  SECTION     • CORONARY ANGIOPLASTY WITH STENT PLACEMENT  2021    GREG mid RCA and GREG right PDA   • DENTAL SURGERY     • IR BIOPSY KIDNEY RANDOM  10/18/2021     Social History     Substance and Sexual Activity   Alcohol Use Yes    Comment: 1-2 times years     Social History     Substance and Sexual Activity   Drug Use No     Social History     Tobacco Use   Smoking Status Every Day   • Packs/day: 0.50   • Years: 25.00   • Total pack years: 12.50   • Types: Cigarettes   Smokeless Tobacco Never   Tobacco Comments    Recently and currently quitting cigarettes       Family History:  Family History   Problem Relation Age of Onset   • No Known Problems Mother    • No Known Problems Father    • No Known Problems Daughter    • Brain cancer Maternal Grandfather    • Heart disease Maternal Grandfather    • Cancer Maternal Grandfather    • No Known Problems Paternal Aunt    • No Known Problems Paternal Aunt        Medication:    Current Facility-Administered Medications:   •  isosorbide mononitrate (IMDUR) 24 hr tablet 60 mg, 60 mg, Oral, Daily, Binu Ryder PA-C, 60 mg at 10/02/23 0585    Current Outpatient Medications:   • acetaminophen (TYLENOL) 325 mg tablet, Take 3 tablets (975 mg total) by mouth every 8 (eight) hours as needed for mild pain, Disp: 90 tablet, Rfl: 0  •  albuterol (PROVENTIL HFA,VENTOLIN HFA) 90 mcg/act inhaler, Inhale 2 puffs every 4 (four) hours as needed for wheezing or shortness of breath, Disp: 18 g, Rfl: 5  •  aspirin 81 mg chewable tablet, Chew 1 tablet (81 mg total) daily, Disp: 90 tablet, Rfl: 0  •  atorvastatin (LIPITOR) 80 mg tablet, Take 1 tablet (80 mg total) by mouth every evening, Disp: 90 tablet, Rfl: 0  •  calcitriol (ROCALTROL) 0.25 mcg capsule, Take 1 capsule (0.25 mcg total) by mouth daily Takes Monday Wednesday and fridays as of 11/11/22, Disp: , Rfl:   •  clopidogrel (PLAVIX) 75 mg tablet, Take 1 tablet (75 mg total) by mouth daily, Disp: 90 tablet, Rfl: 0  •  ezetimibe (ZETIA) 10 mg tablet, Take 1 tablet (10 mg total) by mouth in the morning., Disp: 30 tablet, Rfl: 5  •  gabapentin (Neurontin) 300 mg capsule, Take 1 capsule (300 mg total) by mouth 3 (three) times a day, Disp: 90 capsule, Rfl: 3  •  isosorbide mononitrate (IMDUR) 30 mg 24 hr tablet, Take 1 tablet (30 mg total) by mouth 2 (two) times a day, Disp: 60 tablet, Rfl: 0  •  metoprolol succinate (TOPROL-XL) 50 mg 24 hr tablet, Take 1 tablet (50 mg total) by mouth 2 (two) times a day, Disp: 60 tablet, Rfl: 0  •  nitroglycerin (NITROSTAT) 0.4 mg SL tablet, Place 1 tablet (0.4 mg total) under the tongue every 5 (five) minutes as needed for chest pain, Disp: 25 tablet, Rfl: 2  •  oxyCODONE-acetaminophen (Percocet) 5-325 mg per tablet, Take 2 tablets by mouth every 6 (six) hours as needed for moderate pain for up to 7 days Max Daily Amount: 8 tablets, Disp: 56 tablet, Rfl: 0  •  ranolazine (RANEXA) 500 mg 12 hr tablet, Take 1 tablet (500 mg total) by mouth every 12 (twelve) hours, Disp: 60 tablet, Rfl: 0  •  sodium bicarbonate 650 mg tablet, Take 1 tablet (650 mg total) by mouth 2 (two) times daily after meals, Disp: 60 tablet, Rfl: 0  • venlafaxine (EFFEXOR-XR) 75 mg 24 hr capsule, Take 1 capsule (75 mg total) by mouth daily with breakfast, Disp: 30 capsule, Rfl: 5     Allergies:  No Known Allergies    Physical Exam:  Vitals: Blood pressure 110/58, pulse 70, temperature (!) 97.4 °F (36.3 °C), temperature source Tympanic, resp. rate 20, height 5' 6" (1.676 m), weight 129 kg (285 lb), SpO2 92 %, not currently breastfeeding., Body mass index is 46 kg/m².,   Orthostatic Blood Pressures    Flowsheet Row Most Recent Value   Blood Pressure 110/58 filed at 10/02/2023 1000   Patient Position - Orthostatic VS Sitting filed at 10/02/2023 0843      , Systolic (88XZW), PQW:908 , Min:110 , IEH:266   , Diastolic (27GHK), MTL:59, Min:55, Max:80    Physical Exam  Vitals and nursing note reviewed. Constitutional:       Appearance: She is well-developed. She is morbidly obese. HENT:      Head: Normocephalic and atraumatic. Mouth/Throat:      Mouth: Mucous membranes are moist.   Eyes:      General: No scleral icterus. Conjunctiva/sclera: Conjunctivae normal.   Neck:      Vascular: No JVD. Trachea: No tracheal deviation. Cardiovascular:      Rate and Rhythm: Normal rate and regular rhythm. Pulses: Intact distal pulses. Heart sounds: Normal heart sounds, S1 normal and S2 normal. No murmur heard. No friction rub. No gallop. Pulmonary:      Effort: Pulmonary effort is normal. No respiratory distress. Breath sounds: Normal breath sounds. No wheezing or rales. Chest:      Chest wall: No tenderness. Abdominal:      General: Bowel sounds are normal. There is no distension. Palpations: Abdomen is soft. Tenderness: There is no abdominal tenderness. Comments: Aorta not palpable    Musculoskeletal:         General: No tenderness. Normal range of motion. Cervical back: Normal range of motion and neck supple. Right lower leg: No edema. Left lower leg: No edema. Skin:     General: Skin is warm and dry. Coloration: Skin is not pale. Findings: No erythema or rash. Neurological:      General: No focal deficit present. Mental Status: She is alert and oriented to person, place, and time.    Psychiatric:         Mood and Affect: Mood normal.         Behavior: Behavior normal.         Judgment: Judgment normal.           Most Recent Cardiac Imaging:   Echo 9/24/23: Reduced LVEF 40% G2DD HK basal inferior and mid inferolateral walls, mild AI mild MAC Ao root 3.9 Asc Ao 4.2    EKG/Telemetry:   Tele: SR controlled, no events    EKG: 10/2/23  Normal sinus rhythm  Anteroseptal infarct (cited on or before 07-JAN-2023)  Abnormal ECG      Lab Results:   Troponins:   Results from last 7 days   Lab Units 10/02/23  0914 10/02/23  0729 10/02/23  0513   HS TNI 0HR ng/L  --   --  43   HS TNI 2HR ng/L  --  51*  --    HSTNI D2 ng/L  --  8  --    HS TNI 4HR ng/L 51*  --   --    HSTNI D4 ng/L 8  --   --       BNP:  Results from last 6 Months   Lab Units 10/02/23  0513 06/27/23  0539   BNP pg/mL 544* 416*     CBC with diff:   Results from last 7 days   Lab Units 10/02/23  0513 09/26/23  0532   WBC Thousand/uL 13.85* 10.29*   HEMOGLOBIN g/dL 11.8 11.0*   HEMATOCRIT % 38.6 35.8   PLATELETS Thousands/uL 349 251   RBC Million/uL 4.38 4.00     CMP:  Results from last 7 days   Lab Units 10/02/23  0513 09/27/23  0517 09/26/23  0532   POTASSIUM mmol/L 4.1 4.3 4.2   CHLORIDE mmol/L 109* 108 108   BUN mg/dL 33* 40* 36*   CREATININE mg/dL 2.77* 3.37* 3.07*   CALCIUM mg/dL 9.3 9.5 9.2   AST U/L 19  --   --    ALT U/L 17  --   --    ALK PHOS U/L 98  --   --    EGFR ml/min/1.73sq m 19 15 17     Lipid Profile:

## 2023-10-02 NOTE — DISCHARGE INSTRUCTIONS
Please stop taking your 30 mg Imdur dose and replace it with the 60 mg that was sent to the pharmacy for you. Please make sure to follow-up with cardiology soon as possible. If you develop any new or worsening symptoms please immediately return to your nearest emergency department.

## 2023-10-02 NOTE — ASSESSMENT & PLAN NOTE
Lab Results   Component Value Date    EGFR 19 10/02/2023    EGFR 15 09/27/2023    EGFR 17 09/26/2023    CREATININE 2.77 (H) 10/02/2023    CREATININE 3.37 (H) 09/27/2023    CREATININE 3.07 (H) 09/26/2023   BP does remain soft in the setting of taking nitro   Will continue to monitor with the use of Imdur   Patient has been taking multiple nitro daily   Will try to increase Imdur dose in an attempt to reduce amount of nitro taken on a daily basis

## 2023-10-02 NOTE — PROGRESS NOTES
Assessment/Plan:    No problem-specific Assessment & Plan notes found for this encounter. Diagnoses and all orders for this visit:    Non-STEMI (non-ST elevated myocardial infarction) ([address])  -     Echo complete w/ contrast if indicated; Future    Essential hypertension    Screening for diabetic retinopathy  -     IRIS Diabetic eye exam    New onset type 2 diabetes mellitus ([address])  -     Diabetic foot exam; Future    Chronic obstructive pulmonary disease, unspecified COPD type ([address])  Comments:  quit smoking    Smoking  Comments:  pt counseled to quit smoking    Heart murmur  -     Echo complete w/ contrast if indicated; Future    Chronic renal disease, stage 4, severely decreased glomerular filtration rate between 15-29 mL/min/1.73 square meter ([address])  -     Ambulatory Referral to Nephrology; Future    Other orders  -     amoxicillin (AMOXIL) 500 MG tablet; take 1 tablet by mouth three times a day for 5 days          PHQ-2/9 Depression Screening          TCM Call     Date and time call was made  9/27/2023 12:53 PM    Hospital care reviewed  Records reviewed    Patient was hospitialized at  [facility]    Date of Admission  09/23/23    Date of discharge  09/27/23    Diagnosis  Non-STEMI (non-ST elevated myocardial infarction) [facility]    Disposition  Home    Were the patients medications reviewed and updated  Yes    Current Symptoms  None      TCM Call     Post hospital issues  None    Should patient be enrolled in anticoag monitoring? No    Scheduled for follow up? Yes    Patients specialists  Cardiologist    Cardiologist name  Olivia Linda    Did you obtain your prescribed medications  Yes    Do you need help managing your prescriptions or medications  No    Is transportation to your appointment needed  No    I have advised the patient to call PCP with any new or worsening symptoms  mark johnson      Patient's shoes and socks removed.     Right Foot/Ankle   Right Foot Inspection  Skin Exam: skin normal and skin intact. No dry skin, no warmth, no callus, no erythema, no maceration, no abnormal color, no pre-ulcer, no ulcer and no callus. Toe Exam: ROM and strength within normal limits. no right toe deformity    Sensory   Monofilament testing: intact    Vascular  Capillary refills: < 3 seconds  The right DP pulse is 2+. Left Foot/Ankle  Left Foot Inspection  Skin Exam: skin normal and skin intact. No dry skin, no warmth, no erythema, no maceration, normal color, no pre-ulcer, no ulcer and no callus. Toe Exam: ROM and strength within normal limits. No left toe deformity. Sensory   Monofilament testing: intact    Vascular  Capillary refills: < 3 seconds  The left DP pulse is 2+. Assign Risk Category  No deformity present  No loss of protective sensation  No weak pulses  Risk: 0      Subjective:      Patient ID: Zak Huynh is a 57 y.o. female. Hospital follow up for NSTEMI, pt had a cath in the hospital but did not have an angioplasty, pt had kidney complications with the dye and is slated to see nephrology, pt is alos set to see cardiology later this month, pt states that she feels "crappy" and was in the ER this morning with chest pain but her work up was negative, pt is on imdur 60mg, pt has not quit smoking      The following portions of the patient's history were reviewed and updated as appropriate: allergies, current medications, past family history, past medical history, past social history, past surgical history and problem list.    Review of Systems   Constitutional: Positive for fatigue. Negative for chills and fever. Respiratory: Positive for shortness of breath. Negative for wheezing. Cardiovascular: Negative for chest pain and palpitations. Objective:    BP 96/60 (BP Location: Left arm, Patient Position: Sitting)   Pulse 83   Temp 98.7 °F (37.1 °C) (Tympanic)   Ht 5' 6" (1.676 m)   SpO2 95%   BMI 46.00 kg/m²      Physical Exam  Vitals and nursing note reviewed. Constitutional:       General: She is not in acute distress. Appearance: Normal appearance. She is obese. She is not ill-appearing, toxic-appearing or diaphoretic. HENT:      Head: Normocephalic and atraumatic. Eyes:      Conjunctiva/sclera: Conjunctivae normal.   Cardiovascular:      Rate and Rhythm: Normal rate and regular rhythm. Pulses: no weak pulses          Dorsalis pedis pulses are 2+ on the right side and 2+ on the left side. Heart sounds: Murmur heard. Pulmonary:      Effort: Pulmonary effort is normal. No respiratory distress. Breath sounds: Normal breath sounds. No wheezing, rhonchi or rales. Abdominal:      General: There is no distension. Palpations: Abdomen is soft. There is no mass. Tenderness: There is no abdominal tenderness. There is no guarding or rebound. Musculoskeletal:      Cervical back: Normal range of motion and neck supple. No rigidity. Right lower leg: No edema. Left lower leg: No edema. Feet:      Right foot:      Skin integrity: No ulcer, skin breakdown, erythema, warmth, callus or dry skin. Left foot:      Skin integrity: No ulcer, skin breakdown, erythema, warmth, callus or dry skin. Lymphadenopathy:      Cervical: No cervical adenopathy. Neurological:      Mental Status: She is alert and oriented to person, place, and time. Psychiatric:         Mood and Affect: Mood normal.         Behavior: Behavior normal.         Thought Content:  Thought content normal.         Judgment: Judgment normal.

## 2023-10-02 NOTE — PROGRESS NOTES
Outreach TC to patient for CM assessment. No answer to call. Left message on voicemail requesting a return call from patient to [address] [place], [place] [address]LILY mendoza; Outpatient Care Manager @ [phone]. Second unanswered call to patient. Chart review completed. Patient was discharged to home.

## 2023-10-02 NOTE — ASSESSMENT & PLAN NOTE
CAD s/p PCI to RCA, rPDA and GREG to LAD/D2  • Inferior STEMI (3/23/21) --> initial PCI to [facility]  • Recurrent chest pain (3/23/21) --> GREG to RPDA  • Readmission, inferior STEMI (4/2/21) --> patent stents, no change on cath  • NSTEMI 1/3/2023--> GREG LAD/D2  Continue asa, plavix, statin, BB, ranexa, imdur    Imdur will be increased to help with symptoms

## 2023-10-06 DIAGNOSIS — Z95.5 S/P DRUG ELUTING CORONARY STENT PLACEMENT: ICD-10-CM

## 2023-10-06 DIAGNOSIS — I25.10 CORONARY ARTERY DISEASE INVOLVING NATIVE CORONARY ARTERY OF NATIVE HEART WITHOUT ANGINA PECTORIS: ICD-10-CM

## 2023-10-06 RX ORDER — NITROGLYCERIN 0.4 MG/1
0.4 TABLET SUBLINGUAL
Qty: 100 TABLET | Refills: 1 | Status: SHIPPED | OUTPATIENT
Start: 2023-10-06

## 2023-10-06 NOTE — TELEPHONE ENCOUNTER
[address] [facility]  She said she was told she could get nitro and get more than one bottle when she was in the ER>  She said Hemant Wood told her that.

## 2023-10-24 ENCOUNTER — PATIENT OUTREACH (OUTPATIENT)
Dept: CASE MANAGEMENT | Facility: OTHER | Age: 48
End: 2023-10-24

## 2023-10-24 NOTE — PROGRESS NOTES
Outreach TC to patient for CM assessment. No answer to call. Left message on voicemail requesting a return call from patient [facility]; Outpatient Care Manager @ [phone] Third call to patient. Unable to reach letter sent to patient via [address] [place] [address] and also [address] [place] [address]. Chart review reveals that patient was a "No Show" for her appointment with cardiology on 10/19/23.

## 2023-10-24 NOTE — LETTER
Date: 10/24/23    Dear Alex Keith,   My name is Josesito Del Angel RN; I am a registered nurse care manager working with [facility] [address]  [phone] office phone number   I have not been able to reach you and would like to set a time that I can talk with you over the phone or in-person. I called you and left a message on 9/29/23, 10/2/23 and 10/24/23. My work is to help patients that have complex medical conditions get the care they need. This includes patients who may have been in the hospital or emergency room. I have enclosed information for you. Please call me with any questions you may have. I look forward to meeting with you.   Sincerely,  Samina Scott RN, BSN  [phone] phone number  Outpatient Care Manager  I work M-F 8am-4:30pm

## 2023-11-13 DIAGNOSIS — Z95.5 S/P DRUG ELUTING CORONARY STENT PLACEMENT: ICD-10-CM

## 2023-11-13 DIAGNOSIS — I25.10 CORONARY ARTERY DISEASE INVOLVING NATIVE CORONARY ARTERY OF NATIVE HEART WITHOUT ANGINA PECTORIS: ICD-10-CM

## 2023-11-13 RX ORDER — NITROGLYCERIN 0.4 MG/1
0.4 TABLET SUBLINGUAL
Qty: 100 TABLET | Refills: 0 | Status: SHIPPED | OUTPATIENT
Start: 2023-11-13

## 2023-11-14 DIAGNOSIS — G62.9 PERIPHERAL NEUROPATHY: ICD-10-CM

## 2023-11-14 RX ORDER — GABAPENTIN 300 MG/1
300 CAPSULE ORAL 3 TIMES DAILY
Qty: 90 CAPSULE | Refills: 0 | Status: SHIPPED | OUTPATIENT
Start: 2023-11-14

## 2023-11-21 ENCOUNTER — VBI (OUTPATIENT)
Dept: ADMINISTRATIVE | Facility: OTHER | Age: 48
End: 2023-11-21

## 2023-12-02 ENCOUNTER — APPOINTMENT (EMERGENCY)
Dept: RADIOLOGY | Facility: HOSPITAL | Age: 48
End: 2023-12-02
Payer: COMMERCIAL

## 2023-12-02 ENCOUNTER — HOSPITAL ENCOUNTER (EMERGENCY)
Facility: HOSPITAL | Age: 48
Discharge: HOME | End: 2023-12-02
Attending: EMERGENCY MEDICINE
Payer: COMMERCIAL

## 2023-12-02 VITALS
HEART RATE: 79 BPM | OXYGEN SATURATION: 99 % | SYSTOLIC BLOOD PRESSURE: 146 MMHG | DIASTOLIC BLOOD PRESSURE: 77 MMHG | RESPIRATION RATE: 22 BRPM

## 2023-12-02 DIAGNOSIS — I20.89 ANGINA AT REST: ICD-10-CM

## 2023-12-02 DIAGNOSIS — R07.9 CHEST PAIN: Primary | ICD-10-CM

## 2023-12-02 LAB
2HR DELTA HS TROPONIN: 2 NG/L
4HR DELTA HS TROPONIN: 5 NG/L
ANION GAP SERPL CALCULATED.3IONS-SCNC: 11 MMOL/L
ATRIAL RATE: 101 BPM
ATRIAL RATE: 80 BPM
BASOPHILS # BLD AUTO: 0.12 THOUSANDS/ÂΜL (ref 0–0.1)
BASOPHILS NFR BLD AUTO: 1 % (ref 0–1)
BUN SERPL-MCNC: 32 MG/DL (ref 5–25)
CALCIUM SERPL-MCNC: 10.9 MG/DL (ref 8.4–10.2)
CARDIAC TROPONIN I PNL SERPL HS: 61 NG/L
CARDIAC TROPONIN I PNL SERPL HS: 63 NG/L
CARDIAC TROPONIN I PNL SERPL HS: 66 NG/L
CHLORIDE SERPL-SCNC: 108 MMOL/L (ref 96–108)
CO2 SERPL-SCNC: 18 MMOL/L (ref 21–32)
CREAT SERPL-MCNC: 2.82 MG/DL (ref 0.6–1.3)
EOSINOPHIL # BLD AUTO: 0.54 THOUSAND/ÂΜL (ref 0–0.61)
EOSINOPHIL NFR BLD AUTO: 3 % (ref 0–6)
ERYTHROCYTE [DISTWIDTH] IN BLOOD BY AUTOMATED COUNT: 18.5 % (ref 11.6–15.1)
GFR SERPL CREATININE-BSD FRML MDRD: 19 ML/MIN/1.73SQ M
GLUCOSE SERPL-MCNC: 131 MG/DL (ref 65–140)
HCT VFR BLD AUTO: 42.2 % (ref 34.8–46.1)
HGB BLD-MCNC: 13.4 G/DL (ref 11.5–15.4)
IMM GRANULOCYTES # BLD AUTO: 0.19 THOUSAND/UL (ref 0–0.2)
IMM GRANULOCYTES NFR BLD AUTO: 1 % (ref 0–2)
LYMPHOCYTES # BLD AUTO: 3.95 THOUSANDS/ÂΜL (ref 0.6–4.47)
LYMPHOCYTES NFR BLD AUTO: 22 % (ref 14–44)
MCH RBC QN AUTO: 26.9 PG (ref 26.8–34.3)
MCHC RBC AUTO-ENTMCNC: 31.8 G/DL (ref 31.4–37.4)
MCV RBC AUTO: 85 FL (ref 82–98)
MONOCYTES # BLD AUTO: 1.18 THOUSAND/ÂΜL (ref 0.17–1.22)
MONOCYTES NFR BLD AUTO: 7 % (ref 4–12)
NEUTROPHILS # BLD AUTO: 11.72 THOUSANDS/ÂΜL (ref 1.85–7.62)
NEUTS SEG NFR BLD AUTO: 66 % (ref 43–75)
NRBC BLD AUTO-RTO: 0 /100 WBCS
P AXIS: 39 DEGREES
P AXIS: 51 DEGREES
PLATELET # BLD AUTO: 370 THOUSANDS/UL (ref 149–390)
PMV BLD AUTO: 10.5 FL (ref 8.9–12.7)
POTASSIUM SERPL-SCNC: 3.7 MMOL/L (ref 3.5–5.3)
PR INTERVAL: 166 MS
PR INTERVAL: 172 MS
QRS AXIS: -3 DEGREES
QRS AXIS: 7 DEGREES
QRSD INTERVAL: 96 MS
QRSD INTERVAL: 96 MS
QT INTERVAL: 330 MS
QT INTERVAL: 368 MS
QTC INTERVAL: 424 MS
QTC INTERVAL: 427 MS
RBC # BLD AUTO: 4.98 MILLION/UL (ref 3.81–5.12)
SODIUM SERPL-SCNC: 137 MMOL/L (ref 135–147)
T WAVE AXIS: 22 DEGREES
T WAVE AXIS: 47 DEGREES
T4 FREE SERPL-MCNC: 0.51 NG/DL (ref 0.61–1.12)
TSH SERPL DL<=0.05 MIU/L-ACNC: 5.28 UIU/ML (ref 0.45–4.5)
VENTRICULAR RATE: 101 BPM
VENTRICULAR RATE: 80 BPM
WBC # BLD AUTO: 17.7 THOUSAND/UL (ref 4.31–10.16)

## 2023-12-02 PROCEDURE — 84484 ASSAY OF TROPONIN QUANT: CPT | Performed by: EMERGENCY MEDICINE

## 2023-12-02 PROCEDURE — 80048 BASIC METABOLIC PNL TOTAL CA: CPT | Performed by: EMERGENCY MEDICINE

## 2023-12-02 PROCEDURE — 99285 EMERGENCY DEPT VISIT HI MDM: CPT | Performed by: EMERGENCY MEDICINE

## 2023-12-02 PROCEDURE — 84439 ASSAY OF FREE THYROXINE: CPT | Performed by: EMERGENCY MEDICINE

## 2023-12-02 PROCEDURE — 85025 COMPLETE CBC W/AUTO DIFF WBC: CPT | Performed by: EMERGENCY MEDICINE

## 2023-12-02 PROCEDURE — 96375 TX/PRO/DX INJ NEW DRUG ADDON: CPT

## 2023-12-02 PROCEDURE — 96374 THER/PROPH/DIAG INJ IV PUSH: CPT

## 2023-12-02 PROCEDURE — 99285 EMERGENCY DEPT VISIT HI MDM: CPT

## 2023-12-02 PROCEDURE — 93005 ELECTROCARDIOGRAM TRACING: CPT

## 2023-12-02 PROCEDURE — 71045 X-RAY EXAM CHEST 1 VIEW: CPT

## 2023-12-02 PROCEDURE — 84443 ASSAY THYROID STIM HORMONE: CPT | Performed by: EMERGENCY MEDICINE

## 2023-12-02 PROCEDURE — 36415 COLL VENOUS BLD VENIPUNCTURE: CPT | Performed by: EMERGENCY MEDICINE

## 2023-12-02 RX ORDER — FENTANYL CITRATE 50 UG/ML
75 INJECTION, SOLUTION INTRAMUSCULAR; INTRAVENOUS ONCE
Status: COMPLETED | OUTPATIENT
Start: 2023-12-02 | End: 2023-12-02

## 2023-12-02 RX ORDER — NITROGLYCERIN 0.4 MG/1
0.4 TABLET SUBLINGUAL ONCE
Status: COMPLETED | OUTPATIENT
Start: 2023-12-02 | End: 2023-12-02

## 2023-12-02 RX ORDER — HYDROMORPHONE HCL/PF 1 MG/ML
1 SYRINGE (ML) INJECTION ONCE
Status: COMPLETED | OUTPATIENT
Start: 2023-12-02 | End: 2023-12-02

## 2023-12-02 RX ORDER — ONDANSETRON 2 MG/ML
4 INJECTION INTRAMUSCULAR; INTRAVENOUS ONCE
Status: COMPLETED | OUTPATIENT
Start: 2023-12-02 | End: 2023-12-02

## 2023-12-02 RX ADMIN — NITROGLYCERIN 0.4 MG: 0.4 TABLET SUBLINGUAL at 04:36

## 2023-12-02 RX ADMIN — FENTANYL CITRATE 75 MCG: 50 INJECTION, SOLUTION INTRAMUSCULAR; INTRAVENOUS at 05:11

## 2023-12-02 RX ADMIN — ONDANSETRON 4 MG: 2 INJECTION INTRAMUSCULAR; INTRAVENOUS at 04:53

## 2023-12-02 RX ADMIN — NITROGLYCERIN 0.4 MG: 0.4 TABLET SUBLINGUAL at 09:12

## 2023-12-02 RX ADMIN — HYDROMORPHONE HYDROCHLORIDE 1 MG: 1 INJECTION, SOLUTION INTRAMUSCULAR; INTRAVENOUS; SUBCUTANEOUS at 05:37

## 2023-12-02 NOTE — ED PROVIDER NOTES
History  Chief Complaint   Patient presents with    Chest Pain     Pt presents with chest pain that radiates to her neck/jaw      58 yo female with reports of chest tightness going into the neck and into the back which reportedly started about 8-9 hours prior to arrival with prior history of heart attack. No other symptoms. Prior to Admission Medications   Prescriptions Last Dose Informant Patient Reported? Taking?   acetaminophen (TYLENOL) 325 mg tablet   No No   Sig: Take 3 tablets (975 mg total) by mouth every 8 (eight) hours as needed for mild pain   albuterol (PROVENTIL HFA,VENTOLIN HFA) 90 mcg/act inhaler   No No   Sig: Inhale 2 puffs every 4 (four) hours as needed for wheezing or shortness of breath   amoxicillin (AMOXIL) 500 MG tablet   Yes No   Sig: take 1 tablet by mouth three times a day for 5 days   aspirin 81 mg chewable tablet  Self No No   Sig: Chew 1 tablet (81 mg total) daily   atorvastatin (LIPITOR) 80 mg tablet   No No   Sig: Take 1 tablet (80 mg total) by mouth every evening   calcitriol (ROCALTROL) 0.25 mcg capsule   Yes No   Sig: Take 1 capsule (0.25 mcg total) by mouth daily Takes Monday Wednesday and fridays as of 11/11/22   clopidogrel (PLAVIX) 75 mg tablet   No No   Sig: Take 1 tablet (75 mg total) by mouth daily   ezetimibe (ZETIA) 10 mg tablet   No No   Sig: Take 1 tablet (10 mg total) by mouth in the morning.    Patient not taking: Reported on 10/2/2023   gabapentin (Neurontin) 300 mg capsule   No No   Sig: Take 1 capsule (300 mg total) by mouth 3 (three) times a day   isosorbide mononitrate (IMDUR) 30 mg 24 hr tablet   No No   Sig: Take 1 tablet (30 mg total) by mouth 2 (two) times a day   isosorbide mononitrate (IMDUR) 60 mg 24 hr tablet   No No   Sig: Take 1 tablet (60 mg total) by mouth daily   Patient not taking: Reported on 10/2/2023   metoprolol succinate (TOPROL-XL) 50 mg 24 hr tablet   No No   Sig: Take 1 tablet (50 mg total) by mouth 2 (two) times a day   nitroglycerin (NITROSTAT) 0.4 mg SL tablet   No No   Sig: Place 1 tablet (0.4 mg total) under the tongue every 5 (five) minutes as needed for chest pain   ranolazine (RANEXA) 500 mg 12 hr tablet   No No   Sig: Take 1 tablet (500 mg total) by mouth every 12 (twelve) hours   silver sulfadiazine (SILVADENE,SSD) 1 % cream   No No   Sig: Apply topically daily   sodium bicarbonate 650 mg tablet   No No   Sig: Take 1 tablet (650 mg total) by mouth 2 (two) times daily after meals   venlafaxine (EFFEXOR-XR) 75 mg 24 hr capsule   No No   Sig: Take 1 capsule (75 mg total) by mouth daily with breakfast      Facility-Administered Medications: None       Past Medical History:   Diagnosis Date    Anemia     Anxiety     CAD (coronary artery disease)     Cancer ([address]) 2008    Cardiomyopathy ([address])     CHF (congestive heart failure) (HCC)     Chronic kidney disease     COPD (chronic obstructive pulmonary disease) ([address])     scheduled for sleep apnea test soon after pacemaker implant    Coronary artery disease     Depression     History of chemotherapy     non hodgkins lymphoma 2008    Hyperlipemia     Hypertension     Hypertrophic cardiomyopathy (HCC)     Lymphoma, non-Hodgkin's (HCC)     Myocardial infarction (HCC)     Non-ST elevation MI (NSTEMI) 01/02/2023    Obesity     Obstructive sleep apnea     Olecranon bursitis, right elbow 7/11/2022    SSS (sick sinus syndrome) (HCC)     s/p medtronic dual chamber pacemaker 5/25/2021    Tobacco abuse     Ventricular tachycardia, non-sustained (HCC)     Wears glasses        Past Surgical History:   Procedure Laterality Date    CARDIAC CATHETERIZATION N/A 1/3/2023    Procedure: Cardiac catheterization;  Surgeon: Lori Ortiz MD;  Location: [facility]; Service: Cardiology    CARDIAC CATHETERIZATION N/A 1/3/2023    Procedure: Cardiac Coronary Angiogram;  Surgeon: Lori Ortiz MD;  Location: [facility];   Service: Cardiology    CARDIAC CATHETERIZATION N/A 1/3/2023    Procedure: Cardiac pci; Surgeon: Annette Wells MD;  Location: [facility]; Service: Cardiology    CARDIAC CATHETERIZATION Left 2023    Procedure: Cardiac Left Heart Cath;  Surgeon: Lance Mitchell DO;  [facility]: [facility]; Service: Cardiology    CARDIAC CATHETERIZATION  2023    Procedure: Cardiac catheterization;  Surgeon: Lance Mitchell DO;  [facility]: [facility]; Service: Cardiology    CARDIAC CATHETERIZATION N/A 2023    Procedure: Cardiac Coronary Angiogram;  Surgeon: Lance Mitchell DO;  [facility]: [facility]; Service: Cardiology    CARDIAC PACEMAKER PLACEMENT Left 2021    Procedure: DUAL LEAD PACEMAKER IMPLANT;  Surgeon: Tom Dia MD;  Location: [address] [place] [address] MAIN OR;  Service: Cardiology    CAROTID STENT       SECTION      CORONARY ANGIOPLASTY WITH STENT PLACEMENT  2021    GREG mid RCA and GREG right PDA    DENTAL SURGERY      IR BIOPSY KIDNEY RANDOM  10/18/2021       Family History   Problem Relation Age of Onset    No Known Problems Mother     No Known Problems Father     No Known Problems Daughter     Brain cancer Maternal Grandfather     Heart disease Maternal Grandfather     Cancer Maternal Grandfather     No Known Problems Paternal Aunt     No Known Problems Paternal Aunt      I have reviewed and agree with the history as documented. E-Cigarette/Vaping    E-Cigarette Use Never User      E-Cigarette/Vaping Substances    Nicotine No     THC No     CBD No     Flavoring No     Other No     Unknown No      Social History     Tobacco Use    Smoking status: Every Day     Packs/day: 0.50     Years: 25.00     Total pack years: 12.50     Types: Cigarettes    Smokeless tobacco: Never    Tobacco comments:     Recently and currently quitting cigarettes   Vaping Use    Vaping Use: Never used   Substance Use Topics    Alcohol use: Yes     Comment: 1-2 times years    Drug use: No       Review of Systems   Respiratory:  Positive for shortness of breath. Cardiovascular:  Positive for chest pain and palpitations. Gastrointestinal:  Positive for nausea and vomiting. Physical Exam  Physical Exam  Vitals and nursing note reviewed. Constitutional:       General: She is not in acute distress. Appearance: She is well-developed. She is obese. She is not diaphoretic. HENT:      Head: Normocephalic and atraumatic. Right Ear: External ear normal.      Left Ear: External ear normal.      Nose: Nose normal.   Eyes:      General: No scleral icterus. Right eye: No discharge. Left eye: No discharge. Conjunctiva/sclera: Conjunctivae normal.      Pupils: Pupils are equal, round, and reactive to light. Neck:      Vascular: No JVD. Trachea: No tracheal deviation. Cardiovascular:      Rate and Rhythm: Normal rate and regular rhythm. Heart sounds: Normal heart sounds. No murmur heard. No friction rub. No gallop. Pulmonary:      Effort: Pulmonary effort is normal. No respiratory distress. Breath sounds: Normal breath sounds. No stridor. No wheezing or rales. Abdominal:      General: Bowel sounds are normal. There is no distension. Palpations: Abdomen is soft. There is no mass. Tenderness: There is no abdominal tenderness. There is no guarding. Musculoskeletal:         General: No tenderness or deformity. Normal range of motion. Cervical back: Normal range of motion and neck supple. Skin:     General: Skin is warm and dry. Coloration: Skin is not pale. Findings: No erythema or rash. Neurological:      Mental Status: She is alert and oriented to person, place, and time. Cranial Nerves: No cranial nerve deficit. Sensory: No sensory deficit. Motor: No abnormal muscle tone. Psychiatric:         Behavior: Behavior normal.         Thought Content:  Thought content normal.         Judgment: Judgment normal.         Vital Signs  ED Triage Vitals [12/02/23 0433]   Temp Pulse Respirations Blood Pressure SpO2   -- 96 22 (!) 174/90 99 %      Temp src Heart Rate Source Patient Position - Orthostatic VS BP Location FiO2 (%)   -- Monitor -- Left arm --      Pain Score       10 - Worst Possible Pain           Vitals:    12/02/23 0433   BP: (!) 174/90   Pulse: 96         Visual Acuity      ED Medications  Medications   nitroglycerin (NITROSTAT) SL tablet 0.4 mg (0.4 mg Sublingual Given 12/2/23 0436)   ondansetron (ZOFRAN) injection 4 mg (4 mg Intravenous Given 12/2/23 0453)   fentanyl citrate (PF) 100 MCG/2ML 75 mcg (75 mcg Intravenous Given 12/2/23 0511)   HYDROmorphone (DILAUDID) injection 1 mg (1 mg Intravenous Given 12/2/23 0537)       Diagnostic Studies  Results Reviewed       Procedure Component Value Units Date/Time    HS Troponin I 4hr [[id number]]     Lab Status: No result Specimen: Blood     Basic metabolic panel [[id number]]  (Abnormal) Collected: 12/02/23 0434    Lab Status: Final result Specimen: Blood from Arm, Right Updated: 12/02/23 0522     Sodium 137 mmol/L      Potassium 3.7 mmol/L      Chloride 108 mmol/L      CO2 18 mmol/L      ANION GAP 11 mmol/L      BUN 32 mg/dL      Creatinine 2.82 mg/dL      Glucose 131 mg/dL      Calcium 10.9 mg/dL      eGFR 19 ml/min/1.73sq m     Narrative:      [facility] guidelines for Chronic Kidney Disease (CKD):     Stage 1 with normal or high GFR (GFR > 90 mL/min/1.73 square meters)    Stage 2 Mild CKD (GFR = 60-89 mL/min/1.73 square meters)    Stage 3A Moderate CKD (GFR = 45-59 mL/min/1.73 square meters)    Stage 3B Moderate CKD (GFR = 30-44 mL/min/1.73 square meters)    Stage 4 Severe CKD (GFR = 15-29 mL/min/1.73 square meters)    Stage 5 End Stage CKD (GFR <15 mL/min/1.73 square meters)  Note: GFR calculation is accurate only with a steady state creatinine    TSH, 3rd generation with Free T4 reflex [[id number]]  (Abnormal) Collected: 12/02/23 0434    Lab Status: Final result Specimen: Blood from Arm, Right Updated: 12/02/23 0522     TSH 3RD GENERATON 5.282 uIU/mL     T4, free [[id number]] Collected: 12/02/23 0434    Lab Status:  In process Specimen: Blood from Arm, Right Updated: 12/02/23 0522    HS Troponin I 2hr [[id number]]     Lab Status: No result Specimen: Blood     HS Troponin 0hr (reflex protocol) [[id number]]  (Abnormal) Collected: 12/02/23 0434    Lab Status: Final result Specimen: Blood from Arm, Right Updated: 12/02/23 0513     hs TnI 0hr 61 ng/L     CBC and differential [[id number]]  (Abnormal) Collected: 12/02/23 0434    Lab Status: Final result Specimen: Blood from Arm, Right Updated: 12/02/23 0448     WBC 17.70 Thousand/uL      RBC 4.98 Million/uL      Hemoglobin 13.4 g/dL      Hematocrit 42.2 %      MCV 85 fL      MCH 26.9 pg      MCHC 31.8 g/dL      RDW 18.5 %      MPV 10.5 fL      Platelets 274 Thousands/uL      nRBC 0 /100 WBCs      Neutrophils Relative 66 %      Immat GRANS % 1 %      Lymphocytes Relative 22 %      Monocytes Relative 7 %      Eosinophils Relative 3 %      Basophils Relative 1 %      Neutrophils Absolute 11.72 Thousands/µL      Immature Grans Absolute 0.19 Thousand/uL      Lymphocytes Absolute 3.95 Thousands/µL      Monocytes Absolute 1.18 Thousand/µL      Eosinophils Absolute 0.54 Thousand/µL      Basophils Absolute 0.12 Thousands/µL                    XR chest 1 view portable    (Results Pending)              Procedures  ECG 12 Lead Documentation Only    Date/Time: 12/2/2023 4:38 AM    Performed by: Dary Jamison DO  Authorized by: Dary Jamison DO    Previous ECG:     Previous ECG:  Compared to current    Similarity:  No change  Interpretation:     Interpretation: abnormal    Rate:     ECG rate:  101    ECG rate assessment: tachycardic    Rhythm:     Rhythm: sinus tachycardia    Ectopy:     Ectopy: none    QRS:     QRS axis:  Normal    QRS intervals:  Normal  Conduction:     Conduction: normal    ST segments:     ST segments:  Normal  T waves:     T waves: normal    Q waves:     Q waves:  II, III, aVF, V1, V2, V3, V4, V5 and V6           ED Course  ED Course as of 12/02/23 0701   Sat Dec 02, 2023   0451 WBC(!): 17.70  Patient vomiting at home. 1336 Patient took 2 full-strength 325 mg aspirins at home prior to arrival.   0505 Initially trialed nitro which granted minimal relief temporarily. Will trial fentanyl at this time for pain control. 0532 Creatinine(!): 2.82  Appears to be near patient's baseline, has Alports syndrome   0533 Patient did get relief from fentanyl however starting to wear off will trial longer acting narcotic pain medication with Dilaudid at this time. HEART Risk Score      Flowsheet Row Most Recent Value   Heart Score Risk Calculator    History 0 Filed at: 12/02/2023 0701   ECG 1 Filed at: 12/02/2023 0701   Age 1 Filed at: 12/02/2023 0701   Risk Factors 2 Filed at: 12/02/2023 0701   Troponin 2 Filed at: 12/02/2023 0701   HEART Score 6 Filed at: 12/02/2023 0701                                        Medical Decision Making  53-year-old female with extensive cardiac history with multiple MIs presenting to the ED for reports of chest tightness which started approximately 8 hours prior to arrival.  Describes it as a severe tightness with associated shortness of breath rating to her jaw and back. As patient with extensive cardiac history and states that pain was relieved with nitro at home will give nitro here as patient's pressure will tolerate get cardiac workup. Also will check TSH as patient reports intermittent palpitations. Signed out to Dr. Kim Jacques pending delta troponin. Amount and/or Complexity of Data Reviewed  Labs: ordered. Decision-making details documented in ED Course. Radiology: ordered. Risk  Prescription drug management.              Disposition  Final diagnoses:   None     ED Disposition       None          Follow-up Information    None         Patient's Medications   Discharge Prescriptions    No medications on file       No discharge procedures on file.     PDMP Review         Value Time User    PDMP Reviewed  Yes 3/26/2023  6:06 PM Leni Roa DO            ED Provider  Electronically Signed by             Caterina Briseno DO  12/02/23 8963

## 2023-12-02 NOTE — ED NOTES
Pt reports she has been "popping" nitro tablets "like candy" with her last dose at 3:20am. Pt also took two full size 325mg ASA tablets.       Gary Bates RN  12/02/23 1628

## 2023-12-03 LAB
ATRIAL RATE: 91 BPM
ATRIAL RATE: 95 BPM
P AXIS: 37 DEGREES
P AXIS: 38 DEGREES
PR INTERVAL: 160 MS
PR INTERVAL: 168 MS
QRS AXIS: 20 DEGREES
QRS AXIS: 6 DEGREES
QRSD INTERVAL: 102 MS
QRSD INTERVAL: 92 MS
QT INTERVAL: 358 MS
QT INTERVAL: 364 MS
QTC INTERVAL: 447 MS
QTC INTERVAL: 449 MS
T WAVE AXIS: 19 DEGREES
T WAVE AXIS: 66 DEGREES
VENTRICULAR RATE: 91 BPM
VENTRICULAR RATE: 95 BPM

## 2023-12-07 DIAGNOSIS — I25.10 CORONARY ARTERY DISEASE INVOLVING NATIVE CORONARY ARTERY OF NATIVE HEART, UNSPECIFIED WHETHER ANGINA PRESENT: ICD-10-CM

## 2023-12-07 DIAGNOSIS — Z95.5 S/P DRUG ELUTING CORONARY STENT PLACEMENT: ICD-10-CM

## 2023-12-07 DIAGNOSIS — I25.10 CORONARY ARTERY DISEASE INVOLVING NATIVE CORONARY ARTERY OF NATIVE HEART WITHOUT ANGINA PECTORIS: ICD-10-CM

## 2023-12-07 DIAGNOSIS — R07.9 CHEST PAIN IN ADULT: ICD-10-CM

## 2023-12-07 RX ORDER — NITROGLYCERIN 0.4 MG/1
0.4 TABLET SUBLINGUAL
Qty: 100 TABLET | Refills: 0 | Status: SHIPPED | OUTPATIENT
Start: 2023-12-07

## 2023-12-07 RX ORDER — ISOSORBIDE MONONITRATE 60 MG/1
60 TABLET, EXTENDED RELEASE ORAL DAILY
Qty: 30 TABLET | Refills: 0 | Status: SHIPPED | OUTPATIENT
Start: 2023-12-07 | End: 2024-01-06

## 2023-12-07 RX ORDER — CLOPIDOGREL BISULFATE 75 MG/1
75 TABLET ORAL DAILY
Qty: 90 TABLET | Refills: 0 | Status: SHIPPED | OUTPATIENT
Start: 2023-12-07

## 2023-12-10 ENCOUNTER — HOSPITAL ENCOUNTER (OUTPATIENT)
Facility: HOSPITAL | Age: 48
Setting detail: OBSERVATION
Discharge: LEFT BEFORE DISCHARGE | End: 2023-12-10
Attending: EMERGENCY MEDICINE | Admitting: INTERNAL MEDICINE
Payer: COMMERCIAL

## 2023-12-10 ENCOUNTER — APPOINTMENT (EMERGENCY)
Dept: RADIOLOGY | Facility: HOSPITAL | Age: 48
End: 2023-12-10
Payer: COMMERCIAL

## 2023-12-10 VITALS
RESPIRATION RATE: 15 BRPM | DIASTOLIC BLOOD PRESSURE: 78 MMHG | OXYGEN SATURATION: 96 % | HEIGHT: 66 IN | HEART RATE: 77 BPM | TEMPERATURE: 97.8 F | WEIGHT: 285 LBS | BODY MASS INDEX: 45.8 KG/M2 | SYSTOLIC BLOOD PRESSURE: 165 MMHG

## 2023-12-10 DIAGNOSIS — R07.9 CHEST PAIN: Primary | ICD-10-CM

## 2023-12-10 PROBLEM — R07.89 OTHER CHEST PAIN: Status: ACTIVE | Noted: 2017-03-11

## 2023-12-10 LAB
2HR DELTA HS TROPONIN: 8 NG/L
ANION GAP SERPL CALCULATED.3IONS-SCNC: 10 MMOL/L
BASOPHILS # BLD AUTO: 0.08 THOUSANDS/ÂΜL (ref 0–0.1)
BASOPHILS NFR BLD AUTO: 1 % (ref 0–1)
BUN SERPL-MCNC: 27 MG/DL (ref 5–25)
CALCIUM SERPL-MCNC: 9.8 MG/DL (ref 8.4–10.2)
CARDIAC TROPONIN I PNL SERPL HS: 26 NG/L
CARDIAC TROPONIN I PNL SERPL HS: 34 NG/L
CHLORIDE SERPL-SCNC: 110 MMOL/L (ref 96–108)
CO2 SERPL-SCNC: 17 MMOL/L (ref 21–32)
CREAT SERPL-MCNC: 2.63 MG/DL (ref 0.6–1.3)
EOSINOPHIL # BLD AUTO: 0.58 THOUSAND/ÂΜL (ref 0–0.61)
EOSINOPHIL NFR BLD AUTO: 3 % (ref 0–6)
ERYTHROCYTE [DISTWIDTH] IN BLOOD BY AUTOMATED COUNT: 19.4 % (ref 11.6–15.1)
GFR SERPL CREATININE-BSD FRML MDRD: 21 ML/MIN/1.73SQ M
GLUCOSE SERPL-MCNC: 103 MG/DL (ref 65–140)
HCT VFR BLD AUTO: 39.2 % (ref 34.8–46.1)
HGB BLD-MCNC: 12.5 G/DL (ref 11.5–15.4)
IMM GRANULOCYTES # BLD AUTO: 0.22 THOUSAND/UL (ref 0–0.2)
IMM GRANULOCYTES NFR BLD AUTO: 1 % (ref 0–2)
LYMPHOCYTES # BLD AUTO: 2.77 THOUSANDS/ÂΜL (ref 0.6–4.47)
LYMPHOCYTES NFR BLD AUTO: 16 % (ref 14–44)
MCH RBC QN AUTO: 26.8 PG (ref 26.8–34.3)
MCHC RBC AUTO-ENTMCNC: 31.9 G/DL (ref 31.4–37.4)
MCV RBC AUTO: 84 FL (ref 82–98)
MONOCYTES # BLD AUTO: 1.01 THOUSAND/ÂΜL (ref 0.17–1.22)
MONOCYTES NFR BLD AUTO: 6 % (ref 4–12)
NEUTROPHILS # BLD AUTO: 12.31 THOUSANDS/ÂΜL (ref 1.85–7.62)
NEUTS SEG NFR BLD AUTO: 73 % (ref 43–75)
NRBC BLD AUTO-RTO: 0 /100 WBCS
PLATELET # BLD AUTO: 369 THOUSANDS/UL (ref 149–390)
PMV BLD AUTO: 10.4 FL (ref 8.9–12.7)
POTASSIUM SERPL-SCNC: 4.4 MMOL/L (ref 3.5–5.3)
RBC # BLD AUTO: 4.66 MILLION/UL (ref 3.81–5.12)
SODIUM SERPL-SCNC: 137 MMOL/L (ref 135–147)
WBC # BLD AUTO: 16.97 THOUSAND/UL (ref 4.31–10.16)

## 2023-12-10 PROCEDURE — 99285 EMERGENCY DEPT VISIT HI MDM: CPT | Performed by: EMERGENCY MEDICINE

## 2023-12-10 PROCEDURE — 71045 X-RAY EXAM CHEST 1 VIEW: CPT

## 2023-12-10 PROCEDURE — 80048 BASIC METABOLIC PNL TOTAL CA: CPT

## 2023-12-10 PROCEDURE — 93005 ELECTROCARDIOGRAM TRACING: CPT

## 2023-12-10 PROCEDURE — NC001 PR NO CHARGE: Performed by: PHYSICIAN ASSISTANT

## 2023-12-10 PROCEDURE — 96374 THER/PROPH/DIAG INJ IV PUSH: CPT

## 2023-12-10 PROCEDURE — 85025 COMPLETE CBC W/AUTO DIFF WBC: CPT

## 2023-12-10 PROCEDURE — 36415 COLL VENOUS BLD VENIPUNCTURE: CPT

## 2023-12-10 PROCEDURE — 84484 ASSAY OF TROPONIN QUANT: CPT

## 2023-12-10 PROCEDURE — 99285 EMERGENCY DEPT VISIT HI MDM: CPT

## 2023-12-10 RX ORDER — SODIUM CHLORIDE 9 MG/ML
3 INJECTION INTRAVENOUS
Status: DISCONTINUED | OUTPATIENT
Start: 2023-12-10 | End: 2023-12-11 | Stop reason: HOSPADM

## 2023-12-10 RX ORDER — NITROGLYCERIN 0.4 MG/1
0.4 TABLET SUBLINGUAL ONCE
Status: COMPLETED | OUTPATIENT
Start: 2023-12-10 | End: 2023-12-10

## 2023-12-10 RX ORDER — ONDANSETRON 2 MG/ML
4 INJECTION INTRAMUSCULAR; INTRAVENOUS ONCE
Status: COMPLETED | OUTPATIENT
Start: 2023-12-10 | End: 2023-12-10

## 2023-12-10 RX ADMIN — NITROGLYCERIN 0.4 MG: 0.4 TABLET SUBLINGUAL at 19:24

## 2023-12-10 RX ADMIN — NITROGLYCERIN 1 INCH: 20 OINTMENT TOPICAL at 20:24

## 2023-12-10 RX ADMIN — ONDANSETRON 4 MG: 2 INJECTION INTRAMUSCULAR; INTRAVENOUS at 19:22

## 2023-12-11 LAB
ATRIAL RATE: 73 BPM
ATRIAL RATE: 80 BPM
P AXIS: 48 DEGREES
P AXIS: 51 DEGREES
PR INTERVAL: 176 MS
PR INTERVAL: 184 MS
QRS AXIS: 34 DEGREES
QRS AXIS: 59 DEGREES
QRSD INTERVAL: 104 MS
QRSD INTERVAL: 106 MS
QT INTERVAL: 392 MS
QT INTERVAL: 394 MS
QTC INTERVAL: 434 MS
QTC INTERVAL: 452 MS
T WAVE AXIS: 53 DEGREES
T WAVE AXIS: 87 DEGREES
VENTRICULAR RATE: 73 BPM
VENTRICULAR RATE: 80 BPM

## 2023-12-11 NOTE — ED PROVIDER NOTES
History  Chief Complaint   Patient presents with    Chest Pain     Chest pain 3-4 days. Midsternal radiating into left shoulder blade and neck. Nausea Hx; of MI"s. Pain 10/10     This is a 70-year-old female with significant cardiac history including multiple prior heart attacks. Most recently, patient had an NSTEMI and was transported to [facility] where a cath revealed several areas of near/complete occlusion of coronary arteries near stents however no interventions were able to be performed at that time. The patient reports that she has been using her prescribed nitroglycerin very frequently and finds some relief shortly after taking her nitro. She is denying any new nausea, diaphoresis, shortness of breath or abdominal pain. Prior to Admission Medications   Prescriptions Last Dose Informant Patient Reported? Taking?   acetaminophen (TYLENOL) 325 mg tablet Past Month  No Yes   Sig: Take 3 tablets (975 mg total) by mouth every 8 (eight) hours as needed for mild pain   albuterol (PROVENTIL HFA,VENTOLIN HFA) 90 mcg/act inhaler Past Month  No Yes   Sig: Inhale 2 puffs every 4 (four) hours as needed for wheezing or shortness of breath   amoxicillin (AMOXIL) 500 MG tablet Not Taking  Yes No   Sig: take 1 tablet by mouth three times a day for 5 days   Patient not taking: Reported on 12/10/2023   aspirin 81 mg chewable tablet 12/10/2023 Self No Yes   Sig: Chew 1 tablet (81 mg total) daily   calcitriol (ROCALTROL) 0.25 mcg capsule Past Month  Yes Yes   Sig: Take 1 capsule (0.25 mcg total) by mouth daily Takes Monday Wednesday and fridays as of 11/11/22   clopidogrel (PLAVIX) 75 mg tablet 12/10/2023  No Yes   Sig: Take 1 tablet (75 mg total) by mouth daily   ezetimibe (ZETIA) 10 mg tablet Not Taking  No No   Sig: Take 1 tablet (10 mg total) by mouth in the morning.    Patient not taking: Reported on 10/2/2023   gabapentin (Neurontin) 300 mg capsule 12/10/2023  No Yes   Sig: Take 1 capsule (300 mg total) by mouth 3 (three) times a day   isosorbide mononitrate (IMDUR) 30 mg 24 hr tablet   No No   Sig: Take 1 tablet (30 mg total) by mouth 2 (two) times a day   isosorbide mononitrate (IMDUR) 60 mg 24 hr tablet   No No   Sig: Take 1 tablet (60 mg total) by mouth daily   nitroglycerin (NITROSTAT) 0.4 mg SL tablet 12/10/2023  No Yes   Sig: Place 1 tablet (0.4 mg total) under the tongue every 5 (five) minutes as needed for chest pain   ranolazine (RANEXA) 500 mg 12 hr tablet   No No   Sig: Take 1 tablet (500 mg total) by mouth every 12 (twelve) hours   silver sulfadiazine (SILVADENE,SSD) 1 % cream   No No   Sig: Apply topically daily   sodium bicarbonate 650 mg tablet   No No   Sig: Take 1 tablet (650 mg total) by mouth 2 (two) times daily after meals   venlafaxine (EFFEXOR-XR) 75 mg 24 hr capsule   No No   Sig: Take 1 capsule (75 mg total) by mouth daily with breakfast      Facility-Administered Medications: None       Past Medical History:   Diagnosis Date    Anemia     Anxiety     CAD (coronary artery disease)     Cancer ([address]) 2008    Cardiomyopathy ([address])     CHF (congestive heart failure) ([facility])     Chronic kidney disease     COPD (chronic obstructive pulmonary disease) ([facility])     scheduled for sleep apnea test soon after pacemaker implant    Coronary artery disease     Depression     History of chemotherapy     non hodgkins lymphoma 2008    Hyperlipemia     Hypertension     Hypertrophic cardiomyopathy (HCC)     Lymphoma, non-Hodgkin's (HCC)     Myocardial infarction ([facility])     Non-ST elevation MI (NSTEMI) 01/02/2023    Obesity     Obstructive sleep apnea     Olecranon bursitis, right elbow 7/11/2022    SSS (sick sinus syndrome) ([facility])     s/p medtronic dual chamber pacemaker 5/25/2021    Tobacco abuse     Ventricular tachycardia, non-sustained ([facility])     Wears glasses        Past Surgical History:   Procedure Laterality Date    CARDIAC CATHETERIZATION N/A 1/3/2023    Procedure: Cardiac catheterization;  Surgeon: González Santillan MD; [facility]: [facility]; Service: Cardiology    CARDIAC CATHETERIZATION N/A 1/3/2023    Procedure: Cardiac Coronary Angiogram;  Surgeon: Jovanni Tuttle MD;  [facility]; Service: Cardiology    CARDIAC CATHETERIZATION N/A 1/3/2023    Procedure: Cardiac pci;  Surgeon: Jovanni Tuttle MD;  [facility]; Service: Cardiology    CARDIAC CATHETERIZATION Left 2023    Procedure: Cardiac Left Heart Cath;  Surgeon: Carol Wright DO;  [facility]; Service: Cardiology    CARDIAC CATHETERIZATION  2023    Procedure: Cardiac catheterization;  Surgeon: Carol Wright DO;  [facility]; Service: Cardiology    CARDIAC CATHETERIZATION N/A 2023    Procedure: Cardiac Coronary Angiogram;  Surgeon: Carol Wright DO;  [facility]; Service: Cardiology    CARDIAC PACEMAKER PLACEMENT Left 2021    Procedure: DUAL LEAD PACEMAKER IMPLANT;  Surgeon: Zeno Kayser, MD;  Location: [facility] MAIN OR;  Service: Cardiology    CAROTID STENT       SECTION      CORONARY ANGIOPLASTY WITH STENT PLACEMENT  2021    GREG mid RCA and GREG right PDA    DENTAL SURGERY      IR BIOPSY KIDNEY RANDOM  10/18/2021       Family History   Problem Relation Age of Onset    No Known Problems Mother     No Known Problems Father     No Known Problems Daughter     Brain cancer Maternal Grandfather     Heart disease Maternal Grandfather     Cancer Maternal Grandfather     No Known Problems Paternal Aunt     No Known Problems Paternal Aunt      I have reviewed and agree with the history as documented.     E-Cigarette/Vaping    E-Cigarette Use Never User      E-Cigarette/Vaping Substances    Nicotine No     THC No     CBD No     Flavoring No     Other No     Unknown No      Social History     Tobacco Use    Smoking status: Every Day     Current packs/day: 0.50     Average packs/day: 0.5 packs/day for 25.0 years (12.5 ttl pk-yrs)     Types: Cigarettes    Smokeless tobacco: Never    Tobacco comments:     Recently and currently quitting cigarettes   Vaping Use    Vaping status: Never Used   Substance Use Topics    Alcohol use: Yes     Comment: 1-2 times years    Drug use: No        Review of Systems   Constitutional:  Positive for diaphoresis. Negative for chills and fever. HENT:  Negative for ear pain and sore throat. Eyes:  Negative for pain and visual disturbance. Respiratory:  Negative for cough and shortness of breath. Cardiovascular:  Positive for chest pain and palpitations. Gastrointestinal:  Negative for abdominal pain and vomiting. Genitourinary:  Negative for dysuria and hematuria. Musculoskeletal:  Negative for arthralgias and back pain. Skin:  Negative for color change and rash. Neurological:  Negative for seizures and syncope. All other systems reviewed and are negative. Physical Exam  ED Triage Vitals [12/10/23 1903]   Temperature Pulse Respirations Blood Pressure SpO2   97.8 °F (36.6 °C) 80 18 100/52 99 %      Temp Source Heart Rate Source Patient Position - Orthostatic VS BP Location FiO2 (%)   Tympanic Monitor Lying Right arm --      Pain Score       10 - Worst Possible Pain             Orthostatic Vital Signs  Vitals:    12/10/23 2000 12/10/23 2016 12/10/23 2100 12/10/23 2200   BP: 133/64 142/68 121/68 165/78   Pulse: 77 79 76 77   Patient Position - Orthostatic VS: Lying Lying Lying Lying       Physical Exam  Vitals and nursing note reviewed. Constitutional:       General: She is not in acute distress. Appearance: She is well-developed. She is obese. She is diaphoretic. HENT:      Head: Normocephalic and atraumatic. Right Ear: External ear normal.      Left Ear: External ear normal.      Nose: Nose normal. No congestion or rhinorrhea. Mouth/Throat:      Mouth: Mucous membranes are moist.      Pharynx: Oropharynx is clear. No oropharyngeal exudate or posterior oropharyngeal erythema.    Eyes: General: No scleral icterus. Extraocular Movements: Extraocular movements intact. Conjunctiva/sclera: Conjunctivae normal.      Pupils: Pupils are equal, round, and reactive to light. Cardiovascular:      Rate and Rhythm: Normal rate and regular rhythm. Pulses: Normal pulses. Heart sounds: Normal heart sounds. No murmur heard. Pulmonary:      Effort: Pulmonary effort is normal. No respiratory distress. Breath sounds: Normal breath sounds. No wheezing or rhonchi. Abdominal:      General: Abdomen is flat. There is no distension. Palpations: Abdomen is soft. Tenderness: There is no abdominal tenderness. There is no guarding. Musculoskeletal:         General: No swelling. Cervical back: Neck supple. No rigidity. Right lower leg: No edema. Left lower leg: No edema. Lymphadenopathy:      Cervical: No cervical adenopathy. Skin:     General: Skin is warm. Capillary Refill: Capillary refill takes less than 2 seconds. Coloration: Skin is not jaundiced. Findings: No rash. Neurological:      General: No focal deficit present. Mental Status: She is alert and oriented to person, place, and time. Mental status is at baseline.    Psychiatric:         Mood and Affect: Mood normal.         Behavior: Behavior normal.         ED Medications  Medications   ondansetron (ZOFRAN) injection 4 mg (4 mg Intravenous Given 12/10/23 1922)   nitroglycerin (NITROSTAT) SL tablet 0.4 mg (0.4 mg Sublingual Given 12/10/23 1924)   nitroglycerin (NITRO-BID) 2 % TD ointment 1 inch (1 inch Topical Given 12/10/23 2024)       Diagnostic Studies  Results Reviewed       Procedure Component Value Units Date/Time    HS Troponin I 2hr [[id number]]  (Normal) Collected: 12/10/23 2127    Lab Status: Final result Specimen: Blood from Arm, Left Updated: 12/10/23 2200     hs TnI 2hr 34 ng/L      Delta 2hr hsTnI 8 ng/L     HS Troponin 0hr (reflex protocol) [[id number]]  (Normal) Collected: 12/10/23 1918    Lab Status: Final result Specimen: Blood from Arm, Right Updated: 12/10/23 1946     hs TnI 0hr 26 ng/L     Basic metabolic panel [[id number]]  (Abnormal) Collected: 12/10/23 1918    Lab Status: Final result Specimen: Blood from Arm, Right Updated: 12/10/23 1939     Sodium 137 mmol/L      Potassium 4.4 mmol/L      Chloride 110 mmol/L      CO2 17 mmol/L      ANION GAP 10 mmol/L      BUN 27 mg/dL      Creatinine 2.63 mg/dL      Glucose 103 mg/dL      Calcium 9.8 mg/dL      eGFR 21 ml/min/1.73sq m     Narrative:      [facility] guidelines for Chronic Kidney Disease (CKD):     Stage 1 with normal or high GFR (GFR > 90 mL/min/1.73 square meters)    Stage 2 Mild CKD (GFR = 60-89 mL/min/1.73 square meters)    Stage 3A Moderate CKD (GFR = 45-59 mL/min/1.73 square meters)    Stage 3B Moderate CKD (GFR = 30-44 mL/min/1.73 square meters)    Stage 4 Severe CKD (GFR = 15-29 mL/min/1.73 square meters)    Stage 5 End Stage CKD (GFR <15 mL/min/1.73 square meters)  Note: GFR calculation is accurate only with a steady state creatinine    CBC and differential [[id number]]  (Abnormal) Collected: 12/10/23 1918    Lab Status: Final result Specimen: Blood from Arm, Right Updated: 12/10/23 1923     WBC 16.97 Thousand/uL      RBC 4.66 Million/uL      Hemoglobin 12.5 g/dL      Hematocrit 39.2 %      MCV 84 fL      MCH 26.8 pg      MCHC 31.9 g/dL      RDW 19.4 %      MPV 10.4 fL      Platelets 319 Thousands/uL      nRBC 0 /100 WBCs      Neutrophils Relative 73 %      Immat GRANS % 1 %      Lymphocytes Relative 16 %      Monocytes Relative 6 %      Eosinophils Relative 3 %      Basophils Relative 1 %      Neutrophils Absolute 12.31 Thousands/µL      Immature Grans Absolute 0.22 Thousand/uL      Lymphocytes Absolute 2.77 Thousands/µL      Monocytes Absolute 1.01 Thousand/µL      Eosinophils Absolute 0.58 Thousand/µL      Basophils Absolute 0.08 Thousands/µL                    X-ray chest 1 view portable   Final Result by Lily Haque MD (12/11 1034)      No acute cardiopulmonary disease. Workstation performed: [id number]               Procedures  Procedures      ED Course  ED Course as of 12/13/23 0312   Sun Dec 10, 2023   1926 WBC(!): 16.97  Actually less than 8 days ago     1927 ECG interpreted by me. Date: 12/10/2023. Time: 1927. Rate: 80 bpm.  Axis: Normal.  Rhythm: Regular. There are repolarization abnormalities in the lateral leads, and inferior leads. There are some signs of intraventricular conduction delay in the anterior leads as well. Incomplete. Interpretation: Similar to prior from 12/2/2023. Abnormal ECG. 1954 hs TnI 0hr: 26  Lower than 8 days ago. Will need delta               HEART Risk Score      Flowsheet Row Most Recent Value   Heart Score Risk Calculator    History 1 Filed at: 12/10/2023 1929   ECG 1 Filed at: 12/10/2023 1929   Age 1 Filed at: 12/10/2023 1929   Risk Factors 2 Filed at: 12/10/2023 1929   Troponin 2 Filed at: 12/10/2023 1929   HEART Score 7 Filed at: 12/10/2023 2005 [facility] Decision Making  Patient has known severe cardiac disease with multiple coronary arteries that are already known to be blocked at various stages. Not a candidate for acute intervention as of September. Is having new chest pain today. Likely cardiac in nature either anginal or possible ACS. Will screen with troponin and continuous telemetry monitoring as well as serial EKGs. Will treat with nitroglycerin. Will reach out to cardiology as patient already has known severe blockages. Cardiology recommended Nitropaste every hour, continuous cardiac monitoring, serial EKGs, continuing to trend biomarker, and admission for consultation of the morning. Patient is in agreement with this plan at this time. Will be admitted for ongoing monitoring and to see cardiology. Amount and/or Complexity of Data Reviewed  Labs: ordered. Decision-making details documented in ED Course. Radiology: ordered. Risk  Prescription drug management. Decision regarding hospitalization. Disposition  Final diagnoses:   Chest pain     Time reflects when diagnosis was documented in both MDM as applicable and the Disposition within this note       Time User Action Codes Description Comment    12/10/2023 10:07 PM Dain Boyle Add [R07.9] Chest pain           ED Disposition       ED Disposition   Admit    Condition   Stable    Date/Time   Sun Dec 10, 2023 2207    Comment   Case was discussed with Cherl Boast and the patient's admission status was agreed to be Admission Status: observation status to the service of Dr. Shane Murry .                Follow-up Information    None         Discharge Medication List as of 12/10/2023 10:58 PM        CONTINUE these medications which have NOT CHANGED    Details   acetaminophen (TYLENOL) 325 mg tablet Take 3 tablets (975 mg total) by mouth every 8 (eight) hours as needed for mild pain, Starting Thu 1/5/2023, Normal      albuterol (PROVENTIL HFA,VENTOLIN HFA) 90 mcg/act inhaler Inhale 2 puffs every 4 (four) hours as needed for wheezing or shortness of breath, Starting Mon 5/23/2022, Normal      aspirin 81 mg chewable tablet Chew 1 tablet (81 mg total) daily, Starting Fri 4/2/2021, Normal      calcitriol (ROCALTROL) 0.25 mcg capsule Take 1 capsule (0.25 mcg total) by mouth daily Takes Monday Wednesday and fridays as of 11/11/22, Starting Tue 1/10/2023, Historical Med      clopidogrel (PLAVIX) 75 mg tablet Take 1 tablet (75 mg total) by mouth daily, Starting Thu 12/7/2023, Normal      gabapentin (Neurontin) 300 mg capsule Take 1 capsule (300 mg total) by mouth 3 (three) times a day, Starting Tue 11/14/2023, Normal      nitroglycerin (NITROSTAT) 0.4 mg SL tablet Place 1 tablet (0.4 mg total) under the tongue every 5 (five) minutes as needed for chest pain, Starting Thu 12/7/2023, Normal      atorvastatin (LIPITOR) 80 mg tablet Take 1 tablet (80 mg total) by mouth every evening, Starting Tue 1/17/2023, Normal      metoprolol succinate (TOPROL-XL) 50 mg 24 hr tablet Take 1 tablet (50 mg total) by mouth 2 (two) times a day, Starting Wed 9/27/2023, Normal      !! isosorbide mononitrate (IMDUR) 60 mg 24 hr tablet Take 1 tablet (60 mg total) by mouth daily, Starting Thu 12/7/2023, Until Sat 1/6/2024, Normal      ranolazine (RANEXA) 500 mg 12 hr tablet Take 1 tablet (500 mg total) by mouth every 12 (twelve) hours, Starting Tue 9/26/2023, Until Thu 10/26/2023, Normal      silver sulfadiazine (SILVADENE,SSD) 1 % cream Apply topically daily, Starting Mon 10/2/2023, Normal      sodium bicarbonate 650 mg tablet Take 1 tablet (650 mg total) by mouth 2 (two) times daily after meals, Starting Thu 1/5/2023, Normal      venlafaxine (EFFEXOR-XR) 75 mg 24 hr capsule Take 1 capsule (75 mg total) by mouth daily with breakfast, Starting Thu 8/17/2023, Normal      amoxicillin (AMOXIL) 500 MG tablet take 1 tablet by mouth three times a day for 5 days, Historical Med      ezetimibe (ZETIA) 10 mg tablet Take 1 tablet (10 mg total) by mouth in the morning., Starting Mon 5/23/2022, Normal      !! isosorbide mononitrate (IMDUR) 30 mg 24 hr tablet Take 1 tablet (30 mg total) by mouth 2 (two) times a day, Starting Wed 9/27/2023, Normal       !! - Potential duplicate medications found. Please discuss with provider. No discharge procedures on file. PDMP Review         Value Time User    PDMP Reviewed  Yes 3/26/2023  6:06 PM Jenni Ochoa,              ED Provider  Attending physically available and evaluated Huan Maxim. I managed the patient along with the ED Attending.     Electronically Signed by           Andres Mccollum MD  12/13/23 4909

## 2023-12-11 NOTE — H&P
[id number] [facility]  H&P  Name: Efrem Roca 57 y.o. female I MRN: [id number]  Unit/Bed#: -01 I Date of Admission: 12/10/2023   Date of Service: 12/10/2023 I Hospital Day: 0      Assessment/Plan   No new Assessment & Plan notes have been filed under this hospital service since the last note was generated. Service: Hospitalist         Upon arriving on floor patient notified nursing that she did not wish to remain in the hospital and wanted to sign out [address]. I went had a conversation with the patient explained to her risk of signing out [address] which patient voiced an understanding of. Patient states that she has a lot of anxiety and does not wish to remain in the hospital, she states that her chest pain is constantly there if she is not actively having a heart attack she does not wish to remain in the hospital.  She states that she will call her cardiologist in the morning and follow-up with them.     Patient signed [address] form after extensive conversation and nursing staff is removing her IV as well as Nitropaste

## 2023-12-11 NOTE — ED ATTENDING ATTESTATION
12/10/2023  Lata Haines DO, saw and evaluated the patient. I have discussed the patient with the resident/non-physician practitioner and agree with the resident's/non-physician practitioner's findings, Plan of Care, and MDM as documented in the resident's/non-physician practitioner's note, except where noted. All available labs and Radiology studies were reviewed. I was present for key portions of any procedure(s) performed by the resident/non-physician practitioner and I was immediately available to provide assistance. At this point I agree with the current assessment done in the Emergency Department. I have conducted an independent evaluation of this patient a history and physical is as follows:    Patient is a 70-year-old female with history of coronary artery disease recent NSTEMI and recent visit to the emergency department that resulted in an [address] [place] [address] discharge. Patient notes that she feels lightheaded dizzy and is having chest pain. Patient apparently is not a candidate for an acute intervention due to significant disease. She is apparently medically managed. Patient notes that nitroglycerin helps temporarily but her pain comes back. She has had asked for Dilaudid in the past for pain. The patient however is not being treated for anginal pain with IV narcotics. Patient's EKG was reviewed and appears to be similar to priors. Patient is getting improvement with nitroglycerin however pain returns. Case is discussed with Dr. Portillo Farmer who agrees that the patient should be admitted and put on Nitropaste due to ongoing anginal symptoms. ED Course  ED Course as of 12/10/23 2208   Mauro Milner Dec 10, 2023   1945 Creatinine(!): 2.63   1945 hs TnI 0hr: 26         Critical Care Time  ECG 12 Lead Documentation Only    Date/Time: 12/10/2023 7:35 PM    Performed by: Deidre Guzman DO  Authorized by:  Deidre Guzman DO    ECG reviewed by me, the ED Provider: yes Patient location:  ED  Comments:      EKG shows a sinus rhythm at 80 beats a minute. Axis is normal.  There is an old inferior wall MI as well as an old anterior wall MI noted. There is nonspecific T wave flattening in the lateral and inferior leads. no

## 2023-12-11 NOTE — DISCHARGE SUMMARY
Patient was initially admitted for optimization of her cardiac meds for symptom control but upon arrival on the floor patient dated that she wanted to sign out [facility]. I went had a conversation with the patient and explained to her that it was discussed by the ER provider with Dr. Keya Cade cardiology on-call recommendations to changes to her medications. Patient voiced an understanding but states that she does not wish to remain in the hospital and only came in because she wanted to make sure she was not having an acute MI. I explained to her the risk of signing out [facility] which she voiced an understanding of and all questions were answered to her satisfaction. Oli Rivera patient decided to sign out [facility] and stated that she will call her regular cardiologist in the morning.

## 2023-12-12 ENCOUNTER — OFFICE VISIT (OUTPATIENT)
Dept: CARDIOLOGY CLINIC | Facility: CLINIC | Age: 48
End: 2023-12-12
Payer: COMMERCIAL

## 2023-12-12 VITALS
HEIGHT: 66 IN | SYSTOLIC BLOOD PRESSURE: 130 MMHG | WEIGHT: 285 LBS | BODY MASS INDEX: 45.8 KG/M2 | HEART RATE: 74 BPM | DIASTOLIC BLOOD PRESSURE: 80 MMHG

## 2023-12-12 DIAGNOSIS — R07.9 CHEST PAIN, UNSPECIFIED TYPE: Primary | ICD-10-CM

## 2023-12-12 DIAGNOSIS — I10 ESSENTIAL HYPERTENSION: Chronic | ICD-10-CM

## 2023-12-12 DIAGNOSIS — E78.2 MIXED HYPERLIPIDEMIA: ICD-10-CM

## 2023-12-12 DIAGNOSIS — I10 HYPERTENSION, UNSPECIFIED TYPE: ICD-10-CM

## 2023-12-12 DIAGNOSIS — I21.4 NON-STEMI (NON-ST ELEVATED MYOCARDIAL INFARCTION) (HCC): ICD-10-CM

## 2023-12-12 DIAGNOSIS — I25.118 CORONARY ARTERY DISEASE OF NATIVE ARTERY OF NATIVE HEART WITH STABLE ANGINA PECTORIS (HCC): ICD-10-CM

## 2023-12-12 DIAGNOSIS — Z95.5 S/P DRUG ELUTING CORONARY STENT PLACEMENT: ICD-10-CM

## 2023-12-12 PROCEDURE — 99214 OFFICE O/P EST MOD 30 MIN: CPT | Performed by: NURSE PRACTITIONER

## 2023-12-12 RX ORDER — NIFEDIPINE 30 MG/1
30 TABLET, EXTENDED RELEASE ORAL DAILY
Qty: 30 TABLET | Refills: 11 | Status: SHIPPED | OUTPATIENT
Start: 2023-12-12

## 2023-12-12 RX ORDER — METOPROLOL SUCCINATE 50 MG/1
50 TABLET, EXTENDED RELEASE ORAL 2 TIMES DAILY
Qty: 180 TABLET | Refills: 3 | Status: SHIPPED | OUTPATIENT
Start: 2023-12-12

## 2023-12-12 RX ORDER — ATORVASTATIN CALCIUM 80 MG/1
80 TABLET, FILM COATED ORAL EVERY EVENING
Qty: 90 TABLET | Refills: 3 | Status: SHIPPED | OUTPATIENT
Start: 2023-12-12 | End: 2024-12-06

## 2023-12-12 NOTE — PROGRESS NOTES
Patient ID: Isaak Eric is a 57 y.o. female. Plan:      Essential hypertension  Blood pressure well controlled  Monitor with addition of Procardia    Coronary artery disease of native artery of native heart with stable angina pectoris (HCC)  CAD s/p PCI to RCA, rPDA  Inferior STEMI (3/23/21) --> initial PCI to mRCA  Recurrent chest pain (3/23/21) --> GREG to RPDA  Readmission, inferior STEMI (4/2/21) --> patent stents, no change on cath  NSTEMI 1/3/2023--> GREG LAD/D2  Progression of CAD on cath 9/26/2023  Continue asa, plavix, statin, BB, ranexa, imdur; add Procardia    Chest pain  Add Procardia  See additional comments       Follow up Plan/Summary Comments:  Raymon Goodson continues to experience frequent chest pain with symptoms that last "all day."  She notes some improvement with nitro, however, there is never complete resolution of her symptoms. She certainly has progressive coronary disease, unfortunately, not amenable to further intervention. But, I also suspect some of her chest pain is non-cardiac. Will trial addition of procardia XL 30 mg once daily to her medication regimen. I have instructed her to use this in the evening as she is using Imdur in the morning. Hopefully, staggering of medications will prevent low blood pressures and provide continued symptoms alleviation. She will call our office in a week with an update on her symptoms. If needed, consider uptitration of imdur or procardia. Follow up in 3 months or sooner if needed. HPI: I had the pleasure of seeing Raymon Goodson in the office today for hospital follow-up visit. Raymon Goodson has had several ER visits for chest pain. During her most recent encounter, her symptoms were improved with Nitropaste. Her most recent inpatient stay, 9/23/2023, she underwent cardiac catheterization for an NSTEMI. She was noted to have progression of CAD with prior stent now occluded and progression of disease in the circumflex artery.   Aggressive risk factor reduction and modification was recommended as there was severe disease throughout with poor targets for revascularization. Since that time, Hunter Lovell has had several ER visits for chest pain. Her Imdur has been uptitrated and she returns today for follow up. Unfortunately, Hunter Lovell continues to experience frequent chest pains. She uses nitro with some improvement, but never total alleviation. Review of Systems   10  point ROS  was otherwise non pertinent or negative except as per HPI or as below. Gait: WC      Most recent or relevant cardiac/vascular testing:    Echo 1/9/2023 EF 45%, moderate diastolic dysfunction  Mildly dilated LA  Mild to moderate MR  No change compared to 1/2/2023    Holter 4/22/2021 SR, avg rate 88. Frequent PVCs, 8-beat run NSVT    Cardiac cath 9/26/2023   There is progression of CAD noted: previously occluded LAD with continued collaterals though faint, prior stent to APRIL now occluded, also noted progression in the nondominant pLCX    Patient with severe disease though poor targets for revascularization    Cardiac cath 1/3/2023   Successful PCI to mid % culprit req. rheolytic thrombectomy (progressed when compared to 4-2-21 study); given atretic distal LAD, 2.25 x 15 mm GREG was deployed from LAD into more sizable D2    Residual prox LCx 60% lesion with patent LAD, mid RCA & PDA stents    Elevated LVEDP (20-30 mmHg) with no LV-Ao gradient on pullback    R radial a. accessed but unable to advance equipment, likely occluded; RFA arteriotomy employed       Cath 4/2/2021 Distal LAD: There was a 60 % stenosis. Mid circumflex: There was a 50 % stenosis. Distal RCA: There was a 10 % stenosis at the site of a prior stent. Right PDA: There was a 0 % stenosis at the site of a prior stent. Echo 3/25/2021 60%, severe hypokinesis of the basal-mid inferior wall. Findings consistent with HCM. Mild-mod MR. Cardiac MRI 3/29/2021 severe LVH, EF 44%.   Small circumferential pericardial effusion. Mild MR. Findings suggestive of underlying hypertrophic cardiomyopathy with ischemic scarring of the inferior and inferolateral walls. Stress echo 7/26/2021 Severe hypokinesis if the basal-mid inferior walls at peak stress, but overall conclusion was no evidence of stress induced ischemia. Objective:     /80   Pulse 74   Ht 5' 6" (1.676 m)   Wt 129 kg (285 lb)   BMI 46.00 kg/m²     PHYSICAL EXAM:    General:  Normal appearance, no acute distress  Eyes:  Anicteric. Oral mucosa:  Moist.  Neck:  No JVD. Carotid upstrokes are brisk without bruits. No masses. Chest:  Clear to auscultation   Cardiac:  No palpable PMI. Normal S1 and S2. No murmur gallop or rub. Abdomen:  Soft and nontender. No palpable organomegaly or aortic enlargement. Extremities:  No peripheral edema. Musculoskeletal:  Symmetric. Vascular:  Pedal pulses are intact. Neuro:  Grossly symmetric. Psych:  Alert and oriented x3.     No Known Allergies    Current Outpatient Medications:     acetaminophen (TYLENOL) 325 mg tablet, Take 3 tablets (975 mg total) by mouth every 8 (eight) hours as needed for mild pain, Disp: 90 tablet, Rfl: 0    albuterol (PROVENTIL HFA,VENTOLIN HFA) 90 mcg/act inhaler, Inhale 2 puffs every 4 (four) hours as needed for wheezing or shortness of breath, Disp: 18 g, Rfl: 5    aspirin 81 mg chewable tablet, Chew 1 tablet (81 mg total) daily, Disp: 90 tablet, Rfl: 0    atorvastatin (LIPITOR) 80 mg tablet, Take 1 tablet (80 mg total) by mouth every evening, Disp: 90 tablet, Rfl: 3    calcitriol (ROCALTROL) 0.25 mcg capsule, Take 1 capsule (0.25 mcg total) by mouth daily Takes Monday Wednesday and fridays as of 11/11/22, Disp: , Rfl:     clopidogrel (PLAVIX) 75 mg tablet, Take 1 tablet (75 mg total) by mouth daily, Disp: 90 tablet, Rfl: 0    gabapentin (Neurontin) 300 mg capsule, Take 1 capsule (300 mg total) by mouth 3 (three) times a day, Disp: 90 capsule, Rfl: 0    isosorbide mononitrate (IMDUR) 60 mg 24 hr tablet, Take 1 tablet (60 mg total) by mouth daily, Disp: 30 tablet, Rfl: 0    metoprolol succinate (TOPROL-XL) 50 mg 24 hr tablet, Take 1 tablet (50 mg total) by mouth 2 (two) times a day, Disp: 180 tablet, Rfl: 3    NIFEdipine (PROCARDIA XL) 30 mg 24 hr tablet, Take 1 tablet (30 mg total) by mouth daily, Disp: 30 tablet, Rfl: 11    nitroglycerin (NITROSTAT) 0.4 mg SL tablet, Place 1 tablet (0.4 mg total) under the tongue every 5 (five) minutes as needed for chest pain, Disp: 100 tablet, Rfl: 0    ranolazine (RANEXA) 500 mg 12 hr tablet, Take 1 tablet (500 mg total) by mouth every 12 (twelve) hours, Disp: 60 tablet, Rfl: 0    silver sulfadiazine (SILVADENE,SSD) 1 % cream, Apply topically daily, Disp: 400 g, Rfl: 2    sodium bicarbonate 650 mg tablet, Take 1 tablet (650 mg total) by mouth 2 (two) times daily after meals, Disp: 60 tablet, Rfl: 0    venlafaxine (EFFEXOR-XR) 75 mg 24 hr capsule, Take 1 capsule (75 mg total) by mouth daily with breakfast, Disp: 30 capsule, Rfl: 5  Past Medical History:   Diagnosis Date    Anemia     Anxiety     CAD (coronary artery disease)     Cancer ([address]) 2008    Cardiomyopathy (HCC)     CHF (congestive heart failure) (HCC)     Chronic kidney disease     COPD (chronic obstructive pulmonary disease) (HCC)     scheduled for sleep apnea test soon after pacemaker implant    Coronary artery disease     Depression     History of chemotherapy     non hodgkins lymphoma 2008    Hyperlipemia     Hypertension     Hypertrophic cardiomyopathy (HCC)     Lymphoma, non-Hodgkin's (HCC)     Myocardial infarction ([address])     Non-ST elevation MI (NSTEMI) 01/02/2023    Obesity     Obstructive sleep apnea     Olecranon bursitis, right elbow 7/11/2022    SSS (sick sinus syndrome) ([address])     s/p medtronic dual chamber pacemaker 5/25/2021    Tobacco abuse     Ventricular tachycardia, non-sustained (HCC)     Wears glasses      Past Surgical History:   Procedure Laterality Date CARDIAC CATHETERIZATION N/A 1/3/2023    Procedure: Cardiac catheterization;  Surgeon: Kia Oleary MD;  Location: [facility]; Service: Cardiology    CARDIAC CATHETERIZATION N/A 1/3/2023    Procedure: Cardiac Coronary Angiogram;  Surgeon: Kia Oleary MD;  [facility]: [facility]; Service: Cardiology    CARDIAC CATHETERIZATION N/A 1/3/2023    Procedure: Cardiac pci;  Surgeon: Kia Oleary MD;  [facility]: [facility]; Service: Cardiology    CARDIAC CATHETERIZATION Left 2023    Procedure: Cardiac Left Heart Cath;  Surgeon: Emerson Junior DO;  [facility]: [facility]; Service: Cardiology    CARDIAC CATHETERIZATION  2023    Procedure: Cardiac catheterization;  Surgeon: Emerson Junior DO;  [facility]; Service: Cardiology    CARDIAC CATHETERIZATION N/A 2023    Procedure: Cardiac Coronary Angiogram;  Surgeon: Emerson Junior DO;  [facility]: [facility];   Service: Cardiology    CARDIAC PACEMAKER PLACEMENT Left 2021    Procedure: DUAL LEAD PACEMAKER IMPLANT;  Surgeon: Reatha Shone, MD;  Location: [address] [place] [address] OR;  Service: Cardiology    CAROTID STENT       SECTION      CORONARY ANGIOPLASTY WITH STENT PLACEMENT  2021    GREG mid RCA and GREG right PDA    DENTAL SURGERY      IR BIOPSY KIDNEY RANDOM  10/18/2021       CMP:   Lab Results   Component Value Date    K 4.4 12/10/2023     (H) 12/10/2023    CO2 17 (L) 12/10/2023    BUN 27 (H) 12/10/2023    CREATININE 2.63 (H) 12/10/2023    EGFR 21 12/10/2023     Lipid Profile:    Lab Results   Component Value Date    TRIG 208 (H) 2023    HDL 30 (L) 2023         Social History     Tobacco Use   Smoking Status Every Day    Packs/day: 0.50    Years: 25.00    Total pack years: 12.50    Types: Cigarettes   Smokeless Tobacco Never   Tobacco Comments    Recently and currently quitting cigarettes

## 2023-12-12 NOTE — ASSESSMENT & PLAN NOTE
CAD s/p PCI to RCA, rPDA  Inferior STEMI (3/23/21) --> initial PCI to mRCA  Recurrent chest pain (3/23/21) --> GREG to RPDA  Readmission, inferior STEMI (4/2/21) --> patent stents, no change on cath  NSTEMI 1/3/2023--> GREG LAD/D2  Progression of CAD on cath 9/26/2023  Continue asa, plavix, statin, BB, ranexa, imdur; add Procardia

## 2023-12-16 DIAGNOSIS — G62.9 PERIPHERAL NEUROPATHY: ICD-10-CM

## 2023-12-16 DIAGNOSIS — F32.A DEPRESSION, UNSPECIFIED DEPRESSION TYPE: ICD-10-CM

## 2023-12-18 DIAGNOSIS — Z95.5 S/P DRUG ELUTING CORONARY STENT PLACEMENT: ICD-10-CM

## 2023-12-18 DIAGNOSIS — I25.10 CORONARY ARTERY DISEASE INVOLVING NATIVE CORONARY ARTERY OF NATIVE HEART WITHOUT ANGINA PECTORIS: ICD-10-CM

## 2023-12-18 DIAGNOSIS — G62.9 PERIPHERAL NEUROPATHY: ICD-10-CM

## 2023-12-18 RX ORDER — NITROGLYCERIN 0.4 MG/1
TABLET SUBLINGUAL
Qty: 100 TABLET | Refills: 0 | OUTPATIENT
Start: 2023-12-18

## 2023-12-18 RX ORDER — VENLAFAXINE HYDROCHLORIDE 75 MG/1
75 CAPSULE, EXTENDED RELEASE ORAL
Qty: 30 CAPSULE | Refills: 0 | Status: SHIPPED | OUTPATIENT
Start: 2023-12-18

## 2023-12-20 RX ORDER — GABAPENTIN 300 MG/1
300 CAPSULE ORAL 3 TIMES DAILY
Qty: 90 CAPSULE | Refills: 0 | Status: SHIPPED | OUTPATIENT
Start: 2023-12-20

## 2023-12-22 NOTE — ASSESSMENT & PLAN NOTE
Please see NSTEMI problem A/P Spoke to pt he stated that he has not urinated since leaving clinic this morning.  Instructed for him to return to clinic and he stated that he was not coming back.  I informed him that he would have to go to nearest ER when he couldn't urinate.  And he was in agreement with that..  Reported to YOSELYN zamora NP.  YOSELYN Gonzalez LPN

## 2024-01-01 ENCOUNTER — HOME CARE VISIT (OUTPATIENT)
Dept: HOME HOSPICE | Facility: HOSPICE | Age: 49
End: 2024-01-01
Payer: COMMERCIAL

## 2024-01-01 ENCOUNTER — HOME CARE VISIT (OUTPATIENT)
Dept: HOME HEALTH SERVICES | Facility: HOME HEALTHCARE | Age: 49
End: 2024-01-01
Payer: COMMERCIAL

## 2024-01-01 ENCOUNTER — DOCUMENTATION (OUTPATIENT)
Dept: PALLIATIVE MEDICINE | Facility: HOSPITAL | Age: 49
End: 2024-01-01

## 2024-01-01 ENCOUNTER — HOSPICE ADMISSION (OUTPATIENT)
Dept: HOME HOSPICE | Facility: HOSPICE | Age: 49
End: 2024-01-01
Payer: COMMERCIAL

## 2024-01-01 VITALS
HEART RATE: 60 BPM | TEMPERATURE: 98.4 F | RESPIRATION RATE: 16 BRPM | SYSTOLIC BLOOD PRESSURE: 122 MMHG | DIASTOLIC BLOOD PRESSURE: 80 MMHG | OXYGEN SATURATION: 98 %

## 2024-01-01 VITALS — RESPIRATION RATE: 16 BRPM | TEMPERATURE: 98.7 F | HEART RATE: 60 BPM

## 2024-01-01 DIAGNOSIS — Z95.5 S/P DRUG ELUTING CORONARY STENT PLACEMENT: Primary | ICD-10-CM

## 2024-01-01 DIAGNOSIS — Z51.5 HOSPICE CARE PATIENT: ICD-10-CM

## 2024-01-01 DIAGNOSIS — I25.10 CORONARY ARTERY DISEASE INVOLVING NATIVE CORONARY ARTERY OF NATIVE HEART WITHOUT ANGINA PECTORIS: ICD-10-CM

## 2024-01-01 DIAGNOSIS — Z51.5 HOSPICE CARE PATIENT: Primary | ICD-10-CM

## 2024-01-01 PROCEDURE — G0155 HHCP-SVS OF CSW,EA 15 MIN: HCPCS

## 2024-01-01 PROCEDURE — 10330057 MEDICATION, GENERAL

## 2024-01-01 PROCEDURE — G0299 HHS/HOSPICE OF RN EA 15 MIN: HCPCS

## 2024-01-01 PROCEDURE — 10330064 BRIEF, WINGS ADLT 2XLG GRN WAIST TO 69"

## 2024-01-01 PROCEDURE — T2042 HOSPICE ROUTINE HOME CARE: HCPCS

## 2024-01-01 PROCEDURE — 10330064 PAD, HEEL CUSHION ULTRA (1/PR)SKLCRE

## 2024-01-01 PROCEDURE — 10330064 UNDERPAD, REUSE 36X36 1DZ     BECKCL

## 2024-01-01 PROCEDURE — 10330064 ALIGNER, FOAM BODY SM (8/CS)

## 2024-01-01 RX ORDER — MORPHINE SULFATE 15 MG/1
15 TABLET ORAL EVERY 4 HOURS PRN
Qty: 30 TABLET | Refills: 0 | Status: SHIPPED | OUTPATIENT
Start: 2024-01-01 | End: 2024-01-01 | Stop reason: SDUPTHER

## 2024-01-01 RX ORDER — MORPHINE SULFATE 15 MG/1
15 TABLET ORAL EVERY 4 HOURS PRN
Qty: 30 TABLET | Refills: 0 | Status: SHIPPED | OUTPATIENT
Start: 2024-01-01 | End: 2024-05-22

## 2024-01-01 RX ORDER — NITROGLYCERIN 0.4 MG/1
0.4 TABLET SUBLINGUAL
Qty: 25 TABLET | Refills: 0 | Status: SHIPPED | OUTPATIENT
Start: 2024-01-01 | End: 2024-01-01 | Stop reason: SDUPTHER

## 2024-01-01 RX ORDER — LORAZEPAM 1 MG/1
1 TABLET ORAL EVERY 6 HOURS PRN
Qty: 30 TABLET | Refills: 0 | Status: SHIPPED | OUTPATIENT
Start: 2024-01-01 | End: 2024-05-22

## 2024-01-01 RX ORDER — NITROGLYCERIN 0.4 MG/1
0.4 TABLET SUBLINGUAL
Qty: 25 TABLET | Refills: 0 | Status: SHIPPED | OUTPATIENT
Start: 2024-01-01 | End: 2024-05-22

## 2024-01-01 RX ORDER — LORAZEPAM 1 MG/1
1 TABLET ORAL EVERY 6 HOURS PRN
Qty: 30 TABLET | Refills: 0 | Status: SHIPPED | OUTPATIENT
Start: 2024-01-01 | End: 2024-01-01 | Stop reason: SDUPTHER

## 2024-01-09 ENCOUNTER — TELEPHONE (OUTPATIENT)
Dept: CARDIOLOGY CLINIC | Facility: CLINIC | Age: 49
End: 2024-01-09

## 2024-01-09 DIAGNOSIS — I25.10 CORONARY ARTERY DISEASE INVOLVING NATIVE CORONARY ARTERY OF NATIVE HEART WITHOUT ANGINA PECTORIS: ICD-10-CM

## 2024-01-09 DIAGNOSIS — Z95.5 S/P DRUG ELUTING CORONARY STENT PLACEMENT: ICD-10-CM

## 2024-01-09 RX ORDER — NITROGLYCERIN 0.4 MG/1
0.4 TABLET SUBLINGUAL
Qty: 25 TABLET | Refills: 5 | Status: SHIPPED | OUTPATIENT
Start: 2024-01-09

## 2024-01-09 NOTE — TELEPHONE ENCOUNTER
Ivelisse SOTELO  [facility]  Elise would like a script sent in for Nitro to [facility] on [address] in [place]

## 2024-01-09 NOTE — TELEPHONE ENCOUNTER
Elise wanted to let you know the new medication you started her on seems to be helping.  Her pain is substantially less but still has some.

## 2024-01-12 DIAGNOSIS — R07.9 CHEST PAIN IN ADULT: ICD-10-CM

## 2024-01-12 RX ORDER — ISOSORBIDE MONONITRATE 60 MG/1
60 TABLET, EXTENDED RELEASE ORAL DAILY
Qty: 30 TABLET | Refills: 0 | Status: SHIPPED | OUTPATIENT
Start: 2024-01-12 | End: 2024-02-11

## 2024-01-22 ENCOUNTER — TELEPHONE (OUTPATIENT)
Dept: CARDIOLOGY CLINIC | Facility: CLINIC | Age: 49
End: 2024-01-22

## 2024-01-22 DIAGNOSIS — F32.A DEPRESSION, UNSPECIFIED DEPRESSION TYPE: ICD-10-CM

## 2024-01-22 DIAGNOSIS — I25.118 CORONARY ARTERY DISEASE OF NATIVE ARTERY OF NATIVE HEART WITH STABLE ANGINA PECTORIS (HCC): Primary | ICD-10-CM

## 2024-01-22 DIAGNOSIS — G62.9 PERIPHERAL NEUROPATHY: ICD-10-CM

## 2024-01-22 RX ORDER — ISOSORBIDE MONONITRATE 30 MG/1
30 TABLET, EXTENDED RELEASE ORAL DAILY
Qty: 30 TABLET | Refills: 11 | Status: SHIPPED | OUTPATIENT
Start: 2024-01-22

## 2024-01-22 NOTE — TELEPHONE ENCOUNTER
Spoke to pt as she requested a refill on SL nitro.    Pt using at least 1 nitro per day.  Discussed with pt increasing isosorbide to 90 mg.       Script sent to pharmacy for 30 mg isosorbide tab to be taken in addition to 60 mg tablet.

## 2024-01-23 RX ORDER — GABAPENTIN 300 MG/1
300 CAPSULE ORAL 3 TIMES DAILY
Qty: 90 CAPSULE | Refills: 0 | Status: SHIPPED | OUTPATIENT
Start: 2024-01-23

## 2024-01-23 RX ORDER — VENLAFAXINE HYDROCHLORIDE 75 MG/1
75 CAPSULE, EXTENDED RELEASE ORAL
Qty: 30 CAPSULE | Refills: 0 | Status: SHIPPED | OUTPATIENT
Start: 2024-01-23

## 2024-02-12 DIAGNOSIS — R07.9 CHEST PAIN IN ADULT: ICD-10-CM

## 2024-02-14 RX ORDER — ISOSORBIDE MONONITRATE 60 MG/1
60 TABLET, EXTENDED RELEASE ORAL DAILY
Qty: 30 TABLET | Refills: 2 | Status: ON HOLD | OUTPATIENT
Start: 2024-02-14 | End: 2024-02-18

## 2024-02-15 ENCOUNTER — HOSPITAL ENCOUNTER (INPATIENT)
Facility: HOSPITAL | Age: 49
LOS: 3 days | Discharge: HOME | DRG: 198 | End: 2024-02-18
Attending: EMERGENCY MEDICINE | Admitting: INTERNAL MEDICINE
Payer: COMMERCIAL

## 2024-02-15 ENCOUNTER — APPOINTMENT (EMERGENCY)
Dept: RADIOLOGY | Facility: HOSPITAL | Age: 49
DRG: 198 | End: 2024-02-15
Payer: COMMERCIAL

## 2024-02-15 DIAGNOSIS — I21.4 NSTEMI (NON-ST ELEVATED MYOCARDIAL INFARCTION) (HCC): Primary | ICD-10-CM

## 2024-02-15 DIAGNOSIS — N18.9 CHRONIC KIDNEY DISEASE-MINERAL AND BONE DISORDER: ICD-10-CM

## 2024-02-15 DIAGNOSIS — E83.9 CHRONIC KIDNEY DISEASE-MINERAL AND BONE DISORDER: ICD-10-CM

## 2024-02-15 DIAGNOSIS — M89.9 CHRONIC KIDNEY DISEASE-MINERAL AND BONE DISORDER: ICD-10-CM

## 2024-02-15 DIAGNOSIS — R07.9 CHEST PAIN IN ADULT: ICD-10-CM

## 2024-02-15 DIAGNOSIS — R07.9 CHEST PAIN: ICD-10-CM

## 2024-02-15 LAB
2HR DELTA HS TROPONIN: 149 NG/L
ANION GAP SERPL CALCULATED.3IONS-SCNC: 11 MMOL/L
APTT PPP: 27 SECONDS (ref 23–37)
BASOPHILS # BLD AUTO: 0.07 THOUSANDS/ÂΜL (ref 0–0.1)
BASOPHILS NFR BLD AUTO: 1 % (ref 0–1)
BUN SERPL-MCNC: 28 MG/DL (ref 5–25)
CALCIUM SERPL-MCNC: 9.2 MG/DL (ref 8.4–10.2)
CARDIAC TROPONIN I PNL SERPL HS: 193 NG/L
CARDIAC TROPONIN I PNL SERPL HS: 44 NG/L
CHLORIDE SERPL-SCNC: 106 MMOL/L (ref 96–108)
CO2 SERPL-SCNC: 22 MMOL/L (ref 21–32)
CREAT SERPL-MCNC: 2.5 MG/DL (ref 0.6–1.3)
EOSINOPHIL # BLD AUTO: 0.32 THOUSAND/ÂΜL (ref 0–0.61)
EOSINOPHIL NFR BLD AUTO: 2 % (ref 0–6)
ERYTHROCYTE [DISTWIDTH] IN BLOOD BY AUTOMATED COUNT: 19.3 % (ref 11.6–15.1)
GFR SERPL CREATININE-BSD FRML MDRD: 22 ML/MIN/1.73SQ M
GLUCOSE SERPL-MCNC: 155 MG/DL (ref 65–140)
HCG SERPL QL: NEGATIVE
HCT VFR BLD AUTO: 40.3 % (ref 34.8–46.1)
HGB BLD-MCNC: 12.7 G/DL (ref 11.5–15.4)
IMM GRANULOCYTES # BLD AUTO: 0.14 THOUSAND/UL (ref 0–0.2)
IMM GRANULOCYTES NFR BLD AUTO: 1 % (ref 0–2)
INR PPP: 1.06 (ref 0.84–1.19)
LYMPHOCYTES # BLD AUTO: 1.7 THOUSANDS/ÂΜL (ref 0.6–4.47)
LYMPHOCYTES NFR BLD AUTO: 11 % (ref 14–44)
MCH RBC QN AUTO: 27.3 PG (ref 26.8–34.3)
MCHC RBC AUTO-ENTMCNC: 31.5 G/DL (ref 31.4–37.4)
MCV RBC AUTO: 87 FL (ref 82–98)
MONOCYTES # BLD AUTO: 1.16 THOUSAND/ÂΜL (ref 0.17–1.22)
MONOCYTES NFR BLD AUTO: 8 % (ref 4–12)
NEUTROPHILS # BLD AUTO: 12.03 THOUSANDS/ÂΜL (ref 1.85–7.62)
NEUTS SEG NFR BLD AUTO: 77 % (ref 43–75)
NRBC BLD AUTO-RTO: 0 /100 WBCS
PLATELET # BLD AUTO: 353 THOUSANDS/UL (ref 149–390)
PMV BLD AUTO: 9.8 FL (ref 8.9–12.7)
POTASSIUM SERPL-SCNC: 4 MMOL/L (ref 3.5–5.3)
PROTHROMBIN TIME: 14 SECONDS (ref 11.6–14.5)
RBC # BLD AUTO: 4.66 MILLION/UL (ref 3.81–5.12)
SODIUM SERPL-SCNC: 139 MMOL/L (ref 135–147)
WBC # BLD AUTO: 15.42 THOUSAND/UL (ref 4.31–10.16)

## 2024-02-15 PROCEDURE — 85730 THROMBOPLASTIN TIME PARTIAL: CPT | Performed by: EMERGENCY MEDICINE

## 2024-02-15 PROCEDURE — 71045 X-RAY EXAM CHEST 1 VIEW: CPT

## 2024-02-15 PROCEDURE — 99291 CRITICAL CARE FIRST HOUR: CPT | Performed by: EMERGENCY MEDICINE

## 2024-02-15 PROCEDURE — 84703 CHORIONIC GONADOTROPIN ASSAY: CPT | Performed by: EMERGENCY MEDICINE

## 2024-02-15 PROCEDURE — 93005 ELECTROCARDIOGRAM TRACING: CPT

## 2024-02-15 PROCEDURE — 96375 TX/PRO/DX INJ NEW DRUG ADDON: CPT

## 2024-02-15 PROCEDURE — 85025 COMPLETE CBC W/AUTO DIFF WBC: CPT | Performed by: EMERGENCY MEDICINE

## 2024-02-15 PROCEDURE — 99285 EMERGENCY DEPT VISIT HI MDM: CPT

## 2024-02-15 PROCEDURE — 36415 COLL VENOUS BLD VENIPUNCTURE: CPT | Performed by: EMERGENCY MEDICINE

## 2024-02-15 PROCEDURE — 80048 BASIC METABOLIC PNL TOTAL CA: CPT | Performed by: EMERGENCY MEDICINE

## 2024-02-15 PROCEDURE — 85610 PROTHROMBIN TIME: CPT | Performed by: EMERGENCY MEDICINE

## 2024-02-15 PROCEDURE — 96374 THER/PROPH/DIAG INJ IV PUSH: CPT

## 2024-02-15 PROCEDURE — 84484 ASSAY OF TROPONIN QUANT: CPT | Performed by: EMERGENCY MEDICINE

## 2024-02-15 PROCEDURE — 83036 HEMOGLOBIN GLYCOSYLATED A1C: CPT | Performed by: PHYSICIAN ASSISTANT

## 2024-02-15 RX ORDER — ACETAMINOPHEN 325 MG/1
650 TABLET ORAL EVERY 6 HOURS PRN
Status: DISCONTINUED | OUTPATIENT
Start: 2024-02-15 | End: 2024-02-18 | Stop reason: HOSPADM

## 2024-02-15 RX ORDER — SODIUM CHLORIDE 9 MG/ML
3 INJECTION INTRAVENOUS
Status: DISCONTINUED | OUTPATIENT
Start: 2024-02-15 | End: 2024-02-18 | Stop reason: HOSPADM

## 2024-02-15 RX ORDER — GABAPENTIN 300 MG/1
300 CAPSULE ORAL 3 TIMES DAILY
Status: DISCONTINUED | OUTPATIENT
Start: 2024-02-15 | End: 2024-02-18 | Stop reason: HOSPADM

## 2024-02-15 RX ORDER — HEPARIN SODIUM 10000 [USP'U]/100ML
3-20 INJECTION, SOLUTION INTRAVENOUS
Status: DISCONTINUED | OUTPATIENT
Start: 2024-02-15 | End: 2024-02-16

## 2024-02-15 RX ORDER — ATORVASTATIN CALCIUM 40 MG/1
80 TABLET, FILM COATED ORAL EVERY EVENING
Status: DISCONTINUED | OUTPATIENT
Start: 2024-02-15 | End: 2024-02-18 | Stop reason: HOSPADM

## 2024-02-15 RX ORDER — HYDROMORPHONE HCL/PF 1 MG/ML
1 SYRINGE (ML) INJECTION ONCE
Status: COMPLETED | OUTPATIENT
Start: 2024-02-15 | End: 2024-02-15

## 2024-02-15 RX ORDER — VENLAFAXINE HYDROCHLORIDE 37.5 MG/1
75 CAPSULE, EXTENDED RELEASE ORAL
Status: DISCONTINUED | OUTPATIENT
Start: 2024-02-16 | End: 2024-02-18 | Stop reason: HOSPADM

## 2024-02-15 RX ORDER — NITROGLYCERIN 0.4 MG/1
0.4 TABLET SUBLINGUAL
Status: COMPLETED | OUTPATIENT
Start: 2024-02-15 | End: 2024-02-16

## 2024-02-15 RX ORDER — NICOTINE 21 MG/24HR
1 PATCH, TRANSDERMAL 24 HOURS TRANSDERMAL DAILY
Status: DISCONTINUED | OUTPATIENT
Start: 2024-02-15 | End: 2024-02-18 | Stop reason: HOSPADM

## 2024-02-15 RX ORDER — INSULIN LISPRO 100 [IU]/ML
2-12 INJECTION, SOLUTION INTRAVENOUS; SUBCUTANEOUS
Status: DISCONTINUED | OUTPATIENT
Start: 2024-02-16 | End: 2024-02-18 | Stop reason: HOSPADM

## 2024-02-15 RX ORDER — CALCITRIOL 0.25 UG/1
0.25 CAPSULE, LIQUID FILLED ORAL DAILY
Status: DISCONTINUED | OUTPATIENT
Start: 2024-02-16 | End: 2024-02-18 | Stop reason: HOSPADM

## 2024-02-15 RX ORDER — ALBUTEROL SULFATE 90 UG/1
2 AEROSOL, METERED RESPIRATORY (INHALATION) EVERY 4 HOURS PRN
Status: DISCONTINUED | OUTPATIENT
Start: 2024-02-15 | End: 2024-02-18 | Stop reason: HOSPADM

## 2024-02-15 RX ORDER — ASPIRIN 81 MG/1
324 TABLET, CHEWABLE ORAL ONCE
Status: DISCONTINUED | OUTPATIENT
Start: 2024-02-15 | End: 2024-02-18 | Stop reason: HOSPADM

## 2024-02-15 RX ORDER — NITROGLYCERIN 0.4 MG/1
0.4 TABLET SUBLINGUAL
Status: DISCONTINUED | OUTPATIENT
Start: 2024-02-15 | End: 2024-02-16

## 2024-02-15 RX ORDER — HEPARIN SODIUM 1000 [USP'U]/ML
2000 INJECTION, SOLUTION INTRAVENOUS; SUBCUTANEOUS EVERY 6 HOURS PRN
Status: DISCONTINUED | OUTPATIENT
Start: 2024-02-15 | End: 2024-02-16

## 2024-02-15 RX ORDER — ONDANSETRON 2 MG/ML
4 INJECTION INTRAMUSCULAR; INTRAVENOUS ONCE
Status: COMPLETED | OUTPATIENT
Start: 2024-02-15 | End: 2024-02-15

## 2024-02-15 RX ORDER — INSULIN LISPRO 100 [IU]/ML
2-12 INJECTION, SOLUTION INTRAVENOUS; SUBCUTANEOUS
Status: DISCONTINUED | OUTPATIENT
Start: 2024-02-15 | End: 2024-02-18 | Stop reason: HOSPADM

## 2024-02-15 RX ORDER — NIFEDIPINE 30 MG/1
30 TABLET, EXTENDED RELEASE ORAL DAILY
Status: DISCONTINUED | OUTPATIENT
Start: 2024-02-16 | End: 2024-02-18 | Stop reason: HOSPADM

## 2024-02-15 RX ORDER — HEPARIN SODIUM 1000 [USP'U]/ML
4000 INJECTION, SOLUTION INTRAVENOUS; SUBCUTANEOUS EVERY 6 HOURS PRN
Status: DISCONTINUED | OUTPATIENT
Start: 2024-02-15 | End: 2024-02-16

## 2024-02-15 RX ORDER — METOPROLOL SUCCINATE 50 MG/1
50 TABLET, EXTENDED RELEASE ORAL 2 TIMES DAILY
Status: DISCONTINUED | OUTPATIENT
Start: 2024-02-15 | End: 2024-02-18 | Stop reason: HOSPADM

## 2024-02-15 RX ORDER — CLOPIDOGREL BISULFATE 75 MG/1
75 TABLET ORAL DAILY
Status: DISCONTINUED | OUTPATIENT
Start: 2024-02-16 | End: 2024-02-18 | Stop reason: HOSPADM

## 2024-02-15 RX ORDER — ONDANSETRON 2 MG/ML
4 INJECTION INTRAMUSCULAR; INTRAVENOUS EVERY 6 HOURS PRN
Status: DISCONTINUED | OUTPATIENT
Start: 2024-02-15 | End: 2024-02-18 | Stop reason: HOSPADM

## 2024-02-15 RX ORDER — RANOLAZINE 500 MG/1
500 TABLET, EXTENDED RELEASE ORAL EVERY 12 HOURS SCHEDULED
Status: DISCONTINUED | OUTPATIENT
Start: 2024-02-15 | End: 2024-02-17

## 2024-02-15 RX ORDER — LORAZEPAM 0.5 MG/1
0.5 TABLET ORAL ONCE
Status: COMPLETED | OUTPATIENT
Start: 2024-02-15 | End: 2024-02-15

## 2024-02-15 RX ORDER — ASPIRIN 81 MG/1
81 TABLET, CHEWABLE ORAL DAILY
Status: DISCONTINUED | OUTPATIENT
Start: 2024-02-16 | End: 2024-02-18 | Stop reason: HOSPADM

## 2024-02-15 RX ORDER — ISOSORBIDE MONONITRATE 30 MG/1
60 TABLET, EXTENDED RELEASE ORAL DAILY
Status: DISCONTINUED | OUTPATIENT
Start: 2024-02-16 | End: 2024-02-16

## 2024-02-15 RX ORDER — SODIUM BICARBONATE 650 MG/1
650 TABLET ORAL
Status: DISCONTINUED | OUTPATIENT
Start: 2024-02-16 | End: 2024-02-18 | Stop reason: HOSPADM

## 2024-02-15 RX ORDER — HEPARIN SODIUM 1000 [USP'U]/ML
4000 INJECTION, SOLUTION INTRAVENOUS; SUBCUTANEOUS ONCE
Status: COMPLETED | OUTPATIENT
Start: 2024-02-15 | End: 2024-02-15

## 2024-02-15 RX ADMIN — HYDROMORPHONE HYDROCHLORIDE 1 MG: 1 INJECTION, SOLUTION INTRAMUSCULAR; INTRAVENOUS; SUBCUTANEOUS at 20:06

## 2024-02-15 RX ADMIN — ONDANSETRON 4 MG: 2 INJECTION INTRAMUSCULAR; INTRAVENOUS at 20:26

## 2024-02-15 RX ADMIN — HYDROMORPHONE HYDROCHLORIDE 1 MG: 1 INJECTION, SOLUTION INTRAMUSCULAR; INTRAVENOUS; SUBCUTANEOUS at 23:42

## 2024-02-15 RX ADMIN — NITROGLYCERIN 0.4 MG: 0.4 TABLET SUBLINGUAL at 22:49

## 2024-02-15 RX ADMIN — LORAZEPAM 0.5 MG: 0.5 TABLET ORAL at 22:01

## 2024-02-15 RX ADMIN — NITROGLYCERIN 0.4 MG: 0.4 TABLET SUBLINGUAL at 22:37

## 2024-02-15 RX ADMIN — HEPARIN SODIUM 11.1 UNITS/KG/HR: 10000 INJECTION, SOLUTION INTRAVENOUS at 23:13

## 2024-02-15 RX ADMIN — HEPARIN SODIUM 4000 UNITS: 1000 INJECTION INTRAVENOUS; SUBCUTANEOUS at 23:10

## 2024-02-15 NOTE — Clinical Note
Case was discussed with gloria and the patient's admission status was agreed to be Admission Status: inpatient status to the service of Dr. Payne

## 2024-02-16 PROBLEM — N17.9 ACUTE RENAL FAILURE SUPERIMPOSED ON STAGE 4 CHRONIC KIDNEY DISEASE (HCC): Status: RESOLVED | Noted: 2022-05-18 | Resolved: 2024-02-16

## 2024-02-16 PROBLEM — Z86.39 H/O NON-INSULIN DEPENDENT DIABETES MELLITUS: Status: ACTIVE | Noted: 2024-02-16

## 2024-02-16 PROBLEM — D72.829 LEUKOCYTOSIS: Status: ACTIVE | Noted: 2024-02-16

## 2024-02-16 PROBLEM — N18.4 ACUTE RENAL FAILURE SUPERIMPOSED ON STAGE 4 CHRONIC KIDNEY DISEASE (HCC): Status: RESOLVED | Noted: 2022-05-18 | Resolved: 2024-02-16

## 2024-02-16 LAB
4HR DELTA HS TROPONIN: 326 NG/L
ANION GAP SERPL CALCULATED.3IONS-SCNC: 11 MMOL/L
APTT PPP: 32 SECONDS (ref 23–37)
APTT PPP: 40 SECONDS (ref 23–37)
APTT PPP: 57 SECONDS (ref 23–37)
ATRIAL RATE: 65 BPM
ATRIAL RATE: 66 BPM
ATRIAL RATE: 68 BPM
ATRIAL RATE: 77 BPM
ATRIAL RATE: 90 BPM
BUN SERPL-MCNC: 29 MG/DL (ref 5–25)
CALCIUM SERPL-MCNC: 9.1 MG/DL (ref 8.4–10.2)
CARDIAC TROPONIN I PNL SERPL HS: 278 NG/L
CARDIAC TROPONIN I PNL SERPL HS: 370 NG/L
CARDIAC TROPONIN I PNL SERPL HS: 438 NG/L (ref 8–18)
CARDIAC TROPONIN I PNL SERPL HS: 440 NG/L (ref 8–18)
CHLORIDE SERPL-SCNC: 106 MMOL/L (ref 96–108)
CO2 SERPL-SCNC: 21 MMOL/L (ref 21–32)
CREAT SERPL-MCNC: 2.46 MG/DL (ref 0.6–1.3)
ERYTHROCYTE [DISTWIDTH] IN BLOOD BY AUTOMATED COUNT: 19 % (ref 11.6–15.1)
EST. AVERAGE GLUCOSE BLD GHB EST-MCNC: 137 MG/DL
GFR SERPL CREATININE-BSD FRML MDRD: 22 ML/MIN/1.73SQ M
GLUCOSE SERPL-MCNC: 105 MG/DL (ref 65–140)
GLUCOSE SERPL-MCNC: 106 MG/DL (ref 65–140)
GLUCOSE SERPL-MCNC: 109 MG/DL (ref 65–140)
GLUCOSE SERPL-MCNC: 113 MG/DL (ref 65–140)
GLUCOSE SERPL-MCNC: 115 MG/DL (ref 65–140)
GLUCOSE SERPL-MCNC: 92 MG/DL (ref 65–140)
HBA1C MFR BLD: 6.4 %
HCT VFR BLD AUTO: 39.2 % (ref 34.8–46.1)
HGB BLD-MCNC: 12.2 G/DL (ref 11.5–15.4)
MCH RBC QN AUTO: 26.9 PG (ref 26.8–34.3)
MCHC RBC AUTO-ENTMCNC: 31.1 G/DL (ref 31.4–37.4)
MCV RBC AUTO: 87 FL (ref 82–98)
P AXIS: 29 DEGREES
P AXIS: 47 DEGREES
P AXIS: 50 DEGREES
P AXIS: 51 DEGREES
P AXIS: 54 DEGREES
PLATELET # BLD AUTO: 335 THOUSANDS/UL (ref 149–390)
PMV BLD AUTO: 9.8 FL (ref 8.9–12.7)
POTASSIUM SERPL-SCNC: 4.1 MMOL/L (ref 3.5–5.3)
PR INTERVAL: 178 MS
PR INTERVAL: 182 MS
PR INTERVAL: 182 MS
PR INTERVAL: 184 MS
PR INTERVAL: 186 MS
QRS AXIS: -1 DEGREES
QRS AXIS: 43 DEGREES
QRS AXIS: 45 DEGREES
QRS AXIS: 69 DEGREES
QRS AXIS: 87 DEGREES
QRSD INTERVAL: 100 MS
QRSD INTERVAL: 110 MS
QRSD INTERVAL: 94 MS
QRSD INTERVAL: 96 MS
QRSD INTERVAL: 98 MS
QT INTERVAL: 360 MS
QT INTERVAL: 388 MS
QT INTERVAL: 406 MS
QT INTERVAL: 410 MS
QT INTERVAL: 432 MS
QTC INTERVAL: 425 MS
QTC INTERVAL: 435 MS
QTC INTERVAL: 439 MS
QTC INTERVAL: 440 MS
QTC INTERVAL: 449 MS
RBC # BLD AUTO: 4.53 MILLION/UL (ref 3.81–5.12)
SODIUM SERPL-SCNC: 138 MMOL/L (ref 135–147)
T WAVE AXIS: -1 DEGREES
T WAVE AXIS: -2 DEGREES
T WAVE AXIS: 19 DEGREES
T WAVE AXIS: 43 DEGREES
T WAVE AXIS: 54 DEGREES
VENTRICULAR RATE: 65 BPM
VENTRICULAR RATE: 66 BPM
VENTRICULAR RATE: 68 BPM
VENTRICULAR RATE: 77 BPM
VENTRICULAR RATE: 90 BPM
WBC # BLD AUTO: 14.21 THOUSAND/UL (ref 4.31–10.16)

## 2024-02-16 PROCEDURE — 84484 ASSAY OF TROPONIN QUANT: CPT | Performed by: EMERGENCY MEDICINE

## 2024-02-16 PROCEDURE — 93005 ELECTROCARDIOGRAM TRACING: CPT

## 2024-02-16 PROCEDURE — 82948 REAGENT STRIP/BLOOD GLUCOSE: CPT

## 2024-02-16 PROCEDURE — 80048 BASIC METABOLIC PNL TOTAL CA: CPT | Performed by: PHYSICIAN ASSISTANT

## 2024-02-16 PROCEDURE — 99223 1ST HOSP IP/OBS HIGH 75: CPT | Performed by: INTERNAL MEDICINE

## 2024-02-16 PROCEDURE — 93010 ELECTROCARDIOGRAM REPORT: CPT | Performed by: INTERNAL MEDICINE

## 2024-02-16 PROCEDURE — 84484 ASSAY OF TROPONIN QUANT: CPT

## 2024-02-16 PROCEDURE — 85730 THROMBOPLASTIN TIME PARTIAL: CPT | Performed by: INTERNAL MEDICINE

## 2024-02-16 PROCEDURE — 84484 ASSAY OF TROPONIN QUANT: CPT | Performed by: PHYSICIAN ASSISTANT

## 2024-02-16 PROCEDURE — 99255 IP/OBS CONSLTJ NEW/EST HI 80: CPT | Performed by: INTERNAL MEDICINE

## 2024-02-16 PROCEDURE — 36415 COLL VENOUS BLD VENIPUNCTURE: CPT | Performed by: EMERGENCY MEDICINE

## 2024-02-16 PROCEDURE — 85027 COMPLETE CBC AUTOMATED: CPT | Performed by: PHYSICIAN ASSISTANT

## 2024-02-16 RX ORDER — ISOSORBIDE MONONITRATE 30 MG/1
90 TABLET, EXTENDED RELEASE ORAL DAILY
Status: DISCONTINUED | OUTPATIENT
Start: 2024-02-16 | End: 2024-02-17

## 2024-02-16 RX ORDER — HYDROMORPHONE HCL IN WATER/PF 6 MG/30 ML
0.2 PATIENT CONTROLLED ANALGESIA SYRINGE INTRAVENOUS ONCE
Status: DISCONTINUED | OUTPATIENT
Start: 2024-02-16 | End: 2024-02-16

## 2024-02-16 RX ORDER — HEPARIN SODIUM 1000 [USP'U]/ML
5000 INJECTION, SOLUTION INTRAVENOUS; SUBCUTANEOUS EVERY 6 HOURS PRN
Status: DISCONTINUED | OUTPATIENT
Start: 2024-02-16 | End: 2024-02-18 | Stop reason: HOSPADM

## 2024-02-16 RX ORDER — HYDROMORPHONE HCL IN WATER/PF 6 MG/30 ML
0.2 PATIENT CONTROLLED ANALGESIA SYRINGE INTRAVENOUS ONCE
Status: COMPLETED | OUTPATIENT
Start: 2024-02-16 | End: 2024-02-16

## 2024-02-16 RX ORDER — LORAZEPAM 2 MG/ML
1 INJECTION INTRAMUSCULAR ONCE
Status: COMPLETED | OUTPATIENT
Start: 2024-02-16 | End: 2024-02-16

## 2024-02-16 RX ORDER — NITROGLYCERIN 0.4 MG/1
0.4 TABLET SUBLINGUAL
Status: DISCONTINUED | OUTPATIENT
Start: 2024-02-16 | End: 2024-02-18 | Stop reason: HOSPADM

## 2024-02-16 RX ORDER — HEPARIN SODIUM 10000 [USP'U]/100ML
3-30 INJECTION, SOLUTION INTRAVENOUS
Status: DISCONTINUED | OUTPATIENT
Start: 2024-02-16 | End: 2024-02-18 | Stop reason: HOSPADM

## 2024-02-16 RX ORDER — HYDROMORPHONE HCL/PF 1 MG/ML
1 SYRINGE (ML) INJECTION ONCE
Status: DISCONTINUED | OUTPATIENT
Start: 2024-02-16 | End: 2024-02-16

## 2024-02-16 RX ORDER — LORAZEPAM 0.5 MG/1
0.5 TABLET ORAL EVERY 8 HOURS PRN
Status: DISCONTINUED | OUTPATIENT
Start: 2024-02-16 | End: 2024-02-18 | Stop reason: HOSPADM

## 2024-02-16 RX ORDER — HEPARIN SODIUM 1000 [USP'U]/ML
10000 INJECTION, SOLUTION INTRAVENOUS; SUBCUTANEOUS EVERY 6 HOURS PRN
Status: DISCONTINUED | OUTPATIENT
Start: 2024-02-16 | End: 2024-02-18 | Stop reason: HOSPADM

## 2024-02-16 RX ADMIN — RANOLAZINE 500 MG: 500 TABLET, FILM COATED, EXTENDED RELEASE ORAL at 00:40

## 2024-02-16 RX ADMIN — SODIUM BICARBONATE 650 MG: 650 TABLET ORAL at 08:32

## 2024-02-16 RX ADMIN — HEPARIN SODIUM 4000 UNITS: 1000 INJECTION INTRAVENOUS; SUBCUTANEOUS at 12:39

## 2024-02-16 RX ADMIN — LORAZEPAM 1 MG: 2 INJECTION INTRAMUSCULAR; INTRAVENOUS at 00:55

## 2024-02-16 RX ADMIN — LORAZEPAM 0.5 MG: 0.5 TABLET ORAL at 12:20

## 2024-02-16 RX ADMIN — NITROGLYCERIN 0.4 MG: 0.4 TABLET SUBLINGUAL at 13:42

## 2024-02-16 RX ADMIN — HYDROMORPHONE HYDROCHLORIDE 0.2 MG: 0.2 INJECTION, SOLUTION INTRAMUSCULAR; INTRAVENOUS; SUBCUTANEOUS at 21:47

## 2024-02-16 RX ADMIN — RANOLAZINE 500 MG: 500 TABLET, FILM COATED, EXTENDED RELEASE ORAL at 08:32

## 2024-02-16 RX ADMIN — CLOPIDOGREL 75 MG: 75 TABLET ORAL at 08:32

## 2024-02-16 RX ADMIN — HEPARIN SODIUM 21.1 UNITS/KG/HR: 10000 INJECTION, SOLUTION INTRAVENOUS at 20:50

## 2024-02-16 RX ADMIN — NICOTINE 1 PATCH: 14 PATCH, EXTENDED RELEASE TRANSDERMAL at 08:34

## 2024-02-16 RX ADMIN — METOPROLOL SUCCINATE 50 MG: 50 TABLET, EXTENDED RELEASE ORAL at 08:32

## 2024-02-16 RX ADMIN — ATORVASTATIN CALCIUM 80 MG: 40 TABLET, FILM COATED ORAL at 00:32

## 2024-02-16 RX ADMIN — ONDANSETRON 4 MG: 2 INJECTION INTRAMUSCULAR; INTRAVENOUS at 14:30

## 2024-02-16 RX ADMIN — HEPARIN SODIUM 5000 UNITS: 1000 INJECTION INTRAVENOUS; SUBCUTANEOUS at 20:50

## 2024-02-16 RX ADMIN — ISOSORBIDE MONONITRATE 90 MG: 30 TABLET, EXTENDED RELEASE ORAL at 08:32

## 2024-02-16 RX ADMIN — SILVER SULFADIAZINE 1 APPLICATION: 10 CREAM TOPICAL at 08:32

## 2024-02-16 RX ADMIN — METOPROLOL SUCCINATE 50 MG: 50 TABLET, EXTENDED RELEASE ORAL at 20:52

## 2024-02-16 RX ADMIN — HEPARIN SODIUM 4000 UNITS: 1000 INJECTION INTRAVENOUS; SUBCUTANEOUS at 06:31

## 2024-02-16 RX ADMIN — GABAPENTIN 300 MG: 300 CAPSULE ORAL at 20:52

## 2024-02-16 RX ADMIN — NIFEDIPINE 30 MG: 30 TABLET, FILM COATED, EXTENDED RELEASE ORAL at 08:34

## 2024-02-16 RX ADMIN — CALCITRIOL CAPSULES 0.25 MCG 0.25 MCG: 0.25 CAPSULE ORAL at 08:32

## 2024-02-16 RX ADMIN — NITROGLYCERIN 0.4 MG: 0.4 TABLET SUBLINGUAL at 14:49

## 2024-02-16 RX ADMIN — HEPARIN SODIUM 19.1 UNITS/KG/HR: 10000 INJECTION, SOLUTION INTRAVENOUS at 18:16

## 2024-02-16 RX ADMIN — NICOTINE 1 PATCH: 14 PATCH, EXTENDED RELEASE TRANSDERMAL at 00:28

## 2024-02-16 RX ADMIN — SODIUM BICARBONATE 650 MG: 650 TABLET ORAL at 17:47

## 2024-02-16 RX ADMIN — GABAPENTIN 300 MG: 300 CAPSULE ORAL at 16:39

## 2024-02-16 RX ADMIN — GABAPENTIN 300 MG: 300 CAPSULE ORAL at 00:30

## 2024-02-16 RX ADMIN — ASPIRIN 81 MG: 81 TABLET, CHEWABLE ORAL at 08:32

## 2024-02-16 RX ADMIN — ACETAMINOPHEN 650 MG: 325 TABLET ORAL at 13:46

## 2024-02-16 RX ADMIN — NITROGLYCERIN 0.4 MG: 0.4 TABLET SUBLINGUAL at 14:30

## 2024-02-16 RX ADMIN — VENLAFAXINE HYDROCHLORIDE 75 MG: 75 CAPSULE, EXTENDED RELEASE ORAL at 08:32

## 2024-02-16 RX ADMIN — RANOLAZINE 500 MG: 500 TABLET, FILM COATED, EXTENDED RELEASE ORAL at 20:52

## 2024-02-16 RX ADMIN — GABAPENTIN 300 MG: 300 CAPSULE ORAL at 08:32

## 2024-02-16 RX ADMIN — ATORVASTATIN CALCIUM 80 MG: 40 TABLET, FILM COATED ORAL at 17:47

## 2024-02-16 NOTE — ASSESSMENT & PLAN NOTE
Likely reactionary secondary to NSTEMI  Patient is afebrile with no active signs of infection  Continue to trend fever curve  Continue to monitor with repeat labs in a.m.

## 2024-02-16 NOTE — ASSESSMENT & PLAN NOTE
Wt Readings from Last 3 Encounters:   12/12/23 129 kg (285 lb)   12/10/23 129 kg (285 lb)   10/02/23 129 kg (285 lb)     Not in exacerbation at this time  Continue prehospital Imdur 90 mg p.o. daily, Toprol-XL 50 mg p.o. twice daily Procardia XL 30 mg p.o. daily

## 2024-02-16 NOTE — ED PROVIDER NOTES
History  Chief Complaint   Patient presents with    Chest Pain     Started about 3 hours ago; left sided sharp chest pain; history of 8 heart attacks; constant pain into left shoulder blade     Patient is a 48-year-old female with history of CAD that presents for evaluation of left-sided chest pain.  She has had multiple MIs in the past.  Starting about 2 hours ago, she had the abrupt onset of squeezing chest pain in her left chest radiating into the back.  Is been constant without alleviating exacerbating factors.  She took 325 mg aspirin prior to ED arrival for the pain.  She endorses some nausea without vomiting.  She denies dyspnea or diaphoresis.  Similar to prior MIs.  Patient denies abdominal pain urinary or bowel symptoms.        Prior to Admission Medications   Prescriptions Last Dose Informant Patient Reported? Taking?   NIFEdipine (PROCARDIA XL) 30 mg 24 hr tablet 2/16/2024  No Yes   Sig: Take 1 tablet (30 mg total) by mouth daily   acetaminophen (TYLENOL) 325 mg tablet Not Taking  No No   Sig: Take 3 tablets (975 mg total) by mouth every 8 (eight) hours as needed for mild pain   Patient not taking: Reported on 2/16/2024   albuterol (PROVENTIL HFA,VENTOLIN HFA) 90 mcg/act inhaler   No No   Sig: Inhale 2 puffs every 4 (four) hours as needed for wheezing or shortness of breath   aspirin 81 mg chewable tablet 2/16/2024 Self No Yes   Sig: Chew 1 tablet (81 mg total) daily   atorvastatin (LIPITOR) 80 mg tablet 2/16/2024  No Yes   Sig: Take 1 tablet (80 mg total) by mouth every evening   calcitriol (ROCALTROL) 0.25 mcg capsule 2/16/2024  Yes Yes   Sig: Take 1 capsule (0.25 mcg total) by mouth daily Takes Monday Wednesday and fridays as of 11/11/22   clopidogrel (PLAVIX) 75 mg tablet 2/16/2024  No Yes   Sig: Take 1 tablet (75 mg total) by mouth daily   gabapentin (NEURONTIN) 300 mg capsule 2/16/2024  No Yes   Sig: Take 1 capsule (300 mg total) by mouth 3 (three) times a day   gabapentin (Neurontin) 300 mg  capsule 2/16/2024  No Yes   Sig: Take 1 capsule (300 mg total) by mouth 3 (three) times a day   isosorbide mononitrate (IMDUR) 30 mg 24 hr tablet 2/16/2024  No Yes   Sig: Take 1 tablet (30 mg total) by mouth daily Take in addition to 60 mg tablet   isosorbide mononitrate (IMDUR) 60 mg 24 hr tablet 2/16/2024  No Yes   Sig: Take 1 tablet (60 mg total) by mouth daily   metoprolol succinate (TOPROL-XL) 50 mg 24 hr tablet 2/16/2024  No Yes   Sig: Take 1 tablet (50 mg total) by mouth 2 (two) times a day   nitroglycerin (NITROSTAT) 0.4 mg SL tablet 2/15/2024  No Yes   Sig: Place 1 tablet (0.4 mg total) under the tongue every 5 (five) minutes as needed for chest pain   ranolazine (RANEXA) 500 mg 12 hr tablet 2/16/2024  No Yes   Sig: Take 1 tablet (500 mg total) by mouth every 12 (twelve) hours   silver sulfadiazine (SILVADENE,SSD) 1 % cream 2/16/2024  No Yes   Sig: Apply topically daily   sodium bicarbonate 650 mg tablet 2/16/2024  No Yes   Sig: Take 1 tablet (650 mg total) by mouth 2 (two) times daily after meals   venlafaxine (EFFEXOR-XR) 75 mg 24 hr capsule 2/16/2024  No Yes   Sig: Take 1 capsule (75 mg total) by mouth daily with breakfast      Facility-Administered Medications: None       Past Medical History:   Diagnosis Date    Anemia     Anxiety     CAD (coronary artery disease)     Cancer ([facility]ly [facility]) 2008    Cardiomyopathy ([facility])     CHF (congestive heart failure) (Formerly [facility])     Chronic kidney disease     COPD (chronic obstructive pulmonary disease) ([facility]ly [facility])     scheduled for sleep apnea test soon after pacemaker implant    Coronary artery disease     Depression     History of chemotherapy     non hodgkins lymphoma 2008    Hyperlipemia     Hypertension     Hypertrophic cardiomyopathy ([facility])     Lymphoma, non-Hodgkin's ([facility]ly [facility])     Myocardial infarction ([facility])     Non-ST elevation MI (NSTEMI) 01/02/2023    Obesity     Obstructive sleep apnea     Olecranon bursitis, right elbow 7/11/2022    SSS (sick sinus syndrome) ([facility])     s/p  medtronic dual chamber pacemaker 2021    Tobacco abuse     Ventricular tachycardia, non-sustained (HCC)     Wears glasses        Past Surgical History:   Procedure Laterality Date    CARDIAC CATHETERIZATION N/A 1/3/2023    Procedure: Cardiac catheterization;  Surgeon: Jesus Franco MD;  Location: [facility];  Service: Cardiology    CARDIAC CATHETERIZATION N/A 1/3/2023    Procedure: Cardiac Coronary Angiogram;  Surgeon: Jesus Franco MD;  [facility]: [facility];  Service: Cardiology    CARDIAC CATHETERIZATION N/A 1/3/2023    Procedure: Cardiac pci;  Surgeon: Jesus Franco MD;  [facility]: [facility];  Service: Cardiology    CARDIAC CATHETERIZATION Left 2023    Procedure: Cardiac Left Heart Cath;  Surgeon: Frieda Fox DO;  [facility]: [facility];  Service: Cardiology    CARDIAC CATHETERIZATION  2023    Procedure: Cardiac catheterization;  Surgeon: Frieda Fox DO;  [facility];  Service: Cardiology    CARDIAC CATHETERIZATION N/A 2023    Procedure: Cardiac Coronary Angiogram;  Surgeon: Frieda Fox DO;  [facility]: [facility];  Service: Cardiology    CARDIAC PACEMAKER PLACEMENT Left 2021    Procedure: DUAL LEAD PACEMAKER IMPLANT;  Surgeon: Yinka Shaw MD;  Location:  MAIN OR;  Service: Cardiology    CAROTID STENT       SECTION      CORONARY ANGIOPLASTY WITH STENT PLACEMENT  2021    GREG mid RCA and GREG right PDA    DENTAL SURGERY      IR BIOPSY KIDNEY RANDOM  10/18/2021       Family History   Problem Relation Age of Onset    No Known Problems Mother     No Known Problems Father     No Known Problems Daughter     Brain cancer Maternal Grandfather     Heart disease Maternal Grandfather     Cancer Maternal Grandfather     No Known Problems Paternal Aunt     No Known Problems Paternal Aunt      I have reviewed and agree with the history as documented.    E-Cigarette/Vaping    E-Cigarette Use Never User       E-Cigarette/Vaping Substances    Nicotine No     THC No     CBD No     Flavoring No     Other No     Unknown No      Social History     Tobacco Use    Smoking status: Every Day     Current packs/day: 0.50     Average packs/day: 0.5 packs/day for 25.0 years (12.5 ttl pk-yrs)     Types: Cigarettes    Smokeless tobacco: Never    Tobacco comments:     Recently and currently quitting cigarettes   Vaping Use    Vaping status: Never Used   Substance Use Topics    Alcohol use: Yes     Comment: 1-2 times years    Drug use: No       Review of Systems   Constitutional:  Negative for fever.   HENT:  Negative for sore throat.    Respiratory:  Negative for shortness of breath.    Cardiovascular:  Positive for chest pain.   Gastrointestinal:  Negative for abdominal pain.   Genitourinary:  Negative for dysuria.   Musculoskeletal:  Negative for back pain.   Skin:  Negative for rash.   Neurological:  Negative for light-headedness.   Psychiatric/Behavioral:  Negative for agitation.    All other systems reviewed and are negative.      Physical Exam  Physical Exam  Vitals reviewed.   Constitutional:       General: She is not in acute distress.     Appearance: She is well-developed. She is obese.   HENT:      Head: Normocephalic.   Eyes:      Pupils: Pupils are equal, round, and reactive to light.   Cardiovascular:      Rate and Rhythm: Normal rate and regular rhythm.      Heart sounds: Normal heart sounds.   Pulmonary:      Effort: Pulmonary effort is normal.      Breath sounds: Normal breath sounds.   Abdominal:      General: Bowel sounds are normal. There is no distension.      Palpations: Abdomen is soft.      Tenderness: There is no abdominal tenderness. There is no guarding.   Musculoskeletal:         General: No tenderness or deformity. Normal range of motion.      Cervical back: Normal range of motion and neck supple.   Skin:     General: Skin is warm and dry.      Capillary Refill: Capillary refill takes less than 2 seconds.    Neurological:      Mental Status: She is alert and oriented to person, place, and time.      Cranial Nerves: No cranial nerve deficit.      Sensory: No sensory deficit.   Psychiatric:         Behavior: Behavior normal.         Thought Content: Thought content normal.         Judgment: Judgment normal.         Vital Signs  ED Triage Vitals   Temperature Pulse Respirations Blood Pressure SpO2   02/15/24 1951 02/15/24 1951 02/15/24 1951 02/15/24 1951 02/15/24 1951   98.4 °F (36.9 °C) 85 20 156/83 99 %      Temp Source Heart Rate Source Patient Position - Orthostatic VS BP Location FiO2 (%)   02/16/24 0915 02/15/24 1951 02/15/24 2015 02/15/24 1951 --   Temporal Monitor Sitting Left arm       Pain Score       02/15/24 1951       10 - Worst Possible Pain           Vitals:    02/16/24 0500 02/16/24 0600 02/16/24 0915 02/16/24 1346   BP: 145/77 129/60 120/54 111/57   Pulse: 65 64 63 60   Patient Position - Orthostatic VS: Sitting Sitting Lying Lying         Visual Acuity  Visual Acuity      Flowsheet Row Most Recent Value   L Pupil Size (mm) 3   R Pupil Size (mm) 3   L Pupil Shape Round   R Pupil Shape Round            ED Medications  Medications   sodium chloride (PF) 0.9 % injection 3 mL (has no administration in time range)   aspirin chewable tablet 324 mg (0 mg Oral Hold 2/15/24 2229)   heparin (porcine) 25,000 units in 0.45% NaCl 250 mL infusion (premix) (19.1 Units/kg/hr × 90 kg (Order-Specific) Intravenous Rate/Dose Change 2/16/24 1241)   heparin (porcine) injection 4,000 Units (4,000 Units Intravenous Given 2/16/24 1239)   heparin (porcine) injection 2,000 Units (has no administration in time range)   albuterol (PROVENTIL HFA,VENTOLIN HFA) inhaler 2 puff (has no administration in time range)   aspirin chewable tablet 81 mg (81 mg Oral Given 2/16/24 0832)   atorvastatin (LIPITOR) tablet 80 mg (80 mg Oral Given 2/16/24 0032)   calcitriol (ROCALTROL) capsule 0.25 mcg (0.25 mcg Oral Given 2/16/24 0832)   clopidogrel  (PLAVIX) tablet 75 mg (75 mg Oral Given 2/16/24 0832)   gabapentin (NEURONTIN) capsule 300 mg (300 mg Oral Given 2/16/24 0832)   metoprolol succinate (TOPROL-XL) 24 hr tablet 50 mg (50 mg Oral Given 2/16/24 0832)   NIFEdipine (PROCARDIA XL) 24 hr tablet 30 mg (30 mg Oral Given 2/16/24 0834)   ranolazine (RANEXA) 12 hr tablet 500 mg (500 mg Oral Given 2/16/24 0832)   silver sulfadiazine (SILVADENE,SSD) 1 % cream (1 Application Topical Given 2/16/24 0832)   sodium bicarbonate tablet 650 mg (650 mg Oral Given 2/16/24 0832)   venlafaxine (EFFEXOR-XR) 24 hr capsule 75 mg (75 mg Oral Given 2/16/24 0832)   acetaminophen (TYLENOL) tablet 650 mg (650 mg Oral Given 2/16/24 1346)   ondansetron (ZOFRAN) injection 4 mg (4 mg Intravenous Given 2/16/24 1430)   insulin lispro (HumaLOG) 100 units/mL subcutaneous injection 2-12 Units ( Subcutaneous Not Given 2/16/24 1149)   insulin lispro (HumaLOG) 100 units/mL subcutaneous injection 2-12 Units ( Subcutaneous Not Given 2/16/24 0016)   nicotine (NICODERM CQ) 14 mg/24hr TD 24 hr patch 1 patch (1 patch Transdermal Medication Applied 2/16/24 0834)   isosorbide mononitrate (IMDUR) 24 hr tablet 90 mg (90 mg Oral Given 2/16/24 0832)   LORazepam (ATIVAN) tablet 0.5 mg (0.5 mg Oral Given 2/16/24 1220)   nitroglycerin (NITROSTAT) SL tablet 0.4 mg (0.4 mg Sublingual Given 2/16/24 1449)   HYDROmorphone (DILAUDID) injection 1 mg (1 mg Intravenous Given 2/15/24 2006)   ondansetron (ZOFRAN) injection 4 mg (4 mg Intravenous Given 2/15/24 2026)   LORazepam (ATIVAN) tablet 0.5 mg (0.5 mg Oral Given 2/15/24 2201)   nitroglycerin (NITROSTAT) SL tablet 0.4 mg (0.4 mg Sublingual Given 2/16/24 1342)   heparin (porcine) injection 4,000 Units (4,000 Units Intravenous Given 2/15/24 2310)   HYDROmorphone (DILAUDID) injection 1 mg (1 mg Intravenous Given 2/15/24 2342)   LORazepam (ATIVAN) injection 1 mg (1 mg Intravenous Given 2/16/24 0055)       Diagnostic Studies  Results Reviewed       Procedure Component  Value Units Date/Time    APTT [[id number]]     Lab Status: No result Specimen: Blood     Hemoglobin A1c w/EAG Estimation (Orders if not completed within the last 90 days) [[id number]]  (Abnormal) Collected: 02/15/24 1957    Lab Status: Final result Specimen: Blood from Arm, Left Updated: 02/16/24 1321     Hemoglobin A1C 6.4 %       mg/dl     APTT [[id number]]  (Abnormal) Collected: 02/16/24 1149    Lab Status: Final result Specimen: Blood from Arm, Right Updated: 02/16/24 1230     PTT 40 seconds     Fingerstick Glucose (POCT) [[id number]]  (Normal) Collected: 02/16/24 1145    Lab Status: Final result Updated: 02/16/24 1151     POC Glucose 105 mg/dl     Fingerstick Glucose (POCT) [[id number]]  (Normal) Collected: 02/16/24 0819    Lab Status: Final result Updated: 02/16/24 0820     POC Glucose 113 mg/dl     Basic metabolic panel [[id number]]  (Abnormal) Collected: 02/16/24 0542    Lab Status: Final result Specimen: Blood from Arm, Right Updated: 02/16/24 0619     Sodium 138 mmol/L      Potassium 4.1 mmol/L      Chloride 106 mmol/L      CO2 21 mmol/L      ANION GAP 11 mmol/L      BUN 29 mg/dL      Creatinine 2.46 mg/dL      Glucose 109 mg/dL      Calcium 9.1 mg/dL      eGFR 22 ml/min/1.73sq m     Narrative:      National Kidney Disease Foundation guidelines for Chronic Kidney Disease (CKD):     Stage 1 with normal or high GFR (GFR > 90 mL/min/1.73 square meters)    Stage 2 Mild CKD (GFR = 60-89 mL/min/1.73 square meters)    Stage 3A Moderate CKD (GFR = 45-59 mL/min/1.73 square meters)    Stage 3B Moderate CKD (GFR = 30-44 mL/min/1.73 square meters)    Stage 4 Severe CKD (GFR = 15-29 mL/min/1.73 square meters)    Stage 5 End Stage CKD (GFR <15 mL/min/1.73 square meters)  Note: GFR calculation is accurate only with a steady state creatinine    APTT [[id number]]  (Normal) Collected: 02/16/24 0542    Lab Status: Final result Specimen: Blood from Arm, Right Updated: 02/16/24 0611     PTT 32 seconds     High Sensitivity  Troponin I Random [[id number]]  (Abnormal) Collected: 02/16/24 0454    Lab Status: Final result Specimen: Blood from Arm, Right Updated: 02/16/24 0520     HS TnI random 440 ng/L     CBC (With Platelets) [[id number]]  (Abnormal) Collected: 02/16/24 0454    Lab Status: Final result Specimen: Blood from Arm, Right Updated: 02/16/24 0500     WBC 14.21 Thousand/uL      RBC 4.53 Million/uL      Hemoglobin 12.2 g/dL      Hematocrit 39.2 %      MCV 87 fL      MCH 26.9 pg      MCHC 31.1 g/dL      RDW 19.0 %      Platelets 335 Thousands/uL      MPV 9.8 fL     High Sensitivity Troponin I Random [[id number]]  (Abnormal) Collected: 02/16/24 0207    Lab Status: Final result Specimen: Blood from Arm, Right Updated: 02/16/24 0235     HS TnI random 438 ng/L     HS Troponin I 4hr [[id number]]  (Abnormal) Collected: 02/16/24 0010    Lab Status: Final result Specimen: Blood from Arm, Right Updated: 02/16/24 0038     hs TnI 4hr 370 ng/L      Delta 4hr hsTnI 326 ng/L     Fingerstick Glucose (POCT) [[id number]]  (Normal) Collected: 02/16/24 0012    Lab Status: Final result Updated: 02/16/24 0013     POC Glucose 115 mg/dl     APTT six (6) hours after Heparin bolus/drip initiation or dosing change [[id number]]  (Normal) Collected: 02/15/24 2247    Lab Status: Final result Specimen: Blood from Arm, Left Updated: 02/15/24 2302     PTT 27 seconds     Protime-INR [[id number]]  (Normal) Collected: 02/15/24 2247    Lab Status: Final result Specimen: Blood from Arm, Left Updated: 02/15/24 2302     Protime 14.0 seconds      INR 1.06    HS Troponin I 2hr [[id number]]  (Abnormal) Collected: 02/15/24 2155    Lab Status: Final result Specimen: Blood from Arm, Left Updated: 02/15/24 2221     hs TnI 2hr 193 ng/L      Delta 2hr hsTnI 149 ng/L     hCG, qualitative pregnancy [[id number]]  (Normal) Collected: 02/15/24 1957    Lab Status: Final result Specimen: Blood from Arm, Left Updated: 02/15/24 2031     Preg, Serum Negative    HS Troponin 0hr (reflex protocol)  [[id number]]  (Normal) Collected: 02/15/24 1957    Lab Status: Final result Specimen: Blood from Arm, Left Updated: 02/15/24 2031     hs TnI 0hr 44 ng/L     Basic metabolic panel [[id number]]  (Abnormal) Collected: 02/15/24 1957    Lab Status: Final result Specimen: Blood from Arm, Left Updated: 02/15/24 2023     Sodium 139 mmol/L      Potassium 4.0 mmol/L      Chloride 106 mmol/L      CO2 22 mmol/L      ANION GAP 11 mmol/L      BUN 28 mg/dL      Creatinine 2.50 mg/dL      Glucose 155 mg/dL      Calcium 9.2 mg/dL      eGFR 22 ml/min/1.73sq m     Narrative:      National Kidney Disease Foundation guidelines for Chronic Kidney Disease (CKD):     Stage 1 with normal or high GFR (GFR > 90 mL/min/1.73 square meters)    Stage 2 Mild CKD (GFR = 60-89 mL/min/1.73 square meters)    Stage 3A Moderate CKD (GFR = 45-59 mL/min/1.73 square meters)    Stage 3B Moderate CKD (GFR = 30-44 mL/min/1.73 square meters)    Stage 4 Severe CKD (GFR = 15-29 mL/min/1.73 square meters)    Stage 5 End Stage CKD (GFR <15 mL/min/1.73 square meters)  Note: GFR calculation is accurate only with a steady state creatinine    CBC and differential [[id number]]  (Abnormal) Collected: 02/15/24 1957    Lab Status: Final result Specimen: Blood from Arm, Left Updated: 02/15/24 2005     WBC 15.42 Thousand/uL      RBC 4.66 Million/uL      Hemoglobin 12.7 g/dL      Hematocrit 40.3 %      MCV 87 fL      MCH 27.3 pg      MCHC 31.5 g/dL      RDW 19.3 %      MPV 9.8 fL      Platelets 353 Thousands/uL      nRBC 0 /100 WBCs      Neutrophils Relative 77 %      Immat GRANS % 1 %      Lymphocytes Relative 11 %      Monocytes Relative 8 %      Eosinophils Relative 2 %      Basophils Relative 1 %      Neutrophils Absolute 12.03 Thousands/µL      Immature Grans Absolute 0.14 Thousand/uL      Lymphocytes Absolute 1.70 Thousands/µL      Monocytes Absolute 1.16 Thousand/µL      Eosinophils Absolute 0.32 Thousand/µL      Basophils Absolute 0.07 Thousands/µL                     X-ray chest 1 view portable   ED Interpretation by Cornell Dominguez MD (02/15 2026)   ED wet read: No acute cardiopulmonary process noted      Final Result by Clemente Colon MD (02/16 1256)      No acute cardiopulmonary disease.            Workstation performed: [id number]                    Procedures  ECG 12 Lead Documentation Only    Date/Time: 2/16/2024 4:25 PM    Performed by: Cornell Dominguez MD  Authorized by: Cornell Dominguez MD    ECG reviewed by me, the ED Provider: yes    Patient location:  ED  Previous ECG:     Previous ECG:  Unavailable    Comparison to cardiac monitor: Yes    Interpretation:     Interpretation: abnormal    Rate:     ECG rate assessment: normal    Rhythm:     Rhythm: sinus rhythm    Ectopy:     Ectopy: none    QRS:     QRS axis:  Normal    QRS intervals:  Normal  Conduction:     Conduction: normal    ST segments:     ST segments:  Normal  T waves:     T waves: normal    Q waves:     Q waves:  II, III and aVF           ED Course  ED Course as of 02/16/24 1626   Thu Feb 15, 2024   2007 WBC(!): 15.42  Chronically elevated     2025 Creatinine(!): 2.50  Baseline               HEART Risk Score      Flowsheet Row Most Recent Value   Heart Score Risk Calculator    History 0 Filed at: 02/15/2024 2135   ECG 1 Filed at: 02/15/2024 2135   Age 1 Filed at: 02/15/2024 2135   Risk Factors 2 Filed at: 02/15/2024 2135   Troponin 2 Filed at: 02/15/2024 2135   HEART Score 6 Filed at: 02/15/2024 2135                                        Medical Decision Making  Patient is a 48-year-old female with known CAD that presents for evaluation of chest pain.  Is concern for ACS.  Initial EKG appears similar to prior and troponin is initially negative.  Chest x-ray reviewed and unremarkable.  Signed out to Dr. Kelly pending delta troponin.    Amount and/or Complexity of Data Reviewed  Labs: ordered. Decision-making details documented in ED Course.  Radiology: ordered and independent interpretation  performed.    Risk  OTC drugs.  Prescription drug management.  Decision regarding hospitalization.             Disposition  Final diagnoses:   NSTEMI (non-ST elevated myocardial infarction) (HCC)   Chest pain     Time reflects when diagnosis was documented in both MDM as applicable and the Disposition within this note       Time User Action Codes Description Comment    2/15/2024 10:51 PM Check, Adrien Doe [I21.4] NSTEMI (non-ST elevated myocardial infarction) (HCC)     2/15/2024 10:51 PM Check, Adrien Doe [R07.9] Chest pain           ED Disposition       ED Disposition   Admit    Condition   Stable    Date/Time   Thu Feb 15, 2024 [id number]    Comment   Case was discussed with gloria and the patient's admission status was agreed to be Admission Status: inpatient status to the service of Dr. Wynn.               Follow-up Information    None         Current Discharge Medication List        CONTINUE these medications which have NOT CHANGED    Details   aspirin 81 mg chewable tablet Chew 1 tablet (81 mg total) daily  Qty: 90 tablet, Refills: 0    Associated Diagnoses: Acute MI, inferior wall (HCC); CAD (coronary artery disease)      atorvastatin (LIPITOR) 80 mg tablet Take 1 tablet (80 mg total) by mouth every evening  Qty: 90 tablet, Refills: 3    Associated Diagnoses: Mixed hyperlipidemia; S/P drug eluting coronary stent placement; Hypertension, unspecified type      calcitriol (ROCALTROL) 0.25 mcg capsule Take 1 capsule (0.25 mcg total) by mouth daily Takes Monday Wednesday and fridays as of 11/11/22    Associated Diagnoses: Secondary hyperparathyroidism of renal origin ([facility])      clopidogrel (PLAVIX) 75 mg tablet Take 1 tablet (75 mg total) by mouth daily  Qty: 90 tablet, Refills: 0    Associated Diagnoses: Coronary artery disease involving native coronary artery of native heart, unspecified whether angina present      !! gabapentin (Neurontin) 300 mg capsule Take 1 capsule (300 mg total) by mouth 3 (three) times a  day  Qty: 90 capsule, Refills: 0    Associated Diagnoses: Peripheral neuropathy      !! gabapentin (NEURONTIN) 300 mg capsule Take 1 capsule (300 mg total) by mouth 3 (three) times a day  Qty: 90 capsule, Refills: 0    Associated Diagnoses: Peripheral neuropathy      !! isosorbide mononitrate (IMDUR) 30 mg 24 hr tablet Take 1 tablet (30 mg total) by mouth daily Take in addition to 60 mg tablet  Qty: 30 tablet, Refills: 11    Associated Diagnoses: Coronary artery disease of native artery of native heart with stable angina pectoris ([facility])      !! isosorbide mononitrate (IMDUR) 60 mg 24 hr tablet Take 1 tablet (60 mg total) by mouth daily  Qty: 30 tablet, Refills: 2    Associated Diagnoses: Chest pain in adult      metoprolol succinate (TOPROL-XL) 50 mg 24 hr tablet Take 1 tablet (50 mg total) by mouth 2 (two) times a day  Qty: 180 tablet, Refills: 3    Associated Diagnoses: Non-STEMI (non-ST elevated myocardial infarction) ([facility])      NIFEdipine (PROCARDIA XL) 30 mg 24 hr tablet Take 1 tablet (30 mg total) by mouth daily  Qty: 30 tablet, Refills: 11    Associated Diagnoses: Chest pain, unspecified type      nitroglycerin (NITROSTAT) 0.4 mg SL tablet Place 1 tablet (0.4 mg total) under the tongue every 5 (five) minutes as needed for chest pain  Qty: 25 tablet, Refills: 5    Associated Diagnoses: S/P drug eluting coronary stent placement; Coronary artery disease involving native coronary artery of native heart without angina pectoris      ranolazine (RANEXA) 500 mg 12 hr tablet Take 1 tablet (500 mg total) by mouth every 12 (twelve) hours  Qty: 60 tablet, Refills: 0    Associated Diagnoses: Non-STEMI (non-ST elevated myocardial infarction) ([facility]); Chest pain in adult      silver sulfadiazine (SILVADENE,SSD) 1 % cream Apply topically daily  Qty: 400 g, Refills: 2    Associated Diagnoses: Pressure injury of sacral region, stage 2 ([facility])      sodium bicarbonate 650 mg tablet Take 1 tablet (650 mg total) by mouth 2 (two) times  daily after meals  Qty: 60 tablet, Refills: 0    Associated Diagnoses: Coronary artery disease involving native coronary artery of native heart, unspecified whether angina present      venlafaxine (EFFEXOR-XR) 75 mg 24 hr capsule Take 1 capsule (75 mg total) by mouth daily with breakfast  Qty: 30 capsule, Refills: 0    Associated Diagnoses: Depression, unspecified depression type      acetaminophen (TYLENOL) 325 mg tablet Take 3 tablets (975 mg total) by mouth every 8 (eight) hours as needed for mild pain  Qty: 90 tablet, Refills: 0    Associated Diagnoses: Coronary artery disease involving native coronary artery of native heart, unspecified whether angina present      albuterol (PROVENTIL HFA,VENTOLIN HFA) 90 mcg/act inhaler Inhale 2 puffs every 4 (four) hours as needed for wheezing or shortness of breath  Qty: 18 g, Refills: 5    Comments: Substitution to a formulary equivalent within the same pharmaceutical class is authorized.  Associated Diagnoses: COPD (chronic obstructive pulmonary disease) (HCC)       !! - Potential duplicate medications found. Please discuss with provider.          No discharge procedures on file.    PDMP Review         Value Time User    PDMP Reviewed  Yes 3/26/2023  6:06 PM Berry Munroe III, DO            ED Provider  Electronically Signed by             Cornell Dominguez MD  02/16/24 1208

## 2024-02-16 NOTE — ASSESSMENT & PLAN NOTE
"Admit to level 2 stepdown  Troponins have been 44, 193, 370 and 438  Continue to trend cardiac enzymes to peak  Obtain serial EKGs  Continue heparin drip  Continue prehospital cardiac medications  Case was discussed by ER provider with cardiology on-call Dr. Bernstein felt patient was appropriate for admission at this campus  Patient had cardiac cath 9/25/2023 which revealed \"  There is progression of CAD noted: previously occluded LAD with continued collaterals though faint, prior stent to APRIL now occluded, also noted progression in the nondominant pLCX.  Patient with severe disease though poor targets for revascularization\"  "

## 2024-02-16 NOTE — ASSESSMENT & PLAN NOTE
Not in exacerbation at this time  Patient continues to smoke  Counseled her on importance of smoking cessation  Continue prehospital Proventil 90 mcg 2 puffs every 4 hours as needed

## 2024-02-16 NOTE — ASSESSMENT & PLAN NOTE
Patient denies being told about this diagnosis in the past though it is on her discharge summary from previous admission 9/27/2023  Placed on C[facility] type II diet  Obtain Accu-Cheks before meals and at bedtime with Humalog correction dose before meals and at bedtime

## 2024-02-16 NOTE — ASSESSMENT & PLAN NOTE
Lab Results   Component Value Date    EGFR 22 02/16/2024    EGFR 22 02/15/2024    EGFR 21 12/10/2023    CREATININE 2.46 (H) 02/16/2024    CREATININE 2.50 (H) 02/15/2024    CREATININE 2.63 (H) 12/10/2023     Follows with renal

## 2024-02-16 NOTE — CONSULTS
[facility]  Consult  Name: Elise Ken 48 y.o. female I MRN: [id number]  Unit/Bed#: ED 22 I Date of Admission: 2/15/2024   Date of Service: 2/16/2024 I Hospital Day: 1    Inpatient consult to Cardiology  Consult performed by: JALIL Greer  Consult ordered by: Delonte Liriano PA-C          Assessment/Plan   CKD (chronic kidney disease) stage 4, GFR 15-29 ml/min ([facility])  Assessment & Plan  Lab Results   Component Value Date    EGFR 22 02/16/2024    EGFR 22 02/15/2024    EGFR 21 12/10/2023    CREATININE 2.46 (H) 02/16/2024    CREATININE 2.50 (H) 02/15/2024    CREATININE 2.63 (H) 12/10/2023     Follows with renal    Essential hypertension  Assessment & Plan  Blood pressure well-controlled  Continue current regimen    * Non-STEMI (non-ST elevated myocardial infarction) ([facility])  Assessment & Plan  Patient with known, extensive coronary disease  History of stenting in the past with residual disease not amenable to intervention/poor targets for revascularization  Continue heparin infusion to complete a minimum of 24 hours  Continue current medication regimen as outlined below      Chronic diastolic congestive heart failure (HCC)  Assessment & Plan  Wt Readings from Last 3 Encounters:   02/16/24 122 kg (270 lb)   12/12/23 129 kg (285 lb)   12/10/23 129 kg (285 lb)     Euvolemic on exam  Compliant with low-sodium diet                 Other summary comments:   Elise presented with persistent chest discomfort and was found to have uptrending troponin levels.  This likely represents an NSTEMI with known coronary disease but unfortunately poor targets for revascularization.    Continue heparin infusion for minimum of 24 hours total.    Her current medication regimen includes DAPT with aspirin and Plavix, high intensity statin therapy with Lipitor, isosorbide, metoprolol succinate, nifedipine, and Ranexa.  No changes are recommended at this time.    Brief discussion was had with the patient  regarding the possibility of evaluation at a larger hospital center ([facility]).  She will give this some consideration.    Outpatient Cardiologist: Nadine JIMENES    HPI: Elise Ken is a 48 y.o. year old female who presented with chest pain that began at rest.  The tightness was located midsternally with radiation to the left side of her chest.    Elise has known coronary disease and frequently experiences chest discomfort.  The discomfort noted on the day of presentation was similar in characteristic, but was persistent in nature.    In the ER, troponin levels were noted to be elevated and upward trending.  A heparin infusion was initiated.    Elise is known to me from the outpatient setting.  She has known coronary disease with no targets for revascularization by recent cath 9/25/2023.  She has been treated in the past with stenting.  She has hypertrophic cardiomyopathy, HFpEF, and hypertension.  She had post MI NSVT for which an outpatient event  monitor was performed.  The monitor also revealed symptomatic sinus pauses for which a Medtronic permanent pacemaker was placed 5/2021.  She also follows with renal for chronic kidney disease/Alport syndrome (biopsy-proven).    At the time of my evaluation, Elise is comfortable without chest discomfort.  She reports having some shortness of breath when the pain was worse, but that has resolved.    EKG: Normal sinus rhythm, inferior infarct, anterolateral infarct age undetermined.  When compared to EKG 12/10/2023, inferior infarct is now present      MOST  RECENT CARDIAC IMAGING:   Echo 1/9/2023 EF 45%, moderate diastolic dysfunction  Mildly dilated LA  Mild to moderate MR  No change compared to 1/2/2023    Holter 4/22/2021 SR, avg rate 88.  Frequent PVCs, 8-beat run NSVT    Cardiac cath 9/25/2023  Progression of CAD noted.  Previously occluded LAD with continued collaterals though faint, prior stent to APRIL now occluded.  Also noted progression in the  nondominant proximal circumflex  Patient with severe disease, poor targets for revascularization.    Cardiac cath 1/3/2023   Successful PCI to mid % culprit req. rheolytic thrombectomy (progressed when compared to 4-2-21 study); given atretic distal LAD, 2.25 x 15 mm GREG was deployed from LAD into more sizable D2    Residual prox LCx 60% lesion with patent LAD, mid RCA & PDA stents    Elevated LVEDP (20-30 mmHg) with no LV-Ao gradient on pullback    R radial a. accessed but unable to advance equipment, likely occluded; RFA arteriotomy employed     Cath 4/2/2021 Distal LAD: There was a 60 % stenosis.  Mid circumflex: There was a 50 % stenosis.  Distal RCA: There was a 10 % stenosis at the site of a prior stent.  Right PDA: There was a 0 % stenosis at the site of a prior stent.     Echo 3/25/2021 60%, severe hypokinesis of the basal-mid inferior wall.  Findings consistent with HCM.  Mild-mod MR.     Cardiac MRI 3/29/2021 severe LVH, EF 44%.  Small circumferential pericardial effusion.  Mild MR.  Findings suggestive of underlying hypertrophic cardiomyopathy with ischemic scarring of the inferior and inferolateral walls.     Stress echo 7/26/2021 Severe hypokinesis if the basal-mid inferior walls at peak stress, but overall conclusion was no evidence of stress induced ischemia.      Review of Systems: a 10 point review of systems was conducted and is negative except for as mentioned in the HPI or as below.        Historical Information   Past Medical History:   Diagnosis Date    Anemia     Anxiety     CAD (coronary artery disease)     Cancer ([facility]) 2008    Cardiomyopathy (HCC)     CHF (congestive heart failure) ([facility])     Chronic kidney disease     COPD (chronic obstructive pulmonary disease) ([facility])     scheduled for sleep apnea test soon after pacemaker implant    Coronary artery disease     Depression     History of chemotherapy     non hodgkins lymphoma 2008    Hyperlipemia     Hypertension     Hypertrophic  cardiomyopathy (HCC)     Lymphoma, non-Hodgkin's (HCC)     Myocardial infarction (HCC)     Non-ST elevation MI (NSTEMI) 2023    Obesity     Obstructive sleep apnea     Olecranon bursitis, right elbow 2022    SSS (sick sinus syndrome) ([facility])     s/p medtronic dual chamber pacemaker 2021    Tobacco abuse     Ventricular tachycardia, non-sustained (HCC)     Wears glasses      Past Surgical History:   Procedure Laterality Date    CARDIAC CATHETERIZATION N/A 1/3/2023    Procedure: Cardiac catheterization;  Surgeon: Jesus Franco MD;  Location: [facility];  Service: Cardiology    CARDIAC CATHETERIZATION N/A 1/3/2023    Procedure: Cardiac Coronary Angiogram;  Surgeon: Jesus Franco MD;  [facility]: [facility];  Service: Cardiology    CARDIAC CATHETERIZATION N/A 1/3/2023    Procedure: Cardiac pci;  Surgeon: Jesus Franco MD;  [facility]: [facility];  Service: Cardiology    CARDIAC CATHETERIZATION Left 2023    Procedure: Cardiac Left Heart Cath;  Surgeon: Frieda Fox DO;  Location: [facility];  Service: Cardiology    CARDIAC CATHETERIZATION  2023    Procedure: Cardiac catheterization;  Surgeon: Frieda Fox DO;  [facility]: [facility];  Service: Cardiology    CARDIAC CATHETERIZATION N/A 2023    Procedure: Cardiac Coronary Angiogram;  Surgeon: Frieda Fox DO;  [facility]: [facility];  Service: Cardiology    CARDIAC PACEMAKER PLACEMENT Left 2021    Procedure: DUAL LEAD PACEMAKER IMPLANT;  Surgeon: Yinka Shaw MD;  Location:  MAIN OR;  Service: Cardiology    CAROTID STENT       SECTION      CORONARY ANGIOPLASTY WITH STENT PLACEMENT  2021    GREG mid RCA and GREG right PDA    DENTAL SURGERY      IR BIOPSY KIDNEY RANDOM  10/18/2021     Social History     Substance and Sexual Activity   Alcohol Use Yes    Comment: 1-2 times years     Social History     Substance and Sexual Activity   Drug Use No     Social  "History     Tobacco Use   Smoking Status Every Day    Current packs/day: 0.50    Average packs/day: 0.5 packs/day for 25.0 years (12.5 ttl pk-yrs)    Types: Cigarettes   Smokeless Tobacco Never   Tobacco Comments    Recently and currently quitting cigarettes       Family History: No significant family history      Meds/Allergies   all current active meds have been reviewed  (Not in a hospital admission)      No Known Allergies    Objective   Vitals: Blood pressure 120/54, pulse 63, temperature 99.1 °F (37.3 °C), temperature source Temporal, resp. rate 16, height 5' 6\" (1.676 m), weight 122 kg (270 lb), SpO2 97%, not currently breastfeeding., Body mass index is 43.58 kg/m².,   Orthostatic Blood Pressures      Flowsheet Row Most Recent Value   Blood Pressure 120/54 filed at 2024 0915   Patient Position - Orthostatic VS Lying filed at 2024 0915            Systolic (24hrs), Av , Min:98 , Max:156     Diastolic (24hrs), Av, Min:53, Max:83      Physical Exam:    General:  Normal appearance in no distress.  She is anxious and tearful; she wants to go home  Eyes:  Anicteric.  Oral mucosa:  Moist.  Neck:  No JVD. Carotid upstrokes are brisk without bruits.  No masses.  Chest:  Clear to auscultation.  Cardiac:  No palpable PMI.  Normal S1 and S2.  No murmur gallop or rub.  Abdomen:  Soft and nontender. No palpable organomegaly or aortic enlargement.  Extremities:  No peripheral edema.  Musculoskeletal:  Symmetric.   Vascular:  Pedal pulses are intact.  Neuro:  Grossly symmetric.  Psych:  Alert and oriented x3.        Lab Results:     Troponins:    Results from last 7 days   Lab Units 24  0010 02/15/24  2155 02/15/24  1957   HS TNI 0HR ng/L  --   --  44   HS TNI 2HR ng/L  --  193*  --    HSTNI D2 ng/L  --  149*  --    HS TNI 4HR ng/L 370*  --   --    HSTNI D4 ng/L 326*  --   --      BNP:   Results from last 6 Months   Lab Units 10/02/23  0513   BNP pg/mL 544*       CBC :   Results from last 7 days "   Lab Units 02/16/24  0454 02/15/24  1957   WBC Thousand/uL 14.21* 15.42*   HEMOGLOBIN g/dL 12.2 12.7   HEMATOCRIT % 39.2 40.3   MCV fL 87 87   PLATELETS Thousands/uL 335 353     TSH:     CMP:   Results from last 7 days   Lab Units 02/16/24  0542 02/15/24  1957   POTASSIUM mmol/L 4.1 4.0   CHLORIDE mmol/L 106 106   CO2 mmol/L 21 22   BUN mg/dL 29* 28*   CREATININE mg/dL 2.46* 2.50*   EGFR ml/min/1.73sq m 22 22     Lipid Profile:     Coags:   Results from last 7 days   Lab Units 02/15/24  2247   INR  1.06

## 2024-02-16 NOTE — ED CARE HANDOFF
Emergency Department Sign Out Note        Sign out and transfer of care from Dr. Dominguez See Separate Emergency Department note.     The patient, Elise Ken, was evaluated by the previous provider for chest pain.    Workup Completed:  CBC, CMP, troponin, chest x-ray, EKG    ED Course / Workup Pending (followup):  Patient with significant cardiac history presented with left-sided chest pain.  Initial set of labs unremarkable.  Patient was signed out to me pending delta troponin.    Repeat troponin increased to 193.  Patient was started on a heparin infusion for NSTEMI.  Case was discussed with cardiologist on-call in regards to possible transfer to [place] given patient's significant history.  Per cardiology, okay to keep the patient here and admit to medicine for further management and evaluation.    Was complaining of worsening chest pain and anxiety, was treated with Ativan and nitroglycerin.      HEART Risk Score      Flowsheet Row Most Recent Value   Heart Score Risk Calculator    History 0 Filed at: 02/15/2024 2135   ECG 1 Filed at: 02/15/2024 2135   Age 1 Filed at: 02/15/2024 2135   Risk Factors 2 Filed at: 02/15/2024 2135   Troponin 2 Filed at: 02/15/2024 2135   HEART Score 6 Filed at: 02/15/2024 2135                                    ED Course as of 02/15/24 2254   Thu Feb 15, 2024   2206 EKG interpreted by myself demonstrates normal sinus rhythm, normal axis, normal intervals, nonspecific T waves, when compared to prior EKG, no significant change was found   2229 Patient already took full dose aspirin prior to arrival     CriticalCare Time    Date/Time: 2/15/2024 10:53 PM    Performed by: Adrien Kelly MD  Authorized by: Adrien Kelly MD    Critical care provider statement:     Critical care time (minutes):  32    Critical care time was exclusive of:  Separately billable procedures and treating other patients and teaching time    Critical care was necessary to treat or prevent imminent or  life-threatening deterioration of the following conditions:  Cardiac failure and circulatory failure    Critical care was time spent personally by me on the following activities:  Obtaining history from patient or surrogate, development of treatment plan with patient or surrogate, discussions with consultants, evaluation of patient's response to treatment, examination of patient, ordering and review of laboratory studies, re-evaluation of patient's condition, review of old charts and ordering and performing treatments and interventions  Comments:      Critical care time spent managing and evaluating NSTEMI requiring discussion with cardiologist on-call, initiation of heparin infusion and frequent reassessments    Medical Decision Making  Amount and/or Complexity of Data Reviewed  Labs: ordered.  Radiology: ordered and independent interpretation performed.    Risk  OTC drugs.  Prescription drug management.  Decision regarding hospitalization.            Disposition  Final diagnoses:   NSTEMI (non-ST elevated myocardial infarction) (HCC)   Chest pain     Time reflects when diagnosis was documented in both MDM as applicable and the Disposition within this note       Time User Action Codes Description Comment    2/15/2024 10:51 PM Adrien Kelly [I21.4] NSTEMI (non-ST elevated myocardial infarction) (HCC)     2/15/2024 10:51 PM Adrien Kelly [R07.9] Chest pain           ED Disposition       ED Disposition   Admit    Condition   Stable    Date/Time   Thu Feb 15, 2024 3131    Comment   Case was discussed with gloria and the patient's admission status was agreed to be Admission Status: inpatient status to the service of   .               Follow-up Information    None       Patient's Medications   Discharge Prescriptions    No medications on file     No discharge procedures on file.       ED Provider  Electronically Signed by     Adrien Kelly MD  02/15/24 7511

## 2024-02-16 NOTE — ASSESSMENT & PLAN NOTE
Wt Readings from Last 3 Encounters:   02/16/24 122 kg (270 lb)   12/12/23 129 kg (285 lb)   12/10/23 129 kg (285 lb)     Euvolemic on exam  Compliant with low-sodium diet

## 2024-02-16 NOTE — UTILIZATION REVIEW
NOTIFICATION OF INPATIENT ADMISSION   AUTHORIZATION REQUEST   SERVICING FACILITY:   [place] [address]  Tax ID: [id number]  NPI: [id number]   ATTENDING PROVIDER:  Attending Name and NPI#: Anne-Marie Wynn Do [[id number]]  Address: [address] [place] [address]  Phone: [phone]     ADMISSION INFORMATION:  Place of Service: Inpatient [facility]  Place of Service Code: 21  Inpatient Admission Date/Time: 2/15/24 11:15 PM  Discharge Date/Time: No discharge date for patient encounter.  Admitting Diagnosis Code/Description:  Chest pain [R07.9]  NSTEMI (non-ST elevated myocardial infarction) (HCC) [I21.4]     UTILIZATION REVIEW CONTACT:  [facility]ed[facility] Harley Utilization   Network Utilization Review Department  Phone: [phone]  Fax [phone]  Email: [email or web address]  Contact for approvals/pending authorizations, clinical reviews, and discharge.     PHYSICIAN ADVISORY SERVICES:  Medical Necessity Denial & Lidg-il-Bxjv Review  Phone: [phone]  Fax: [phone]  Email: [email or web address]     DISCHARGE SUPPORT TEAM:  For Patients Discharge Needs & Updates  Phone: [phone] opt. 2 Fax: [phone]  Email: [email or web address]

## 2024-02-16 NOTE — ASSESSMENT & PLAN NOTE
Patient with known, extensive coronary disease  History of stenting in the past with residual disease not amenable to intervention/poor targets for revascularization  Continue heparin infusion to complete a minimum of 24 hours  Continue current medication regimen as outlined below

## 2024-02-16 NOTE — ED NOTES
Patient resting comfortably in bed; continuous cardiac monitoring in place. Call light within reach.     Shelly Olsen RN  02/16/24 0782

## 2024-02-16 NOTE — ASSESSMENT & PLAN NOTE
Lab Results   Component Value Date    EGFR 22 02/15/2024    EGFR 21 12/10/2023    EGFR 19 12/02/2023    CREATININE 2.50 (H) 02/15/2024    CREATININE 2.63 (H) 12/10/2023    CREATININE 2.82 (H) 12/02/2023     Creatinine appears to be approximately baseline, will continue to monitor with repeat labs in a.m.

## 2024-02-16 NOTE — H&P
"[facility]  H&P  Name: Elise Ken 48 y.o. female I MRN: [id number]  Unit/Bed#: ED 22 I Date of Admission: 2/15/2024   Date of Service: 2/16/2024 I Hospital Day: 1      Assessment/Plan   * Non-STEMI (non-ST elevated myocardial infarction) ([facility])  Assessment & Plan  Admit to level 2 stepdown  Troponins have been 44, 193, 370 and 438  Continue to trend cardiac enzymes to peak  Obtain serial EKGs  Continue heparin drip  Continue prehospital cardiac medications  Case was discussed by ER provider with cardiology on-call Dr. Bernstein felt patient was appropriate for admission at this [facility]  Patient had cardiac cath 9/25/2023 which revealed \"  There is progression of CAD noted: previously occluded LAD with continued collaterals though faint, prior stent to APRIL now occluded, also noted progression in the nondominant pLCX.  Patient with severe disease though poor targets for revascularization\"    Leukocytosis  Assessment & Plan  Likely reactionary secondary to NSTEMI  Patient is afebrile with no active signs of infection  Continue to trend fever curve  Continue to monitor with repeat labs in a.m.    H/O non-insulin dependent diabetes mellitus  Assessment & Plan  Patient denies being told about this diagnosis in the past though it is on her discharge summary from previous admission 9/27/2023  Placed on [facility] type II diet  Obtain Accu-Cheks before meals and at bedtime with Humalog correction dose before meals and at bedtime    CKD (chronic kidney disease) stage 4, GFR 15-29 ml/min ([facility])  Assessment & Plan  Lab Results   Component Value Date    EGFR 22 02/15/2024    EGFR 21 12/10/2023    EGFR 19 12/02/2023    CREATININE 2.50 (H) 02/15/2024    CREATININE 2.63 (H) 12/10/2023    CREATININE 2.82 (H) 12/02/2023     Creatinine appears to be approximately baseline, will continue to monitor with repeat labs in a.m.    COPD (chronic obstructive pulmonary disease) ([facility])  Assessment & Plan  Not in exacerbation at " this time  Patient continues to smoke  Counseled her on importance of smoking cessation  Continue prehospital Proventil 90 mcg 2 puffs every 4 hours as needed    Mixed hyperlipidemia  Assessment & Plan  Continue prehospital Lipitor 80 mg p.o. daily    Chronic diastolic congestive heart failure (HCC)  Assessment & Plan  Wt Readings from Last 3 Encounters:   12/12/23 129 kg (285 lb)   12/10/23 129 kg (285 lb)   10/02/23 129 kg (285 lb)     Not in exacerbation at this time  Continue prehospital Imdur 90 mg p.o. daily, Toprol-XL 50 mg p.o. twice daily Procardia XL 30 mg p.o. daily    Essential hypertension  Assessment & Plan  Continue prehospital Toprol-XL 50 mg p.o. twice daily and Procardia XL 30 mg p.o. daily         VTE Prophylaxis: Heparin Drip  Code Status: Level 1  Discussion with family: Present at bedside at time of exam    Anticipated Length of Stay:  Patient will be admitted on an Inpatient basis with an anticipated length of stay of  > 2 midnights.   Justification for Hospital Stay: NSTEMI requiring heparin drip and further cardiac evaluation    Total Time for Visit, including Counseling / Coordination of Care: 1 hour.  Greater than 50% of this total time spent on direct patient counseling and coordination of care.    Chief Complaint:   Chest pain x 1 day    History of Present Illness:    Elise Ken is a 48 y.o. female who presents with chest pain x 1 day.  Patient with a significant cardiac history presents ER for further evaluation and treatment of her 1 day history of chest pain that began at rest.  Patient describes the pain as being chest tightness located midsternally with radiation to the left side of her anterior chest worse with a deep breath.  Patient states pain started while she was seated watching television.  Patient states pain is 10 out of 10 at its worst but currently a 2 out of 10 and denies any accompanying palpitations, lightheadedness, dizziness or diaphoresis.    Patient recently had a  cardiac catheterization on 9/25/2023 which showed progression of her severe coronary artery disease though poor targets for revascularization.    Review of Systems:    Review of Systems   Constitutional:  Negative for chills and fever.   HENT:  Negative for congestion and sore throat.    Respiratory:  Negative for cough, shortness of breath and wheezing.    Cardiovascular:  Positive for chest pain. Negative for palpitations.   Gastrointestinal:  Negative for abdominal pain, diarrhea, nausea and vomiting.   Genitourinary:  Negative for dysuria, frequency, hematuria and urgency.   Musculoskeletal:  Negative for arthralgias and myalgias.   Skin:  Negative for wound.   Neurological:  Negative for dizziness, syncope, light-headedness and headaches.   All other systems reviewed and are negative.      Past Medical and Surgical History:     Past Medical History:   Diagnosis Date    Anemia     Anxiety     CAD (coronary artery disease)     Cancer ([facility]) 2008    Cardiomyopathy ([facility])     CHF (congestive heart failure) ([facility])     Chronic kidney disease     COPD (chronic obstructive pulmonary disease) ([facility])     scheduled for sleep apnea test soon after pacemaker implant    Coronary artery disease     Depression     History of chemotherapy     non hodgkins lymphoma 2008    Hyperlipemia     Hypertension     Hypertrophic cardiomyopathy ([facility])     Lymphoma, non-Hodgkin's ([facility])     Myocardial infarction ([facility])     Non-ST elevation MI (NSTEMI) 01/02/2023    Obesity     Obstructive sleep apnea     Olecranon bursitis, right elbow 7/11/2022    SSS (sick sinus syndrome) ([facility])     s/p medtronic dual chamber pacemaker 5/25/2021    Tobacco abuse     Ventricular tachycardia, non-sustained ([facility])     Wears glasses        Past Surgical History:   Procedure Laterality Date    CARDIAC CATHETERIZATION N/A 1/3/2023    Procedure: Cardiac catheterization;  Surgeon: Jesus Franco MD;  Location: [facility];  Service: Cardiology    CARDIAC  CATHETERIZATION N/A 1/3/2023    Procedure: Cardiac Coronary Angiogram;  Surgeon: Jesus Franco MD;  Location: [facility];  Service: Cardiology    CARDIAC CATHETERIZATION N/A 1/3/2023    Procedure: Cardiac pci;  Surgeon: Jesus Franco MD;  [facility]: [facility];  Service: Cardiology    CARDIAC CATHETERIZATION Left 2023    Procedure: Cardiac Left Heart Cath;  Surgeon: Frieda Fox DO;  Location: [facility];  Service: Cardiology    CARDIAC CATHETERIZATION  2023    Procedure: Cardiac catheterization;  Surgeon: Frieda Fox DO;  [facility]: [facility];  Service: Cardiology    CARDIAC CATHETERIZATION N/A 2023    Procedure: Cardiac Coronary Angiogram;  Surgeon: Frieda Fox DO;  Location: [facility];  Service: Cardiology    CARDIAC PACEMAKER PLACEMENT Left 2021    Procedure: DUAL LEAD PACEMAKER IMPLANT;  Surgeon: Yinka Shaw MD;  Location:  MAIN OR;  Service: Cardiology    CAROTID STENT       SECTION      CORONARY ANGIOPLASTY WITH STENT PLACEMENT  2021    GREG mid RCA and GREG right PDA    DENTAL SURGERY      IR BIOPSY KIDNEY RANDOM  10/18/2021       Meds/Allergies:    Prior to Admission medications    Medication Sig Start Date End Date Taking? Authorizing Provider   acetaminophen (TYLENOL) 325 mg tablet Take 3 tablets (975 mg total) by mouth every 8 (eight) hours as needed for mild pain 23   Nahum Ramirez MD   albuterol (PROVENTIL HFA,VENTOLIN HFA) 90 mcg/act inhaler Inhale 2 puffs every 4 (four) hours as needed for wheezing or shortness of breath 22   Delonte Hernandez DO   aspirin 81 mg chewable tablet Chew 1 tablet (81 mg total) daily 21   Toma Holden DO   atorvastatin (LIPITOR) 80 mg tablet Take 1 tablet (80 mg total) by mouth every evening 23  JALIL Greer   calcitriol (ROCALTROL) 0.25 mcg capsule Take 1 capsule (0.25 mcg total) by mouth daily Takes  and  fridays as of 11/11/22 1/10/23   Lorne Montgomery DO   clopidogrel (PLAVIX) 75 mg tablet Take 1 tablet (75 mg total) by mouth daily 12/7/23   Isaak Armstrong DO   gabapentin (Neurontin) 300 mg capsule Take 1 capsule (300 mg total) by mouth 3 (three) times a day 1/23/24   Isaak Armstrong DO   gabapentin (NEURONTIN) 300 mg capsule Take 1 capsule (300 mg total) by mouth 3 (three) times a day 1/23/24   Isaak Armstrong DO   isosorbide mononitrate (IMDUR) 30 mg 24 hr tablet Take 1 tablet (30 mg total) by mouth daily Take in addition to 60 mg tablet 1/22/24   JALIL Greer   isosorbide mononitrate (IMDUR) 60 mg 24 hr tablet Take 1 tablet (60 mg total) by mouth daily 2/14/24 3/15/24  Isaak Armstrong DO   metoprolol succinate (TOPROL-XL) 50 mg 24 hr tablet Take 1 tablet (50 mg total) by mouth 2 (two) times a day 12/12/23   JALIL Greer   NIFEdipine (PROCARDIA XL) 30 mg 24 hr tablet Take 1 tablet (30 mg total) by mouth daily 12/12/23   JALIL Greer   nitroglycerin (NITROSTAT) 0.4 mg SL tablet Place 1 tablet (0.4 mg total) under the tongue every 5 (five) minutes as needed for chest pain 1/9/24   JALIL Greer   ranolazine (RANEXA) 500 mg 12 hr tablet Take 1 tablet (500 mg total) by mouth every 12 (twelve) hours 9/26/23   Lizbet Masters DO   silver sulfadiazine (SILVADENE,SSD) 1 % cream Apply topically daily 10/2/23   Isaak Armstrong DO   sodium bicarbonate 650 mg tablet Take 1 tablet (650 mg total) by mouth 2 (two) times daily after meals 1/5/23   Nahum Ramirez MD   venlafaxine (EFFEXOR-XR) 75 mg 24 hr capsule Take 1 capsule (75 mg total) by mouth daily with breakfast 1/23/24   Isaak John Patti, DO     all medications and allergies reviewed    Allergies: No Known Allergies    Social History:     Marital Status: /Civil Union   Occupation: Disabled  Patient Pre-hospital Living Situation: Lives at home with    Patient Pre-hospital Level of Mobility: Full with assist of a rollator walker and sometimes wheelchair  Patient Pre-hospital Diet Restrictions: Cardiac    Social History     Substance and Sexual Activity   Alcohol Use Yes    Comment: 1-2 times years     Social History     Tobacco Use   Smoking Status Every Day    Current packs/day: 0.50    Average packs/day: 0.5 packs/day for 25.0 years (12.5 ttl pk-yrs)    Types: Cigarettes   Smokeless Tobacco Never   Tobacco Comments    Recently and currently quitting cigarettes     Social History     Substance and Sexual Activity   Drug Use No       Family History:  I have reviewed the patient's family history    Physical Exam:     Vitals:   Blood Pressure: 136/65 (02/16/24 0330)  Pulse: 68 (02/16/24 0330)  Temperature: 98.4 °F (36.9 °C) (02/15/24 1951)  Respirations: 14 (02/16/24 0330)  SpO2: 96 % (02/16/24 0330)    Physical Exam  Vitals and nursing note reviewed.   Constitutional:       General: She is not in acute distress.     Appearance: Normal appearance. She is obese.   HENT:      Head: Normocephalic and atraumatic.      Right Ear: Tympanic membrane normal.      Left Ear: Tympanic membrane normal.      Nose: Nose normal.      Mouth/Throat:      Mouth: Mucous membranes are moist.      Pharynx: Oropharynx is clear. No posterior oropharyngeal erythema.   Eyes:      Extraocular Movements: Extraocular movements intact.      Conjunctiva/sclera: Conjunctivae normal.      Pupils: Pupils are equal, round, and reactive to light.   Cardiovascular:      Rate and Rhythm: Normal rate and regular rhythm.      Pulses: Normal pulses.      Heart sounds: Normal heart sounds. No murmur heard.  Pulmonary:      Effort: Pulmonary effort is normal. No respiratory distress.      Breath sounds: Normal breath sounds. No rhonchi or rales.   Abdominal:      General: Bowel sounds are normal.      Palpations: Abdomen is soft.      Tenderness: There is no abdominal tenderness.   Musculoskeletal:       Cervical back: Normal range of motion and neck supple. No muscular tenderness.      Right lower leg: No edema.      Left lower leg: No edema.   Skin:     General: Skin is warm and dry.      Capillary Refill: Capillary refill takes less than 2 seconds.   Neurological:      General: No focal deficit present.      Mental Status: She is alert and oriented to person, place, and time.         Additional Data:     Lab Results: I have personally reviewed pertinent reports.      Results from last 7 days   Lab Units 02/15/24  1957   WBC Thousand/uL 15.42*   HEMOGLOBIN g/dL 12.7   HEMATOCRIT % 40.3   PLATELETS Thousands/uL 353   NEUTROS PCT % 77*   LYMPHS PCT % 11*   MONOS PCT % 8   EOS PCT % 2     Results from last 7 days   Lab Units 02/15/24  1957   SODIUM mmol/L 139   POTASSIUM mmol/L 4.0   CHLORIDE mmol/L 106   CO2 mmol/L 22   BUN mg/dL 28*   CREATININE mg/dL 2.50*   ANION GAP mmol/L 11   CALCIUM mg/dL 9.2   GLUCOSE RANDOM mg/dL 155*     Results from last 7 days   Lab Units 02/15/24  2247   INR  1.06     Results from last 7 days   Lab Units 02/16/24  0012   POC GLUCOSE mg/dl 115               Imaging: I have personally reviewed pertinent reports.    X-ray chest 1 view portable   ED Interpretation by Cornell Dominguez MD (02/15 2026)   ED wet read: No acute cardiopulmonary process noted            EKG, Pathology, and Other Studies Reviewed on Admission:   EKG: Normal sinus rhythm at a rate of 90 without ischemia    Epic / Care Everywhere Records Reviewed: Yes    ** Please Note: This note has been constructed using a voice recognition system. **

## 2024-02-16 NOTE — UTILIZATION REVIEW
Initial Clinical Review    Admission: Date/Time/Statement:   Admission Orders (From admission, onward)       Ordered        02/15/24 2315  INPATIENT ADMISSION  Once                          Orders Placed This Encounter   Procedures    INPATIENT ADMISSION     Standing Status:   Standing     Number of Occurrences:   1     Order Specific Question:   Level of Care     Answer:   Level 2 Stepdown / HOT [14]     Order Specific Question:   Estimated length of stay     Answer:   More than 2 Midnights     Order Specific Question:   Certification     Answer:   I certify that inpatient services are medically necessary for this patient for a duration of greater than two midnights. See H&P and MD Progress Notes for additional information about the patient's course of treatment.     ED Arrival Information       Expected   -    Arrival   2/15/2024 19:29    Acuity   Urgent              Means of arrival   Wheelchair    Escorted by   Family Member    Service   Hospitalist    Admission type   Emergency              Arrival complaint   servere chest pain, 2 hours             Chief Complaint   Patient presents with    Chest Pain     Started about 3 hours ago; left sided sharp chest pain; history of 8 heart attacks; constant pain into left shoulder blade       Initial Presentation: 48 y.o. female  who presents with chest pain x 1 day. Patient with a significant cardiac history presents ER for further evaluation and treatment of her 1 day history of chest pain that began at rest. Patient describes the pain as being chest tightness located midsternally with radiation to the left side of her anterior chest worse with a deep breath. Patient states pain started while she was seated watching television. Patient states pain is 10 out of 10 at its worst but currently a 2 out of 10 and denies any accompanying palpitations, lightheadedness, dizziness or diaphoresis. Patient recently had a cardiac catheterization on 9/25/2023 which showed  progression of her severe coronary artery disease though poor targets for revascularization. Plan: Inpatient admission for evaluation and treatment of NSTEMI, leukocytosis, DM, CKD stage 4, COPD, HLD, CHF, current smoker, HTN: trend troponin, heparin drip, continue home cardiac meds, Cardiology consult, Carb controlled diet, Lipitor, Imdur, Toprol XL, Procardia.    Date: 2/16   Day 2:     Cardiology consult: Continue heparin infusion to complete a minimum of 24 hours. Low sodium diet. Continue current antihypertensives.     Internal medicine: continue IV heparin drip. Carb controlled diet, Lipitor, Imdur, Toprol XL, Procardia.    ED Triage Vitals   Temperature Pulse Respirations Blood Pressure SpO2   02/15/24 1951 02/15/24 1951 02/15/24 1951 02/15/24 1951 02/15/24 1951   98.4 °F (36.9 °C) 85 20 156/83 99 %      Temp Source Heart Rate Source Patient Position - Orthostatic VS BP Location FiO2 (%)   02/16/24 0915 02/15/24 1951 02/15/24 2015 02/15/24 1951 --   Temporal Monitor Sitting Left arm       Pain Score       02/15/24 1951       10 - Worst Possible Pain          Wt Readings from Last 1 Encounters:   12/12/23 129 kg (285 lb)     Additional Vital Signs:     Date/Time Temp Pulse Resp BP MAP (mmHg) SpO2 Calculated FIO2 (%) - Nasal Cannula Nasal Cannula O2 Flow Rate (L/min) O2 Device   02/16/24 0915 99.1 °F (37.3 °C) 63 16 120/54 78 97 % -- -- Nasal cannula   02/16/24 0600 -- 64 14 129/60 87 98 % 32 3 L/min Nasal cannula   02/16/24 0500 -- 65 14 145/77 105 98 % -- -- --   02/16/24 0430 -- 67 13 114/55 79 97 % 32 3 L/min Nasal cannula   02/16/24 0400 -- 69 14 138/66 95 96 % 32 3 L/min Nasal cannula   02/16/24 0330 -- 68 14 136/65 93 96 % 32 3 L/min Nasal cannula   02/16/24 0230 -- 68 14 107/54 75 98 % 32 3 L/min Nasal cannula   02/16/24 0200 -- 64 17 122/60 87 99 % 32 3 L/min Nasal cannula   02/16/24 0130 -- 62 15 116/59 82 83 % Abnormal  -- -- None (Room air)   02/16/24 0100 -- 61 20 112/56 78 94 % -- -- None (Room  air)   02/16/24 0045 -- 67 18 115/53 77 97 % -- -- None (Room air)   02/16/24 0000 -- 66 18 126/60 85 96 % -- -- None (Room air)   02/15/24 2330 -- 69 17 127/75 95 98 % -- -- None (Room air)   02/15/24 2315 -- 67 18 141/65 93 99 % -- -- None (Room air)   02/15/24 2300 -- 74 16 98/54 71 98 % -- -- None (Room air)   02/15/24 2230 -- 66 19 145/67 96 99 % -- -- None (Room air)   02/15/24 2100 -- 65 20 116/55 -- 98 % -- -- None (Room air)   02/15/24 2030 -- 71 20 110/55 79 98 % -- -- None (Room air)   02/15/24 2015 -- 76 20 132/58 84 -- -- -- None (Room air)   02/15/24 2000 -- 77 20 156/83 113 98 % -- -- None (Room air)     Pertinent Labs/Diagnostic Test Results:   X-ray chest 1 view portable   ED Interpretation by Cornell Dominguez MD (02/15 2026)   ED wet read: No acute cardiopulmonary process noted        2/16 EKG:  Normal sinus rhythm  Possible Inferior infarct (cited on or before 07-JAN-2023)  Anterolateral infarct (cited on or before 07-JAN-2023)  Abnormal ECG  When compared with ECG of 15-FEB-2024 21:53, (unconfirmed)  Questionable change in initial forces of Inferior leads     2/15 EKG:  Normal sinus rhythm  Inferior infarct , age undetermined  Anterolateral infarct (cited on or before 07-JAN-2023)  Abnormal ECG  When compared with ECG of 10-DEC-2023 21:27,  Inferior infarct is now Present         Results from last 7 days   Lab Units 02/16/24  0454 02/15/24  1957   WBC Thousand/uL 14.21* 15.42*   HEMOGLOBIN g/dL 12.2 12.7   HEMATOCRIT % 39.2 40.3   PLATELETS Thousands/uL 335 353   NEUTROS ABS Thousands/µL  --  12.03*         Results from last 7 days   Lab Units 02/16/24  0542 02/15/24  1957   SODIUM mmol/L 138 139   POTASSIUM mmol/L 4.1 4.0   CHLORIDE mmol/L 106 106   CO2 mmol/L 21 22   ANION GAP mmol/L 11 11   BUN mg/dL 29* 28*   CREATININE mg/dL 2.46* 2.50*   EGFR ml/min/1.73sq m 22 22   CALCIUM mg/dL 9.1 9.2         Results from last 7 days   Lab Units 02/16/24  1145 02/16/24  0819 02/16/24  0012   POC  GLUCOSE mg/dl 105 113 115     Results from last 7 days   Lab Units 02/16/24  0542 02/15/24  1957   GLUCOSE RANDOM mg/dL 109 155*           Results from last 7 days   Lab Units 02/16/24  0010 02/15/24  2155 02/15/24  1957   HS TNI 0HR ng/L  --   --  44   HS TNI 2HR ng/L  --  193*  --    HSTNI D2 ng/L  --  149*  --    HS TNI 4HR ng/L 370*  --   --    HSTNI D4 ng/L 326*  --   --          Results from last 7 days   Lab Units 02/16/24  0542 02/15/24  2247   PROTIME seconds  --  14.0   INR   --  1.06   PTT seconds 32 27         ED Treatment:   Medication Administration from 02/15/2024 1929 to 02/16/2024 1220         Date/Time Order Dose Route Action     02/15/2024 2006 EST HYDROmorphone (DILAUDID) injection 1 mg 1 mg Intravenous Given     02/15/2024 2026 EST ondansetron (ZOFRAN) injection 4 mg 4 mg Intravenous Given     02/15/2024 2201 EST LORazepam (ATIVAN) tablet 0.5 mg 0.5 mg Oral Given     02/15/2024 2249 EST nitroglycerin (NITROSTAT) SL tablet 0.4 mg 0.4 mg Sublingual Given     02/15/2024 2237 EST nitroglycerin (NITROSTAT) SL tablet 0.4 mg 0.4 mg Sublingual Given     02/15/2024 2310 EST heparin (porcine) injection 4,000 Units 4,000 Units Intravenous Given     02/15/2024 2313 EST heparin (porcine) 25,000 units in 0.45% NaCl 250 mL infusion (premix) 11.1 Units/kg/hr Intravenous New Bag     02/16/2024 0631 EST heparin (porcine) injection 4,000 Units 4,000 Units Intravenous Given     02/15/2024 2342 EST HYDROmorphone (DILAUDID) injection 1 mg 1 mg Intravenous Given     02/16/2024 0832 EST aspirin chewable tablet 81 mg 81 mg Oral Given     02/16/2024 0032 EST atorvastatin (LIPITOR) tablet 80 mg 80 mg Oral Given     02/16/2024 0832 EST calcitriol (ROCALTROL) capsule 0.25 mcg 0.25 mcg Oral Given     02/16/2024 0832 EST clopidogrel (PLAVIX) tablet 75 mg 75 mg Oral Given     02/16/2024 0832 EST gabapentin (NEURONTIN) capsule 300 mg 300 mg Oral Given     02/16/2024 0030 EST gabapentin (NEURONTIN) capsule 300 mg 300 mg Oral  Given     02/16/2024 0832 EST metoprolol succinate (TOPROL-XL) 24 hr tablet 50 mg 50 mg Oral Given     02/16/2024 0834 EST NIFEdipine (PROCARDIA XL) 24 hr tablet 30 mg 30 mg Oral Given     02/16/2024 0832 EST ranolazine (RANEXA) 12 hr tablet 500 mg 500 mg Oral Given     02/16/2024 0040 EST ranolazine (RANEXA) 12 hr tablet 500 mg 500 mg Oral Given     02/16/2024 0832 EST silver sulfadiazine (SILVADENE,SSD) 1 % cream 1 Application Topical Given     02/16/2024 0832 EST sodium bicarbonate tablet 650 mg 650 mg Oral Given     02/16/2024 0832 EST venlafaxine (EFFEXOR-XR) 24 hr capsule 75 mg 75 mg Oral Given     02/16/2024 0834 EST nicotine (NICODERM CQ) 14 mg/24hr TD 24 hr patch 1 patch 1 patch Transdermal Medication Applied     02/16/2024 0028 EST nicotine (NICODERM CQ) 14 mg/24hr TD 24 hr patch 1 patch 1 patch Transdermal Medication Applied     02/16/2024 0055 EST LORazepam (ATIVAN) injection 1 mg 1 mg Intravenous Given     02/16/2024 0832 EST isosorbide mononitrate (IMDUR) 24 hr tablet 90 mg 90 mg Oral Given          Past Medical History:   Diagnosis Date    Anemia     Anxiety     CAD (coronary artery disease)     Cancer ([facility]) 2008    Cardiomyopathy ([facility])     CHF (congestive heart failure) ([facility])     Chronic kidney disease     COPD (chronic obstructive pulmonary disease) ([facility])     scheduled for sleep apnea test soon after pacemaker implant    Coronary artery disease     Depression     History of chemotherapy     non hodgkins lymphoma 2008    Hyperlipemia     Hypertension     Hypertrophic cardiomyopathy ([facility])     Lymphoma, non-Hodgkin's ([facility])     Myocardial infarction ([facility])     Non-ST elevation MI (NSTEMI) 01/02/2023    Obesity     Obstructive sleep apnea     Olecranon bursitis, right elbow 7/11/2022    SSS (sick sinus syndrome) ([facility])     s/p medtronic dual chamber pacemaker 5/25/2021    Tobacco abuse     Ventricular tachycardia, non-sustained ([facility])     Wears glasses      Present on Admission:   CKD (chronic kidney  disease) stage 4, GFR 15-29 ml/min (HCC)   Chronic diastolic congestive heart failure (HCC)   COPD (chronic obstructive pulmonary disease) (HCC)   Essential hypertension   Mixed hyperlipidemia   Non-STEMI (non-ST elevated myocardial infarction) ([facility])   Leukocytosis   H/O non-insulin dependent diabetes mellitus      Admitting Diagnosis: Chest pain [R07.9]  Age/Sex: 48 y.o. female  Admission Orders:  Scheduled Medications:  aspirin, 324 mg, Oral, Once  aspirin, 81 mg, Oral, Daily  atorvastatin, 80 mg, Oral, QPM  calcitriol, 0.25 mcg, Oral, Daily  clopidogrel, 75 mg, Oral, Daily  gabapentin, 300 mg, Oral, TID  insulin lispro, 2-12 Units, Subcutaneous, TID AC  insulin lispro, 2-12 Units, Subcutaneous, HS  isosorbide mononitrate, 90 mg, Oral, Daily  metoprolol succinate, 50 mg, Oral, BID  nicotine, 1 patch, Transdermal, Daily  NIFEdipine, 30 mg, Oral, Daily  ranolazine, 500 mg, Oral, Q12H ABIEL  silver sulfadiazine, , Topical, Daily  sodium bicarbonate, 650 mg, Oral, BID after meals  venlafaxine, 75 mg, Oral, Daily With Breakfast      Continuous IV Infusions:  heparin (porcine), 3-20 Units/kg/hr (Order-Specific), Intravenous, Titrated      PRN Meds:  acetaminophen, 650 mg, Oral, Q6H PRN  albuterol, 2 puff, Inhalation, Q4H PRN  heparin (porcine), 2,000 Units, Intravenous, Q6H PRN  heparin (porcine), 4,000 Units, Intravenous, Q6H PRN  LORazepam, 0.5 mg, Oral, Q8H PRN  nitroglycerin, 0.4 mg, Sublingual, Q5 Min PRN  ondansetron, 4 mg, Intravenous, Q6H PRN  sodium chloride (PF), 3 mL, Intravenous, Q1H PRN        IP CONSULT TO CARDIOLOGY    Network Utilization Review Department  ATTENTION: Please call with any questions or concerns to [phone] and carefully listen to the prompts so that you are directed to the right person. All voicemails are confidential.   For Discharge needs, contact Care Management DC Support Team at [phone] opt. [phone]  Send all requests for admission clinical reviews, approved or denied determinations  and any other requests to dedicated fax number below belonging to the campus where the patient is receiving treatment. List of dedicated fax numbers for the Facilities:  FACILITY NAME UR FAX NUMBER   ADMISSION DENIALS (Administrative/Medical Necessity) [phone]   DISCHARGE SUPPORT TEAM (NETWORK) [phone]   PARENT CHILD HEALTH (Maternity/NICU/Pediatrics) [phone]   [facility] [phone]   [facility] [phone]   [facility] [phone]   [facility] [phone]   [facility] [phone]   [facility] [phone]   [facility] [phone]   [facility] [phone]   [facility] [phone]   [facility] [phone]   General acute hospital [phone]   [facility] [phone]

## 2024-02-17 ENCOUNTER — APPOINTMENT (INPATIENT)
Dept: ULTRASOUND IMAGING | Facility: HOSPITAL | Age: 49
DRG: 198 | End: 2024-02-17
Payer: COMMERCIAL

## 2024-02-17 LAB
2HR DELTA HS TROPONIN: -8 NG/L
4HR DELTA HS TROPONIN: -50 NG/L
ALBUMIN SERPL BCP-MCNC: 3.4 G/DL (ref 3.5–5)
ALP SERPL-CCNC: 121 U/L (ref 34–104)
ALT SERPL W P-5'-P-CCNC: 18 U/L (ref 7–52)
ANION GAP SERPL CALCULATED.3IONS-SCNC: 9 MMOL/L
APTT PPP: 67 SECONDS (ref 23–37)
APTT PPP: 84 SECONDS (ref 23–37)
APTT PPP: 98 SECONDS (ref 23–37)
AST SERPL W P-5'-P-CCNC: 12 U/L (ref 13–39)
ATRIAL RATE: 60 BPM
ATRIAL RATE: 64 BPM
ATRIAL RATE: 68 BPM
BACTERIA UR QL AUTO: ABNORMAL /HPF
BILIRUB SERPL-MCNC: 0.33 MG/DL (ref 0.2–1)
BILIRUB UR QL STRIP: NEGATIVE
BUN SERPL-MCNC: 29 MG/DL (ref 5–25)
CALCIUM ALBUM COR SERPL-MCNC: 9.2 MG/DL (ref 8.3–10.1)
CALCIUM SERPL-MCNC: 8.7 MG/DL (ref 8.4–10.2)
CARDIAC TROPONIN I PNL SERPL HS: 228 NG/L
CARDIAC TROPONIN I PNL SERPL HS: 270 NG/L
CHLORIDE SERPL-SCNC: 110 MMOL/L (ref 96–108)
CLARITY UR: CLEAR
CO2 SERPL-SCNC: 18 MMOL/L (ref 21–32)
COLOR UR: YELLOW
CREAT SERPL-MCNC: 2.79 MG/DL (ref 0.6–1.3)
ERYTHROCYTE [DISTWIDTH] IN BLOOD BY AUTOMATED COUNT: 19.3 % (ref 11.6–15.1)
GFR SERPL CREATININE-BSD FRML MDRD: 19 ML/MIN/1.73SQ M
GLUCOSE SERPL-MCNC: 111 MG/DL (ref 65–140)
GLUCOSE SERPL-MCNC: 119 MG/DL (ref 65–140)
GLUCOSE SERPL-MCNC: 119 MG/DL (ref 65–140)
GLUCOSE SERPL-MCNC: 134 MG/DL (ref 65–140)
GLUCOSE SERPL-MCNC: 86 MG/DL (ref 65–140)
GLUCOSE UR STRIP-MCNC: NEGATIVE MG/DL
HCT VFR BLD AUTO: 36.1 % (ref 34.8–46.1)
HGB BLD-MCNC: 11.3 G/DL (ref 11.5–15.4)
HGB UR QL STRIP.AUTO: NEGATIVE
INR PPP: 1.12 (ref 0.84–1.19)
KETONES UR STRIP-MCNC: NEGATIVE MG/DL
LEUKOCYTE ESTERASE UR QL STRIP: NEGATIVE
MCH RBC QN AUTO: 27.7 PG (ref 26.8–34.3)
MCHC RBC AUTO-ENTMCNC: 31.3 G/DL (ref 31.4–37.4)
MCV RBC AUTO: 89 FL (ref 82–98)
MUCOUS THREADS UR QL AUTO: ABNORMAL
NITRITE UR QL STRIP: NEGATIVE
NON-SQ EPI CELLS URNS QL MICRO: ABNORMAL /HPF
P AXIS: 38 DEGREES
P AXIS: 47 DEGREES
P AXIS: 50 DEGREES
PH UR STRIP.AUTO: 6 [PH]
PLATELET # BLD AUTO: 285 THOUSANDS/UL (ref 149–390)
PMV BLD AUTO: 9.9 FL (ref 8.9–12.7)
POTASSIUM SERPL-SCNC: 4.4 MMOL/L (ref 3.5–5.3)
PR INTERVAL: 184 MS
PR INTERVAL: 194 MS
PR INTERVAL: 200 MS
PROT SERPL-MCNC: 6.5 G/DL (ref 6.4–8.4)
PROT UR STRIP-MCNC: ABNORMAL MG/DL
PROTHROMBIN TIME: 14.5 SECONDS (ref 11.6–14.5)
QRS AXIS: 22 DEGREES
QRS AXIS: 30 DEGREES
QRS AXIS: 51 DEGREES
QRSD INTERVAL: 100 MS
QRSD INTERVAL: 94 MS
QRSD INTERVAL: 98 MS
QT INTERVAL: 414 MS
QT INTERVAL: 430 MS
QT INTERVAL: 444 MS
QTC INTERVAL: 440 MS
QTC INTERVAL: 443 MS
QTC INTERVAL: 444 MS
RBC # BLD AUTO: 4.08 MILLION/UL (ref 3.81–5.12)
RBC #/AREA URNS AUTO: ABNORMAL /HPF
SODIUM SERPL-SCNC: 137 MMOL/L (ref 135–147)
SP GR UR STRIP.AUTO: 1.02
T WAVE AXIS: 102 DEGREES
T WAVE AXIS: 124 DEGREES
T WAVE AXIS: 129 DEGREES
TSH SERPL DL<=0.05 MIU/L-ACNC: 2.31 UIU/ML (ref 0.45–4.5)
UROBILINOGEN UR QL STRIP.AUTO: 0.2 E.U./DL
VENTRICULAR RATE: 60 BPM
VENTRICULAR RATE: 64 BPM
VENTRICULAR RATE: 68 BPM
WBC # BLD AUTO: 13.54 THOUSAND/UL (ref 4.31–10.16)
WBC #/AREA URNS AUTO: ABNORMAL /HPF

## 2024-02-17 PROCEDURE — 82948 REAGENT STRIP/BLOOD GLUCOSE: CPT

## 2024-02-17 PROCEDURE — 99232 SBSQ HOSP IP/OBS MODERATE 35: CPT

## 2024-02-17 PROCEDURE — 85730 THROMBOPLASTIN TIME PARTIAL: CPT

## 2024-02-17 PROCEDURE — 82570 ASSAY OF URINE CREATININE: CPT

## 2024-02-17 PROCEDURE — 93010 ELECTROCARDIOGRAM REPORT: CPT | Performed by: INTERNAL MEDICINE

## 2024-02-17 PROCEDURE — 84300 ASSAY OF URINE SODIUM: CPT

## 2024-02-17 PROCEDURE — 81001 URINALYSIS AUTO W/SCOPE: CPT

## 2024-02-17 PROCEDURE — 99232 SBSQ HOSP IP/OBS MODERATE 35: CPT | Performed by: INTERNAL MEDICINE

## 2024-02-17 PROCEDURE — 76775 US EXAM ABDO BACK WALL LIM: CPT

## 2024-02-17 PROCEDURE — 85027 COMPLETE CBC AUTOMATED: CPT | Performed by: INTERNAL MEDICINE

## 2024-02-17 PROCEDURE — 84484 ASSAY OF TROPONIN QUANT: CPT

## 2024-02-17 PROCEDURE — 84443 ASSAY THYROID STIM HORMONE: CPT

## 2024-02-17 PROCEDURE — 80053 COMPREHEN METABOLIC PANEL: CPT | Performed by: INTERNAL MEDICINE

## 2024-02-17 PROCEDURE — 85610 PROTHROMBIN TIME: CPT | Performed by: INTERNAL MEDICINE

## 2024-02-17 PROCEDURE — 85730 THROMBOPLASTIN TIME PARTIAL: CPT | Performed by: NURSE PRACTITIONER

## 2024-02-17 RX ORDER — ISOSORBIDE MONONITRATE 30 MG/1
120 TABLET, EXTENDED RELEASE ORAL DAILY
Status: DISCONTINUED | OUTPATIENT
Start: 2024-02-17 | End: 2024-02-18 | Stop reason: HOSPADM

## 2024-02-17 RX ORDER — RANOLAZINE 500 MG/1
1000 TABLET, EXTENDED RELEASE ORAL EVERY 12 HOURS SCHEDULED
Status: DISCONTINUED | OUTPATIENT
Start: 2024-02-17 | End: 2024-02-18 | Stop reason: HOSPADM

## 2024-02-17 RX ADMIN — LORAZEPAM 0.5 MG: 0.5 TABLET ORAL at 17:39

## 2024-02-17 RX ADMIN — SODIUM BICARBONATE 650 MG: 650 TABLET ORAL at 08:10

## 2024-02-17 RX ADMIN — NICOTINE 1 PATCH: 14 PATCH, EXTENDED RELEASE TRANSDERMAL at 08:13

## 2024-02-17 RX ADMIN — VENLAFAXINE HYDROCHLORIDE 75 MG: 75 CAPSULE, EXTENDED RELEASE ORAL at 08:10

## 2024-02-17 RX ADMIN — HEPARIN SODIUM 19.1 UNITS/KG/HR: 10000 INJECTION, SOLUTION INTRAVENOUS at 14:53

## 2024-02-17 RX ADMIN — GABAPENTIN 300 MG: 300 CAPSULE ORAL at 21:39

## 2024-02-17 RX ADMIN — ASPIRIN 81 MG: 81 TABLET, CHEWABLE ORAL at 08:10

## 2024-02-17 RX ADMIN — LORAZEPAM 0.5 MG: 0.5 TABLET ORAL at 09:26

## 2024-02-17 RX ADMIN — NIFEDIPINE 30 MG: 30 TABLET, FILM COATED, EXTENDED RELEASE ORAL at 08:11

## 2024-02-17 RX ADMIN — SILVER SULFADIAZINE: 10 CREAM TOPICAL at 11:53

## 2024-02-17 RX ADMIN — ATORVASTATIN CALCIUM 80 MG: 40 TABLET, FILM COATED ORAL at 17:36

## 2024-02-17 RX ADMIN — METOPROLOL SUCCINATE 50 MG: 50 TABLET, EXTENDED RELEASE ORAL at 08:11

## 2024-02-17 RX ADMIN — SODIUM BICARBONATE 650 MG: 650 TABLET ORAL at 16:31

## 2024-02-17 RX ADMIN — CALCITRIOL CAPSULES 0.25 MCG 0.25 MCG: 0.25 CAPSULE ORAL at 08:10

## 2024-02-17 RX ADMIN — METOPROLOL SUCCINATE 50 MG: 50 TABLET, EXTENDED RELEASE ORAL at 21:39

## 2024-02-17 RX ADMIN — CLOPIDOGREL 75 MG: 75 TABLET ORAL at 08:10

## 2024-02-17 RX ADMIN — RANOLAZINE 1000 MG: 500 TABLET, FILM COATED, EXTENDED RELEASE ORAL at 21:39

## 2024-02-17 RX ADMIN — RANOLAZINE 500 MG: 500 TABLET, FILM COATED, EXTENDED RELEASE ORAL at 08:10

## 2024-02-17 RX ADMIN — GABAPENTIN 300 MG: 300 CAPSULE ORAL at 16:26

## 2024-02-17 RX ADMIN — ISOSORBIDE MONONITRATE 120 MG: 30 TABLET, EXTENDED RELEASE ORAL at 08:10

## 2024-02-17 RX ADMIN — GABAPENTIN 300 MG: 300 CAPSULE ORAL at 08:09

## 2024-02-17 RX ADMIN — HEPARIN SODIUM 21.1 UNITS/KG/HR: 10000 INJECTION, SOLUTION INTRAVENOUS at 04:31

## 2024-02-17 NOTE — PROGRESS NOTES
"Progress Note - Cardiology   Elise Ken 48 y.o. female MRN: [id number]  Unit/Bed#: ICU 02-01 Encounter: [id number]    Assessment/Plan:    Accelerating Angina with mild troponin elevation in history of  previously occluded LAD with continued collaterals though faint, prior stent to APRIL now occluded, also noted progression in the nondominant pLCX. Patient with severe disease though poor targets for revascularization.   -EKGS with sinus and prior inferior and anterior/lateral infarct pattern with no dynamic changes  -patient has small mild peak troponin and don't suspect plaque rupture  -compensated and stable  -optimize anti anginal at this time     Recommendations Today:  -increase Imdur 120 mg daily   -increase ranexa to 1000 mg BID     Subjective/Objective       No events overnight.     This morning resting and sleeping comfortably around 6 am. The patient states that he pains are somewhat better but still fairly constant and mild.     Vitals: /61   Pulse 69   Temp 98.3 °F (36.8 °C) (Tympanic)   Resp 14   Ht 5' 6\" (1.676 m)   Wt 129 kg (285 lb 4.4 oz)   SpO2 95%   BMI 46.04 kg/m²   Vitals:    02/16/24 1233 02/16/24 1346   Weight: 122 kg (270 lb) 129 kg (285 lb 4.4 oz)     Orthostatic Blood Pressures      Flowsheet Row Most Recent Value   Blood Pressure 108/61 filed at 02/17/2024 0400   Patient Position - Orthostatic VS Lying filed at 02/17/2024 0013              Intake/Output Summary (Last 24 hours) at 2/17/2024 0704  Last data filed at 2/17/2024 0400  Gross per 24 hour   Intake 605.32 ml   Output 450 ml   Net 155.32 ml       Invasive Devices       Peripheral Intravenous Line  Duration             Peripheral IV 02/15/24 Left Antecubital 1 day    Peripheral IV 02/15/24 Right Antecubital 1 day                    Review of Systems: Negative     Physical Exam:     General:  Normal appearance in no distress.  obese  Eyes:  Anicteric.  Oral mucosa:  Moist.  Chest:  Clear to auscultation.  Cardiac:  JVP " flat, No palpable PMI.  Normal S1 and S2.  No murmur gallop or rub.  Abdomen:  Soft and nontender.   Extremities:  No peripheral edema.  Musculoskeletal:  Symmetric.   Vascular:  Pedal pulses are intact.  Neuro:  Grossly symmetric.  Psych:  Alert and oriented x3.    Lab Results: I have personally reviewed pertinent lab results.    Imaging: I have personally reviewed pertinent reports.    EKG:   VTE Pharmacologic Prophylaxis: Sequential compression device (Venodyne)  per primary   VTE Mechanical Prophylaxis: sequential compression device    Counseling / Coordination of Care  Total Critical Care time spent 45 minutes excluding procedures, teaching and family updates.
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
## 2024-02-17 NOTE — PROGRESS NOTES
"[facility]  Progress Note  Name: Elise Ken I  MRN: [id number]  Unit/Bed#: ICU 02-01 I Date of Admission: 2/15/2024   Date of Service: 2/17/2024 I Hospital Day: 2    Assessment/Plan   * Non-STEMI (non-ST elevated myocardial infarction) ([facility])  Assessment & Plan  Nstemi per cardiology . Patient is not a candidate for revascularization  Refer to cardiology team documentation     Day#2 heparin infusion   Plan to transition when possible per cardiology.       Continue heparin drip  Continue Tele   Continue prehospital cardiac medications  Appreciate cardiology team recommendations   Am labs     Patient had cardiac cath 9/25/2023 which revealed \"  There is progression of CAD noted: previously occluded LAD with continued collaterals though faint, prior stent to APRIL now occluded, also noted progression in the nondominant pLCX.  Patient with severe disease though poor targets for revascularization\"    H/O non-insulin dependent diabetes mellitus  Assessment & Plan  Patient denies being told about this diagnosis in the past though it is on her discharge summary from previous admission 9/27/2023  Placed on [facility] type II diet  Obtain Accu-Cheks before meals and at bedtime with Humalog correction dose before meals and at bedtime    Leukocytosis  Assessment & Plan  Likely reactionary secondary to NSTEMI  Patient is afebrile with no active signs of infection  Continue to trend fever curve  Continue to monitor with repeat labs in a.m.    CKD (chronic kidney disease) stage 4, GFR 15-29 ml/min ([facility])  Assessment & Plan  Lab Results   Component Value Date    EGFR 19 02/17/2024    EGFR 22 02/16/2024    EGFR 22 02/15/2024    CREATININE 2.79 (H) 02/17/2024    CREATININE 2.46 (H) 02/16/2024    CREATININE 2.50 (H) 02/15/2024     Creatinine appears to be approximately baseline  Check PVR, bladder scan, retention protocol   Check Renal US   Am labs     COPD (chronic obstructive pulmonary disease) ([facility])  Assessment & " Plan  Not in exacerbation at this time  Patient continues to smoke  Counseled her on importance of smoking cessation  Continue prehospital Proventil 90 mcg 2 puffs every 4 hours as needed    Mixed hyperlipidemia  Assessment & Plan  Continue prehospital Lipitor 80 mg p.o. daily    Chronic diastolic congestive heart failure (HCC)  Assessment & Plan  Wt Readings from Last 3 Encounters:   24 129 kg (285 lb 4.4 oz)   23 129 kg (285 lb)   12/10/23 129 kg (285 lb)     Not in exacerbation at this time  Continue prehospital Imdur 90 mg p.o. daily, Toprol-XL 50 mg p.o. twice daily Procardia XL 30 mg p.o. daily    Essential hypertension  Assessment & Plan  Continue prehospital Toprol-XL 50 mg p.o. twice daily and Procardia XL 30 mg p.o. daily             VTE Pharmacologic Prophylaxis:   Pharmacologic: Heparin Drip  Mechanical VTE Prophylaxis in Place: Yes    Patient Centered Rounds: I have performed bedside rounds with nursing staff today.    Discussions with Specialists or Other Care Team Provider: cardiology     Education and Discussions with Family / Patient: patient     Time Spent for Care: 30 minutes.  More than 50% of total time spent on counseling and coordination of care as described above.    Current Length of Stay: 2 day(s)    Current Patient Status: Inpatient   Certification Statement: The patient will continue to require additional inpatient hospital stay due to NSTEMI, CKD4     Discharge Plan: when stable     Code Status: Level 1 - Full Code      Subjective:   Patient was seen today at bedside. Doesn't have any active complaints   No chest pain or tightness, SOB or cough, dizziness or light headedness, N/V, Diarrhea of constipation.   No active urinary symptoms  Tolerating oral diet.       Objective:     Vitals:   Temp (24hrs), Av.4 °F (36.9 °C), Min:97.6 °F (36.4 °C), Max:99 °F (37.2 °C)    Temp:  [97.6 °F (36.4 °C)-99 °F (37.2 °C)] 98.1 °F (36.7 °C)  HR:  [60-72] 68  Resp:  [12-23] 16  BP:  ()/(47-75) 113/60  SpO2:  [95 %-98 %] 97 %  Body mass index is 46.04 kg/m².     Input and Output Summary (last 24 hours):       Intake/Output Summary (Last 24 hours) at 2/17/2024 1146  Last data filed at 2/17/2024 0843  Gross per 24 hour   Intake 884.5 ml   Output 1250 ml   Net -365.5 ml       Physical Exam:     Physical Exam  Vitals reviewed.   Constitutional:       General: She is not in acute distress.     Appearance: Normal appearance. She is obese.   HENT:      Head: Normocephalic and atraumatic.      Mouth/Throat:      Mouth: Mucous membranes are moist.   Eyes:      Conjunctiva/sclera: Conjunctivae normal.      Pupils: Pupils are equal, round, and reactive to light.   Cardiovascular:      Rate and Rhythm: Normal rate and regular rhythm.      Pulses: Normal pulses.           Carotid pulses are 2+ on the right side and 2+ on the left side.       Radial pulses are 2+ on the right side and 2+ on the left side.        Dorsalis pedis pulses are 2+ on the right side and 2+ on the left side.      Heart sounds: Normal heart sounds, S1 normal and S2 normal. No murmur heard.  Pulmonary:      Effort: No tachypnea, bradypnea or accessory muscle usage.      Breath sounds: Normal breath sounds and air entry. No decreased breath sounds, wheezing, rhonchi or rales.   Abdominal:      General: Abdomen is flat. Bowel sounds are normal.      Palpations: Abdomen is soft.      Tenderness: There is no abdominal tenderness.   Musculoskeletal:      Right lower leg: No edema.      Left lower leg: No edema.   Neurological:      Mental Status: She is alert and oriented to person, place, and time. Mental status is at baseline.         Additional Data:     Labs:    Results from last 7 days   Lab Units 02/17/24  0423 02/16/24  0454 02/15/24  1957   WBC Thousand/uL 13.54*   < > 15.42*   HEMOGLOBIN g/dL 11.3*   < > 12.7   HEMATOCRIT % 36.1   < > 40.3   PLATELETS Thousands/uL 285   < > 353   NEUTROS PCT %  --   --  77*   LYMPHS PCT %  --    --  11*   MONOS PCT %  --   --  8   EOS PCT %  --   --  2    < > = values in this interval not displayed.     Results from last 7 days   Lab Units 02/17/24  0423   SODIUM mmol/L 137   POTASSIUM mmol/L 4.4   CHLORIDE mmol/L 110*   CO2 mmol/L 18*   BUN mg/dL 29*   CREATININE mg/dL 2.79*   ANION GAP mmol/L 9   CALCIUM mg/dL 8.7   ALBUMIN g/dL 3.4*   TOTAL BILIRUBIN mg/dL 0.33   ALK PHOS U/L 121*   ALT U/L 18   AST U/L 12*   GLUCOSE RANDOM mg/dL 86     Results from last 7 days   Lab Units 02/17/24  0423   INR  1.12     Results from last 7 days   Lab Units 02/17/24  1117 02/17/24  0645 02/16/24  2122 02/16/24  1609 02/16/24  1145 02/16/24  0819 02/16/24  0012   POC GLUCOSE mg/dl 134 119 106 92 105 113 115     Results from last 7 days   Lab Units 02/15/24  1957   HEMOGLOBIN A1C % 6.4*               * I Have Reviewed All Lab Data Listed Above.  * Additional Pertinent Lab Tests Reviewed: All Labs For Current Hospital Admission Reviewed    Imaging:    Imaging Reports Reviewed Today Include:   X-ray chest 1 view portable   ED Interpretation   ED wet read: No acute cardiopulmonary process noted      Final Result      No acute cardiopulmonary disease.            Workstation performed: [id number]         US kidney and bladder with pvr    (Results Pending)       Imaging Personally Reviewed by Myself Includes:    X-ray chest 1 view portable   ED Interpretation   ED wet read: No acute cardiopulmonary process noted      Final Result      No acute cardiopulmonary disease.            Workstation performed: [id number]         US kidney and bladder with pvr    (Results Pending)         Recent Cultures (last 7 days):           Last 24 Hours Medication List:   Current Facility-Administered Medications   Medication Dose Route Frequency Provider Last Rate    acetaminophen  650 mg Oral Q6H PRN Delonte Liriano PA-C      albuterol  2 puff Inhalation Q4H PRN Delonte Liriano PA-C      aspirin  324 mg Oral Once Adrien Kelly MD      aspirin  81 mg  Oral Daily Delonte Liriano PA-C      atorvastatin  80 mg Oral QPM Delonte Liriano PA-C      calcitriol  0.25 mcg Oral Daily Delonte Liriano PA-C      clopidogrel  75 mg Oral Daily Delonte Liriano PA-C      gabapentin  300 mg Oral TID Delonte Liriano PA-C      heparin (porcine)  3-30 Units/kg/hr (Order-Specific) Intravenous Titrated Alejandra VEE Gipson CRNP 19.1 Units/kg/hr (02/17/24 0800)    heparin (porcine)  10,000 Units Intravenous Q6H PRN Alejandra VEE Gipson CRNP      heparin (porcine)  5,000 Units Intravenous Q6H PRN Alejandra DAVON NeilNP      insulin lispro  2-12 Units Subcutaneous TID AC Delonte Liriano PA-C      insulin lispro  2-12 Units Subcutaneous HS Delonte Liriano PA-C      isosorbide mononitrate  120 mg Oral Daily Nacho Sawyer MD      LORazepam  0.5 mg Oral Q8H PRN Rodriguez Chaudhari MD      metoprolol succinate  50 mg Oral BID Delonte Liriano PA-C      nicotine  1 patch Transdermal Daily Delonte Liriano PA-C      NIFEdipine  30 mg Oral Daily Delonte Liriano PA-C      nitroglycerin  0.4 mg Sublingual Q5 Min PRN Rordiguez Chaudhari MD      ondansetron  4 mg Intravenous Q6H PRN Delonte Liriano PA-C      ranolazine  500 mg Oral Q12H [facility] Delonte Liriano PA-C      silver sulfadiazine   Topical Daily Delonte Liriano PA-C      sodium bicarbonate  650 mg Oral BID after meals Delonte Liriano PA-C      sodium chloride (PF)  3 mL Intravenous Q1H PRN Cornell Dominguez MD      venlafaxine  75 mg Oral Daily With Breakfast Delonte Liriano PA-C          Today, Patient Was Seen By: Destini Diaz MD    ** Please Note: Dictation voice to text software may have been used in the creation of this document. **

## 2024-02-17 NOTE — ASSESSMENT & PLAN NOTE
"Nstemi per cardiology . Patient is not a candidate for revascularization  Refer to cardiology team documentation     Day#2 heparin infusion   Plan to transition when possible per cardiology.       Continue heparin drip  Continue Tele   Continue prehospital cardiac medications  Appreciate cardiology team recommendations   Am labs     Patient had cardiac cath 9/25/2023 which revealed \"  There is progression of CAD noted: previously occluded LAD with continued collaterals though faint, prior stent to APRIL now occluded, also noted progression in the nondominant pLCX.  Patient with severe disease though poor targets for revascularization\"  "

## 2024-02-17 NOTE — ASSESSMENT & PLAN NOTE
Wt Readings from Last 3 Encounters:   02/16/24 129 kg (285 lb 4.4 oz)   12/12/23 129 kg (285 lb)   12/10/23 129 kg (285 lb)     Not in exacerbation at this time  Continue prehospital Imdur 90 mg p.o. daily, Toprol-XL 50 mg p.o. twice daily Procardia XL 30 mg p.o. daily

## 2024-02-17 NOTE — NURSING NOTE
Patient assisted OOB to BSC and reclining chair for breakfast meal.  Denies pain or pressure at present time.

## 2024-02-17 NOTE — ASSESSMENT & PLAN NOTE
Lab Results   Component Value Date    EGFR 19 02/17/2024    EGFR 22 02/16/2024    EGFR 22 02/15/2024    CREATININE 2.79 (H) 02/17/2024    CREATININE 2.46 (H) 02/16/2024    CREATININE 2.50 (H) 02/15/2024     Creatinine appears to be approximately baseline  Check PVR, bladder scan, retention protocol   Check Renal US   Am labs

## 2024-02-17 NOTE — PLAN OF CARE
Problem: NEUROSENSORY - ADULT  Goal: Achieves stable or improved neurological status  Description: INTERVENTIONS  - Monitor and report changes in neurological status  - Monitor vital signs such as temperature, blood pressure, glucose, and any other labs ordered   - Initiate measures to prevent increased intracranial pressure  - Monitor for seizure activity and implement precautions if appropriate      Outcome: Progressing     Problem: CARDIOVASCULAR - ADULT  Goal: Maintains optimal cardiac output and hemodynamic stability  Description: INTERVENTIONS:  - Monitor I/O, vital signs and rhythm  - Monitor for S/S and trends of decreased cardiac output  - Administer and titrate ordered vasoactive medications to optimize hemodynamic stability  - Assess quality of pulses, skin color and temperature  - Assess for signs of decreased coronary artery perfusion  - Instruct patient to report change in severity of symptoms  Outcome: Progressing  Goal: Absence of cardiac dysrhythmias or at baseline rhythm  Description: INTERVENTIONS:  - Continuous cardiac monitoring, vital signs, obtain 12 lead EKG if ordered  - Administer antiarrhythmic and heart rate control medications as ordered  - Monitor electrolytes and administer replacement therapy as ordered  Outcome: Progressing     Problem: RESPIRATORY - ADULT  Goal: Achieves optimal ventilation and oxygenation  Description: INTERVENTIONS:  - Assess for changes in respiratory status  - Assess for changes in mentation and behavior  - Position to facilitate oxygenation and minimize respiratory effort  - Oxygen administered by appropriate delivery if ordered  - Initiate smoking cessation education as indicated  - Encourage broncho-pulmonary hygiene including cough, deep breathe, Incentive Spirometry  - Assess the need for suctioning and aspirate as needed  - Assess and instruct to report SOB or any respiratory difficulty  - Respiratory Therapy support as indicated  Outcome: Progressing      Problem: METABOLIC, FLUID AND ELECTROLYTES - ADULT  Goal: Glucose maintained within target range  Description: INTERVENTIONS:  - Monitor Blood Glucose as ordered  - Assess for signs and symptoms of hyperglycemia and hypoglycemia  - Administer ordered medications to maintain glucose within target range  - Assess nutritional intake and initiate nutrition service referral as needed  Outcome: Progressing     Problem: SKIN/TISSUE INTEGRITY - ADULT  Goal: Incision(s), wounds(s) or drain site(s) healing without S/S of infection  Description: INTERVENTIONS  - Assess and document dressing, incision, wound bed, drain sites and surrounding tissue  - Provide patient and family education  - Perform skin care/dressing changes every day   Outcome: Progressing  Goal: Pressure injury heals and does not worsen  Description: Interventions:  - Implement low air loss mattress or specialty surface (Criteria met)  - Apply silicone foam dressing  - Instruct/assist with weight shifting every 30 minutes when in chair   - Limit chair time to 4 hour intervals  - Use special pressure reducing interventions such as cusion when in chair   - Apply fecal or urinary incontinence containment device   - Perform passive or active ROM every 8  - Turn and reposition patient & offload bony prominences every 2 hours   - Utilize friction reducing device or surface for transfers   - Consider consults to  interdisciplinary teams such as wound care  - Consider nutrition services referral as needed  Outcome: Progressing

## 2024-02-17 NOTE — PLAN OF CARE
Problem: CARDIOVASCULAR - ADULT  Goal: Maintains optimal cardiac output and hemodynamic stability  Description: INTERVENTIONS:  - Monitor I/O, vital signs and rhythm  - Monitor for S/S and trends of decreased cardiac output  - Administer and titrate ordered vasoactive medications to optimize hemodynamic stability  - Assess quality of pulses, skin color and temperature  - Assess for signs of decreased coronary artery perfusion  - Instruct patient to report change in severity of symptoms  Outcome: Progressing  Goal: Absence of cardiac dysrhythmias or at baseline rhythm  Description: INTERVENTIONS:  - Continuous cardiac monitoring, vital signs, obtain 12 lead EKG if ordered  - Administer antiarrhythmic and heart rate control medications as ordered  - Monitor electrolytes and administer replacement therapy as ordered  Outcome: Progressing     Problem: RESPIRATORY - ADULT  Goal: Achieves optimal ventilation and oxygenation  Description: INTERVENTIONS:  - Assess for changes in respiratory status  - Assess for changes in mentation and behavior  - Position to facilitate oxygenation and minimize respiratory effort  - Oxygen administered by appropriate delivery if ordered  - Initiate smoking cessation education as indicated  - Encourage broncho-pulmonary hygiene including cough, deep breathe, Incentive Spirometry  - Assess the need for suctioning and aspirate as needed  - Assess and instruct to report SOB or any respiratory difficulty  - Respiratory Therapy support as indicated  Outcome: Progressing     Problem: METABOLIC, FLUID AND ELECTROLYTES - ADULT  Goal: Glucose maintained within target range  Description: INTERVENTIONS:  - Monitor Blood Glucose as ordered  - Assess for signs and symptoms of hyperglycemia and hypoglycemia  - Administer ordered medications to maintain glucose within target range  - Assess nutritional intake and initiate nutrition service referral as needed  Outcome: Progressing     Problem: SKIN/TISSUE  INTEGRITY - ADULT  Goal: Pressure injury heals and does not worsen  Description: Interventions:  - Implement low air loss mattress or specialty surface (Criteria met)  - Apply silicone foam dressing  - Instruct/assist with weight shifting every 30 minutes when in chair   - Limit chair time to 4 hour intervals  - Use special pressure reducing interventions such as cusion when in chair   - Apply fecal or urinary incontinence containment device   - Perform passive or active ROM every 8  - Turn and reposition patient & offload bony prominences every 2 hours   - Utilize friction reducing device or surface for transfers   - Consider consults to  interdisciplinary teams such as wound care  - Consider nutrition services referral as needed  Outcome: Progressing

## 2024-02-17 NOTE — NURSING NOTE
Patient remains OOB in reclining chair; PTT = 84.  Heparin gtt remains at previous rate of 19.1 u/kg/hr.  Patient's vital signs stable; O2 via nasal cannula at 4 liters; SAO2-94%.  Next PTT @ 18:45

## 2024-02-18 VITALS
HEART RATE: 67 BPM | SYSTOLIC BLOOD PRESSURE: 111 MMHG | HEIGHT: 66 IN | DIASTOLIC BLOOD PRESSURE: 54 MMHG | WEIGHT: 285.27 LBS | TEMPERATURE: 99.6 F | RESPIRATION RATE: 16 BRPM | BODY MASS INDEX: 45.85 KG/M2 | OXYGEN SATURATION: 99 %

## 2024-02-18 LAB
ANION GAP SERPL CALCULATED.3IONS-SCNC: 8 MMOL/L
APTT PPP: 87 SECONDS (ref 23–37)
BASOPHILS # BLD AUTO: 0.06 THOUSANDS/ÂΜL (ref 0–0.1)
BASOPHILS NFR BLD AUTO: 1 % (ref 0–1)
BUN SERPL-MCNC: 29 MG/DL (ref 5–25)
CALCIUM SERPL-MCNC: 9.3 MG/DL (ref 8.4–10.2)
CHLORIDE SERPL-SCNC: 110 MMOL/L (ref 96–108)
CO2 SERPL-SCNC: 20 MMOL/L (ref 21–32)
CREAT SERPL-MCNC: 3.01 MG/DL (ref 0.6–1.3)
CREAT UR-MCNC: 110.6 MG/DL
EOSINOPHIL # BLD AUTO: 0.33 THOUSAND/ÂΜL (ref 0–0.61)
EOSINOPHIL NFR BLD AUTO: 3 % (ref 0–6)
ERYTHROCYTE [DISTWIDTH] IN BLOOD BY AUTOMATED COUNT: 19.1 % (ref 11.6–15.1)
GFR SERPL CREATININE-BSD FRML MDRD: 17 ML/MIN/1.73SQ M
GLUCOSE SERPL-MCNC: 78 MG/DL (ref 65–140)
GLUCOSE SERPL-MCNC: 82 MG/DL (ref 65–140)
HCT VFR BLD AUTO: 35.5 % (ref 34.8–46.1)
HGB BLD-MCNC: 10.8 G/DL (ref 11.5–15.4)
IMM GRANULOCYTES # BLD AUTO: 0.22 THOUSAND/UL (ref 0–0.2)
IMM GRANULOCYTES NFR BLD AUTO: 2 % (ref 0–2)
LYMPHOCYTES # BLD AUTO: 2.08 THOUSANDS/ÂΜL (ref 0.6–4.47)
LYMPHOCYTES NFR BLD AUTO: 16 % (ref 14–44)
MAGNESIUM SERPL-MCNC: 2.1 MG/DL (ref 1.9–2.7)
MCH RBC QN AUTO: 26.9 PG (ref 26.8–34.3)
MCHC RBC AUTO-ENTMCNC: 30.4 G/DL (ref 31.4–37.4)
MCV RBC AUTO: 89 FL (ref 82–98)
MONOCYTES # BLD AUTO: 1.02 THOUSAND/ÂΜL (ref 0.17–1.22)
MONOCYTES NFR BLD AUTO: 8 % (ref 4–12)
NEUTROPHILS # BLD AUTO: 9.19 THOUSANDS/ÂΜL (ref 1.85–7.62)
NEUTS SEG NFR BLD AUTO: 70 % (ref 43–75)
NRBC BLD AUTO-RTO: 0 /100 WBCS
PHOSPHATE SERPL-MCNC: 4.4 MG/DL (ref 2.7–4.5)
PLATELET # BLD AUTO: 293 THOUSANDS/UL (ref 149–390)
PMV BLD AUTO: 9.9 FL (ref 8.9–12.7)
POTASSIUM SERPL-SCNC: 4.4 MMOL/L (ref 3.5–5.3)
RBC # BLD AUTO: 4.01 MILLION/UL (ref 3.81–5.12)
SODIUM 24H UR-SCNC: 41 MOL/L
SODIUM SERPL-SCNC: 138 MMOL/L (ref 135–147)
WBC # BLD AUTO: 12.9 THOUSAND/UL (ref 4.31–10.16)

## 2024-02-18 PROCEDURE — 82948 REAGENT STRIP/BLOOD GLUCOSE: CPT

## 2024-02-18 PROCEDURE — 83735 ASSAY OF MAGNESIUM: CPT

## 2024-02-18 PROCEDURE — 99239 HOSP IP/OBS DSCHRG MGMT >30: CPT | Performed by: STUDENT IN AN ORGANIZED HEALTH CARE EDUCATION/TRAINING PROGRAM

## 2024-02-18 PROCEDURE — 85730 THROMBOPLASTIN TIME PARTIAL: CPT | Performed by: PHYSICIAN ASSISTANT

## 2024-02-18 PROCEDURE — 85025 COMPLETE CBC W/AUTO DIFF WBC: CPT

## 2024-02-18 PROCEDURE — 84100 ASSAY OF PHOSPHORUS: CPT

## 2024-02-18 PROCEDURE — 80048 BASIC METABOLIC PNL TOTAL CA: CPT

## 2024-02-18 RX ORDER — ISOSORBIDE MONONITRATE 120 MG/1
120 TABLET, EXTENDED RELEASE ORAL DAILY
Qty: 30 TABLET | Refills: 0 | Status: SHIPPED | OUTPATIENT
Start: 2024-02-19 | End: 2024-03-05 | Stop reason: SDUPTHER

## 2024-02-18 RX ORDER — RANOLAZINE 1000 MG/1
1000 TABLET, EXTENDED RELEASE ORAL EVERY 12 HOURS SCHEDULED
Qty: 60 TABLET | Refills: 0 | Status: SHIPPED | OUTPATIENT
Start: 2024-02-18 | End: 2024-02-19 | Stop reason: SDUPTHER

## 2024-02-18 RX ORDER — ISOSORBIDE MONONITRATE 60 MG/1
120 TABLET, EXTENDED RELEASE ORAL DAILY
Qty: 30 TABLET | Refills: 0 | Status: SHIPPED | OUTPATIENT
Start: 2024-02-18 | End: 2024-03-19

## 2024-02-18 RX ADMIN — LORAZEPAM 0.5 MG: 0.5 TABLET ORAL at 05:09

## 2024-02-18 RX ADMIN — NICOTINE 1 PATCH: 14 PATCH, EXTENDED RELEASE TRANSDERMAL at 08:18

## 2024-02-18 RX ADMIN — VENLAFAXINE HYDROCHLORIDE 75 MG: 75 CAPSULE, EXTENDED RELEASE ORAL at 07:17

## 2024-02-18 RX ADMIN — CALCITRIOL CAPSULES 0.25 MCG 0.25 MCG: 0.25 CAPSULE ORAL at 08:16

## 2024-02-18 RX ADMIN — SODIUM BICARBONATE 650 MG: 650 TABLET ORAL at 08:17

## 2024-02-18 RX ADMIN — GABAPENTIN 300 MG: 300 CAPSULE ORAL at 08:17

## 2024-02-18 RX ADMIN — SILVER SULFADIAZINE 1 APPLICATION: 10 CREAM TOPICAL at 08:17

## 2024-02-18 RX ADMIN — HEPARIN SODIUM 19.1 UNITS/KG/HR: 10000 INJECTION, SOLUTION INTRAVENOUS at 03:27

## 2024-02-18 RX ADMIN — METOPROLOL SUCCINATE 50 MG: 50 TABLET, EXTENDED RELEASE ORAL at 08:17

## 2024-02-18 RX ADMIN — ISOSORBIDE MONONITRATE 120 MG: 30 TABLET, EXTENDED RELEASE ORAL at 08:16

## 2024-02-18 RX ADMIN — CLOPIDOGREL 75 MG: 75 TABLET ORAL at 08:17

## 2024-02-18 RX ADMIN — NIFEDIPINE 30 MG: 30 TABLET, FILM COATED, EXTENDED RELEASE ORAL at 08:16

## 2024-02-18 RX ADMIN — ASPIRIN 81 MG: 81 TABLET, CHEWABLE ORAL at 08:17

## 2024-02-18 RX ADMIN — RANOLAZINE 1000 MG: 500 TABLET, FILM COATED, EXTENDED RELEASE ORAL at 08:17

## 2024-02-18 NOTE — ASSESSMENT & PLAN NOTE
Wt Readings from Last 3 Encounters:   02/18/24 129 kg (285 lb 4.4 oz)   12/12/23 129 kg (285 lb)   12/10/23 129 kg (285 lb)     Not in exacerbation at this time  Continue prehospital Imdur 90 mg p.o. daily, Toprol-XL 50 mg p.o. twice daily Procardia XL 30 mg p.o. daily

## 2024-02-18 NOTE — ASSESSMENT & PLAN NOTE
Lab Results   Component Value Date    EGFR 17 02/18/2024    EGFR 19 02/17/2024    EGFR 22 02/16/2024    CREATININE 3.01 (H) 02/18/2024    CREATININE 2.79 (H) 02/17/2024    CREATININE 2.46 (H) 02/16/2024     Creatinine appears to be approximately baseline  Check PVR, bladder scan, retention protocol   Check Renal US   Advised BMP on discharge in 2 days to verify improvement in renal function

## 2024-02-18 NOTE — ASSESSMENT & PLAN NOTE
"Nstemi per cardiology . Patient is not a candidate for revascularization  Refer to cardiology team documentation     Day#2 heparin infusion   Plan to transition when possible per cardiology.       Stop heparin drip  Continue prehospital cardiac medications  Appreciate cardiology team recommendations   Am labs ,     Patient had cardiac cath 9/25/2023 which revealed \"  There is progression of CAD noted: previously occluded LAD with continued collaterals though faint, prior stent to APRIL now occluded, also noted progression in the nondominant pLCX.  Patient with severe disease though poor targets for revascularization\"  "

## 2024-02-18 NOTE — PLAN OF CARE
Problem: NEUROSENSORY - ADULT  Goal: Achieves stable or improved neurological status  Description: INTERVENTIONS  - Monitor and report changes in neurological status  - Monitor vital signs such as temperature, blood pressure, glucose, and any other labs ordered   - Initiate measures to prevent increased intracranial pressure  - Monitor for seizure activity and implement precautions if appropriate      Outcome: Progressing     Problem: CARDIOVASCULAR - ADULT  Goal: Maintains optimal cardiac output and hemodynamic stability  Description: INTERVENTIONS:  - Monitor I/O, vital signs and rhythm  - Monitor for S/S and trends of decreased cardiac output  - Administer and titrate ordered vasoactive medications to optimize hemodynamic stability  - Assess quality of pulses, skin color and temperature  - Assess for signs of decreased coronary artery perfusion  - Instruct patient to report change in severity of symptoms  Outcome: Progressing     Problem: RESPIRATORY - ADULT  Goal: Achieves optimal ventilation and oxygenation  Description: INTERVENTIONS:  - Assess for changes in respiratory status  - Assess for changes in mentation and behavior  - Position to facilitate oxygenation and minimize respiratory effort  - Oxygen administered by appropriate delivery if ordered  - Initiate smoking cessation education as indicated  - Encourage broncho-pulmonary hygiene including cough, deep breathe, Incentive Spirometry  - Assess the need for suctioning and aspirate as needed  - Assess and instruct to report SOB or any respiratory difficulty  - Respiratory Therapy support as indicated  Outcome: Progressing     Problem: Prexisting or High Potential for Compromised Skin Integrity  Goal: Skin integrity is maintained or improved  Description: INTERVENTIONS:  - Identify patients at risk for skin breakdown  - Assess and monitor skin integrity  - Assess and monitor nutrition and hydration status  - Monitor labs   - Assess for incontinence   -  Turn and reposition patient  - Assist with mobility/ambulation  - Relieve pressure over bony prominences  - Avoid friction and shearing  - Provide appropriate hygiene as needed including keeping skin clean and dry  - Evaluate need for skin moisturizer/barrier cream  - Collaborate with interdisciplinary team   - Patient/family teaching  - Consider wound care consult   Outcome: Progressing     Problem: SAFETY ADULT  Goal: Patient will remain free of falls  Description: INTERVENTIONS:  - Educate patient/family on patient safety including physical limitations  - Instruct patient to call for assistance with activity   - Consult OT/PT to assist with strengthening/mobility   - Keep Call bell within reach  - Keep bed low and locked with side rails adjusted as appropriate  - Keep care items and personal belongings within reach  - Initiate and maintain comfort rounds  - Make Fall Risk Sign visible to staff  - Offer Toileting every 2 Hours, in advance of need  - Initiate/Maintain bed alarm  - Obtain necessary fall risk management equipment  - Apply yellow socks and bracelet for high fall risk patients  - Consider moving patient to room near nurses station  Outcome: Progressing

## 2024-02-18 NOTE — DISCHARGE SUMMARY
[facility]  Discharge- Elise Ken 1975, 48 y.o. female MRN: [id number]  Unit/Bed#: ICU 02-01 Encounter: [id number]  Primary Care Provider: Isaak Stanford DO   Date and time admitted to hospital: 2/15/2024  7:40 PM    H/O non-insulin dependent diabetes mellitus  Assessment & Plan  Patient denies being told about this diagnosis in the past though it is on her discharge summary from previous admission 9/27/2023  Placed on CCH type II diet  Obtain Accu-Cheks before meals and at bedtime with Humalog correction dose before meals and at bedtime    Leukocytosis  Assessment & Plan  Likely reactionary secondary to NSTEMI  Patient is afebrile with no active signs of infection  Continue to trend fever curve  Continue to monitor with repeat labs in a.m.    CKD (chronic kidney disease) stage 4, GFR 15-29 ml/min ([facility])  Assessment & Plan  Lab Results   Component Value Date    EGFR 17 02/18/2024    EGFR 19 02/17/2024    EGFR 22 02/16/2024    CREATININE 3.01 (H) 02/18/2024    CREATININE 2.79 (H) 02/17/2024    CREATININE 2.46 (H) 02/16/2024     Creatinine appears to be approximately baseline  Check PVR, bladder scan, retention protocol   Check Renal US   Advised BMP on discharge in 2 days to verify improvement in renal function    COPD (chronic obstructive pulmonary disease) ([facility])  Assessment & Plan  Not in exacerbation at this time  Patient continues to smoke  Counseled her on importance of smoking cessation  Continue prehospital Proventil 90 mcg 2 puffs every 4 hours as needed    Mixed hyperlipidemia  Assessment & Plan  Continue prehospital Lipitor 80 mg p.o. daily    Chronic diastolic congestive heart failure (HCC)  Assessment & Plan  Wt Readings from Last 3 Encounters:   02/18/24 129 kg (285 lb 4.4 oz)   12/12/23 129 kg (285 lb)   12/10/23 129 kg (285 lb)     Not in exacerbation at this time  Continue prehospital Imdur 90 mg p.o. daily, Toprol-XL 50 mg p.o. twice daily Procardia XL 30 mg  "p.o. daily    Essential hypertension  Assessment & Plan  Continue prehospital Toprol-XL 50 mg p.o. twice daily and Procardia XL 30 mg p.o. daily    * Non-STEMI (non-ST elevated myocardial infarction) (HCC)  Assessment & Plan  Nstemi per cardiology . Patient is not a candidate for revascularization  Refer to cardiology team documentation     Day#2 heparin infusion   Plan to transition when possible per cardiology.       Stop heparin drip  Continue prehospital cardiac medications  Appreciate cardiology team recommendations   Am labs ,     Patient had cardiac cath 9/25/2023 which revealed \"  There is progression of CAD noted: previously occluded LAD with continued collaterals though faint, prior stent to APRIL now occluded, also noted progression in the nondominant pLCX.  Patient with severe disease though poor targets for revascularization\"              Medical Problems       Resolved Problems  Date Reviewed: 2/17/2024   None         Admission Date:   Admission Orders (From admission, onward)       Ordered        02/15/24 2315  INPATIENT ADMISSION  Once                            Admitting Diagnosis: Chest pain [R07.9]  NSTEMI (non-ST elevated myocardial infarction) (HCC) [I21.4]    HPI:     Procedures Performed:   Orders Placed This Encounter   Procedures    Critical Care    ED ECG Documentation Only       Summary of Hospital Course:  Elise Ken is a 48 y.o. year old female who presented with chest pain that began at rest.  The tightness was located midsternally with radiation to the left side of her chest.  Patient has known cardio vascular disease, further cardiac catheterization in September 25, 2023 showed no need for revascularization though she has had prior stenting.  Hospitalization was complicated by ongoing chest pain, requiring further heparin drip and optimization of medical therapy    Significant Findings, Care, Treatment and Services Provided: as per HPI    Complications: as per HPI    Condition at " Discharge: fair         Discharge instructions/Information to patient and family:   See after visit summary for information provided to patient and family.      Provisions for Follow-Up Care:  See after visit summary for information related to follow-up care and any pertinent home health orders.      PCP: Isaak Stanford,     Disposition: See After Visit Summary for discharge disposition information.    Planned Readmission: No    Discharge Statement   I spent 30 minutes discharging the patient. This time was spent on the day of discharge. I had direct contact with the patient on the day of discharge. Additional documentation is required if more than 30 minutes were spent on discharge.     Discharge Medications:  See after visit summary for reconciled discharge medications provided to patient and family.

## 2024-02-18 NOTE — NURSING NOTE
Discharge instructions and medications reviewed at bedside prior to discharge. Patient given information on smoking cessation.

## 2024-02-19 ENCOUNTER — TRANSITIONAL CARE MANAGEMENT (OUTPATIENT)
Dept: FAMILY MEDICINE CLINIC | Facility: CLINIC | Age: 49
End: 2024-02-19

## 2024-02-19 ENCOUNTER — OFFICE VISIT (OUTPATIENT)
Dept: FAMILY MEDICINE CLINIC | Facility: CLINIC | Age: 49
End: 2024-02-19
Payer: COMMERCIAL

## 2024-02-19 VITALS
OXYGEN SATURATION: 98 % | DIASTOLIC BLOOD PRESSURE: 60 MMHG | SYSTOLIC BLOOD PRESSURE: 112 MMHG | BODY MASS INDEX: 46.04 KG/M2 | HEART RATE: 80 BPM | HEIGHT: 66 IN | TEMPERATURE: 96.1 F

## 2024-02-19 DIAGNOSIS — I21.4 NSTEMI (NON-ST ELEVATED MYOCARDIAL INFARCTION) (HCC): Primary | ICD-10-CM

## 2024-02-19 DIAGNOSIS — F17.200 SMOKING: ICD-10-CM

## 2024-02-19 DIAGNOSIS — R26.2 AMBULATORY DYSFUNCTION: Primary | ICD-10-CM

## 2024-02-19 DIAGNOSIS — R07.9 CHEST PAIN: ICD-10-CM

## 2024-02-19 DIAGNOSIS — I25.10 CORONARY ARTERY DISEASE INVOLVING NATIVE CORONARY ARTERY OF NATIVE HEART WITHOUT ANGINA PECTORIS: ICD-10-CM

## 2024-02-19 DIAGNOSIS — Z95.5 S/P DRUG ELUTING CORONARY STENT PLACEMENT: ICD-10-CM

## 2024-02-19 DIAGNOSIS — Z23 ENCOUNTER FOR IMMUNIZATION: ICD-10-CM

## 2024-02-19 DIAGNOSIS — E11.9 NEW ONSET TYPE 2 DIABETES MELLITUS (HCC): ICD-10-CM

## 2024-02-19 DIAGNOSIS — I10 ESSENTIAL HYPERTENSION: Chronic | ICD-10-CM

## 2024-02-19 PROCEDURE — 99496 TRANSJ CARE MGMT HIGH F2F 7D: CPT | Performed by: FAMILY MEDICINE

## 2024-02-19 RX ORDER — RANOLAZINE 1000 MG/1
1000 TABLET, EXTENDED RELEASE ORAL EVERY 12 HOURS SCHEDULED
Qty: 60 TABLET | Refills: 0 | Status: SHIPPED | OUTPATIENT
Start: 2024-02-19

## 2024-02-19 RX ORDER — NICOTINE 21 MG/24HR
1 PATCH, TRANSDERMAL 24 HOURS TRANSDERMAL EVERY 24 HOURS
Qty: 28 PATCH | Refills: 0 | Status: SHIPPED | OUTPATIENT
Start: 2024-02-19

## 2024-02-19 RX ORDER — NITROGLYCERIN 0.4 MG/1
0.4 TABLET SUBLINGUAL
Qty: 25 TABLET | Refills: 5 | Status: SHIPPED | OUTPATIENT
Start: 2024-02-19

## 2024-02-19 NOTE — PROGRESS NOTES
Assessment/Plan:    No problem-specific Assessment & Plan notes found for this encounter.       Diagnoses and all orders for this visit:    NSTEMI (non-ST elevated myocardial infarction) (HCC)  Comments:  keep appointment with cardiology  Orders:  -     ranolazine (RANEXA) 1000 MG SR tablet; Take 1 tablet (1,000 mg total) by mouth every 12 (twelve) hours    Encounter for immunization    New onset type 2 diabetes mellitus (HCC)  -     Albumin / creatinine urine ratio; Future  -     CBC and differential; Future  -     Comprehensive metabolic panel; Future  -     Lipid panel; Future  -     TSH, 3rd generation with Free T4 reflex; Future  -     Hemoglobin A1C; Future    Smoking  Comments:  pt counseled on quitting smoking and relationship to CAD and MI  Orders:  -     nicotine (NICODERM CQ) 14 mg/24hr TD 24 hr patch; Place 1 patch on the skin over 24 hours every 24 hours    Essential hypertension    Chest pain  -     ranolazine (RANEXA) 1000 MG SR tablet; Take 1 tablet (1,000 mg total) by mouth every 12 (twelve) hours    S/P drug eluting coronary stent placement  -     nitroglycerin (NITROSTAT) 0.4 mg SL tablet; Place 1 tablet (0.4 mg total) under the tongue every 5 (five) minutes as needed for chest pain    Coronary artery disease involving native coronary artery of native heart without angina pectoris  -     nitroglycerin (NITROSTAT) 0.4 mg SL tablet; Place 1 tablet (0.4 mg total) under the tongue every 5 (five) minutes as needed for chest pain          PHQ-2/9 Depression Screening    Little interest or pleasure in doing things: 0 - not at all  Feeling down, depressed, or hopeless: 0 - not at all  Trouble falling or staying asleep, or sleeping too much: 0 - not at all  Feeling tired or having little energy: 0 - not at all  Poor appetite or overeatin - not at all  Feeling bad about yourself - or that you are a failure or have let yourself or your family down: 0 - not at all  Trouble concentrating on things, such as  reading the newspaper or watching television: 0 - not at all  Moving or speaking so slowly that other people could have noticed. Or the opposite - being so fidgety or restless that you have been moving around a lot more than usual: 0 - not at all  Thoughts that you would be better off dead, or of hurting yourself in some way: 0 - not at all  PHQ-9 Score: 0  PHQ-9 Interpretation: No or Minimal depression        TCM Call       Date and time call was made  2/19/2024  6:46 AM    Hospital care reviewed  Records reviewed    Patient [facility]    Date of Admission  02/15/24    Date of discharge  02/18/24    Diagnosis  Non-STEMI (non-ST elevated myocardial infarction) (HCC)    Disposition  Home    Were the patients medications reviewed and updated  Yes    Current Symptoms  None          TCM Call       Post hospital issues  None    Should patient be enrolled in anticoag monitoring?  No    Scheduled for follow up?  Yes    Patients specialists  Cardiologist    Cardiologist name  Debbie Nazariobrodyfrank    Did you obtain your prescribed medications  Yes    Do you need help managing your prescriptions or medications  No    Is transportation to your appointment needed  No    I have advised the patient to call PCP with any new or worsening symptoms  mark jhonson             Subjective:      Patient ID: Elise Ken is a 48 y.o. female.    Hospital follow up for non-STEMI MI, pt was given blood thinners but had no procedures, pt was discharged with nitro and was dx with new onset diabetes      The following portions of the patient's history were reviewed and updated as appropriate: allergies, current medications, past family history, past medical history, past social history, past surgical history, and problem list.    Review of Systems   Constitutional:  Positive for fatigue. Negative for chills and fever.   Respiratory:  Negative for shortness of breath and wheezing.    Cardiovascular:  Negative for chest pain  "and palpitations.         Objective:    /60   Pulse 80   Temp (!) 96.1 °F (35.6 °C)   Ht 5' 6\" (1.676 m)   SpO2 98%   BMI 46.04 kg/m²      Physical Exam  Vitals and nursing note reviewed.   Constitutional:       General: She is not in acute distress.     Appearance: Normal appearance. She is not ill-appearing, toxic-appearing or diaphoretic.   HENT:      Head: Normocephalic and atraumatic.   Eyes:      Conjunctiva/sclera: Conjunctivae normal.   Cardiovascular:      Rate and Rhythm: Normal rate and regular rhythm.      Heart sounds: Normal heart sounds. No murmur heard.  Pulmonary:      Effort: Pulmonary effort is normal. No respiratory distress.      Breath sounds: Normal breath sounds. No wheezing, rhonchi or rales.   Abdominal:      General: There is no distension.      Palpations: Abdomen is soft. There is no mass.      Tenderness: There is no abdominal tenderness. There is no guarding or rebound.   Musculoskeletal:      Cervical back: Normal range of motion and neck supple. No rigidity.      Right lower leg: No edema.      Left lower leg: No edema.   Lymphadenopathy:      Cervical: No cervical adenopathy.   Neurological:      Mental Status: She is alert and oriented to person, place, and time.   Psychiatric:         Mood and Affect: Mood normal.         Behavior: Behavior normal.         Thought Content: Thought content normal.         Judgment: Judgment normal.         "

## 2024-02-19 NOTE — UTILIZATION REVIEW
NOTIFICATION OF ADMISSION DISCHARGE   This is a Notification of Discharge from [facility]. Please be advised that this patient has been discharge from our facility. Below you will find the admission and discharge date and time including the patient’s disposition.   UTILIZATION REVIEW CONTACT:  Remedios Harley  Utilization   Network Utilization Review Department  Phone: [phone] carefully listen to the prompts. All voicemails are confidential.  Email: [email or web address]     ADMISSION INFORMATION  PRESENTATION DATE: 2/15/2024  7:40 PM  OBERVATION ADMISSION DATE:   INPATIENT ADMISSION DATE: 2/15/24 11:15 PM   DISCHARGE DATE: 2/18/2024 10:21 AM   DISPOSITION:Home/Self Care    Network Utilization Review Department  ATTENTION: Please call with any questions or concerns to [phone] and carefully listen to the prompts so that you are directed to the right person. All voicemails are confidential.   For Discharge needs, contact Care Management DC Support Team at [phone] opt. [phone]  Send all requests for admission clinical reviews, approved or denied determinations and any other requests to dedicated fax number below belonging to the campus where the patient is receiving treatment. List of dedicated fax numbers for the Facilities:  FACILITY NAME UR FAX NUMBER   ADMISSION DENIALS (Administrative/Medical Necessity) [phone]   DISCHARGE SUPPORT TEAM ([facility]) [phone]   PARENT CHILD HEALTH (Maternity/NICU/Pediatrics) [phone]   [facility] [phone]   [facility] [phone]   [facility] [phone]   [facility] [phone]   [facility] [phone]   [facility] [phone]   [facility] [phone]   [facility] [phone]    [facility] [phone]   [facility] [phone]   [facility] [phone]   [facility] [phone]

## 2024-03-01 ENCOUNTER — HOSPITAL ENCOUNTER (EMERGENCY)
Facility: HOSPITAL | Age: 49
Discharge: HOME | End: 2024-03-02
Attending: INTERNAL MEDICINE
Payer: COMMERCIAL

## 2024-03-01 ENCOUNTER — APPOINTMENT (EMERGENCY)
Dept: RADIOLOGY | Facility: HOSPITAL | Age: 49
End: 2024-03-01
Payer: COMMERCIAL

## 2024-03-01 DIAGNOSIS — R07.9 CHEST PAIN: Primary | ICD-10-CM

## 2024-03-01 LAB
ALBUMIN SERPL BCP-MCNC: 4.1 G/DL (ref 3.5–5)
ALP SERPL-CCNC: 124 U/L (ref 34–104)
ALT SERPL W P-5'-P-CCNC: 16 U/L (ref 7–52)
ANION GAP SERPL CALCULATED.3IONS-SCNC: 16 MMOL/L
APTT PPP: 26 SECONDS (ref 23–37)
AST SERPL W P-5'-P-CCNC: 10 U/L (ref 13–39)
BASOPHILS # BLD AUTO: 0.1 THOUSANDS/ÂΜL (ref 0–0.1)
BASOPHILS NFR BLD AUTO: 1 % (ref 0–1)
BILIRUB SERPL-MCNC: 0.28 MG/DL (ref 0.2–1)
BUN SERPL-MCNC: 34 MG/DL (ref 5–25)
CALCIUM SERPL-MCNC: 9.4 MG/DL (ref 8.4–10.2)
CARDIAC TROPONIN I PNL SERPL HS: 19 NG/L
CHLORIDE SERPL-SCNC: 106 MMOL/L (ref 96–108)
CO2 SERPL-SCNC: 15 MMOL/L (ref 21–32)
CREAT SERPL-MCNC: 3.31 MG/DL (ref 0.6–1.3)
EOSINOPHIL # BLD AUTO: 0.44 THOUSAND/ÂΜL (ref 0–0.61)
EOSINOPHIL NFR BLD AUTO: 3 % (ref 0–6)
ERYTHROCYTE [DISTWIDTH] IN BLOOD BY AUTOMATED COUNT: 19.5 % (ref 11.6–15.1)
GFR SERPL CREATININE-BSD FRML MDRD: 15 ML/MIN/1.73SQ M
GLUCOSE SERPL-MCNC: 93 MG/DL (ref 65–140)
HCT VFR BLD AUTO: 41.3 % (ref 34.8–46.1)
HGB BLD-MCNC: 13.1 G/DL (ref 11.5–15.4)
IMM GRANULOCYTES # BLD AUTO: 0.16 THOUSAND/UL (ref 0–0.2)
IMM GRANULOCYTES NFR BLD AUTO: 1 % (ref 0–2)
INR PPP: 1.08 (ref 0.84–1.19)
LYMPHOCYTES # BLD AUTO: 2.43 THOUSANDS/ÂΜL (ref 0.6–4.47)
LYMPHOCYTES NFR BLD AUTO: 16 % (ref 14–44)
MCH RBC QN AUTO: 27.5 PG (ref 26.8–34.3)
MCHC RBC AUTO-ENTMCNC: 31.7 G/DL (ref 31.4–37.4)
MCV RBC AUTO: 87 FL (ref 82–98)
MONOCYTES # BLD AUTO: 1.02 THOUSAND/ÂΜL (ref 0.17–1.22)
MONOCYTES NFR BLD AUTO: 7 % (ref 4–12)
NEUTROPHILS # BLD AUTO: 10.86 THOUSANDS/ÂΜL (ref 1.85–7.62)
NEUTS SEG NFR BLD AUTO: 72 % (ref 43–75)
NRBC BLD AUTO-RTO: 0 /100 WBCS
PLATELET # BLD AUTO: 394 THOUSANDS/UL (ref 149–390)
PMV BLD AUTO: 10.2 FL (ref 8.9–12.7)
POTASSIUM SERPL-SCNC: 4 MMOL/L (ref 3.5–5.3)
PROT SERPL-MCNC: 7.8 G/DL (ref 6.4–8.4)
PROTHROMBIN TIME: 14.1 SECONDS (ref 11.6–14.5)
RBC # BLD AUTO: 4.77 MILLION/UL (ref 3.81–5.12)
SODIUM SERPL-SCNC: 137 MMOL/L (ref 135–147)
WBC # BLD AUTO: 15.01 THOUSAND/UL (ref 4.31–10.16)

## 2024-03-01 PROCEDURE — 36415 COLL VENOUS BLD VENIPUNCTURE: CPT

## 2024-03-01 PROCEDURE — 85025 COMPLETE CBC W/AUTO DIFF WBC: CPT

## 2024-03-01 PROCEDURE — 84484 ASSAY OF TROPONIN QUANT: CPT

## 2024-03-01 PROCEDURE — 85610 PROTHROMBIN TIME: CPT

## 2024-03-01 PROCEDURE — 93005 ELECTROCARDIOGRAM TRACING: CPT

## 2024-03-01 PROCEDURE — 71045 X-RAY EXAM CHEST 1 VIEW: CPT

## 2024-03-01 PROCEDURE — 85730 THROMBOPLASTIN TIME PARTIAL: CPT

## 2024-03-01 PROCEDURE — 99285 EMERGENCY DEPT VISIT HI MDM: CPT | Performed by: INTERNAL MEDICINE

## 2024-03-01 PROCEDURE — 80053 COMPREHEN METABOLIC PANEL: CPT

## 2024-03-01 PROCEDURE — 99285 EMERGENCY DEPT VISIT HI MDM: CPT

## 2024-03-01 RX ORDER — NITROGLYCERIN 0.4 MG/1
0.4 TABLET SUBLINGUAL ONCE
Status: COMPLETED | OUTPATIENT
Start: 2024-03-01 | End: 2024-03-01

## 2024-03-01 RX ADMIN — NITROGLYCERIN 0.4 MG: 0.4 TABLET SUBLINGUAL at 22:17

## 2024-03-02 VITALS
DIASTOLIC BLOOD PRESSURE: 60 MMHG | RESPIRATION RATE: 18 BRPM | TEMPERATURE: 97.9 F | HEART RATE: 60 BPM | SYSTOLIC BLOOD PRESSURE: 122 MMHG | OXYGEN SATURATION: 97 %

## 2024-03-02 LAB
2HR DELTA HS TROPONIN: 0 NG/L
ATRIAL RATE: 62 BPM
ATRIAL RATE: 68 BPM
CARDIAC TROPONIN I PNL SERPL HS: 19 NG/L
P AXIS: 48 DEGREES
P AXIS: 55 DEGREES
PR INTERVAL: 184 MS
PR INTERVAL: 188 MS
QRS AXIS: 37 DEGREES
QRS AXIS: 7 DEGREES
QRSD INTERVAL: 104 MS
QRSD INTERVAL: 110 MS
QT INTERVAL: 422 MS
QT INTERVAL: 442 MS
QTC INTERVAL: 448 MS
QTC INTERVAL: 448 MS
T WAVE AXIS: 79 DEGREES
T WAVE AXIS: 80 DEGREES
VENTRICULAR RATE: 62 BPM
VENTRICULAR RATE: 68 BPM

## 2024-03-02 PROCEDURE — 93010 ELECTROCARDIOGRAM REPORT: CPT | Performed by: INTERNAL MEDICINE

## 2024-03-02 NOTE — ED PROVIDER NOTES
History  Chief Complaint   Patient presents with    Chest Pain     Chest pain started around [id number] tonight; left sided chest pain into back     48-year-old female accompanied by her daughter presents emergency room with midsternal chest pain radiating across her precordium.  Patient has a long history of hypertension,  D, Alport's syndrome, sleep apnea, and morbid obesity.  Patient has a prior history of multiple non-STEMI infarcts, headache cardiac catheterization done in the fall 2023.  Patient is unsure if she has stents she believes she does.;  Patient was told she is had about 9 MIs in the past.  States she is down to half a pack of cigarettes a day she had been a pack-a-day smoker for most of her adult life, she recently purchased NicoDerm patches but has not started using them yet        Prior to Admission Medications   Prescriptions Last Dose Informant Patient Reported? Taking?   NIFEdipine (PROCARDIA XL) 30 mg 24 hr tablet   No No   Sig: Take 1 tablet (30 mg total) by mouth daily   acetaminophen (TYLENOL) 325 mg tablet   No No   Sig: Take 3 tablets (975 mg total) by mouth every 8 (eight) hours as needed for mild pain   albuterol (PROVENTIL HFA,VENTOLIN HFA) 90 mcg/act inhaler   No No   Sig: Inhale 2 puffs every 4 (four) hours as needed for wheezing or shortness of breath   aspirin 81 mg chewable tablet  Self No No   Sig: Chew 1 tablet (81 mg total) daily   atorvastatin (LIPITOR) 80 mg tablet   No No   Sig: Take 1 tablet (80 mg total) by mouth every evening   calcitriol (ROCALTROL) 0.25 mcg capsule   Yes No   Sig: Take 1 capsule (0.25 mcg total) by mouth daily Takes Monday Wednesday and fridays as of 11/11/22   clopidogrel (PLAVIX) 75 mg tablet   No No   Sig: Take 1 tablet (75 mg total) by mouth daily   gabapentin (Neurontin) 300 mg capsule   No No   Sig: Take 1 capsule (300 mg total) by mouth 3 (three) times a day   isosorbide mononitrate (IMDUR) 120 mg 24 hr tablet   No No   Sig: Take 1 tablet (120  mg total) by mouth daily   isosorbide mononitrate (IMDUR) 60 mg 24 hr tablet   No No   Sig: Take 2 tablets (120 mg total) by mouth daily   Patient not taking: Reported on 2/19/2024   metoprolol succinate (TOPROL-XL) 50 mg 24 hr tablet   No No   Sig: Take 1 tablet (50 mg total) by mouth 2 (two) times a day   nicotine (NICODERM CQ) 14 mg/24hr TD 24 hr patch   No No   Sig: Place 1 patch on the skin over 24 hours every 24 hours   nitroglycerin (NITROSTAT) 0.4 mg SL tablet   No No   Sig: Place 1 tablet (0.4 mg total) under the tongue every 5 (five) minutes as needed for chest pain   ranolazine (RANEXA) 1000 MG SR tablet   No No   Sig: Take 1 tablet (1,000 mg total) by mouth every 12 (twelve) hours   silver sulfadiazine (SILVADENE,SSD) 1 % cream   No No   Sig: Apply topically daily   sodium bicarbonate 650 mg tablet   No No   Sig: Take 1 tablet (650 mg total) by mouth 2 (two) times daily after meals   venlafaxine (EFFEXOR-XR) 75 mg 24 hr capsule   No No   Sig: Take 1 capsule (75 mg total) by mouth daily with breakfast      Facility-Administered Medications: None       Past Medical History:   Diagnosis Date    Anemia     Anxiety     CAD (coronary artery disease)     Cancer (formerly [facility]) 2008    Cardiomyopathy (formerly [facility])     CHF (congestive heart failure) (formerly [facility])     Chronic kidney disease     COPD (chronic obstructive pulmonary disease) (formerly [facility])     scheduled for sleep apnea test soon after pacemaker implant    Coronary artery disease     Depression     History of chemotherapy     non hodgkins lymphoma 2008    Hyperlipemia     Hypertension     Hypertrophic cardiomyopathy (formerly [facility])     Lymphoma, non-Hodgkin's (formerly [facility])     Myocardial infarction (formerly [facility])     Non-ST elevation MI (NSTEMI) 01/02/2023    Obesity     Obstructive sleep apnea     Olecranon bursitis, right elbow 7/11/2022    SSS (sick sinus syndrome) (formerly [facility])     s/p medtronic dual chamber pacemaker 5/25/2021    Tobacco abuse     Ventricular tachycardia, non-sustained (formerly [facility])     Wears glasses         Past Surgical History:   Procedure Laterality Date    CARDIAC CATHETERIZATION N/A 1/3/2023    Procedure: Cardiac catheterization;  Surgeon: Jesus Franco MD;  Location: [facility];  Service: Cardiology    CARDIAC CATHETERIZATION N/A 1/3/2023    Procedure: Cardiac Coronary Angiogram;  Surgeon: Jesus Franco MD;  Location:  CARDIAC CATH LAB;  Service: Cardiology    CARDIAC CATHETERIZATION N/A 1/3/2023    Procedure: Cardiac pci;  Surgeon: Jesus Franco MD;  Location:  CARDIAC CATH LAB;  Service: Cardiology    CARDIAC CATHETERIZATION Left 2023    Procedure: Cardiac Left Heart Cath;  Surgeon: Frieda Fox DO;  Location: [facility];  Service: Cardiology    CARDIAC CATHETERIZATION  2023    Procedure: Cardiac catheterization;  Surgeon: Frieda Fox DO;  Location:  CARDIAC CATH LAB;  Service: Cardiology    CARDIAC CATHETERIZATION N/A 2023    Procedure: Cardiac Coronary Angiogram;  Surgeon: Frieda Fox DO;  Location:  CARDIAC CATH LAB;  Service: Cardiology    CARDIAC PACEMAKER PLACEMENT Left 2021    Procedure: DUAL LEAD PACEMAKER IMPLANT;  Surgeon: Yinka Shaw MD;  Location: [facility] OR;  Service: Cardiology    CAROTID STENT       SECTION      CORONARY ANGIOPLASTY WITH STENT PLACEMENT  2021    GREG mid RCA and GREG right PDA    DENTAL SURGERY      IR BIOPSY KIDNEY RANDOM  10/18/2021       Family History   Problem Relation Age of Onset    No Known Problems Mother     No Known Problems Father     No Known Problems Daughter     Brain cancer Maternal Grandfather     Heart disease Maternal Grandfather     Cancer Maternal Grandfather     No Known Problems Paternal Aunt     No Known Problems Paternal Aunt      I have reviewed and agree with the history as documented.    E-Cigarette/Vaping    E-Cigarette Use Never User      E-Cigarette/Vaping Substances    Nicotine No     THC No     CBD No     Flavoring No     Other No     Unknown No      Social  History     Tobacco Use    Smoking status: Every Day     Current packs/day: 0.50     Average packs/day: 0.5 packs/day for 25.0 years (12.5 ttl pk-yrs)     Types: Cigarettes    Smokeless tobacco: Never    Tobacco comments:     Recently and currently quitting cigarettes   Vaping Use    Vaping status: Never Used   Substance Use Topics    Alcohol use: Yes     Comment: 1-2 times years    Drug use: No       Review of Systems   Constitutional:  Negative for fatigue and fever.   HENT:  Negative for congestion.    Respiratory:  Positive for chest tightness and shortness of breath. Negative for apnea, cough, choking, wheezing and stridor.    Cardiovascular:  Positive for chest pain. Negative for palpitations and leg swelling.   Gastrointestinal: Negative.    Genitourinary: Negative.    Neurological: Negative.    Psychiatric/Behavioral: Negative.         Physical Exam  Physical Exam  Vitals and nursing note reviewed.   Constitutional:       General: She is not in acute distress.     Appearance: She is obese. She is not ill-appearing.   HENT:      Head: Normocephalic and atraumatic.   Eyes:      Extraocular Movements: Extraocular movements intact.   Cardiovascular:      Rate and Rhythm: Normal rate and regular rhythm.      Heart sounds: Normal heart sounds.   Pulmonary:      Effort: Pulmonary effort is normal.      Breath sounds: Normal breath sounds. No wheezing or rhonchi.   Chest:      Chest wall: No mass or deformity.   Abdominal:      General: Bowel sounds are normal.      Palpations: Abdomen is soft.   Musculoskeletal:         General: Normal range of motion.      Cervical back: Normal range of motion and neck supple.      Right lower leg: Edema present.      Left lower leg: Edema present.   Skin:     General: Skin is warm and dry.   Neurological:      General: No focal deficit present.      Mental Status: She is alert and oriented to person, place, and time.   Psychiatric:         Mood and Affect: Mood is anxious.          Behavior: Behavior normal.         Vital Signs  ED Triage Vitals   Temp Pulse Resp BP SpO2   -- -- -- -- --      Temp src Heart Rate Source Patient Position - Orthostatic VS BP Location FiO2 (%)   -- -- -- -- --      Pain Score       --           There were no vitals filed for this visit.      Visual Acuity      ED Medications  Medications - No data to display    Diagnostic Studies  Results Reviewed       None                   No orders to display              Procedures  ECG 12 Lead Documentation Only    Date/Time: 3/1/2024 10:05 PM    Performed by: Rip Thornton MD  Authorized by: Rip Thornotn MD    Indications / Diagnosis:  Chest pain  ECG reviewed by me, the ED Provider: yes    Patient location:  ED  Previous ECG:     Previous ECG:  Compared to current    Similarity:  No change    Comparison to cardiac monitor: Yes    Interpretation:     Interpretation: normal    Rate:     ECG rate assessment: normal    Rhythm:     Rhythm: sinus rhythm    Ectopy:     Ectopy: none    QRS:     QRS axis:  Normal  Conduction:     Conduction: normal    ST segments:     ST segments:  Abnormal  T waves:     T waves: flattening    Q waves:     Q waves:  II, III, aVF, V1, V2, V3, V4 and V5           ED Course                                             Medical Decision Making           Disposition  Final diagnoses:   None     ED Disposition       None          Follow-up Information    None         Patient's Medications   Discharge Prescriptions    No medications on file       No discharge procedures on file.    PDMP Review         Value Time User    PDMP Reviewed  Yes 3/26/2023  6:06 PM Berry Munroe III DO            ED Provider  Electronically Signed by           Arm, Right Updated: 03/01/24 2207     PTT 26 seconds     CBC and differential [[id number]]  (Abnormal) Collected: 03/01/24 2150    Lab Status: Final result Specimen: Blood from Arm, Right Updated: 03/01/24 2157     WBC 15.01 Thousand/uL      RBC 4.77 Million/uL      Hemoglobin 13.1 g/dL      Hematocrit 41.3 %      MCV 87 fL      MCH 27.5 pg      MCHC 31.7 g/dL      RDW 19.5 %      MPV 10.2 fL      Platelets 394 Thousands/uL      nRBC 0 /100 WBCs      Neutrophils Relative 72 %      Immature Grans % 1 %      Lymphocytes Relative 16 %      Monocytes Relative 7 %      Eosinophils Relative 3 %      Basophils Relative 1 %      Neutrophils Absolute 10.86 Thousands/µL      Absolute Immature Grans 0.16 Thousand/uL      Absolute Lymphocytes 2.43 Thousands/µL      Absolute Monocytes 1.02 Thousand/µL      Eosinophils Absolute 0.44 Thousand/µL      Basophils Absolute 0.10 Thousands/µL                    XR chest 1 view portable   Final Result by Alyssa Lau MD (03/02 0809)      No acute cardiopulmonary disease.            Workstation performed: [id number]                    Procedures  ECG 12 Lead Documentation Only    Date/Time: 3/1/2024 10:05 PM    Performed by: Rip Thornton MD  Authorized by: Rip Thornton MD    Indications / Diagnosis:  Chest pain  ECG reviewed by me, the ED Provider: yes    Patient location:  ED  Previous ECG:     Previous ECG:  Compared to current    Similarity:  No change    Comparison to cardiac monitor: Yes    Interpretation:     Interpretation: normal    Rate:     ECG rate assessment: normal    Rhythm:     Rhythm: sinus rhythm    Ectopy:     Ectopy: none    QRS:     QRS axis:  Normal  Conduction:     Conduction: normal    ST segments:     ST segments:  Abnormal  T waves:     T waves: flattening    Q waves:     Q waves:  II, III, aVF, V1, V2, V3, V4 and V5           ED Course             HEART Risk Score      Flowsheet Row Most Recent Value   Heart Score Risk Calculator    History 0 Filed  at: 03/02/2024 0035   ECG 1 Filed at: 03/02/2024 0035   Age 1 Filed at: 03/02/2024 0035   Risk Factors 2 Filed at: 03/02/2024 0035   Troponin 1 Filed at: 03/02/2024 0035   HEART Score 5 Filed at: 03/02/2024 0035                                        Medical Decision Making  Pt w Hx CAD and recurrent angina and chronically elevated Trop - pt r/o for MI - pt was Dcd home to F/U w PCP and hr cardiologist     Amount and/or Complexity of Data Reviewed  Labs: ordered.  Radiology: ordered.    Risk  Prescription drug management.             Disposition  Final diagnoses:   Chest pain     Time reflects when diagnosis was documented in both MDM as applicable and the Disposition within this note       Time User Action Codes Description Comment    3/2/2024 12:34 AM Adrien Kelly [R07.9] Chest pain           ED Disposition       ED Disposition   Discharge    Condition   Stable    Date/Time   Sat Mar 2, 2024 0034    Comment   Elise Ken discharge to home/self care.                   Follow-up Information       Follow up With Specialties Details Why Contact Info Additional Information    [facility] Emergency Department Emergency Medicine  If symptoms worsen [address] [address] [phone] [facility] Emergency Department, [address], [place] [address]    Isaak Armstrong,  Family Medicine Schedule an appointment as soon as possible for a visit   [address] [place] [address]  [facility] [address]  [phone]               Discharge Medication List as of 3/2/2024 12:34 AM        CONTINUE these medications which have NOT CHANGED    Details   acetaminophen (TYLENOL) 325 mg tablet Take 3 tablets (975 mg total) by mouth every 8 (eight) hours as needed for mild pain, Starting Thu 1/5/2023, Normal      albuterol (PROVENTIL HFA,VENTOLIN HFA) 90 mcg/act inhaler Inhale 2 puffs every 4 (four) hours as needed for wheezing or shortness of breath, Starting Mon  5/23/2022, Normal      aspirin 81 mg chewable tablet Chew 1 tablet (81 mg total) daily, Starting Fri 4/2/2021, Normal      calcitriol (ROCALTROL) 0.25 mcg capsule Take 1 capsule (0.25 mcg total) by mouth daily Takes Monday Wednesday and fridays as of 11/11/22, Starting Tue 1/10/2023, Historical Med      clopidogrel (PLAVIX) 75 mg tablet Take 1 tablet (75 mg total) by mouth daily, Starting Thu 12/7/2023, Normal      !! isosorbide mononitrate (IMDUR) 60 mg 24 hr tablet Take 2 tablets (120 mg total) by mouth daily, Starting Sun 2/18/2024, Until Tue 3/19/2024, Normal      metoprolol succinate (TOPROL-XL) 50 mg 24 hr tablet Take 1 tablet (50 mg total) by mouth 2 (two) times a day, Starting Tue 12/12/2023, Normal      nicotine (NICODERM CQ) 14 mg/24hr TD 24 hr patch Place 1 patch on the skin over 24 hours every 24 hours, Starting Mon 2/19/2024, Normal      NIFEdipine (PROCARDIA XL) 30 mg 24 hr tablet Take 1 tablet (30 mg total) by mouth daily, Starting Tue 12/12/2023, Normal      ranolazine (RANEXA) 1000 MG SR tablet Take 1 tablet (1,000 mg total) by mouth every 12 (twelve) hours, Starting Mon 2/19/2024, Normal      silver sulfadiazine (SILVADENE,SSD) 1 % cream Apply topically daily, Starting Mon 10/2/2023, Normal      sodium bicarbonate 650 mg tablet Take 1 tablet (650 mg total) by mouth 2 (two) times daily after meals, Starting Thu 1/5/2023, Normal      atorvastatin (LIPITOR) 80 mg tablet Take 1 tablet (80 mg total) by mouth every evening, Starting Tue 12/12/2023, Until Fri 12/6/2024, Normal      gabapentin (Neurontin) 300 mg capsule Take 1 capsule (300 mg total) by mouth 3 (three) times a day, Starting Tue 1/23/2024, Normal      !! isosorbide mononitrate (IMDUR) 120 mg 24 hr tablet Take 1 tablet (120 mg total) by mouth daily, Starting Mon 2/19/2024, Normal      nitroglycerin (NITROSTAT) 0.4 mg SL tablet Place 1 tablet (0.4 mg total) under the tongue every 5 (five) minutes as needed for chest pain, Starting Mon  2/19/2024, Normal      venlafaxine (EFFEXOR-XR) 75 mg 24 hr capsule Take 1 capsule (75 mg total) by mouth daily with breakfast, Starting Tue 1/23/2024, Normal       !! - Potential duplicate medications found. Please discuss with provider.          No discharge procedures on file.    PDMP Review         Value Time User    PDMP Reviewed  Yes 3/26/2023  6:06 PM Berry Munroe III, DO            ED Provider  Electronically Signed by             Rip Thornton MD  03/16/24 1501       Rip Thornton MD  03/20/24 0370

## 2024-03-02 NOTE — ED CARE HANDOFF
Emergency Department Sign Out Note        Sign out and transfer of care from Dr. Thornton See Separate Emergency Department note.     The patient, Elise Ken, was evaluated by the previous provider for chest pain.    Workup Completed:  Cardiac Workup    ED Course / Workup Pending (followup):  Patient signed out to me pending delta troponin.  Heart score 5    Repeat troponin unchanged, patient states her symptoms have improved.  She is stable for discharge.  She was informed that her kidney function was slightly elevated compared to prior and recommended routine follow-up with her primary care physician.  She was given strict return precautions.      HEART Risk Score      Flowsheet Row Most Recent Value   Heart Score Risk Calculator    History 0 Filed at: 03/02/2024 0035   ECG 1 Filed at: 03/02/2024 0035   Age 1 Filed at: 03/02/2024 0035   Risk Factors 2 Filed at: 03/02/2024 0035   Troponin 1 Filed at: 03/02/2024 0035   HEART Score 5 Filed at: 03/02/2024 0035                                    ED Course as of 03/02/24 0038   Sat Mar 02, 2024   0030 Delta 2hr hsTnI: 0     Procedures  Medical Decision Making  Amount and/or Complexity of Data Reviewed  Labs:  Decision-making details documented in ED Course.    Risk  Prescription drug management.            Disposition  Final diagnoses:   Chest pain     Time reflects when diagnosis was documented in both MDM as applicable and the Disposition within this note       Time User Action Codes Description Comment    3/2/2024 12:34 AM Adrien Kelly Add [R07.9] Chest pain           ED Disposition       ED Disposition   Discharge    Condition   Stable    Date/Time   Sat Mar 2, 2024 0034    Comment   Elise Ken discharge to home/self care.                   Follow-up Information       Follow up With Specialties Details Why Contact Info Additional Information    [facility] Emergency Department Emergency Medicine  If symptoms worsen [address] [place]  [place] [address]  [phone] [facility] Emergency Department, [address] [facility], [place], [place] [address]    Isaak Armstrong,  Family Medicine Schedule an appointment as soon as possible for a visit   [address] [place] [address] [place]  [facility] [address]  [phone]             Patient's Medications   Discharge Prescriptions    No medications on file     No discharge procedures on file.       ED Provider  Electronically Signed by     Adrien Kelly MD  03/02/24 0039

## 2024-03-05 DIAGNOSIS — R07.9 CHEST PAIN: ICD-10-CM

## 2024-03-05 DIAGNOSIS — Z95.5 S/P DRUG ELUTING CORONARY STENT PLACEMENT: ICD-10-CM

## 2024-03-05 DIAGNOSIS — G62.9 PERIPHERAL NEUROPATHY: ICD-10-CM

## 2024-03-05 DIAGNOSIS — I25.10 CORONARY ARTERY DISEASE INVOLVING NATIVE CORONARY ARTERY OF NATIVE HEART WITHOUT ANGINA PECTORIS: ICD-10-CM

## 2024-03-05 DIAGNOSIS — I21.4 NSTEMI (NON-ST ELEVATED MYOCARDIAL INFARCTION) (HCC): ICD-10-CM

## 2024-03-05 DIAGNOSIS — E78.2 MIXED HYPERLIPIDEMIA: ICD-10-CM

## 2024-03-05 DIAGNOSIS — F32.A DEPRESSION, UNSPECIFIED DEPRESSION TYPE: ICD-10-CM

## 2024-03-05 DIAGNOSIS — I10 HYPERTENSION, UNSPECIFIED TYPE: ICD-10-CM

## 2024-03-05 RX ORDER — ATORVASTATIN CALCIUM 80 MG/1
80 TABLET, FILM COATED ORAL EVERY EVENING
Qty: 90 TABLET | Refills: 3 | Status: SHIPPED | OUTPATIENT
Start: 2024-03-05 | End: 2025-02-28

## 2024-03-06 RX ORDER — ISOSORBIDE MONONITRATE 120 MG/1
120 TABLET, EXTENDED RELEASE ORAL DAILY
Qty: 30 TABLET | Refills: 3 | Status: SHIPPED | OUTPATIENT
Start: 2024-03-06

## 2024-03-06 RX ORDER — GABAPENTIN 300 MG/1
300 CAPSULE ORAL 3 TIMES DAILY
Qty: 90 CAPSULE | Refills: 3 | Status: SHIPPED | OUTPATIENT
Start: 2024-03-06

## 2024-03-06 RX ORDER — VENLAFAXINE HYDROCHLORIDE 75 MG/1
75 CAPSULE, EXTENDED RELEASE ORAL
Qty: 30 CAPSULE | Refills: 3 | Status: SHIPPED | OUTPATIENT
Start: 2024-03-06

## 2024-03-06 RX ORDER — NITROGLYCERIN 0.4 MG/1
0.4 TABLET SUBLINGUAL
Qty: 25 TABLET | Refills: 1 | Status: SHIPPED | OUTPATIENT
Start: 2024-03-06

## 2024-03-26 DIAGNOSIS — I25.10 CORONARY ARTERY DISEASE INVOLVING NATIVE CORONARY ARTERY OF NATIVE HEART WITHOUT ANGINA PECTORIS: ICD-10-CM

## 2024-03-26 DIAGNOSIS — I21.4 NSTEMI (NON-ST ELEVATED MYOCARDIAL INFARCTION) (HCC): ICD-10-CM

## 2024-03-26 DIAGNOSIS — Z95.5 S/P DRUG ELUTING CORONARY STENT PLACEMENT: ICD-10-CM

## 2024-03-26 DIAGNOSIS — R07.9 CHEST PAIN: ICD-10-CM

## 2024-03-27 RX ORDER — RANOLAZINE 1000 MG/1
1000 TABLET, EXTENDED RELEASE ORAL EVERY 12 HOURS SCHEDULED
Qty: 60 TABLET | Refills: 3 | Status: SHIPPED | OUTPATIENT
Start: 2024-03-27

## 2024-03-27 RX ORDER — NITROGLYCERIN 0.4 MG/1
0.4 TABLET SUBLINGUAL
Qty: 25 TABLET | Refills: 0 | Status: SHIPPED | OUTPATIENT
Start: 2024-03-27

## 2024-05-07 ENCOUNTER — APPOINTMENT (EMERGENCY)
Dept: RADIOLOGY | Facility: HOSPITAL | Age: 49
End: 2024-05-07
Payer: COMMERCIAL

## 2024-05-07 ENCOUNTER — HOSPITAL ENCOUNTER (OUTPATIENT)
Facility: HOSPITAL | Age: 49
Setting detail: OBSERVATION
Discharge: HOME | End: 2024-05-08
Attending: EMERGENCY MEDICINE | Admitting: STUDENT IN AN ORGANIZED HEALTH CARE EDUCATION/TRAINING PROGRAM
Payer: COMMERCIAL

## 2024-05-07 ENCOUNTER — APPOINTMENT (OUTPATIENT)
Dept: ULTRASOUND IMAGING | Facility: HOSPITAL | Age: 49
End: 2024-05-07
Payer: COMMERCIAL

## 2024-05-07 DIAGNOSIS — N17.9 AKI (ACUTE KIDNEY INJURY) (HCC): ICD-10-CM

## 2024-05-07 DIAGNOSIS — Z95.5 S/P DRUG ELUTING CORONARY STENT PLACEMENT: ICD-10-CM

## 2024-05-07 DIAGNOSIS — I25.10 CORONARY ARTERY DISEASE INVOLVING NATIVE CORONARY ARTERY OF NATIVE HEART WITHOUT ANGINA PECTORIS: ICD-10-CM

## 2024-05-07 DIAGNOSIS — R07.9 ACUTE CHEST PAIN: Primary | ICD-10-CM

## 2024-05-07 LAB
2HR DELTA HS TROPONIN: 7 NG/L
4HR DELTA HS TROPONIN: 22 NG/L
ALBUMIN SERPL BCP-MCNC: 4.2 G/DL (ref 3.5–5)
ALP SERPL-CCNC: 103 U/L (ref 34–104)
ALT SERPL W P-5'-P-CCNC: 12 U/L (ref 7–52)
ANION GAP SERPL CALCULATED.3IONS-SCNC: 12 MMOL/L (ref 4–13)
ANION GAP SERPL CALCULATED.3IONS-SCNC: 9 MMOL/L (ref 4–13)
AST SERPL W P-5'-P-CCNC: 8 U/L (ref 13–39)
ATRIAL RATE: 60 BPM
ATRIAL RATE: 74 BPM
BACTERIA UR QL AUTO: ABNORMAL /HPF
BASOPHILS # BLD AUTO: 0.1 THOUSANDS/ÂΜL (ref 0–0.1)
BASOPHILS NFR BLD AUTO: 1 % (ref 0–1)
BILIRUB SERPL-MCNC: 0.37 MG/DL (ref 0.2–1)
BILIRUB UR QL STRIP: NEGATIVE
BNP SERPL-MCNC: 356 PG/ML (ref 0–100)
BUN SERPL-MCNC: 45 MG/DL (ref 5–25)
BUN SERPL-MCNC: 47 MG/DL (ref 5–25)
CALCIUM SERPL-MCNC: 9.3 MG/DL (ref 8.4–10.2)
CALCIUM SERPL-MCNC: 9.5 MG/DL (ref 8.4–10.2)
CARDIAC TROPONIN I PNL SERPL HS: 22 NG/L
CARDIAC TROPONIN I PNL SERPL HS: 29 NG/L
CARDIAC TROPONIN I PNL SERPL HS: 44 NG/L
CHLORIDE SERPL-SCNC: 112 MMOL/L (ref 96–108)
CHLORIDE SERPL-SCNC: 114 MMOL/L (ref 96–108)
CLARITY UR: CLEAR
CO2 SERPL-SCNC: 15 MMOL/L (ref 21–32)
CO2 SERPL-SCNC: 17 MMOL/L (ref 21–32)
COLOR UR: YELLOW
CREAT SERPL-MCNC: 4.2 MG/DL (ref 0.6–1.3)
CREAT SERPL-MCNC: 4.41 MG/DL (ref 0.6–1.3)
CREAT UR-MCNC: 112.9 MG/DL
CREAT UR-MCNC: 112.9 MG/DL
EOSINOPHIL # BLD AUTO: 0.45 THOUSAND/ÂΜL (ref 0–0.61)
EOSINOPHIL NFR BLD AUTO: 3 % (ref 0–6)
ERYTHROCYTE [DISTWIDTH] IN BLOOD BY AUTOMATED COUNT: 20.1 % (ref 11.6–15.1)
GFR SERPL CREATININE-BSD FRML MDRD: 11 ML/MIN/1.73SQ M
GFR SERPL CREATININE-BSD FRML MDRD: 11 ML/MIN/1.73SQ M
GLUCOSE SERPL-MCNC: 110 MG/DL (ref 65–140)
GLUCOSE SERPL-MCNC: 98 MG/DL (ref 65–140)
GLUCOSE UR STRIP-MCNC: NEGATIVE MG/DL
HCT VFR BLD AUTO: 40.7 % (ref 34.8–46.1)
HGB BLD-MCNC: 12.4 G/DL (ref 11.5–15.4)
HGB UR QL STRIP.AUTO: NEGATIVE
IMM GRANULOCYTES # BLD AUTO: 0.12 THOUSAND/UL (ref 0–0.2)
IMM GRANULOCYTES NFR BLD AUTO: 1 % (ref 0–2)
KETONES UR STRIP-MCNC: NEGATIVE MG/DL
LEUKOCYTE ESTERASE UR QL STRIP: NEGATIVE
LYMPHOCYTES # BLD AUTO: 2.18 THOUSANDS/ÂΜL (ref 0.6–4.47)
LYMPHOCYTES NFR BLD AUTO: 16 % (ref 14–44)
MAGNESIUM SERPL-MCNC: 1.9 MG/DL (ref 1.9–2.7)
MCH RBC QN AUTO: 27.6 PG (ref 26.8–34.3)
MCHC RBC AUTO-ENTMCNC: 30.5 G/DL (ref 31.4–37.4)
MCV RBC AUTO: 90 FL (ref 82–98)
MONOCYTES # BLD AUTO: 1.02 THOUSAND/ÂΜL (ref 0.17–1.22)
MONOCYTES NFR BLD AUTO: 8 % (ref 4–12)
NEUTROPHILS # BLD AUTO: 9.79 THOUSANDS/ÂΜL (ref 1.85–7.62)
NEUTS SEG NFR BLD AUTO: 71 % (ref 43–75)
NITRITE UR QL STRIP: NEGATIVE
NON-SQ EPI CELLS URNS QL MICRO: ABNORMAL /HPF
NRBC BLD AUTO-RTO: 0 /100 WBCS
P AXIS: 35 DEGREES
P AXIS: 50 DEGREES
P AXIS: 52 DEGREES
P AXIS: 9 DEGREES
PH UR STRIP.AUTO: 6 [PH]
PLATELET # BLD AUTO: 339 THOUSANDS/UL (ref 149–390)
PMV BLD AUTO: 10.1 FL (ref 8.9–12.7)
POTASSIUM SERPL-SCNC: 4 MMOL/L (ref 3.5–5.3)
POTASSIUM SERPL-SCNC: 4.7 MMOL/L (ref 3.5–5.3)
PR INTERVAL: 130 MS
PR INTERVAL: 134 MS
PR INTERVAL: 144 MS
PR INTERVAL: 192 MS
PROT SERPL-MCNC: 7.9 G/DL (ref 6.4–8.4)
PROT UR STRIP-MCNC: ABNORMAL MG/DL
PROT UR-MCNC: 142 MG/DL
PROT/CREAT UR: 1.26 MG/G{CREAT} (ref 0–0.1)
QRS AXIS: 13 DEGREES
QRS AXIS: 20 DEGREES
QRS AXIS: 25 DEGREES
QRS AXIS: 8 DEGREES
QRSD INTERVAL: 100 MS
QRSD INTERVAL: 100 MS
QRSD INTERVAL: 110 MS
QRSD INTERVAL: 112 MS
QT INTERVAL: 416 MS
QT INTERVAL: 450 MS
QT INTERVAL: 450 MS
QT INTERVAL: 452 MS
QTC INTERVAL: 450 MS
QTC INTERVAL: 450 MS
QTC INTERVAL: 452 MS
QTC INTERVAL: 461 MS
RBC # BLD AUTO: 4.5 MILLION/UL (ref 3.81–5.12)
RBC #/AREA URNS AUTO: ABNORMAL /HPF
SODIUM 24H UR-SCNC: 38 MOL/L
SODIUM SERPL-SCNC: 139 MMOL/L (ref 135–147)
SODIUM SERPL-SCNC: 140 MMOL/L (ref 135–147)
SP GR UR STRIP.AUTO: 1.02
T WAVE AXIS: 88 DEGREES
T WAVE AXIS: 88 DEGREES
T WAVE AXIS: 90 DEGREES
T WAVE AXIS: 96 DEGREES
UROBILINOGEN UR QL STRIP.AUTO: 0.2 E.U./DL
VENTRICULAR RATE: 60 BPM
VENTRICULAR RATE: 74 BPM
WBC # BLD AUTO: 13.66 THOUSAND/UL (ref 4.31–10.16)
WBC #/AREA URNS AUTO: ABNORMAL /HPF

## 2024-05-07 PROCEDURE — 80048 BASIC METABOLIC PNL TOTAL CA: CPT | Performed by: INTERNAL MEDICINE

## 2024-05-07 PROCEDURE — 81001 URINALYSIS AUTO W/SCOPE: CPT | Performed by: STUDENT IN AN ORGANIZED HEALTH CARE EDUCATION/TRAINING PROGRAM

## 2024-05-07 PROCEDURE — 85025 COMPLETE CBC W/AUTO DIFF WBC: CPT | Performed by: EMERGENCY MEDICINE

## 2024-05-07 PROCEDURE — 83880 ASSAY OF NATRIURETIC PEPTIDE: CPT | Performed by: EMERGENCY MEDICINE

## 2024-05-07 PROCEDURE — 96374 THER/PROPH/DIAG INJ IV PUSH: CPT

## 2024-05-07 PROCEDURE — 99285 EMERGENCY DEPT VISIT HI MDM: CPT | Performed by: EMERGENCY MEDICINE

## 2024-05-07 PROCEDURE — 99244 OFF/OP CNSLTJ NEW/EST MOD 40: CPT | Performed by: INTERNAL MEDICINE

## 2024-05-07 PROCEDURE — 99245 OFF/OP CONSLTJ NEW/EST HI 55: CPT | Performed by: INTERNAL MEDICINE

## 2024-05-07 PROCEDURE — 96376 TX/PRO/DX INJ SAME DRUG ADON: CPT

## 2024-05-07 PROCEDURE — 71045 X-RAY EXAM CHEST 1 VIEW: CPT

## 2024-05-07 PROCEDURE — 36415 COLL VENOUS BLD VENIPUNCTURE: CPT | Performed by: EMERGENCY MEDICINE

## 2024-05-07 PROCEDURE — 83735 ASSAY OF MAGNESIUM: CPT | Performed by: EMERGENCY MEDICINE

## 2024-05-07 PROCEDURE — 84300 ASSAY OF URINE SODIUM: CPT | Performed by: INTERNAL MEDICINE

## 2024-05-07 PROCEDURE — 99223 1ST HOSP IP/OBS HIGH 75: CPT | Performed by: STUDENT IN AN ORGANIZED HEALTH CARE EDUCATION/TRAINING PROGRAM

## 2024-05-07 PROCEDURE — 76775 US EXAM ABDO BACK WALL LIM: CPT

## 2024-05-07 PROCEDURE — 80053 COMPREHEN METABOLIC PANEL: CPT | Performed by: EMERGENCY MEDICINE

## 2024-05-07 PROCEDURE — 84484 ASSAY OF TROPONIN QUANT: CPT | Performed by: EMERGENCY MEDICINE

## 2024-05-07 PROCEDURE — 82570 ASSAY OF URINE CREATININE: CPT | Performed by: INTERNAL MEDICINE

## 2024-05-07 PROCEDURE — 93010 ELECTROCARDIOGRAM REPORT: CPT | Performed by: INTERNAL MEDICINE

## 2024-05-07 PROCEDURE — 99285 EMERGENCY DEPT VISIT HI MDM: CPT

## 2024-05-07 PROCEDURE — 84156 ASSAY OF PROTEIN URINE: CPT | Performed by: INTERNAL MEDICINE

## 2024-05-07 PROCEDURE — 93005 ELECTROCARDIOGRAM TRACING: CPT

## 2024-05-07 PROCEDURE — 96375 TX/PRO/DX INJ NEW DRUG ADDON: CPT

## 2024-05-07 RX ORDER — NIFEDIPINE 30 MG/1
30 TABLET, EXTENDED RELEASE ORAL DAILY
Status: DISCONTINUED | OUTPATIENT
Start: 2024-05-07 | End: 2024-05-08 | Stop reason: HOSPADM

## 2024-05-07 RX ORDER — SODIUM CHLORIDE 9 MG/ML
3 INJECTION INTRAVENOUS
Status: DISCONTINUED | OUTPATIENT
Start: 2024-05-07 | End: 2024-05-08 | Stop reason: HOSPADM

## 2024-05-07 RX ORDER — CLOPIDOGREL BISULFATE 75 MG/1
75 TABLET ORAL DAILY
Status: DISCONTINUED | OUTPATIENT
Start: 2024-05-07 | End: 2024-05-08 | Stop reason: HOSPADM

## 2024-05-07 RX ORDER — OXYCODONE HYDROCHLORIDE 10 MG/1
10 TABLET ORAL EVERY 6 HOURS PRN
Status: DISCONTINUED | OUTPATIENT
Start: 2024-05-07 | End: 2024-05-08 | Stop reason: HOSPADM

## 2024-05-07 RX ORDER — HEPARIN SODIUM 5000 [USP'U]/ML
5000 INJECTION, SOLUTION INTRAVENOUS; SUBCUTANEOUS EVERY 8 HOURS SCHEDULED
Status: DISCONTINUED | OUTPATIENT
Start: 2024-05-07 | End: 2024-05-08 | Stop reason: HOSPADM

## 2024-05-07 RX ORDER — ONDANSETRON 2 MG/ML
4 INJECTION INTRAMUSCULAR; INTRAVENOUS EVERY 6 HOURS PRN
Status: DISCONTINUED | OUTPATIENT
Start: 2024-05-07 | End: 2024-05-08 | Stop reason: HOSPADM

## 2024-05-07 RX ORDER — SODIUM CHLORIDE, SODIUM GLUCONATE, SODIUM ACETATE, POTASSIUM CHLORIDE, MAGNESIUM CHLORIDE, SODIUM PHOSPHATE, DIBASIC, AND POTASSIUM PHOSPHATE .53; .5; .37; .037; .03; .012; .00082 G/100ML; G/100ML; G/100ML; G/100ML; G/100ML; G/100ML; G/100ML
75 INJECTION, SOLUTION INTRAVENOUS CONTINUOUS
Status: DISPENSED | OUTPATIENT
Start: 2024-05-07 | End: 2024-05-07

## 2024-05-07 RX ORDER — LORAZEPAM 0.5 MG/1
0.5 TABLET ORAL EVERY 8 HOURS PRN
Status: DISCONTINUED | OUTPATIENT
Start: 2024-05-07 | End: 2024-05-08 | Stop reason: HOSPADM

## 2024-05-07 RX ORDER — ISOSORBIDE MONONITRATE 30 MG/1
120 TABLET, EXTENDED RELEASE ORAL DAILY
Status: DISCONTINUED | OUTPATIENT
Start: 2024-05-07 | End: 2024-05-08 | Stop reason: HOSPADM

## 2024-05-07 RX ORDER — MORPHINE SULFATE 4 MG/ML
4 INJECTION, SOLUTION INTRAMUSCULAR; INTRAVENOUS ONCE
Status: COMPLETED | OUTPATIENT
Start: 2024-05-07 | End: 2024-05-07

## 2024-05-07 RX ORDER — VENLAFAXINE HYDROCHLORIDE 75 MG/1
75 CAPSULE, EXTENDED RELEASE ORAL
Status: DISCONTINUED | OUTPATIENT
Start: 2024-05-08 | End: 2024-05-08 | Stop reason: HOSPADM

## 2024-05-07 RX ORDER — HYDROMORPHONE HCL IN WATER/PF 6 MG/30 ML
0.2 PATIENT CONTROLLED ANALGESIA SYRINGE INTRAVENOUS EVERY 4 HOURS PRN
Status: DISCONTINUED | OUTPATIENT
Start: 2024-05-07 | End: 2024-05-08 | Stop reason: HOSPADM

## 2024-05-07 RX ORDER — SODIUM BICARBONATE 650 MG/1
1300 TABLET ORAL
Status: DISCONTINUED | OUTPATIENT
Start: 2024-05-07 | End: 2024-05-08 | Stop reason: HOSPADM

## 2024-05-07 RX ORDER — OXYCODONE HYDROCHLORIDE 5 MG/1
5 TABLET ORAL EVERY 6 HOURS PRN
Status: DISCONTINUED | OUTPATIENT
Start: 2024-05-07 | End: 2024-05-08 | Stop reason: HOSPADM

## 2024-05-07 RX ORDER — ASPIRIN 81 MG/1
81 TABLET, CHEWABLE ORAL DAILY
Status: DISCONTINUED | OUTPATIENT
Start: 2024-05-07 | End: 2024-05-08 | Stop reason: HOSPADM

## 2024-05-07 RX ORDER — METOPROLOL SUCCINATE 50 MG/1
50 TABLET, EXTENDED RELEASE ORAL 2 TIMES DAILY
Status: DISCONTINUED | OUTPATIENT
Start: 2024-05-07 | End: 2024-05-08 | Stop reason: HOSPADM

## 2024-05-07 RX ORDER — ISOSORBIDE MONONITRATE 30 MG/1
120 TABLET, EXTENDED RELEASE ORAL DAILY
Status: DISCONTINUED | OUTPATIENT
Start: 2024-05-07 | End: 2024-05-07

## 2024-05-07 RX ORDER — ONDANSETRON 2 MG/ML
4 INJECTION INTRAMUSCULAR; INTRAVENOUS ONCE
Status: COMPLETED | OUTPATIENT
Start: 2024-05-07 | End: 2024-05-07

## 2024-05-07 RX ORDER — ALBUTEROL SULFATE 90 UG/1
2 AEROSOL, METERED RESPIRATORY (INHALATION) EVERY 4 HOURS PRN
Status: DISCONTINUED | OUTPATIENT
Start: 2024-05-07 | End: 2024-05-08 | Stop reason: HOSPADM

## 2024-05-07 RX ORDER — METOPROLOL SUCCINATE 50 MG/1
50 TABLET, EXTENDED RELEASE ORAL 2 TIMES DAILY
Status: DISCONTINUED | OUTPATIENT
Start: 2024-05-07 | End: 2024-05-07

## 2024-05-07 RX ORDER — NICOTINE 21 MG/24HR
1 PATCH, TRANSDERMAL 24 HOURS TRANSDERMAL DAILY
Status: DISCONTINUED | OUTPATIENT
Start: 2024-05-07 | End: 2024-05-08 | Stop reason: HOSPADM

## 2024-05-07 RX ORDER — MAGNESIUM HYDROXIDE/ALUMINUM HYDROXICE/SIMETHICONE 120; 1200; 1200 MG/30ML; MG/30ML; MG/30ML
30 SUSPENSION ORAL EVERY 6 HOURS PRN
Status: DISCONTINUED | OUTPATIENT
Start: 2024-05-07 | End: 2024-05-08 | Stop reason: HOSPADM

## 2024-05-07 RX ORDER — ATORVASTATIN CALCIUM 80 MG/1
80 TABLET, FILM COATED ORAL EVERY EVENING
Status: DISCONTINUED | OUTPATIENT
Start: 2024-05-07 | End: 2024-05-08 | Stop reason: HOSPADM

## 2024-05-07 RX ORDER — ACETAMINOPHEN 325 MG/1
975 TABLET ORAL EVERY 8 HOURS PRN
Status: DISCONTINUED | OUTPATIENT
Start: 2024-05-07 | End: 2024-05-08 | Stop reason: HOSPADM

## 2024-05-07 RX ORDER — RANOLAZINE 500 MG/1
1000 TABLET, EXTENDED RELEASE ORAL EVERY 12 HOURS SCHEDULED
Status: DISCONTINUED | OUTPATIENT
Start: 2024-05-07 | End: 2024-05-08 | Stop reason: HOSPADM

## 2024-05-07 RX ORDER — NIFEDIPINE 30 MG/1
30 TABLET, EXTENDED RELEASE ORAL DAILY
Status: DISCONTINUED | OUTPATIENT
Start: 2024-05-07 | End: 2024-05-07

## 2024-05-07 RX ORDER — ACETAMINOPHEN 325 MG/1
650 TABLET ORAL EVERY 6 HOURS PRN
Status: DISCONTINUED | OUTPATIENT
Start: 2024-05-07 | End: 2024-05-07

## 2024-05-07 RX ADMIN — ATORVASTATIN CALCIUM 80 MG: 80 TABLET, FILM COATED ORAL at 17:07

## 2024-05-07 RX ADMIN — ONDANSETRON 4 MG: 2 INJECTION INTRAMUSCULAR; INTRAVENOUS at 05:39

## 2024-05-07 RX ADMIN — HEPARIN SODIUM 5000 UNITS: 5000 INJECTION INTRAVENOUS; SUBCUTANEOUS at 22:13

## 2024-05-07 RX ADMIN — CLOPIDOGREL 75 MG: 75 TABLET ORAL at 09:53

## 2024-05-07 RX ADMIN — SODIUM BICARBONATE 1300 MG: 650 TABLET ORAL at 13:06

## 2024-05-07 RX ADMIN — ISOSORBIDE MONONITRATE 120 MG: 30 TABLET, EXTENDED RELEASE ORAL at 13:06

## 2024-05-07 RX ADMIN — MORPHINE SULFATE 4 MG: 4 INJECTION INTRAVENOUS at 05:39

## 2024-05-07 RX ADMIN — NIFEDIPINE 30 MG: 30 TABLET, FILM COATED, EXTENDED RELEASE ORAL at 13:06

## 2024-05-07 RX ADMIN — SODIUM BICARBONATE 1300 MG: 650 TABLET ORAL at 17:07

## 2024-05-07 RX ADMIN — RANOLAZINE 1000 MG: 500 TABLET, FILM COATED, EXTENDED RELEASE ORAL at 22:12

## 2024-05-07 RX ADMIN — MORPHINE SULFATE 4 MG: 4 INJECTION INTRAVENOUS at 06:56

## 2024-05-07 RX ADMIN — RANOLAZINE 1000 MG: 500 TABLET, FILM COATED, EXTENDED RELEASE ORAL at 09:58

## 2024-05-07 RX ADMIN — ASPIRIN 81 MG 81 MG: 81 TABLET ORAL at 09:51

## 2024-05-07 RX ADMIN — SODIUM CHLORIDE, SODIUM GLUCONATE, SODIUM ACETATE, POTASSIUM CHLORIDE, MAGNESIUM CHLORIDE, SODIUM PHOSPHATE, DIBASIC, AND POTASSIUM PHOSPHATE 75 ML/HR: .53; .5; .37; .037; .03; .012; .00082 INJECTION, SOLUTION INTRAVENOUS at 09:59

## 2024-05-07 RX ADMIN — HEPARIN SODIUM 5000 UNITS: 5000 INJECTION INTRAVENOUS; SUBCUTANEOUS at 09:54

## 2024-05-07 RX ADMIN — HEPARIN SODIUM 5000 UNITS: 5000 INJECTION INTRAVENOUS; SUBCUTANEOUS at 13:06

## 2024-05-07 RX ADMIN — NICOTINE 1 PATCH: 14 PATCH, EXTENDED RELEASE TRANSDERMAL at 09:53

## 2024-05-07 RX ADMIN — LORAZEPAM 0.5 MG: 0.5 TABLET ORAL at 10:51

## 2024-05-07 RX ADMIN — ACETAMINOPHEN 975 MG: 325 TABLET ORAL at 09:57

## 2024-05-07 NOTE — ASSESSMENT & PLAN NOTE
Primary reason for presentation  Pt with known CAD without options for revascularization.  This presentation does not sound consistent with symptoms previously experienced prior to stents  Continue medical management.  Previously discussed the option of evaluation at a larger center ([facility] or [facility]) for advanced PCI

## 2024-05-07 NOTE — CONSULTS
[facility]  Consult  Name: Elise Ken 49 y.o. female I MRN: [id number]  Unit/Bed#: ED 05 I Date of Admission: 5/7/2024   Date of Service: 5/7/2024 I Hospital Day: 0    Inpatient consult to Cardiology  Consult performed by: JALIL Greer  Consult ordered by: Ayush Chaparro MD          Assessment/Plan   ANTHONY (acute kidney injury) (HCC)  Assessment & Plan  Renal consulted    Coronary artery disease of native artery of native heart with stable angina pectoris (HCC)  Assessment & Plan  Hx of CAD s/p PCI  Recent cath on 09/2023 with progression of CAD not amenable to PCI. Cards at that time recommending GDMT  Continue aspirin, statin, toprol xl, imdur, ranexa     Tobacco abuse  Assessment & Plan  Attempting to quit     Essential hypertension  Assessment & Plan  Blood pressures running low    * Chest pain  Assessment & Plan  Primary reason for presentation  Pt with known CAD without options for revascularization.  This presentation does not sound consistent with symptoms previously experienced prior to stents  Continue medical management.  Previously discussed the option of evaluation at a larger center ([facility] or [facility]) for advanced PCI           Other summary comments:   Elise presented with chest discomfort.  At the time of my evaluation, she is comfortable and notes improvement in symptoms.  Trop levels are slightly elevated, though she also has worsening renal function.      Elise has known CAD and often experiences chest discomfort.  Symptoms today are not necessarily similar to those experienced in the past prior to stenting.  Unfortunately, she has poor targets for revascularization.      Continue to optimize medical therapies.  Will adjust hold parameters on home meds.    Would benefit from renal evaluation given worsening renal function.      Outpatient Cardiologist: JALIL Dawn    HPI: Elise Ken is a 49 y.o. year old female who presented with chest discomfort.  She  was woken from sleep early this morning with L sided chest discomfort that went into her shoulder.  Due to the persistent nature of her symptoms, she presented to the ER for evaluation.    At the time of my evaluation, she notes improvement in her symptoms.  She was given morphine and ativan and noted improvement, but symptoms are coming back.    Trop levels were mildly abnormal, but review of her labs shows that they are chronically elevated.  Additionally, her renal function is noted to be worsening.      Elise has known coronary disease and frequently experiences various types of chest discomfort.    Elise is known to me from the outpatient setting.  She has known coronary disease with no targets for revascularization by cath 9/25/2023.  She has been treated in the past with stenting is maintained on appropriate medical therapy.  Additionally, she has hypertrophic cardiomyopathy, HFpEF, and hypertension.  She had post MI NSVT for which an outpatient event monitor was performed.  This also revealed symptomatic sinus pauses for which a Medtronic permanent pacemaker was placed 5/2021.  She also follows with nephrology for chronic kidney disease/Alport syndrome (biopsy-proven).      EKG: A-paced      MOST  RECENT CARDIAC IMAGING:   Echo 1/9/2023 EF 45%, moderate diastolic dysfunction  Mildly dilated LA  Mild to moderate MR  No change compared to 1/2/2023    Holter 4/22/2021 SR, avg rate 88.  Frequent PVCs, 8-beat run NSVT    Cardiac cath 1/3/2023   Successful PCI to mid % culprit req. rheolytic thrombectomy (progressed when compared to 4-2-21 study); given atretic distal LAD, 2.25 x 15 mm GREG was deployed from LAD into more sizable D2    Residual prox LCx 60% lesion with patent LAD, mid RCA & PDA stents    Elevated LVEDP (20-30 mmHg) with no LV-Ao gradient on pullback    R radial a. accessed but unable to advance equipment, likely occluded; RFA arteriotomy employed       Cath 4/2/2021 Distal LAD: There was a  60 % stenosis.  Mid circumflex: There was a 50 % stenosis.  Distal RCA: There was a 10 % stenosis at the site of a prior stent.  Right PDA: There was a 0 % stenosis at the site of a prior stent.     Echo 3/25/2021 60%, severe hypokinesis of the basal-mid inferior wall.  Findings consistent with HCM.  Mild-mod MR.     Cardiac MRI 3/29/2021 severe LVH, EF 44%.  Small circumferential pericardial effusion.  Mild MR.  Findings suggestive of underlying hypertrophic cardiomyopathy with ischemic scarring of the inferior and inferolateral walls.     Stress echo 7/26/2021 Severe hypokinesis if the basal-mid inferior walls at peak stress, but overall conclusion was no evidence of stress induced ischemia.        Review of Systems: a 10 point review of systems was conducted and is negative except for as mentioned in the HPI or as below.        Historical Information   Past Medical History:   Diagnosis Date    Anemia     Anxiety     CAD (coronary artery disease)     Cancer ([facility]) 2008    Cardiomyopathy ([facility])     CHF (congestive heart failure) ([facility])     Chronic kidney disease     COPD (chronic obstructive pulmonary disease) ([facility])     scheduled for sleep apnea test soon after pacemaker implant    Coronary artery disease     Depression     History of chemotherapy     non hodgkins lymphoma 2008    Hyperlipemia     Hypertension     Hypertrophic cardiomyopathy (HCC)     Lymphoma, non-Hodgkin's (HCC)     Myocardial infarction ([facility])     Non-ST elevation MI (NSTEMI) 01/02/2023    Obesity     Obstructive sleep apnea     Olecranon bursitis, right elbow 7/11/2022    SSS (sick sinus syndrome) ([facility])     s/p medtronic dual chamber pacemaker 5/25/2021    Tobacco abuse     Ventricular tachycardia, non-sustained ([facility])     Wears glasses      Past Surgical History:   Procedure Laterality Date    CARDIAC CATHETERIZATION N/A 1/3/2023    Procedure: Cardiac catheterization;  Surgeon: Jesus Franco MD;  [facility];  Service: Cardiology  "   CARDIAC CATHETERIZATION N/A 1/3/2023    Procedure: Cardiac Coronary Angiogram;  Surgeon: Jseus Franco MD;  [facility];  Service: Cardiology    CARDIAC CATHETERIZATION N/A 1/3/2023    Procedure: Cardiac pci;  Surgeon: Jesus Franco MD;  [facility];  Service: Cardiology    CARDIAC CATHETERIZATION Left 2023    Procedure: Cardiac Left Heart Cath;  Surgeon: Frieda Fox DO;  [facility];  Service: Cardiology    CARDIAC CATHETERIZATION  2023    Procedure: Cardiac catheterization;  Surgeon: Frieda Fox DO;  [facility];  Service: Cardiology    CARDIAC CATHETERIZATION N/A 2023    Procedure: Cardiac Coronary Angiogram;  Surgeon: Frieda Fox DO;  [facility];  Service: Cardiology    CARDIAC PACEMAKER PLACEMENT Left 2021    Procedure: DUAL LEAD PACEMAKER IMPLANT;  Surgeon: Yinka Shaw MD;  Location:  MAIN OR;  Service: Cardiology    CAROTID STENT       SECTION      CORONARY ANGIOPLASTY WITH STENT PLACEMENT  2021    GREG mid RCA and GREG right PDA    DENTAL SURGERY      IR BIOPSY KIDNEY RANDOM  10/18/2021     Social History     Substance and Sexual Activity   Alcohol Use Yes    Comment: 1-2 times years     Social History     Substance and Sexual Activity   Drug Use No     Social History     Tobacco Use   Smoking Status Every Day    Current packs/day: 0.50    Average packs/day: 0.5 packs/day for 25.0 years (12.5 ttl pk-yrs)    Types: Cigarettes   Smokeless Tobacco Never   Tobacco Comments    Recently and currently quitting cigarettes       Family History: no longer relevant      Meds/Allergies   all current active meds have been reviewed  (Not in a hospital admission)      No Known Allergies    Objective   Vitals: Blood pressure 93/50, pulse 61, temperature 98 °F (36.7 °C), resp. rate 20, height 5' 7\" (1.702 m), weight 127 kg (280 lb), last menstrual period 04/15/2024, SpO2 " 96%, not currently breastfeeding., Body mass index is 43.85 kg/m².,   Orthostatic Blood Pressures      Flowsheet Row Most Recent Value   Blood Pressure 93/50 filed at 2024 1000   Patient Position - Orthostatic VS Sitting filed at 2024 1000            Systolic (24hrs), Av , Min:93 , Max:137     Diastolic (24hrs), Av, Min:50, Max:66      Physical Exam:    General:  Normal appearance in no distress.  Eyes:  Anicteric.  Oral mucosa:  Moist.  Neck:  unable to assess due to body habitus.  Chest:  Clear to auscultation.  Cardiac:  No palpable PMI.  Normal S1 and S2.  No murmur gallop or rub.  Abdomen:  Obese, soft and nontender.   Extremities:  trace bilat LE edema  Musculoskeletal:  Symmetric   Vascular:  Pedal pulses are intact.  Neuro:  Grossly symmetric.  Psych:  Alert and oriented x3.        Lab Results:   Labs reviewed and prominent abnormalities reviewed above and/or below.    Troponins:    Results from last 7 days   Lab Units 24  0916 24  0718 24  0521   HS TNI 0HR ng/L  --   --  22   HS TNI 2HR ng/L  --  29  --    HSTNI D2 ng/L  --  7  --    HS TNI 4HR ng/L 44  --   --    HSTNI D4 ng/L 22*  --   --      BNP:   Results from last 6 Months   Lab Units 24  0521   BNP pg/mL 356*

## 2024-05-07 NOTE — CONSULTS
CONSULTATION-NEPHROLOGY   Elise Ken 49 y.o. female MRN: [id number]  Unit/Bed#: -01 Encounter: [id number]        Assessment and Plan:    ANTHONY on CKD 4 vs progressive CKD.   Cr baseline 2.2-3.0 but trend most recently in 2.5-3.3 range.   She does not appear overtly uremic. Volume status appears compensated.  UOP likely nonoliguric.  Patient has biopsy proven hereditary nephritis/Alport syndrome 10/28/2021.   Potential ANTHONY due to prerenal/vasomotor state with hypoperfusion /low BP/diarrhea/ CRS. Favor she has progressive CKD at baseline.    Recommend:  Reviewed Cr trends with patient. No emergent need for HD yet but has worsening Cr trends overall and will need close outpatient follow up for dialysis planning. She was told she would not be an ideal HD candidate due to cardiac issues. May consider PD if this would be feasible for patient.   No ACE/ARB/SGLT-2I. Not maintained on diuretics as outpatient.   Repeat chem this evening and AM labs.   Urine lytes ordered.   Renal US ordered.     2. NAGMA due to CKD  Increase sodium bicarb to 1300mg TID.    3. HTN   BP trends running low, given her reduced EF in the past, potentially amlodipine would be better suited?   Will discuss with cardiology.  Repeat echo seems reasonable.     4. CAD sp PCI 9/2023.   CAD not amenable to PCI. Cardio recommending GDMT.   Continue cardiology follow up. Potentially advanced PCI.    HPI:    Elise Ken is a 49 y.o. female with hx CKD 4 presents to ED with chest discomfort that radiated to her shoulder. She was given morphine in ED but symptoms persist. She was very anxious on my evaluation and felt like she was having a panic attack. Reports decreased oral intake recently and slight decrease in urination.  She has not followed up in nephrology office since 10/2022 but was seen in hospital by nephrology team most recently 9/2023 in setting of cardiac  cath. She was felt to have ANTHONY on CRS as that time. Baseline Cr reported to be 2.2-3.0.  Cr slightly elevated at 3.3. She has a hx biopsy proven Alport syndrome. She is not on bumex as outpatient or jardiance.  Does report some dysgeusia.   No renal imaging available while here.  She had diarrhea last week but resolved.    Labs done 3/1 showed Cr 3.3 and TCO2 15. Labs today show Cr 4.2 and TCO2 15.   She is receiving judicious IVF trial.       Reason for Consult: ANTHONY     Review of Systems:    A complete 10-point review of systems was performed. Aside from what was mentioned in the HPI, it is otherwise negative.      Historical Information   Past Medical History:   Diagnosis Date    Anemia     Anxiety     CAD (coronary artery disease)     Cancer ([facility]) 2008    Cardiomyopathy ([facility])     CHF (congestive heart failure) ([facility])     Chronic kidney disease     COPD (chronic obstructive pulmonary disease) ([facility])     scheduled for sleep apnea test soon after pacemaker implant    Coronary artery disease     Depression     History of chemotherapy     non hodgkins lymphoma 2008    Hyperlipemia     Hypertension     Hypertrophic cardiomyopathy ([facility])     Lymphoma, non-Hodgkin's ([facility])     Myocardial infarction ([facility])     Non-ST elevation MI (NSTEMI) 01/02/2023    Obesity     Obstructive sleep apnea     Olecranon bursitis, right elbow 7/11/2022    SSS (sick sinus syndrome) ([facility])     s/p medtronic dual chamber pacemaker 5/25/2021    Tobacco abuse     Ventricular tachycardia, non-sustained ([facility])     Wears glasses      Past Surgical History:   Procedure Laterality Date    CARDIAC CATHETERIZATION N/A 1/3/2023    Procedure: Cardiac catheterization;  Surgeon: Jesus Franco MD;  [facility];  Service: Cardiology    CARDIAC CATHETERIZATION N/A 1/3/2023    Procedure: Cardiac Coronary Angiogram;  Surgeon: Jesus Franco MD;  [facility];  Service: Cardiology    CARDIAC CATHETERIZATION N/A 1/3/2023    Procedure: Cardiac pci;  Surgeon: Jesus Franco MD;  [facility];  Service:  Cardiology    CARDIAC CATHETERIZATION Left 2023    Procedure: Cardiac Left Heart Cath;  Surgeon: Frieda Fox DO;  [facility];  Service: Cardiology    CARDIAC CATHETERIZATION  2023    Procedure: Cardiac catheterization;  Surgeon: Frieda Fox DO;  Location:  CARDIAC CATH LAB;  Service: Cardiology    CARDIAC CATHETERIZATION N/A 2023    Procedure: Cardiac Coronary Angiogram;  Surgeon: Frieda Fox DO;  [facility];  Service: Cardiology    CARDIAC PACEMAKER PLACEMENT Left 2021    Procedure: DUAL LEAD PACEMAKER IMPLANT;  Surgeon: Yinka Shaw MD;  Location:  MAIN OR;  Service: Cardiology    CAROTID STENT       SECTION      CORONARY ANGIOPLASTY WITH STENT PLACEMENT  2021    GREG mid RCA and GREG right PDA    DENTAL SURGERY      IR BIOPSY KIDNEY RANDOM  10/18/2021     Social History   Social History     Substance and Sexual Activity   Alcohol Use Yes    Comment: 1-2 times years     Social History     Substance and Sexual Activity   Drug Use No     Social History     Tobacco Use   Smoking Status Every Day    Current packs/day: 0.50    Average packs/day: 0.5 packs/day for 25.0 years (12.5 ttl pk-yrs)    Types: Cigarettes   Smokeless Tobacco Never   Tobacco Comments    Recently and currently quitting cigarettes       Family History:   Family History   Problem Relation Age of Onset    No Known Problems Mother     No Known Problems Father     No Known Problems Daughter     Brain cancer Maternal Grandfather     Heart disease Maternal Grandfather     Cancer Maternal Grandfather     No Known Problems Paternal Aunt     No Known Problems Paternal Aunt        Medications:  Pertinent medications were reviewed  Current Facility-Administered Medications   Medication Dose Route Frequency Provider Last Rate    acetaminophen  975 mg Oral Q8H PRN Ayush Chaparro MD      albuterol  2 puff Inhalation Q4H PRN Ayush Chaparro MD       "aluminum-magnesium hydroxide-simethicone  30 mL Oral Q6H PRN Ayush Chaparro MD      aspirin  81 mg Oral Daily Ayush Chaparro MD      atorvastatin  80 mg Oral QPM Ayush Chaparro MD      clopidogrel  75 mg Oral Daily Ayush Chaparro MD      heparin (porcine)  5,000 Units Subcutaneous Q8H ABIEL Ayush Chaparro MD      HYDROmorphone  0.2 mg Intravenous Q4H PRN Ayush Chaparro MD      isosorbide mononitrate  120 mg Oral Daily JALIL Greer      LORazepam  0.5 mg Oral Q8H PRN Ayush Chaparro MD      metoprolol succinate  50 mg Oral BID JALIL Greer      multi-electrolyte  75 mL/hr Intravenous Continuous Ayush Chaparro MD 75 mL/hr (05/07/24 0959)    nicotine  1 patch Transdermal Daily Ayush Chaparro MD      NIFEdipine  30 mg Oral Daily JALIL Greer      ondansetron  4 mg Intravenous Q6H PRN Ayush Chaparro MD      oxyCODONE  10 mg Oral Q6H PRN Ayush Chaparro MD      oxyCODONE  5 mg Oral Q6H PRN Ayush Chaparro MD      ranolazine  1,000 mg Oral Q12H [facility] Ayush Chaparro MD      sodium bicarbonate  1,300 mg Oral TID after meals Anne-Marie Gomez DO      sodium chloride (PF)  3 mL Intravenous Q1H PRN Shamir Monahan MD      [START ON 5/8/2024] venlafaxine  75 mg Oral Daily With Breakfast Ayush Chaparro MD           No Known Allergies      Vitals:   BP (!) 98/47 (BP Location: Left arm)   Pulse 60   Temp 97.7 °F (36.5 °C) (Oral)   Resp 18   Ht 5' 7\" (1.702 m)   Wt 126 kg (277 lb 12.5 oz)   LMP 04/15/2024   SpO2 97%   BMI 43.51 kg/m²   Body mass index is 43.51 kg/m².  SpO2: 97 %,   SpO2 Activity: At Rest,   O2 Device: None (Room air)      Intake/Output Summary (Last 24 hours) at 5/7/2024 1643  Last data filed at 5/7/2024 1612  Gross per 24 hour   Intake --   Output 600 ml   Net -600 ml     Invasive Devices       Peripheral Intravenous Line  Duration             Peripheral IV 05/07/24 Right Antecubital <1 day                    Physical Exam:  General: conscious, cooperative, " "in no acute distress  Eyes: conjunctivae pink, anicteric sclerae  ENT: lips and mucous membranes moist  Neck: supple, no JVD, no masses  Chest: clear breath sounds bilateral, no crackles, ronchus or wheezings  CVS: distinct S1 & S2, normal rate, regular rhythm  Abdomen: soft, non-tender, non-distended, normoactive bowel sounds  Extremities: no edema of both legs  Skin: no rash  Neuro: awake, alert, oriented      Diagnostic Data:  Lab: I have personally reviewed pertinent lab results.,   CBC:  Results from last 7 days   Lab Units 05/07/24  0521   WBC Thousand/uL 13.66*   HEMOGLOBIN g/dL 12.4   HEMATOCRIT % 40.7   PLATELETS Thousands/uL 339      CMP:   Lab Results   Component Value Date    SODIUM 139 05/07/2024    K 4.0 05/07/2024     (H) 05/07/2024    CO2 15 (L) 05/07/2024    BUN 45 (H) 05/07/2024    CREATININE 4.20 (H) 05/07/2024    CALCIUM 9.5 05/07/2024    AST 8 (L) 05/07/2024    ALT 12 05/07/2024    ALKPHOS 103 05/07/2024    EGFR 11 05/07/2024   ,   PT/INR: No results found for: \"PT\", \"INR\",   Magnesium: No components found for: \"MAG\",  Phosphorous: No results found for: \"PHOS\"    Microbiology:  @[facility],(urinecx:7)@        Anne-Marie Gomez,     Portions of the record may have been created with voice recognition software. Occasional wrong word or \"sound a like\" substitutions may have occurred due to the inherent limitations of voice recognition software. Read the chart carefully and recognize, using context, where substitutions have occurred.      "

## 2024-05-07 NOTE — PLAN OF CARE
Problem: Potential for Falls  Goal: Patient will remain free of falls  Description: INTERVENTIONS:  - Educate patient/family on patient safety including physical limitations  - Instruct patient to call for assistance with activity   - Consult OT/PT to assist with strengthening/mobility   - Keep Call bell within reach  - Keep bed low and locked with side rails adjusted as appropriate  - Keep care items and personal belongings within reach  - Initiate and maintain comfort rounds  - Make Fall Risk Sign visible to staff  - Offer Toileting every 2 Hours, in advance of need  - Initiate/Maintain bed alarm  - Obtain necessary fall risk management equipment: non skid socks  - Apply yellow socks and bracelet for high fall risk patients  - Consider moving patient to room near nurses station  Outcome: Progressing     Problem: PAIN - ADULT  Goal: Verbalizes/displays adequate comfort level or baseline comfort level  Description: Interventions:  - Encourage patient to monitor pain and request assistance  - Assess pain using appropriate pain scale  - Administer analgesics based on type and severity of pain and evaluate response  - Implement non-pharmacological measures as appropriate and evaluate response  - Consider cultural and social influences on pain and pain management  - Notify physician/advanced practitioner if interventions unsuccessful or patient reports new pain  Outcome: Progressing     Problem: INFECTION - ADULT  Goal: Absence or prevention of progression during hospitalization  Description: INTERVENTIONS:  - Assess and monitor for signs and symptoms of infection  - Monitor lab/diagnostic results  - Monitor all insertion sites, i.e. indwelling lines, tubes, and drains  - Monitor endotracheal if appropriate and nasal secretions for changes in amount and color  - [place] appropriate cooling/warming therapies per order  - Administer medications as ordered  - Instruct and encourage patient and family to use good hand  hygiene technique  - Identify and instruct in appropriate isolation precautions for identified infection/condition  Outcome: Progressing  Goal: Absence of fever/infection during neutropenic period  Description: INTERVENTIONS:  - Monitor WBC    Outcome: Progressing     Problem: SAFETY ADULT  Goal: Patient will remain free of falls  Description: INTERVENTIONS:  - Educate patient/family on patient safety including physical limitations  - Instruct patient to call for assistance with activity   - Consult OT/PT to assist with strengthening/mobility   - Keep Call bell within reach  - Keep bed low and locked with side rails adjusted as appropriate  - Keep care items and personal belongings within reach  - Initiate and maintain comfort rounds  - Make Fall Risk Sign visible to staff  - Offer Toileting every 2 Hours, in advance of need  - Initiate/Maintain bed alarm  - Obtain necessary fall risk management equipment: non skid socks  - Apply yellow socks and bracelet for high fall risk patients  - Consider moving patient to room near nurses station  Outcome: Progressing  Goal: Maintain or return to baseline ADL function  Description: INTERVENTIONS:  -  Assess patient's ability to carry out ADLs; assess patient's baseline for ADL function and identify physical deficits which impact ability to perform ADLs (bathing, care of mouth/teeth, toileting, grooming, dressing, etc.)  - Assess/evaluate cause of self-care deficits   - Assess range of motion  - Assess patient's mobility; develop plan if impaired  - Assess patient's need for assistive devices and provide as appropriate  - Encourage maximum independence but intervene and supervise when necessary  - Involve family in performance of ADLs  - Assess for home care needs following discharge   - Consider OT consult to assist with ADL evaluation and planning for discharge  - Provide patient education as appropriate  Outcome: Progressing  Goal: Maintains/Returns to pre admission  functional level  Description: INTERVENTIONS:  - Perform AM-PAC 6 Click Basic Mobility/ Daily Activity assessment daily.  - Set and communicate daily mobility goal to care team and patient/family/caregiver.   - Collaborate with rehabilitation services on mobility goals if consulted  - Perform Range of Motion 2 times a day.  - Reposition patient every 2 hours.  - Dangle patient 2 times a day  - Stand patient 2 times a day  - Ambulate patient 2 times a day  - Out of bed to chair 3 times a day   - Out of bed for meals 3 times a day  - Out of bed for toileting  - Record patient progress and toleration of activity level   Outcome: Progressing     Problem: DISCHARGE PLANNING  Goal: Discharge to home or other facility with appropriate resources  Description: INTERVENTIONS:  - Identify barriers to discharge w/patient and caregiver  - Arrange for needed discharge resources and transportation as appropriate  - Identify discharge learning needs (meds, wound care, etc.)  - Refer to Case Management Department for coordinating discharge planning if the patient needs post-hospital services based on physician/advanced practitioner order or complex needs related to functional status, cognitive ability, or social support system  Outcome: Progressing     Problem: Knowledge Deficit  Goal: Patient/family/caregiver demonstrates understanding of disease process, treatment plan, medications, and discharge instructions  Description: Complete learning assessment and assess knowledge base.  Interventions:  - Provide teaching at level of understanding  - Provide teaching via preferred learning methods  Outcome: Progressing     Problem: CARDIOVASCULAR - ADULT  Goal: Maintains optimal cardiac output and hemodynamic stability  Description: INTERVENTIONS:  - Monitor I/O, vital signs and rhythm  - Monitor for S/S and trends of decreased cardiac output  - Administer and titrate ordered vasoactive medications to optimize hemodynamic stability  -  Assess quality of pulses, skin color and temperature  - Assess for signs of decreased coronary artery perfusion  - Instruct patient to report change in severity of symptoms  Outcome: Progressing  Goal: Absence of cardiac dysrhythmias or at baseline rhythm  Description: INTERVENTIONS:  - Continuous cardiac monitoring, vital signs, obtain 12 lead EKG if ordered  - Administer antiarrhythmic and heart rate control medications as ordered  - Monitor electrolytes and administer replacement therapy as ordered  Outcome: Progressing

## 2024-05-07 NOTE — ASSESSMENT & PLAN NOTE
Hx of CKD stage 4, baseline creatinine about 2.5-2.7  Creatinine of 4.2 on admission  Hold gabapentin  Consult nephrology  Check UA

## 2024-05-07 NOTE — ASSESSMENT & PLAN NOTE
49-year-old female with PMH of CAD s/p PCI, HTN, CKD stage IV, hyperlipidemia and continued tobacco abuse presented with acute chest pain  Patient reportedly awoken with chest pressure-like sensation substernal region, nonradiating  CXR without acute process  Troponins neg x2,  EKG without ischemic changes  Consult cardiology  Complete trend of troponins, monitor on telemetry  Continue aspirin, statin, Plavix and Toprol-XL

## 2024-05-07 NOTE — ASSESSMENT & PLAN NOTE
BP currently controlled, continue home meds with BP hold parameters  Continue procardia, toprol xl, imdur

## 2024-05-07 NOTE — ASSESSMENT & PLAN NOTE
Hx of CAD s/p PCI  Recent cath on 09/2023 with progression of CAD not amenable to PCI. Cards at that time recommending GDMT  Continue aspirin, statin, toprol xl, imdur, ranexa

## 2024-05-07 NOTE — H&P
[facility]  H&P  Name: Elise Ken 49 y.o. female I MRN: [id number]  Unit/Bed#: ED 05 I Date of Admission: 5/7/2024   Date of Service: 5/7/2024 I Hospital Day: 0      Assessment/Plan   ANTHONY (acute kidney injury) (HCC)  Assessment & Plan  Hx of CKD stage 4, baseline creatinine about 2.5-2.7  Creatinine of 4.2 on admission  Consult nephrology  Check UA    Social anxiety disorder  Assessment & Plan  Continue venlafaxine    Morbid obesity with BMI of 45.0-49.9, adult (HCC)  Assessment & Plan  Would benefit from weight loss, dietary changes and exercise    Coronary artery disease of native artery of native heart with stable angina pectoris (HCC)  Assessment & Plan  Hx of CAD s/p PCI  Recent cath on 09/2023 with progression of CAD not amenable to PCI. Cards at that time recommending GDMT  Continue aspirin, statin, toprol xl, imdur, ranexa     Tobacco abuse  Assessment & Plan  Continues to smoke, but trying to cut back  Nicotine patch    Essential hypertension  Assessment & Plan  BP currently controlled, continue home meds with BP hold parameters  Continue procardia, toprol xl, imdur    * Chest pain  Assessment & Plan  49-year-old female with PMH of CAD s/p PCI, HTN, CKD stage IV, hyperlipidemia and continued tobacco abuse presented with acute chest pain  Patient reportedly awoken with chest pressure-like sensation substernal region, nonradiating  First 2 troponins were negative, EKG without ischemic changes  Consult cardiology  Complete trend of troponins, monitor on telemetry  Continue aspirin, statin, Plavix and Toprol-XL         VTE Prophylaxis: Heparin  / sequential compression device   Code Status: FC  POLST: There is no POLST form on file for this patient (pre-hospital)    Anticipated Length of Stay:  Patient will be admitted on an Observation basis with an anticipated length of stay of  2 midnights.   Justification for Hospital Stay: Chest Pain    Chief Complaint:   Chest Pain    History of  Present Illness:    Elise Ken is a 49 y.o. female who presents with substernal chest pain.  Past medical history of CAD s/p PCI, hypertension, obesity, anxiety and CKD stage IV.  She presented this morning after woke eating with substernal chest pain described as pressure-like.  She denies any radiation to her jaw, neck or back.  Without diaphoresis, shortness of breath.  She initially waited out but her chest pain persisted that she came to the ED for further evaluation.  She denies any recent sick contacts, fevers, chills, nausea or vomiting.    Review of Systems:    Review of Systems   Respiratory:  Positive for chest tightness.    Cardiovascular:  Positive for chest pain.   All other systems reviewed and are negative.      Past Medical and Surgical History:     Past Medical History:   Diagnosis Date    Anemia     Anxiety     CAD (coronary artery disease)     Cancer (HCC) 2008    Cardiomyopathy (HCC)     CHF (congestive heart failure) (Former[facility])     Chronic kidney disease     COPD (chronic obstructive pulmonary disease) (Former[facility])     scheduled for sleep apnea test soon after pacemaker implant    Coronary artery disease     Depression     History of chemotherapy     non hodgkins lymphoma 2008    Hyperlipemia     Hypertension     Hypertrophic cardiomyopathy (HCC)     Lymphoma, non-Hodgkin's (HCC)     Myocardial infarction (HCC)     Non-ST elevation MI (NSTEMI) 01/02/2023    Obesity     Obstructive sleep apnea     Olecranon bursitis, right elbow 7/11/2022    SSS (sick sinus syndrome) (Former[facility])     s/p medtronic dual chamber pacemaker 5/25/2021    Tobacco abuse     Ventricular tachycardia, non-sustained (HCC)     Wears glasses        Past Surgical History:   Procedure Laterality Date    CARDIAC CATHETERIZATION N/A 1/3/2023    Procedure: Cardiac catheterization;  Surgeon: Jesus Franco MD;  Location: [facility];  Service: Cardiology    CARDIAC CATHETERIZATION N/A 1/3/2023    Procedure: Cardiac Coronary Angiogram;   Surgeon: Jesus Franco MD;  Location: [facility];  Service: Cardiology    CARDIAC CATHETERIZATION N/A 1/3/2023    Procedure: Cardiac pci;  Surgeon: Jesus Franco MD;  Location:  CARDIAC CATH LAB;  Service: Cardiology    CARDIAC CATHETERIZATION Left 2023    Procedure: Cardiac Left Heart Cath;  Surgeon: Frieda Fox DO;  Location: [facility];  Service: Cardiology    CARDIAC CATHETERIZATION  2023    Procedure: Cardiac catheterization;  Surgeon: Frieda Fox DO;  [facility];  Service: Cardiology    CARDIAC CATHETERIZATION N/A 2023    Procedure: Cardiac Coronary Angiogram;  Surgeon: Frieda Fox DO;  Location: [facility];  Service: Cardiology    CARDIAC PACEMAKER PLACEMENT Left 2021    Procedure: DUAL LEAD PACEMAKER IMPLANT;  Surgeon: Yinka Shaw MD;  Location: [facility] OR;  Service: Cardiology    CAROTID STENT       SECTION      CORONARY ANGIOPLASTY WITH STENT PLACEMENT  2021    GREG mid RCA and GREG right PDA    DENTAL SURGERY      IR BIOPSY KIDNEY RANDOM  10/18/2021       Meds/Allergies:    Prior to Admission medications    Medication Sig Start Date End Date Taking? Authorizing Provider   acetaminophen (TYLENOL) 325 mg tablet Take 3 tablets (975 mg total) by mouth every 8 (eight) hours as needed for mild pain 23  Yes Nahum Ramirez MD   albuterol (PROVENTIL HFA,VENTOLIN HFA) 90 mcg/act inhaler Inhale 2 puffs every 4 (four) hours as needed for wheezing or shortness of breath 22  Yes Delonte Hernandez, DO   aspirin 81 mg chewable tablet Chew 1 tablet (81 mg total) daily 21  Yes Toma Holden DO   atorvastatin (LIPITOR) 80 mg tablet Take 1 tablet (80 mg total) by mouth every evening 3/5/24 2/28/25 Yes JALIL Greer   clopidogrel (PLAVIX) 75 mg tablet Take 1 tablet (75 mg total) by mouth daily 23  Yes Isaak Armstrong DO   gabapentin (Neurontin) 300 mg capsule Take 1 capsule (300 mg  total) by mouth 3 (three) times a day 3/6/24  Yes Isaak Armstrong DO   isosorbide mononitrate (IMDUR) 120 mg 24 hr tablet Take 1 tablet (120 mg total) by mouth daily 3/6/24  Yes Isaak Armstrong DO   metoprolol succinate (TOPROL-XL) 50 mg 24 hr tablet Take 1 tablet (50 mg total) by mouth 2 (two) times a day 12/12/23  Yes JALIL Greer   nicotine (NICODERM CQ) 14 mg/24hr TD 24 hr patch Place 1 patch on the skin over 24 hours every 24 hours 2/19/24  Yes Isaak Armstrong DO   NIFEdipine (PROCARDIA XL) 30 mg 24 hr tablet Take 1 tablet (30 mg total) by mouth daily 12/12/23  Yes JALIL Greer   nitroglycerin (NITROSTAT) 0.4 mg SL tablet Place 1 tablet (0.4 mg total) under the tongue every 5 (five) minutes as needed for chest pain 3/27/24  Yes Isaak Armstrong DO   ranolazine (RANEXA) 1000 MG SR tablet Take 1 tablet (1,000 mg total) by mouth every 12 (twelve) hours 3/27/24  Yes Isaak Armstrong DO   silver sulfadiazine (SILVADENE,SSD) 1 % cream Apply topically daily 10/2/23  Yes Isaak Armstrong DO   venlafaxine (EFFEXOR-XR) 75 mg 24 hr capsule Take 1 capsule (75 mg total) by mouth daily with breakfast 3/6/24  Yes Isaak Armstrong DO   calcitriol (ROCALTROL) 0.25 mcg capsule Take 1 capsule (0.25 mcg total) by mouth daily Takes Monday Wednesday and fridays as of 11/11/22  Patient not taking: Reported on 5/7/2024 1/10/23   Lorne Montgomery DO   isosorbide mononitrate (IMDUR) 60 mg 24 hr tablet Take 2 tablets (120 mg total) by mouth daily  Patient not taking: Reported on 2/19/2024 2/18/24 3/19/24  Jaz Quintero MD   sodium bicarbonate 650 mg tablet Take 1 tablet (650 mg total) by mouth 2 (two) times daily after meals  Patient not taking: Reported on 5/7/2024 1/5/23   Nahum Ramirez MD     I have reviewed home medications with patient personally.    Allergies: No Known Allergies    Social History:     Marital Status: /Civil Union  "  Occupation:   Patient Pre-hospital Living Situation: home  Patient Pre-hospital Level of Mobility: amb  Patient Pre-hospital Diet Restrictions: none  Substance Use History:   Social History     Substance and Sexual Activity   Alcohol Use Yes    Comment: 1-2 times years     Social History     Tobacco Use   Smoking Status Every Day    Current packs/day: 0.50    Average packs/day: 0.5 packs/day for 25.0 years (12.5 ttl pk-yrs)    Types: Cigarettes   Smokeless Tobacco Never   Tobacco Comments    Recently and currently quitting cigarettes     Social History     Substance and Sexual Activity   Drug Use No       Family History:    Family History   Problem Relation Age of Onset    No Known Problems Mother     No Known Problems Father     No Known Problems Daughter     Brain cancer Maternal Grandfather     Heart disease Maternal Grandfather     Cancer Maternal Grandfather     No Known Problems Paternal Aunt     No Known Problems Paternal Aunt        Physical Exam:     Vitals:   Blood Pressure: 110/57 (05/07/24 0900)  Pulse: 61 (05/07/24 0900)  Temperature: 98 °F (36.7 °C) (05/07/24 0800)  Respirations: 20 (05/07/24 0900)  Height: 5' 7\" (170.2 cm) (05/07/24 0513)  Weight - Scale: 127 kg (280 lb) (05/07/24 0513)  SpO2: 96 % (05/07/24 0900)    Physical Exam  Vitals and nursing note reviewed.   Constitutional:       Appearance: She is obese.   HENT:      Head: Normocephalic.   Eyes:      Conjunctiva/sclera: Conjunctivae normal.   Cardiovascular:      Rate and Rhythm: Normal rate.   Pulmonary:      Effort: Pulmonary effort is normal. No respiratory distress.   Abdominal:      General: Bowel sounds are normal. There is no distension.      Palpations: Abdomen is soft.   Musculoskeletal:         General: No swelling.      Right lower leg: No edema.      Left lower leg: No edema.   Skin:     General: Skin is warm and dry.   Neurological:      General: No focal deficit present.      Mental Status: She is alert. Mental status is at " baseline.       Additional Data:     Lab Results: I have personally reviewed pertinent reports.      Results from last 7 days   Lab Units 05/07/24  0521   WBC Thousand/uL 13.66*   HEMOGLOBIN g/dL 12.4   HEMATOCRIT % 40.7   PLATELETS Thousands/uL 339   SEGS PCT % 71   LYMPHO PCT % 16   MONO PCT % 8   EOS PCT % 3     Results from last 7 days   Lab Units 05/07/24  0521   SODIUM mmol/L 139   POTASSIUM mmol/L 4.0   CHLORIDE mmol/L 112*   CO2 mmol/L 15*   BUN mg/dL 45*   CREATININE mg/dL 4.20*   ANION GAP mmol/L 12   CALCIUM mg/dL 9.5   ALBUMIN g/dL 4.2   TOTAL BILIRUBIN mg/dL 0.37   ALK PHOS U/L 103   ALT U/L 12   AST U/L 8*   GLUCOSE RANDOM mg/dL 110                       Imaging: I have personally reviewed pertinent reports.      X-ray chest 1 view portable   ED Interpretation by Shamir Monahan MD (05/07 0559)     XR CHEST PORTABLE     INDICATION: chest pain.        COMPARISON: 3.1.24     FINDINGS:     Clear lungs. No pneumothorax or pleural effusion.     Mild cardiomegaly with left subclavian pacemaker leads in right atrium and right ventricle. Cardiac stent.     Bones are unremarkable for age.     Normal upper abdomen.     IMPRESSION:     No acute cardiopulmonary disease.             US kidney and bladder    (Results Pending)       EKG, Pathology, and Other Studies Reviewed on Admission:   EKG: NSR    Allscripts / Epic Records Reviewed: Yes     ** Please Note: This note has been constructed using a voice recognition system. **

## 2024-05-07 NOTE — ED NOTES
PRN ativan given at this time as result of discussion held with provider.    Cranberry juice and crackers provided per request.     Mitzi Torres RN  05/07/24 8765

## 2024-05-07 NOTE — ED PROVIDER NOTES
History  No chief complaint on file.    49-YEAR-OLD FEMALE    Chief complaint: CP    HPI:  Pain woke her about 4 hours ago  Not going away    PAIN IS RATED AS SEVERE  DESCRIBED AS TIGHTNESS    PATIENT HAD SOME SHORTNESS OF BREATH  SOME NAUSEA  NO COMPLAINTS OF DIAPHORESIS    SHE DENIES ANY RADIATION OF PAIN TO THE JAW NECK OR THE BACK.  SOME RADIATION TO THE LEFT SHOULDER       INTERVENTIONS: NONE    PATIENT DENIES ANY COUGH, NO FEVERS OR CHILLS.  NO URI SYMPTOMS    VTE  RISK FACTORS:  NONE  NO HISTORY OF PE OR DVT  NO LONG CAR RIDES OR PLANE RIDES.  NO IMMOBILIZATION.  NO RECENT SURGERY OR TRAUMA.  NO HEMOPTYSIS.      OTHER ASSOCIATED SYMPTOMS:  NO ABDOMINAL PAIN.  NO NAUSEA OR VOMITING.  NO STOOL CHANGES.  NO URINARY COMPLAINTS: NO DYSURIA, NO HEMATURIA, NO FREQUENCY          History provided by:  Patient      Prior to Admission Medications   Prescriptions Last Dose Informant Patient Reported? Taking?   NIFEdipine (PROCARDIA XL) 30 mg 24 hr tablet   No No   Sig: Take 1 tablet (30 mg total) by mouth daily   acetaminophen (TYLENOL) 325 mg tablet   No No   Sig: Take 3 tablets (975 mg total) by mouth every 8 (eight) hours as needed for mild pain   albuterol (PROVENTIL HFA,VENTOLIN HFA) 90 mcg/act inhaler   No No   Sig: Inhale 2 puffs every 4 (four) hours as needed for wheezing or shortness of breath   aspirin 81 mg chewable tablet  Self No No   Sig: Chew 1 tablet (81 mg total) daily   atorvastatin (LIPITOR) 80 mg tablet   No No   Sig: Take 1 tablet (80 mg total) by mouth every evening   calcitriol (ROCALTROL) 0.25 mcg capsule   Yes No   Sig: Take 1 capsule (0.25 mcg total) by mouth daily Takes Monday Wednesday and fridays as of 11/11/22   clopidogrel (PLAVIX) 75 mg tablet   No No   Sig: Take 1 tablet (75 mg total) by mouth daily   gabapentin (Neurontin) 300 mg capsule   No No   Sig: Take 1 capsule (300 mg total) by mouth 3 (three) times a day   isosorbide mononitrate (IMDUR) 120 mg 24 hr tablet   No No   Sig: Take 1  tablet (120 mg total) by mouth daily   isosorbide mononitrate (IMDUR) 60 mg 24 hr tablet   No No   Sig: Take 2 tablets (120 mg total) by mouth daily   Patient not taking: Reported on 2/19/2024   metoprolol succinate (TOPROL-XL) 50 mg 24 hr tablet   No No   Sig: Take 1 tablet (50 mg total) by mouth 2 (two) times a day   nicotine (NICODERM CQ) 14 mg/24hr TD 24 hr patch   No No   Sig: Place 1 patch on the skin over 24 hours every 24 hours   nitroglycerin (NITROSTAT) 0.4 mg SL tablet   No No   Sig: Place 1 tablet (0.4 mg total) under the tongue every 5 (five) minutes as needed for chest pain   ranolazine (RANEXA) 1000 MG SR tablet   No No   Sig: Take 1 tablet (1,000 mg total) by mouth every 12 (twelve) hours   silver sulfadiazine (SILVADENE,SSD) 1 % cream   No No   Sig: Apply topically daily   sodium bicarbonate 650 mg tablet   No No   Sig: Take 1 tablet (650 mg total) by mouth 2 (two) times daily after meals   venlafaxine (EFFEXOR-XR) 75 mg 24 hr capsule   No No   Sig: Take 1 capsule (75 mg total) by mouth daily with breakfast      Facility-Administered Medications: None       Past Medical History:   Diagnosis Date    Anemia     Anxiety     CAD (coronary artery disease)     Cancer ([facility]) 2008    Cardiomyopathy ([facility])     CHF (congestive heart failure) ([facility])     Chronic kidney disease     COPD (chronic obstructive pulmonary disease) ([facility])     scheduled for sleep apnea test soon after pacemaker implant    Coronary artery disease     Depression     History of chemotherapy     non hodgkins lymphoma 2008    Hyperlipemia     Hypertension     Hypertrophic cardiomyopathy ([facility])     Lymphoma, non-Hodgkin's ([facility])     Myocardial infarction ([facility])     Non-ST elevation MI (NSTEMI) 01/02/2023    Obesity     Obstructive sleep apnea     Olecranon bursitis, right elbow 7/11/2022    SSS (sick sinus syndrome) ([facility])     s/p medtronic dual chamber pacemaker 5/25/2021    Tobacco abuse     Ventricular tachycardia, non-sustained ([facility])     Wears  glasses        Past Surgical History:   Procedure Laterality Date    CARDIAC CATHETERIZATION N/A 1/3/2023    Procedure: Cardiac catheterization;  Surgeon: Jesus Franco MD;  Location: [facility];  Service: Cardiology    CARDIAC CATHETERIZATION N/A 1/3/2023    Procedure: Cardiac Coronary Angiogram;  Surgeon: Jesus Franco MD;  Location:  [facility];  Service: Cardiology    CARDIAC CATHETERIZATION N/A 1/3/2023    Procedure: Cardiac pci;  Surgeon: Jesus Franco MD;  Location:  [facility] C[facility];  Service: Cardiology    CARDIAC CATHETERIZATION Left 2023    Procedure: Cardiac Left Heart Cath;  Surgeon: Frieda Fox DO;  Location: [facility];  Service: Cardiology    CARDIAC CATHETERIZATION  2023    Procedure: Cardiac catheterization;  Surgeon: Frieda Fox DO;  Location:  [facility] CATH LAB;  Service: Cardiology    CARDIAC CATHETERIZATION N/A 2023    Procedure: Cardiac Coronary Angiogram;  Surgeon: Frieda Fox DO;  Location:  [facility] CATH LAB;  Service: Cardiology    CARDIAC PACEMAKER PLACEMENT Left 2021    Procedure: DUAL LEAD PACEMAKER IMPLANT;  Surgeon: Yinka Shaw MD;  Location: [facility];  Service: Cardiology    CAROTID STENT       SECTION      CORONARY ANGIOPLASTY WITH STENT PLACEMENT  2021    GREG mid RCA and GREG right PDA    DENTAL SURGERY      IR BIOPSY KIDNEY RANDOM  10/18/2021       Family History   Problem Relation Age of Onset    No Known Problems Mother     No Known Problems Father     No Known Problems Daughter     Brain cancer Maternal Grandfather     Heart disease Maternal Grandfather     Cancer Maternal Grandfather     No Known Problems Paternal Aunt     No Known Problems Paternal Aunt      I have reviewed and agree with the history as documented.    E-Cigarette/Vaping    E-Cigarette Use Never User      E-Cigarette/Vaping Substances    Nicotine No     THC No     CBD No     Flavoring No     Other No     Unknown No       Social History     Tobacco Use    Smoking status: Every Day     Current packs/day: 0.50     Average packs/day: 0.5 packs/day for 25.0 years (12.5 ttl pk-yrs)     Types: Cigarettes    Smokeless tobacco: Never    Tobacco comments:     Recently and currently quitting cigarettes   Vaping Use    Vaping status: Never Used   Substance Use Topics    Alcohol use: Yes     Comment: 1-2 times years    Drug use: No       Review of Systems   Constitutional:  Negative for chills, diaphoresis, fatigue and fever.   Respiratory:  Positive for shortness of breath. Negative for cough, wheezing and stridor.    Cardiovascular:  Positive for chest pain. Negative for palpitations and leg swelling.   Gastrointestinal:  Positive for nausea. Negative for abdominal pain, blood in stool and vomiting.   Genitourinary:  Negative for difficulty urinating, dysuria, flank pain and frequency.   Musculoskeletal:  Negative for arthralgias, back pain, gait problem, joint swelling, myalgias, neck pain and neck stiffness.   Skin:  Negative for rash and wound.   Neurological:  Negative for dizziness, light-headedness and headaches.   All other systems reviewed and are negative.      Physical Exam  Physical Exam  Constitutional:       General: She is not in acute distress.     Appearance: Normal appearance. She is well-developed. She is not ill-appearing, toxic-appearing or diaphoretic.   HENT:      Head: Normocephalic and atraumatic.      Right Ear: There is no impacted cerumen.      Left Ear: There is no impacted cerumen.      Nose: Nose normal. No congestion or rhinorrhea.      Mouth/Throat:      Pharynx: No oropharyngeal exudate or posterior oropharyngeal erythema.   Eyes:      General: No scleral icterus.        Right eye: No discharge.         Left eye: No discharge.      Extraocular Movements: Extraocular movements intact.      Conjunctiva/sclera: Conjunctivae normal.      Pupils: Pupils are equal, round, and reactive to light.   Neck:       Vascular: No JVD.      Trachea: No tracheal deviation.   Cardiovascular:      Rate and Rhythm: Normal rate and regular rhythm.      Pulses: Normal pulses.           Radial pulses are 2+ on the right side and 2+ on the left side.      Heart sounds: Normal heart sounds. No murmur heard.     No friction rub. No gallop.   Pulmonary:      Effort: Pulmonary effort is normal. No respiratory distress.      Breath sounds: Normal breath sounds. No stridor. No wheezing, rhonchi or rales.   Chest:      Chest wall: No tenderness.   Abdominal:      General: Bowel sounds are normal. There is no distension.      Palpations: Abdomen is soft. There is no mass.      Tenderness: There is no abdominal tenderness. There is no right CVA tenderness, left CVA tenderness, guarding or rebound.      Hernia: No hernia is present.   Musculoskeletal:         General: No swelling, tenderness, deformity or signs of injury. Normal range of motion.      Cervical back: Normal range of motion and neck supple. No rigidity or tenderness.      Right lower leg: No edema.      Left lower leg: No edema.   Lymphadenopathy:      Cervical: No cervical adenopathy.   Skin:     General: Skin is warm.      Capillary Refill: Capillary refill takes less than 2 seconds.      Coloration: Skin is not jaundiced or pale.      Findings: No bruising, erythema, lesion or rash.   Neurological:      General: No focal deficit present.      Mental Status: She is alert and oriented to person, place, and time. Mental status is at baseline.      Cranial Nerves: No cranial nerve deficit.      Sensory: No sensory deficit.      Motor: No weakness or abnormal muscle tone.      Coordination: Coordination normal.   Psychiatric:         Mood and Affect: Mood is anxious.         Behavior: Behavior normal.         Thought Content: Thought content normal.         Judgment: Judgment normal.         Vital Signs  ED Triage Vitals   Temp Pulse Resp BP SpO2   -- -- -- -- --      Temp src Heart  Rate Source Patient Position - Orthostatic VS BP Location FiO2 (%)   -- -- -- -- --      Pain Score       --           There were no vitals filed for this visit.      Visual Acuity      ED Medications  Medications   sodium chloride (PF) 0.9 % injection 3 mL (has no administration in time range)       Diagnostic Studies  Results Reviewed       Procedure Component Value Units Date/Time    CBC and differential [[id number]]     Lab Status: No result Specimen: Blood     HS Troponin 0hr (reflex protocol) [[id number]]     Lab Status: No result Specimen: Blood     Magnesium [[id number]]     Lab Status: No result Specimen: Blood     Comprehensive metabolic panel [[id number]]     Lab Status: No result Specimen: Blood     B-Type Natriuretic Peptide(BNP) [[id number]]     Lab Status: No result Specimen: Blood                    X-ray chest 1 view portable    (Results Pending)              Procedures  ECG 12 Lead Documentation Only    Date/Time: 5/7/2024 5:20 AM    Performed by: Shamir Monahan MD  Authorized by: Shamir Monahan MD    Indications / Diagnosis:  Cp  Patient location:  ED  Interpretation:     Interpretation: abnormal    Rate:     ECG rate:  74    ECG rate assessment: normal    Rhythm:     Rhythm: sinus rhythm    Ectopy:     Ectopy: PVCs    ST segments:     ST segments:  Non-specific  T waves:     T waves: non-specific             ED Course  ED Course as of 05/08/24 0147   Tue May 07, 2024   0525 Pt seen and evaluated  Here for cp  Pt stable appearing     0532 CBC and differential(!)   0557 MAGNESIUM: 1.9   0557 Comprehensive metabolic panel(!)   0557 BNP(!): 356   0557 hs TnI 0hr: 22   0557 Worsening creatinine from baseline, otherwise labs at baseline   0644 Pt stable appearing  Requesting a second dose of pain medication   0655 TT to [place]  He will pass admission on to day team                                              Medical Decision Making    MDM    Patient with history as above presented with Patient presents  with:  Chest Pain: Started around 0200 this morning; tight pain in left side of chest    History obtained from patient    Patient was nontoxic, stable. Exam as above    EKG reviewed.    Labs reviewed.    Independently reviewed imaging.    Differential diagnosis considered. Overall presentation is consistent with acute CP, ANTHONY. Low suspicion for sepsis, TAD, PE.    Patient was treated with Morphine    Discussed case with VEE, who agreed to admit patient for Chest pain [R07.9]  Acute chest pain [R07.9]  ANTHONY (acute kidney injury) (HCC) [N17.9]  .        This medical documentation was created using an electronic medical record system with M Modal voice recognition.  Although this document has been carefully reviewed, there may still be some phonetic and typographical errors.  These errors are purely typographical and due to imperfections of the software program, do not reflect any compromise in the patient's medical care.          Amount and/or Complexity of Data Reviewed  Labs: ordered. Decision-making details documented in ED Course.  Radiology: ordered and independent interpretation performed.  ECG/medicine tests: ordered and independent interpretation performed. Decision-making details documented in ED Course.    Risk  Decision regarding hospitalization.             Disposition  Final diagnoses:   None     ED Disposition       None          Follow-up Information    None         Patient's Medications   Discharge Prescriptions    No medications on file       No discharge procedures on file.    PDMP Review         Value Time User    PDMP Reviewed  Yes 3/26/2023  6:06 PM Berry Munroe III, DO            ED Provider  Electronically Signed by             Shamir Monahan MD  05/08/24 0148

## 2024-05-08 ENCOUNTER — APPOINTMENT (OUTPATIENT)
Dept: NON INVASIVE DIAGNOSTICS | Facility: HOSPITAL | Age: 49
End: 2024-05-08
Payer: COMMERCIAL

## 2024-05-08 VITALS
DIASTOLIC BLOOD PRESSURE: 58 MMHG | SYSTOLIC BLOOD PRESSURE: 113 MMHG | TEMPERATURE: 97.3 F | WEIGHT: 277 LBS | HEIGHT: 67 IN | OXYGEN SATURATION: 94 % | HEART RATE: 64 BPM | RESPIRATION RATE: 18 BRPM | BODY MASS INDEX: 43.47 KG/M2

## 2024-05-08 LAB
ANION GAP SERPL CALCULATED.3IONS-SCNC: 10 MMOL/L (ref 4–13)
AORTIC ROOT: 4.1 CM
APICAL FOUR CHAMBER EJECTION FRACTION: 57 %
ASCENDING AORTA: 4 CM
AV LVOT MEAN GRADIENT: 2 MMHG
AV LVOT PEAK GRADIENT: 4 MMHG
BSA FOR ECHO PROCEDURE: 2.32 M2
BUN SERPL-MCNC: 45 MG/DL (ref 5–25)
CALCIUM SERPL-MCNC: 9.2 MG/DL (ref 8.4–10.2)
CHLORIDE SERPL-SCNC: 113 MMOL/L (ref 96–108)
CO2 SERPL-SCNC: 17 MMOL/L (ref 21–32)
CREAT SERPL-MCNC: 4.3 MG/DL (ref 0.6–1.3)
CREAT UR-MCNC: 54.4 MG/DL
DOP CALC LVOT PEAK VEL VTI: 21.64 CM
DOP CALC LVOT PEAK VEL: 1.06 M/S
E WAVE DECELERATION TIME: 237 MS
E/A RATIO: 1.9
ERYTHROCYTE [DISTWIDTH] IN BLOOD BY AUTOMATED COUNT: 20 % (ref 11.6–15.1)
FRACTIONAL SHORTENING: 28 (ref 28–44)
GFR SERPL CREATININE-BSD FRML MDRD: 11 ML/MIN/1.73SQ M
GLUCOSE SERPL-MCNC: 96 MG/DL (ref 65–140)
HCT VFR BLD AUTO: 37.6 % (ref 34.8–46.1)
HGB BLD-MCNC: 11.8 G/DL (ref 11.5–15.4)
INTERVENTRICULAR SEPTUM IN DIASTOLE (PARASTERNAL SHORT AXIS VIEW): 1 CM
INTERVENTRICULAR SEPTUM: 1 CM (ref 0.6–1.1)
LAAS-AP2: 17.9 CM2
LAAS-AP4: 16.2 CM2
LEFT ATRIUM SIZE: 4.9 CM
LEFT ATRIUM VOLUME (MOD BIPLANE): 41 ML
LEFT ATRIUM VOLUME INDEX (MOD BIPLANE): 17.6 ML/M2
LEFT INTERNAL DIMENSION IN SYSTOLE: 4.9 CM (ref 2.1–4)
LEFT VENTRICULAR INTERNAL DIMENSION IN DIASTOLE: 6.8 CM (ref 3.5–6)
LEFT VENTRICULAR POSTERIOR WALL IN END DIASTOLE: 1 CM
LEFT VENTRICULAR STROKE VOLUME: 127 ML
LVSV (TEICH): 127 ML
MCH RBC QN AUTO: 28.2 PG (ref 26.8–34.3)
MCHC RBC AUTO-ENTMCNC: 31.4 G/DL (ref 31.4–37.4)
MCV RBC AUTO: 90 FL (ref 82–98)
MICROALBUMIN UR-MCNC: 161.9 MG/L
MICROALBUMIN/CREAT 24H UR: 298 MG/G CREATININE (ref 0–30)
MV E'TISSUE VEL-SEP: 6 CM/S
MV PEAK A VEL: 0.63 M/S
MV PEAK E VEL: 120 CM/S
MV STENOSIS PRESSURE HALF TIME: 69 MS
MV VALVE AREA P 1/2 METHOD: 3.2
PLATELET # BLD AUTO: 350 THOUSANDS/UL (ref 149–390)
PMV BLD AUTO: 10.3 FL (ref 8.9–12.7)
POTASSIUM SERPL-SCNC: 4.2 MMOL/L (ref 3.5–5.3)
RBC # BLD AUTO: 4.18 MILLION/UL (ref 3.81–5.12)
RIGHT ATRIUM AREA SYSTOLE A4C: 17.1 CM2
RIGHT VENTRICLE ID DIMENSION: 3.6 CM
SINOTUBULAR JUNCTION: 3.6 CM
SL CV LEFT ATRIUM LENGTH A2C: 6.4 CM
SL CV LV EF: 40
SL CV PED ECHO LEFT VENTRICLE DIASTOLIC VOLUME (MOD BIPLANE) 2D: 238 ML
SL CV PED ECHO LEFT VENTRICLE SYSTOLIC VOLUME (MOD BIPLANE) 2D: 112 ML
SL CV SINUS OF VALSALVA 2D: 3.8 CM
SODIUM SERPL-SCNC: 140 MMOL/L (ref 135–147)
STJ: 3.6 CM
TRICUSPID ANNULAR PLANE SYSTOLIC EXCURSION: 2.2 CM
WBC # BLD AUTO: 11.25 THOUSAND/UL (ref 4.31–10.16)

## 2024-05-08 PROCEDURE — 82570 ASSAY OF URINE CREATININE: CPT | Performed by: INTERNAL MEDICINE

## 2024-05-08 PROCEDURE — 93306 TTE W/DOPPLER COMPLETE: CPT

## 2024-05-08 PROCEDURE — 93306 TTE W/DOPPLER COMPLETE: CPT | Performed by: INTERNAL MEDICINE

## 2024-05-08 PROCEDURE — 99214 OFFICE O/P EST MOD 30 MIN: CPT | Performed by: INTERNAL MEDICINE

## 2024-05-08 PROCEDURE — 82043 UR ALBUMIN QUANTITATIVE: CPT | Performed by: INTERNAL MEDICINE

## 2024-05-08 PROCEDURE — 80048 BASIC METABOLIC PNL TOTAL CA: CPT | Performed by: INTERNAL MEDICINE

## 2024-05-08 PROCEDURE — 99239 HOSP IP/OBS DSCHRG MGMT >30: CPT | Performed by: STUDENT IN AN ORGANIZED HEALTH CARE EDUCATION/TRAINING PROGRAM

## 2024-05-08 PROCEDURE — 85027 COMPLETE CBC AUTOMATED: CPT | Performed by: INTERNAL MEDICINE

## 2024-05-08 RX ORDER — NITROGLYCERIN 0.4 MG/1
0.4 TABLET SUBLINGUAL
Qty: 25 TABLET | Refills: 0 | Status: SHIPPED | OUTPATIENT
Start: 2024-05-08 | End: 2024-05-19 | Stop reason: SDUPTHER

## 2024-05-08 RX ORDER — SODIUM BICARBONATE 650 MG/1
1300 TABLET ORAL
Qty: 180 TABLET | Refills: 0 | Status: SHIPPED | OUTPATIENT
Start: 2024-05-08 | End: 2024-05-22

## 2024-05-08 RX ORDER — LORAZEPAM 2 MG/ML
0.5 INJECTION INTRAMUSCULAR ONCE
Status: COMPLETED | OUTPATIENT
Start: 2024-05-08 | End: 2024-05-08

## 2024-05-08 RX ADMIN — CLOPIDOGREL 75 MG: 75 TABLET ORAL at 08:50

## 2024-05-08 RX ADMIN — LORAZEPAM 0.5 MG: 0.5 TABLET ORAL at 01:48

## 2024-05-08 RX ADMIN — VENLAFAXINE HYDROCHLORIDE 75 MG: 75 CAPSULE, EXTENDED RELEASE ORAL at 08:51

## 2024-05-08 RX ADMIN — SODIUM BICARBONATE 1300 MG: 650 TABLET ORAL at 08:51

## 2024-05-08 RX ADMIN — RANOLAZINE 1000 MG: 500 TABLET, FILM COATED, EXTENDED RELEASE ORAL at 08:50

## 2024-05-08 RX ADMIN — NICOTINE 1 PATCH: 14 PATCH, EXTENDED RELEASE TRANSDERMAL at 08:53

## 2024-05-08 RX ADMIN — SODIUM BICARBONATE 1300 MG: 650 TABLET ORAL at 12:37

## 2024-05-08 RX ADMIN — LORAZEPAM 0.5 MG: 2 INJECTION INTRAMUSCULAR; INTRAVENOUS at 09:45

## 2024-05-08 RX ADMIN — PERFLUTREN 0.8 ML/MIN: 6.52 INJECTION, SUSPENSION INTRAVENOUS at 08:05

## 2024-05-08 RX ADMIN — NIFEDIPINE 30 MG: 30 TABLET, FILM COATED, EXTENDED RELEASE ORAL at 08:50

## 2024-05-08 RX ADMIN — ISOSORBIDE MONONITRATE 120 MG: 30 TABLET, EXTENDED RELEASE ORAL at 08:51

## 2024-05-08 RX ADMIN — METOPROLOL SUCCINATE 50 MG: 50 TABLET, EXTENDED RELEASE ORAL at 08:51

## 2024-05-08 RX ADMIN — HEPARIN SODIUM 5000 UNITS: 5000 INJECTION INTRAVENOUS; SUBCUTANEOUS at 06:00

## 2024-05-08 RX ADMIN — ONDANSETRON 4 MG: 2 INJECTION INTRAMUSCULAR; INTRAVENOUS at 02:01

## 2024-05-08 RX ADMIN — ASPIRIN 81 MG 81 MG: 81 TABLET ORAL at 08:51

## 2024-05-08 NOTE — PLAN OF CARE
Problem: Potential for Falls  Goal: Patient will remain free of falls  Description: INTERVENTIONS:  - Educate patient/family on patient safety including physical limitations  - Instruct patient to call for assistance with activity   - Consult OT/PT to assist with strengthening/mobility   - Keep Call bell within reach  - Keep bed low and locked with side rails adjusted as appropriate  - Keep care items and personal belongings within reach  - Initiate and maintain comfort rounds  - Make Fall Risk Sign visible to staff  - Offer Toileting every 2 Hours, in advance of need  - Initiate/Maintain bed alarm  - Obtain necessary fall risk management equipment: non skid socks  - Apply yellow socks and bracelet for high fall risk patients  - Consider moving patient to room near nurses station  Outcome: Progressing     Problem: PAIN - ADULT  Goal: Verbalizes/displays adequate comfort level or baseline comfort level  Description: Interventions:  - Encourage patient to monitor pain and request assistance  - Assess pain using appropriate pain scale  - Administer analgesics based on type and severity of pain and evaluate response  - Implement non-pharmacological measures as appropriate and evaluate response  - Consider cultural and social influences on pain and pain management  - Notify physician/advanced practitioner if interventions unsuccessful or patient reports new pain  Outcome: Progressing     Problem: INFECTION - ADULT  Goal: Absence or prevention of progression during hospitalization  Description: INTERVENTIONS:  - Assess and monitor for signs and symptoms of infection  - Monitor lab/diagnostic results  - Monitor all insertion sites, i.e. indwelling lines, tubes, and drains  - Monitor endotracheal if appropriate and nasal secretions for changes in amount and color  - [place] appropriate cooling/warming therapies per order  - Administer medications as ordered  - Instruct and encourage patient and family to use good hand  hygiene technique  - Identify and instruct in appropriate isolation precautions for identified infection/condition  Outcome: Progressing  Goal: Absence of fever/infection during neutropenic period  Description: INTERVENTIONS:  - Monitor WBC    Outcome: Progressing     Problem: SAFETY ADULT  Goal: Patient will remain free of falls  Description: INTERVENTIONS:  - Educate patient/family on patient safety including physical limitations  - Instruct patient to call for assistance with activity   - Consult OT/PT to assist with strengthening/mobility   - Keep Call bell within reach  - Keep bed low and locked with side rails adjusted as appropriate  - Keep care items and personal belongings within reach  - Initiate and maintain comfort rounds  - Make Fall Risk Sign visible to staff  - Offer Toileting every 2 Hours, in advance of need  - Initiate/Maintain bed alarm  - Obtain necessary fall risk management equipment: non skid socks  - Apply yellow socks and bracelet for high fall risk patients  - Consider moving patient to room near nurses station  Outcome: Progressing  Goal: Maintain or return to baseline ADL function  Description: INTERVENTIONS:  -  Assess patient's ability to carry out ADLs; assess patient's baseline for ADL function and identify physical deficits which impact ability to perform ADLs (bathing, care of mouth/teeth, toileting, grooming, dressing, etc.)  - Assess/evaluate cause of self-care deficits   - Assess range of motion  - Assess patient's mobility; develop plan if impaired  - Assess patient's need for assistive devices and provide as appropriate  - Encourage maximum independence but intervene and supervise when necessary  - Involve family in performance of ADLs  - Assess for home care needs following discharge   - Consider OT consult to assist with ADL evaluation and planning for discharge  - Provide patient education as appropriate  Outcome: Progressing  Goal: Maintains/Returns to pre admission  functional level  Description: INTERVENTIONS:  - Perform AM-PAC 6 Click Basic Mobility/ Daily Activity assessment daily.  - Set and communicate daily mobility goal to care team and patient/family/caregiver.   - Collaborate with rehabilitation services on mobility goals if consulted  - Perform Range of Motion 2 times a day.  - Reposition patient every 2 hours.  - Dangle patient 2 times a day  - Stand patient 2 times a day  - Ambulate patient 2 times a day  - Out of bed to chair 3 times a day   - Out of bed for meals 3 times a day  - Out of bed for toileting  - Record patient progress and toleration of activity level   Outcome: Progressing     Problem: DISCHARGE PLANNING  Goal: Discharge to home or other facility with appropriate resources  Description: INTERVENTIONS:  - Identify barriers to discharge w/patient and caregiver  - Arrange for needed discharge resources and transportation as appropriate  - Identify discharge learning needs (meds, wound care, etc.)  - Refer to Case Management Department for coordinating discharge planning if the patient needs post-hospital services based on physician/advanced practitioner order or complex needs related to functional status, cognitive ability, or social support system  Outcome: Progressing     Problem: Knowledge Deficit  Goal: Patient/family/caregiver demonstrates understanding of disease process, treatment plan, medications, and discharge instructions  Description: Complete learning assessment and assess knowledge base.  Interventions:  - Provide teaching at level of understanding  - Provide teaching via preferred learning methods  Outcome: Progressing     Problem: CARDIOVASCULAR - ADULT  Goal: Maintains optimal cardiac output and hemodynamic stability  Description: INTERVENTIONS:  - Monitor I/O, vital signs and rhythm  - Monitor for S/S and trends of decreased cardiac output  - Administer and titrate ordered vasoactive medications to optimize hemodynamic stability  -  Assess quality of pulses, skin color and temperature  - Assess for signs of decreased coronary artery perfusion  - Instruct patient to report change in severity of symptoms  Outcome: Progressing  Goal: Absence of cardiac dysrhythmias or at baseline rhythm  Description: INTERVENTIONS:  - Continuous cardiac monitoring, vital signs, obtain 12 lead EKG if ordered  - Administer antiarrhythmic and heart rate control medications as ordered  - Monitor electrolytes and administer replacement therapy as ordered  Outcome: Progressing

## 2024-05-08 NOTE — DISCHARGE SUMMARY
[facility]  Discharge- Elise eKn 1975, 49 y.o. female MRN: [id number]  Unit/Bed#: -[facility] Encounter: [id number]  Primary Care Provider: Isaak Stanford DO   Date and time admitted to hospital: 5/7/2024  5:07 AM    Assessment/Plan   ANTHONY (acute kidney injury) (HCC)  Assessment & Plan  Hx of CKD stage 4, baseline creatinine about 2.5-2.7  Creatinine of 4.2 on admission  Consult nephrology  Check UA     Social anxiety disorder  Assessment & Plan  Continue venlafaxine     Morbid obesity with BMI of 45.0-49.9, adult (HCC)  Assessment & Plan  Would benefit from weight loss, dietary changes and exercise     Coronary artery disease of native artery of native heart with stable angina pectoris (HCC)  Assessment & Plan  Hx of CAD s/p PCI  Recent cath on 09/2023 with progression of CAD not amenable to PCI. Cards at that time recommending GDMT  Continue aspirin, statin, toprol xl, imdur, ranexa      Tobacco abuse  Assessment & Plan  Continues to smoke, but trying to cut back  Nicotine patch     Essential hypertension  Assessment & Plan  BP currently controlled, continue home meds with BP hold parameters  Continue procardia, toprol xl, imdur     * Chest pain  Assessment & Plan  49-year-old female with PMH of CAD s/p PCI, HTN, CKD stage IV, hyperlipidemia and continued tobacco abuse presented with acute chest pain  Patient reportedly awoken with chest pressure-like sensation substernal region, nonradiating  First 2 troponins were negative, EKG without ischemic changes  Consult cardiology  Complete trend of troponins, monitor on telemetry  Continue aspirin, statin, Plavix and Toprol-XL       Discharging Physician / Practitioner: Ayush Chaparro MD  PCP: Isaak Stanford DO  Admission Date:   Admission Orders (From admission, onward)       Ordered        05/07/24 0711  Place in Observation  Once                          Discharge Date: 05/08/24    Medical Problems       Resolved  Problems  Date Reviewed: 5/7/2024   None         Consultations During Hospital Stay:  Cardiology  Nephrology    Procedures Performed:   2D Echo    Interpretation Summary         Left Ventricle: Left ventricular cavity size is dilated. Wall thickness is mildly increased. The left ventricular ejection fraction is 40%. Systolic function is moderately reduced. There is global hypokinesis with regional variation worse in the inferior and lateral regions. Diastolic function is abnormal.    Left Atrium: The atrium is mildly dilated.    Mitral Valve: There is mild to moderate regurgitation.    Tricuspid Valve: There is mild regurgitation.    Aorta: The aortic root is mildly dilated. The ascending aorta is mildly dilated. The aortic root is 4.10 cm. The ascending aorta is 4 cm.       Significant Findings / Test Results:   US kidney and bladder    Result Date: 5/7/2024  Impression: No acute findings. Echogenic parenchyma bilaterally suggestive of medical renal disease. Resident: MICHELLE LANTIGUA I, the attending radiologist, have reviewed the images and agree with the final report above. Workstation performed: [id number]     X-ray chest 1 view portable    Result Date: 5/7/2024  Impression: No acute cardiopulmonary disease. Workstation performed: [id number]        Incidental Findings:   none     Test Results Pending at Discharge (will require follow up):   none     Outpatient Tests Requested:  none    Complications:  none    Reason for Admission: Chest Pain    Hospital Course:     Elise Ken is a 49 y.o. female patient who originally presented to the hospital on 5/7/2024 due to chest pain and ANTHONY.  Troponins were trended and were negative, EKG without ischemic changes.  With history of severe CAD with previous cath showing diffuse disease not amenable to PCI cardiology was consulted.  2D echo shows EF 40%, similar to previous with global hypokinesis.  Cardiology did offer cardiac catheterization, though understood that  "patient would be at risk given CKD 4/5.  Initially suspected ANTHONY on admission with elevated creatinine, given trial of IV fluids with no improvement.  Nephrology consulted, suspect likely progression of CKD with history of Alport syndrome.  Discussed dialysis as patient would be high risk though may be a candidate for PD dialysis.  Patient at this time did not want cardiac catheterization or further discussion regarding dialysis.  She was cleared for discharge by cardiology and nephrology with recommendations to have close follow-up.    Please see above list of diagnoses and related plan for additional information.     Condition at Discharge: fair     Discharge Day Visit / Exam:     Subjective:    Patient overnight reports doing well.  Denies any chest pain, shortness of breath.  Was able to get some rest.  No medical complaints.  After discussion with cardiology and nephrology, she did appear somewhat anxious but now improved.  Vitals: Blood Pressure: 113/58 (05/08/24 1121)  Pulse: 64 (05/08/24 1121)  Temperature: (!) 97.3 °F (36.3 °C) (05/08/24 1121)  Temp Source: Oral (05/08/24 0252)  Respirations: 18 (05/08/24 1121)  Height: 5' 7\" (170.2 cm) (05/08/24 0715)  Weight - Scale: 126 kg (277 lb) (05/08/24 0715)  SpO2: 94 % (05/08/24 1121)  Exam:   Physical Exam  Vitals and nursing note reviewed.   Constitutional:       Appearance: She is obese.   HENT:      Head: Normocephalic.   Eyes:      Conjunctiva/sclera: Conjunctivae normal.      Pupils: Pupils are equal, round, and reactive to light.   Cardiovascular:      Rate and Rhythm: Normal rate.   Pulmonary:      Effort: Pulmonary effort is normal.      Breath sounds: No wheezing.   Abdominal:      General: Bowel sounds are normal. There is no distension.      Palpations: Abdomen is soft.   Musculoskeletal:         General: No swelling.      Right lower leg: No edema.      Left lower leg: No edema.   Skin:     General: Skin is warm and dry.   Neurological:      General: " No focal deficit present.      Mental Status: She is alert. Mental status is at baseline.   Psychiatric:         Mood and Affect: Mood is anxious.         Discharge instructions/Information to patient and family:   See after visit summary for information provided to patient and family.      Provisions for Follow-Up Care:  See after visit summary for information related to follow-up care and any pertinent home health orders.      Disposition:     Home     Discharge Statement:  I spent 40 minutes discharging the patient. This time was spent on the day of discharge. I had direct contact with the patient on the day of discharge. Greater than 50% of the total time was spent examining patient, answering all patient questions, arranging and discussing plan of care with patient as well as directly providing post-discharge instructions.  Additional time then spent on discharge activities.    Discharge Medications:  See after visit summary for reconciled discharge medications provided to patient and family.      ** Please Note: This note has been constructed using a voice recognition system **

## 2024-05-08 NOTE — ASSESSMENT & PLAN NOTE
-Hx of CAD s/p PCI  -Recent cath on 09/2023 with progression of CAD not amenable to PCI. Cards at that time recommending GDMT  -Cardiac catheterization images reviewed by Dr. Franco from interventional cardiology, after discussion with patient she wishes to hold off intervention at this time given renal dysfunction and improvement in symptoms  -Continue aspirin, Plavix, metoprolol, Imdur, Ranexa  -Once stabilized from nephrology standpoint patient may benefit from outpatient evaluation by interventional cardiology for discussion as well.

## 2024-05-08 NOTE — UTILIZATION REVIEW
Initial Clinical Review    Admission: Date/Time/Statement:   Admission Orders (From admission, onward)       Ordered        05/07/24 0711  Place in Observation  Once                          Orders Placed This Encounter   Procedures    Place in Observation     Standing Status:   Standing     Number of Occurrences:   1     Order Specific Question:   Level of Care     Answer:   Med Surg [16]     ED Arrival Information       Expected   -    Arrival   5/7/2024 05:04    Acuity   Urgent              Means of arrival   Walk-In    Escorted by   Family Member    Service   Hospitalist    Admission type   Emergency              Arrival complaint   chest pain             Chief Complaint   Patient presents with    Chest Pain     Started around 0200 this morning; tight pain in left side of chest       Initial Presentation: 49 y.o. female to the ED from home with complaints of chest pain which started about 3 hours prior to arrival. Admitted under observation for chest pain. H/o CAD s/p PCI, hypertension, obesity, anxiety and CKD stage IV. Arrives anxious. Creat on arrival 4.2 with baseline around 2.5-2.7. Nephrology consult. Check UA.  Troponins x 2 neg. Cardiology consult.  Monitor tele.     Cardiology:  Continue medical management.  Known CAD without options for revascularization.   Previously discussed the option of evaluation at a larger center ([facility] or [facility]) for advanced PCI   Symptoms not similar to prior episodes which required stenting.     Nephrology: Continue to trend.  Potential ANTHONY due to prerenal/vasomotor state with hypoperfusion /low BP/diarrhea/ CRS. Decreased po intake and slight decrease in urination. Not on bumex.  lIkely progressive CKD.   Date:     Day 2:      ED Triage Vitals   Temperature Pulse Respirations Blood Pressure SpO2   05/07/24 0517 05/07/24 0513 05/07/24 0513 05/07/24 0513 05/07/24 0513   98 °F (36.7 °C) 79 18 137/66 98 %      Temp Source Heart Rate Source Patient Position - Orthostatic VS BP  Location FiO2 (%)   05/07/24 1235 05/07/24 0513 05/07/24 0513 05/07/24 0513 --   Oral Monitor Sitting Left arm       Pain Score       05/07/24 0513       8          Wt Readings from Last 1 Encounters:   05/08/24 126 kg (277 lb)     Additional Vital Signs: Vital Signs (last 2 days)    Date/Time Temp Pulse Resp BP MAP (mmHg) SpO2 O2 Device Patient Position - Orthostatic VS   05/08/24 0715 -- 62 -- 107/51 -- -- -- --   05/08/24 0300 -- 62 -- 107/51 70 93 % -- --   05/08/24 02:52:16 97.6 °F (36.4 °C) 61 16 107/51 70 96 % -- Lying   05/07/24 22:15:35 -- 59 -- 107/61 76 95 % -- --   05/07/24 1953 -- -- -- -- -- -- None (Room air) --   05/07/24 19:34:23 97.5 °F (36.4 °C) 60 18 109/61 77 96 % None (Room air) Lying   05/07/24 14:54:57 97.7 °F (36.5 °C) 60 18 98/47 Abnormal  64 Abnormal  97 % None (Room air) Lying   05/07/24 12:35:36 98.2 °F (36.8 °C) 60 19 108/62 77 96 % None (Room air) Lying   05/07/24 1000 -- 61 20 93/50 67 96 % None (Room air) Sitting   05/07/24 0900 -- 61 20 110/57 73 96 % None (Room air) Lying   05/07/24 0800 98 °F (36.7 °C) 60 21 102/52 75 97 % None (Room air) Sitting   05/07/24 0700 -- 60 17 111/65 81 96 % None (Room air) Sitting   05/07/24 0630 -- 60 18 119/58 82 95 % None (Room air) --   05/07/24 0517 98 °F (36.7 °C) -- -- -- -- -- -- --   05/07/24 0513 -- 79 18 137/66 -- 98 % None (Room air) Sitting     Pertinent Labs/Diagnostic Test Results:   5/7 EKG: Narrative & Impression    Sinus rhythm with occasional Premature ventricular complexes  Inferior infarct (cited on or before 07-JAN-2023)  Anteroseptal infarct (cited on or before 07-JAN-2023)  Abnormal ECG  When compared with ECG of 01-MAR-2024 22:19,  Premature ventricular complexes are now Present     US kidney and bladder   Final Result by Christian Burciaga MD (05/07 4637)      No acute findings. Echogenic parenchyma bilaterally suggestive of medical renal disease.            Resident: MICHELLE LANTIGUA I, the attending radiologist, have reviewed  "the images and agree with the final report above.      Workstation performed: [id number]         X-ray chest 1 view portable   ED Interpretation by Shamir Monahan MD (05/07 0559)     XR CHEST PORTABLE     INDICATION: chest pain.        COMPARISON: 3.1.24     FINDINGS:     Clear lungs. No pneumothorax or pleural effusion.     Mild cardiomegaly with left subclavian pacemaker leads in right atrium and right ventricle. Cardiac stent.     Bones are unremarkable for age.     Normal upper abdomen.     IMPRESSION:     No acute cardiopulmonary disease.             Final Result by Nay Chao MD (05/07 1505)      No acute cardiopulmonary disease.            Workstation performed: [id number]               Results from last 7 days   Lab Units 05/08/24  0629 05/07/24  0521   WBC Thousand/uL 11.25* 13.66*   HEMOGLOBIN g/dL 11.8 12.4   HEMATOCRIT % 37.6 40.7   PLATELETS Thousands/uL 350 339   TOTAL NEUT ABS Thousands/µL  --  9.79*         Results from last 7 days   Lab Units 05/08/24  0629 05/07/24  1713 05/07/24  0521   SODIUM mmol/L 140 140 139   POTASSIUM mmol/L 4.2 4.7 4.0   CHLORIDE mmol/L 113* 114* 112*   CO2 mmol/L 17* 17* 15*   ANION GAP mmol/L 10 9 12   BUN mg/dL 45* 47* 45*   CREATININE mg/dL 4.30* 4.41* 4.20*   EGFR ml/min/1.73sq m 11 11 11   CALCIUM mg/dL 9.2 9.3 9.5   MAGNESIUM mg/dL  --   --  1.9     Results from last 7 days   Lab Units 05/07/24  0521   AST U/L 8*   ALT U/L 12   ALK PHOS U/L 103   TOTAL PROTEIN g/dL 7.9   ALBUMIN g/dL 4.2   TOTAL BILIRUBIN mg/dL 0.37         Results from last 7 days   Lab Units 05/08/24  0629 05/07/24  1713 05/07/24  0521   GLUCOSE RANDOM mg/dL 96 98 110             No results found for: \"BETA-HYDROXYBUTYRATE\"                   Results from last 7 days   Lab Units 05/07/24  0916 05/07/24  0718 05/07/24  0521   HS TNI 0HR ng/L  --   --  22   HS TNI 2HR ng/L  --  29  --    HSTNI D2 ng/L  --  7  --    HS TNI 4HR ng/L 44  --   --    HSTNI D4 ng/L 22*  --   --                    "               Results from last 7 days   Lab Units 05/07/24  0521   BNP pg/mL 356*                                     Results from last 7 days   Lab Units 05/07/24  1621   CLARITY UA  Clear   COLOR UA  Yellow   SPEC GRAV UA  1.025   PH UA  6.0   GLUCOSE UA mg/dl Negative   KETONES UA mg/dl Negative   BLOOD UA  Negative   PROTEIN UA mg/dl 2+*   NITRITE UA  Negative   BILIRUBIN UA  Negative   UROBILINOGEN UA E.U./dl 0.2   LEUKOCYTES UA  Negative   WBC UA /hpf 0-1*   RBC UA /hpf 0-1*   BACTERIA UA /hpf Occasional   EPITHELIAL CELLS WET PREP /hpf Moderate*   SODIUM UR  38   CREATININE UR mg/dL 112.9  112.9   PROTEIN UR mg/dL 142   PROT/CREAT RATIO UR  1.26*                                                   ED Treatment:   Medication Administration from 05/07/2024 0504 to 05/07/2024 1231         Date/Time Order Dose Route Action Action by Comments     05/07/2024 0539 EDT morphine injection 4 mg 4 mg Intravenous Given Chioma Simon RN --     05/07/2024 0539 EDT ondansetron (ZOFRAN) injection 4 mg 4 mg Intravenous Given Chioma Simon RN --     05/07/2024 0656 EDT morphine injection 4 mg 4 mg Intravenous Given Chioma Simon RN --     05/07/2024 0959 EDT multi-electrolyte (PLASMALYTE-A/ISOLYTE-S PH 7.4) IV solution 75 mL/hr Intravenous New Bag Mitzi Torres, SPEEDY --     05/07/2024 0957 EDT acetaminophen (TYLENOL) tablet 975 mg 975 mg Oral Given Mitzi Torres RN --     05/07/2024 0951 EDT aspirin chewable tablet 81 mg 81 mg Oral Given Mitzi Torres RN --     05/07/2024 0953 EDT clopidogrel (PLAVIX) tablet 75 mg 75 mg Oral Given Mitzi Torres RN --     05/07/2024 0952 EDT isosorbide mononitrate (IMDUR) 24 hr tablet 120 mg 120 mg Oral Not Given Mitzi Torres RN --     05/07/2024 0952 EDT metoprolol succinate (TOPROL-XL) 24 hr tablet 50 mg 50 mg Oral Not Given Mitzi Torres RN --     05/07/2024 0951 EDT NIFEdipine (PROCARDIA XL) 24 hr tablet 30 mg 30 mg Oral Not Given Mitzi ELIZABETH  SPEEDY Torres --     05/07/2024 0958 EDT ranolazine (RANEXA) 12 hr tablet 1,000 mg 1,000 mg Oral Given Mitzi Torres RN --     05/07/2024 0953 EDT nicotine (NICODERM CQ) 14 mg/24hr TD 24 hr patch 1 patch 1 patch Transdermal Medication Applied Mitzi Torres RN --     05/07/2024 0954 EDT heparin (porcine) subcutaneous injection 5,000 Units 5,000 Units Subcutaneous Given Mitzi Torres RN --     05/07/2024 1051 EDT LORazepam (ATIVAN) tablet 0.5 mg 0.5 mg Oral Given Mitzi Torres RN --          Past Medical History:   Diagnosis Date    Anemia     Anxiety     CAD (coronary artery disease)     Cancer ([facility]) 2008    Cardiomyopathy ([facility])     CHF (congestive heart failure) ([facility])     Chronic kidney disease     COPD (chronic obstructive pulmonary disease) ([facility])     scheduled for sleep apnea test soon after pacemaker implant    Coronary artery disease     Depression     History of chemotherapy     non hodgkins lymphoma 2008    Hyperlipemia     Hypertension     Hypertrophic cardiomyopathy ([facility])     Lymphoma, non-Hodgkin's ([facility])     Myocardial infarction ([facility])     Non-ST elevation MI (NSTEMI) 01/02/2023    Obesity     Obstructive sleep apnea     Olecranon bursitis, right elbow 7/11/2022    SSS (sick sinus syndrome) ([facility])     s/p medtronic dual chamber pacemaker 5/25/2021    Tobacco abuse     Ventricular tachycardia, non-sustained ([facility])     Wears glasses          Admitting Diagnosis: Chest pain [R07.9]  Acute chest pain [R07.9]  ANTHONY (acute kidney injury) ([facility]) [N17.9]  Age/Sex: 49 y.o. female  Admission Orders:  Scheduled Medications:  aspirin, 81 mg, Oral, Daily  atorvastatin, 80 mg, Oral, QPM  clopidogrel, 75 mg, Oral, Daily  heparin (porcine), 5,000 Units, Subcutaneous, Q8H ABIEL  isosorbide mononitrate, 120 mg, Oral, Daily  metoprolol succinate, 50 mg, Oral, BID  nicotine, 1 patch, Transdermal, Daily  NIFEdipine, 30 mg, Oral, Daily  ranolazine, 1,000 mg, Oral, Q12H ABIEL  sodium bicarbonate, 1,300 mg,  Oral, TID after meals  venlafaxine, 75 mg, Oral, Daily With Breakfast      Continuous IV Infusions:     PRN Meds:  acetaminophen, 975 mg, Oral, Q8H PRN  albuterol, 2 puff, Inhalation, Q4H PRN  aluminum-magnesium hydroxide-simethicone, 30 mL, Oral, Q6H PRN  HYDROmorphone, 0.2 mg, Intravenous, Q4H PRN  LORazepam, 0.5 mg, Oral, Q8H PRN  ondansetron, 4 mg, Intravenous, Q6H PRN  oxyCODONE, 10 mg, Oral, Q6H PRN  oxyCODONE, 5 mg, Oral, Q6H PRN  sodium chloride (PF), 3 mL, Intravenous, Q1H PRN        IP CONSULT TO NEPHROLOGY  IP CONSULT TO CARDIOLOGY    Network Utilization Review Department  ATTENTION: Please call with any questions or concerns to [phone] and carefully listen to the prompts so that you are directed to the right person. All voicemails are confidential.   For Discharge needs, contact Care Management DC Support Team at [phone] opt. [phone]  Send all requests for admission clinical reviews, approved or denied determinations and any other requests to dedicated fax number below belonging to the campus where the patient is receiving treatment. List of dedicated fax numbers for the Facilities:  FACILITY NAME UR FAX NUMBER   ADMISSION DENIALS (Administrative/Medical Necessity) [phone]   DISCHARGE SUPPORT TEAM (NETWORK) [phone]   PARENT CHILD HEALTH (Maternity/NICU/Pediatrics) [phone]   [facility] [phone]   [facility] [phone]   [facility] [phone]   [facility] [phone]   [facility] [phone]   [facility] [phone]   [facility] [phone]   [facility] [phone]   [facility] [phone]   [facility] [phone]   [facility] [phone]   [facility]  [facility] [phone]

## 2024-05-08 NOTE — PROGRESS NOTES
[facility]  Progress Note  Name: Elise Ken I  MRN: [id number]  Unit/Bed#: -01 I Date of Admission: 5/7/2024   Date of Service: 5/8/2024 I Hospital Day: 0    Assessment/Plan   ANTHONY (acute kidney injury) (HCC)  Assessment & Plan  -Appreciate nephrology assistance and recommendations  -Will attempt to avoid nephrotoxic agents    Coronary artery disease of native artery of native heart with stable angina pectoris (HCC)  Assessment & Plan  -Hx of CAD s/p PCI  -Recent cath on 09/2023 with progression of CAD not amenable to PCI. Cards at that time recommending GDMT  -Cardiac catheterization images reviewed by Dr. Franco from interventional cardiology, after discussion with patient she wishes to hold off intervention at this time given renal dysfunction and improvement in symptoms  -Continue aspirin, Plavix, metoprolol, Imdur, Ranexa  -Once stabilized from nephrology standpoint patient may benefit from outpatient evaluation by interventional cardiology for discussion as well.    Tobacco abuse  Assessment & Plan  -Counseled on the importance of tobacco cessation    Essential hypertension  Assessment & Plan  -Currently on softer side will monitor with current regimen    * Chest pain  Assessment & Plan  -Currently improved at this time  -Patient with known coronary artery disease  -Cardiac catheterization images reviewed by Dr. Franco from interventional cardiology team who noted potential high risk intervention opportunity of RCA disease.  After discussion with patient understanding risks of renal dysfunction, she wishes to hold off at this time as she notes symptoms have improved.  -Will continue with current medical therapy including Imdur, nifedipine, Ranexa and dual antiplatelet therapy  -Continue to monitor patient clinically at this time      Outpatient Cardiologist: Nadine Amanda      Subjective:   Patient seen and examined.  Per patient, she denies any chest pain, palpitations,  "lightness or dizziness, loss of consciousness and notes shortness of breath improving.  She does note some anxiety around her overall chronic medical conditions.      Summary comments:  -After discussion with patient she does not wish to undergo invasive or aggressive interventions at this time particularly given risk to renal dysfunction and improvement in her symptoms.    -Will continue current medical regimen including current antianginal therapy and dual antiplatelet therapy  -Patient will need to follow with her primary cardiology team and could also be seen and evaluated by Dr. Franco (interventional cardiology) in the outpatient setting for discussion as well.    Telemetry/ECG/Cardiac testing:   -Currently sinus rhythm on telemetry    Vitals: Blood pressure 108/55, pulse 60, temperature 97.6 °F (36.4 °C), temperature source Oral, resp. rate 16, height 5' 7\" (1.702 m), weight 126 kg (277 lb), last menstrual period 04/15/2024, SpO2 98%, not currently breastfeeding.,   Orthostatic Blood Pressures      Flowsheet Row Most Recent Value   Blood Pressure 108/55 filed at 05/08/2024 0858   Patient Position - Orthostatic VS Lying filed at 05/08/2024 0252        ,   Weight (last 2 days)       Date/Time Weight    05/08/24 0715 126 (277)    05/07/24 12:35:36 126 (277.78)    05/07/24 0513 127 (280)            Physical Exam:  Physical Exam  Vitals reviewed.   Constitutional:       General: She is not in acute distress.     Appearance: She is obese. She is not diaphoretic.   HENT:      Head: Normocephalic and atraumatic.   Eyes:      General:         Right eye: No discharge.         Left eye: No discharge.   Neck:      Comments: Trachea midline, neck obese, difficult to assess JVD  Cardiovascular:      Rate and Rhythm: Normal rate and regular rhythm.      Heart sounds:      No friction rub.   Pulmonary:      Effort: No respiratory distress.      Breath sounds: No wheezing.      Comments: Decreased breath sounds " bilaterally  Chest:      Chest wall: No tenderness.   Abdominal:      General: Bowel sounds are normal.      Palpations: Abdomen is soft.      Tenderness: There is no abdominal tenderness. There is no rebound.   Musculoskeletal:      Right lower leg: No edema.      Left lower leg: No edema.   Skin:     General: Skin is warm and dry.   Neurological:      Mental Status: She is alert.      Comments: Awake, alert, able to answer questions appropriately, able to move extremities bilaterally.   Psychiatric:      Comments: Anxious appearing but pleasant          Medications:      Current Facility-Administered Medications:     acetaminophen (TYLENOL) tablet 975 mg, 975 mg, Oral, Q8H PRN, Ayush Chaparro MD, 975 mg at 05/07/24 0957    albuterol (PROVENTIL HFA,VENTOLIN HFA) inhaler 2 puff, 2 puff, Inhalation, Q4H PRN, Ayush Chaparro MD    aluminum-magnesium hydroxide-simethicone (MAALOX) oral suspension 30 mL, 30 mL, Oral, Q6H PRN, Ayush Chaparro MD    aspirin chewable tablet 81 mg, 81 mg, Oral, Daily, Ayush Chaparro MD, 81 mg at 05/08/24 0851    atorvastatin (LIPITOR) tablet 80 mg, 80 mg, Oral, QPM, Ayush Chaparro MD, 80 mg at 05/07/24 1707    clopidogrel (PLAVIX) tablet 75 mg, 75 mg, Oral, Daily, Ayush Chaparro MD, 75 mg at 05/08/24 0850    heparin (porcine) subcutaneous injection 5,000 Units, 5,000 Units, Subcutaneous, Q8H ABIEL, Ayush Chaparro MD, 5,000 Units at 05/08/24 0600    HYDROmorphone HCl (DILAUDID) injection 0.2 mg, 0.2 mg, Intravenous, Q4H PRN, Ayush Chaparro MD    isosorbide mononitrate (IMDUR) 24 hr tablet 120 mg, 120 mg, Oral, Daily, JALIL Greer, 120 mg at 05/08/24 0851    LORazepam (ATIVAN) tablet 0.5 mg, 0.5 mg, Oral, Q8H PRN, Ayush Chaparro MD, 0.5 mg at 05/08/24 0148    metoprolol succinate (TOPROL-XL) 24 hr tablet 50 mg, 50 mg, Oral, BID, JALIL Greer, 50 mg at 05/08/24 0851    nicotine (NICODERM CQ) 14 mg/24hr TD 24 hr patch 1 patch, 1 patch, Transdermal, Daily,  Ayush Chaparro MD, 1 patch at 05/08/24 0853    NIFEdipine (PROCARDIA XL) 24 hr tablet 30 mg, 30 mg, Oral, Daily, JALIL Greer, 30 mg at 05/08/24 0850    ondansetron (ZOFRAN) injection 4 mg, 4 mg, Intravenous, Q6H PRN, Ayush Chaparro MD, 4 mg at 05/08/24 0201    oxyCODONE (ROXICODONE) immediate release tablet 10 mg, 10 mg, Oral, Q6H PRN, Ayush Chaparro MD    oxyCODONE (ROXICODONE) IR tablet 5 mg, 5 mg, Oral, Q6H PRN, Ayush Chaparro MD    ranolazine (RANEXA) 12 hr tablet 1,000 mg, 1,000 mg, Oral, Q12H ABIEL, Ayush Chaparro MD, 1,000 mg at 05/08/24 0850    sodium bicarbonate tablet 1,300 mg, 1,300 mg, Oral, TID after meals, Anne-Marie Gomez DO, 1,300 mg at 05/08/24 0851    Insert peripheral IV, , , Once **AND** sodium chloride (PF) 0.9 % injection 3 mL, 3 mL, Intravenous, Q1H PRN, Shamir Monahan MD    venlafaxine (EFFEXOR-XR) 24 hr capsule 75 mg, 75 mg, Oral, Daily With Breakfast, Ayush Chaparro MD, 75 mg at 05/08/24 0851     Labs & Results:  Labs reviewed and prominent abnormalities reviewed above and/or below.  Troponins:    Results from last 7 days   Lab Units 05/07/24  0916 05/07/24  0718 05/07/24  0521   HS TNI 0HR ng/L  --   --  22   HS TNI 2HR ng/L  --  29  --    HSTNI D2 ng/L  --  7  --    HS TNI 4HR ng/L 44  --   --    HSTNI D4 ng/L 22*  --   --         BNP:   Results from last 6 Months   Lab Units 05/07/24  0521   BNP pg/mL 356*

## 2024-05-08 NOTE — NURSING NOTE
Patient discharged to home, no needs. Reviewed discharge instructions with patient - all questions/concerns addressed prior to d/c.

## 2024-05-08 NOTE — CASE MANAGEMENT
Case Management Progress Note    Patient name Elise Ken  Location /-01 MRN [id number]  : 1975 Date 2024       LOS (days): 0  Geometric Mean LOS (GMLOS) (days):   Days to GMLOS:        OBJECTIVE:        Current admission status: Observation  Preferred Pharmacy:   [id number] - [place] - [address]  [address]  [place] [address]  Phone: [phone] Fax: [phone]    Primary Care Provider: Isaak Stanford DO    Primary Insurance: [employer]  Secondary Insurance:     PROGRESS NOTE:    CM performed chart review and determined no CM needs at this time. CM will continue to follow.

## 2024-05-08 NOTE — ASSESSMENT & PLAN NOTE
-Currently improved at this time  -Patient with known coronary artery disease  -Cardiac catheterization images reviewed by Dr. Franco from interventional cardiology team who noted potential high risk intervention opportunity of RCA disease.  After discussion with patient understanding risks of renal dysfunction, she wishes to hold off at this time as she notes symptoms have improved.  -Will continue with current medical therapy including Imdur, nifedipine, Ranexa and dual antiplatelet therapy  -Continue to monitor patient clinically at this time

## 2024-05-08 NOTE — PROGRESS NOTES
Progress Note - Nephrology   Elise Ken 49 y.o. female MRN: [id number]  Unit/Bed#: -01 Encounter: [id number]    A/P:  1.  Acute kidney injury on top of chronic kidney disease   Patient creatinine is stable at 4.3 mg/dL.  Patient is eating and drinking well and has no acute complaints at this time.  Patient denies symptoms consistent with uremia at this time.  Recommend continued conservative medical management and avoidance of nephrotoxins if possible.  I discussed the potential for cardiac catheterization with our cardiology as well as hospitalist colleagues.  In general, if it is felt that the patient would significantly benefit from a catheterization and intervention knowing that the risk is high for potential acute kidney injury worsening and potentially leading to the need to initiate extracorporeal replacement therapy, then would proceed with catheterization.  It is felt that potential intervention, although may be a benefit, would not provide a significant improvement to the patient's overall symptoms, then would advise against proceeding given substantial risk for worsening of ANTHONY as noted above.  2.  Chronic kidney disease stage IV with current baseline creatinine likely between 2.5-3.3 mg/dL   Patient's kidney function has been declining, unclear if this is due to hemodynamic/cardiac output or progression of underlying chronic kidney disease.  Patient will need to be closely monitored in the outpatient setting.  3.  Alport syndrome biopsy-proven October 2021  4.  Metabolic acidosis   Patient's serum bicarbonate remains reduced but stable, currently on sodium bicarb and supplementation 1300 mg 3 times daily.  Continue monitor bicarbonate at this time  5.  Hypertension in the setting of chronic kidney disease stage IV   Continue to monitor blood pressures, currently reduced, medication modification as indicated according to our cardiology colleagues in order to maximize hemodynamics and cardiac  output.  6.  Coronary disease   Patient had cardiac catheterization which was consistent with diffuse disease not amenable to angioplasty, as noted above there is consideration for additional catheterization given patient's current symptoms.  If the benefits will outweigh the risks, patient to proceed as long as she is agreeable to undertake those risks.    Case discussed with hospitalist and cardiologist, as noted above may proceed with cardiac catheterization and will optimize from the renal standpoint if benefit far exceeds the high risk for worsening of acute kidney injury and potential need for extracorporeal renal replacement therapy.  After discussion, and presuming the patient's opinion on this matter, she will most [place] with cardiac catheterization at this time.  There is the case, given stable kidney function, and that the patient is eating and drinking properly, she is okay from the renal standpoint to be discharged and have follow-up labs as well as appointments in the outpatient setting.    Follow up reason for today's visit: Acute kidney injury/chronic kidney disease/Alport syndrome/metabolic acidosis    Chest pain    Patient Active Problem List   Diagnosis    Chest pain    Essential hypertension    Tobacco abuse    Chronic diastolic congestive heart failure (HCC)    Major depressive disorder with current active episode    Anxiety    Coronary artery disease of native artery of native heart with stable angina pectoris (HCC)    Lump of left breast    Non-Hodgkin lymphoma of lymph nodes of multiple regions (HCC)    Mixed hyperlipidemia    Morbid obesity with BMI of 45.0-49.9, adult (HCC)    Cervical cyst    Nabothian cyst    Pelvic floor dysfunction    Cervical motion tenderness    Iron deficiency anemia secondary to inadequate dietary iron intake    Proteinuria    Hypertrophic cardiomyopathy (HCC)    COPD (chronic obstructive pulmonary disease) (HCC)    Chronic kidney disease-mineral and bone disorder  "   Chronic tubulo-interstitial nephritis    Sinus pause    ELIAZAR (obstructive sleep apnea)    Nocturnal hypoxemia    Elevated brain natriuretic peptide (BNP) level    Benign hypertension with CKD (chronic kidney disease) stage III ([facility])    CKD (chronic kidney disease) stage 4, GFR 15-29 ml/min ([facility])    Chronic fatigue    Alport syndrome    Social anxiety disorder    Secondary hyperparathyroidism of renal origin ([facility])    S/P drug eluting coronary stent placement    Agoraphobia    Non-STEMI (non-ST elevated myocardial infarction) ([facility])    New onset type 2 diabetes mellitus ([facility])    Ambulatory dysfunction    Leukocytosis    H/O non-insulin dependent diabetes mellitus    ANTHONY (acute kidney injury) ([facility])         Subjective:   Patient feeling improved, able to eat some breakfast.    Objective:     Vitals: Blood pressure 108/55, pulse 60, temperature 97.6 °F (36.4 °C), temperature source Oral, resp. rate 16, height 5' 7\" (1.702 m), weight 126 kg (277 lb), last menstrual period 04/15/2024, SpO2 98%, not currently breastfeeding.,Body mass index is 43.38 kg/m².    Weight (last 2 days)       Date/Time Weight    05/08/24 0715 126 (277)    05/07/24 12:35:36 126 (277.78)    05/07/24 0513 127 (280)              Intake/Output Summary (Last 24 hours) at 5/8/2024 0959  Last data filed at 5/8/2024 0601  Gross per 24 hour   Intake 440 ml   Output 1400 ml   Net -960 ml     I/O last 3 completed shifts:  In: 440 [P.O.:440]  Out: 1400 [Urine:1400]         Physical Exam: /55   Pulse 60   Temp 97.6 °F (36.4 °C) (Oral)   Resp 16   Ht 5' 7\" (1.702 m)   Wt 126 kg (277 lb)   LMP 04/15/2024   SpO2 98%   BMI 43.38 kg/m²     General Appearance:    Alert, cooperative, no distress, appears stated age   Head:    Normocephalic, without obvious abnormality, atraumatic   Eyes:    Conjunctiva/corneas clear   Ears:    Normal external ears   Nose:   Nares normal, septum midline, mucosa normal, no drainage    or sinus tenderness   Throat:   Lips, " "mucosa, and tongue normal; teeth and gums normal   Neck:   Supple   Back:     Symmetric, no curvature, ROM normal, no CVA tenderness   Lungs:     Reduced but otherwise clear   Chest wall:    No tenderness or deformity   Heart:    Regular rate and rhythm, S1 and S2 normal, no murmur, rub   or gallop   Abdomen:     Soft, non-tender, bowel sounds active   Extremities:   Extremities normal, atraumatic, no cyanosis, trace bilateral lower extremity edema   Skin:   Skin color, texture, turgor normal, no rashes or lesions   Lymph nodes:   Cervical normal   Neurologic:   CNII-XII intact            Lab, Imaging and other studies: I have personally reviewed pertinent labs.  CBC:   Lab Results   Component Value Date    WBC 11.25 (H) 05/08/2024    HGB 11.8 05/08/2024    HCT 37.6 05/08/2024    MCV 90 05/08/2024     05/08/2024    RBC 4.18 05/08/2024    MCH 28.2 05/08/2024    MCHC 31.4 05/08/2024    RDW 20.0 (H) 05/08/2024    MPV 10.3 05/08/2024     CMP:   Lab Results   Component Value Date    K 4.2 05/08/2024     (H) 05/08/2024    CO2 17 (L) 05/08/2024    BUN 45 (H) 05/08/2024    CREATININE 4.30 (H) 05/08/2024    CALCIUM 9.2 05/08/2024    EGFR 11 05/08/2024       .  Results from last 7 days   Lab Units 05/08/24  0629 05/07/24  1713 05/07/24  0521   POTASSIUM mmol/L 4.2 4.7 4.0   CHLORIDE mmol/L 113* 114* 112*   CO2 mmol/L 17* 17* 15*   BUN mg/dL 45* 47* 45*   CREATININE mg/dL 4.30* 4.41* 4.20*   CALCIUM mg/dL 9.2 9.3 9.5   ALK PHOS U/L  --   --  103   ALT U/L  --   --  12   AST U/L  --   --  8*         Phosphorus: No results found for: \"PHOS\"  Magnesium: No results found for: \"MG\"  Urinalysis:   Lab Results   Component Value Date    COLORU Yellow 05/07/2024    CLARITYU Clear 05/07/2024    SPECGRAV 1.025 05/07/2024    PHUR 6.0 05/07/2024    LEUKOCYTESUR Negative 05/07/2024    NITRITE Negative 05/07/2024    GLUCOSEU Negative 05/07/2024    KETONESU Negative 05/07/2024    BILIRUBINUR Negative 05/07/2024    BLOODU Negative " "05/07/2024     Ionized Calcium: No results found for: \"CAION\"  Coagulation: No results found for: \"PT\", \"INR\", \"APTT\"  Troponin: No results found for: \"TROPONINI\"  ABG: No results found for: \"PHART\", \"SFE2PSN\", \"PO2ART\", \"DQA8LQT\", \"A0GCDEUK\", \"BEART\", \"SOURCE\"  Radiology review:     IMAGING  Procedure: Echo complete w/ contrast if indicated    Result Date: 5/8/2024  Narrative:   Left Ventricle: Left ventricular cavity size is dilated. Wall thickness is mildly increased. The left ventricular ejection fraction is 40%. Systolic function is moderately reduced. There is global hypokinesis with regional variation worse in the inferior and lateral regions. Diastolic function is abnormal.   Left Atrium: The atrium is mildly dilated.   Mitral Valve: There is mild to moderate regurgitation.   Tricuspid Valve: There is mild regurgitation.   Aorta: The aortic root is mildly dilated. The ascending aorta is mildly dilated. The aortic root is 4.10 cm. The ascending aorta is 4 cm.     Procedure: US kidney and bladder    Result Date: 5/7/2024  Narrative: RENAL ULTRASOUND INDICATION: suzette. COMPARISON: Ultrasound the kidneys and bladder 2/17/2024 TECHNIQUE: Ultrasound of the retroperitoneum was performed with a curvilinear transducer utilizing volumetric sweeps and still imaging techniques. FINDINGS: KIDNEYS: Symmetric and normal size. Right kidney: 11.6 x 4.2 x 4.7 cm. Volume 122.1 mL Left kidney: 12.4 x 4.7 x 4.2 cm. Volume 127.1 mL Right kidney The renal parenchyma is diffusely echogenic consistent with medical renal disease. No mass is identified. No hydronephrosis. No shadowing calculi. No perinephric fluid collections. Left kidney The renal parenchyma is diffusely echogenic consistent with medical renal disease. No mass is identified. No hydronephrosis. No shadowing calculi. No perinephric fluid collections. URETERS: Nonvisualized. BLADDER: Normally distended. No focal thickening or mass lesions. Bilateral ureteral jets not " detected.     Impression: No acute findings. Echogenic parenchyma bilaterally suggestive of medical renal disease. Resident: MICHELLE LANTIGUA I, the attending radiologist, have reviewed the images and agree with the final report above. Workstation performed: [id number]     Procedure: X-ray chest 1 view portable    Result Date: 5/7/2024  Narrative: XR CHEST PORTABLE INDICATION: chest pain. COMPARISON: 5/1/2024 FINDINGS: Clear lungs. No pneumothorax or pleural effusion. Normal cardiomediastinal silhouette. Left-sided pacemaker with expected position of electrodes. Bones are unremarkable for age. Normal upper abdomen.     Impression: No acute cardiopulmonary disease. Workstation performed: [id number]       Current Facility-Administered Medications   Medication Dose Route Frequency    acetaminophen (TYLENOL) tablet 975 mg  975 mg Oral Q8H PRN    albuterol (PROVENTIL HFA,VENTOLIN HFA) inhaler 2 puff  2 puff Inhalation Q4H PRN    aluminum-magnesium hydroxide-simethicone (MAALOX) oral suspension 30 mL  30 mL Oral Q6H PRN    aspirin chewable tablet 81 mg  81 mg Oral Daily    atorvastatin (LIPITOR) tablet 80 mg  80 mg Oral QPM    clopidogrel (PLAVIX) tablet 75 mg  75 mg Oral Daily    heparin (porcine) subcutaneous injection 5,000 Units  5,000 Units Subcutaneous Q8H ABIEL    HYDROmorphone HCl (DILAUDID) injection 0.2 mg  0.2 mg Intravenous Q4H PRN    isosorbide mononitrate (IMDUR) 24 hr tablet 120 mg  120 mg Oral Daily    LORazepam (ATIVAN) tablet 0.5 mg  0.5 mg Oral Q8H PRN    metoprolol succinate (TOPROL-XL) 24 hr tablet 50 mg  50 mg Oral BID    nicotine (NICODERM CQ) 14 mg/24hr TD 24 hr patch 1 patch  1 patch Transdermal Daily    NIFEdipine (PROCARDIA XL) 24 hr tablet 30 mg  30 mg Oral Daily    ondansetron (ZOFRAN) injection 4 mg  4 mg Intravenous Q6H PRN    oxyCODONE (ROXICODONE) immediate release tablet 10 mg  10 mg Oral Q6H PRN    oxyCODONE (ROXICODONE) IR tablet 5 mg  5 mg Oral Q6H PRN    ranolazine (RANEXA) 12 hr  tablet 1,000 mg  1,000 mg Oral Q12H ABIEL    sodium bicarbonate tablet 1,300 mg  1,300 mg Oral TID after meals    sodium chloride (PF) 0.9 % injection 3 mL  3 mL Intravenous Q1H PRN    venlafaxine (EFFEXOR-XR) 24 hr capsule 75 mg  75 mg Oral Daily With Breakfast     Medications Discontinued During This Encounter   Medication Reason    acetaminophen (TYLENOL) tablet 650 mg     isosorbide mononitrate (IMDUR) 24 hr tablet 120 mg     metoprolol succinate (TOPROL-XL) 24 hr tablet 50 mg     NIFEdipine (PROCARDIA XL) 24 hr tablet 30 mg        Mehran Garcia, DO      This progress note was produced in part using a dictation device which may document imprecise wording from author's original intent.

## 2024-05-09 ENCOUNTER — TRANSITIONAL CARE MANAGEMENT (OUTPATIENT)
Dept: FAMILY MEDICINE CLINIC | Facility: CLINIC | Age: 49
End: 2024-05-09

## 2024-05-13 ENCOUNTER — TELEPHONE (OUTPATIENT)
Dept: NEPHROLOGY | Facility: CLINIC | Age: 49
End: 2024-05-13

## 2024-05-13 DIAGNOSIS — I25.10 CORONARY ARTERY DISEASE INVOLVING NATIVE CORONARY ARTERY OF NATIVE HEART, UNSPECIFIED WHETHER ANGINA PRESENT: ICD-10-CM

## 2024-05-13 DIAGNOSIS — F32.A DEPRESSION, UNSPECIFIED DEPRESSION TYPE: ICD-10-CM

## 2024-05-13 DIAGNOSIS — F17.200 SMOKING: ICD-10-CM

## 2024-05-13 DIAGNOSIS — I21.4 NON-STEMI (NON-ST ELEVATED MYOCARDIAL INFARCTION) (HCC): ICD-10-CM

## 2024-05-15 ENCOUNTER — APPOINTMENT (EMERGENCY)
Dept: RADIOLOGY | Facility: HOSPITAL | Age: 49
DRG: 198 | End: 2024-05-15
Payer: COMMERCIAL

## 2024-05-15 ENCOUNTER — HOSPITAL ENCOUNTER (INPATIENT)
Facility: HOSPITAL | Age: 49
LOS: 1 days | Discharge: HOME | DRG: 198 | End: 2024-05-16
Attending: EMERGENCY MEDICINE | Admitting: INTERNAL MEDICINE
Payer: COMMERCIAL

## 2024-05-15 DIAGNOSIS — I25.10 CORONARY ARTERY DISEASE INVOLVING NATIVE CORONARY ARTERY OF NATIVE HEART, UNSPECIFIED WHETHER ANGINA PRESENT: ICD-10-CM

## 2024-05-15 DIAGNOSIS — N18.9 CHRONIC RENAL FAILURE: ICD-10-CM

## 2024-05-15 DIAGNOSIS — R79.89 ELEVATED TROPONIN: ICD-10-CM

## 2024-05-15 DIAGNOSIS — R07.9 ACUTE CHEST PAIN: Primary | ICD-10-CM

## 2024-05-15 DIAGNOSIS — I21.4 NON-STEMI (NON-ST ELEVATED MYOCARDIAL INFARCTION) (HCC): ICD-10-CM

## 2024-05-15 DIAGNOSIS — I50.32 CHRONIC DIASTOLIC CONGESTIVE HEART FAILURE (HCC): ICD-10-CM

## 2024-05-15 DIAGNOSIS — Q87.81 ALPORT SYNDROME: ICD-10-CM

## 2024-05-15 LAB
2HR DELTA HS TROPONIN: -3 NG/L
2HR DELTA HS TROPONIN: -7 NG/L
4HR DELTA HS TROPONIN: -5 NG/L
4HR DELTA HS TROPONIN: 11 NG/L
ALBUMIN SERPL BCP-MCNC: 4.1 G/DL (ref 3.5–5)
ALP SERPL-CCNC: 119 U/L (ref 34–104)
ALT SERPL W P-5'-P-CCNC: 13 U/L (ref 7–52)
ANION GAP SERPL CALCULATED.3IONS-SCNC: 12 MMOL/L (ref 4–13)
AST SERPL W P-5'-P-CCNC: 9 U/L (ref 13–39)
ATRIAL RATE: 70 BPM
ATRIAL RATE: 71 BPM
ATRIAL RATE: 73 BPM
ATRIAL RATE: 80 BPM
BASOPHILS # BLD AUTO: 0.08 THOUSANDS/ÂΜL (ref 0–0.1)
BASOPHILS NFR BLD AUTO: 1 % (ref 0–1)
BILIRUB SERPL-MCNC: 0.36 MG/DL (ref 0.2–1)
BUN SERPL-MCNC: 40 MG/DL (ref 5–25)
CALCIUM SERPL-MCNC: 9.4 MG/DL (ref 8.4–10.2)
CARDIAC TROPONIN I PNL SERPL HS: 104 NG/L
CARDIAC TROPONIN I PNL SERPL HS: 110 NG/L
CARDIAC TROPONIN I PNL SERPL HS: 96 NG/L
CARDIAC TROPONIN I PNL SERPL HS: 97 NG/L
CARDIAC TROPONIN I PNL SERPL HS: 99 NG/L
CARDIAC TROPONIN I PNL SERPL HS: 99 NG/L
CHLORIDE SERPL-SCNC: 110 MMOL/L (ref 96–108)
CO2 SERPL-SCNC: 19 MMOL/L (ref 21–32)
CREAT SERPL-MCNC: 3.85 MG/DL (ref 0.6–1.3)
EOSINOPHIL # BLD AUTO: 0.37 THOUSAND/ÂΜL (ref 0–0.61)
EOSINOPHIL NFR BLD AUTO: 3 % (ref 0–6)
ERYTHROCYTE [DISTWIDTH] IN BLOOD BY AUTOMATED COUNT: 19.8 % (ref 11.6–15.1)
GFR SERPL CREATININE-BSD FRML MDRD: 13 ML/MIN/1.73SQ M
GLUCOSE SERPL-MCNC: 142 MG/DL (ref 65–140)
HCT VFR BLD AUTO: 41 % (ref 34.8–46.1)
HGB BLD-MCNC: 12.5 G/DL (ref 11.5–15.4)
IMM GRANULOCYTES # BLD AUTO: 0.07 THOUSAND/UL (ref 0–0.2)
IMM GRANULOCYTES NFR BLD AUTO: 1 % (ref 0–2)
LYMPHOCYTES # BLD AUTO: 2.32 THOUSANDS/ÂΜL (ref 0.6–4.47)
LYMPHOCYTES NFR BLD AUTO: 18 % (ref 14–44)
MAGNESIUM SERPL-MCNC: 1.8 MG/DL (ref 1.9–2.7)
MCH RBC QN AUTO: 27.7 PG (ref 26.8–34.3)
MCHC RBC AUTO-ENTMCNC: 30.5 G/DL (ref 31.4–37.4)
MCV RBC AUTO: 91 FL (ref 82–98)
MONOCYTES # BLD AUTO: 1.11 THOUSAND/ÂΜL (ref 0.17–1.22)
MONOCYTES NFR BLD AUTO: 9 % (ref 4–12)
NEUTROPHILS # BLD AUTO: 8.83 THOUSANDS/ÂΜL (ref 1.85–7.62)
NEUTS SEG NFR BLD AUTO: 68 % (ref 43–75)
NRBC BLD AUTO-RTO: 0 /100 WBCS
P AXIS: 28 DEGREES
P AXIS: 31 DEGREES
P AXIS: 40 DEGREES
P AXIS: 42 DEGREES
PLATELET # BLD AUTO: 309 THOUSANDS/UL (ref 149–390)
PMV BLD AUTO: 10.7 FL (ref 8.9–12.7)
POTASSIUM SERPL-SCNC: 4 MMOL/L (ref 3.5–5.3)
PR INTERVAL: 178 MS
PR INTERVAL: 178 MS
PR INTERVAL: 190 MS
PR INTERVAL: 196 MS
PROT SERPL-MCNC: 7.7 G/DL (ref 6.4–8.4)
QRS AXIS: 0 DEGREES
QRS AXIS: 15 DEGREES
QRS AXIS: 23 DEGREES
QRS AXIS: 5 DEGREES
QRSD INTERVAL: 102 MS
QRSD INTERVAL: 104 MS
QRSD INTERVAL: 110 MS
QRSD INTERVAL: 114 MS
QT INTERVAL: 410 MS
QT INTERVAL: 410 MS
QT INTERVAL: 414 MS
QT INTERVAL: 420 MS
QTC INTERVAL: 445 MS
QTC INTERVAL: 451 MS
QTC INTERVAL: 453 MS
QTC INTERVAL: 477 MS
RBC # BLD AUTO: 4.52 MILLION/UL (ref 3.81–5.12)
SODIUM SERPL-SCNC: 141 MMOL/L (ref 135–147)
T WAVE AXIS: 24 DEGREES
T WAVE AXIS: 63 DEGREES
T WAVE AXIS: 79 DEGREES
T WAVE AXIS: 87 DEGREES
VENTRICULAR RATE: 70 BPM
VENTRICULAR RATE: 71 BPM
VENTRICULAR RATE: 73 BPM
VENTRICULAR RATE: 80 BPM
WBC # BLD AUTO: 12.78 THOUSAND/UL (ref 4.31–10.16)

## 2024-05-15 PROCEDURE — 99222 1ST HOSP IP/OBS MODERATE 55: CPT | Performed by: INTERNAL MEDICINE

## 2024-05-15 PROCEDURE — 96375 TX/PRO/DX INJ NEW DRUG ADDON: CPT

## 2024-05-15 PROCEDURE — 84484 ASSAY OF TROPONIN QUANT: CPT | Performed by: EMERGENCY MEDICINE

## 2024-05-15 PROCEDURE — 94760 N-INVAS EAR/PLS OXIMETRY 1: CPT

## 2024-05-15 PROCEDURE — 93005 ELECTROCARDIOGRAM TRACING: CPT

## 2024-05-15 PROCEDURE — 36415 COLL VENOUS BLD VENIPUNCTURE: CPT | Performed by: EMERGENCY MEDICINE

## 2024-05-15 PROCEDURE — 99285 EMERGENCY DEPT VISIT HI MDM: CPT

## 2024-05-15 PROCEDURE — 85025 COMPLETE CBC W/AUTO DIFF WBC: CPT | Performed by: EMERGENCY MEDICINE

## 2024-05-15 PROCEDURE — 99291 CRITICAL CARE FIRST HOUR: CPT | Performed by: EMERGENCY MEDICINE

## 2024-05-15 PROCEDURE — 94660 CPAP INITIATION&MGMT: CPT

## 2024-05-15 PROCEDURE — 84484 ASSAY OF TROPONIN QUANT: CPT | Performed by: NURSE PRACTITIONER

## 2024-05-15 PROCEDURE — 99291 CRITICAL CARE FIRST HOUR: CPT | Performed by: INTERNAL MEDICINE

## 2024-05-15 PROCEDURE — 83735 ASSAY OF MAGNESIUM: CPT | Performed by: EMERGENCY MEDICINE

## 2024-05-15 PROCEDURE — 93010 ELECTROCARDIOGRAM REPORT: CPT | Performed by: INTERNAL MEDICINE

## 2024-05-15 PROCEDURE — 96374 THER/PROPH/DIAG INJ IV PUSH: CPT

## 2024-05-15 PROCEDURE — 71045 X-RAY EXAM CHEST 1 VIEW: CPT

## 2024-05-15 PROCEDURE — 80053 COMPREHEN METABOLIC PANEL: CPT | Performed by: EMERGENCY MEDICINE

## 2024-05-15 RX ORDER — MAGNESIUM SULFATE HEPTAHYDRATE 40 MG/ML
2 INJECTION, SOLUTION INTRAVENOUS ONCE
Status: COMPLETED | OUTPATIENT
Start: 2024-05-15 | End: 2024-05-15

## 2024-05-15 RX ORDER — CLOPIDOGREL BISULFATE 75 MG/1
75 TABLET ORAL DAILY
Qty: 90 TABLET | Refills: 1 | Status: SHIPPED | OUTPATIENT
Start: 2024-05-15 | End: 2024-05-22

## 2024-05-15 RX ORDER — HEPARIN SODIUM 5000 [USP'U]/ML
7500 INJECTION, SOLUTION INTRAVENOUS; SUBCUTANEOUS EVERY 8 HOURS SCHEDULED
Status: DISCONTINUED | OUTPATIENT
Start: 2024-05-15 | End: 2024-05-16 | Stop reason: HOSPADM

## 2024-05-15 RX ORDER — METOPROLOL SUCCINATE 50 MG/1
50 TABLET, EXTENDED RELEASE ORAL 2 TIMES DAILY
Qty: 180 TABLET | Refills: 1 | Status: SHIPPED | OUTPATIENT
Start: 2024-05-15 | End: 2024-05-22

## 2024-05-15 RX ORDER — VENLAFAXINE HYDROCHLORIDE 75 MG/1
75 CAPSULE, EXTENDED RELEASE ORAL ONCE
Status: COMPLETED | OUTPATIENT
Start: 2024-05-15 | End: 2024-05-15

## 2024-05-15 RX ORDER — CLOPIDOGREL BISULFATE 75 MG/1
75 TABLET ORAL DAILY
Status: DISCONTINUED | OUTPATIENT
Start: 2024-05-15 | End: 2024-05-16 | Stop reason: HOSPADM

## 2024-05-15 RX ORDER — ALBUTEROL SULFATE 90 UG/1
2 AEROSOL, METERED RESPIRATORY (INHALATION) EVERY 4 HOURS PRN
Status: DISCONTINUED | OUTPATIENT
Start: 2024-05-15 | End: 2024-05-16 | Stop reason: HOSPADM

## 2024-05-15 RX ORDER — NITROGLYCERIN 20 MG/100ML
5-200 INJECTION INTRAVENOUS
Status: DISCONTINUED | OUTPATIENT
Start: 2024-05-15 | End: 2024-05-16 | Stop reason: HOSPADM

## 2024-05-15 RX ORDER — RANOLAZINE 500 MG/1
1000 TABLET, EXTENDED RELEASE ORAL 2 TIMES DAILY
Status: DISCONTINUED | OUTPATIENT
Start: 2024-05-15 | End: 2024-05-16 | Stop reason: HOSPADM

## 2024-05-15 RX ORDER — NIFEDIPINE 30 MG/1
30 TABLET, EXTENDED RELEASE ORAL DAILY
Status: DISCONTINUED | OUTPATIENT
Start: 2024-05-15 | End: 2024-05-15

## 2024-05-15 RX ORDER — NIFEDIPINE 30 MG/1
30 TABLET, EXTENDED RELEASE ORAL 2 TIMES DAILY
Status: DISCONTINUED | OUTPATIENT
Start: 2024-05-15 | End: 2024-05-16 | Stop reason: HOSPADM

## 2024-05-15 RX ORDER — LORAZEPAM 2 MG/ML
0.5 INJECTION INTRAMUSCULAR ONCE
Status: COMPLETED | OUTPATIENT
Start: 2024-05-15 | End: 2024-05-15

## 2024-05-15 RX ORDER — VENLAFAXINE HYDROCHLORIDE 75 MG/1
75 CAPSULE, EXTENDED RELEASE ORAL DAILY
Status: DISCONTINUED | OUTPATIENT
Start: 2024-05-16 | End: 2024-05-16 | Stop reason: HOSPADM

## 2024-05-15 RX ORDER — CHLORHEXIDINE GLUCONATE ORAL RINSE 1.2 MG/ML
15 SOLUTION DENTAL EVERY 12 HOURS SCHEDULED
Status: DISCONTINUED | OUTPATIENT
Start: 2024-05-15 | End: 2024-05-16 | Stop reason: HOSPADM

## 2024-05-15 RX ORDER — ASPIRIN 81 MG/1
324 TABLET, CHEWABLE ORAL ONCE
Status: COMPLETED | OUTPATIENT
Start: 2024-05-15 | End: 2024-05-15

## 2024-05-15 RX ORDER — VENLAFAXINE HYDROCHLORIDE 75 MG/1
75 CAPSULE, EXTENDED RELEASE ORAL
Qty: 30 CAPSULE | Refills: 5 | Status: SHIPPED | OUTPATIENT
Start: 2024-05-15 | End: 2024-05-22

## 2024-05-15 RX ORDER — HYDROMORPHONE HCL/PF 1 MG/ML
0.5 SYRINGE (ML) INJECTION ONCE
Status: COMPLETED | OUTPATIENT
Start: 2024-05-15 | End: 2024-05-15

## 2024-05-15 RX ORDER — NIFEDIPINE 30 MG/1
60 TABLET, EXTENDED RELEASE ORAL ONCE
Status: DISCONTINUED | OUTPATIENT
Start: 2024-05-15 | End: 2024-05-15

## 2024-05-15 RX ORDER — LORAZEPAM 0.5 MG/1
0.5 TABLET ORAL EVERY 8 HOURS PRN
Status: DISCONTINUED | OUTPATIENT
Start: 2024-05-15 | End: 2024-05-16 | Stop reason: HOSPADM

## 2024-05-15 RX ORDER — SODIUM CHLORIDE 9 MG/ML
3 INJECTION INTRAVENOUS
Status: DISCONTINUED | OUTPATIENT
Start: 2024-05-15 | End: 2024-05-16 | Stop reason: HOSPADM

## 2024-05-15 RX ORDER — METOPROLOL SUCCINATE 50 MG/1
50 TABLET, EXTENDED RELEASE ORAL 2 TIMES DAILY
Status: DISCONTINUED | OUTPATIENT
Start: 2024-05-15 | End: 2024-05-16 | Stop reason: HOSPADM

## 2024-05-15 RX ORDER — SODIUM BICARBONATE 650 MG/1
1300 TABLET ORAL
Status: DISCONTINUED | OUTPATIENT
Start: 2024-05-15 | End: 2024-05-16 | Stop reason: HOSPADM

## 2024-05-15 RX ORDER — GABAPENTIN 100 MG/1
200 CAPSULE ORAL 3 TIMES DAILY
Status: DISCONTINUED | OUTPATIENT
Start: 2024-05-15 | End: 2024-05-16 | Stop reason: HOSPADM

## 2024-05-15 RX ORDER — NICOTINE 21 MG/24HR
21 PATCH, TRANSDERMAL 24 HOURS TRANSDERMAL DAILY
Status: DISCONTINUED | OUTPATIENT
Start: 2024-05-15 | End: 2024-05-16 | Stop reason: HOSPADM

## 2024-05-15 RX ORDER — MORPHINE SULFATE 4 MG/ML
4 INJECTION, SOLUTION INTRAMUSCULAR; INTRAVENOUS ONCE
Status: COMPLETED | OUTPATIENT
Start: 2024-05-15 | End: 2024-05-15

## 2024-05-15 RX ORDER — ASPIRIN 81 MG/1
81 TABLET, CHEWABLE ORAL DAILY
Status: DISCONTINUED | OUTPATIENT
Start: 2024-05-15 | End: 2024-05-16 | Stop reason: HOSPADM

## 2024-05-15 RX ORDER — ATORVASTATIN CALCIUM 80 MG/1
80 TABLET, FILM COATED ORAL EVERY EVENING
Status: DISCONTINUED | OUTPATIENT
Start: 2024-05-15 | End: 2024-05-16 | Stop reason: HOSPADM

## 2024-05-15 RX ORDER — ISOSORBIDE MONONITRATE 30 MG/1
120 TABLET, EXTENDED RELEASE ORAL DAILY
Status: DISCONTINUED | OUTPATIENT
Start: 2024-05-15 | End: 2024-05-16 | Stop reason: HOSPADM

## 2024-05-15 RX ADMIN — GABAPENTIN 200 MG: 100 CAPSULE ORAL at 11:07

## 2024-05-15 RX ADMIN — LORAZEPAM 0.5 MG: 0.5 TABLET ORAL at 08:26

## 2024-05-15 RX ADMIN — NIFEDIPINE 30 MG: 30 TABLET, FILM COATED, EXTENDED RELEASE ORAL at 11:11

## 2024-05-15 RX ADMIN — ISOSORBIDE MONONITRATE 120 MG: 30 TABLET, EXTENDED RELEASE ORAL at 11:08

## 2024-05-15 RX ADMIN — CHLORHEXIDINE GLUCONATE 15 ML: 1.2 RINSE ORAL at 20:54

## 2024-05-15 RX ADMIN — SODIUM BICARBONATE 1300 MG: 650 TABLET ORAL at 17:59

## 2024-05-15 RX ADMIN — ASPIRIN 81 MG 81 MG: 81 TABLET ORAL at 11:08

## 2024-05-15 RX ADMIN — ATORVASTATIN CALCIUM 80 MG: 80 TABLET, FILM COATED ORAL at 18:00

## 2024-05-15 RX ADMIN — VENLAFAXINE HYDROCHLORIDE 75 MG: 75 CAPSULE, EXTENDED RELEASE ORAL at 05:24

## 2024-05-15 RX ADMIN — NITROGLYCERIN 1 INCH: 20 OINTMENT TOPICAL at 16:07

## 2024-05-15 RX ADMIN — HEPARIN SODIUM 7500 UNITS: 5000 INJECTION INTRAVENOUS; SUBCUTANEOUS at 21:00

## 2024-05-15 RX ADMIN — NITROGLYCERIN 1 INCH: 20 OINTMENT TOPICAL at 20:54

## 2024-05-15 RX ADMIN — NICOTINE 21 MG: 21 PATCH, EXTENDED RELEASE TRANSDERMAL at 08:27

## 2024-05-15 RX ADMIN — HYDROMORPHONE HYDROCHLORIDE 0.5 MG: 1 INJECTION, SOLUTION INTRAMUSCULAR; INTRAVENOUS; SUBCUTANEOUS at 02:35

## 2024-05-15 RX ADMIN — NITROGLYCERIN 10 MCG/MIN: 20 INJECTION INTRAVENOUS at 03:20

## 2024-05-15 RX ADMIN — MORPHINE SULFATE 4 MG: 4 INJECTION, SOLUTION INTRAMUSCULAR; INTRAVENOUS at 01:31

## 2024-05-15 RX ADMIN — ASPIRIN 81 MG 324 MG: 81 TABLET ORAL at 02:58

## 2024-05-15 RX ADMIN — NITROGLYCERIN 10 MCG/MIN: 20 INJECTION INTRAVENOUS at 07:30

## 2024-05-15 RX ADMIN — LORAZEPAM 0.5 MG: 2 INJECTION INTRAMUSCULAR; INTRAVENOUS at 04:45

## 2024-05-15 RX ADMIN — SODIUM BICARBONATE 1300 MG: 650 TABLET ORAL at 13:30

## 2024-05-15 RX ADMIN — GABAPENTIN 200 MG: 100 CAPSULE ORAL at 20:54

## 2024-05-15 RX ADMIN — METOPROLOL SUCCINATE 50 MG: 50 TABLET, EXTENDED RELEASE ORAL at 11:07

## 2024-05-15 RX ADMIN — NIFEDIPINE 30 MG: 30 TABLET, FILM COATED, EXTENDED RELEASE ORAL at 20:54

## 2024-05-15 RX ADMIN — GABAPENTIN 200 MG: 100 CAPSULE ORAL at 16:07

## 2024-05-15 RX ADMIN — HEPARIN SODIUM 7500 UNITS: 5000 INJECTION INTRAVENOUS; SUBCUTANEOUS at 13:39

## 2024-05-15 RX ADMIN — METOPROLOL SUCCINATE 50 MG: 50 TABLET, EXTENDED RELEASE ORAL at 20:54

## 2024-05-15 RX ADMIN — NITROGLYCERIN 1 INCH: 20 OINTMENT TOPICAL at 04:31

## 2024-05-15 RX ADMIN — MAGNESIUM SULFATE HEPTAHYDRATE 2 G: 40 INJECTION, SOLUTION INTRAVENOUS at 02:51

## 2024-05-15 RX ADMIN — RANOLAZINE 1000 MG: 500 TABLET, FILM COATED, EXTENDED RELEASE ORAL at 17:59

## 2024-05-15 RX ADMIN — RANOLAZINE 1000 MG: 500 TABLET, FILM COATED, EXTENDED RELEASE ORAL at 11:08

## 2024-05-15 RX ADMIN — CLOPIDOGREL 75 MG: 75 TABLET ORAL at 11:07

## 2024-05-15 RX ADMIN — HEPARIN SODIUM 7500 UNITS: 5000 INJECTION INTRAVENOUS; SUBCUTANEOUS at 06:12

## 2024-05-15 NOTE — ASSESSMENT & PLAN NOTE
Lab Results   Component Value Date    EGFR 13 05/15/2024    EGFR 11 05/08/2024    EGFR 11 05/07/2024    CREATININE 3.85 (H) 05/15/2024    CREATININE 4.30 (H) 05/08/2024    CREATININE 4.41 (H) 05/07/2024     Follows with nephrology

## 2024-05-15 NOTE — PROGRESS NOTES
ICU downgrade to [facility].  Patient is a 49-year-old female with a past medical history significant for morbid obesity, ELIAZAR, SSS, CHF, Alport syndrome, hypertrophic cardiomyopathy who was admitted to the intensive care unit for chest pain and angina.  Patient was on nitro infusion and has been weaned off.  Cardiology following.  Hemodynamically stable.  The hospitalist service will begin to care for the patient on 5/16/2024.

## 2024-05-15 NOTE — Clinical Note
Case was discussed with  and the patient's admission status was agreed to be Admission Status: inpatient status to the service of Dr. Payne

## 2024-05-15 NOTE — ED NOTES
Per Critical Care attending, [place], restart nitro drip at this time as chest pain continues.       NO PRN FOR CHEST PAIN MEDICATIONS ORDERED AT THIS TIME TO ADMINISTER.     Mitzi Torres RN  05/15/24 9384

## 2024-05-15 NOTE — ED NOTES
EKG completed & given to Dr. Munroe at this time. TT sent to [facility] AP with EKGs. Chest pain continues.     Mitzi Torres RN  05/15/24 0589

## 2024-05-15 NOTE — ASSESSMENT & PLAN NOTE
Longstanding history of CAD  Most recently had cath 09/2023 noting progression of CAD in previously occluded LAD with continued collateral though faint, prior stent to APRIL now occluded, also noted progression in the nondominant pLCX  Unfortunately patient with severe disease but poor target for revascularization  Frequent admissions for chest pain   Attempting GDMT with Imdur, Ranexa, Procardia and PRN Nitro  Despite this regimen patient with persistent pain  EKG on presentation with no acute changes, continue to monitor  Troponin mildly elevated but now downtrending, if troponin rises start Heparin ACS per cardiology  Now on nitro infusion, titrate to resolution of chest pain as long as BP tolerates  Will start nitrobid as this has been noted to help during prior admissions   Cardiology consult  Recent echo 5/8 demonstrating EF 40%, LV dilated, systolic function moderately reduced, global hypokinesis with regional variation worse in the inferior and lateral regions, mild to moderate MR, mild TR

## 2024-05-15 NOTE — ASSESSMENT & PLAN NOTE
Continues to smoke although is attempting to cut back, down to about one third of a pack a day  NRT   Encourage cessation and lifestyle modification

## 2024-05-15 NOTE — ASSESSMENT & PLAN NOTE
Pt with recurrent chest pain, presently controlled with nitro gtt  Trop mildly elevated, though this is chronic  See additional comments regarding cardiac catheterization  Continue medical therapy, uptitrate Procardia

## 2024-05-15 NOTE — ED NOTES
Patient reports relief & decrease of chest pain while being on nitro gtt.     Mtizi Torres RN  05/15/24 1289

## 2024-05-15 NOTE — PLAN OF CARE
Problem: Potential for Falls  Goal: Patient will remain free of falls  Description: INTERVENTIONS:  - Educate patient/family on patient safety including physical limitations  - Instruct patient to call for assistance with activity   - Consult OT/PT to assist with strengthening/mobility   - Keep Call bell within reach  - Keep bed low and locked with side rails adjusted as appropriate  - Keep care items and personal belongings within reach  - Initiate and maintain comfort rounds  - Make Fall Risk Sign visible to staff  - Offer Toileting every 2 Hours, in advance of need  - Initiate/Maintain BED alarm  - Obtain necessary fall risk management equipment:   Problem: PAIN - ADULT  Goal: Verbalizes/displays adequate comfort level or baseline comfort level  Description: Interventions:  - Encourage patient to monitor pain and request assistance  - Assess pain using appropriate pain scale  - Administer analgesics based on type and severity of pain and evaluate response  - Implement non-pharmacological measures as appropriate and evaluate response  - Consider cultural and social influences on pain and pain management  - Notify physician/advanced practitioner if interventions unsuccessful or patient reports new pain  5/15/2024 1013 by Frieda Dobbins RN  Outcome: Progressing  5/15/2024 1013 by Frieda Dobbins RN  Outcome: Progressing     Problem: INFECTION - ADULT  Goal: Absence or prevention of progression during hospitalization  Description: INTERVENTIONS:  - Assess and monitor for signs and symptoms of infection  - Monitor lab/diagnostic results  - Monitor all insertion sites, i.e. indwelling lines, tubes, and drains  - Monitor endotracheal if appropriate and nasal secretions for changes in amount and color  - [place] appropriate cooling/warming therapies per order  - Administer medications as ordered  - Instruct and encourage patient and family to use good hand hygiene technique  - Identify and instruct in appropriate  isolation precautions for identified infection/condition  5/15/2024 1013 by Frieda Dobbins RN  Outcome: Progressing  5/15/2024 1013 by Frieda Dobbins RN  Outcome: Progressing  Goal: Absence of fever/infection during neutropenic period  Description: INTERVENTIONS:  - Monitor WBC    5/15/2024 1013 by Frieda Dobbins RN  Outcome: Progressing  5/15/2024 1013 by Frieda Dobbins RN  Outcome: Progressing     Problem: SAFETY ADULT  Goal: Patient will remain free of falls  Description: INTERVENTIONS:  - Educate patient/family on patient safety including physical limitations  - Instruct patient to call for assistance with activity   - Consult OT/PT to assist with strengthening/mobility   - Keep Call bell within reach  - Keep bed low and locked with side rails adjusted as appropriate  - Keep care items and personal belongings within reach  - Initiate and maintain comfort rounds  - Make Fall Risk Sign visible to staff  - Offer Toileting every 2 Hours, in advance of need  - Initiate/Maintain BED alarm  - Obtain necessary fall risk management equipment:   - Apply yellow socks and bracelet for high fall risk patients  - Consider moving patient to room near nurses station  5/15/2024 1013 by Frieda Dobbins RN  Outcome: Progressing  5/15/2024 1013 by Frieda Dobbins RN  Outcome: Progressing  Goal: Maintain or return to baseline ADL function  Description: INTERVENTIONS:  -  Assess patient's ability to carry out ADLs; assess patient's baseline for ADL function and identify physical deficits which impact ability to perform ADLs (bathing, care of mouth/teeth, toileting, grooming, dressing, etc.)  - Assess/evaluate cause of self-care deficits   - Assess range of motion  - Assess patient's mobility; develop plan if impaired  - Assess patient's need for assistive devices and provide as appropriate  - Encourage maximum independence but intervene and supervise when necessary  - Involve family in performance of ADLs  - Assess for home care  needs following discharge   - Consider OT consult to assist with ADL evaluation and planning for discharge  - Provide patient education as appropriate  5/15/2024 1013 by Frieda Dobbins RN  Outcome: Progressing  5/15/2024 1013 by Frieda Dobbins RN  Outcome: Progressing  Goal: Maintains/Returns to pre admission functional level  Description: INTERVENTIONS:  - Perform AM-PAC 6 Click Basic Mobility/ Daily Activity assessment daily.  - Set and communicate daily mobility goal to care team and patient/family/caregiver.   - Collaborate with rehabilitation services on mobility goals if consulted  - Perform Range of Motion 3 times a day.  - Reposition patient every 2 hours.  - Dangle patient 3 times a day  - Stand patient 3 times a day  - Ambulate patient 3 times a day  - Out of bed to chair 3 times a day   - Out of bed for meals 3  Problem: DISCHARGE PLANNING  Goal: Discharge to home or other facility with appropriate resources  Description: INTERVENTIONS:  - Identify barriers to discharge w/patient and caregiver  - Arrange for needed discharge resources and transportation as appropriate  - Identify discharge learning needs (meds, wound care, etc.)  - Arrange for interpretive services to assist at discharge as needed  - Refer to Case Management Department for coordinating discharge planning if the patient needs post-hospital services based on physician/advanced practitioner order or complex needs related to functional status, cognitive ability, or social support system  5/15/2024 1013 by Frieda Dobbins RN  Outcome: Progressing  5/15/2024 1013 by Frieda Dobbins RN  Outcome: Progressing     Problem: Knowledge Deficit  Goal: Patient/family/caregiver demonstrates understanding of disease process, treatment plan, medications, and discharge instructions  Description: Complete learning assessment and assess knowledge base.  Interventions:  - Provide teaching at level of understanding  - Provide teaching via preferred learning  methods  5/15/2024 1013 by Frieda Dobbins RN  Outcome: Progressing  5/15/2024 1013 by Frieda Dobbins RN  Outcome: Progressing    times a day  - Out of bed for toileting  - Record patient progress and toleration of activity level   5/15/2024 1013 by Frieda Dobbins RN  Outcome: Progressing  5/15/2024 1013 by Frieda Dobbins RN  Outcome: Progressing     - Apply yellow socks and bracelet for high fall risk patients  - Consider moving patient to room near nurses station  5/15/2024 1013 by Frieda Dobbins RN  Outcome: Progressing  5/15/2024 1013 by Frieda Dobbins RN  Outcome: Progressing

## 2024-05-15 NOTE — ASSESSMENT & PLAN NOTE
Appears to be chronically elevated, low suspicion for infection   CXR with no acute disease   UA negative   Trend CBC and fever curve

## 2024-05-15 NOTE — ED PROVIDER NOTES
History  Chief Complaint   Patient presents with    Chest Pain     Started about midnight with left sided chest pain that woke her out of her sleep; pressure       49-YEAR-OLD FEMALE     Chief complaint: CP     HPI:  Pain woke her from sleep about 1 hour ago  Not going away       PAIN IS RATED AS SEVERE  DESCRIBED AS TIGHTNESS     PATIENT HAD SOME SHORTNESS OF BREATH  SOME NAUSEA  NO COMPLAINTS OF DIAPHORESIS     SHE DENIES ANY RADIATION OF PAIN TO THE JAW NECK OR THE BACK.  SOME RADIATION TO THE LEFT SHOULDER         INTERVENTIONS: NONE     PATIENT DENIES ANY COUGH, NO FEVERS OR CHILLS.  NO URI SYMPTOMS     VTE  RISK FACTORS:  NONE  NO HISTORY OF PE OR DVT  NO LONG CAR RIDES OR PLANE RIDES.  NO IMMOBILIZATION.  NO RECENT SURGERY OR TRAUMA.  NO HEMOPTYSIS.        OTHER ASSOCIATED SYMPTOMS:  NO ABDOMINAL PAIN.  NO NAUSEA OR VOMITING.  NO STOOL CHANGES.  NO URINARY COMPLAINTS: NO DYSURIA, NO HEMATURIA, NO FREQUENCY           History provided by:  Patient      Prior to Admission Medications   Prescriptions Last Dose Informant Patient Reported? Taking?   NIFEdipine (PROCARDIA XL) 30 mg 24 hr tablet   No No   Sig: Take 1 tablet (30 mg total) by mouth daily   acetaminophen (TYLENOL) 325 mg tablet   No No   Sig: Take 3 tablets (975 mg total) by mouth every 8 (eight) hours as needed for mild pain   albuterol (PROVENTIL HFA,VENTOLIN HFA) 90 mcg/act inhaler   No No   Sig: Inhale 2 puffs every 4 (four) hours as needed for wheezing or shortness of breath   aspirin 81 mg chewable tablet  Self No No   Sig: Chew 1 tablet (81 mg total) daily   atorvastatin (LIPITOR) 80 mg tablet   No No   Sig: Take 1 tablet (80 mg total) by mouth every evening   clopidogrel (PLAVIX) 75 mg tablet   No No   Sig: Take 1 tablet (75 mg total) by mouth daily   gabapentin (Neurontin) 300 mg capsule   No No   Sig: Take 1 capsule (300 mg total) by mouth 3 (three) times a day   isosorbide mononitrate (IMDUR) 120 mg 24 hr tablet   No No   Sig: Take 1 tablet  (120 mg total) by mouth daily   metoprolol succinate (TOPROL-XL) 50 mg 24 hr tablet   No No   Sig: Take 1 tablet (50 mg total) by mouth 2 (two) times a day   nicotine (NICODERM CQ) 14 mg/24hr TD 24 hr patch   No No   Sig: Place 1 patch on the skin over 24 hours every 24 hours   nitroglycerin (NITROSTAT) 0.4 mg SL tablet   No No   Sig: Place 1 tablet (0.4 mg total) under the tongue every 5 (five) minutes as needed for chest pain   ranolazine (RANEXA) 1000 MG SR tablet   No No   Sig: Take 1 tablet (1,000 mg total) by mouth every 12 (twelve) hours   silver sulfadiazine (SILVADENE,SSD) 1 % cream   No No   Sig: Apply topically daily   sodium bicarbonate 650 mg tablet   No No   Sig: Take 2 tablets (1,300 mg total) by mouth 3 (three) times daily after meals   venlafaxine (EFFEXOR-XR) 75 mg 24 hr capsule   No No   Sig: Take 1 capsule (75 mg total) by mouth daily with breakfast      Facility-Administered Medications: None       Past Medical History:   Diagnosis Date    Anemia     Anxiety     CAD (coronary artery disease)     Cancer ([facility]) 2008    Cardiomyopathy ([facility])     CHF (congestive heart failure) ([facility])     Chronic kidney disease     COPD (chronic obstructive pulmonary disease) ([facility])     scheduled for sleep apnea test soon after pacemaker implant    Coronary artery disease     Depression     History of chemotherapy     non hodgkins lymphoma 2008    Hyperlipemia     Hypertension     Hypertrophic cardiomyopathy ([facility])     Lymphoma, non-Hodgkin's ([facility])     Myocardial infarction ([facility])     Non-ST elevation MI (NSTEMI) 01/02/2023    Obesity     Obstructive sleep apnea     Olecranon bursitis, right elbow 7/11/2022    SSS (sick sinus syndrome) ([facility])     s/p medtronic dual chamber pacemaker 5/25/2021    Tobacco abuse     Ventricular tachycardia, non-sustained ([facility])     Wears glasses        Past Surgical History:   Procedure Laterality Date    CARDIAC CATHETERIZATION N/A 1/3/2023    Procedure: Cardiac catheterization;  Surgeon: Jesus  CLARA Franco MD;  Location: [facility];  Service: Cardiology    CARDIAC CATHETERIZATION N/A 1/3/2023    Procedure: Cardiac Coronary Angiogram;  Surgeon: Jesus Franco MD;  [facility]: [facility];  Service: Cardiology    CARDIAC CATHETERIZATION N/A 1/3/2023    Procedure: Cardiac pci;  Surgeon: Jesus Franco MD;  [facility]: [facility];  Service: Cardiology    CARDIAC CATHETERIZATION Left 2023    Procedure: Cardiac Left Heart Cath;  Surgeon: Frieda Fox DO;  Location: [facility];  Service: Cardiology    CARDIAC CATHETERIZATION  2023    Procedure: Cardiac catheterization;  Surgeon: Frieda Fox DO;  [facility];  Service: Cardiology    CARDIAC CATHETERIZATION N/A 2023    Procedure: Cardiac Coronary Angiogram;  Surgeon: Frieda Fox DO;  Location: [facility];  Service: Cardiology    CARDIAC PACEMAKER PLACEMENT Left 2021    Procedure: DUAL LEAD PACEMAKER IMPLANT;  Surgeon: Yinka Shaw MD;  Location: [facility] OR;  Service: Cardiology    CAROTID STENT       SECTION      CORONARY ANGIOPLASTY WITH STENT PLACEMENT  2021    GREG mid RCA and GREG right PDA    DENTAL SURGERY      IR BIOPSY KIDNEY RANDOM  10/18/2021       Family History   Problem Relation Age of Onset    No Known Problems Mother     No Known Problems Father     No Known Problems Daughter     Brain cancer Maternal Grandfather     Heart disease Maternal Grandfather     Cancer Maternal Grandfather     No Known Problems Paternal Aunt     No Known Problems Paternal Aunt      I have reviewed and agree with the history as documented.    E-Cigarette/Vaping    E-Cigarette Use Never User      E-Cigarette/Vaping Substances    Nicotine No     THC No     CBD No     Flavoring No     Other No     Unknown No      Social History     Tobacco Use    Smoking status: Every Day     Current packs/day: 0.50     Average packs/day: 0.5 packs/day for 25.0 years (12.5 ttl pk-yrs)      Types: Cigarettes    Smokeless tobacco: Never    Tobacco comments:     Recently and currently quitting cigarettes   Vaping Use    Vaping status: Never Used   Substance Use Topics    Alcohol use: Yes     Comment: 1-2 times years    Drug use: No       Review of Systems   Respiratory:  Negative for wheezing and stridor.    All other systems reviewed and are negative.      Physical Exam  Physical Exam  Constitutional:       General: She is not in acute distress.     Appearance: She is well-developed. She is not ill-appearing, toxic-appearing or diaphoretic.   HENT:      Head: Normocephalic and atraumatic.      Nose: Nose normal.      Mouth/Throat:      Pharynx: No oropharyngeal exudate.   Eyes:      General: No scleral icterus.        Right eye: No discharge.         Left eye: No discharge.      Conjunctiva/sclera: Conjunctivae normal.      Pupils: Pupils are equal, round, and reactive to light.   Neck:      Vascular: No JVD.      Trachea: No tracheal deviation.   Cardiovascular:      Rate and Rhythm: Regular rhythm.      Pulses:           Radial pulses are 2+ on the right side and 2+ on the left side.      Heart sounds: Normal heart sounds. No murmur heard.     No friction rub. No gallop.   Pulmonary:      Effort: Pulmonary effort is normal. No respiratory distress.      Breath sounds: Normal breath sounds. No stridor. No wheezing or rales.   Chest:      Chest wall: No tenderness.   Abdominal:      General: Bowel sounds are normal. There is no distension.      Palpations: Abdomen is soft. There is no mass.      Tenderness: There is no abdominal tenderness. There is no guarding or rebound.      Hernia: No hernia is present.   Musculoskeletal:         General: No tenderness or deformity. Normal range of motion.      Cervical back: Normal range of motion and neck supple.   Lymphadenopathy:      Cervical: No cervical adenopathy.   Skin:     General: Skin is warm.      Capillary Refill: Capillary refill takes less than  2 seconds.      Coloration: Skin is not pale.      Findings: No erythema or rash.   Neurological:      Mental Status: She is alert and oriented to person, place, and time.      Cranial Nerves: No cranial nerve deficit.      Sensory: No sensory deficit.      Motor: No abnormal muscle tone.      Coordination: Coordination normal.   Psychiatric:         Behavior: Behavior normal.         Thought Content: Thought content normal.         Judgment: Judgment normal.         Vital Signs  ED Triage Vitals [05/15/24 0111]   Temperature Pulse Respirations Blood Pressure SpO2   98.5 °F (36.9 °C) 89 22 162/78 98 %      Temp src Heart Rate Source Patient Position - Orthostatic VS BP Location FiO2 (%)   -- Monitor -- Left arm --      Pain Score       10 - Worst Possible Pain           Vitals:    05/15/24 0320 05/15/24 0415 05/15/24 0500 05/15/24 0530   BP: 125/62 107/53 134/64 126/60   Pulse: 69 67 66 72         Visual Acuity      ED Medications  Medications   sodium chloride (PF) 0.9 % injection 3 mL (has no administration in time range)   nitroGLYcerin (TRIDIL) 50 mg in 250 mL infusion (premix) (0 mcg/min Intravenous Stopped 5/15/24 0431)   albuterol (PROVENTIL HFA,VENTOLIN HFA) inhaler 2 puff (has no administration in time range)   aspirin chewable tablet 81 mg (has no administration in time range)   atorvastatin (LIPITOR) tablet 80 mg (has no administration in time range)   clopidogrel (PLAVIX) tablet 75 mg (has no administration in time range)   gabapentin (NEURONTIN) capsule 200 mg (has no administration in time range)   isosorbide mononitrate (IMDUR) 24 hr tablet 120 mg (has no administration in time range)   metoprolol succinate (TOPROL-XL) 24 hr tablet 50 mg (has no administration in time range)   NIFEdipine (PROCARDIA XL) 24 hr tablet 30 mg (has no administration in time range)   nicotine (NICODERM CQ) 21 mg/24 hr TD 24 hr patch 21 mg (has no administration in time range)   sodium bicarbonate tablet 1,300 mg (has no  administration in time range)   ranolazine (RANEXA) 12 hr tablet 1,000 mg (has no administration in time range)   venlafaxine (EFFEXOR-XR) 24 hr capsule 75 mg (has no administration in time range)   chlorhexidine (PERIDEX) 0.12 % oral rinse 15 mL (has no administration in time range)   heparin (porcine) subcutaneous injection 7,500 Units (has no administration in time range)   morphine injection 4 mg (4 mg Intravenous Given 5/15/24 0131)   HYDROmorphone (DILAUDID) injection 0.5 mg (0.5 mg Intravenous Given 5/15/24 0235)   magnesium sulfate 2 g/50 mL IVPB (premix) 2 g (0 g Intravenous Stopped 5/15/24 0451)   aspirin chewable tablet 324 mg (324 mg Oral Given 5/15/24 0258)   nitroglycerin (NITRO-BID) 2 % TD ointment 1 inch (1 inch Topical Given 5/15/24 0431)   venlafaxine (EFFEXOR-XR) 24 hr capsule 75 mg (75 mg Oral Given 5/15/24 0524)   LORazepam (ATIVAN) injection 0.5 mg (0.5 mg Intravenous Given 5/15/24 0445)       Diagnostic Studies  Results Reviewed       Procedure Component Value Units Date/Time    HS Troponin I 4hr [[id number]]  (Abnormal) Collected: 05/15/24 0522    Lab Status: Final result Specimen: Blood from Arm, Left Updated: 05/15/24 0551     hs TnI 4hr 110 ng/L      Delta 4hr hsTnI 11 ng/L     HS Troponin I 2hr [[id number]]  (Abnormal) Collected: 05/15/24 0319    Lab Status: Final result Specimen: Blood from Arm, Left Updated: 05/15/24 0353     hs TnI 2hr 96 ng/L      Delta 2hr hsTnI -3 ng/L     HS Troponin 0hr (reflex protocol) [[id number]]  (Abnormal) Collected: 05/15/24 0120    Lab Status: Final result Specimen: Blood from Arm, Left Updated: 05/15/24 0147     hs TnI 0hr 99 ng/L     Comprehensive metabolic panel [[id number]]  (Abnormal) Collected: 05/15/24 0120    Lab Status: Final result Specimen: Blood from Arm, Left Updated: 05/15/24 0143     Sodium 141 mmol/L      Potassium 4.0 mmol/L      Chloride 110 mmol/L      CO2 19 mmol/L      ANION GAP 12 mmol/L      BUN 40 mg/dL      Creatinine 3.85 mg/dL       Glucose 142 mg/dL      Calcium 9.4 mg/dL      AST 9 U/L      ALT 13 U/L      Alkaline Phosphatase 119 U/L      Total Protein 7.7 g/dL      Albumin 4.1 g/dL      Total Bilirubin 0.36 mg/dL      eGFR 13 ml/min/1.73sq m     Narrative:      National Kid[employer] guidelines for Chronic Kidney Disease (CKD):     Stage 1 with normal or high GFR (GFR > 90 mL/min/1.73 square meters)    Stage 2 Mild CKD (GFR = 60-89 mL/min/1.73 square meters)    Stage 3A Moderate CKD (GFR = 45-59 mL/min/1.73 square meters)    Stage 3B Moderate CKD (GFR = 30-44 mL/min/1.73 square meters)    Stage 4 Severe CKD (GFR = 15-29 mL/min/1.73 square meters)    Stage 5 End Stage CKD (GFR <15 mL/min/1.73 square meters)  Note: GFR calculation is accurate only with a steady state creatinine    Magnesium [[id number]]  (Abnormal) Collected: 05/15/24 0120    Lab Status: Final result Specimen: Blood from Arm, Left Updated: 05/15/24 0143     Magnesium 1.8 mg/dL     CBC and differential [[id number]]  (Abnormal) Collected: 05/15/24 0120    Lab Status: Final result Specimen: Blood from Arm, Left Updated: 05/15/24 0124     WBC 12.78 Thousand/uL      RBC 4.52 Million/uL      Hemoglobin 12.5 g/dL      Hematocrit 41.0 %      MCV 91 fL      MCH 27.7 pg      MCHC 30.5 g/dL      RDW 19.8 %      MPV 10.7 fL      Platelets 309 Thousands/uL      nRBC 0 /100 WBCs      Segmented % 68 %      Immature Grans % 1 %      Lymphocytes % 18 %      Monocytes % 9 %      Eosinophils Relative 3 %      Basophils Relative 1 %      Absolute Neutrophils 8.83 Thousands/µL      Absolute Immature Grans 0.07 Thousand/uL      Absolute Lymphocytes 2.32 Thousands/µL      Absolute Monocytes 1.11 Thousand/µL      Eosinophils Absolute 0.37 Thousand/µL      Basophils Absolute 0.08 Thousands/µL                    X-ray chest 1 view portable   ED Interpretation by Shamir Monahan MD (05/15 0240)   XR CHEST PORTABLE     INDICATION: chest pain.     COMPARISON: 5/1/2024 and 5/7/2024      FINDINGS:     Clear lungs. No pneumothorax or pleural effusion.     Normal cardiomediastinal silhouette. Left-sided pacemaker with expected position of electrodes.     Bones are unremarkable for age.     Normal upper abdomen.     IMPRESSION:     No acute cardiopulmonary disease                   Procedures  ECG 12 Lead Documentation Only    Date/Time: 5/15/2024 1:20 AM    Performed by: Shamir Monahan MD  Authorized by: Shamir Monahan MD    Indications / Diagnosis:  Chest pain  Patient location:  ED  Interpretation:     Interpretation: normal    Rate:     ECG rate:  80    ECG rate assessment: normal    Rhythm:     Rhythm: sinus rhythm    Ectopy:     Ectopy: PVCs    QRS:     QRS axis:  Normal  Conduction:     Conduction: normal    ST segments:     ST segments:  Non-specific  T waves:     T waves: non-specific    CriticalCare Time    Date/Time: 5/15/2024 3:00 AM    Performed by: Shamir Monahan MD  Authorized by: Shamir Monahan MD    Critical care provider statement:     Critical care time (minutes):  65    Critical care start time:  5/15/2024 1:30 AM    Critical care end time:  5/15/2024 3:00 AM    Critical care time was exclusive of:  Separately billable procedures and treating other patients    Critical care was necessary to treat or prevent imminent or life-threatening deterioration of the following conditions:  Cardiac failure    Critical care was time spent personally by me on the following activities:  Examination of patient, evaluation of patient's response to treatment, discussions with primary provider, discussions with consultants, development of treatment plan with patient or surrogate, obtaining history from patient or surrogate, review of old charts, re-evaluation of patient's condition, ordering and review of radiographic studies, ordering and review of laboratory studies and ordering and performing treatments and interventions  ECG 12 Lead Documentation Only    Date/Time: 5/15/2024 3:19  AM    Performed by: Shamir Monahan MD  Authorized by: Shamir Monahan MD    Indications / Diagnosis:  Cp  Patient location:  ED  Interpretation:     Interpretation: non-specific    Rate:     ECG rate:  71    ECG rate assessment: normal    Rhythm:     Rhythm: sinus rhythm    Ectopy:     Ectopy: none    QRS:     QRS axis:  Normal    QRS intervals:  Normal  Conduction:     Conduction: normal    ST segments:     ST segments:  Non-specific  T waves:     T waves: non-specific             ED Course  ED Course as of 05/15/24 0611   Wed May 15, 2024   0122 Pt seen and evaluated     Pt here for recurrent CP    Reviewed DC sum from 5/8:  cath on 09/2023 with progression of CAD not amenable to PCI. Cards at that time recommending GDMT  Continue aspirin, statin, toprol xl, imdur, ranexa      0141 CBC and differential(!)   0141 CBC at baseline   0219 Comprehensive metabolic panel(!)   0220 hs TnI 0hr(!): 99   0227 TT to Dr Santos w/ Cardiology    0231 Patient is clinically stable  States pain was much better, but is coming back   0231 Dw Dr Santos    Reviewed the case   Pt understands that she is not a good candidate for PIC at this time    Dr Santos recommends tighter BP control   Pt can be admitted here     Nifedapine can be increased. Can  Nephro to see pt     If Trops continue to climb, pt will need Heparin     PT can go to Step down 2    0240 TT to Hospitalist, Dr Pollack  Reviewed case    0254 bp is 136/71 now  Holding dose of Procardia for now, given her current BP    Reviewed w/ Dr Santos    0259 Dw Dr Santos  He wants to hold dose of Procardia  Wants low dose nitro drip for now    0336 Dw Hospitalist, Dr Pollack  Given nitro drip, he d/w ICU,who accepted pt              HEART Risk Score      Flowsheet Row Most Recent Value   Heart Score Risk Calculator    History 2 Filed at: 05/15/2024 0222   ECG 1 Filed at: 05/15/2024 0222   Age 1 Filed at: 05/15/2024 0222   Risk Factors 2 Filed at: 05/15/2024 0222   Troponin 2  Filed at: 05/15/2024 0222   HEART Score 8 Filed at: 05/15/2024 0222                                        Medical Decision Making    MDM    Patient with history as above presented with Patient presents with:  Chest Pain: Started about midnight with left sided chest pain that woke her out of her sleep; pressure    History obtained from patient    Patient was nontoxic, stable. Exam as above    EKG reviewed.    Labs reviewed.    Independently reviewed imaging.    Differential diagnosis considered. Overall presentation is consistent with Acute CP w/ elevated troponin - possible unstable angina. Low suspicion for PE, TAD, Pna, sepsis.    Patient was treated with nitro drip, IV mag, IV morphine, IV dilaudid, aspirin    Discussed case with VEE, who agreed to admit patient for Chest pain [R07.9]  Pt was subsequently admitted to ICU due to Nitro drip.  Hospitalist discussed case w/ ICU attending. I discussed case w/ ICU AP  .        This medical documentation was created using an electronic medical record system with M [employer] voice recognition.  Although this document has been carefully reviewed, there may still be some phonetic and typographical errors.  These errors are purely typographical and due to imperfections of the software program, do not reflect any compromise in the patient's medical care.          Amount and/or Complexity of Data Reviewed  Labs: ordered. Decision-making details documented in ED Course.  Radiology: ordered and independent interpretation performed.  ECG/medicine tests: ordered and independent interpretation performed. Decision-making details documented in ED Course.    Risk  Decision regarding hospitalization.             Disposition  Final diagnoses:   Acute chest pain   Elevated troponin   Chronic renal failure     Time reflects when diagnosis was documented in both MDM as applicable and the Disposition within this note       Time User Action Codes Description Comment    5/15/2024  2:42 AM  Shamir Monahan Add [R07.9] Acute chest pain     5/15/2024  2:42 AM Shamir Monahan [R79.89] Elevated troponin     5/15/2024  2:42 AM Shamir Monahan [N18.9] Chronic renal failure           ED Disposition       ED Disposition   Admit    Condition   Stable    Date/Time   Wed May 15, 2024 0242    Comment   Case was discussed with Dr Pollack and the patient's admission status was agreed to be Admission Status: inpatient status to the service of Dr. Pollack .               Follow-up Information    None         Patient's Medications   Discharge Prescriptions    No medications on file       No discharge procedures on file.    PDMP Review         Value Time User    PDMP Reviewed  Yes 3/26/2023  6:06 PM Berry Munroe III, DO            ED Provider  Electronically Signed by             Shamir Monahan MD  05/15/24 0611

## 2024-05-15 NOTE — ASSESSMENT & PLAN NOTE
Patient presents with crushing chest pain  EKG with no acute changes  Troponin mildly elevated, trending down  Known history of CAD (see details under CAD)  Given ASA, Morphine and started on nitro gtt  Will need further optimization of regimen  Continue home regimen for now  Cardiology consult  Obtain EKG with any worsening chest pain

## 2024-05-15 NOTE — H&P
[facility]  H&P  Name: Elise Ken 49 y.o. female I MRN: [id number]  Unit/Bed#: ED 27 I Date of Admission: 5/15/2024   Date of Service: 5/15/2024 I Hospital Day: 0      Assessment & Plan   Chest pain  Assessment & Plan  Patient presents with crushing chest pain  EKG with no acute changes  Troponin mildly elevated, trending down  Known history of CAD (see details under CAD)  Given ASA, Morphine and started on nitro gtt  Will need further optimization of regimen  Continue home regimen for now  Cardiology consult  Obtain EKG with any worsening chest pain    Coronary artery disease  Assessment & Plan  Longstanding history of CAD  Most recently had cath 09/2023 noting progression of CAD in previously occluded LAD with continued collateral though faint, prior stent to APRIL now occluded, also noted progression in the nondominant pLCX  Unfortunately patient with severe disease but poor target for revascularization  Frequent admissions for chest pain   Attempting GDMT with Imdur, Ranexa, Procardia and PRN Nitro  Despite this regimen patient with persistent pain  EKG on presentation with no acute changes, continue to monitor  Troponin mildly elevated but now downtrending, if troponin rises start Heparin ACS per cardiology  Now on nitro infusion, titrate to resolution of chest pain as long as BP tolerates  Will start nitrobid as this has been noted to help during prior admissions   Cardiology consult  Recent echo 5/8 demonstrating EF 40%, LV dilated, systolic function moderately reduced, global hypokinesis with regional variation worse in the inferior and lateral regions, mild to moderate MR, mild TR    Elevated troponin  Assessment & Plan  Mildly elevated, now downtrending  Cardiology consult    Leukocytosis  Assessment & Plan  Appears to be chronically elevated, low suspicion for infection   CXR with no acute disease   UA negative   Trend CBC and fever curve     Ambulatory dysfunction  Assessment &  Plan  Uses walker  PT/OT when medically appropriate    CKD (chronic kidney disease) stage 4, GFR 15-29 ml/min ([facility])  Assessment & Plan  Lab Results   Component Value Date    EGFR 13 05/15/2024    EGFR 11 05/08/2024    EGFR 11 05/07/2024    CREATININE 3.85 (H) 05/15/2024    CREATININE 4.30 (H) 05/08/2024    CREATININE 4.41 (H) 05/07/2024     In the setting of alport syndrome   New baseline creatinine appears to be 3.8-4.4  Trend I & O and renal indices    Morbid obesity with BMI of 45.0-49.9, adult ([facility])  Assessment & Plan  Nutrition consult  Encourage lifestyle modification    Mixed hyperlipidemia  Assessment & Plan  Continue statin     Anxiety  Assessment & Plan  Continue home Effexor    Tobacco abuse  Assessment & Plan  Continues to smoke although is attempting to cut back, down to about one third of a pack a day  NRT   Encourage cessation and lifestyle modification    Essential hypertension  Assessment & Plan  Continue home regimen as tolerated  Monitor BP closely           History of Present Illness     HPI: Elise Ken is a 49 y.o. with past medical history significant for coronary artery disease, chronic kidney disease in the setting of Alport syndrome, ambulatory dysfunction, tobacco abuse, hypertension, hyperlipidemia and frequent admissions for persistent chest pain who presents with presented today with crushing chest pain that began around midnight.  Patient states that there are no alleviating or worsening factors.  Initially given morphine and aspirin, placed on nitro infusion.  EKG with no acute changes.  Troponin mildly elevated, now downtrending.  Cardiology consulted, will hold off on heparin infusion unless troponins continue to rise.  Admit to stepdown 1 given nitro infusion.    History obtained from chart review and the patient.  Review of Systems   Constitutional: Negative.    HENT: Negative.     Respiratory:  Negative for shortness of breath.    Cardiovascular:  Positive for chest pain.    Gastrointestinal: Negative.    Genitourinary: Negative.    Musculoskeletal: Negative.    Skin: Negative.    Neurological:  Negative for dizziness and light-headedness.   All other systems reviewed and are negative.    Disposition: Stepdown Level 1  Historical Information   Past Medical History:  No date: Anemia  No date: Anxiety  No date: CAD (coronary artery disease)  2008: Cancer ([facility])  No date: Cardiomyopathy ([facility])  No date: CHF (congestive heart failure) ([facility])  No date: Chronic kidney disease  No date: COPD (chronic obstructive pulmonary disease) ([facility])      Comment:  scheduled for sleep apnea test soon after pacemaker                implant  No date: Coronary artery disease  No date: Depression  No date: History of chemotherapy      Comment:  non hodgkins lymphoma 2008  No date: Hyperlipemia  No date: Hypertension  No date: Hypertrophic cardiomyopathy ([facility])  No date: Lymphoma, non-Hodgkin's ([facility])  No date: Myocardial infarction ([facility])  01/02/2023: Non-ST elevation MI (NSTEMI)  No date: Obesity  No date: Obstructive sleep apnea  7/11/2022: Olecranon bursitis, right elbow  No date: SSS (sick sinus syndrome) ([facility])      Comment:  s/p medtronic dual chamber pacemaker 5/25/2021  No date: Tobacco abuse  No date: Ventricular tachycardia, non-sustained ([facility])  No date: Wears glasses Past Surgical History:  1/3/2023: CARDIAC CATHETERIZATION; N/A      Comment:  Procedure: Cardiac catheterization;  Surgeon: Jesus Franco MD;  [facility];  Service:                Cardiology  1/3/2023: CARDIAC CATHETERIZATION; N/A      Comment:  Procedure: Cardiac Coronary Angiogram;  Surgeon: Jesus Franco MD;  [facility];  Service:                Cardiology  1/3/2023: CARDIAC CATHETERIZATION; N/A      Comment:  Procedure: Cardiac pci;  Surgeon: Jesus Franco MD;                 [facility]: [facility];  Service: Cardiology  9/25/2023: CARDIAC CATHETERIZATION;  Left      Comment:  Procedure: Cardiac Left Heart Cath;  Surgeon: Frieda Fox DO;  Location: [facility];  Service:                Cardiology  2023: CARDIAC CATHETERIZATION      Comment:  Procedure: Cardiac catheterization;  Surgeon: Frieda Fox DO;  [facility];  Service:                Cardiology  2023: CARDIAC CATHETERIZATION; N/A      Comment:  Procedure: Cardiac Coronary Angiogram;  Surgeon:                Frieda Fox DO;  [facility]: [facility];                 Service: Cardiology  2021: CARDIAC PACEMAKER PLACEMENT; Left      Comment:  Procedure: DUAL LEAD PACEMAKER IMPLANT;  Surgeon:                Yinka Shaw MD;  Location:  MAIN OR;  Service:                Cardiology  No date: CAROTID STENT  No date:  SECTION  2021: CORONARY ANGIOPLASTY WITH STENT PLACEMENT      Comment:  GREG mid RCA and GREG right PDA  No date: DENTAL SURGERY  10/18/2021: IR BIOPSY KIDNEY RANDOM   Current Outpatient Medications   Medication Instructions    acetaminophen (TYLENOL) 975 mg, Oral, Every 8 hours PRN    albuterol (PROVENTIL HFA,VENTOLIN HFA) 90 mcg/act inhaler 2 puffs, Inhalation, Every 4 hours PRN    aspirin 81 mg, Oral, Daily    atorvastatin (LIPITOR) 80 mg, Oral, Every evening    clopidogrel (PLAVIX) 75 mg, Oral, Daily    gabapentin (NEURONTIN) 300 mg, Oral, 3 times daily    isosorbide mononitrate (IMDUR) 120 mg, Oral, Daily    metoprolol succinate (TOPROL-XL) 50 mg, Oral, 2 times daily    nicotine (NICODERM CQ) 14 mg/24hr TD 24 hr patch 1 patch, Transdermal, Every 24 hours    NIFEdipine (PROCARDIA XL) 30 mg, Oral, Daily    nitroglycerin (NITROSTAT) 0.4 mg, Sublingual, Every 5 minutes PRN    ranolazine (RANEXA) 1,000 mg, Oral, Every 12 hours scheduled    silver sulfadiazine (SILVADENE,SSD) 1 % cream Topical, Daily    sodium bicarbonate 1,300 mg, Oral, 3 times daily after meals    venlafaxine (EFFEXOR-XR) 75  mg, Oral, Daily with breakfast    No Known Allergies   Social History     Tobacco Use    Smoking status: Every Day     Current packs/day: 0.50     Average packs/day: 0.5 packs/day for 25.0 years (12.5 ttl pk-yrs)     Types: Cigarettes    Smokeless tobacco: Never    Tobacco comments:     Recently and currently quitting cigarettes   Vaping Use    Vaping status: Never Used   Substance Use Topics    Alcohol use: Yes     Comment: 1-2 times years    Drug use: No    Family History   Problem Relation Age of Onset    No Known Problems Mother     No Known Problems Father     No Known Problems Daughter     Brain cancer Maternal Grandfather     Heart disease Maternal Grandfather     Cancer Maternal Grandfather     No Known Problems Paternal Aunt     No Known Problems Paternal Aunt           Objective                            Vitals I/O      Most Recent Min/Max in 24hrs   Temp 98.5 °F (36.9 °C) Temp  Min: 98.5 °F (36.9 °C)  Max: 98.5 °F (36.9 °C)   Pulse 67 Pulse  Min: 67  Max: 89   Resp 19 Resp  Min: 17  Max: 22   /53 BP  Min: 107/53  Max: 162/78   O2 Sat 97 % SpO2  Min: 97 %  Max: 98 %    No intake or output data in the 24 hours ending 05/15/24 0428    Diet NPO    Invasive Monitoring           Physical Exam   Physical Exam  Vitals and nursing note reviewed.   Eyes:      Pupils: Pupils are equal, round, and reactive to light.   Skin:     General: Skin is warm and dry.   HENT:      Mouth/Throat:      Mouth: Mucous membranes are dry.   Cardiovascular:      Rate and Rhythm: Normal rate and regular rhythm.      Pulses: Normal pulses.      Heart sounds: Normal heart sounds.   Musculoskeletal:         General: Normal range of motion.   Abdominal:      Palpations: Abdomen is soft.   Constitutional:       Appearance: She is morbidly obese. She is ill-appearing.   Pulmonary:      Effort: Pulmonary effort is normal.      Breath sounds: Normal breath sounds.   Neurological:      General: No focal deficit present.      Mental  Status: She is alert and oriented to person, place and time. Mental status is at baseline.            Diagnostic Studies      EKG: Normal sinus rhythm  Imaging:  I have personally reviewed pertinent reports.   and I have personally reviewed pertinent films in PACS     Medications:  Scheduled PRN   aspirin, 81 mg, Daily  atorvastatin, 80 mg, QPM  chlorhexidine, 15 mL, Q12H ABIEL  clopidogrel, 75 mg, Daily  gabapentin, 300 mg, TID  heparin (porcine), 5,000 Units, Q8H ABIEL  isosorbide mononitrate, 120 mg, Daily  magnesium sulfate, 2 g, Once  metoprolol succinate, 50 mg, BID  nicotine, 21 mg, Daily  NIFEdipine, 30 mg, Daily  nitroglycerin, 1 inch, Once  ranolazine, 1,000 mg, BID  sodium bicarbonate, 1,300 mg, TID after meals  venlafaxine, 75 mg, Once  [START ON 5/16/2024] venlafaxine, 75 mg, Daily      albuterol, 2 puff, Q4H PRN  sodium chloride (PF), 3 mL, Q1H PRN       Continuous    nitroGLYcerin, 5-200 mcg/min, Last Rate: 10 mcg/min (05/15/24 0320)         Labs:    CBC    Recent Labs     05/15/24  0120   WBC 12.78*   HGB 12.5   HCT 41.0        BMP    Recent Labs     05/15/24  0120   SODIUM 141   K 4.0   *   CO2 19*   AGAP 12   BUN 40*   CREATININE 3.85*   CALCIUM 9.4       Coags    No recent results     Additional Electrolytes  Recent Labs     05/15/24  0120   MG 1.8*          Blood Gas    No recent results  No recent results LFTs  Recent Labs     05/15/24  0120   ALT 13   AST 9*   ALKPHOS 119*   ALB 4.1   TBILI 0.36       Infectious  No recent results  Glucose  Recent Labs     05/15/24  0120   GLUC 142*             Anticipated Length of Stay is > 2 midnights  JALIL Goodson

## 2024-05-15 NOTE — CONSULTS
[facility]  Consult  Name: Elise Ken 49 y.o. female I MRN: [id number]  Unit/Bed#: ED 27 I Date of Admission: 5/15/2024   Date of Service: 5/15/2024 I Hospital Day: 0    Inpatient consult to Cardiology  Consult performed by: JALIL Greer  Consult ordered by: JALIL Goodson          Assessment & Plan   Elevated troponin  Assessment & Plan  Chronically elevated likely related to advanced renal disease  EKG without changes    CKD (chronic kidney disease) stage 4, GFR 15-29 ml/min ([facility])  Assessment & Plan  Lab Results   Component Value Date    EGFR 13 05/15/2024    EGFR 11 05/08/2024    EGFR 11 05/07/2024    CREATININE 3.85 (H) 05/15/2024    CREATININE 4.30 (H) 05/08/2024    CREATININE 4.41 (H) 05/07/2024     Follows with nephrology    Coronary artery disease  Assessment & Plan  Multiple interventions in the past  Continue aspirin, Plavix, statin therapy    Essential hypertension  Assessment & Plan  Blood pressure well-controlled  Continue to monitor on current regimen    Chest pain  Assessment & Plan  Pt with recurrent chest pain, presently controlled with nitro gtt  Trop mildly elevated, though this is chronic  See additional comments regarding cardiac catheterization  Continue medical therapy, uptitrate Procardia             Other summary comments:   Troponin levels are abnormal though flat trending and review of labs reveal that troponin levels are chronically elevated.  This likely in the setting of underlying renal disease.    Elise is currently comfortable on a nitro drip.  Would resume home medications including isosorbide 120 mg daily, Toprol 50 mg twice daily, Procardia 30 mg daily-can increase to 60 mg, Ranexa 1000 mg twice daily, aspirin and Plavix.  Continue Lipitor for statin therapy.  Wean from nitro drip.    We discussed the option of cardiac catheterization and high risk PCI.  She expresses that if she will continue to experience chest pain and most likely  require dialysis, she would not like to proceed.    Consider palliative care evaluation    Outpatient Cardiologist: JALIL Dawn    HPI: Elise Ken is a 49 y.o. year old female who presented with chest pain.    Elise reports waking up overnight with discomfort in her chest.  She felt restless and like she was suffocating with her CPAP mask on.  She felt better after taking it off.  She notes that her chest discomfort was similar to what she experienced 1 week ago when she was evaluated.  This this time, she also had a brief feeling of pins-and-needles up into her neck but describes it as painful.  This is now resolved without recurrence.    She was given morphine and noted some improvement in her symptoms, however they returned when the medication wore off.  She was placed on a nitro drip and is currently comfortable.  She states the drip was turned off for a period of time and she had worsening discomfort.    Elise is known to me from the outpatient setting as well as recent ER evaluation for chest pain.  She has known coronary disease with no targets for revascularization by catheterization 9/25/2023.  During her encounter 1 week ago, it was determined that she may be able to undergo a high risk intervention of RCA disease.  Because of her advanced renal disease, she opted to hold off.    Additional medical history is significant for hypertrophic cardiomyopathy, HFpEF and hypertension.  She had post MI NSVT for which an outpatient event monitor was performed.  This also revealed symptomatic sinus pauses for which a Medtronic permanent pacemaker was placed 5/20/2021.  She also follows with nephrology for chronic kidney disease/Alport syndrome (biopsy-proven).        EKG: Normal sinus rhythm, age undetermined inferior infarct, anteroseptal infarct.      MOST  RECENT CARDIAC IMAGING:   Echo 1/9/2023   EF 45%, moderate diastolic dysfunction  Mildly dilated LA  Mild to moderate MR  No change compared to  1/2/2023    Holter 4/22/2021 SR, avg rate 88.  Frequent PVCs, 8-beat run NSVT    Cardiac cath 1/3/2023   Successful PCI to mid % culprit req. rheolytic thrombectomy (progressed when compared to 4-2-21 study); given atretic distal LAD, 2.25 x 15 mm GREG was deployed from LAD into more sizable D2    Residual prox LCx 60% lesion with patent LAD, mid RCA & PDA stents    Elevated LVEDP (20-30 mmHg) with no LV-Ao gradient on pullback    R radial a. accessed but unable to advance equipment, likely occluded; RFA arteriotomy employed       Cath 4/2/2021 Distal LAD: There was a 60 % stenosis.  Mid circumflex: There was a 50 % stenosis.  Distal RCA: There was a 10 % stenosis at the site of a prior stent.  Right PDA: There was a 0 % stenosis at the site of a prior stent.     Echo 3/25/2021 60%, severe hypokinesis of the basal-mid inferior wall.  Findings consistent with HCM.  Mild-mod MR.     Cardiac MRI 3/29/2021 severe LVH, EF 44%.  Small circumferential pericardial effusion.  Mild MR.  Findings suggestive of underlying hypertrophic cardiomyopathy with ischemic scarring of the inferior and inferolateral walls.     Stress echo 7/26/2021 Severe hypokinesis if the basal-mid inferior walls at peak stress, but overall conclusion was no evidence of stress induced ischemia.        Review of Systems: a 10 point review of systems was conducted and is negative except for as mentioned in the HPI or as below.        Historical Information   Past Medical History:   Diagnosis Date    Anemia     Anxiety     CAD (coronary artery disease)     Cancer (HCC) 2008    Cardiomyopathy (HCC)     CHF (congestive heart failure) (HCC)     Chronic kidney disease     COPD (chronic obstructive pulmonary disease) (HCC)     scheduled for sleep apnea test soon after pacemaker implant    Coronary artery disease     Depression     History of chemotherapy     non hodgkins lymphoma 2008    Hyperlipemia     Hypertension     Hypertrophic cardiomyopathy (HCC)      Lymphoma, non-Hodgkin's (HCC)     Myocardial infarction ([facility])     Non-ST elevation MI (NSTEMI) 2023    Obesity     Obstructive sleep apnea     Olecranon bursitis, right elbow 2022    SSS (sick sinus syndrome) (formerly [facility])     s/p medtronic dual chamber pacemaker 2021    Tobacco abuse     Ventricular tachycardia, non-sustained ([facility])     Wears glasses      Past Surgical History:   Procedure Laterality Date    CARDIAC CATHETERIZATION N/A 1/3/2023    Procedure: Cardiac catheterization;  Surgeon: Jesus Franco MD;  [facility]: [facility];  Service: Cardiology    CARDIAC CATHETERIZATION N/A 1/3/2023    Procedure: Cardiac Coronary Angiogram;  Surgeon: Jesus Franco MD;  [facility]: [facility];  Service: Cardiology    CARDIAC CATHETERIZATION N/A 1/3/2023    Procedure: Cardiac pci;  Surgeon: Jesus Franco MD;  Location: [facility];  Service: Cardiology    CARDIAC CATHETERIZATION Left 2023    Procedure: Cardiac Left Heart Cath;  Surgeon: Frieda Fox DO;  Location: [facility];  Service: Cardiology    CARDIAC CATHETERIZATION  2023    Procedure: Cardiac catheterization;  Surgeon: Frieda Fox DO;  Location: [facility];  Service: Cardiology    CARDIAC CATHETERIZATION N/A 2023    Procedure: Cardiac Coronary Angiogram;  Surgeon: Frieda Fox DO;  [facility]: [facility];  Service: Cardiology    CARDIAC PACEMAKER PLACEMENT Left 2021    Procedure: DUAL LEAD PACEMAKER IMPLANT;  Surgeon: Yinka Shaw MD;  Location:  MAIN OR;  Service: Cardiology    CAROTID STENT       SECTION      CORONARY ANGIOPLASTY WITH STENT PLACEMENT  2021    GREG mid RCA and GREG right PDA    DENTAL SURGERY      IR BIOPSY KIDNEY RANDOM  10/18/2021     Social History     Substance and Sexual Activity   Alcohol Use Yes    Comment: 1-2 times years     Social History     Substance and Sexual Activity   Drug Use No     Social History     Tobacco Use  "  Smoking Status Every Day    Current packs/day: 0.50    Average packs/day: 0.5 packs/day for 25.0 years (12.5 ttl pk-yrs)    Types: Cigarettes   Smokeless Tobacco Never   Tobacco Comments    Recently and currently quitting cigarettes       Family History: no longer relevant      Meds/Allergies   all current active meds have been reviewed  Not in a hospital admission.    No Known Allergies    Objective   Vitals: Blood pressure 122/60, pulse 65, temperature 98 °F (36.7 °C), resp. rate 18, height 5' 7\" (1.702 m), weight 125 kg (275 lb), last menstrual period 04/15/2024, SpO2 94%, not currently breastfeeding., Body mass index is 43.07 kg/m².,   Orthostatic Blood Pressures      Flowsheet Row Most Recent Value   Blood Pressure 122/60 filed at 05/15/2024 0930   Patient Position - Orthostatic VS Sitting filed at 05/15/2024 0930            Systolic (24hrs), Av , Min:107 , Max:167     Diastolic (24hrs), Av, Min:53, Max:79      Physical Exam:    General:  Normal appearance in no distress.  Eyes:  Anicteric.  Oral mucosa:  Moist.  Neck:  Difficult to assess due to body habitus  Chest:  Clear to auscultation.  Cardiac:  No palpable PMI.  Normal S1 and S2.  No murmur gallop or rub.  Abdomen:  Obese, soft and nontender. No palpable organomegaly or aortic enlargement.  Extremities:  No peripheral edema.  Musculoskeletal:  Symmetric.   Vascular:  Pedal pulses are intact.  Neuro:  Grossly symmetric.  Psych:  Alert and oriented x3.        Lab Results:   Labs reviewed and prominent abnormalities reviewed above and/or below.    Troponins:    Results from last 7 days   Lab Units 05/15/24  0744 05/15/24  0522 05/15/24  0319   HS TNI 0HR ng/L 104*  --   --    HS TNI 2HR ng/L  --   --  96*   HSTNI D2 ng/L  --   --  -3   HS TNI 4HR ng/L  --  110*  --    HSTNI D4 ng/L  --  11  --      BNP:   Results from last 6 Months   Lab Units 24  0521   BNP pg/mL 356*               "

## 2024-05-15 NOTE — ASSESSMENT & PLAN NOTE
Lab Results   Component Value Date    EGFR 13 05/15/2024    EGFR 11 05/08/2024    EGFR 11 05/07/2024    CREATININE 3.85 (H) 05/15/2024    CREATININE 4.30 (H) 05/08/2024    CREATININE 4.41 (H) 05/07/2024     In the setting of alport syndrome   New baseline creatinine appears to be 3.8-4.4  Trend I & O and renal indices

## 2024-05-16 ENCOUNTER — TELEPHONE (OUTPATIENT)
Dept: FAMILY MEDICINE CLINIC | Facility: CLINIC | Age: 49
End: 2024-05-16

## 2024-05-16 ENCOUNTER — TRANSITIONAL CARE MANAGEMENT (OUTPATIENT)
Dept: FAMILY MEDICINE CLINIC | Facility: CLINIC | Age: 49
End: 2024-05-16

## 2024-05-16 ENCOUNTER — HOME CARE VISIT (OUTPATIENT)
Dept: HOME HEALTH SERVICES | Facility: HOME HEALTHCARE | Age: 49
End: 2024-05-16
Payer: COMMERCIAL

## 2024-05-16 VITALS
BODY MASS INDEX: 45.12 KG/M2 | TEMPERATURE: 97.8 F | RESPIRATION RATE: 14 BRPM | WEIGHT: 287.48 LBS | DIASTOLIC BLOOD PRESSURE: 66 MMHG | HEART RATE: 60 BPM | HEIGHT: 67 IN | OXYGEN SATURATION: 99 % | SYSTOLIC BLOOD PRESSURE: 114 MMHG

## 2024-05-16 PROBLEM — Z51.5 PALLIATIVE CARE BY SPECIALIST: Status: ACTIVE | Noted: 2024-05-16

## 2024-05-16 PROBLEM — Z71.89 GOALS OF CARE, COUNSELING/DISCUSSION: Status: ACTIVE | Noted: 2024-01-01

## 2024-05-16 LAB
ALBUMIN SERPL BCP-MCNC: 3.7 G/DL (ref 3.5–5)
ALP SERPL-CCNC: 107 U/L (ref 34–104)
ALT SERPL W P-5'-P-CCNC: 13 U/L (ref 7–52)
ANION GAP SERPL CALCULATED.3IONS-SCNC: 8 MMOL/L (ref 4–13)
AST SERPL W P-5'-P-CCNC: 10 U/L (ref 13–39)
ATRIAL RATE: 66 BPM
ATRIAL RATE: 67 BPM
BASOPHILS # BLD AUTO: 0.07 THOUSANDS/ÂΜL (ref 0–0.1)
BASOPHILS NFR BLD AUTO: 1 % (ref 0–1)
BILIRUB SERPL-MCNC: 0.36 MG/DL (ref 0.2–1)
BUN SERPL-MCNC: 40 MG/DL (ref 5–25)
CA-I BLD-SCNC: 1.3 MMOL/L (ref 1.12–1.32)
CALCIUM SERPL-MCNC: 9.4 MG/DL (ref 8.4–10.2)
CHLORIDE SERPL-SCNC: 113 MMOL/L (ref 96–108)
CO2 SERPL-SCNC: 20 MMOL/L (ref 21–32)
CREAT SERPL-MCNC: 4.27 MG/DL (ref 0.6–1.3)
EOSINOPHIL # BLD AUTO: 0.37 THOUSAND/ÂΜL (ref 0–0.61)
EOSINOPHIL NFR BLD AUTO: 3 % (ref 0–6)
ERYTHROCYTE [DISTWIDTH] IN BLOOD BY AUTOMATED COUNT: 19.7 % (ref 11.6–15.1)
GFR SERPL CREATININE-BSD FRML MDRD: 11 ML/MIN/1.73SQ M
GLUCOSE SERPL-MCNC: 94 MG/DL (ref 65–140)
HCT VFR BLD AUTO: 37.5 % (ref 34.8–46.1)
HGB BLD-MCNC: 11.4 G/DL (ref 11.5–15.4)
IMM GRANULOCYTES # BLD AUTO: 0.05 THOUSAND/UL (ref 0–0.2)
IMM GRANULOCYTES NFR BLD AUTO: 1 % (ref 0–2)
LYMPHOCYTES # BLD AUTO: 2.19 THOUSANDS/ÂΜL (ref 0.6–4.47)
LYMPHOCYTES NFR BLD AUTO: 20 % (ref 14–44)
MAGNESIUM SERPL-MCNC: 2.3 MG/DL (ref 1.9–2.7)
MCH RBC QN AUTO: 27.5 PG (ref 26.8–34.3)
MCHC RBC AUTO-ENTMCNC: 30.4 G/DL (ref 31.4–37.4)
MCV RBC AUTO: 91 FL (ref 82–98)
MONOCYTES # BLD AUTO: 0.84 THOUSAND/ÂΜL (ref 0.17–1.22)
MONOCYTES NFR BLD AUTO: 8 % (ref 4–12)
NEUTROPHILS # BLD AUTO: 7.36 THOUSANDS/ÂΜL (ref 1.85–7.62)
NEUTS SEG NFR BLD AUTO: 67 % (ref 43–75)
NRBC BLD AUTO-RTO: 0 /100 WBCS
P AXIS: 40 DEGREES
P AXIS: 44 DEGREES
PHOSPHATE SERPL-MCNC: 6 MG/DL (ref 2.7–4.5)
PLATELET # BLD AUTO: 286 THOUSANDS/UL (ref 149–390)
PMV BLD AUTO: 10 FL (ref 8.9–12.7)
POTASSIUM SERPL-SCNC: 5 MMOL/L (ref 3.5–5.3)
PR INTERVAL: 186 MS
PR INTERVAL: 194 MS
PROT SERPL-MCNC: 6.9 G/DL (ref 6.4–8.4)
QRS AXIS: 12 DEGREES
QRS AXIS: 23 DEGREES
QRSD INTERVAL: 106 MS
QRSD INTERVAL: 114 MS
QT INTERVAL: 432 MS
QT INTERVAL: 438 MS
QTC INTERVAL: 456 MS
QTC INTERVAL: 459 MS
RBC # BLD AUTO: 4.14 MILLION/UL (ref 3.81–5.12)
SODIUM SERPL-SCNC: 141 MMOL/L (ref 135–147)
T WAVE AXIS: 89 DEGREES
T WAVE AXIS: 90 DEGREES
VENTRICULAR RATE: 66 BPM
VENTRICULAR RATE: 67 BPM
WBC # BLD AUTO: 10.88 THOUSAND/UL (ref 4.31–10.16)

## 2024-05-16 PROCEDURE — 99232 SBSQ HOSP IP/OBS MODERATE 35: CPT | Performed by: INTERNAL MEDICINE

## 2024-05-16 PROCEDURE — 80053 COMPREHEN METABOLIC PANEL: CPT | Performed by: NURSE PRACTITIONER

## 2024-05-16 PROCEDURE — 99239 HOSP IP/OBS DSCHRG MGMT >30: CPT

## 2024-05-16 PROCEDURE — 93010 ELECTROCARDIOGRAM REPORT: CPT | Performed by: INTERNAL MEDICINE

## 2024-05-16 PROCEDURE — 85025 COMPLETE CBC W/AUTO DIFF WBC: CPT | Performed by: NURSE PRACTITIONER

## 2024-05-16 PROCEDURE — 94660 CPAP INITIATION&MGMT: CPT

## 2024-05-16 PROCEDURE — 99255 IP/OBS CONSLTJ NEW/EST HI 80: CPT | Performed by: PHYSICIAN ASSISTANT

## 2024-05-16 PROCEDURE — 82330 ASSAY OF CALCIUM: CPT | Performed by: NURSE PRACTITIONER

## 2024-05-16 PROCEDURE — 83735 ASSAY OF MAGNESIUM: CPT | Performed by: NURSE PRACTITIONER

## 2024-05-16 PROCEDURE — 94760 N-INVAS EAR/PLS OXIMETRY 1: CPT

## 2024-05-16 PROCEDURE — 84100 ASSAY OF PHOSPHORUS: CPT | Performed by: NURSE PRACTITIONER

## 2024-05-16 RX ORDER — NIFEDIPINE 30 MG/1
30 TABLET, EXTENDED RELEASE ORAL 2 TIMES DAILY
Start: 2024-05-16 | End: 2024-05-22

## 2024-05-16 RX ADMIN — HEPARIN SODIUM 7500 UNITS: 5000 INJECTION INTRAVENOUS; SUBCUTANEOUS at 05:31

## 2024-05-16 RX ADMIN — CHLORHEXIDINE GLUCONATE 15 ML: 1.2 RINSE ORAL at 09:45

## 2024-05-16 RX ADMIN — METOPROLOL SUCCINATE 50 MG: 50 TABLET, EXTENDED RELEASE ORAL at 09:45

## 2024-05-16 RX ADMIN — RANOLAZINE 1000 MG: 500 TABLET, FILM COATED, EXTENDED RELEASE ORAL at 09:45

## 2024-05-16 RX ADMIN — CLOPIDOGREL 75 MG: 75 TABLET ORAL at 09:45

## 2024-05-16 RX ADMIN — VENLAFAXINE HYDROCHLORIDE 75 MG: 75 CAPSULE, EXTENDED RELEASE ORAL at 09:45

## 2024-05-16 RX ADMIN — ASPIRIN 81 MG 81 MG: 81 TABLET ORAL at 09:45

## 2024-05-16 RX ADMIN — LORAZEPAM 0.5 MG: 0.5 TABLET ORAL at 09:49

## 2024-05-16 RX ADMIN — SODIUM BICARBONATE 1300 MG: 650 TABLET ORAL at 09:49

## 2024-05-16 RX ADMIN — NIFEDIPINE 30 MG: 30 TABLET, FILM COATED, EXTENDED RELEASE ORAL at 09:45

## 2024-05-16 RX ADMIN — GABAPENTIN 200 MG: 100 CAPSULE ORAL at 09:45

## 2024-05-16 RX ADMIN — ISOSORBIDE MONONITRATE 120 MG: 30 TABLET, EXTENDED RELEASE ORAL at 09:45

## 2024-05-16 RX ADMIN — NICOTINE 21 MG: 21 PATCH, EXTENDED RELEASE TRANSDERMAL at 09:45

## 2024-05-16 NOTE — ASSESSMENT & PLAN NOTE
Lab Results   Component Value Date    EGFR 11 05/16/2024    EGFR 13 05/15/2024    EGFR 11 05/08/2024    CREATININE 4.27 (H) 05/16/2024    CREATININE 3.85 (H) 05/15/2024    CREATININE 4.30 (H) 05/08/2024     -Patient will need to continue to follow with nephrology team  -Will attempt to avoid additional nephrotoxic agents

## 2024-05-16 NOTE — SOCIAL WORK
Palliative LSW saw patient at the bedside today. LSW appreciates the opportunity to provide the patient with inpatient emotional support and guidance while patient continues to receive medical attention from the medical team     Topics discussed: LSW and EDY Chester met with the patient and explained [facility] at length. The patient expressed that she has been presenting to the hospital more frequently due to her increased/uncontrolled pain.  The patient reports that she is running out of options from a medical standpoint, and has identified that there are some interventions that she is choosing not to pursue as she wants to maintain her quality of life.  The patient does not wish to pursue anymore surguries or interventions, and does not want to do dialysis.   The patient became tearful and expressed that she feels guilty for coming back to the hospital so much as she knows the staff has other patients to take care of, but stated that her pain becomes unbearable.  LSW processed this with the patient and validated her feelings.  The patient initially decided on following up with palliative care on an outpatient basis, but then later informed EDY Chester that she wanted to transition to hospice care. Extensive emotional support was provided.     Areas that need follow-up: None at this time.     Resources given: None at this time.     Others present: Yosef Chester PA-C and  Dr. Saldana.    I have spent 60 minutes with Patient  today in which greater than 50% of this time was spent in counseling/coordination of care regarding  Ongoing emotional support .       LSW will continue to follow as requested by the medical team, patient, or family

## 2024-05-16 NOTE — ASSESSMENT & PLAN NOTE
-With multiple interventions in the past  -Continue aspirin, Plavix, atorvastatin  -Patient will follow with her primary cardiology team Nadine Amanda in the outpatient setting and undergo repeat lipid testing at that time with adjustment send medical therapy pending results  -Will send message to office staff to assist in setting patient up for telemedicine visit with interventional cardiologist Dr. Franco

## 2024-05-16 NOTE — CASE COMMUNICATION
For RLOC Elise Ken 58 yo femal  [birth year] currently at [facility] referred by palliative. Hx significant for morbid obesity, multivessel CAD/PCI, CHF EF 30 %, non-hodgkin's lymphoma s/p chemo, ELIAZAR, tobacco dependence, CKD4, Alport syndrome, recurrent admissions d/t chest pains who presented to [facility] ED yesterday for crushing chest pain that started at night. Treated with nitro gtt, IV mg, morphine, dilaudid and ASA in the ED.Palliative met  with pt and she is now interested in home hospice. LABS BUN 40 CREAT 4.27 CREAT 4.27 GFR 11 WBC 10.88 PPS 40    Approvd for RLOC by Dr Cruz 5.16.24  dx Hypertensive heart and kidney disease

## 2024-05-16 NOTE — ASSESSMENT & PLAN NOTE
Longstanding history of CAD  Most recently had cath 09/2023 noting progression of CAD in previously occluded LAD with continued collateral though faint, prior stent to APRIL now occluded, also noted progression in the nondominant pLCX  Unfortunately patient with severe disease but poor target for revascularization  Frequent admissions for chest pain   EKG on presentation with no acute changes  Troponin mildly elevated but trended downward  Initially on nitro infusion, since discontinued  Recent echo 5/8 demonstrating EF 40%, LV dilated, systolic function moderately reduced, global hypokinesis with regional variation worse in the inferior and lateral regions, mild to moderate MR, mild TR  Appreciate input of cardiology who followed.  Cleared for discharge today on increased dose of Procardia (twice a day)  Patient is going to transition to hospice-ambulatory referral placed

## 2024-05-16 NOTE — UTILIZATION REVIEW
Initial Clinical Review    Admission: Date/Time/Statement:   Admission Orders (From admission, onward)       Ordered        05/15/24 0330  INPATIENT ADMISSION  Once                          Orders Placed This Encounter   Procedures    INPATIENT ADMISSION     Standing Status:   Standing     Number of Occurrences:   1     Order Specific Question:   Level of Care     Answer:   Level 1 Stepdown [13]     Order Specific Question:   Estimated length of stay     Answer:   More than 2 Midnights     Order Specific Question:   Certification     Answer:   I certify that inpatient services are medically necessary for this patient for a duration of greater than two midnights. See H&P and MD Progress Notes for additional information about the patient's course of treatment.     ED Arrival Information       Expected   -    Arrival   5/15/2024 01:06    Acuity   Urgent              Means of arrival   Wheelchair    Escorted by   Family Member    Service   Hospitalist    Admission type   Emergency              Arrival complaint   chest pain             Chief Complaint   Patient presents with    Chest Pain     Started about midnight with left sided chest pain that woke her out of her sleep; pressure       Initial Presentation: 49 y.o. female to the ED from home with complaints of chest pain/pressure.  H/O coronary artery disease, chronic kidney disease in the setting of Alport syndrome, ambulatory dysfunction, tobacco abuse, hypertension, hyperlipidemia and frequent admissions.Recently admitted from 5/7/24-5/8 for suzette, chest pain.  Arrives tachypneic with elevated troponins. EKG showing frequent PVCs. Wbcs 12.78, creat 3.85. Admitted to CRITICAL CARE step down  for chest pain, elevated troponin.  Given ASa, morphine in the ED. Started on NItro drip. Cardiology consult. ECHO on 5/8 showed EF 40%. Continue imdur, ranexa. Pain improved while on nitro drip. New baseline creat between 3.8-4.4.  Cardiology Consult: Currently comfortable on  nitro drip.  Resume imdur, toprol, procardia, ranexa, asa, plavix. Wean from nitro drip as able. NOt a candidate for intervention or CABG at this time.     Date: 5/16   Day 2: Nitro has been weaned off.  Increase dose of procardia. Denies chest pain.  As per palliative consult, patien requesting to transition to hospice services and wants to go home. Referral sent to hospice.     Cardiology consult:  Symptoms have improved with uptitration of medications. Does not wish to undergo card cath.  Continue with medications. Palliative consult.       ED Triage Vitals   Temperature Pulse Respirations Blood Pressure SpO2   05/15/24 0111 05/15/24 0111 05/15/24 0111 05/15/24 0111 05/15/24 0111   98.5 °F (36.9 °C) 89 22 162/78 98 %      Temp Source Heart Rate Source Patient Position - Orthostatic VS BP Location FiO2 (%)   05/15/24 1009 05/15/24 0111 05/15/24 0654 05/15/24 0111 --   Tympanic Monitor Sitting Left arm       Pain Score       05/15/24 0111       10 - Worst Possible Pain          Wt Readings from Last 1 Encounters:   05/16/24 130 kg (287 lb 7.7 oz)     Additional Vital Signs: ital Signs (last 2 days)    Date/Time Temp Pulse Resp BP MAP (mmHg) SpO2 O2 Device O2 Interface Device Patient Position - Orthostatic VS   05/16/24 07:02:30 97.8 °F (36.6 °C) 60 14 114/66 82 99 % -- -- --   05/16/24 0423 -- -- -- -- -- -- -- Full face mask --   05/16/24 02:53:14 98.1 °F (36.7 °C) 61 20 105/57 73 100 % BiPAP -- Lying   05/15/24 6448 -- -- -- -- -- -- -- Full face mask --   05/15/24 23:03:30 97.8 °F (36.6 °C) 68 20 120/58 79 96 % None (Room air) -- Lying   05/15/24 23:02:25 -- 68 18 120/58 79 96 % -- -- --   05/15/24 20:48:05 -- 71 -- 118/68 85 96 % -- -- --   05/15/24 1945 -- -- -- -- -- 91 % None (Room air) -- --   05/15/24 19:34:43 97.6 °F (36.4 °C) 73 18 136/74 95 96 % -- -- --   05/15/24 17:21:05 98.1 °F (36.7 °C) 76 18 125/63 84 96 % None (Room air) -- Sitting   05/15/24 1500 98.6 °F (37 °C) 66 18 134/73 97 94 % -- -- --    05/15/24 1200 -- 63 14 145/67 100 93 % -- -- --   05/15/24 1107 -- 71 13 137/73 99 95 % -- -- --   05/15/24 1100 -- 68 14 142/73 101 89 % Abnormal  -- -- --   05/15/24 1009 97.1 °F (36.2 °C) Abnormal  72 16 149/98 120 96 % None (Room air) -- --   05/15/24 0945 -- 65 16 160/76 109 93 % None (Room air) -- Sitting   05/15/24 0930 -- 65 18 122/60 87 94 % None (Room air) -- Sitting   05/15/24 0915 -- 64 18 134/63 90 92 % None (Room air) -- Sitting   05/15/24 0900 -- 62 18 121/57 82 97 % None (Room air) -- Lying   05/15/24 0845 -- 60 18 121/58 84 96 % None (Room air) -- Lying   05/15/24 0830 -- 61 18 131/59 74 98 % None (Room air) -- Lying   05/15/24 0820 -- 60 18 110/62 -- 97 % None (Room air) -- Lying   05/15/24 0800 -- 61 18 141/79 104 96 % None (Room air) -- Lying     Pertinent Labs/Diagnostic Test Results:   5/15 EKG: Narrative & Impression    Normal sinus rhythm  Inferior infarct (cited on or before 07-JAN-2023)  Anteroseptal infarct (cited on or before 07-JAN-2023)  Abnormal ECG  When compared with ECG of 15-MAY-2024 01:14, (unconfirmed)  Premature ventricular complexes are no longer Present  ST no longer elevated in Inferior leads  Normal sinus rhythm  Inferior infarct (cited on or before 07-JAN-2023)  Anteroseptal infarct (cited on or before 07-JAN-2023)  Abnormal ECG  When compared with ECG of 15-MAY-2024 01:14, (unconfirmed)  Premature ventricular complexes are no longer Present     X-ray chest 1 view portable   ED Interpretation by Shamir Monahan MD (05/15 0240)   XR CHEST PORTABLE     INDICATION: chest pain.     COMPARISON: 5/1/2024 and 5/7/2024     FINDINGS:     Clear lungs. No pneumothorax or pleural effusion.     Normal cardiomediastinal silhouette. Left-sided pacemaker with expected position of electrodes.     Bones are unremarkable for age.     Normal upper abdomen.     IMPRESSION:     No acute cardiopulmonary disease        Final Result by Byron Friend MD (05/15 8079)      Mild  prominence of the heart.            Workstation performed: [id number]               Results from last 7 days   Lab Units 05/16/24  0517 05/15/24  0120   WBC Thousand/uL 10.88* 12.78*   HEMOGLOBIN g/dL 11.4* 12.5   HEMATOCRIT % 37.5 41.0   PLATELETS Thousands/uL 286 309   TOTAL NEUT ABS Thousands/µL 7.36 8.83*         Results from last 7 days   Lab Units 05/16/24  0517 05/15/24  0120   SODIUM mmol/L 141 141   POTASSIUM mmol/L 5.0 4.0   CHLORIDE mmol/L 113* 110*   CO2 mmol/L 20* 19*   ANION GAP mmol/L 8 12   BUN mg/dL 40* 40*   CREATININE mg/dL 4.27* 3.85*   EGFR ml/min/1.73sq m 11 13   CALCIUM mg/dL 9.4 9.4   CALCIUM, IONIZED mmol/L 1.30  --    MAGNESIUM mg/dL 2.3 1.8*   PHOSPHORUS mg/dL 6.0*  --      Results from last 7 days   Lab Units 05/16/24  0517 05/15/24  0120   AST U/L 10* 9*   ALT U/L 13 13   ALK PHOS U/L 107* 119*   TOTAL PROTEIN g/dL 6.9 7.7   ALBUMIN g/dL 3.7 4.1   TOTAL BILIRUBIN mg/dL 0.36 0.36         Results from last 7 days   Lab Units 05/16/24  0517 05/15/24  0120   GLUCOSE RANDOM mg/dL 94 142*     Results from last 7 days   Lab Units 05/15/24  1225 05/15/24  0936 05/15/24  0744 05/15/24  0522 05/15/24  0319 05/15/24  0120   HS TNI 0HR ng/L  --   --  104*  --   --  99*   HS TNI 2HR ng/L  --  97*  --   --  96*  --    HSTNI D2 ng/L  --  -7  --   --  -3  --    HS TNI 4HR ng/L 99*  --   --  110*  --   --    HSTNI D4 ng/L -5  --   --  11  --   --      ED Treatment:   Medication Administration from 05/15/2024 0106 to 05/15/2024 1007         Date/Time Order Dose Route Action Action by Comments     05/15/2024 0131 EDT morphine injection 4 mg 4 mg Intravenous Given Janie Mejia, RN --     05/15/2024 0235 EDT HYDROmorphone (DILAUDID) injection 0.5 mg 0.5 mg Intravenous Given Janie Mejia, RN --     05/15/2024 0251 EDT magnesium sulfate 2 g/50 mL IVPB (premix) 2 g 2 g Intravenous New Bag Janie Mejia, RN --     05/15/2024 0258 EDT aspirin chewable tablet 324 mg 324 mg Oral Given Janie Mejia RN --      05/15/2024 0730 EDT nitroGLYcerin (TRIDIL) 50 mg in 250 mL infusion (premix) 10 mcg/min Intravenous New Bag Mitzi Rodriguezfrank, RN --     05/15/2024 0320 EDT nitroGLYcerin (TRIDIL) 50 mg in 250 mL infusion (premix) 10 mcg/min Intravenous New Bag Janie Olivacasper, RN --     05/15/2024 0431 EDT nitroglycerin (NITRO-BID) 2 % TD ointment 1 inch 1 inch Topical Given Janie TURNER Jackie, RN --     05/15/2024 0827 EDT nicotine (NICODERM CQ) 21 mg/24 hr TD 24 hr patch 21 mg 21 mg Transdermal Medication Applied Chris Hyman RN --     05/15/2024 0524 EDT venlafaxine (EFFEXOR-XR) 24 hr capsule 75 mg 75 mg Oral Given Janie TURNER Jackie, RN --     05/15/2024 0612 EDT heparin (porcine) subcutaneous injection 7,500 Units 7,500 Units Subcutaneous Given Janie YUE Jackie, RN --     05/15/2024 0445 EDT LORazepam (ATIVAN) injection 0.5 mg 0.5 mg Intravenous Given Janie TURNER Jackie, RN --     05/15/2024 0826 EDT LORazepam (ATIVAN) tablet 0.5 mg 0.5 mg Oral Given Chris Hyman, RN per icu pa despite npo order          Past Medical History:   Diagnosis Date    Anemia     Anxiety     CAD (coronary artery disease)     Cancer ([facility]) 2008    Cardiomyopathy ([facility])     CHF (congestive heart failure) ([facility])     Chronic kidney disease     COPD (chronic obstructive pulmonary disease) ([facility])     scheduled for sleep apnea test soon after pacemaker implant    Coronary artery disease     Depression     History of chemotherapy     non hodgkins lymphoma 2008    Hyperlipemia     Hypertension     Hypertrophic cardiomyopathy (HCC)     Lymphoma, non-Hodgkin's ([facility])     Myocardial infarction ([facility])     Non-ST elevation MI (NSTEMI) 01/02/2023    Obesity     Obstructive sleep apnea     Olecranon bursitis, right elbow 7/11/2022    SSS (sick sinus syndrome) ([facility])     s/p medtronic dual chamber pacemaker 5/25/2021    Tobacco abuse     Ventricular tachycardia, non-sustained ([facility])     Wears glasses        Admitting Diagnosis: Alport syndrome [Q87.81]  Chest pain  [R07.9]  Chronic renal failure [N18.9]  Elevated troponin [R79.89]  Acute chest pain [R07.9]  Non-STEMI (non-ST elevated myocardial infarction) ([facility]) [I21.4]  Age/Sex: 49 y.o. female  Admission Orders:  Scheduled Medications:  aspirin, 81 mg, Oral, Daily  atorvastatin, 80 mg, Oral, QPM  chlorhexidine, 15 mL, Mouth/Throat, Q12H ABEIL  clopidogrel, 75 mg, Oral, Daily  gabapentin, 200 mg, Oral, TID  heparin (porcine), 7,500 Units, Subcutaneous, Q8H ABIEL  isosorbide mononitrate, 120 mg, Oral, Daily  metoprolol succinate, 50 mg, Oral, BID  nicotine, 21 mg, Transdermal, Daily  NIFEdipine, 30 mg, Oral, BID  nitroglycerin, 1 inch, Topical, TID  ranolazine, 1,000 mg, Oral, BID  sodium bicarbonate, 1,300 mg, Oral, TID after meals  venlafaxine, 75 mg, Oral, Daily      Continuous IV Infusions:  nitroGLYcerin, 5-200 mcg/min, Intravenous, Titrated      PRN Meds:  albuterol, 2 puff, Inhalation, Q4H PRN  LORazepam, 0.5 mg, Oral, Q8H PRN  sodium chloride (PF), 3 mL, Intravenous, Q1H PRN        IP CONSULT TO CASE MANAGEMENT  IP CONSULT TO CARDIOLOGY  IP CONSULT TO PALLIATIVE CARE    Network Utilization Review Department  ATTENTION: Please call with any questions or concerns to [phone] and carefully listen to the prompts so that you are directed to the right person. All voicemails are confidential.   For Discharge needs, contact Care Management DC Support Team at [phone] opt. [phone]  Send all requests for admission clinical reviews, approved or denied determinations and any other requests to dedicated fax number below belonging to the campus where the patient is receiving treatment. List of dedicated fax numbers for the Facilities:  FACILITY NAME UR FAX NUMBER   ADMISSION DENIALS (Administrative/Medical Necessity) [phone]   DISCHARGE SUPPORT TEAM (NETWORK) [phone]   PARENT CHILD HEALTH (Maternity/NICU/Pediatrics) [phone]   [facility] [phone]   General acute hospital  [phone]   [facility] [phone]   [facility] [phone]   [facility] [phone]   [facility] [phone]   [facility] [phone]   [facility] [phone]   [facility] [phone]   [facility] [phone]   [facility] [phone]   [facility] [phone]

## 2024-05-16 NOTE — UTILIZATION REVIEW
NOTIFICATION OF INPATIENT ADMISSION   AUTHORIZATION REQUEST   SERVICING FACILITY:   [place] [address]  Tax ID: [id number]  NPI: [id number]   ATTENDING PROVIDER:  Attending Name and NPI#: Armand Blanco Do [[id number]]  Address: [address] [place] [address]  Phone: [phone]     ADMISSION INFORMATION:  Place of Service: Inpatient Acute [facility]  Place of Service Code: 21  Inpatient Admission Date/Time: 5/15/24  2:46 AM  Discharge Date/Time: No discharge date for patient encounter.  Admitting Diagnosis Code/Description:  Alport syndrome [Q87.81]  Chest pain [R07.9]  Chronic renal failure [N18.9]  Elevated troponin [R79.89]  Acute chest pain [R07.9]  Non-STEMI (non-ST elevated myocardial infarction) (HCC) [I21.4]     UTILIZATION REVIEW CONTACT:  Kina Núñez, Utilization   Network Utilization Review Department  Phone: [phone]  Fax [phone]  Email: [email or web address]  Contact for approvals/pending authorizations, clinical reviews, and discharge.     PHYSICIAN ADVISORY SERVICES:  Medical Necessity Denial & Frgm-ut-Yulq Review  Phone: [phone]  Fax: [phone]  Email: [email or web address]     DISCHARGE SUPPORT TEAM:  For Patients Discharge Needs & Updates  Phone: [phone] opt. 2 Fax: [phone]  Email: [email or web address]

## 2024-05-16 NOTE — ASSESSMENT & PLAN NOTE
Appears to be chronically elevated, low suspicion for infection   CXR with no acute disease   UA negative

## 2024-05-16 NOTE — PROGRESS NOTES
[facility]  Progress Note  Name: Elise Ken I  MRN: [id number]  Unit/Bed#: -01 I Date of Admission: 5/15/2024   Date of Service: 5/16/2024  Hospital Day: 1    Assessment & Plan   Goals of care, counseling/discussion  Assessment & Plan  -Appreciate palliative care assistance.    CKD (chronic kidney disease) stage 4, GFR 15-29 ml/min ([facility])  Assessment & Plan  Lab Results   Component Value Date    EGFR 11 05/16/2024    EGFR 13 05/15/2024    EGFR 11 05/08/2024    CREATININE 4.27 (H) 05/16/2024    CREATININE 3.85 (H) 05/15/2024    CREATININE 4.30 (H) 05/08/2024     -Patient will need to continue to follow with nephrology team  -Will attempt to avoid additional nephrotoxic agents      Coronary artery disease  Assessment & Plan  -With multiple interventions in the past  -Continue aspirin, Plavix, atorvastatin  -Patient will follow with her primary cardiology team Nadine Amanda in the outpatient setting and undergo repeat lipid testing at that time with adjustment send medical therapy pending results  -Will send message to office staff to assist in setting patient up for telemedicine visit with interventional cardiologist Dr. Franco    Tobacco abuse  Assessment & Plan  -Counseled patient on the importance of tobacco cessation    Essential hypertension  Assessment & Plan  -Patient will need to monitor in the outpatient setting on current medical regimen  -Counseled on dietary and lifestyle modifications      * Chest pain  Assessment & Plan  -Symptoms currently improved with up titration of medical therapy  -At this time patient does not wish to undergo cardiac catheterization however after discussion is agreeable to speaking with interventional team about potential risks and benefits.  -Will have office staff set up hopeful telemedicine visit with Dr. Franco to discuss options of treatment from an invasive procedure standpoint possible PCI  -Patient can continue current medical therapy and  "seems to be tolerating uptitrated twice daily dosing of nifedipine therefore we will continue at this time as well  -Counseled patient on dietary and lifestyle modifications including sodium restriction along with tobacco cessation and weight reduction  -Appreciate palliative care assistance.      Outpatient Cardiologist: Nadine Amanda      Subjective:   Patient seen and examined.  Per patient, she denies any chest pain, palpitations, lightness or dizziness, loss consciousness.  She notes shortness of breath is improved and while she does note still feeling anxious of her overall medical situation does feel better and wishes to be discharged with outpatient follow-up.      Summary comments:  -Continue current medical therapy with aspirin 81 mg daily, atorvastatin 80 mg daily, Plavix 75 mg daily, Imdur 120 mg daily, metoprolol succinate 50 mg twice daily, and nifedipine 30 mg twice daily, Ranexa 1000 mg twice daily  -Patient notes resolution of chest discomfort and wishes to be discharged with outpatient follow-up  -Will send message to office staff to assist with setting this up along with potential for telephone visit with interventional cardiology Dr. Franco to discuss potential intervention discussed with patient although at this time she does note not wishing to undergo any invasive or aggressive procedures but is agreeable to at least speaking with interventional team to see how feasible this option is for her.  -Patient will need to follow with nephrology along with palliative care teams    Telemetry/ECG/Cardiac testing:   Currently sinus rhythm on telemetry    Vitals: Blood pressure 114/66, pulse 60, temperature 97.8 °F (36.6 °C), resp. rate 14, height 5' 7\" (1.702 m), weight 130 kg (287 lb 7.7 oz), last menstrual period 04/15/2024, SpO2 99%, not currently breastfeeding.,   Orthostatic Blood Pressures      Flowsheet Row Most Recent Value   Blood Pressure 114/66 filed at 05/16/2024 0702   Patient Position - " Orthostatic VS Lying filed at 05/16/2024 0253        ,   Weight (last 2 days)       Date/Time Weight    05/16/24 0555 130 (287.48)    05/16/24 0500 130 (287.48)    05/16/24 0027 125 (275)    05/15/24 0111 125 (275)            Physical Exam:  Physical Exam  Vitals reviewed.   Constitutional:       General: She is not in acute distress.     Appearance: She is obese. She is not diaphoretic.   HENT:      Head: Normocephalic and atraumatic.   Eyes:      General:         Right eye: No discharge.         Left eye: No discharge.   Neck:      Comments: Trachea midline, neck obese, difficult to assess JVD  Cardiovascular:      Rate and Rhythm: Normal rate and regular rhythm.      Heart sounds:      No friction rub.   Pulmonary:      Effort: No respiratory distress.      Breath sounds: No wheezing.   Chest:      Chest wall: No tenderness.   Abdominal:      General: Bowel sounds are normal.      Palpations: Abdomen is soft.      Tenderness: There is no abdominal tenderness. There is no rebound.   Musculoskeletal:      Right lower leg: No edema.      Left lower leg: No edema.   Neurological:      Mental Status: She is alert.      Comments: Awake, alert, able to answer questions appropriately, able to move extremities bilaterally.   Psychiatric:      Comments: Anxious but pleasant with appropriate responses.        Medications:      Current Facility-Administered Medications:     albuterol (PROVENTIL HFA,VENTOLIN HFA) inhaler 2 puff, 2 puff, Inhalation, Q4H PRN, Meg A Sedora, CRNP    aspirin chewable tablet 81 mg, 81 mg, Oral, Daily, Meg A Sedora, CRNP, 81 mg at 05/15/24 1108    atorvastatin (LIPITOR) tablet 80 mg, 80 mg, Oral, QPM, Meg A Sedora, CRNP, 80 mg at 05/15/24 1800    chlorhexidine (PERIDEX) 0.12 % oral rinse 15 mL, 15 mL, Mouth/Throat, Q12H ABIEL, Meg A Sedora, CRNP, 15 mL at 05/15/24 2054    clopidogrel (PLAVIX) tablet 75 mg, 75 mg, Oral, Daily, Meg A Sedora, CRNP, 75 mg at 05/15/24 1107     gabapentin (NEURONTIN) capsule 200 mg, 200 mg, Oral, TID, Meg A Sedora, CRNP, 200 mg at 05/15/24 2054    heparin (porcine) subcutaneous injection 7,500 Units, 7,500 Units, Subcutaneous, Q8H ABIEL, Meg A Sedora, CRNP, 7,500 Units at 05/16/24 0531    isosorbide mononitrate (IMDUR) 24 hr tablet 120 mg, 120 mg, Oral, Daily, Meg A Sedora, CRNP, 120 mg at 05/15/24 1108    LORazepam (ATIVAN) tablet 0.5 mg, 0.5 mg, Oral, Q8H PRN, Meg A Sedora, CRNP, 0.5 mg at 05/15/24 0826    metoprolol succinate (TOPROL-XL) 24 hr tablet 50 mg, 50 mg, Oral, BID, Meg A Sedora, CRNP, 50 mg at 05/15/24 2054    nicotine (NICODERM CQ) 21 mg/24 hr TD 24 hr patch 21 mg, 21 mg, Transdermal, Daily, Meg A Sedora, CRNP, 21 mg at 05/15/24 0827    NIFEdipine (PROCARDIA XL) 24 hr tablet 30 mg, 30 mg, Oral, BID, Meg A Sedora, CRNP, 30 mg at 05/15/24 2054    nitroglycerin (NITRO-BID) 2 % TD ointment 1 inch, 1 inch, Topical, TID, Meg A Sedora, CRNP, 1 inch at 05/15/24 2054    nitroGLYcerin (TRIDIL) 50 mg in 250 mL infusion (premix), 5-200 mcg/min, Intravenous, Titrated, Meg A Sedora, CRNP, Stopped at 05/15/24 1145    ranolazine (RANEXA) 12 hr tablet 1,000 mg, 1,000 mg, Oral, BID, Meg A Sedora, CRNP, 1,000 mg at 05/15/24 1759    sodium bicarbonate tablet 1,300 mg, 1,300 mg, Oral, TID after meals, Meg A Sedora, CRNP, 1,300 mg at 05/15/24 1759    Insert peripheral IV, , , Once **AND** sodium chloride (PF) 0.9 % injection 3 mL, 3 mL, Intravenous, Q1H PRN, JALIL Marino    venlafaxine (EFFEXOR-XR) 24 hr capsule 75 mg, 75 mg, Oral, Daily, JALIL Marino     Labs & Results:  Labs reviewed and prominent abnormalities reviewed above and/or below.  Troponins:    Results from last 7 days   Lab Units 05/15/24  1225 05/15/24  0936 05/15/24  0744   HS TNI 0HR ng/L  --   --  104*   HS TNI 2HR ng/L  --  97*  --    HSTNI D2 ng/L  --  -7  --    HS TNI 4HR ng/L 99*  --   --    HSTNI D4 ng/L -5  --   --          BNP:   Results from last 6 Months   Lab Units 05/07/24  0521   BNP pg/mL 356*

## 2024-05-16 NOTE — ASSESSMENT & PLAN NOTE
Palliative care met with patient today, discussed GOC.  She is requesting to be transition to hospice services.  Per discussion with palliative care will place ambulatory referral for hospice as patient is anxious to leave ASAP  Case management following and aware

## 2024-05-16 NOTE — ASSESSMENT & PLAN NOTE
Palliative care met with patient today.  After goals of care was decided patient wants to transition to hospice services.  She is anxious to go home today was in agreement for ambulatory referral to hospice services.  Placed order for same.

## 2024-05-16 NOTE — ASSESSMENT & PLAN NOTE
Social support  Patient is supported by mother, children.   Supportive listening provided  Normalized experience of patient/family  Provided anxiety containment  Provided anticipatory guidance    Follow up  Palliative Medicine will continue to follow until discharge.   Please reach out to on-call provider via [facility] if questions or concerns arise.  Please do not hesitate to reach our on call provider through our clinic answering service at [phone] should you have acute symptom control concerns.

## 2024-05-16 NOTE — DISCHARGE INSTR - AVS FIRST PAGE
I have placed an ambulatory referral to hospice services.  Someone will be reaching out to you.  Only change to prehospital medication was increased Procardia to twice a day.  A prescription has been sent electronically to your pharmacy.  Please follow-up with your PCP within a week   Repair Performed By Another Provider Text (Leave Blank If You Do Not Want): After obtaining clear surgical margins the defect was repaired by another provider.

## 2024-05-16 NOTE — DISCHARGE SUMMARY
Formerly [facility]  Discharge- Elise Ken 1975, 49 y.o. female MRN: [id number]  Unit/Bed#: -01 Encounter: [id number]  Primary Care Provider: Isaak Stanford DO   Date and time admitted to hospital: 5/15/2024  1:07 AM    * Chest pain  Assessment & Plan  Patient presented with crushing chest pain  EKG with no acute changes  Troponin elevation on arrival   Known history of CAD (see details under CAD)  Given ASA, Morphine and started on nitro gtt  Nitro drip has been weaned off and patient transition this to Slim service today  Appreciate input of cardiology who followed-for discharge today on increased dose of Procardia twice a day  Patient denies any chest pain or discomfort today, anxious to go home  Palliative care met with patient-patient requested transition to hospice services.  Ambulatory referral made on discharge.  Case management following and aware.    Elevated troponin  Assessment & Plan  Mild troponin elevation on arrival  In setting of CKD stage IV  Appreciate input of cardiology who followed  Patient is going to transition to hospice services after discussion with palliative care today.  Ambulatory referral placed.    Coronary artery disease  Assessment & Plan  Longstanding history of CAD  Most recently had cath 09/2023 noting progression of CAD in previously occluded LAD with continued collateral though faint, prior stent to APRIL now occluded, also noted progression in the nondominant pLCX  Unfortunately patient with severe disease but poor target for revascularization  Frequent admissions for chest pain   EKG on presentation with no acute changes  Troponin mildly elevated but trended downward  Initially on nitro infusion, since discontinued  Recent echo 5/8 demonstrating EF 40%, LV dilated, systolic function moderately reduced, global hypokinesis with regional variation worse in the inferior and lateral regions, mild to moderate MR, mild TR  Appreciate input of  cardiology who followed.  Cleared for discharge today on increased dose of Procardia (twice a day)  Patient is going to transition to hospice-ambulatory referral placed    CKD (chronic kidney disease) stage 4, GFR 15-29 ml/min ([facility])  Assessment & Plan  Lab Results   Component Value Date    EGFR 11 05/16/2024    EGFR 13 05/15/2024    EGFR 11 05/08/2024    CREATININE 4.27 (H) 05/16/2024    CREATININE 3.85 (H) 05/15/2024    CREATININE 4.30 (H) 05/08/2024     In the setting of alport syndrome   New baseline creatinine appears to be 3.8-4.4  Was following with nephrology in outpatient setting  After discussion with palliative care today patient wants to be transition to hospice services.  Ambulatory referral was made.  She is anxious to go home ASAP.  Case management aware.    Goals of care, counseling/discussion  Assessment & Plan  Palliative care met with patient today, discussed GOC.  She is requesting to be transition to hospice services.  Per discussion with palliative care will place ambulatory referral for hospice as patient is anxious to leave ASAP  Case management following and aware    Palliative care by specialist  Assessment & Plan  Palliative care met with patient today.  After goals of care was decided patient wants to transition to hospice services.  She is anxious to go home today was in agreement for ambulatory referral to hospice services.  Placed order for same.    Leukocytosis  Assessment & Plan  Appears to be chronically elevated, low suspicion for infection   CXR with no acute disease   UA negative    Mixed hyperlipidemia  Assessment & Plan  Continue prehospital statin on discharge    Tobacco abuse  Assessment & Plan  Encouraged smoking cessation    Morbid obesity with BMI of 45.0-49.9, adult ([facility])  Assessment & Plan  BMI 45.03  Encouraged healthy diet, exercise lifestyle changes    Essential hypertension  Assessment & Plan  Blood pressures controlled  Continue home regimen on  discharge                Medical Problems       Resolved Problems  Date Reviewed: 5/16/2024   None         Admission Date:   Admission Orders (From admission, onward)       Ordered        05/15/24 0330  INPATIENT ADMISSION  Once                            Admitting Diagnosis: Alport syndrome [Q87.81]  Chest pain [R07.9]  Chronic renal failure [N18.9]  Elevated troponin [R79.89]  Acute chest pain [R07.9]  Non-STEMI (non-ST elevated myocardial infarction) (HCC) [I21.4]    HPI: Patient is a 49-year-old with a medical history of significant CAD, CKD stage IV in setting of Alport syndrome, ambulatory dysfunction, tobacco abuse, hypertension, and hyperlipidemia who presented to the ED with complaints of crushing chest pain.  Of note has recent admissions for chest pain.  EKG noted no acute changes.  Mild troponin elevation.  She received morphine and aspirin.  She was placed on nitro drip and admitted to stepdown 1 service.    Procedures Performed:   Orders Placed This Encounter   Procedures    Critical Care    ED ECG Documentation Only    ED ECG Documentation Only       Summary of Hospital Course: For full details regarding patient admission please refer to the consult of the chart and the H&P as dictated by Gertrude JIMENES.  In brief, patient is a 49-year-old history of significant CAD, stage IV kidney disease in setting of Alport syndrome, ambulatory dysfunction, tobacco abuse, hypertension and hyperlipidemia who presented to the ED with crushing chest pain.  There were no acute changes on her EKG.  Slight elevation in troponin in setting of CKD stage IV.  Patient received morphine and aspirin in the ED and was initiated on a nitroglycerin drip and admitted to level 1 stepdown service.  Patient has a history of CAD with stenting.  Had a cath 9/2023 noting progression of CAD and previously occluded LAD with continued collateral prior stent to APRIL.  Due to severe disease patient deemed poor target for  revascularization.  Cardiology followed patient throughout.  Patient was transitioned off of nitroglycerin drip yesterday and downgraded to Slim service.  She denies chest pain or discomfort today and is anxious to go home.  Cleared for discharge per cardiology on Procardia and increased dosing twice daily.  Patient met with palliative care today and reports that she wants to transition to hospice services.  Is very anxious to go home ASAP was in agreement with ambulatory referral for hospice which has been placed.  Case management aware.  Patient declined call to family.    Significant Findings, Care, Treatment and Services Provided:   5/15 BUN 40 creatinine 3.85  5/15 hs troponin 99, 96, 110 with 4-hour delta 11  5/15 WBC 12.78  5/15 chest x-ray: Mild prominence of the heart  5/15 EKG: Normal sinus rhythm heart rate 66.  Fair infarct and anterior lateral infarct cited January 2023.  No significant changes on EKG.    Complications: None    Condition at Discharge: good         Discharge instructions/Information to patient and family:   See after visit summary for information provided to patient and family.      Provisions for Follow-Up Care:  See after visit summary for information related to follow-up care and any pertinent home health orders.      PCP: Isaak Stanford, DO    Disposition: Home    Planned Readmission: No    Discharge Statement   I spent 40 minutes discharging the patient. This time was spent on the day of discharge. I had direct contact with the patient on the day of discharge. Additional documentation is required if more than 30 minutes were spent on discharge.     Discharge Medications:  See after visit summary for reconciled discharge medications provided to patient and family.

## 2024-05-16 NOTE — NURSING NOTE
Pt DC to home via family in stable condition. All belongings packed and sent with pt. AVS reviewed and sent.   Olumiant Counseling: I discussed with the patient the risks of Olumiant therapy including but not limited to upper respiratory tract infections, shingles, cold sores, and nausea. Live vaccines should be avoided.  This medication has been linked to serious infections; higher rate of mortality; malignancy and lymphoproliferative disorders; major adverse cardiovascular events; thrombosis; gastrointestinal perforations; neutropenia; lymphopenia; anemia; liver enzyme elevations; and lipid elevations.

## 2024-05-16 NOTE — ASSESSMENT & PLAN NOTE
-Patient will need to monitor in the outpatient setting on current medical regimen  -Counseled on dietary and lifestyle modifications

## 2024-05-16 NOTE — PLAN OF CARE

## 2024-05-16 NOTE — CONSULTS
[facility]  Consult  Name: Elise Ken 49 y.o. female I MRN: [id number]  Unit/Bed#: -01 I Date of Admission: 5/15/2024   Date of Service: 5/16/2024 I Hospital Day: 1    Inpatient consult to Palliative Care  Consult performed by: Yosef Chester PA-C  Consult ordered by: JALIL Marino        Assessment & Plan   Goals of care, counseling/discussion  Assessment & Plan  Decisional apparatus:  Patient is competent on exam today.  If competency is lost, patient's substitute decision maker would default to her mother Brianne Wheeler () per POA  Advance Directive / Living Will / POLST / POA Forms: Not on file.     Goals  Level 1 code status.   Full code.   Disease focused care.   No limits placed.   Met with patient and cardiology team - discussed palliative vs hospice. Answered all questions.   Patient would like to be discharged home today  She would like to pursue home hospice - ambulatory referral placed to [facility] hospice  Plan for discharge home today.     Palliative care by specialist  Assessment & Plan  Social support  Patient is supported by mother, children.   Supportive listening provided  Normalized experience of patient/family  Provided anxiety containment  Provided anticipatory guidance    Follow up  Palliative Medicine will continue to follow until discharge.   Please reach out to on-call provider via [employer] if questions or concerns arise.  Please do not hesitate to reach our on call provider through our clinic answering service at [phone] should you have acute symptom control concerns.      Ambulatory dysfunction  Assessment & Plan  Uses walker    CKD (chronic kidney disease) stage 4, GFR 15-29 ml/min ([facility])  Assessment & Plan  Hx of Alport syndrome  Follows with nephrology  Lab Results   Component Value Date    EGFR 11 05/16/2024    EGFR 13 05/15/2024    EGFR 11 05/08/2024    CREATININE 4.27 (H) 05/16/2024    CREATININE 3.85 (H) 05/15/2024     CREATININE 4.30 (H) 05/08/2024       Non-Hodgkin lymphoma of lymph nodes of multiple regions (HCC)  Assessment & Plan  In remission  Was following with Dr. Dawson ([facility] onc), last encounter in 2021    Coronary artery disease  Assessment & Plan  Recurrent hospitalizations d/t NSTEMIs/chest pain  Cardiology team following - see plan      Anxiety  Assessment & Plan  On effexor.  Following with [facility] in [place].     Chronic diastolic congestive heart failure (HCC)  Assessment & Plan  Follows with cardiology             IDENTIFICATION:  Reason for Consult / Principal Problem: goals of care    HISTORY OF PRESENT ILLNESS:    Elise Ken is a 49 y.o. female with pmhx significant for morbid obesity, multivessel CAD/PCI, CHF, non-hodgkin's lymphoma s/p chemo, ELIAZAR, tobacco dependence, CKD4, Alport syndrome, recurrent admissions d/t chest pains who presented to [facility] yesterday for crushing chest pain that started at night. Treated with nitro gtt, IV mg, morphine, dilaudid and ASA in the ED> admitted to ICU for further management. Patient able to be downgraded to med surg floor now off of nitro gtt and stable. Cardiology team following. [facility] team consulted for goals of care.     Met with patient along with cardiologist today. Reviewed disease process and prognosis and discussed goals of care. PSC and hospice services reviewed in depth. Patient vocalizes understanding of her condition and limited treatment options. After some deliberation she has decided that she would no longer want to return to the hospital and prefers to manage her symptoms at home under the guidance of home hospice team. She would prefer them to meet her at home after discharge. Requesting [facility] hospice. All questions and concerns addressed today to patient's satisfaction.     Review of Systems   Constitutional:  Positive for activity change and fatigue. Negative for appetite change and unexpected weight change.   HENT:  Negative for  mouth sores, trouble swallowing and voice change.    Eyes: Negative.    Respiratory:  Negative for shortness of breath.    Cardiovascular:  Negative for chest pain.   Gastrointestinal:  Negative for abdominal pain, constipation, diarrhea, nausea and vomiting.   Genitourinary: Negative.    Musculoskeletal:  Positive for arthralgias and back pain. Negative for gait problem and myalgias.   Skin:  Negative for color change, pallor and wound.   Neurological:  Positive for weakness. Negative for dizziness, light-headedness and headaches.   Hematological: Negative.    Psychiatric/Behavioral:  Positive for sleep disturbance. Negative for confusion. The patient is nervous/anxious.        Past Medical History:   Diagnosis Date    Anemia     Anxiety     CAD (coronary artery disease)     Cancer ([facility]) 2008    Cardiomyopathy ([facility])     CHF (congestive heart failure) (Formerly [facility])     Chronic kidney disease     COPD (chronic obstructive pulmonary disease) ([facility])     scheduled for sleep apnea test soon after pacemaker implant    Coronary artery disease     Depression     History of chemotherapy     non hodgkins lymphoma 2008    Hyperlipemia     Hypertension     Hypertrophic cardiomyopathy ([facility])     Lymphoma, non-Hodgkin's ([facility]ly [facility])     Myocardial infarction ([facility])     Non-ST elevation MI (NSTEMI) 01/02/2023    Obesity     Obstructive sleep apnea     Olecranon bursitis, right elbow 7/11/2022    SSS (sick sinus syndrome) ([facility]ly [facility])     s/p medtronic dual chamber pacemaker 5/25/2021    Tobacco abuse     Ventricular tachycardia, non-sustained ([facility])     Wears glasses      Past Surgical History:   Procedure Laterality Date    CARDIAC CATHETERIZATION N/A 1/3/2023    Procedure: Cardiac catheterization;  Surgeon: Jesus Franco MD;  Location: [facility];  Service: Cardiology    CARDIAC CATHETERIZATION N/A 1/3/2023    Procedure: Cardiac Coronary Angiogram;  Surgeon: Jesus Franco MD;  Location: [facility];  Service: Cardiology    CARDIAC  CATHETERIZATION N/A 1/3/2023    Procedure: Cardiac pci;  Surgeon: Jesus Franco MD;  [facility]: [facility];  Service: Cardiology    CARDIAC CATHETERIZATION Left 2023    Procedure: Cardiac Left Heart Cath;  Surgeon: Frieda Fox DO;  Location: [facility];  Service: Cardiology    CARDIAC CATHETERIZATION  2023    Procedure: Cardiac catheterization;  Surgeon: Frieda Fox DO;  [facility]: [facility];  Service: Cardiology    CARDIAC CATHETERIZATION N/A 2023    Procedure: Cardiac Coronary Angiogram;  Surgeon: Frieda Fox DO;  [facility]: [facility];  Service: Cardiology    CARDIAC PACEMAKER PLACEMENT Left 2021    Procedure: DUAL LEAD PACEMAKER IMPLANT;  Surgeon: Yinka Shaw MD;  Location:  MAIN OR;  Service: Cardiology    CAROTID STENT       SECTION      CORONARY ANGIOPLASTY WITH STENT PLACEMENT  2021    GREG mid RCA and GREG right PDA    DENTAL SURGERY      IR BIOPSY KIDNEY RANDOM  10/18/2021     Social History     Socioeconomic History    Marital status: /Civil Union     Spouse name: Not on file    Number of children: Not on file    Years of education: Not on file    Highest education level: Not on file   Occupational History    Not on file   Tobacco Use    Smoking status: Every Day     Current packs/day: 0.50     Average packs/day: 0.5 packs/day for 25.0 years (12.5 ttl pk-yrs)     Types: Cigarettes    Smokeless tobacco: Never    Tobacco comments:     Recently and currently quitting cigarettes   Vaping Use    Vaping status: Never Used   Substance and Sexual Activity    Alcohol use: Yes     Comment: 1-2 times years    Drug use: No    Sexual activity: Yes     Partners: Male     Birth control/protection: None     Comment:    Other Topics Concern    Not on file   Social History Narrative    Not on file     Social Determinants of Health     Financial Resource Strain: Not on file   Food Insecurity: No Food Insecurity  (2/18/2024)    Hunger Vital Sign     Worried About Running Out of Food in the Last Year: Never true     Ran Out of Food in the Last Year: Never true   Transportation Needs: No Transportation Needs (2/18/2024)    PRAPARE - Transportation     Lack of Transportation (Medical): No     Lack of Transportation (Non-Medical): No   Physical Activity: Not on file   Stress: Not on file   Social Connections: Not on file   Intimate Partner Violence: Not on file   Housing Stability: Unknown (2/18/2024)    Housing Stability Vital Sign     Unable to Pay for Housing in the Last Year: No     Number of Places Lived in the Last Year: Not on file     Unstable Housing in the Last Year: No     Family History   Problem Relation Age of Onset    No Known Problems Mother     No Known Problems Father     No Known Problems Daughter     Brain cancer Maternal Grandfather     Heart disease Maternal Grandfather     Cancer Maternal Grandfather     No Known Problems Paternal Aunt     No Known Problems Paternal Aunt        MEDICATIONS / ALLERGIES:  all current active meds have been reviewed, current meds:   Current Facility-Administered Medications   Medication Dose Route Frequency    albuterol (PROVENTIL HFA,VENTOLIN HFA) inhaler 2 puff  2 puff Inhalation Q4H PRN    aspirin chewable tablet 81 mg  81 mg Oral Daily    atorvastatin (LIPITOR) tablet 80 mg  80 mg Oral QPM    chlorhexidine (PERIDEX) 0.12 % oral rinse 15 mL  15 mL Mouth/Throat Q12H ABIEL    clopidogrel (PLAVIX) tablet 75 mg  75 mg Oral Daily    gabapentin (NEURONTIN) capsule 200 mg  200 mg Oral TID    heparin (porcine) subcutaneous injection 7,500 Units  7,500 Units Subcutaneous Q8H ABIEL    isosorbide mononitrate (IMDUR) 24 hr tablet 120 mg  120 mg Oral Daily    LORazepam (ATIVAN) tablet 0.5 mg  0.5 mg Oral Q8H PRN    metoprolol succinate (TOPROL-XL) 24 hr tablet 50 mg  50 mg Oral BID    nicotine (NICODERM CQ) 21 mg/24 hr TD 24 hr patch 21 mg  21 mg Transdermal Daily    NIFEdipine (PROCARDIA  XL) 24 hr tablet 30 mg  30 mg Oral BID    nitroglycerin (NITRO-BID) 2 % TD ointment 1 inch  1 inch Topical TID    nitroGLYcerin (TRIDIL) 50 mg in 250 mL infusion (premix)  5-200 mcg/min Intravenous Titrated    ranolazine (RANEXA) 12 hr tablet 1,000 mg  1,000 mg Oral BID    sodium bicarbonate tablet 1,300 mg  1,300 mg Oral TID after meals    sodium chloride (PF) 0.9 % injection 3 mL  3 mL Intravenous Q1H PRN    venlafaxine (EFFEXOR-XR) 24 hr capsule 75 mg  75 mg Oral Daily   , and PTA meds:   Prior to Admission Medications   Prescriptions Last Dose Informant Patient Reported? Taking?   NIFEdipine (PROCARDIA XL) 30 mg 24 hr tablet   No No   Sig: Take 1 tablet (30 mg total) by mouth daily   acetaminophen (TYLENOL) 325 mg tablet   No No   Sig: Take 3 tablets (975 mg total) by mouth every 8 (eight) hours as needed for mild pain   albuterol (PROVENTIL HFA,VENTOLIN HFA) 90 mcg/act inhaler   No No   Sig: Inhale 2 puffs every 4 (four) hours as needed for wheezing or shortness of breath   aspirin 81 mg chewable tablet  Self No No   Sig: Chew 1 tablet (81 mg total) daily   atorvastatin (LIPITOR) 80 mg tablet   No No   Sig: Take 1 tablet (80 mg total) by mouth every evening   clopidogrel (PLAVIX) 75 mg tablet   No No   Sig: Take 1 tablet (75 mg total) by mouth daily   gabapentin (Neurontin) 300 mg capsule   No No   Sig: Take 1 capsule (300 mg total) by mouth 3 (three) times a day   isosorbide mononitrate (IMDUR) 120 mg 24 hr tablet   No No   Sig: Take 1 tablet (120 mg total) by mouth daily   metoprolol succinate (TOPROL-XL) 50 mg 24 hr tablet   No No   Sig: Take 1 tablet (50 mg total) by mouth 2 (two) times a day   nicotine (NICODERM CQ) 14 mg/24hr TD 24 hr patch   No No   Sig: Place 1 patch on the skin over 24 hours every 24 hours   nitroglycerin (NITROSTAT) 0.4 mg SL tablet   No No   Sig: Place 1 tablet (0.4 mg total) under the tongue every 5 (five) minutes as needed for chest pain   ranolazine (RANEXA) 1000 MG SR tablet   No  "No   Sig: Take 1 tablet (1,000 mg total) by mouth every 12 (twelve) hours   silver sulfadiazine (SILVADENE,SSD) 1 % cream   No No   Sig: Apply topically daily   sodium bicarbonate 650 mg tablet   No No   Sig: Take 2 tablets (1,300 mg total) by mouth 3 (three) times daily after meals   venlafaxine (EFFEXOR-XR) 75 mg 24 hr capsule   No No   Sig: Take 1 capsule (75 mg total) by mouth daily with breakfast      Facility-Administered Medications: None       No Known Allergies    OBJECTIVE:  /66   Pulse 60   Temp 97.8 °F (36.6 °C)   Resp 14   Ht 5' 7\" (1.702 m)   Wt 130 kg (287 lb 7.7 oz)   LMP 04/15/2024 (Approximate)   SpO2 99%   BMI 45.03 kg/m²   Nursing notes reviewed.  Physical Exam  Vitals reviewed.   Constitutional:       Appearance: She is obese. She is ill-appearing.   HENT:      Head: Normocephalic and atraumatic.   Cardiovascular:      Rate and Rhythm: Normal rate.   Pulmonary:      Effort: Pulmonary effort is normal.   Skin:     Findings: No rash.   Neurological:      Mental Status: She is alert and oriented to person, place, and time.   Psychiatric:         Mood and Affect: Mood normal.         Behavior: Behavior normal.       Lab Results: I have personally reviewed pertinent labs.    HEMATOLOGY PROFILE:  Results from last 7 days   Lab Units 05/16/24  0517 05/15/24  0120   WBC Thousand/uL 10.88* 12.78*   HEMOGLOBIN g/dL 11.4* 12.5   HEMATOCRIT % 37.5 41.0   PLATELETS Thousands/uL 286 309   SEGS PCT % 67 68   MONO PCT % 8 9   EOS PCT % 3 3       CHEMISTRY PROFILE:  Results from last 7 days   Lab Units 05/16/24  0517 05/15/24  0120   SODIUM mmol/L 141 141   POTASSIUM mmol/L 5.0 4.0   CHLORIDE mmol/L 113* 110*   CO2 mmol/L 20* 19*   BUN mg/dL 40* 40*   CREATININE mg/dL 4.27* 3.85*   ALBUMIN g/dL 3.7 4.1   CALCIUM mg/dL 9.4 9.4   ALK PHOS U/L 107* 119*   ALT U/L 13 13   AST U/L 10* 9*       Albumin:  0   Lab Value Date/Time    ALB 3.7 05/16/2024 0517    ALB 2.8 (L) 10/14/2018 1540     Imaging " "Studies: I have personally reviewed pertinent reports.  EKG, Pathology, and Other Studies: I have personally reviewed pertinent reports.    Counseling / Coordination of Care:  Total floor / unit time spent today 60 minutes. Greater than 50% of total time was spent with the patient and / or family counseling and / or coordination of care. A description of the counseling / coordination of care: symptom assessment and management, medication review, psychosocial support, chart review, imaging review, lab review, advanced directives, goals of care, hospice services, supportive listening, and anticipatory guidance.    Yosef Chester PA-C  [facility] Support[facility]  [phone]    Portions of this document may have been created using dictation software and as such some \"sound alike\" terms may have been generated by the system. Do not hesitate to contact me with any questions or clarifications.       "

## 2024-05-16 NOTE — ASSESSMENT & PLAN NOTE
Mild troponin elevation on arrival  In setting of CKD stage IV  Appreciate input of cardiology who followed  Patient is going to transition to hospice services after discussion with palliative care today.  Ambulatory referral placed.

## 2024-05-16 NOTE — ASSESSMENT & PLAN NOTE
Hx of Alport syndrome  Follows with nephrology  Lab Results   Component Value Date    EGFR 11 05/16/2024    EGFR 13 05/15/2024    EGFR 11 05/08/2024    CREATININE 4.27 (H) 05/16/2024    CREATININE 3.85 (H) 05/15/2024    CREATININE 4.30 (H) 05/08/2024

## 2024-05-16 NOTE — ASSESSMENT & PLAN NOTE
Decisional apparatus:  Patient is competent on exam today.  If competency is lost, patient's substitute decision maker would default to her mother Brianne Wheeler () per POA  Advance Directive / Living Will / POLST / POA Forms: Not on file.     Goals  Level 1 code status.   Full code.   Disease focused care.   No limits placed.   Met with patient and cardiology team - discussed palliative vs hospice. Answered all questions.   Patient would like to be discharged home today  She would like to pursue home hospice - ambulatory referral placed to [facility] hospice  Plan for discharge home today.

## 2024-05-16 NOTE — CASE MANAGEMENT
Case Management Assessment & Discharge Planning Note    Patient name Elise Ken  Location /-01 MRN [id number]  : 1975 Date 2024       Current Admission Date: 5/15/2024  Current Admission Diagnosis:Chest pain   Patient Active Problem List    Diagnosis Date Noted Date Diagnosed    Palliative care by specialist 2024     Goals of care, counseling/discussion 2024     Elevated troponin 05/15/2024     ANTHONY (acute kidney injury) ([facility]) 2024     Leukocytosis 2024     H/O non-insulin dependent diabetes mellitus 2024     Ambulatory dysfunction 10/02/2023     New onset type 2 diabetes mellitus ([facility]) 2023     Non-STEMI (non-ST elevated myocardial infarction) ([facility]) 2023     Agoraphobia 2023     S/P drug eluting coronary stent placement 2023     Secondary hyperparathyroidism of renal origin ([facility]) 10/24/2022     Social anxiety disorder 2022     Alport syndrome 2021     Chronic fatigue 10/21/2021     CKD (chronic kidney disease) stage 4, GFR 15-29 ml/min ([facility]) 2021     Elevated brain natriuretic peptide (BNP) level 2021     Benign hypertension with CKD (chronic kidney disease) stage III ([facility]) 2021     Nocturnal hypoxemia      ELIAZAR (obstructive sleep apnea)      Sinus pause 2021     Chronic kidney disease-mineral and bone disorder 2021     Chronic tubulo-interstitial nephritis 2021     COPD (chronic obstructive pulmonary disease) ([facility]) 2021     Hypertrophic cardiomyopathy ([facility]) 2021     Proteinuria 10/29/2020     Iron deficiency anemia secondary to inadequate dietary iron intake 10/13/2020     Nabothian cyst 2020     Pelvic floor dysfunction 2020     Cervical motion tenderness 2020     Cervical cyst 2020     Mixed hyperlipidemia 2020     Morbid obesity with BMI of 45.0-49.9, adult ([facility]) 2020     Lump of left breast 2020     Non-Hodgkin lymphoma of lymph  nodes of multiple regions ([facility]) 07/08/2020     Major depressive disorder with current active episode 11/04/2019     Anxiety 10/14/2018     Coronary artery disease 10/14/2018     Chest pain 03/11/2017     Essential hypertension 03/11/2017     Tobacco abuse 03/11/2017     Chronic diastolic congestive heart failure (HCC) 03/11/2017       LOS (days): 1  Geometric Mean LOS (GMLOS) (days): 1.8  Days to GMLOS:0.4     OBJECTIVE:    Risk of Unplanned Readmission Score: 33.6         Current admission status: Inpatient       Preferred Pharmacy:   [id number] - [place] - [address]  [address]  [place] [address]  Phone: [phone] Fax: [phone]    Primary Care Provider: Isaak Stanford DO    Primary Insurance: [employer]  Secondary Insurance:     ASSESSMENT:  Active Health Care Proxies    There are no active Health Care Proxies on file.       Advance Directives  Does patient have a Health Care POA?: Yes  Does patient have Advance Directives?: Yes  Advance Directives: Living will, Power of  for health care  Primary Contact: Edson Lance         Readmission Root Cause  30 Day Readmission: Yes  Who directed you to return to the hospital?: Self  Did you understand whom to contact if you had questions or problems?: Yes  Did you get your prescriptions before you left the hospital?: Yes  Were you able to get your prescriptions filled when you left the hospital?: Yes  Did you take your medications as prescribed?: Yes  Were you able to get to your follow-up appointments?: No  Reason:: Readmitted prior to appointment  During previous admission, was a post-acute recommendation made?: No (Pt was discharged home with no needs)  Patient was readmitted due to: Chest pain , Angina, Elevated troponin ,Amb dysfunction.  Action Plan: I will continue to follow for any Case Management needs.    Patient Information  Admitted from:: Home  Mental Status: Alert  During  Assessment patient was accompanied by: Daughter  Assessment information provided by:: Patient  Primary Caregiver: Self  Support Systems: Son, Daughter, Parent  County of Residence: [place]  What city do you live in?: [place]  Home entry access options. Select all that apply.: Stairs  Number of steps to enter home.: 8  Do the steps have railings?: Yes  Type of Current Residence: [address] home  Upon entering residence, is there a bedroom on the main floor (no further steps)?: Yes  Upon entering residence, is there a bathroom on the main floor (no further steps)?: Yes  Living Arrangements: Lives w/ Son, Lives w/ Daughter (Pt lives with Adult son and daughter.)  Is patient a ?: Yes (Pt served in the [employer] .)  Is patient active with VA ([employer])?: No    Activities of Daily Living Prior to Admission  Functional Status: Independent  Completes ADLs independently?: Yes  Ambulates independently?: Yes (Pt uses walker to ambulate)  Does patient use assisted devices?: Yes  Assisted Devices (DME) used: Wheelchair, Walker  Does patient currently own DME?: Yes  What DME does the patient currently own?: Walker, Wheelchair  Does patient have a history of Outpatient Therapy (PT/OT)?: No  Does the patient have a history of Short-Term Rehab?: No  Does patient have a history of HHC?: No  Does patient currently have HHC?: No         Patient Information Continued  Income Source: SSI/SSD  Does patient have prescription coverage?: Yes  Does patient receive dialysis treatments?: No  Does patient have a history of substance abuse?: No  Does patient have a history of Mental Health Diagnosis?: No         Means of Transportation  Means of Transport to Appts:: Family transport (Pt's son and daughter both drive .)  [place] Application:  (Pt refused .)      Social Determinants of Health (SDOH)      Flowsheet Row Most Recent Value   Housing Stability    In the last 12 months, was there a time when you were not able to pay the mortgage or  rent on time? N   In the past 12 months, how many times have you moved where you were living? 1   At any time in the past 12 months, were you homeless or living in a shelter (including now)? N   Transportation Needs    In the past 12 months, has lack of transportation kept you from medical appointments or from getting medications? no   In the past 12 months, has lack of transportation kept you from meetings, work, or from getting things needed for daily living? No   Food Insecurity    Within the past 12 months, you worried that your food would run out before you got the money to buy more. Never true   Within the past 12 months, the food you bought just didn't last and you didn't have money to get more. Never true   Utilities    In the past 12 months has the electric, gas, oil, or water company threatened to shut off services in your home? No            DISCHARGE DETAILS:    Discharge planning discussed with:: Pt and daughter was in the room  Freedom of Choice: Yes     CM contacted family/caregiver?: No- see comments (Daughter was present in the room)  Were Treatment Team discharge recommendations reviewed with patient/caregiver?: Yes  Did patient/caregiver verbalize understanding of patient care needs?: Yes  Were patient/caregiver advised of the risks associated with not following Treatment Team discharge recommendations?: Yes         Requested Home Health Care         Is the patient interested in HHC at discharge?: No    DME Referral Provided  Referral made for DME?: No    Other Referral/Resources/Interventions Provided:  Interventions: Hospice (Pt would like a hospice referral . Pt would like to talk to hospice liaison at home .)    Would you like to participate in our [facility] service program?  : No - Declined    Treatment Team Recommendation: Home  Discharge Destination Plan:: Home  Transport at Discharge : Automobile, Family            Pt lives with her adult son and daughter. Pt is independent with  ADL's and functional mobility. Pt is being discharged today. Pt would like a hospice referral to [facility]. She would like hospice liaison to reach out to patient at home .Hospice referral was made to [facility]. I notified Hospice liaison Anne-Marie that patient would like an information session once she got home. Anne-Marie reached out to patient and will call the patient tomorrow with a hospice info session tomorrow at 1300. Discussed with patient preferences on discharge : Understanding how to manage health at home , purpose of taking meds, importance of follow up appointments and symptoms to watch out for. Nurse to review AVS with patient. All follow up providers listed . Daughter To transport the patient home.

## 2024-05-16 NOTE — ASSESSMENT & PLAN NOTE
Lab Results   Component Value Date    EGFR 11 05/16/2024    EGFR 13 05/15/2024    EGFR 11 05/08/2024    CREATININE 4.27 (H) 05/16/2024    CREATININE 3.85 (H) 05/15/2024    CREATININE 4.30 (H) 05/08/2024     In the setting of alport syndrome   New baseline creatinine appears to be 3.8-4.4  Was following with nephrology in outpatient setting  After discussion with palliative care today patient wants to be transition to hospice services.  Ambulatory referral was made.  She is anxious to go home ASAP.  Case management aware.

## 2024-05-16 NOTE — ASSESSMENT & PLAN NOTE
On effexor.  Following with [facility] in [place].

## 2024-05-16 NOTE — ASSESSMENT & PLAN NOTE
Patient presented with crushing chest pain  EKG with no acute changes  Troponin elevation on arrival   Known history of CAD (see details under CAD)  Given ASA, Morphine and started on nitro gtt  Nitro drip has been weaned off and patient transition this to [facility] service today  Appreciate input of cardiology who followed-for discharge today on increased dose of Procardia twice a day  Patient denies any chest pain or discomfort today, anxious to go home  Palliative care met with patient-patient requested transition to hospice services.  Ambulatory referral made on discharge.  Case management following and aware.

## 2024-05-17 RX ORDER — NICOTINE 21 MG/24HR
1 PATCH, TRANSDERMAL 24 HOURS TRANSDERMAL EVERY 24 HOURS
Qty: 28 PATCH | Refills: 0 | Status: SHIPPED | OUTPATIENT
Start: 2024-05-17 | End: 2024-05-22

## 2024-05-23 ENCOUNTER — TELEPHONE (OUTPATIENT)
Age: 49
End: 2024-05-23

## 2024-05-23 NOTE — TELEPHONE ENCOUNTER
Sara called from  cremation states, death certificate not been received.    Please refax only Medical Death Certification form with cause of death signed by provider. Nothing else is needed.    Please fax to: [phone].   Thank you.

## 2024-05-23 NOTE — TELEPHONE ENCOUNTER
Fax is not going through to numbers provided   [phone] and [phone]    I did call to inform them and the call center was going to reach out and let them know

## 2024-05-23 NOTE — TELEPHONE ENCOUNTER
Sara called to check status - death certificate to confirm that form was received. Form to be completed by   Dr Armstrong to sign and fax back.  Office confirmed form has been received       Please advise. Thank you.

## 2024-05-24 ENCOUNTER — HOME CARE VISIT (OUTPATIENT)
Dept: HOME HOSPICE | Facility: HOSPICE | Age: 49
End: 2024-05-24
Payer: COMMERCIAL

## 2024-09-16 NOTE — PRE-PROCEDURE INSTRUCTIONS
Pre-Surgery Instructions:   Medication Instructions    albuterol (PROVENTIL HFA,VENTOLIN HFA) 90 mcg/act inhaler Patient was instructed by Physician and understands   ARIPiprazole (ABILIFY) 10 mg tablet Patient was instructed by Physician and understands   aspirin 81 mg chewable tablet Patient was instructed by Physician and understands   atorvastatin (LIPITOR) 80 mg tablet Patient was instructed by Physician and understands   colchicine (COLCRYS) 0 6 mg tablet Patient was instructed by Physician and understands   ergocalciferol (VITAMIN D2) 50,000 units Patient was instructed by Physician and understands   fluticasone (FLONASE) 50 mcg/act nasal spray Patient was instructed by Physician and understands   lisinopril (ZESTRIL) 5 mg tablet Patient was instructed by Physician and understands   LORazepam (ATIVAN) 0 5 mg tablet Patient was instructed by Physician and understands   nicotine (NICODERM CQ) 14 mg/24hr TD 24 hr patch Patient was instructed by Physician and understands   nitroglycerin (NITROSTAT) 0 4 mg SL tablet Patient was instructed by Physician and understands   sertraline (ZOLOFT) 100 mg tablet Patient was instructed by Physician and understands   ticagrelor (BRILINTA) 90 MG Instructed patient per Anesthesia Guidelines  Pre-op and bathing instructions given  Pt reports surgeons office advised her to take all of her meds morning of surgery except for the metoprolol which pt was advised to hold last Thursday 5-20-21  Pt will take ativan am DOS and will not wear nicotine patch  Pt has hibiclens for 2 pre-op showers 
all other ROS negative except as per HPI

## 2025-04-01 NOTE — PROGRESS NOTES
Quality [facility]: Preventive Care And Screening: Tobacco Use: Screening And Cessation Intervention: Patient screened for tobacco use and is an ex/non-smoker [facility] - Outpatient Consultation  Joseph Sizer 42 y o  female MRN: [id number]  Encounter: [id number]          ASSESSMENT AND PLAN:    1  Bloody stool  2  Other iron deficiency anemias  3  Alternating constipation and diarrhea  4  Generalized abdominal pain  5  Bloating     Patient reports several months of feeling bloated with abdominal pain  She also reports bloody stools with large blood clots a couple times a week  She has a history of iron deficiency anemia, last hemoglobin on August 24, 2021 was 14 6  Patient reports alternating constipation diarrhea  Sometimes her stool is little staci and sometimes it is loose  She has taken a laxative in the past with some relief  She reports feeling very bloated with generalized abdominal pain and has used over-the-counter simethicone with some relief  Would recommend colonoscopy at this time to evaluate for etiology such as microscopic colitis, IBD, or malignancy  Patient is on Brilinta daily and will require to be off for 5 days prior to procedure  Will address with cardiology to be sure this can be self only held  Process, risks and benefits of colonoscopy discussed with patient, she is agreeable  - Ambulatory referral to Gastroenterology  - bisacodyl (DULCOLAX) 5 mg EC tablet; Take for prep as directed  Dispense: 1 tablet; Refill: 0  - polyethylene glycol (GLYCOLAX) 17 GM/SCOOP powder; Take as instructed for prep  Dispense: 238 g; Refill: 0  - Colonoscopy; Future    6  Gastroesophageal reflux disease, unspecified whether esophagitis present  7  Nausea    Patient reporting several months of heartburn and nausea  Patient has tried Tums with relief  She reports a lot of belching and a sour taste in her mouth  She denies vomiting or trouble swallowing  Would recommend EGD at this time to evaluate for etiology such as but not limited to peptic ulcer disease, reflux esophagitis, or EOE    Will order stool for H pylori and Detail Level: Detailed celiac panel  Will start patient on a daily H2 blocker at this time versus PPI due to CKD  Process, risks and benefits of EGD discussed with patient, she is agreeable  - EGD; Future  - H  pylori antigen, stool; Future  - Tissue transglutaminase, IgA; Future  - IgA; Future  - famotidine (PEPCID) 40 MG tablet; Take 1 tablet (40 mg total) by mouth daily  Dispense: 30 tablet; Refill: 5    Will see patient back after procedure   ______________________________________________________________________    HPI:  Lee Carranza is a 42year old female with a past medical history of CKD stage 3, iron deficiency anemia, CHF,  CAD with hypertrophic cardiomyopathy, with pacemaker, history of MI in March on Brilinta, ELIAZAR, hypertension and COPD that presents today with several months of generalized abdominal pain, bloating, bloody and maroon stools, diarrhea, constipation, heartburn and nausea  Patient reports that she feels bloated with generalized abdominal pain most of the time  She has used over-the-counter simethicone with some relief  She reports bright red blood in the toilet as well as blood clots that she describes as being maroon  Last hemoglobin on August 24, 2021 was 14 6  She reports alternating bowel habits and reports this has been ongoing for many years  She does not associated with any specific foods  She has tried a laxative with good relief  She reports this has gotten worse over the course of months and now happens a couple times a week  Patient reports nausea and heartburn most of the time  She reports only trying Tums with some relief  She reports a lot of belching with a sour taste in her mouth  She reports a poor appetite due to nausea and reflux however denies vomiting or unexpected weight loss  She denies family history of colon cancer  Patient has never had a colonoscopy or EGD  REVIEW OF SYSTEMS:    Review of Systems   Constitutional: Positive for fatigue   Negative for unexpected weight change  HENT: Negative for trouble swallowing  Gastrointestinal: Positive for abdominal distention, abdominal pain, anal bleeding, blood in stool, constipation, diarrhea and nausea  Negative for rectal pain and vomiting  Musculoskeletal: Positive for back pain  Neurological: Negative for dizziness  Psychiatric/Behavioral: The patient is nervous/anxious            Historical Information   Past Medical History:   Diagnosis Date    Acute MI ([place] [address] )     Anxiety     CAD (coronary artery disease)     GREG mid RCA and GREG right PDA 3/25/2021    Cardiomyopathy ([place] [address] )     CHF (congestive heart failure) ([facility])     Chronic kidney disease     COPD (chronic obstructive pulmonary disease) ([facility])     scheduled for sleep apnea test soon after pacemaker implant    Depression     History of chemotherapy     non hodgkins lymphoma     Hypertension     Lymphoma, non-Hodgkin's ([facility])     Myocardial infarction ([place] [address] )     SSS (sick sinus syndrome) ([facility])     s/p medtronic dual chamber pacemaker 2021    Tobacco abuse     Wears glasses      Past Surgical History:   Procedure Laterality Date    CARDIAC PACEMAKER PLACEMENT Left 2021    Procedure: DUAL LEAD PACEMAKER IMPLANT;  Surgeon: Kelsey Bocanegra MD;  Location: [address] [place] [address];  Service: Cardiology    CAROTID [address] [place] [address]      CORONARY ANGIOPLASTY WITH STENT PLACEMENT  2021    GREG mid RCA and GREG right PDA    DENTAL SURGERY       Social History   Social History     Substance and Sexual Activity   Alcohol Use Yes    Comment: 1-2 times years     Social History     Substance and Sexual Activity   Drug Use Never     Social History     Tobacco Use   Smoking Status Current Every Day Smoker    Packs/day: 1 00    Types: Cigarettes    Last attempt to quit: 3/29/2021    Years since quittin 4   Smokeless Tobacco Current User     Family History   Problem Relation Age of Onset    No Known Problems Mother     No Known Problems Father  No Known Problems Daughter     Brain cancer Maternal Grandfather     No Known Problems Paternal Aunt     No Known Problems Paternal Aunt        Meds/Allergies       Current Outpatient Medications:     albuterol (PROVENTIL HFA,VENTOLIN HFA) 90 mcg/act inhaler    ARIPiprazole (ABILIFY) 10 mg tablet    aspirin 81 mg chewable tablet    atorvastatin (LIPITOR) 80 mg tablet    Diclofenac Sodium (VOLTAREN) 1 %    ergocalciferol (VITAMIN D2) 50,000 units    furosemide (LASIX) 20 mg tablet    lisinopril (ZESTRIL) 5 mg tablet    LORazepam (ATIVAN) 0 5 mg tablet    methocarbamol (ROBAXIN) 500 mg tablet    metoprolol tartrate (LOPRESSOR) 50 mg tablet    nitroglycerin (NITROSTAT) 0 4 mg SL tablet    sertraline (ZOLOFT) 100 mg tablet    ticagrelor (BRILINTA) 90 MG    No Known Allergies        Objective     Last menstrual period 08/25/2021, not currently breastfeeding  There is no height or weight on file to calculate BMI  PHYSICAL EXAM:      General Appearance:   Alert, cooperative, no distress   HEENT:   Normocephalic, atraumatic, anicteric  Neck:  Symmetrical, trachea midline   Lungs:   Clear to auscultation bilaterally; no rales, rhonchi or wheezing; respirations unlabored    Heart[de-identified]   Regular rate and rhythm; no murmur, rub, or gallop  Abdomen:   Soft, generalized tenderness throughout, non-distended; normal bowel sounds; no masses, no organomegaly    Genitalia:   Deferred    Rectal:   Deferred    Skin:  No jaundice, rashes, or lesions             Lab Results:   No visits with results within 1 Day(s) from this visit     Latest known visit with results is:   Appointment on 09/07/2021   Component Date Value    Hepatitis B Surface Ag 09/07/2021 Non-reactive     Hepatitis C Ab 09/07/2021 Non-reactive     Hep B C IgM 09/07/2021 Non-reactive     Hep B Core Total Ab 09/07/2021 Non-reactive     ds DNA Ab 09/07/2021 <1     C-ANCA 09/07/2021 <1:20     Atypical pANCA 09/07/2021 <1:20     MPO AB 09/07/2021 <9 0     WI-3 AB 09/07/2021 <3 5     P-ANCA 09/07/2021 <1:20     GBM Ab 09/07/2021 3     Compl, Total (CH50) 09/07/2021 >60     Sodium 09/07/2021 135*    Potassium 09/07/2021 4 4     Chloride 09/07/2021 109*    CO2 09/07/2021 19*    ANION GAP 09/07/2021 7     BUN 09/07/2021 28*    Creatinine 09/07/2021 2 02*    Glucose, Fasting 09/07/2021 94     Calcium 09/07/2021 9 5     eGFR 09/07/2021 29     NT-proBNP 09/07/2021 944*    Iron Saturation 09/07/2021 13*    TIBC 09/07/2021 380     Iron 09/07/2021 50     Ferritin 09/07/2021 20     ANTICARDIOLIPIN IGG ANTI* 09/07/2021 0 7     ANTICARDIOLIPIN IGA ANTI* 09/07/2021 3 4     ANTICARDIOLIPIN IGM ANTI* 09/07/2021 <0 8     HIV-1 RNA by PCR, Qn 09/07/2021 <20     HIV-1 RNA Viral Load Log 09/07/2021 COMMENT     24H Urine Volume 09/09/2021 2,450     Protein, 24H Urine 09/09/2021 857 5*         Radiology Results:   XR chest pa & lateral    Result Date: 9/10/2021  Narrative: CHEST INDICATION:   I50 9: Heart failure, unspecified I42 2: Other hypertrophic cardiomyopathy  COMPARISON:  7/24/2021 EXAM PERFORMED/VIEWS:  XR CHEST PA & LATERAL FINDINGS:  Left-sided chest wall intracardiac device is identified  Leads are intact  Cardiomediastinal silhouette appears unremarkable  The lungs are clear  No pneumothorax or pleural effusion  Osseous structures appear within normal limits for patient age  Mild elevation right hemidiaphragm  Impression: No acute cardiopulmonary disease  Workstation performed: [id number]     XR spine lumbar minimum 4 views non injury    Result Date: 9/14/2021  Narrative: LUMBAR SPINE INDICATION:   S39 012A: Strain of muscle, fascia and tendon of lower back, initial encounter  COMPARISON:  4/15/2016 VIEWS:  XR SPINE LUMBAR MINIMUM 4 VIEWS NON INJURY FINDINGS: There are 5 non rib bearing lumbar vertebral bodies  There is no evidence of acute fracture or destructive osseous lesion  Anatomic alignment    Lumbar lordosis straightening  L4, L5 tiny osteophytes  L5-S1 mild disc space narrowing  The pedicles appear intact  Soft tissues are unremarkable  Impression: Lumbar lordosis straightening   No acute osseous abnormality Workstation performed: [id number]

## 2025-06-17 NOTE — ASSESSMENT & PLAN NOTE
Problem: Pain  Goal: Verbalizes/displays adequate comfort level or baseline comfort level  Outcome: Progressing  Flowsheets (Taken 6/17/2025 0619)  Verbalizes/displays adequate comfort level or baseline comfort level:   Encourage patient to monitor pain and request assistance   Assess pain using appropriate pain scale     Problem: Safety - Adult  Goal: Free from fall injury  Outcome: Progressing  Flowsheets (Taken 6/17/2025 0619)  Free From Fall Injury: Instruct family/caregiver on patient safety     Problem: ABCDS Injury Assessment  Goal: Absence of physical injury  Outcome: Progressing  Flowsheets (Taken 6/17/2025 0619)  Absence of Physical Injury: Implement safety measures based on patient assessment     Problem: Hematologic - Adult  Goal: Maintains hematologic stability  Outcome: Progressing  Flowsheets (Taken 6/17/2025 0619)  Maintains hematologic stability:   Assess for signs and symptoms of bleeding or hemorrhage   Monitor labs for bleeding or clotting disorders      Noted on prior cardiac MRI  TTE 01/02/2023: LV cavity normal increased wall thickness   LVEF 45% mild reduction in systolic function, LA moderately dilated, AV/MV/TV mild regurg, ascending aorta mildly dilated  careful use of nitrates/diuretics to avoid loss of preload  Bumex on hold

## (undated) DEVICE — PINNACLE INTRODUCER SHEATH: Brand: PINNACLE

## (undated) DEVICE — RUNTHROUGH NS EXTRA FLOPPY PTCA GUIDEWIRE: Brand: RUNTHROUGH

## (undated) DEVICE — THROMBECTOMY CAT RX INDIGO SYS LRG 140CM

## (undated) DEVICE — BETHLEHEM UNIVERSAL MINOR GEN: Brand: CARDINAL HEALTH

## (undated) DEVICE — MINI TREK CORONARY DILATATION CATHETER 2.0 MM X 15 MM / RAPID-EXCHANGE: Brand: MINI TREK

## (undated) DEVICE — INTENDED FOR TISSUE SEPARATION, AND OTHER PROCEDURES THAT REQUIRE A SHARP SURGICAL BLADE TO PUNCTURE OR CUT.: Brand: BARD-PARKER ® CARBON RIB-BACK BLADES

## (undated) DEVICE — ADHESIVE SKIN HIGH VISCOSITY EXOFIN 1ML

## (undated) DEVICE — CHLORAPREP HI-LITE 26ML ORANGE

## (undated) DEVICE — DGW .035 FC J3MM 150CM TEF: Brand: EMERALD

## (undated) DEVICE — CATH DIAG 5FR IMPULSE 100CM FL4.5

## (undated) DEVICE — 3M™ DEFIBRULATOR PADS 2346N: Brand: 3M™

## (undated) DEVICE — INTRO SHEATH SAFE 7FR ULTRA TEAR AWAY

## (undated) DEVICE — CATH DIAG 5FR IMPULSE 100CM FL4

## (undated) DEVICE — CATH GUIDE LAUNCHER 5FR EBU 4.0

## (undated) DEVICE — THROMBECTOMY SET: Brand: ANGIOJET® SPIROFLEX®

## (undated) DEVICE — MICROPUNCTURE INTRODUCER SET SILHOUETTE TRANSITIONLESS PUSH-PLUS DESIGN - STIFFENED CANNULA WITH NITINOL WIRE GUIDE: Brand: MICROPUNCTURE

## (undated) DEVICE — CATH DIAG 5FR IMPULSE 100CM FR4

## (undated) DEVICE — THROMBECTOMY CAT RX INGIGO CANISTER PUMP ENGINE

## (undated) DEVICE — VIOLET BRAIDED (POLYGLACTIN 910), SYNTHETIC ABSORBABLE SUTURE: Brand: COATED VICRYL

## (undated) DEVICE — DRAPE C-ARM X-RAY

## (undated) DEVICE — SUT VICRYL 4-0 P-3 18 IN J494G

## (undated) DEVICE — HI-TORQUE BALANCE MIDDLEWEIGHT GUIDE WIRE W/HYDROCOAT .014 J TIP 3.0 CM X 190 CM: Brand: HI-TORQUE BALANCE MIDDLEWEIGHT

## (undated) DEVICE — NEEDLE 25G X 1 1/2

## (undated) DEVICE — BULB SYRINGE,IRRIGATION WITH PROTECTIVE CAP: Brand: DOVER

## (undated) DEVICE — CATH GUIDE LAUNCHER 6FR EBU 4.0

## (undated) DEVICE — SUT SILK 0 CT-1 30 IN 424H

## (undated) DEVICE — 3M™ STERI-DRAPE™ INCISE DRAPE 1040 (35CM X 35CM): Brand: STERI-DRAPE™

## (undated) DEVICE — GLOVE SRG BIOGEL 7.5

## (undated) DEVICE — PLUMEPEN PRO 10FT

## (undated) DEVICE — Device

## (undated) DEVICE — BAG DECANTER